# Patient Record
Sex: FEMALE | Race: WHITE | NOT HISPANIC OR LATINO | Employment: PART TIME | URBAN - METROPOLITAN AREA
[De-identification: names, ages, dates, MRNs, and addresses within clinical notes are randomized per-mention and may not be internally consistent; named-entity substitution may affect disease eponyms.]

---

## 2017-01-06 ENCOUNTER — CONVERSION ENCOUNTER (OUTPATIENT)
Dept: FAMILY MEDICINE CLINIC | Facility: CLINIC | Age: 52
End: 2017-01-06

## 2017-02-21 ENCOUNTER — CONVERSION ENCOUNTER (OUTPATIENT)
Dept: FAMILY MEDICINE CLINIC | Facility: CLINIC | Age: 52
End: 2017-02-21

## 2017-03-29 ENCOUNTER — HOSPITAL ENCOUNTER (OUTPATIENT)
Dept: CARDIOLOGY | Facility: HOSPITAL | Age: 52
Discharge: HOME | End: 2017-03-29
Attending: SURGERY | Admitting: SURGERY

## 2017-03-29 ENCOUNTER — CONVERSION ENCOUNTER (OUTPATIENT)
Dept: FAMILY MEDICINE CLINIC | Facility: CLINIC | Age: 52
End: 2017-03-29

## 2017-04-18 ENCOUNTER — HOSPITAL ENCOUNTER (OUTPATIENT)
Dept: INTERVENTIONAL RADIOLOGY/VASCULAR | Facility: HOSPITAL | Age: 52
Discharge: HOME | End: 2017-04-18
Attending: ORTHOPAEDIC SURGERY | Admitting: ORTHOPAEDIC SURGERY

## 2017-05-03 ENCOUNTER — CONVERSION ENCOUNTER (OUTPATIENT)
Dept: FAMILY MEDICINE CLINIC | Facility: CLINIC | Age: 52
End: 2017-05-03

## 2017-06-08 ENCOUNTER — CONVERSION ENCOUNTER (OUTPATIENT)
Dept: FAMILY MEDICINE CLINIC | Facility: CLINIC | Age: 52
End: 2017-06-08

## 2017-06-08 ENCOUNTER — HOSPITAL ENCOUNTER (OUTPATIENT)
Dept: ORTHOPEDIC SURGERY | Facility: CLINIC | Age: 52
Discharge: HOME | End: 2017-06-08
Attending: ORTHOPAEDIC SURGERY | Admitting: ORTHOPAEDIC SURGERY

## 2017-07-13 ENCOUNTER — CONVERSION ENCOUNTER (OUTPATIENT)
Dept: FAMILY MEDICINE CLINIC | Facility: CLINIC | Age: 52
End: 2017-07-13

## 2017-07-26 ENCOUNTER — CONVERSION ENCOUNTER (OUTPATIENT)
Dept: FAMILY MEDICINE CLINIC | Facility: CLINIC | Age: 52
End: 2017-07-26

## 2017-08-15 ENCOUNTER — CONVERSION ENCOUNTER (OUTPATIENT)
Dept: FAMILY MEDICINE CLINIC | Facility: CLINIC | Age: 52
End: 2017-08-15

## 2017-08-15 ENCOUNTER — HOSPITAL ENCOUNTER (OUTPATIENT)
Dept: ORTHOPEDIC SURGERY | Facility: CLINIC | Age: 52
Discharge: HOME | End: 2017-08-15
Attending: PHYSICIAN ASSISTANT | Admitting: PHYSICIAN ASSISTANT

## 2017-10-05 ENCOUNTER — CONVERSION ENCOUNTER (OUTPATIENT)
Dept: FAMILY MEDICINE CLINIC | Facility: CLINIC | Age: 52
End: 2017-10-05

## 2018-02-19 ENCOUNTER — CONVERSION ENCOUNTER (OUTPATIENT)
Dept: FAMILY MEDICINE CLINIC | Facility: CLINIC | Age: 53
End: 2018-02-19

## 2018-04-24 ENCOUNTER — CONVERSION ENCOUNTER (OUTPATIENT)
Dept: FAMILY MEDICINE CLINIC | Facility: CLINIC | Age: 53
End: 2018-04-24

## 2018-04-24 ENCOUNTER — HOSPITAL ENCOUNTER (OUTPATIENT)
Dept: FAMILY MEDICINE CLINIC | Facility: CLINIC | Age: 53
Setting detail: SPECIMEN
Discharge: HOME | End: 2018-04-24
Attending: NURSE PRACTITIONER | Admitting: NURSE PRACTITIONER

## 2018-04-26 LAB
BACTERIA SPEC AEROBE CULT: NORMAL
Lab: NORMAL
MICRO REPORT STATUS: NORMAL
SPECIMEN SOURCE: NORMAL

## 2018-11-27 ENCOUNTER — HOSPITAL ENCOUNTER (OUTPATIENT)
Dept: ORTHOPEDIC SURGERY | Facility: CLINIC | Age: 53
Discharge: HOME | End: 2018-11-27
Attending: PHYSICIAN ASSISTANT | Admitting: PHYSICIAN ASSISTANT

## 2018-11-27 ENCOUNTER — CONVERSION ENCOUNTER (OUTPATIENT)
Dept: FAMILY MEDICINE CLINIC | Facility: CLINIC | Age: 53
End: 2018-11-27

## 2018-11-29 ENCOUNTER — HOSPITAL ENCOUNTER (OUTPATIENT)
Dept: FAMILY MEDICINE CLINIC | Facility: CLINIC | Age: 53
Setting detail: SPECIMEN
Discharge: HOME | End: 2018-11-29
Attending: PHYSICIAN ASSISTANT | Admitting: PHYSICIAN ASSISTANT

## 2018-11-29 ENCOUNTER — CONVERSION ENCOUNTER (OUTPATIENT)
Dept: FAMILY MEDICINE CLINIC | Facility: CLINIC | Age: 53
End: 2018-11-29

## 2018-11-29 LAB
ALBUMIN SERPL-MCNC: 4.5 G/DL (ref 3.5–4.8)
ALBUMIN/GLOB SERPL: 1.3 {RATIO} (ref 1–1.7)
ALP SERPL-CCNC: 88 IU/L (ref 32–91)
ALT SERPL-CCNC: 42 IU/L (ref 14–54)
ANION GAP SERPL CALC-SCNC: 13.7 MMOL/L (ref 10–20)
AST SERPL-CCNC: 66 IU/L (ref 15–41)
BILIRUB SERPL-MCNC: 0.6 MG/DL (ref 0.3–1.2)
BUN SERPL-MCNC: 18 MG/DL (ref 8–20)
BUN/CREAT SERPL: 30 (ref 5.4–26.2)
CALCIUM SERPL-MCNC: 9.6 MG/DL (ref 8.9–10.3)
CHLORIDE SERPL-SCNC: 100 MMOL/L (ref 101–111)
CHOLEST SERPL-MCNC: 257 MG/DL
CHOLEST/HDLC SERPL: 3.6 {RATIO}
CONV CO2: 26 MMOL/L (ref 22–32)
CONV LDL CHOLESTEROL DIRECT: 100 MG/DL (ref 0–100)
CONV TOTAL PROTEIN: 8 G/DL (ref 6.1–7.9)
CREAT UR-MCNC: 0.6 MG/DL (ref 0.4–1)
GLOBULIN UR ELPH-MCNC: 3.5 G/DL (ref 2.5–3.8)
GLUCOSE SERPL-MCNC: 82 MG/DL (ref 65–99)
HDLC SERPL-MCNC: 71 MG/DL
LDLC/HDLC SERPL: 1.4 {RATIO}
LIPID INTERPRETATION: ABNORMAL
POTASSIUM SERPL-SCNC: 4.7 MMOL/L (ref 3.6–5.1)
SODIUM SERPL-SCNC: 135 MMOL/L (ref 136–144)
TRIGL SERPL-MCNC: 704 MG/DL
VLDLC SERPL CALC-MCNC: 85.7 MG/DL

## 2018-11-30 LAB
ANA SER QL IA: NORMAL
CHROMATIN AB SERPL-ACNC: <20 [IU]/ML (ref 0–20)

## 2018-12-27 ENCOUNTER — HOSPITAL ENCOUNTER (OUTPATIENT)
Dept: ORTHOPEDIC SURGERY | Facility: CLINIC | Age: 53
Setting detail: SPECIMEN
Discharge: HOME | End: 2018-12-27
Attending: PHYSICIAN ASSISTANT | Admitting: PHYSICIAN ASSISTANT

## 2018-12-27 ENCOUNTER — CONVERSION ENCOUNTER (OUTPATIENT)
Dept: FAMILY MEDICINE CLINIC | Facility: CLINIC | Age: 53
End: 2018-12-27

## 2018-12-27 LAB
BASOPHILS # BLD AUTO: 0 10*3/UL (ref 0–0.2)
BASOPHILS NFR BLD AUTO: 1 % (ref 0–2)
DIFFERENTIAL METHOD BLD: (no result)
EOSINOPHIL # BLD AUTO: 0.1 10*3/UL (ref 0–0.3)
EOSINOPHIL # BLD AUTO: 2 % (ref 0–3)
ERYTHROCYTE [DISTWIDTH] IN BLOOD BY AUTOMATED COUNT: 13.6 % (ref 11.5–14.5)
HCT VFR BLD AUTO: 39.6 % (ref 35–49)
HGB BLD-MCNC: 13.5 G/DL (ref 12–15)
LYMPHOCYTES # BLD AUTO: 1.6 10*3/UL (ref 0.8–4.8)
LYMPHOCYTES NFR BLD AUTO: 32 % (ref 18–42)
MAGNESIUM SERPL-MCNC: 1.8 MG/DL (ref 1.8–2.5)
MCH RBC QN AUTO: 36.5 PG (ref 26–32)
MCHC RBC AUTO-ENTMCNC: 34.2 G/DL (ref 32–36)
MCV RBC AUTO: 106.9 FL (ref 80–94)
MONOCYTES # BLD AUTO: 0.4 10*3/UL (ref 0.1–1.3)
MONOCYTES NFR BLD AUTO: 8 % (ref 2–11)
NEUTROPHILS # BLD AUTO: 2.9 10*3/UL (ref 2.3–8.6)
NEUTROPHILS NFR BLD AUTO: 57 % (ref 50–75)
NRBC BLD AUTO-RTO: 0 /100{WBCS}
NRBC/RBC NFR BLD MANUAL: 0 10*3/UL
PHOSPHATE SERPL-MCNC: 3.4 MG/DL (ref 2.4–4.7)
PLATELET # BLD AUTO: 217 10*3/UL (ref 150–450)
PMV BLD AUTO: 7.4 FL (ref 7.4–10.4)
RBC # BLD AUTO: 3.71 10*6/UL (ref 4–5.4)
WBC # BLD AUTO: 5.1 10*3/UL (ref 4.5–11.5)

## 2018-12-28 ENCOUNTER — CONVERSION ENCOUNTER (OUTPATIENT)
Dept: FAMILY MEDICINE CLINIC | Facility: CLINIC | Age: 53
End: 2018-12-28

## 2019-03-11 ENCOUNTER — HOSPITAL ENCOUNTER (OUTPATIENT)
Dept: FAMILY MEDICINE CLINIC | Facility: CLINIC | Age: 54
Setting detail: SPECIMEN
Discharge: HOME | End: 2019-03-11
Attending: PHYSICIAN ASSISTANT | Admitting: PHYSICIAN ASSISTANT

## 2019-04-26 ENCOUNTER — HOSPITAL ENCOUNTER (OUTPATIENT)
Dept: URGENT CARE | Facility: CLINIC | Age: 54
Setting detail: SPECIMEN
Discharge: HOME | End: 2019-04-26
Attending: FAMILY MEDICINE | Admitting: FAMILY MEDICINE

## 2019-04-26 ENCOUNTER — CONVERSION ENCOUNTER (OUTPATIENT)
Dept: FAMILY MEDICINE CLINIC | Facility: CLINIC | Age: 54
End: 2019-04-26

## 2019-04-26 ENCOUNTER — HOSPITAL ENCOUNTER (OUTPATIENT)
Dept: URGENT CARE | Facility: CLINIC | Age: 54
Discharge: HOME | End: 2019-04-26
Attending: FAMILY MEDICINE | Admitting: FAMILY MEDICINE

## 2019-04-26 LAB
GRAM STN SPEC: NORMAL
Lab: NORMAL
MICRO REPORT STATUS: NORMAL
SPECIMEN SOURCE: NORMAL
SPECIMEN SOURCE: NORMAL

## 2019-06-04 VITALS
BODY MASS INDEX: 20.49 KG/M2 | DIASTOLIC BLOOD PRESSURE: 87 MMHG | WEIGHT: 105 LBS | BODY MASS INDEX: 18.37 KG/M2 | DIASTOLIC BLOOD PRESSURE: 98 MMHG | DIASTOLIC BLOOD PRESSURE: 90 MMHG | HEART RATE: 90 BPM | DIASTOLIC BLOOD PRESSURE: 90 MMHG | WEIGHT: 109 LBS | HEART RATE: 88 BPM | OXYGEN SATURATION: 99 % | OXYGEN SATURATION: 99 % | HEART RATE: 93 BPM | HEIGHT: 64 IN | WEIGHT: 110 LBS | OXYGEN SATURATION: 100 % | SYSTOLIC BLOOD PRESSURE: 131 MMHG | WEIGHT: 105 LBS | WEIGHT: 108 LBS | SYSTOLIC BLOOD PRESSURE: 167 MMHG | SYSTOLIC BLOOD PRESSURE: 147 MMHG | SYSTOLIC BLOOD PRESSURE: 145 MMHG | SYSTOLIC BLOOD PRESSURE: 162 MMHG | DIASTOLIC BLOOD PRESSURE: 89 MMHG | HEART RATE: 86 BPM | DIASTOLIC BLOOD PRESSURE: 122 MMHG | WEIGHT: 111 LBS | SYSTOLIC BLOOD PRESSURE: 168 MMHG | SYSTOLIC BLOOD PRESSURE: 168 MMHG | OXYGEN SATURATION: 99 % | WEIGHT: 109 LBS | SYSTOLIC BLOOD PRESSURE: 142 MMHG | SYSTOLIC BLOOD PRESSURE: 133 MMHG | HEART RATE: 101 BPM | SYSTOLIC BLOOD PRESSURE: 124 MMHG | BODY MASS INDEX: 18.78 KG/M2 | DIASTOLIC BLOOD PRESSURE: 91 MMHG | HEART RATE: 101 BPM | HEART RATE: 77 BPM | WEIGHT: 105 LBS | WEIGHT: 120 LBS | SYSTOLIC BLOOD PRESSURE: 185 MMHG | WEIGHT: 107 LBS | BODY MASS INDEX: 18.27 KG/M2 | WEIGHT: 110 LBS | DIASTOLIC BLOOD PRESSURE: 85 MMHG | HEART RATE: 89 BPM | SYSTOLIC BLOOD PRESSURE: 135 MMHG | SYSTOLIC BLOOD PRESSURE: 193 MMHG | BODY MASS INDEX: 18.54 KG/M2 | DIASTOLIC BLOOD PRESSURE: 106 MMHG | WEIGHT: 108.8 LBS | WEIGHT: 107 LBS | DIASTOLIC BLOOD PRESSURE: 119 MMHG | HEIGHT: 64 IN | DIASTOLIC BLOOD PRESSURE: 86 MMHG | WEIGHT: 105 LBS | OXYGEN SATURATION: 100 % | WEIGHT: 105 LBS | BODY MASS INDEX: 18.95 KG/M2 | DIASTOLIC BLOOD PRESSURE: 98 MMHG | HEART RATE: 95 BPM | HEART RATE: 101 BPM | HEIGHT: 64 IN | WEIGHT: 105 LBS | DIASTOLIC BLOOD PRESSURE: 100 MMHG | OXYGEN SATURATION: 99 % | BODY MASS INDEX: 18.71 KG/M2 | OXYGEN SATURATION: 100 % | HEART RATE: 77 BPM | SYSTOLIC BLOOD PRESSURE: 124 MMHG | HEIGHT: 64 IN | SYSTOLIC BLOOD PRESSURE: 186 MMHG | HEIGHT: 64 IN | HEART RATE: 89 BPM | BODY MASS INDEX: 18.61 KG/M2 | DIASTOLIC BLOOD PRESSURE: 88 MMHG | HEART RATE: 73 BPM | DIASTOLIC BLOOD PRESSURE: 121 MMHG | HEART RATE: 81 BPM | HEART RATE: 89 BPM

## 2019-06-17 ENCOUNTER — OFFICE VISIT (OUTPATIENT)
Dept: FAMILY MEDICINE CLINIC | Facility: CLINIC | Age: 54
End: 2019-06-17

## 2019-06-17 VITALS
HEART RATE: 66 BPM | OXYGEN SATURATION: 99 % | BODY MASS INDEX: 21.09 KG/M2 | DIASTOLIC BLOOD PRESSURE: 60 MMHG | HEIGHT: 63 IN | TEMPERATURE: 98.2 F | WEIGHT: 119 LBS | SYSTOLIC BLOOD PRESSURE: 90 MMHG

## 2019-06-17 DIAGNOSIS — F10.932: Primary | ICD-10-CM

## 2019-06-17 PROBLEM — M79.605 LEG PAIN, LEFT: Status: ACTIVE | Noted: 2017-01-06

## 2019-06-17 PROBLEM — Z96.649 STATUS POST HIP REPLACEMENT: Status: ACTIVE | Noted: 2017-08-15

## 2019-06-17 PROBLEM — F17.200 SMOKER: Status: ACTIVE | Noted: 2019-04-26

## 2019-06-17 PROBLEM — M81.0 OSTEOPOROSIS: Status: ACTIVE | Noted: 2019-04-26

## 2019-06-17 PROBLEM — E78.5 HYPERLIPIDEMIA: Status: ACTIVE | Noted: 2018-11-29

## 2019-06-17 PROBLEM — I10 HYPERTENSION: Status: ACTIVE | Noted: 2019-06-17

## 2019-06-17 PROBLEM — Z78.0 POSTMENOPAUSAL STATE: Status: ACTIVE | Noted: 2018-12-27

## 2019-06-17 PROBLEM — E53.8 VITAMIN B12 DEFICIENCY: Status: ACTIVE | Noted: 2018-12-28

## 2019-06-17 PROBLEM — M16.12 DEGENERATIVE JOINT DISEASE OF LEFT HIP: Status: ACTIVE | Noted: 2017-06-08

## 2019-06-17 PROBLEM — G40.909 SEIZURE DISORDER: Status: ACTIVE | Noted: 2018-11-29

## 2019-06-17 PROBLEM — M54.17 LUMBOSACRAL RADICULOPATHY: Status: ACTIVE | Noted: 2017-02-21

## 2019-06-17 PROBLEM — D53.9 MACROCYTIC ANEMIA: Status: ACTIVE | Noted: 2018-12-28

## 2019-06-17 PROBLEM — F43.10 POST TRAUMATIC STRESS DISORDER: Status: ACTIVE | Noted: 2018-11-29

## 2019-06-17 PROBLEM — F17.200 TOBACCO DEPENDENCE SYNDROME: Status: ACTIVE | Noted: 2017-03-29

## 2019-06-17 PROBLEM — M25.50 PAIN IN JOINTS: Status: ACTIVE | Noted: 2018-11-29

## 2019-06-17 PROBLEM — I73.9 PERIPHERAL VASCULAR DISEASE (HCC): Status: ACTIVE | Noted: 2017-02-21

## 2019-06-17 PROCEDURE — 99213 OFFICE O/P EST LOW 20 MIN: CPT | Performed by: NURSE PRACTITIONER

## 2019-06-17 RX ORDER — ATORVASTATIN CALCIUM 20 MG/1
TABLET, FILM COATED ORAL
Refills: 0 | COMMUNITY
Start: 2019-05-28 | End: 2019-08-26 | Stop reason: SDUPTHER

## 2019-06-17 RX ORDER — SERTRALINE HYDROCHLORIDE 25 MG/1
75 TABLET, FILM COATED ORAL DAILY
Refills: 0 | COMMUNITY
Start: 2019-06-09 | End: 2019-06-17

## 2019-06-17 RX ORDER — OMEGA-3-ACID ETHYL ESTERS 1 G/1
CAPSULE, LIQUID FILLED ORAL
Refills: 0 | COMMUNITY
Start: 2019-05-28 | End: 2019-08-26 | Stop reason: SDUPTHER

## 2019-06-17 RX ORDER — LISINOPRIL AND HYDROCHLOROTHIAZIDE 25; 20 MG/1; MG/1
TABLET ORAL
Refills: 0 | COMMUNITY
Start: 2019-06-03 | End: 2019-07-26

## 2019-06-17 RX ORDER — ARIPIPRAZOLE 5 MG/1
5 TABLET ORAL DAILY
Qty: 30 TABLET | Refills: 0 | Status: SHIPPED | OUTPATIENT
Start: 2019-06-17 | End: 2019-07-18 | Stop reason: SDUPTHER

## 2019-06-17 RX ORDER — SERTRALINE HYDROCHLORIDE 100 MG/1
100 TABLET, FILM COATED ORAL DAILY
Qty: 30 TABLET | Refills: 1 | Status: SHIPPED | OUTPATIENT
Start: 2019-06-17 | End: 2019-08-27

## 2019-06-17 RX ORDER — MELOXICAM 15 MG/1
15 TABLET ORAL DAILY
Refills: 0 | COMMUNITY
Start: 2019-05-29 | End: 2021-03-29

## 2019-06-17 RX ORDER — ASPIRIN 325 MG/1
1 TABLET, FILM COATED ORAL DAILY
Refills: 0 | COMMUNITY
Start: 2019-06-09 | End: 2019-08-02 | Stop reason: SDUPTHER

## 2019-06-17 RX ORDER — AMLODIPINE BESYLATE 5 MG/1
TABLET ORAL
Refills: 0 | COMMUNITY
Start: 2019-05-28 | End: 2019-07-26

## 2019-06-17 RX ORDER — HYDROCHLOROTHIAZIDE 25 MG/1
TABLET ORAL EVERY 12 HOURS
COMMUNITY
Start: 2019-03-01 | End: 2019-07-26

## 2019-06-17 RX ORDER — CHOLECALCIFEROL (VITAMIN D3) 25 MCG
TABLET,CHEWABLE ORAL
Refills: 0 | COMMUNITY
Start: 2019-04-10 | End: 2021-03-29 | Stop reason: SDDI

## 2019-06-17 RX ORDER — FOLIC ACID 1 MG/1
1000 TABLET ORAL DAILY
Refills: 0 | COMMUNITY
Start: 2019-06-09 | End: 2019-08-02 | Stop reason: SDUPTHER

## 2019-06-17 NOTE — PROGRESS NOTES
Berkley Yu is a 53 y.o. female.     Pt is here today for a hospital follow up on alcohol withdrawal and hallucinations.  She was admitted on Weds Jun 5th and was released on Friday June 7th.  Her last drink of alcohol was on June 4th at 9pm.  She used to drink almost 1 pint of hard alcohol daily.  She went to the hospital with HA, neck stiffness, nausea and vomiting, and hallucination. She reports that she has not drank since being discharged.  She reports that she is still having some hallucinations at night.  She can see and feel bugs crawling and hears kids yelling.  The hallucinations keep her up at night.  She does not have them every night. Pt reports that she is going to AA meeting 4-5 times a week.  She denies any drug.  She is currently on zoloft 75 mg. She was told she may need to increase her dose again.  She denies any thoughts of suicide or homicide         The following portions of the patient's history were reviewed and updated as appropriate: allergies, current medications, past family history, past medical history, past social history, past surgical history and problem list.    Review of Systems   Constitutional: Positive for appetite change (very irregular eating habits), diaphoresis (night sweats) and fatigue. Negative for chills and fever.   HENT: Negative for congestion, ear pain and hearing loss.    Eyes: Positive for blurred vision (worseing vision since getting off alcohol) and visual disturbance.   Respiratory: Negative for cough, chest tightness, shortness of breath and wheezing.    Cardiovascular: Negative for chest pain and palpitations.   Gastrointestinal: Negative for abdominal pain, blood in stool, constipation, diarrhea, nausea and vomiting.   Genitourinary: Negative for dysuria, flank pain, frequency and urgency.   Musculoskeletal: Negative for arthralgias and myalgias.   Skin: Negative for rash and skin lesions.   Neurological: Negative for dizziness and  "headache.   Psychiatric/Behavioral: Positive for hallucinations and sleep disturbance. Negative for self-injury, suicidal ideas and stress. The patient is nervous/anxious.        Objective   BP 90/60   Pulse 66   Temp 98.2 °F (36.8 °C)   Ht 160.8 cm (63.3\")   Wt 54 kg (119 lb)   SpO2 99%   BMI 20.88 kg/m²   Physical Exam   Constitutional: She is oriented to person, place, and time. She appears well-developed and well-nourished. No distress.   thin   HENT:   Head: Normocephalic and atraumatic.   Eyes: Conjunctivae and EOM are normal. Pupils are equal, round, and reactive to light. Right eye exhibits no discharge. Left eye exhibits no discharge.   Neck: Normal range of motion. Neck supple.   Cardiovascular: Normal rate and regular rhythm.   Pulmonary/Chest: Effort normal and breath sounds normal. No respiratory distress. She has no wheezes. She has no rales.   Abdominal: Soft. Normal appearance and bowel sounds are normal. She exhibits no distension. There is no tenderness.   Musculoskeletal: Normal range of motion.   Neurological: She is alert and oriented to person, place, and time.   Skin: Skin is warm and dry. She is not diaphoretic.   Psychiatric: She has a normal mood and affect. Her speech is normal and behavior is normal. Thought content normal. Hallucinating: denies hallucinations at this time.   Vitals reviewed.        Assessment/Plan   Problems Addressed this Visit     None      Visit Diagnoses     Alcohol withdrawal with perceptual disturbances (CMS/HCC)    -  Primary    increase zoloft to 100mg, start abilify. psych referral    Relevant Medications    sertraline (ZOLOFT) 100 MG tablet    ARIPiprazole (ABILIFY) 5 MG tablet    Other Relevant Orders    Comprehensive metabolic panel    Ambulatory Referral to Psychiatry    Ambulatory Referral to Behavioral Health                   "

## 2019-06-17 NOTE — PATIENT INSTRUCTIONS
Start taking increased dose of zoloft  Start taking abilify daily  Follow up in 1 month  Referral to [facility] with psych and counseling  Call if symptoms worsening

## 2019-06-21 ENCOUNTER — LAB (OUTPATIENT)
Dept: FAMILY MEDICINE CLINIC | Facility: CLINIC | Age: 54
End: 2019-06-21

## 2019-06-21 DIAGNOSIS — F10.932: ICD-10-CM

## 2019-06-21 LAB
ALBUMIN SERPL-MCNC: 4 G/DL (ref 3.5–4.8)
ALBUMIN/GLOB SERPL: 1.1 G/DL (ref 1–1.7)
ALP SERPL-CCNC: 91 U/L (ref 32–91)
ALT SERPL W P-5'-P-CCNC: 31 U/L (ref 14–54)
ANION GAP SERPL CALCULATED.3IONS-SCNC: 11 MMOL/L (ref 10–20)
AST SERPL-CCNC: 38 U/L (ref 15–41)
BILIRUB SERPL-MCNC: 0.6 MG/DL (ref 0.3–1.2)
BUN BLD-MCNC: 16 MG/DL (ref 8–20)
BUN/CREAT SERPL: 20 (ref 5.4–26.2)
CALCIUM SPEC-SCNC: 9.7 MG/DL (ref 8.9–10.3)
CHLORIDE SERPL-SCNC: 99 MMOL/L (ref 101–111)
CO2 SERPL-SCNC: 24 MMOL/L (ref 22–32)
CREAT BLD-MCNC: 0.8 MG/DL (ref 0.4–1)
GFR SERPL CREATININE-BSD FRML MDRD: 76 ML/MIN/1.73
GLOBULIN UR ELPH-MCNC: 3.5 GM/DL (ref 2.5–3.8)
GLUCOSE BLD-MCNC: 90 MG/DL (ref 65–99)
POTASSIUM BLD-SCNC: 4.3 MMOL/L (ref 3.6–5.1)
PROT SERPL-MCNC: 7.5 G/DL (ref 6.1–7.9)
SODIUM BLD-SCNC: 134 MMOL/L (ref 136–144)

## 2019-06-21 PROCEDURE — 80053 COMPREHEN METABOLIC PANEL: CPT | Performed by: NURSE PRACTITIONER

## 2019-06-25 ENCOUNTER — TELEPHONE (OUTPATIENT)
Dept: FAMILY MEDICINE CLINIC | Facility: CLINIC | Age: 54
End: 2019-06-25

## 2019-06-25 NOTE — TELEPHONE ENCOUNTER
----- Message from SHARIF Agudelo sent at 6/22/2019 10:12 PM EDT -----  Please let patient know her labs are stable

## 2019-07-19 RX ORDER — ARIPIPRAZOLE 5 MG/1
TABLET ORAL
Qty: 30 TABLET | Refills: 0 | Status: SHIPPED | OUTPATIENT
Start: 2019-07-19 | End: 2019-07-26

## 2019-07-26 ENCOUNTER — OFFICE VISIT (OUTPATIENT)
Dept: FAMILY MEDICINE CLINIC | Facility: CLINIC | Age: 54
End: 2019-07-26

## 2019-07-26 VITALS
HEART RATE: 89 BPM | DIASTOLIC BLOOD PRESSURE: 67 MMHG | WEIGHT: 115.2 LBS | BODY MASS INDEX: 20.41 KG/M2 | OXYGEN SATURATION: 99 % | HEIGHT: 63 IN | SYSTOLIC BLOOD PRESSURE: 101 MMHG

## 2019-07-26 DIAGNOSIS — Z12.11 COLON CANCER SCREENING: ICD-10-CM

## 2019-07-26 DIAGNOSIS — I10 HYPERTENSION, UNSPECIFIED TYPE: ICD-10-CM

## 2019-07-26 DIAGNOSIS — F10.932: Primary | ICD-10-CM

## 2019-07-26 PROBLEM — D64.9 ANEMIA: Status: ACTIVE | Noted: 2019-04-26

## 2019-07-26 PROCEDURE — 99213 OFFICE O/P EST LOW 20 MIN: CPT | Performed by: NURSE PRACTITIONER

## 2019-07-26 NOTE — PATIENT INSTRUCTIONS
Ok to stay off of lisinopril/hctz  May stop norvasc  Call [facility] at [phone], option 3, then option 1 on Monday with BP reading  Continue zolt  Call for any issues or concerns  Complete colonoscopy

## 2019-07-26 NOTE — PROGRESS NOTES
Subjective   Lita Yu is a 53 y.o. female.     Pt is here today to follow up on alcohol withdrawal and depression.  She had one relapse since the last time I saw her, but that was over a month ago.  She is going to AA meetings 10 times a week.  She also has a sponsor that she is in contact with.  Pt states that she is feeling so much better.  She is not having any more withdrawal or side effects.  Pt stopped taking the abilify on Sunday because she had a 12lb weight gain within a week.  She thought it could have been due to the medication.  She states that she feels good without the abilify.  She has not made an appointment at [facility] since she is going to so many AA meetings.    Pt states that she has been out of her lisinopril/ hctz for 2 weeks.  She has checked her BP at the [facility] and it was 90/54.  She is wanting to know if she can stop norvasc.         The following portions of the patient's history were reviewed and updated as appropriate: allergies, current medications, past family history, past medical history, past social history, past surgical history and problem list.    Review of Systems   Constitutional: Positive for diaphoresis (every morning). Negative for appetite change, chills, fatigue and fever.   HENT: Negative for congestion, ear pain, hearing loss, postnasal drip, rhinorrhea, sinus pressure, sore throat and trouble swallowing.    Eyes: Negative for blurred vision, double vision, pain, discharge, itching and visual disturbance.   Respiratory: Negative for cough, chest tightness, shortness of breath and wheezing.    Cardiovascular: Negative for chest pain and palpitations.   Gastrointestinal: Positive for diarrhea. Negative for abdominal pain, blood in stool, constipation, nausea and vomiting.   Genitourinary: Negative for dysuria, flank pain, frequency and urgency.   Skin: Negative for rash and skin lesions.   Neurological: Negative for dizziness, weakness, numbness and  "headache.   Psychiatric/Behavioral: Negative for agitation, hallucinations, depressed mood and stress. The patient is not nervous/anxious.        Objective   /67 (BP Location: Left arm, Patient Position: Sitting, Cuff Size: Adult)   Pulse 89   Ht 160.8 cm (63.3\")   Wt 52.3 kg (115 lb 3.2 oz)   SpO2 99%   BMI 20.21 kg/m²   Physical Exam   Constitutional: She is oriented to person, place, and time. She appears well-developed and well-nourished. No distress.   HENT:   Head: Normocephalic and atraumatic.   Eyes: Conjunctivae and EOM are normal. Pupils are equal, round, and reactive to light. Right eye exhibits no discharge. Left eye exhibits no discharge.   Neck: Normal range of motion. Neck supple.   Cardiovascular: Normal rate and regular rhythm.   Pulmonary/Chest: Effort normal and breath sounds normal. No respiratory distress. She has no wheezes. She has no rales.   Abdominal: Soft. Normal appearance and bowel sounds are normal. She exhibits no distension. There is no tenderness.   Musculoskeletal: Normal range of motion.   Neurological: She is alert and oriented to person, place, and time.   Skin: Skin is warm and dry. She is not diaphoretic.   Psychiatric: She has a normal mood and affect. Her behavior is normal. Thought content normal.   Vitals reviewed.        Assessment/Plan   Problems Addressed this Visit        Cardiovascular and Mediastinum    Hypertension      Other Visit Diagnoses     Alcohol withdrawal with perceptual disturbances (CMS/HCC)    -  Primary    resolved  pt is feeling great and is currently taking zoloft  stopped abiliy  goes to     Colon cancer screening        colonoscopy ordered    Relevant Orders    Ambulatory Referral For Screening Colonoscopy              Diagnoses and all orders for this visit:    1. Alcohol withdrawal with perceptual disturbances (CMS/HCC) (Primary)  Comments:  resolved  pt is feeling great and is currently taking zoloft  stopped abilify  goes to " AA    2. Colon cancer screening  Comments:  colonoscopy ordered  Orders:  -     Ambulatory Referral For Screening Colonoscopy    3. Hypertension, unspecified type  Comments:  resolved  has not taken lisinopril/hctz in 2 wks  BP still low. Ok to stop amlodipine  check BP and call on monday

## 2019-08-07 RX ORDER — SERTRALINE HYDROCHLORIDE 25 MG/1
TABLET, FILM COATED ORAL
Qty: 90 TABLET | Refills: 1 | Status: SHIPPED | OUTPATIENT
Start: 2019-08-07 | End: 2019-08-27

## 2019-08-07 RX ORDER — FOLIC ACID 1 MG/1
TABLET ORAL
Qty: 30 TABLET | Refills: 1 | Status: SHIPPED | OUTPATIENT
Start: 2019-08-07 | End: 2021-03-29 | Stop reason: SDDI

## 2019-08-07 RX ORDER — ASPIRIN 325 MG/1
TABLET, FILM COATED ORAL
Qty: 30 EACH | Refills: 1 | Status: SHIPPED | OUTPATIENT
Start: 2019-08-07 | End: 2021-03-29 | Stop reason: SDDI

## 2019-08-27 RX ORDER — AMLODIPINE BESYLATE 5 MG/1
TABLET ORAL
Qty: 90 TABLET | Refills: 1 | Status: SHIPPED | OUTPATIENT
Start: 2019-08-27 | End: 2020-02-24

## 2019-08-27 RX ORDER — ATORVASTATIN CALCIUM 20 MG/1
TABLET, FILM COATED ORAL
Qty: 90 TABLET | Refills: 1 | Status: SHIPPED | OUTPATIENT
Start: 2019-08-27 | End: 2020-02-24

## 2019-08-27 RX ORDER — OMEGA-3-ACID ETHYL ESTERS 1 G/1
CAPSULE, LIQUID FILLED ORAL
Qty: 360 CAPSULE | Refills: 1 | Status: SHIPPED | OUTPATIENT
Start: 2019-08-27 | End: 2020-02-24

## 2019-11-08 ENCOUNTER — PATIENT MESSAGE (OUTPATIENT)
Dept: FAMILY MEDICINE CLINIC | Facility: CLINIC | Age: 54
End: 2019-11-08

## 2019-12-05 RX ORDER — SERTRALINE HYDROCHLORIDE 100 MG/1
TABLET, FILM COATED ORAL
Qty: 30 TABLET | Refills: 1 | Status: SHIPPED | OUTPATIENT
Start: 2019-12-05 | End: 2020-02-02

## 2019-12-05 NOTE — TELEPHONE ENCOUNTER
Pt states that she was prescribed 100mg so that is what she has been taking.   She is also requesting refill on abilify.

## 2019-12-05 NOTE — TELEPHONE ENCOUNTER
I have down pt has been off of abilify since July.  There has not been any prescriptions called in since then.  When did she start retaking it?

## 2020-02-02 RX ORDER — SERTRALINE HYDROCHLORIDE 100 MG/1
TABLET, FILM COATED ORAL
Qty: 30 TABLET | Refills: 1 | Status: SHIPPED | OUTPATIENT
Start: 2020-02-02 | End: 2020-02-24

## 2020-02-24 RX ORDER — OMEGA-3-ACID ETHYL ESTERS 1 G/1
CAPSULE, LIQUID FILLED ORAL
Qty: 360 CAPSULE | Refills: 1 | Status: SHIPPED | OUTPATIENT
Start: 2020-02-24 | End: 2021-02-19 | Stop reason: SDUPTHER

## 2020-02-24 RX ORDER — AMLODIPINE BESYLATE 5 MG/1
TABLET ORAL
Qty: 90 TABLET | Refills: 1 | Status: SHIPPED | OUTPATIENT
Start: 2020-02-24 | End: 2020-10-02 | Stop reason: SDUPTHER

## 2020-02-24 RX ORDER — ATORVASTATIN CALCIUM 20 MG/1
TABLET, FILM COATED ORAL
Qty: 90 TABLET | Refills: 1 | Status: SHIPPED | OUTPATIENT
Start: 2020-02-24 | End: 2021-03-05 | Stop reason: SDUPTHER

## 2020-03-24 ENCOUNTER — TELEPHONE (OUTPATIENT)
Dept: FAMILY MEDICINE CLINIC | Facility: CLINIC | Age: 55
End: 2020-03-24

## 2020-03-24 RX ORDER — LEVOFLOXACIN 500 MG/1
500 TABLET, FILM COATED ORAL DAILY
Qty: 10 TABLET | Refills: 0 | Status: SHIPPED | OUTPATIENT
Start: 2020-03-24 | End: 2020-04-03

## 2020-03-24 NOTE — TELEPHONE ENCOUNTER
I called the patient back and encouraged her to be tested for coronavirus, antibiotic sent to the pharmacy to cover for pneumonia.

## 2020-03-24 NOTE — TELEPHONE ENCOUNTER
I called the patient back, she complains of fever of 101.3, cough, body aches, headache for the past 3 days.  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  She is a current smoker.  She denies shortness of breath, however while speaking with her on the phone she sounds short of breath and frequently has to stop to catch her breath.  Due to her smoking status and shortness of breath with fever, I recommended she be tested for the coronavirus, I am also sending in an antibiotic to cover for pneumonia.  She verbalized understanding.

## 2020-03-25 ENCOUNTER — HOSPITAL ENCOUNTER (EMERGENCY)
Facility: HOSPITAL | Age: 55
Discharge: HOME | End: 2020-03-25
Attending: EMERGENCY MEDICINE | Admitting: EMERGENCY MEDICINE

## 2020-03-25 ENCOUNTER — TELEPHONE (OUTPATIENT)
Dept: FAMILY MEDICINE CLINIC | Facility: CLINIC | Age: 55
End: 2020-03-25

## 2020-03-25 VITALS
OXYGEN SATURATION: 100 % | TEMPERATURE: 97.9 F | HEIGHT: 64 IN | SYSTOLIC BLOOD PRESSURE: 119 MMHG | HEART RATE: 75 BPM | BODY MASS INDEX: 20.29 KG/M2 | DIASTOLIC BLOOD PRESSURE: 79 MMHG | RESPIRATION RATE: 16 BRPM | WEIGHT: 118.83 LBS

## 2020-03-25 DIAGNOSIS — J01.90 ACUTE NON-RECURRENT SINUSITIS, UNSPECIFIED LOCATION: Primary | ICD-10-CM

## 2020-03-25 DIAGNOSIS — J06.9 UPPER RESPIRATORY TRACT INFECTION, UNSPECIFIED TYPE: ICD-10-CM

## 2020-03-25 PROCEDURE — 99282 EMERGENCY DEPT VISIT SF MDM: CPT

## 2020-03-25 RX ORDER — AZITHROMYCIN 250 MG/1
TABLET, FILM COATED ORAL
Qty: 6 TABLET | Refills: 0 | OUTPATIENT
Start: 2020-03-25 | End: 2021-02-05

## 2020-03-25 NOTE — TELEPHONE ENCOUNTER
Informed pt to have someone  her letter. Pt came to  her letter coughing at the  requesting her letter.

## 2020-03-25 NOTE — TELEPHONE ENCOUNTER
Pt presented at front window to  work note. Pt was actively coughing and was not wearing mask or gloves. Pt is currently in [facility] ER.

## 2020-03-26 NOTE — TELEPHONE ENCOUNTER
Noted, the emergency department did not test her for coronavirus, they diagnosed her with sinusitis and bronchitis.

## 2020-05-12 ENCOUNTER — OFFICE VISIT (OUTPATIENT)
Dept: FAMILY MEDICINE CLINIC | Facility: CLINIC | Age: 55
End: 2020-05-12

## 2020-05-12 VITALS
TEMPERATURE: 98.5 F | BODY MASS INDEX: 19.91 KG/M2 | HEART RATE: 95 BPM | SYSTOLIC BLOOD PRESSURE: 143 MMHG | DIASTOLIC BLOOD PRESSURE: 95 MMHG | WEIGHT: 116 LBS | OXYGEN SATURATION: 99 %

## 2020-05-12 DIAGNOSIS — L08.9 SOFT TISSUE INFECTION: Primary | ICD-10-CM

## 2020-05-12 DIAGNOSIS — H92.01: ICD-10-CM

## 2020-05-12 PROCEDURE — 99213 OFFICE O/P EST LOW 20 MIN: CPT | Performed by: PHYSICIAN ASSISTANT

## 2020-05-12 PROCEDURE — 96372 THER/PROPH/DIAG INJ SC/IM: CPT | Performed by: PHYSICIAN ASSISTANT

## 2020-05-12 RX ORDER — SERTRALINE HYDROCHLORIDE 100 MG/1
100 TABLET, FILM COATED ORAL DAILY
COMMUNITY
Start: 2020-05-04 | End: 2020-06-01

## 2020-05-12 RX ORDER — DOXYCYCLINE 100 MG/1
100 TABLET ORAL 2 TIMES DAILY
Qty: 14 TABLET | Refills: 0 | Status: SHIPPED | OUTPATIENT
Start: 2020-05-12 | End: 2020-05-19

## 2020-05-12 RX ORDER — KETOROLAC TROMETHAMINE 30 MG/ML
60 INJECTION, SOLUTION INTRAMUSCULAR; INTRAVENOUS ONCE
Status: COMPLETED | OUTPATIENT
Start: 2020-05-12 | End: 2020-05-12

## 2020-05-12 RX ADMIN — KETOROLAC TROMETHAMINE 60 MG: 30 INJECTION, SOLUTION INTRAMUSCULAR; INTRAVENOUS at 11:39

## 2020-05-12 NOTE — PROGRESS NOTES
Berkley Yu is a 54 y.o. female     History of Present Illness  Patient is a 54-year-old white female complaining of bilateral heel pain for the past month.  She states there has been some drainage from the ears and scabs on the outside.  She reports a previous history of MRSA.  She denies fevers.  She has tried Tylenol and ibuprofen over-the-counter for the pain.     The following portions of the patient's history were reviewed and updated as appropriate: allergies, current medications, past family history, past medical history, past social history, past surgical history and problem list.    Review of Systems   Constitutional: Negative for fever.   HENT: Positive for ear pain (bilateral). Negative for sinus pressure and sore throat.    Eyes: Negative for blurred vision, pain and redness.   Respiratory: Negative for shortness of breath.    Cardiovascular: Negative for chest pain.   Skin: Positive for color change and skin lesions.       Objective  Physical Exam   Constitutional: She is oriented to person, place, and time. She appears well-developed and well-nourished.   HENT:   Head: Normocephalic and atraumatic.   Right Ear: External ear normal. There is swelling (tragus).   Left Ear: External ear normal.   Ears:    Nose: Nose normal.   Mouth/Throat: Oropharynx is clear and moist.   Eyes: Pupils are equal, round, and reactive to light. Conjunctivae and EOM are normal.   Neck: Normal range of motion. Neck supple.   Cardiovascular: Normal rate, regular rhythm and normal heart sounds.   Pulmonary/Chest: Effort normal and breath sounds normal.   Neurological: She is alert and oriented to person, place, and time.   Psychiatric: She has a normal mood and affect. Her behavior is normal.       Vitals:    05/12/20 1038   BP: 143/95   BP Location: Right arm   Patient Position: Sitting   Cuff Size: Adult   Pulse: 95   Temp: 98.5 °F (36.9 °C)   TempSrc: Oral   SpO2: 99%   Weight: 52.6 kg (116 lb)     Body  mass index is 19.91 kg/m².    PHQ-9 Total Score:        Assessment/Plan  Diagnoses and all orders for this visit:    1. Soft tissue infection (Primary)  Comments:  Treating patient with doxycycline orally and Toradol injection today.  She is allergic to Keflex and sulfa antibiotics.  Orders:  -     ketorolac (TORADOL) injection 60 mg    2. Pain of right ear structure  Comments:  There is no otitis media or acute otitis externa, it does appear there is an infection in the soft tissues of the right tragus area.    Other orders  -     doxycycline (ADOXA) 100 MG tablet; Take 1 tablet by mouth 2 (Two) Times a Day for 7 days.  Dispense: 14 tablet; Refill: 0

## 2020-06-01 RX ORDER — SERTRALINE HYDROCHLORIDE 100 MG/1
TABLET, FILM COATED ORAL
Qty: 90 TABLET | Refills: 1 | Status: SHIPPED | OUTPATIENT
Start: 2020-06-01 | End: 2021-02-19 | Stop reason: SDUPTHER

## 2020-06-04 ENCOUNTER — E-VISIT (OUTPATIENT)
Dept: FAMILY MEDICINE CLINIC | Facility: CLINIC | Age: 55
End: 2020-06-04

## 2020-06-04 DIAGNOSIS — J44.9 CHRONIC OBSTRUCTIVE PULMONARY DISEASE, UNSPECIFIED COPD TYPE (HCC): ICD-10-CM

## 2020-06-04 DIAGNOSIS — F17.200 SMOKER: ICD-10-CM

## 2020-06-04 DIAGNOSIS — J44.1 COPD EXACERBATION (HCC): Primary | ICD-10-CM

## 2020-06-04 PROCEDURE — 99213 OFFICE O/P EST LOW 20 MIN: CPT | Performed by: PHYSICIAN ASSISTANT

## 2020-06-04 RX ORDER — ALBUTEROL SULFATE 90 UG/1
2 AEROSOL, METERED RESPIRATORY (INHALATION) EVERY 4 HOURS PRN
Qty: 1 INHALER | Refills: 5 | Status: ON HOLD | OUTPATIENT
Start: 2020-06-04 | End: 2022-08-04

## 2020-06-04 NOTE — PROGRESS NOTES
Lita Yu    You have chosen to receive care through a telephone visit. Do you consent to use a telephone visit for your medical care today? Yes    [id number]  [phone]    I have reviewed the e-Visit questionnaire and patient's answers, my assessment and plan are as follows:    HPI  Patient is a 54-year-old white female complaining of episodic shortness of breath that has been occurring since February 2020.  She states that she has had a chronic cough for the last year or so, she is a daily smoker, however the shortness of breath is new.  She wonders if she could have asthma.  The shortness of breath is not severe, it does come and go.  She has never tried inhalers in the past.  She has never been diagnosed with COPD.  She denies fevers, feeling ill, productive cough, it is dry.    Review of Systems - Negative except SOA episodically.        Diagnoses and all orders for this visit:    COPD exacerbation (CMS/[facility])  Comments:  Patient to use albuterol inhaler as needed for shortness of breath, she is to follow-up in 1 week to let me know if improving or worsening.  Will consider maintenance inhalers, PFTs, or CT scan of the chest at that time if needed.  Will need an office visit at that time if not improving.    Chronic obstructive pulmonary disease, unspecified COPD type (CMS/[facility])  Comments:  Due to patient's long history of smoking and coughing regularly, I am diagnosing her with COPD.  Starting on albuterol inhaler today.  Recommended smoking cessation.    Smoker  Comments:  Patient is a current smoker not ready to quit.    This visit has been rescheduled as a phone visit to comply with patient safety concerns in accordance with CDC recommendations. Total time of discussion was 15 minutes.      Any medications prescribed have been sent electronically to   [facility][id number] [facility] [address] AT [facility] [phone] [facility] [phone]   [address]  [facility] [address] [place] [address] [place] [facility]  Phone: [phone] Fax: [phone]        WELLINGTON Fraire  06/04/20  1:00 PM

## 2020-06-08 DIAGNOSIS — J44.9 CHRONIC OBSTRUCTIVE PULMONARY DISEASE, UNSPECIFIED COPD TYPE (HCC): Primary | ICD-10-CM

## 2020-07-10 ENCOUNTER — TRANSCRIBE ORDERS (OUTPATIENT)
Dept: SLEEP MEDICINE | Facility: HOSPITAL | Age: 55
End: 2020-07-10

## 2020-07-10 DIAGNOSIS — Z01.818 OTHER SPECIFIED PRE-OPERATIVE EXAMINATION: Primary | ICD-10-CM

## 2020-10-02 RX ORDER — AMLODIPINE BESYLATE 5 MG/1
5 TABLET ORAL DAILY
Qty: 90 TABLET | Refills: 1 | Status: SHIPPED | OUTPATIENT
Start: 2020-10-02 | End: 2021-02-19

## 2021-02-19 ENCOUNTER — LAB (OUTPATIENT)
Dept: FAMILY MEDICINE CLINIC | Facility: CLINIC | Age: 56
End: 2021-02-19

## 2021-02-19 ENCOUNTER — OFFICE VISIT (OUTPATIENT)
Dept: FAMILY MEDICINE CLINIC | Facility: CLINIC | Age: 56
End: 2021-02-19

## 2021-02-19 VITALS
OXYGEN SATURATION: 98 % | HEART RATE: 92 BPM | HEIGHT: 64 IN | DIASTOLIC BLOOD PRESSURE: 88 MMHG | WEIGHT: 127 LBS | TEMPERATURE: 97.2 F | SYSTOLIC BLOOD PRESSURE: 154 MMHG | BODY MASS INDEX: 21.68 KG/M2

## 2021-02-19 DIAGNOSIS — I10 HYPERTENSION, UNSPECIFIED TYPE: ICD-10-CM

## 2021-02-19 DIAGNOSIS — F43.10 PTSD (POST-TRAUMATIC STRESS DISORDER): ICD-10-CM

## 2021-02-19 DIAGNOSIS — R04.0 BLOODY NOSE: Primary | ICD-10-CM

## 2021-02-19 DIAGNOSIS — E78.5 HYPERLIPIDEMIA, UNSPECIFIED HYPERLIPIDEMIA TYPE: ICD-10-CM

## 2021-02-19 DIAGNOSIS — R15.9 INCONTINENCE OF FECES, UNSPECIFIED FECAL INCONTINENCE TYPE: ICD-10-CM

## 2021-02-19 DIAGNOSIS — J44.9 CHRONIC OBSTRUCTIVE PULMONARY DISEASE, UNSPECIFIED COPD TYPE (HCC): ICD-10-CM

## 2021-02-19 DIAGNOSIS — Z11.59 ENCOUNTER FOR HEPATITIS C SCREENING TEST FOR LOW RISK PATIENT: ICD-10-CM

## 2021-02-19 LAB
ALBUMIN SERPL-MCNC: 4 G/DL (ref 3.5–5.2)
ALBUMIN/GLOB SERPL: 1.3 G/DL
ALP SERPL-CCNC: 250 U/L (ref 39–117)
ALT SERPL W P-5'-P-CCNC: 123 U/L (ref 1–33)
ANION GAP SERPL CALCULATED.3IONS-SCNC: 14.9 MMOL/L (ref 5–15)
ANISOCYTOSIS BLD QL: NORMAL
AST SERPL-CCNC: 339 U/L (ref 1–32)
BILIRUB SERPL-MCNC: 0.5 MG/DL (ref 0–1.2)
BUN SERPL-MCNC: 8 MG/DL (ref 6–20)
BUN/CREAT SERPL: 13.3 (ref 7–25)
CALCIUM SPEC-SCNC: 8.9 MG/DL (ref 8.6–10.5)
CHLORIDE SERPL-SCNC: 99 MMOL/L (ref 98–107)
CHOLEST SERPL-MCNC: 169 MG/DL (ref 0–200)
CO2 SERPL-SCNC: 25.1 MMOL/L (ref 22–29)
CREAT SERPL-MCNC: 0.6 MG/DL (ref 0.57–1)
DEPRECATED RDW RBC AUTO: 52 FL (ref 37–54)
ERYTHROCYTE [DISTWIDTH] IN BLOOD BY AUTOMATED COUNT: 13.1 % (ref 12.3–15.4)
GFR SERPL CREATININE-BSD FRML MDRD: 105 ML/MIN/1.73
GLOBULIN UR ELPH-MCNC: 3.2 GM/DL
GLUCOSE SERPL-MCNC: 100 MG/DL (ref 65–99)
HCT VFR BLD AUTO: 36.9 % (ref 34–46.6)
HCV AB SER DONR QL: NORMAL
HDLC SERPL-MCNC: 61 MG/DL (ref 40–60)
HGB BLD-MCNC: 12.4 G/DL (ref 12–15.9)
LDLC SERPL CALC-MCNC: 38 MG/DL (ref 0–100)
LDLC/HDLC SERPL: 0.13 {RATIO}
LYMPHOCYTES # BLD MANUAL: 1.18 10*3/MM3 (ref 0.7–3.1)
LYMPHOCYTES NFR BLD MANUAL: 10.6 % (ref 5–12)
LYMPHOCYTES NFR BLD MANUAL: 26.6 % (ref 19.6–45.3)
MACROCYTES BLD QL SMEAR: NORMAL
MCH RBC QN AUTO: 36.6 PG (ref 26.6–33)
MCHC RBC AUTO-ENTMCNC: 33.6 G/DL (ref 31.5–35.7)
MCV RBC AUTO: 108.8 FL (ref 79–97)
MONOCYTES # BLD AUTO: 0.47 10*3/MM3 (ref 0.1–0.9)
NEUTROPHILS # BLD AUTO: 2.79 10*3/MM3 (ref 1.7–7)
NEUTROPHILS NFR BLD MANUAL: 62.8 % (ref 42.7–76)
NRBC BLD AUTO-RTO: 0.2 /100 WBC (ref 0–0.2)
PLAT MORPH BLD: NORMAL
PLATELET # BLD AUTO: 183 10*3/MM3 (ref 140–450)
PMV BLD AUTO: 9.8 FL (ref 6–12)
POLYCHROMASIA BLD QL SMEAR: NORMAL
POTASSIUM SERPL-SCNC: 3.9 MMOL/L (ref 3.5–5.2)
PROT SERPL-MCNC: 7.2 G/DL (ref 6–8.5)
RBC # BLD AUTO: 3.39 10*6/MM3 (ref 3.77–5.28)
SODIUM SERPL-SCNC: 139 MMOL/L (ref 136–145)
TRIGL SERPL-MCNC: 501 MG/DL (ref 0–150)
VLDLC SERPL-MCNC: 70 MG/DL (ref 5–40)
WBC # BLD AUTO: 4.44 10*3/MM3 (ref 3.4–10.8)
WBC MORPH BLD: NORMAL

## 2021-02-19 PROCEDURE — 36415 COLL VENOUS BLD VENIPUNCTURE: CPT | Performed by: NURSE PRACTITIONER

## 2021-02-19 PROCEDURE — 85025 COMPLETE CBC W/AUTO DIFF WBC: CPT | Performed by: NURSE PRACTITIONER

## 2021-02-19 PROCEDURE — 86803 HEPATITIS C AB TEST: CPT | Performed by: NURSE PRACTITIONER

## 2021-02-19 PROCEDURE — 85007 BL SMEAR W/DIFF WBC COUNT: CPT | Performed by: NURSE PRACTITIONER

## 2021-02-19 PROCEDURE — 80053 COMPREHEN METABOLIC PANEL: CPT | Performed by: NURSE PRACTITIONER

## 2021-02-19 PROCEDURE — 80061 LIPID PANEL: CPT | Performed by: NURSE PRACTITIONER

## 2021-02-19 PROCEDURE — 99214 OFFICE O/P EST MOD 30 MIN: CPT | Performed by: NURSE PRACTITIONER

## 2021-02-19 RX ORDER — OMEGA-3-ACID ETHYL ESTERS 1 G/1
2 CAPSULE, LIQUID FILLED ORAL 2 TIMES DAILY
Qty: 360 CAPSULE | Refills: 1 | Status: SHIPPED | OUTPATIENT
Start: 2021-02-19 | End: 2022-02-18 | Stop reason: SDUPTHER

## 2021-02-19 RX ORDER — AMLODIPINE BESYLATE 10 MG/1
10 TABLET ORAL DAILY
Qty: 30 TABLET | Refills: 0 | Status: SHIPPED | OUTPATIENT
Start: 2021-02-19 | End: 2021-03-29

## 2021-02-19 RX ORDER — AMLODIPINE BESYLATE 10 MG/1
10 TABLET ORAL DAILY
Qty: 90 TABLET | OUTPATIENT
Start: 2021-02-19

## 2021-02-19 RX ORDER — SERTRALINE HYDROCHLORIDE 100 MG/1
100 TABLET, FILM COATED ORAL DAILY
Qty: 90 TABLET | Refills: 1 | Status: SHIPPED | OUTPATIENT
Start: 2021-02-19 | End: 2021-03-29 | Stop reason: SDUPTHER

## 2021-02-19 NOTE — PATIENT INSTRUCTIONS
Increase norvasc to 10mg daily  F/u 1 mo for blood pressure  Referral to [facility]  Cont saline spray  Work on diet and exercise  Referral to gastroenterology  Check blood work

## 2021-02-19 NOTE — PROGRESS NOTES
Answers for HPI/ROS submitted by the patient on 2/13/2021   What is the primary reason for your visit?: Other  Please describe your symptoms.: my nose and ears hurt and bleed .  Have you had these symptoms before?: Yes  How long have you been having these symptoms?: Greater than 2 weeks  Please list any medications you are currently taking for this condition.: i was taking an antibiotic but have used it all  Subjective   Lita Yu is a 55 y.o. female.     Patient is here today to follow-up on hypertension, COPD, hyperlipidemia, depression.  She is also here to follow-up on nasal cellulitis and bloody noses.  Works at [employer]    Hypertension- patient is currently on Norvasc 5 mg daily. She does not monitor at home.  States that it has ran high when checked other placed.  Denies CP, dizziness, HA.  Denies swelling in legs    COPD-patient is currently on Anoro 62.5-25 and albuterol as needed. Pt smokes 1 pack per week.  Does use her inhalers regularly.  Experiences some SOA.     Hyperlipidemia-patient is currently on Lipitor 20 mg daily and Lovaza 2 g twice a day.  She has been out of the lovaza for 1-2 months.  Eats arbys daily- doesn't always consume a well balanced diet.    PTSD- patient is currently on Zoloft 100 mg daily. She states that she has been out of the medication for the last month.  She has been more on edge.     Nasal cellulitis and nosebleeds-patient was seen at the urgent care center recently and was treated for nasal cellulitis with clindamycin.  Patient reports that she is still having nosebleeds.    Stool incontinence- has been occurring for about 1 year. Has regular bowel movements- often diarrhea.  She states she gets incontinent of stool.        The following portions of the patient's history were reviewed and updated as appropriate: allergies, current medications, past family history, past medical history, past social history, past surgical history and problem list.    Review of  "Systems   Constitutional: Negative for chills, fatigue and fever.   HENT: Positive for congestion, nosebleeds and sinus pressure.    Eyes: Negative for blurred vision and double vision.   Respiratory: Positive for shortness of breath. Negative for cough.    Cardiovascular: Negative for chest pain and palpitations.   Gastrointestinal: Positive for diarrhea. Negative for abdominal pain, constipation, nausea and vomiting.        Stool incontinence   Genitourinary: Negative for dysuria and frequency.   Neurological: Negative for dizziness and headache.   Psychiatric/Behavioral: Positive for stress. Negative for self-injury and suicidal ideas. The patient is nervous/anxious.        Objective   /88 (BP Location: Left arm, Patient Position: Sitting, Cuff Size: Adult)   Pulse 92   Temp 97.2 °F (36.2 °C) (Temporal)   Ht 161.3 cm (63.5\")   Wt 57.6 kg (127 lb)   SpO2 98%   BMI 22.14 kg/m²   Physical Exam  Vitals signs reviewed.   Constitutional:       General: She is not in acute distress.     Appearance: Normal appearance. She is well-developed. She is not diaphoretic.   HENT:      Head: Normocephalic and atraumatic.      Nose: Nasal tenderness present.      Right Turbinates: Enlarged and swollen.      Left Turbinates: Enlarged and swollen.      Comments: Nasal passage is erythematic  Eyes:      General:         Right eye: No discharge.         Left eye: No discharge.      Conjunctiva/sclera: Conjunctivae normal.   Neck:      Musculoskeletal: Normal range of motion and neck supple.   Cardiovascular:      Rate and Rhythm: Normal rate and regular rhythm.      Heart sounds: No murmur.   Pulmonary:      Effort: Pulmonary effort is normal. No respiratory distress.      Breath sounds: Normal breath sounds. No wheezing or rales.   Abdominal:      General: Bowel sounds are normal. There is no distension.      Palpations: Abdomen is soft.      Tenderness: There is no abdominal tenderness.   Musculoskeletal: Normal range of " motion.   Skin:     General: Skin is warm and dry.   Neurological:      General: No focal deficit present.      Mental Status: She is alert and oriented to person, place, and time.   Psychiatric:         Mood and Affect: Mood normal.         Behavior: Behavior normal.         Thought Content: Thought content normal.         Judgment: Judgment normal.           Assessment/Plan     Diagnoses and all orders for this visit:    1. Bloody nose (Primary)  Comments:  chronic issue  frequent bleeds and inflammation  completed abx  cont ocean spray  ENT referral  Orders:  -     Ambulatory Referral to ENT (Otolaryngology)    2. Encounter for hepatitis C screening test for low risk patient  -     Hepatitis C Antibody; Future    3. Chronic obstructive pulmonary disease, unspecified COPD type (CMS/HCC)  Comments:  stable  cont meds  smoking cessation discussed    4. Hypertension, unspecified type  Comments:  elevated  inc norvasc to 10mg   f/u 1 mo  Orders:  -     CBC & Differential  -     Comprehensive Metabolic Panel; Future  -     Lipid Panel; Future  -     amLODIPine (Norvasc) 10 MG tablet; Take 1 tablet by mouth Daily.  Dispense: 30 tablet; Refill: 0    5. PTSD (post-traumatic stress disorder)  Comments:  cont zoloft  has been out of med 1 mo  denies SI/HI  Orders:  -     sertraline (ZOLOFT) 100 MG tablet; Take 1 tablet by mouth Daily.  Dispense: 90 tablet; Refill: 1    6. Hyperlipidemia, unspecified hyperlipidemia type  Comments:  cont meds  work on diet and exercise  Orders:  -     omega-3 acid ethyl esters (LOVAZA) 1 g capsule; Take 2 capsules by mouth 2 (Two) Times a Day.  Dispense: 360 capsule; Refill: 1    7. Incontinence of feces, unspecified fecal incontinence type  Comments:  unknown etiology  denies constipation  referral to GI  Orders:  -     Ambulatory Referral to Gastroenterology

## 2021-03-05 RX ORDER — ATORVASTATIN CALCIUM 20 MG/1
20 TABLET, FILM COATED ORAL
Qty: 90 TABLET | Refills: 1 | Status: SHIPPED | OUTPATIENT
Start: 2021-03-05 | End: 2021-07-07

## 2021-03-25 ENCOUNTER — OFFICE (OUTPATIENT)
Dept: URBAN - METROPOLITAN AREA CLINIC 64 | Facility: CLINIC | Age: 56
End: 2021-03-25

## 2021-03-25 VITALS
HEART RATE: 98 BPM | SYSTOLIC BLOOD PRESSURE: 126 MMHG | HEIGHT: 63 IN | WEIGHT: 127 LBS | DIASTOLIC BLOOD PRESSURE: 75 MMHG

## 2021-03-25 DIAGNOSIS — R15.9 FULL INCONTINENCE OF FECES: ICD-10-CM

## 2021-03-25 DIAGNOSIS — R74.8 ABNORMAL LEVELS OF OTHER SERUM ENZYMES: ICD-10-CM

## 2021-03-25 DIAGNOSIS — F10.10 ALCOHOL ABUSE, UNCOMPLICATED: ICD-10-CM

## 2021-03-25 DIAGNOSIS — R10.32 LEFT LOWER QUADRANT PAIN: ICD-10-CM

## 2021-03-25 DIAGNOSIS — K21.9 GASTRO-ESOPHAGEAL REFLUX DISEASE WITHOUT ESOPHAGITIS: ICD-10-CM

## 2021-03-25 DIAGNOSIS — R19.7 DIARRHEA, UNSPECIFIED: ICD-10-CM

## 2021-03-25 PROCEDURE — 99204 OFFICE O/P NEW MOD 45 MIN: CPT | Performed by: INTERNAL MEDICINE

## 2021-03-28 NOTE — PROGRESS NOTES
Subjective  Answers for HPI/ROS submitted by the patient on 3/27/2021  What is the primary reason for your visit?: High Blood Pressure      Lita Yu is a 55 y.o. female.     Pt is here to follow up on hypertension. She has been taking amlodipine 10mg and has been taking 2 of them.  She does get swelling in her legs with this dose but didn't when she was taking 1 tab daily. She used to be on lisinopril/hctz with it and did well but it was discontinued due to blood pressure improving.  Has hx of elevated liver enzymes. Ultrasound in 2019 showed hepatic steatosis. Saw GI on 3/25/2021 and additional labs ordered. She is scheduled to have upper endoscopy and colonoscopy in early May.  Drinks 3 shots of bourbon every night. She has a lot of anxiety and PTSD and self medicates to help her sleep. She is taking zoloft 100mg daily but feels like it needs to be increased.  Smoking status- 1 pack every 10 days and has been smoking x 43 years. She is working on cutting back and has cut down from 2 packs per week. She does have some shortness of breath on exertion at times. Occasional chest pain but nothing significant.  Last mammogram- last year at [facility]  DXA scan- has an order for it already.       The following portions of the patient's history were reviewed and updated as appropriate: allergies, current medications, past family history, past medical history, past social history, past surgical history and problem list.  Past Medical History:   Diagnosis Date   • Depression    • Hyperlipidemia    • Hypertension    • PTSD (post-traumatic stress disorder)      Past Surgical History:   Procedure Laterality Date   •  SECTION N/A    • DENTAL PROCEDURE     • REPLACEMENT TOTAL HIP LATERAL POSITION Left    • TONSILLECTOMY     • VAGINAL BIRTH AFTER  SECTION       Family History   Problem Relation Age of Onset   • Alcohol abuse Mother    • Alcohol abuse Paternal Grandfather      Social History      Socioeconomic History   • Marital status:      Spouse name: Not on file   • Number of children: Not on file   • Years of education: Not on file   • Highest education level: Not on file   Tobacco Use   • Smoking status: Current Every Day Smoker     Types: Cigarettes   • Smokeless tobacco: Never Used   • Tobacco comment: 3cigs   Substance and Sexual Activity   • Alcohol use: Yes     Comment: daily   • Drug use: No   • Sexual activity: Yes     Partners: Male     Birth control/protection: Post-menopausal         Current Outpatient Medications:   •  albuterol sulfate  (90 Base) MCG/ACT inhaler, Inhale 2 puffs Every 4 (Four) Hours As Needed for Wheezing., Disp: 1 inhaler, Rfl: 5  •  amLODIPine (Norvasc) 10 MG tablet, Take 1 tablet by mouth Daily., Disp: 30 tablet, Rfl: 0  •  Anoro Ellipta 62.5-25 MCG/INH aerosol powder  inhaler, INHALE 1 PUFF BY MOUTH DAILY, Disp: 60 each, Rfl: 5  •  atorvastatin (LIPITOR) 20 MG tablet, Take 1 tablet by mouth every night at bedtime., Disp: 90 tablet, Rfl: 1  •  Cyanocobalamin (B-12) 1000 MCG lozenge, , Disp: , Rfl: 0  •  folic acid (FOLVITE) 1 MG tablet, take 1 tablet by mouth once daily, Disp: 30 tablet, Rfl: 1  •  meloxicam (MOBIC) 15 MG tablet, Take 15 mg by mouth Daily., Disp: , Rfl: 0  •  omega-3 acid ethyl esters (LOVAZA) 1 g capsule, Take 2 capsules by mouth 2 (Two) Times a Day., Disp: 360 capsule, Rfl: 1  •  RA VITAMIN B-1 100 MG tablet, take 1 tablet by mouth once daily, Disp: 30 each, Rfl: 1  •  sertraline (ZOLOFT) 100 MG tablet, Take 1 tablet by mouth Daily., Disp: 90 tablet, Rfl: 1    Review of Systems   Constitutional: Negative for activity change, appetite change, chills, diaphoresis, fatigue, fever, unexpected weight gain and unexpected weight loss.   HENT: Negative for rhinorrhea and sinus pressure.    Respiratory: Positive for cough, shortness of breath and wheezing. Negative for apnea, choking, chest tightness and stridor.    Cardiovascular: Negative  for chest pain, palpitations and leg swelling.   Gastrointestinal: Negative for abdominal pain, constipation, diarrhea and GERD.        Fecal incontinence   Genitourinary: Negative for frequency and urinary incontinence.   Musculoskeletal: Negative for gait problem.   Neurological: Negative for dizziness, weakness, light-headedness, headache and confusion.   Psychiatric/Behavioral: Positive for sleep disturbance and stress. Negative for suicidal ideas and depressed mood. The patient is nervous/anxious.      There were no vitals taken for this visit.      Objective   Physical Exam  Constitutional:       General: She is not in acute distress.     Appearance: Normal appearance. She is normal weight.   HENT:      Head: Normocephalic and atraumatic.   Neck:      Thyroid: No thyroid mass, thyromegaly or thyroid tenderness.   Cardiovascular:      Rate and Rhythm: Normal rate and regular rhythm.      Heart sounds: Normal heart sounds. No murmur heard.     Pulmonary:      Effort: Pulmonary effort is normal. No respiratory distress.      Breath sounds: Decreased air movement present. No wheezing, rhonchi or rales.   Abdominal:      General: Abdomen is flat. Bowel sounds are normal. There is no distension.      Palpations: Abdomen is soft. There is no mass.      Tenderness: There is no abdominal tenderness.   Musculoskeletal:      Cervical back: Normal range of motion and neck supple.      Right lower leg: No edema.      Left lower leg: No edema.   Skin:     General: Skin is warm and dry.   Neurological:      General: No focal deficit present.      Mental Status: She is alert and oriented to person, place, and time.   Psychiatric:         Mood and Affect: Mood normal.         Behavior: Behavior normal.         Thought Content: Thought content normal.           ECG 12 Lead    Date/Time: 3/29/2021 3:08 PM  Performed by: Loraine Griffin PA  Authorized by: Loraine Griffin PA   Comparison: compared with previous ECG from  6/5/2019  Similar to previous ECG  Rhythm: sinus rhythm  Rate: normal  ST Segments: ST segments normal  T Waves: T waves normal  QRS axis: normal  Other: no other findings    Clinical impression: normal ECG             Assessment/Plan   Diagnoses and all orders for this visit:    1. Essential hypertension (Primary)  -     amLODIPine (Norvasc) 10 MG tablet; Take 1 tablet by mouth Daily.  Dispense: 90 tablet; Refill: 3  -     lisinopril-hydrochlorothiazide (PRINZIDE,ZESTORETIC) 20-25 MG per tablet; Take 1 tablet by mouth Daily.  Dispense: 90 tablet; Refill: 3  Pt has been taking 2 of the amlodipine 10mg tabs daily.  Discussed to decrease this to 1 tablet daily.  No edema today on exam.  Start lisinopril/hctz and monitor blood pressure.  Let me know if any problems or if edema returns.  May need to decrease to amlodipine to 5mg or discontinue it if bp controlled but still having edema.  2. Tobacco dependence syndrome  Continue working on smoking cessation.  3. Mixed hyperlipidemia  Continue cholesterol medication and work on slowly decreasing alcohol consumption.    4.Chronic obstructive pulmonary disease, unspecified COPD type (CMS/HCC)  -     Fluticasone-Umeclidin-Vilant (Trelegy Ellipta) 100-62.5-25 MCG/INH aerosol powder ; Inhale 1 puff Daily.  Dispense: 1 each; Refill: 11  Pt is having more issues lately.  Will try on trelegy but if not improving, pt to let me know.  Follow up in 4 weeks or earlier if needed.  6. Abnormal liver enzymes  Seeing GI.  Pt to try to slowly decrease alcohol use.  7. Shortness of breath on exertion  -     XR Chest 2 View; Future  Will check chest xray and call with results.  Pt given order.  8. Post traumatic stress disorder  -     sertraline (ZOLOFT) 100 MG tablet; Take 1.5 tablets by mouth Daily.  Dispense: 135 tablet; Refill: 1  Increase zoloft to 1.5 tabs daily.  Follow up in 4 weeks to recheck.

## 2021-03-29 ENCOUNTER — OFFICE VISIT (OUTPATIENT)
Dept: FAMILY MEDICINE CLINIC | Facility: CLINIC | Age: 56
End: 2021-03-29

## 2021-03-29 VITALS
HEART RATE: 97 BPM | SYSTOLIC BLOOD PRESSURE: 136 MMHG | TEMPERATURE: 97.5 F | BODY MASS INDEX: 22.49 KG/M2 | WEIGHT: 129 LBS | OXYGEN SATURATION: 98 % | DIASTOLIC BLOOD PRESSURE: 80 MMHG

## 2021-03-29 DIAGNOSIS — I10 ESSENTIAL HYPERTENSION: Primary | ICD-10-CM

## 2021-03-29 DIAGNOSIS — F43.10 POST TRAUMATIC STRESS DISORDER: ICD-10-CM

## 2021-03-29 DIAGNOSIS — R06.02 SHORTNESS OF BREATH ON EXERTION: ICD-10-CM

## 2021-03-29 DIAGNOSIS — E53.8 VITAMIN B12 DEFICIENCY: ICD-10-CM

## 2021-03-29 DIAGNOSIS — R74.8 ABNORMAL LIVER ENZYMES: ICD-10-CM

## 2021-03-29 DIAGNOSIS — E78.2 MIXED HYPERLIPIDEMIA: ICD-10-CM

## 2021-03-29 DIAGNOSIS — J44.9 CHRONIC OBSTRUCTIVE PULMONARY DISEASE, UNSPECIFIED COPD TYPE (HCC): ICD-10-CM

## 2021-03-29 DIAGNOSIS — F17.200 TOBACCO DEPENDENCE SYNDROME: ICD-10-CM

## 2021-03-29 PROCEDURE — 99214 OFFICE O/P EST MOD 30 MIN: CPT | Performed by: PHYSICIAN ASSISTANT

## 2021-03-29 PROCEDURE — 93000 ELECTROCARDIOGRAM COMPLETE: CPT | Performed by: PHYSICIAN ASSISTANT

## 2021-03-29 RX ORDER — DICYCLOMINE HCL 20 MG
20 TABLET ORAL 2 TIMES DAILY
COMMUNITY
Start: 2021-03-25 | End: 2022-08-03

## 2021-03-29 RX ORDER — SERTRALINE HYDROCHLORIDE 100 MG/1
150 TABLET, FILM COATED ORAL DAILY
Qty: 135 TABLET | Refills: 1 | Status: SHIPPED | OUTPATIENT
Start: 2021-03-29 | End: 2021-06-14

## 2021-03-29 RX ORDER — LISINOPRIL AND HYDROCHLOROTHIAZIDE 25; 20 MG/1; MG/1
1 TABLET ORAL DAILY
Qty: 90 TABLET | Refills: 3 | Status: ON HOLD | OUTPATIENT
Start: 2021-03-29 | End: 2022-08-04

## 2021-03-29 RX ORDER — AMLODIPINE BESYLATE 10 MG/1
10 TABLET ORAL DAILY
Qty: 90 TABLET | Refills: 3 | Status: SHIPPED | OUTPATIENT
Start: 2021-03-29 | End: 2021-06-14

## 2021-03-29 NOTE — PATIENT INSTRUCTIONS
Please only take amlodipine 10mg once daily.  Also start taking the lisinopril/hydrochlorothiazide.  Monitor blood pressure and let me know if any problems.  Will also increase zoloft to 150mg (1.5 tabs) daily.  Please follow up in 4 weeks to see how you are doing and if we need to increase this further.  Please also get your mammogram done.  Continue working on smoking cessation.  Please also work on slowly cutting back on alcohol consumption.

## 2021-04-09 ENCOUNTER — ON CAMPUS - OUTPATIENT (OUTPATIENT)
Dept: URBAN - METROPOLITAN AREA HOSPITAL 77 | Facility: HOSPITAL | Age: 56
End: 2021-04-09

## 2021-04-09 DIAGNOSIS — R19.7 DIARRHEA, UNSPECIFIED: ICD-10-CM

## 2021-04-09 DIAGNOSIS — K29.50 UNSPECIFIED CHRONIC GASTRITIS WITHOUT BLEEDING: ICD-10-CM

## 2021-04-09 DIAGNOSIS — R74.8 ABNORMAL LEVELS OF OTHER SERUM ENZYMES: ICD-10-CM

## 2021-04-09 DIAGNOSIS — K21.9 GASTRO-ESOPHAGEAL REFLUX DISEASE WITHOUT ESOPHAGITIS: ICD-10-CM

## 2021-04-09 DIAGNOSIS — K75.81 NONALCOHOLIC STEATOHEPATITIS (NASH): ICD-10-CM

## 2021-04-09 PROCEDURE — 43242 EGD US FINE NEEDLE BX/ASPIR: CPT | Performed by: INTERNAL MEDICINE

## 2021-04-09 PROCEDURE — 43239 EGD BIOPSY SINGLE/MULTIPLE: CPT | Mod: 59 | Performed by: INTERNAL MEDICINE

## 2021-04-16 ENCOUNTER — TELEPHONE (OUTPATIENT)
Dept: FAMILY MEDICINE CLINIC | Facility: CLINIC | Age: 56
End: 2021-04-16

## 2021-04-16 NOTE — TELEPHONE ENCOUNTER
PATIENT CALLING IN REGARDS TO SPEAK TO VANDANA GONSALVES ABOUT A REFERRAL TO REHAB. PLEASE ADVISE, THANK YOU!

## 2021-04-19 DIAGNOSIS — F10.10 ALCOHOL ABUSE: Primary | ICD-10-CM

## 2021-06-14 ENCOUNTER — OFFICE VISIT (OUTPATIENT)
Dept: FAMILY MEDICINE CLINIC | Facility: CLINIC | Age: 56
End: 2021-06-14

## 2021-06-14 VITALS
DIASTOLIC BLOOD PRESSURE: 62 MMHG | OXYGEN SATURATION: 98 % | SYSTOLIC BLOOD PRESSURE: 89 MMHG | WEIGHT: 115 LBS | TEMPERATURE: 97.5 F | BODY MASS INDEX: 20.05 KG/M2 | HEART RATE: 91 BPM

## 2021-06-14 DIAGNOSIS — I10 ESSENTIAL HYPERTENSION: ICD-10-CM

## 2021-06-14 DIAGNOSIS — F10.10 ALCOHOL ABUSE: Primary | ICD-10-CM

## 2021-06-14 DIAGNOSIS — F41.9 ANXIETY: ICD-10-CM

## 2021-06-14 DIAGNOSIS — G47.00 INSOMNIA, UNSPECIFIED TYPE: ICD-10-CM

## 2021-06-14 PROCEDURE — 99214 OFFICE O/P EST MOD 30 MIN: CPT | Performed by: NURSE PRACTITIONER

## 2021-06-14 RX ORDER — TRAZODONE HYDROCHLORIDE 100 MG/1
100 TABLET ORAL NIGHTLY
Qty: 30 TABLET | Refills: 2 | Status: SHIPPED | OUTPATIENT
Start: 2021-06-14 | End: 2021-09-30

## 2021-06-14 RX ORDER — CHOLECALCIFEROL (VITAMIN D3) 125 MCG
1 CAPSULE ORAL EVERY MORNING
Status: ON HOLD | COMMUNITY
Start: 2021-06-03 | End: 2022-08-04

## 2021-06-14 RX ORDER — BUSPIRONE HYDROCHLORIDE 7.5 MG/1
7.5 TABLET ORAL 2 TIMES DAILY
COMMUNITY
Start: 2021-06-03 | End: 2021-06-14

## 2021-06-14 RX ORDER — BUSPIRONE HYDROCHLORIDE 15 MG/1
15 TABLET ORAL 3 TIMES DAILY
Qty: 60 TABLET | Refills: 2 | Status: SHIPPED | OUTPATIENT
Start: 2021-06-14 | End: 2021-09-29

## 2021-06-14 RX ORDER — METHION/INOS/CHOL BT/B COM/LIV 110MG-86MG
100 CAPSULE ORAL DAILY
COMMUNITY
Start: 2021-06-03 | End: 2022-08-03

## 2021-06-14 RX ORDER — IBUPROFEN 800 MG/1
800 TABLET ORAL EVERY 6 HOURS PRN
COMMUNITY
Start: 2021-05-06 | End: 2022-08-03

## 2021-06-14 RX ORDER — FOLIC ACID 1 MG/1
1000 TABLET ORAL DAILY
COMMUNITY
Start: 2021-06-03 | End: 2021-07-07

## 2021-06-14 RX ORDER — OLANZAPINE 10 MG/1
10 TABLET ORAL NIGHTLY
COMMUNITY
Start: 2021-06-09 | End: 2021-09-07

## 2021-06-14 RX ORDER — SERTRALINE HYDROCHLORIDE 100 MG/1
100 TABLET, FILM COATED ORAL 2 TIMES DAILY
COMMUNITY
End: 2021-06-14 | Stop reason: SDUPTHER

## 2021-06-14 RX ORDER — PRAZOSIN HYDROCHLORIDE 1 MG/1
CAPSULE ORAL
COMMUNITY
Start: 2021-06-03 | End: 2022-08-03

## 2021-06-14 RX ORDER — TRAZODONE HYDROCHLORIDE 100 MG/1
TABLET ORAL
COMMUNITY
Start: 2021-06-05 | End: 2021-06-14 | Stop reason: SDUPTHER

## 2021-06-14 RX ORDER — SERTRALINE HYDROCHLORIDE 100 MG/1
100 TABLET, FILM COATED ORAL 2 TIMES DAILY
Qty: 60 TABLET | Refills: 2 | Status: SHIPPED | OUTPATIENT
Start: 2021-06-14 | End: 2022-02-18 | Stop reason: SDUPTHER

## 2021-06-14 RX ORDER — HYDROXYZINE PAMOATE 25 MG/1
CAPSULE ORAL
COMMUNITY
Start: 2021-06-03 | End: 2021-07-02 | Stop reason: SDUPTHER

## 2021-06-14 RX ORDER — AMLODIPINE BESYLATE 5 MG/1
5 TABLET ORAL DAILY
Qty: 90 TABLET | OUTPATIENT
Start: 2021-06-14

## 2021-06-14 RX ORDER — AMLODIPINE BESYLATE 5 MG/1
5 TABLET ORAL DAILY
Qty: 30 TABLET | Refills: 0 | Status: ON HOLD | OUTPATIENT
Start: 2021-06-14 | End: 2022-08-04

## 2021-06-14 NOTE — PROGRESS NOTES
Berkley Yu is a 55 y.o. female.     Pt is here today to follow up on alcoholism.  She states she started drinking at the age of 48.  In 2019 she gave up alcohol but restarted when Covid hit.  She drank heavily for the last year and went to rehab in [facility] for 29 days.   She has been out of rehab for 11 days.  She goes to  at least once a day.  She has a sponsor.  She has not had any alcohol intake since rehab.  Her  is in rehab as well.  He is wanting to get out and start drinking again.  She is trying to surround herself around supportive people.   She was discharged on zyprexa from [facility] for hallucinations.  She was set up with [facility] but she doesn't want to go there.  She was also discharged on Vit B1.  She states that she continues to have them some.  She is on zoloft 100mg bid.  She states that she has night sweats and unable to sleep.   She is on trazadone for sleep.  She states it helps some but she is waking up in the middle of the night.   She has tried adding melatonin.   Denies any SI or HI.  Anxiety and depression are improving.  She is currently on buspar 7.5mg bid. She would like a higher dose.     Hypotension- pt is currently on norvasc 10mg, lisinopril/Hctz 20-25mg. Will decrease dose today.        The following portions of the patient's history were reviewed and updated as appropriate: allergies, current medications, past family history, past medical history, past social history, past surgical history and problem list.    Review of Systems   Constitutional: Positive for diaphoresis. Negative for chills, fatigue and fever.   Respiratory: Negative for chest tightness and shortness of breath.    Cardiovascular: Negative for chest pain and palpitations.   Gastrointestinal: Negative for abdominal pain, nausea and vomiting.   Neurological: Positive for tremors.   Psychiatric/Behavioral: Positive for sleep disturbance, depressed mood and stress.  Negative for self-injury and suicidal ideas. The patient is nervous/anxious.        Objective   BP (!) 89/62 (BP Location: Left arm, Patient Position: Sitting, Cuff Size: Adult)   Pulse 91   Temp 97.5 °F (36.4 °C) (Tympanic)   Wt 52.2 kg (115 lb)   SpO2 98%   BMI 20.05 kg/m²   Physical Exam  Constitutional:       Appearance: Normal appearance.   HENT:      Head: Normocephalic and atraumatic.   Cardiovascular:      Rate and Rhythm: Normal rate and regular rhythm.      Heart sounds: No murmur heard.     Pulmonary:      Effort: No respiratory distress.      Breath sounds: Normal breath sounds.   Skin:     General: Skin is warm and dry.   Neurological:      General: No focal deficit present.      Mental Status: She is alert and oriented to person, place, and time.   Psychiatric:         Mood and Affect: Mood normal.         Behavior: Behavior normal.         Thought Content: Thought content normal.         Judgment: Judgment normal.      Comments: Pt is quite fidgety during visit           Assessment/Plan     Diagnoses and all orders for this visit:    1. Alcohol abuse (Primary)  Comments:  1 mo sober  completed rehab  AA daily and has sponsor  cont Vit B, zoloft, trazadone  psych referral  Orders:  -     Ambulatory Referral to Psychiatry    2. Anxiety  Comments:  improving  will try increasing buspar to 15mg bid  f/u 1 mo  Orders:  -     Ambulatory Referral to Psychiatry  -     busPIRone (BUSPAR) 15 MG tablet; Take 1 tablet by mouth 3 (Three) Times a Day.  Dispense: 60 tablet; Refill: 2  -     sertraline (ZOLOFT) 100 MG tablet; Take 1 tablet by mouth 2 (Two) Times a Day.  Dispense: 60 tablet; Refill: 2    3. Essential hypertension  Comments:  hypotensive today  decrease norvasc to 5mg daily  f/u 1mo  Orders:  -     amLODIPine (Norvasc) 5 MG tablet; Take 1 tablet by mouth Daily.  Dispense: 30 tablet; Refill: 0    4. Insomnia, unspecified type  Comments:  stable  cont trazadone  add in melatonin  f/u 1  mo  Orders:  -     traZODone (DESYREL) 100 MG tablet; Take 1 tablet by mouth Every Night.  Dispense: 30 tablet; Refill: 2

## 2021-06-14 NOTE — PATIENT INSTRUCTIONS
Make psych appt with Dr. Loulou Mayers zoloft and trazadone  Decrease amlodipine to 5mg daily  Monitor BP at home  Increase buspar to 15mg twice daily  Continue AA daily  Call for issues or concerns

## 2021-07-02 RX ORDER — HYDROXYZINE PAMOATE 25 MG/1
25 CAPSULE ORAL EVERY 6 HOURS PRN
Qty: 120 CAPSULE | Refills: 0 | Status: SHIPPED | OUTPATIENT
Start: 2021-07-02 | End: 2021-09-07 | Stop reason: SDUPTHER

## 2021-07-07 RX ORDER — ATORVASTATIN CALCIUM 20 MG/1
TABLET, FILM COATED ORAL
Qty: 26 TABLET | Refills: 0 | Status: SHIPPED | OUTPATIENT
Start: 2021-07-07 | End: 2022-05-24

## 2021-07-07 RX ORDER — FOLIC ACID 1 MG/1
TABLET ORAL
Qty: 29 TABLET | Refills: 0 | Status: SHIPPED | OUTPATIENT
Start: 2021-07-07 | End: 2021-08-12

## 2021-07-15 ENCOUNTER — OFFICE VISIT (OUTPATIENT)
Dept: FAMILY MEDICINE CLINIC | Facility: CLINIC | Age: 56
End: 2021-07-15

## 2021-07-15 VITALS
WEIGHT: 123 LBS | HEART RATE: 92 BPM | OXYGEN SATURATION: 98 % | DIASTOLIC BLOOD PRESSURE: 76 MMHG | BODY MASS INDEX: 21.45 KG/M2 | SYSTOLIC BLOOD PRESSURE: 110 MMHG | TEMPERATURE: 97.3 F

## 2021-07-15 DIAGNOSIS — F41.9 ANXIETY: Primary | ICD-10-CM

## 2021-07-15 DIAGNOSIS — F10.10 ALCOHOL ABUSE: ICD-10-CM

## 2021-07-15 DIAGNOSIS — I10 ESSENTIAL HYPERTENSION: ICD-10-CM

## 2021-07-15 DIAGNOSIS — G47.00 INSOMNIA, UNSPECIFIED TYPE: ICD-10-CM

## 2021-07-15 PROCEDURE — 99214 OFFICE O/P EST MOD 30 MIN: CPT | Performed by: NURSE PRACTITIONER

## 2021-07-15 RX ORDER — AMLODIPINE BESYLATE 10 MG/1
10 TABLET ORAL NIGHTLY
COMMUNITY
Start: 2021-07-05 | End: 2021-12-14

## 2021-07-15 RX ORDER — ACAMPROSATE CALCIUM 333 MG/1
333 TABLET, DELAYED RELEASE ORAL 3 TIMES DAILY
Qty: 90 TABLET | Refills: 1 | Status: SHIPPED | OUTPATIENT
Start: 2021-07-15 | End: 2021-09-14 | Stop reason: SDUPTHER

## 2021-07-15 RX ORDER — ACAMPROSATE CALCIUM 333 MG/1
TABLET, DELAYED RELEASE ORAL 3 TIMES DAILY
COMMUNITY
Start: 2021-05-04 | End: 2021-07-15 | Stop reason: SDUPTHER

## 2021-07-15 NOTE — PROGRESS NOTES
Berkley Yu is a 55 y.o. female.     Patient is here today for 1 month follow-up on hypertension, anxiety, and insomnia.  Pt is a recovering alcoholic.  She has been in inpatient rehab before.  She had been sober for   She was previously on acamprosate and has been out of it for 2 weeks. She believes that this is why she has had a relapse.     Hypertension-patient was found to be hypotensive at her last appointment.  Her Norvasc was decreased to 5 mg. She states that she is doing well on the decreased dose. Denies CP, SOA, dizziness.     Anxiety-patient's BuSpar was increased to 15 mg twice a day.  She was referred to psych.  She sees them in January. She states that she is still anxious and depressed.  She doesn't feel like the increase has helped much.  She denies SI or HI.     Insomnia-patient was on trazodone and was advised to add melatonin.  She reports that adding the meloton has helped with the sleep.        The following portions of the patient's history were reviewed and updated as appropriate: allergies, current medications, past family history, past medical history, past social history, past surgical history and problem list.    Review of Systems   Constitutional: Negative for chills, diaphoresis, fatigue and fever.   Respiratory: Negative for chest tightness and shortness of breath.    Cardiovascular: Negative for chest pain and palpitations.   Gastrointestinal: Negative for anal bleeding, nausea and vomiting.   Neurological: Positive for headache. Negative for dizziness.   Psychiatric/Behavioral: Positive for depressed mood and stress. Negative for self-injury and suicidal ideas. The patient is nervous/anxious.        Objective   /76 (BP Location: Left arm, Patient Position: Sitting, Cuff Size: Adult)   Pulse 92   Temp 97.3 °F (36.3 °C) (Tympanic)   Wt 55.8 kg (123 lb)   SpO2 98%   BMI 21.45 kg/m²   Physical Exam  Constitutional:       Appearance: Normal appearance.  She is not ill-appearing.   HENT:      Head: Normocephalic and atraumatic.   Cardiovascular:      Rate and Rhythm: Normal rate and regular rhythm.      Heart sounds: No murmur heard.     Pulmonary:      Effort: Pulmonary effort is normal. No respiratory distress.      Breath sounds: Normal breath sounds.   Skin:     General: Skin is warm and dry.   Neurological:      General: No focal deficit present.      Mental Status: She is alert and oriented to person, place, and time.   Psychiatric:         Mood and Affect: Mood normal.         Thought Content: Thought content normal.      Comments: fidgety during visit           Assessment/Plan     Diagnoses and all orders for this visit:    1. Anxiety (Primary)  Comments:  no change  had a relapse  restart acamprosate  has appt with psych  cont other meds    2. Alcohol abuse  Comments:  had a relapse  going to AA  restart acamprosate  follow up with psych  f/u 1 mo  Orders:  -     acamprosate (CAMPRAL) 333 MG EC tablet; Take 1 tablet by mouth 3 (Three) Times a Day.  Dispense: 90 tablet; Refill: 1    3. Essential hypertension  Comments:  improved  cont norvasc    4. Insomnia, unspecified type  Comments:  improved   cont trazadone and melatonin

## 2021-08-12 RX ORDER — FOLIC ACID 1 MG/1
TABLET ORAL
Qty: 29 TABLET | Refills: 0 | Status: SHIPPED | OUTPATIENT
Start: 2021-08-12 | End: 2021-09-14 | Stop reason: SDUPTHER

## 2021-08-27 ENCOUNTER — LAB (OUTPATIENT)
Dept: LAB | Facility: HOSPITAL | Age: 56
End: 2021-08-27

## 2021-08-28 ENCOUNTER — LAB (OUTPATIENT)
Dept: LAB | Facility: HOSPITAL | Age: 56
End: 2021-08-28

## 2021-08-28 PROCEDURE — U0004 COV-19 TEST NON-CDC HGH THRU: HCPCS

## 2021-08-28 PROCEDURE — C9803 HOPD COVID-19 SPEC COLLECT: HCPCS

## 2021-08-29 LAB — SARS-COV-2 ORF1AB RESP QL NAA+PROBE: NOT DETECTED

## 2021-08-30 ENCOUNTER — ANESTHESIA EVENT (OUTPATIENT)
Dept: GASTROENTEROLOGY | Facility: HOSPITAL | Age: 56
End: 2021-08-30

## 2021-08-31 ENCOUNTER — HOSPITAL ENCOUNTER (OUTPATIENT)
Facility: HOSPITAL | Age: 56
Setting detail: HOSPITAL OUTPATIENT SURGERY
Discharge: HOME | End: 2021-08-31
Attending: INTERNAL MEDICINE | Admitting: INTERNAL MEDICINE

## 2021-08-31 ENCOUNTER — ANESTHESIA (OUTPATIENT)
Dept: GASTROENTEROLOGY | Facility: HOSPITAL | Age: 56
End: 2021-08-31

## 2021-08-31 ENCOUNTER — ON CAMPUS - OUTPATIENT (OUTPATIENT)
Dept: URBAN - METROPOLITAN AREA HOSPITAL 85 | Facility: HOSPITAL | Age: 56
End: 2021-08-31
Payer: COMMERCIAL

## 2021-08-31 VITALS — DIASTOLIC BLOOD PRESSURE: 56 MMHG | SYSTOLIC BLOOD PRESSURE: 90 MMHG | OXYGEN SATURATION: 100 % | HEART RATE: 76 BPM

## 2021-08-31 VITALS
RESPIRATION RATE: 18 BRPM | BODY MASS INDEX: 22.11 KG/M2 | HEIGHT: 63 IN | DIASTOLIC BLOOD PRESSURE: 68 MMHG | WEIGHT: 124.78 LBS | OXYGEN SATURATION: 100 % | HEART RATE: 77 BPM | TEMPERATURE: 98.1 F | SYSTOLIC BLOOD PRESSURE: 102 MMHG

## 2021-08-31 DIAGNOSIS — K29.70 GASTRITIS, UNSPECIFIED, WITHOUT BLEEDING: ICD-10-CM

## 2021-08-31 DIAGNOSIS — R19.7 DIARRHEA: ICD-10-CM

## 2021-08-31 DIAGNOSIS — K21.9 GERD (GASTROESOPHAGEAL REFLUX DISEASE): ICD-10-CM

## 2021-08-31 DIAGNOSIS — K21.00 GASTRO-ESOPHAGEAL REFLUX DISEASE WITH ESOPHAGITIS, WITHOUT B: ICD-10-CM

## 2021-08-31 DIAGNOSIS — K52.9 NONINFECTIVE GASTROENTERITIS AND COLITIS, UNSPECIFIED: ICD-10-CM

## 2021-08-31 DIAGNOSIS — D12.4 BENIGN NEOPLASM OF DESCENDING COLON: ICD-10-CM

## 2021-08-31 DIAGNOSIS — R19.7 DIARRHEA, UNSPECIFIED: ICD-10-CM

## 2021-08-31 PROCEDURE — 94799 UNLISTED PULMONARY SVC/PX: CPT

## 2021-08-31 PROCEDURE — 94640 AIRWAY INHALATION TREATMENT: CPT

## 2021-08-31 PROCEDURE — 43235 EGD DIAGNOSTIC BRUSH WASH: CPT | Performed by: INTERNAL MEDICINE

## 2021-08-31 PROCEDURE — 88305 TISSUE EXAM BY PATHOLOGIST: CPT | Performed by: INTERNAL MEDICINE

## 2021-08-31 PROCEDURE — 45385 COLONOSCOPY W/LESION REMOVAL: CPT | Performed by: INTERNAL MEDICINE

## 2021-08-31 PROCEDURE — 45380 COLONOSCOPY AND BIOPSY: CPT | Mod: 59 | Performed by: INTERNAL MEDICINE

## 2021-08-31 PROCEDURE — 25010000002 PROPOFOL 10 MG/ML EMULSION: Performed by: ANESTHESIOLOGY

## 2021-08-31 RX ORDER — ONDANSETRON 2 MG/ML
4 INJECTION INTRAMUSCULAR; INTRAVENOUS ONCE AS NEEDED
Status: DISCONTINUED | OUTPATIENT
Start: 2021-08-31 | End: 2021-08-31 | Stop reason: HOSPADM

## 2021-08-31 RX ORDER — SODIUM CHLORIDE 0.9 % (FLUSH) 0.9 %
3 SYRINGE (ML) INJECTION EVERY 12 HOURS SCHEDULED
Status: DISCONTINUED | OUTPATIENT
Start: 2021-08-31 | End: 2021-08-31 | Stop reason: HOSPADM

## 2021-08-31 RX ORDER — LIDOCAINE HYDROCHLORIDE 10 MG/ML
INJECTION, SOLUTION EPIDURAL; INFILTRATION; INTRACAUDAL; PERINEURAL AS NEEDED
Status: DISCONTINUED | OUTPATIENT
Start: 2021-08-31 | End: 2021-08-31 | Stop reason: SURG

## 2021-08-31 RX ORDER — SODIUM CHLORIDE 9 MG/ML
9 INJECTION, SOLUTION INTRAVENOUS CONTINUOUS PRN
Status: DISCONTINUED | OUTPATIENT
Start: 2021-08-31 | End: 2021-08-31 | Stop reason: HOSPADM

## 2021-08-31 RX ORDER — SODIUM CHLORIDE 0.9 % (FLUSH) 0.9 %
10 SYRINGE (ML) INJECTION AS NEEDED
Status: DISCONTINUED | OUTPATIENT
Start: 2021-08-31 | End: 2021-08-31 | Stop reason: HOSPADM

## 2021-08-31 RX ORDER — MAGNESIUM CARB/ALUMINUM HYDROX 105-160MG
296 TABLET,CHEWABLE ORAL ONCE
Status: DISCONTINUED | OUTPATIENT
Start: 2021-08-31 | End: 2021-08-31 | Stop reason: HOSPADM

## 2021-08-31 RX ORDER — IPRATROPIUM BROMIDE AND ALBUTEROL SULFATE 2.5; .5 MG/3ML; MG/3ML
3 SOLUTION RESPIRATORY (INHALATION) ONCE
Status: COMPLETED | OUTPATIENT
Start: 2021-08-31 | End: 2021-08-31

## 2021-08-31 RX ORDER — PROPOFOL 10 MG/ML
VIAL (ML) INTRAVENOUS AS NEEDED
Status: DISCONTINUED | OUTPATIENT
Start: 2021-08-31 | End: 2021-08-31 | Stop reason: SURG

## 2021-08-31 RX ORDER — SODIUM CHLORIDE 0.9 % (FLUSH) 0.9 %
10 SYRINGE (ML) INJECTION EVERY 12 HOURS SCHEDULED
Status: DISCONTINUED | OUTPATIENT
Start: 2021-08-31 | End: 2021-08-31 | Stop reason: HOSPADM

## 2021-08-31 RX ADMIN — PROPOFOL 50 MG: 10 INJECTION, EMULSION INTRAVENOUS at 09:13

## 2021-08-31 RX ADMIN — LIDOCAINE HYDROCHLORIDE 50 MG: 10 INJECTION, SOLUTION EPIDURAL; INFILTRATION; INTRACAUDAL; PERINEURAL at 08:49

## 2021-08-31 RX ADMIN — PROPOFOL 50 MG: 10 INJECTION, EMULSION INTRAVENOUS at 09:06

## 2021-08-31 RX ADMIN — PROPOFOL 50 MG: 10 INJECTION, EMULSION INTRAVENOUS at 08:59

## 2021-08-31 RX ADMIN — IPRATROPIUM BROMIDE AND ALBUTEROL SULFATE 3 ML: 2.5; .5 SOLUTION RESPIRATORY (INHALATION) at 08:23

## 2021-08-31 RX ADMIN — PROPOFOL 120 MG: 10 INJECTION, EMULSION INTRAVENOUS at 08:49

## 2021-08-31 RX ADMIN — SODIUM CHLORIDE: 0.9 INJECTION, SOLUTION INTRAVENOUS at 08:46

## 2021-08-31 RX ADMIN — PROPOFOL 50 MG: 10 INJECTION, EMULSION INTRAVENOUS at 09:10

## 2021-08-31 RX ADMIN — PROPOFOL 50 MG: 10 INJECTION, EMULSION INTRAVENOUS at 08:53

## 2021-08-31 RX ADMIN — PROPOFOL 40 MG: 10 INJECTION, EMULSION INTRAVENOUS at 09:02

## 2021-08-31 RX ADMIN — PROPOFOL 30 MG: 10 INJECTION, EMULSION INTRAVENOUS at 08:57

## 2021-08-31 NOTE — ANESTHESIA PREPROCEDURE EVALUATION
Anesthesia Evaluation     Patient summary reviewed and Nursing notes reviewed   NPO Solid Status: > 8 hours  NPO Liquid Status: > 2 hours           Airway   Mallampati: II  TM distance: >3 FB  Neck ROM: full  No difficulty expected  Dental - normal exam         Pulmonary    (+) a smoker Current Smoked day of surgery, COPD,   Cardiovascular     (+) hypertension, hyperlipidemia,       Neuro/Psych  (+) seizures, psychiatric history PTSD,     GI/Hepatic/Renal/Endo    (+)  GERD,  liver disease,     Musculoskeletal     Abdominal    Substance History      OB/GYN          Other   arthritis,                    Anesthesia Plan    ASA 3     MAC     intravenous induction     Anesthetic plan, all risks, benefits, and alternatives have been provided, discussed and informed consent has been obtained with: patient.    Plan discussed with CRNA and CAA.

## 2021-08-31 NOTE — ANESTHESIA POSTPROCEDURE EVALUATION
Patient: Lita Yu    Procedure Summary     Date: 08/31/21 Room / Location: [facility] ENDOSCOPY 4 / [facility] ENDOSCOPY    Anesthesia Start: 0846 Anesthesia Stop: 0921    Procedures:       ESOPHAGOGASTRODUODENOSCOPY (N/A )      COLONOSCOPY with polypectomy x2 and random biopsy (N/A ) Diagnosis:       Diarrhea      GERD (gastroesophageal reflux disease)      (Diarrhea [R19.7])      (GERD (gastroesophageal reflux disease) [K21.9])    Surgeons: Osman Clay MD Provider: Femi Ledesma MD    Anesthesia Type: MAC ASA Status: 3          Anesthesia Type: MAC    Vitals  Vitals Value Taken Time   /68 08/31/21 0933   Temp     Pulse 72 08/31/21 0936   Resp 18 08/31/21 0933   SpO2 98 % 08/31/21 0936   Vitals shown include unvalidated device data.        Post Anesthesia Care and Evaluation    Patient location during evaluation: PACU  Patient participation: complete - patient participated  Level of consciousness: awake  Pain scale: See nurse's notes for pain score.  Pain management: adequate  Airway patency: patent  Anesthetic complications: No anesthetic complications  PONV Status: none  Cardiovascular status: acceptable  Respiratory status: acceptable  Hydration status: acceptable    Comments: Patient seen and examined postoperatively; vital signs stable; SpO2 greater than or equal to 90%; cardiopulmonary status stable; nausea/vomiting adequately controlled; pain adequately controlled; no apparent anesthesia complications; patient discharged from anesthesia care when discharge criteria were met

## 2021-09-01 LAB
LAB AP CASE REPORT: NORMAL
LAB AP DIAGNOSIS COMMENT: NORMAL
PATH REPORT.FINAL DX SPEC: NORMAL
PATH REPORT.GROSS SPEC: NORMAL

## 2021-09-07 ENCOUNTER — OFFICE VISIT (OUTPATIENT)
Dept: FAMILY MEDICINE CLINIC | Facility: CLINIC | Age: 56
End: 2021-09-07

## 2021-09-07 ENCOUNTER — LAB (OUTPATIENT)
Dept: FAMILY MEDICINE CLINIC | Facility: CLINIC | Age: 56
End: 2021-09-07

## 2021-09-07 VITALS
DIASTOLIC BLOOD PRESSURE: 68 MMHG | WEIGHT: 132 LBS | TEMPERATURE: 98 F | SYSTOLIC BLOOD PRESSURE: 103 MMHG | OXYGEN SATURATION: 99 % | BODY MASS INDEX: 23.38 KG/M2 | HEART RATE: 78 BPM

## 2021-09-07 DIAGNOSIS — F41.9 ANXIETY: Primary | ICD-10-CM

## 2021-09-07 DIAGNOSIS — Z00.00 PREVENTATIVE HEALTH CARE: ICD-10-CM

## 2021-09-07 DIAGNOSIS — F41.9 ANXIETY: ICD-10-CM

## 2021-09-07 LAB
ALBUMIN SERPL-MCNC: 5 G/DL (ref 3.5–5.2)
ALBUMIN/GLOB SERPL: 1.8 G/DL
ALP SERPL-CCNC: 87 U/L (ref 39–117)
ALT SERPL W P-5'-P-CCNC: 21 U/L (ref 1–33)
ANION GAP SERPL CALCULATED.3IONS-SCNC: 14.2 MMOL/L (ref 5–15)
AST SERPL-CCNC: 27 U/L (ref 1–32)
BASOPHILS # BLD AUTO: 0.04 10*3/MM3 (ref 0–0.2)
BASOPHILS NFR BLD AUTO: 0.5 % (ref 0–1.5)
BILIRUB SERPL-MCNC: 0.3 MG/DL (ref 0–1.2)
BUN SERPL-MCNC: 13 MG/DL (ref 6–20)
BUN/CREAT SERPL: 15.1 (ref 7–25)
CALCIUM SPEC-SCNC: 9.6 MG/DL (ref 8.6–10.5)
CHLORIDE SERPL-SCNC: 99 MMOL/L (ref 98–107)
CHOLEST SERPL-MCNC: 174 MG/DL (ref 0–200)
CO2 SERPL-SCNC: 24.8 MMOL/L (ref 22–29)
CREAT SERPL-MCNC: 0.86 MG/DL (ref 0.57–1)
DEPRECATED RDW RBC AUTO: 54.2 FL (ref 37–54)
EOSINOPHIL # BLD AUTO: 0.16 10*3/MM3 (ref 0–0.4)
EOSINOPHIL NFR BLD AUTO: 1.9 % (ref 0.3–6.2)
ERYTHROCYTE [DISTWIDTH] IN BLOOD BY AUTOMATED COUNT: 14.3 % (ref 12.3–15.4)
GFR SERPL CREATININE-BSD FRML MDRD: 69 ML/MIN/1.73
GLOBULIN UR ELPH-MCNC: 2.8 GM/DL
GLUCOSE SERPL-MCNC: 80 MG/DL (ref 65–99)
HCT VFR BLD AUTO: 36.2 % (ref 34–46.6)
HDLC SERPL-MCNC: 91 MG/DL (ref 40–60)
HGB BLD-MCNC: 11.5 G/DL (ref 12–15.9)
IMM GRANULOCYTES # BLD AUTO: 0.03 10*3/MM3 (ref 0–0.05)
IMM GRANULOCYTES NFR BLD AUTO: 0.3 % (ref 0–0.5)
LDLC SERPL CALC-MCNC: 64 MG/DL (ref 0–100)
LDLC/HDLC SERPL: 0.67 {RATIO}
LYMPHOCYTES # BLD AUTO: 1.93 10*3/MM3 (ref 0.7–3.1)
LYMPHOCYTES NFR BLD AUTO: 22.4 % (ref 19.6–45.3)
MCH RBC QN AUTO: 32.5 PG (ref 26.6–33)
MCHC RBC AUTO-ENTMCNC: 31.8 G/DL (ref 31.5–35.7)
MCV RBC AUTO: 102.3 FL (ref 79–97)
MONOCYTES # BLD AUTO: 0.8 10*3/MM3 (ref 0.1–0.9)
MONOCYTES NFR BLD AUTO: 9.3 % (ref 5–12)
NEUTROPHILS NFR BLD AUTO: 5.64 10*3/MM3 (ref 1.7–7)
NEUTROPHILS NFR BLD AUTO: 65.6 % (ref 42.7–76)
NRBC BLD AUTO-RTO: 0 /100 WBC (ref 0–0.2)
PLATELET # BLD AUTO: 258 10*3/MM3 (ref 140–450)
PMV BLD AUTO: 9.7 FL (ref 6–12)
POTASSIUM SERPL-SCNC: 3.9 MMOL/L (ref 3.5–5.2)
PROT SERPL-MCNC: 7.8 G/DL (ref 6–8.5)
RBC # BLD AUTO: 3.54 10*6/MM3 (ref 3.77–5.28)
SODIUM SERPL-SCNC: 138 MMOL/L (ref 136–145)
TRIGL SERPL-MCNC: 109 MG/DL (ref 0–150)
TSH SERPL DL<=0.05 MIU/L-ACNC: 1.25 UIU/ML (ref 0.27–4.2)
VLDLC SERPL-MCNC: 19 MG/DL (ref 5–40)
WBC # BLD AUTO: 8.6 10*3/MM3 (ref 3.4–10.8)

## 2021-09-07 PROCEDURE — 99214 OFFICE O/P EST MOD 30 MIN: CPT | Performed by: NURSE PRACTITIONER

## 2021-09-07 PROCEDURE — 36415 COLL VENOUS BLD VENIPUNCTURE: CPT

## 2021-09-07 PROCEDURE — 80053 COMPREHEN METABOLIC PANEL: CPT | Performed by: NURSE PRACTITIONER

## 2021-09-07 PROCEDURE — 84443 ASSAY THYROID STIM HORMONE: CPT | Performed by: NURSE PRACTITIONER

## 2021-09-07 PROCEDURE — 80061 LIPID PANEL: CPT | Performed by: NURSE PRACTITIONER

## 2021-09-07 PROCEDURE — 85025 COMPLETE CBC W/AUTO DIFF WBC: CPT | Performed by: NURSE PRACTITIONER

## 2021-09-07 RX ORDER — OLANZAPINE 15 MG/1
15 TABLET ORAL NIGHTLY
Qty: 30 TABLET | Refills: 0 | Status: SHIPPED | OUTPATIENT
Start: 2021-09-07 | End: 2021-10-07 | Stop reason: SDUPTHER

## 2021-09-07 RX ORDER — HYDROXYZINE PAMOATE 25 MG/1
25 CAPSULE ORAL EVERY 6 HOURS PRN
Qty: 30 CAPSULE | Refills: 2 | Status: SHIPPED | OUTPATIENT
Start: 2021-09-07 | End: 2022-08-03

## 2021-09-07 NOTE — PROGRESS NOTES
Subjective     Lita Yu is a 55 y.o. female.     Pt is here today for a 1 mo follow up on anxiety and alcohol abuse.  She feels like her anxiety is worsening.  She is currently on buspar 15mg bid and zoloft 100mg bid.  She is on acamprosate for alcohol abuse.  She has not drank alcohol since July 13th.  She states that she has no desire to drink but she feels ill.  She has some suicidal ideation but she has no plan and states that they are not serious thoughts.  She has an appt with psych, but not until January.   She states that she has restless legs.  She is having trouble sleeping.  Only sleep 2-3 hours.  She is taking trazodone 100mg nightly.    She is taking 20mg melatonin.         The following portions of the patient's history were reviewed and updated as appropriate: allergies, current medications, past family history, past medical history, past social history, past surgical history and problem list.    Review of Systems   Constitutional: Positive for fatigue. Negative for chills and fever.   Respiratory: Negative for chest tightness and shortness of breath.    Cardiovascular: Negative for chest pain and palpitations.   Gastrointestinal: Positive for diarrhea. Negative for abdominal pain, constipation, nausea and vomiting.   Neurological: Positive for headache. Negative for dizziness.   Psychiatric/Behavioral: Positive for suicidal ideas, depressed mood and stress. Negative for self-injury. The patient is nervous/anxious.        Objective     /68 (BP Location: Right arm, Patient Position: Sitting, Cuff Size: Adult)   Pulse 78   Temp 98 °F (36.7 °C) (Tympanic)   Wt 59.9 kg (132 lb)   SpO2 99%   BMI 23.38 kg/m²     Current Outpatient Medications on File Prior to Visit   Medication Sig Dispense Refill   • acamprosate (CAMPRAL) 333 MG EC tablet Take 1 tablet by mouth 3 (Three) Times a Day. 90 tablet 1   • albuterol sulfate  (90 Base) MCG/ACT inhaler Inhale 2 puffs Every 4 (Four)  Hours As Needed for Wheezing. 1 inhaler 5   • amLODIPine (NORVASC) 10 MG tablet Take 10 mg by mouth Every Night.     • amLODIPine (Norvasc) 5 MG tablet Take 1 tablet by mouth Daily. 30 tablet 0   • atorvastatin (LIPITOR) 20 MG tablet TAKE 1 TABLET BY MOUTH EVERY DAY (Patient taking differently: Take 20 mg by mouth Every Night.) 26 tablet 0   • B-12, Methylcobalamin, 1000 MCG sublingual tablet Take 1 tablet by mouth Every Morning.     • busPIRone (BUSPAR) 15 MG tablet Take 1 tablet by mouth 3 (Three) Times a Day. (Patient taking differently: Take 15 mg by mouth 2 (two) times a day.) 60 tablet 2   • dicyclomine (BENTYL) 20 MG tablet Take 20 mg by mouth 2 (Two) Times a Day.     • Fluticasone-Umeclidin-Vilant (Trelegy Ellipta) 100-62.5-25 MCG/INH aerosol powder  Inhale 1 puff Daily. 1 each 11   • folic acid (FOLVITE) 1 MG tablet TAKE 1 TABLET BY MOUTH EVERY DAY (Patient taking differently: Take 1 mg by mouth Daily.) 29 tablet 0   • ibuprofen (ADVIL,MOTRIN) 800 MG tablet Take 800 mg by mouth Every 6 (Six) Hours As Needed.     • lisinopril-hydrochlorothiazide (PRINZIDE,ZESTORETIC) 20-25 MG per tablet Take 1 tablet by mouth Daily. (Patient taking differently: Take 1 tablet by mouth Daily. Hold DOS) 90 tablet 3   • mupirocin (BACTROBAN) 2 % ointment APPLY SMALL AMOUNT EXTERNALLY TO THE AFFECTED AREA THREE TIMES DAILY     • omega-3 acid ethyl esters (LOVAZA) 1 g capsule Take 2 capsules by mouth 2 (Two) Times a Day. (Patient taking differently: Take 2 g by mouth 2 (Two) Times a Day. LD /8/24) 360 capsule 1   • prazosin (MINIPRESS) 1 MG capsule No longer taking     • sertraline (ZOLOFT) 100 MG tablet Take 1 tablet by mouth 2 (Two) Times a Day. 60 tablet 2   • thiamine (VITAMIN B-1) 100 MG tablet tablet Take 100 mg by mouth Daily. No longer taking     • traZODone (DESYREL) 100 MG tablet Take 1 tablet by mouth Every Night. 30 tablet 2   • [DISCONTINUED] hydrOXYzine pamoate (VISTARIL) 25 MG capsule Take 1 capsule by mouth Every 6  (Six) Hours As Needed for Itching. 120 capsule 0   • [DISCONTINUED] OLANZapine (zyPREXA) 10 MG tablet Take 10 mg by mouth Every Night.       No current facility-administered medications on file prior to visit.        Physical Exam  Constitutional:       Appearance: Normal appearance. She is not ill-appearing.   HENT:      Head: Normocephalic and atraumatic.   Cardiovascular:      Rate and Rhythm: Normal rate and regular rhythm.      Heart sounds: No murmur heard.     Pulmonary:      Effort: Pulmonary effort is normal. No respiratory distress.      Breath sounds: Normal breath sounds.   Neurological:      Mental Status: She is alert.           Assessment/Plan     Diagnoses and all orders for this visit:    1. Anxiety (Primary)  Comments:  deteriorated  cont buspar and zoloft  add in zyprexa at night  awaiting psych appt  f/u 1 mo  Orders:  -     OLANZapine (ZyPREXA) 15 MG tablet; Take 1 tablet by mouth Every Night.  Dispense: 30 tablet; Refill: 0  -     TSH; Future    2. Preventative health care  Comments:  check labs  Orders:  -     Lipid panel; Future  -     Comprehensive metabolic panel; Future  -     CBC w AUTO Differential; Future    Other orders  -     hydrOXYzine pamoate (VISTARIL) 25 MG capsule; Take 1 capsule by mouth Every 6 (Six) Hours As Needed for Itching.  Dispense: 30 capsule; Refill: 2

## 2021-09-14 DIAGNOSIS — F10.10 ALCOHOL ABUSE: ICD-10-CM

## 2021-09-14 RX ORDER — FOLIC ACID 1 MG/1
1000 TABLET ORAL DAILY
Qty: 90 TABLET | OUTPATIENT
Start: 2021-09-14

## 2021-09-14 RX ORDER — FOLIC ACID 1 MG/1
TABLET ORAL
Qty: 29 TABLET | Refills: 0 | OUTPATIENT
Start: 2021-09-14

## 2021-09-14 RX ORDER — FOLIC ACID 1 MG/1
1000 TABLET ORAL DAILY
Qty: 29 TABLET | Refills: 0 | Status: SHIPPED | OUTPATIENT
Start: 2021-09-14 | End: 2021-09-29

## 2021-09-14 RX ORDER — ACAMPROSATE CALCIUM 333 MG/1
333 TABLET, DELAYED RELEASE ORAL 3 TIMES DAILY
Qty: 90 TABLET | Refills: 1 | Status: SHIPPED | OUTPATIENT
Start: 2021-09-14 | End: 2022-08-03

## 2021-09-28 DIAGNOSIS — F41.9 ANXIETY: ICD-10-CM

## 2021-09-29 RX ORDER — FOLIC ACID 1 MG/1
1000 TABLET ORAL DAILY
Qty: 29 TABLET | Refills: 0 | Status: ON HOLD | OUTPATIENT
Start: 2021-09-29 | End: 2022-08-04

## 2021-09-29 RX ORDER — BUSPIRONE HYDROCHLORIDE 15 MG/1
15 TABLET ORAL 2 TIMES DAILY
Qty: 180 TABLET | Refills: 0 | Status: SHIPPED | OUTPATIENT
Start: 2021-09-29 | End: 2022-02-23 | Stop reason: SDUPTHER

## 2021-09-30 DIAGNOSIS — G47.00 INSOMNIA, UNSPECIFIED TYPE: ICD-10-CM

## 2021-09-30 RX ORDER — TRAZODONE HYDROCHLORIDE 100 MG/1
100 TABLET ORAL NIGHTLY
Qty: 30 TABLET | Refills: 2 | Status: SHIPPED | OUTPATIENT
Start: 2021-09-30 | End: 2022-01-11

## 2021-10-07 ENCOUNTER — OFFICE VISIT (OUTPATIENT)
Dept: FAMILY MEDICINE CLINIC | Facility: CLINIC | Age: 56
End: 2021-10-07

## 2021-10-07 VITALS
WEIGHT: 134 LBS | HEART RATE: 107 BPM | OXYGEN SATURATION: 98 % | SYSTOLIC BLOOD PRESSURE: 119 MMHG | DIASTOLIC BLOOD PRESSURE: 72 MMHG | TEMPERATURE: 98.7 F | BODY MASS INDEX: 23.74 KG/M2

## 2021-10-07 DIAGNOSIS — F41.9 ANXIETY: ICD-10-CM

## 2021-10-07 PROCEDURE — 99213 OFFICE O/P EST LOW 20 MIN: CPT | Performed by: NURSE PRACTITIONER

## 2021-10-07 RX ORDER — OLANZAPINE 15 MG/1
15 TABLET ORAL NIGHTLY
Qty: 90 TABLET | Refills: 1 | Status: SHIPPED | OUTPATIENT
Start: 2021-10-07 | End: 2022-08-03

## 2021-10-07 NOTE — PATIENT INSTRUCTIONS
Continue current meds  Work on diet and exercise  Follow up with psych  Call for issues or concerns

## 2021-10-07 NOTE — PROGRESS NOTES
Berkley Yu is a 55 y.o. female.     Patient is here today for 1 month follow-up on anxiety.  She is a recovering alcoholic and is currently on treatment.  She is currently taking Zoloft 100 mg a day and BuSpar 15 mg twice daily.  Last month Zyprexa 15 mg was added for her to take at night.  Patient has an appointment scheduled with psychiatry in January.  Patient reports that she has noticed a difference in her anxiety and is feeling much better.  Her last drink was July 13th.    She is going to AA regularly.   Denies CP, dizziness, HA.  Has COPD and has occasional SOA.  Denies SI or HI.  She is resting better.   Pt is concerned about a 10lb weight gain in the last 2 weeks. She is not sure if it is related to the medication.  She does yoga.  She states she eats a lot of salads.       The following portions of the patient's history were reviewed and updated as appropriate: allergies, current medications, past family history, past medical history, past social history, past surgical history and problem list.    Review of Systems   Constitutional: Positive for unexpected weight gain. Negative for chills, fatigue and fever.   Respiratory: Positive for shortness of breath. Negative for chest tightness.    Cardiovascular: Negative for chest pain and palpitations.   Gastrointestinal: Negative for abdominal pain, nausea and vomiting.   Neurological: Negative for dizziness and headache.   Psychiatric/Behavioral: Negative for decreased concentration, self-injury, suicidal ideas and stress. The patient is not nervous/anxious.        Objective     /72 (BP Location: Right arm, Patient Position: Sitting, Cuff Size: Adult)   Pulse 107   Temp 98.7 °F (37.1 °C) (Tympanic)   Wt 60.8 kg (134 lb)   SpO2 98%   BMI 23.74 kg/m²     Current Outpatient Medications on File Prior to Visit   Medication Sig Dispense Refill   • acamprosate (CAMPRAL) 333 MG EC tablet Take 1 tablet by mouth 3 (Three) Times a  Day. 90 tablet 1   • albuterol sulfate  (90 Base) MCG/ACT inhaler Inhale 2 puffs Every 4 (Four) Hours As Needed for Wheezing. 1 inhaler 5   • amLODIPine (NORVASC) 10 MG tablet Take 10 mg by mouth Every Night.     • amLODIPine (Norvasc) 5 MG tablet Take 1 tablet by mouth Daily. 30 tablet 0   • atorvastatin (LIPITOR) 20 MG tablet TAKE 1 TABLET BY MOUTH EVERY DAY (Patient taking differently: Take 20 mg by mouth Every Night.) 26 tablet 0   • B-12, Methylcobalamin, 1000 MCG sublingual tablet Take 1 tablet by mouth Every Morning.     • busPIRone (BUSPAR) 15 MG tablet Take 1 tablet by mouth 2 (two) times a day. 180 tablet 0   • dicyclomine (BENTYL) 20 MG tablet Take 20 mg by mouth 2 (Two) Times a Day.     • Fluticasone-Umeclidin-Vilant (Trelegy Ellipta) 100-62.5-25 MCG/INH aerosol powder  Inhale 1 puff Daily. 1 each 11   • folic acid (FOLVITE) 1 MG tablet TAKE 1 TABLET BY MOUTH DAILY 29 tablet 0   • hydrOXYzine pamoate (VISTARIL) 25 MG capsule Take 1 capsule by mouth Every 6 (Six) Hours As Needed for Itching. 30 capsule 2   • ibuprofen (ADVIL,MOTRIN) 800 MG tablet Take 800 mg by mouth Every 6 (Six) Hours As Needed.     • lisinopril-hydrochlorothiazide (PRINZIDE,ZESTORETIC) 20-25 MG per tablet Take 1 tablet by mouth Daily. (Patient taking differently: Take 1 tablet by mouth Daily. Hold DOS) 90 tablet 3   • mupirocin (BACTROBAN) 2 % ointment APPLY SMALL AMOUNT EXTERNALLY TO THE AFFECTED AREA THREE TIMES DAILY     • omega-3 acid ethyl esters (LOVAZA) 1 g capsule Take 2 capsules by mouth 2 (Two) Times a Day. (Patient taking differently: Take 2 g by mouth 2 (Two) Times a Day. LD /8/24) 360 capsule 1   • prazosin (MINIPRESS) 1 MG capsule No longer taking     • sertraline (ZOLOFT) 100 MG tablet Take 1 tablet by mouth 2 (Two) Times a Day. 60 tablet 2   • thiamine (VITAMIN B-1) 100 MG tablet tablet Take 100 mg by mouth Daily. No longer taking     • traZODone (DESYREL) 100 MG tablet TAKE 1 TABLET BY MOUTH EVERY NIGHT 30  tablet 2   • [DISCONTINUED] OLANZapine (ZyPREXA) 15 MG tablet Take 1 tablet by mouth Every Night. 30 tablet 0     No current facility-administered medications on file prior to visit.        Physical Exam  Vitals reviewed.   Constitutional:       General: She is not in acute distress.     Appearance: Normal appearance. She is well-developed. She is not diaphoretic.   HENT:      Head: Normocephalic and atraumatic.   Eyes:      General:         Right eye: No discharge.         Left eye: No discharge.      Conjunctiva/sclera: Conjunctivae normal.   Cardiovascular:      Rate and Rhythm: Normal rate and regular rhythm.      Heart sounds: No murmur heard.     Pulmonary:      Effort: Pulmonary effort is normal. No respiratory distress.      Breath sounds: Normal breath sounds. No wheezing or rales.   Abdominal:      General: Bowel sounds are normal. There is no distension.      Palpations: Abdomen is soft.      Tenderness: There is no abdominal tenderness.   Musculoskeletal:         General: Normal range of motion.      Cervical back: Normal range of motion and neck supple.   Skin:     General: Skin is warm and dry.   Neurological:      General: No focal deficit present.      Mental Status: She is alert and oriented to person, place, and time.   Psychiatric:         Mood and Affect: Mood normal.         Behavior: Behavior normal.         Thought Content: Thought content normal.         Judgment: Judgment normal.           Assessment/Plan     Diagnoses and all orders for this visit:    1. Anxiety  Comments:  improved  cont buspar and zoloft and zyprexa at night  awaiting psych appt  concerned of weight gain  work on diet and exercise  Orders:  -     OLANZapine (ZyPREXA) 15 MG tablet; Take 1 tablet by mouth Every Night.  Dispense: 90 tablet; Refill: 1

## 2021-11-26 ENCOUNTER — APPOINTMENT (OUTPATIENT)
Dept: GENERAL RADIOLOGY | Facility: HOSPITAL | Age: 56
End: 2021-11-26

## 2021-11-26 ENCOUNTER — HOSPITAL ENCOUNTER (EMERGENCY)
Facility: HOSPITAL | Age: 56
Discharge: HOME | End: 2021-11-26
Attending: EMERGENCY MEDICINE | Admitting: EMERGENCY MEDICINE

## 2021-11-26 VITALS
HEIGHT: 63 IN | BODY MASS INDEX: 25 KG/M2 | DIASTOLIC BLOOD PRESSURE: 60 MMHG | HEART RATE: 102 BPM | OXYGEN SATURATION: 95 % | TEMPERATURE: 98.2 F | SYSTOLIC BLOOD PRESSURE: 112 MMHG | WEIGHT: 141.09 LBS | RESPIRATION RATE: 20 BRPM

## 2021-11-26 DIAGNOSIS — Z20.822 COVID-19 RULED OUT BY LABORATORY TESTING: ICD-10-CM

## 2021-11-26 DIAGNOSIS — R06.00 DYSPNEA, UNSPECIFIED TYPE: ICD-10-CM

## 2021-11-26 DIAGNOSIS — J44.1 COPD EXACERBATION (HCC): Primary | ICD-10-CM

## 2021-11-26 LAB
ALBUMIN SERPL-MCNC: 3.9 G/DL (ref 3.5–5.2)
ALBUMIN/GLOB SERPL: 1.3 G/DL
ALP SERPL-CCNC: 308 U/L (ref 39–117)
ALT SERPL W P-5'-P-CCNC: 366 U/L (ref 1–33)
ANION GAP SERPL CALCULATED.3IONS-SCNC: 22 MMOL/L (ref 5–15)
AST SERPL-CCNC: 1096 U/L (ref 1–32)
BASOPHILS # BLD AUTO: 0 10*3/MM3 (ref 0–0.2)
BASOPHILS NFR BLD AUTO: 0.5 % (ref 0–1.5)
BILIRUB SERPL-MCNC: 0.7 MG/DL (ref 0–1.2)
BUN SERPL-MCNC: 10 MG/DL (ref 6–20)
BUN/CREAT SERPL: 11.1 (ref 7–25)
CALCIUM SPEC-SCNC: 8.1 MG/DL (ref 8.6–10.5)
CHLORIDE SERPL-SCNC: 83 MMOL/L (ref 98–107)
CO2 SERPL-SCNC: 26 MMOL/L (ref 22–29)
CREAT SERPL-MCNC: 0.9 MG/DL (ref 0.57–1)
DEPRECATED RDW RBC AUTO: 48.1 FL (ref 37–54)
EOSINOPHIL # BLD AUTO: 0.1 10*3/MM3 (ref 0–0.4)
EOSINOPHIL NFR BLD AUTO: 1.6 % (ref 0.3–6.2)
ERYTHROCYTE [DISTWIDTH] IN BLOOD BY AUTOMATED COUNT: 14.1 % (ref 12.3–15.4)
GFR SERPL CREATININE-BSD FRML MDRD: 65 ML/MIN/1.73
GLOBULIN UR ELPH-MCNC: 3 GM/DL
GLUCOSE SERPL-MCNC: 125 MG/DL (ref 65–99)
HCT VFR BLD AUTO: 35 % (ref 34–46.6)
HGB BLD-MCNC: 12.3 G/DL (ref 12–15.9)
HOLD SPECIMEN: NORMAL
LYMPHOCYTES # BLD AUTO: 1.4 10*3/MM3 (ref 0.7–3.1)
LYMPHOCYTES NFR BLD AUTO: 16.7 % (ref 19.6–45.3)
MCH RBC QN AUTO: 34.5 PG (ref 26.6–33)
MCHC RBC AUTO-ENTMCNC: 35.1 G/DL (ref 31.5–35.7)
MCV RBC AUTO: 98.3 FL (ref 79–97)
MONOCYTES # BLD AUTO: 0.4 10*3/MM3 (ref 0.1–0.9)
MONOCYTES NFR BLD AUTO: 4.2 % (ref 5–12)
NEUTROPHILS NFR BLD AUTO: 6.7 10*3/MM3 (ref 1.7–7)
NEUTROPHILS NFR BLD AUTO: 77 % (ref 42.7–76)
NRBC BLD AUTO-RTO: 0.1 /100 WBC (ref 0–0.2)
PLATELET # BLD AUTO: 173 10*3/MM3 (ref 140–450)
PMV BLD AUTO: 7.6 FL (ref 6–12)
POTASSIUM SERPL-SCNC: 2.9 MMOL/L (ref 3.5–5.2)
PROT SERPL-MCNC: 6.9 G/DL (ref 6–8.5)
RBC # BLD AUTO: 3.56 10*6/MM3 (ref 3.77–5.28)
SARS-COV-2 RNA PNL SPEC NAA+PROBE: NOT DETECTED
SODIUM SERPL-SCNC: 131 MMOL/L (ref 136–145)
WBC NRBC COR # BLD: 8.7 10*3/MM3 (ref 3.4–10.8)
WHOLE BLOOD HOLD SPECIMEN: NORMAL

## 2021-11-26 PROCEDURE — 96374 THER/PROPH/DIAG INJ IV PUSH: CPT

## 2021-11-26 PROCEDURE — 71045 X-RAY EXAM CHEST 1 VIEW: CPT

## 2021-11-26 PROCEDURE — 94640 AIRWAY INHALATION TREATMENT: CPT

## 2021-11-26 PROCEDURE — 99283 EMERGENCY DEPT VISIT LOW MDM: CPT

## 2021-11-26 PROCEDURE — 85025 COMPLETE CBC W/AUTO DIFF WBC: CPT | Performed by: EMERGENCY MEDICINE

## 2021-11-26 PROCEDURE — 94799 UNLISTED PULMONARY SVC/PX: CPT

## 2021-11-26 PROCEDURE — 25010000002 METHYLPREDNISOLONE PER 125 MG: Performed by: EMERGENCY MEDICINE

## 2021-11-26 PROCEDURE — 87635 SARS-COV-2 COVID-19 AMP PRB: CPT | Performed by: EMERGENCY MEDICINE

## 2021-11-26 PROCEDURE — 80053 COMPREHEN METABOLIC PANEL: CPT | Performed by: EMERGENCY MEDICINE

## 2021-11-26 PROCEDURE — 93005 ELECTROCARDIOGRAM TRACING: CPT | Performed by: EMERGENCY MEDICINE

## 2021-11-26 RX ORDER — BROMPHENIRAMINE MALEATE, PSEUDOEPHEDRINE HYDROCHLORIDE, AND DEXTROMETHORPHAN HYDROBROMIDE 2; 30; 10 MG/5ML; MG/5ML; MG/5ML
5 SYRUP ORAL 4 TIMES DAILY PRN
Qty: 118 ML | Refills: 0 | Status: SHIPPED | OUTPATIENT
Start: 2021-11-26 | End: 2021-12-14

## 2021-11-26 RX ORDER — ALBUTEROL SULFATE 90 UG/1
2 AEROSOL, METERED RESPIRATORY (INHALATION) ONCE
Status: COMPLETED | OUTPATIENT
Start: 2021-11-26 | End: 2021-11-26

## 2021-11-26 RX ORDER — POTASSIUM CHLORIDE 20 MEQ/1
40 TABLET, EXTENDED RELEASE ORAL ONCE
Status: COMPLETED | OUTPATIENT
Start: 2021-11-26 | End: 2021-11-26

## 2021-11-26 RX ORDER — METHYLPREDNISOLONE SODIUM SUCCINATE 125 MG/2ML
125 INJECTION, POWDER, LYOPHILIZED, FOR SOLUTION INTRAMUSCULAR; INTRAVENOUS ONCE
Status: COMPLETED | OUTPATIENT
Start: 2021-11-26 | End: 2021-11-26

## 2021-11-26 RX ORDER — ALBUTEROL SULFATE 2.5 MG/3ML
2.5 SOLUTION RESPIRATORY (INHALATION) ONCE
Status: DISCONTINUED | OUTPATIENT
Start: 2021-11-26 | End: 2021-11-26

## 2021-11-26 RX ORDER — PREDNISONE 50 MG/1
50 TABLET ORAL DAILY
Qty: 4 TABLET | Refills: 0 | Status: SHIPPED | OUTPATIENT
Start: 2021-11-26 | End: 2021-12-14

## 2021-11-26 RX ORDER — DOXYCYCLINE 100 MG/1
100 CAPSULE ORAL 2 TIMES DAILY
Qty: 14 CAPSULE | Refills: 0 | Status: SHIPPED | OUTPATIENT
Start: 2021-11-26 | End: 2022-08-03

## 2021-11-26 RX ADMIN — POTASSIUM CHLORIDE 40 MEQ: 1500 TABLET, EXTENDED RELEASE ORAL at 20:17

## 2021-11-26 RX ADMIN — METHYLPREDNISOLONE SODIUM SUCCINATE 125 MG: 125 INJECTION, POWDER, FOR SOLUTION INTRAMUSCULAR; INTRAVENOUS at 18:40

## 2021-11-26 RX ADMIN — ALBUTEROL SULFATE 2 PUFF: 108 INHALANT RESPIRATORY (INHALATION) at 19:16

## 2021-11-28 LAB — QT INTERVAL: 345 MS

## 2021-12-14 ENCOUNTER — LAB (OUTPATIENT)
Dept: FAMILY MEDICINE CLINIC | Facility: CLINIC | Age: 56
End: 2021-12-14

## 2021-12-14 ENCOUNTER — HOSPITAL ENCOUNTER (EMERGENCY)
Facility: HOSPITAL | Age: 56
Discharge: HOME | End: 2021-12-14
Attending: EMERGENCY MEDICINE | Admitting: EMERGENCY MEDICINE

## 2021-12-14 ENCOUNTER — LAB (OUTPATIENT)
Dept: LAB | Facility: HOSPITAL | Age: 56
End: 2021-12-14

## 2021-12-14 ENCOUNTER — OFFICE VISIT (OUTPATIENT)
Dept: FAMILY MEDICINE CLINIC | Facility: CLINIC | Age: 56
End: 2021-12-14

## 2021-12-14 ENCOUNTER — APPOINTMENT (OUTPATIENT)
Dept: GENERAL RADIOLOGY | Facility: HOSPITAL | Age: 56
End: 2021-12-14

## 2021-12-14 VITALS
RESPIRATION RATE: 17 BRPM | BODY MASS INDEX: 23.92 KG/M2 | SYSTOLIC BLOOD PRESSURE: 104 MMHG | WEIGHT: 135 LBS | OXYGEN SATURATION: 96 % | HEART RATE: 86 BPM | DIASTOLIC BLOOD PRESSURE: 66 MMHG | TEMPERATURE: 98 F | HEIGHT: 63 IN

## 2021-12-14 VITALS
OXYGEN SATURATION: 100 % | DIASTOLIC BLOOD PRESSURE: 74 MMHG | SYSTOLIC BLOOD PRESSURE: 108 MMHG | WEIGHT: 134 LBS | HEART RATE: 85 BPM | TEMPERATURE: 98.6 F | BODY MASS INDEX: 23.74 KG/M2

## 2021-12-14 DIAGNOSIS — R74.8 ELEVATED LIVER ENZYMES: ICD-10-CM

## 2021-12-14 DIAGNOSIS — R53.1 WEAKNESS: Primary | ICD-10-CM

## 2021-12-14 DIAGNOSIS — R55 SYNCOPE, UNSPECIFIED SYNCOPE TYPE: Primary | ICD-10-CM

## 2021-12-14 DIAGNOSIS — R53.1 WEAKNESS: ICD-10-CM

## 2021-12-14 LAB
ALBUMIN SERPL-MCNC: 3.4 G/DL (ref 3.5–5.2)
ALBUMIN/GLOB SERPL: 0.8 G/DL
ALP SERPL-CCNC: 407 U/L (ref 39–117)
ALT SERPL W P-5'-P-CCNC: 142 U/L (ref 1–33)
AMMONIA BLD-SCNC: 54 UMOL/L (ref 11–51)
AMYLASE SERPL-CCNC: 83 U/L (ref 28–100)
ANION GAP SERPL CALCULATED.3IONS-SCNC: 12.2 MMOL/L (ref 5–15)
AST SERPL-CCNC: 335 U/L (ref 1–32)
BASOPHILS # BLD AUTO: 0.13 10*3/MM3 (ref 0–0.2)
BASOPHILS NFR BLD AUTO: 1.1 % (ref 0–1.5)
BILIRUB SERPL-MCNC: 2 MG/DL (ref 0–1.2)
BUN SERPL-MCNC: 5 MG/DL (ref 6–20)
BUN/CREAT SERPL: 7.9 (ref 7–25)
CALCIUM SPEC-SCNC: 8.8 MG/DL (ref 8.6–10.5)
CHLORIDE SERPL-SCNC: 97 MMOL/L (ref 98–107)
CO2 SERPL-SCNC: 25.8 MMOL/L (ref 22–29)
CREAT SERPL-MCNC: 0.63 MG/DL (ref 0.57–1)
DEPRECATED RDW RBC AUTO: 50 FL (ref 37–54)
EOSINOPHIL # BLD AUTO: 0.1 10*3/MM3 (ref 0–0.4)
EOSINOPHIL NFR BLD AUTO: 0.9 % (ref 0.3–6.2)
ERYTHROCYTE [DISTWIDTH] IN BLOOD BY AUTOMATED COUNT: 16.9 % (ref 12.3–15.4)
GFR SERPL CREATININE-BSD FRML MDRD: 98 ML/MIN/1.73
GGT SERPL-CCNC: 1477 U/L (ref 5–36)
GLOBULIN UR ELPH-MCNC: 4.5 GM/DL
GLUCOSE SERPL-MCNC: 89 MG/DL (ref 65–99)
HAV IGM SERPL QL IA: NORMAL
HBV CORE IGM SERPL QL IA: NORMAL
HBV SURFACE AG SERPL QL IA: NORMAL
HCT VFR BLD AUTO: 28.3 % (ref 34–46.6)
HCV AB SER DONR QL: NORMAL
HETEROPH AB SER QL LA: NEGATIVE
HGB BLD-MCNC: 9.7 G/DL (ref 12–15.9)
HOLD SPECIMEN: NORMAL
LIPASE SERPL-CCNC: 82 U/L (ref 13–60)
LYMPHOCYTES # BLD AUTO: 2.74 10*3/MM3 (ref 0.7–3.1)
LYMPHOCYTES NFR BLD AUTO: 24.2 % (ref 19.6–45.3)
MCH RBC QN AUTO: 35.9 PG (ref 26.6–33)
MCHC RBC AUTO-ENTMCNC: 34.3 G/DL (ref 31.5–35.7)
MCV RBC AUTO: 104.8 FL (ref 79–97)
MONOCYTES # BLD AUTO: 1.2 10*3/MM3 (ref 0.1–0.9)
MONOCYTES NFR BLD AUTO: 10.6 % (ref 5–12)
NEUTROPHILS NFR BLD AUTO: 6.89 10*3/MM3 (ref 1.7–7)
NEUTROPHILS NFR BLD AUTO: 60.7 % (ref 42.7–76)
PLATELET # BLD AUTO: 242 10*3/MM3 (ref 140–450)
PMV BLD AUTO: 11.6 FL (ref 6–12)
POTASSIUM SERPL-SCNC: 3.3 MMOL/L (ref 3.5–5.2)
PROT SERPL-MCNC: 7.9 G/DL (ref 6–8.5)
RBC # BLD AUTO: 2.7 10*6/MM3 (ref 3.77–5.28)
SARS-COV-2 RNA PNL SPEC NAA+PROBE: NOT DETECTED
SODIUM SERPL-SCNC: 135 MMOL/L (ref 136–145)
WBC NRBC COR # BLD: 11.34 10*3/MM3 (ref 3.4–10.8)
WHOLE BLOOD HOLD SPECIMEN: NORMAL
WHOLE BLOOD HOLD SPECIMEN: NORMAL

## 2021-12-14 PROCEDURE — 85025 COMPLETE CBC W/AUTO DIFF WBC: CPT

## 2021-12-14 PROCEDURE — 99283 EMERGENCY DEPT VISIT LOW MDM: CPT

## 2021-12-14 PROCEDURE — 93005 ELECTROCARDIOGRAM TRACING: CPT | Performed by: EMERGENCY MEDICINE

## 2021-12-14 PROCEDURE — 87635 SARS-COV-2 COVID-19 AMP PRB: CPT | Performed by: EMERGENCY MEDICINE

## 2021-12-14 PROCEDURE — 93005 ELECTROCARDIOGRAM TRACING: CPT

## 2021-12-14 PROCEDURE — 80074 ACUTE HEPATITIS PANEL: CPT

## 2021-12-14 PROCEDURE — 86645 CMV ANTIBODY IGM: CPT

## 2021-12-14 PROCEDURE — 82977 ASSAY OF GGT: CPT

## 2021-12-14 PROCEDURE — 82150 ASSAY OF AMYLASE: CPT

## 2021-12-14 PROCEDURE — 99214 OFFICE O/P EST MOD 30 MIN: CPT | Performed by: NURSE PRACTITIONER

## 2021-12-14 PROCEDURE — 80053 COMPREHEN METABOLIC PANEL: CPT

## 2021-12-14 PROCEDURE — 71045 X-RAY EXAM CHEST 1 VIEW: CPT

## 2021-12-14 PROCEDURE — 36415 COLL VENOUS BLD VENIPUNCTURE: CPT

## 2021-12-14 PROCEDURE — 82140 ASSAY OF AMMONIA: CPT

## 2021-12-14 PROCEDURE — 86644 CMV ANTIBODY: CPT

## 2021-12-14 PROCEDURE — 83690 ASSAY OF LIPASE: CPT

## 2021-12-14 PROCEDURE — 86308 HETEROPHILE ANTIBODY SCREEN: CPT

## 2021-12-14 PROCEDURE — 82962 GLUCOSE BLOOD TEST: CPT

## 2021-12-14 PROCEDURE — 94799 UNLISTED PULMONARY SVC/PX: CPT

## 2021-12-14 RX ADMIN — SODIUM CHLORIDE 1000 ML: 9 INJECTION, SOLUTION INTRAVENOUS at 16:18

## 2021-12-14 NOTE — PROGRESS NOTES
Subjective     Lita Yu is a 56 y.o. female.     Pt is here today with dizziness and fatigue.  She states that she started feeling ill on Nov 22nd and hasnt felt right since.  She went to the ER and was diagnosed with bronchitis.   She was sent home on prednisone and doxycycline.  They believe it was a COPD exacerbation with bronchitis.  She has been sober from alcohol since July 13th,  Her liver enzymes were extremely elevated in the ER.  She states she has a history of cirrhosis.  She states she has been extremely weak for 3 weeks.  She is unable to eat.  She states the only thing that she has been able to take in is yogurt.  She has not been able to take her medications until today.  She had been vomiting.  She also had some diarrhea.   She felt like she was going to pass out coming in today.  She has ran a fever off and on for the last few weeks.   Yesterday was 101.   Her  has had the same symptoms.  Had a sore throat but it has resolved.  Denies any current abdominal pain.  She has been taking immodium.   She was previously having diarrhea.  Denies CP, SOA.  She gets a HA with fever.       The following portions of the patient's history were reviewed and updated as appropriate: allergies, current medications, past family history, past medical history, past social history, past surgical history and problem list.    Review of Systems   Constitutional: Positive for chills, fatigue and fever.   HENT: Negative for congestion, sinus pressure and sore throat.    Respiratory: Negative for cough, chest tightness and shortness of breath.    Cardiovascular: Negative for chest pain and palpitations.   Gastrointestinal: Positive for diarrhea and nausea. Negative for abdominal pain.   Genitourinary: Negative for dysuria, frequency and urgency.   Musculoskeletal: Positive for myalgias.   Neurological: Positive for dizziness and headache.       Objective     /74 (BP Location: Right arm, Patient  Position: Sitting, Cuff Size: Adult)   Pulse 85   Temp 98.6 °F (37 °C) (Tympanic)   Wt 60.8 kg (134 lb)   SpO2 100%   BMI 23.74 kg/m²     No current facility-administered medications on file prior to visit.     Current Outpatient Medications on File Prior to Visit   Medication Sig Dispense Refill   • acamprosate (CAMPRAL) 333 MG EC tablet Take 1 tablet by mouth 3 (Three) Times a Day. 90 tablet 1   • albuterol sulfate  (90 Base) MCG/ACT inhaler Inhale 2 puffs Every 4 (Four) Hours As Needed for Wheezing. 1 inhaler 5   • amLODIPine (Norvasc) 5 MG tablet Take 1 tablet by mouth Daily. 30 tablet 0   • atorvastatin (LIPITOR) 20 MG tablet TAKE 1 TABLET BY MOUTH EVERY DAY (Patient taking differently: Take 20 mg by mouth Every Night.) 26 tablet 0   • B-12, Methylcobalamin, 1000 MCG sublingual tablet Take 1 tablet by mouth Every Morning.     • busPIRone (BUSPAR) 15 MG tablet Take 1 tablet by mouth 2 (two) times a day. 180 tablet 0   • dicyclomine (BENTYL) 20 MG tablet Take 20 mg by mouth 2 (Two) Times a Day.     • doxycycline (MONODOX) 100 MG capsule Take 1 capsule by mouth 2 (Two) Times a Day. 14 capsule 0   • Fluticasone-Umeclidin-Vilant (Trelegy Ellipta) 100-62.5-25 MCG/INH aerosol powder  Inhale 1 puff Daily. 1 each 11   • folic acid (FOLVITE) 1 MG tablet TAKE 1 TABLET BY MOUTH DAILY 29 tablet 0   • hydrOXYzine pamoate (VISTARIL) 25 MG capsule Take 1 capsule by mouth Every 6 (Six) Hours As Needed for Itching. 30 capsule 2   • ibuprofen (ADVIL,MOTRIN) 800 MG tablet Take 800 mg by mouth Every 6 (Six) Hours As Needed.     • lisinopril-hydrochlorothiazide (PRINZIDE,ZESTORETIC) 20-25 MG per tablet Take 1 tablet by mouth Daily. (Patient taking differently: Take 1 tablet by mouth Daily. Hold DOS) 90 tablet 3   • mupirocin (BACTROBAN) 2 % ointment APPLY SMALL AMOUNT EXTERNALLY TO THE AFFECTED AREA THREE TIMES DAILY     • OLANZapine (ZyPREXA) 15 MG tablet Take 1 tablet by mouth Every Night. 90 tablet 1   • omega-3 acid  ethyl esters (LOVAZA) 1 g capsule Take 2 capsules by mouth 2 (Two) Times a Day. (Patient taking differently: Take 2 g by mouth 2 (Two) Times a Day. LD /8/24) 360 capsule 1   • prazosin (MINIPRESS) 1 MG capsule No longer taking     • sertraline (ZOLOFT) 100 MG tablet Take 1 tablet by mouth 2 (Two) Times a Day. 60 tablet 2   • thiamine (VITAMIN B-1) 100 MG tablet tablet Take 100 mg by mouth Daily. No longer taking     • traZODone (DESYREL) 100 MG tablet TAKE 1 TABLET BY MOUTH EVERY NIGHT 30 tablet 2   • [DISCONTINUED] amLODIPine (NORVASC) 10 MG tablet Take 10 mg by mouth Every Night.     • [DISCONTINUED] brompheniramine-pseudoephedrine-DM 30-2-10 MG/5ML syrup Take 5 mL by mouth 4 (Four) Times a Day As Needed for Cough. 118 mL 0   • [DISCONTINUED] predniSONE (DELTASONE) 50 MG tablet Take 1 tablet by mouth Daily. 4 tablet 0        Physical Exam  Constitutional:       Appearance: Normal appearance. She is not ill-appearing.      Comments: Pt appears weak when standing   HENT:      Head: Normocephalic and atraumatic.   Cardiovascular:      Rate and Rhythm: Normal rate and regular rhythm.      Heart sounds: No murmur heard.      Pulmonary:      Effort: Pulmonary effort is normal. No respiratory distress.      Breath sounds: Normal breath sounds.   Abdominal:      General: Abdomen is flat. There is no distension.      Palpations: Abdomen is soft.      Tenderness: There is abdominal tenderness (mild RUQ).   Musculoskeletal:         General: Normal range of motion.   Skin:     General: Skin is warm and dry.   Neurological:      General: No focal deficit present.      Mental Status: She is alert and oriented to person, place, and time.   Psychiatric:         Mood and Affect: Mood normal.         Thought Content: Thought content normal.         Judgment: Judgment normal.           Assessment/Plan     Diagnoses and all orders for this visit:    1. Weakness (Primary)  Comments:  unknown etiology  poss dehydration  check labs  VS  stable  reviewed ER notes- liver enzymes elevated  r/o mono/cytomegalovirus  strict ER prec  Orders:  -     Comprehensive metabolic panel; Future  -     CBC w AUTO Differential; Future  -     Mononucleosis Screen; Future  -     Cytomegalovirus Antibody, IgM; Future  -     Cytomegalovirus Antibody, IgG; Future  -     Amylase; Future  -     Lipase; Future    2. Elevated liver enzymes  Comments:  hx cirrhosis  elevated in ER  recheck labs today  schedule appt with GI  r/o viral etiology  push fluids  hasnt drank alcohol since July  Orders:  -     Comprehensive metabolic panel; Future  -     CBC w AUTO Differential; Future  -     Mononucleosis Screen; Future  -     Cytomegalovirus Antibody, IgM; Future  -     Cytomegalovirus Antibody, IgG; Future  -     Amylase; Future  -     Lipase; Future  -     Gamma GT; Future  -     Hepatitis panel, acute; Future  -     Ammonia; Future      Pt given very strict ER prec.

## 2021-12-14 NOTE — ED PROVIDER NOTES
Subjective   Patient is a 56-year-old female who states she is having blood drawn today at the hospital when she passed out.  She states he has had this happen several times in the past with blood draws.  She has no complaints other than mild weakness at this time.          Review of Systems  Negative for headache earache throat cough fever chest pain shortness of breath vomiting diarrhea dysuria or other complaint  Past Medical History:   Diagnosis Date   • Cirrhosis (HCC)    • COPD (chronic obstructive pulmonary disease) (HCC)    • Depression    • GERD (gastroesophageal reflux disease)    • Hyperlipidemia    • Hypertension    • PTSD (post-traumatic stress disorder)        Allergies   Allergen Reactions   • Sulfa Antibiotics Hives   • Keflex [Cephalexin] Hives       Past Surgical History:   Procedure Laterality Date   •  SECTION N/A    • COLONOSCOPY N/A 2021    Procedure: COLONOSCOPY with polypectomy x2 and random biopsy;  Surgeon: Osman Clay MD;  Location: [facility] ENDOSCOPY;  Service: Gastroenterology;  Laterality: N/A;  colon polyps   • DENTAL PROCEDURE     • ENDOSCOPY N/A 2021    Procedure: ESOPHAGOGASTRODUODENOSCOPY;  Surgeon: Osman Clay MD;  Location: [facility] ENDOSCOPY;  Service: Gastroenterology;  Laterality: N/A;  esophagitis   • JOINT REPLACEMENT     • REPLACEMENT TOTAL HIP LATERAL POSITION Left    • TONSILLECTOMY     • VAGINAL BIRTH AFTER  SECTION         Family History   Problem Relation Age of Onset   • Alcohol abuse Mother    • Alcohol abuse Paternal Grandfather        Social History     Socioeconomic History   • Marital status:    Tobacco Use   • Smoking status: Current Every Day Smoker     Packs/day: 0.25     Types: Cigarettes   • Smokeless tobacco: Never Used   • Tobacco comment: 3cigs   Vaping Use   • Vaping Use: Never used   Substance and Sexual Activity   • Alcohol use: Not Currently     Comment: quit 23   • Drug use: No   • Sexual activity: Yes      Partners: Male     Birth control/protection: Post-menopausal           Objective   Physical Exam  Neurologic exam is nonfocal.  HEENT exam shows TMs to be clear.  Oropharynx comers but sclera nonicteric.  Neck has no not the JVD or bruits.  Lungs clear.  Heart has regular rhythm.  Chest nontender.  Soft nontender.  Extremities M unremarkable.  Procedures       My EKG interpretation shows normal sinus rhythm with no acute ST change    ED Course      .this  XR Chest 1 View    Result Date: 12/14/2021  No acute cardiopulmonary process.  Electronically Signed By-Dawson Hua MD On:12/14/2021 4:39 PM This report was finalized on [id number] by  Dawson Hua MD.                                               MDM  Number of Diagnoses or Management Options  Diagnosis management comments: Patient was mildly hypotensive but not orthostatic.  She has no evidence of infectious process including Covid.  Patient was given normal saline 1 L bolus.  On reexam her blood pressure is 100 systolic.  She states feels much improved patient be discharged home and follow with MD for recheck as needed.       Amount and/or Complexity of Data Reviewed  Clinical lab tests: reviewed  Tests in the radiology section of CPT®: reviewed  Tests in the medicine section of CPT®: reviewed    Risk of Complications, Morbidity, and/or Mortality  Presenting problems: high  Diagnostic procedures: high  Management options: high    Patient Progress  Patient progress: stable      Final diagnoses:   Syncope, unspecified syncope type       ED Disposition  ED Disposition     ED Disposition Condition Comment    Discharge Stable           No follow-up provider specified.       Medication List      Changed    atorvastatin 20 MG tablet  Commonly known as: LIPITOR  TAKE 1 TABLET BY MOUTH EVERY DAY  What changed: when to take this     lisinopril-hydrochlorothiazide 20-25 MG per tablet  Commonly known as: PRINZIDE,ZESTORETIC  Take 1 tablet by mouth Daily.  What changed:  additional instructions     omega-3 acid ethyl esters 1 g capsule  Commonly known as: LOVAZA  Take 2 capsules by mouth 2 (Two) Times a Day.  What changed: additional instructions             Dawson Berrios MD  12/14/21 1181

## 2021-12-15 DIAGNOSIS — R53.1 WEAKNESS: Primary | ICD-10-CM

## 2021-12-15 DIAGNOSIS — R74.8 ELEVATED LIVER ENZYMES: ICD-10-CM

## 2021-12-15 DIAGNOSIS — R74.8 ELEVATED LIPASE: ICD-10-CM

## 2021-12-15 DIAGNOSIS — R10.84 GENERALIZED ABDOMINAL PAIN: ICD-10-CM

## 2021-12-15 DIAGNOSIS — R11.0 NAUSEA: ICD-10-CM

## 2021-12-15 LAB
CMV IGG SERPL IA-ACNC: >10 U/ML (ref 0–0.59)
CMV IGM SERPL IA-ACNC: <30 AU/ML (ref 0–29.9)
GLUCOSE BLDC GLUCOMTR-MCNC: 108 MG/DL (ref 70–105)

## 2021-12-16 ENCOUNTER — HOSPITAL ENCOUNTER (EMERGENCY)
Facility: HOSPITAL | Age: 56
Discharge: LEFT BEFORE DISCHARGE | End: 2021-12-16

## 2021-12-16 ENCOUNTER — TELEPHONE (OUTPATIENT)
Dept: FAMILY MEDICINE CLINIC | Facility: CLINIC | Age: 56
End: 2021-12-16

## 2021-12-16 VITALS
OXYGEN SATURATION: 98 % | SYSTOLIC BLOOD PRESSURE: 124 MMHG | TEMPERATURE: 97.2 F | BODY MASS INDEX: 23.46 KG/M2 | HEIGHT: 63 IN | HEART RATE: 90 BPM | DIASTOLIC BLOOD PRESSURE: 64 MMHG | WEIGHT: 132.4 LBS | RESPIRATION RATE: 18 BRPM

## 2021-12-16 PROCEDURE — 99211 OFF/OP EST MAY X REQ PHY/QHP: CPT

## 2021-12-16 NOTE — TELEPHONE ENCOUNTER
Received CT results from [facility].  radiologist states that there is some right upper quadrant inflammation that he is concerned about a duodenal ulcer.  The pancreas did not look super inflamed on scan per radiologist.  I called patient went over results.  With her increase in white blood cell count and decreasing hemoglobin and extreme weakness, I advised her that I would like for her to go back to the ER at this time. She has also not been able to tolerate food for a few weeks.  I called the ER and gave report and voiced my concerns with patient status.  Patient was agreeable to go

## 2021-12-19 LAB — QT INTERVAL: 391 MS

## 2022-01-11 DIAGNOSIS — G47.00 INSOMNIA, UNSPECIFIED TYPE: ICD-10-CM

## 2022-01-11 RX ORDER — TRAZODONE HYDROCHLORIDE 100 MG/1
TABLET ORAL
Qty: 30 TABLET | Refills: 1 | Status: SHIPPED | OUTPATIENT
Start: 2022-01-11 | End: 2022-02-03

## 2022-02-03 DIAGNOSIS — G47.00 INSOMNIA, UNSPECIFIED TYPE: ICD-10-CM

## 2022-02-03 RX ORDER — TRAZODONE HYDROCHLORIDE 100 MG/1
TABLET ORAL
Qty: 30 TABLET | Refills: 1 | Status: SHIPPED | OUTPATIENT
Start: 2022-02-03 | End: 2022-03-09

## 2022-02-18 DIAGNOSIS — E78.5 HYPERLIPIDEMIA, UNSPECIFIED HYPERLIPIDEMIA TYPE: ICD-10-CM

## 2022-02-18 DIAGNOSIS — F41.9 ANXIETY: ICD-10-CM

## 2022-02-20 RX ORDER — SERTRALINE HYDROCHLORIDE 100 MG/1
100 TABLET, FILM COATED ORAL 2 TIMES DAILY
Qty: 60 TABLET | Refills: 2 | Status: SHIPPED | OUTPATIENT
Start: 2022-02-20 | End: 2022-03-16

## 2022-02-20 RX ORDER — OMEGA-3-ACID ETHYL ESTERS 1 G/1
2 CAPSULE, LIQUID FILLED ORAL 2 TIMES DAILY
Qty: 360 CAPSULE | Refills: 1 | Status: SHIPPED | OUTPATIENT
Start: 2022-02-20 | End: 2022-08-18

## 2022-02-23 ENCOUNTER — TELEPHONE (OUTPATIENT)
Dept: FAMILY MEDICINE CLINIC | Facility: CLINIC | Age: 57
End: 2022-02-23

## 2022-02-23 DIAGNOSIS — F41.9 ANXIETY: ICD-10-CM

## 2022-02-23 RX ORDER — BUSPIRONE HYDROCHLORIDE 15 MG/1
15 TABLET ORAL 2 TIMES DAILY
Qty: 180 TABLET | Refills: 0 | Status: SHIPPED | OUTPATIENT
Start: 2022-02-23 | End: 2022-05-24

## 2022-02-23 NOTE — TELEPHONE ENCOUNTER
Patient is transferring to this pharmacy [facility] and needs Buspirone 15 mg BID sent to [facility]

## 2022-03-09 DIAGNOSIS — G47.00 INSOMNIA, UNSPECIFIED TYPE: ICD-10-CM

## 2022-03-09 RX ORDER — TRAZODONE HYDROCHLORIDE 100 MG/1
TABLET ORAL
Qty: 30 TABLET | Refills: 1 | Status: SHIPPED | OUTPATIENT
Start: 2022-03-09 | End: 2022-04-11

## 2022-03-16 DIAGNOSIS — F41.9 ANXIETY: ICD-10-CM

## 2022-03-16 RX ORDER — SERTRALINE HYDROCHLORIDE 100 MG/1
TABLET, FILM COATED ORAL
Qty: 60 TABLET | Refills: 2 | Status: SHIPPED | OUTPATIENT
Start: 2022-03-16 | End: 2022-06-13

## 2022-04-09 DIAGNOSIS — G47.00 INSOMNIA, UNSPECIFIED TYPE: ICD-10-CM

## 2022-04-11 RX ORDER — TRAZODONE HYDROCHLORIDE 100 MG/1
TABLET ORAL
Qty: 30 TABLET | Refills: 1 | Status: SHIPPED | OUTPATIENT
Start: 2022-04-11 | End: 2022-05-11

## 2022-05-11 DIAGNOSIS — G47.00 INSOMNIA, UNSPECIFIED TYPE: ICD-10-CM

## 2022-05-11 RX ORDER — TRAZODONE HYDROCHLORIDE 100 MG/1
TABLET ORAL
Qty: 30 TABLET | Refills: 1 | Status: SHIPPED | OUTPATIENT
Start: 2022-05-11 | End: 2022-06-15

## 2022-05-24 DIAGNOSIS — F41.9 ANXIETY: ICD-10-CM

## 2022-05-24 RX ORDER — ATORVASTATIN CALCIUM 20 MG/1
TABLET, FILM COATED ORAL
Qty: 90 TABLET | Refills: 1 | Status: ON HOLD | OUTPATIENT
Start: 2022-05-24 | End: 2022-08-04

## 2022-05-24 RX ORDER — BUSPIRONE HYDROCHLORIDE 15 MG/1
TABLET ORAL
Qty: 180 TABLET | Refills: 0 | Status: ON HOLD | OUTPATIENT
Start: 2022-05-24 | End: 2022-08-04

## 2022-06-11 DIAGNOSIS — F41.9 ANXIETY: ICD-10-CM

## 2022-06-12 DIAGNOSIS — F41.9 ANXIETY: ICD-10-CM

## 2022-06-13 RX ORDER — SERTRALINE HYDROCHLORIDE 100 MG/1
TABLET, FILM COATED ORAL
Qty: 180 TABLET | Refills: 1 | Status: ON HOLD | OUTPATIENT
Start: 2022-06-13 | End: 2022-08-04

## 2022-06-13 RX ORDER — OLANZAPINE 15 MG/1
TABLET ORAL
Qty: 85 TABLET | OUTPATIENT
Start: 2022-06-13

## 2022-06-15 DIAGNOSIS — G47.00 INSOMNIA, UNSPECIFIED TYPE: ICD-10-CM

## 2022-06-15 RX ORDER — TRAZODONE HYDROCHLORIDE 100 MG/1
TABLET ORAL
Qty: 30 TABLET | Refills: 1 | Status: SHIPPED | OUTPATIENT
Start: 2022-06-15 | End: 2022-07-14

## 2022-06-21 DIAGNOSIS — J44.9 CHRONIC OBSTRUCTIVE PULMONARY DISEASE, UNSPECIFIED COPD TYPE: ICD-10-CM

## 2022-07-14 DIAGNOSIS — G47.00 INSOMNIA, UNSPECIFIED TYPE: ICD-10-CM

## 2022-07-14 RX ORDER — TRAZODONE HYDROCHLORIDE 100 MG/1
TABLET ORAL
Qty: 30 TABLET | Refills: 1 | Status: ON HOLD | OUTPATIENT
Start: 2022-07-14 | End: 2022-08-04

## 2022-07-18 ENCOUNTER — APPOINTMENT (OUTPATIENT)
Dept: CT IMAGING | Facility: HOSPITAL | Age: 57
End: 2022-07-18

## 2022-07-18 ENCOUNTER — HOSPITAL ENCOUNTER (EMERGENCY)
Facility: HOSPITAL | Age: 57
Discharge: HOME | End: 2022-07-18
Attending: EMERGENCY MEDICINE | Admitting: EMERGENCY MEDICINE

## 2022-07-18 VITALS
HEIGHT: 63 IN | BODY MASS INDEX: 23.39 KG/M2 | RESPIRATION RATE: 16 BRPM | OXYGEN SATURATION: 97 % | HEART RATE: 74 BPM | WEIGHT: 132 LBS | DIASTOLIC BLOOD PRESSURE: 69 MMHG | SYSTOLIC BLOOD PRESSURE: 118 MMHG | TEMPERATURE: 97.6 F

## 2022-07-18 DIAGNOSIS — B34.9 ACUTE VIRAL SYNDROME: Primary | ICD-10-CM

## 2022-07-18 DIAGNOSIS — D51.8 MACROCYTIC ANEMIA WITH VITAMIN B12 DEFICIENCY: ICD-10-CM

## 2022-07-18 DIAGNOSIS — K74.60 CIRRHOSIS OF LIVER WITHOUT ASCITES, UNSPECIFIED HEPATIC CIRRHOSIS TYPE: ICD-10-CM

## 2022-07-18 DIAGNOSIS — R79.89 ABNORMAL LIVER FUNCTION TESTS: ICD-10-CM

## 2022-07-18 LAB
ALBUMIN SERPL-MCNC: 3.5 G/DL (ref 3.5–5.2)
ALBUMIN/GLOB SERPL: 0.8 G/DL
ALP SERPL-CCNC: 212 U/L (ref 39–117)
ALT SERPL W P-5'-P-CCNC: 84 U/L (ref 1–33)
ANION GAP SERPL CALCULATED.3IONS-SCNC: 12 MMOL/L (ref 5–15)
APPEARANCE CSF: CLEAR
AST SERPL-CCNC: 199 U/L (ref 1–32)
BILIRUB SERPL-MCNC: 1 MG/DL (ref 0–1.2)
BUN SERPL-MCNC: 5 MG/DL (ref 6–20)
BUN/CREAT SERPL: 10.9 (ref 7–25)
CALCIUM SPEC-SCNC: 8.7 MG/DL (ref 8.6–10.5)
CHLORIDE SERPL-SCNC: 101 MMOL/L (ref 98–107)
CO2 SERPL-SCNC: 25 MMOL/L (ref 22–29)
COLOR CSF: COLORLESS
CREAT SERPL-MCNC: 0.46 MG/DL (ref 0.57–1)
CRP SERPL-MCNC: 12.77 MG/DL (ref 0–0.5)
DEPRECATED RDW RBC AUTO: 74.4 FL (ref 37–54)
EGFRCR SERPLBLD CKD-EPI 2021: 113.9 ML/MIN/1.73
ERYTHROCYTE [DISTWIDTH] IN BLOOD BY AUTOMATED COUNT: 18.6 % (ref 12.3–15.4)
FLUAV SUBTYP SPEC NAA+PROBE: NOT DETECTED
FLUBV RNA ISLT QL NAA+PROBE: NOT DETECTED
GLOBULIN UR ELPH-MCNC: 4.6 GM/DL
GLUCOSE CSF-MCNC: 54 MG/DL (ref 40–70)
GLUCOSE SERPL-MCNC: 90 MG/DL (ref 65–99)
HCT VFR BLD AUTO: 32.8 % (ref 34–46.6)
HGB BLD-MCNC: 11 G/DL (ref 12–15.9)
HOLD SPECIMEN: NORMAL
LYMPHOCYTES # BLD MANUAL: 1.37 10*3/MM3 (ref 0.7–3.1)
LYMPHOCYTES NFR BLD MANUAL: 10 % (ref 5–12)
MACROCYTES BLD QL SMEAR: ABNORMAL
MCH RBC QN AUTO: 37.9 PG (ref 26.6–33)
MCHC RBC AUTO-ENTMCNC: 33.5 G/DL (ref 31.5–35.7)
MCV RBC AUTO: 113 FL (ref 79–97)
METHOD: NORMAL
MONOCYTES # BLD: 0.65 10*3/MM3 (ref 0.1–0.9)
NEUTROPHILS # BLD AUTO: 4.49 10*3/MM3 (ref 1.7–7)
NEUTROPHILS NFR BLD MANUAL: 58 % (ref 42.7–76)
NEUTS BAND NFR BLD MANUAL: 11 % (ref 0–5)
NEUTS VAC BLD QL SMEAR: ABNORMAL
NUC CELL # CSF MANUAL: 1 /MM3 (ref 0–5)
PLAT MORPH BLD: NORMAL
PLATELET # BLD AUTO: 167 10*3/MM3 (ref 140–450)
PMV BLD AUTO: 7.8 FL (ref 6–12)
POLYCHROMASIA BLD QL SMEAR: ABNORMAL
POTASSIUM SERPL-SCNC: 3.1 MMOL/L (ref 3.5–5.2)
PROT CSF-MCNC: 28.2 MG/DL (ref 15–45)
PROT SERPL-MCNC: 8.1 G/DL (ref 6–8.5)
RBC # BLD AUTO: 2.9 10*6/MM3 (ref 3.77–5.28)
RBC # CSF MANUAL: 0 /MM3 (ref 0–0)
SARS-COV-2 RNA PNL SPEC NAA+PROBE: NOT DETECTED
SCAN SLIDE: NORMAL
SODIUM SERPL-SCNC: 138 MMOL/L (ref 136–145)
SPECIMEN VOL CSF: 3 ML
TOXIC GRANULATION: ABNORMAL
TUBE # CSF: 3
VARIANT LYMPHS NFR BLD MANUAL: 21 % (ref 19.6–45.3)
WBC NRBC COR # BLD: 6.5 10*3/MM3 (ref 3.4–10.8)

## 2022-07-18 PROCEDURE — 36415 COLL VENOUS BLD VENIPUNCTURE: CPT

## 2022-07-18 PROCEDURE — 84157 ASSAY OF PROTEIN OTHER: CPT | Performed by: EMERGENCY MEDICINE

## 2022-07-18 PROCEDURE — 87015 SPECIMEN INFECT AGNT CONCNTJ: CPT | Performed by: EMERGENCY MEDICINE

## 2022-07-18 PROCEDURE — 85007 BL SMEAR W/DIFF WBC COUNT: CPT | Performed by: EMERGENCY MEDICINE

## 2022-07-18 PROCEDURE — 87205 SMEAR GRAM STAIN: CPT | Performed by: EMERGENCY MEDICINE

## 2022-07-18 PROCEDURE — 87635 SARS-COV-2 COVID-19 AMP PRB: CPT | Performed by: EMERGENCY MEDICINE

## 2022-07-18 PROCEDURE — 85025 COMPLETE CBC W/AUTO DIFF WBC: CPT | Performed by: EMERGENCY MEDICINE

## 2022-07-18 PROCEDURE — 25010000002 PROCHLORPERAZINE 10 MG/2ML SOLUTION: Performed by: EMERGENCY MEDICINE

## 2022-07-18 PROCEDURE — 25010000002 HYDROMORPHONE 1 MG/ML SOLUTION: Performed by: EMERGENCY MEDICINE

## 2022-07-18 PROCEDURE — 89050 BODY FLUID CELL COUNT: CPT | Performed by: EMERGENCY MEDICINE

## 2022-07-18 PROCEDURE — 80053 COMPREHEN METABOLIC PANEL: CPT | Performed by: EMERGENCY MEDICINE

## 2022-07-18 PROCEDURE — 82607 VITAMIN B-12: CPT | Performed by: EMERGENCY MEDICINE

## 2022-07-18 PROCEDURE — 99283 EMERGENCY DEPT VISIT LOW MDM: CPT

## 2022-07-18 PROCEDURE — 82945 GLUCOSE OTHER FLUID: CPT | Performed by: EMERGENCY MEDICINE

## 2022-07-18 PROCEDURE — 87070 CULTURE OTHR SPECIMN AEROBIC: CPT | Performed by: EMERGENCY MEDICINE

## 2022-07-18 PROCEDURE — 87502 INFLUENZA DNA AMP PROBE: CPT | Performed by: EMERGENCY MEDICINE

## 2022-07-18 PROCEDURE — 86140 C-REACTIVE PROTEIN: CPT | Performed by: EMERGENCY MEDICINE

## 2022-07-18 PROCEDURE — 87040 BLOOD CULTURE FOR BACTERIA: CPT | Performed by: EMERGENCY MEDICINE

## 2022-07-18 PROCEDURE — 87498 ENTEROVIRUS PROBE&REVRS TRNS: CPT | Performed by: EMERGENCY MEDICINE

## 2022-07-18 PROCEDURE — 25010000002 DIPHENHYDRAMINE PER 50 MG: Performed by: EMERGENCY MEDICINE

## 2022-07-18 PROCEDURE — 96374 THER/PROPH/DIAG INJ IV PUSH: CPT

## 2022-07-18 PROCEDURE — 25010000002 ONDANSETRON PER 1 MG: Performed by: EMERGENCY MEDICINE

## 2022-07-18 PROCEDURE — 25010000002 MIDAZOLAM PER 1 MG

## 2022-07-18 PROCEDURE — 70450 CT HEAD/BRAIN W/O DYE: CPT

## 2022-07-18 PROCEDURE — 96375 TX/PRO/DX INJ NEW DRUG ADDON: CPT

## 2022-07-18 PROCEDURE — 87529 HSV DNA AMP PROBE: CPT | Performed by: EMERGENCY MEDICINE

## 2022-07-18 RX ORDER — PROCHLORPERAZINE EDISYLATE 5 MG/ML
10 INJECTION INTRAMUSCULAR; INTRAVENOUS ONCE
Status: COMPLETED | OUTPATIENT
Start: 2022-07-18 | End: 2022-07-18

## 2022-07-18 RX ORDER — MIDAZOLAM HYDROCHLORIDE 1 MG/ML
2 INJECTION INTRAMUSCULAR; INTRAVENOUS ONCE
Status: COMPLETED | OUTPATIENT
Start: 2022-07-18 | End: 2022-07-18

## 2022-07-18 RX ORDER — BUTALBITAL, ACETAMINOPHEN AND CAFFEINE 50; 325; 40 MG/1; MG/1; MG/1
1 TABLET ORAL EVERY 6 HOURS PRN
Qty: 20 TABLET | Refills: 0 | Status: SHIPPED | OUTPATIENT
Start: 2022-07-18 | End: 2022-08-17 | Stop reason: SDUPTHER

## 2022-07-18 RX ORDER — PROMETHAZINE HYDROCHLORIDE 12.5 MG/1
12.5 TABLET ORAL EVERY 6 HOURS PRN
Qty: 12 TABLET | Refills: 0 | Status: SHIPPED | OUTPATIENT
Start: 2022-07-18 | End: 2022-08-17 | Stop reason: SDUPTHER

## 2022-07-18 RX ORDER — MIDAZOLAM HYDROCHLORIDE 1 MG/ML
INJECTION INTRAMUSCULAR; INTRAVENOUS
Status: COMPLETED
Start: 2022-07-18 | End: 2022-07-18

## 2022-07-18 RX ORDER — ONDANSETRON 2 MG/ML
INJECTION INTRAMUSCULAR; INTRAVENOUS
Status: DISCONTINUED
Start: 2022-07-18 | End: 2022-07-19 | Stop reason: HOSPADM

## 2022-07-18 RX ORDER — CYANOCOBALAMIN 1000 UG/ML
1000 INJECTION, SOLUTION INTRAMUSCULAR; SUBCUTANEOUS ONCE
Status: CANCELLED | OUTPATIENT
Start: 2022-07-18

## 2022-07-18 RX ORDER — DIPHENHYDRAMINE HYDROCHLORIDE 50 MG/ML
25 INJECTION INTRAMUSCULAR; INTRAVENOUS ONCE
Status: COMPLETED | OUTPATIENT
Start: 2022-07-18 | End: 2022-07-18

## 2022-07-18 RX ORDER — ONDANSETRON 2 MG/ML
INJECTION INTRAMUSCULAR; INTRAVENOUS
Status: COMPLETED | OUTPATIENT
Start: 2022-07-18 | End: 2022-07-18

## 2022-07-18 RX ADMIN — PROCHLORPERAZINE EDISYLATE 10 MG: 5 INJECTION INTRAMUSCULAR; INTRAVENOUS at 18:25

## 2022-07-18 RX ADMIN — HYDROMORPHONE HYDROCHLORIDE 0.5 MG: 1 INJECTION, SOLUTION INTRAMUSCULAR; INTRAVENOUS; SUBCUTANEOUS at 20:33

## 2022-07-18 RX ADMIN — DIPHENHYDRAMINE HYDROCHLORIDE 25 MG: 50 INJECTION, SOLUTION INTRAMUSCULAR; INTRAVENOUS at 18:25

## 2022-07-18 RX ADMIN — MIDAZOLAM 2 MG: 1 INJECTION, SOLUTION INTRAMUSCULAR; INTRAVENOUS at 20:22

## 2022-07-18 RX ADMIN — ONDANSETRON 4 MG: 2 INJECTION INTRAMUSCULAR; INTRAVENOUS at 20:30

## 2022-07-18 RX ADMIN — MIDAZOLAM HYDROCHLORIDE 2 MG: 1 INJECTION INTRAMUSCULAR; INTRAVENOUS at 20:22

## 2022-07-18 RX ADMIN — SODIUM CHLORIDE 500 ML: 9 INJECTION, SOLUTION INTRAVENOUS at 18:27

## 2022-07-19 LAB — VIT B12 BLD-MCNC: 1592 PG/ML (ref 211–946)

## 2022-07-19 NOTE — ED PROVIDER NOTES
Subjective   56-year-old female reports a 3-day history of muscle aches and pains.  She states she has had subjective fever as well as shaking chills.  The patient reports that she has had some nausea but no vomiting or diarrhea.  She reports that she has had a severe headache starting today associated with some neck stiffness.  She reports that she has had no paresthesias or focal neurologic deficits she reports no low back pain.  The patient reports that that she has had no known tick bites or exposures.  The patient states that she has had no trauma to the head or recent loss of consciousness.  She denies a seizure history          Review of Systems   Constitutional: Positive for chills, fatigue and fever.   HENT: Negative for congestion, sore throat and trouble swallowing.    Eyes: Negative for discharge and visual disturbance.   Respiratory: Negative for chest tightness and shortness of breath.    Gastrointestinal: Positive for abdominal pain and nausea. Negative for abdominal distention, anal bleeding, blood in stool, constipation, diarrhea, rectal pain and vomiting.   Endocrine: Negative for polydipsia and polyphagia.   Genitourinary: Negative for flank pain and pelvic pain. Dysuria: .EDLABS.   Musculoskeletal: Positive for neck pain and neck stiffness. Negative for back pain.   Allergic/Immunologic: Positive for environmental allergies. Negative for immunocompromised state.   Neurological: Positive for headaches. Negative for tremors, syncope, facial asymmetry, speech difficulty and numbness.   Hematological: Does not bruise/bleed easily.   All other systems reviewed and are negative.      Past Medical History:   Diagnosis Date   • Cirrhosis (HCC)    • COPD (chronic obstructive pulmonary disease) (HCC)    • Depression    • GERD (gastroesophageal reflux disease)    • Hyperlipidemia    • Hypertension    • PTSD (post-traumatic stress disorder)      Patient states that she has had elevated liver functions in the  past.  She states that she has been told she did not have hepatitis C or chronic hepatitis B.  She states that LFTs were explained secondary to the cirrhosis.  The patient says that she has had anemia in the past and is supposed to get medication for but does not  Allergies   Allergen Reactions   • Sulfa Antibiotics Hives   • Keflex [Cephalexin] Hives       Past Surgical History:   Procedure Laterality Date   •  SECTION N/A    • COLONOSCOPY N/A 2021    Procedure: COLONOSCOPY with polypectomy x2 and random biopsy;  Surgeon: Osman Clay MD;  Location: [facility] ENDOSCOPY;  Service: Gastroenterology;  Laterality: N/A;  colon polyps   • DENTAL PROCEDURE     • ENDOSCOPY N/A 2021    Procedure: ESOPHAGOGASTRODUODENOSCOPY;  Surgeon: Osman Clay MD;  Location: [facility] ENDOSCOPY;  Service: Gastroenterology;  Laterality: N/A;  esophagitis   • JOINT REPLACEMENT     • REPLACEMENT TOTAL HIP LATERAL POSITION Left    • TONSILLECTOMY     • VAGINAL BIRTH AFTER  SECTION         Family History   Problem Relation Age of Onset   • Alcohol abuse Mother    • Alcohol abuse Paternal Grandfather        Social History     Socioeconomic History   • Marital status:    Tobacco Use   • Smoking status: Current Every Day Smoker     Packs/day: 0.25     Types: Cigarettes   • Smokeless tobacco: Never Used   • Tobacco comment: 3cigs   Vaping Use   • Vaping Use: Never used   Substance and Sexual Activity   • Alcohol use: Not Currently     Comment: quit 23   • Drug use: No   • Sexual activity: Yes     Partners: Male     Birth control/protection: Post-menopausal   Reports no unusual food water travel or activity        Objective   Physical Exam  Alert [place] Coma Scale 15   HEENT: Pupils equal and reactive to light. Conjunctivae are not injected. normal tympanic membranes. Oropharynx and nares are normal.   Neck: The patient has pain with side to side rotation of the neck as well as pain with flexion.. Midline  trachea. No JVD. No goiter.   Chest: Clear and equal breath sounds bilaterally regular rate and rhythm without murmur or rub.   Abdomen: Positive bowel sounds there is some right upper quadrant tenderness that is chronic according to the patient.  She has a negative Ballesteros sign.  Abdomen is mildly obese but nondistended. No rebound or peritoneal signs. No CVA tenderness.   Extremities no clubbing cyanosis or edema motor sensory exam is normal the full range of motion is intact equivocal Kernig negative Brudzinski   skin: Warm and dry, no rashes or petechia.   Lymphatic: No regional lymphadenopathy. No calf pain, swelling or Eddie's sign    Procedures  After consent and discussion of the procedure including the percentage of post spinal headaches and the patient was given Versed and hydromorphone for anxiolysis and pain prior to the procedure.  The patient was positioned and prepped with chlorhexidine she did have prepped with isopropyl propyl alcohol.  The L4-5 area was anesthetized with Xylocaine 1% the patient then had a 20-gauge needle inserted the L4-5 interspace 12 cc of clear CSF were withdrawn.  The stylette was then reinserted and needle removed intact dressing was applied patient tolerated procedure well         ED Course      Patient felt much better with ER therapy.     Labs Reviewed   COMPREHENSIVE METABOLIC PANEL - Abnormal; Notable for the following components:       Result Value    BUN 5 (*)     Creatinine 0.46 (*)     Potassium 3.1 (*)     ALT (SGPT) 84 (*)     AST (SGOT) 199 (*)     Alkaline Phosphatase 212 (*)     All other components within normal limits    Narrative:     GFR Normal >60  Chronic Kidney Disease <60  Kidney Failure <15     C-REACTIVE PROTEIN - Abnormal; Notable for the following components:    C-Reactive Protein 12.77 (*)     All other components within normal limits   CBC WITH AUTO DIFFERENTIAL - Abnormal; Notable for the following components:    RBC 2.90 (*)     Hemoglobin 11.0  (*)     Hematocrit 32.8 (*)     .0 (*)     MCH 37.9 (*)     RDW 18.6 (*)     RDW-SD 74.4 (*)     All other components within normal limits   MANUAL DIFFERENTIAL - Abnormal; Notable for the following components:    Bands %  11.0 (*)     All other components within normal limits   COVID-19,CEPHEID/CARRIE,COR/FREDA/PAD/JASWINDER IN-HOUSE,NP SWAB IN TRANSPORT MEDIA 3-4 HR TAT, RT-PCR - Normal    Narrative:     Fact sheet for providers: https://www.fda.gov/media/494581/download     Fact sheet for patients: https://www.fda.gov/media/191024/download  Fact sheet for providers: https://www.fda.gov/media/335072/download    Fact sheet for patients: https://www.iFormulary.gov/media/821740/download    Test performed by PCR.   INFLUENZA ANTIGEN, RAPID - Normal   PROTEIN, CSF - Normal   GLUCOSE, CSF - Normal   CULTURE, CSF   BLOOD CULTURE   BLOOD CULTURE   ENTEROVIRUS RT-PCR   SCAN SLIDE   CELL COUNT CSF    Narrative:     Differential not indicated.   VITAMIN B12   FOLATE RBC   HERPES SIMPLEX VIRUS (HSV) TYPES 1/2, CEREBROSPINAL FLUID, DNA PCR   CBC AND DIFFERENTIAL    Narrative:     The following orders were created for panel order CBC & Differential.  Procedure                               Abnormality         Status                     ---------                               -----------         ------                     CBC Auto Differential[[id number]]        Abnormal            Final result               Scan Slide[[id number]]                                       Final result                 Please view results for these tests on the individual orders.   CELL COUNT WITH DIFFERENTIAL, CSF    Narrative:     The following orders were created for panel order Cell Count With Differential, CSF Use CSF Tube: 4.  Procedure                               Abnormality         Status                     ---------                               -----------         ------                     Cell Count, CSF - Cerebr...[[id number]]                       Final result                 Please view results for these tests on the individual orders.   CSF TUBE     Medications   diphenhydrAMINE (BENADRYL) injection 25 mg (25 mg Intravenous Given 7/18/22 1825)   prochlorperazine (COMPAZINE) injection 10 mg (10 mg Intravenous Given 7/18/22 1825)   sodium chloride 0.9 % bolus 500 mL (0 mL Intravenous Stopped 7/18/22 2020)   midazolam (VERSED) injection 2 mg (2 mg Intravenous Given 7/18/22 2022)   ondansetron (ZOFRAN) injection ( Intravenous Not Given 7/18/22 2059)   HYDROmorphone (DILAUDID) injection ( Intravenous Not Given 7/18/22 2059)     CT Head Without Contrast    Result Date: 7/18/2022  No acute abnormality  Electronically Signed By-Glenn Tomas On:7/18/2022 7:14 PM This report was finalized on [id number] by  Glenn Tomas, .                                    MDM  Number of Diagnoses or Management Options     Amount and/or Complexity of Data Reviewed  Clinical lab tests: reviewed  Tests in the radiology section of CPT®: reviewed    Risk of Complications, Morbidity, and/or Mortality  Presenting problems: high  Diagnostic procedures: high  Management options: high  General comments: Patient's elevation liver functions is similar to those found previously.  The patient had 6 serum B12 and RBC folic acid pending and given the macrocytic anemia without history of recent replacement was given injection of B12.  The importance of follow-up to treat this was discussed.  The patient will be discharged with a prescription for Fioricet, ondansetron.  The patient was encouraged to follow-up with her primary care provider or return for worsening symptoms she was stable at discharge and vocalized understanding of discharge instructions warning        Final diagnoses:   Acute viral syndrome   Macrocytic anemia with vitamin B12 deficiency   Cirrhosis of liver without ascites, unspecified hepatic cirrhosis type (HCC)   Abnormal liver function tests       ED Disposition  ED  Disposition     ED Disposition   Discharge    Condition   Stable    Comment   --             Norma Saul, APRN  [address] [facility]  [address]  [place] [id number] [phone]               Medication List      Changed    lisinopril-hydrochlorothiazide 20-25 MG per tablet  Commonly known as: PRINZIDE,ZESTORETIC  Take 1 tablet by mouth Daily.  What changed: additional instructions             Roberto Carlos Payton MD  07/18/22 [id number]

## 2022-07-19 NOTE — DISCHARGE INSTRUCTIONS
Rest next 24 hours, no work or exertion  No heavy lifting bending or stooping for the next 3 days  You can also use Tylenol or ibuprofen for pain and discomfort  Medication as directed  Follow-up with primary care provider or return for worsening symptoms

## 2022-07-21 LAB
BACTERIA SPEC AEROBE CULT: NORMAL
GRAM STN SPEC: NORMAL
HSV1 DNA CSF QL NAA+PROBE: NEGATIVE
HSV2 DNA CSF QL NAA+PROBE: NEGATIVE

## 2022-07-23 LAB
BACTERIA SPEC AEROBE CULT: NORMAL
BACTERIA SPEC AEROBE CULT: NORMAL

## 2022-07-24 LAB — EV RNA SPEC QL NAA+PROBE: NEGATIVE

## 2022-08-03 ENCOUNTER — HOSPITAL ENCOUNTER (INPATIENT)
Facility: HOSPITAL | Age: 57
LOS: 5 days | Discharge: HOME | End: 2022-08-08
Attending: EMERGENCY MEDICINE | Admitting: HOSPITALIST

## 2022-08-03 ENCOUNTER — OFFICE VISIT (OUTPATIENT)
Dept: FAMILY MEDICINE CLINIC | Facility: CLINIC | Age: 57
End: 2022-08-03

## 2022-08-03 VITALS
HEART RATE: 96 BPM | OXYGEN SATURATION: 99 % | SYSTOLIC BLOOD PRESSURE: 113 MMHG | DIASTOLIC BLOOD PRESSURE: 73 MMHG | BODY MASS INDEX: 21.08 KG/M2 | WEIGHT: 119 LBS | TEMPERATURE: 98.7 F

## 2022-08-03 DIAGNOSIS — I10 ESSENTIAL HYPERTENSION: ICD-10-CM

## 2022-08-03 DIAGNOSIS — E87.6 HYPOKALEMIA: ICD-10-CM

## 2022-08-03 DIAGNOSIS — R11.2 NAUSEA AND VOMITING, UNSPECIFIED VOMITING TYPE: Primary | ICD-10-CM

## 2022-08-03 DIAGNOSIS — M54.17 LUMBOSACRAL RADICULOPATHY: ICD-10-CM

## 2022-08-03 DIAGNOSIS — F10.20 ALCOHOLISM: ICD-10-CM

## 2022-08-03 DIAGNOSIS — F10.931 ALCOHOL WITHDRAWAL SYNDROME, WITH DELIRIUM: Primary | ICD-10-CM

## 2022-08-03 DIAGNOSIS — F41.9 ANXIETY: ICD-10-CM

## 2022-08-03 LAB
ALBUMIN SERPL-MCNC: 3.4 G/DL (ref 3.5–5.2)
ALBUMIN/GLOB SERPL: 0.7 G/DL
ALP SERPL-CCNC: 248 U/L (ref 39–117)
ALT SERPL W P-5'-P-CCNC: 63 U/L (ref 1–33)
ANION GAP SERPL CALCULATED.3IONS-SCNC: 16 MMOL/L (ref 5–15)
ANISOCYTOSIS BLD QL: ABNORMAL
AST SERPL-CCNC: 242 U/L (ref 1–32)
BILIRUB SERPL-MCNC: 0.8 MG/DL (ref 0–1.2)
BUN SERPL-MCNC: 2 MG/DL (ref 6–20)
BUN/CREAT SERPL: 3 (ref 7–25)
CALCIUM SPEC-SCNC: 8.5 MG/DL (ref 8.6–10.5)
CHLORIDE SERPL-SCNC: 96 MMOL/L (ref 98–107)
CO2 SERPL-SCNC: 21 MMOL/L (ref 22–29)
CREAT SERPL-MCNC: 0.66 MG/DL (ref 0.57–1)
DEPRECATED RDW RBC AUTO: 73.9 FL (ref 37–54)
EGFRCR SERPLBLD CKD-EPI 2021: 104.4 ML/MIN/1.73
EOSINOPHIL # BLD MANUAL: 0.07 10*3/MM3 (ref 0–0.4)
EOSINOPHIL NFR BLD MANUAL: 1 % (ref 0.3–6.2)
ERYTHROCYTE [DISTWIDTH] IN BLOOD BY AUTOMATED COUNT: 18.5 % (ref 12.3–15.4)
ETHANOL UR QL: <0.01 %
GLOBULIN UR ELPH-MCNC: 5 GM/DL
GLUCOSE SERPL-MCNC: 99 MG/DL (ref 65–99)
HCT VFR BLD AUTO: 36.8 % (ref 34–46.6)
HGB BLD-MCNC: 12.3 G/DL (ref 12–15.9)
LIPASE SERPL-CCNC: 47 U/L (ref 13–60)
LYMPHOCYTES # BLD MANUAL: 2.16 10*3/MM3 (ref 0.7–3.1)
LYMPHOCYTES NFR BLD MANUAL: 16 % (ref 5–12)
MACROCYTES BLD QL SMEAR: ABNORMAL
MAGNESIUM SERPL-MCNC: 1.8 MG/DL (ref 1.6–2.6)
MCH RBC QN AUTO: 38.2 PG (ref 26.6–33)
MCHC RBC AUTO-ENTMCNC: 33.5 G/DL (ref 31.5–35.7)
MCV RBC AUTO: 114.1 FL (ref 79–97)
MONOCYTES # BLD: 1.15 10*3/MM3 (ref 0.1–0.9)
NEUTROPHILS # BLD AUTO: 3.82 10*3/MM3 (ref 1.7–7)
NEUTROPHILS NFR BLD MANUAL: 53 % (ref 42.7–76)
PLAT MORPH BLD: NORMAL
PLATELET # BLD AUTO: 226 10*3/MM3 (ref 140–450)
PMV BLD AUTO: 7.5 FL (ref 6–12)
POTASSIUM SERPL-SCNC: 2.3 MMOL/L (ref 3.5–5.2)
PROT SERPL-MCNC: 8.4 G/DL (ref 6–8.5)
RBC # BLD AUTO: 3.23 10*6/MM3 (ref 3.77–5.28)
ROULEAUX BLD QL SMEAR: ABNORMAL
SARS-COV-2 RNA RESP QL NAA+PROBE: NOT DETECTED
SCAN SLIDE: NORMAL
SODIUM SERPL-SCNC: 133 MMOL/L (ref 136–145)
TROPONIN T SERPL-MCNC: <0.01 NG/ML (ref 0–0.03)
VARIANT LYMPHS NFR BLD MANUAL: 30 % (ref 19.6–45.3)
WBC MORPH BLD: NORMAL
WBC NRBC COR # BLD: 7.2 10*3/MM3 (ref 3.4–10.8)

## 2022-08-03 PROCEDURE — 99222 1ST HOSP IP/OBS MODERATE 55: CPT | Performed by: NURSE PRACTITIONER

## 2022-08-03 PROCEDURE — 82077 ASSAY SPEC XCP UR&BREATH IA: CPT | Performed by: EMERGENCY MEDICINE

## 2022-08-03 PROCEDURE — 80053 COMPREHEN METABOLIC PANEL: CPT | Performed by: EMERGENCY MEDICINE

## 2022-08-03 PROCEDURE — 85025 COMPLETE CBC W/AUTO DIFF WBC: CPT | Performed by: EMERGENCY MEDICINE

## 2022-08-03 PROCEDURE — 80053 COMPREHEN METABOLIC PANEL: CPT | Performed by: NURSE PRACTITIONER

## 2022-08-03 PROCEDURE — 82607 VITAMIN B-12: CPT | Performed by: NURSE PRACTITIONER

## 2022-08-03 PROCEDURE — 83735 ASSAY OF MAGNESIUM: CPT | Performed by: NURSE PRACTITIONER

## 2022-08-03 PROCEDURE — 25010000002 ONDANSETRON PER 1 MG: Performed by: EMERGENCY MEDICINE

## 2022-08-03 PROCEDURE — 85007 BL SMEAR W/DIFF WBC COUNT: CPT | Performed by: EMERGENCY MEDICINE

## 2022-08-03 PROCEDURE — 25010000002 THIAMINE PER 100 MG: Performed by: EMERGENCY MEDICINE

## 2022-08-03 PROCEDURE — 85730 THROMBOPLASTIN TIME PARTIAL: CPT | Performed by: NURSE PRACTITIONER

## 2022-08-03 PROCEDURE — 0 POTASSIUM CHLORIDE 10 MEQ/100ML SOLUTION: Performed by: EMERGENCY MEDICINE

## 2022-08-03 PROCEDURE — 99284 EMERGENCY DEPT VISIT MOD MDM: CPT

## 2022-08-03 PROCEDURE — 84484 ASSAY OF TROPONIN QUANT: CPT | Performed by: NURSE PRACTITIONER

## 2022-08-03 PROCEDURE — 93010 ELECTROCARDIOGRAM REPORT: CPT | Performed by: INTERNAL MEDICINE

## 2022-08-03 PROCEDURE — 85007 BL SMEAR W/DIFF WBC COUNT: CPT | Performed by: NURSE PRACTITIONER

## 2022-08-03 PROCEDURE — 84425 ASSAY OF VITAMIN B-1: CPT | Performed by: NURSE PRACTITIONER

## 2022-08-03 PROCEDURE — 85610 PROTHROMBIN TIME: CPT | Performed by: NURSE PRACTITIONER

## 2022-08-03 PROCEDURE — 93005 ELECTROCARDIOGRAM TRACING: CPT | Performed by: NURSE PRACTITIONER

## 2022-08-03 PROCEDURE — 36415 COLL VENOUS BLD VENIPUNCTURE: CPT | Performed by: NURSE PRACTITIONER

## 2022-08-03 PROCEDURE — 25010000002 DIAZEPAM PER 5 MG: Performed by: NURSE PRACTITIONER

## 2022-08-03 PROCEDURE — 99213 OFFICE O/P EST LOW 20 MIN: CPT | Performed by: NURSE PRACTITIONER

## 2022-08-03 PROCEDURE — 83735 ASSAY OF MAGNESIUM: CPT | Performed by: EMERGENCY MEDICINE

## 2022-08-03 PROCEDURE — 85025 COMPLETE CBC W/AUTO DIFF WBC: CPT | Performed by: NURSE PRACTITIONER

## 2022-08-03 PROCEDURE — 83690 ASSAY OF LIPASE: CPT | Performed by: EMERGENCY MEDICINE

## 2022-08-03 PROCEDURE — U0003 INFECTIOUS AGENT DETECTION BY NUCLEIC ACID (DNA OR RNA); SEVERE ACUTE RESPIRATORY SYNDROME CORONAVIRUS 2 (SARS-COV-2) (CORONAVIRUS DISEASE [COVID-19]), AMPLIFIED PROBE TECHNIQUE, MAKING USE OF HIGH THROUGHPUT TECHNOLOGIES AS DESCRIBED BY CMS-2020-01-R: HCPCS | Performed by: EMERGENCY MEDICINE

## 2022-08-03 RX ORDER — POTASSIUM CHLORIDE 1.5 G/1.77G
40 POWDER, FOR SOLUTION ORAL AS NEEDED
Status: DISCONTINUED | OUTPATIENT
Start: 2022-08-03 | End: 2022-08-08 | Stop reason: HOSPADM

## 2022-08-03 RX ORDER — AMLODIPINE BESYLATE 5 MG/1
5 TABLET ORAL DAILY
Status: DISCONTINUED | OUTPATIENT
Start: 2022-08-04 | End: 2022-08-04

## 2022-08-03 RX ORDER — ONDANSETRON 4 MG/1
4 TABLET, FILM COATED ORAL EVERY 6 HOURS PRN
Status: DISCONTINUED | OUTPATIENT
Start: 2022-08-03 | End: 2022-08-08 | Stop reason: HOSPADM

## 2022-08-03 RX ORDER — SODIUM CHLORIDE 0.9 % (FLUSH) 0.9 %
10 SYRINGE (ML) INJECTION AS NEEDED
Status: DISCONTINUED | OUTPATIENT
Start: 2022-08-03 | End: 2022-08-08 | Stop reason: HOSPADM

## 2022-08-03 RX ORDER — SERTRALINE HYDROCHLORIDE 100 MG/1
100 TABLET, FILM COATED ORAL 2 TIMES DAILY
Status: DISCONTINUED | OUTPATIENT
Start: 2022-08-03 | End: 2022-08-04

## 2022-08-03 RX ORDER — MAGNESIUM SULFATE 1 G/100ML
1 INJECTION INTRAVENOUS AS NEEDED
Status: DISCONTINUED | OUTPATIENT
Start: 2022-08-03 | End: 2022-08-08 | Stop reason: HOSPADM

## 2022-08-03 RX ORDER — BUDESONIDE AND FORMOTEROL FUMARATE DIHYDRATE 80; 4.5 UG/1; UG/1
2 AEROSOL RESPIRATORY (INHALATION)
Status: DISCONTINUED | OUTPATIENT
Start: 2022-08-03 | End: 2022-08-07

## 2022-08-03 RX ORDER — POTASSIUM CHLORIDE 20 MEQ/1
40 TABLET, EXTENDED RELEASE ORAL AS NEEDED
Status: DISCONTINUED | OUTPATIENT
Start: 2022-08-03 | End: 2022-08-08 | Stop reason: HOSPADM

## 2022-08-03 RX ORDER — BUTALBITAL, ACETAMINOPHEN AND CAFFEINE 50; 325; 40 MG/1; MG/1; MG/1
1 TABLET ORAL EVERY 6 HOURS PRN
Status: DISCONTINUED | OUTPATIENT
Start: 2022-08-03 | End: 2022-08-08 | Stop reason: HOSPADM

## 2022-08-03 RX ORDER — SODIUM CHLORIDE 0.9 % (FLUSH) 0.9 %
10 SYRINGE (ML) INJECTION EVERY 12 HOURS SCHEDULED
Status: DISCONTINUED | OUTPATIENT
Start: 2022-08-03 | End: 2022-08-08 | Stop reason: HOSPADM

## 2022-08-03 RX ORDER — MAGNESIUM SULFATE HEPTAHYDRATE 40 MG/ML
2 INJECTION, SOLUTION INTRAVENOUS AS NEEDED
Status: DISCONTINUED | OUTPATIENT
Start: 2022-08-03 | End: 2022-08-08 | Stop reason: HOSPADM

## 2022-08-03 RX ORDER — ALUMINA, MAGNESIA, AND SIMETHICONE 2400; 2400; 240 MG/30ML; MG/30ML; MG/30ML
15 SUSPENSION ORAL EVERY 6 HOURS PRN
Status: DISCONTINUED | OUTPATIENT
Start: 2022-08-03 | End: 2022-08-08 | Stop reason: HOSPADM

## 2022-08-03 RX ORDER — ONDANSETRON 2 MG/ML
4 INJECTION INTRAMUSCULAR; INTRAVENOUS EVERY 6 HOURS PRN
Status: DISCONTINUED | OUTPATIENT
Start: 2022-08-03 | End: 2022-08-03 | Stop reason: SDUPTHER

## 2022-08-03 RX ORDER — TRAZODONE HYDROCHLORIDE 100 MG/1
100 TABLET ORAL NIGHTLY
Status: DISCONTINUED | OUTPATIENT
Start: 2022-08-03 | End: 2022-08-04

## 2022-08-03 RX ORDER — DIAZEPAM 5 MG/ML
10 INJECTION, SOLUTION INTRAMUSCULAR; INTRAVENOUS
Status: DISCONTINUED | OUTPATIENT
Start: 2022-08-03 | End: 2022-08-08 | Stop reason: HOSPADM

## 2022-08-03 RX ORDER — DIAZEPAM 5 MG/ML
5 INJECTION, SOLUTION INTRAMUSCULAR; INTRAVENOUS
Status: DISCONTINUED | OUTPATIENT
Start: 2022-08-03 | End: 2022-08-08 | Stop reason: HOSPADM

## 2022-08-03 RX ORDER — ONDANSETRON 2 MG/ML
4 INJECTION INTRAMUSCULAR; INTRAVENOUS EVERY 6 HOURS PRN
Status: DISCONTINUED | OUTPATIENT
Start: 2022-08-03 | End: 2022-08-08 | Stop reason: HOSPADM

## 2022-08-03 RX ORDER — LANOLIN ALCOHOL/MO/W.PET/CERES
1000 CREAM (GRAM) TOPICAL DAILY
Status: DISCONTINUED | OUTPATIENT
Start: 2022-08-04 | End: 2022-08-04

## 2022-08-03 RX ORDER — FOLIC ACID 1 MG/1
1000 TABLET ORAL DAILY
Status: DISCONTINUED | OUTPATIENT
Start: 2022-08-04 | End: 2022-08-08 | Stop reason: HOSPADM

## 2022-08-03 RX ORDER — NITROGLYCERIN 0.4 MG/1
0.4 TABLET SUBLINGUAL
Status: DISCONTINUED | OUTPATIENT
Start: 2022-08-03 | End: 2022-08-08 | Stop reason: HOSPADM

## 2022-08-03 RX ORDER — CALCIUM CARBONATE 200(500)MG
2 TABLET,CHEWABLE ORAL 2 TIMES DAILY PRN
Status: DISCONTINUED | OUTPATIENT
Start: 2022-08-03 | End: 2022-08-08 | Stop reason: HOSPADM

## 2022-08-03 RX ORDER — LORAZEPAM 1 MG/1
2 TABLET ORAL
Status: DISCONTINUED | OUTPATIENT
Start: 2022-08-03 | End: 2022-08-08 | Stop reason: HOSPADM

## 2022-08-03 RX ORDER — POTASSIUM CHLORIDE 7.45 MG/ML
10 INJECTION INTRAVENOUS
Status: DISCONTINUED | OUTPATIENT
Start: 2022-08-03 | End: 2022-08-08 | Stop reason: HOSPADM

## 2022-08-03 RX ORDER — CHOLECALCIFEROL (VITAMIN D3) 125 MCG
5 CAPSULE ORAL NIGHTLY PRN
Status: DISCONTINUED | OUTPATIENT
Start: 2022-08-03 | End: 2022-08-08 | Stop reason: HOSPADM

## 2022-08-03 RX ORDER — LORAZEPAM 1 MG/1
1 TABLET ORAL
Status: DISCONTINUED | OUTPATIENT
Start: 2022-08-03 | End: 2022-08-08 | Stop reason: HOSPADM

## 2022-08-03 RX ORDER — ALBUTEROL SULFATE 2.5 MG/3ML
2.5 SOLUTION RESPIRATORY (INHALATION) EVERY 6 HOURS PRN
Refills: 5 | Status: DISCONTINUED | OUTPATIENT
Start: 2022-08-03 | End: 2022-08-08 | Stop reason: HOSPADM

## 2022-08-03 RX ORDER — BUSPIRONE HYDROCHLORIDE 15 MG/1
15 TABLET ORAL 2 TIMES DAILY
Status: DISCONTINUED | OUTPATIENT
Start: 2022-08-04 | End: 2022-08-08 | Stop reason: HOSPADM

## 2022-08-03 RX ADMIN — ONDANSETRON 4 MG: 2 INJECTION INTRAMUSCULAR; INTRAVENOUS at 18:38

## 2022-08-03 RX ADMIN — THIAMINE HYDROCHLORIDE 1000 ML/HR: 100 INJECTION, SOLUTION INTRAMUSCULAR; INTRAVENOUS at 18:39

## 2022-08-03 RX ADMIN — Medication 10 ML: at 22:01

## 2022-08-03 RX ADMIN — SERTRALINE HYDROCHLORIDE 100 MG: 100 TABLET ORAL at 22:00

## 2022-08-03 RX ADMIN — DIAZEPAM 10 MG: 10 INJECTION, SOLUTION INTRAMUSCULAR; INTRAVENOUS at 22:01

## 2022-08-03 RX ADMIN — POTASSIUM CHLORIDE 10 MEQ: 7.46 INJECTION, SOLUTION INTRAVENOUS at 18:51

## 2022-08-03 RX ADMIN — TRAZODONE HYDROCHLORIDE 100 MG: 100 TABLET ORAL at 22:01

## 2022-08-03 RX ADMIN — BUTALBITAL, ACETAMINOPHEN AND CAFFEINE 1 TABLET: 50; 325; 40 TABLET ORAL at 22:01

## 2022-08-03 NOTE — ED NOTES
Patient stated that she is trying to cut back on drinking and is now hallucinating. Patient is seeing crabs and the walls or moving in and out. Patients last drink was at 10 am this morning. Patient drinks about 1/2-1 pint of bourbon a day.

## 2022-08-03 NOTE — PROGRESS NOTES
Berkley Yu is a 56 y.o. female.     Patient is here today with complaints of fatigue, extreme weakness, decreased appetite, vomiting, diarrhea.  Patient reports this started  She was seen in the ER on July 18.  She had a lumbar puncture which did not grow any bacteria.  Liver enzymes were elevated but improved from previously.  Blood count showed some mild anemia but white blood cells were normal.  CRP was quite elevated.  Patient had a CT of the head which was normal.    Pt states she started drinking alcohol again in June.  She is drinking over a half a pint per day.  She has been trying to cut back but she goes through withdrawal.   She starts hallucinating.  For the last 8 weeks she has been nauseous, vomiting, and diarrhea.   She hasnt gotten out of bed and showered in 8 weeks.  Insurance wont cover any more inpatient rehab.  She has stopped taking all of her medication   She is down 13lbs in 2.5wks       The following portions of the patient's history were reviewed and updated as appropriate: allergies, current medications, past family history, past medical history, past social history, past surgical history and problem list.    Review of Systems   Constitutional: Positive for chills, fatigue and fever.   Respiratory: Negative for chest tightness and shortness of breath.    Cardiovascular: Negative for chest pain and palpitations.   Gastrointestinal: Positive for diarrhea, nausea and vomiting. Negative for abdominal pain.   Neurological: Positive for dizziness and headache.   Psychiatric/Behavioral: Positive for depressed mood and stress. The patient is nervous/anxious.        Objective     /73 (BP Location: Left arm, Patient Position: Sitting, Cuff Size: Adult)   Pulse 96   Temp 98.7 °F (37.1 °C) (Tympanic)   Wt 54 kg (119 lb)   SpO2 99%   BMI 21.08 kg/m²     Current Outpatient Medications on File Prior to Visit   Medication Sig Dispense Refill   • albuterol sulfate HFA  108 (90 Base) MCG/ACT inhaler Inhale 2 puffs Every 4 (Four) Hours As Needed for Wheezing. 1 inhaler 5   • amLODIPine (Norvasc) 5 MG tablet Take 1 tablet by mouth Daily. 30 tablet 0   • atorvastatin (LIPITOR) 20 MG tablet TAKE 1 TABLET BY MOUTH EVERY DAY AT BEDTIME 90 tablet 1   • B-12, Methylcobalamin, 1000 MCG sublingual tablet Take 1 tablet by mouth Every Morning.     • busPIRone (BUSPAR) 15 MG tablet TAKE 1 TABLET BY MOUTH TWICE A  tablet 0   • butalbital-acetaminophen-caffeine (FIORICET, ESGIC) -40 MG per tablet Take 1 tablet by mouth Every 6 (Six) Hours As Needed for Headache (Neck discomfort). 20 tablet 0   • folic acid (FOLVITE) 1 MG tablet TAKE 1 TABLET BY MOUTH DAILY 29 tablet 0   • lisinopril-hydrochlorothiazide (PRINZIDE,ZESTORETIC) 20-25 MG per tablet Take 1 tablet by mouth Daily. (Patient taking differently: Take 1 tablet by mouth Daily. Hold DOS) 90 tablet 3   • mupirocin (BACTROBAN) 2 % ointment APPLY SMALL AMOUNT EXTERNALLY TO THE AFFECTED AREA THREE TIMES DAILY     • omega-3 acid ethyl esters (LOVAZA) 1 g capsule Take 2 capsules by mouth 2 (Two) Times a Day. 360 capsule 1   • promethazine (PHENERGAN) 12.5 MG tablet Take 1 tablet by mouth Every 6 (Six) Hours As Needed for Nausea or Vomiting. 12 tablet 0   • sertraline (ZOLOFT) 100 MG tablet TAKE 1 TABLET BY MOUTH TWICE A  tablet 1   • traZODone (DESYREL) 100 MG tablet TAKE 1 TABLET BY MOUTH EVERY DAY AT NIGHT 30 tablet 1   • Trelegy Ellipta 100-62.5-25 MCG/INH inhaler INHALE 1 PUFF BY MOUTH DAILY. 60 each 6   • [DISCONTINUED] acamprosate (CAMPRAL) 333 MG EC tablet Take 1 tablet by mouth 3 (Three) Times a Day. 90 tablet 1   • [DISCONTINUED] dicyclomine (BENTYL) 20 MG tablet Take 20 mg by mouth 2 (Two) Times a Day.     • [DISCONTINUED] doxycycline (MONODOX) 100 MG capsule Take 1 capsule by mouth 2 (Two) Times a Day. 14 capsule 0   • [DISCONTINUED] hydrOXYzine pamoate (VISTARIL) 25 MG capsule Take 1 capsule by mouth Every 6 (Six)  Hours As Needed for Itching. 30 capsule 2   • [DISCONTINUED] ibuprofen (ADVIL,MOTRIN) 800 MG tablet Take 800 mg by mouth Every 6 (Six) Hours As Needed.     • [DISCONTINUED] OLANZapine (ZyPREXA) 15 MG tablet Take 1 tablet by mouth Every Night. 90 tablet 1   • [DISCONTINUED] prazosin (MINIPRESS) 1 MG capsule No longer taking     • [DISCONTINUED] thiamine (VITAMIN B-1) 100 MG tablet tablet Take 100 mg by mouth Daily. No longer taking       No current facility-administered medications on file prior to visit.        Physical Exam  Constitutional:       Appearance: She is ill-appearing.   HENT:      Head: Normocephalic and atraumatic.   Cardiovascular:      Rate and Rhythm: Normal rate.      Heart sounds: No murmur heard.  Pulmonary:      Effort: Pulmonary effort is normal. No respiratory distress.   Abdominal:      General: Abdomen is flat.   Musculoskeletal:         General: Normal range of motion.   Skin:     General: Skin is warm and dry.   Neurological:      General: No focal deficit present.      Mental Status: She is alert and oriented to person, place, and time.   Psychiatric:         Mood and Affect: Mood normal.      Comments: Patient appears mildly intoxicated.           Assessment & Plan     Diagnoses and all orders for this visit:    1. Nausea and vomiting, unspecified vomiting type (Primary)  Comments:  Has not been able to eat much in 8 weeks  Down 13 pounds in 2 weeks  Concerned about malnourishment and dehydration  Referral to ER    2. Alcoholism (HCC)  Comments:  Relapse started in June  Drinking a half a pint per day  Insurance would not cover inpatient rehab  Will send to the hospital for further eval and detox        Called and spoke with provider at [facility].  Advised her that patient has had significant weight loss.  I am concerned about malnourishment and dehydration

## 2022-08-03 NOTE — ED PROVIDER NOTES
Subjective   Chief complaint: Alcohol withdrawal    56-year-old female presents with alcohol withdrawal.  Patient states she has been trying to cut back on her alcohol use and has been having alcohol withdrawal.  She states she has been having nausea, vomiting, diarrhea.  She has been generally weak and fatigued.  She has had bad anxiety.  She also states that she has had some hallucinations where she has seen lobsters and crabs.  She states she last had alcohol this morning.  She normally drinks over 1/2 pint of bourbon daily.  She states she has done inpatient rehab in the past but her insurance will not pay for it again.  She called her primary doctor who recommended she come to the emergency room.      History provided by:  Patient      Review of Systems   Constitutional: Negative for fever.   HENT: Negative for congestion.    Respiratory: Negative for cough and shortness of breath.    Cardiovascular: Negative for chest pain.   Gastrointestinal: Positive for abdominal pain, diarrhea and vomiting.   Genitourinary: Negative for dysuria.   Musculoskeletal: Negative for back pain.   Skin: Negative for rash.   Neurological: Negative for headaches.   Psychiatric/Behavioral: Positive for hallucinations. The patient is nervous/anxious.        Past Medical History:   Diagnosis Date   • Cirrhosis (HCC)    • COPD (chronic obstructive pulmonary disease) (HCC)    • Depression    • GERD (gastroesophageal reflux disease)    • Hyperlipidemia    • Hypertension    • PTSD (post-traumatic stress disorder)        Allergies   Allergen Reactions   • Sulfa Antibiotics Hives   • Keflex [Cephalexin] Hives       Past Surgical History:   Procedure Laterality Date   •  SECTION N/A    • COLONOSCOPY N/A 2021    Procedure: COLONOSCOPY with polypectomy x2 and random biopsy;  Surgeon: Osman Clay MD;  Location: [facility] ENDOSCOPY;  Service: Gastroenterology;  Laterality: N/A;  colon polyps   • DENTAL PROCEDURE     • ENDOSCOPY N/A  "2021    Procedure: ESOPHAGOGASTRODUODENOSCOPY;  Surgeon: Osman Clay MD;  Location: [facility] ENDOSCOPY;  Service: Gastroenterology;  Laterality: N/A;  esophagitis   • JOINT REPLACEMENT     • REPLACEMENT TOTAL HIP LATERAL POSITION Left    • TONSILLECTOMY     • VAGINAL BIRTH AFTER  SECTION         Family History   Problem Relation Age of Onset   • Alcohol abuse Mother    • Alcohol abuse Paternal Grandfather        Social History     Socioeconomic History   • Marital status:    Tobacco Use   • Smoking status: Current Every Day Smoker     Packs/day: 0.25     Types: Cigarettes   • Smokeless tobacco: Never Used   • Tobacco comment: 3cigs   Vaping Use   • Vaping Use: Never used   Substance and Sexual Activity   • Alcohol use: Not Currently     Comment: quit 21   • Drug use: No   • Sexual activity: Yes     Partners: Male     Birth control/protection: Post-menopausal       /76 (BP Location: Left arm, Patient Position: Sitting)   Pulse 96   Temp 98.2 °F (36.8 °C) (Oral)   Resp 16   Ht 160 cm (63\")   Wt 54.1 kg (119 lb 4.3 oz)   SpO2 98%   BMI 21.13 kg/m²       Objective   Physical Exam  Vitals and nursing note reviewed.   Constitutional:       General: She is not in acute distress.     Appearance: Normal appearance. She is well-developed. She is not toxic-appearing.   HENT:      Head: Normocephalic and atraumatic.   Eyes:      Pupils: Pupils are equal, round, and reactive to light.   Cardiovascular:      Rate and Rhythm: Normal rate and regular rhythm.      Heart sounds: Normal heart sounds.   Pulmonary:      Effort: Pulmonary effort is normal. No respiratory distress.      Breath sounds: Normal breath sounds.   Abdominal:      General: Bowel sounds are normal.      Palpations: Abdomen is soft.      Tenderness: There is no abdominal tenderness.   Musculoskeletal:         General: Normal range of motion.      Cervical back: Normal range of motion and neck supple.   Skin:     General: " Skin is warm and dry.   Neurological:      General: No focal deficit present.      Mental Status: She is alert and oriented to person, place, and time.         Procedures           ED Course      Results for orders placed or performed during the hospital encounter of 08/03/22   Comprehensive Metabolic Panel    Specimen: Blood   Result Value Ref Range    Glucose 99 65 - 99 mg/dL    BUN 2 (L) 6 - 20 mg/dL    Creatinine 0.66 0.57 - 1.00 mg/dL    Sodium 133 (L) 136 - 145 mmol/L    Potassium 2.3 (C) 3.5 - 5.2 mmol/L    Chloride 96 (L) 98 - 107 mmol/L    CO2 21.0 (L) 22.0 - 29.0 mmol/L    Calcium 8.5 (L) 8.6 - 10.5 mg/dL    Total Protein 8.4 6.0 - 8.5 g/dL    Albumin 3.40 (L) 3.50 - 5.20 g/dL    ALT (SGPT) 63 (H) 1 - 33 U/L    AST (SGOT) 242 (H) 1 - 32 U/L    Alkaline Phosphatase 248 (H) 39 - 117 U/L    Total Bilirubin 0.8 0.0 - 1.2 mg/dL    Globulin 5.0 gm/dL    A/G Ratio 0.7 g/dL    BUN/Creatinine Ratio 3.0 (L) 7.0 - 25.0    Anion Gap 16.0 (H) 5.0 - 15.0 mmol/L    eGFR 104.4 >60.0 mL/min/1.73   Lipase    Specimen: Blood   Result Value Ref Range    Lipase 47 13 - 60 U/L   Ethanol    Specimen: Blood   Result Value Ref Range    Ethanol % <0.010 %   CBC Auto Differential    Specimen: Blood   Result Value Ref Range    WBC 7.20 3.40 - 10.80 10*3/mm3    RBC 3.23 (L) 3.77 - 5.28 10*6/mm3    Hemoglobin 12.3 12.0 - 15.9 g/dL    Hematocrit 36.8 34.0 - 46.6 %    .1 (H) 79.0 - 97.0 fL    MCH 38.2 (H) 26.6 - 33.0 pg    MCHC 33.5 31.5 - 35.7 g/dL    RDW 18.5 (H) 12.3 - 15.4 %    RDW-SD 73.9 (H) 37.0 - 54.0 fl    MPV 7.5 6.0 - 12.0 fL    Platelets 226 140 - 450 10*3/mm3   Magnesium    Specimen: Blood   Result Value Ref Range    Magnesium 1.8 1.6 - 2.6 mg/dL                                          MDM   Patient had the above evaluation.  Results were discussed with the patient.  White blood cell count is normal.  Alcohol is negative.  Lipase is normal.  Patient has elevation of ALT and AST at 63 and 242 respectively.  She has  had chronic elevation in her liver function test.  Potassium is significantly low at 2.3.  She was started on IV potassium replacement.  She was given a banana bag.  She will be admitted for further management of her alcohol withdrawal and hypokalemia.  I discussed with the hospitalist who agreed to admit.      Final diagnoses:   Alcohol withdrawal syndrome, with delirium (HCC)   Hypokalemia       ED Disposition  ED Disposition     ED Disposition   Decision to Admit    Condition   --    Comment   Level of Care: Telemetry [5]   Admitting Physician: ALTA BURNETT [[id number]]               No follow-up provider specified.       Medication List      No changes were made to your prescriptions during this visit.          Khanh Taylor MD  08/03/22 6017

## 2022-08-03 NOTE — H&P
[facility]      Patient Name: Lita Yu  : 1965  MRN: [id number]  Primary Care Physician:  Norma Saul APRN  Date of admission: 8/3/2022      Subjective      Chief Complaint: Alcohol withdrawal    History of Present Illness: Lita Yu is a 56 y.o. female with past medical history of alcoholism, hypertension, hyperlipidemia, PTSD, seizure, tobacco user who presented to [facility] on 8/3/2022 complaining of alcohol withdrawal.  Patient states she has been trying to cut back on her alcohol use and has been having alcohol withdrawal.  She states she has been having nausea, vomiting, diarrhea.  She has been generally weak and fatigued, and complains of anxiety.  She also states that she has had some hallucinations where she has seen lobsters and crabs, which occurs mainly when she closes her eyes to rest.  Patient complains of migraine headache, which she has a history of. She states she last had alcohol this morning.  She normally drinks over 1/2 pint of bourbon daily.  She states she has done inpatient rehab about 1 year ago but her insurance will not pay for it again.  She called her primary doctor who recommended she come to the emergency room.  Patient stated that she is going to , and has no need for additional resources to cease drinking alcohol.    In the ED, CT head showed no acute abnormality.  All labs unremarkable except potassium 2.3, ALT 63, , albumin 3.4, sodium 133.  All vital signs unremarkable.  Patient received potassium chloride, banana bag in the ED.  Patient admitted to hospitalist service for further evaluation and treatment.    Review of Systems   Constitutional: Negative. Negative for chills and fever.   HENT: Negative.    Eyes: Negative.    Cardiovascular: Negative.  Negative for chest pain.   Respiratory: Negative.  Negative for cough and shortness of breath.    Endocrine: Negative.    Skin:  Negative.    Musculoskeletal: Negative.    Gastrointestinal: Positive for diarrhea, nausea and vomiting.   Genitourinary: Negative.    Neurological: Negative.    Psychiatric/Behavioral: The patient is nervous/anxious.    Allergic/Immunologic: Negative.         Personal History     Past Medical History:   Diagnosis Date   • Cirrhosis (HCC)    • COPD (chronic obstructive pulmonary disease) (HCC)    • Depression    • GERD (gastroesophageal reflux disease)    • Hyperlipidemia    • Hypertension    • PTSD (post-traumatic stress disorder)        Past Surgical History:   Procedure Laterality Date   •  SECTION N/A    • COLONOSCOPY N/A 2021    Procedure: COLONOSCOPY with polypectomy x2 and random biopsy;  Surgeon: Osman Clay MD;  Location: [facility] ENDOSCOPY;  Service: Gastroenterology;  Laterality: N/A;  colon polyps   • DENTAL PROCEDURE     • ENDOSCOPY N/A 2021    Procedure: ESOPHAGOGASTRODUODENOSCOPY;  Surgeon: Osman Clay MD;  Location: [facility] ENDOSCOPY;  Service: Gastroenterology;  Laterality: N/A;  esophagitis   • JOINT REPLACEMENT     • REPLACEMENT TOTAL HIP LATERAL POSITION Left    • TONSILLECTOMY     • VAGINAL BIRTH AFTER  SECTION         Family History: family history includes Alcohol abuse in her mother and paternal grandfather. Otherwise pertinent FHx was reviewed and not pertinent to current issue.    Social History:  reports that she has been smoking cigarettes. She has been smoking about 0.25 packs per day. She has never used smokeless tobacco. She reports previous alcohol use. She reports that she does not use drugs.    Home Medications:  Prior to Admission Medications     Prescriptions Last Dose Informant Patient Reported? Taking?    albuterol sulfate  (90 Base) MCG/ACT inhaler   No No    Inhale 2 puffs Every 4 (Four) Hours As Needed for Wheezing.    amLODIPine (Norvasc) 5 MG tablet   No No    Take 1 tablet by mouth Daily.    atorvastatin (LIPITOR) 20 MG tablet   No  No    TAKE 1 TABLET BY MOUTH EVERY DAY AT BEDTIME    B-12, Methylcobalamin, 1000 MCG sublingual tablet   Yes No    Take 1 tablet by mouth Every Morning.    busPIRone (BUSPAR) 15 MG tablet   No No    TAKE 1 TABLET BY MOUTH TWICE A DAY    butalbital-acetaminophen-caffeine (FIORICET, ESGIC) -40 MG per tablet   No No    Take 1 tablet by mouth Every 6 (Six) Hours As Needed for Headache (Neck discomfort).    folic acid (FOLVITE) 1 MG tablet   No No    TAKE 1 TABLET BY MOUTH DAILY    lisinopril-hydrochlorothiazide (PRINZIDE,ZESTORETIC) 20-25 MG per tablet   No No    Take 1 tablet by mouth Daily.    Patient taking differently:  Take 1 tablet by mouth Daily. Hold DOS    mupirocin (BACTROBAN) 2 % ointment   Yes No    APPLY SMALL AMOUNT EXTERNALLY TO THE AFFECTED AREA THREE TIMES DAILY    omega-3 acid ethyl esters (LOVAZA) 1 g capsule   No No    Take 2 capsules by mouth 2 (Two) Times a Day.    promethazine (PHENERGAN) 12.5 MG tablet   No No    Take 1 tablet by mouth Every 6 (Six) Hours As Needed for Nausea or Vomiting.    sertraline (ZOLOFT) 100 MG tablet   No No    TAKE 1 TABLET BY MOUTH TWICE A DAY    traZODone (DESYREL) 100 MG tablet   No No    TAKE 1 TABLET BY MOUTH EVERY DAY AT NIGHT    Trelegy Ellipta 100-62.5-25 MCG/INH inhaler   No No    INHALE 1 PUFF BY MOUTH DAILY.            Allergies:  Allergies   Allergen Reactions   • Sulfa Antibiotics Hives   • Keflex [Cephalexin] Hives       Objective      Vitals:   Temp:  [97.6 °F (36.4 °C)-98.7 °F (37.1 °C)] 97.6 °F (36.4 °C)  Heart Rate:  [84-96] 84  Resp:  [16-18] 18  BP: ()/(61-76) 99/61    Physical Exam  Vitals and nursing note reviewed.   Constitutional:       Appearance: Normal appearance.   HENT:      Head: Normocephalic.      Right Ear: External ear normal.      Left Ear: External ear normal.      Nose: Nose normal.      Mouth/Throat:      Pharynx: Oropharynx is clear.   Eyes:      Extraocular Movements: Extraocular movements intact.   Cardiovascular:       Rate and Rhythm: Normal rate and regular rhythm.      Pulses: Normal pulses.      Heart sounds: Normal heart sounds.   Pulmonary:      Effort: Pulmonary effort is normal.      Breath sounds: Normal breath sounds.   Abdominal:      General: Bowel sounds are normal.      Palpations: Abdomen is soft.   Musculoskeletal:         General: Normal range of motion.      Cervical back: Normal range of motion.   Skin:     General: Skin is warm and dry.   Neurological:      Mental Status: She is alert and oriented to person, place, and time.   Psychiatric:         Mood and Affect: Mood normal.         Behavior: Behavior normal.          Result Review    Result Review:  I have personally reviewed the results from the time of this admission to 8/4/2022 02:46 EDT and agree with these findings:  [x]  Laboratory  []  Microbiology  [x]  Radiology  [x]  EKG/Telemetry   []  Cardiology/Vascular   []  Pathology  []  Old records  []  Other:  Most notable findings include: as above      Assessment & Plan        Active Hospital Problems:  Active Hospital Problems    Diagnosis    • **Alcohol withdrawal syndrome, with delirium (HCC)    • Hypokalemia    • Chronic obstructive pulmonary disease (HCC)    • Hypertension    • Osteoporosis    • Smoker    • Seizure disorder (HCC)    • Post traumatic stress disorder    • Hyperlipidemia      Plan:     Alcohol withdrawal syndrome, with delirium  -CT head reviewed  -ALT 63  -   -Alcohol withdrawal protocol  -Seizure precautions  -Fall precautions  -PT/INR and PTT ordered  -Thiamine level and vitamin B12 level ordered  -Hold all hepatotoxic medications    Hypokalemia  -Potassium 2.3  -Potassium chloride given in the ED  -Electrolyte protocol  -EKG showed sinus rhythm with prolonged QT interval    Hyponatremia  -Sodium 133  -Monitor    Hypertension  Hyperlipidemia  -BP well controlled    Seizure disorder  -Seizure precautions    Chronic obstructive pulmonary disease   -Not in exacerbation    Post  traumatic stress disorder  -Stable    Osteoporosis    Smoker  -Encourage cessation    DVT prophylaxis:  Mechanical DVT prophylaxis orders are present.    CODE STATUS:    Code Status (Patient has no pulse and is not breathing): CPR (Attempt to Resuscitate)  Medical Interventions (Patient has pulse or is breathing): Full Support    Admission Status:  I believe this patient meets observation status.    I discussed the patient's findings and my recommendations with patient.    This patient has been examined wearing appropriate Personal Protective Equipment. 08/04/22      Signature: Electronically signed by SHARIF Ibrahim, 08/03/22, 10:34 PM EDT.

## 2022-08-04 PROBLEM — E87.6 HYPOKALEMIA: Status: ACTIVE | Noted: 2022-08-04

## 2022-08-04 LAB
ALBUMIN SERPL-MCNC: 2.7 G/DL (ref 3.5–5.2)
ALBUMIN/GLOB SERPL: 0.7 G/DL
ALP SERPL-CCNC: 198 U/L (ref 39–117)
ALT SERPL W P-5'-P-CCNC: 50 U/L (ref 1–33)
ANION GAP SERPL CALCULATED.3IONS-SCNC: 11 MMOL/L (ref 5–15)
ANISOCYTOSIS BLD QL: NORMAL
APTT PPP: 32.2 SECONDS (ref 24–31)
AST SERPL-CCNC: 187 U/L (ref 1–32)
BASOPHILS # BLD AUTO: 0 10*3/MM3 (ref 0–0.2)
BASOPHILS NFR BLD AUTO: 0.4 % (ref 0–1.5)
BILIRUB SERPL-MCNC: 0.8 MG/DL (ref 0–1.2)
BUN SERPL-MCNC: 2 MG/DL (ref 6–20)
BUN/CREAT SERPL: 3.7 (ref 7–25)
CALCIUM SPEC-SCNC: 7.7 MG/DL (ref 8.6–10.5)
CHLORIDE SERPL-SCNC: 103 MMOL/L (ref 98–107)
CO2 SERPL-SCNC: 23 MMOL/L (ref 22–29)
CREAT SERPL-MCNC: 0.54 MG/DL (ref 0.57–1)
DACRYOCYTES BLD QL SMEAR: NORMAL
DEPRECATED RDW RBC AUTO: 77 FL (ref 37–54)
EGFRCR SERPLBLD CKD-EPI 2021: 109.6 ML/MIN/1.73
EOSINOPHIL # BLD AUTO: 0.1 10*3/MM3 (ref 0–0.4)
EOSINOPHIL NFR BLD AUTO: 1.5 % (ref 0.3–6.2)
ERYTHROCYTE [DISTWIDTH] IN BLOOD BY AUTOMATED COUNT: 19.2 % (ref 12.3–15.4)
GLOBULIN UR ELPH-MCNC: 4 GM/DL
GLUCOSE SERPL-MCNC: 82 MG/DL (ref 65–99)
HCT VFR BLD AUTO: 32 % (ref 34–46.6)
HGB BLD-MCNC: 10.5 G/DL (ref 12–15.9)
INR PPP: 1.32 (ref 0.93–1.1)
LARGE PLATELETS: NORMAL
LYMPHOCYTES # BLD AUTO: 1.6 10*3/MM3 (ref 0.7–3.1)
LYMPHOCYTES NFR BLD AUTO: 26.9 % (ref 19.6–45.3)
MAGNESIUM SERPL-MCNC: 1.7 MG/DL (ref 1.6–2.6)
MCH RBC QN AUTO: 37.4 PG (ref 26.6–33)
MCHC RBC AUTO-ENTMCNC: 32.9 G/DL (ref 31.5–35.7)
MCV RBC AUTO: 113.9 FL (ref 79–97)
MONOCYTES # BLD AUTO: 0.9 10*3/MM3 (ref 0.1–0.9)
MONOCYTES NFR BLD AUTO: 15.2 % (ref 5–12)
NEUTROPHILS NFR BLD AUTO: 3.3 10*3/MM3 (ref 1.7–7)
NEUTROPHILS NFR BLD AUTO: 56 % (ref 42.7–76)
NRBC BLD AUTO-RTO: 0.1 /100 WBC (ref 0–0.2)
PLATELET # BLD AUTO: 176 10*3/MM3 (ref 140–450)
PMV BLD AUTO: 8.2 FL (ref 6–12)
POIKILOCYTOSIS BLD QL SMEAR: NORMAL
POTASSIUM SERPL-SCNC: 2.6 MMOL/L (ref 3.5–5.2)
POTASSIUM SERPL-SCNC: 3.2 MMOL/L (ref 3.5–5.2)
POTASSIUM SERPL-SCNC: 3.6 MMOL/L (ref 3.5–5.2)
PROT SERPL-MCNC: 6.7 G/DL (ref 6–8.5)
PROTHROMBIN TIME: 13.4 SECONDS (ref 9.6–11.7)
RBC # BLD AUTO: 2.81 10*6/MM3 (ref 3.77–5.28)
SMALL PLATELETS BLD QL SMEAR: ADEQUATE
SODIUM SERPL-SCNC: 137 MMOL/L (ref 136–145)
TROPONIN T SERPL-MCNC: <0.01 NG/ML (ref 0–0.03)
VIT B12 BLD-MCNC: 1331 PG/ML (ref 211–946)
WBC MORPH BLD: NORMAL
WBC NRBC COR # BLD: 5.8 10*3/MM3 (ref 3.4–10.8)

## 2022-08-04 PROCEDURE — 94799 UNLISTED PULMONARY SVC/PX: CPT

## 2022-08-04 PROCEDURE — 94760 N-INVAS EAR/PLS OXIMETRY 1: CPT

## 2022-08-04 PROCEDURE — 94640 AIRWAY INHALATION TREATMENT: CPT

## 2022-08-04 PROCEDURE — 99232 SBSQ HOSP IP/OBS MODERATE 35: CPT | Performed by: HOSPITALIST

## 2022-08-04 PROCEDURE — 94664 DEMO&/EVAL PT USE INHALER: CPT

## 2022-08-04 PROCEDURE — 25010000002 POTASSIUM CHLORIDE PER 2 MEQ OF POTASSIUM: Performed by: NURSE PRACTITIONER

## 2022-08-04 PROCEDURE — 25010000002 ONDANSETRON PER 1 MG: Performed by: NURSE PRACTITIONER

## 2022-08-04 PROCEDURE — 25010000002 DIAZEPAM PER 5 MG: Performed by: NURSE PRACTITIONER

## 2022-08-04 PROCEDURE — 0 POTASSIUM CHLORIDE 10 MEQ/100ML SOLUTION: Performed by: EMERGENCY MEDICINE

## 2022-08-04 PROCEDURE — 84132 ASSAY OF SERUM POTASSIUM: CPT | Performed by: HOSPITALIST

## 2022-08-04 PROCEDURE — 84132 ASSAY OF SERUM POTASSIUM: CPT | Performed by: INTERNAL MEDICINE

## 2022-08-04 RX ORDER — SERTRALINE HYDROCHLORIDE 100 MG/1
100 TABLET, FILM COATED ORAL 2 TIMES DAILY
COMMUNITY
End: 2023-01-03

## 2022-08-04 RX ORDER — TRAZODONE HYDROCHLORIDE 50 MG/1
50 TABLET ORAL NIGHTLY
Status: DISCONTINUED | OUTPATIENT
Start: 2022-08-04 | End: 2022-08-08 | Stop reason: HOSPADM

## 2022-08-04 RX ORDER — BUSPIRONE HYDROCHLORIDE 15 MG/1
15 TABLET ORAL 2 TIMES DAILY
COMMUNITY
End: 2022-08-19

## 2022-08-04 RX ORDER — CHOLECALCIFEROL (VITAMIN D3) 125 MCG
1000 CAPSULE ORAL DAILY
Status: ON HOLD | COMMUNITY
End: 2022-10-15

## 2022-08-04 RX ORDER — ATORVASTATIN CALCIUM 20 MG/1
20 TABLET, FILM COATED ORAL DAILY
COMMUNITY
End: 2022-11-28

## 2022-08-04 RX ORDER — LANOLIN ALCOHOL/MO/W.PET/CERES
1000 CREAM (GRAM) TOPICAL DAILY
Status: DISCONTINUED | OUTPATIENT
Start: 2022-08-04 | End: 2022-08-05

## 2022-08-04 RX ORDER — TRAZODONE HYDROCHLORIDE 100 MG/1
100 TABLET ORAL NIGHTLY
COMMUNITY
End: 2022-08-10

## 2022-08-04 RX ADMIN — LORAZEPAM 2 MG: 1 TABLET ORAL at 09:38

## 2022-08-04 RX ADMIN — POTASSIUM CHLORIDE 10 MEQ: 7.46 INJECTION, SOLUTION INTRAVENOUS at 02:54

## 2022-08-04 RX ADMIN — POTASSIUM CHLORIDE 100 ML/HR: 2 INJECTION, SOLUTION, CONCENTRATE INTRAVENOUS at 15:54

## 2022-08-04 RX ADMIN — SERTRALINE HYDROCHLORIDE 100 MG: 100 TABLET ORAL at 09:28

## 2022-08-04 RX ADMIN — POTASSIUM CHLORIDE 10 MEQ: 7.46 INJECTION, SOLUTION INTRAVENOUS at 05:20

## 2022-08-04 RX ADMIN — IPRATROPIUM BROMIDE 0.5 MG: 0.5 SOLUTION RESPIRATORY (INHALATION) at 08:14

## 2022-08-04 RX ADMIN — CYANOCOBALAMIN TAB 1000 MCG 1000 MCG: 1000 TAB at 15:56

## 2022-08-04 RX ADMIN — Medication 10 ML: at 20:08

## 2022-08-04 RX ADMIN — POTASSIUM CHLORIDE 100 ML/HR: 2 INJECTION, SOLUTION, CONCENTRATE INTRAVENOUS at 03:30

## 2022-08-04 RX ADMIN — DIAZEPAM 10 MG: 10 INJECTION, SOLUTION INTRAMUSCULAR; INTRAVENOUS at 13:50

## 2022-08-04 RX ADMIN — BUDESONIDE AND FORMOTEROL FUMARATE DIHYDRATE 2 PUFF: 80; 4.5 AEROSOL RESPIRATORY (INHALATION) at 08:14

## 2022-08-04 RX ADMIN — TRAZODONE HYDROCHLORIDE 50 MG: 50 TABLET ORAL at 20:08

## 2022-08-04 RX ADMIN — POTASSIUM CHLORIDE 10 MEQ: 7.46 INJECTION, SOLUTION INTRAVENOUS at 04:22

## 2022-08-04 RX ADMIN — DIAZEPAM 10 MG: 10 INJECTION, SOLUTION INTRAMUSCULAR; INTRAVENOUS at 02:47

## 2022-08-04 RX ADMIN — BUSPIRONE HYDROCHLORIDE 15 MG: 15 TABLET ORAL at 15:56

## 2022-08-04 RX ADMIN — LORAZEPAM 1 MG: 1 TABLET ORAL at 22:25

## 2022-08-04 RX ADMIN — BUTALBITAL, ACETAMINOPHEN AND CAFFEINE 1 TABLET: 50; 325; 40 TABLET ORAL at 09:28

## 2022-08-04 RX ADMIN — FOLIC ACID 1000 MCG: 1 TABLET ORAL at 15:57

## 2022-08-04 RX ADMIN — HYDROCHLOROTHIAZIDE: 25 TABLET ORAL at 15:55

## 2022-08-04 RX ADMIN — IPRATROPIUM BROMIDE 0.5 MG: 0.5 SOLUTION RESPIRATORY (INHALATION) at 19:18

## 2022-08-04 RX ADMIN — SERTRALINE 50 MG: 50 TABLET, FILM COATED ORAL at 20:08

## 2022-08-04 RX ADMIN — ONDANSETRON 4 MG: 2 INJECTION INTRAMUSCULAR; INTRAVENOUS at 02:46

## 2022-08-04 RX ADMIN — IPRATROPIUM BROMIDE 0.5 MG: 0.5 SOLUTION RESPIRATORY (INHALATION) at 11:12

## 2022-08-04 RX ADMIN — BUDESONIDE AND FORMOTEROL FUMARATE DIHYDRATE 2 PUFF: 80; 4.5 AEROSOL RESPIRATORY (INHALATION) at 19:18

## 2022-08-04 NOTE — SIGNIFICANT NOTE
PT discussed pt's status with nurse - nurse reports pt is not appropriate this date due to being lethargic related to diagnosis.

## 2022-08-04 NOTE — PLAN OF CARE
Problem: Adult Inpatient Plan of Care  Goal: Plan of Care Review  Outcome: Ongoing, Progressing  Flowsheets (Taken 8/4/2022 0316)  Outcome Evaluation: Pt resting abed at this time. Pt admitted from ED r/t alcohol withdrawals. Pt treated per CIWA. Pt C/O nausea, diarrhea, hallucinations, anxiety, & tremors. Pt K+ is low, K+ protocol initiated. Pt on tele. Fall precautions initiated. Seizure precautions in place. Pt on clear liquid diet. No additional C/O voiced at this time. Will continue to monitor.  Goal: Patient-Specific Goal (Individualized)  Outcome: Ongoing, Progressing  Goal: Absence of Hospital-Acquired Illness or Injury  Outcome: Ongoing, Progressing  Intervention: Identify and Manage Fall Risk  Recent Flowsheet Documentation  Taken 8/4/2022 0217 by Sukh Marcos RN  Safety Promotion/Fall Prevention:   safety round/check completed   fall prevention program maintained  Taken 8/4/2022 0031 by Sukh Marcos RN  Safety Promotion/Fall Prevention:   safety round/check completed   fall prevention program maintained  Taken 8/3/2022 2220 by Sukh Marcos RN  Safety Promotion/Fall Prevention: safety round/check completed  Taken 8/3/2022 2101 by Sukh Marcos RN  Safety Promotion/Fall Prevention:   safety round/check completed   nonskid shoes/slippers when out of bed   fall prevention program maintained   clutter free environment maintained   assistive device/personal items within reach  Intervention: Prevent Skin Injury  Recent Flowsheet Documentation  Taken 8/3/2022 2101 by Sukh Marcos RN  Body Position:   supine   position changed independently  Intervention: Prevent and Manage VTE (Venous Thromboembolism) Risk  Recent Flowsheet Documentation  Taken 8/3/2022 2101 by Sukh Marcos RN  Activity Management: activity adjusted per tolerance  Intervention: Prevent Infection  Recent Flowsheet Documentation  Taken 8/4/2022 0217 by Sukh Marcos RN  Infection Prevention:   hand hygiene promoted    personal protective equipment utilized   rest/sleep promoted   single patient room provided  Taken 8/4/2022 0031 by Sukh Marcos RN  Infection Prevention:   hand hygiene promoted   personal protective equipment utilized   rest/sleep promoted   single patient room provided  Taken 8/3/2022 2220 by Sukh Marcos RN  Infection Prevention:   hand hygiene promoted   personal protective equipment utilized   rest/sleep promoted   single patient room provided  Taken 8/3/2022 2101 by Sukh Marcos RN  Infection Prevention:   hand hygiene promoted   personal protective equipment utilized   rest/sleep promoted   single patient room provided  Goal: Optimal Comfort and Wellbeing  Outcome: Ongoing, Progressing  Intervention: Monitor Pain and Promote Comfort  Recent Flowsheet Documentation  Taken 8/3/2022 2101 by Sukh Marcos RN  Pain Management Interventions:   care clustered   diversional activity provided   pillow support provided   quiet environment facilitated   unnecessary movement minimized  Intervention: Provide Person-Centered Care  Recent Flowsheet Documentation  Taken 8/3/2022 2101 by Sukh Marcos RN  Trust Relationship/Rapport:   care explained   choices provided   questions answered   questions encouraged   reassurance provided   thoughts/feelings acknowledged  Goal: Readiness for Transition of Care  Outcome: Ongoing, Progressing  Intervention: Mutually Develop Transition Plan  Recent Flowsheet Documentation  Taken 8/3/2022 2055 by Sukh Marcos RN  Transportation Anticipated:   car, drives self   family or friend will provide  Patient/Family Anticipated Services at Transition: none  Patient/Family Anticipates Transition to: home with family  Taken 8/3/2022 2052 by Sukh Marcos RN  Equipment Currently Used at Home: none     Problem: Behavioral Health Comorbidity  Goal: Maintenance of Behavioral Health Symptom Control  Outcome: Ongoing, Progressing  Intervention: Maintain Behavioral Health  Symptom Control  Recent Flowsheet Documentation  Taken 8/4/2022 0217 by Sukh Marcos RN  Medication Review/Management: medications reviewed  Taken 8/4/2022 0031 by Sukh Marcos RN  Medication Review/Management: medications reviewed  Taken 8/3/2022 2220 by Sukh Marcos RN  Medication Review/Management: medications reviewed  Taken 8/3/2022 2101 by Sukh Marcos RN  Medication Review/Management: medications reviewed     Problem: COPD (Chronic Obstructive Pulmonary Disease) Comorbidity  Goal: Maintenance of COPD Symptom Control  Outcome: Ongoing, Progressing  Intervention: Maintain COPD-Symptom Control  Recent Flowsheet Documentation  Taken 8/4/2022 0217 by Sukh Marcos RN  Medication Review/Management: medications reviewed  Taken 8/4/2022 0031 by Sukh Marcos RN  Medication Review/Management: medications reviewed  Taken 8/3/2022 2220 by Sukh Marcos RN  Medication Review/Management: medications reviewed  Taken 8/3/2022 2101 by Sukh Marcos RN  Supportive Measures:   active listening utilized   verbalization of feelings encouraged  Medication Review/Management: medications reviewed     Problem: Hypertension Comorbidity  Goal: Blood Pressure in Desired Range  Outcome: Ongoing, Progressing  Intervention: Maintain Blood Pressure Management  Recent Flowsheet Documentation  Taken 8/4/2022 0217 by Sukh Marcos RN  Medication Review/Management: medications reviewed  Taken 8/4/2022 0031 by Sukh Marcos RN  Medication Review/Management: medications reviewed  Taken 8/3/2022 2220 by Sukh Marcos RN  Medication Review/Management: medications reviewed  Taken 8/3/2022 2101 by Sukh Marcos RN  Medication Review/Management: medications reviewed     Problem: Seizure Disorder Comorbidity  Goal: Maintenance of Seizure Control  Outcome: Ongoing, Progressing  Intervention: Maintain Seizure-Symptom Control  Recent Flowsheet Documentation  Taken 8/4/2022 0031 by Sukh Marcos RN  Seizure  Precautions: side rails padded  Taken 8/3/2022 2101 by Sukh Marcos, RN  Seizure Precautions: side rails padded   Goal Outcome Evaluation:              Outcome Evaluation: Pt resting abed at this time. Pt admitted from ED r/t alcohol withdrawals. Pt treated per CIWA. Pt C/O nausea, diarrhea, hallucinations, anxiety, & tremors. Pt K+ is low, K+ protocol initiated. Pt on tele. Fall precautions initiated. Seizure precautions in place. Pt on clear liquid diet. No additional C/O voiced at this time. Will continue to monitor.

## 2022-08-04 NOTE — PROGRESS NOTES
[facility] Daily Progress Note    Patient Name: Lita Yu  : 1965  MRN: [id number]  Primary Care Physician:  Norma Saul APRN  Date of admission: 8/3/2022      Subjective      Chief Complaint: Hallucinations due to alcohol withdrawal    Patient Reports   2022: The patient reports that she continues to have hallucinations but they are not getting worse.  She reports some mild tremor which is getting better.  She reports that she had been having nausea vomiting and diarrhea at home and had been unable to eat anything for several days.  She denies any hematemesis, coffee-ground emesis, melena or bright red blood per rectum.  She no longer has the nausea and vomiting but she still is having multiple episodes of diarrhea.      ROS   All other systems were reviewed and were negative.      Objective      Vitals:   Temp:  [97.6 °F (36.4 °C)-97.8 °F (36.6 °C)] 97.7 °F (36.5 °C)  Heart Rate:  [75-88] 81  Resp:  [16-18] 16  BP: ()/(61-84) 107/69    Physical Exam   Vital signs and nurses notes reviewed.  Well-nourished female sitting up in bed awake and alert in no acute distress; normocephalic atraumatic, mucous membranes moist; sclera anicteric; lungs clear to auscultation bilaterally; CV regular rate and rhythm; abdomen soft and nontender nondistended; extremities with no edema or calf tenderness; palpable pedal pulses bilaterally; no asterixis.       Result Review    Result Review:  I have personally reviewed the results from the time of this admission to 2022 18:15 EDT and agree with these findings:  [x]  Laboratory  [x]  Microbiology  [x]  Radiology  [x]  EKG/Telemetry   [x]  Cardiology/Vascular   []  Pathology  []  Old records  []  Other:  Most notable findings discussed in the assessment and plan.          Assessment & Plan      Brief Patient Summary:  Lita Yu is a 56 y.o. female with past medical history of alcoholism,  hypertension, hyperlipidemia, PTSD, seizure, tobacco user who presented to [facility] on 8/3/2022 complaining of alcohol withdrawal with hallucinations, tremors, nausea, vomiting, and diarrhea.  She has been generally weak and fatigued, and complains of anxiety. She states she has done inpatient rehab about 1 year ago but her insurance will not pay for it again.  She called her primary doctor who recommended she come to the emergency room.  Patient stated that she is going to , and had quit drinking for a year and started drinking again recently.    Current medications include:  budesonide-formoterol, 2 puff, Inhalation, BID - RT   And  ipratropium, 0.5 mg, Nebulization, Q6H - RT  busPIRone, 15 mg, Oral, BID  folic acid, 1,000 mcg, Oral, Daily  lisinopril-hydroCHLOROthiazide (ZESTORETIC) 20-25 mg combo dose, , Oral, Q24H  sertraline, 50 mg, Oral, BID  sodium chloride, 10 mL, Intravenous, Q12H  traZODone, 50 mg, Oral, Nightly  vitamin B-12, 1,000 mcg, Oral, Daily       lactated ringers with KCl 40 mEq/L, 100 mL/hr, Last Rate: 100 mL/hr (08/04/22 9074)         Active Hospital Problems:  Active Hospital Problems    Diagnosis    • **Alcohol withdrawal syndrome, with delirium (HCC)    • Hypokalemia    • Chronic obstructive pulmonary disease (HCC)    • Hypertension    • Osteoporosis    • Smoker    • Seizure disorder (HCC)    • Post traumatic stress disorder    • Hyperlipidemia      Plan:   Alcohol abuse with current delirium tremens and nausea, vomiting and diarrhea due to alcohol withdrawal  Elevated LFTs due to alcohol abuse  -CT head showed no acute intracranial abnormalities  -Alcohol withdrawal protocol  -Seizure precautions  -Continue thiamine and folic acid     Hypokalemia  -Potassium 2.3  -Potassium chloride given in the ED  -Electrolyte replacement protocol ordered  -EKG showed sinus rhythm with prolonged QT interval     Hyponatremia, mild and not clinically significant  -Sodium  133  -Monitor    Anemia of chronic disease  -Hemoglobin 12.3 on admission compared to 11.0 on 7/18/2022 but dropped to 10.5 after IV fluid hydration     Essential hypertension  Hyperlipidemia  -Continue lisinopril and hydrochlorothiazide  -Avoid statins due to elevated liver tests     Seizure disorder  -Seizure precautions     Chronic obstructive pulmonary disease   -Not in exacerbation  -Continue Symbicort and ipratropium (avoid albuterol due to side effect of tachycardia)     Post traumatic stress disorder  -Continue BuSpar  -Continue sertraline and trazodone at a reduced dose due to prolonged prolonged QT interval  -Stable     Osteoporosis     Smoker  -Encourage cessation    DVT prophylaxis:  Mechanical DVT prophylaxis orders are present.    CODE STATUS:    Code Status (Patient has no pulse and is not breathing): CPR (Attempt to Resuscitate)  Medical Interventions (Patient has pulse or is breathing): Full Support      Disposition:  I expect patient to be discharged once she has no further symptoms of alcohol withdrawal.    This patient has been examined wearing appropriate Personal Protective Equipment and discussed with hospital infection control department. 08/04/22      Electronically signed by Zoila Jama MD, 08/04/22, 18:15 EDT.  [facility] [place] Hospitalist Team

## 2022-08-04 NOTE — CASE MANAGEMENT/SOCIAL WORK
Discharge Planning Assessment   Vik     Patient Name: Lita Yu  MRN: [id number]  Today's Date: 8/4/2022    Admit Date: 8/3/2022     Discharge Needs Assessment     Row Name 08/04/22 1229       Living Environment    People in Home spouse    Name(s) of People in Home  Horacio    Current Living Arrangements home    Primary Care Provided by self    Provides Primary Care For no one, unable/limited ability to care for self    Family Caregiver if Needed spouse    Family Caregiver Names Horacio    Quality of Family Relationships unable to assess  No family present during CM rounds    Able to Return to Prior Arrangements yes       Resource/Environmental Concerns    Transportation Concerns none       Transition Planning    Patient/Family Anticipates Transition to home with family    Patient/Family Anticipated Services at Transition other (see comments)      Transportation Anticipated family or friend will provide       Discharge Needs Assessment    Readmission Within the Last 30 Days no previous admission in last 30 days    Equipment Currently Used at Home none    Concerns to be Addressed substance/tobacco abuse/use;discharge planning    Anticipated Changes Related to Illness none    Equipment Needed After Discharge none    Provided Post Acute Provider List? N/A    N/A Provider List Comment Anticipate routine home    Current Discharge Risk substance use/abuse               Discharge Plan     Row Name 08/04/22 1234       Plan    Plan D/C plan: Anticipate routine home with spouse.    Patient/Family in Agreement with Plan yes    Plan Comments Met with patient at bedside. Pt is sleepy, but arousable. Pt lives at home with  Horacio. Pt is IADL, including driving. Son in law Yo will provide transportation at time of d/c. Denies use of DME at home. PCP and pharmacy confirmed. No financial barriers to medications. Has used [facility] for HH PT in the past. CM consulted for ANNE to see patient today r/t  alcohol use. PT/OT eval order placed by nurse and is pending at this time.                   Expected Discharge Date and Time     Expected Discharge Date Expected Discharge Time    Aug 6, 2022          Demographic Summary     Row Name 08/04/22 1228       General Information    Admission Type inpatient    Arrived From emergency department    Referral Source admission list    Reason for Consult discharge planning    Preferred Language English               Functional Status     Row Name 08/04/22 1229       Functional Status    Usual Activity Tolerance moderate    Current Activity Tolerance moderate       Functional Status, IADL    Medications independent    Meal Preparation independent    Housekeeping independent    Laundry independent    Shopping independent                     Met with patient in room wearing PPE: mask    Maintained distance greater than six feet and spent less than 15 minutes in the room.          CHRISTINE TEMPLETON RN

## 2022-08-04 NOTE — PAYOR COMM NOTE
"Inpatient order 8/3/22  ER admit for n/v, visual hallucinations, ETOH withdrawal. Electrolyte imbalance and hx of seizure disorder.   IV Fluids, IV Valium, IV Zofran, IV potassium.   CIWA score 22  =======  AUTHORIZATION PENDING:   PLEASE FAX OR CALL DETERMINATION TO CONTACT BELOW:       THANK YOU,    DANAE HaydenN, RN  Utilization Review  [facility]  Phone: [phone]  Fax: [phone]      NPI: [id number]  Tax ID: [id number]      Lita Yu (56 y.o. Female)             Date of Birth   1965    Social Security Number       Address   [address] [place] [address] [address] [place] [facility]    Home Phone   [phone]    MRN   [id number]       Restorationism   None    Marital Status                               Admission Date   8/3/22    Admission Type   Emergency    Admitting Provider   Zoila Jama MD    Attending Provider   Zoila Jama MD    Department, Room/Bed   [facility] 3A MEDICAL INPATIENT, 345/1       Discharge Date       Discharge Disposition       Discharge Destination                               Attending Provider: Zoila Jama MD    Allergies: Sulfa Antibiotics, Keflex [Cephalexin]    Isolation: None   Infection: None   Code Status: CPR   Advance Care Planning Activity    Ht: 160 cm (63\")   Wt: 53.9 kg (118 lb 12.8 oz)    Admission Cmt: None   Principal Problem: Alcohol withdrawal syndrome, with delirium (HCC) [F10.231]                 Active Insurance as of 8/3/2022     Primary Coverage     Payor Plan Insurance Group Employer/Plan Group    [id number]     Payor Plan Address Payor Plan Phone Number Payor Plan Fax Number Effective Dates    [address]   6/22/2014 - None Entered    [facility] [id number]       Subscriber Name Subscriber Birth Date Member ID       [id number] CONDE 1/21/1966 [id number]                 Emergency Contacts      (Rel.) Home Phone Work Phone Mobile Phone    MILANA YU (Spouse) [phone] -- " [phone]               History & Physical      AlsopAlyssiai MARIA MSHARIF at 22              [facility]      Patient Name: Lita Yu  : 1965  MRN: [id number]  Primary Care Physician:  Norma Saul APRN  Date of admission: 8/3/2022      Subjective      Chief Complaint: Alcohol withdrawal    History of Present Illness: Lita Yu is a 56 y.o. female with past medical history of alcoholism, hypertension, hyperlipidemia, PTSD, seizure, tobacco user who presented to [facility] on 8/3/2022 complaining of alcohol withdrawal.  Patient states she has been trying to cut back on her alcohol use and has been having alcohol withdrawal.  She states she has been having nausea, vomiting, diarrhea.  She has been generally weak and fatigued, and complains of anxiety.  She also states that she has had some hallucinations where she has seen lobsters and crabs, which occurs mainly when she closes her eyes to rest.  Patient complains of migraine headache, which she has a history of. She states she last had alcohol this morning.  She normally drinks over 1/2 pint of bourbon daily.  She states she has done inpatient rehab about 1 year ago but her insurance will not pay for it again.  She called her primary doctor who recommended she come to the emergency room.  Patient stated that she is going to , and has no need for additional resources to cease drinking alcohol.    In the ED, CT head showed no acute abnormality.  All labs unremarkable except potassium 2.3, ALT 63, , albumin 3.4, sodium 133.  All vital signs unremarkable.  Patient received potassium chloride, banana bag in the ED.  Patient admitted to hospitalist service for further evaluation and treatment.    Review of Systems   Constitutional: Negative. Negative for chills and fever.   HENT: Negative.    Eyes: Negative.    Cardiovascular: Negative.  Negative for chest pain.    Respiratory: Negative.  Negative for cough and shortness of breath.    Endocrine: Negative.    Skin: Negative.    Musculoskeletal: Negative.    Gastrointestinal: Positive for diarrhea, nausea and vomiting.   Genitourinary: Negative.    Neurological: Negative.    Psychiatric/Behavioral: The patient is nervous/anxious.    Allergic/Immunologic: Negative.         Personal History     Past Medical History:   Diagnosis Date   • Cirrhosis (HCC)    • COPD (chronic obstructive pulmonary disease) (HCC)    • Depression    • GERD (gastroesophageal reflux disease)    • Hyperlipidemia    • Hypertension    • PTSD (post-traumatic stress disorder)        Past Surgical History:   Procedure Laterality Date   •  SECTION N/A    • COLONOSCOPY N/A 2021    Procedure: COLONOSCOPY with polypectomy x2 and random biopsy;  Surgeon: Osman Clay MD;  Location: [facility] ENDOSCOPY;  Service: Gastroenterology;  Laterality: N/A;  colon polyps   • DENTAL PROCEDURE     • ENDOSCOPY N/A 2021    Procedure: ESOPHAGOGASTRODUODENOSCOPY;  Surgeon: Osman Clay MD;  Location: [facility] ENDOSCOPY;  Service: Gastroenterology;  Laterality: N/A;  esophagitis   • JOINT REPLACEMENT     • REPLACEMENT TOTAL HIP LATERAL POSITION Left    • TONSILLECTOMY     • VAGINAL BIRTH AFTER  SECTION         Family History: family history includes Alcohol abuse in her mother and paternal grandfather. Otherwise pertinent FHx was reviewed and not pertinent to current issue.    Social History:  reports that she has been smoking cigarettes. She has been smoking about 0.25 packs per day. She has never used smokeless tobacco. She reports previous alcohol use. She reports that she does not use drugs.    Home Medications:  Prior to Admission Medications     Prescriptions Last Dose Informant Patient Reported? Taking?    albuterol sulfate  (90 Base) MCG/ACT inhaler   No No    Inhale 2 puffs Every 4 (Four) Hours As Needed for Wheezing.    amLODIPine  (Norvasc) 5 MG tablet   No No    Take 1 tablet by mouth Daily.    atorvastatin (LIPITOR) 20 MG tablet   No No    TAKE 1 TABLET BY MOUTH EVERY DAY AT BEDTIME    B-12, Methylcobalamin, 1000 MCG sublingual tablet   Yes No    Take 1 tablet by mouth Every Morning.    busPIRone (BUSPAR) 15 MG tablet   No No    TAKE 1 TABLET BY MOUTH TWICE A DAY    butalbital-acetaminophen-caffeine (FIORICET, ESGIC) -40 MG per tablet   No No    Take 1 tablet by mouth Every 6 (Six) Hours As Needed for Headache (Neck discomfort).    folic acid (FOLVITE) 1 MG tablet   No No    TAKE 1 TABLET BY MOUTH DAILY    lisinopril-hydrochlorothiazide (PRINZIDE,ZESTORETIC) 20-25 MG per tablet   No No    Take 1 tablet by mouth Daily.    Patient taking differently:  Take 1 tablet by mouth Daily. Hold DOS    mupirocin (BACTROBAN) 2 % ointment   Yes No    APPLY SMALL AMOUNT EXTERNALLY TO THE AFFECTED AREA THREE TIMES DAILY    omega-3 acid ethyl esters (LOVAZA) 1 g capsule   No No    Take 2 capsules by mouth 2 (Two) Times a Day.    promethazine (PHENERGAN) 12.5 MG tablet   No No    Take 1 tablet by mouth Every 6 (Six) Hours As Needed for Nausea or Vomiting.    sertraline (ZOLOFT) 100 MG tablet   No No    TAKE 1 TABLET BY MOUTH TWICE A DAY    traZODone (DESYREL) 100 MG tablet   No No    TAKE 1 TABLET BY MOUTH EVERY DAY AT NIGHT    Trelegy Ellipta 100-62.5-25 MCG/INH inhaler   No No    INHALE 1 PUFF BY MOUTH DAILY.            Allergies:  Allergies   Allergen Reactions   • Sulfa Antibiotics Hives   • Keflex [Cephalexin] Hives       Objective      Vitals:   Temp:  [97.6 °F (36.4 °C)-98.7 °F (37.1 °C)] 97.6 °F (36.4 °C)  Heart Rate:  [84-96] 84  Resp:  [16-18] 18  BP: ()/(61-76) 99/61    Physical Exam  Vitals and nursing note reviewed.   Constitutional:       Appearance: Normal appearance.   HENT:      Head: Normocephalic.      Right Ear: External ear normal.      Left Ear: External ear normal.      Nose: Nose normal.      Mouth/Throat:      Pharynx:  Oropharynx is clear.   Eyes:      Extraocular Movements: Extraocular movements intact.   Cardiovascular:      Rate and Rhythm: Normal rate and regular rhythm.      Pulses: Normal pulses.      Heart sounds: Normal heart sounds.   Pulmonary:      Effort: Pulmonary effort is normal.      Breath sounds: Normal breath sounds.   Abdominal:      General: Bowel sounds are normal.      Palpations: Abdomen is soft.   Musculoskeletal:         General: Normal range of motion.      Cervical back: Normal range of motion.   Skin:     General: Skin is warm and dry.   Neurological:      Mental Status: She is alert and oriented to person, place, and time.   Psychiatric:         Mood and Affect: Mood normal.         Behavior: Behavior normal.          Result Review    Result Review:  I have personally reviewed the results from the time of this admission to 8/4/2022 02:46 EDT and agree with these findings:  [x]  Laboratory  []  Microbiology  [x]  Radiology  [x]  EKG/Telemetry   []  Cardiology/Vascular   []  Pathology  []  Old records  []  Other:  Most notable findings include: as above      Assessment & Plan        Active Hospital Problems:  Active Hospital Problems    Diagnosis    • **Alcohol withdrawal syndrome, with delirium (HCC)    • Hypokalemia    • Chronic obstructive pulmonary disease (HCC)    • Hypertension    • Osteoporosis    • Smoker    • Seizure disorder (HCC)    • Post traumatic stress disorder    • Hyperlipidemia      Plan:     Alcohol withdrawal syndrome, with delirium  -CT head reviewed  -ALT 63  -   -Alcohol withdrawal protocol  -Seizure precautions  -Fall precautions  -PT/INR and PTT ordered  -Thiamine level and vitamin B12 level ordered  -Hold all hepatotoxic medications    Hypokalemia  -Potassium 2.3  -Potassium chloride given in the ED  -Electrolyte protocol  -EKG showed sinus rhythm with prolonged QT interval    Hyponatremia  -Sodium 133  -Monitor    Hypertension  Hyperlipidemia  -BP well  controlled    Seizure disorder  -Seizure precautions    Chronic obstructive pulmonary disease   -Not in exacerbation    Post traumatic stress disorder  -Stable    Osteoporosis    Smoker  -Encourage cessation    DVT prophylaxis:  Mechanical DVT prophylaxis orders are present.    CODE STATUS:    Code Status (Patient has no pulse and is not breathing): CPR (Attempt to Resuscitate)  Medical Interventions (Patient has pulse or is breathing): Full Support    Admission Status:  I believe this patient meets observation status.    I discussed the patient's findings and my recommendations with patient.    This patient has been examined wearing appropriate Personal Protective Equipment. 08/04/22      Signature: Electronically signed by SHARIF Ibrahim, 08/03/22, 10:34 PM EDT.      Electronically signed by Savannah Garrison APRN at 08/04/22 0247          Emergency Department Notes      Khanh Taylor MD at 08/03/22 1644          Subjective   Chief complaint: Alcohol withdrawal    56-year-old female presents with alcohol withdrawal.  Patient states she has been trying to cut back on her alcohol use and has been having alcohol withdrawal.  She states she has been having nausea, vomiting, diarrhea.  She has been generally weak and fatigued.  She has had bad anxiety.  She also states that she has had some hallucinations where she has seen lobsters and crabs.  She states she last had alcohol this morning.  She normally drinks over 1/2 pint of bourbon daily.  She states she has done inpatient rehab in the past but her insurance will not pay for it again.  She called her primary doctor who recommended she come to the emergency room.      History provided by:  Patient      Review of Systems   Constitutional: Negative for fever.   HENT: Negative for congestion.    Respiratory: Negative for cough and shortness of breath.    Cardiovascular: Negative for chest pain.   Gastrointestinal: Positive for abdominal pain, diarrhea and vomiting.    Genitourinary: Negative for dysuria.   Musculoskeletal: Negative for back pain.   Skin: Negative for rash.   Neurological: Negative for headaches.   Psychiatric/Behavioral: Positive for hallucinations. The patient is nervous/anxious.        Past Medical History:   Diagnosis Date   • Cirrhosis (HCC)    • COPD (chronic obstructive pulmonary disease) (HCC)    • Depression    • GERD (gastroesophageal reflux disease)    • Hyperlipidemia    • Hypertension    • PTSD (post-traumatic stress disorder)        Allergies   Allergen Reactions   • Sulfa Antibiotics Hives   • Keflex [Cephalexin] Hives       Past Surgical History:   Procedure Laterality Date   •  SECTION N/A    • COLONOSCOPY N/A 2021    Procedure: COLONOSCOPY with polypectomy x2 and random biopsy;  Surgeon: Osman Clay MD;  Location: [facility] ENDOSCOPY;  Service: Gastroenterology;  Laterality: N/A;  colon polyps   • DENTAL PROCEDURE     • ENDOSCOPY N/A 2021    Procedure: ESOPHAGOGASTRODUODENOSCOPY;  Surgeon: Osman Clay MD;  Location: [facility] ENDOSCOPY;  Service: Gastroenterology;  Laterality: N/A;  esophagitis   • JOINT REPLACEMENT     • REPLACEMENT TOTAL HIP LATERAL POSITION Left    • TONSILLECTOMY     • VAGINAL BIRTH AFTER  SECTION         Family History   Problem Relation Age of Onset   • Alcohol abuse Mother    • Alcohol abuse Paternal Grandfather        Social History     Socioeconomic History   • Marital status:    Tobacco Use   • Smoking status: Current Every Day Smoker     Packs/day: 0.25     Types: Cigarettes   • Smokeless tobacco: Never Used   • Tobacco comment: 3cigs   Vaping Use   • Vaping Use: Never used   Substance and Sexual Activity   • Alcohol use: Not Currently     Comment: quit 23   • Drug use: No   • Sexual activity: Yes     Partners: Male     Birth control/protection: Post-menopausal       /76 (BP Location: Left arm, Patient Position: Sitting)   Pulse 96   Temp 98.2 °F (36.8 °C) (Oral)    "Resp 16   Ht 160 cm (63\")   Wt 54.1 kg (119 lb 4.3 oz)   SpO2 98%   BMI 21.13 kg/m²       Objective   Physical Exam  Vitals and nursing note reviewed.   Constitutional:       General: She is not in acute distress.     Appearance: Normal appearance. She is well-developed. She is not toxic-appearing.   HENT:      Head: Normocephalic and atraumatic.   Eyes:      Pupils: Pupils are equal, round, and reactive to light.   Cardiovascular:      Rate and Rhythm: Normal rate and regular rhythm.      Heart sounds: Normal heart sounds.   Pulmonary:      Effort: Pulmonary effort is normal. No respiratory distress.      Breath sounds: Normal breath sounds.   Abdominal:      General: Bowel sounds are normal.      Palpations: Abdomen is soft.      Tenderness: There is no abdominal tenderness.   Musculoskeletal:         General: Normal range of motion.      Cervical back: Normal range of motion and neck supple.   Skin:     General: Skin is warm and dry.   Neurological:      General: No focal deficit present.      Mental Status: She is alert and oriented to person, place, and time.         Procedures          ED Course      Results for orders placed or performed during the hospital encounter of 08/03/22   Comprehensive Metabolic Panel    Specimen: Blood   Result Value Ref Range    Glucose 99 65 - 99 mg/dL    BUN 2 (L) 6 - 20 mg/dL    Creatinine 0.66 0.57 - 1.00 mg/dL    Sodium 133 (L) 136 - 145 mmol/L    Potassium 2.3 (C) 3.5 - 5.2 mmol/L    Chloride 96 (L) 98 - 107 mmol/L    CO2 21.0 (L) 22.0 - 29.0 mmol/L    Calcium 8.5 (L) 8.6 - 10.5 mg/dL    Total Protein 8.4 6.0 - 8.5 g/dL    Albumin 3.40 (L) 3.50 - 5.20 g/dL    ALT (SGPT) 63 (H) 1 - 33 U/L    AST (SGOT) 242 (H) 1 - 32 U/L    Alkaline Phosphatase 248 (H) 39 - 117 U/L    Total Bilirubin 0.8 0.0 - 1.2 mg/dL    Globulin 5.0 gm/dL    A/G Ratio 0.7 g/dL    BUN/Creatinine Ratio 3.0 (L) 7.0 - 25.0    Anion Gap 16.0 (H) 5.0 - 15.0 mmol/L    eGFR 104.4 >60.0 mL/min/1.73   Lipase    " Specimen: Blood   Result Value Ref Range    Lipase 47 13 - 60 U/L   Ethanol    Specimen: Blood   Result Value Ref Range    Ethanol % <0.010 %   CBC Auto Differential    Specimen: Blood   Result Value Ref Range    WBC 7.20 3.40 - 10.80 10*3/mm3    RBC 3.23 (L) 3.77 - 5.28 10*6/mm3    Hemoglobin 12.3 12.0 - 15.9 g/dL    Hematocrit 36.8 34.0 - 46.6 %    .1 (H) 79.0 - 97.0 fL    MCH 38.2 (H) 26.6 - 33.0 pg    MCHC 33.5 31.5 - 35.7 g/dL    RDW 18.5 (H) 12.3 - 15.4 %    RDW-SD 73.9 (H) 37.0 - 54.0 fl    MPV 7.5 6.0 - 12.0 fL    Platelets 226 140 - 450 10*3/mm3   Magnesium    Specimen: Blood   Result Value Ref Range    Magnesium 1.8 1.6 - 2.6 mg/dL                                          MDM   Patient had the above evaluation.  Results were discussed with the patient.  White blood cell count is normal.  Alcohol is negative.  Lipase is normal.  Patient has elevation of ALT and AST at 63 and 242 respectively.  She has had chronic elevation in her liver function test.  Potassium is significantly low at 2.3.  She was started on IV potassium replacement.  She was given a banana bag.  She will be admitted for further management of her alcohol withdrawal and hypokalemia.  I discussed with the hospitalist who agreed to admit.      Final diagnoses:   Alcohol withdrawal syndrome, with delirium (HCC)   Hypokalemia       ED Disposition  ED Disposition     ED Disposition   Decision to Admit    Condition   --    Comment   Level of Care: Telemetry [5]   Admitting Physician: ALTA BURNETT [[id number]]               No follow-up provider specified.       Medication List      No changes were made to your prescriptions during this visit.          Khanh Taylor MD  08/03/22 1818      Electronically signed by Khanh Taylor MD at 08/03/22 1818     Ciara Solomon RN at 08/03/22 1728        Patient stated that she is trying to cut back on drinking and is now hallucinating. Patient is seeing crabs and the walls or moving in and out.  Patients last drink was at 10 am this morning. Patient drinks about 1/2-1 pint of bourbon a day.     Electronically signed by Ciara Solomon RN at 08/03/22 1730       CIWA (last day)     Date/Time CIWA-Ar Score    08/04/22 0928 19    08/04/22 0242 22    08/03/22 2101 18    08/03/22 17:30:35 18          Facility-Administered Medications as of 8/4/2022   Medication Dose Route Frequency Provider Last Rate Last Admin   • albuterol (PROVENTIL) nebulizer solution 0.083% 2.5 mg/3mL  2.5 mg Nebulization Q6H PRN Alsop, Savannah L, APRN       • aluminum-magnesium hydroxide-simethicone (MAALOX MAX) 400-400-40 MG/5ML suspension 15 mL  15 mL Oral Q6H PRN Alsop, Savannah L, APRN       • amLODIPine (NORVASC) tablet 5 mg  5 mg Oral Daily Alsop, Savannah L, APRN       • budesonide-formoterol (SYMBICORT) 80-4.5 MCG/ACT inhaler 2 puff  2 puff Inhalation BID - RT Alsop, Savannah L, APRN   2 puff at 08/04/22 0814    And   • ipratropium (ATROVENT) nebulizer solution 0.5 mg  0.5 mg Nebulization Q6H - RT Alsop, Savannah L, APRN   0.5 mg at 08/04/22 0814   • busPIRone (BUSPAR) tablet 15 mg  15 mg Oral BID Alsop, Savannah L, APRN       • butalbital-acetaminophen-caffeine (FIORICET, ESGIC) -40 MG per tablet 1 tablet  1 tablet Oral Q6H PRN Alsop, Savannah L, APRN   1 tablet at 08/04/22 0928   • calcium carbonate (TUMS) chewable tablet 500 mg (200 mg elemental)  2 tablet Oral BID PRN Alsop, Savannah L, APRN       • LORazepam (ATIVAN) tablet 1 mg  1 mg Oral Q2H PRN Alsop, Savannah L, APRN        Or   • diazePAM (VALIUM) injection 5 mg  5 mg Intravenous Q2H PRN Alsop, Savannah L, APRN        Or   • LORazepam (ATIVAN) tablet 2 mg  2 mg Oral Q1H PRN Savannah Garrison APRN        Or   • diazePAM (VALIUM) injection 10 mg  10 mg Intravenous Q1H PRN Savannah Garrison APRN   10 mg at 08/04/22 0247    Or   • diazePAM (VALIUM) injection 10 mg  10 mg Intravenous Q30 Min PRN Savannah Garrison APRN       • folic acid (FOLVITE) tablet 1,000 mcg  1,000 mcg Oral Daily Savannah Garrison,  APRN       • lactated ringers with KCl 40 mEq/L infusion  100 mL/hr Intravenous Continuous Alsop, Savannah L, APRN 100 mL/hr at 08/04/22 0330 100 mL/hr at 08/04/22 0330   • lisinopril (PRINIVIL,ZESTRIL) 20 mg, hydroCHLOROthiazide (HYDRODIURIL) 25 mg for ZESTORETIC 20-25   Oral Q24H Alsop, Savannah L, APRN       • Magnesium Sulfate 2 gram infusion - Mg less than or equal to 1.5 mg/dL  2 g Intravenous PRN Alsop, Savannah L, APRN        Or   • Magnesium Sulfate 1 gram infusion - Mg 1.6-1.9 mg/dL  1 g Intravenous PRN Alsop, Savannah L, APRN       • melatonin tablet 5 mg  5 mg Oral Nightly PRN Alsop, Savannah L, APRN       • nitroglycerin (NITROSTAT) SL tablet 0.4 mg  0.4 mg Sublingual Q5 Min PRN Alsop, Savannah L, APRN       • ondansetron (ZOFRAN) tablet 4 mg  4 mg Oral Q6H PRN Alsop, Savannah L, APRN        Or   • ondansetron (ZOFRAN) injection 4 mg  4 mg Intravenous Q6H PRN Alsop, Savannah L, APRN   4 mg at 08/04/22 0246   • Pharmacy to dose Trelegy   Does not apply Continuous PRN Alsop, Savannah L, APRN       • potassium chloride (K-DUR,KLOR-CON) CR tablet 40 mEq  40 mEq Oral PRN Alsop, Savannah L, APRN       • potassium chloride (KLOR-CON) packet 40 mEq  40 mEq Oral PRN Alsop, Savannah L, APRN       • potassium chloride 10 mEq in 100 mL IVPB  10 mEq Intravenous Q1H PRN Khanh Taylor  mL/hr at 08/04/22 0520 10 mEq at 08/04/22 0520   • sertraline (ZOLOFT) tablet 100 mg  100 mg Oral BID Alsop, Savannah L, APRN   100 mg at 08/04/22 0928   • sodium chloride 0.9 % flush 10 mL  10 mL Intravenous PRN Khanh Taylor MD       • sodium chloride 0.9 % flush 10 mL  10 mL Intravenous Q12H Alsop, Savannah L, APRN   10 mL at 08/03/22 2201   • sodium chloride 0.9 % flush 10 mL  10 mL Intravenous PRN Savannah Garrison APRN       • [COMPLETED] thiamine (B-1) 100 mg, folic acid 1 mg in sodium chloride 0.9 % 1,000 mL infusion  1,000 mL/hr Intravenous Once Khanh Taylor MD 1,000 mL/hr at 08/03/22 1839 1,000 mL/hr at 08/03/22 1839   • traZODone (DESYREL) tablet 100 mg   100 mg Oral Nightly Alsop, Savannah L, APRN   100 mg at 08/03/22 2201   • vitamin B-12 (CYANOCOBALAMIN) tablet 1,000 mcg  1,000 mcg Oral Daily Alsop, Savannah L, APRN           Lab Results (last 24 hours)     Procedure Component Value Units Date/Time    Potassium [[id number]] Collected: 08/04/22 0828    Specimen: Blood Updated: 08/04/22 0922    CBC & Differential [[id number]]  (Abnormal) Collected: 08/03/22 2347    Specimen: Blood Updated: 08/04/22 0231    Narrative:      The following orders were created for panel order CBC & Differential.  Procedure                               Abnormality         Status                     ---------                               -----------         ------                     CBC Auto Differential[[id number]]        Abnormal            Final result               Scan Slide[[id number]]                                       Final result                 Please view results for these tests on the individual orders.    CBC Auto Differential [[id number]]  (Abnormal) Collected: 08/03/22 2347    Specimen: Blood Updated: 08/04/22 0231     WBC 5.80 10*3/mm3      RBC 2.81 10*6/mm3      Hemoglobin 10.5 g/dL      Hematocrit 32.0 %      .9 fL      MCH 37.4 pg      MCHC 32.9 g/dL      RDW 19.2 %      RDW-SD 77.0 fl      MPV 8.2 fL      Platelets 176 10*3/mm3      Neutrophil % 56.0 %      Lymphocyte % 26.9 %      Monocyte % 15.2 %      Eosinophil % 1.5 %      Basophil % 0.4 %      Neutrophils, Absolute 3.30 10*3/mm3      Lymphocytes, Absolute 1.60 10*3/mm3      Monocytes, Absolute 0.90 10*3/mm3      Eosinophils, Absolute 0.10 10*3/mm3      Basophils, Absolute 0.00 10*3/mm3      nRBC 0.1 /100 WBC     Narrative:      Appended report. These results have been appended to a previously verified report.    Scan Slide [[id number]] Collected: 08/03/22 2347    Specimen: Blood Updated: 08/04/22 0231     Anisocytosis Mod/2+     Dacrocytes Slight/1+     Poikilocytes Slight/1+     WBC Morphology Normal      Platelet Estimate Adequate     Large Platelets Slight/1+    Comprehensive Metabolic Panel [[id number]]  (Abnormal) Collected: 08/03/22 2347    Specimen: Blood Updated: 08/04/22 0159     Glucose 82 mg/dL      BUN 2 mg/dL      Creatinine 0.54 mg/dL      Sodium 137 mmol/L      Potassium 2.6 mmol/L      Chloride 103 mmol/L      CO2 23.0 mmol/L      Calcium 7.7 mg/dL      Total Protein 6.7 g/dL      Albumin 2.70 g/dL      ALT (SGPT) 50 U/L      AST (SGOT) 187 U/L      Alkaline Phosphatase 198 U/L      Total Bilirubin 0.8 mg/dL      Globulin 4.0 gm/dL      A/G Ratio 0.7 g/dL      BUN/Creatinine Ratio 3.7     Anion Gap 11.0 mmol/L      eGFR 109.6 mL/min/1.73      Comment: National Kid[employer] and American Society of Nephrology (ASN) Task Force recommended calculation based on the Chronic Kidney Disease Epidemiology Collaboration (CKD-EPI) equation refit without adjustment for race.       Narrative:      GFR Normal >60  Chronic Kidney Disease <60  Kidney Failure <15      Troponin [[id number]]  (Normal) Collected: 08/03/22 2347    Specimen: Blood Updated: 08/04/22 0139     Troponin T <0.010 ng/mL     Narrative:      Troponin T Reference Range:  <= 0.03 ng/mL-   Negative for AMI  >0.03 ng/mL-     Abnormal for myocardial necrosis.  Clinicians would have to utilize clinical acumen, EKG, Troponin and serial changes to determine if it is an Acute Myocardial Infarction or myocardial injury due to an underlying chronic condition.       Results may be falsely decreased if patient taking Biotin.      Magnesium [[id number]]  (Normal) Collected: 08/03/22 2347    Specimen: Blood Updated: 08/04/22 0139     Magnesium 1.7 mg/dL     Protime-INR [[id number]]  (Abnormal) Collected: 08/03/22 2347    Specimen: Blood Updated: 08/04/22 0133     Protime 13.4 Seconds      INR 1.32    aPTT [[id number]]  (Abnormal) Collected: 08/03/22 2347    Specimen: Blood Updated: 08/04/22 0133     PTT 32.2 seconds     Vitamin B1, Whole Blood [[id number]]  Collected: 08/03/22 2347    Specimen: Blood Updated: 08/04/22 0058    Vitamin B12 [[id number]] Collected: 08/03/22 2347    Specimen: Blood Updated: 08/04/22 0056    Troponin [[id number]]  (Normal) Collected: 08/03/22 1705    Specimen: Blood Updated: 08/03/22 2021     Troponin T <0.010 ng/mL     Narrative:      Troponin T Reference Range:  <= 0.03 ng/mL-   Negative for AMI  >0.03 ng/mL-     Abnormal for myocardial necrosis.  Clinicians would have to utilize clinical acumen, EKG, Troponin and serial changes to determine if it is an Acute Myocardial Infarction or myocardial injury due to an underlying chronic condition.       Results may be falsely decreased if patient taking Biotin.      COVID PRE-OP / PRE-PROCEDURE SCREENING ORDER (NO ISOLATION) - Swab, Nasopharynx [[id number]]  (Normal) Collected: 08/03/22 1854    Specimen: Swab from Nasopharynx Updated: 08/03/22 1945    Narrative:      The following orders were created for panel order COVID PRE-OP / PRE-PROCEDURE SCREENING ORDER (NO ISOLATION) - Swab, Nasopharynx.  Procedure                               Abnormality         Status                     ---------                               -----------         ------                     COVID-19,CEPHEID/CARRIE,CO...[[id number]]  Normal              Final result                 Please view results for these tests on the individual orders.    COVID-19,CEPHEID/CARRIE,COR/FREDA/PAD/[facility]AS[facility] IN-HOUSE(OR EMERGENT/ADD-ON),NP SWAB IN TRANSPORT MEDIA 3-4 HR TAT, RT-PCR - Swab, Nasopharynx [[id number]]  (Normal) Collected: 08/03/22 1854    Specimen: Swab from Nasopharynx Updated: 08/03/22 1945     COVID19 Not Detected    Narrative:      Fact sheet for providers: https://www.fda.gov/media/628961/download     Fact sheet for patients: https://www.fda.gov/media/692400/download  Fact sheet for providers: https://www.fda.gov/media/444972/download     Fact sheet for patients: https://www.fda.gov/media/303168/download    Manual Differential  [[id number]]  (Abnormal) Collected: 08/03/22 1705    Specimen: Blood Updated: 08/03/22 1818     Neutrophil % 53.0 %      Lymphocyte % 30.0 %      Monocyte % 16.0 %      Eosinophil % 1.0 %      Neutrophils Absolute 3.82 10*3/mm3      Lymphocytes Absolute 2.16 10*3/mm3      Monocytes Absolute 1.15 10*3/mm3      Eosinophils Absolute 0.07 10*3/mm3      Anisocytosis Slight/1+     Macrocytes Mod/2+     Rouleaux Slight/1+     WBC Morphology Normal     Platelet Morphology Normal    CBC & Differential [[id number]]  (Abnormal) Collected: 08/03/22 1705    Specimen: Blood Updated: 08/03/22 1818    Narrative:      The following orders were created for panel order CBC & Differential.  Procedure                               Abnormality         Status                     ---------                               -----------         ------                     CBC Auto Differential[[id number]]        Abnormal            Final result               Scan Slide[[id number]]                                       Final result                 Please view results for these tests on the individual orders.    CBC Auto Differential [[id number]]  (Abnormal) Collected: 08/03/22 1705    Specimen: Blood Updated: 08/03/22 1818     WBC 7.20 10*3/mm3      RBC 3.23 10*6/mm3      Hemoglobin 12.3 g/dL      Hematocrit 36.8 %      .1 fL      MCH 38.2 pg      MCHC 33.5 g/dL      RDW 18.5 %      RDW-SD 73.9 fl      MPV 7.5 fL      Platelets 226 10*3/mm3     Scan Slide [[id number]] Collected: 08/03/22 1705    Specimen: Blood Updated: 08/03/22 1818     Scan Slide --     Comment: See Manual Differential Results       Magnesium [[id number]]  (Normal) Collected: 08/03/22 1705    Specimen: Blood Updated: 08/03/22 1810     Magnesium 1.8 mg/dL     Comprehensive Metabolic Panel [[id number]]  (Abnormal) Collected: 08/03/22 1705    Specimen: Blood Updated: 08/03/22 1753     Glucose 99 mg/dL      BUN 2 mg/dL      Creatinine 0.66 mg/dL      Sodium 133 mmol/L      Potassium  2.3 mmol/L      Chloride 96 mmol/L      CO2 21.0 mmol/L      Calcium 8.5 mg/dL      Total Protein 8.4 g/dL      Albumin 3.40 g/dL      ALT (SGPT) 63 U/L      AST (SGOT) 242 U/L      Alkaline Phosphatase 248 U/L      Total Bilirubin 0.8 mg/dL      Globulin 5.0 gm/dL      A/G Ratio 0.7 g/dL      BUN/Creatinine Ratio 3.0     Anion Gap 16.0 mmol/L      eGFR 104.4 mL/min/1.73      Comment: National Kidney Foundation and American Society of Nephrology (ASN) Task Force recommended calculation based on the Chronic Kidney Disease Epidemiology Collaboration (CKD-EPI) equation refit without adjustment for race.       Narrative:      GFR Normal >60  Chronic Kidney Disease <60  Kidney Failure <15      Lipase [[id number]]  (Normal) Collected: 08/03/22 1705    Specimen: Blood Updated: 08/03/22 1750     Lipase 47 U/L     Ethanol [[id number]] Collected: 08/03/22 1705    Specimen: Blood Updated: 08/03/22 1750     Ethanol % <0.010 %     Narrative:      Plasma Ethanol Clinical Symptoms:    ETOH (%)               Clinical Symptom  .01-.05              No apparent influence  .03-.12              Euphoria, Diminished judgment and attention   .09-.25              Impaired comprehension, Muscle incoordination  .18-.30              Confusion, Staggered gait, Slurred speech  .25-.40              Markedly decreased response to stimuli, unable to stand or                        walk, vomitting, sleep or stupor  .35-.50              Comatose, Anesthesia, Subnormal body temperature

## 2022-08-04 NOTE — CASE MANAGEMENT/SOCIAL WORK
Social Work Assessment   Vik     Patient Name: Lita Yu  MRN: [id number]  Today's Date: 8/4/2022    Admit Date: 8/3/2022     Substance Abuse     Row Name 08/04/22 1508       Substance Use    Substance Use Status current alcohol use    Substance Use Comment SW was consulted 2/2 alcohol abuse, drinking 0.5-1 pint of bourbon daily. SW saw in H&P that she had been in IP treatment before and that she believed insurance would not pay for IP treatment again. ANNE called pt’s insurance and spoke with the [facility] CM for [place] (Gabriel DEL CID, [phone]), who stated that anyone who goes IP through [facility] insurance must first be assessed by her and she can coordinate IP rehab at any facility pt would like to go to. ANNE also spoke with [facility]  Doreen, who stated pt has a $350 co-pay per admission, with unlimited days in alcohol rehab, and a $6k OOP max - pt has met $592.64 of this as of today. SW met with pt in room 345 to discuss this with her. Pt stated the reason she doesn’t want to go to IP alcohol rehab is because the last time she went, her  attempted suicide. Pt described enabling behavior of her , and in fact, told this SW that he is texting her messages intended to cause guilt for her being admitted. When SW inquired of pt’s understanding of the consequences of her alcohol use, pt stated, “It’s killing me.” SW pointed out the irony of that statement against her ’s insistence on her being home vs. getting treatment. Pt stated she is in a 12-step program called [facility] that she tries to attend daily, and has a sponsor named [employer]. Pt declined a referral for IP alcohol tx at this time, and stated she plans to return home after getting through w/d and, “I’m gonna do 90 days (meetings?) in 90 days.” She reports having no alcohol in the home, and her  does not drink. ANNE provided pt with contact info for local CD resources, as well as the name  and contact info for her [facility] CM. SW encouraged pt to reach out if she changes her mind and would like a referral to IP rehab from the hospital. Pt thanked this writer and denies further needs at this time.              Met with patient in room wearing PPE: mask, face shield/goggles, gloves, gown.      Maintained distance greater than six feet and spent less than 15 minutes in the room.      TANYA Manzo, [facility]  Medical Social Worker  Ph [phone]  Fax [phone]  [email or web address]

## 2022-08-05 PROBLEM — D63.8 ANEMIA OF CHRONIC DISEASE: Status: ACTIVE | Noted: 2019-04-26

## 2022-08-05 LAB
ALBUMIN SERPL-MCNC: 2.6 G/DL (ref 3.5–5.2)
ALBUMIN/GLOB SERPL: 0.7 G/DL
ALP SERPL-CCNC: 181 U/L (ref 39–117)
ALT SERPL W P-5'-P-CCNC: 41 U/L (ref 1–33)
ANION GAP SERPL CALCULATED.3IONS-SCNC: 7 MMOL/L (ref 5–15)
AST SERPL-CCNC: 151 U/L (ref 1–32)
BASOPHILS # BLD AUTO: 0 10*3/MM3 (ref 0–0.2)
BASOPHILS NFR BLD AUTO: 0.5 % (ref 0–1.5)
BILIRUB SERPL-MCNC: 0.7 MG/DL (ref 0–1.2)
BUN SERPL-MCNC: 2 MG/DL (ref 6–20)
BUN/CREAT SERPL: 3.6 (ref 7–25)
CALCIUM SPEC-SCNC: 7.9 MG/DL (ref 8.6–10.5)
CHLORIDE SERPL-SCNC: 110 MMOL/L (ref 98–107)
CO2 SERPL-SCNC: 22 MMOL/L (ref 22–29)
CREAT SERPL-MCNC: 0.56 MG/DL (ref 0.57–1)
DEPRECATED RDW RBC AUTO: 76.1 FL (ref 37–54)
EGFRCR SERPLBLD CKD-EPI 2021: 108.6 ML/MIN/1.73
EOSINOPHIL # BLD AUTO: 0.1 10*3/MM3 (ref 0–0.4)
EOSINOPHIL NFR BLD AUTO: 2.1 % (ref 0.3–6.2)
ERYTHROCYTE [DISTWIDTH] IN BLOOD BY AUTOMATED COUNT: 19 % (ref 12.3–15.4)
GLOBULIN UR ELPH-MCNC: 3.5 GM/DL
GLUCOSE SERPL-MCNC: 126 MG/DL (ref 65–99)
HCT VFR BLD AUTO: 28.7 % (ref 34–46.6)
HGB BLD-MCNC: 9.4 G/DL (ref 12–15.9)
LYMPHOCYTES # BLD AUTO: 1.3 10*3/MM3 (ref 0.7–3.1)
LYMPHOCYTES NFR BLD AUTO: 29.9 % (ref 19.6–45.3)
MAGNESIUM SERPL-MCNC: 1.6 MG/DL (ref 1.6–2.6)
MCH RBC QN AUTO: 37.9 PG (ref 26.6–33)
MCHC RBC AUTO-ENTMCNC: 32.8 G/DL (ref 31.5–35.7)
MCV RBC AUTO: 115.4 FL (ref 79–97)
MONOCYTES # BLD AUTO: 0.5 10*3/MM3 (ref 0.1–0.9)
MONOCYTES NFR BLD AUTO: 12.4 % (ref 5–12)
NEUTROPHILS NFR BLD AUTO: 2.4 10*3/MM3 (ref 1.7–7)
NEUTROPHILS NFR BLD AUTO: 55.1 % (ref 42.7–76)
NRBC BLD AUTO-RTO: 0.2 /100 WBC (ref 0–0.2)
PLATELET # BLD AUTO: 153 10*3/MM3 (ref 140–450)
PMV BLD AUTO: 7.4 FL (ref 6–12)
POTASSIUM SERPL-SCNC: 3.8 MMOL/L (ref 3.5–5.2)
PROT SERPL-MCNC: 6.1 G/DL (ref 6–8.5)
RBC # BLD AUTO: 2.48 10*6/MM3 (ref 3.77–5.28)
SODIUM SERPL-SCNC: 139 MMOL/L (ref 136–145)
WBC NRBC COR # BLD: 4.4 10*3/MM3 (ref 3.4–10.8)

## 2022-08-05 PROCEDURE — 25010000002 MAGNESIUM SULFATE IN D5W 1G/100ML (PREMIX) 1-5 GM/100ML-% SOLUTION: Performed by: NURSE PRACTITIONER

## 2022-08-05 PROCEDURE — 94799 UNLISTED PULMONARY SVC/PX: CPT

## 2022-08-05 PROCEDURE — 80053 COMPREHEN METABOLIC PANEL: CPT | Performed by: NURSE PRACTITIONER

## 2022-08-05 PROCEDURE — 99232 SBSQ HOSP IP/OBS MODERATE 35: CPT | Performed by: HOSPITALIST

## 2022-08-05 PROCEDURE — 94761 N-INVAS EAR/PLS OXIMETRY MLT: CPT

## 2022-08-05 PROCEDURE — 97162 PT EVAL MOD COMPLEX 30 MIN: CPT

## 2022-08-05 PROCEDURE — 97166 OT EVAL MOD COMPLEX 45 MIN: CPT

## 2022-08-05 PROCEDURE — 97535 SELF CARE MNGMENT TRAINING: CPT

## 2022-08-05 PROCEDURE — 83735 ASSAY OF MAGNESIUM: CPT | Performed by: NURSE PRACTITIONER

## 2022-08-05 PROCEDURE — 85025 COMPLETE CBC W/AUTO DIFF WBC: CPT | Performed by: NURSE PRACTITIONER

## 2022-08-05 PROCEDURE — 36415 COLL VENOUS BLD VENIPUNCTURE: CPT | Performed by: NURSE PRACTITIONER

## 2022-08-05 RX ORDER — CHOLESTYRAMINE LIGHT 4 G/5.7G
2 POWDER, FOR SUSPENSION ORAL EVERY 12 HOURS SCHEDULED
Status: DISCONTINUED | OUTPATIENT
Start: 2022-08-05 | End: 2022-08-07

## 2022-08-05 RX ADMIN — IPRATROPIUM BROMIDE 0.5 MG: 0.5 SOLUTION RESPIRATORY (INHALATION) at 20:41

## 2022-08-05 RX ADMIN — BUSPIRONE HYDROCHLORIDE 15 MG: 15 TABLET ORAL at 21:20

## 2022-08-05 RX ADMIN — Medication 10 ML: at 08:01

## 2022-08-05 RX ADMIN — CHOLESTYRAMINE 8 G: 4 POWDER, FOR SUSPENSION ORAL at 09:44

## 2022-08-05 RX ADMIN — CYANOCOBALAMIN TAB 1000 MCG 1000 MCG: 1000 TAB at 07:54

## 2022-08-05 RX ADMIN — TRAZODONE HYDROCHLORIDE 50 MG: 50 TABLET ORAL at 21:20

## 2022-08-05 RX ADMIN — CHOLESTYRAMINE 8 G: 4 POWDER, FOR SUSPENSION ORAL at 21:20

## 2022-08-05 RX ADMIN — BUSPIRONE HYDROCHLORIDE 15 MG: 15 TABLET ORAL at 07:54

## 2022-08-05 RX ADMIN — BUDESONIDE AND FORMOTEROL FUMARATE DIHYDRATE 2 PUFF: 80; 4.5 AEROSOL RESPIRATORY (INHALATION) at 20:45

## 2022-08-05 RX ADMIN — MAGNESIUM SULFATE 1 G: 1 INJECTION INTRAVENOUS at 07:56

## 2022-08-05 RX ADMIN — LORAZEPAM 2 MG: 1 TABLET ORAL at 07:54

## 2022-08-05 RX ADMIN — FOLIC ACID 1000 MCG: 1 TABLET ORAL at 07:54

## 2022-08-05 RX ADMIN — SERTRALINE 50 MG: 50 TABLET, FILM COATED ORAL at 21:19

## 2022-08-05 RX ADMIN — BUTALBITAL, ACETAMINOPHEN AND CAFFEINE 1 TABLET: 50; 325; 40 TABLET ORAL at 07:54

## 2022-08-05 NOTE — THERAPY EVALUATION
Patient Name: Lita Yu  : 1965    MRN: [id number]                              Today's Date: 2022       Admit Date: 8/3/2022    Visit Dx:     ICD-10-CM ICD-9-CM   1. Alcohol withdrawal syndrome, with delirium (HCC)  F10.231 291.0   2. Hypokalemia  E87.6 276.8     Patient Active Problem List   Diagnosis   • Postmenopausal state   • Post traumatic stress disorder   • Pain in joints   • Osteoporosis   • Macrocytic anemia   • Lumbosacral radiculopathy   • Leg pain, left   • Peripheral vascular disease (HCC)   • Essential hypertension   • Hyperlipidemia   • Degenerative joint disease of left hip   • Seizure disorder (HCC)   • Smoker   • Status post hip replacement   • Tobacco dependence syndrome   • Vitamin B12 deficiency   • Anemia of chronic disease   • Chronic obstructive pulmonary disease (HCC)   • Alcohol withdrawal syndrome, with delirium (HCC)   • Hypokalemia     Past Medical History:   Diagnosis Date   • Cirrhosis (HCC)    • COPD (chronic obstructive pulmonary disease) (HCC)    • Depression    • GERD (gastroesophageal reflux disease)    • Hyperlipidemia    • Hypertension    • PTSD (post-traumatic stress disorder)      Past Surgical History:   Procedure Laterality Date   •  SECTION N/A    • COLONOSCOPY N/A 2021    Procedure: COLONOSCOPY with polypectomy x2 and random biopsy;  Surgeon: Osman Clay MD;  Location: [facility] ENDOSCOPY;  Service: Gastroenterology;  Laterality: N/A;  colon polyps   • DENTAL PROCEDURE     • ENDOSCOPY N/A 2021    Procedure: ESOPHAGOGASTRODUODENOSCOPY;  Surgeon: Osman Clay MD;  Location: [facility] ENDOSCOPY;  Service: Gastroenterology;  Laterality: N/A;  esophagitis   • JOINT REPLACEMENT     • REPLACEMENT TOTAL HIP LATERAL POSITION Left    • TONSILLECTOMY     • VAGINAL BIRTH AFTER  SECTION        General Information     Row Name 22 0923          OT Time and Intention    Document Type evaluation  -MS     Mode of Treatment  occupational therapy  -MS     Row Name 08/05/22 0923          General Information    Patient Profile Reviewed yes  -MS     Prior Level of Function independent:;ADL's;driving;max assist:  Pt reports being independent in ADLs, however she reports that she does not drive  -MS     Existing Precautions/Restrictions seizures  -MS     Barriers to Rehab ineffective coping  withdrawal from ETOH  -MS     Row Name 08/05/22 0923          Occupational Profile    Reason for Services/Referral (Occupational Profile) Pt is a 55 y/o female who presented to [facility] 8/3/22 with c/o alcohol withdrawal including nausea, vomiting, diarrhea, general weakness, fatigue, anxiety, and hallucinations. PMH includes alcoholism, hypertension, hyperlipidemia, PTSD, seizure and tobacco user. CT (-). Pt reports living with spouse in multilevel home with 1 flight entry stairs, 2 flights stairs in home.  -MS     Environmental Supports and Barriers (Occupational Profile) supportive family  -MS     Patient Goals (Occupational Profile) return home with spouse  -MS     Row Name 08/05/22 0923          Living Environment    People in Home spouse  -MS     Row Name 08/05/22 0923          Home Main Entrance    Number of Stairs, Main Entrance nine  -MS     Row Name 08/05/22 0923          Stairs Within Home, Primary    Number of Stairs, Within Home, Primary other (see comments)  two flights of stairs, one flight with railing  -MS     Row Name 08/05/22 0923          Cognition    Orientation Status (Cognition) oriented x 4  -MS     Row Name 08/05/22 0923          Safety Issues, Functional Mobility    Safety Issues Affecting Function (Mobility) problem-solving  -MS     Impairments Affecting Function (Mobility) endurance/activity tolerance;strength;pain  -MS           User Key  (r) = Recorded By, (t) = Taken By, (c) = Cosigned By    Initials Name Provider Type    Lary Hunter OT Occupational Therapist                 Mobility/ADL's     Row Name 08/05/22 09           Bed Mobility    Bed Mobility bed mobility (all) activities  -MS     All Activities, [place] (Bed Mobility) moderate assist (50% patient effort)  -MS     Assistive Device (Bed Mobility) bed rails  -MS     Row Name 08/05/22 0947          Activities of Daily Living    BADL Assessment/Intervention lower body dressing;grooming  -MS     Row Name 08/05/22 0947          Lower Body Dressing Assessment/Training    [place] Level (Lower Body Dressing) doff;don;socks;maximum assist (25% patient effort)  -MS     Position (Lower Body Dressing) edge of bed sitting  -MS     Row Name 08/05/22 0947          Grooming Assessment/Training    [place] Level (Grooming) hair care, combing/brushing;oral care regimen;wash face, hands;set up  -MS     Position (Grooming) sitting up in bed  -MS           User Key  (r) = Recorded By, (t) = Taken By, (c) = Cosigned By    Initials Name Provider Type    Lary Hunter OT Occupational Therapist               Obj/Interventions     Row Name 08/05/22 0948          Range of Motion Comprehensive    General Range of Motion bilateral upper extremity ROM WNL  -MS     Row Name 08/05/22 0948          Strength Comprehensive (MMT)    General Manual Muscle Testing (MMT) Assessment upper extremity strength deficits identified  -MS     Comment, General Manual Muscle Testing (MMT) Assessment BUE grossly 2+/5  -MS     Row Name 08/05/22 0948          Balance    Static Sitting Balance modified independence  -MS     Dynamic Sitting Balance moderate assist;1-person assist  -MS           User Key  (r) = Recorded By, (t) = Taken By, (c) = Cosigned By    Initials Name Provider Type    Lary Hunter OT Occupational Therapist               Goals/Plan     Row Name 08/05/22 1041          Bathing Goal 1 (OT)    Activity/Device (Bathing Goal 1, OT) bathing skills, all  -MS     [place] Level/Cues Needed (Bathing Goal 1, OT) independent  -MS     Time Frame (Bathing Goal 1, OT) 2 weeks  -MS      Progress/Outcomes (Bathing Goal 1, OT) new goal  -MS     Row Name 08/05/22 1041          Dressing Goal 1 (OT)    Activity/Device (Dressing Goal 1, OT) dressing skills, all  -MS     [place]/Cues Needed (Dressing Goal 1, OT) independent  -MS     Time Frame (Dressing Goal 1, OT) 2 weeks  -MS     Progress/Outcome (Dressing Goal 1, OT) new goal  -MS     Row Name 08/05/22 1041          Toileting Goal 1 (OT)    Activity/Device (Toileting Goal 1, OT) toileting skills, all  -MS     [place] Level/Cues Needed (Toileting Goal 1, OT) independent  -MS     Time Frame (Toileting Goal 1, OT) 2 weeks  -MS     Progress/Outcome (Toileting Goal 1, OT) new goal  -MS     Row Name 08/05/22 1041          Therapy Assessment/Plan (OT)    Planned Therapy Interventions (OT) activity tolerance training;BADL retraining;functional balance retraining;IADL retraining;strengthening exercise;transfer/mobility retraining  -MS           User Key  (r) = Recorded By, (t) = Taken By, (c) = Cosigned By    Initials Name Provider Type    MS Landrum Lary, OT Occupational Therapist               Clinical Impression     Row Name 08/05/22 0949          Pain Assessment    Pretreatment Pain Rating 6/10  -MS     Posttreatment Pain Rating 6/10  -MS     Pain Location - head;buttock  -MS     Pain Intervention(s) Nursing Notified;Emotional support;Repositioned  -MS     Row Name 08/05/22 0949          Plan of Care Review    Plan of Care Reviewed With patient  -MS     Outcome Evaluation Pt is a 57 y/o female who presented to [facility] 8/3/22 with c/o alcohol withdrawal including nausea, vomiting, diarrhea, general weakness, fatigue, anxiety, and hallucinations. PMH includes alcoholism, hypertension, hyperlipidemia, PTSD, seizure and tobacco user. CT (-). During OT eval, pt was sitting up in bed upon arrival. Pt reports living with spouse in multilevel home with 1 flight entry stairs, 2 flights stairs in home. Pt reports completing dressing, toileting, and bathing  "independently. Pt does report sitting on floor in bathtub ocassionally d/t increased fatigue. Pt reports having 2 grab bars in shower. During OT eval, pt required mod A for bed mobility, demonstrating increased faitgue. Pt able to tolerate sitting EOB without LOB. Pt attempted to don/dof socks, pt required mod A support for dynamic sitting balance. Pt completed grooming tasks sitting up in bed. Pt required extended time d/t slower movements. Pt elected not to complete functional mobility this date d/t increased pain and \"sore bottom\" d/t increased diarrhea. Pt likely to require mod A for dressing and bathing. Pt is at high risk for falls d/t grossly decrease in strength. Recommend IP rehab at [place] as she needs to be at higher level of funciton than at admit to return home. Pt's participation and independence is limited and needs to be able to perform at level prior to hospital admit. She is not safe to return home d/t fall risk, weakness, decrease in self care tasks, and ETOH abuse. OT to follow.  -MS     Row Name 08/05/22 0949          Therapy Assessment/Plan (OT)    Patient/Family Therapy Goal Statement (OT) return home with family  -MS     Rehab Potential (OT) good, to achieve stated therapy goals  -MS     Criteria for Skilled Therapeutic Interventions Met (OT) yes;skilled treatment is necessary  -MS     Therapy Frequency (OT) 5 times/wk  -MS     Predicted Duration of Therapy Intervention (OT) until d/c  -MS     Row Name 08/05/22 0949          Therapy Plan Review/Discharge Plan (OT)    Equipment Needs Upon Discharge (OT) shower chair  -MS     Anticipated Discharge Disposition (OT) inpatient rehabilitation facility  -MS     Row Name 08/05/22 0949          Vital Signs    O2 Delivery Pre Treatment room air  -MS     O2 Delivery Intra Treatment room air  -MS     O2 Delivery Post Treatment room air  -MS     Pre Patient Position Supine  -MS     Intra Patient Position Sitting  -MS     Post Patient Position Supine  -MS     " Row Name 08/05/22 0949          Positioning and Restraints    Pre-Treatment Position in bed  -MS     Post Treatment Position bed  -MS     In Bed notified nsg;supine;call light within reach;encouraged to call for assist;exit alarm on  -MS           User Key  (r) = Recorded By, (t) = Taken By, (c) = Cosigned By    Initials Name Provider Type    Lary Hunter, OT Occupational Therapist               Outcome Measures     Row Name 08/05/22 1042          How much help from another is currently needed...    Putting on and taking off regular lower body clothing? 2  -MS     Bathing (including washing, rinsing, and drying) 2  -MS     Toileting (which includes using toilet bed pan or urinal) 2  -MS     Putting on and taking off regular upper body clothing 2  -MS     Taking care of personal grooming (such as brushing teeth) 3  -MS     Eating meals 3  -MS     AM-PAC 6 Clicks Score (OT) 14  -MS     Row Name 08/05/22 0949          How much help from another person do you currently need...    Turning from your back to your side while in flat bed without using bedrails? 4  -JR     Moving from lying on back to sitting on the side of a flat bed without bedrails? 3  -JR     Moving to and from a bed to a chair (including a wheelchair)? 3  -JR     Standing up from a chair using your arms (e.g., wheelchair, bedside chair)? 3  -JR     Climbing 3-5 steps with a railing? 2  -JR     To walk in hospital room? 3  -JR     AM-PAC 6 Clicks Score (PT) 18  -JR     Highest level of mobility 6 --> Walked 10 steps or more  -JR     Row Name 08/05/22 1042 08/05/22 0949       Functional Assessment    Outcome Measure Options AM-PAC 6 Clicks Daily Activity (OT)  -MS AM-PAC 6 Clicks Basic Mobility (PT)  -JR          User Key  (r) = Recorded By, (t) = Taken By, (c) = Cosigned By    Initials Name Provider Type    Paz Blas, PT Physical Therapist    Lary Hunter, OT Occupational Therapist                Occupational Therapy Education          "        Title: PT OT SLP Therapies (In Progress)     Topic: Occupational Therapy (Done)     Point: ADL training (Done)     Description:   Instruct learner(s) on proper safety adaptation and remediation techniques during self care or transfers.   Instruct in proper use of assistive devices.              Learning Progress Summary           Patient Acceptance, E,TB, VU by MS at 8/5/2022 1043                               User Key     Initials Effective Dates Name Provider Type Discipline    MS 07/13/22 -  Lary Landrum OT Occupational Therapist OT              OT Recommendation and Plan  Planned Therapy Interventions (OT): activity tolerance training, BADL retraining, functional balance retraining, IADL retraining, strengthening exercise, transfer/mobility retraining  Therapy Frequency (OT): 5 times/wk  Plan of Care Review  Plan of Care Reviewed With: patient  Outcome Evaluation: Pt is a 55 y/o female who presented to [facility] 8/3/22 with c/o alcohol withdrawal including nausea, vomiting, diarrhea, general weakness, fatigue, anxiety, and hallucinations. PMH includes alcoholism, hypertension, hyperlipidemia, PTSD, seizure and tobacco user. CT (-). During OT eval, pt was sitting up in bed upon arrival. Pt reports living with spouse in multilevel home with 1 flight entry stairs, 2 flights stairs in home. Pt reports completing dressing, toileting, and bathing independently. Pt does report sitting on floor in bathtub ocassionally d/t increased fatigue. Pt reports having 2 grab bars in shower. During OT eval, pt required mod A for bed mobility, demonstrating increased faitgue. Pt able to tolerate sitting EOB without LOB. Pt attempted to don/dof socks, pt required mod A support for dynamic sitting balance. Pt completed grooming tasks sitting up in bed. Pt required extended time d/t slower movements. Pt elected not to complete functional mobility this date d/t increased pain and \"sore bottom\" d/t increased diarrhea. Pt likely to " require mod A for dressing and bathing. Pt is at high risk for falls d/t grossly decrease in strength. Recommend IP rehab at [place] as she needs to be at higher level of funciton than at admit to return home. Pt's participation and independence is limited and needs to be able to perform at level prior to hospital admit. She is not safe to return home d/t fall risk, weakness, decrease in self care tasks, and ETOH abuse. OT to follow.     Time Calculation:    Time Calculation- OT     Row Name 08/05/22 1043             Time Calculation- OT    OT Start Time 0849  -MS      OT Stop Time 0917  -MS      OT Time Calculation (min) 28 min  -MS      Total Timed Code Minutes- OT 14 minute(s)  -MS      OT Received On 08/05/22  -MS      OT - Next Appointment 08/08/22  -MS      OT Goal Re-Cert Due Date 08/19/22  -MS            User Key  (r) = Recorded By, (t) = Taken By, (c) = Cosigned By    Initials Name Provider Type    MS Lary Landrum OT Occupational Therapist              Therapy Charges for Today     Code Description Service Date Service Provider Modifiers Qty    18224753297 HC OT SELF CARE/MGMT/TRAIN EA 15 MIN 8/5/2022 Lary Landrum OT GO 1    53430731109 HC OT EVAL MOD COMPLEXITY 3 8/5/2022 Lary Landrum OT GO 1               Lary Landrum OT  8/5/2022

## 2022-08-05 NOTE — PLAN OF CARE
"Goal Outcome Evaluation:  Plan of Care Reviewed With: patient           Outcome Evaluation: Pt is a 57 y/o female who presented to [facility] 8/3/22 with c/o alcohol withdrawal including nausea, vomiting, diarrhea, general weakness, fatigue, anxiety, and hallucinations. PMH includes alcoholism, hypertension, hyperlipidemia, PTSD, seizure and tobacco user. CT (-). During OT eval, pt was sitting up in bed upon arrival. Pt reports living with spouse in multilevel home with 1 flight entry stairs, 2 flights stairs in home. Pt reports completing dressing, toileting, and bathing independently. Pt does report sitting on floor in bathtub ocassionally d/t increased fatigue. Pt reports having 2 grab bars in shower. During OT eval, pt required mod A for bed mobility, demonstrating increased faitgue. Pt able to tolerate sitting EOB without LOB. Pt attempted to don/dof socks, pt required mod A support for dynamic sitting balance. Pt completed grooming tasks sitting up in bed. Pt required extended time d/t slower movements. Pt elected not to complete functional mobility this date d/t increased pain and \"sore bottom\" d/t increased diarrhea. Pt likely to require mod A for dressing and bathing. Pt is at high risk for falls d/t grossly decrease in strength. Recommend IP rehab at [place] as she needs to be at higher level of funciton than at admit to return home. Pt's participation and independence is limited and needs to be able to perform at level prior to hospital admit. She is not safe to return home d/t fall risk, weakness, decrease in self care tasks, and ETOH abuse. OT to follow.  "

## 2022-08-05 NOTE — PROGRESS NOTES
[facility] Medicine Services Daily Progress Note    Patient Name: Lita Yu  : 1965  MRN: [id number]  Primary Care Physician:  Norma Saul APRN  Date of admission: 8/3/2022      Subjective      Chief Complaint: Hallucinations due to alcohol withdrawal    Patient Reports   2022: The patient reports that she continues to have hallucinations but they are not getting worse.  She reports some mild tremor which is getting better.  She reports that she had been having nausea vomiting and diarrhea at home and had been unable to eat anything for several days.  She denies any hematemesis, coffee-ground emesis, melena or bright red blood per rectum.  She no longer has the nausea and vomiting but she still is having multiple episodes of diarrhea.  2022: Patient reports ongoing hallucinations but denies other complaints.  The nursing staff reports that the patient continues to have multiple episodes of diarrhea.      ROS   All other systems were reviewed and were negative.      Objective      Vitals:   Temp:  [97.6 °F (36.4 °C)-97.8 °F (36.6 °C)] 97.7 °F (36.5 °C)  Heart Rate:  [75-88] 81  Resp:  [16-18] 16  BP: ()/(61-84) 107/69    Physical Exam   Vital signs and nurses notes reviewed.  Well-nourished female sitting up in bed awake and alert in no acute distress; normocephalic atraumatic, mucous membranes moist; sclera anicteric; lungs clear to auscultation bilaterally; CV regular rate and rhythm; abdomen soft and nontender nondistended; extremities with no edema or calf tenderness; palpable pedal pulses bilaterally; no asterixis.       Result Review    Result Review:  I have personally reviewed the results from the time of this admission to 2022 18:15 EDT and agree with these findings:  [x]  Laboratory  [x]  Microbiology  [x]  Radiology  [x]  EKG/Telemetry   [x]  Cardiology/Vascular   []  Pathology  []  Old records  []  Other:  Most notable findings discussed  in the assessment and plan.          Assessment & Plan      Brief Patient Summary:  Lita Yu is a 56 y.o. female with past medical history of alcoholism, hypertension, hyperlipidemia, PTSD, seizure, tobacco user who presented to [facility] on 8/3/2022 complaining of alcohol withdrawal with hallucinations, tremors, nausea, vomiting, and diarrhea.  She has been generally weak and fatigued, and complains of anxiety. She states she has done inpatient rehab about 1 year ago but her insurance will not pay for it again.  She called her primary doctor who recommended she come to the emergency room.  Patient stated that she is going to , and had quit drinking for a year and started drinking again recently.    Current medications include:  budesonide-formoterol, 2 puff, Inhalation, BID - RT   And  ipratropium, 0.5 mg, Nebulization, Q6H - RT  busPIRone, 15 mg, Oral, BID  folic acid, 1,000 mcg, Oral, Daily  lisinopril-hydroCHLOROthiazide (ZESTORETIC) 20-25 mg combo dose, , Oral, Q24H  sertraline, 50 mg, Oral, BID  sodium chloride, 10 mL, Intravenous, Q12H  traZODone, 50 mg, Oral, Nightly  vitamin B-12, 1,000 mcg, Oral, Daily       lactated ringers with KCl 40 mEq/L, 100 mL/hr, Last Rate: 100 mL/hr (08/04/22 9124)         Active Hospital Problems:  Active Hospital Problems    Diagnosis    • **Alcohol withdrawal syndrome, with delirium (HCC)    • Hypokalemia    • Chronic obstructive pulmonary disease (HCC)    • Hypertension    • Osteoporosis    • Smoker    • Seizure disorder (HCC)    • Post traumatic stress disorder    • Hyperlipidemia      Plan:   Alcohol abuse with current delirium tremens and nausea, vomiting and diarrhea due to alcohol withdrawal  Elevated LFTs due to alcohol abuse  -CT head showed no acute intracranial abnormalities  -Alcohol withdrawal protocol  -Seizure precautions  -Continue thiamine and folic acid    Diarrhea likely secondary to alcohol withdrawal  -Stool studies including GI  panel and C. difficile ordered  -Cholestyramine     Hypokalemia  -Potassium 2.3  -Potassium chloride given in the ED  -Electrolyte replacement protocol ordered  -EKG showed sinus rhythm with prolonged QT interval     Hyponatremia, mild and not clinically significant  -Sodium 133  -Monitor    Anemia of chronic disease  -Hemoglobin 12.3 on admission compared to 11.0 on 7/18/2022 but dropped to 10.5 after IV fluid hydration     Essential hypertension  Hyperlipidemia  -Continue lisinopril and hydrochlorothiazide  -Avoid statins due to elevated liver tests     Seizure disorder  -Seizure precautions     Chronic obstructive pulmonary disease   -Not in exacerbation  -Continue Symbicort and ipratropium (avoid albuterol due to side effect of tachycardia)     Post traumatic stress disorder  -Continue BuSpar  -Continue sertraline and trazodone at a reduced dose due to prolonged prolonged QT interval  -Stable     Osteoporosis     Smoker  -Encourage cessation    DVT prophylaxis:  Mechanical DVT prophylaxis orders are present.    CODE STATUS:    Code Status (Patient has no pulse and is not breathing): CPR (Attempt to Resuscitate)  Medical Interventions (Patient has pulse or is breathing): Full Support      Disposition:  I expect patient to be discharged once she has no further symptoms of alcohol withdrawal.    This patient has been examined wearing appropriate Personal Protective Equipment and discussed with hospital infection control department. 08/04/22      Electronically signed by Zoila Jama MD, 08/04/22, 18:15 EDT.  [facility] Hospitalist Team

## 2022-08-05 NOTE — CASE MANAGEMENT/SOCIAL WORK
Continued Stay Note   Vik     Patient Name: Lita Yu  MRN: [id number]  Today's Date: 8/5/2022    Admit Date: 8/3/2022     Discharge Plan     Row Name 08/05/22 1653       Plan    Plan D/C plan: Home vs rehab, pending pt decision. Will need precert. PASRR per facility.    Patient/Family in Agreement with Plan yes    Provided Post Acute Provider List? Yes    Post Acute Provider List Inpatient Rehab    Delivered To Patient    Method of Delivery In person    Plan Comments CM met with patient at bedside to discuss recommendation of PT for rehab. Pt does not know what facility she wants, but is agreeable to take list of options. Pt not willing to make selection at this time, wants to discuss with her . CM to follow for rehab choices. Pt will require precert.                   Expected Discharge Date and Time     Expected Discharge Date Expected Discharge Time    Aug 8, 2022         Met with patient in room wearing PPE: mask, face shield/goggles, gloves, gown.      Maintained distance greater than six feet and spent less than 15 minutes in the room.          CHRISTINE TEMPLETON RN

## 2022-08-05 NOTE — PLAN OF CARE
Goal Outcome Evaluation:              Outcome Evaluation: Patient pleasant and cooperative. PRN Ativan and Floricet given and effective. Held BP med this AM due to low BP. MD aware of BP being low. Patient states she is very tired. Still needs stool sample; not able to obtain during this shift.

## 2022-08-05 NOTE — PLAN OF CARE
"Goal Outcome Evaluation:           Progress: improving  Outcome Evaluation: Patient performs bed mobility with min a to decrease shearing on \"sore bottom\".  She has been having frequent diarrhea that keeps sacral area and buttocks sore.  Fit with rolling walker 2' to increased fall risk.  Balance improves with use of AD.  Amb to and from bathroom with CG and Min a for turning,.  She does not fully back up to toilet/chair/bed before sitting and this also increases fall risk.  Recomend IP rehab at [facility] as she needs to be at a higher level of function than at admit to return home.  She needs to be able to use steps and perform ADLS that she was not doing prior to hospital admit.  She is not safe to return home 2' to fall risk, weakness, poor intitative, ETOH abuse and other issues.  "

## 2022-08-05 NOTE — THERAPY EVALUATION
Patient Name: Lita Yu  : 1965    MRN: [id number]                              Today's Date: 2022       Admit Date: 8/3/2022    Visit Dx:     ICD-10-CM ICD-9-CM   1. Alcohol withdrawal syndrome, with delirium (HCC)  F10.231 291.0   2. Hypokalemia  E87.6 276.8     Patient Active Problem List   Diagnosis   • Postmenopausal state   • Post traumatic stress disorder   • Pain in joints   • Osteoporosis   • Macrocytic anemia   • Lumbosacral radiculopathy   • Leg pain, left   • Peripheral vascular disease (HCC)   • Essential hypertension   • Hyperlipidemia   • Degenerative joint disease of left hip   • Seizure disorder (HCC)   • Smoker   • Status post hip replacement   • Tobacco dependence syndrome   • Vitamin B12 deficiency   • Anemia of chronic disease   • Chronic obstructive pulmonary disease (HCC)   • Alcohol withdrawal syndrome, with delirium (HCC)   • Hypokalemia     Past Medical History:   Diagnosis Date   • Cirrhosis (HCC)    • COPD (chronic obstructive pulmonary disease) (HCC)    • Depression    • GERD (gastroesophageal reflux disease)    • Hyperlipidemia    • Hypertension    • PTSD (post-traumatic stress disorder)      Past Surgical History:   Procedure Laterality Date   •  SECTION N/A    • COLONOSCOPY N/A 2021    Procedure: COLONOSCOPY with polypectomy x2 and random biopsy;  Surgeon: Osman Clay MD;  Location: [facility] ENDOSCOPY;  Service: Gastroenterology;  Laterality: N/A;  colon polyps   • DENTAL PROCEDURE     • ENDOSCOPY N/A 2021    Procedure: ESOPHAGOGASTRODUODENOSCOPY;  Surgeon: Osman Clay MD;  Location: [facility] ENDOSCOPY;  Service: Gastroenterology;  Laterality: N/A;  esophagitis   • JOINT REPLACEMENT     • REPLACEMENT TOTAL HIP LATERAL POSITION Left    • TONSILLECTOMY     • VAGINAL BIRTH AFTER  SECTION        General Information     Row Name 22 0924          Physical Therapy Time and Intention    Document Type evaluation  -JR     Mode of  Treatment physical therapy  -     Row Name 08/05/22 0924          General Information    Patient Profile Reviewed yes  -JR     Prior Level of Function min assist:  -     Barriers to Rehab medically complex;ineffective coping  -     Row Name 08/05/22 0924          Cognition    Orientation Status (Cognition) oriented x 4  -     Row Name 08/05/22 0924          Safety Issues, Functional Mobility    Safety Issues Affecting Function (Mobility) positioning of assistive device;problem-solving  -     Impairments Affecting Function (Mobility) balance;strength  -           User Key  (r) = Recorded By, (t) = Taken By, (c) = Cosigned By    Initials Name Provider Type     Paz Mc, PT Physical Therapist               Mobility     Row Name 08/05/22 0925          Bed Mobility    Bed Mobility bed mobility (all) activities  -     All Activities, [place] (Bed Mobility) minimum assist (75% patient effort)  -     Assistive Device (Bed Mobility) bed rails  -     Row Name 08/05/22 0925          Bed-Chair Transfer    Bed-Chair [place] (Transfers) standby assist  -     Assistive Device (Bed-Chair Transfers) walker, front-wheeled  -     Row Name 08/05/22 0925          Sit-Stand Transfer    Sit-Stand [place] (Transfers) standby assist  -     Assistive Device (Sit-Stand Transfers) walker, front-wheeled  -     Row Name 08/05/22 0925          Gait/Stairs (Locomotion)    [place] Level (Gait) contact guard;minimum assist (75% patient effort)  -     Assistive Device (Gait) walker, front-wheeled  -     Distance in Feet (Gait) 2x25'  -           User Key  (r) = Recorded By, (t) = Taken By, (c) = Cosigned By    Initials Name Provider Type    Paz Blas, PT Physical Therapist               Obj/Interventions     Row Name 08/05/22 0926          Range of Motion Comprehensive    General Range of Motion no range of motion deficits identified  -     Row Name 08/05/22 0926           Strength Comprehensive (MMT)    General Manual Muscle Testing (MMT) Assessment lower extremity strength deficits identified  -JR     Comment, General Manual Muscle Testing (MMT) Assessment generalized weakness 3+/5  -JR     Row Name 08/05/22 0926          Balance    Balance Assessment sitting static balance;sitting dynamic balance;standing static balance;standing dynamic balance  -JR     Static Sitting Balance independent  -JR     Dynamic Sitting Balance modified independence  -JR     Static Standing Balance contact guard  -JR     Dynamic Standing Balance minimal assist  -JR           User Key  (r) = Recorded By, (t) = Taken By, (c) = Cosigned By    Initials Name Provider Type    JR Paz Mc, PT Physical Therapist               Goals/Plan     Row Name 08/05/22 0948          Bed Mobility Goal 1 (PT)    Activity/Assistive Device (Bed Mobility Goal 1, PT) bed mobility activities, all  -JR     [place] Level/Cues Needed (Bed Mobility Goal 1, PT) independent  -JR     Time Frame (Bed Mobility Goal 1, PT) 2 weeks  -JR     Progress/Outcomes (Bed Mobility Goal 1, PT) progress slower than expected  -JR     Row Name 08/05/22 0948          Transfer Goal 1 (PT)    Activity/Assistive Device (Transfer Goal 1, PT) transfers, all  -JR     [place] Level/Cues Needed (Transfer Goal 1, PT) independent  -JR     Time Frame (Transfer Goal 1, PT) 2 weeks  -JR     Row Name 08/05/22 0948          Gait Training Goal 1 (PT)    Activity/Assistive Device (Gait Training Goal 1, PT) gait (walking locomotion);assistive device use;diminish gait deviation;walker, rolling  -JR     [place] Level (Gait Training Goal 1, PT) modified independence  -JR     Distance (Gait Training Goal 1, PT) 100'  -JR     Time Frame (Gait Training Goal 1, PT) 2 weeks  -JR     Progress/Outcome (Gait Training Goal 1, PT) progress slower than expected  -JR     Row Name 08/05/22 0948          Therapy Assessment/Plan (PT)    Planned Therapy Interventions  "(PT) balance training;bed mobility training;gait training;home exercise program;patient/family education;strengthening;transfer training  -           User Key  (r) = Recorded By, (t) = Taken By, (c) = Cosigned By    Initials Name Provider Type    Paz Blas PT Physical Therapist               Clinical Impression     Row Name 08/05/22 0927          Pain    Pretreatment Pain Rating 4/10  -JR     Posttreatment Pain Rating 4/10  -JR     Pain Location - buttock;head  -JR     Row Name 08/05/22 0927          Plan of Care Review    Progress improving  -     Outcome Evaluation Patient performs bed mobility with min a to decrease shearing on \"sore bottom\".  She has been having frequent diarrhea that keeps sacral area and buttocks sore.  Fit with rolling walker 2' to increased fall risk.  Balance improves with use of AD.  Amb to and from bathroom with CG and Min a for turning,.  She does not fully back up to toilet/chair/bed before sitting and this also increases fall risk.  Recomend IP rehab at [facility] as she needs to be at a higher level of function than at admit to return home.  She needs to be able to use steps and perform ADLS that she was not doing prior to hospital admit.  She is not safe to return home 2' to fall risk, weakness, poor intitative, ETOH abuse and other issues.  -     Row Name 08/05/22 0927          Therapy Assessment/Plan (PT)    Criteria for Skilled Interventions Met (PT) yes  -JR     Therapy Frequency (PT) 5 times/wk  -JR     Row Name 08/05/22 0927          Positioning and Restraints    Pre-Treatment Position in bed  -JR     Post Treatment Position bed  -JR     In Bed notified nsg;call light within reach;exit alarm on  -JR           User Key  (r) = Recorded By, (t) = Taken By, (c) = Cosigned By    Initials Name Provider Type    Paz Blas, TOBY Physical Therapist               Outcome Measures     Row Name 08/05/22 0949          How much help from another person do you currently " "need...    Turning from your back to your side while in flat bed without using bedrails? 4  -JR     Moving from lying on back to sitting on the side of a flat bed without bedrails? 3  -JR     Moving to and from a bed to a chair (including a wheelchair)? 3  -JR     Standing up from a chair using your arms (e.g., wheelchair, bedside chair)? 3  -JR     Climbing 3-5 steps with a railing? 2  -JR     To walk in hospital room? 3  -JR     AM-PAC 6 Clicks Score (PT) 18  -JR     Highest level of mobility 6 --> Walked 10 steps or more  -     Row Name 08/05/22 0949          Functional Assessment    Outcome Measure Options AM-PAC 6 Clicks Basic Mobility (PT)  -           User Key  (r) = Recorded By, (t) = Taken By, (c) = Cosigned By    Initials Name Provider Type    Paz Blas, PT Physical Therapist                             Physical Therapy Education                 Title: PT OT SLP Therapies (In Progress)     Topic: Physical Therapy (In Progress)     Point: Mobility training (In Progress)     Learning Progress Summary           Patient Acceptance, E,TB,D, NR by  at 8/5/2022 0950    Comment: use of rolling walker to decrease fall risk                               User Key     Initials Effective Dates Name Provider Type Discipline     06/16/21 -  Paz Mc, PT Physical Therapist PT              PT Recommendation and Plan  Planned Therapy Interventions (PT): balance training, bed mobility training, gait training, home exercise program, patient/family education, strengthening, transfer training  Progress: improving  Outcome Evaluation: Patient performs bed mobility with min a to decrease shearing on \"sore bottom\".  She has been having frequent diarrhea that keeps sacral area and buttocks sore.  Fit with rolling walker 2' to increased fall risk.  Balance improves with use of AD.  Amb to and from bathroom with CG and Min a for turning,.  She does not fully back up to toilet/chair/bed before sitting and " this also increases fall risk.  Recomend IP rehab at [facility] as she needs to be at a higher level of function than at admit to return home.  She needs to be able to use steps and perform ADLS that she was not doing prior to hospital admit.  She is not safe to return home 2' to fall risk, weakness, poor intitative, ETOH abuse and other issues.     Time Calculation:    PT Charges     Row Name 08/05/22 0957             Time Calculation    Start Time 0815  -JR      Stop Time 0900  -      Time Calculation (min) 45 min  -JR      PT Received On 08/05/22  -      PT - Next Appointment 08/08/22  -      PT Goal Re-Cert Due Date 08/19/22  -            User Key  (r) = Recorded By, (t) = Taken By, (c) = Cosigned By    Initials Name Provider Type    Paz Blas PT Physical Therapist              Therapy Charges for Today     Code Description Service Date Service Provider Modifiers Qty    92721269885 HC PT EVAL MOD COMPLEXITY 4 8/5/2022 Paz Mc, PT GP 1          PT G-Codes  Outcome Measure Options: AM-PAC 6 Clicks Basic Mobility (PT)  AM-PAC 6 Clicks Score (PT): 18    Paz Mc PT  8/5/2022

## 2022-08-06 LAB
ADV 40+41 DNA STL QL NAA+NON-PROBE: NOT DETECTED
ALBUMIN SERPL-MCNC: 2.7 G/DL (ref 3.5–5.2)
ALBUMIN/GLOB SERPL: 0.7 G/DL
ALP SERPL-CCNC: 180 U/L (ref 39–117)
ALT SERPL W P-5'-P-CCNC: 42 U/L (ref 1–33)
ANION GAP SERPL CALCULATED.3IONS-SCNC: 10 MMOL/L (ref 5–15)
AST SERPL-CCNC: 132 U/L (ref 1–32)
ASTRO TYP 1-8 RNA STL QL NAA+NON-PROBE: NOT DETECTED
BASOPHILS # BLD AUTO: 0 10*3/MM3 (ref 0–0.2)
BASOPHILS NFR BLD AUTO: 0.5 % (ref 0–1.5)
BILIRUB SERPL-MCNC: 0.7 MG/DL (ref 0–1.2)
BUN SERPL-MCNC: <2 MG/DL (ref 6–20)
BUN/CREAT SERPL: ABNORMAL
C CAYETANENSIS DNA STL QL NAA+NON-PROBE: NOT DETECTED
C COLI+JEJ+UPSA DNA STL QL NAA+NON-PROBE: NOT DETECTED
C DIFF GDH STL QL: NEGATIVE
CALCIUM SPEC-SCNC: 8.3 MG/DL (ref 8.6–10.5)
CHLORIDE SERPL-SCNC: 104 MMOL/L (ref 98–107)
CO2 SERPL-SCNC: 23 MMOL/L (ref 22–29)
CREAT SERPL-MCNC: 0.58 MG/DL (ref 0.57–1)
CRYPTOSP DNA STL QL NAA+NON-PROBE: NOT DETECTED
DEPRECATED RDW RBC AUTO: 75.7 FL (ref 37–54)
E HISTOLYT DNA STL QL NAA+NON-PROBE: NOT DETECTED
EAEC PAA PLAS AGGR+AATA ST NAA+NON-PRB: NOT DETECTED
EC STX1+STX2 GENES STL QL NAA+NON-PROBE: NOT DETECTED
EGFRCR SERPLBLD CKD-EPI 2021: 107.7 ML/MIN/1.73
EOSINOPHIL # BLD AUTO: 0.1 10*3/MM3 (ref 0–0.4)
EOSINOPHIL NFR BLD AUTO: 2.6 % (ref 0.3–6.2)
EPEC EAE GENE STL QL NAA+NON-PROBE: NOT DETECTED
ERYTHROCYTE [DISTWIDTH] IN BLOOD BY AUTOMATED COUNT: 18.9 % (ref 12.3–15.4)
ETEC LTA+ST1A+ST1B TOX ST NAA+NON-PROBE: NOT DETECTED
G LAMBLIA DNA STL QL NAA+NON-PROBE: NOT DETECTED
GLOBULIN UR ELPH-MCNC: 3.7 GM/DL
GLUCOSE SERPL-MCNC: 82 MG/DL (ref 65–99)
HCT VFR BLD AUTO: 30.6 % (ref 34–46.6)
HGB BLD-MCNC: 10 G/DL (ref 12–15.9)
LYMPHOCYTES # BLD AUTO: 1.6 10*3/MM3 (ref 0.7–3.1)
LYMPHOCYTES NFR BLD AUTO: 32.5 % (ref 19.6–45.3)
MAGNESIUM SERPL-MCNC: 1.7 MG/DL (ref 1.6–2.6)
MCH RBC QN AUTO: 37.9 PG (ref 26.6–33)
MCHC RBC AUTO-ENTMCNC: 32.6 G/DL (ref 31.5–35.7)
MCV RBC AUTO: 116.2 FL (ref 79–97)
MONOCYTES # BLD AUTO: 0.6 10*3/MM3 (ref 0.1–0.9)
MONOCYTES NFR BLD AUTO: 12.7 % (ref 5–12)
NEUTROPHILS NFR BLD AUTO: 2.5 10*3/MM3 (ref 1.7–7)
NEUTROPHILS NFR BLD AUTO: 51.7 % (ref 42.7–76)
NOROVIRUS GI+II RNA STL QL NAA+NON-PROBE: NOT DETECTED
NRBC BLD AUTO-RTO: 0.4 /100 WBC (ref 0–0.2)
P SHIGELLOIDES DNA STL QL NAA+NON-PROBE: NOT DETECTED
PLATELET # BLD AUTO: 166 10*3/MM3 (ref 140–450)
PMV BLD AUTO: 7.9 FL (ref 6–12)
POTASSIUM SERPL-SCNC: 3.1 MMOL/L (ref 3.5–5.2)
PROT SERPL-MCNC: 6.4 G/DL (ref 6–8.5)
QT INTERVAL: 438 MS
RBC # BLD AUTO: 2.63 10*6/MM3 (ref 3.77–5.28)
RVA RNA STL QL NAA+NON-PROBE: NOT DETECTED
S ENT+BONG DNA STL QL NAA+NON-PROBE: NOT DETECTED
SAPO I+II+IV+V RNA STL QL NAA+NON-PROBE: NOT DETECTED
SHIGELLA SP+EIEC IPAH ST NAA+NON-PROBE: NOT DETECTED
SODIUM SERPL-SCNC: 137 MMOL/L (ref 136–145)
V CHOL+PARA+VUL DNA STL QL NAA+NON-PROBE: NOT DETECTED
V CHOLERAE DNA STL QL NAA+NON-PROBE: NOT DETECTED
WBC NRBC COR # BLD: 4.8 10*3/MM3 (ref 3.4–10.8)
Y ENTEROCOL DNA STL QL NAA+NON-PROBE: NOT DETECTED

## 2022-08-06 PROCEDURE — 94664 DEMO&/EVAL PT USE INHALER: CPT

## 2022-08-06 PROCEDURE — 94799 UNLISTED PULMONARY SVC/PX: CPT

## 2022-08-06 PROCEDURE — 85025 COMPLETE CBC W/AUTO DIFF WBC: CPT | Performed by: NURSE PRACTITIONER

## 2022-08-06 PROCEDURE — 94761 N-INVAS EAR/PLS OXIMETRY MLT: CPT

## 2022-08-06 PROCEDURE — 80053 COMPREHEN METABOLIC PANEL: CPT | Performed by: NURSE PRACTITIONER

## 2022-08-06 PROCEDURE — 87324 CLOSTRIDIUM AG IA: CPT | Performed by: HOSPITALIST

## 2022-08-06 PROCEDURE — 36415 COLL VENOUS BLD VENIPUNCTURE: CPT | Performed by: NURSE PRACTITIONER

## 2022-08-06 PROCEDURE — 83735 ASSAY OF MAGNESIUM: CPT | Performed by: NURSE PRACTITIONER

## 2022-08-06 PROCEDURE — 87507 IADNA-DNA/RNA PROBE TQ 12-25: CPT | Performed by: HOSPITALIST

## 2022-08-06 PROCEDURE — 99232 SBSQ HOSP IP/OBS MODERATE 35: CPT | Performed by: HOSPITALIST

## 2022-08-06 PROCEDURE — 87449 NOS EACH ORGANISM AG IA: CPT | Performed by: HOSPITALIST

## 2022-08-06 RX ADMIN — FOLIC ACID 1000 MCG: 1 TABLET ORAL at 10:34

## 2022-08-06 RX ADMIN — TRAZODONE HYDROCHLORIDE 50 MG: 50 TABLET ORAL at 21:56

## 2022-08-06 RX ADMIN — BUSPIRONE HYDROCHLORIDE 15 MG: 15 TABLET ORAL at 10:33

## 2022-08-06 RX ADMIN — BUDESONIDE AND FORMOTEROL FUMARATE DIHYDRATE 2 PUFF: 80; 4.5 AEROSOL RESPIRATORY (INHALATION) at 06:44

## 2022-08-06 RX ADMIN — POTASSIUM CHLORIDE 40 MEQ: 1500 TABLET, EXTENDED RELEASE ORAL at 06:44

## 2022-08-06 RX ADMIN — LORAZEPAM 1 MG: 1 TABLET ORAL at 17:14

## 2022-08-06 RX ADMIN — SERTRALINE 50 MG: 50 TABLET, FILM COATED ORAL at 10:33

## 2022-08-06 RX ADMIN — IPRATROPIUM BROMIDE 0.5 MG: 0.5 SOLUTION RESPIRATORY (INHALATION) at 00:00

## 2022-08-06 RX ADMIN — BUTALBITAL, ACETAMINOPHEN AND CAFFEINE 1 TABLET: 50; 325; 40 TABLET ORAL at 17:11

## 2022-08-06 RX ADMIN — BUDESONIDE AND FORMOTEROL FUMARATE DIHYDRATE 2 PUFF: 80; 4.5 AEROSOL RESPIRATORY (INHALATION) at 19:47

## 2022-08-06 RX ADMIN — Medication 10 ML: at 21:55

## 2022-08-06 RX ADMIN — CHOLESTYRAMINE 8 G: 4 POWDER, FOR SUSPENSION ORAL at 21:56

## 2022-08-06 RX ADMIN — LORAZEPAM 1 MG: 1 TABLET ORAL at 10:33

## 2022-08-06 RX ADMIN — SERTRALINE 50 MG: 50 TABLET, FILM COATED ORAL at 21:56

## 2022-08-06 RX ADMIN — IPRATROPIUM BROMIDE 0.5 MG: 0.5 SOLUTION RESPIRATORY (INHALATION) at 19:43

## 2022-08-06 RX ADMIN — BUSPIRONE HYDROCHLORIDE 15 MG: 15 TABLET ORAL at 21:55

## 2022-08-06 RX ADMIN — LORAZEPAM 1 MG: 1 TABLET ORAL at 21:56

## 2022-08-06 RX ADMIN — POTASSIUM CHLORIDE 40 MEQ: 1500 TABLET, EXTENDED RELEASE ORAL at 17:11

## 2022-08-06 RX ADMIN — HYDROCHLOROTHIAZIDE: 25 TABLET ORAL at 10:33

## 2022-08-06 RX ADMIN — IPRATROPIUM BROMIDE 0.5 MG: 0.5 SOLUTION RESPIRATORY (INHALATION) at 06:40

## 2022-08-06 RX ADMIN — BUTALBITAL, ACETAMINOPHEN AND CAFFEINE 1 TABLET: 50; 325; 40 TABLET ORAL at 06:47

## 2022-08-06 NOTE — PROGRESS NOTES
[facility] Medicine Services Daily Progress Note    Patient Name: Lita Yu  : 1965  MRN: [id number]  Primary Care Physician:  Norma Saul APRN  Date of admission: 8/3/2022      Subjective      Chief Complaint: Hallucinations due to alcohol withdrawal    Patient Reports   2022: The patient reports that she continues to have hallucinations but they are not getting worse.  She reports some mild tremor which is getting better.  She reports that she had been having nausea vomiting and diarrhea at home and had been unable to eat anything for several days.  She denies any hematemesis, coffee-ground emesis, melena or bright red blood per rectum.  She no longer has the nausea and vomiting but she still is having multiple episodes of diarrhea.  2022: Patient reports ongoing hallucinations.  The nursing staff reports that the patient continues to have multiple episodes of diarrhea.  2022: Patient reports improvement in her diarrhea.  She is still having hallucinations.    ROS   All other systems were reviewed and were negative.       Objective      Vitals:   Temp:  [97.6 °F (36.4 °C)-97.8 °F (36.6 °C)] 97.7 °F (36.5 °C)  Heart Rate:  [75-88] 81  Resp:  [16-18] 16  BP: ()/(61-84) 107/69    Physical Exam   Vital signs and nurses notes reviewed.  Well-nourished female sitting up in bed awake and alert in no acute distress; normocephalic atraumatic, mucous membranes moist; sclera anicteric; lungs clear to auscultation bilaterally; CV regular rate and rhythm; abdomen soft and nontender nondistended; extremities with no edema or calf tenderness; palpable pedal pulses bilaterally; no asterixis.  Exam unchanged from 2022.       Result Review    Result Review:  I have personally reviewed the results from the time of this admission to 2022 18:15 EDT and agree with these findings:  [x]  Laboratory  [x]  Microbiology  [x]  Radiology  [x]  EKG/Telemetry   [x]   Cardiology/Vascular   []  Pathology  []  Old records  []  Other:  Most notable findings discussed in the assessment and plan.          Assessment & Plan      Brief Patient Summary:  Lita Yu is a 56 y.o. female with past medical history of alcoholism, hypertension, hyperlipidemia, PTSD, seizure, tobacco user who [facility] to [facility] complaining of alcohol withdrawal with hallucinations, tremors, nausea, vomiting, and diarrhea.  She has been generally weak and fatigued, and complains of anxiety. She states she has done inpatient rehab about 1 year ago but her insurance will not pay for it again.  She called her primary doctor who recommended she come to the emergency room.  Patient stated that she is going to , and had quit drinking for a year and started drinking again recently.    Current medications include:  budesonide-formoterol, 2 puff, Inhalation, BID - RT   And  ipratropium, 0.5 mg, Nebulization, Q6H - RT  busPIRone, 15 mg, Oral, BID  folic acid, 1,000 mcg, Oral, Daily  lisinopril-hydroCHLOROthiazide (ZESTORETIC) 20-25 mg combo dose, , Oral, Q24H  sertraline, 50 mg, Oral, BID  sodium chloride, 10 mL, Intravenous, Q12H  traZODone, 50 mg, Oral, Nightly  vitamin B-12, 1,000 mcg, Oral, Daily       lactated ringers with KCl 40 mEq/L, 100 mL/hr, Last Rate: 100 mL/hr (08/04/22 6964)         Active Hospital Problems:  Active Hospital Problems    Diagnosis    • **Alcohol withdrawal syndrome, with delirium (HCC)    • Hypokalemia    • Chronic obstructive pulmonary disease (HCC)    • Hypertension    • Osteoporosis    • Smoker    • Seizure disorder (HCC)    • Post traumatic stress disorder    • Hyperlipidemia      Plan:   Alcohol abuse with current delirium tremens and nausea, vomiting and diarrhea due to alcohol withdrawal  Elevated LFTs due to alcohol abuse  -CT head showed no acute intracranial abnormalities  -Alcohol withdrawal protocol  -Seizure precautions  -Continue thiamine  and folic acid    Diarrhea likely secondary to alcohol withdrawal  -Stool studies including GI panel and C. difficile ordered  -Cholestyramine     Hypokalemia  -Potassium 2.3  -Potassium chloride given in the ED  -Electrolyte replacement protocol ordered  -EKG showed sinus rhythm with prolonged QT interval     Hyponatremia, mild and not clinically significant  -Sodium 133  -Monitor    Anemia of chronic disease  -Hemoglobin 12.3 on admission compared to 11.0 on 7/18/2022 but dropped to 10.5 after IV fluid hydration     Essential hypertension  Hyperlipidemia  -Continue lisinopril and hydrochlorothiazide  -Avoid statins due to elevated liver tests     Seizure disorder  -Seizure precautions     Chronic obstructive pulmonary disease   -Not in exacerbation  -Continue Symbicort and ipratropium (avoid albuterol due to side effect of tachycardia)     Post traumatic stress disorder  -Continue BuSpar  -Continue sertraline and trazodone at a reduced dose due to prolonged prolonged QT interval  -Stable     Osteoporosis     Smoker  -Encourage cessation    DVT prophylaxis:  Mechanical DVT prophylaxis orders are present.    CODE STATUS:    Code Status (Patient has no pulse and is not breathing): CPR (Attempt to Resuscitate)  Medical Interventions (Patient has pulse or is breathing): Full Support      Disposition:  I expect patient to be discharged once she has no further symptoms of alcohol withdrawal.    This patient has been examined wearing appropriate Personal Protective Equipment and discussed with hospital infection control department. 08/04/22      Electronically signed by Zoila Jama MD, 08/04/22, 18:15 EDT.  [facility]ist Team

## 2022-08-06 NOTE — PLAN OF CARE
Goal Outcome Evaluation:  Plan of Care Reviewed With: patient        Progress: improving  Outcome Evaluation: Pt has no c/o pain or discomfort. Pt scored an 8 on CIWA scale this am, see MAR for intervention. Pt is not observed to have any confusion, visual disturbances or audio hallucinations. Pt is able to make needs known. Pt remains resting in bed with eyes closed and call light within reach.

## 2022-08-06 NOTE — PLAN OF CARE
Goal Outcome Evaluation:     Awake and alert during nursing assessment. Patient had been asleep during walking rounds at beginning of shift. Last V/S: B/P 119/75 T-97.9 HR 89 R-16. O2 sats 95% RA. CIWA score 2. Obtained stool sample. Contact Spore isolation. Falls risk precautions continued. Will continue to monitor closely.

## 2022-08-07 LAB
MAGNESIUM SERPL-MCNC: 1.9 MG/DL (ref 1.6–2.6)
POTASSIUM SERPL-SCNC: 4.2 MMOL/L (ref 3.5–5.2)

## 2022-08-07 PROCEDURE — 94799 UNLISTED PULMONARY SVC/PX: CPT

## 2022-08-07 PROCEDURE — 99232 SBSQ HOSP IP/OBS MODERATE 35: CPT | Performed by: HOSPITALIST

## 2022-08-07 PROCEDURE — 83735 ASSAY OF MAGNESIUM: CPT | Performed by: NURSE PRACTITIONER

## 2022-08-07 PROCEDURE — 84132 ASSAY OF SERUM POTASSIUM: CPT | Performed by: NURSE PRACTITIONER

## 2022-08-07 PROCEDURE — 97116 GAIT TRAINING THERAPY: CPT

## 2022-08-07 PROCEDURE — 36415 COLL VENOUS BLD VENIPUNCTURE: CPT | Performed by: NURSE PRACTITIONER

## 2022-08-07 PROCEDURE — 94664 DEMO&/EVAL PT USE INHALER: CPT

## 2022-08-07 PROCEDURE — 97530 THERAPEUTIC ACTIVITIES: CPT

## 2022-08-07 RX ORDER — CHOLESTYRAMINE LIGHT 4 G/5.7G
2 POWDER, FOR SUSPENSION ORAL EVERY 8 HOURS SCHEDULED
Status: DISCONTINUED | OUTPATIENT
Start: 2022-08-07 | End: 2022-08-08 | Stop reason: HOSPADM

## 2022-08-07 RX ORDER — BUDESONIDE AND FORMOTEROL FUMARATE DIHYDRATE 80; 4.5 UG/1; UG/1
2 AEROSOL RESPIRATORY (INHALATION)
Status: DISCONTINUED | OUTPATIENT
Start: 2022-08-08 | End: 2022-08-08 | Stop reason: HOSPADM

## 2022-08-07 RX ADMIN — SERTRALINE 50 MG: 50 TABLET, FILM COATED ORAL at 20:41

## 2022-08-07 RX ADMIN — LORAZEPAM 1 MG: 1 TABLET ORAL at 20:41

## 2022-08-07 RX ADMIN — BUTALBITAL, ACETAMINOPHEN AND CAFFEINE 1 TABLET: 50; 325; 40 TABLET ORAL at 13:10

## 2022-08-07 RX ADMIN — CHOLESTYRAMINE 8 G: 4 POWDER, FOR SUSPENSION ORAL at 09:02

## 2022-08-07 RX ADMIN — BUDESONIDE AND FORMOTEROL FUMARATE DIHYDRATE 2 PUFF: 80; 4.5 AEROSOL RESPIRATORY (INHALATION) at 19:08

## 2022-08-07 RX ADMIN — BUTALBITAL, ACETAMINOPHEN AND CAFFEINE 1 TABLET: 50; 325; 40 TABLET ORAL at 05:27

## 2022-08-07 RX ADMIN — IPRATROPIUM BROMIDE 0.5 MG: 0.5 SOLUTION RESPIRATORY (INHALATION) at 19:08

## 2022-08-07 RX ADMIN — TRAZODONE HYDROCHLORIDE 50 MG: 50 TABLET ORAL at 20:41

## 2022-08-07 RX ADMIN — Medication 10 ML: at 20:41

## 2022-08-07 RX ADMIN — HYDROCHLOROTHIAZIDE: 25 TABLET ORAL at 09:02

## 2022-08-07 RX ADMIN — BUDESONIDE AND FORMOTEROL FUMARATE DIHYDRATE 2 PUFF: 80; 4.5 AEROSOL RESPIRATORY (INHALATION) at 06:28

## 2022-08-07 RX ADMIN — LORAZEPAM 1 MG: 1 TABLET ORAL at 10:18

## 2022-08-07 RX ADMIN — Medication 10 ML: at 09:03

## 2022-08-07 RX ADMIN — SERTRALINE 50 MG: 50 TABLET, FILM COATED ORAL at 09:02

## 2022-08-07 RX ADMIN — CHOLESTYRAMINE 8 G: 4 POWDER, FOR SUSPENSION ORAL at 20:41

## 2022-08-07 RX ADMIN — IPRATROPIUM BROMIDE 0.5 MG: 0.5 SOLUTION RESPIRATORY (INHALATION) at 06:24

## 2022-08-07 RX ADMIN — BUSPIRONE HYDROCHLORIDE 15 MG: 15 TABLET ORAL at 20:41

## 2022-08-07 RX ADMIN — LORAZEPAM 2 MG: 1 TABLET ORAL at 13:10

## 2022-08-07 RX ADMIN — FOLIC ACID 1000 MCG: 1 TABLET ORAL at 09:03

## 2022-08-07 RX ADMIN — BUSPIRONE HYDROCHLORIDE 15 MG: 15 TABLET ORAL at 09:02

## 2022-08-07 RX ADMIN — BUTALBITAL, ACETAMINOPHEN AND CAFFEINE 1 TABLET: 50; 325; 40 TABLET ORAL at 20:41

## 2022-08-07 NOTE — PLAN OF CARE
Goal Outcome Evaluation:      Patient alert and oriented. Able to make needs known. CIWA score at HS 10. Stool specimen negative for C-diff. Assisted to bathroom for shower. Gait unsteady. Nursing assistant stayed with patient during ADL. Monitor shows SR. Falls precautions maintained. Will continue to monitor patient closely.

## 2022-08-07 NOTE — PLAN OF CARE
Patient stable throughout shift. Anticipate discharge tomorrow 8/8.           Problem: Adult Inpatient Plan of Care  Goal: Plan of Care Review  Outcome: Ongoing, Progressing  Goal: Patient-Specific Goal (Individualized)  Outcome: Ongoing, Progressing  Goal: Absence of Hospital-Acquired Illness or Injury  Outcome: Ongoing, Progressing  Intervention: Identify and Manage Fall Risk  Recent Flowsheet Documentation  Taken 8/7/2022 1400 by Darlyn Aguilar RN  Safety Promotion/Fall Prevention: safety round/check completed  Taken 8/7/2022 1200 by Darlyn Aguilar RN  Safety Promotion/Fall Prevention: safety round/check completed  Taken 8/7/2022 1000 by Darlyn Aguilar RN  Safety Promotion/Fall Prevention: safety round/check completed  Taken 8/7/2022 0900 by Darlyn Aguilar RN  Safety Promotion/Fall Prevention: safety round/check completed  Taken 8/7/2022 0800 by Darlyn Aguilar RN  Safety Promotion/Fall Prevention: safety round/check completed  Intervention: Prevent Skin Injury  Recent Flowsheet Documentation  Taken 8/7/2022 1200 by Darlyn Aguilar RN  Skin Protection: adhesive use limited  Taken 8/7/2022 0900 by Darlyn Aguilar RN  Body Position: position changed independently  Intervention: Prevent and Manage VTE (Venous Thromboembolism) Risk  Recent Flowsheet Documentation  Taken 8/7/2022 0900 by Darlyn Aguilar RN  Activity Management: activity adjusted per tolerance  VTE Prevention/Management: dorsiflexion/plantar flexion performed  Goal: Optimal Comfort and Wellbeing  Outcome: Ongoing, Progressing  Intervention: Monitor Pain and Promote Comfort  Recent Flowsheet Documentation  Taken 8/7/2022 1310 by Darlyn Aguilar RN  Pain Management Interventions: see MAR  Intervention: Provide Person-Centered Care  Recent Flowsheet Documentation  Taken 8/7/2022 1200 by Darlyn Aguilar RN  Trust Relationship/Rapport:   care explained   thoughts/feelings acknowledged   reassurance provided   questions  answered   questions encouraged  Goal: Readiness for Transition of Care  Outcome: Ongoing, Progressing     Problem: Behavioral Health Comorbidity  Goal: Maintenance of Behavioral Health Symptom Control  Outcome: Ongoing, Progressing     Problem: COPD (Chronic Obstructive Pulmonary Disease) Comorbidity  Goal: Maintenance of COPD Symptom Control  Outcome: Ongoing, Progressing     Problem: Hypertension Comorbidity  Goal: Blood Pressure in Desired Range  Outcome: Ongoing, Progressing     Problem: Seizure Disorder Comorbidity  Goal: Maintenance of Seizure Control  Outcome: Ongoing, Progressing     Problem: Fall Injury Risk  Goal: Absence of Fall and Fall-Related Injury  Outcome: Ongoing, Progressing  Intervention: Promote Injury-Free Environment  Recent Flowsheet Documentation  Taken 8/7/2022 1400 by Darlyn Aguilar RN  Safety Promotion/Fall Prevention: safety round/check completed  Taken 8/7/2022 1200 by Darlyn Aguilar RN  Safety Promotion/Fall Prevention: safety round/check completed  Taken 8/7/2022 1000 by Darlyn Aguilar RN  Safety Promotion/Fall Prevention: safety round/check completed  Taken 8/7/2022 0900 by Darlyn Aguilar RN  Safety Promotion/Fall Prevention: safety round/check completed  Taken 8/7/2022 0800 by Darlyn Aguilar RN  Safety Promotion/Fall Prevention: safety round/check completed   Goal Outcome Evaluation:

## 2022-08-07 NOTE — PROGRESS NOTES
[facility] Daily Progress Note    Patient Name: Lita Yu  : 1965  MRN: [id number]  Primary Care Physician:  Norma Saul APRN  Date of admission: 8/3/2022      Subjective      Chief Complaint: Hallucinations due to alcohol withdrawal    Patient Reports   2022: The patient reports that she continues to have hallucinations but they are not getting worse.  She reports some mild tremor which is getting better.  She reports that she had been having nausea vomiting and diarrhea at home and had been unable to eat anything for several days.  She denies any hematemesis, coffee-ground emesis, melena or bright red blood per rectum.  She no longer has the nausea and vomiting but she still is having multiple episodes of diarrhea.  2022: Patient reports ongoing hallucinations.  The nursing staff reports that the patient continues to have multiple episodes of diarrhea.  2022: Patient reports improvement in her diarrhea.  She is still having hallucinations.  2022: Patient reports that she has had more diarrhea (3-5) today but she denies any melena or bright red blood per rectum.  No nausea or vomiting.  She reports that she is still having hallucinations.  No reported seizures.    ROS   All other systems were reviewed and were negative except hallucinations and diarrhea.       Objective      Vitals:   Temp:  [97.6 °F (36.4 °C)-97.8 °F (36.6 °C)] 97.7 °F (36.5 °C)  Heart Rate:  [75-88] 81  Resp:  [16-18] 16  BP: ()/(61-84) 107/69    Physical Exam   Vital signs and nurses notes reviewed.  Well-nourished female sitting up in bed awake and alert in no acute distress; normocephalic atraumatic, mucous membranes moist; sclera anicteric; lungs clear to auscultation bilaterally; CV regular rate and rhythm; abdomen soft and nontender nondistended; extremities with no edema or calf tenderness; palpable pedal pulses bilaterally; no asterixis.  Exam  unchanged from 8/6/2022.       Result Review    Result Review:  I have personally reviewed the results from the time of this admission to 8/4/2022 18:15 EDT and agree with these findings:  [x]  Laboratory  [x]  Microbiology  [x]  Radiology  [x]  EKG/Telemetry   [x]  Cardiology/Vascular   []  Pathology  []  Old records  []  Other:  Most notable findings discussed in the assessment and plan.          Assessment & Plan      Brief Patient Summary:  Lita Yu is a 56 y.o. female with past medical history of alcoholism, hypertension, hyperlipidemia, PTSD, seizure, tobacco user who presented to [facility] on 8/3/2022 complaining of alcohol withdrawal with hallucinations, tremors, nausea, vomiting, and diarrhea.  She has been generally weak and fatigued, and complains of anxiety. She states she has done inpatient rehab about 1 year ago but her insurance will not pay for it again.  She called her primary doctor who recommended she come to the emergency room.  Patient stated that she is going to , and had quit drinking for a year and started drinking again recently.    Current medications include:  budesonide-formoterol, 2 puff, Inhalation, BID - RT   And  ipratropium, 0.5 mg, Nebulization, Q6H - RT  busPIRone, 15 mg, Oral, BID  folic acid, 1,000 mcg, Oral, Daily  lisinopril-hydroCHLOROthiazide (ZESTORETIC) 20-25 mg combo dose, , Oral, Q24H  sertraline, 50 mg, Oral, BID  sodium chloride, 10 mL, Intravenous, Q12H  traZODone, 50 mg, Oral, Nightly  vitamin B-12, 1,000 mcg, Oral, Daily       lactated ringers with KCl 40 mEq/L, 100 mL/hr, Last Rate: 100 mL/hr (08/04/22 8387)         Active Hospital Problems:  Active Hospital Problems    Diagnosis    • **Alcohol withdrawal syndrome, with delirium (HCC)    • Hypokalemia    • Chronic obstructive pulmonary disease (HCC)    • Hypertension    • Osteoporosis    • Smoker    • Seizure disorder (HCC)    • Post traumatic stress disorder    • Hyperlipidemia      Plan:    Alcohol abuse with current delirium tremens and nausea, vomiting and diarrhea due to alcohol withdrawal  Elevated LFTs due to alcohol abuse  -CT head showed no acute intracranial abnormalities  -Patient had a a negative acute hepatitis panel 12/14/2021  -Continue alcohol withdrawal protocol  -Seizure precautions  -Continue thiamine and folic acid    Diarrhea likely secondary to alcohol withdrawal  -Stool studies including GI panel and C. difficile ordered  -Cholestyramine     Hypokalemia  -Potassium 2.3 on admission  -Potassium chloride given in the ED  -Electrolyte replacement protocol ordered  -EKG showed sinus rhythm with prolonged QT interval     Hyponatremia, mild and not clinically significant, resolved  -Sodium 133 and follow-up 137  -Monitor    Anemia of chronic disease  -Hemoglobin 12.3 on admission compared to 11.0 on 7/18/2022 but dropped to 10.5 after IV fluid hydration  -Hemoglobin has remained stable at 10.0 on 8/6/2022     Essential hypertension  Hyperlipidemia  -Continue lisinopril and hydrochlorothiazide  -Avoid statins due to elevated liver tests     Seizure disorder  -Seizure precautions     Chronic obstructive pulmonary disease   -Not in exacerbation  -Continue Symbicort and ipratropium (avoid albuterol due to side effect of tachycardia)     Post traumatic stress disorder  -Continue BuSpar  -Continue sertraline and trazodone at a reduced dose due to prolonged prolonged QT interval  -Stable     Osteoporosis     Smoker  -Encourage cessation    DVT prophylaxis:  Mechanical DVT prophylaxis orders are present.    CODE STATUS:    Code Status (Patient has no pulse and is not breathing): CPR (Attempt to Resuscitate)  Medical Interventions (Patient has pulse or is breathing): Full Support      Disposition:  I expect patient to be discharged once she has no further symptoms of alcohol withdrawal.    This patient has been examined wearing appropriate Personal Protective Equipment and discussed with  \[facility] [place] [facility]\"" infection control department. 08/04/22      Electronically signed by Zoila Jama MD, 08/04/22, 18:15 EDT.  [facility]ist Team

## 2022-08-07 NOTE — THERAPY TREATMENT NOTE
Subjective: Pt agreeable to therapeutic plan of care.    Objective:     Bed mobility - Min-A supine to sit  Transfers - CGA and with rolling walker  Ambulation - 48 feet CGA and Min-A  Pt needs cues for eye gaze ahead instead of looking at the floor.  Pt unsteady, ambulates outside the right side of the walker.  Pt needed abut 4 cues to stay the walker.  Pt very fatigued after about 20 feet and quality of gait declined.     Vitals: WNL    Pain: 0 VAS  Education: Provided education on importance of mobility and skilled verbal / tactile cueing throughout intervention.     Assessment: Lita Yu presents with functional mobility impairments which indicate the need for skilled intervention. Tolerating session today without incident. Pt is far from her PLOF but has good potential to cont to improve her mobility with physical therapy services. Will continue to follow and progress as tolerated.     Plan/Recommendations:   High Intensity Therapy recommended post-acute care. This is recommended as therapy feels the patient would require 5-6 days per week, 2-3 hours per day. At this time, inpatient rehabilitation (acute rehab) would be the first choice and SNF would be second.. Pt requires no DME at discharge.     Pt desires Home with family assist and Outpatient therapy at discharge. Pt cooperative; agreeable to therapeutic recommendations and plan of care.       Basic Mobility 6-click:  Rollin = Total, A lot = 2, A little = 3; 4 = None  Supine>Sit:   1 = Total, A lot = 2, A little = 3; 4 = None   Sit>Stand with arms:  1 = Total, A lot = 2, A little = 3; 4 = None  Bed>Chair:   1 = Total, A lot = 2, A little = 3; 4 = None  Ambulate in room:  1 = Total, A lot = 2, A little = 3; 4 = None  3-5 Steps with railin = Total, A lot = 2, A little = 3; 4 = None  Score: 16    Modified [place]: 4 = Moderately severe disability (Unable to attend to own bodily needs without assistance, and unable to walk  unassisted)     Post-Tx Position: Supine with HOB Elevated, Alarms activated and Call light and personal items within reach  Encouraged pt to stay out of the bed but she declined  PPE: gloves, surgical mask, eyewear protection

## 2022-08-07 NOTE — PLAN OF CARE
Goal Outcome Evaluation:     Bed mobility - Min-A supine to sit  Transfers - CGA and with rolling walker  Ambulation - 48 feet CGA and Min-A  Pt needs cues for eye gaze ahead instead of looking at the floor.  Pt unsteady, ambulates outside the right side of the walker.  Pt needed abut 4 cues to stay the walker.  Pt very fatigued after about 20 feet and quality of gait declined.     High Intensity Therapy recommended post-acute care. This is recommended as therapy feels the patient would require 5-6 days per week, 2-3 hours per day. At this time, inpatient rehabilitation (acute rehab) would be the first choice and SNF would be second.. Pt requires no DME at discharge.     Pt desires Home with family assist and Outpatient therapy at discharge. Pt cooperative; agreeable to therapeutic recommendations and plan of care.

## 2022-08-08 ENCOUNTER — READMISSION MANAGEMENT (OUTPATIENT)
Dept: CALL CENTER | Facility: HOSPITAL | Age: 57
End: 2022-08-08

## 2022-08-08 VITALS
WEIGHT: 127.4 LBS | TEMPERATURE: 98 F | OXYGEN SATURATION: 99 % | HEIGHT: 63 IN | RESPIRATION RATE: 14 BRPM | SYSTOLIC BLOOD PRESSURE: 90 MMHG | DIASTOLIC BLOOD PRESSURE: 58 MMHG | BODY MASS INDEX: 22.57 KG/M2 | HEART RATE: 69 BPM

## 2022-08-08 LAB
MAGNESIUM SERPL-MCNC: 1.9 MG/DL (ref 1.6–2.6)
POTASSIUM SERPL-SCNC: 4.1 MMOL/L (ref 3.5–5.2)
VIT B1 BLD-SCNC: 167.8 NMOL/L (ref 66.5–200)

## 2022-08-08 PROCEDURE — 25010000002 THIAMINE PER 100 MG: Performed by: HOSPITALIST

## 2022-08-08 PROCEDURE — 94799 UNLISTED PULMONARY SVC/PX: CPT

## 2022-08-08 PROCEDURE — 99238 HOSP IP/OBS DSCHRG MGMT 30/<: CPT | Performed by: HOSPITALIST

## 2022-08-08 PROCEDURE — 94664 DEMO&/EVAL PT USE INHALER: CPT

## 2022-08-08 PROCEDURE — 97530 THERAPEUTIC ACTIVITIES: CPT

## 2022-08-08 PROCEDURE — 97535 SELF CARE MNGMENT TRAINING: CPT

## 2022-08-08 PROCEDURE — 94760 N-INVAS EAR/PLS OXIMETRY 1: CPT

## 2022-08-08 RX ORDER — CHOLESTYRAMINE LIGHT 4 G/5.7G
2 POWDER, FOR SUSPENSION ORAL EVERY 8 HOURS SCHEDULED
Qty: 30 PACKET | Refills: 0 | Status: ON HOLD | OUTPATIENT
Start: 2022-08-08 | End: 2022-10-15

## 2022-08-08 RX ORDER — THIAMINE HYDROCHLORIDE 100 MG/ML
100 INJECTION, SOLUTION INTRAMUSCULAR; INTRAVENOUS DAILY
Status: DISCONTINUED | OUTPATIENT
Start: 2022-08-08 | End: 2022-08-08

## 2022-08-08 RX ORDER — THIAMINE MONONITRATE (VIT B1) 100 MG
100 TABLET ORAL DAILY
Qty: 30 TABLET | Refills: 0 | Status: ON HOLD | OUTPATIENT
Start: 2022-08-09 | End: 2022-10-15

## 2022-08-08 RX ORDER — FOLIC ACID 1 MG/1
1000 TABLET ORAL DAILY
Qty: 29 TABLET | Refills: 0 | Status: SHIPPED | OUTPATIENT
Start: 2022-08-08

## 2022-08-08 RX ORDER — LISINOPRIL AND HYDROCHLOROTHIAZIDE 25; 20 MG/1; MG/1
1 TABLET ORAL DAILY
Qty: 90 TABLET | Refills: 3 | Status: SHIPPED | OUTPATIENT
Start: 2022-08-08 | End: 2022-10-21 | Stop reason: HOSPADM

## 2022-08-08 RX ORDER — ALBUTEROL SULFATE 90 UG/1
2 AEROSOL, METERED RESPIRATORY (INHALATION) EVERY 4 HOURS PRN
Status: ON HOLD
Start: 2022-08-08 | End: 2022-10-15

## 2022-08-08 RX ADMIN — FOLIC ACID 1000 MCG: 1 TABLET ORAL at 08:10

## 2022-08-08 RX ADMIN — IPRATROPIUM BROMIDE 0.5 MG: 0.5 SOLUTION RESPIRATORY (INHALATION) at 18:35

## 2022-08-08 RX ADMIN — BUDESONIDE AND FORMOTEROL FUMARATE DIHYDRATE 2 PUFF: 80; 4.5 AEROSOL RESPIRATORY (INHALATION) at 18:35

## 2022-08-08 RX ADMIN — BUDESONIDE AND FORMOTEROL FUMARATE DIHYDRATE 2 PUFF: 80; 4.5 AEROSOL RESPIRATORY (INHALATION) at 07:36

## 2022-08-08 RX ADMIN — SERTRALINE 50 MG: 50 TABLET, FILM COATED ORAL at 08:09

## 2022-08-08 RX ADMIN — BUSPIRONE HYDROCHLORIDE 15 MG: 15 TABLET ORAL at 08:09

## 2022-08-08 RX ADMIN — HYDROCHLOROTHIAZIDE: 25 TABLET ORAL at 08:08

## 2022-08-08 RX ADMIN — CHOLESTYRAMINE 8 G: 4 POWDER, FOR SUSPENSION ORAL at 05:03

## 2022-08-08 RX ADMIN — IPRATROPIUM BROMIDE 0.5 MG: 0.5 SOLUTION RESPIRATORY (INHALATION) at 14:57

## 2022-08-08 RX ADMIN — IPRATROPIUM BROMIDE 0.5 MG: 0.5 SOLUTION RESPIRATORY (INHALATION) at 07:35

## 2022-08-08 RX ADMIN — THIAMINE HYDROCHLORIDE 100 MG: 100 INJECTION, SOLUTION INTRAMUSCULAR; INTRAVENOUS at 15:05

## 2022-08-08 RX ADMIN — Medication 10 ML: at 08:09

## 2022-08-08 RX ADMIN — BUTALBITAL, ACETAMINOPHEN AND CAFFEINE 1 TABLET: 50; 325; 40 TABLET ORAL at 08:08

## 2022-08-08 RX ADMIN — CHOLESTYRAMINE 8 G: 4 POWDER, FOR SUSPENSION ORAL at 15:04

## 2022-08-08 NOTE — DISCHARGE INSTR - APPOINTMENTS
[facility]    8/16/2022 10am    [address] [facility] [address]  [facility] [phone]

## 2022-08-08 NOTE — DISCHARGE PLACEMENT REQUEST
"      To [facility]  Fax: [phone]    From BAKARI Crespo BSN    [facility]  [phone]            Lita Yu (56 y.o. Female)             Date of Birth   1965    Social Security Number       Address   [address] [place] [address]    Home Phone   [phone]    MRN   [id number]       Lutheran   None    Marital Status                               Admission Date   8/3/22    Admission Type   Emergency    Admitting Provider   Zoila Jama MD    Attending Provider   Uriah Finch DO    Department, Room/Bed   [facility] 3A MEDICAL INPATIENT, 345/1       Discharge Date       Discharge Disposition       Discharge Destination                               Attending Provider: Uriah Finch DO    Allergies: Sulfa Antibiotics, Keflex [Cephalexin]    Isolation: None   Infection: None   Code Status: CPR   Advance Care Planning Activity    Ht: 160 cm (63\")   Wt: 57.8 kg (127 lb 6.4 oz)    Admission Cmt: None   Principal Problem: Alcohol withdrawal syndrome, with delirium (HCC) [F10.231]                 Active Insurance as of 8/3/2022     Primary Coverage     Payor Plan Insurance Group Employer/Plan Group    [id number]     Payor Plan Address Payor Plan Phone Number Payor Plan Fax Number Effective Dates    [address]   6/22/2014 - None Entered    [place] [address]       Subscriber Name Subscriber Birth Date Member ID       MILANA YU ELTON 1/21/1966 [id number]                 Emergency Contacts      (Rel.) Home Phone Work Phone Mobile Phone    VICKIKWAMEMILANA (Spouse) [phone] -- [phone]               History & Physical      Alsop, SHARIF Bah at 08/03/22 1948     Attestation signed by Zoila Jama MD at 08/05/22 0849    I have reviewed this documentation and agree.  Electronically signed by Zoila Jama MD, 08/05/22, 8:49 AM EDT.                        [facility] " [facility] Medicine Services      Patient Name: Lita Yu  : 1965  MRN: [id number]  Primary Care Physician:  Norma Saul APRN  Date of admission: 8/3/2022      Subjective      Chief Complaint: Alcohol withdrawal    History of Present Illness: Lita Yu is a 56 y.o. female with past medical history of alcoholism, hypertension, hyperlipidemia, PTSD, seizure, tobacco user who presented to [facility] on 8/3/2022 complaining of alcohol withdrawal.  Patient states she has been trying to cut back on her alcohol use and has been having alcohol withdrawal.  She states she has been having nausea, vomiting, diarrhea.  She has been generally weak and fatigued, and complains of anxiety.  She also states that she has had some hallucinations where she has seen lobsters and crabs, which occurs mainly when she closes her eyes to rest.  Patient complains of migraine headache, which she has a history of. She states she last had alcohol this morning.  She normally drinks over 1/2 pint of bourbon daily.  She states she has done inpatient rehab about 1 year ago but her insurance will not pay for it again.  She called her primary doctor who recommended she come to the emergency room.  Patient stated that she is going to , and has no need for additional resources to cease drinking alcohol.    In the ED, CT head showed no acute abnormality.  All labs unremarkable except potassium 2.3, ALT 63, , albumin 3.4, sodium 133.  All vital signs unremarkable.  Patient received potassium chloride, banana bag in the ED.  Patient admitted to hospitalist service for further evaluation and treatment.    Review of Systems   Constitutional: Negative. Negative for chills and fever.   HENT: Negative.    Eyes: Negative.    Cardiovascular: Negative.  Negative for chest pain.   Respiratory: Negative.  Negative for cough and shortness of breath.    Endocrine: Negative.    Skin: Negative.     Musculoskeletal: Negative.    Gastrointestinal: Positive for diarrhea, nausea and vomiting.   Genitourinary: Negative.    Neurological: Negative.    Psychiatric/Behavioral: The patient is nervous/anxious.    Allergic/Immunologic: Negative.         Personal History     Past Medical History:   Diagnosis Date   • Cirrhosis (HCC)    • COPD (chronic obstructive pulmonary disease) (HCC)    • Depression    • GERD (gastroesophageal reflux disease)    • Hyperlipidemia    • Hypertension    • PTSD (post-traumatic stress disorder)        Past Surgical History:   Procedure Laterality Date   •  SECTION N/A    • COLONOSCOPY N/A 2021    Procedure: COLONOSCOPY with polypectomy x2 and random biopsy;  Surgeon: Osman Clay MD;  Location: [facility] ENDOSCOPY;  Service: Gastroenterology;  Laterality: N/A;  colon polyps   • DENTAL PROCEDURE     • ENDOSCOPY N/A 2021    Procedure: ESOPHAGOGASTRODUODENOSCOPY;  Surgeon: Osman Clay MD;  Location: [facility] ENDOSCOPY;  Service: Gastroenterology;  Laterality: N/A;  esophagitis   • JOINT REPLACEMENT     • REPLACEMENT TOTAL HIP LATERAL POSITION Left    • TONSILLECTOMY     • VAGINAL BIRTH AFTER  SECTION         Family History: family history includes Alcohol abuse in her mother and paternal grandfather. Otherwise pertinent FHx was reviewed and not pertinent to current issue.    Social History:  reports that she has been smoking cigarettes. She has been smoking about 0.25 packs per day. She has never used smokeless tobacco. She reports previous alcohol use. She reports that she does not use drugs.    Home Medications:  Prior to Admission Medications     Prescriptions Last Dose Informant Patient Reported? Taking?    albuterol sulfate  (90 Base) MCG/ACT inhaler   No No    Inhale 2 puffs Every 4 (Four) Hours As Needed for Wheezing.    amLODIPine (Norvasc) 5 MG tablet   No No    Take 1 tablet by mouth Daily.    atorvastatin (LIPITOR) 20 MG tablet   No No    TAKE 1  TABLET BY MOUTH EVERY DAY AT BEDTIME    B-12, Methylcobalamin, 1000 MCG sublingual tablet   Yes No    Take 1 tablet by mouth Every Morning.    busPIRone (BUSPAR) 15 MG tablet   No No    TAKE 1 TABLET BY MOUTH TWICE A DAY    butalbital-acetaminophen-caffeine (FIORICET, ESGIC) -40 MG per tablet   No No    Take 1 tablet by mouth Every 6 (Six) Hours As Needed for Headache (Neck discomfort).    folic acid (FOLVITE) 1 MG tablet   No No    TAKE 1 TABLET BY MOUTH DAILY    lisinopril-hydrochlorothiazide (PRINZIDE,ZESTORETIC) 20-25 MG per tablet   No No    Take 1 tablet by mouth Daily.    Patient taking differently:  Take 1 tablet by mouth Daily. Hold DOS    mupirocin (BACTROBAN) 2 % ointment   Yes No    APPLY SMALL AMOUNT EXTERNALLY TO THE AFFECTED AREA THREE TIMES DAILY    omega-3 acid ethyl esters (LOVAZA) 1 g capsule   No No    Take 2 capsules by mouth 2 (Two) Times a Day.    promethazine (PHENERGAN) 12.5 MG tablet   No No    Take 1 tablet by mouth Every 6 (Six) Hours As Needed for Nausea or Vomiting.    sertraline (ZOLOFT) 100 MG tablet   No No    TAKE 1 TABLET BY MOUTH TWICE A DAY    traZODone (DESYREL) 100 MG tablet   No No    TAKE 1 TABLET BY MOUTH EVERY DAY AT NIGHT    Trelegy Ellipta 100-62.5-25 MCG/INH inhaler   No No    INHALE 1 PUFF BY MOUTH DAILY.            Allergies:  Allergies   Allergen Reactions   • Sulfa Antibiotics Hives   • Keflex [Cephalexin] Hives       Objective      Vitals:   Temp:  [97.6 °F (36.4 °C)-98.7 °F (37.1 °C)] 97.6 °F (36.4 °C)  Heart Rate:  [84-96] 84  Resp:  [16-18] 18  BP: ()/(61-76) 99/61    Physical Exam  Vitals and nursing note reviewed.   Constitutional:       Appearance: Normal appearance.   HENT:      Head: Normocephalic.      Right Ear: External ear normal.      Left Ear: External ear normal.      Nose: Nose normal.      Mouth/Throat:      Pharynx: Oropharynx is clear.   Eyes:      Extraocular Movements: Extraocular movements intact.   Cardiovascular:      Rate and  Rhythm: Normal rate and regular rhythm.      Pulses: Normal pulses.      Heart sounds: Normal heart sounds.   Pulmonary:      Effort: Pulmonary effort is normal.      Breath sounds: Normal breath sounds.   Abdominal:      General: Bowel sounds are normal.      Palpations: Abdomen is soft.   Musculoskeletal:         General: Normal range of motion.      Cervical back: Normal range of motion.   Skin:     General: Skin is warm and dry.   Neurological:      Mental Status: She is alert and oriented to person, place, and time.   Psychiatric:         Mood and Affect: Mood normal.         Behavior: Behavior normal.          Result Review    Result Review:  I have personally reviewed the results from the time of this admission to 8/4/2022 02:46 EDT and agree with these findings:  [x]  Laboratory  []  Microbiology  [x]  Radiology  [x]  EKG/Telemetry   []  Cardiology/Vascular   []  Pathology  []  Old records  []  Other:  Most notable findings include: as above      Assessment & Plan        Active Hospital Problems:  Active Hospital Problems    Diagnosis    • **Alcohol withdrawal syndrome, with delirium (HCC)    • Hypokalemia    • Chronic obstructive pulmonary disease (HCC)    • Hypertension    • Osteoporosis    • Smoker    • Seizure disorder (HCC)    • Post traumatic stress disorder    • Hyperlipidemia      Plan:     Alcohol withdrawal syndrome, with delirium  -CT head reviewed  -ALT 63  -   -Alcohol withdrawal protocol  -Seizure precautions  -Fall precautions  -PT/INR and PTT ordered  -Thiamine level and vitamin B12 level ordered  -Hold all hepatotoxic medications    Hypokalemia  -Potassium 2.3  -Potassium chloride given in the ED  -Electrolyte protocol  -EKG showed sinus rhythm with prolonged QT interval    Hyponatremia  -Sodium 133  -Monitor    Hypertension  Hyperlipidemia  -BP well controlled    Seizure disorder  -Seizure precautions    Chronic obstructive pulmonary disease   -Not in exacerbation    Post traumatic  stress disorder  -Stable    Osteoporosis    Smoker  -Encourage cessation    DVT prophylaxis:  Mechanical DVT prophylaxis orders are present.    CODE STATUS:    Code Status (Patient has no pulse and is not breathing): CPR (Attempt to Resuscitate)  Medical Interventions (Patient has pulse or is breathing): Full Support    Admission Status:  I believe this patient meets observation status.    I discussed the patient's findings and my recommendations with patient.    This patient has been examined wearing appropriate Personal Protective Equipment. 22      Signature: Electronically signed by SHARIF Ibrahim, 22, 10:34 PM EDT.      Electronically signed by Zoila Jama MD at 22 0849       Referral Orders (last 24 hours) (24h ago, onward)     Start     Ordered    22 0000  Ambulatory Referral to Physical Therapy Evaluate and treat        Question:  Specialty needed:  Answer:  Evaluate and treat    22 1519                   Physician Progress Notes (last 24 hours)      Uriah Finch DO at 22 1327              [facility] Medicine Services Daily Progress Note    Patient Name: Lita Yu  : 1965  MRN: [id number]  Primary Care Physician:  Norma Saul APRN  Date of admission: 8/3/2022      Subjective      Chief Complaint: Hallucinations      Patient Reports     22: feels better.  Claims to have visual hallucinations    Review of Systems   All other systems reviewed and are negative.         Objective      Vitals:   Temp:  [97.6 °F (36.4 °C)-98 °F (36.7 °C)] 98 °F (36.7 °C)  Heart Rate:  [72-82] 82  Resp:  [12-20] 12  BP: (88-99)/(53-68) 90/58    Physical Exam  HENT:      Head: Normocephalic.      Nose: Nose normal.   Eyes:      Extraocular Movements: Extraocular movements intact.      Pupils: Pupils are equal, round, and reactive to light.   Cardiovascular:      Rate and Rhythm: Normal rate and regular rhythm.   Pulmonary:       Effort: Pulmonary effort is normal.   Abdominal:      General: Bowel sounds are normal.   Musculoskeletal:         General: Normal range of motion.      Cervical back: Normal range of motion.   Skin:     General: Skin is warm.   Neurological:      General: No focal deficit present.      Mental Status: She is alert.   Psychiatric:         Mood and Affect: Mood normal.             Result Review    Result Review:  I have personally reviewed the results from the time of this admission to 8/8/2022 13:27 EDT and agree with these findings:  [x]  Laboratory  []  Microbiology  [x]  Radiology  []  EKG/Telemetry   []  Cardiology/Vascular   []  Pathology  [x]  Old records  []  Other:            Assessment & Plan      Brief Patient Summary:        ipratropium, 0.5 mg, Nebulization, TID - RT   And  budesonide-formoterol, 2 puff, Inhalation, BID - RT  busPIRone, 15 mg, Oral, BID  cholestyramine light, 2 packet, Oral, Q8H  folic acid, 1,000 mcg, Oral, Daily  lisinopril-hydroCHLOROthiazide (ZESTORETIC) 20-25 mg combo dose, , Oral, Q24H  sertraline, 50 mg, Oral, BID  sodium chloride, 10 mL, Intravenous, Q12H  traZODone, 50 mg, Oral, Nightly             Active Hospital Problems:  Active Hospital Problems    Diagnosis    • **Alcohol withdrawal syndrome, with delirium (HCC)    • Hypokalemia    • Chronic obstructive pulmonary disease (HCC)    • Essential hypertension    • Osteoporosis    • Smoker    • Anemia of chronic disease    • Seizure disorder (HCC)    • Post traumatic stress disorder    • Hyperlipidemia            Alcohol abuse with current delirium tremens and nausea, vomiting and diarrhea due to alcohol withdrawal  Elevated LFTs due to alcohol abuse  -CT head showed no acute intracranial abnormalities  -Patient had a a negative acute hepatitis panel 12/14/2021  -Continue CIWA protocol  -Continue thiamine and folic acid     Diarrhea likely secondary to alcohol withdrawal  -Stool studies including GI panel and C. difficile --> all  negative  -Continue cholestyramine     Hypokalemia  -Potassium 2.3 on admission  -Potassium chloride given in the ED  -Electrolyte replacement protocol ordered  -EKG showed sinus rhythm with prolonged QT interval     Hyponatremia due to alcohol use  -Asymptomatic     Anemia of chronic disease  -Hemodynamically stable   -No evidence of bleeding     Essential hypertension:  -Continue lisinopril and HCTZ    Hyperlipidemia  -Avoid statins due to elevated liver tests     Seizure disorder  -Seizure precautions     Chronic obstructive pulmonary disease   -Not in exacerbation  -Continue bronchodilators     Post traumatic stress disorder  -Continue BuSpar  -Continue sertraline and trazodone at a reduced dose due to prolonged prolonged QT interval  -Stable     Osteoporosis     Smoker  -Encourage cessation      DVT prophylaxis:  Mechanical DVT prophylaxis orders are present.    CODE STATUS:    Code Status (Patient has no pulse and is not breathing): CPR (Attempt to Resuscitate)  Medical Interventions (Patient has pulse or is breathing): Full Support      Disposition:  I expect patient to be discharged defer.    This patient has been examined wearing appropriate Personal Protective Equipment and discussed with nursing. 08/08/22      Electronically signed by Uriah Finch DO, 08/08/22, 13:27 EDT.  [facility] Hospitalist Team             Electronically signed by Uriah Finch DO at 08/08/22 1331          Physical Therapy Notes (last 48 hours)      Merced [place], PTA at 08/07/22 1257  Version 1 of 1       Goal Outcome Evaluation:     Bed mobility - Min-A supine to sit  Transfers - CGA and with rolling walker  Ambulation - 48 feet CGA and Min-A  Pt needs cues for eye gaze ahead instead of looking at the floor.  Pt unsteady, ambulates outside the right side of the walker.  Pt needed abut 4 cues to stay the walker.  Pt very fatigued after about 20 feet and quality of gait declined.     High Intensity  Therapy recommended post-acute care. This is recommended as therapy feels the patient would require 5-6 days per week, 2-3 hours per day. At this time, inpatient rehabilitation (acute rehab) would be the first choice and SNF would be second.. Pt requires no DME at discharge.     Pt desires Home with family assist and Outpatient therapy at discharge. Pt cooperative; agreeable to therapeutic recommendations and plan of care.               Electronically signed by Merced Carbone PTA at 08/07/22 1308     Merced Carbone PTA at 08/07/22 1257  Version 1 of 1       Subjective: Pt agreeable to therapeutic plan of care.    Objective:     Bed mobility - Min-A supine to sit  Transfers - CGA and with rolling walker  Ambulation - 48 feet CGA and Min-A  Pt needs cues for eye gaze ahead instead of looking at the floor.  Pt unsteady, ambulates outside the right side of the walker.  Pt needed abut 4 cues to stay the walker.  Pt very fatigued after about 20 feet and quality of gait declined.     Vitals: WNL    Pain: 0 VAS  Education: Provided education on importance of mobility and skilled verbal / tactile cueing throughout intervention.     Assessment: Lita Yu presents with functional mobility impairments which indicate the need for skilled intervention. Tolerating session today without incident. Pt is far from her PLOF but has good potential to cont to improve her mobility with physical therapy services. Will continue to follow and progress as tolerated.     Plan/Recommendations:   High Intensity Therapy recommended post-acute care. This is recommended as therapy feels the patient would require 5-6 days per week, 2-3 hours per day. At this time, inpatient rehabilitation (acute rehab) would be the first choice and SNF would be second.. Pt requires no DME at discharge.     Pt desires Home with family assist and Outpatient therapy at discharge. Pt cooperative; agreeable to therapeutic recommendations and plan of  care.       Basic Mobility 6-click:  Rollin = Total, A lot = 2, A little = 3; 4 = None  Supine>Sit:   1 = Total, A lot = 2, A little = 3; 4 = None   Sit>Stand with arms:  1 = Total, A lot = 2, A little = 3; 4 = None  Bed>Chair:   1 = Total, A lot = 2, A little = 3; 4 = None  Ambulate in room:  1 = Total, A lot = 2, A little = 3; 4 = None  3-5 Steps with railin = Total, A lot = 2, A little = 3; 4 = None  Score: 16    Modified [place]: 4 = Moderately severe disability (Unable to attend to own bodily needs without assistance, and unable to walk unassisted)     Post-Tx Position: Supine with HOB Elevated, Alarms activated and Call light and personal items within reach  Encouraged pt to stay out of the bed but she declined  PPE: gloves, surgical mask, eyewear protection        Electronically signed by Merced Carbone PTA at 22 [phone]

## 2022-08-08 NOTE — CASE MANAGEMENT/SOCIAL WORK
Continued Stay Note   [facility]     Patient Name: Lita Yu  MRN: [id number]  Today's Date: 8/8/2022    Admit Date: 8/3/2022     Discharge Plan     Row Name 08/08/22 1547       Plan    Plan D/C plan: Home with family, [facility] for OP PT.    Patient/Family in Agreement with Plan yes    Post Acute Provider List Outpatient Therapy    Plan Comments CM met with patient at bedside to follow up on IP rehab. Pt is now refusing IP rehab following hospital stay, pt states she wants to go home, but is agreeable to outpatient rehab. Pt wants to go to OP PT in [place]. CM sent secure chat to Dr. Junior to notify that pt is refusing inpatient rehab and requested order for OP rehab. CM contacted [facility] OP, they are unable to see any new patients at a minimum of 2 weeks. CM contacted [facility] in [place]. They have a new pt appt open next week Tuesday 8/16 @ 10am. CM will place appointment details on discharge summary for patient. CM faxed face sheet, order, and progress notes to [facility] at [phone].                   Expected Discharge Date and Time     Expected Discharge Date Expected Discharge Time    Aug 8, 2022         Met with patient in room wearing PPE: mask, face shield/goggles, gloves, gown.      Maintained distance greater than six feet and spent less than 15 minutes in the room.          CHRISTINE TEMPLETON RN

## 2022-08-08 NOTE — OUTREACH NOTE
Prep Survey    Flowsheet Row Responses   [facility] facility patient discharged from? [facility]   Is LACE score < 7 ? No   Emergency Room discharge w/ pulse ox? No   Eligibility TCM   Hospital [facility]   Date of Admission 08/03/22   Date of Discharge 08/08/22   Discharge Disposition Home or Self Care   Discharge diagnosis alcohol withdrawal syndrome with delirium, hypokalemia   Does the patient have one of the following disease processes/diagnoses(primary or secondary)? Other   Does the patient have Home health ordered? No   Is there a DME ordered? No   Prep survey completed? Yes          MISHEL DOUGLAS - Registered Nurse

## 2022-08-08 NOTE — PLAN OF CARE
Problem: Adult Inpatient Plan of Care  Goal: Plan of Care Review  Outcome: Ongoing, Progressing  Flowsheets (Taken 8/8/2022 0320)  Outcome Evaluation: Pt has been resting abed most of this shift. PRN lorazepam administered x1 so far during this shift r/t CIWA. Pt denied having any hallucinations earlier in this shift. No s/s of hallucinations observed. Pt reports having diarrhea. Pt C/O HA earlier in this shift with PRN medication administered with + effect noted. No additional C/O voiced at this time. Pt remains in isolation r/t C-Diff R/O. Will continue to monitor.  Goal: Patient-Specific Goal (Individualized)  Outcome: Ongoing, Progressing  Goal: Absence of Hospital-Acquired Illness or Injury  Outcome: Ongoing, Progressing  Intervention: Identify and Manage Fall Risk  Recent Flowsheet Documentation  Taken 8/8/2022 0240 by Sukh Marcos RN  Safety Promotion/Fall Prevention:   safety round/check completed   fall prevention program maintained  Taken 8/8/2022 0024 by Sukh Marcos RN  Safety Promotion/Fall Prevention:   safety round/check completed   fall prevention program maintained  Taken 8/7/2022 2245 by Sukh Marcos RN  Safety Promotion/Fall Prevention:   safety round/check completed   fall prevention program maintained  Taken 8/7/2022 2041 by Sukh Marcos RN  Safety Promotion/Fall Prevention:   safety round/check completed   nonskid shoes/slippers when out of bed   fall prevention program maintained   clutter free environment maintained   assistive device/personal items within reach   activity supervised  Intervention: Prevent Skin Injury  Recent Flowsheet Documentation  Taken 8/7/2022 2041 by Sukh Marcos RN  Body Position: supine  Intervention: Prevent and Manage VTE (Venous Thromboembolism) Risk  Recent Flowsheet Documentation  Taken 8/7/2022 2041 by Sukh Marcos, RN  Activity Management: activity adjusted per tolerance  Intervention: Prevent Infection  Recent Flowsheet  Documentation  Taken 8/8/2022 0240 by Sukh Marcos RN  Infection Prevention:   hand hygiene promoted   personal protective equipment utilized   rest/sleep promoted   single patient room provided  Taken 8/8/2022 0024 by Sukh Marcos RN  Infection Prevention:   hand hygiene promoted   personal protective equipment utilized   rest/sleep promoted   single patient room provided  Taken 8/7/2022 2245 by Sukh Marcos RN  Infection Prevention:   hand hygiene promoted   personal protective equipment utilized   rest/sleep promoted   single patient room provided  Taken 8/7/2022 2041 by Sukh Marcos RN  Infection Prevention:   hand hygiene promoted   personal protective equipment utilized   rest/sleep promoted   single patient room provided  Goal: Optimal Comfort and Wellbeing  Outcome: Ongoing, Progressing  Intervention: Monitor Pain and Promote Comfort  Recent Flowsheet Documentation  Taken 8/7/2022 2041 by Sukh Marcos RN  Pain Management Interventions:   care clustered   diversional activity provided   pillow support provided   position adjusted   quiet environment facilitated   see MAR   unnecessary movement minimized  Intervention: Provide Person-Centered Care  Recent Flowsheet Documentation  Taken 8/7/2022 2041 by Sukh Marcos RN  Trust Relationship/Rapport:   care explained   choices provided   questions answered   questions encouraged   thoughts/feelings acknowledged   reassurance provided  Goal: Readiness for Transition of Care  Outcome: Ongoing, Progressing     Problem: Behavioral Health Comorbidity  Goal: Maintenance of Behavioral Health Symptom Control  Outcome: Ongoing, Progressing  Intervention: Maintain Behavioral Health Symptom Control  Recent Flowsheet Documentation  Taken 8/8/2022 0240 by Sukh Marcos RN  Medication Review/Management: medications reviewed  Taken 8/8/2022 0024 by Sukh Marcos RN  Medication Review/Management: medications reviewed  Taken 8/7/2022 2245 by Hemant  BAKARI Luz  Medication Review/Management: medications reviewed  Taken 8/7/2022 2041 by Sukh aMrcos RN  Medication Review/Management: medications reviewed     Problem: COPD (Chronic Obstructive Pulmonary Disease) Comorbidity  Goal: Maintenance of COPD Symptom Control  Outcome: Ongoing, Progressing  Intervention: Maintain COPD-Symptom Control  Recent Flowsheet Documentation  Taken 8/8/2022 0240 by Sukh Marcos RN  Medication Review/Management: medications reviewed  Taken 8/8/2022 0024 by Sukh Marcos RN  Medication Review/Management: medications reviewed  Taken 8/7/2022 2245 by Sukh Marcos RN  Medication Review/Management: medications reviewed  Taken 8/7/2022 2041 by Sukh Marcos RN  Supportive Measures:   active listening utilized   verbalization of feelings encouraged  Medication Review/Management: medications reviewed     Problem: Hypertension Comorbidity  Goal: Blood Pressure in Desired Range  Outcome: Ongoing, Progressing  Intervention: Maintain Blood Pressure Management  Recent Flowsheet Documentation  Taken 8/8/2022 0240 by Sukh Marcos RN  Medication Review/Management: medications reviewed  Taken 8/8/2022 0024 by Sukh Marcos RN  Medication Review/Management: medications reviewed  Taken 8/7/2022 2245 by Sukh Marcos RN  Medication Review/Management: medications reviewed  Taken 8/7/2022 2041 by Sukh Marcos RN  Medication Review/Management: medications reviewed     Problem: Seizure Disorder Comorbidity  Goal: Maintenance of Seizure Control  Outcome: Ongoing, Progressing  Intervention: Maintain Seizure-Symptom Control  Recent Flowsheet Documentation  Taken 8/7/2022 2041 by Sukh Marcos RN  Seizure Precautions:   clutter-free environment maintained   side rails padded     Problem: Fall Injury Risk  Goal: Absence of Fall and Fall-Related Injury  Outcome: Ongoing, Progressing  Intervention: Identify and Manage Contributors  Recent Flowsheet Documentation  Taken 8/8/2022  0240 by Sukh Marcos RN  Medication Review/Management: medications reviewed  Taken 8/8/2022 0024 by Sukh Marcos RN  Medication Review/Management: medications reviewed  Taken 8/7/2022 2245 by Sukh Marcos RN  Medication Review/Management: medications reviewed  Taken 8/7/2022 2041 by Sukh Marcos RN  Medication Review/Management: medications reviewed  Self-Care Promotion: BADL personal objects within reach  Intervention: Promote Injury-Free Environment  Recent Flowsheet Documentation  Taken 8/8/2022 0240 by Sukh Marcos RN  Safety Promotion/Fall Prevention:   safety round/check completed   fall prevention program maintained  Taken 8/8/2022 0024 by Sukh Marcos RN  Safety Promotion/Fall Prevention:   safety round/check completed   fall prevention program maintained  Taken 8/7/2022 2245 by Sukh Marcos RN  Safety Promotion/Fall Prevention:   safety round/check completed   fall prevention program maintained  Taken 8/7/2022 2041 by Sukh Marcos RN  Safety Promotion/Fall Prevention:   safety round/check completed   nonskid shoes/slippers when out of bed   fall prevention program maintained   clutter free environment maintained   assistive device/personal items within reach   activity supervised   Goal Outcome Evaluation:              Outcome Evaluation: Pt has been resting abed most of this shift. PRN lorazepam administered x1 so far during this shift r/t CIWA. Pt denied having any hallucinations earlier in this shift. No s/s of hallucinations observed. Pt reports having diarrhea. Pt C/O HA earlier in this shift with PRN medication administered with + effect noted. No additional C/O voiced at this time. Pt remains in isolation r/t C-Diff R/O. Will continue to monitor.

## 2022-08-08 NOTE — PLAN OF CARE
"Lita Yu presents with ADL impairments below baseline abilities which indicate the need for continued skilled intervention while inpatient. Pt agreeable to minimal activity this date. Completes bed mobility with Min A. Sit<>stand with Min A. Pt states she is no longer \"Eating\" because \"I always get diarrhea afterward\".  Edu on importance of OOB activity, but pt declines transfer to chair this date. She completes minimal sit<>stand, with poor activity tolerance and SOA with minimal exertion tasks. Pt continues to be below PLOF, and would benefit from IP rehab to facilitate increased strength and activity tolerance within the home. Tolerating session today without incident. Will continue to follow and progress as tolerated.   "

## 2022-08-08 NOTE — THERAPY TREATMENT NOTE
"Subjective: Pt agreeable to therapeutic plan of care.  Cognition: oriented to Person and Place. Pt requesting pain Rx. NSG states pt has recently received pain meds.     Objective:     Bed Mobility: Min-A  Functional Transfers: Min-A  Functional Ambulation: N/A or Not attempted.    Grooming: SBA for set up  ADL Position: edge of bed sitting and unsupported sitting    OT provided and reviewed BUE ROM HEP. Pt completes 1 set x 10 reps of each exercise with fair understanding. Encouraged to complete 3x/daily to facilitate increased activity tolerance. Pt will require additional education to ensure carryover.     Vitals: WNL    Pain: 9 VAS HA  Education: Provided education on importance of mobility and skilled verbal / tactile cueing throughout intervention.     Assessment: Lita Yu presents with ADL impairments below baseline abilities which indicate the need for continued skilled intervention while inpatient. Pt agreeable to minimal activity this date. Completes bed mobility with Min A. Sit<>stand with Min A. Pt states she is no longer \"Eating\" because \"I always get diarrhea afterward\".  Edu on importance of OOB activity, but pt declines transfer to chair this date. She completes minimal sit<>stand, with poor activity tolerance and SOA with minimal exertion tasks. Pt continues to be below PLOF, and would benefit from IP rehab to facilitate increased strength and activity tolerance within the home. Tolerating session today without incident. Will continue to follow and progress as tolerated.     Plan/Recommendations:   Pt would benefit from Inpatient Rehabilitation placement at discharge from facility.   Pt desires Home at discharge. Pt cooperative; agreeable to therapeutic recommendations and plan of care.     Post-Tx Position: Supine with HOB elevated. Bed alarm on and call light within reach.  PPE: gloves, surgical mask, eyewear protection    "

## 2022-08-08 NOTE — PROGRESS NOTES
[facility] Medicine Services Daily Progress Note    Patient Name: Lita Yu  : 1965  MRN: [id number]  Primary Care Physician:  Norma Saul APRN  Date of admission: 8/3/2022      Subjective      Chief Complaint: Hallucinations      Patient Reports     24: feels better.  Claims to have visual hallucinations    Review of Systems   All other systems reviewed and are negative.         Objective      Vitals:   Temp:  [97.6 °F (36.4 °C)-98 °F (36.7 °C)] 98 °F (36.7 °C)  Heart Rate:  [72-82] 82  Resp:  [12-20] 12  BP: (88-99)/(53-68) 90/58    Physical Exam  HENT:      Head: Normocephalic.      Nose: Nose normal.   Eyes:      Extraocular Movements: Extraocular movements intact.      Pupils: Pupils are equal, round, and reactive to light.   Cardiovascular:      Rate and Rhythm: Normal rate and regular rhythm.   Pulmonary:      Effort: Pulmonary effort is normal.   Abdominal:      General: Bowel sounds are normal.   Musculoskeletal:         General: Normal range of motion.      Cervical back: Normal range of motion.   Skin:     General: Skin is warm.   Neurological:      General: No focal deficit present.      Mental Status: She is alert.   Psychiatric:         Mood and Affect: Mood normal.             Result Review    Result Review:  I have personally reviewed the results from the time of this admission to 2022 13:27 EDT and agree with these findings:  [x]  Laboratory  []  Microbiology  [x]  Radiology  []  EKG/Telemetry   []  Cardiology/Vascular   []  Pathology  [x]  Old records  []  Other:            Assessment & Plan      Brief Patient Summary:        ipratropium, 0.5 mg, Nebulization, TID - RT   And  budesonide-formoterol, 2 puff, Inhalation, BID - RT  busPIRone, 15 mg, Oral, BID  cholestyramine light, 2 packet, Oral, Q8H  folic acid, 1,000 mcg, Oral, Daily  lisinopril-hydroCHLOROthiazide (ZESTORETIC) 20-25 mg combo dose, , Oral, Q24H  sertraline, 50 mg, Oral,  BID  sodium chloride, 10 mL, Intravenous, Q12H  traZODone, 50 mg, Oral, Nightly             Active Hospital Problems:  Active Hospital Problems    Diagnosis    • **Alcohol withdrawal syndrome, with delirium (HCC)    • Hypokalemia    • Chronic obstructive pulmonary disease (HCC)    • Essential hypertension    • Osteoporosis    • Smoker    • Anemia of chronic disease    • Seizure disorder (HCC)    • Post traumatic stress disorder    • Hyperlipidemia            Alcohol abuse with current delirium tremens and nausea, vomiting and diarrhea due to alcohol withdrawal  Elevated LFTs due to alcohol abuse  -CT head showed no acute intracranial abnormalities  -Patient had a a negative acute hepatitis panel 12/14/2021  -Continue CIWA protocol  -Continue thiamine and folic acid     Diarrhea likely secondary to alcohol withdrawal  -Stool studies including GI panel and C. difficile --> all negative  -Continue cholestyramine     Hypokalemia  -Potassium 2.3 on admission  -Potassium chloride given in the ED  -Electrolyte replacement protocol ordered  -EKG showed sinus rhythm with prolonged QT interval     Hyponatremia due to alcohol use  -Asymptomatic     Anemia of chronic disease  -Hemodynamically stable   -No evidence of bleeding     Essential hypertension:  -Continue lisinopril and HCTZ    Hyperlipidemia  -Avoid statins due to elevated liver tests     Seizure disorder  -Seizure precautions     Chronic obstructive pulmonary disease   -Not in exacerbation  -Continue bronchodilators     Post traumatic stress disorder  -Continue BuSpar  -Continue sertraline and trazodone at a reduced dose due to prolonged prolonged QT interval  -Stable     Osteoporosis     Smoker  -Encourage cessation      DVT prophylaxis:  Mechanical DVT prophylaxis orders are present.    CODE STATUS:    Code Status (Patient has no pulse and is not breathing): CPR (Attempt to Resuscitate)  Medical Interventions (Patient has pulse or is breathing): Full  Support      Disposition:  I expect patient to be discharged defer.    This patient has been examined wearing appropriate Personal Protective Equipment and discussed with nursing. 08/08/22      Electronically signed by Uriah Finch DO, 08/08/22, 13:27 EDT.  [facility] Hospitalist Team

## 2022-08-09 ENCOUNTER — TRANSITIONAL CARE MANAGEMENT TELEPHONE ENCOUNTER (OUTPATIENT)
Dept: CALL CENTER | Facility: HOSPITAL | Age: 57
End: 2022-08-09

## 2022-08-09 PROBLEM — E87.6 HYPOKALEMIA: Status: RESOLVED | Noted: 2022-08-04 | Resolved: 2022-08-09

## 2022-08-09 PROBLEM — F10.931 ALCOHOL WITHDRAWAL SYNDROME, WITH DELIRIUM: Status: RESOLVED | Noted: 2022-08-03 | Resolved: 2022-08-09

## 2022-08-09 NOTE — OUTREACH NOTE
Call Center TCM Note    Flowsheet Row Responses   [facility] patient discharged from? [facility]   Does the patient have one of the following disease processes/diagnoses(primary or secondary)? Other   TCM attempt successful? No   Unsuccessful attempts Attempt 1          Stephanie Dejesus RN    8/9/2022, 10:55 EDT

## 2022-08-09 NOTE — OUTREACH NOTE
Call Center TCM Note    Flowsheet Row Responses   [facility] patient discharged from? [facility]   Does the patient have one of the following disease processes/diagnoses(primary or secondary)? Other   TCM attempt successful? No   Unsuccessful attempts Attempt 2          Stephanie Dejesus RN    8/9/2022, 12:42 EDT

## 2022-08-09 NOTE — DISCHARGE SUMMARY
[facility]  DISCHARGE SUMMARY    Patient Name: Lita Yu  : 1965  MRN: [id number]    Date of Admission: 8/3/2022  Date of Discharge:  2022  Primary Care Physician: Norma Saul APRN      Presenting Problem:   Hypokalemia [E87.6]  Alcohol withdrawal syndrome, with delirium (HCC) [F10.231]    Active and Resolved Hospital Problems:  Active Hospital Problems    Diagnosis POA   • **Alcohol withdrawal syndrome, with delirium (HCC) [F10.231] Yes   • Hypokalemia [E87.6] Yes   • Chronic obstructive pulmonary disease (HCC) [J44.9] Yes   • Essential hypertension [I10] Yes   • Osteoporosis [M81.0] Yes   • Smoker [F17.200] Yes   • Anemia of chronic disease [D63.8] Yes   • Seizure disorder (HCC) [G40.909] Yes   • Post traumatic stress disorder [F43.10] Yes   • Hyperlipidemia [E78.5] Yes      Resolved Hospital Problems   No resolved problems to display.         Hospital Course     Hospital Course:      The patient is a 56-year-old female with history of alcohol abuse, hypertension, hyperlipidemia, PTSD, seizure disorder and active tobacco use.    The patient came to the emergency room on 8/3/22 complaining of tremors, nausea, vomiting, diarrhea and generalized fatigue.  The patient usually drinks half a pint of bourbon daily and was in alcohol rehab a year ago.    In the ED, CT of the head ruled out acute intracranial pathology.    The patient was then admitted to the medical floor for treatment of alcohol withdrawal.  Infectious causes for diarrhea were ruled out.  Hyponatremia due to alcohol abuse and hypokalemia due to diarrhea were treated.  The patient refused to go to rehab therefore was discharged home on 2022.      DISCHARGE Follow Up Recommendations for labs and diagnostics:       Reasons For Change In Medications and Indications for New Medications:      Day of Discharge     Vital Signs:  Heart Rate:  [69] 69  Resp:  [14] 14    Physical  Exam:    Physical Exam  HENT:      Head: Normocephalic.      Nose: Nose normal.   Eyes:      Extraocular Movements: Extraocular movements intact.      Pupils: Pupils are equal, round, and reactive to light.   Cardiovascular:      Rate and Rhythm: Normal rate and regular rhythm.   Pulmonary:      Effort: Pulmonary effort is normal.   Abdominal:      General: Bowel sounds are normal.   Musculoskeletal:         General: Normal range of motion.      Cervical back: Normal range of motion.   Skin:     General: Skin is warm.   Neurological:      Mental Status: She is alert. Mental status is at baseline.   Psychiatric:         Mood and Affect: Mood normal.            Pertinent  and/or Most Recent Results     LAB RESULTS:      Lab 08/06/22  0220 08/05/22  0113 08/03/22  2347 08/03/22  1705   WBC 4.80 4.40 5.80 7.20   HEMOGLOBIN 10.0* 9.4* 10.5* 12.3   HEMATOCRIT 30.6* 28.7* 32.0* 36.8   PLATELETS 166 153 176 226   NEUTROS ABS 2.50 2.40 3.30 3.82   LYMPHS ABS 1.60 1.30 1.60  --    MONOS ABS 0.60 0.50 0.90  --    EOS ABS 0.10 0.10 0.10 0.07   .2* 115.4* 113.9* 114.1*   PROTIME  --   --  13.4*  --    APTT  --   --  32.2*  --          Lab 08/07/22  2354 08/07/22  0243 08/06/22  0220 08/05/22  0113 08/04/22  1828 08/04/22  0828 08/03/22  2347 08/03/22  1705   SODIUM  --   --  137 139  --   --  137 133*   POTASSIUM 4.1 4.2 3.1* 3.8 3.6   < > 2.6* 2.3*   CHLORIDE  --   --  104 110*  --   --  103 96*   CO2  --   --  23.0 22.0  --   --  23.0 21.0*   ANION GAP  --   --  10.0 7.0  --   --  11.0 16.0*   BUN  --   --  <2* 2*  --   --  2* 2*   CREATININE  --   --  0.58 0.56*  --   --  0.54* 0.66   EGFR  --   --  107.7 108.6  --   --  109.6 104.4   GLUCOSE  --   --  82 126*  --   --  82 99   CALCIUM  --   --  8.3* 7.9*  --   --  7.7* 8.5*   MAGNESIUM 1.9 1.9 1.7 1.6  --   --  1.7 1.8    < > = values in this interval not displayed.         Lab 08/06/22  0220 08/05/22  0113 08/03/22  2347 08/03/22  1705   TOTAL PROTEIN 6.4 6.1 6.7 8.4    ALBUMIN 2.70* 2.60* 2.70* 3.40*   GLOBULIN 3.7 3.5 4.0 5.0   ALT (SGPT) 42* 41* 50* 63*   AST (SGOT) 132* 151* 187* 242*   BILIRUBIN 0.7 0.7 0.8 0.8   ALK PHOS 180* 181* 198* 248*   LIPASE  --   --   --  47         Lab 08/03/22 2347 08/03/22  1705   TROPONIN T <0.010 <0.010   PROTIME 13.4*  --    INR 1.32*  --              Lab 08/03/22 2347   VITAMIN B 12 1,331*         Brief Urine Lab Results     None        Microbiology Results (last 10 days)     Procedure Component Value - Date/Time    Gastrointestinal Panel, PCR - Stool, Per Rectum [[id number]]  (Normal) Collected: 08/06/22 0217    Lab Status: Final result Specimen: Stool from Per Rectum Updated: 08/06/22 0427     Campylobacter Not Detected     Plesiomonas shigelloides Not Detected     Salmonella Not Detected     Vibrio Not Detected     Vibrio cholerae Not Detected     Yersinia enterocolitica Not Detected     Enteroaggregative E. coli (EAEC) Not Detected     Enteropathogenic E. coli (EPEC) Not Detected     Enterotoxigenic E. coli (ETEC) lt/st Not Detected     Shiga-like toxin-producing E. coli (STEC) stx1/stx2 Not Detected     Shigella/Enteroinvasive E. coli (EIEC) Not Detected     Cryptosporidium Not Detected     Cyclospora cayetanensis Not Detected     Entamoeba histolytica Not Detected     Giardia lamblia Not Detected     Adenovirus F40/41 Not Detected     Astrovirus Not Detected     Norovirus GI/GII Not Detected     Rotavirus A Not Detected     Sapovirus (I, II, IV or V) Not Detected    Narrative:      If Aeromonas, Staphylococcus aureus or Bacillus cereus are suspected, please order [id number]: Stool Culture, Aeromonas, S aureus, B Cereus.    Clostridioides difficile Toxin - Stool, Per Rectum [[id number]]  (Normal) Collected: 08/06/22 0217    Lab Status: Final result Specimen: Stool from Per Rectum Updated: 08/06/22 0727    Narrative:      The following orders were created for panel order Clostridioides difficile Toxin - Stool, Per Rectum.  Procedure                                Abnormality         Status                     ---------                               -----------         ------                     Clostridioides difficile...[[id number]]  Normal              Final result                 Please view results for these tests on the individual orders.    Clostridioides difficile EIA - Stool, Per Rectum [[id number]]  (Normal) Collected: 08/06/22 0217    Lab Status: Final result Specimen: Stool from Per Rectum Updated: 08/06/22 0727     C Diff GDH / Toxin Negative    COVID PRE-OP / PRE-PROCEDURE SCREENING ORDER (NO ISOLATION) - Swab, Nasopharynx [[id number]]  (Normal) Collected: 08/03/22 1854    Lab Status: Final result Specimen: Swab from Nasopharynx Updated: 08/03/22 1945    Narrative:      The following orders were created for panel order COVID PRE-OP / PRE-PROCEDURE SCREENING ORDER (NO ISOLATION) - Swab, Nasopharynx.  Procedure                               Abnormality         Status                     ---------                               -----------         ------                     COVID-19,CEPHEID/CARRIE,CO...[[id number]]  Normal              Final result                 Please view results for these tests on the individual orders.    COVID-19,CEPHEID/CARRIE,COR/[facility]/PAD/[facility] IN-HOUSE(OR EMERGENT/ADD-ON),NP SWAB IN TRANSPORT MEDIA 3-4 HR TAT, RT-PCR - Swab, Nasopharynx [[id number]]  (Normal) Collected: 08/03/22 1854    Lab Status: Final result Specimen: Swab from Nasopharynx Updated: 08/03/22 1945     COVID19 Not Detected    Narrative:      Fact sheet for providers: https://www.fda.gov/media/066017/download     Fact sheet for patients: https://www.fda.gov/media/565430/download  Fact sheet for providers: https://www.fda.gov/media/841623/download     Fact sheet for patients: https://www.fda.gov/media/847424/download          CT Head Without Contrast    Result Date: 7/18/2022  Impression: No acute abnormality  Electronically Signed ByAnila Tomas On:7/18/2022  7:14 PM This report was finalized on [id number] by  Glenn Tomas, .                  Labs Pending at Discharge:      Procedures Performed           Consults:   Consults     No orders found from 7/5/2022 to 8/4/2022.            Discharge Details        Discharge Medications      New Medications      Instructions Start Date   cholestyramine light 4 g packet   8 g, Oral, Every 8 Hours Scheduled      Thiamine Mononitrate 100 MG tablet   100 mg, Oral, Daily         Changes to Medications      Instructions Start Date   lisinopril-hydrochlorothiazide 20-25 MG per tablet  Commonly known as: PRINZIDE,ZESTORETIC  What changed: additional instructions   1 tablet, Oral, Daily         Continue These Medications      Instructions Start Date   albuterol sulfate  (90 Base) MCG/ACT inhaler  Commonly known as: PROVENTIL HFA;VENTOLIN HFA;PROAIR HFA   2 puffs, Inhalation, Every 4 Hours PRN      atorvastatin 20 MG tablet  Commonly known as: LIPITOR   20 mg, Oral, Daily      busPIRone 15 MG tablet  Commonly known as: BUSPAR   15 mg, Oral, 2 Times Daily      butalbital-acetaminophen-caffeine -40 MG per tablet  Commonly known as: FIORICET, ESGIC   1 tablet, Oral, Every 6 Hours PRN      folic acid 1 MG tablet  Commonly known as: FOLVITE   1,000 mcg, Oral, Daily      omega-3 acid ethyl esters 1 g capsule  Commonly known as: LOVAZA   2 g, Oral, 2 Times Daily      PROBIOTIC (LACTOBACILLUS) PO   1 dose, Oral, Daily      promethazine 12.5 MG tablet  Commonly known as: PHENERGAN   12.5 mg, Oral, Every 6 Hours PRN      psyllium 58.6 % packet  Commonly known as: METAMUCIL   1 packet, Oral, Daily      sertraline 100 MG tablet  Commonly known as: ZOLOFT   100 mg, Oral, 2 Times Daily      traZODone 100 MG tablet  Commonly known as: DESYREL   100 mg, Oral, Nightly      Trelegy Ellipta 100-62.5-25 MCG/INH inhaler  Generic drug: Fluticasone-Umeclidin-Vilant   1 puff, Inhalation, Daily - RT      vitamin B-12 500 MCG tablet  Commonly  known as: CYANOCOBALAMIN   1,000 mcg, Oral, Daily             Allergies   Allergen Reactions   • Sulfa Antibiotics Hives   • Keflex [Cephalexin] Hives         Discharge Disposition:   Home or Self Care    Diet:  Hospital:No active diet order        Discharge Activity:   Activity Instructions    As Tolerated            Discharge Condition: Hemodynamically stable      CODE STATUS:  Code Status and Medical Interventions:   Ordered at: 08/03/22 1957     Code Status (Patient has no pulse and is not breathing):    CPR (Attempt to Resuscitate)     Medical Interventions (Patient has pulse or is breathing):    Full Support         Future Appointments   Date Time Provider Department Center   8/24/2022  3:00 PM Namita Prado, PT [facility]       Additional Instructions for the Follow-ups that You Need to Schedule     Ambulatory Referral to Physical Therapy Evaluate and treat   As directed      Specialty needed: Evaluate and treat         Discharge Follow-up with PCP   As directed       Currently Documented PCP:    Norma Saul APRN    PCP Phone Number:    [phone]     Follow Up Details: 2 weeks               Time spent on Discharge including face to face service:  15 minutes    This patient has been examined wearing appropriate Personal Protective Equipment and discussed with nursing. 08/09/22      Signature: Electronically signed by Uriah Finch DO, 08/09/22, 4:41 PM EDT.

## 2022-08-09 NOTE — CASE MANAGEMENT/SOCIAL WORK
Case Management Discharge Note      Final Note: Home, Outpatient PT at [facility]    Provided Post Acute Provider List?: Yes  Post Acute Provider List: Outpatient Therapy  N/A Provider List Comment: Anticipate routine home  Delivered To: Patient  Method of Delivery: In person    Selected Continued Care - Discharged on 8/8/2022 Admission date: 8/3/2022 - Discharge disposition: Home or Self Care            Transportation Services  Private: Car    Final Discharge Disposition Code: 01 - home or self-care

## 2022-08-10 ENCOUNTER — TRANSITIONAL CARE MANAGEMENT TELEPHONE ENCOUNTER (OUTPATIENT)
Dept: CALL CENTER | Facility: HOSPITAL | Age: 57
End: 2022-08-10

## 2022-08-10 DIAGNOSIS — G47.00 INSOMNIA, UNSPECIFIED: ICD-10-CM

## 2022-08-10 RX ORDER — TRAZODONE HYDROCHLORIDE 100 MG/1
TABLET ORAL
Qty: 30 TABLET | Refills: 1 | Status: SHIPPED | OUTPATIENT
Start: 2022-08-10 | End: 2022-09-08

## 2022-08-10 NOTE — OUTREACH NOTE
Call Center TCM Note    Flowsheet Row Responses   [facility] patient discharged from? [facility]   Does the patient have one of the following disease processes/diagnoses(primary or secondary)? Other   TCM attempt successful? Yes   Call start time 1000   Call end time 1004   Discharge diagnosis alcohol withdrawal syndrome with delirium, hypokalemia   Meds reviewed with patient/caregiver? Yes   Is the patient having any side effects they believe may be caused by any medication additions or changes? No   Does the patient have all medications ordered at discharge? Yes   Is the patient taking all medications as directed (includes completed medication regime)? Yes   Does the patient have a primary care provider?  Yes   Does the patient have an appointment with their PCP within 7 days of discharge? No   What is preventing the patient from scheduling follow up appointments within 7 days of discharge? Haven't had time   Has the patient kept scheduled appointments due by today? N/A   Comments No appts available in the TCM time frame.  Will message office   Psychosocial issues? No   Did the patient receive a copy of their discharge instructions? Yes   Nursing interventions Reviewed instructions with patient   What is the patient's perception of their health status since discharge? Improving   Is the patient/caregiver able to teach back signs and symptoms related to disease process for when to call PCP? Yes   Is the patient/caregiver able to teach back signs and symptoms related to disease process for when to call 911? Yes   Is the patient/caregiver able to teach back the hierarchy of who to call/visit for symptoms/problems? PCP, Specialist, Home health nurse, Urgent Care, ED, 911 Yes   Additional teach back comments States she is doing better today.  Was able to keep some french fries down.  She is still having diarrhea.  She is drinking fluids to prevent dehydration.    TCM call completed? Yes   Wrap up additional comments  Will message PCP office for TCM follow up due to none available in the TCM time frame.           Jelena Castellanos LPN    8/10/2022, 10:05 EDT

## 2022-08-17 ENCOUNTER — OFFICE VISIT (OUTPATIENT)
Dept: FAMILY MEDICINE CLINIC | Facility: CLINIC | Age: 57
End: 2022-08-17

## 2022-08-17 VITALS
BODY MASS INDEX: 19.84 KG/M2 | HEART RATE: 103 BPM | OXYGEN SATURATION: 97 % | SYSTOLIC BLOOD PRESSURE: 128 MMHG | DIASTOLIC BLOOD PRESSURE: 84 MMHG | WEIGHT: 112 LBS | TEMPERATURE: 97.8 F

## 2022-08-17 DIAGNOSIS — F10.20 ALCOHOLISM: Primary | ICD-10-CM

## 2022-08-17 DIAGNOSIS — R51.9 NONINTRACTABLE HEADACHE, UNSPECIFIED CHRONICITY PATTERN, UNSPECIFIED HEADACHE TYPE: ICD-10-CM

## 2022-08-17 DIAGNOSIS — R10.84 GENERALIZED ABDOMINAL PAIN: ICD-10-CM

## 2022-08-17 PROCEDURE — 99495 TRANSJ CARE MGMT MOD F2F 14D: CPT | Performed by: NURSE PRACTITIONER

## 2022-08-17 RX ORDER — BUTALBITAL, ACETAMINOPHEN AND CAFFEINE 50; 325; 40 MG/1; MG/1; MG/1
1 TABLET ORAL EVERY 6 HOURS PRN
Qty: 20 TABLET | Refills: 0 | Status: ON HOLD | OUTPATIENT
Start: 2022-08-17 | End: 2022-10-15

## 2022-08-17 RX ORDER — PROMETHAZINE HYDROCHLORIDE 12.5 MG/1
12.5 TABLET ORAL EVERY 6 HOURS PRN
Qty: 12 TABLET | Refills: 0 | Status: ON HOLD | OUTPATIENT
Start: 2022-08-17 | End: 2022-10-15

## 2022-08-17 NOTE — PATIENT INSTRUCTIONS
Go to [facility] on [address] tomorrow for appt with Nya Jolley at 1 pm  Push water intake

## 2022-08-17 NOTE — PROGRESS NOTES
Transitional Care Follow Up Visit  Subjective     Lita Yu is a 56 y.o. female who presents for a transitional care management visit.    Within 48 business hours after discharge our office contacted her via telephone to coordinate her care and needs.      I reviewed and discussed the details of that call along with the discharge summary, hospital problems, inpatient lab results, inpatient diagnostic studies, and consultation reports with Lita.     Current outpatient and discharge medications have been reconciled for the patient.  Reviewed by: SHARIF Agudelo      Date of TCM Phone Call 8/8/2022   [facility]   Date of Admission 8/3/2022   Date of Discharge 8/8/2022   Discharge Disposition Home or Self Care     Risk for Readmission (LACE) Score: 11 (8/8/2022  6:01 AM)      Patient is here today for hospital follow-up.  Patient was admitted to [facility] from August 3 through August 4.  She was sent there after being seen in PCP office.  Patient had a relapse with her alcoholism.  She had been able to eat for numerous weeks and had significant weight loss  Liver enzymes have been elevated  She sees gastroenterology but hasnt seen them in a while  Pt states she is still vomiting and having diarrhea daily.  She hasnt been able to eat anything and keep it down.  She tries daily  She drinks ensure but throws that up.  She is drinking some water.   Ginger ale helps  She hasnt drank alcohol since the day she was admitted.  She has HA everyday.  It is in the center of her forehead.   She takes fioricet and it helps.  She hasnt been able to keep any of her other meds down such as zoloft.   She vomits the cholestyramine.   She has more nausea than she does abdominal pain.  She has tried immodium.   She is down 7lbs from her last visit.     Hospital Course:     The patient is a 56-year-old female with history of alcohol abuse, hypertension, hyperlipidemia, PTSD, seizure disorder and active  tobacco use.     The patient came to the emergency room on 8/3/22 complaining of tremors, nausea, vomiting, diarrhea and generalized fatigue.  The patient usually drinks half a pint of bourbon daily and was in alcohol rehab a year ago.     In the ED, CT of the head ruled out acute intracranial pathology.     The patient was then admitted to the medical floor for treatment of alcohol withdrawal.  Infectious causes for diarrhea were ruled out.  Hyponatremia due to alcohol abuse and hypokalemia due to diarrhea were treated.  The patient refused to go to rehab therefore was discharged home on 8/8/2022.        Course During Hospital Stay:  See HPI     The following portions of the patient's history were reviewed and updated as appropriate: allergies, current medications, past family history, past medical history, past social history, past surgical history and problem list.    Review of Systems   Constitutional: Positive for fatigue. Negative for chills and fever.   Respiratory: Negative for chest tightness and shortness of breath.    Cardiovascular: Negative for chest pain and palpitations.   Gastrointestinal: Positive for abdominal pain, diarrhea, nausea and vomiting.   Neurological: Positive for dizziness and headaches.   Psychiatric/Behavioral: Negative for self-injury and suicidal ideas. The patient is not nervous/anxious.        Objective   Physical Exam  Vitals reviewed.   Constitutional:       General: She is not in acute distress.     Appearance: Normal appearance. She is well-developed. She is not diaphoretic.   HENT:      Head: Normocephalic and atraumatic.   Eyes:      General:         Right eye: No discharge.         Left eye: No discharge.      Extraocular Movements: Extraocular movements intact.      Conjunctiva/sclera: Conjunctivae normal.   Cardiovascular:      Rate and Rhythm: Normal rate and regular rhythm.   Pulmonary:      Effort: Pulmonary effort is normal. No respiratory distress.      Breath  sounds: Normal breath sounds. No wheezing or rales.   Abdominal:      General: Bowel sounds are normal.      Palpations: Abdomen is soft.      Tenderness: There is no abdominal tenderness.   Musculoskeletal:         General: Normal range of motion.      Cervical back: Normal range of motion.   Skin:     General: Skin is warm and dry.   Neurological:      General: No focal deficit present.      Mental Status: She is alert and oriented to person, place, and time.   Psychiatric:         Mood and Affect: Mood normal.         Behavior: Behavior normal.         Thought Content: Thought content normal.         Judgment: Judgment normal.         Assessment & Plan   Problems Addressed this Visit    None     Visit Diagnoses     Alcoholism (HCC)    -  Primary    Has not drink alcohol since being in the hospital  Going to AA meetings    Generalized abdominal pain        More nauseous than pain  Vomiting daily and diarrhea daily  Got an appointment with GI tomorrow  Unable to keep medication down at this time  Phenergan as neede    Nonintractable headache, unspecified chronicity pattern, unspecified headache type        Believe it is likely due to dehydration  Mother had brain tumors  CT in 2019 was normal  Fioricet as needed  Unable to keep other medications down at this time  Follow-up once nausea has resolved    Relevant Medications    butalbital-acetaminophen-caffeine (FIORICET, ESGIC) -40 MG per tablet    promethazine (PHENERGAN) 12.5 MG tablet      Diagnoses       Codes Comments    Alcoholism (HCC)    -  Primary ICD-10-CM: F10.20  ICD-9-CM: 303.90 Has not drink alcohol since being in the hospital  Going to AA meetings    Generalized abdominal pain     ICD-10-CM: R10.84  ICD-9-CM: 789.07 More nauseous than pain  Vomiting daily and diarrhea daily  Got an appointment with GI tomorrow  Unable to keep medication down at this time  Phenergan as neede    Nonintractable headache, unspecified chronicity pattern, unspecified  headache type     ICD-10-CM: R51.9  ICD-9-CM: 784.0 Believe it is likely due to dehydration  Mother had brain tumors  CT in 2019 was normal  Fioricet as needed  Unable to keep other medications down at this time

## 2022-08-18 ENCOUNTER — OFFICE (OUTPATIENT)
Dept: URBAN - METROPOLITAN AREA CLINIC 64 | Facility: CLINIC | Age: 57
End: 2022-08-18

## 2022-08-18 VITALS
SYSTOLIC BLOOD PRESSURE: 132 MMHG | DIASTOLIC BLOOD PRESSURE: 92 MMHG | HEIGHT: 63 IN | HEART RATE: 93 BPM | WEIGHT: 114 LBS

## 2022-08-18 DIAGNOSIS — R19.7 DIARRHEA, UNSPECIFIED: ICD-10-CM

## 2022-08-18 DIAGNOSIS — E78.5 HYPERLIPIDEMIA, UNSPECIFIED HYPERLIPIDEMIA TYPE: ICD-10-CM

## 2022-08-18 DIAGNOSIS — R11.2 NAUSEA WITH VOMITING, UNSPECIFIED: ICD-10-CM

## 2022-08-18 PROCEDURE — 99213 OFFICE O/P EST LOW 20 MIN: CPT | Performed by: NURSE PRACTITIONER

## 2022-08-18 RX ORDER — OMEGA-3-ACID ETHYL ESTERS 1 G/1
CAPSULE, LIQUID FILLED ORAL
Qty: 360 CAPSULE | Refills: 1 | Status: SHIPPED | OUTPATIENT
Start: 2022-08-18

## 2022-08-19 DIAGNOSIS — F41.9 ANXIETY DISORDER, UNSPECIFIED: ICD-10-CM

## 2022-08-19 RX ORDER — BUSPIRONE HYDROCHLORIDE 15 MG/1
TABLET ORAL
Qty: 180 TABLET | Refills: 1 | Status: SHIPPED | OUTPATIENT
Start: 2022-08-19

## 2022-09-08 DIAGNOSIS — G47.00 INSOMNIA, UNSPECIFIED: ICD-10-CM

## 2022-09-08 RX ORDER — TRAZODONE HYDROCHLORIDE 100 MG/1
TABLET ORAL
Qty: 30 TABLET | Refills: 1 | Status: SHIPPED | OUTPATIENT
Start: 2022-09-08 | End: 2022-10-31

## 2022-09-19 ENCOUNTER — LAB (OUTPATIENT)
Dept: FAMILY MEDICINE CLINIC | Facility: CLINIC | Age: 57
End: 2022-09-19

## 2022-09-19 ENCOUNTER — OFFICE VISIT (OUTPATIENT)
Dept: FAMILY MEDICINE CLINIC | Facility: CLINIC | Age: 57
End: 2022-09-19

## 2022-09-19 VITALS
TEMPERATURE: 98.2 F | HEART RATE: 100 BPM | OXYGEN SATURATION: 97 % | SYSTOLIC BLOOD PRESSURE: 144 MMHG | BODY MASS INDEX: 18.25 KG/M2 | DIASTOLIC BLOOD PRESSURE: 84 MMHG | WEIGHT: 103 LBS

## 2022-09-19 DIAGNOSIS — R11.2 NAUSEA AND VOMITING, UNSPECIFIED VOMITING TYPE: ICD-10-CM

## 2022-09-19 DIAGNOSIS — F10.20 ALCOHOLISM: ICD-10-CM

## 2022-09-19 DIAGNOSIS — R63.4 WEIGHT LOSS: Primary | ICD-10-CM

## 2022-09-19 PROCEDURE — 99214 OFFICE O/P EST MOD 30 MIN: CPT | Performed by: NURSE PRACTITIONER

## 2022-09-19 RX ORDER — ONDANSETRON 4 MG/1
4 TABLET, ORALLY DISINTEGRATING ORAL EVERY 12 HOURS PRN
Qty: 30 TABLET | Refills: 0 | Status: ON HOLD | OUTPATIENT
Start: 2022-09-19 | End: 2022-10-15

## 2022-09-19 NOTE — PROGRESS NOTES
Subjective     Lita Yu is a 56 y.o. female.     History of Present Illness  Patient is here today for 1 month follow-up on alcoholism and nausea.  She is disheveled.  She reiterates that she is no longer drinking.  She states she has black mold in her house and she doesn't know if that's causing her to feel bad.  She gets tachycardia when she is sitting down.   She is having trouble sleeping.  She is trying to take the trazodone at night.     Alcoholism-this has been an ongoing issue for the patient.  He has a history of cirrhosis.  She is seen by GI. States she has been sober since the beginning of august.     Nausea- patient has lost close to 30 pounds in the last few months.  She has had extreme nausea.  Patient has seen gastroenterology.  She supposed be getting a right upper quadrant ultrasound. She is vomiting 4-6 times a day. Has diarrhea daily.        The following portions of the patient's history were reviewed and updated as appropriate: allergies, current medications, past family history, past medical history, past social history, past surgical history and problem list.    Review of Systems   Constitutional: Positive for fatigue. Negative for chills and fever.   Respiratory: Negative for chest tightness and shortness of breath.    Cardiovascular: Negative for chest pain and palpitations.   Gastrointestinal: Positive for abdominal pain (occasional generalized), diarrhea, nausea and vomiting.   Neurological: Positive for dizziness. Negative for headache.       Objective     /84 (BP Location: Left arm, Patient Position: Sitting, Cuff Size: Adult)   Pulse 100   Temp 98.2 °F (36.8 °C) (Tympanic)   Wt 46.7 kg (103 lb)   SpO2 97%   BMI 18.25 kg/m²     Current Outpatient Medications on File Prior to Visit   Medication Sig Dispense Refill   • albuterol sulfate  (90 Base) MCG/ACT inhaler Inhale 2 puffs Every 4 (Four) Hours As Needed for Wheezing.     • atorvastatin (LIPITOR) 20 MG  tablet Take 20 mg by mouth Daily.     • busPIRone (BUSPAR) 15 MG tablet TAKE 1 TABLET BY MOUTH TWICE A  tablet 1   • butalbital-acetaminophen-caffeine (FIORICET, ESGIC) -40 MG per tablet Take 1 tablet by mouth Every 6 (Six) Hours As Needed for Headache (Neck discomfort). 20 tablet 0   • cholestyramine light 4 g packet Take 2 packets by mouth Every 8 (Eight) Hours. 30 packet 0   • Fluticasone-Umeclidin-Vilant (Trelegy Ellipta) 100-62.5-25 MCG/INH inhaler Inhale 1 puff Daily.     • folic acid (FOLVITE) 1 MG tablet Take 1 tablet by mouth Daily. 29 tablet 0   • lisinopril-hydrochlorothiazide (PRINZIDE,ZESTORETIC) 20-25 MG per tablet Take 1 tablet by mouth Daily. 90 tablet 3   • omega-3 acid ethyl esters (LOVAZA) 1 g capsule TAKE 2 CAPSULES BY MOUTH 2 TIMES A DAY. 360 capsule 1   • PROBIOTIC, LACTOBACILLUS, PO Take 1 dose by mouth Daily.     • promethazine (PHENERGAN) 12.5 MG tablet Take 1 tablet by mouth Every 6 (Six) Hours As Needed for Nausea or Vomiting. 12 tablet 0   • psyllium (METAMUCIL) 58.6 % packet Take 1 packet by mouth Daily.     • sertraline (ZOLOFT) 100 MG tablet Take 100 mg by mouth 2 (Two) Times a Day.     • thiamine (VITAMIN B-1) 100 MG tablet Take 1 tablet by mouth Daily. 30 tablet 0   • traZODone (DESYREL) 100 MG tablet TAKE 1 TABLET BY MOUTH EVERY DAY AT NIGHT 30 tablet 1   • vitamin B-12 (CYANOCOBALAMIN) 500 MCG tablet Take 1,000 mcg by mouth Daily.       No current facility-administered medications on file prior to visit.        Physical Exam  Constitutional:       Appearance: She is ill-appearing.      Comments: Disheveled look. Very thin   HENT:      Head: Normocephalic and atraumatic.   Cardiovascular:      Rate and Rhythm: Normal rate and regular rhythm.      Heart sounds: No murmur heard.  Pulmonary:      Effort: Pulmonary effort is normal. No respiratory distress.      Breath sounds: Normal breath sounds.   Abdominal:      General: There is no distension.      Palpations: Abdomen  is soft.   Musculoskeletal:      Comments: Patient stumbling some as she was walking through the [place]   Skin:     General: Skin is warm and dry.   Neurological:      General: No focal deficit present.      Mental Status: She is alert and oriented to person, place, and time.   Psychiatric:         Behavior: Behavior normal.         Thought Content: Thought content normal.         Judgment: Judgment normal.           Assessment & Plan     Diagnoses and all orders for this visit:    1. Weight loss (Primary)  Comments:  Unknown etiology  Has daily vomiting and diarrhea  Check labs and stool studies  Rule out pancreatitis  Get CT of abdomen  Follow-up GI ASAP  Orders:  -     CK; Future  -     Ammonia; Future  -     Comprehensive metabolic panel; Future  -     CBC & Differential  -     Ethanol level; Future  -     Lipase; Future  -     Amylase; Future  -     Vitamin B12; Future  -     Vitamin B1, Whole Blood; Future  -     Urine Drug Screen - Urine, Clean Catch; Future  -     Magnesium; Future  -     Gastrointestinal Panel, PCR - Stool, Per Rectum; Future  -     Cancel: CT Abdomen Without Contrast; Future  -     CT Abdomen Pelvis With Contrast; Future    2. Nausea and vomiting, unspecified vomiting type  Comments:  Zofran as needed  Check labs  Get CT scan  Concerned about history of esophageal varices  Follow-up GI  Orders:  -     CK; Future  -     Ammonia; Future  -     Comprehensive metabolic panel; Future  -     CBC & Differential  -     Ethanol level; Future  -     Lipase; Future  -     Amylase; Future  -     Vitamin B12; Future  -     Vitamin B1, Whole Blood; Future  -     Urine Drug Screen - Urine, Clean Catch; Future  -     Magnesium; Future  -     Gastrointestinal Panel, PCR - Stool, Per Rectum; Future  -     Cancel: CT Abdomen Without Contrast; Future  -     CT Abdomen Pelvis With Contrast; Future    3. Alcoholism (HCC)  Comments:  Patient states she has been sober since August  Check serum ethanol  level  Discussed importance of sobriety with cirrhosis  Orders:  -     CK; Future  -     Ammonia; Future  -     Comprehensive metabolic panel; Future  -     CBC & Differential  -     Ethanol level; Future  -     Lipase; Future  -     Amylase; Future  -     Vitamin B12; Future  -     Vitamin B1, Whole Blood; Future  -     Urine Drug Screen - Urine, Clean Catch; Future  -     Magnesium; Future  -     Gastrointestinal Panel, PCR - Stool, Per Rectum; Future  -     Cancel: CT Abdomen Without Contrast; Future  -     CT Abdomen Pelvis With Contrast; Future    Other orders  -     ondansetron ODT (Zofran ODT) 4 MG disintegrating tablet; Place 1 tablet on the tongue Every 12 (Twelve) Hours As Needed for Nausea or Vomiting.  Dispense: 30 tablet; Refill: 0

## 2022-10-14 ENCOUNTER — HOSPITAL ENCOUNTER (INPATIENT)
Facility: HOSPITAL | Age: 57
LOS: 7 days | Discharge: NURSING, REHAB OR LONG-TERM CARE | End: 2022-10-21
Attending: EMERGENCY MEDICINE | Admitting: INTERNAL MEDICINE

## 2022-10-14 ENCOUNTER — APPOINTMENT (OUTPATIENT)
Dept: CT IMAGING | Facility: HOSPITAL | Age: 57
End: 2022-10-14

## 2022-10-14 DIAGNOSIS — N39.0 URINARY TRACT INFECTION WITHOUT HEMATURIA, SITE UNSPECIFIED: ICD-10-CM

## 2022-10-14 DIAGNOSIS — E83.42 HYPOMAGNESEMIA: ICD-10-CM

## 2022-10-14 DIAGNOSIS — M62.82 NON-TRAUMATIC RHABDOMYOLYSIS: Primary | ICD-10-CM

## 2022-10-14 PROBLEM — R53.1 GENERALIZED WEAKNESS: Status: ACTIVE | Noted: 2022-10-14

## 2022-10-14 PROBLEM — K74.60 CIRRHOSIS OF LIVER (HCC): Chronic | Status: ACTIVE | Noted: 2022-10-14

## 2022-10-14 LAB
ALBUMIN SERPL-MCNC: 2 G/DL (ref 3.5–5.2)
ALBUMIN/GLOB SERPL: 0.7 G/DL
ALP SERPL-CCNC: 77 U/L (ref 39–117)
ALT SERPL W P-5'-P-CCNC: 83 U/L (ref 1–33)
ANION GAP SERPL CALCULATED.3IONS-SCNC: 9 MMOL/L (ref 5–15)
APTT PPP: 26.4 SECONDS (ref 61–76.5)
AST SERPL-CCNC: 493 U/L (ref 1–32)
BACTERIA UR QL AUTO: ABNORMAL /HPF
BASOPHILS # BLD AUTO: 0.1 10*3/MM3 (ref 0–0.2)
BASOPHILS NFR BLD AUTO: 1 % (ref 0–1.5)
BILIRUB SERPL-MCNC: 0.6 MG/DL (ref 0–1.2)
BILIRUB UR QL STRIP: ABNORMAL
BUN SERPL-MCNC: 7 MG/DL (ref 6–20)
BUN/CREAT SERPL: 16.7 (ref 7–25)
CALCIUM SPEC-SCNC: 7.5 MG/DL (ref 8.6–10.5)
CHLORIDE SERPL-SCNC: 112 MMOL/L (ref 98–107)
CK SERPL-CCNC: ABNORMAL U/L (ref 20–180)
CLARITY UR: ABNORMAL
CO2 SERPL-SCNC: 22 MMOL/L (ref 22–29)
COLOR UR: ABNORMAL
CREAT SERPL-MCNC: 0.42 MG/DL (ref 0.57–1)
DEPRECATED RDW RBC AUTO: 56.9 FL (ref 37–54)
EGFRCR SERPLBLD CKD-EPI 2021: 115.7 ML/MIN/1.73
EOSINOPHIL # BLD AUTO: 0.2 10*3/MM3 (ref 0–0.4)
EOSINOPHIL NFR BLD AUTO: 2 % (ref 0.3–6.2)
ERYTHROCYTE [DISTWIDTH] IN BLOOD BY AUTOMATED COUNT: 14.5 % (ref 12.3–15.4)
GLOBULIN UR ELPH-MCNC: 2.8 GM/DL
GLUCOSE SERPL-MCNC: 74 MG/DL (ref 65–99)
GLUCOSE UR STRIP-MCNC: NEGATIVE MG/DL
HCT VFR BLD AUTO: 36.9 % (ref 34–46.6)
HGB BLD-MCNC: 11.8 G/DL (ref 12–15.9)
HGB UR QL STRIP.AUTO: ABNORMAL
HYALINE CASTS UR QL AUTO: ABNORMAL /LPF
INR PPP: 1.14 (ref 0.93–1.1)
KETONES UR QL STRIP: NEGATIVE
LEUKOCYTE ESTERASE UR QL STRIP.AUTO: ABNORMAL
LYMPHOCYTES # BLD AUTO: 1.3 10*3/MM3 (ref 0.7–3.1)
LYMPHOCYTES NFR BLD AUTO: 14.5 % (ref 19.6–45.3)
MAGNESIUM SERPL-MCNC: 1.4 MG/DL (ref 1.6–2.6)
MCH RBC QN AUTO: 35.6 PG (ref 26.6–33)
MCHC RBC AUTO-ENTMCNC: 32 G/DL (ref 31.5–35.7)
MCV RBC AUTO: 111 FL (ref 79–97)
MONOCYTES # BLD AUTO: 0.5 10*3/MM3 (ref 0.1–0.9)
MONOCYTES NFR BLD AUTO: 6 % (ref 5–12)
NEUTROPHILS NFR BLD AUTO: 6.8 10*3/MM3 (ref 1.7–7)
NEUTROPHILS NFR BLD AUTO: 76.5 % (ref 42.7–76)
NITRITE UR QL STRIP: POSITIVE
NRBC BLD AUTO-RTO: 0.1 /100 WBC (ref 0–0.2)
PH UR STRIP.AUTO: 5.5 [PH] (ref 5–8)
PLATELET # BLD AUTO: 233 10*3/MM3 (ref 140–450)
PMV BLD AUTO: 7.7 FL (ref 6–12)
POTASSIUM SERPL-SCNC: 3.6 MMOL/L (ref 3.5–5.2)
PROT SERPL-MCNC: 4.8 G/DL (ref 6–8.5)
PROT UR QL STRIP: ABNORMAL
PROTHROMBIN TIME: 11.7 SECONDS (ref 9.6–11.7)
RBC # BLD AUTO: 3.33 10*6/MM3 (ref 3.77–5.28)
RBC # UR STRIP: ABNORMAL /HPF
REF LAB TEST METHOD: ABNORMAL
SODIUM SERPL-SCNC: 143 MMOL/L (ref 136–145)
SP GR UR STRIP: 1.02 (ref 1–1.03)
SQUAMOUS #/AREA URNS HPF: ABNORMAL /HPF
TSH SERPL DL<=0.05 MIU/L-ACNC: 0.93 UIU/ML (ref 0.27–4.2)
UROBILINOGEN UR QL STRIP: ABNORMAL
WBC # UR STRIP: ABNORMAL /HPF
WBC NRBC COR # BLD: 8.9 10*3/MM3 (ref 3.4–10.8)

## 2022-10-14 PROCEDURE — 87088 URINE BACTERIA CULTURE: CPT | Performed by: EMERGENCY MEDICINE

## 2022-10-14 PROCEDURE — 87186 SC STD MICRODIL/AGAR DIL: CPT | Performed by: EMERGENCY MEDICINE

## 2022-10-14 PROCEDURE — 83735 ASSAY OF MAGNESIUM: CPT | Performed by: EMERGENCY MEDICINE

## 2022-10-14 PROCEDURE — 74176 CT ABD & PELVIS W/O CONTRAST: CPT

## 2022-10-14 PROCEDURE — 70450 CT HEAD/BRAIN W/O DYE: CPT

## 2022-10-14 PROCEDURE — 82550 ASSAY OF CK (CPK): CPT | Performed by: EMERGENCY MEDICINE

## 2022-10-14 PROCEDURE — 81001 URINALYSIS AUTO W/SCOPE: CPT | Performed by: EMERGENCY MEDICINE

## 2022-10-14 PROCEDURE — 85730 THROMBOPLASTIN TIME PARTIAL: CPT | Performed by: EMERGENCY MEDICINE

## 2022-10-14 PROCEDURE — 25010000002 THIAMINE PER 100 MG: Performed by: EMERGENCY MEDICINE

## 2022-10-14 PROCEDURE — 87086 URINE CULTURE/COLONY COUNT: CPT | Performed by: EMERGENCY MEDICINE

## 2022-10-14 PROCEDURE — P9612 CATHETERIZE FOR URINE SPEC: HCPCS

## 2022-10-14 PROCEDURE — 85610 PROTHROMBIN TIME: CPT | Performed by: EMERGENCY MEDICINE

## 2022-10-14 PROCEDURE — 80050 GENERAL HEALTH PANEL: CPT | Performed by: EMERGENCY MEDICINE

## 2022-10-14 PROCEDURE — 99285 EMERGENCY DEPT VISIT HI MDM: CPT

## 2022-10-14 RX ORDER — SODIUM CHLORIDE 0.9 % (FLUSH) 0.9 %
10 SYRINGE (ML) INJECTION AS NEEDED
Status: DISCONTINUED | OUTPATIENT
Start: 2022-10-14 | End: 2022-10-21 | Stop reason: HOSPADM

## 2022-10-14 RX ORDER — MAGNESIUM SULFATE HEPTAHYDRATE 40 MG/ML
2 INJECTION, SOLUTION INTRAVENOUS ONCE
Status: COMPLETED | OUTPATIENT
Start: 2022-10-15 | End: 2022-10-15

## 2022-10-14 RX ORDER — THIAMINE HYDROCHLORIDE 100 MG/ML
100 INJECTION, SOLUTION INTRAMUSCULAR; INTRAVENOUS ONCE
Status: COMPLETED | OUTPATIENT
Start: 2022-10-14 | End: 2022-10-14

## 2022-10-14 RX ADMIN — THIAMINE HYDROCHLORIDE 100 MG: 100 INJECTION, SOLUTION INTRAMUSCULAR; INTRAVENOUS at 20:43

## 2022-10-14 RX ADMIN — MAGNESIUM SULFATE HEPTAHYDRATE 2 G: 2 INJECTION, SOLUTION INTRAVENOUS at 23:56

## 2022-10-14 RX ADMIN — SODIUM CHLORIDE 1000 ML: 9 INJECTION, SOLUTION INTRAVENOUS at 23:56

## 2022-10-15 ENCOUNTER — APPOINTMENT (OUTPATIENT)
Dept: MRI IMAGING | Facility: HOSPITAL | Age: 57
End: 2022-10-15

## 2022-10-15 ENCOUNTER — INPATIENT HOSPITAL (OUTPATIENT)
Dept: URBAN - METROPOLITAN AREA HOSPITAL 84 | Facility: HOSPITAL | Age: 57
End: 2022-10-15

## 2022-10-15 DIAGNOSIS — R93.3 ABNORMAL FINDINGS ON DIAGNOSTIC IMAGING OF OTHER PARTS OF DI: ICD-10-CM

## 2022-10-15 DIAGNOSIS — K70.31 ALCOHOLIC CIRRHOSIS OF LIVER WITH ASCITES: ICD-10-CM

## 2022-10-15 DIAGNOSIS — D53.9 NUTRITIONAL ANEMIA, UNSPECIFIED: ICD-10-CM

## 2022-10-15 DIAGNOSIS — K75.81 NONALCOHOLIC STEATOHEPATITIS (NASH): ICD-10-CM

## 2022-10-15 DIAGNOSIS — R74.01 ELEVATION OF LEVELS OF LIVER TRANSAMINASE LEVELS: ICD-10-CM

## 2022-10-15 DIAGNOSIS — K52.9 NONINFECTIVE GASTROENTERITIS AND COLITIS, UNSPECIFIED: ICD-10-CM

## 2022-10-15 LAB
AMMONIA BLD-SCNC: 26 UMOL/L (ref 11–51)
BASOPHILS # BLD AUTO: 0 10*3/MM3 (ref 0–0.2)
BASOPHILS NFR BLD AUTO: 0.3 % (ref 0–1.5)
CK SERPL-CCNC: ABNORMAL U/L (ref 20–180)
D-LACTATE SERPL-SCNC: 1 MMOL/L (ref 0.5–2)
DEPRECATED RDW RBC AUTO: 56.9 FL (ref 37–54)
EOSINOPHIL # BLD AUTO: 0.2 10*3/MM3 (ref 0–0.4)
EOSINOPHIL NFR BLD AUTO: 2.1 % (ref 0.3–6.2)
ERYTHROCYTE [DISTWIDTH] IN BLOOD BY AUTOMATED COUNT: 14.5 % (ref 12.3–15.4)
HAV IGM SERPL QL IA: NORMAL
HBV CORE IGM SERPL QL IA: NORMAL
HBV SURFACE AG SERPL QL IA: NORMAL
HCT VFR BLD AUTO: 31.7 % (ref 34–46.6)
HCV AB SER DONR QL: NORMAL
HGB BLD-MCNC: 10.2 G/DL (ref 12–15.9)
LYMPHOCYTES # BLD AUTO: 1.2 10*3/MM3 (ref 0.7–3.1)
LYMPHOCYTES NFR BLD AUTO: 15 % (ref 19.6–45.3)
MCH RBC QN AUTO: 35.6 PG (ref 26.6–33)
MCHC RBC AUTO-ENTMCNC: 32.2 G/DL (ref 31.5–35.7)
MCV RBC AUTO: 110.8 FL (ref 79–97)
MONOCYTES # BLD AUTO: 0.6 10*3/MM3 (ref 0.1–0.9)
MONOCYTES NFR BLD AUTO: 7.6 % (ref 5–12)
NEUTROPHILS NFR BLD AUTO: 5.8 10*3/MM3 (ref 1.7–7)
NEUTROPHILS NFR BLD AUTO: 75 % (ref 42.7–76)
NRBC BLD AUTO-RTO: 0.1 /100 WBC (ref 0–0.2)
PLATELET # BLD AUTO: 183 10*3/MM3 (ref 140–450)
PMV BLD AUTO: 8.8 FL (ref 6–12)
RBC # BLD AUTO: 2.86 10*6/MM3 (ref 3.77–5.28)
WBC NRBC COR # BLD: 7.7 10*3/MM3 (ref 3.4–10.8)

## 2022-10-15 PROCEDURE — 72146 MRI CHEST SPINE W/O DYE: CPT

## 2022-10-15 PROCEDURE — 80143 DRUG ASSAY ACETAMINOPHEN: CPT | Performed by: NURSE PRACTITIONER

## 2022-10-15 PROCEDURE — 84100 ASSAY OF PHOSPHORUS: CPT | Performed by: INTERNAL MEDICINE

## 2022-10-15 PROCEDURE — 86015 ACTIN ANTIBODY EACH: CPT | Performed by: INTERNAL MEDICINE

## 2022-10-15 PROCEDURE — 80053 COMPREHEN METABOLIC PANEL: CPT | Performed by: INTERNAL MEDICINE

## 2022-10-15 PROCEDURE — 72141 MRI NECK SPINE W/O DYE: CPT

## 2022-10-15 PROCEDURE — 25010000002 FUROSEMIDE PER 20 MG: Performed by: NURSE PRACTITIONER

## 2022-10-15 PROCEDURE — 72148 MRI LUMBAR SPINE W/O DYE: CPT

## 2022-10-15 PROCEDURE — 25010000002 MAGNESIUM SULFATE 2 GM/50ML SOLUTION: Performed by: EMERGENCY MEDICINE

## 2022-10-15 PROCEDURE — 85025 COMPLETE CBC W/AUTO DIFF WBC: CPT | Performed by: PHYSICIAN ASSISTANT

## 2022-10-15 PROCEDURE — 82784 ASSAY IGA/IGD/IGG/IGM EACH: CPT | Performed by: INTERNAL MEDICINE

## 2022-10-15 PROCEDURE — 87040 BLOOD CULTURE FOR BACTERIA: CPT | Performed by: EMERGENCY MEDICINE

## 2022-10-15 PROCEDURE — 36415 COLL VENOUS BLD VENIPUNCTURE: CPT

## 2022-10-15 PROCEDURE — 25010000002 LORAZEPAM PER 2 MG: Performed by: INTERNAL MEDICINE

## 2022-10-15 PROCEDURE — 82550 ASSAY OF CK (CPK): CPT | Performed by: INTERNAL MEDICINE

## 2022-10-15 PROCEDURE — 83605 ASSAY OF LACTIC ACID: CPT | Performed by: EMERGENCY MEDICINE

## 2022-10-15 PROCEDURE — 99222 1ST HOSP IP/OBS MODERATE 55: CPT | Performed by: PSYCHIATRY & NEUROLOGY

## 2022-10-15 PROCEDURE — 83735 ASSAY OF MAGNESIUM: CPT | Performed by: INTERNAL MEDICINE

## 2022-10-15 PROCEDURE — 99222 1ST HOSP IP/OBS MODERATE 55: CPT | Performed by: NURSE PRACTITIONER

## 2022-10-15 PROCEDURE — 63710000001 ONDANSETRON PER 8 MG: Performed by: PHYSICIAN ASSISTANT

## 2022-10-15 PROCEDURE — 82140 ASSAY OF AMMONIA: CPT | Performed by: PHYSICIAN ASSISTANT

## 2022-10-15 PROCEDURE — 94640 AIRWAY INHALATION TREATMENT: CPT

## 2022-10-15 PROCEDURE — 94799 UNLISTED PULMONARY SVC/PX: CPT

## 2022-10-15 PROCEDURE — 80074 ACUTE HEPATITIS PANEL: CPT | Performed by: INTERNAL MEDICINE

## 2022-10-15 PROCEDURE — 86038 ANTINUCLEAR ANTIBODIES: CPT | Performed by: INTERNAL MEDICINE

## 2022-10-15 RX ORDER — ONDANSETRON 4 MG/1
4 TABLET, FILM COATED ORAL EVERY 6 HOURS PRN
Status: DISCONTINUED | OUTPATIENT
Start: 2022-10-15 | End: 2022-10-21 | Stop reason: HOSPADM

## 2022-10-15 RX ORDER — LORAZEPAM 2 MG/ML
2 INJECTION INTRAMUSCULAR
Status: DISCONTINUED | OUTPATIENT
Start: 2022-10-15 | End: 2022-10-21 | Stop reason: HOSPADM

## 2022-10-15 RX ORDER — NICOTINE 21 MG/24HR
1 PATCH, TRANSDERMAL 24 HOURS TRANSDERMAL
Status: DISCONTINUED | OUTPATIENT
Start: 2022-10-15 | End: 2022-10-21 | Stop reason: HOSPADM

## 2022-10-15 RX ORDER — SERTRALINE HYDROCHLORIDE 100 MG/1
100 TABLET, FILM COATED ORAL 2 TIMES DAILY
Status: DISCONTINUED | OUTPATIENT
Start: 2022-10-15 | End: 2022-10-21 | Stop reason: HOSPADM

## 2022-10-15 RX ORDER — SPIRONOLACTONE 25 MG/1
50 TABLET ORAL DAILY
Status: DISCONTINUED | OUTPATIENT
Start: 2022-10-15 | End: 2022-10-15

## 2022-10-15 RX ORDER — LORAZEPAM 2 MG/ML
0.5 INJECTION INTRAMUSCULAR ONCE
Status: DISCONTINUED | OUTPATIENT
Start: 2022-10-15 | End: 2022-10-20

## 2022-10-15 RX ORDER — ALBUMIN (HUMAN) 12.5 G/50ML
62.5 SOLUTION INTRAVENOUS ONCE
Status: DISCONTINUED | OUTPATIENT
Start: 2022-10-15 | End: 2022-10-15

## 2022-10-15 RX ORDER — ALBUMIN (HUMAN) 12.5 G/50ML
87.5 SOLUTION INTRAVENOUS ONCE
Status: DISCONTINUED | OUTPATIENT
Start: 2022-10-15 | End: 2022-10-15

## 2022-10-15 RX ORDER — ALBUMIN (HUMAN) 12.5 G/50ML
50 SOLUTION INTRAVENOUS ONCE
Status: DISCONTINUED | OUTPATIENT
Start: 2022-10-15 | End: 2022-10-15

## 2022-10-15 RX ORDER — BUSPIRONE HYDROCHLORIDE 15 MG/1
15 TABLET ORAL 2 TIMES DAILY
Status: DISCONTINUED | OUTPATIENT
Start: 2022-10-15 | End: 2022-10-21 | Stop reason: HOSPADM

## 2022-10-15 RX ORDER — ALBUMIN (HUMAN) 12.5 G/50ML
62.5 SOLUTION INTRAVENOUS ONCE
Status: DISCONTINUED | OUTPATIENT
Start: 2022-10-17 | End: 2022-10-20

## 2022-10-15 RX ORDER — CHOLECALCIFEROL (VITAMIN D3) 125 MCG
5 CAPSULE ORAL NIGHTLY PRN
Status: DISCONTINUED | OUTPATIENT
Start: 2022-10-15 | End: 2022-10-21 | Stop reason: HOSPADM

## 2022-10-15 RX ORDER — BUDESONIDE AND FORMOTEROL FUMARATE DIHYDRATE 160; 4.5 UG/1; UG/1
2 AEROSOL RESPIRATORY (INHALATION)
Status: DISCONTINUED | OUTPATIENT
Start: 2022-10-15 | End: 2022-10-18

## 2022-10-15 RX ORDER — SODIUM CHLORIDE 0.9 % (FLUSH) 0.9 %
10 SYRINGE (ML) INJECTION AS NEEDED
Status: DISCONTINUED | OUTPATIENT
Start: 2022-10-15 | End: 2022-10-21 | Stop reason: HOSPADM

## 2022-10-15 RX ORDER — FUROSEMIDE 10 MG/ML
40 INJECTION INTRAMUSCULAR; INTRAVENOUS DAILY
Status: DISCONTINUED | OUTPATIENT
Start: 2022-10-15 | End: 2022-10-15

## 2022-10-15 RX ORDER — ASPIRIN 325 MG
650 TABLET, DELAYED RELEASE (ENTERIC COATED) ORAL ONCE
Status: COMPLETED | OUTPATIENT
Start: 2022-10-15 | End: 2022-10-15

## 2022-10-15 RX ORDER — MAGNESIUM 200 MG
1 TABLET ORAL DAILY
COMMUNITY

## 2022-10-15 RX ORDER — LORAZEPAM 2 MG/ML
1 INJECTION INTRAMUSCULAR
Status: DISCONTINUED | OUTPATIENT
Start: 2022-10-15 | End: 2022-10-21 | Stop reason: HOSPADM

## 2022-10-15 RX ORDER — ALBUMIN (HUMAN) 12.5 G/50ML
50 SOLUTION INTRAVENOUS ONCE
Status: DISCONTINUED | OUTPATIENT
Start: 2022-10-17 | End: 2022-10-20

## 2022-10-15 RX ORDER — ONDANSETRON 4 MG/1
4 TABLET, FILM COATED ORAL DAILY PRN
COMMUNITY
End: 2022-10-31 | Stop reason: SDUPTHER

## 2022-10-15 RX ORDER — ALBUMIN (HUMAN) 12.5 G/50ML
87.5 SOLUTION INTRAVENOUS ONCE
Status: DISCONTINUED | OUTPATIENT
Start: 2022-10-17 | End: 2022-10-20

## 2022-10-15 RX ORDER — ALBUMIN (HUMAN) 12.5 G/50ML
37.5 SOLUTION INTRAVENOUS ONCE
Status: DISCONTINUED | OUTPATIENT
Start: 2022-10-15 | End: 2022-10-15

## 2022-10-15 RX ORDER — TRAZODONE HYDROCHLORIDE 100 MG/1
100 TABLET ORAL NIGHTLY
Status: DISCONTINUED | OUTPATIENT
Start: 2022-10-15 | End: 2022-10-21 | Stop reason: HOSPADM

## 2022-10-15 RX ORDER — ALBUMIN (HUMAN) 12.5 G/50ML
75 SOLUTION INTRAVENOUS ONCE
Status: DISCONTINUED | OUTPATIENT
Start: 2022-10-17 | End: 2022-10-20

## 2022-10-15 RX ORDER — SODIUM CHLORIDE, SODIUM LACTATE, POTASSIUM CHLORIDE, CALCIUM CHLORIDE 600; 310; 30; 20 MG/100ML; MG/100ML; MG/100ML; MG/100ML
75 INJECTION, SOLUTION INTRAVENOUS CONTINUOUS
Status: DISCONTINUED | OUTPATIENT
Start: 2022-10-15 | End: 2022-10-17

## 2022-10-15 RX ORDER — LORAZEPAM 1 MG/1
2 TABLET ORAL
Status: DISCONTINUED | OUTPATIENT
Start: 2022-10-15 | End: 2022-10-21 | Stop reason: HOSPADM

## 2022-10-15 RX ORDER — LORAZEPAM 1 MG/1
1 TABLET ORAL
Status: DISCONTINUED | OUTPATIENT
Start: 2022-10-15 | End: 2022-10-21 | Stop reason: HOSPADM

## 2022-10-15 RX ORDER — ALBUMIN (HUMAN) 12.5 G/50ML
100 SOLUTION INTRAVENOUS ONCE
Status: DISCONTINUED | OUTPATIENT
Start: 2022-10-17 | End: 2022-10-20

## 2022-10-15 RX ORDER — ALBUMIN (HUMAN) 12.5 G/50ML
75 SOLUTION INTRAVENOUS ONCE
Status: DISCONTINUED | OUTPATIENT
Start: 2022-10-15 | End: 2022-10-15

## 2022-10-15 RX ORDER — IPRATROPIUM BROMIDE AND ALBUTEROL SULFATE 2.5; .5 MG/3ML; MG/3ML
3 SOLUTION RESPIRATORY (INHALATION) EVERY 4 HOURS PRN
Status: DISCONTINUED | OUTPATIENT
Start: 2022-10-15 | End: 2022-10-21 | Stop reason: HOSPADM

## 2022-10-15 RX ORDER — LORAZEPAM 2 MG/ML
0.5 INJECTION INTRAMUSCULAR ONCE
Status: COMPLETED | OUTPATIENT
Start: 2022-10-15 | End: 2022-10-15

## 2022-10-15 RX ORDER — FOLIC ACID/MULTIVIT,IRON,MINER .4-18-35
1 TABLET,CHEWABLE ORAL DAILY
Status: DISCONTINUED | OUTPATIENT
Start: 2022-10-15 | End: 2022-10-21 | Stop reason: HOSPADM

## 2022-10-15 RX ORDER — NITROGLYCERIN 0.4 MG/1
0.4 TABLET SUBLINGUAL
Status: DISCONTINUED | OUTPATIENT
Start: 2022-10-15 | End: 2022-10-21 | Stop reason: HOSPADM

## 2022-10-15 RX ORDER — ALBUMIN (HUMAN) 12.5 G/50ML
112.5 SOLUTION INTRAVENOUS ONCE
Status: DISCONTINUED | OUTPATIENT
Start: 2022-10-17 | End: 2022-10-20

## 2022-10-15 RX ORDER — ONDANSETRON 2 MG/ML
4 INJECTION INTRAMUSCULAR; INTRAVENOUS EVERY 6 HOURS PRN
Status: DISCONTINUED | OUTPATIENT
Start: 2022-10-15 | End: 2022-10-21 | Stop reason: HOSPADM

## 2022-10-15 RX ORDER — SODIUM CHLORIDE 0.9 % (FLUSH) 0.9 %
10 SYRINGE (ML) INJECTION EVERY 12 HOURS SCHEDULED
Status: DISCONTINUED | OUTPATIENT
Start: 2022-10-15 | End: 2022-10-21 | Stop reason: HOSPADM

## 2022-10-15 RX ORDER — FOLIC ACID 1 MG/1
1000 TABLET ORAL DAILY
Status: DISCONTINUED | OUTPATIENT
Start: 2022-10-15 | End: 2022-10-21 | Stop reason: HOSPADM

## 2022-10-15 RX ORDER — ALBUMIN (HUMAN) 12.5 G/50ML
112.5 SOLUTION INTRAVENOUS ONCE
Status: DISCONTINUED | OUTPATIENT
Start: 2022-10-15 | End: 2022-10-15

## 2022-10-15 RX ORDER — ALBUMIN (HUMAN) 12.5 G/50ML
100 SOLUTION INTRAVENOUS ONCE
Status: DISCONTINUED | OUTPATIENT
Start: 2022-10-15 | End: 2022-10-15

## 2022-10-15 RX ORDER — SPIRONOLACTONE 100 MG/1
100 TABLET, FILM COATED ORAL DAILY
Status: DISCONTINUED | OUTPATIENT
Start: 2022-10-15 | End: 2022-10-15

## 2022-10-15 RX ORDER — ALBUMIN (HUMAN) 12.5 G/50ML
37.5 SOLUTION INTRAVENOUS ONCE
Status: DISCONTINUED | OUTPATIENT
Start: 2022-10-17 | End: 2022-10-20

## 2022-10-15 RX ADMIN — Medication 100 MG: at 09:45

## 2022-10-15 RX ADMIN — ASPIRIN 650 MG: 325 TABLET, COATED ORAL at 02:55

## 2022-10-15 RX ADMIN — Medication 10 ML: at 09:45

## 2022-10-15 RX ADMIN — SERTRALINE 100 MG: 100 TABLET, FILM COATED ORAL at 21:16

## 2022-10-15 RX ADMIN — FUROSEMIDE 40 MG: 10 INJECTION, SOLUTION INTRAMUSCULAR; INTRAVENOUS at 09:46

## 2022-10-15 RX ADMIN — AZTREONAM 1000 MG: 1 INJECTION, POWDER, LYOPHILIZED, FOR SOLUTION INTRAMUSCULAR; INTRAVENOUS at 09:45

## 2022-10-15 RX ADMIN — ONDANSETRON HYDROCHLORIDE 4 MG: 4 TABLET, FILM COATED ORAL at 02:55

## 2022-10-15 RX ADMIN — BUDESONIDE AND FORMOTEROL FUMARATE DIHYDRATE 2 PUFF: 160; 4.5 AEROSOL RESPIRATORY (INHALATION) at 19:18

## 2022-10-15 RX ADMIN — Medication 10 ML: at 21:16

## 2022-10-15 RX ADMIN — Medication 1 TABLET: at 09:45

## 2022-10-15 RX ADMIN — SPIRONOLACTONE 100 MG: 100 TABLET ORAL at 09:45

## 2022-10-15 RX ADMIN — AZTREONAM 1 G: 1 INJECTION, POWDER, LYOPHILIZED, FOR SOLUTION INTRAMUSCULAR; INTRAVENOUS at 01:04

## 2022-10-15 RX ADMIN — Medication 400 MG: at 21:16

## 2022-10-15 RX ADMIN — BUSPIRONE HYDROCHLORIDE 15 MG: 15 TABLET ORAL at 21:16

## 2022-10-15 RX ADMIN — SODIUM CHLORIDE, POTASSIUM CHLORIDE, SODIUM LACTATE AND CALCIUM CHLORIDE 125 ML/HR: 600; 310; 30; 20 INJECTION, SOLUTION INTRAVENOUS at 02:58

## 2022-10-15 RX ADMIN — SODIUM CHLORIDE, POTASSIUM CHLORIDE, SODIUM LACTATE AND CALCIUM CHLORIDE 500 ML: 600; 310; 30; 20 INJECTION, SOLUTION INTRAVENOUS at 02:57

## 2022-10-15 RX ADMIN — LORAZEPAM 0.5 MG: 2 INJECTION INTRAMUSCULAR; INTRAVENOUS at 11:05

## 2022-10-15 RX ADMIN — TRAZODONE HYDROCHLORIDE 100 MG: 100 TABLET ORAL at 21:16

## 2022-10-15 RX ADMIN — AZTREONAM 1000 MG: 1 INJECTION, POWDER, LYOPHILIZED, FOR SOLUTION INTRAMUSCULAR; INTRAVENOUS at 17:10

## 2022-10-15 NOTE — CONSULTS
Primary Care Provider: Norma Saul APRN     Consult requested by: Moisés Rodriguez DO    Reason for Consultation: Neurological evaluation for weakness, management of condition.     Lita Yu is a 57 y.o. female *    History taken from: patient chart RN    Chief complaint: Weakness of extremities.        SUBJECTIVE:    History of present illness:    The patient is a 57 year old female with multiple medical problems including cirrhosis of liver, COPDD, depression, GERD, HLD, HTN, PTSD who was brought to ER of [facility] yesterday evening due to inability to walk or stand up at home.  She lay on ground for all day.  It took her 6 hours to crawl to the phone to call [facility]. She states that she was too weak.  She also had sharp pain in the upper back region.  Her weakness started Monday (10/10/22).  One day prior to admission she was able to go to store and walk to her car.  She states that she has stopped drinking alcohol about 2 months ago.  Her last drink was 2 months ago.  She was found to have rhabdomyolysis.  She denies any vision problems, bowel and bladder incontinence, change in sensation over anterior abdominal wall.     Review of Systems   Constitutional: Negative  HENT: Negative.    Eyes: Negative.    Respiratory: Negative.    Cardiovascular: Negative.    Gastrointestinal: Cirrhosis of liver, GERD.    Genitourinary: Negative.    Musculoskeletal: Negative  Skin: Negative.    Neurological: Negative.    Hematological: Negative.    Psychiatric/Behavioral: anxiety, depression, PTSD.        PATIENT HISTORY:  Past Medical History:   Diagnosis Date   • Cirrhosis (HCC)    • COPD (chronic obstructive pulmonary disease) (HCC)    • Depression    • GERD (gastroesophageal reflux disease)    • Hyperlipidemia    • Hypertension    • PTSD (post-traumatic stress disorder)    ,   Past Surgical History:   Procedure Laterality Date   •  SECTION N/A    • COLONOSCOPY N/A 2021    Procedure: COLONOSCOPY with  polypectomy x2 and random biopsy;  Surgeon: Osman Clay MD;  Location: [facility] ENDOSCOPY;  Service: Gastroenterology;  Laterality: N/A;  colon polyps   • DENTAL PROCEDURE     • ENDOSCOPY N/A 2021    Procedure: ESOPHAGOGASTRODUODENOSCOPY;  Surgeon: Osman Clay MD;  Location: [facility] ENDOSCOPY;  Service: Gastroenterology;  Laterality: N/A;  esophagitis   • JOINT REPLACEMENT     • REPLACEMENT TOTAL HIP LATERAL POSITION Left    • TONSILLECTOMY     • VAGINAL BIRTH AFTER  SECTION     ,   Family History   Problem Relation Age of Onset   • Alcohol abuse Mother    • Alcohol abuse Paternal Grandfather    ,   Social History     Tobacco Use   • Smoking status: Every Day     Packs/day: 0.25     Types: Cigarettes   • Smokeless tobacco: Never   • Tobacco comments:     3cigs   Vaping Use   • Vaping Use: Never used   Substance Use Topics   • Alcohol use: Not Currently     Comment: quit 23   • Drug use: No   ,   Medications Prior to Admission   Medication Sig Dispense Refill Last Dose   • atorvastatin (LIPITOR) 20 MG tablet Take 20 mg by mouth Daily.   10/13/2022   • busPIRone (BUSPAR) 15 MG tablet TAKE 1 TABLET BY MOUTH TWICE A  tablet 1 10/13/2022   • Cyanocobalamin (Vitamin B-12) 1000 MCG sublingual tablet Place 1 tablet under the tongue Daily.   10/13/2022   • folic acid (FOLVITE) 1 MG tablet Take 1 tablet by mouth Daily. 29 tablet 0 10/13/2022   • lisinopril-hydrochlorothiazide (PRINZIDE,ZESTORETIC) 20-25 MG per tablet Take 1 tablet by mouth Daily. 90 tablet 3 10/13/2022   • omega-3 acid ethyl esters (LOVAZA) 1 g capsule TAKE 2 CAPSULES BY MOUTH 2 TIMES A DAY. 360 capsule 1 10/13/2022   • ondansetron (ZOFRAN) 4 MG tablet Take 1 tablet by mouth Daily As Needed for Nausea or Vomiting.   Past Month   • sertraline (ZOLOFT) 100 MG tablet Take 100 mg by mouth 2 (Two) Times a Day.   10/13/2022   • traZODone (DESYREL) 100 MG tablet TAKE 1 TABLET BY MOUTH EVERY DAY AT NIGHT 30 tablet 1 10/13/2022   ,  Scheduled Meds:  albumin human, 37.5 g, Intravenous, Once   Or  albumin human, 50 g, Intravenous, Once   Or  albumin human, 62.5 g, Intravenous, Once   Or  albumin human, 75 g, Intravenous, Once   Or  albumin human, 87.5 g, Intravenous, Once   Or  albumin human, 100 g, Intravenous, Once   Or  albumin human, 112.5 g, Intravenous, Once  aztreonam, 1,000 mg, Intravenous, Q8H  budesonide-formoterol, 2 puff, Inhalation, BID - RT  busPIRone, 15 mg, Oral, BID  folic acid, 1,000 mcg, Oral, Daily  furosemide, 40 mg, Intravenous, Daily  LORazepam, 0.5 mg, Intravenous, Once  multivitamin-iron-minerals-folic acid, 1 tablet, Oral, Daily  nicotine, 1 patch, Transdermal, Q24H  sertraline, 100 mg, Oral, BID  sodium chloride, 10 mL, Intravenous, Q12H  spironolactone, 100 mg, Oral, Daily  thiamine, 100 mg, Oral, Daily  traZODone, 100 mg, Oral, Nightly    , Continuous Infusions:  lactated ringers, 75 mL/hr, Last Rate: 75 mL/hr (10/15/22 0947)    , PRN Meds:  •  ipratropium-albuterol  •  LORazepam **OR** LORazepam **OR** LORazepam **OR** LORazepam **OR** LORazepam **OR** LORazepam  •  melatonin  •  nitroglycerin  •  ondansetron **OR** ondansetron  •  [COMPLETED] Insert peripheral IV **AND** sodium chloride  •  sodium chloride, Allergies:  Sulfa antibiotics and Keflex [cephalexin]    ________________________________________________________        OBJECTIVE:  Upon today's exam, The patient is laying in bed in no apparent distress.  Head NC, AT, Neck supple, trachea midline.  Lungs CTA,  Good pulmonary effort. CV  S1-S2 no murmur. Abdomen soft, non tender.  Ext no edema, no cyanosis.          Neurologic Exam  The patient is awake, alert, oriented to person, place and time.  Speech fluent with good comprehension, follow commands.  Name common objects.  CN VFFC, EOMI, no facial droop. Tongue midline.  Motor both upper extremities 5-/5.  Both lower extremities 4+/5.  Reflexes absent, plantar mute.  Sensory light touch intact, no sensory  level on anterior abdominal wall noted. Cerebellum finger to nose intact.    ________________________________________________________   RESULTS REVIEW:    VITAL SIGNS:   Temp:  [98 °F (36.7 °C)-98.2 °F (36.8 °C)] 98.2 °F (36.8 °C)  Heart Rate:  [84-96] 88  Resp:  [16-18] 16  BP: (116-161)/(65-87) 125/86     LABS:  WBC   Date Value Ref Range Status   10/14/2022 8.90 3.40 - 10.80 10*3/mm3 Final     RBC   Date Value Ref Range Status   10/14/2022 3.33 (L) 3.77 - 5.28 10*6/mm3 Final     Hemoglobin   Date Value Ref Range Status   10/14/2022 11.8 (L) 12.0 - 15.9 g/dL Final     Hematocrit   Date Value Ref Range Status   10/14/2022 36.9 34.0 - 46.6 % Final     MCV   Date Value Ref Range Status   10/14/2022 111.0 (H) 79.0 - 97.0 fL Final     MCH   Date Value Ref Range Status   10/14/2022 35.6 (H) 26.6 - 33.0 pg Final     MCHC   Date Value Ref Range Status   10/14/2022 32.0 31.5 - 35.7 g/dL Final     RDW   Date Value Ref Range Status   10/14/2022 14.5 12.3 - 15.4 % Final     RDW-SD   Date Value Ref Range Status   10/14/2022 56.9 (H) 37.0 - 54.0 fl Final     MPV   Date Value Ref Range Status   10/14/2022 7.7 6.0 - 12.0 fL Final     Platelets   Date Value Ref Range Status   10/14/2022 233 140 - 450 10*3/mm3 Final     Neutrophil %   Date Value Ref Range Status   10/14/2022 76.5 (H) 42.7 - 76.0 % Final     Lymphocyte %   Date Value Ref Range Status   10/14/2022 14.5 (L) 19.6 - 45.3 % Final     Monocyte %   Date Value Ref Range Status   10/14/2022 6.0 5.0 - 12.0 % Final     Eosinophil %   Date Value Ref Range Status   10/14/2022 2.0 0.3 - 6.2 % Final     Basophil %   Date Value Ref Range Status   10/14/2022 1.0 0.0 - 1.5 % Final     Neutrophils, Absolute   Date Value Ref Range Status   10/14/2022 6.80 1.70 - 7.00 10*3/mm3 Final     Lymphocytes, Absolute   Date Value Ref Range Status   10/14/2022 1.30 0.70 - 3.10 10*3/mm3 Final     Monocytes, Absolute   Date Value Ref Range Status   10/14/2022 0.50 0.10 - 0.90 10*3/mm3 Final      Eosinophils, Absolute   Date Value Ref Range Status   10/14/2022 0.20 0.00 - 0.40 10*3/mm3 Final     Basophils, Absolute   Date Value Ref Range Status   10/14/2022 0.10 0.00 - 0.20 10*3/mm3 Final     nRBC   Date Value Ref Range Status   10/14/2022 0.1 0.0 - 0.2 /100 WBC Final     Glucose   Date Value Ref Range Status   10/14/2022 74 65 - 99 mg/dL Final     BUN   Date Value Ref Range Status   10/14/2022 7 6 - 20 mg/dL Final     Creatinine   Date Value Ref Range Status   10/14/2022 0.42 (L) 0.57 - 1.00 mg/dL Final     Sodium   Date Value Ref Range Status   10/14/2022 143 136 - 145 mmol/L Final     Potassium   Date Value Ref Range Status   10/14/2022 3.6 3.5 - 5.2 mmol/L Final     Chloride   Date Value Ref Range Status   10/14/2022 112 (H) 98 - 107 mmol/L Final     CO2   Date Value Ref Range Status   10/14/2022 22.0 22.0 - 29.0 mmol/L Final     Calcium   Date Value Ref Range Status   10/14/2022 7.5 (L) 8.6 - 10.5 mg/dL Final     Total Protein   Date Value Ref Range Status   10/14/2022 4.8 (L) 6.0 - 8.5 g/dL Final     Albumin   Date Value Ref Range Status   10/14/2022 2.00 (L) 3.50 - 5.20 g/dL Final     ALT (SGPT)   Date Value Ref Range Status   10/14/2022 83 (H) 1 - 33 U/L Final     AST (SGOT)   Date Value Ref Range Status   10/14/2022 493 (H) 1 - 32 U/L Final     Alkaline Phosphatase   Date Value Ref Range Status   10/14/2022 77 39 - 117 U/L Final     Total Bilirubin   Date Value Ref Range Status   10/14/2022 0.6 0.0 - 1.2 mg/dL Final     BUN/Creatinine Ratio   Date Value Ref Range Status   10/14/2022 16.7 7.0 - 25.0 Final     Anion Gap   Date Value Ref Range Status   10/14/2022 9.0 5.0 - 15.0 mmol/L Final       Lab Results   Component Value Date    TSH 0.930 10/14/2022    LDL 64 09/07/2021    [id number] 1,331 (H) 08/03/2022         IMAGING STUDIES:  CT Abdomen Pelvis Without Contrast    Result Date: 10/14/2022   1. Large amount of ascites in the abdomen pelvis. 2. The gallbladder is largely distended. I see no  evidence of gallbladder wall thickening or stone or sludge in the gallbladder lumen. No evidence of dilatation of bile ducts. 3. The liver is small in size and is lobulated surface consistent with cirrhosis of the liver. There is no evidence of enlargement of the spleen #4 questionable thickening of the wall of the a ascending colon and transverse colon might be caused by contraction or possibly inflammation or infection  Electronically Signed By-Yohan Alcantar MD On:10/14/2022 9:15 PM This report was finalized on [id number] by  Yohan Alcantar MD.    CT Head Without Contrast    Result Date: 10/14/2022   1. No acute intracranial abnormality seen.  Electronically Signed By-Yohan Alcantar MD On:10/14/2022 9:08 PM This report was finalized on [id number] by  Yohan Alcantar MD.      I reviewed the patient's new clinical results.      ________________________________________________________     PROBLEM LIST:    Generalized weakness    Post traumatic stress disorder    Essential hypertension    Hyperlipidemia    Tobacco dependence syndrome    Anemia of chronic disease    Chronic obstructive pulmonary disease (HCC)    Cirrhosis of liver (HCC)    Acute UTI (urinary tract infection)    Rhabdomyolysis    Non-traumatic rhabdomyolysis          Assessment & Plan   ASSESSMENT/PLAN:  The patient is a 57 yr old female with multiple medical problems with HTN, cirrhosis of liver, history of alcohol abuse, COPD, UTI and rhabdomyolysis who had developed profound weakness of both upper and lower extremities.  Her weakness has improved considerably.  It may be secondary to the effect of rhabdomyolysis.  However agree with obtaining MRI of Spine to evaluate for any spinal cord pathology.   Rec:  Will continue supportive care.  Agree with obtaining MRI of C-Th-LS Spine.  Will follow. Discussed with Dr. Fitzpatrick.   HTN, cirrhosis of liver, HLD, COPD, UTI, rhabdomyolysis as per hospitalist, MD.           I  discussed the patient's findings and my recommendations with patient and nursing staff    Shiv Stevenson MD  10/15/22  10:30 EDT

## 2022-10-15 NOTE — ED PROVIDER NOTES
Subjective   History of Present Illness  Chief complaint: Patient is a pleasant 57-year-old female who came in today by EMS.  She was on the ground all day.  Took her 6 hours to be able to crawl to a phone to call EMS.  She states she is too weak.  She also has pain all over her extremities.  Sharp-like pain is the way she describes it.  But she also has mid spine tenderness all the way down.  She rates it as severe but she has been progressively weak since Monday.  She had to sit down for an hour and a half in the store yesterday in order to be able to stand up and ambulate to the car.  She has had no fever.  She does not drink alcohol anymore.  She was here couple of months ago for an alcohol detox.  She has not had any alcohol since that time.  She does not use drugs.  No numbness.  She is very thirsty because has not been able to eat or drink anything all day she states.  She denies loss of bowel or bladder.  She states that she has been going on the floor today but it is because she has been too generally weak to get to the bathroom    Context: Started and has progressed    Duration: Progressive since Monday over the last 5 days    Timin days of gradual onset    Severity: Symptoms are severe    Associated Symptoms: As noted above.        PCP:  LMP: Postmenopausal        Review of Systems   Respiratory: Negative.    Cardiovascular: Negative.    Gastrointestinal: Negative for abdominal pain.   Genitourinary: Negative.    Musculoskeletal: Positive for myalgias.   Neurological: Positive for weakness and headaches.   Psychiatric/Behavioral: Negative.    All other systems reviewed and are negative.      Past Medical History:   Diagnosis Date   • Cirrhosis (HCC)    • COPD (chronic obstructive pulmonary disease) (HCC)    • Depression    • GERD (gastroesophageal reflux disease)    • Hyperlipidemia    • Hypertension    • PTSD (post-traumatic stress disorder)        Allergies   Allergen Reactions   • Sulfa Antibiotics  Hives   • Keflex [Cephalexin] Hives       Past Surgical History:   Procedure Laterality Date   •  SECTION N/A    • COLONOSCOPY N/A 2021    Procedure: COLONOSCOPY with polypectomy x2 and random biopsy;  Surgeon: Osman Clay MD;  Location: [facility] ENDOSCOPY;  Service: Gastroenterology;  Laterality: N/A;  colon polyps   • DENTAL PROCEDURE     • ENDOSCOPY N/A 2021    Procedure: ESOPHAGOGASTRODUODENOSCOPY;  Surgeon: sOman Clay MD;  Location: [facility] ENDOSCOPY;  Service: Gastroenterology;  Laterality: N/A;  esophagitis   • JOINT REPLACEMENT     • REPLACEMENT TOTAL HIP LATERAL POSITION Left    • TONSILLECTOMY     • VAGINAL BIRTH AFTER  SECTION         Family History   Problem Relation Age of Onset   • Alcohol abuse Mother    • Alcohol abuse Paternal Grandfather        Social History     Socioeconomic History   • Marital status:    Tobacco Use   • Smoking status: Every Day     Packs/day: 0.25     Types: Cigarettes   • Smokeless tobacco: Never   • Tobacco comments:     3cigs   Vaping Use   • Vaping Use: Never used   Substance and Sexual Activity   • Alcohol use: Not Currently     Comment: quit 23   • Drug use: No   • Sexual activity: Yes     Partners: Male     Birth control/protection: Post-menopausal           Objective   Physical Exam  Vitals and nursing note reviewed.   HENT:      Head: Normocephalic and atraumatic.   Eyes:      Extraocular Movements: Extraocular movements intact.      Pupils: Pupils are equal, round, and reactive to light.   Cardiovascular:      Rate and Rhythm: Normal rate and regular rhythm.      Pulses: Normal pulses.      Heart sounds: Normal heart sounds.   Pulmonary:      Effort: Pulmonary effort is normal.      Breath sounds: Normal breath sounds.   Abdominal:      General: Abdomen is protuberant.      Tenderness: There is no abdominal tenderness.   Musculoskeletal:         General: Normal range of motion.      Cervical back: Normal range of motion and  neck supple.        Back:    Skin:     General: Skin is warm and dry.   Neurological:      General: No focal deficit present.      Mental Status: She is alert and oriented to person, place, and time.      Motor: Weakness present.      Coordination: Coordination abnormal.         Procedures           ED Course  ED Course as of 10/14/22 2045   Fri Oct 14, 2022   2042 Spoke with Dr. Stevenson who did not feel that at this point time MRI needed to be called in emergently.  This has been going on for 5 days.   [LH]      ED Course User Index  [LH] Moisés Rodriguez,             Results for orders placed or performed during the hospital encounter of 10/14/22   Comprehensive Metabolic Panel    Specimen: Blood   Result Value Ref Range    Glucose 74 65 - 99 mg/dL    BUN 7 6 - 20 mg/dL    Creatinine 0.42 (L) 0.57 - 1.00 mg/dL    Sodium 143 136 - 145 mmol/L    Potassium 3.6 3.5 - 5.2 mmol/L    Chloride 112 (H) 98 - 107 mmol/L    CO2 22.0 22.0 - 29.0 mmol/L    Calcium 7.5 (L) 8.6 - 10.5 mg/dL    Total Protein 4.8 (L) 6.0 - 8.5 g/dL    Albumin 2.00 (L) 3.50 - 5.20 g/dL    ALT (SGPT) 83 (H) 1 - 33 U/L    AST (SGOT) 493 (H) 1 - 32 U/L    Alkaline Phosphatase 77 39 - 117 U/L    Total Bilirubin 0.6 0.0 - 1.2 mg/dL    Globulin 2.8 gm/dL    A/G Ratio 0.7 g/dL    BUN/Creatinine Ratio 16.7 7.0 - 25.0    Anion Gap 9.0 5.0 - 15.0 mmol/L    eGFR 115.7 >60.0 mL/min/1.73   CK    Specimen: Blood   Result Value Ref Range    Creatine Kinase 17,738 (H) 20 - 180 U/L   Magnesium    Specimen: Blood   Result Value Ref Range    Magnesium 1.4 (L) 1.6 - 2.6 mg/dL   TSH    Specimen: Blood   Result Value Ref Range    TSH 0.930 0.270 - 4.200 uIU/mL   Protime-INR    Specimen: Blood   Result Value Ref Range    Protime 11.7 9.6 - 11.7 Seconds    INR 1.14 (H) 0.93 - 1.10   aPTT    Specimen: Blood   Result Value Ref Range    PTT 26.4 (L) 61.0 - 76.5 seconds   Urinalysis With Culture If Indicated - Urine, Catheter    Specimen: Urine, Catheter   Result Value  Ref Range    Color, UA Other (A) Yellow, Straw    Appearance, UA Turbid (A) Clear    pH, UA 5.5 5.0 - 8.0    Specific Gravity, UA 1.020 1.005 - 1.030    Glucose, UA Negative Negative    Ketones, UA Negative Negative    Bilirubin, UA Small (1+) (A) Negative    Blood, UA Large (3+) (A) Negative    Protein, UA >=300 mg/dL (3+) (A) Negative    Leuk Esterase, UA Large (3+) (A) Negative    Nitrite, UA Positive (A) Negative    Urobilinogen, UA 1.0 E.U./dL 0.2 - 1.0 E.U./dL   CBC Auto Differential    Specimen: Blood   Result Value Ref Range    WBC 8.90 3.40 - 10.80 10*3/mm3    RBC 3.33 (L) 3.77 - 5.28 10*6/mm3    Hemoglobin 11.8 (L) 12.0 - 15.9 g/dL    Hematocrit 36.9 34.0 - 46.6 %    .0 (H) 79.0 - 97.0 fL    MCH 35.6 (H) 26.6 - 33.0 pg    MCHC 32.0 31.5 - 35.7 g/dL    RDW 14.5 12.3 - 15.4 %    RDW-SD 56.9 (H) 37.0 - 54.0 fl    MPV 7.7 6.0 - 12.0 fL    Platelets 233 140 - 450 10*3/mm3    Neutrophil % 76.5 (H) 42.7 - 76.0 %    Lymphocyte % 14.5 (L) 19.6 - 45.3 %    Monocyte % 6.0 5.0 - 12.0 %    Eosinophil % 2.0 0.3 - 6.2 %    Basophil % 1.0 0.0 - 1.5 %    Neutrophils, Absolute 6.80 1.70 - 7.00 10*3/mm3    Lymphocytes, Absolute 1.30 0.70 - 3.10 10*3/mm3    Monocytes, Absolute 0.50 0.10 - 0.90 10*3/mm3    Eosinophils, Absolute 0.20 0.00 - 0.40 10*3/mm3    Basophils, Absolute 0.10 0.00 - 0.20 10*3/mm3    nRBC 0.1 0.0 - 0.2 /100 WBC   Urinalysis, Microscopic Only - Urine, Catheter    Specimen: Urine, Catheter   Result Value Ref Range    RBC, UA 0-2 (A) None Seen /HPF    WBC, UA Too Numerous to Count (A) None Seen /HPF    Bacteria, UA 4+ (A) None Seen /HPF    Squamous Epithelial Cells, UA None Seen None Seen, 0-2 /HPF    Hyaline Casts, UA None Seen None Seen /LPF    Methodology Manual Light Microscopy            CT Abdomen Pelvis Without Contrast    Result Date: 10/14/2022   1. Large amount of ascites in the abdomen pelvis. 2. The gallbladder is largely distended. I see no evidence of gallbladder wall thickening or stone  or sludge in the gallbladder lumen. No evidence of dilatation of bile ducts. 3. The liver is small in size and is lobulated surface consistent with cirrhosis of the liver. There is no evidence of enlargement of the spleen #4 questionable thickening of the wall of the a ascending colon and transverse colon might be caused by contraction or possibly inflammation or infection  Electronically Signed By-Yohan Alcantar MD On:10/14/2022 9:15 PM This report was finalized on [id number] by  Yohan Alcantar MD.    CT Head Without Contrast    Result Date: 10/14/2022   1. No acute intracranial abnormality seen.  Electronically Signed By-Yohan Alcantar MD On:10/14/2022 9:08 PM This report was finalized on [id number] by  Yohan Alcantar MD.                                 MDM  Number of Diagnoses or Management Options  Hypomagnesemia  Non-traumatic rhabdomyolysis  Urinary tract infection without hematuria, site unspecified  Diagnosis management comments: Patient does have significant rhabdomyolysis as well as UTI.  Which could cause her back pain.  As well as her weakness her weakness is not focal.  I spoke with Dr. Stevenson.  MRIs were not necessary emergently at this point he did not feel.  She is not having bowel or bladder incontinence.  She has a history of alcoholism.  She has not had a drink for 2 months she states.  Warnicke's is a risk.  However she does not have ophthalmoplegia.  And finger-to-nose and heel-to-shin she is able to complete here.  She is not altered at this time.  She is receiving IV fluid boluses as well as magnesium replacement and antibiotics.  She is allergic to Keflex with hives.  Aztreonam has been ordered.  Discussed with the Shaun on-call for the hospital who agrees to admit in patient verbalized understanding.  I did review old records of her prior admission for alcohol detox       Amount and/or Complexity of Data Reviewed  Clinical lab tests: reviewed  Tests in the  radiology section of CPT®: reviewed  Discussion of test results with the performing providers: yes  Review and summarize past medical records: yes  Discuss the patient with other providers: yes  Independent visualization of images, tracings, or specimens: yes    Patient Progress  Patient progress: other (comment)      Final diagnoses:   None   Acute rhabdomyolysis  UTI  Back pain   Generalized weakness      ED Disposition  ED Disposition     None          No follow-up provider specified.       Medication List      No changes were made to your prescriptions during this visit.          Moisés Rodriguez,   10/14/22 [id number]       Moisés Rodriguez,   10/14/22 2431

## 2022-10-15 NOTE — H&P
[facility] Medicine Services      Patient Name: Lita Yu  : 1965  MRN: [id number]  Primary Care Physician:  Norma Saul APRN  Date of admission: 10/14/2022      Subjective      Chief Complaint: Generalized weakness and pain    History of Present Illness: Lita Yu is a 57 y.o. female who presented to [facility] on 10/14/2022 complaining of generalized weakness as well as generalized pain.  Patient reports for approximately past 5 days she has had increasing weakness with difficulty in mobility though no focal neurologic deficits are reported.  She notes significant decrease in p.o. intake as well as urine output and reports that on 10/14/2022 she fell landing on the ground and had to lay on the ground for approximately 24 hours before she was found.  At present she denies generalized muscle aches worse in her extremities as well as a headache and dry mouth.  Some increase in her abdominal swelling is also reported though she denies any peripheral edema and does not note any nausea, vomiting or recent fevers.      Review of Systems   Constitutional: Positive for malaise/fatigue.   HENT: Negative.    Eyes: Negative.    Cardiovascular: Negative.    Respiratory: Negative.    Skin: Negative.    Musculoskeletal: Positive for joint pain and myalgias.   Gastrointestinal: Positive for nausea. Negative for vomiting.   Genitourinary: Negative.    Neurological: Negative.    Psychiatric/Behavioral: Negative.         Personal History     Past Medical History:   Diagnosis Date   • Cirrhosis (HCC)    • COPD (chronic obstructive pulmonary disease) (HCC)    • Depression    • GERD (gastroesophageal reflux disease)    • Hyperlipidemia    • Hypertension    • PTSD (post-traumatic stress disorder)        Past Surgical History:   Procedure Laterality Date   •  SECTION N/A    • COLONOSCOPY N/A 2021    Procedure: COLONOSCOPY with polypectomy x2 and random  biopsy;  Surgeon: Osman Clay MD;  Location: [facility] ENDOSCOPY;  Service: Gastroenterology;  Laterality: N/A;  colon polyps   • DENTAL PROCEDURE     • ENDOSCOPY N/A 2021    Procedure: ESOPHAGOGASTRODUODENOSCOPY;  Surgeon: Osman Clay MD;  Location: [facility] ENDOSCOPY;  Service: Gastroenterology;  Laterality: N/A;  esophagitis   • JOINT REPLACEMENT     • REPLACEMENT TOTAL HIP LATERAL POSITION Left    • TONSILLECTOMY     • VAGINAL BIRTH AFTER  SECTION         Family History: family history includes Alcohol abuse in her mother and paternal grandfather. Otherwise pertinent FHx was reviewed and not pertinent to current issue.    Social History:  reports that she has been smoking cigarettes. She has been smoking an average of .25 packs per day. She has never used smokeless tobacco. She reports that she does not currently use alcohol. She reports that she does not use drugs.    Home Medications:  Prior to Admission Medications     Prescriptions Last Dose Informant Patient Reported? Taking?    albuterol sulfate  (90 Base) MCG/ACT inhaler   No No    Inhale 2 puffs Every 4 (Four) Hours As Needed for Wheezing.    atorvastatin (LIPITOR) 20 MG tablet   Yes No    Take 20 mg by mouth Daily.    busPIRone (BUSPAR) 15 MG tablet   No No    TAKE 1 TABLET BY MOUTH TWICE A DAY    butalbital-acetaminophen-caffeine (FIORICET, ESGIC) -40 MG per tablet   No No    Take 1 tablet by mouth Every 6 (Six) Hours As Needed for Headache (Neck discomfort).    cholestyramine light 4 g packet   No No    Take 2 packets by mouth Every 8 (Eight) Hours.    Fluticasone-Umeclidin-Vilant (Trelegy Ellipta) 100-62.5-25 MCG/INH inhaler   Yes No    Inhale 1 puff Daily.    folic acid (FOLVITE) 1 MG tablet   No No    Take 1 tablet by mouth Daily.    lisinopril-hydrochlorothiazide (PRINZIDE,ZESTORETIC) 20-25 MG per tablet   No No    Take 1 tablet by mouth Daily.    omega-3 acid ethyl esters (LOVAZA) 1 g capsule   No No    TAKE 2  CAPSULES BY MOUTH 2 TIMES A DAY.    ondansetron ODT (Zofran ODT) 4 MG disintegrating tablet   No No    Place 1 tablet on the tongue Every 12 (Twelve) Hours As Needed for Nausea or Vomiting.    PROBIOTIC, LACTOBACILLUS, PO   Yes No    Take 1 dose by mouth Daily.    promethazine (PHENERGAN) 12.5 MG tablet   No No    Take 1 tablet by mouth Every 6 (Six) Hours As Needed for Nausea or Vomiting.    psyllium (METAMUCIL) 58.6 % packet   Yes No    Take 1 packet by mouth Daily.    sertraline (ZOLOFT) 100 MG tablet   Yes No    Take 100 mg by mouth 2 (Two) Times a Day.    thiamine (VITAMIN B-1) 100 MG tablet   No No    Take 1 tablet by mouth Daily.    traZODone (DESYREL) 100 MG tablet   No No    TAKE 1 TABLET BY MOUTH EVERY DAY AT NIGHT    vitamin B-12 (CYANOCOBALAMIN) 500 MCG tablet   Yes No    Take 1,000 mcg by mouth Daily.            Allergies:  Allergies   Allergen Reactions   • Sulfa Antibiotics Hives   • Keflex [Cephalexin] Hives       Objective      Vitals:   Temp:  [98 °F (36.7 °C)] 98 °F (36.7 °C)  Heart Rate:  [86-88] 87  Resp:  [18] 18  BP: (141-161)/(76-87) 149/76    Physical Exam  Constitutional:       General: She is not in acute distress.     Appearance: Normal appearance. She is normal weight. She is not ill-appearing, toxic-appearing or diaphoretic.   HENT:      Head: Normocephalic.      Right Ear: External ear normal.      Left Ear: External ear normal.      Nose: Nose normal.      Mouth/Throat:      Mouth: Mucous membranes are moist.   Eyes:      Extraocular Movements: Extraocular movements intact.   Cardiovascular:      Rate and Rhythm: Normal rate and regular rhythm.      Pulses: Normal pulses.   Pulmonary:      Effort: Pulmonary effort is normal.      Breath sounds: Normal breath sounds.   Abdominal:      General: Bowel sounds are normal. There is distension.      Palpations: Abdomen is soft.   Musculoskeletal:      Cervical back: Normal range of motion.      Right lower leg: No edema.      Left lower leg:  No edema.   Skin:     General: Skin is warm and dry.      Capillary Refill: Capillary refill takes less than 2 seconds.   Neurological:      General: No focal deficit present.      Mental Status: She is alert.   Psychiatric:         Mood and Affect: Mood normal.         Behavior: Behavior normal.         Thought Content: Thought content normal.         Judgment: Judgment normal.          Result Review    Result Review:  I have personally reviewed the results from the time of this admission to 10/14/2022 23:33 EDT and agree with these findings:  [x]  Laboratory  []  Microbiology  [x]  Radiology  []  EKG/Telemetry   []  Cardiology/Vascular   []  Pathology  []  Old records  []  Other:  Most notable findings include: CK, CMP, CBC, magnesium, TSH, UA, CT of head and CT of abdomen      Assessment & Plan        Active Hospital Problems:  Active Hospital Problems    Diagnosis    • **Generalized weakness    • Acute UTI (urinary tract infection)    • Rhabdomyolysis    • Cirrhosis of liver (HCC)    • Chronic obstructive pulmonary disease (HCC)    • Essential hypertension    • Anemia of chronic disease    • Post traumatic stress disorder    • Hyperlipidemia    • Tobacco dependence syndrome      Plan:     Generalized weakness with rhabdomyolysis and urinary tract infection  -CK: 17,738  -Creatinine 0.42 with a BUN of 7  -WBCs: 8.90  -UA shows 4+ bacteria with WBCs too numerous to count, 0-2 RBCs, 1+ bilirubin, 3+ protein, 3+ leukocyte esterase, positive nitrites and 3+ blood  -CT of the head showed no acute intracranial abnormality  -Azactam started in the ED, continue  -MRI of spine ordered in ED with neurology also consulted who felt given the several day chronicity of the symptoms could be performed in a.m.  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  Liver is noted as consistent with cirrhosis with questionable thickening of the wall of the ascending colon and transverse colon also noted  -ALT and AST increased from baseline with current finding of 83/493  -INR: 1.14 with a PT of 11.7 and a PTT of 26.4  -Patient may benefit from paracentesis  -Continue thiamine and B12    Hypomagnesemia  -Magnesium: 1.4  -Replacement protocol ordered in ED  -Monitor while admitted    COPD  -Symbicort and DuoNeb    Hypertension  -Well controlled   BP Readings from Last 1 Encounters:   10/14/22 149/76   -Hold lisinopril hydrochlorothiazide for now and monitor kidney function  - Monitor while admitted    Anemia  Lab Results   Component Value Date    HGB 11.8 (L) 10/14/2022    .0 (H) 10/14/2022    MCHC 32.0 10/14/2022   -Monitor while admitted    PTSD/anxiety  -Zoloft and BuSpar    Hyperlipidemia  -Statin held for now    Tobacco abuse  -Encourage cessation especially in light of comorbid conditions  -Nicotine patch ordered        DVT prophylaxis:  No DVT prophylaxis order currently exists.    CODE STATUS:       Admission Status:  I believe this patient meets inpatient status.    I discussed the patient's findings and my recommendations with patient and nursing staff.      Signature: Electronically signed by Christian Andrews PA-C, 10/15/22, 3:19 AM EDT.

## 2022-10-15 NOTE — PROGRESS NOTES
[facility] Medicine Services Daily Progress Note    Patient Name: Lita Yu  : 1965  MRN: [id number]  Primary Care Physician:  Norma Saul APRN  Date of admission: 10/14/2022      Subjective      Chief Complaint: Weakness    Patient seen and examined this morning.  Feeling better compared to yesterday.  Denies any further weakness, able to move both upper and lower extremities well, denies any numbness or tingling.  No fecal or urinary incontinence.  He does tell me that her abdomen is more distended than usual.  Her last drink of alcohol was 1 day prior to admission.  Denies any fever, chills, chest pain, or shortness of breath.    Pertinent positives as noted in HPI/subjective.  All other systems were reviewed and are negative.      Objective      Vitals:   Temp:  [98 °F (36.7 °C)-98.2 °F (36.8 °C)] 98.2 °F (36.8 °C)  Heart Rate:  [84-96] 88  Resp:  [16-18] 16  BP: (116-161)/(65-87) 125/86    Physical Exam:    General: Awake, alert, lying in bed, NAD  Eyes: PERRL, EOMI, conjunctive are clear  Cardiovascular: Regular rate and rhythm, no murmurs  Respiratory: Clear to auscultation bilaterally, no wheezing or rales, unlabored breathing  Abdomen: Soft, distended, nontender, positive bowel sounds, no guarding  Neurologic: A&O, CN grossly intact, moves all extremities spontaneously, sensation intact bilaterally  Musculoskeletal: Generalized weakness, no deformities  Skin: Warm, dry, intact         Result Review    Result Review:  I have personally reviewed the results from the time of this admission to 10/15/2022 09:53 EDT and agree with these findings:  [x]  Laboratory  [x]  Microbiology  [x]  Radiology  []  EKG/Telemetry   []  Cardiology/Vascular   []  Pathology  [x]  Old records  []  Other:          Assessment & Plan      Brief Patient Summary:  Lita Yu is a 57 y.o. female who      albumin human, 37.5 g, Intravenous, Once   Or  albumin human, 50 g,  Intravenous, Once   Or  albumin human, 62.5 g, Intravenous, Once   Or  albumin human, 75 g, Intravenous, Once   Or  albumin human, 87.5 g, Intravenous, Once   Or  albumin human, 100 g, Intravenous, Once   Or  albumin human, 112.5 g, Intravenous, Once  aztreonam, 1,000 mg, Intravenous, Q8H  furosemide, 40 mg, Intravenous, Daily  LORazepam, 0.5 mg, Intravenous, Once  multivitamin-iron-minerals-folic acid, 1 tablet, Oral, Daily  nicotine, 1 patch, Transdermal, Q24H  sodium chloride, 10 mL, Intravenous, Q12H  spironolactone, 100 mg, Oral, Daily  thiamine, 100 mg, Oral, Daily       lactated ringers, 75 mL/hr, Last Rate: 75 mL/hr (10/15/22 1381)         Active Hospital Problems:  Active Hospital Problems    Diagnosis    • **Generalized weakness    • Cirrhosis of liver (HCC)    • Acute UTI (urinary tract infection)    • Rhabdomyolysis    • Non-traumatic rhabdomyolysis    • Chronic obstructive pulmonary disease (HCC)    • Essential hypertension    • Anemia of chronic disease    • Post traumatic stress disorder    • Hyperlipidemia    • Tobacco dependence syndrome      Plan:   Generalized weakness  Acute rhabdomyolysis  -Likely related to deconditioning and alcohol abuse, patient was found on the floor for at least 24 hours  -CPK elevated but patient has significant ascites, IV fluids decreased per GI  -Nephrology has been consulted as well  -PT/OT, may need rehab  -MRI spine ordered and discussed with neurology who will see the patient  -Continue to monitor    LORENZO/Alcoholic cirrhosis with ascites  -CT report noted  -Paracentesis ordered, check fluid studies  -Diuresis with Lasix per GI  -Continue Aldactone  -Lactulose if needed  -GI following    Acute UTI  -UA noted, urine culture collected  -Continue empiric aztreonam due to allergies  -Follow-up on urine culture    Alcohol abuse  -Appears to be chronic, counseled on cessation  -Last drink was the night before admission  -Started on CIWA protocol with Ativan as  needed  -Monitor for withdrawals    Hypertension  -Controlled  -Resume home meds as able  -Monitor    Hypomagnesemia  -Replace and monitor    Anemia of chronic disease  -Hemoglobin appears to be stable  -Monitor daily    COPD  -Not in exacerbation, remains on room air  -Continue bronchodilators, monitor    PTSD/anxiety  -Continue home meds, monitor    DVT prophylaxis  -Lovenox        CODE STATUS:    Code Status (Patient has no pulse and is not breathing): CPR (Attempt to Resuscitate)  Medical Interventions (Patient has pulse or is breathing): Full Support      Disposition: Pending improvement    Electronically signed by Donna Fitzpatrick DO, 10/15/22, 09:53 EDT.  [facility]      Much of this encounter note is an electronic transcription/translation of spoken language to printed text. The electronic translation of spoken language may permit erroneous, or at times, nonsensical words or phrases to be inadvertently transcribed; Although I have reviewed the note for such errors, some may still exist.

## 2022-10-15 NOTE — CONSULTS
GI CONSULT  NOTE:    Referring Provider:   Dr. Fitzpatrick    Chief complaint:   Cirrhosis    Subjective    Weakness    History of present illness:   Patient is a 57-year-old female with a history of alcohol abuse, alcohol/Taylor cirrhosis, COPD, GERD, hypertension and depression who came into the ER on 10/14/2022 with a complaint of weakness.  States her weakness started on 10/10/2022.  Patient had evidently been down on the floor for over 6 hours and urinating on herself.  Unable to crawl to the telephone.  Patient was evaluated in the ER via CT of the abdomen and pelvis without contrast which showed a large amount of ascites in the abdomen and pelvis gallbladder largely distended no evidence of biliary ductal dilation, gallbladder wall thickening, stones or sludge.  Liver cirrhosis.  Questionable thickening of the wall of the ascending colon and transverse colon questionable contraction or possible inflammation or infection.   Labs: Sodium, potassium, creatinine normal.  Total bilirubin 0.6.  Alk phos 77, , ALT 83.  Ammonia level 26, lactate 1, magnesium low at 1.4, INR 1.14 WBCs 8.9, hemoglobin 11.8 with an MCV of 111, platelets 233. Meld Na 8  It does not appear as if patient is on any diuretics at home.  She denies taking any at home.  Patient does not follow low-sodium diet at home.  States she eats chips, preprepared meals, canned foods and soda.    She has chronic diarrhea and cholestyramine is listed on her medication list but admits she is not taking it.  She takes Imodium once a day and it generally controls her diarrhea.  States she has not been able to reach her for a couple days due to her weakness though so she has been having diarrhea lately.   She denies any abdominal pain, GERD, blood in her stool, nausea or vomiting.  No fever or chills.  Denies any urinary symptoms.  She was given Azactam for urinary tract infection in the ER.  Patient states she did notice abdominal distention but she thought  she was just getting fat.    2022 AFP 2.5    Endo History:  2021 EGD/colonoscopy (Dr. Clay) - LA grade A esophagitis at the GE junction, nonerosive gastritis, tubular adenoma colon polyps, mild nonspecific chronic colitis.   2021 EGD/EUS by Dr. Clay - nonerosive gastritis of biopsy for H. pylori, severe fatty liver with liver biopsy showing cirrhosis with mild steatohepatitis, abnormal spleen with linear change, small bowel biopsy benign.       Past Medical History:  Past Medical History:   Diagnosis Date   • Cirrhosis (HCC)    • COPD (chronic obstructive pulmonary disease) (HCC)    • Depression    • GERD (gastroesophageal reflux disease)    • Hyperlipidemia    • Hypertension    • PTSD (post-traumatic stress disorder)        Past Surgical History:  Past Surgical History:   Procedure Laterality Date   •  SECTION N/A    • COLONOSCOPY N/A 2021    Procedure: COLONOSCOPY with polypectomy x2 and random biopsy;  Surgeon: Osman Clay MD;  Location: [facility] ENDOSCOPY;  Service: Gastroenterology;  Laterality: N/A;  colon polyps   • DENTAL PROCEDURE     • ENDOSCOPY N/A 2021    Procedure: ESOPHAGOGASTRODUODENOSCOPY;  Surgeon: Osman Clay MD;  Location: [facility] ENDOSCOPY;  Service: Gastroenterology;  Laterality: N/A;  esophagitis   • JOINT REPLACEMENT     • REPLACEMENT TOTAL HIP LATERAL POSITION Left    • TONSILLECTOMY     • VAGINAL BIRTH AFTER  SECTION         Social History:  Social History     Tobacco Use   • Smoking status: Every Day     Packs/day: 0.25     Types: Cigarettes   • Smokeless tobacco: Never   • Tobacco comments:     3cigs   Vaping Use   • Vaping Use: Never used   Substance Use Topics   • Alcohol use: Not Currently     Comment: quit 23   • Drug use: No       Family History:  Family History   Problem Relation Age of Onset   • Alcohol abuse Mother    • Alcohol abuse Paternal Grandfather        Medications:  Medications Prior to Admission   Medication Sig Dispense  Refill Last Dose   • albuterol sulfate  (90 Base) MCG/ACT inhaler Inhale 2 puffs Every 4 (Four) Hours As Needed for Wheezing.      • atorvastatin (LIPITOR) 20 MG tablet Take 20 mg by mouth Daily.      • busPIRone (BUSPAR) 15 MG tablet TAKE 1 TABLET BY MOUTH TWICE A  tablet 1    • butalbital-acetaminophen-caffeine (FIORICET, ESGIC) -40 MG per tablet Take 1 tablet by mouth Every 6 (Six) Hours As Needed for Headache (Neck discomfort). 20 tablet 0    • cholestyramine light 4 g packet Take 2 packets by mouth Every 8 (Eight) Hours. 30 packet 0    • Fluticasone-Umeclidin-Vilant (Trelegy Ellipta) 100-62.5-25 MCG/INH inhaler Inhale 1 puff Daily.      • folic acid (FOLVITE) 1 MG tablet Take 1 tablet by mouth Daily. 29 tablet 0    • lisinopril-hydrochlorothiazide (PRINZIDE,ZESTORETIC) 20-25 MG per tablet Take 1 tablet by mouth Daily. 90 tablet 3    • omega-3 acid ethyl esters (LOVAZA) 1 g capsule TAKE 2 CAPSULES BY MOUTH 2 TIMES A DAY. 360 capsule 1    • ondansetron ODT (Zofran ODT) 4 MG disintegrating tablet Place 1 tablet on the tongue Every 12 (Twelve) Hours As Needed for Nausea or Vomiting. 30 tablet 0    • PROBIOTIC, LACTOBACILLUS, PO Take 1 dose by mouth Daily.      • promethazine (PHENERGAN) 12.5 MG tablet Take 1 tablet by mouth Every 6 (Six) Hours As Needed for Nausea or Vomiting. 12 tablet 0    • psyllium (METAMUCIL) 58.6 % packet Take 1 packet by mouth Daily.      • sertraline (ZOLOFT) 100 MG tablet Take 100 mg by mouth 2 (Two) Times a Day.      • thiamine (VITAMIN B-1) 100 MG tablet Take 1 tablet by mouth Daily. 30 tablet 0    • traZODone (DESYREL) 100 MG tablet TAKE 1 TABLET BY MOUTH EVERY DAY AT NIGHT 30 tablet 1    • vitamin B-12 (CYANOCOBALAMIN) 500 MCG tablet Take 1,000 mcg by mouth Daily.          Scheduled Meds:aztreonam, 1,000 mg, Intravenous, Q8H  LORazepam, 0.5 mg, Intravenous, Once  nicotine, 1 patch, Transdermal, Q24H  sodium chloride, 10 mL, Intravenous, Q12H      Continuous  "Infusions:lactated ringers, 150 mL/hr, Last Rate: 125 mL/hr (10/15/22 0258)      PRN Meds:.•  ipratropium-albuterol  •  melatonin  •  nitroglycerin  •  ondansetron **OR** ondansetron  •  [COMPLETED] Insert peripheral IV **AND** sodium chloride  •  sodium chloride    ALLERGIES:  Sulfa antibiotics and Keflex [cephalexin]    ROS:  Review of Systems   Gastrointestinal: Positive for abdominal distention and diarrhea. Negative for abdominal pain, anal bleeding, blood in stool, constipation, nausea, rectal pain and vomiting.   Genitourinary: Negative.        The following systems were reviewed and negative;  Constitution:  No fevers, chills, no unintentional weight loss  Skin: no rash, no jaundice  Eyes:  No blurry vision, no eye pain  HENT:  No change in hearing or smell  Resp:  No dyspnea or cough  CV:  No chest pain or palpitations  :  No dysuria, hematuria  Musculoskeletal:  No leg cramps or arthralgias  Neuro:  No tremor, no numbness  Psych:  No depression or confusion    Objective Resting in hospital bed covered up.  NAD.  Room 304.    Vital Signs:   Vitals:    10/15/22 0001 10/15/22 0100 10/15/22 0203 10/15/22 0747   BP: 116/65 127/79 143/78 125/86   BP Location:   Left arm Left arm   Patient Position:   Lying Lying   Pulse: 88 88 89 88   Resp:   18 16   Temp:   98.1 °F (36.7 °C) 98.2 °F (36.8 °C)   TempSrc:   Oral Oral   SpO2: 93%  95% 95%   Weight:   58.3 kg (128 lb 8.5 oz)    Height:   162.6 cm (64\")        Physical Exam:   General Appearance:    Awake and alert, in no acute distress   Head:    Normocephalic, without obvious abnormality, atraumatic   Eyes:            Conjunctivae normal, anicteric sclerae, pupils equal   Ears:    Ears appear intact with no abnormalities noted   Throat:   No oral lesions, no thrush, oral mucosa moist   Neck:   Supple, no JVD   Lungs:     respirations regular, even and unlabored       Chest Wall:    No abnormalities observed   Abdomen:     Normal bowel sounds, soft, nontender, no " rebound or guarding, moderate distended with ascites but not tense, no hepatosplenomegaly   Rectal:     Deferred   Extremities:   Moves all extremities, no edema, no cyanosis   Pulses:   Pulses palpable and equal bilaterally   Skin:   No rash, no jaundice, normal palpation       Neurologic:   Cranial nerves 2 - 12 grossly intact, no asterixis       Results Review:   I reviewed the patient's labs and imaging.  CBC    Results from last 7 days   Lab Units 10/14/22  2037   WBC 10*3/mm3 8.90   HEMOGLOBIN g/dL 11.8*   PLATELETS 10*3/mm3 233     CMP   Results from last 7 days   Lab Units 10/15/22  0428 10/14/22  2113   SODIUM mmol/L  --  143   POTASSIUM mmol/L  --  3.6   CHLORIDE mmol/L  --  112*   CO2 mmol/L  --  22.0   BUN mg/dL  --  7   CREATININE mg/dL  --  0.42*   GLUCOSE mg/dL  --  74   ALBUMIN g/dL  --  2.00*   BILIRUBIN mg/dL  --  0.6   ALK PHOS U/L  --  77   AST (SGOT) U/L  --  493*   ALT (SGPT) U/L  --  83*   MAGNESIUM mg/dL  --  1.4*   AMMONIA umol/L 26  --      Cr Clearance Estimated Creatinine Clearance: 139.3 mL/min (A) (by C-G formula based on SCr of 0.42 mg/dL (L)).  Coag   Results from last 7 days   Lab Units 10/14/22  2037   INR  1.14*   APTT seconds 26.4*     HbA1C No results found for: HGBA1C  Blood Glucose No results found for: POCGLU  Infection     UA    Results from last 7 days   Lab Units 10/14/22  2136   NITRITE UA  Positive*   WBC UA /HPF Too Numerous to Count*   BACTERIA UA /HPF 4+*   SQUAM EPITHEL UA /HPF None Seen     Radiology(recent) CT Abdomen Pelvis Without Contrast    Result Date: 10/14/2022   1. Large amount of ascites in the abdomen pelvis. 2. The gallbladder is largely distended. I see no evidence of gallbladder wall thickening or stone or sludge in the gallbladder lumen. No evidence of dilatation of bile ducts. 3. The liver is small in size and is lobulated surface consistent with cirrhosis of the liver. There is no evidence of enlargement of the spleen #4 questionable thickening of  the wall of the a ascending colon and transverse colon might be caused by contraction or possibly inflammation or infection  Electronically Signed By-Yohan Alcantar MD On:10/14/2022 9:15 PM This report was finalized on [id number] by  Yohan Alcantar MD.    CT Head Without Contrast    Result Date: 10/14/2022   1. No acute intracranial abnormality seen.  Electronically Signed By-Yohan Alcantar MD On:10/14/2022 9:08 PM This report was finalized on [id number] by  Yohan Alcantar MD.        ASSESSMENT AND PLAN:  LORENZO/alcohol cirrhosis complicated by ascites  Generalized weakness  UTI  Hypertension  Macrocytic anemia of chronic disease  COPD  GERD  Depression  Rhabdomyolysis    PLAN:  Decrease or stop lactated Ringer's due to large amount of ascites seen on CT.  Kidney function is good she does not show signs of dehydration.  Sodium and potassium are normal.  We will start diuretics to hopefully help decrease amount of ascites fluid present. Paracentesis with fluid studies to rule out SBP. MELD Na 8  Hold Imodium for diarrhea. Recommend welchol or colestipol upon discharge, she does not like cholestyramine.   Continue Azactam  Thiamine, multi-vitamin, folate   Physical therapy   Low sodium diet. Ask dietician to see to provide diet education.   She up to date on AFP HCC screening & varices screening.  Will order right upper quadrant ultrasound tomorrow for HCC screening after she has diuresed and hopefully had a paracentesis.      I discussed the patient's findings and my recommendations with the patient.  Roxann Coley, SHARIF  10/15/22  09:08 EDT    Time:

## 2022-10-15 NOTE — CONSULTS
[facility] Consult Note      Patient Name: Lita Yu  : 1965  MRN: [id number]  Primary Care Physician:  Norma Saul APRN  Referring Physician: SHARIF Agudelo  Date of admission: 10/14/2022    Subjective     Reason for Consult: Rhabdomyolysis    HPI:   Lita Yu is a 57 y.o. female with past medical history of tobacco abuse quarter pack for decades, chronic obstructive pulmonary disease, hypertension, dyslipidemia, gastroesophageal flux disease, alcoholic liver disease patient used to drink 1 pint of bourbon daily for 9 years she quit in 2022 ) .  Patient states that for the last couple of days she has been noticing generalized weakness with marked inability to perform activities and recurrent falls at home she was down for many hours.  Patient presented to the emergency department found to have rhabdomyolysis with generalized weakness.    Nephrology consultation been requested for the management    Review of Systems:   14 point review of systems is otherwise negative except for mentioned above on HPI    Personal History     Past Medical History:   Diagnosis Date   • Cirrhosis (HCC)    • COPD (chronic obstructive pulmonary disease) (HCC)    • Depression    • GERD (gastroesophageal reflux disease)    • Hyperlipidemia    • Hypertension    • PTSD (post-traumatic stress disorder)        Past Surgical History:   Procedure Laterality Date   •  SECTION N/A    • COLONOSCOPY N/A 2021    Procedure: COLONOSCOPY with polypectomy x2 and random biopsy;  Surgeon: Osman Clay MD;  Location: [facility] ENDOSCOPY;  Service: Gastroenterology;  Laterality: N/A;  colon polyps   • DENTAL PROCEDURE     • ENDOSCOPY N/A 2021    Procedure: ESOPHAGOGASTRODUODENOSCOPY;  Surgeon: Osman Clay MD;  Location: [facility] ENDOSCOPY;  Service: Gastroenterology;  Laterality: N/A;  esophagitis   • JOINT REPLACEMENT     • REPLACEMENT TOTAL HIP LATERAL  POSITION Left    • TONSILLECTOMY     • VAGINAL BIRTH AFTER  SECTION         Family History: family history includes Alcohol abuse in her mother and paternal grandfather.    Social History:  reports that she has been smoking cigarettes. She has been smoking an average of .25 packs per day. She has never used smokeless tobacco. She reports that she does not currently use alcohol. She reports that she does not use drugs.    Home Medications:  Prior to Admission medications    Medication Sig Start Date End Date Taking? Authorizing Provider   atorvastatin (LIPITOR) 20 MG tablet Take 20 mg by mouth Daily.   Yes Abigail Hood MD   busPIRone (BUSPAR) 15 MG tablet TAKE 1 TABLET BY MOUTH TWICE A DAY 22  Yes Loraine Griffin PA   Cyanocobalamin (Vitamin B-12) 1000 MCG sublingual tablet Place 1 tablet under the tongue Daily.   Yes Abigail Hood MD   folic acid (FOLVITE) 1 MG tablet Take 1 tablet by mouth Daily. 22  Yes Uriah Finch DO   lisinopril-hydrochlorothiazide (PRINZIDE,ZESTORETIC) 20-25 MG per tablet Take 1 tablet by mouth Daily. 22  Yes Uriah Finch DO   omega-3 acid ethyl esters (LOVAZA) 1 g capsule TAKE 2 CAPSULES BY MOUTH 2 TIMES A DAY. 22  Yes Norma Saul APRN   ondansetron (ZOFRAN) 4 MG tablet Take 1 tablet by mouth Daily As Needed for Nausea or Vomiting.   Yes Abigail Hood MD   sertraline (ZOLOFT) 100 MG tablet Take 100 mg by mouth 2 (Two) Times a Day.   Yes Abigail Hood MD   traZODone (DESYREL) 100 MG tablet TAKE 1 TABLET BY MOUTH EVERY DAY AT NIGHT 22  Yes Norma Saul APRN   albuterol sulfate  (90 Base) MCG/ACT inhaler Inhale 2 puffs Every 4 (Four) Hours As Needed for Wheezing. 8/8/22 10/15/22  Uriah Finch DO   butalbital-acetaminophen-caffeine (FIORICET, ESGIC) -40 MG per tablet Take 1 tablet by mouth Every 6 (Six) Hours As Needed for Headache (Neck discomfort). 8/17/22 10/15/22   Norma Saul APRN   cholestyramine light 4 g packet Take 2 packets by mouth Every 8 (Eight) Hours. 8/8/22 10/15/22  Uriah Finch DO   Fluticasone-Umeclidin-Vilant (Trelegy Ellipta) 100-62.5-25 MCG/INH inhaler Inhale 1 puff Daily.  10/15/22  Abigail Hood MD   ondansetron ODT (Zofran ODT) 4 MG disintegrating tablet Place 1 tablet on the tongue Every 12 (Twelve) Hours As Needed for Nausea or Vomiting. 9/19/22 10/15/22  Norma Saul APRN   PROBIOTIC, LACTOBACILLUS, PO Take 1 dose by mouth Daily.  10/15/22  Abigail Hood MD   promethazine (PHENERGAN) 12.5 MG tablet Take 1 tablet by mouth Every 6 (Six) Hours As Needed for Nausea or Vomiting. 8/17/22 10/15/22  Norma Saul APRN   psyllium (METAMUCIL) 58.6 % packet Take 1 packet by mouth Daily.  10/15/22  Abigail Hood MD   thiamine (VITAMIN B-1) 100 MG tablet Take 1 tablet by mouth Daily. 8/9/22 10/15/22  Uriah Finch DO   vitamin B-12 (CYANOCOBALAMIN) 500 MCG tablet Take 1,000 mcg by mouth Daily.  10/15/22  Abigail Hood MD       Allergies:  Allergies   Allergen Reactions   • Sulfa Antibiotics Hives   • Keflex [Cephalexin] Hives       Objective     Vitals:   Temp:  [98 °F (36.7 °C)-98.2 °F (36.8 °C)] 98.2 °F (36.8 °C)  Heart Rate:  [84-96] 88  Resp:  [16-18] 16  BP: (116-161)/(65-87) 125/86    Intake/Output Summary (Last 24 hours) at 10/15/2022 1542  Last data filed at 10/15/2022 0058  Gross per 24 hour   Intake 50 ml   Output --   Net 50 ml       Physical Exam:   Constitutional: Awake, alert, no acute distress.  HEENT: Sclera anicteric, no conjunctival injection  Neck: Supple, no thyromegaly, no lymphadenopathy, trachea at midline, no JVD  Respiratory: Clear to auscultation bilaterally, nonlabored respiration  Cardiovascular: RRR, no murmurs, no rubs or gallops, no carotid bruit  Gastrointestinal: Positive bowel sounds, abdomen is soft, nontender and nondistended  : No palpable  bladder  Musculoskeletal: No edema, no clubbing or cyanosis  Psychiatric: Appropriate affect, cooperative  Neurologic: Oriented x3, moving all extremities, normal speech and mental status  Skin: Warm and dry       Scheduled Meds:     [START ON 10/17/2022] albumin human, 37.5 g, Intravenous, Once   Or  [START ON 10/17/2022] albumin human, 50 g, Intravenous, Once   Or  [START ON 10/17/2022] albumin human, 62.5 g, Intravenous, Once   Or  [START ON 10/17/2022] albumin human, 75 g, Intravenous, Once   Or  [START ON 10/17/2022] albumin human, 87.5 g, Intravenous, Once   Or  [START ON 10/17/2022] albumin human, 100 g, Intravenous, Once   Or  [START ON 10/17/2022] albumin human, 112.5 g, Intravenous, Once  aztreonam, 1,000 mg, Intravenous, Q8H  budesonide-formoterol, 2 puff, Inhalation, BID - RT  busPIRone, 15 mg, Oral, BID  folic acid, 1,000 mcg, Oral, Daily  furosemide, 40 mg, Intravenous, Daily  LORazepam, 0.5 mg, Intravenous, Once  multivitamin-iron-minerals-folic acid, 1 tablet, Oral, Daily  nicotine, 1 patch, Transdermal, Q24H  sertraline, 100 mg, Oral, BID  sodium chloride, 10 mL, Intravenous, Q12H  spironolactone, 50 mg, Oral, Daily  thiamine, 100 mg, Oral, Daily  traZODone, 100 mg, Oral, Nightly      IV Meds:   lactated ringers, 75 mL/hr, Last Rate: 75 mL/hr (10/15/22 0947)        Results Reviewed:   I have personally reviewed the results from the time of this admission to 10/15/2022 15:42 EDT     Results from last 7 days   Lab Units 10/15/22  1006 10/14/22  2037   WBC 10*3/mm3 7.70 8.90   HEMOGLOBIN g/dL 10.2* 11.8*   HEMATOCRIT % 31.7* 36.9   PLATELETS 10*3/mm3 183 233     Results from last 7 days   Lab Units 10/14/22  2113   SODIUM mmol/L 143   POTASSIUM mmol/L 3.6   CHLORIDE mmol/L 112*   CO2 mmol/L 22.0   BUN mg/dL 7   CREATININE mg/dL 0.42*   CALCIUM mg/dL 7.5*   BILIRUBIN mg/dL 0.6   ALK PHOS U/L 77   ALT (SGPT) U/L 83*   AST (SGOT) U/L 493*   GLUCOSE mg/dL 74         Lab Results   Component Value Date     GLUCOSE 74 10/14/2022    CALCIUM 7.5 (L) 10/14/2022     10/14/2022    K 3.6 10/14/2022    CO2 22.0 10/14/2022     (H) 10/14/2022    BUN 7 10/14/2022    CREATININE 0.42 (L) 10/14/2022    EGFRIFNONA 98 12/14/2021    BCR 16.7 10/14/2022    ANIONGAP 9.0 10/14/2022      Lab Results   Component Value Date    MG 1.4 (L) 10/14/2022    PHOS 3.4 12/27/2018    ALBUMIN 2.00 (L) 10/14/2022           Assessment / Plan     ASSESSMENT:    -Rhabdomyolysis , secondary to recurrent falls aggravated with increased GI losses while on diuretics.  Agree with fluid resuscitation and follow CPK .  Hold diuretics temporarily.     -Hypomagnesemia.  Patient was treated with magnesium IV we will continue magnesium oxide 400 mg twice a day    -Chronic macrocytic anemia .  Currently on thiamine, and multivitamins    -Tobacco abuse chronic nicotine patch    -History alcohol abuse with cirrhosis followed closely by GI.  Appreciate input    PLAN:    -Agree with fluid resuscitation   -We will continue to follow CPK trend. , Her CPK are worsening from 15741 to 44773 .  -Will  Holding  Diuretics ( for 24 -48 hours )  while receiving  IV fluids , once CPKs has normalized can titrate diuretic  to prevent acute kidney injury and worsening rhabdomyolysis       Thank you for involving us in the care of Lita Yu.  Please feel free to call with any questions.    Boris Helm MD  10/15/22  15:42 EDT    [facility]  [phone]

## 2022-10-16 ENCOUNTER — INPATIENT HOSPITAL (OUTPATIENT)
Dept: URBAN - METROPOLITAN AREA HOSPITAL 84 | Facility: HOSPITAL | Age: 57
End: 2022-10-16

## 2022-10-16 ENCOUNTER — APPOINTMENT (OUTPATIENT)
Dept: ULTRASOUND IMAGING | Facility: HOSPITAL | Age: 57
End: 2022-10-16

## 2022-10-16 DIAGNOSIS — K74.69 OTHER CIRRHOSIS OF LIVER: ICD-10-CM

## 2022-10-16 LAB
ADV 40+41 DNA STL QL NAA+NON-PROBE: NOT DETECTED
ALBUMIN SERPL-MCNC: 2.3 G/DL (ref 3.5–5.2)
ALBUMIN/GLOB SERPL: 0.7 G/DL
ALP SERPL-CCNC: 90 U/L (ref 39–117)
ALT SERPL W P-5'-P-CCNC: 114 U/L (ref 1–33)
ANION GAP SERPL CALCULATED.3IONS-SCNC: 8 MMOL/L (ref 5–15)
APAP SERPL-MCNC: <5 MCG/ML (ref 0–30)
AST SERPL-CCNC: 675 U/L (ref 1–32)
ASTRO TYP 1-8 RNA STL QL NAA+NON-PROBE: NOT DETECTED
BACTERIA SPEC AEROBE CULT: ABNORMAL
BASOPHILS # BLD AUTO: 0 10*3/MM3 (ref 0–0.2)
BASOPHILS NFR BLD AUTO: 0.2 % (ref 0–1.5)
BILIRUB SERPL-MCNC: 0.4 MG/DL (ref 0–1.2)
BUN SERPL-MCNC: 9 MG/DL (ref 6–20)
BUN/CREAT SERPL: 17.6 (ref 7–25)
C CAYETANENSIS DNA STL QL NAA+NON-PROBE: NOT DETECTED
C COLI+JEJ+UPSA DNA STL QL NAA+NON-PROBE: NOT DETECTED
CALCIUM SPEC-SCNC: 8.5 MG/DL (ref 8.6–10.5)
CHLORIDE SERPL-SCNC: 102 MMOL/L (ref 98–107)
CK SERPL-CCNC: ABNORMAL U/L (ref 20–180)
CO2 SERPL-SCNC: 29 MMOL/L (ref 22–29)
CREAT SERPL-MCNC: 0.51 MG/DL (ref 0.57–1)
CRYPTOSP DNA STL QL NAA+NON-PROBE: NOT DETECTED
DEPRECATED RDW RBC AUTO: 53.8 FL (ref 37–54)
E HISTOLYT DNA STL QL NAA+NON-PROBE: NOT DETECTED
EAEC PAA PLAS AGGR+AATA ST NAA+NON-PRB: NOT DETECTED
EC STX1+STX2 GENES STL QL NAA+NON-PROBE: NOT DETECTED
EGFRCR SERPLBLD CKD-EPI 2021: 110.4 ML/MIN/1.73
EOSINOPHIL # BLD AUTO: 0.2 10*3/MM3 (ref 0–0.4)
EOSINOPHIL NFR BLD AUTO: 2.3 % (ref 0.3–6.2)
EPEC EAE GENE STL QL NAA+NON-PROBE: NOT DETECTED
ERYTHROCYTE [DISTWIDTH] IN BLOOD BY AUTOMATED COUNT: 14 % (ref 12.3–15.4)
ETEC LTA+ST1A+ST1B TOX ST NAA+NON-PROBE: NOT DETECTED
G LAMBLIA DNA STL QL NAA+NON-PROBE: NOT DETECTED
GLOBULIN UR ELPH-MCNC: 3.1 GM/DL
GLUCOSE BLDC GLUCOMTR-MCNC: 86 MG/DL (ref 70–105)
GLUCOSE SERPL-MCNC: 83 MG/DL (ref 65–99)
HCT VFR BLD AUTO: 28.5 % (ref 34–46.6)
HGB BLD-MCNC: 9.4 G/DL (ref 12–15.9)
IGA1 MFR SER: 476 MG/DL (ref 70–400)
IGG1 SER-MCNC: 1218 MG/DL (ref 700–1600)
IGM SERPL-MCNC: 275 MG/DL (ref 40–230)
INR PPP: 1.2 (ref 0.93–1.1)
LYMPHOCYTES # BLD AUTO: 1.4 10*3/MM3 (ref 0.7–3.1)
LYMPHOCYTES NFR BLD AUTO: 18.1 % (ref 19.6–45.3)
MAGNESIUM SERPL-MCNC: 1.6 MG/DL (ref 1.6–2.6)
MCH RBC QN AUTO: 36 PG (ref 26.6–33)
MCHC RBC AUTO-ENTMCNC: 33 G/DL (ref 31.5–35.7)
MCV RBC AUTO: 109 FL (ref 79–97)
MONOCYTES # BLD AUTO: 0.6 10*3/MM3 (ref 0.1–0.9)
MONOCYTES NFR BLD AUTO: 8.6 % (ref 5–12)
NEUTROPHILS NFR BLD AUTO: 5.3 10*3/MM3 (ref 1.7–7)
NEUTROPHILS NFR BLD AUTO: 70.8 % (ref 42.7–76)
NOROVIRUS GI+II RNA STL QL NAA+NON-PROBE: NOT DETECTED
NRBC BLD AUTO-RTO: 0.1 /100 WBC (ref 0–0.2)
P SHIGELLOIDES DNA STL QL NAA+NON-PROBE: NOT DETECTED
PHOSPHATE SERPL-MCNC: 3.8 MG/DL (ref 2.5–4.5)
PLATELET # BLD AUTO: 176 10*3/MM3 (ref 140–450)
PMV BLD AUTO: 8.4 FL (ref 6–12)
POTASSIUM SERPL-SCNC: 3.5 MMOL/L (ref 3.5–5.2)
POTASSIUM UR-SCNC: 14.8 MMOL/L
PROT SERPL-MCNC: 5.4 G/DL (ref 6–8.5)
PROTHROMBIN TIME: 12.2 SECONDS (ref 9.6–11.7)
RBC # BLD AUTO: 2.61 10*6/MM3 (ref 3.77–5.28)
RVA RNA STL QL NAA+NON-PROBE: NOT DETECTED
S ENT+BONG DNA STL QL NAA+NON-PROBE: NOT DETECTED
SAPO I+II+IV+V RNA STL QL NAA+NON-PROBE: NOT DETECTED
SHIGELLA SP+EIEC IPAH ST NAA+NON-PROBE: NOT DETECTED
SODIUM SERPL-SCNC: 139 MMOL/L (ref 136–145)
SODIUM UR-SCNC: 133 MMOL/L
V CHOL+PARA+VUL DNA STL QL NAA+NON-PROBE: NOT DETECTED
V CHOLERAE DNA STL QL NAA+NON-PROBE: NOT DETECTED
WBC NRBC COR # BLD: 7.5 10*3/MM3 (ref 3.4–10.8)
Y ENTEROCOL DNA STL QL NAA+NON-PROBE: NOT DETECTED

## 2022-10-16 PROCEDURE — 76705 ECHO EXAM OF ABDOMEN: CPT

## 2022-10-16 PROCEDURE — 99232 SBSQ HOSP IP/OBS MODERATE 35: CPT | Performed by: NURSE PRACTITIONER

## 2022-10-16 PROCEDURE — 84133 ASSAY OF URINE POTASSIUM: CPT | Performed by: NURSE PRACTITIONER

## 2022-10-16 PROCEDURE — 85025 COMPLETE CBC W/AUTO DIFF WBC: CPT | Performed by: PHYSICIAN ASSISTANT

## 2022-10-16 PROCEDURE — 85610 PROTHROMBIN TIME: CPT | Performed by: NURSE PRACTITIONER

## 2022-10-16 PROCEDURE — 86015 ACTIN ANTIBODY EACH: CPT | Performed by: INTERNAL MEDICINE

## 2022-10-16 PROCEDURE — 84300 ASSAY OF URINE SODIUM: CPT | Performed by: NURSE PRACTITIONER

## 2022-10-16 PROCEDURE — 82962 GLUCOSE BLOOD TEST: CPT

## 2022-10-16 PROCEDURE — 82550 ASSAY OF CK (CPK): CPT | Performed by: INTERNAL MEDICINE

## 2022-10-16 PROCEDURE — 87449 NOS EACH ORGANISM AG IA: CPT | Performed by: NURSE PRACTITIONER

## 2022-10-16 PROCEDURE — 87507 IADNA-DNA/RNA PROBE TQ 12-25: CPT | Performed by: NURSE PRACTITIONER

## 2022-10-16 PROCEDURE — 80053 COMPREHEN METABOLIC PANEL: CPT | Performed by: INTERNAL MEDICINE

## 2022-10-16 PROCEDURE — 83735 ASSAY OF MAGNESIUM: CPT | Performed by: INTERNAL MEDICINE

## 2022-10-16 PROCEDURE — 99232 SBSQ HOSP IP/OBS MODERATE 35: CPT | Performed by: PSYCHIATRY & NEUROLOGY

## 2022-10-16 PROCEDURE — 87324 CLOSTRIDIUM AG IA: CPT | Performed by: NURSE PRACTITIONER

## 2022-10-16 RX ORDER — TRAMADOL HYDROCHLORIDE 50 MG/1
25 TABLET ORAL ONCE
Status: COMPLETED | OUTPATIENT
Start: 2022-10-16 | End: 2022-10-16

## 2022-10-16 RX ORDER — NITROFURANTOIN 25; 75 MG/1; MG/1
100 CAPSULE ORAL EVERY 12 HOURS SCHEDULED
Status: COMPLETED | OUTPATIENT
Start: 2022-10-16 | End: 2022-10-19

## 2022-10-16 RX ORDER — FUROSEMIDE 10 MG/ML
40 INJECTION INTRAMUSCULAR; INTRAVENOUS DAILY
Status: DISCONTINUED | OUTPATIENT
Start: 2022-10-17 | End: 2022-10-21 | Stop reason: HOSPADM

## 2022-10-16 RX ORDER — SPIRONOLACTONE 25 MG/1
50 TABLET ORAL DAILY
Status: DISCONTINUED | OUTPATIENT
Start: 2022-10-17 | End: 2022-10-17

## 2022-10-16 RX ADMIN — Medication 10 ML: at 09:51

## 2022-10-16 RX ADMIN — FOLIC ACID 1000 MCG: 1 TABLET ORAL at 09:51

## 2022-10-16 RX ADMIN — Medication 100 MG: at 09:51

## 2022-10-16 RX ADMIN — Medication 1 TABLET: at 09:51

## 2022-10-16 RX ADMIN — Medication 400 MG: at 20:30

## 2022-10-16 RX ADMIN — SERTRALINE 100 MG: 100 TABLET, FILM COATED ORAL at 20:30

## 2022-10-16 RX ADMIN — BUSPIRONE HYDROCHLORIDE 15 MG: 15 TABLET ORAL at 20:30

## 2022-10-16 RX ADMIN — Medication 400 MG: at 09:51

## 2022-10-16 RX ADMIN — Medication 10 ML: at 20:30

## 2022-10-16 RX ADMIN — TRAMADOL HYDROCHLORIDE 25 MG: 50 TABLET, COATED ORAL at 16:26

## 2022-10-16 RX ADMIN — AZTREONAM 1000 MG: 1 INJECTION, POWDER, LYOPHILIZED, FOR SOLUTION INTRAMUSCULAR; INTRAVENOUS at 09:51

## 2022-10-16 RX ADMIN — SERTRALINE 100 MG: 100 TABLET, FILM COATED ORAL at 09:51

## 2022-10-16 RX ADMIN — TRAZODONE HYDROCHLORIDE 100 MG: 100 TABLET ORAL at 20:30

## 2022-10-16 RX ADMIN — AZTREONAM 1000 MG: 1 INJECTION, POWDER, LYOPHILIZED, FOR SOLUTION INTRAMUSCULAR; INTRAVENOUS at 02:00

## 2022-10-16 RX ADMIN — BUSPIRONE HYDROCHLORIDE 15 MG: 15 TABLET ORAL at 09:51

## 2022-10-16 RX ADMIN — NITROFURANTOIN (MONOHYDRATE/MACROCRYSTALS) 100 MG: 75; 25 CAPSULE ORAL at 20:30

## 2022-10-16 NOTE — PROGRESS NOTES
LOS: 2 days     Lita Yu is a 57 y.o. female     Chief Complaint:  Follow up for generalized weakness.        SUBJECTIVE:  History taken from: patient chart RN    Interval History:  57 yr old lady with cirrhosis of liver, COPD, GERD, depression, HLD, GERD, HLD, HTN, PTSD who presented to ER with inability to walk.  The MRI of C-Th-LS Spine were unremarkable.  Her condition has improved.  She complains about headaches, no vision problems, no speech and swallowing problems.           Patient Complaints: none.      Review of Systems   Review of Systems   Review of Systems   Constitutional: Negative  HENT: Negative.    Eyes: Negative.    Respiratory: Negative.    Cardiovascular: Negative.    Gastrointestinal: Cirrhosis of liver, GERD   Genitourinary: Negative.    Musculoskeletal: weakness  Skin: Negative.    Neurological: Negative.    Hematological: Negative.    Psychiatric/Behavioral: depression, anxiety, PTSD.      Pertinent PMH:  has a past medical history of Cirrhosis (HCC), COPD (chronic obstructive pulmonary disease) (HCC), Depression, GERD (gastroesophageal reflux disease), Hyperlipidemia, Hypertension, and PTSD (post-traumatic stress disorder).   ________________________________________________     OBJECTIVE:  The patient is laying in bed in no apparent distress.  Head NC, AT, Neck supple, trachea midline.  Lungs CTA,  Good pulmonary effort. CV  S1-S2 no murmur. Abdomen soft, non tender.  Ext no edema, no cyanosis.            Neurologic Exam    The patient is awake, alert, oriented to person, place and time.  Speech fluent with good comprehension, follow commands.  Name common objects.  CN VFFC, EOMI, no facial droop. Tongue midline.  Motor both upper extremities 4+/5.  Both lower extremities 4+/5.  Reflexes absent, plantar mute.  Sensory light touch intact, no sensory level on anterior abdominal wall noted. Cerebellum finger to nose intact.       ________________________________________________   RESULTS REVIEW    VITAL SIGNS:  Temp:  [97.3 °F (36.3 °C)-98.7 °F (37.1 °C)] 97.7 °F (36.5 °C)  Heart Rate:  [73-87] 83  Resp:  [16-18] 18  BP: (105-122)/(68-79) 117/73    LABS:   Lab Results   Component Value Date    WBC 7.50 10/15/2022    HGB 9.4 (L) 10/15/2022    HCT 28.5 (L) 10/15/2022    .0 (H) 10/15/2022     10/15/2022     Lab Results   Component Value Date    GLUCOSE 83 10/15/2022    BUN 9 10/15/2022    CREATININE 0.51 (L) 10/15/2022    EGFRIFNONA 98 12/14/2021    BCR 17.6 10/15/2022    K 3.5 10/15/2022    CO2 29.0 10/15/2022    CALCIUM 8.5 (L) 10/15/2022    ALBUMIN 2.30 (L) 10/15/2022    LABIL2 0.9 (L) 06/07/2019     (H) 10/15/2022     (H) 10/15/2022       Lab Results   Component Value Date    TSH 0.930 10/14/2022    LDL 64 09/07/2021    [id number] 1,331 (H) 08/03/2022         IMAGING STUDIES:  CT Abdomen Pelvis Without Contrast    Result Date: 10/14/2022   1. Large amount of ascites in the abdomen pelvis. 2. The gallbladder is largely distended. I see no evidence of gallbladder wall thickening or stone or sludge in the gallbladder lumen. No evidence of dilatation of bile ducts. 3. The liver is small in size and is lobulated surface consistent with cirrhosis of the liver. There is no evidence of enlargement of the spleen #4 questionable thickening of the wall of the a ascending colon and transverse colon might be caused by contraction or possibly inflammation or infection  Electronically Signed By-Yohan Alcantar MD On:10/14/2022 9:15 PM This report was finalized on [id number] by  Yohan Alcantar MD.    CT Head Without Contrast    Result Date: 10/14/2022   1. No acute intracranial abnormality seen.  Electronically Signed By-Yohan Alcantar MD On:10/14/2022 9:08 PM This report was finalized on [id number] by  Yohan Alcantar MD.    MRI Cervical Spine Without Contrast    Result Date:  10/15/2022  IMPRESSION : 1.     Motion artifact degrades diagnostic evaluation. No definitive cord abnormality is seen on this exam. 2.     Multilevel degenerative changes as above with suspected multilevel severe foraminal and mild to moderate canal narrowing. Repeat evaluation when patient is better able to maintain positioning may be considered.  Electronically Signed By-Jluis Medina MD On:10/15/2022 1:23 PM This report was finalized on [id number] by  Jluis Medina MD.    MRI Thoracic Spine Without Contrast    Result Date: 10/15/2022  1.     No definite high-grade stenosis or thoracic cord abnormality is seen on this exam which is somewhat limited by motion. 2.     Ascites and body wall edema.  Electronically Signed By-Jluis Medina MD On:10/15/2022 1:30 PM This report was finalized on [id number] by  Jluis Medina MD.    MRI Lumbar Spine Without Contrast    Result Date: 10/15/2022  1.     Motion artifact somewhat degrades diagnostic image quality. Conus appears grossly unremarkable. 2.     Mild marrow edema at the superior endplate of L3 surrounding a small Schmorl's node. No definite vertebral body height loss or acute fracture is identified. 3.     Mild marrow signal heterogeneity which could be seen with marrow reconversion. Correlate with lab values. 4.     Degenerative changes with suspected mild canal and foraminal narrowing as above. 5.     L5 pars defects without significant displacement on this exam. 6.     Ascites and body wall edema.  Electronically Signed By-Jluis Medina MD On:10/15/2022 1:38 PM This report was finalized on [id number] by  Jluis Medina MD.    US Abdomen Limited    Result Date: 10/16/2022   1.  Nodular appearing liver and be seen with cirrhosis. 2.  Small amount of ascites. 3.  No evidence of gallstones or biliary tract obstruction.  Electronically Signed By-Ryan Peres MD On:10/16/2022 11:22 AM This report was finalized on [id number] by  Ryan Peres MD.      I  reviewed the patient's new clinical results.    ________________________________________________      PROBLEM LIST:    Generalized weakness    Post traumatic stress disorder    Essential hypertension    Hyperlipidemia    Tobacco dependence syndrome    Anemia of chronic disease    Chronic obstructive pulmonary disease (HCC)    Cirrhosis of liver (HCC)    Acute UTI (urinary tract infection)    Rhabdomyolysis    Non-traumatic rhabdomyolysis        Assessment & Plan   ASSESSMENT/PLAN:  57 yr old female with multiple medical problems with HTN, cirrhosis of liver, history of alcohol abuse, COPD, UTI and rhabdomyolysis who had developed profound weakness of both upper and lower extremities.  Her weakness has improved.  It may be secondary to the effect of rhabdomyolysis  Rec:  Continue supportive care.  Will follow.   HTN, cirrhosis of liver, HLD, COPD, UTI, rhabdomyolysis as per hospitalist, MD.   **Please refer to previous notes for further details and recommendations.     I discussed the patients findings and my recommendations with patient and nursing staff    Shiv Stevenson MD  10/16/22  13:07 EDT

## 2022-10-16 NOTE — PROGRESS NOTES
[facility] Progress Note      Patient Name: Lita Yu  : 1965  MRN: [id number]  Primary Care Physician:  Norma Saul APRN  Date of admission: 10/14/2022    Subjective     Interval History:     Patient feeling better  Having some frontal headache without visual or neurological deficit  Tolerating oral intake  Denies any chest pain, shortness of palpitations  Having regular bowel movements  Urinating spontaneously    Review of Systems:   As noted above    Objective     Vitals:   Temp:  [97.3 °F (36.3 °C)-98.7 °F (37.1 °C)] 97.7 °F (36.5 °C)  Heart Rate:  [73-87] 83  Resp:  [16-18] 18  BP: (105-122)/(68-79) 117/73    Intake/Output Summary (Last 24 hours) at 10/16/2022 1532  Last data filed at 10/16/2022 1048  Gross per 24 hour   Intake 240 ml   Output --   Net 240 ml       Physical Exam:    General Appearance: alert, oriented x 3, no acute distress   Skin: warm and dry  HEENT: oral mucosa normal, nonicteric sclera  Neck: supple, no JVD  Lungs: CTA  Heart: RRR, normal S1 and S2  Abdomen: soft, nontender, nondistended  : no palpable bladder  Extremities: no edema, cyanosis or clubbing  Neuro: normal speech and mental status     Scheduled Meds:     [START ON 10/17/2022] albumin human, 37.5 g, Intravenous, Once   Or  [START ON 10/17/2022] albumin human, 50 g, Intravenous, Once   Or  [START ON 10/17/2022] albumin human, 62.5 g, Intravenous, Once   Or  [START ON 10/17/2022] albumin human, 75 g, Intravenous, Once   Or  [START ON 10/17/2022] albumin human, 87.5 g, Intravenous, Once   Or  [START ON 10/17/2022] albumin human, 100 g, Intravenous, Once   Or  [START ON 10/17/2022] albumin human, 112.5 g, Intravenous, Once  budesonide-formoterol, 2 puff, Inhalation, BID - RT  busPIRone, 15 mg, Oral, BID  folic acid, 1,000 mcg, Oral, Daily  [START ON 10/17/2022] furosemide, 40 mg, Intravenous, Daily  LORazepam, 0.5 mg, Intravenous, Once  magnesium oxide, 400 mg, Oral,  BID  multivitamin-iron-minerals-folic acid, 1 tablet, Oral, Daily  nicotine, 1 patch, Transdermal, Q24H  nitrofurantoin (macrocrystal-monohydrate), 100 mg, Oral, Q12H  sertraline, 100 mg, Oral, BID  sodium chloride, 10 mL, Intravenous, Q12H  [START ON 10/17/2022] spironolactone, 50 mg, Oral, Daily  thiamine, 100 mg, Oral, Daily  traMADol, 25 mg, Oral, Once  traZODone, 100 mg, Oral, Nightly      IV Meds:   lactated ringers, 75 mL/hr, Last Rate: 75 mL/hr (10/15/22 0947)        Results Reviewed:   I have personally reviewed the results from the time of this admission to 10/16/2022 15:32 EDT     Results from last 7 days   Lab Units 10/15/22  2259 10/14/22  2113   SODIUM mmol/L 139 143   POTASSIUM mmol/L 3.5 3.6   CHLORIDE mmol/L 102 112*   CO2 mmol/L 29.0 22.0   BUN mg/dL 9 7   CREATININE mg/dL 0.51* 0.42*   CALCIUM mg/dL 8.5* 7.5*   BILIRUBIN mg/dL 0.4 0.6   ALK PHOS U/L 90 77   ALT (SGPT) U/L 114* 83*   AST (SGOT) U/L 675* 493*   GLUCOSE mg/dL 83 74       Estimated Creatinine Clearance: 114.9 mL/min (A) (by C-G formula based on SCr of 0.51 mg/dL (L)).    Results from last 7 days   Lab Units 10/15/22  2259 10/14/22  2113   MAGNESIUM mg/dL 1.6 1.4*   PHOSPHORUS mg/dL 3.8  --              Results from last 7 days   Lab Units 10/15/22  2259 10/15/22  1006 10/14/22  2037   WBC 10*3/mm3 7.50 7.70 8.90   HEMOGLOBIN g/dL 9.4* 10.2* 11.8*   PLATELETS 10*3/mm3 176 183 233       Results from last 7 days   Lab Units 10/16/22  1351 10/14/22  2037   INR  1.20* 1.14*       Assessment / Plan       ASSESSMENT:     -Rhabdomyolysis , secondary to recurrent falls aggravated with increased GI losses while on diuretics.  On lactate ringer  and follow CPK .      -Hypomagnesemia.  Patient was treated with magnesium IV we will continue magnesium oxide 400 mg twice a day  Upon admission 1.4 > 1.6     -Chronic macrocytic anemia .  Currently on thiamine, and multivitamins     -Tobacco abuse chronic nicotine patch     -History alcohol abuse with  cirrhosis followed closely by GI.  Appreciate input.  Planning paracentesis tomorrow currently on diuretics.     -Urinary tract infection culture isolated over 100,000 colonies of E. Coli.  After primary team.  On nitrofurantoin    -Headache ; Order tramadol 25 mg oral  x1      PLAN:     -Continue IVF , CPK improving   -Diuretics as per gastroenterology   -Follow renal function closely  -Avoid nephrotoxins  -Adjust medications to GFR         Thank you for involving us in the care of Lita MARIA M Yu.  Please feel free to call with any questions.    Boris Helm MD  10/16/22  15:32 EDT    [facility]  [phone]

## 2022-10-16 NOTE — PROGRESS NOTES
" LOS: 2 days   Patient Care Team:  Norma Saul APRN as PCP - General (Nurse Practitioner)      Subjective    \"Let me sleep\"    Interval History:   Pending ultrasound this morning.  N.p.o. until after that.  Labs: Hepatitis panel is negative.  Pending urine sodium and potassium.  Pending stool studies.  Creatinine 0.51.  Total bilirubin is still normal at 0.4, AST is more elevated at 679 5, ALT is increased at 114, alk phos is 90.  WBC 7.5, hemoglobin 9.4, platelets 176.  Magnesium normal at 1.6.  Meld 9    ROS:   Weakness  No chest pain, shortness of breath, or cough.        Medication Review:     Current Facility-Administered Medications:   •  [START ON 10/17/2022] albumin human 25 % IV SOLN 37.5 g, 37.5 g, Intravenous, Once **OR** [START ON 10/17/2022] albumin human 25 % IV SOLN 50 g, 50 g, Intravenous, Once **OR** [START ON 10/17/2022] albumin human 25 % IV SOLN 62.5 g, 62.5 g, Intravenous, Once **OR** [START ON 10/17/2022] albumin human 25 % IV SOLN 75 g, 75 g, Intravenous, Once **OR** [START ON 10/17/2022] albumin human 25 % IV SOLN 87.5 g, 87.5 g, Intravenous, Once **OR** [START ON 10/17/2022] albumin human 25 % IV SOLN 100 g, 100 g, Intravenous, Once **OR** [START ON 10/17/2022] albumin human 25 % IV SOLN 112.5 g, 112.5 g, Intravenous, Once, Roxann Coley APRN  •  aztreonam (AZACTAM) 1,000 mg in sodium chloride 0.9 % 100 mL IVPB-MBP, 1,000 mg, Intravenous, Q8H, Christian Andrews PA-C, Last Rate: 0 mL/hr at 10/15/22 1711, 1,000 mg at 10/16/22 0200  •  budesonide-formoterol (SYMBICORT) 160-4.5 MCG/ACT inhaler 2 puff, 2 puff, Inhalation, BID - RT, Donna Fitzpatrick DO, 2 puff at 10/15/22 1918  •  busPIRone (BUSPAR) tablet 15 mg, 15 mg, Oral, BID, Donna Fitzpatrick DO, 15 mg at 10/15/22 2116  •  folic acid (FOLVITE) tablet 1,000 mcg, 1,000 mcg, Oral, Daily, Donna Fitzpatrick DO  •  ipratropium-albuterol (DUO-NEB) nebulizer solution 3 mL, 3 mL, Nebulization, Q4H PRN, Christian Andrews PA-C  •  lactated " ringers infusion, 75 mL/hr, Intravenous, Continuous, Roxann Coley, APRN, Last Rate: 75 mL/hr at 10/15/22 0947, 75 mL/hr at 10/15/22 0947  •  LORazepam (ATIVAN) injection 0.5 mg, 0.5 mg, Intravenous, Once, Donna Fitzpatrick,   •  LORazepam (ATIVAN) tablet 1 mg, 1 mg, Oral, Q2H PRN **OR** LORazepam (ATIVAN) injection 1 mg, 1 mg, Intravenous, Q2H PRN **OR** LORazepam (ATIVAN) tablet 2 mg, 2 mg, Oral, Q1H PRN **OR** LORazepam (ATIVAN) injection 2 mg, 2 mg, Intravenous, Q1H PRN **OR** LORazepam (ATIVAN) injection 2 mg, 2 mg, Intravenous, Q15 Min PRN **OR** LORazepam (ATIVAN) injection 2 mg, 2 mg, Intramuscular, Q15 Min PRN, Donna Fitzpatrick,   •  magnesium oxide (MAG-OX) tablet 400 mg, 400 mg, Oral, BID, Boris Helm MD, 400 mg at 10/15/22 2116  •  melatonin tablet 5 mg, 5 mg, Oral, Nightly PRN, Christian Andrews PA-C  •  multivitamin-iron-minerals-folic acid (CENTRUM) chewable tablet 1 tablet, 1 tablet, Oral, Daily, Roxann Coley, APRN, 1 tablet at 10/15/22 0945  •  nicotine (NICODERM CQ) 21 MG/24HR patch 1 patch, 1 patch, Transdermal, Q24H, Christian Andrews PA-C  •  nitroglycerin (NITROSTAT) SL tablet 0.4 mg, 0.4 mg, Sublingual, Q5 Min PRN, Christian Andrews PA-C  •  ondansetron (ZOFRAN) tablet 4 mg, 4 mg, Oral, Q6H PRN, 4 mg at 10/15/22 0255 **OR** ondansetron (ZOFRAN) injection 4 mg, 4 mg, Intravenous, Q6H PRN, Christian Andrews PA-C  •  sertraline (ZOLOFT) tablet 100 mg, 100 mg, Oral, BID, Fitzpatrick, Jalaram, DO, 100 mg at 10/15/22 2116  •  [COMPLETED] Insert peripheral IV, , , Once **AND** sodium chloride 0.9 % flush 10 mL, 10 mL, Intravenous, PRN, Christian Andrews PA-C  •  sodium chloride 0.9 % flush 10 mL, 10 mL, Intravenous, Q12H, Christian Andrews PA-C, 10 mL at 10/15/22 2116  •  sodium chloride 0.9 % flush 10 mL, 10 mL, Intravenous, PRN, Christian Andrews PA-C  •  thiamine (VITAMIN B-1) tablet 100 mg, 100 mg, Oral, Daily, Roxann Coley APRN, 100 mg at 10/15/22 0932  •   traZODone (DESYREL) tablet 100 mg, 100 mg, Oral, Nightly, Donna Fitzpatrick, DO, 100 mg at 10/15/22 2116      Objective Resting in hospital bed.  NAD.  Covers over her head.    Vital Signs  Temp:  [97.3 °F (36.3 °C)-98.7 °F (37.1 °C)] 97.3 °F (36.3 °C)  Heart Rate:  [73-87] 82  Resp:  [16-18] 16  BP: (105-122)/(68-79) 105/68  Physical Exam:    General Appearance:    Awake and alert, in no acute distress   Head:    Normocephalic, without obvious abnormality   Eyes:          Conjunctivae normal, anicteric sclerae   Ears:    Hearing intact   Throat:   No oral lesions, no thrush, oral mucosa moist   Neck:   No adenopathy, supple, no JVD   Lungs:     respirations regular, even and unlabored       Abdomen:     Normal bowel sounds, soft, non-tender, no rebound or guarding, non-distended, no hepatosplenomegaly   Rectal:     Deferred   Extremities:   No edema, no cyanosis, no redness   Skin:   No bleeding, bruising or rash, no jaundice   Neurologic:   Cranial nerves 2 - 12 grossly intact, no asterixis, sensation   intact        Results Review:    CBC    Results from last 7 days   Lab Units 10/15/22  2259 10/15/22  1006 10/14/22  2037   WBC 10*3/mm3 7.50 7.70 8.90   HEMOGLOBIN g/dL 9.4* 10.2* 11.8*   PLATELETS 10*3/mm3 176 183 233     CMP   Results from last 7 days   Lab Units 10/15/22  2259 10/15/22  0428 10/14/22  2113   SODIUM mmol/L 139  --  143   POTASSIUM mmol/L 3.5  --  3.6   CHLORIDE mmol/L 102  --  112*   CO2 mmol/L 29.0  --  22.0   BUN mg/dL 9  --  7   CREATININE mg/dL 0.51*  --  0.42*   GLUCOSE mg/dL 83  --  74   ALBUMIN g/dL 2.30*  --  2.00*   BILIRUBIN mg/dL 0.4  --  0.6   ALK PHOS U/L 90  --  77   AST (SGOT) U/L 675*  --  493*   ALT (SGPT) U/L 114*  --  83*   MAGNESIUM mg/dL 1.6  --  1.4*   PHOSPHORUS mg/dL 3.8  --   --    AMMONIA umol/L  --  26  --      Cr Clearance Estimated Creatinine Clearance: 114.9 mL/min (A) (by C-G formula based on SCr of 0.51 mg/dL (L)).  Coag   Results from last 7 days   Lab Units  10/14/22  2037   INR  1.14*   APTT seconds 26.4*     HbA1C No results found for: HGBA1C  Blood Glucose No results found for: POCGLU  Infection   Results from last 7 days   Lab Units 10/15/22  0100 10/14/22  2136   BLOODCX  No growth at 24 hours  No growth at 24 hours  --    URINECX   --  >100,000 CFU/mL Escherichia coli*     UA    Results from last 7 days   Lab Units 10/14/22  2136   NITRITE UA  Positive*   WBC UA /HPF Too Numerous to Count*   BACTERIA UA /HPF 4+*   SQUAM EPITHEL UA /HPF None Seen   URINECX  >100,000 CFU/mL Escherichia coli*     Radiology(recent) CT Abdomen Pelvis Without Contrast    Result Date: 10/14/2022   1. Large amount of ascites in the abdomen pelvis. 2. The gallbladder is largely distended. I see no evidence of gallbladder wall thickening or stone or sludge in the gallbladder lumen. No evidence of dilatation of bile ducts. 3. The liver is small in size and is lobulated surface consistent with cirrhosis of the liver. There is no evidence of enlargement of the spleen #4 questionable thickening of the wall of the a ascending colon and transverse colon might be caused by contraction or possibly inflammation or infection  Electronically Signed By-Yohan Alcantar MD On:10/14/2022 9:15 PM This report was finalized on [id number] by  Yohan Alcantar MD.    CT Head Without Contrast    Result Date: 10/14/2022   1. No acute intracranial abnormality seen.  Electronically Signed By-Yohan Alcantar MD On:10/14/2022 9:08 PM This report was finalized on [id number] by  Yohan Alcantar MD.    MRI Cervical Spine Without Contrast    Result Date: 10/15/2022  IMPRESSION : 1.     Motion artifact degrades diagnostic evaluation. No definitive cord abnormality is seen on this exam. 2.     Multilevel degenerative changes as above with suspected multilevel severe foraminal and mild to moderate canal narrowing. Repeat evaluation when patient is better able to maintain positioning may be  considered.  Electronically Signed By-Jluis Medina MD On:10/15/2022 1:23 PM This report was finalized on [id number] by  Jluis Medina MD.    MRI Thoracic Spine Without Contrast    Result Date: 10/15/2022  1.     No definite high-grade stenosis or thoracic cord abnormality is seen on this exam which is somewhat limited by motion. 2.     Ascites and body wall edema.  Electronically Signed By-Jluis Medina MD On:10/15/2022 1:30 PM This report was finalized on [id number] by  Jluis Medina MD.    MRI Lumbar Spine Without Contrast    Result Date: 10/15/2022  1.     Motion artifact somewhat degrades diagnostic image quality. Conus appears grossly unremarkable. 2.     Mild marrow edema at the superior endplate of L3 surrounding a small Schmorl's node. No definite vertebral body height loss or acute fracture is identified. 3.     Mild marrow signal heterogeneity which could be seen with marrow reconversion. Correlate with lab values. 4.     Degenerative changes with suspected mild canal and foraminal narrowing as above. 5.     L5 pars defects without significant displacement on this exam. 6.     Ascites and body wall edema.  Electronically Signed By-Jluis Medina MD On:10/15/2022 1:38 PM This report was finalized on [id number] by  Jluis Medina MD.        Assessment & Plan   Decompensated cirrhosis due to LORENZO/alcohol, complicated by ascites  Transaminitis-AST predominant transaminitis may be due to rhabdomyolysis in the setting of being down, versus less likely drug-induced liver injury due to antibiotics.   Macrocytic anemia  E coli UTI  Chronic diarrhea  Abnormal CT imaging of the colon    PLAN:   Meld Na 9  Pending ultrasound this morning.  Needs stool and urine specimen.  Nurse and patient made aware.  Plan for diagnostic paracentesis Monday to rule out SBP.  Albumin per protocol after paracentesis   Continue diuretics and titrate up as able.  Pending urine sodium to potassium ratio.    Low-sodium diet.  Dietician   Recommend colestipol as an outpatient as she does not like colestipol  Patient is on Azactam for UTI.  Hepatitis panel is negative.  Consider physical therapy evaluation after neurology clears to help with ambulation.  Part of her weakness may be related to deconditioning.    Addendum: Right upper quadrant ultrasound shows nodular appearing liver consistent with cirrhosis.  Small amount of ascites.  Urine potassium 14.8, urine sodium 133.  Patient admits to taking Tylenol ibuprofen combination prior to admission.  We will check Tylenol level on blood in the lab.  Add boost/Ensure 2 times a day.  Only eating about 25%.    Roxann Coley, APRN  10/16/22  08:07 EDT

## 2022-10-16 NOTE — PROGRESS NOTES
[facility] Medicine Services Daily Progress Note    Patient Name: Lita Yu  : 1965  MRN: [id number]  Primary Care Physician:  Norma Saul APRN  Date of admission: 10/14/2022      Subjective      Chief Complaint: Weakness    Patient seen and examined this morning.  Doing better compared to yesterday.  Feels generalized pain all over and weakness but no other complaints.  No numbness or tingling.    Pertinent positives as noted in HPI/subjective.  All other systems were reviewed and are negative.      Objective      Vitals:   Temp:  [97.3 °F (36.3 °C)-98.7 °F (37.1 °C)] 97.7 °F (36.5 °C)  Heart Rate:  [73-87] 83  Resp:  [16-18] 18  BP: (105-122)/(68-79) 117/73    Physical Exam:    General: Awake, alert, lying in bed, NAD  Eyes: PERRL, EOMI, conjunctive are clear  Cardiovascular: Regular rate and rhythm, no murmurs  Respiratory: Clear to auscultation bilaterally, no wheezing or rales, unlabored breathing  Abdomen: Soft, distended, nontender, positive bowel sounds, no guarding  Neurologic: A&O, CN grossly intact, moves all extremities spontaneously, sensation intact bilaterally  Musculoskeletal: Generalized weakness, no deformities  Skin: Warm, dry, intact         Result Review    Result Review:  I have personally reviewed the results from the time of this admission to 10/16/2022 09:24 EDT and agree with these findings:  [x]  Laboratory  [x]  Microbiology  [x]  Radiology  []  EKG/Telemetry   []  Cardiology/Vascular   []  Pathology  []  Old records  []  Other:          Assessment & Plan      Brief Patient Summary:  Lita Yu is a 57 y.o. female who      [START ON 10/17/2022] albumin human, 37.5 g, Intravenous, Once   Or  [START ON 10/17/2022] albumin human, 50 g, Intravenous, Once   Or  [START ON 10/17/2022] albumin human, 62.5 g, Intravenous, Once   Or  [START ON 10/17/2022] albumin human, 75 g, Intravenous, Once   Or  [START ON 10/17/2022] albumin human,  87.5 g, Intravenous, Once   Or  [START ON 10/17/2022] albumin human, 100 g, Intravenous, Once   Or  [START ON 10/17/2022] albumin human, 112.5 g, Intravenous, Once  aztreonam, 1,000 mg, Intravenous, Q8H  budesonide-formoterol, 2 puff, Inhalation, BID - RT  busPIRone, 15 mg, Oral, BID  folic acid, 1,000 mcg, Oral, Daily  LORazepam, 0.5 mg, Intravenous, Once  magnesium oxide, 400 mg, Oral, BID  multivitamin-iron-minerals-folic acid, 1 tablet, Oral, Daily  nicotine, 1 patch, Transdermal, Q24H  sertraline, 100 mg, Oral, BID  sodium chloride, 10 mL, Intravenous, Q12H  thiamine, 100 mg, Oral, Daily  traZODone, 100 mg, Oral, Nightly       lactated ringers, 75 mL/hr, Last Rate: 75 mL/hr (10/15/22 0947)         Active Hospital Problems:  Active Hospital Problems    Diagnosis    • **Generalized weakness    • Cirrhosis of liver (HCC)    • Acute UTI (urinary tract infection)    • Rhabdomyolysis    • Non-traumatic rhabdomyolysis    • Chronic obstructive pulmonary disease (HCC)    • Essential hypertension    • Anemia of chronic disease    • Post traumatic stress disorder    • Hyperlipidemia    • Tobacco dependence syndrome      Plan:   Generalized weakness  Acute rhabdomyolysis  -Likely related to deconditioning and alcohol abuse, patient was found on the floor for at least 24 hours  -CPK elevated but patient has significant ascites, IV fluids decreased per GI  -PT/OT, may need rehab  -MRI spine negative for acute cord compression or abscess  -Low-dose IV fluid due to ascites  -Nephrology following  -Neurology following    LORENZO/Alcoholic cirrhosis with ascites  -CT report noted  -Paracentesis ordered, check fluid studies  -Diuresis with Lasix per GI  -Continue Aldactone  -Lactulose if needed  -GI following    Acute UTI  -UA noted, urine culture collected  -Continue empiric aztreonam due to allergies  -Follow-up on urine culture    Alcohol abuse  -Appears to be chronic, counseled on cessation  -Last drink was the night before  admission  -Started on CIWA protocol with Ativan as needed  -Monitor for withdrawals    Hypertension  -Controlled  -Resume home meds as able  -Monitor    Hypomagnesemia  -Replace and monitor    Anemia of chronic disease  -Hemoglobin appears to be stable  -Monitor daily    COPD  -Not in exacerbation, remains on room air  -Continue bronchodilators, monitor    PTSD/anxiety  -Continue home meds, monitor    DVT prophylaxis  -Lovenox        CODE STATUS:    Code Status (Patient has no pulse and is not breathing): CPR (Attempt to Resuscitate)  Medical Interventions (Patient has pulse or is breathing): Full Support      Disposition: Pending improvement    Electronically signed by Donna Fitzpatrick DO, 10/16/22, 09:24 EDT.  [facility]      Much of this encounter note is an electronic transcription/translation of spoken language to printed text. The electronic translation of spoken language may permit erroneous, or at times, nonsensical words or phrases to be inadvertently transcribed; Although I have reviewed the note for such errors, some may still exist.

## 2022-10-17 ENCOUNTER — INPATIENT HOSPITAL (OUTPATIENT)
Dept: URBAN - METROPOLITAN AREA HOSPITAL 84 | Facility: HOSPITAL | Age: 57
End: 2022-10-17

## 2022-10-17 DIAGNOSIS — D53.9 NUTRITIONAL ANEMIA, UNSPECIFIED: ICD-10-CM

## 2022-10-17 DIAGNOSIS — K75.81 NONALCOHOLIC STEATOHEPATITIS (NASH): ICD-10-CM

## 2022-10-17 DIAGNOSIS — A04.72 ENTEROCOLITIS DUE TO CLOSTRIDIUM DIFFICILE, NOT SPECIFIED AS: ICD-10-CM

## 2022-10-17 DIAGNOSIS — R94.5 ABNORMAL RESULTS OF LIVER FUNCTION STUDIES: ICD-10-CM

## 2022-10-17 DIAGNOSIS — K70.31 ALCOHOLIC CIRRHOSIS OF LIVER WITH ASCITES: ICD-10-CM

## 2022-10-17 DIAGNOSIS — R93.3 ABNORMAL FINDINGS ON DIAGNOSTIC IMAGING OF OTHER PARTS OF DI: ICD-10-CM

## 2022-10-17 LAB
ALBUMIN SERPL-MCNC: 2.3 G/DL (ref 3.5–5.2)
ALBUMIN/GLOB SERPL: 0.8 G/DL
ALP SERPL-CCNC: 121 U/L (ref 39–117)
ALT SERPL W P-5'-P-CCNC: 108 U/L (ref 1–33)
ANA SER QL: NEGATIVE
ANION GAP SERPL CALCULATED.3IONS-SCNC: 7 MMOL/L (ref 5–15)
AST SERPL-CCNC: 428 U/L (ref 1–32)
BASOPHILS # BLD AUTO: 0 10*3/MM3 (ref 0–0.2)
BASOPHILS NFR BLD AUTO: 0.2 % (ref 0–1.5)
BILIRUB SERPL-MCNC: 0.3 MG/DL (ref 0–1.2)
BUN SERPL-MCNC: 9 MG/DL (ref 6–20)
BUN/CREAT SERPL: 22 (ref 7–25)
C DIFF GDH + TOXINS A+B STL QL IA.RAPID: POSITIVE
C DIFF GDH + TOXINS A+B STL QL IA.RAPID: POSITIVE
CALCIUM SPEC-SCNC: 8 MG/DL (ref 8.6–10.5)
CHLORIDE SERPL-SCNC: 103 MMOL/L (ref 98–107)
CK SERPL-CCNC: 5500 U/L (ref 20–180)
CO2 SERPL-SCNC: 29 MMOL/L (ref 22–29)
CREAT SERPL-MCNC: 0.41 MG/DL (ref 0.57–1)
DEPRECATED RDW RBC AUTO: 53.4 FL (ref 37–54)
EGFRCR SERPLBLD CKD-EPI 2021: 116.4 ML/MIN/1.73
EOSINOPHIL # BLD AUTO: 0.2 10*3/MM3 (ref 0–0.4)
EOSINOPHIL NFR BLD AUTO: 2.3 % (ref 0.3–6.2)
ERYTHROCYTE [DISTWIDTH] IN BLOOD BY AUTOMATED COUNT: 14 % (ref 12.3–15.4)
GLOBULIN UR ELPH-MCNC: 3 GM/DL
GLUCOSE SERPL-MCNC: 96 MG/DL (ref 65–99)
HCT VFR BLD AUTO: 26.7 % (ref 34–46.6)
HGB BLD-MCNC: 8.9 G/DL (ref 12–15.9)
INR PPP: 1.16 (ref 0.93–1.1)
LYMPHOCYTES # BLD AUTO: 1.7 10*3/MM3 (ref 0.7–3.1)
LYMPHOCYTES NFR BLD AUTO: 21.6 % (ref 19.6–45.3)
MAGNESIUM SERPL-MCNC: 1.5 MG/DL (ref 1.6–2.6)
MCH RBC QN AUTO: 36.7 PG (ref 26.6–33)
MCHC RBC AUTO-ENTMCNC: 33.3 G/DL (ref 31.5–35.7)
MCV RBC AUTO: 110.3 FL (ref 79–97)
MONOCYTES # BLD AUTO: 0.9 10*3/MM3 (ref 0.1–0.9)
MONOCYTES NFR BLD AUTO: 11.8 % (ref 5–12)
NEUTROPHILS NFR BLD AUTO: 5 10*3/MM3 (ref 1.7–7)
NEUTROPHILS NFR BLD AUTO: 64.1 % (ref 42.7–76)
NRBC BLD AUTO-RTO: 0.2 /100 WBC (ref 0–0.2)
PLATELET # BLD AUTO: 153 10*3/MM3 (ref 140–450)
PMV BLD AUTO: 8.6 FL (ref 6–12)
POTASSIUM SERPL-SCNC: 3.6 MMOL/L (ref 3.5–5.2)
PROT SERPL-MCNC: 5.3 G/DL (ref 6–8.5)
PROTHROMBIN TIME: 11.8 SECONDS (ref 9.6–11.7)
RBC # BLD AUTO: 2.42 10*6/MM3 (ref 3.77–5.28)
SODIUM SERPL-SCNC: 139 MMOL/L (ref 136–145)
WBC NRBC COR # BLD: 7.7 10*3/MM3 (ref 3.4–10.8)

## 2022-10-17 PROCEDURE — 97163 PT EVAL HIGH COMPLEX 45 MIN: CPT | Performed by: PHYSICAL THERAPIST

## 2022-10-17 PROCEDURE — 99232 SBSQ HOSP IP/OBS MODERATE 35: CPT | Performed by: NURSE PRACTITIONER

## 2022-10-17 PROCEDURE — 25010000002 FUROSEMIDE PER 20 MG: Performed by: INTERNAL MEDICINE

## 2022-10-17 PROCEDURE — 99232 SBSQ HOSP IP/OBS MODERATE 35: CPT | Performed by: PSYCHIATRY & NEUROLOGY

## 2022-10-17 RX ORDER — VANCOMYCIN HYDROCHLORIDE 125 MG/1
125 CAPSULE ORAL EVERY 6 HOURS SCHEDULED
Status: DISCONTINUED | OUTPATIENT
Start: 2022-10-17 | End: 2022-10-21 | Stop reason: HOSPADM

## 2022-10-17 RX ORDER — SPIRONOLACTONE 100 MG/1
100 TABLET, FILM COATED ORAL DAILY
Status: DISCONTINUED | OUTPATIENT
Start: 2022-10-18 | End: 2022-10-21 | Stop reason: HOSPADM

## 2022-10-17 RX ORDER — SACCHAROMYCES BOULARDII 250 MG
250 CAPSULE ORAL 2 TIMES DAILY
Status: DISCONTINUED | OUTPATIENT
Start: 2022-10-17 | End: 2022-10-21 | Stop reason: HOSPADM

## 2022-10-17 RX ADMIN — BUSPIRONE HYDROCHLORIDE 15 MG: 15 TABLET ORAL at 09:48

## 2022-10-17 RX ADMIN — SERTRALINE 100 MG: 100 TABLET, FILM COATED ORAL at 20:30

## 2022-10-17 RX ADMIN — FOLIC ACID 1000 MCG: 1 TABLET ORAL at 09:48

## 2022-10-17 RX ADMIN — Medication 800 MG: at 09:48

## 2022-10-17 RX ADMIN — TRAZODONE HYDROCHLORIDE 100 MG: 100 TABLET ORAL at 20:30

## 2022-10-17 RX ADMIN — NITROFURANTOIN (MONOHYDRATE/MACROCRYSTALS) 100 MG: 75; 25 CAPSULE ORAL at 09:48

## 2022-10-17 RX ADMIN — NITROFURANTOIN (MONOHYDRATE/MACROCRYSTALS) 100 MG: 75; 25 CAPSULE ORAL at 20:30

## 2022-10-17 RX ADMIN — VANCOMYCIN HYDROCHLORIDE 125 MG: 125 CAPSULE ORAL at 17:15

## 2022-10-17 RX ADMIN — VANCOMYCIN HYDROCHLORIDE 125 MG: 125 CAPSULE ORAL at 23:24

## 2022-10-17 RX ADMIN — VANCOMYCIN HYDROCHLORIDE 125 MG: 125 CAPSULE ORAL at 09:48

## 2022-10-17 RX ADMIN — SERTRALINE 100 MG: 100 TABLET, FILM COATED ORAL at 09:48

## 2022-10-17 RX ADMIN — SODIUM CHLORIDE, POTASSIUM CHLORIDE, SODIUM LACTATE AND CALCIUM CHLORIDE 75 ML/HR: 600; 310; 30; 20 INJECTION, SOLUTION INTRAVENOUS at 01:46

## 2022-10-17 RX ADMIN — Medication 800 MG: at 20:30

## 2022-10-17 RX ADMIN — Medication 1 TABLET: at 09:48

## 2022-10-17 RX ADMIN — SPIRONOLACTONE 50 MG: 25 TABLET ORAL at 09:48

## 2022-10-17 RX ADMIN — FUROSEMIDE 40 MG: 10 INJECTION, SOLUTION INTRAMUSCULAR; INTRAVENOUS at 09:54

## 2022-10-17 RX ADMIN — BUSPIRONE HYDROCHLORIDE 15 MG: 15 TABLET ORAL at 20:31

## 2022-10-17 RX ADMIN — Medication 250 MG: at 11:51

## 2022-10-17 RX ADMIN — Medication 100 MG: at 09:48

## 2022-10-17 RX ADMIN — Medication 10 ML: at 09:53

## 2022-10-17 RX ADMIN — Medication 250 MG: at 20:30

## 2022-10-17 NOTE — PAYOR COMM NOTE
"INPATIENT AUTHORIZATION REQUEST  ==============================  UTILIZATION REVIEW  ARIELLE ABDALLA RN   PH: [phone]  FAX: [phone]    [facility]  NPI# [id number]  TID # [id number]  =================================                        Lita Yu (57 y.o. Female)     Date of Birth   1965    Social Security Number       Address   [id number] [place] [address] [address] [place] [address]    Home Phone   [phone]    MRN   [id number]       Jehovah's witness   None    Marital Status                               Admission Date   10/14/22    Admission Type   Emergency    Admitting Provider       Attending Provider   Quang Estrella MD    Department, Room/Bed   [facility] 3A MEDICAL INPATIENT, 304/1       Discharge Date       Discharge Disposition       Discharge Destination                               Attending Provider: Quang Estrella MD    Allergies: Sulfa Antibiotics, Keflex [Cephalexin]    Isolation: None   Infection: C.difficile (rule out) (10/16/22), C.difficile (10/17/22)   Code Status: CPR    Ht: 162.6 cm (64\")   Wt: 58.4 kg (128 lb 12 oz)    Admission Cmt: None   Principal Problem: Generalized weakness [R53.1]                 Active Insurance as of 10/14/2022     Primary Coverage     Payor Plan Insurance Group Employer/Plan Group    [id number]     Payor Plan Address Payor Plan Phone Number Payor Plan Fax Number Effective Dates    [address]   6/22/2014 - None Entered    [facility] [id number]       Subscriber Name Subscriber Birth Date Member ID       MILANA YU 1/21/1966 [id number]                 Emergency Contacts      (Rel.) Home Phone Work Phone Mobile Phone    OBDULIA CANTU (Daughter) -- -- [phone]        Indicia® Completed 10/15/2022 1759       Created By   Cydney Abdalla       Criteria Set Name - Subset   Musculoskeletal Disease GRG - Clinical Indications for Admission to Inpatient Care      Criteria Review    "   Clinical Indications for Admission to Inpatient Care    Most Recent : Cydney Abdalla Most Recent Date: 10/15/2022 17:57:49 EDST    (X) Hospital admission is needed for appropriate care of the patient because of  1 or more  of    the following :       (X) Rhabdomyolysis and  1 or more  of the following  (65) (66):          (X) Inability to maintain oral hydration          10/15/2022 17:57:49 EDST by Cydney Abdalla            She notes significant decrease in p.o. intake as well as urine output and reports that on 10/14/2022    Notes:    10/15/2022 17:57:49 EDST by Rm Cydney    Subject: Admission    Reports increased weakness and diff with mobility x 5 days      Reports fall and that she was on the ground for 24 hours      Acute rhabdomyolysis    UTI    Back pain    Generalized weakness      Temp 98      HR 88      Resp 18      B/P 161/78      Pain /10      SpO2 95% on RA      CK 17,738, 94746      Creat 0.42      Magnesium 1.4      Hgb 10.2      Aztreonam 1G Iv every 8 hours for UTI      LR 75mL per hour IV cont                 =================================================         History & Physical      Christian Andrews PA-C at 10/14/22 6599     Attestation signed by Donna Fitzpatrick DO at 10/15/22 8908    Attending attestation:    I have reviewed the LAUREN's note and agree with the documented findings and plan of care unless stated otherwise by my note.                        [facility]      Patient Name: Lita Yu  : 1965  MRN: [id number]  Primary Care Physician:  Norma Saul APRN  Date of admission: 10/14/2022      Subjective       Chief Complaint: Generalized weakness and pain    History of Present Illness: Lita Yu is a 57 y.o. female who presented to [facility] on 10/14/2022 complaining of generalized weakness as well as generalized pain.  Patient reports for approximately past 5 days she has had  increasing weakness with difficulty in mobility though no focal neurologic deficits are reported.  She notes significant decrease in p.o. intake as well as urine output and reports that on 10/14/2022 she fell landing on the ground and had to lay on the ground for approximately 24 hours before she was found.  At present she denies generalized muscle aches worse in her extremities as well as a headache and dry mouth.  Some increase in her abdominal swelling is also reported though she denies any peripheral edema and does not note any nausea, vomiting or recent fevers.      Review of Systems   Constitutional: Positive for malaise/fatigue.   HENT: Negative.    Eyes: Negative.    Cardiovascular: Negative.    Respiratory: Negative.    Skin: Negative.    Musculoskeletal: Positive for joint pain and myalgias.   Gastrointestinal: Positive for nausea. Negative for vomiting.   Genitourinary: Negative.    Neurological: Negative.    Psychiatric/Behavioral: Negative.         Personal History     Past Medical History:   Diagnosis Date   • Cirrhosis (HCC)    • COPD (chronic obstructive pulmonary disease) (HCC)    • Depression    • GERD (gastroesophageal reflux disease)    • Hyperlipidemia    • Hypertension    • PTSD (post-traumatic stress disorder)        Past Surgical History:   Procedure Laterality Date   •  SECTION N/A    • COLONOSCOPY N/A 2021    Procedure: COLONOSCOPY with polypectomy x2 and random biopsy;  Surgeon: Osman Clay MD;  Location: [facility] ENDOSCOPY;  Service: Gastroenterology;  Laterality: N/A;  colon polyps   • DENTAL PROCEDURE     • ENDOSCOPY N/A 2021    Procedure: ESOPHAGOGASTRODUODENOSCOPY;  Surgeon: Osman Clay MD;  Location: [facility] ENDOSCOPY;  Service: Gastroenterology;  Laterality: N/A;  esophagitis   • JOINT REPLACEMENT     • REPLACEMENT TOTAL HIP LATERAL POSITION Left    • TONSILLECTOMY     • VAGINAL BIRTH AFTER  SECTION         Family History: family history includes  Alcohol abuse in her mother and paternal grandfather. Otherwise pertinent FHx was reviewed and not pertinent to current issue.    Social History:  reports that she has been smoking cigarettes. She has been smoking an average of .25 packs per day. She has never used smokeless tobacco. She reports that she does not currently use alcohol. She reports that she does not use drugs.    Home Medications:  Prior to Admission Medications     Prescriptions Last Dose Informant Patient Reported? Taking?    albuterol sulfate  (90 Base) MCG/ACT inhaler   No No    Inhale 2 puffs Every 4 (Four) Hours As Needed for Wheezing.    atorvastatin (LIPITOR) 20 MG tablet   Yes No    Take 20 mg by mouth Daily.    busPIRone (BUSPAR) 15 MG tablet   No No    TAKE 1 TABLET BY MOUTH TWICE A DAY    butalbital-acetaminophen-caffeine (FIORICET, ESGIC) -40 MG per tablet   No No    Take 1 tablet by mouth Every 6 (Six) Hours As Needed for Headache (Neck discomfort).    cholestyramine light 4 g packet   No No    Take 2 packets by mouth Every 8 (Eight) Hours.    Fluticasone-Umeclidin-Vilant (Trelegy Ellipta) 100-62.5-25 MCG/INH inhaler   Yes No    Inhale 1 puff Daily.    folic acid (FOLVITE) 1 MG tablet   No No    Take 1 tablet by mouth Daily.    lisinopril-hydrochlorothiazide (PRINZIDE,ZESTORETIC) 20-25 MG per tablet   No No    Take 1 tablet by mouth Daily.    omega-3 acid ethyl esters (LOVAZA) 1 g capsule   No No    TAKE 2 CAPSULES BY MOUTH 2 TIMES A DAY.    ondansetron ODT (Zofran ODT) 4 MG disintegrating tablet   No No    Place 1 tablet on the tongue Every 12 (Twelve) Hours As Needed for Nausea or Vomiting.    PROBIOTIC, LACTOBACILLUS, PO   Yes No    Take 1 dose by mouth Daily.    promethazine (PHENERGAN) 12.5 MG tablet   No No    Take 1 tablet by mouth Every 6 (Six) Hours As Needed for Nausea or Vomiting.    psyllium (METAMUCIL) 58.6 % packet   Yes No    Take 1 packet by mouth Daily.    sertraline (ZOLOFT) 100 MG tablet   Yes No    Take  100 mg by mouth 2 (Two) Times a Day.    thiamine (VITAMIN B-1) 100 MG tablet   No No    Take 1 tablet by mouth Daily.    traZODone (DESYREL) 100 MG tablet   No No    TAKE 1 TABLET BY MOUTH EVERY DAY AT NIGHT    vitamin B-12 (CYANOCOBALAMIN) 500 MCG tablet   Yes No    Take 1,000 mcg by mouth Daily.            Allergies:  Allergies   Allergen Reactions   • Sulfa Antibiotics Hives   • Keflex [Cephalexin] Hives       Objective       Vitals:   Temp:  [98 °F (36.7 °C)] 98 °F (36.7 °C)  Heart Rate:  [86-88] 87  Resp:  [18] 18  BP: (141-161)/(76-87) 149/76    Physical Exam  Constitutional:       General: She is not in acute distress.     Appearance: Normal appearance. She is normal weight. She is not ill-appearing, toxic-appearing or diaphoretic.   HENT:      Head: Normocephalic.      Right Ear: External ear normal.      Left Ear: External ear normal.      Nose: Nose normal.      Mouth/Throat:      Mouth: Mucous membranes are moist.   Eyes:      Extraocular Movements: Extraocular movements intact.   Cardiovascular:      Rate and Rhythm: Normal rate and regular rhythm.      Pulses: Normal pulses.   Pulmonary:      Effort: Pulmonary effort is normal.      Breath sounds: Normal breath sounds.   Abdominal:      General: Bowel sounds are normal. There is distension.      Palpations: Abdomen is soft.   Musculoskeletal:      Cervical back: Normal range of motion.      Right lower leg: No edema.      Left lower leg: No edema.   Skin:     General: Skin is warm and dry.      Capillary Refill: Capillary refill takes less than 2 seconds.   Neurological:      General: No focal deficit present.      Mental Status: She is alert.   Psychiatric:         Mood and Affect: Mood normal.         Behavior: Behavior normal.         Thought Content: Thought content normal.         Judgment: Judgment normal.          Result Review    Result Review:  I have personally reviewed the results from the time of this admission to 10/14/2022 23:33 EDT and  agree with these findings:  [x]  Laboratory  []  Microbiology  [x]  Radiology  []  EKG/Telemetry   []  Cardiology/Vascular   []  Pathology  []  Old records  []  Other:  Most notable findings include: CK, CMP, CBC, magnesium, TSH, UA, CT of head and CT of abdomen      Assessment & Plan        Active Hospital Problems:  Active Hospital Problems    Diagnosis    • **Generalized weakness    • Acute UTI (urinary tract infection)    • Rhabdomyolysis    • Cirrhosis of liver (HCC)    • Chronic obstructive pulmonary disease (HCC)    • Essential hypertension    • Anemia of chronic disease    • Post traumatic stress disorder    • Hyperlipidemia    • Tobacco dependence syndrome      Plan:     Generalized weakness with rhabdomyolysis and urinary tract infection  -CK: 17,738  -Creatinine 0.42 with a BUN of 7  -WBCs: 8.90  -UA shows 4+ bacteria with WBCs too numerous to count, 0-2 RBCs, 1+ bilirubin, 3+ protein, 3+ leukocyte esterase, positive nitrites and 3+ blood  -CT of the head showed no acute intracranial abnormality  -Azactam started in the ED, continue  -MRI of spine ordered in ED with neurology also consulted who felt given the several day chronicity of the symptoms could be performed in a.m.  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  Liver is noted as consistent with cirrhosis with questionable thickening of the wall of the ascending colon and transverse colon also noted  -ALT and AST increased from baseline with current finding of 83/493  -INR: 1.14 with a PT of 11.7 and a PTT of 26.4  -Patient  may benefit from paracentesis  -Continue thiamine and B12    Hypomagnesemia  -Magnesium: 1.4  -Replacement protocol ordered in ED  -Monitor while admitted    COPD  -Symbicort and DuoNeb    Hypertension  -Well controlled   BP Readings from Last 1 Encounters:   10/14/22 149/76   -Hold lisinopril hydrochlorothiazide for now and monitor kidney function  - Monitor while admitted    Anemia  Lab Results   Component Value Date    HGB 11.8 (L) 10/14/2022    .0 (H) 10/14/2022    MCHC 32.0 10/14/2022   -Monitor while admitted    PTSD/anxiety  -Zoloft and BuSpar    Hyperlipidemia  -Statin held for now    Tobacco abuse  -Encourage cessation especially in light of comorbid conditions  -Nicotine patch ordered        DVT prophylaxis:  No DVT prophylaxis order currently exists.    CODE STATUS:       Admission Status:  I believe this patient meets inpatient status.    I discussed the patient's findings and my recommendations with patient and nursing staff.      Signature: Electronically signed by Christian Andrews PA-C, 10/15/22, 3:19 AM EDT.    Electronically signed by Donna Fitzpatrick DO at 10/15/22 0758       Vital Signs (last day)     Date/Time Temp Temp src Pulse Resp BP Patient Position SpO2    10/17/22 0730 -- -- -- -- -- -- 92    10/17/22 0725 97.5 (36.4) Axillary 83 14 135/74 Lying 87    10/17/22 0334 98.3 (36.8) Oral 80 16 136/80 Lying 92    10/16/22 2300 98 (36.7) Oral 82 18 139/78 Lying 97    10/16/22 1544 98.5 (36.9) Oral 78 18 143/89 Lying 95    10/16/22 0700 97.7 (36.5) Oral 83 18 117/73 Lying 87    10/16/22 0532 97.3 (36.3) Oral 82 16 105/68 Lying 92             Physician Progress Notes (last 24 hours)      Boris Helm MD at 10/17/22 0739                [facility] Progress Note      Patient Name: Lita Yu  : 1965  MRN: [id number]  Primary Care Physician:  Norma Sual APRN  Date of admission: 10/14/2022    Subjective     Interval History:      Patient continues to have decreased appetite   her diffuse myalgias have improved  Denies any chest pain, shortness of palpitations  Awaiting paracentesis today      Review of Systems:   As noted above    Objective     Vitals:   Temp:  [97.5 °F (36.4 °C)-98.5 °F (36.9 °C)] 97.5 °F (36.4 °C)  Heart Rate:  [78-83] 83  Resp:  [14-18] 14  BP: (135-143)/(74-89) 135/74  Flow (L/min):  [2] 2    Intake/Output Summary (Last 24 hours) at 10/17/2022 0739  Last data filed at 10/17/2022 0146  Gross per 24 hour   Intake 1480 ml   Output --   Net 1480 ml       Physical Exam:    General Appearance: alert, oriented x 3, no acute distress   Skin: warm and dry  HEENT: oral mucosa normal, nonicteric sclera  Neck: supple, no JVD  Lungs: CTA  Heart: RRR, normal S1 and S2  Abdomen: soft, nontender, nondistended  : no palpable bladder  Extremities: no edema, cyanosis or clubbing  Neuro: normal speech and mental status     Scheduled Meds:     albumin human, 37.5 g, Intravenous, Once   Or  albumin human, 50 g, Intravenous, Once   Or  albumin human, 62.5 g, Intravenous, Once   Or  albumin human, 75 g, Intravenous, Once   Or  albumin human, 87.5 g, Intravenous, Once   Or  albumin human, 100 g, Intravenous, Once   Or  albumin human, 112.5 g, Intravenous, Once  budesonide-formoterol, 2 puff, Inhalation, BID - RT  busPIRone, 15 mg, Oral, BID  folic acid, 1,000 mcg, Oral, Daily  furosemide, 40 mg, Intravenous, Daily  LORazepam, 0.5 mg, Intravenous, Once  magnesium oxide, 800 mg, Oral, BID  multivitamin-iron-minerals-folic acid, 1 tablet, Oral, Daily  nicotine, 1 patch, Transdermal, Q24H  nitrofurantoin (macrocrystal-monohydrate), 100 mg, Oral, Q12H  sertraline, 100 mg, Oral, BID  sodium chloride, 10 mL, Intravenous, Q12H  spironolactone, 50 mg, Oral, Daily  thiamine, 100 mg, Oral, Daily  traZODone, 100 mg, Oral, Nightly      IV Meds:        Results Reviewed:   I have personally reviewed the results from the time of this admission to  10/17/2022 07:39 EDT     Results from last 7 days   Lab Units 10/16/22  2344 10/15/22  2259 10/14/22  2113   SODIUM mmol/L 139 139 143   POTASSIUM mmol/L 3.6 3.5 3.6   CHLORIDE mmol/L 103 102 112*   CO2 mmol/L 29.0 29.0 22.0   BUN mg/dL 9 9 7   CREATININE mg/dL 0.41* 0.51* 0.42*   CALCIUM mg/dL 8.0* 8.5* 7.5*   BILIRUBIN mg/dL 0.3 0.4 0.6   ALK PHOS U/L 121* 90 77   ALT (SGPT) U/L 108* 114* 83*   AST (SGOT) U/L 428* 675* 493*   GLUCOSE mg/dL 96 83 74       Estimated Creatinine Clearance: 142.9 mL/min (A) (by C-G formula based on SCr of 0.41 mg/dL (L)).    Results from last 7 days   Lab Units 10/16/22  2344 10/15/22  2259 10/14/22  2113   MAGNESIUM mg/dL 1.5* 1.6 1.4*   PHOSPHORUS mg/dL  --  3.8  --              Results from last 7 days   Lab Units 10/16/22  2344 10/15/22  2259 10/15/22  1006 10/14/22  2037   WBC 10*3/mm3 7.70 7.50 7.70 8.90   HEMOGLOBIN g/dL 8.9* 9.4* 10.2* 11.8*   PLATELETS 10*3/mm3 153 176 183 233       Results from last 7 days   Lab Units 10/16/22  2344 10/16/22  1351 10/14/22  2037   INR  1.16* 1.20* 1.14*       Assessment / Plan       ASSESSMENT:     Rhabdomyolysis , secondary to recurrent falls aggravated with increased GI losses while on diuretics.   Results from last 7 days   Lab Units 10/16/22  2344 10/15/22  2259 10/15/22  1006   CK TOTAL U/L 5,500* 14,182* 18,760*   -CPKs with significant improvement with IV fluids,   -We will discontinue IV fluids to avoid worsening ascites  -We will continue to follow CPKs trend closely     Hypomagnesemia.   - Patient was treated with magnesium IV ,  - Magnesium  1.4 > 1.6 > 1.5  - Increased magnesium oral from 400 to 800 mg      Chronic macrocytic anemia .   - Currently on thiamine, and multivitamins     Tobacco abuse chronic nicotine patch     History alcohol abuse with cirrhosis followed closely by GI.  -  Appreciate input.    -Planning paracentesis today  currently on diuretics.     -Urinary tract infection culture isolated over 100,000 colonies of  E. Coli.  After primary team.  On nitrofurantoin     PLAN:     - will d/c IV fluids to avoid worsening ascites .   -Awaiting paracentesis today  - Increased magnesium oral from 400 to 800 mg   -Diuretics as per gastroenterology   -Follow renal function closely  -Avoid nephrotoxins  -Adjust medications to GFR      Discussed with nursing staff at bedside    Thank you for involving us in the care of Lita Yu.  Please feel free to call with any questions.    Boris Helm MD  10/17/22  07:39 EDT    [facility]  [phone]                   Electronically signed by Boris Helm MD at 10/17/22 0743     Boris Helm MD at 10/16/22 1532                [facility] Progress Note      Patient Name: Lita Yu  : 1965  MRN: [id number]  Primary Care Physician:  Norma Saul APRN  Date of admission: 10/14/2022    Subjective     Interval History:     Patient feeling better  Having some frontal headache without visual or neurological deficit  Tolerating oral intake  Denies any chest pain, shortness of palpitations  Having regular bowel movements  Urinating spontaneously    Review of Systems:   As noted above    Objective     Vitals:   Temp:  [97.3 °F (36.3 °C)-98.7 °F (37.1 °C)] 97.7 °F (36.5 °C)  Heart Rate:  [73-87] 83  Resp:  [16-18] 18  BP: (105-122)/(68-79) 117/73    Intake/Output Summary (Last 24 hours) at 10/16/2022 1532  Last data filed at 10/16/2022 1048  Gross per 24 hour   Intake 240 ml   Output --   Net 240 ml       Physical Exam:    General Appearance: alert, oriented x 3, no acute distress   Skin: warm and dry  HEENT: oral mucosa normal, nonicteric sclera  Neck: supple, no JVD  Lungs: CTA  Heart: RRR, normal S1 and S2  Abdomen: soft, nontender, nondistended  : no palpable bladder  Extremities: no edema, cyanosis or clubbing  Neuro: normal speech and mental status     Scheduled Meds:     [START ON  10/17/2022] albumin human, 37.5 g, Intravenous, Once   Or  [START ON 10/17/2022] albumin human, 50 g, Intravenous, Once   Or  [START ON 10/17/2022] albumin human, 62.5 g, Intravenous, Once   Or  [START ON 10/17/2022] albumin human, 75 g, Intravenous, Once   Or  [START ON 10/17/2022] albumin human, 87.5 g, Intravenous, Once   Or  [START ON 10/17/2022] albumin human, 100 g, Intravenous, Once   Or  [START ON 10/17/2022] albumin human, 112.5 g, Intravenous, Once  budesonide-formoterol, 2 puff, Inhalation, BID - RT  busPIRone, 15 mg, Oral, BID  folic acid, 1,000 mcg, Oral, Daily  [START ON 10/17/2022] furosemide, 40 mg, Intravenous, Daily  LORazepam, 0.5 mg, Intravenous, Once  magnesium oxide, 400 mg, Oral, BID  multivitamin-iron-minerals-folic acid, 1 tablet, Oral, Daily  nicotine, 1 patch, Transdermal, Q24H  nitrofurantoin (macrocrystal-monohydrate), 100 mg, Oral, Q12H  sertraline, 100 mg, Oral, BID  sodium chloride, 10 mL, Intravenous, Q12H  [START ON 10/17/2022] spironolactone, 50 mg, Oral, Daily  thiamine, 100 mg, Oral, Daily  traMADol, 25 mg, Oral, Once  traZODone, 100 mg, Oral, Nightly      IV Meds:   lactated ringers, 75 mL/hr, Last Rate: 75 mL/hr (10/15/22 0973)        Results Reviewed:   I have personally reviewed the results from the time of this admission to 10/16/2022 15:32 EDT     Results from last 7 days   Lab Units 10/15/22  5219 10/14/22  2113   SODIUM mmol/L 139 143   POTASSIUM mmol/L 3.5 3.6   CHLORIDE mmol/L 102 112*   CO2 mmol/L 29.0 22.0   BUN mg/dL 9 7   CREATININE mg/dL 0.51* 0.42*   CALCIUM mg/dL 8.5* 7.5*   BILIRUBIN mg/dL 0.4 0.6   ALK PHOS U/L 90 77   ALT (SGPT) U/L 114* 83*   AST (SGOT) U/L 675* 493*   GLUCOSE mg/dL 83 74       Estimated Creatinine Clearance: 114.9 mL/min (A) (by C-G formula based on SCr of 0.51 mg/dL (L)).    Results from last 7 days   Lab Units 10/15/22  2101 10/14/22  2113   MAGNESIUM mg/dL 1.6 1.4*   PHOSPHORUS mg/dL 3.8  --              Results from last 7 days   Lab  Units 10/15/22  2259 10/15/22  1006 10/14/22  2037   WBC 10*3/mm3 7.50 7.70 8.90   HEMOGLOBIN g/dL 9.4* 10.2* 11.8*   PLATELETS 10*3/mm3 176 183 233       Results from last 7 days   Lab Units 10/16/22  1351 10/14/22  2037   INR  1.20* 1.14*       Assessment / Plan       ASSESSMENT:     -Rhabdomyolysis , secondary to recurrent falls aggravated with increased GI losses while on diuretics.  On lactate ringer  and follow CPK .      -Hypomagnesemia.  Patient was treated with magnesium IV we will continue magnesium oxide 400 mg twice a day  Upon admission 1.4 > 1.6     -Chronic macrocytic anemia .  Currently on thiamine, and multivitamins     -Tobacco abuse chronic nicotine patch     -History alcohol abuse with cirrhosis followed closely by GI.  Appreciate input.  Planning paracentesis tomorrow currently on diuretics.     -Urinary tract infection culture isolated over 100,000 colonies of E. Coli.  After primary team.  On nitrofurantoin    -Headache ; Order tramadol 25 mg oral  x1      PLAN:     -Continue IVF , CPK improving   -Diuretics as per gastroenterology   -Follow renal function closely  -Avoid nephrotoxins  -Adjust medications to GFR         Thank you for involving us in the care of Lita Yu.  Please feel free to call with any questions.    Boris Helm MD  10/16/22  15:32 EDT    [facility]  [phone]                   Electronically signed by Boris Helm MD at 10/16/22 9788     Shiv Stevenson MD at 10/16/22 1302               LOS: 2 days     Lita Yu is a 57 y.o. female     Chief Complaint:  Follow up for generalized weakness.     Subjective   SUBJECTIVE:  History taken from: patient chart RN    Interval History:  57 yr old lady with cirrhosis of liver, COPD, GERD, depression, HLD, GERD, HLD, HTN, PTSD who presented to ER with inability to walk.  The MRI of C-Th-LS Spine were unremarkable.  Her condition has improved.  She complains about  headaches, no vision problems, no speech and swallowing problems.           Patient Complaints: none.      Review of Systems   Review of Systems   Review of Systems   Constitutional: Negative  HENT: Negative.    Eyes: Negative.    Respiratory: Negative.    Cardiovascular: Negative.    Gastrointestinal: Cirrhosis of liver, GERD   Genitourinary: Negative.    Musculoskeletal: weakness  Skin: Negative.    Neurological: Negative.    Hematological: Negative.    Psychiatric/Behavioral: depression, anxiety, PTSD.      Pertinent PMH:  has a past medical history of Cirrhosis (HCC), COPD (chronic obstructive pulmonary disease) (HCC), Depression, GERD (gastroesophageal reflux disease), Hyperlipidemia, Hypertension, and PTSD (post-traumatic stress disorder).   ________________________________________________  Objective   OBJECTIVE:  The patient is laying in bed in no apparent distress.  Head NC, AT, Neck supple, trachea midline.  Lungs CTA,  Good pulmonary effort. CV  S1-S2 no murmur. Abdomen soft, non tender.  Ext no edema, no cyanosis.            Neurologic Exam    The patient is awake, alert, oriented to person, place and time.  Speech fluent with good comprehension, follow commands.  Name common objects.  CN VFFC, EOMI, no facial droop. Tongue midline.  Motor both upper extremities 4+/5.  Both lower extremities 4+/5.  Reflexes absent, plantar mute.  Sensory light touch intact, no sensory level on anterior abdominal wall noted. Cerebellum finger to nose intact.      ________________________________________________   RESULTS REVIEW    VITAL SIGNS:  Temp:  [97.3 °F (36.3 °C)-98.7 °F (37.1 °C)] 97.7 °F (36.5 °C)  Heart Rate:  [73-87] 83  Resp:  [16-18] 18  BP: (105-122)/(68-79) 117/73    LABS:   Lab Results   Component Value Date    WBC 7.50 10/15/2022    HGB 9.4 (L) 10/15/2022    HCT 28.5 (L) 10/15/2022    .0 (H) 10/15/2022     10/15/2022     Lab Results   Component Value Date    GLUCOSE 83 10/15/2022    BUN 9  10/15/2022    CREATININE 0.51 (L) 10/15/2022    EGFRIFNONA 98 12/14/2021    BCR 17.6 10/15/2022    K 3.5 10/15/2022    CO2 29.0 10/15/2022    CALCIUM 8.5 (L) 10/15/2022    ALBUMIN 2.30 (L) 10/15/2022    LABIL2 0.9 (L) 06/07/2019     (H) 10/15/2022     (H) 10/15/2022       Lab Results   Component Value Date    TSH 0.930 10/14/2022    LDL 64 09/07/2021    HTWLFAOM15 1,331 (H) 08/03/2022         IMAGING STUDIES:  CT Abdomen Pelvis Without Contrast    Result Date: 10/14/2022   1. Large amount of ascites in the abdomen pelvis. 2. The gallbladder is largely distended. I see no evidence of gallbladder wall thickening or stone or sludge in the gallbladder lumen. No evidence of dilatation of bile ducts. 3. The liver is small in size and is lobulated surface consistent with cirrhosis of the liver. There is no evidence of enlargement of the spleen #4 questionable thickening of the wall of the a ascending colon and transverse colon might be caused by contraction or possibly inflammation or infection  Electronically Signed By-Yohan Alcantar MD On:10/14/2022 9:15 PM This report was finalized on [id number] by  Yohan Alcantar MD.    CT Head Without Contrast    Result Date: 10/14/2022   1. No acute intracranial abnormality seen.  Electronically Signed By-Yohan Alcantar MD On:10/14/2022 9:08 PM This report was finalized on [id number] by  Yohan Alcantar MD.    MRI Cervical Spine Without Contrast    Result Date: 10/15/2022  IMPRESSION : 1.     Motion artifact degrades diagnostic evaluation. No definitive cord abnormality is seen on this exam. 2.     Multilevel degenerative changes as above with suspected multilevel severe foraminal and mild to moderate canal narrowing. Repeat evaluation when patient is better able to maintain positioning may be considered.  Electronically Signed By-Jluis Medina MD On:10/15/2022 1:23 PM This report was finalized on [id number] by  Jluis Medina  MD.    MRI Thoracic Spine Without Contrast    Result Date: 10/15/2022  1.     No definite high-grade stenosis or thoracic cord abnormality is seen on this exam which is somewhat limited by motion. 2.     Ascites and body wall edema.  Electronically Signed By-Jluis Medina MD On:10/15/2022 1:30 PM This report was finalized on [id number] by  Jluis Medina MD.    MRI Lumbar Spine Without Contrast    Result Date: 10/15/2022  1.     Motion artifact somewhat degrades diagnostic image quality. Conus appears grossly unremarkable. 2.     Mild marrow edema at the superior endplate of L3 surrounding a small Schmorl's node. No definite vertebral body height loss or acute fracture is identified. 3.     Mild marrow signal heterogeneity which could be seen with marrow reconversion. Correlate with lab values. 4.     Degenerative changes with suspected mild canal and foraminal narrowing as above. 5.     L5 pars defects without significant displacement on this exam. 6.     Ascites and body wall edema.  Electronically Signed By-Jluis Medina MD On:10/15/2022 1:38 PM This report was finalized on [id number] by  Jluis Medina MD.    US Abdomen Limited    Result Date: 10/16/2022   1.  Nodular appearing liver and be seen with cirrhosis. 2.  Small amount of ascites. 3.  No evidence of gallstones or biliary tract obstruction.  Electronically Signed By-Ryan Peres MD On:10/16/2022 11:22 AM This report was finalized on [id number] by  Ryan Peres MD.      I reviewed the patient's new clinical results.    ________________________________________________      PROBLEM LIST:    Generalized weakness    Post traumatic stress disorder    Essential hypertension    Hyperlipidemia    Tobacco dependence syndrome    Anemia of chronic disease    Chronic obstructive pulmonary disease (HCC)    Cirrhosis of liver (HCC)    Acute UTI (urinary tract infection)    Rhabdomyolysis    Non-traumatic rhabdomyolysis        Assessment & Plan    ASSESSMENT/PLAN:  57 yr old female with multiple medical problems with HTN, cirrhosis of liver, history of alcohol abuse, COPD, UTI and rhabdomyolysis who had developed profound weakness of both upper and lower extremities.  Her weakness has improved.  It may be secondary to the effect of rhabdomyolysis  Rec:  Continue supportive care.  Will follow.   HTN, cirrhosis of liver, HLD, COPD, UTI, rhabdomyolysis as per hospitalist, MD.   **Please refer to previous notes for further details and recommendations.     I discussed the patients findings and my recommendations with patient and nursing staff    Shiv Stevenson MD  10/16/22  13:07 EDT        Electronically signed by Shiv Stevenson MD at 10/16/22 1313     Donna Fitzpatrick DO at 10/16/22 0910              [facility] Daily Progress Note    Patient Name: Lita Yu  : 1965  MRN: [id number]  Primary Care Physician:  Norma Saul APRN  Date of admission: 10/14/2022      Subjective       Chief Complaint: Weakness    Patient seen and examined this morning.  Doing better compared to yesterday.  Feels generalized pain all over and weakness but no other complaints.  No numbness or tingling.    Pertinent positives as noted in HPI/subjective.  All other systems were reviewed and are negative.      Objective       Vitals:   Temp:  [97.3 °F (36.3 °C)-98.7 °F (37.1 °C)] 97.7 °F (36.5 °C)  Heart Rate:  [73-87] 83  Resp:  [16-18] 18  BP: (105-122)/(68-79) 117/73    Physical Exam:    General: Awake, alert, lying in bed, NAD  Eyes: PERRL, EOMI, conjunctive are clear  Cardiovascular: Regular rate and rhythm, no murmurs  Respiratory: Clear to auscultation bilaterally, no wheezing or rales, unlabored breathing  Abdomen: Soft, distended, nontender, positive bowel sounds, no guarding  Neurologic: A&O, CN grossly intact, moves all extremities spontaneously, sensation intact bilaterally  Musculoskeletal: Generalized weakness, no  deformities  Skin: Warm, dry, intact         Result Review    Result Review:  I have personally reviewed the results from the time of this admission to 10/16/2022 09:24 EDT and agree with these findings:  [x]  Laboratory  [x]  Microbiology  [x]  Radiology  []  EKG/Telemetry   []  Cardiology/Vascular   []  Pathology  []  Old records  []  Other:          Assessment & Plan      Brief Patient Summary:  Lita Yu is a 57 y.o. female who      [START ON 10/17/2022] albumin human, 37.5 g, Intravenous, Once   Or  [START ON 10/17/2022] albumin human, 50 g, Intravenous, Once   Or  [START ON 10/17/2022] albumin human, 62.5 g, Intravenous, Once   Or  [START ON 10/17/2022] albumin human, 75 g, Intravenous, Once   Or  [START ON 10/17/2022] albumin human, 87.5 g, Intravenous, Once   Or  [START ON 10/17/2022] albumin human, 100 g, Intravenous, Once   Or  [START ON 10/17/2022] albumin human, 112.5 g, Intravenous, Once  aztreonam, 1,000 mg, Intravenous, Q8H  budesonide-formoterol, 2 puff, Inhalation, BID - RT  busPIRone, 15 mg, Oral, BID  folic acid, 1,000 mcg, Oral, Daily  LORazepam, 0.5 mg, Intravenous, Once  magnesium oxide, 400 mg, Oral, BID  multivitamin-iron-minerals-folic acid, 1 tablet, Oral, Daily  nicotine, 1 patch, Transdermal, Q24H  sertraline, 100 mg, Oral, BID  sodium chloride, 10 mL, Intravenous, Q12H  thiamine, 100 mg, Oral, Daily  traZODone, 100 mg, Oral, Nightly       lactated ringers, 75 mL/hr, Last Rate: 75 mL/hr (10/15/22 0947)         Active Hospital Problems:  Active Hospital Problems    Diagnosis    • **Generalized weakness    • Cirrhosis of liver (HCC)    • Acute UTI (urinary tract infection)    • Rhabdomyolysis    • Non-traumatic rhabdomyolysis    • Chronic obstructive pulmonary disease (HCC)    • Essential hypertension    • Anemia of chronic disease    • Post traumatic stress disorder    • Hyperlipidemia    • Tobacco dependence syndrome      Plan:   Generalized weakness  Acute  rhabdomyolysis  -Likely related to deconditioning and alcohol abuse, patient was found on the floor for at least 24 hours  -CPK elevated but patient has significant ascites, IV fluids decreased per GI  -PT/OT, may need rehab  -MRI spine negative for acute cord compression or abscess  -Low-dose IV fluid due to ascites  -Nephrology following  -Neurology following    LORENZO/Alcoholic cirrhosis with ascites  -CT report noted  -Paracentesis ordered, check fluid studies  -Diuresis with Lasix per GI  -Continue Aldactone  -Lactulose if needed  -GI following    Acute UTI  -UA noted, urine culture collected  -Continue empiric aztreonam due to allergies  -Follow-up on urine culture    Alcohol abuse  -Appears to be chronic, counseled on cessation  -Last drink was the night before admission  -Started on CIWA protocol with Ativan as needed  -Monitor for withdrawals    Hypertension  -Controlled  -Resume home meds as able  -Monitor    Hypomagnesemia  -Replace and monitor    Anemia of chronic disease  -Hemoglobin appears to be stable  -Monitor daily    COPD  -Not in exacerbation, remains on room air  -Continue bronchodilators, monitor    PTSD/anxiety  -Continue home meds, monitor    DVT prophylaxis  -Lovenox        CODE STATUS:    Code Status (Patient has no pulse and is not breathing): CPR (Attempt to Resuscitate)  Medical Interventions (Patient has pulse or is breathing): Full Support      Disposition: Pending improvement    Electronically signed by Donna Fitzpatrick DO, 10/16/22, 09:24 EDT.  [facility] Hospitalist Team      Much of this encounter note is an electronic transcription/translation of spoken language to printed text. The electronic translation of spoken language may permit erroneous, or at times, nonsensical words or phrases to be inadvertently transcribed; Although I have reviewed the note for such errors, some may still exist.         Electronically signed by Donna Fitzpatrick DO at 10/16/22 7615       Consult Notes  (last 24 hours)  Notes from 10/16/22 0842 through 10/17/22 0842   No notes of this type exist for this encounter.

## 2022-10-17 NOTE — THERAPY EVALUATION
Patient Name: Lita Yu  : 1965    MRN: [id number]                              Today's Date: 10/17/2022       Admit Date: 10/14/2022    Visit Dx:     ICD-10-CM ICD-9-CM   1. Non-traumatic rhabdomyolysis  M62.82 728.88   2. Urinary tract infection without hematuria, site unspecified  N39.0 599.0   3. Hypomagnesemia  E83.42 275.2     Patient Active Problem List   Diagnosis   • Postmenopausal state   • Post traumatic stress disorder   • Pain in joints   • Osteoporosis   • Macrocytic anemia   • Lumbosacral radiculopathy   • Leg pain, left   • Peripheral vascular disease (HCC)   • Essential hypertension   • Hyperlipidemia   • Degenerative joint disease of left hip   • Seizure disorder (HCC)   • Smoker   • Status post hip replacement   • Tobacco dependence syndrome   • Vitamin B12 deficiency   • Anemia of chronic disease   • Chronic obstructive pulmonary disease (HCC)   • Cirrhosis of liver (HCC)   • Acute UTI (urinary tract infection)   • Generalized weakness   • Rhabdomyolysis   • Non-traumatic rhabdomyolysis     Past Medical History:   Diagnosis Date   • Cirrhosis (HCC)    • COPD (chronic obstructive pulmonary disease) (HCC)    • Depression    • GERD (gastroesophageal reflux disease)    • Hyperlipidemia    • Hypertension    • PTSD (post-traumatic stress disorder)      Past Surgical History:   Procedure Laterality Date   •  SECTION N/A    • COLONOSCOPY N/A 2021    Procedure: COLONOSCOPY with polypectomy x2 and random biopsy;  Surgeon: Osman Clay MD;  Location: [facility] ENDOSCOPY;  Service: Gastroenterology;  Laterality: N/A;  colon polyps   • DENTAL PROCEDURE     • ENDOSCOPY N/A 2021    Procedure: ESOPHAGOGASTRODUODENOSCOPY;  Surgeon: Osman Clay MD;  Location: [facility] ENDOSCOPY;  Service: Gastroenterology;  Laterality: N/A;  esophagitis   • JOINT REPLACEMENT     • REPLACEMENT TOTAL HIP LATERAL POSITION Left    • TONSILLECTOMY     • VAGINAL BIRTH AFTER  SECTION         General Information     Row Name 10/17/22 [facility]          Physical Therapy Time and Intention    Document Type evaluation  -EJ     Mode of Treatment physical therapy  -     Row Name 10/17/22 [employer]          General Information    Patient Profile Reviewed yes  -EJ     Prior Level of Function independent:;ADL's;community mobility;all household mobility;gait;transfer;driving;work;home management;cooking;bed mobility;cleaning  -EJ     Existing Precautions/Restrictions fall  -EJ     Barriers to Rehab medically complex  -EJ     Row Name 10/17/22 [employer]          Living Environment    People in Home alone  -EJ     Row Name 10/17/22 [facility]          Home Main Entrance    Number of Stairs, Main Entrance five  Pt reports she lives in a bilevel home, so at minimum she will have to go up/down 5 steps.  -EJ     Stair Railings, Main Entrance railings safe and in good condition  -     Row Name 10/17/22 1355          Stairs Within Home, Primary    Number of Stairs, Within Home, Primary six  -EJ     Row Name 10/17/22 [employer]          Cognition    Orientation Status (Cognition) oriented x 4  -EJ     Row Name 10/17/22 [employer]          Safety Issues, Functional Mobility    Impairments Affecting Function (Mobility) balance;pain;strength;postural/trunk control  -EJ           User Key  (r) = Recorded By, (t) = Taken By, (c) = Cosigned By    Initials Name Provider Type    EJ Michelle Cid, PT Physical Therapist               Mobility     Row Name 10/17/22 1357          Bed Mobility    Bed Mobility bed mobility (all) activities  -EJ     All Activities, [place] (Bed Mobility) moderate assist (50% patient effort)  -EJ     Assistive Device (Bed Mobility) bed rails  -EJ     Comment, (Bed Mobility) Sat EOB ~10 min  -EJ     Row Name 10/17/22 1357          Sit-Stand Transfer    Sit-Stand [place] (Transfers) moderate assist (50% patient effort);2 person assist  -EJ     Assistive Device (Sit-Stand Transfers) walker, front-wheeled  -EJ     Row  Name 10/17/22 1357          Gait/Stairs (Locomotion)    [place] Level (Gait) not tested  -EJ           User Key  (r) = Recorded By, (t) = Taken By, (c) = Cosigned By    Initials Name Provider Type    Michelle Cook, PT Physical Therapist               Obj/Interventions     Row Name 10/17/22 1359          Range of Motion Comprehensive    Comment, General Range of Motion AROM limited ~30% (hips/knees)  -EJ     Row Name 10/17/22 1359          Strength Comprehensive (MMT)    Comment, General Manual Muscle Testing (MMT) Assessment BLE/BUE strength grossly 3-/5  -EJ     Row Name 10/17/22 1359          Balance    Balance Assessment sitting static balance;sitting dynamic balance;sit to stand dynamic balance;standing static balance  -EJ     Static Sitting Balance standby assist  -EJ     Dynamic Sitting Balance contact guard;minimal assist;1-person assist  -EJ     Position, Sitting Balance sitting edge of bed  -EJ     Sit to Stand Dynamic Balance moderate assist;2-person assist  -EJ     Static Standing Balance moderate assist;2-person assist  -EJ     Position/Device Used, Standing Balance walker, front-wheeled  -EJ     Balance Interventions sitting;standing;sit to stand;supported;static;minimal challenge  -EJ           User Key  (r) = Recorded By, (t) = Taken By, (c) = Cosigned By    Initials Name Provider Type    Michelle Cook, PT Physical Therapist               Goals/Plan     Row Name 10/17/22 1412          Bed Mobility Goal 1 (PT)    Activity/Assistive Device (Bed Mobility Goal 1, PT) bed mobility activities, all  -EJ     [place] Level/Cues Needed (Bed Mobility Goal 1, PT) modified independence  -EJ     Time Frame (Bed Mobility Goal 1, PT) long term goal (LTG);2 weeks  -     Row Name 10/17/22 1412          Transfer Goal 1 (PT)    Activity/Assistive Device (Transfer Goal 1, PT) transfers, all;walker, rolling  -EJ     [place] Level/Cues Needed (Transfer Goal 1, PT) minimum assist (75% or more  patient effort)  -EJ     Time Frame (Transfer Goal 1, PT) long term goal (LTG);2 weeks  -EJ     Row Name 10/17/22 1412          Gait Training Goal 1 (PT)    Activity/Assistive Device (Gait Training Goal 1, PT) gait (walking locomotion);walker, rolling  -EJ     [place] Level (Gait Training Goal 1, PT) minimum assist (75% or more patient effort)  -EJ     Distance (Gait Training Goal 1, PT) 40 feet  -EJ     Time Frame (Gait Training Goal 1, PT) long term goal (LTG);2 weeks  -EJ     Row Name 10/17/22 1412          Therapy Assessment/Plan (PT)    Planned Therapy Interventions (PT) balance training;bed mobility training;gait training;home exercise program;postural re-education;patient/family education;neuromuscular re-education;ROM (range of motion);strengthening;transfer training  -EJ           User Key  (r) = Recorded By, (t) = Taken By, (c) = Cosigned By    Initials Name Provider Type    EJ Michelle Cid, PT Physical Therapist               Clinical Impression     Row Name 10/17/22 1407          Pain    Pretreatment Pain Rating 6/10  -EJ     Posttreatment Pain Rating 6/10  -EJ     Pain Location generalized  -EJ     Pain Intervention(s) Therapeutic presence;Repositioned  -EJ     Row Name 10/17/22 1407          Plan of Care Review    Plan of Care Reviewed With patient  -EJ     Outcome Evaluation Patient is a 56 y/o F who was admitted 10/14/22 after inability to get herself around her home and being on the floor for an extended amount of time.  She has a h/o cirrohosis, anemia, transaminitis, ETOH abuse, PTSD, and HTN.  Pt lives in a bi-level home with multiple steps.  During today's evaluation she required mod A x2 to perform sit to stand at EOB for almas-care.  Due to findings pt will benefit from d/c to Inpatient Rehab.  -EJ     Row Name 10/17/22 1406          Therapy Assessment/Plan (PT)    Rehab Potential (PT) good, to achieve stated therapy goals  -EJ     Criteria for Skilled Interventions Met (PT) yes;skilled  treatment is necessary  -EJ     Therapy Frequency (PT) 5 times/wk  -EJ     Predicted Duration of Therapy Intervention (PT) until discharge  -     Row Name 10/17/22 1407          Positioning and Restraints    Pre-Treatment Position in bed  -EJ     Post Treatment Position bed  -EJ     In Bed call light within reach;encouraged to call for assist;exit alarm on;with family/caregiver;with other staff  -           User Key  (r) = Recorded By, (t) = Taken By, (c) = Cosigned By    Initials Name Provider Type     Michelle Cid, PT Physical Therapist               Outcome Measures     Row Name 10/17/22 1413          How much help from another person do you currently need...    Turning from your back to your side while in flat bed without using bedrails? 3  -EJ     Moving from lying on back to sitting on the side of a flat bed without bedrails? 2  -EJ     Moving to and from a bed to a chair (including a wheelchair)? 2  -EJ     Standing up from a chair using your arms (e.g., wheelchair, bedside chair)? 2  -EJ     Climbing 3-5 steps with a railing? 1  -EJ     To walk in hospital room? 1  -EJ     AM-PAC 6 Clicks Score (PT) 11  -EJ     Highest level of mobility 4 --> Transferred to chair/commode  -     Row Name 10/17/22 1413          Functional Assessment    Outcome Measure Options AM-PAC 6 Clicks Basic Mobility (PT)  -           User Key  (r) = Recorded By, (t) = Taken By, (c) = Cosigned By    Initials Name Provider Type    Michelle Cook, PT Physical Therapist                             Physical Therapy Education     Title: PT OT SLP Therapies (In Progress)     Topic: Physical Therapy (In Progress)     Point: Mobility training (Done)     Learning Progress Summary           Patient Acceptance, E,TB, VU,NR by GABRIELA at 10/17/2022 1414                   Point: Home exercise program (Not Started)     Learner Progress:  Not documented in this visit.          Point: Body mechanics (Done)     Learning Progress Summary            Patient Acceptance, E,TB, VU,NR by  at 10/17/2022 1414                   Point: Precautions (Done)     Learning Progress Summary           Patient Acceptance, E,TB, VU,NR by  at 10/17/2022 1414                               User Key     Initials Effective Dates Name Provider Type [facility] 06/16/21 -  Michelle Cid, PT Physical Therapist PT              PT Recommendation and Plan  Planned Therapy Interventions (PT): balance training, bed mobility training, gait training, home exercise program, postural re-education, patient/family education, neuromuscular re-education, ROM (range of motion), strengthening, transfer training  Plan of Care Reviewed With: patient  Outcome Evaluation: Patient is a 58 y/o F who was admitted 10/14/22 after inability to get herself around her home and being on the floor for an extended amount of time.  She has a h/o cirrohosis, anemia, transaminitis, ETOH abuse, PTSD, and HTN.  Pt lives in a bi-level home with multiple steps.  During today's evaluation she required mod A x2 to perform sit to stand at EOB for almas-care.  Due to findings pt will benefit from d/c to Inpatient Rehab.     Time Calculation:    PT Charges     Row Name 10/17/22 1415             Time Calculation    Start Time 0925  -      Stop Time 0957  -      Time Calculation (min) 32 min  -EJ      PT Received On 10/17/22  -      PT - Next Appointment 10/18/22  -      PT Goal Re-Cert Due Date 10/31/22  -         Time Calculation- PT    Total Timed Code Minutes- PT 0 minute(s)  -            User Key  (r) = Recorded By, (t) = Taken By, (c) = Cosigned By    Initials Name Provider Type     Michelle Cid, PT Physical Therapist              Therapy Charges for Today     Code Description Service Date Service Provider Modifiers Qty    29003163073 HC PT EVAL HIGH COMPLEXITY 4 10/17/2022 Michelle Cid, PT GP 1          PT G-Codes  Outcome Measure Options: AM-PAC 6 Clicks Basic Mobility (PT)  AM-PAC 6  Clicks Score (PT): 11    Michelle Cid, PT  10/17/2022

## 2022-10-17 NOTE — PLAN OF CARE
Problem: Adult Inpatient Plan of Care  Goal: Plan of Care Review  Outcome: Ongoing, Progressing  Goal: Patient-Specific Goal (Individualized)  Outcome: Ongoing, Progressing  Goal: Absence of Hospital-Acquired Illness or Injury  Outcome: Ongoing, Progressing  Intervention: Identify and Manage Fall Risk  Recent Flowsheet Documentation  Taken 10/17/2022 1413 by Glenny Alvarez RN  Safety Promotion/Fall Prevention: safety round/check completed  Taken 10/17/2022 0800 by Glenny Alvarez RN  Safety Promotion/Fall Prevention: safety round/check completed  Goal: Optimal Comfort and Wellbeing  Outcome: Ongoing, Progressing  Intervention: Provide Person-Centered Care  Recent Flowsheet Documentation  Taken 10/17/2022 0800 by Glenny Alvarez RN  Trust Relationship/Rapport:   care explained   choices provided   emotional support provided  Goal: Readiness for Transition of Care  Outcome: Ongoing, Progressing     Problem: Fall Injury Risk  Goal: Absence of Fall and Fall-Related Injury  Outcome: Ongoing, Progressing  Intervention: Promote Injury-Free Environment  Recent Flowsheet Documentation  Taken 10/17/2022 1413 by Glenny Alvarez RN  Safety Promotion/Fall Prevention: safety round/check completed  Taken 10/17/2022 0800 by Glenny Alvarez RN  Safety Promotion/Fall Prevention: safety round/check completed     Problem: Skin Injury Risk Increased  Goal: Skin Health and Integrity  Outcome: Ongoing, Progressing   Goal Outcome Evaluation:      Pt had a fall today, eased down to knees with nurse support, no complaints of pain post fall. Significant weakness at this time. Will plan to use bedpan, incontinent of bowel and bladder, external catheter applied.

## 2022-10-17 NOTE — SIGNIFICANT NOTE
Post Fall Note    Patient: Lita Yu   Location: 304/1    Time of fall: 1225    Date of fall:10/17/22    Location of the fall: Rm 304    Describe the Fall: Patient was transferring to bedside commode and was not able to bear weight on legs.  Nurse assisted patient to her knees    Interventions in place prior to fall: Call Light Within Reach, Encourage Use of Call Light, Bed Side Rails x1, x2, x3, Bed/Chair in Lowest Position, Bed/Chair Locked, Falls Wrist Band Applied, Falls Gown Applied, Fall Risk Care Plan Active, Non-Skid Footwear, Items within Reach, Room Clutter Free and Adequate Lighting Provided    Was the fall witnessed? Yes    Where was the patient found?: On floor      Patient position when found?: On knees    If witnessed, what part of the body made contact with the floor or other object?Knees and legs     Injury: No    Is the patient having any pain?: No    Level of Consciousness: awake, alert and oriented     Post fall assessment: 26    Physician notified: Yes    Name of physician: Sameer    Family notified: Yes    Name of family member notified: Darshana Curiel    Post fall precautions in place: Patient/ Family Educated, Call Light Within Reach, Encourage Use of Call Light and Pharmacy Review of Medication     Teaching provided: Patient instructed to call when needing help.           Electronically signed by Meng Hill RN, 10/17/22, 13:03 EDT.

## 2022-10-17 NOTE — PROGRESS NOTES
[facility] Progress Note      Patient Name: Lita Yu  : 1965  MRN: [id number]  Primary Care Physician:  Norma Saul APRN  Date of admission: 10/14/2022    Subjective     Interval History:     Patient continues to have decreased appetite   her diffuse myalgias have improved  Denies any chest pain, shortness of palpitations  Awaiting paracentesis today      Review of Systems:   As noted above    Objective     Vitals:   Temp:  [97.5 °F (36.4 °C)-98.5 °F (36.9 °C)] 97.5 °F (36.4 °C)  Heart Rate:  [78-83] 83  Resp:  [14-18] 14  BP: (135-143)/(74-89) 135/74  Flow (L/min):  [2] 2    Intake/Output Summary (Last 24 hours) at 10/17/2022 0739  Last data filed at 10/17/2022 0146  Gross per 24 hour   Intake 1480 ml   Output --   Net 1480 ml       Physical Exam:    General Appearance: alert, oriented x 3, no acute distress   Skin: warm and dry  HEENT: oral mucosa normal, nonicteric sclera  Neck: supple, no JVD  Lungs: CTA  Heart: RRR, normal S1 and S2  Abdomen: soft, nontender, nondistended  : no palpable bladder  Extremities: no edema, cyanosis or clubbing  Neuro: normal speech and mental status     Scheduled Meds:     albumin human, 37.5 g, Intravenous, Once   Or  albumin human, 50 g, Intravenous, Once   Or  albumin human, 62.5 g, Intravenous, Once   Or  albumin human, 75 g, Intravenous, Once   Or  albumin human, 87.5 g, Intravenous, Once   Or  albumin human, 100 g, Intravenous, Once   Or  albumin human, 112.5 g, Intravenous, Once  budesonide-formoterol, 2 puff, Inhalation, BID - RT  busPIRone, 15 mg, Oral, BID  folic acid, 1,000 mcg, Oral, Daily  furosemide, 40 mg, Intravenous, Daily  LORazepam, 0.5 mg, Intravenous, Once  magnesium oxide, 800 mg, Oral, BID  multivitamin-iron-minerals-folic acid, 1 tablet, Oral, Daily  nicotine, 1 patch, Transdermal, Q24H  nitrofurantoin (macrocrystal-monohydrate), 100 mg, Oral, Q12H  sertraline, 100 mg, Oral, BID  sodium chloride,  10 mL, Intravenous, Q12H  spironolactone, 50 mg, Oral, Daily  thiamine, 100 mg, Oral, Daily  traZODone, 100 mg, Oral, Nightly      IV Meds:        Results Reviewed:   I have personally reviewed the results from the time of this admission to 10/17/2022 07:39 EDT     Results from last 7 days   Lab Units 10/16/22  2344 10/15/22  2259 10/14/22  2113   SODIUM mmol/L 139 139 143   POTASSIUM mmol/L 3.6 3.5 3.6   CHLORIDE mmol/L 103 102 112*   CO2 mmol/L 29.0 29.0 22.0   BUN mg/dL 9 9 7   CREATININE mg/dL 0.41* 0.51* 0.42*   CALCIUM mg/dL 8.0* 8.5* 7.5*   BILIRUBIN mg/dL 0.3 0.4 0.6   ALK PHOS U/L 121* 90 77   ALT (SGPT) U/L 108* 114* 83*   AST (SGOT) U/L 428* 675* 493*   GLUCOSE mg/dL 96 83 74       Estimated Creatinine Clearance: 142.9 mL/min (A) (by C-G formula based on SCr of 0.41 mg/dL (L)).    Results from last 7 days   Lab Units 10/16/22  2344 10/15/22  2259 10/14/22  2113   MAGNESIUM mg/dL 1.5* 1.6 1.4*   PHOSPHORUS mg/dL  --  3.8  --              Results from last 7 days   Lab Units 10/16/22  2344 10/15/22  2259 10/15/22  1006 10/14/22  2037   WBC 10*3/mm3 7.70 7.50 7.70 8.90   HEMOGLOBIN g/dL 8.9* 9.4* 10.2* 11.8*   PLATELETS 10*3/mm3 153 176 183 233       Results from last 7 days   Lab Units 10/16/22  2344 10/16/22  1351 10/14/22  2037   INR  1.16* 1.20* 1.14*       Assessment / Plan       ASSESSMENT:     Rhabdomyolysis , secondary to recurrent falls aggravated with increased GI losses while on diuretics.   Results from last 7 days   Lab Units 10/16/22  2344 10/15/22  2259 10/15/22  1006   CK TOTAL U/L 5,500* 14,182* 18,760*   -CPKs with significant improvement with IV fluids,   -We will discontinue IV fluids to avoid worsening ascites  -We will continue to follow CPKs trend closely     Hypomagnesemia.   - Patient was treated with magnesium IV ,  - Magnesium  1.4 > 1.6 > 1.5  - Increased magnesium oral from 400 to 800 mg      Chronic macrocytic anemia .   - Currently on thiamine, and multivitamins     Tobacco  abuse chronic nicotine patch     History alcohol abuse with cirrhosis followed closely by GI.  -  Appreciate input.    -Planning paracentesis today  currently on diuretics.     -Urinary tract infection culture isolated over 100,000 colonies of E. Coli.  After primary team.  On nitrofurantoin     PLAN:     - will d/c IV fluids to avoid worsening ascites .   -Awaiting paracentesis today  - Increased magnesium oral from 400 to 800 mg   -Diuretics as per gastroenterology   -Follow renal function closely  -Avoid nephrotoxins  -Adjust medications to GFR      Discussed with nursing staff at bedside    Thank you for involving us in the care of Lita Yu.  Please feel free to call with any questions.    Boris Helm MD  10/17/22  07:39 EDT    [facility]  [phone]

## 2022-10-17 NOTE — PLAN OF CARE
Goal Outcome Evaluation:  Plan of Care Reviewed With: patient           Outcome Evaluation: Patient is a 58 y/o F who was admitted 10/14/22 after inability to get herself around her home and being on the floor for an extended amount of time.  She has a h/o cirrohosis, anemia, transaminitis, ETOH abuse, PTSD, and HTN.  Pt lives in a bi-level home with multiple steps.  During today's evaluation she required mod A x2 to perform sit to stand at EOB for almas-care.  Due to findings pt will benefit from d/c to Inpatient Rehab.

## 2022-10-17 NOTE — PROGRESS NOTES
[facility] Medicine Services Daily Progress Note    Patient Name: Lita Yu  : 1965  MRN: [id number]  Primary Care Physician:  Norma Saul APRN  Date of admission: 10/14/2022      Subjective      Chief Complaint: Weakness    10/16/2022 patient seen and examined this morning.  Doing better compared to yesterday.  Feels generalized pain all over and weakness but no other complaints.  No numbness or tingling.  10/17/2022 patient seen and examined in bed no acute distress, vital signs stable, reports feeling better today denies any abdominal pain nausea vomiting.  Unable to perform paracentesis no None fluids.        Pertinent positives as noted in HPI/subjective.  All other systems were reviewed and are negative.      Objective      Vitals:   Temp:  [97.5 °F (36.4 °C)-98.5 °F (36.9 °C)] 97.5 °F (36.4 °C)  Heart Rate:  [78-83] 83  Resp:  [14-20] 20  BP: (130-143)/(74-89) 130/79  Flow (L/min):  [2] 2    Physical Exam:    General: Awake, alert, lying in bed, NAD  Eyes: PERRL, EOMI, conjunctive are clear  Cardiovascular: Regular rate and rhythm, no murmurs  Respiratory: Clear to auscultation bilaterally, no wheezing or rales, unlabored breathing  Abdomen: Soft, distended, nontender, positive bowel sounds, no guarding  Neurologic: A&O, CN grossly intact, moves all extremities spontaneously, sensation intact bilaterally  Musculoskeletal: Generalized weakness, no deformities  Skin: Warm, dry, intact         Result Review    Result Review:  I have personally reviewed the results from the time of this admission to 10/17/2022 15:35 EDT and agree with these findings:  [x]  Laboratory  [x]  Microbiology  [x]  Radiology  []  EKG/Telemetry   []  Cardiology/Vascular   []  Pathology  []  Old records  []  Other:       CMP:      Lab 10/16/22  2344 10/15/22  2259 10/14/22  2113   SODIUM 139 139 143   POTASSIUM 3.6 3.5 3.6   CHLORIDE 103 102 112*   CO2 29.0 29.0 22.0   ANION GAP 7.0 8.0  9.0   BUN 9 9 7   CREATININE 0.41* 0.51* 0.42*   EGFR 116.4 110.4 115.7   GLUCOSE 96 83 74   CALCIUM 8.0* 8.5* 7.5*   MAGNESIUM 1.5* 1.6 1.4*   PHOSPHORUS  --  3.8  --    TOTAL PROTEIN 5.3* 5.4* 4.8*   ALBUMIN 2.30* 2.30* 2.00*   GLOBULIN 3.0 3.1 2.8   ALT (SGPT) 108* 114* 83*   AST (SGOT) 428* 675* 493*   BILIRUBIN 0.3 0.4 0.6   ALK PHOS 121* 90 77     CBC:      Lab 10/16/22  2344 10/15/22  2259 10/15/22  1006 10/14/22  2037   WBC 7.70 7.50 7.70 8.90   HEMOGLOBIN 8.9* 9.4* 10.2* 11.8*   HEMATOCRIT 26.7* 28.5* 31.7* 36.9   PLATELETS 153 176 183 233   NEUTROS ABS 5.00 5.30 5.80 6.80   LYMPHS ABS 1.70 1.40 1.20 1.30   MONOS ABS 0.90 0.60 0.60 0.50   EOS ABS 0.20 0.20 0.20 0.20   .3* 109.0* 110.8* 111.0*       Assessment & Plan      Brief Patient Summary:  Lita Yu is a 57 y.o. female who      albumin human, 37.5 g, Intravenous, Once   Or  albumin human, 50 g, Intravenous, Once   Or  albumin human, 62.5 g, Intravenous, Once   Or  albumin human, 75 g, Intravenous, Once   Or  albumin human, 87.5 g, Intravenous, Once   Or  albumin human, 100 g, Intravenous, Once   Or  albumin human, 112.5 g, Intravenous, Once  budesonide-formoterol, 2 puff, Inhalation, BID - RT  busPIRone, 15 mg, Oral, BID  folic acid, 1,000 mcg, Oral, Daily  furosemide, 40 mg, Intravenous, Daily  LORazepam, 0.5 mg, Intravenous, Once  magnesium oxide, 800 mg, Oral, BID  multivitamin-iron-minerals-folic acid, 1 tablet, Oral, Daily  nicotine, 1 patch, Transdermal, Q24H  nitrofurantoin (macrocrystal-monohydrate), 100 mg, Oral, Q12H  saccharomyces boulardii, 250 mg, Oral, BID  sertraline, 100 mg, Oral, BID  sodium chloride, 10 mL, Intravenous, Q12H  [START ON 10/18/2022] spironolactone, 100 mg, Oral, Daily  thiamine, 100 mg, Oral, Daily  traZODone, 100 mg, Oral, Nightly  vancomycin, 125 mg, Oral, Q6H       Pharmacy Consult,          Active Hospital Problems:  Active Hospital Problems    Diagnosis    • **Generalized weakness    • Cirrhosis  of liver (HCC)    • Acute UTI (urinary tract infection)    • Rhabdomyolysis    • Non-traumatic rhabdomyolysis    • Chronic obstructive pulmonary disease (HCC)    • Essential hypertension    • Anemia of chronic disease    • Post traumatic stress disorder    • Hyperlipidemia    • Tobacco dependence syndrome         Generalized weakness  Acute rhabdomyolysis  -Likely related to deconditioning and alcohol abuse, patient was found on the floor for at least 24 hours  -CPK elevated but patient has significant ascites, IV fluids decreased per GI  -PT/OT, may need rehab  -MRI spine negative for acute cord compression or abscess  -Low-dose IV fluid due to ascites  -Nephrology following  -Neurology following    LORENZO/Alcoholic cirrhosis with ascites  -CT report noted  -Paracentesis ordered, unable to perform  -Diuresis with Lasix per GI  -Continue Aldactone  -Lactulose if needed  -GI following    Acute UTI  -UA noted, urine culture collected  -Continue empiric aztreonam due to allergies  -Follow-up on urine culture    Alcohol abuse  -Appears to be chronic, counseled on cessation  -Last drink was the night before admission  -Started on CIWA protocol with Ativan as needed  -Monitor for withdrawals    Hypertension  -Controlled  -Resume home meds as able  -Monitor    Hypomagnesemia  -Replace and monitor    Anemia of chronic disease  -Hemoglobin appears to be stable  -Monitor daily    COPD  -Not in exacerbation, remains on room air  -Continue bronchodilators, monitor    PTSD/anxiety  -Continue home meds, monitor    DVT prophylaxis  -Lovenox        CODE STATUS:    Code Status (Patient has no pulse and is not breathing): CPR (Attempt to Resuscitate)  Medical Interventions (Patient has pulse or is breathing): Full Support      Disposition: Pending improvement    Electronically signed by Quang Estrella MD, 10/17/22, 15:35 EDT.  [facility] Hospitalist Team      Much of this encounter note is an electronic  transcription/translation of spoken language to printed text. The electronic translation of spoken language may permit erroneous, or at times, nonsensical words or phrases to be inadvertently transcribed; Although I have reviewed the note for such errors, some may still exist.

## 2022-10-17 NOTE — NURSING NOTE
PARACENTESIS    Time Arrived in Department: 0845      Consent: yes  Allergies have been Reviewed: yes      ID Band Verified: yes    Method: Bed    Lab Results: Checked and MD Notified - Patient is c-diff Positive    Physician: Sameer      Nurse: Shakila Humphrey RN    IR Care Plan: Person Educated - Patient, Current Knowledge - Understands, Learning Barrier - None, Readiness to Learn - Acceptance, Teaching Topic - Procedure and Evaluation of Teaching - Verbalized Understanding      Neurological: Within Normal Limits    Skin: Flesh, Warm and Dry    Respiratory Status: Respirations even and unlabored    Room In: 304      Procedure: Paracentesis    Time Out Completed: yes    Time Out: 0905    Patient did not have enough fluid to remove per MD. Patient is aware. Patients primary nurse was notified. No Paracentesis done today.

## 2022-10-17 NOTE — PROGRESS NOTES
LOS: 3 days   Patient Care Team:  Norma Saul APRN as PCP - General (Nurse Practitioner)      Subjective     Interval History:     Subjective: Patient denies abdominal pain.  No diarrhea.  Denies nausea/vomiting.      ROS:   No chest pain, shortness of breath, or cough.        Medication Review:     Current Facility-Administered Medications:   •  albumin human 25 % IV SOLN 37.5 g, 37.5 g, Intravenous, Once **OR** albumin human 25 % IV SOLN 50 g, 50 g, Intravenous, Once **OR** albumin human 25 % IV SOLN 62.5 g, 62.5 g, Intravenous, Once **OR** albumin human 25 % IV SOLN 75 g, 75 g, Intravenous, Once **OR** albumin human 25 % IV SOLN 87.5 g, 87.5 g, Intravenous, Once **OR** albumin human 25 % IV SOLN 100 g, 100 g, Intravenous, Once **OR** albumin human 25 % IV SOLN 112.5 g, 112.5 g, Intravenous, Once, Roxann Coley APRN  •  budesonide-formoterol (SYMBICORT) 160-4.5 MCG/ACT inhaler 2 puff, 2 puff, Inhalation, BID - RT, Donna Fitzpatrick DO, 2 puff at 10/15/22 1918  •  busPIRone (BUSPAR) tablet 15 mg, 15 mg, Oral, BID, Donna Fitzpatrick DO, 15 mg at 10/17/22 0948  •  folic acid (FOLVITE) tablet 1,000 mcg, 1,000 mcg, Oral, Daily, Donna Fitzpatrick DO, 1,000 mcg at 10/17/22 0948  •  furosemide (LASIX) injection 40 mg, 40 mg, Intravenous, Daily, Boris Helm MD, 40 mg at 10/17/22 0954  •  ipratropium-albuterol (DUO-NEB) nebulizer solution 3 mL, 3 mL, Nebulization, Q4H PRN, Christian Andrews PA-C  •  LORazepam (ATIVAN) injection 0.5 mg, 0.5 mg, Intravenous, Once, Donna Fitzpatrick DO  •  LORazepam (ATIVAN) tablet 1 mg, 1 mg, Oral, Q2H PRN **OR** LORazepam (ATIVAN) injection 1 mg, 1 mg, Intravenous, Q2H PRN **OR** LORazepam (ATIVAN) tablet 2 mg, 2 mg, Oral, Q1H PRN **OR** LORazepam (ATIVAN) injection 2 mg, 2 mg, Intravenous, Q1H PRN **OR** LORazepam (ATIVAN) injection 2 mg, 2 mg, Intravenous, Q15 Min PRN **OR** LORazepam (ATIVAN) injection 2 mg, 2 mg, Intramuscular, Q15 Min PRN, Donna Fitzpatrick,   •  magnesium  oxide (MAG-OX) tablet 800 mg, 800 mg, Oral, BID, Boris Helm MD, 800 mg at 10/17/22 0948  •  melatonin tablet 5 mg, 5 mg, Oral, Nightly PRN, Christian Andrews PA-C  •  multivitamin-iron-minerals-folic acid (CENTRUM) chewable tablet 1 tablet, 1 tablet, Oral, Daily, Roxann Coley, APRN, 1 tablet at 10/17/22 0948  •  nicotine (NICODERM CQ) 21 MG/24HR patch 1 patch, 1 patch, Transdermal, Q24H, Christian Andrews PA-C  •  nitrofurantoin (macrocrystal-monohydrate) (MACROBID) capsule 100 mg, 100 mg, Oral, Q12H, Donna Fitzpatrick DO, 100 mg at 10/17/22 0948  •  nitroglycerin (NITROSTAT) SL tablet 0.4 mg, 0.4 mg, Sublingual, Q5 Min PRN, Christian Andrews PA-C  •  ondansetron (ZOFRAN) tablet 4 mg, 4 mg, Oral, Q6H PRN, 4 mg at 10/15/22 0255 **OR** ondansetron (ZOFRAN) injection 4 mg, 4 mg, Intravenous, Q6H PRN, Christian Andrews PA-C  •  Pharmacy Consult, , Does not apply, Continuous PRN, Quang Estrella MD  •  sertraline (ZOLOFT) tablet 100 mg, 100 mg, Oral, BID, Donna Fitzpatrick DO, 100 mg at 10/17/22 0948  •  [COMPLETED] Insert peripheral IV, , , Once **AND** sodium chloride 0.9 % flush 10 mL, 10 mL, Intravenous, PRN, Christian Andrews PA-C  •  sodium chloride 0.9 % flush 10 mL, 10 mL, Intravenous, Q12H, Christian Andrews PA-C, 10 mL at 10/17/22 0953  •  sodium chloride 0.9 % flush 10 mL, 10 mL, Intravenous, PRN, Christian Andrews PA-C  •  spironolactone (ALDACTONE) tablet 50 mg, 50 mg, Oral, Daily, Boris Helm MD, 50 mg at 10/17/22 0948  •  thiamine (VITAMIN B-1) tablet 100 mg, 100 mg, Oral, Daily, Roxann Coley APRN, 100 mg at 10/17/22 0948  •  traZODone (DESYREL) tablet 100 mg, 100 mg, Oral, Nightly, Donna Fitzpatrick DO, 100 mg at 10/16/22 2030  •  vancomycin (VANCOCIN) capsule 125 mg, 125 mg, Oral, Q6H, Quang Estrella MD, 125 mg at 10/17/22 0948      Objective     Vital Signs  Vitals:    10/17/22 0334 10/17/22 0725 10/17/22 0730 10/17/22 0855   BP: 136/80 135/74  130/79    BP Location: Right arm Left arm  Left arm   Patient Position: Lying Lying  Lying   Pulse: 80 83  83   Resp: 16 14  20   Temp: 98.3 °F (36.8 °C) 97.5 °F (36.4 °C)     TempSrc: Oral Axillary     SpO2: 92% (!) 87% 92% 97%   Weight: 58.4 kg (128 lb 12 oz)      Height:           Physical Exam:     General Appearance:    Awake and alert, in no acute distress   Head:    Normocephalic, without obvious abnormality   Eyes:          Conjunctivae normal, anicteric sclera   Throat:   No oral lesions, no thrush, oral mucosa moist   Neck:   No adenopathy, supple, no JVD   Lungs:     respirations regular, even and unlabored   Abdomen:     Soft, non-tender, no rebound or guarding, non-distended   Rectal:     Deferred   Extremities:   No edema, no cyanosis   Skin:   No bruising or rash, no jaundice        Results Review:    CBC    Results from last 7 days   Lab Units 10/16/22  2344 10/15/22  2259 10/15/22  1006 10/14/22  2037   WBC 10*3/mm3 7.70 7.50 7.70 8.90   HEMOGLOBIN g/dL 8.9* 9.4* 10.2* 11.8*   PLATELETS 10*3/mm3 153 176 183 233     CMP   Results from last 7 days   Lab Units 10/16/22  2344 10/15/22  2259 10/15/22  0428 10/14/22  2113   SODIUM mmol/L 139 139  --  143   POTASSIUM mmol/L 3.6 3.5  --  3.6   CHLORIDE mmol/L 103 102  --  112*   CO2 mmol/L 29.0 29.0  --  22.0   BUN mg/dL 9 9  --  7   CREATININE mg/dL 0.41* 0.51*  --  0.42*   GLUCOSE mg/dL 96 83  --  74   ALBUMIN g/dL 2.30* 2.30*  --  2.00*   BILIRUBIN mg/dL 0.3 0.4  --  0.6   ALK PHOS U/L 121* 90  --  77   AST (SGOT) U/L 428* 675*  --  493*   ALT (SGPT) U/L 108* 114*  --  83*   MAGNESIUM mg/dL 1.5* 1.6  --  1.4*   PHOSPHORUS mg/dL  --  3.8  --   --    AMMONIA umol/L  --   --  26  --      Cr Clearance Estimated Creatinine Clearance: 142.9 mL/min (A) (by C-G formula based on SCr of 0.41 mg/dL (L)).  Coag   Results from last 7 days   Lab Units 10/16/22  2344 10/16/22  1351 10/14/22  2037   INR  1.16* 1.20* 1.14*   APTT seconds  --   --  26.4*     HbA1C No results  found for: HGBA1C  Blood Glucose   Glucose   Date/Time Value Ref Range Status   10/16/2022 0828 86 70 - 105 mg/dL Final     Comment:     Serial Number: [id number]:  [id number]     Infection   Results from last 7 days   Lab Units 10/15/22  0100 10/14/22  2136   BLOODCX  No growth at 2 days  No growth at 2 days  --    URINECX   --  >100,000 CFU/mL Escherichia coli*     UA    Results from last 7 days   Lab Units 10/14/22  2136   NITRITE UA  Positive*   WBC UA /HPF Too Numerous to Count*   BACTERIA UA /HPF 4+*   SQUAM EPITHEL UA /HPF None Seen   URINECX  >100,000 CFU/mL Escherichia coli*     Radiology(recent) MRI Cervical Spine Without Contrast    Result Date: 10/15/2022  IMPRESSION : 1.     Motion artifact degrades diagnostic evaluation. No definitive cord abnormality is seen on this exam. 2.     Multilevel degenerative changes as above with suspected multilevel severe foraminal and mild to moderate canal narrowing. Repeat evaluation when patient is better able to maintain positioning may be considered.  Electronically Signed By-Jluis Medina MD On:10/15/2022 1:23 PM This report was finalized on [id number] by  Jluis Medina MD.    MRI Thoracic Spine Without Contrast    Result Date: 10/15/2022  1.     No definite high-grade stenosis or thoracic cord abnormality is seen on this exam which is somewhat limited by motion. 2.     Ascites and body wall edema.  Electronically Signed By-Jluis Medina MD On:10/15/2022 1:30 PM This report was finalized on [id number] by  Jluis Medina MD.    MRI Lumbar Spine Without Contrast    Result Date: 10/15/2022  1.     Motion artifact somewhat degrades diagnostic image quality. Conus appears grossly unremarkable. 2.     Mild marrow edema at the superior endplate of L3 surrounding a small Schmorl's node. No definite vertebral body height loss or acute fracture is identified. 3.     Mild marrow signal heterogeneity which could be seen with marrow reconversion. Correlate with  lab values. 4.     Degenerative changes with suspected mild canal and foraminal narrowing as above. 5.     L5 pars defects without significant displacement on this exam. 6.     Ascites and body wall edema.  Electronically Signed By-Jluis Medina MD On:10/15/2022 1:38 PM This report was finalized on [id number] by  Jluis Medina MD.    US Abdomen Limited    Result Date: 10/16/2022   1.  Nodular appearing liver and be seen with cirrhosis. 2.  Small amount of ascites. 3.  No evidence of gallstones or biliary tract obstruction.  Electronically Signed By-Ryan Peres MD On:10/16/2022 11:22 AM This report was finalized on [id number] by  Ryan Peres MD.         Assessment & Plan     ASSESSMENT:  -Decompensated cirrhosis due to LORENZO/ETOH complicated by ascites  -Elevated LFTs  -C. difficile colitis  -Macrocytic anemia  -E. coli UTI  -Abnormal CT of the colon    PLAN:  Patient reports that she is feeling some better today.  Denies any abdominal pain, nausea/vomiting, or further diarrhea.  Labs have not yet resulted today.  LFTs were trending down yesterday.  Await results for today.  Suspect elevation due to rhabdomyolysis superimposed on cirrhosis.  Liver serologies pending.  Hepatitis panel negative.  Tylenol level normal.  RUQ US shows cirrhosis, small amount of ascites, no evidence of CBD obstruction.  Paracentesis is planned for today.  Fluid studies ordered to rule out SBP.  Replace albumin as needed per protocol.  Continue Lasix 40 mg daily and spironolactone 100 mg daily.  Creatinine 0.41.  Monitor renal function closely.  Urine sodium to potassium ratio > 1 is consistent with nonadherence to low-sodium diet.  Continue low-sodium diet.  Stool positive for C. difficile.  Continue oral vancomycin.  Will add Florastor.  On antibiotics for UTI which will make treating C. difficile more difficult.  GI PCR negative.  Supportive care.      Electronically signed by SHARIF Huertas, 10/17/22, 10:20 AM EDT.

## 2022-10-17 NOTE — CASE MANAGEMENT/SOCIAL WORK
Continued Stay Note   Vik     Patient Name: Lita Yu  MRN: [id number]  Today's Date: 10/17/2022    Admit Date: 10/14/2022    Plan: Needs screened   Discharge Plan     Row Name 10/17/22 1246       Plan    Plan Needs screened    Plan Comments Needs CM assessment. Pt + for Cdiff, having multiple bowel movements, had fall today, requiring nursing staff continuous interventions.                   Expected Discharge Date and Time     Expected Discharge Date Expected Discharge Time    Oct 21, 2022         Phone communication or documentation only - no physical contact with patient or family.      Zak Osei RN

## 2022-10-17 NOTE — PLAN OF CARE
Goal Outcome Evaluation:              Outcome Evaluation: Patient currently resting abed, no distress noted. Patient voiced complaints of feeling tenderness in upper extremities and weakness in her lower extremities. Patient moves from side to side but is unable to sit herself up or move herself over in bed. Will continue to monitor

## 2022-10-17 NOTE — CONSULTS
Nutrition Services    Patient Name: Lita Yu  YOB: 1965  MRN: [id number]  Admission date: 10/14/2022    *See [facility] data at bottom of this note.     Moderate chronic Dz related malnutrition R/t suspected chronic suboptimal intake in the setting of EtOH use disorder; as evidenced by moderate and severe muscle and fat loss per NFPE.    Pt refusing meals while on 2g Na diet. May consider allowing slightly liberalized diet during hospitalization. Foods offered on regular diet while inpatient are lower in sodium than a typical home diet. Will liberalize to Regular to help improve intake halt progression of malnutrition.     Diet education on low sodium diet for home use completed today -- print materials, sample recipes, and discussion provided at bedside. RD contact information also given, and pt encouraged to contact this writer to discuss further, as needed.    PPE Documentation        PPE Worn By Provider mask, gloves, eye protection and gown   PPE Worn By Patient  None      CLINICAL NUTRITION ASSESSMENT      Reason for Assessment 10/17: MST score; consult for education      H&P      Past Medical History:   Diagnosis Date   • Cirrhosis (HCC)    • COPD (chronic obstructive pulmonary disease) (HCC)    • Depression    • GERD (gastroesophageal reflux disease)    • Hyperlipidemia    • Hypertension    • PTSD (post-traumatic stress disorder)        Past Surgical History:   Procedure Laterality Date   •  SECTION N/A    • COLONOSCOPY N/A 2021    Procedure: COLONOSCOPY with polypectomy x2 and random biopsy;  Surgeon: Osman Clay MD;  Location: [facility] ENDOSCOPY;  Service: Gastroenterology;  Laterality: N/A;  colon polyps   • DENTAL PROCEDURE     • ENDOSCOPY N/A 2021    Procedure: ESOPHAGOGASTRODUODENOSCOPY;  Surgeon: Osman Clay MD;  Location: [facility] ENDOSCOPY;  Service: Gastroenterology;  Laterality: N/A;  esophagitis   • JOINT REPLACEMENT     • REPLACEMENT TOTAL HIP  "LATERAL POSITION Left    • TONSILLECTOMY     • VAGINAL BIRTH AFTER  SECTION          Current Problems   Generalized weakness  Acute rhabdomyolysis  -Neurology following  -Nephrology following     LORENZO/Alcoholic cirrhosis with ascites  -GI following     Acute UTI     Alcohol abuse  - B-1 and folvite in place      Anemia of chronic disease     COPD  -Not in exacerbation     PTSD/anxiety     Encounter Information        Trending Narrative     10/17: Pt assessed due to concern for MST score/possible weight loss. Pt denies weight loss verbally, but documented weight Hx shows weight loss, and NFPE is C/W malnutrition. Pt with palpable muscle and fat loss -- discussed with pt and reviewed sources of protein and use of ONS. Pt agreeable to ongoing ONS; recommended she continue these for at least 1-2 months following D/C. Pt open to low sodium educ, but also stated she will not eat while on low sodium diet. States she doesn't like the diet and therefore will not accept food. Observed 0% consumed from tray at bedside. Other meals, pt eating 0-25%. Reviewed importance of low sodium choices at home; reviewed diet with pt and provided print materials. For now, will liberalize diet while in hospital, to help optimize intake and prevent progression of malnutrition.      Anthropometrics        Current Height, Weight Height: 162.6 cm (64\")  Weight: 58.4 kg (128 lb 12 oz) (10/17/22 0334)       Ideal Body Weight (IBW) 120 lb   Usual Body Weight (UBW) 135 lb per pt report        Trending Weight Hx     This admission: 10/17: Scale weight 58.4 kg             PTA: 10/17: Downtrend in weight over the last several months -- though current scale weight is higher than previous weights from  and  -- monitor     Wt Readings from Last 30 Encounters:   10/17/22 0334 58.4 kg (128 lb 12 oz)   10/16/22 0532 58.4 kg (128 lb 12 oz)   10/15/22 0203 58.3 kg (128 lb 8.5 oz)   10/14/22 2008 61.2 kg (135 lb)   22 1446 46.7 kg (103 " lb)   08/17/22 1057 50.8 kg (112 lb)   08/05/22 0323 57.8 kg (127 lb 6.4 oz)   08/03/22 2028 53.9 kg (118 lb 12.8 oz)   08/03/22 1523 54.1 kg (119 lb 4.3 oz)   08/03/22 1401 54 kg (119 lb)   07/18/22 1523 59.9 kg (132 lb)   12/16/21 1418 60.1 kg (132 lb 6.4 oz)   12/14/21 1548 61.2 kg (135 lb)   12/14/21 1422 60.8 kg (134 lb)   11/26/21 1802 64 kg (141 lb 1.5 oz)   10/07/21 1258 60.8 kg (134 lb)   09/07/21 1258 59.9 kg (132 lb)   08/31/21 0742 56.6 kg (124 lb 12.5 oz)   08/20/21 1618 56.7 kg (125 lb)   07/15/21 1259 55.8 kg (123 lb)   06/14/21 1303 52.2 kg (115 lb)   03/29/21 1405 58.5 kg (129 lb)   02/19/21 1406 57.6 kg (127 lb)   02/05/21 1115 63.5 kg (140 lb)   05/12/20 1038 52.6 kg (116 lb)   03/25/20 1056 53.9 kg (118 lb 13.3 oz)   07/26/19 1231 52.3 kg (115 lb 3.2 oz)   06/17/19 1110 54 kg (119 lb)   04/26/19 1217 54.4 kg (120 lb)   12/28/18 1455 49.9 kg (110 lb)   12/27/18 1306 49.4 kg (109 lb)   11/29/18 1354 48.5 kg (107 lb)   11/27/18 1245 50.3 kg (111 lb)   04/24/18 1421 49.4 kg (109 lb)   02/19/18 1424 49 kg (108 lb)   10/05/17 1024 48.5 kg (107 lb)      BMI kg/m2 Body mass index is 22.1 kg/m².       Labs        Pertinent Labs    Results from last 7 days   Lab Units 10/16/22  2344 10/15/22  2259 10/14/22  2113   SODIUM mmol/L 139 139 143   POTASSIUM mmol/L 3.6 3.5 3.6   CHLORIDE mmol/L 103 102 112*   CO2 mmol/L 29.0 29.0 22.0   BUN mg/dL 9 9 7   CREATININE mg/dL 0.41* 0.51* 0.42*   CALCIUM mg/dL 8.0* 8.5* 7.5*   BILIRUBIN mg/dL 0.3 0.4 0.6   ALK PHOS U/L 121* 90 77   ALT (SGPT) U/L 108* 114* 83*   AST (SGOT) U/L 428* 675* 493*   GLUCOSE mg/dL 96 83 74     Results from last 7 days   Lab Units 10/16/22  2344 10/15/22  2259 10/15/22  1006 10/14/22  2113   MAGNESIUM mg/dL 1.5* 1.6  --  1.4*   PHOSPHORUS mg/dL  --  3.8  --   --    HEMOGLOBIN g/dL 8.9* 9.4*   < >  --    HEMATOCRIT % 26.7* 28.5*   < >  --     < > = values in this interval not displayed.     COVID19   Date Value Ref Range Status   08/03/2022 Not  Detected Not Detected - Ref. Range Final     No results found for: HGBA1C     Medications    Scheduled Medications albumin human, 37.5 g, Intravenous, Once   Or  albumin human, 50 g, Intravenous, Once   Or  albumin human, 62.5 g, Intravenous, Once   Or  albumin human, 75 g, Intravenous, Once   Or  albumin human, 87.5 g, Intravenous, Once   Or  albumin human, 100 g, Intravenous, Once   Or  albumin human, 112.5 g, Intravenous, Once  budesonide-formoterol, 2 puff, Inhalation, BID - RT  busPIRone, 15 mg, Oral, BID  folic acid, 1,000 mcg, Oral, Daily  furosemide, 40 mg, Intravenous, Daily  LORazepam, 0.5 mg, Intravenous, Once  magnesium oxide, 800 mg, Oral, BID  multivitamin-iron-minerals-folic acid, 1 tablet, Oral, Daily  nicotine, 1 patch, Transdermal, Q24H  nitrofurantoin (macrocrystal-monohydrate), 100 mg, Oral, Q12H  saccharomyces boulardii, 250 mg, Oral, BID  sertraline, 100 mg, Oral, BID  sodium chloride, 10 mL, Intravenous, Q12H  [START ON 10/18/2022] spironolactone, 100 mg, Oral, Daily  thiamine, 100 mg, Oral, Daily  traZODone, 100 mg, Oral, Nightly  vancomycin, 125 mg, Oral, Q6H        Infusions Pharmacy Consult,         PRN Medications •  ipratropium-albuterol  •  LORazepam **OR** LORazepam **OR** LORazepam **OR** LORazepam **OR** LORazepam **OR** LORazepam  •  melatonin  •  nitroglycerin  •  ondansetron **OR** ondansetron  •  Pharmacy Consult  •  [COMPLETED] Insert peripheral IV **AND** sodium chloride  •  sodium chloride     Physical Findings        Trending Physical   Appearance, NFPE 10/17: NFPE completed, consistent with nutrition diagnosis of malnutrition using AND/ASPEN criteria. See MSA below.      --  Edema  None documented      Bowel Function Stool Output  Stool Unmeasured Occurrence: 1 (10/17/22 1212)  Bowel Incontinence: Yes (10/17/22 1212)  Stool Amount: moderate (10/17/22 1600)        Tubes No feeding tube in place      Chewing/Swallowing Denies difficulty      Skin No breakdown documented       --  Current Nutrition Orders & Evaluation of Intake       Oral Nutrition     Food Allergies NKFA   Current PO Diet Diet Cardiac; 2gm Na+   Supplement Boost Plus BID   PO Evaluation     Trending % PO Intake 10/17: 50% or less at recent meals    --  Nutritional Risk Screening        NRS-2002 Score          Nutrition Diagnosis         Nutrition Dx Problem 1 Moderate chronic Dz related malnutrition R/t suspected chronic suboptimal intake in the setting of EtOH use disorder; as evidenced by moderate and severe muscle and fat loss per NFPE.      Nutrition Dx Problem 2        Intervention Goal         Intervention Goal(s) Intake improving to at least 75% at meals   Weight stabilizing      Nutrition Intervention        RD Action Liberalizing diet to help improve intake      Nutrition Prescription          Diet Prescription Regular    Supplement Prescription Boost Plus BID (Provides 720 kcals, 28 g protein if consumed)      --  Monitor/Evaluation        Monitor PO intake, Pertinent labs, Weight, Skin status, GI status, POC/GOC     Malnutrition Severity Assessment      Patient meets criteria for : Moderate (non-severe) Malnutrition  Malnutrition Type (last 8 hours)     Malnutrition Severity Assessment     Row Name 10/17/22 2333       Malnutrition Severity Assessment    Malnutrition Type Chronic Disease - Related Malnutrition    Row Name 10/17/22 2334       Muscle Loss    Loss of Muscle Mass Findings Moderate    [place] Severe - deep hollowing/scooping, lack of muscle to touch, facial bones well defined    Clavicle Bone Region Moderate - some protrusion in females, visible in males    Acromion Bone Region Moderate - acromion may slightly protrude    Scapular Bone Region Moderate - mild depression, bones may show slightly    Dorsal Hand Region Moderate - slight depression    Patellar Region Severe - prominent bone, square looking, very little muscle definition    Anterior Thigh Region Severe - line/depression along  thigh, obviously thin    Posterior Calf Region Moderate - some roundness, slight firmness    Row Name 10/17/22 9287       Fat Loss    Subcutaneous Fat Loss Findings Moderate    Orbital Region  Moderate -  somewhat hollowness, slightly dark circles    Upper Arm Region Severe - mostly skin, very little space between folds, fingers touch    Thoracic & Lumbar Region Moderate - ribs visible with mild depressions, iliac crest somewhat prominent    Row Name 10/17/22 8206       Criteria Met (Must meet criteria for severity in at least 2 of these categories: M Wasting, Fat Loss, Fluid, Secondary Signs, Wt. Status, Intake)    Patient meets criteria for  Moderate (non-severe) Malnutrition                   Electronically signed by:  Marleni Alonzo RD  10/17/22 17:09 EDT

## 2022-10-17 NOTE — PROGRESS NOTES
LOS: 3 days     Lita Yu is a 57 y.o. female     Chief Complaint:  Follow up for weakness.        SUBJECTIVE:  History taken from: patient chart RN    Interval History:   57 yr old lady with cirrhosis of liver, COPD, GERD, depression, HLD, GERD, HLD, HTN, PTSD who presented to ER with inability to walk.  The MRI of C-Th-LS Spine were unremarkable.  Her condition has improved considerably.  Her headaches have resolved. no vision problems, no speech and swallowing problems.         Patient Complaints: none      Review of Systems   Review of Systems   Review of Systems   Constitutional: Negative  HENT: Negative.    Eyes: Negative.    Respiratory: Negative.    Cardiovascular: Negative.    Gastrointestinal: cirrhosis of liver. GERD.    Genitourinary: Negative.    Musculoskeletal: weakness.  Skin: Negative.    Neurological: Negative.    Hematological: Negative.    Psychiatric/Behavioral:  Depression, PTSD.      Pertinent PMH:  has a past medical history of Cirrhosis (HCC), COPD (chronic obstructive pulmonary disease) (HCC), Depression, GERD (gastroesophageal reflux disease), Hyperlipidemia, Hypertension, and PTSD (post-traumatic stress disorder).   ________________________________________________     OBJECTIVE:  The patient is laying in bed in no apparent distress.  Head NC, AT, Neck supple, trachea midline.  Lungs CTA,  Good pulmonary effort. CV  S1-S2 no murmur. Abdomen soft, non tender.  Ext no edema, no cyanosis.       Neurologic Exam    The patient is awake, alert, oriented to person, place and time.  Speech fluent with good comprehension, follow commands.  Name common objects.  CN VFFC, EOMI, no facial droop. Tongue midline.  Motor both upper extremities 5-/5.  Both lower extremities 5-/5.  Reflexes absent, plantar mute.  ________________________________________________   RESULTS REVIEW    VITAL SIGNS:  Temp:  [97.5 °F (36.4 °C)-98.5 °F (36.9 °C)] 97.5 °F (36.4 °C)  Heart Rate:  [78-83] 83  Resp:   [14-20] 20  BP: (130-143)/(74-89) 130/79    LABS:   Lab Results   Component Value Date    WBC 7.70 10/16/2022    HGB 8.9 (L) 10/16/2022    HCT 26.7 (L) 10/16/2022    .3 (H) 10/16/2022     10/16/2022     Lab Results   Component Value Date    GLUCOSE 96 10/16/2022    BUN 9 10/16/2022    CREATININE 0.41 (L) 10/16/2022    EGFRIFNONA 98 12/14/2021    BCR 22.0 10/16/2022    K 3.6 10/16/2022    CO2 29.0 10/16/2022    CALCIUM 8.0 (L) 10/16/2022    ALBUMIN 2.30 (L) 10/16/2022    LABIL2 0.9 (L) 06/07/2019     (H) 10/16/2022     (H) 10/16/2022       Lab Results   Component Value Date    TSH 0.930 10/14/2022    LDL 64 09/07/2021    AHUXIFBH43 1,331 (H) 08/03/2022         IMAGING STUDIES:  US Abdomen Limited    Result Date: 10/16/2022   1.  Nodular appearing liver and be seen with cirrhosis. 2.  Small amount of ascites. 3.  No evidence of gallstones or biliary tract obstruction.  Electronically Signed By-Ryan Peres MD On:10/16/2022 11:22 AM This report was finalized on [id number] by  Ryan Peres MD.      I reviewed the patient's new clinical results.    ________________________________________________      PROBLEM LIST:    Generalized weakness    Post traumatic stress disorder    Essential hypertension    Hyperlipidemia    Tobacco dependence syndrome    Anemia of chronic disease    Chronic obstructive pulmonary disease (HCC)    Cirrhosis of liver (HCC)    Acute UTI (urinary tract infection)    Rhabdomyolysis    Non-traumatic rhabdomyolysis        Assessment & Plan   ASSESSMENT/PLAN:  57 yr old female with multiple medical problems with HTN, cirrhosis of liver, history of alcohol abuse, COPD, UTI and rhabdomyolysis who had developed profound weakness of both upper and lower extremities.  Her weakness has improved considerably.  It may be secondary to the effect of rhabdomyolysis.  Rec:  Will continue supportive care.  Patient is signed off. Please call for further assistance.  HTN, cirrhosis  of liver, HLD, COPD, UTI, rhabdomyolysis as per hospitalist, MD.   **Please refer to previous notes for further details and recommendations.     I discussed the patients findings and my recommendations with patient and nursing staff    Shiv Stevenson MD  10/17/22  13:30 EDT

## 2022-10-18 ENCOUNTER — INPATIENT HOSPITAL (OUTPATIENT)
Dept: URBAN - METROPOLITAN AREA HOSPITAL 84 | Facility: HOSPITAL | Age: 57
End: 2022-10-18

## 2022-10-18 DIAGNOSIS — K75.81 NONALCOHOLIC STEATOHEPATITIS (NASH): ICD-10-CM

## 2022-10-18 PROBLEM — E44.0 MODERATE MALNUTRITION: Status: ACTIVE | Noted: 2022-10-18

## 2022-10-18 LAB
ALBUMIN SERPL-MCNC: 2.9 G/DL (ref 3.5–5.2)
ALBUMIN/GLOB SERPL: 0.8 G/DL
ALP SERPL-CCNC: 103 U/L (ref 39–117)
ALT SERPL W P-5'-P-CCNC: 113 U/L (ref 1–33)
ANION GAP SERPL CALCULATED.3IONS-SCNC: 10 MMOL/L (ref 5–15)
AST SERPL-CCNC: 305 U/L (ref 1–32)
BASOPHILS # BLD AUTO: 0 10*3/MM3 (ref 0–0.2)
BASOPHILS NFR BLD AUTO: 0.4 % (ref 0–1.5)
BILIRUB SERPL-MCNC: 0.7 MG/DL (ref 0–1.2)
BUN SERPL-MCNC: 9 MG/DL (ref 6–20)
BUN/CREAT SERPL: 19.6 (ref 7–25)
CALCIUM SPEC-SCNC: 8.9 MG/DL (ref 8.6–10.5)
CHLORIDE SERPL-SCNC: 98 MMOL/L (ref 98–107)
CK SERPL-CCNC: 4682 U/L (ref 20–180)
CO2 SERPL-SCNC: 32 MMOL/L (ref 22–29)
CREAT SERPL-MCNC: 0.46 MG/DL (ref 0.57–1)
DEPRECATED RDW RBC AUTO: 52.9 FL (ref 37–54)
EGFRCR SERPLBLD CKD-EPI 2021: 113.2 ML/MIN/1.73
EOSINOPHIL # BLD AUTO: 0.1 10*3/MM3 (ref 0–0.4)
EOSINOPHIL NFR BLD AUTO: 2 % (ref 0.3–6.2)
ERYTHROCYTE [DISTWIDTH] IN BLOOD BY AUTOMATED COUNT: 14 % (ref 12.3–15.4)
GLOBULIN UR ELPH-MCNC: 3.7 GM/DL
GLUCOSE SERPL-MCNC: 91 MG/DL (ref 65–99)
HCT VFR BLD AUTO: 33.5 % (ref 34–46.6)
HGB BLD-MCNC: 10.9 G/DL (ref 12–15.9)
INR PPP: 1.1 (ref 0.93–1.1)
LYMPHOCYTES # BLD AUTO: 1.2 10*3/MM3 (ref 0.7–3.1)
LYMPHOCYTES NFR BLD AUTO: 16.6 % (ref 19.6–45.3)
MAGNESIUM SERPL-MCNC: 1.6 MG/DL (ref 1.6–2.6)
MCH RBC QN AUTO: 35.4 PG (ref 26.6–33)
MCHC RBC AUTO-ENTMCNC: 32.5 G/DL (ref 31.5–35.7)
MCV RBC AUTO: 108.9 FL (ref 79–97)
MONOCYTES # BLD AUTO: 0.6 10*3/MM3 (ref 0.1–0.9)
MONOCYTES NFR BLD AUTO: 8.6 % (ref 5–12)
NEUTROPHILS NFR BLD AUTO: 5.4 10*3/MM3 (ref 1.7–7)
NEUTROPHILS NFR BLD AUTO: 72.4 % (ref 42.7–76)
NRBC BLD AUTO-RTO: 0.1 /100 WBC (ref 0–0.2)
PLATELET # BLD AUTO: 205 10*3/MM3 (ref 140–450)
PMV BLD AUTO: 8.3 FL (ref 6–12)
POTASSIUM SERPL-SCNC: 3.7 MMOL/L (ref 3.5–5.2)
PROT SERPL-MCNC: 6.6 G/DL (ref 6–8.5)
PROTHROMBIN TIME: 11.3 SECONDS (ref 9.6–11.7)
RBC # BLD AUTO: 3.07 10*6/MM3 (ref 3.77–5.28)
SMA IGG SER-ACNC: 11 UNITS (ref 0–19)
SMA IGG SER-ACNC: 14 UNITS (ref 0–19)
SODIUM SERPL-SCNC: 140 MMOL/L (ref 136–145)
WBC NRBC COR # BLD: 7.5 10*3/MM3 (ref 3.4–10.8)

## 2022-10-18 PROCEDURE — 97530 THERAPEUTIC ACTIVITIES: CPT

## 2022-10-18 PROCEDURE — 85025 COMPLETE CBC W/AUTO DIFF WBC: CPT | Performed by: NURSE PRACTITIONER

## 2022-10-18 PROCEDURE — 80053 COMPREHEN METABOLIC PANEL: CPT | Performed by: INTERNAL MEDICINE

## 2022-10-18 PROCEDURE — 97110 THERAPEUTIC EXERCISES: CPT

## 2022-10-18 PROCEDURE — 25010000002 MAGNESIUM SULFATE 2 GM/50ML SOLUTION: Performed by: INTERNAL MEDICINE

## 2022-10-18 PROCEDURE — 99232 SBSQ HOSP IP/OBS MODERATE 35: CPT | Performed by: NURSE PRACTITIONER

## 2022-10-18 PROCEDURE — 25010000002 FUROSEMIDE PER 20 MG: Performed by: INTERNAL MEDICINE

## 2022-10-18 PROCEDURE — 82550 ASSAY OF CK (CPK): CPT | Performed by: INTERNAL MEDICINE

## 2022-10-18 PROCEDURE — 85610 PROTHROMBIN TIME: CPT | Performed by: NURSE PRACTITIONER

## 2022-10-18 PROCEDURE — 93005 ELECTROCARDIOGRAM TRACING: CPT | Performed by: INTERNAL MEDICINE

## 2022-10-18 PROCEDURE — 83735 ASSAY OF MAGNESIUM: CPT | Performed by: INTERNAL MEDICINE

## 2022-10-18 PROCEDURE — 93010 ELECTROCARDIOGRAM REPORT: CPT | Performed by: INTERNAL MEDICINE

## 2022-10-18 RX ORDER — MAGNESIUM SULFATE HEPTAHYDRATE 40 MG/ML
2 INJECTION, SOLUTION INTRAVENOUS ONCE
Status: COMPLETED | OUTPATIENT
Start: 2022-10-18 | End: 2022-10-18

## 2022-10-18 RX ORDER — CHOLESTYRAMINE LIGHT 4 G/5.7G
1 POWDER, FOR SUSPENSION ORAL DAILY
Status: DISCONTINUED | OUTPATIENT
Start: 2022-10-18 | End: 2022-10-19

## 2022-10-18 RX ADMIN — FOLIC ACID 1000 MCG: 1 TABLET ORAL at 09:05

## 2022-10-18 RX ADMIN — NITROFURANTOIN (MONOHYDRATE/MACROCRYSTALS) 100 MG: 75; 25 CAPSULE ORAL at 09:05

## 2022-10-18 RX ADMIN — Medication 800 MG: at 20:16

## 2022-10-18 RX ADMIN — Medication 100 MG: at 09:05

## 2022-10-18 RX ADMIN — MAGNESIUM SULFATE HEPTAHYDRATE 2 G: 2 INJECTION, SOLUTION INTRAVENOUS at 12:24

## 2022-10-18 RX ADMIN — Medication 250 MG: at 20:16

## 2022-10-18 RX ADMIN — Medication 250 MG: at 09:05

## 2022-10-18 RX ADMIN — VANCOMYCIN HYDROCHLORIDE 125 MG: 125 CAPSULE ORAL at 12:24

## 2022-10-18 RX ADMIN — SERTRALINE 100 MG: 100 TABLET, FILM COATED ORAL at 09:05

## 2022-10-18 RX ADMIN — BUSPIRONE HYDROCHLORIDE 15 MG: 15 TABLET ORAL at 09:05

## 2022-10-18 RX ADMIN — SERTRALINE 100 MG: 100 TABLET, FILM COATED ORAL at 20:16

## 2022-10-18 RX ADMIN — SPIRONOLACTONE 100 MG: 100 TABLET ORAL at 09:05

## 2022-10-18 RX ADMIN — Medication 10 ML: at 20:16

## 2022-10-18 RX ADMIN — Medication 10 ML: at 09:06

## 2022-10-18 RX ADMIN — FUROSEMIDE 40 MG: 10 INJECTION, SOLUTION INTRAMUSCULAR; INTRAVENOUS at 09:05

## 2022-10-18 RX ADMIN — TRAZODONE HYDROCHLORIDE 100 MG: 100 TABLET ORAL at 20:16

## 2022-10-18 RX ADMIN — Medication 1 TABLET: at 09:05

## 2022-10-18 RX ADMIN — NITROFURANTOIN (MONOHYDRATE/MACROCRYSTALS) 100 MG: 75; 25 CAPSULE ORAL at 20:16

## 2022-10-18 RX ADMIN — CHOLESTYRAMINE 4 G: 4 POWDER, FOR SUSPENSION ORAL at 18:05

## 2022-10-18 RX ADMIN — Medication 800 MG: at 09:05

## 2022-10-18 RX ADMIN — VANCOMYCIN HYDROCHLORIDE 125 MG: 125 CAPSULE ORAL at 18:04

## 2022-10-18 RX ADMIN — BUSPIRONE HYDROCHLORIDE 15 MG: 15 TABLET ORAL at 20:16

## 2022-10-18 RX ADMIN — VANCOMYCIN HYDROCHLORIDE 125 MG: 125 CAPSULE ORAL at 05:51

## 2022-10-18 RX ADMIN — VANCOMYCIN HYDROCHLORIDE 125 MG: 125 CAPSULE ORAL at 23:13

## 2022-10-18 NOTE — PLAN OF CARE
Goal Outcome Evaluation:              Outcome Evaluation: Patient currently resting abed, no distress noted.

## 2022-10-18 NOTE — CASE MANAGEMENT/SOCIAL WORK
Discharge Planning Assessment   Vik     Patient Name: Lita Yu  MRN: [id number]  Today's Date: 10/18/2022    Admit Date: 10/14/2022    Plan: D/C plan: IP rehab vs SNF, pending pt decision. Will require precert.   Discharge Needs Assessment     Row Name 10/18/22 1430       Living Environment    People in Home alone    Current Living Arrangements home    Primary Care Provided by self    Provides Primary Care For no one, unable/limited ability to care for self    Family Caregiver if Needed child(stephanie), adult    Family Caregiver Names 3 adult daughters    Quality of Family Relationships unable to assess  No family present at bedside during CM rounds    Able to Return to Prior Arrangements no  PT recommends rehab       Resource/Environmental Concerns    Resource/Environmental Concerns none    Transportation Concerns none       Transition Planning    Patient/Family Anticipates Transition to inpatient rehabilitation facility    Patient/Family Anticipated Services at Transition none    Transportation Anticipated family or friend will provide       Discharge Needs Assessment    Readmission Within the Last 30 Days no previous admission in last 30 days    Equipment Currently Used at Home cane, straight    Concerns to be Addressed discharge planning    Anticipated Changes Related to Illness inability to care for self    Equipment Needed After Discharge none    Discharge Facility/Level of Care Needs rehabilitation facility;nursing facility, skilled    Provided Post Acute Provider List? Yes    Post Acute Provider List Inpatient Rehab  SNF also    Delivered To Patient    Method of Delivery In person    Current Discharge Risk lives alone               Discharge Plan     Row Name 10/18/22 5957       Plan    Plan D/C plan: IP rehab vs SNF, pending pt decision. Will require precert.    Patient/Family in Agreement with Plan yes    Plan Comments CM met with pt at bedside. Pt lives at home alone, has mostly been IADL,  including driving. Pt has become weaker recently and decided she needed to come to the hospital. Pt states for transportation at d/c, one of her 3 adult daughters should be able to get her. Pt uses a cane at home. PCP and pharmacy confirmed. Pt expressed concern about financial status and she has not been able to work lately and thinks she is not going to be able to work for awhile, and also her  passed away in September and she states she did not receive anything from his estate. Pt has used [facility] previously for HH after her hip replacement several years ago. PT recommends IP rehab for pt. CM discussed this with pt, also discussed the difference between IP rehab and SNF. CM provided lists for both levels of care, and provided CM contact info if pt decides she wants to go to rehab to provide her choices so that CM can send out referrals. Pt not ready to make decision while CM was at bedside.                   Expected Discharge Date and Time     Expected Discharge Date Expected Discharge Time    Oct 21, 2022          Demographic Summary     Row Name 10/18/22 1430       General Information    Admission Type inpatient    Arrived From emergency department    Referral Source admission list    Reason for Consult discharge planning    Preferred Language English               Functional Status     Row Name 10/18/22 1430       Functional Status    Usual Activity Tolerance moderate    Current Activity Tolerance fair       Functional Status, IADL    Medications independent    Meal Preparation independent    Housekeeping independent    Laundry independent    Shopping independent              Met with patient in room wearing PPE: mask, face shield/goggles, gloves, gown.      Maintained distance greater than six feet and spent less than 15 minutes in the room.                 Zak Osei RN

## 2022-10-18 NOTE — THERAPY TREATMENT NOTE
Subjective: Pt agreeable to therapeutic plan of care.    Objective:     Bed mobility - Min-A  Supine to sit to supine  Transfers - Min-A, Assist x 2 and with rolling walker sit to stand from edge of the bed.  Pt took a few side step toward head of the bed.   Ambulation - N/A or Not attempted.   Therapeutic exercises:  Heel slides, hip abduction, laq's, ap's 1/10 reps.  Pt performed 5 bridges.      Vitals: WNL    Pain: 0 VAS   Location: N/A  Intervention for pain: N/A    Education: Provided education on the importance of mobility in the acute care setting, Verbal/Tactile Cues and Transfer/Gait training    Assessment: Lita Yu presents with functional mobility impairments which indicate the need for skilled intervention. Tolerating session today without incident.  Pt did need some encouragement to participate but then demonstrated improve abilities today. Will continue to follow and progress as tolerated.     Plan/Recommendations:   High Intensity Therapy recommended post-acute care. This is recommended as therapy feels the patient would require 5-6 days per week, 2-3 hours per day. At this time, inpatient rehabilitation (acute rehab) would be the first choice and SNF would be second.. Pt requires no DME at discharge.     Pt desires Inpatient Rehabilitation placement at discharge. Pt cooperative; agreeable to therapeutic recommendations and plan of care.     Basic Mobility 6-click:  Rollin = Total, A lot = 2, A little = 3; 4 = None  Supine>Sit:   1 = Total, A lot = 2, A little = 3; 4 = None   Sit>Stand with arms:  1 = Total, A lot = 2, A little = 3; 4 = None  Bed>Chair:   1 = Total, A lot = 2, A little = 3; 4 = None  Ambulate in room:  1 = Total, A lot = 2, A little = 3; 4 = None  3-5 Steps with railin = Total, A lot = 2, A little = 3; 4 = None  Score: 13    Modified [place]: 4 = Moderately severe disability (Unable to attend to own bodily needs without assistance, and unable to walk  unassisted)     Post-Tx Position: Supine with HOB Elevated, Alarms activated and Call light and personal items within reach   PPE: mask, gloves, eye protection and gown

## 2022-10-18 NOTE — PROGRESS NOTES
[facility] Medicine Services Daily Progress Note    Patient Name: Lita Yu  : 1965  MRN: [id number]  Primary Care Physician:  Norma Saul APRN  Date of admission: 10/14/2022      Subjective      Chief Complaint: Weakness    10/16/2022 patient seen and examined this morning.  Doing better compared to yesterday.  Feels generalized pain all over and weakness but no other complaints.  No numbness or tingling.  10/17/2022 patient seen and examined in bed no acute distress, vital signs stable, reports feeling better today denies any abdominal pain nausea vomiting.  Unable to perform paracentesis no None fluids.  10/18/22 patient seen and examined in bed no acute distress, no complaints, vital signs stable, discussed with RN.  Diarrhea      Pertinent positives as noted in HPI/subjective.  All other systems were reviewed and are negative.    Objective      Vitals:   Temp:  [97.8 °F (36.6 °C)-98.7 °F (37.1 °C)] 98.3 °F (36.8 °C)  Heart Rate:  [79-86] 79  Resp:  [14-18] 14  BP: (124-147)/(75-95) 128/79  Flow (L/min):  [2] 2    Physical Exam:  General: Awake, alert, lying in bed, NAD  Eyes: PERRL, EOMI, conjunctive are clear  Cardiovascular: Regular rate and rhythm, no murmurs  Respiratory: Clear to auscultation bilaterally, no wheezing or rales, unlabored breathing  Abdomen: Soft, distended, nontender, positive bowel sounds, no guarding  Neurologic: A&O, CN grossly intact, moves all extremities spontaneously, sensation intact bilaterally  Musculoskeletal: Generalized weakness, no deformities  Skin: Warm, dry, intact    Result Review    Result Review:  I have personally reviewed the results from the time of this admission to 10/18/2022 13:34 EDT and agree with these findings:  [x]  Laboratory  [x]  Microbiology  [x]  Radiology  []  EKG/Telemetry   []  Cardiology/Vascular   []  Pathology  []  Old records  []  Other:       CMP:        Lab 10/18/22  1052 10/16/22  7276  10/15/22  2259 10/14/22  2113   SODIUM 140 139 139 143   POTASSIUM 3.7 3.6 3.5 3.6   CHLORIDE 98 103 102 112*   CO2 32.0* 29.0 29.0 22.0   ANION GAP 10.0 7.0 8.0 9.0   BUN 9 9 9 7   CREATININE 0.46* 0.41* 0.51* 0.42*   EGFR 113.2 116.4 110.4 115.7   GLUCOSE 91 96 83 74   CALCIUM 8.9 8.0* 8.5* 7.5*   MAGNESIUM 1.6 1.5* 1.6 1.4*   PHOSPHORUS  --   --  3.8  --    TOTAL PROTEIN 6.6 5.3* 5.4* 4.8*   ALBUMIN 2.90* 2.30* 2.30* 2.00*   GLOBULIN 3.7 3.0 3.1 2.8   ALT (SGPT) 113* 108* 114* 83*   AST (SGOT) 305* 428* 675* 493*   BILIRUBIN 0.7 0.3 0.4 0.6   ALK PHOS 103 121* 90 77     CBC:      Lab 10/18/22  1052 10/16/22  2344 10/15/22  2259 10/15/22  1006 10/14/22  2037   WBC 7.50 7.70 7.50 7.70 8.90   HEMOGLOBIN 10.9* 8.9* 9.4* 10.2* 11.8*   HEMATOCRIT 33.5* 26.7* 28.5* 31.7* 36.9   PLATELETS 205 153 176 183 233   NEUTROS ABS 5.40 5.00 5.30 5.80 6.80   LYMPHS ABS 1.20 1.70 1.40 1.20 1.30   MONOS ABS 0.60 0.90 0.60 0.60 0.50   EOS ABS 0.10 0.20 0.20 0.20 0.20   .9* 110.3* 109.0* 110.8* 111.0*       Assessment & Plan      Brief Patient Summary:  Lita Yu is a 57 y.o. female who      albumin human, 37.5 g, Intravenous, Once   Or  albumin human, 50 g, Intravenous, Once   Or  albumin human, 62.5 g, Intravenous, Once   Or  albumin human, 75 g, Intravenous, Once   Or  albumin human, 87.5 g, Intravenous, Once   Or  albumin human, 100 g, Intravenous, Once   Or  albumin human, 112.5 g, Intravenous, Once  budesonide-formoterol, 2 puff, Inhalation, BID - RT  busPIRone, 15 mg, Oral, BID  folic acid, 1,000 mcg, Oral, Daily  furosemide, 40 mg, Intravenous, Daily  LORazepam, 0.5 mg, Intravenous, Once  magnesium oxide, 800 mg, Oral, BID  multivitamin-iron-minerals-folic acid, 1 tablet, Oral, Daily  nicotine, 1 patch, Transdermal, Q24H  nitrofurantoin (macrocrystal-monohydrate), 100 mg, Oral, Q12H  saccharomyces boulardii, 250 mg, Oral, BID  sertraline, 100 mg, Oral, BID  sodium chloride, 10 mL, Intravenous,  Q12H  spironolactone, 100 mg, Oral, Daily  thiamine, 100 mg, Oral, Daily  traZODone, 100 mg, Oral, Nightly  vancomycin, 125 mg, Oral, Q6H       Pharmacy Consult,        Active Hospital Problems:  Active Hospital Problems    Diagnosis    • **Generalized weakness    • Moderate malnutrition (HCC)    • Cirrhosis of liver (HCC)    • Acute UTI (urinary tract infection)    • Rhabdomyolysis    • Non-traumatic rhabdomyolysis    • Chronic obstructive pulmonary disease (HCC)    • Essential hypertension    • Anemia of chronic disease    • Post traumatic stress disorder    • Hyperlipidemia    • Tobacco dependence syndrome        Generalized weakness  Acute rhabdomyolysis  -Likely related to deconditioning and alcohol abuse, patient was found on the floor for at least 24 hours  -CPK elevated but patient has significant ascites, IV fluids decreased per GI  -PT/OT, may need rehab  -MRI spine negative for acute cord compression or abscess  -Low-dose IV fluid due to ascites  -Nephrology following  -Neurology following    LORENZO/Alcoholic cirrhosis with ascites  -CT report noted  -Paracentesis ordered, unable to perform  -Diuresis with Lasix per GI  -Continue Aldactone  -Lactulose if needed  -GI following    Acute UTI  -UA noted, urine culture collected  -Continue empiric aztreonam due to allergies  -Follow-up on urine culture    C. difficile colitis  P.o. vancomycin,  P.o. Florastor  P.o. well,  Imodium as needed    Alcohol abuse  -Appears to be chronic, counseled on cessation  -Last drink was the night before admission  -Started on CIWA protocol with Ativan as needed  -Monitor for withdrawals    Hypertension  -Controlled  -Resume home meds as able  -Monitor    Hypomagnesemia  -Replace and monitor    Anemia of chronic disease  -Hemoglobin appears to be stable  -Monitor daily    COPD  -Not in exacerbation, remains on room air  -Continue bronchodilators, monitor    PTSD/anxiety  -Continue home meds, monitor    DVT  prophylaxis  -Lovenox        CODE STATUS:    Code Status (Patient has no pulse and is not breathing): CPR (Attempt to Resuscitate)  Medical Interventions (Patient has pulse or is breathing): Full Support      Disposition: Pending improvement    Electronically signed by Quang Estrella MD, 10/18/22, 13:34 EDT.  [facility] Hospitalist Team      Much of this encounter note is an electronic transcription/translation of spoken language to printed text. The electronic translation of spoken language may permit erroneous, or at times, nonsensical words or phrases to be inadvertently transcribed; Although I have reviewed the note for such errors, some may still exist.

## 2022-10-18 NOTE — PLAN OF CARE
Goal Outcome Evaluation:     Bed mobility - Min-A  Supine to sit to supine  Transfers - Min-A, Assist x 2 and with rolling walker sit to stand from edge of the bed.  Pt took a few side step toward head of the bed.   Ambulation - N/A or Not attempted.   Therapeutic exercises:  Heel slides, hip abduction, laq's, ap's 1/10 reps.  Pt performed 5 bridges.        High Intensity Therapy recommended post-acute care. This is recommended as therapy feels the patient would require 5-6 days per week, 2-3 hours per day. At this time, inpatient rehabilitation (acute rehab) would be the first choice and SNF would be second.. Pt requires no DME at discharge.     Pt desires Inpatient Rehabilitation placement at discharge. Pt cooperative; agreeable to therapeutic recommendations and plan of care.

## 2022-10-18 NOTE — PROGRESS NOTES
LOS: 4 days   Patient Care Team:  Norma Saul APRN as PCP - General (Nurse Practitioner)      Subjective     Interval History:     Subjective: Patient with no new complaints.  Continues to have more than 5 loose bowel movements daily.  No overt GI bleeding.  Reports that paracentesis was not performed as there was insufficient amount of ascites fluid.      ROS:   No chest pain, shortness of breath, or cough.        Medication Review:     Current Facility-Administered Medications:   •  albumin human 25 % IV SOLN 37.5 g, 37.5 g, Intravenous, Once **OR** albumin human 25 % IV SOLN 50 g, 50 g, Intravenous, Once **OR** albumin human 25 % IV SOLN 62.5 g, 62.5 g, Intravenous, Once **OR** albumin human 25 % IV SOLN 75 g, 75 g, Intravenous, Once **OR** albumin human 25 % IV SOLN 87.5 g, 87.5 g, Intravenous, Once **OR** albumin human 25 % IV SOLN 100 g, 100 g, Intravenous, Once **OR** albumin human 25 % IV SOLN 112.5 g, 112.5 g, Intravenous, Once, Roxann Coley APRN  •  budesonide-formoterol (SYMBICORT) 160-4.5 MCG/ACT inhaler 2 puff, 2 puff, Inhalation, BID - RT, Donna Fitzpatrick DO, 2 puff at 10/15/22 1918  •  busPIRone (BUSPAR) tablet 15 mg, 15 mg, Oral, BID, Donna Fitzpatrick DO, 15 mg at 10/18/22 0905  •  folic acid (FOLVITE) tablet 1,000 mcg, 1,000 mcg, Oral, Daily, Donna Fitzpatrick DO, 1,000 mcg at 10/18/22 0905  •  furosemide (LASIX) injection 40 mg, 40 mg, Intravenous, Daily, Boris Helm MD, 40 mg at 10/18/22 0905  •  ipratropium-albuterol (DUO-NEB) nebulizer solution 3 mL, 3 mL, Nebulization, Q4H PRN, Christian Andrews PA-C  •  LORazepam (ATIVAN) injection 0.5 mg, 0.5 mg, Intravenous, Once, Donna Fitzpatrick, DO  •  LORazepam (ATIVAN) tablet 1 mg, 1 mg, Oral, Q2H PRN **OR** LORazepam (ATIVAN) injection 1 mg, 1 mg, Intravenous, Q2H PRN **OR** LORazepam (ATIVAN) tablet 2 mg, 2 mg, Oral, Q1H PRN **OR** LORazepam (ATIVAN) injection 2 mg, 2 mg, Intravenous, Q1H PRN **OR** LORazepam (ATIVAN) injection 2  mg, 2 mg, Intravenous, Q15 Min PRN **OR** LORazepam (ATIVAN) injection 2 mg, 2 mg, Intramuscular, Q15 Min PRN, Donna Fitzpatrick DO  •  magnesium oxide (MAG-OX) tablet 800 mg, 800 mg, Oral, BID, Boris Helm MD, 800 mg at 10/18/22 0905  •  magnesium sulfate 2g/50 mL (PREMIX) infusion, 2 g, Intravenous, Once, Quang Estrella MD  •  melatonin tablet 5 mg, 5 mg, Oral, Nightly PRN, Christian Andrews PA-C  •  multivitamin-iron-minerals-folic acid (CENTRUM) chewable tablet 1 tablet, 1 tablet, Oral, Daily, Roxann Coley, APRN, 1 tablet at 10/18/22 0905  •  nicotine (NICODERM CQ) 21 MG/24HR patch 1 patch, 1 patch, Transdermal, Q24H, Christian Andrews PA-C  •  nitrofurantoin (macrocrystal-monohydrate) (MACROBID) capsule 100 mg, 100 mg, Oral, Q12H, Donna Fitzpatrick DO, 100 mg at 10/18/22 0905  •  nitroglycerin (NITROSTAT) SL tablet 0.4 mg, 0.4 mg, Sublingual, Q5 Min PRN, Christian Andrews PA-C  •  ondansetron (ZOFRAN) tablet 4 mg, 4 mg, Oral, Q6H PRN, 4 mg at 10/15/22 0255 **OR** ondansetron (ZOFRAN) injection 4 mg, 4 mg, Intravenous, Q6H PRN, Christian Andrews PA-C  •  Pharmacy Consult, , Does not apply, Continuous PRN, Quang Estrella MD  •  saccharomyces boulardii (FLORASTOR) capsule 250 mg, 250 mg, Oral, BID, Teresa Curran, APRN, 250 mg at 10/18/22 0905  •  sertraline (ZOLOFT) tablet 100 mg, 100 mg, Oral, BID, Donna Fitzpatrick, DO, 100 mg at 10/18/22 0905  •  [COMPLETED] Insert peripheral IV, , , Once **AND** sodium chloride 0.9 % flush 10 mL, 10 mL, Intravenous, PRN, Christian Andrews PA-C  •  sodium chloride 0.9 % flush 10 mL, 10 mL, Intravenous, Q12H, Christian Andrews PA-C, 10 mL at 10/18/22 0906  •  sodium chloride 0.9 % flush 10 mL, 10 mL, Intravenous, PRN, Christian Andrews PA-C  •  spironolactone (ALDACTONE) tablet 100 mg, 100 mg, Oral, Daily, Teresa Curran APRN, 100 mg at 10/18/22 0905  •  thiamine (VITAMIN B-1) tablet 100 mg, 100 mg, Oral, Daily, Roxann Coley APRN,  100 mg at 10/18/22 0905  •  traZODone (DESYREL) tablet 100 mg, 100 mg, Oral, Nightly, Donna Fitzpatrick DO, 100 mg at 10/17/22 2030  •  vancomycin (VANCOCIN) capsule 125 mg, 125 mg, Oral, Q6H, Quang Estrella MD, 125 mg at 10/18/22 0551      Objective     Vital Signs  Vitals:    10/17/22 1931 10/17/22 2257 10/18/22 0342 10/18/22 0805   BP: 128/79 147/81 124/75 128/79   BP Location: Right arm Right arm Left arm Left arm   Patient Position: Lying Lying Lying Lying   Pulse: 84 81 86 79   Resp: 18 16 16 14   Temp: 97.9 °F (36.6 °C)  98.7 °F (37.1 °C) 98.3 °F (36.8 °C)   TempSrc: Oral  Oral Oral   SpO2: 90% 94% 92% 93%   Weight:       Height:           Physical Exam:     General Appearance:    Awake and alert, in no acute distress   Head:    Normocephalic, without obvious abnormality   Eyes:          Conjunctivae normal, anicteric sclera   Throat:   No oral lesions, no thrush, oral mucosa moist   Neck:   No adenopathy, supple, no JVD   Lungs:     respirations regular, even and unlabored   Abdomen:     Soft, non-tender, no rebound or guarding, non-distended   Rectal:     Deferred   Extremities:   No edema, no cyanosis   Skin:   No bruising or rash, no jaundice        Results Review:    CBC    Results from last 7 days   Lab Units 10/18/22  1052 10/16/22  2344 10/15/22  2259 10/15/22  1006 10/14/22  2037   WBC 10*3/mm3 7.50 7.70 7.50 7.70 8.90   HEMOGLOBIN g/dL 10.9* 8.9* 9.4* 10.2* 11.8*   PLATELETS 10*3/mm3 205 153 176 183 233     CMP   Results from last 7 days   Lab Units 10/18/22  1052 10/16/22  2344 10/15/22  2259 10/15/22  0428 10/14/22  2113   SODIUM mmol/L 140 139 139  --  143   POTASSIUM mmol/L 3.7 3.6 3.5  --  3.6   CHLORIDE mmol/L 98 103 102  --  112*   CO2 mmol/L 32.0* 29.0 29.0  --  22.0   BUN mg/dL 9 9 9  --  7   CREATININE mg/dL 0.46* 0.41* 0.51*  --  0.42*   GLUCOSE mg/dL 91 96 83  --  74   ALBUMIN g/dL 2.90* 2.30* 2.30*  --  2.00*   BILIRUBIN mg/dL 0.7 0.3 0.4  --  0.6   ALK PHOS U/L 103 121* 90  --  77    AST (SGOT) U/L 305* 428* 675*  --  493*   ALT (SGPT) U/L 113* 108* 114*  --  83*   MAGNESIUM mg/dL 1.6 1.5* 1.6  --  1.4*   PHOSPHORUS mg/dL  --   --  3.8  --   --    AMMONIA umol/L  --   --   --  26  --      Cr Clearance Estimated Creatinine Clearance: 127.4 mL/min (A) (by C-G formula based on SCr of 0.46 mg/dL (L)).  Coag   Results from last 7 days   Lab Units 10/18/22  1052 10/16/22  2344 10/16/22  1351 10/14/22  2037   INR  1.10 1.16* 1.20* 1.14*   APTT seconds  --   --   --  26.4*     HbA1C No results found for: HGBA1C  Blood Glucose   Glucose   Date/Time Value Ref Range Status   10/16/2022 0828 86 70 - 105 mg/dL Final     Comment:     Serial Number: [id number]:  [id number]     Infection   Results from last 7 days   Lab Units 10/15/22  0100 10/14/22  2136   BLOODCX  No growth at 3 days  No growth at 3 days  --    URINECX   --  >100,000 CFU/mL Escherichia coli*     UA    Results from last 7 days   Lab Units 10/14/22  2136   NITRITE UA  Positive*   WBC UA /HPF Too Numerous to Count*   BACTERIA UA /HPF 4+*   SQUAM EPITHEL UA /HPF None Seen   URINECX  >100,000 CFU/mL Escherichia coli*     Radiology(recent) No radiology results for the last day       Assessment & Plan     & Plan        ASSESSMENT:  -Decompensated cirrhosis due to LORENZO/ETOH complicated by ascites  -Elevated LFTs  -C. difficile colitis  -Macrocytic anemia  -E. coli UTI  -Abnormal CT of the colon     PLAN:  Patient with no new complaints.  Continues to report more than 5 loose bowel movements daily.  No overt GI bleeding.  Stool positive for C. difficile this admission.  Continue oral vancomycin and Florastor.  WBC count is normal.  Continue to monitor.  LFTs continue to trend down.  Suspect elevation due to rhabdomyolysis superimposed on cirrhosis.  Liver serologies pending.  Hepatitis panel negative.  Tylenol level normal.  RUQ US shows cirrhosis, small amount of ascites, no evidence of CBD obstruction.  Paracentesis unable to be performed  yesterday due to insufficient amount of ascites fluid.  Continue Lasix 40 mg daily and spironolactone 100 mg daily.  Creatinine 0.46. Monitor renal function closely.  Urine sodium to potassium ratio > 1 is consistent with nonadherence to low-sodium diet.  Continue low-sodium diet.  On antibiotics for UTI which will make treating C. difficile more difficult.  Continue supportive care.      Electronically signed by SHARIF Huertas, 10/18/22, 12:02 PM EDT.

## 2022-10-18 NOTE — PROGRESS NOTES
[facility] Progress Note      Patient Name: Lita Yu  : 1965  MRN: [id number]  Primary Care Physician:  Norma Saul APRN  Date of admission: 10/14/2022    Subjective     Interval History:     Patient feeling better  Denies any chest pain, shortness of palpitations  Decreased appetite with onset of loose bowel movements  Awaiting paracentesis today      Review of Systems:   As noted above    Objective     Vitals:   Temp:  [97.8 °F (36.6 °C)-98.7 °F (37.1 °C)] 98.3 °F (36.8 °C)  Heart Rate:  [79-86] 79  Resp:  [14-18] 14  BP: (124-147)/(75-95) 128/79  Flow (L/min):  [2] 2    Intake/Output Summary (Last 24 hours) at 10/18/2022 1443  Last data filed at 10/18/2022 1403  Gross per 24 hour   Intake 480 ml   Output 500 ml   Net -20 ml       Physical Exam:    General Appearance: alert, oriented x 3, no acute distress   Skin: warm and dry  HEENT: oral mucosa normal, nonicteric sclera  Neck: supple, no JVD  Lungs: CTA  Heart: RRR, normal S1 and S2  Abdomen: soft, nontender, nondistended  : no palpable bladder  Extremities: no edema, cyanosis or clubbing  Neuro: normal speech and mental status     Scheduled Meds:     albumin human, 37.5 g, Intravenous, Once   Or  albumin human, 50 g, Intravenous, Once   Or  albumin human, 62.5 g, Intravenous, Once   Or  albumin human, 75 g, Intravenous, Once   Or  albumin human, 87.5 g, Intravenous, Once   Or  albumin human, 100 g, Intravenous, Once   Or  albumin human, 112.5 g, Intravenous, Once  budesonide-formoterol, 2 puff, Inhalation, BID - RT  busPIRone, 15 mg, Oral, BID  cholestyramine light, 1 packet, Oral, Daily  folic acid, 1,000 mcg, Oral, Daily  furosemide, 40 mg, Intravenous, Daily  LORazepam, 0.5 mg, Intravenous, Once  magnesium oxide, 800 mg, Oral, BID  multivitamin-iron-minerals-folic acid, 1 tablet, Oral, Daily  nicotine, 1 patch, Transdermal, Q24H  nitrofurantoin (macrocrystal-monohydrate), 100 mg, Oral,  Q12H  saccharomyces boulardii, 250 mg, Oral, BID  sertraline, 100 mg, Oral, BID  sodium chloride, 10 mL, Intravenous, Q12H  spironolactone, 100 mg, Oral, Daily  thiamine, 100 mg, Oral, Daily  traZODone, 100 mg, Oral, Nightly  vancomycin, 125 mg, Oral, Q6H      IV Meds:   Pharmacy Consult,         Results Reviewed:   I have personally reviewed the results from the time of this admission to 10/18/2022 14:43 EDT     Results from last 7 days   Lab Units 10/18/22  1052 10/16/22  2344 10/15/22  2259   SODIUM mmol/L 140 139 139   POTASSIUM mmol/L 3.7 3.6 3.5   CHLORIDE mmol/L 98 103 102   CO2 mmol/L 32.0* 29.0 29.0   BUN mg/dL 9 9 9   CREATININE mg/dL 0.46* 0.41* 0.51*   CALCIUM mg/dL 8.9 8.0* 8.5*   BILIRUBIN mg/dL 0.7 0.3 0.4   ALK PHOS U/L 103 121* 90   ALT (SGPT) U/L 113* 108* 114*   AST (SGOT) U/L 305* 428* 675*   GLUCOSE mg/dL 91 96 83       Estimated Creatinine Clearance: 127.4 mL/min (A) (by C-G formula based on SCr of 0.46 mg/dL (L)).    Results from last 7 days   Lab Units 10/18/22  1052 10/16/22  2344 10/15/22  2259   MAGNESIUM mg/dL 1.6 1.5* 1.6   PHOSPHORUS mg/dL  --   --  3.8             Results from last 7 days   Lab Units 10/18/22  1052 10/16/22  2344 10/15/22  2259 10/15/22  1006 10/14/22  2037   WBC 10*3/mm3 7.50 7.70 7.50 7.70 8.90   HEMOGLOBIN g/dL 10.9* 8.9* 9.4* 10.2* 11.8*   PLATELETS 10*3/mm3 205 153 176 183 233       Results from last 7 days   Lab Units 10/18/22  1052 10/16/22  2344 10/16/22  1351 10/14/22  2037   INR  1.10 1.16* 1.20* 1.14*       Assessment / Plan       ASSESSMENT:     Rhabdomyolysis , secondary to recurrent falls aggravated with increased GI losses while on diuretics.   Results from last 7 days   Lab Units 10/18/22  1052 10/16/22  2344 10/15/22  2259   CK TOTAL U/L 4,682* 5,500* 14,182*   -CPKs with significant improvement   -Avoiding fluids due to ascites  -We will continue to follow CPKs trend closely     Hypomagnesemia.   - Patient was treated with magnesium IV ,  - Magnesium   1.4 > 1.6 > 1.5 > 1.5  - I magnesium  800 mg  TID      Chronic macrocytic anemia .   - Currently on thiamine, and multivitamins     Tobacco abuse chronic nicotine patch     History alcohol abuse with cirrhosis followed closely by GI.  -  Appreciate input.    -Planning paracentesis today  currently on diuretics.     -Urinary tract infection culture isolated over 100,000 colonies of E. Coli.  After primary team.  On nitrofurantoin    C. difficile colitis.  -Started on oral vancomycin  -On isolation    PLAN:     - continue magnesium   -Diuretics as per gastroenterology   -Follow renal function closely  -Avoid nephrotoxins  -Adjust medications to GFR      Thank you for involving us in the care of Lita Leonrene.  Please feel free to call with any questions.    Boris Helm MD  10/18/22  14:43 EDT    [facility]  [phone]

## 2022-10-19 ENCOUNTER — INPATIENT HOSPITAL (OUTPATIENT)
Dept: URBAN - METROPOLITAN AREA HOSPITAL 84 | Facility: HOSPITAL | Age: 57
End: 2022-10-19

## 2022-10-19 DIAGNOSIS — K75.81 NONALCOHOLIC STEATOHEPATITIS (NASH): ICD-10-CM

## 2022-10-19 LAB
ALBUMIN SERPL-MCNC: 2.4 G/DL (ref 3.5–5.2)
ALBUMIN/GLOB SERPL: 0.7 G/DL
ALP SERPL-CCNC: 108 U/L (ref 39–117)
ALT SERPL W P-5'-P-CCNC: 86 U/L (ref 1–33)
ANION GAP SERPL CALCULATED.3IONS-SCNC: 9 MMOL/L (ref 5–15)
AST SERPL-CCNC: 187 U/L (ref 1–32)
BASOPHILS # BLD AUTO: 0 10*3/MM3 (ref 0–0.2)
BASOPHILS NFR BLD AUTO: 0.2 % (ref 0–1.5)
BILIRUB SERPL-MCNC: 0.4 MG/DL (ref 0–1.2)
BUN SERPL-MCNC: 9 MG/DL (ref 6–20)
BUN/CREAT SERPL: 23.1 (ref 7–25)
CALCIUM SPEC-SCNC: 8.1 MG/DL (ref 8.6–10.5)
CHLORIDE SERPL-SCNC: 99 MMOL/L (ref 98–107)
CK SERPL-CCNC: 2278 U/L (ref 20–180)
CO2 SERPL-SCNC: 30 MMOL/L (ref 22–29)
CREAT SERPL-MCNC: 0.39 MG/DL (ref 0.57–1)
DEPRECATED RDW RBC AUTO: 53.8 FL (ref 37–54)
EGFRCR SERPLBLD CKD-EPI 2021: 117.8 ML/MIN/1.73
EOSINOPHIL # BLD AUTO: 0.2 10*3/MM3 (ref 0–0.4)
EOSINOPHIL NFR BLD AUTO: 2.9 % (ref 0.3–6.2)
ERYTHROCYTE [DISTWIDTH] IN BLOOD BY AUTOMATED COUNT: 14.2 % (ref 12.3–15.4)
GLOBULIN UR ELPH-MCNC: 3.4 GM/DL
GLUCOSE SERPL-MCNC: 92 MG/DL (ref 65–99)
HCT VFR BLD AUTO: 27.5 % (ref 34–46.6)
HGB BLD-MCNC: 9.2 G/DL (ref 12–15.9)
INR PPP: 1.09 (ref 0.93–1.1)
LYMPHOCYTES # BLD AUTO: 1.9 10*3/MM3 (ref 0.7–3.1)
LYMPHOCYTES NFR BLD AUTO: 24.4 % (ref 19.6–45.3)
MCH RBC QN AUTO: 36.7 PG (ref 26.6–33)
MCHC RBC AUTO-ENTMCNC: 33.5 G/DL (ref 31.5–35.7)
MCV RBC AUTO: 109.3 FL (ref 79–97)
MONOCYTES # BLD AUTO: 0.9 10*3/MM3 (ref 0.1–0.9)
MONOCYTES NFR BLD AUTO: 11.9 % (ref 5–12)
NEUTROPHILS NFR BLD AUTO: 4.6 10*3/MM3 (ref 1.7–7)
NEUTROPHILS NFR BLD AUTO: 60.6 % (ref 42.7–76)
NRBC BLD AUTO-RTO: 0 /100 WBC (ref 0–0.2)
PLATELET # BLD AUTO: 150 10*3/MM3 (ref 140–450)
PMV BLD AUTO: 8.5 FL (ref 6–12)
POTASSIUM SERPL-SCNC: 3.2 MMOL/L (ref 3.5–5.2)
PROT SERPL-MCNC: 5.8 G/DL (ref 6–8.5)
PROTHROMBIN TIME: 11.2 SECONDS (ref 9.6–11.7)
RBC # BLD AUTO: 2.51 10*6/MM3 (ref 3.77–5.28)
SODIUM SERPL-SCNC: 138 MMOL/L (ref 136–145)
WBC NRBC COR # BLD: 7.6 10*3/MM3 (ref 3.4–10.8)

## 2022-10-19 PROCEDURE — 25010000002 FUROSEMIDE PER 20 MG: Performed by: INTERNAL MEDICINE

## 2022-10-19 PROCEDURE — 97110 THERAPEUTIC EXERCISES: CPT

## 2022-10-19 PROCEDURE — 85025 COMPLETE CBC W/AUTO DIFF WBC: CPT | Performed by: NURSE PRACTITIONER

## 2022-10-19 PROCEDURE — 99232 SBSQ HOSP IP/OBS MODERATE 35: CPT | Performed by: NURSE PRACTITIONER

## 2022-10-19 PROCEDURE — 82550 ASSAY OF CK (CPK): CPT | Performed by: INTERNAL MEDICINE

## 2022-10-19 PROCEDURE — 80053 COMPREHEN METABOLIC PANEL: CPT | Performed by: INTERNAL MEDICINE

## 2022-10-19 PROCEDURE — 85610 PROTHROMBIN TIME: CPT | Performed by: NURSE PRACTITIONER

## 2022-10-19 PROCEDURE — 97116 GAIT TRAINING THERAPY: CPT

## 2022-10-19 RX ORDER — POTASSIUM CHLORIDE 1.5 G/1.77G
40 POWDER, FOR SOLUTION ORAL AS NEEDED
Status: DISCONTINUED | OUTPATIENT
Start: 2022-10-19 | End: 2022-10-21 | Stop reason: HOSPADM

## 2022-10-19 RX ORDER — POTASSIUM CHLORIDE 20 MEQ/1
40 TABLET, EXTENDED RELEASE ORAL AS NEEDED
Status: DISCONTINUED | OUTPATIENT
Start: 2022-10-19 | End: 2022-10-21 | Stop reason: HOSPADM

## 2022-10-19 RX ORDER — CHOLESTYRAMINE LIGHT 4 G/5.7G
1 POWDER, FOR SUSPENSION ORAL EVERY 12 HOURS SCHEDULED
Status: DISCONTINUED | OUTPATIENT
Start: 2022-10-19 | End: 2022-10-20

## 2022-10-19 RX ADMIN — BUSPIRONE HYDROCHLORIDE 15 MG: 15 TABLET ORAL at 20:36

## 2022-10-19 RX ADMIN — FOLIC ACID 1000 MCG: 1 TABLET ORAL at 08:45

## 2022-10-19 RX ADMIN — SPIRONOLACTONE 100 MG: 100 TABLET ORAL at 08:45

## 2022-10-19 RX ADMIN — Medication 800 MG: at 08:45

## 2022-10-19 RX ADMIN — SERTRALINE 100 MG: 100 TABLET, FILM COATED ORAL at 20:35

## 2022-10-19 RX ADMIN — CHOLESTYRAMINE 4 G: 4 POWDER, FOR SUSPENSION ORAL at 20:36

## 2022-10-19 RX ADMIN — CHOLESTYRAMINE 4 G: 4 POWDER, FOR SUSPENSION ORAL at 08:46

## 2022-10-19 RX ADMIN — VANCOMYCIN HYDROCHLORIDE 125 MG: 125 CAPSULE ORAL at 05:31

## 2022-10-19 RX ADMIN — Medication 250 MG: at 08:45

## 2022-10-19 RX ADMIN — Medication 10 ML: at 20:48

## 2022-10-19 RX ADMIN — POTASSIUM CHLORIDE 40 MEQ: 1500 TABLET, EXTENDED RELEASE ORAL at 08:46

## 2022-10-19 RX ADMIN — NITROFURANTOIN (MONOHYDRATE/MACROCRYSTALS) 100 MG: 75; 25 CAPSULE ORAL at 20:36

## 2022-10-19 RX ADMIN — FUROSEMIDE 40 MG: 10 INJECTION, SOLUTION INTRAMUSCULAR; INTRAVENOUS at 08:45

## 2022-10-19 RX ADMIN — Medication 10 ML: at 08:46

## 2022-10-19 RX ADMIN — SERTRALINE 100 MG: 100 TABLET, FILM COATED ORAL at 08:45

## 2022-10-19 RX ADMIN — Medication 250 MG: at 20:36

## 2022-10-19 RX ADMIN — TRAZODONE HYDROCHLORIDE 100 MG: 100 TABLET ORAL at 20:35

## 2022-10-19 RX ADMIN — Medication 800 MG: at 20:36

## 2022-10-19 RX ADMIN — Medication 1 TABLET: at 08:45

## 2022-10-19 RX ADMIN — VANCOMYCIN HYDROCHLORIDE 125 MG: 125 CAPSULE ORAL at 13:59

## 2022-10-19 RX ADMIN — Medication 100 MG: at 08:45

## 2022-10-19 RX ADMIN — BUSPIRONE HYDROCHLORIDE 15 MG: 15 TABLET ORAL at 08:46

## 2022-10-19 RX ADMIN — NITROFURANTOIN (MONOHYDRATE/MACROCRYSTALS) 100 MG: 75; 25 CAPSULE ORAL at 08:46

## 2022-10-19 RX ADMIN — VANCOMYCIN HYDROCHLORIDE 125 MG: 125 CAPSULE ORAL at 17:35

## 2022-10-19 RX ADMIN — POTASSIUM CHLORIDE 40 MEQ: 1500 TABLET, EXTENDED RELEASE ORAL at 20:36

## 2022-10-19 NOTE — THERAPY TREATMENT NOTE
"Subjective: Pt agreeable to therapeutic plan of care.    Objective:     Bed mobility - CGA supine to sit, min A for LE sit to supine  Transfers - Min-A, Mod-A and with rolling walker. Needs cues to extend knees during sit to stand  Ambulation - 20 feet CGA, Min-A, Assist x 2 and with rolling walker    Vitals: WNL    Pain: 6 VAS   Location: Le  Intervention for pain: Repositioned and Increased Activity    Education: Provided education on the importance of mobility in the acute care setting, Verbal/Tactile Cues and Transfer/Gait training    Assessment: Lita Yu presents with functional mobility impairments which indicate the need for skilled intervention. Patient making progress, now able to ambulate 20 ft this session using rw with min/CGA of 2. Patient has difficulty with sit to stand activities but with cues able to perform with min A using rw , no buckling noted.  Tolerating session today without incident. Will continue to follow and progress as tolerated.     Plan/Recommendations:   Moderate Intensity Therapy recommended post-acute care. This is recommended as therapy feels the patient would require 3-4 days per week and wouldn't tolerate \"3 hour daily\" rehab intensity. SNF would be the preferred choice. If the patient does not agree to SNF, arrange HH or OP depending on home bound status. If patient is medically complex, consider LTACH.. Pt requires no DME at discharge.     Pt desires Skilled Rehab placement at discharge. Pt cooperative; agreeable to therapeutic recommendations and plan of care.         Basic Mobility 6-click:  Rollin = Total, A lot = 2, A little = 3; 4 = None  Supine>Sit:   1 = Total, A lot = 2, A little = 3; 4 = None   Sit>Stand with arms:  1 = Total, A lot = 2, A little = 3; 4 = None  Bed>Chair:   1 = Total, A lot = 2, A little = 3; 4 = None  Ambulate in room:  1 = Total, A lot = 2, A little = 3; 4 = None  3-5 Steps with railin = Total, A lot = 2, A little = 3; 4 " = None  Score: 16      Post-Tx Position: Supine with HOB Elevated, Alarms activated and Call light and personal items within reach  PPE: gloves, eye protection, gown and respirator

## 2022-10-19 NOTE — PROGRESS NOTES
[facility] Daily Progress Note    Patient Name: Lita Yu  : 1965  MRN: [id number]  Primary Care Physician:  Norma Saul APRN  Date of admission: 10/14/2022      Subjective      Chief Complaint: Weakness    10/16/2022 patient seen and examined this morning.  Doing better compared to yesterday.  Feels generalized pain all over and weakness but no other complaints.  No numbness or tingling.  10/17/2022 patient seen and examined in bed no acute distress, vital signs stable, reports feeling better today denies any abdominal pain nausea vomiting.  Unable to perform paracentesis no None fluids.  10/18/22 patient seen and examined in bed no acute distress, no complaints, vital signs stable, discussed with RN.  Diarrhea  10/19/22 patient seen and examined in bed no acute distress, vital signs stable, having diarrhea.  Discussed with RN.  Awaiting placement.  Pending patient decision.    Pertinent positives as noted in HPI/subjective.  All other systems were reviewed and are negative.    Objective      Vitals:   Temp:  [97.7 °F (36.5 °C)-98.3 °F (36.8 °C)] 98.3 °F (36.8 °C)  Heart Rate:  [70-76] 73  Resp:  [16-18] 16  BP: (118-138)/(71-79) 128/76    Physical Exam:  General: Awake, alert, lying in bed, NAD  Eyes: PERRL, EOMI, conjunctive are clear  Cardiovascular: Regular rate and rhythm, no murmurs  Respiratory: Clear to auscultation bilaterally, no wheezing or rales, unlabored breathing  Abdomen: Soft, distended, nontender, positive bowel sounds, no guarding  Neurologic: A&O, CN grossly intact, moves all extremities spontaneously, sensation intact bilaterally  Musculoskeletal: Generalized weakness, no deformities  Skin: Warm, dry, intact    Result Review    Result Review:  I have personally reviewed the results from the time of this admission to 10/19/2022 09:19 EDT and agree with these findings:  [x]  Laboratory  [x]  Microbiology  [x]  Radiology  []   EKG/Telemetry   []  Cardiology/Vascular   []  Pathology  []  Old records  []  Other:       CMP:        Lab 10/19/22  0315 10/18/22  1052 10/16/22  2344 10/15/22  2259 10/14/22  2113   SODIUM 138 140 139 139 143   POTASSIUM 3.2* 3.7 3.6 3.5 3.6   CHLORIDE 99 98 103 102 112*   CO2 30.0* 32.0* 29.0 29.0 22.0   ANION GAP 9.0 10.0 7.0 8.0 9.0   BUN 9 9 9 9 7   CREATININE 0.39* 0.46* 0.41* 0.51* 0.42*   EGFR 117.8 113.2 116.4 110.4 115.7   GLUCOSE 92 91 96 83 74   CALCIUM 8.1* 8.9 8.0* 8.5* 7.5*   MAGNESIUM  --  1.6 1.5* 1.6 1.4*   PHOSPHORUS  --   --   --  3.8  --    TOTAL PROTEIN 5.8* 6.6 5.3* 5.4* 4.8*   ALBUMIN 2.40* 2.90* 2.30* 2.30* 2.00*   GLOBULIN 3.4 3.7 3.0 3.1 2.8   ALT (SGPT) 86* 113* 108* 114* 83*   AST (SGOT) 187* 305* 428* 675* 493*   BILIRUBIN 0.4 0.7 0.3 0.4 0.6   ALK PHOS 108 103 121* 90 77     CBC:      Lab 10/19/22  0315 10/18/22  1052 10/16/22  2344 10/15/22  2259 10/15/22  1006   WBC 7.60 7.50 7.70 7.50 7.70   HEMOGLOBIN 9.2* 10.9* 8.9* 9.4* 10.2*   HEMATOCRIT 27.5* 33.5* 26.7* 28.5* 31.7*   PLATELETS 150 205 153 176 183   NEUTROS ABS 4.60 5.40 5.00 5.30 5.80   LYMPHS ABS 1.90 1.20 1.70 1.40 1.20   MONOS ABS 0.90 0.60 0.90 0.60 0.60   EOS ABS 0.20 0.10 0.20 0.20 0.20   .3* 108.9* 110.3* 109.0* 110.8*       Assessment & Plan      Brief Patient Summary:  Lita Yu is a 57 y.o. female who      albumin human, 37.5 g, Intravenous, Once   Or  albumin human, 50 g, Intravenous, Once   Or  albumin human, 62.5 g, Intravenous, Once   Or  albumin human, 75 g, Intravenous, Once   Or  albumin human, 87.5 g, Intravenous, Once   Or  albumin human, 100 g, Intravenous, Once   Or  albumin human, 112.5 g, Intravenous, Once  busPIRone, 15 mg, Oral, BID  cholestyramine light, 1 packet, Oral, Daily  folic acid, 1,000 mcg, Oral, Daily  furosemide, 40 mg, Intravenous, Daily  LORazepam, 0.5 mg, Intravenous, Once  magnesium oxide, 800 mg, Oral, BID  multivitamin-iron-minerals-folic acid, 1 tablet, Oral,  Daily  nicotine, 1 patch, Transdermal, Q24H  nitrofurantoin (macrocrystal-monohydrate), 100 mg, Oral, Q12H  saccharomyces boulardii, 250 mg, Oral, BID  sertraline, 100 mg, Oral, BID  sodium chloride, 10 mL, Intravenous, Q12H  spironolactone, 100 mg, Oral, Daily  thiamine, 100 mg, Oral, Daily  traZODone, 100 mg, Oral, Nightly  vancomycin, 125 mg, Oral, Q6H       Pharmacy Consult,        Active Hospital Problems:  Active Hospital Problems    Diagnosis    • **Generalized weakness    • Moderate malnutrition (HCC)    • Cirrhosis of liver (HCC)    • Acute UTI (urinary tract infection)    • Rhabdomyolysis    • Non-traumatic rhabdomyolysis    • Chronic obstructive pulmonary disease (HCC)    • Essential hypertension    • Anemia of chronic disease    • Post traumatic stress disorder    • Hyperlipidemia    • Tobacco dependence syndrome        Generalized weakness  Acute rhabdomyolysis  -Likely related to deconditioning and alcohol abuse, patient was found on the floor for at least 24 hours  -CPK elevated but patient has significant ascites, IV fluids decreased per GI  -PT/OT, may need rehab  -MRI spine negative for acute cord compression or abscess  -Low-dose IV fluid due to ascites  -Nephrology following  -Neurology following    OLRENZO/Alcoholic cirrhosis with ascites  -CT report noted  -Paracentesis ordered, unable to perform  -Diuresis with Lasix per GI  -Continue Aldactone  -Lactulose if needed  -GI following    Acute UTI  -UA noted, urine culture collected  -Continue empiric aztreonam due to allergies  -Follow-up on urine culture    C. difficile colitis  P.o. vancomycin,  P.o. Florastor  P.o. well,  Imodium as needed    Alcohol abuse  -Appears to be chronic, counseled on cessation  -Last drink was the night before admission  -Started on CIWA protocol with Ativan as needed  -Monitor for withdrawals    Hypertension  -Controlled  -Resume home meds as able  -Monitor    Hypomagnesemia  -Replace and monitor    Anemia of chronic  disease  -Hemoglobin appears to be stable  -Monitor daily    COPD  -Not in exacerbation, remains on room air  -Continue bronchodilators, monitor    PTSD/anxiety  -Continue home meds, monitor    DVT prophylaxis  -Lovenox        CODE STATUS:    Code Status (Patient has no pulse and is not breathing): CPR (Attempt to Resuscitate)  Medical Interventions (Patient has pulse or is breathing): Full Support      Disposition: Pending improvement    Electronically signed by Quang Estrella MD, 10/19/22, 09:19 EDT.  [facility]      Much of this encounter note is an electronic transcription/translation of spoken language to printed text. The electronic translation of spoken language may permit erroneous, or at times, nonsensical words or phrases to be inadvertently transcribed; Although I have reviewed the note for such errors, some may still exist.

## 2022-10-19 NOTE — PLAN OF CARE
Goal Outcome Evaluation:      Lita Yu presents with functional mobility impairments which indicate the need for skilled intervention. Patient making progress, now able to ambulate 20 ft this session using rw with min/CGA of 2. Patient has difficulty with sit to stand activities but with cues able to perform with min A using rw , no buckling noted.  Tolerating session today without incident. Will continue to follow and progress as tolerated.

## 2022-10-19 NOTE — PLAN OF CARE
Problem: Adult Inpatient Plan of Care  Goal: Plan of Care Review  Outcome: Ongoing, Progressing  Flowsheets (Taken 10/19/2022 0137)  Progress: no change  Plan of Care Reviewed With: patient  Outcome Evaluation: Patient rested throughout the night, no complaints at this time.  Goal: Patient-Specific Goal (Individualized)  Outcome: Ongoing, Progressing  Goal: Absence of Hospital-Acquired Illness or Injury  Outcome: Ongoing, Progressing  Intervention: Identify and Manage Fall Risk  Recent Flowsheet Documentation  Taken 10/19/2022 0021 by Tio Ortiz RN  Safety Promotion/Fall Prevention: safety round/check completed  Taken 10/18/2022 2206 by Tio Ortiz RN  Safety Promotion/Fall Prevention: safety round/check completed  Taken 10/18/2022 2016 by Tio Ortiz RN  Safety Promotion/Fall Prevention:   safety round/check completed   room organization consistent   nonskid shoes/slippers when out of bed   fall prevention program maintained   clutter free environment maintained   assistive device/personal items within reach   activity supervised  Intervention: Prevent Skin Injury  Recent Flowsheet Documentation  Taken 10/18/2022 2016 by Tio Ortiz RN  Skin Protection:   adhesive use limited   incontinence pads utilized   protective footwear used   transparent dressing maintained  Intervention: Prevent and Manage VTE (Venous Thromboembolism) Risk  Recent Flowsheet Documentation  Taken 10/18/2022 2016 by Tio Ortiz RN  VTE Prevention/Management:   bilateral   sequential compression devices off  Intervention: Prevent Infection  Recent Flowsheet Documentation  Taken 10/18/2022 2016 by Tio Ortiz RN  Infection Prevention:   single patient room provided   rest/sleep promoted   personal protective equipment utilized   hand hygiene promoted  Goal: Optimal Comfort and Wellbeing  Outcome: Ongoing, Progressing  Intervention: Provide Person-Centered Care  Recent Flowsheet Documentation  Taken 10/18/2022  2016 by Tio Ortiz RN  Trust Relationship/Rapport:   choices provided   care explained   questions answered   questions encouraged  Goal: Readiness for Transition of Care  Outcome: Ongoing, Progressing     Problem: Fall Injury Risk  Goal: Absence of Fall and Fall-Related Injury  Outcome: Ongoing, Progressing  Intervention: Identify and Manage Contributors  Recent Flowsheet Documentation  Taken 10/19/2022 0021 by Tio Ortiz RN  Medication Review/Management: medications reviewed  Taken 10/18/2022 2206 by Tio Ortiz RN  Medication Review/Management: medications reviewed  Taken 10/18/2022 2016 by Tio Ortiz RN  Medication Review/Management: medications reviewed  Intervention: Promote Injury-Free Environment  Recent Flowsheet Documentation  Taken 10/19/2022 0021 by Tio Ortiz RN  Safety Promotion/Fall Prevention: safety round/check completed  Taken 10/18/2022 2206 by Tio Ortiz RN  Safety Promotion/Fall Prevention: safety round/check completed  Taken 10/18/2022 2016 by Tio Ortiz RN  Safety Promotion/Fall Prevention:   safety round/check completed   room organization consistent   nonskid shoes/slippers when out of bed   fall prevention program maintained   clutter free environment maintained   assistive device/personal items within reach   activity supervised     Problem: Skin Injury Risk Increased  Goal: Skin Health and Integrity  Outcome: Ongoing, Progressing  Intervention: Optimize Skin Protection  Recent Flowsheet Documentation  Taken 10/18/2022 2016 by Tio Ortiz RN  Pressure Reduction Techniques:   frequent weight shift encouraged   weight shift assistance provided  Pressure Reduction Devices:   pressure-redistributing mattress utilized   positioning supports utilized  Skin Protection:   adhesive use limited   incontinence pads utilized   protective footwear used   transparent dressing maintained     Problem: Malnutrition  Goal: Improved Nutritional Intake  Outcome:  Ongoing, Progressing   Goal Outcome Evaluation:  Plan of Care Reviewed With: patient        Progress: no change  Outcome Evaluation: Patient rested throughout the night, no complaints at this time.

## 2022-10-19 NOTE — CASE MANAGEMENT/SOCIAL WORK
Continued Stay Note  DAYANARA Vik     Patient Name: Lita Yu  MRN: [id number]  Today's Date: 10/19/2022    Admit Date: 10/14/2022    Plan: D/C plan: IP rehab vs SNF, pending pt decision. Will require precert.   Discharge Plan     Row Name 10/19/22 1336       Plan    Plan D/C plan: IP rehab vs SNF, pending pt decision. Will require precert.    Patient/Family in Agreement with Plan yes    Plan Comments CM met with pt to follow up on decision about rehab. Pt has not yet made a decision, she states her family member is coming to the hospital soon and they are going to discuss rehab options. Pt has contact information for CM and will reach out when she makes a decision.                   Expected Discharge Date and Time     Expected Discharge Date Expected Discharge Time    Oct 21, 2022       Met with patient in room wearing PPE: mask, face shield/goggles, gloves, gown.      Maintained distance greater than six feet and spent less than 15 minutes in the room.            Zak Osei RN

## 2022-10-19 NOTE — PROGRESS NOTES
LOS: 5 days   Patient Care Team:  Norma Saul APRN as PCP - General (Nurse Practitioner)      Subjective     Interval History:     Subjective: Patient continues to complain of diarrhea that has not significantly improved since yesterday.  No abdominal pain.  Denies nausea/vomiting.      ROS:   No chest pain, shortness of breath, or cough.        Medication Review:     Current Facility-Administered Medications:   •  albumin human 25 % IV SOLN 37.5 g, 37.5 g, Intravenous, Once **OR** albumin human 25 % IV SOLN 50 g, 50 g, Intravenous, Once **OR** albumin human 25 % IV SOLN 62.5 g, 62.5 g, Intravenous, Once **OR** albumin human 25 % IV SOLN 75 g, 75 g, Intravenous, Once **OR** albumin human 25 % IV SOLN 87.5 g, 87.5 g, Intravenous, Once **OR** albumin human 25 % IV SOLN 100 g, 100 g, Intravenous, Once **OR** albumin human 25 % IV SOLN 112.5 g, 112.5 g, Intravenous, Once, Roxann Coley APRN  •  busPIRone (BUSPAR) tablet 15 mg, 15 mg, Oral, BID, Donna Fitzpatrick DO, 15 mg at 10/19/22 0846  •  cholestyramine light packet 4 g, 1 packet, Oral, Daily, Quang Estrella MD, 4 g at 10/19/22 0846  •  folic acid (FOLVITE) tablet 1,000 mcg, 1,000 mcg, Oral, Daily, Donna Fitzpatrick DO, 1,000 mcg at 10/19/22 0845  •  furosemide (LASIX) injection 40 mg, 40 mg, Intravenous, Daily, Boris Helm MD, 40 mg at 10/19/22 0845  •  ipratropium-albuterol (DUO-NEB) nebulizer solution 3 mL, 3 mL, Nebulization, Q4H PRN, Christian Andrews PA-C  •  LORazepam (ATIVAN) injection 0.5 mg, 0.5 mg, Intravenous, Once, Donna Fitzpatrick DO  •  LORazepam (ATIVAN) tablet 1 mg, 1 mg, Oral, Q2H PRN **OR** LORazepam (ATIVAN) injection 1 mg, 1 mg, Intravenous, Q2H PRN **OR** LORazepam (ATIVAN) tablet 2 mg, 2 mg, Oral, Q1H PRN **OR** LORazepam (ATIVAN) injection 2 mg, 2 mg, Intravenous, Q1H PRN **OR** LORazepam (ATIVAN) injection 2 mg, 2 mg, Intravenous, Q15 Min PRN **OR** LORazepam (ATIVAN) injection 2 mg, 2 mg, Intramuscular, Q15 Min PRN,  Donna Fitzpatrick DO  •  magnesium oxide (MAG-OX) tablet 800 mg, 800 mg, Oral, BID, Boris Helm MD, 800 mg at 10/19/22 0845  •  melatonin tablet 5 mg, 5 mg, Oral, Nightly PRN, Christian Andrews PA-C  •  multivitamin-iron-minerals-folic acid (CENTRUM) chewable tablet 1 tablet, 1 tablet, Oral, Daily, Roxann Coley, APRN, 1 tablet at 10/19/22 0845  •  nicotine (NICODERM CQ) 21 MG/24HR patch 1 patch, 1 patch, Transdermal, Q24H, Christian Andrews PA-C  •  nitrofurantoin (macrocrystal-monohydrate) (MACROBID) capsule 100 mg, 100 mg, Oral, Q12H, Donna Fitzpatrick DO, 100 mg at 10/19/22 0846  •  nitroglycerin (NITROSTAT) SL tablet 0.4 mg, 0.4 mg, Sublingual, Q5 Min PRN, Christian Andrews PA-C  •  ondansetron (ZOFRAN) tablet 4 mg, 4 mg, Oral, Q6H PRN, 4 mg at 10/15/22 0255 **OR** ondansetron (ZOFRAN) injection 4 mg, 4 mg, Intravenous, Q6H PRN, Christian Andrews PA-C  •  Pharmacy Consult, , Does not apply, Continuous PRN, Quang Estrella MD  •  potassium chloride (K-DUR,KLOR-CON) CR tablet 40 mEq, 40 mEq, Oral, PRN, Quang Estrella MD, 40 mEq at 10/19/22 0846  •  potassium chloride (KLOR-CON) packet 40 mEq, 40 mEq, Oral, PRN, Quang Estrella MD  •  saccharomyces boulardii (FLORASTOR) capsule 250 mg, 250 mg, Oral, BID, Teresa Curran, APRN, 250 mg at 10/19/22 0845  •  sertraline (ZOLOFT) tablet 100 mg, 100 mg, Oral, BID, Donna Fitzpatrick DO, 100 mg at 10/19/22 0845  •  [COMPLETED] Insert peripheral IV, , , Once **AND** sodium chloride 0.9 % flush 10 mL, 10 mL, Intravenous, PRN, Christian Andrews PA-C  •  sodium chloride 0.9 % flush 10 mL, 10 mL, Intravenous, Q12H, Christian Andrews PA-C, 10 mL at 10/19/22 0846  •  sodium chloride 0.9 % flush 10 mL, 10 mL, Intravenous, PRN, Christian Andrews PA-C  •  spironolactone (ALDACTONE) tablet 100 mg, 100 mg, Oral, Daily, Teresa Curran APRN, 100 mg at 10/19/22 0845  •  thiamine (VITAMIN B-1) tablet 100 mg, 100 mg, Oral, Daily, Roxann Coley,  APRN, 100 mg at 10/19/22 0845  •  traZODone (DESYREL) tablet 100 mg, 100 mg, Oral, Nightly, Donna Fitzpatrick DO, 100 mg at 10/18/22 2016  •  vancomycin (VANCOCIN) capsule 125 mg, 125 mg, Oral, Q6H, Quang Estrella MD, 125 mg at 10/19/22 0531      Objective     Vital Signs  Vitals:    10/19/22 0022 10/19/22 0507 10/19/22 0555 10/19/22 0749   BP: 118/75  118/71 128/76   BP Location: Left arm  Left arm Left arm   Patient Position: Lying  Lying Lying   Pulse: 70  71 73   Resp: 18  18 16   Temp: 98.3 °F (36.8 °C)  98.1 °F (36.7 °C) 98.3 °F (36.8 °C)   TempSrc: Oral  Oral Oral   SpO2: 94%  92% 94%   Weight:  53.1 kg (117 lb 1 oz)     Height:           Physical Exam:     General Appearance:    Awake and alert, in no acute distress   Head:    Normocephalic, without obvious abnormality   Eyes:          Conjunctivae normal, anicteric sclera   Throat:   No oral lesions, no thrush, oral mucosa moist   Neck:   No adenopathy, supple, no JVD   Lungs:     respirations regular, even and unlabored   Abdomen:     Soft, non-tender, no rebound or guarding, non-distended   Rectal:     Deferred   Extremities:   No edema, no cyanosis   Skin:   No bruising or rash, no jaundice        Results Review:    CBC    Results from last 7 days   Lab Units 10/19/22  0315 10/18/22  1052 10/16/22  2344 10/15/22  2259 10/15/22  1006 10/14/22  2037   WBC 10*3/mm3 7.60 7.50 7.70 7.50 7.70 8.90   HEMOGLOBIN g/dL 9.2* 10.9* 8.9* 9.4* 10.2* 11.8*   PLATELETS 10*3/mm3 150 205 153 176 183 233     CMP   Results from last 7 days   Lab Units 10/19/22  0315 10/18/22  1052 10/16/22  2344 10/15/22  2259 10/15/22  0428 10/14/22  2113   SODIUM mmol/L 138 140 139 139  --  143   POTASSIUM mmol/L 3.2* 3.7 3.6 3.5  --  3.6   CHLORIDE mmol/L 99 98 103 102  --  112*   CO2 mmol/L 30.0* 32.0* 29.0 29.0  --  22.0   BUN mg/dL 9 9 9 9  --  7   CREATININE mg/dL 0.39* 0.46* 0.41* 0.51*  --  0.42*   GLUCOSE mg/dL 92 91 96 83  --  74   ALBUMIN g/dL 2.40* 2.90* 2.30* 2.30*  --   2.00*   BILIRUBIN mg/dL 0.4 0.7 0.3 0.4  --  0.6   ALK PHOS U/L 108 103 121* 90  --  77   AST (SGOT) U/L 187* 305* 428* 675*  --  493*   ALT (SGPT) U/L 86* 113* 108* 114*  --  83*   MAGNESIUM mg/dL  --  1.6 1.5* 1.6  --  1.4*   PHOSPHORUS mg/dL  --   --   --  3.8  --   --    AMMONIA umol/L  --   --   --   --  26  --      Cr Clearance Estimated Creatinine Clearance: 136.6 mL/min (A) (by C-G formula based on SCr of 0.39 mg/dL (L)).  Coag   Results from last 7 days   Lab Units 10/19/22  0315 10/18/22  1052 10/16/22  2344 10/16/22  1351 10/14/22  2037   INR  1.09 1.10 1.16* 1.20* 1.14*   APTT seconds  --   --   --   --  26.4*     HbA1C No results found for: HGBA1C  Blood Glucose No results found for: POCGLU  Infection   Results from last 7 days   Lab Units 10/15/22  0100 10/14/22  2136   BLOODCX  No growth at 4 days  No growth at 4 days  --    URINECX   --  >100,000 CFU/mL Escherichia coli*     UA    Results from last 7 days   Lab Units 10/14/22  2136   NITRITE UA  Positive*   WBC UA /HPF Too Numerous to Count*   BACTERIA UA /HPF 4+*   SQUAM EPITHEL UA /HPF None Seen   URINECX  >100,000 CFU/mL Escherichia coli*     Radiology(recent) No radiology results for the last day       Assessment & Plan     ASSESSMENT:  -Decompensated cirrhosis due to LORENZO/ETOH complicated by ascites  -Elevated LFTs  -C. difficile colitis  -Macrocytic anemia  -E. coli UTI  -Abnormal CT of the colon     PLAN:  Patient continues to complain of diarrhea that has not significantly improved since yesterday.  No abdominal pain.  No overt GI bleeding.  No nausea/vomiting.  We will continue oral vancomycin and Florastor for C. difficile.  Increase cholestyramine to twice daily dosing.  LFTs continue to trend down.  Continue to monitor.  Suspect elevation was due to rhabdomyolysis superimposed on cirrhosis.  Hepatitis panel negative.  CMV IgM negative.  KAMINI negative.  Smooth muscle antibody normal.  RUQ US shows cirrhosis, small amount of ascites, no  evidence of CBD obstruction.  Continue Lasix 40 mg daily and spironolactone 100 mg daily.  Creatinine remains normal.  Urine sodium to potassium ratio > 1 this admission which is consistent with nonadherence to low-sodium diet.  Continue low-sodium diet.  Patient remains on antibiotics for UTI.  Continue supportive care.    Electronically signed by SHARIF Huertas, 10/19/22, 11:38 AM EDT.

## 2022-10-19 NOTE — PHARMACY RECOMMENDATION
Transitions-of-Care ([facility]) Pharmacy Future COST Assessment:     /Nurse requesting test claim: N/A - this patient is on the high-cost drug report list    Pharmacy ran test claim for the following:     Drug Sig Covered/PA required Patient Copay per month   Vancomycin Oral 125mg q6h x 32 doses (8 doses given inpt so far) Covered without PA $ 7.50     Patient Insurance Type: Commercial Insurance - they are eligible to use a monthly  discount card in the future    Deductible/Medicare Gap Issue? Unknown    Is patient signed up for M2B service? Yes    Is above drug(s) eligible for 1 month free through M2B service? No - coupon not available for this drug  If eligible,  or [facility] pharmacy can provide coupon upon request.           For billing questions, reach out to [facility] at [phone]  For M2B questions, reach out to [facility] Pharmacy at [phone]    Christian Lopez, PharmD   10/19/2022 12:57 EDT

## 2022-10-19 NOTE — PROGRESS NOTES
[facility] Progress Note      Patient Name: Lita Yu  : 1965  MRN: [id number]  Primary Care Physician:  Norma Saul APRN  Date of admission: 10/14/2022    Subjective     Interval History:     Patient with improved myalgias  Appetite slowly improving  Minimal abdominal discomfort  Bowel movements improvement in consistency    No fevers or chills      Review of Systems:   As noted above    Objective     Vitals:   Temp:  [97.7 °F (36.5 °C)-98.3 °F (36.8 °C)] 98.3 °F (36.8 °C)  Heart Rate:  [70-76] 73  Resp:  [16-18] 16  BP: (118-138)/(71-79) 128/76    Intake/Output Summary (Last 24 hours) at 10/19/2022 0832  Last data filed at 10/18/2022 1820  Gross per 24 hour   Intake 340 ml   Output --   Net 340 ml       Physical Exam:    General Appearance: alert, oriented x 3, no acute distress   Skin: warm and dry  HEENT: oral mucosa normal, nonicteric sclera  Neck: supple, no JVD  Lungs: CTA  Heart: RRR, normal S1 and S2  Abdomen: soft, nontender, nondistended  : no palpable bladder  Extremities: no edema, cyanosis or clubbing  Neuro: normal speech and mental status     Scheduled Meds:     albumin human, 37.5 g, Intravenous, Once   Or  albumin human, 50 g, Intravenous, Once   Or  albumin human, 62.5 g, Intravenous, Once   Or  albumin human, 75 g, Intravenous, Once   Or  albumin human, 87.5 g, Intravenous, Once   Or  albumin human, 100 g, Intravenous, Once   Or  albumin human, 112.5 g, Intravenous, Once  busPIRone, 15 mg, Oral, BID  cholestyramine light, 1 packet, Oral, Daily  folic acid, 1,000 mcg, Oral, Daily  furosemide, 40 mg, Intravenous, Daily  LORazepam, 0.5 mg, Intravenous, Once  magnesium oxide, 800 mg, Oral, BID  multivitamin-iron-minerals-folic acid, 1 tablet, Oral, Daily  nicotine, 1 patch, Transdermal, Q24H  nitrofurantoin (macrocrystal-monohydrate), 100 mg, Oral, Q12H  saccharomyces boulardii, 250 mg, Oral, BID  sertraline, 100 mg, Oral, BID  sodium  chloride, 10 mL, Intravenous, Q12H  spironolactone, 100 mg, Oral, Daily  thiamine, 100 mg, Oral, Daily  traZODone, 100 mg, Oral, Nightly  vancomycin, 125 mg, Oral, Q6H      IV Meds:   Pharmacy Consult,         Results Reviewed:   I have personally reviewed the results from the time of this admission to 10/19/2022 08:32 EDT     Results from last 7 days   Lab Units 10/19/22  0315 10/18/22  1052 10/16/22  2344   SODIUM mmol/L 138 140 139   POTASSIUM mmol/L 3.2* 3.7 3.6   CHLORIDE mmol/L 99 98 103   CO2 mmol/L 30.0* 32.0* 29.0   BUN mg/dL 9 9 9   CREATININE mg/dL 0.39* 0.46* 0.41*   CALCIUM mg/dL 8.1* 8.9 8.0*   BILIRUBIN mg/dL 0.4 0.7 0.3   ALK PHOS U/L 108 103 121*   ALT (SGPT) U/L 86* 113* 108*   AST (SGOT) U/L 187* 305* 428*   GLUCOSE mg/dL 92 91 96       Estimated Creatinine Clearance: 136.6 mL/min (A) (by C-G formula based on SCr of 0.39 mg/dL (L)).    Results from last 7 days   Lab Units 10/18/22  1052 10/16/22  2344 10/15/22  2259   MAGNESIUM mg/dL 1.6 1.5* 1.6   PHOSPHORUS mg/dL  --   --  3.8             Results from last 7 days   Lab Units 10/19/22  0315 10/18/22  1052 10/16/22  2344 10/15/22  2259 10/15/22  1006   WBC 10*3/mm3 7.60 7.50 7.70 7.50 7.70   HEMOGLOBIN g/dL 9.2* 10.9* 8.9* 9.4* 10.2*   PLATELETS 10*3/mm3 150 205 153 176 183       Results from last 7 days   Lab Units 10/19/22  0315 10/18/22  1052 10/16/22  2344 10/16/22  1351 10/14/22  2037   INR  1.09 1.10 1.16* 1.20* 1.14*       Assessment / Plan       ASSESSMENT:     Rhabdomyolysis , secondary to recurrent falls aggravated with increased GI losses while on diuretics.   Results from last 7 days   Lab Units 10/19/22  0315 10/18/22  1052 10/16/22  2344   CK TOTAL U/L 2,278* 4,682* 5,500*   -CPKs with significant improvement   -Avoiding fluids due to ascites  -We will continue to follow CPKs trend closely     Hypomagnesemia.   - Patient was treated with magnesium IV ,  - Magnesium  1.4 > 1.6 > 1.5 > 1.5  - I magnesium  800 mg  TID      Chronic  macrocytic anemia .   - Currently on thiamine, and multivitamins     Tobacco abuse chronic   -nicotine patch  -Counseling provided    Hypokalemia  -Secondary to colitis  -KCl replacement  -Magnesium normal    History alcohol abuse with cirrhosis .  -  Appreciate GI recommendations  -Currently spironolactone 100 mg and furosemide 40 mg IV daily    -Urinary tract infection culture isolated over 100,000 colonies of E. Coli.  After primary team.  On nitrofurantoin    C. difficile colitis.  -Started on oral vancomycin  -On isolation    PLAN:     -Diuretics as per gastroenterology   -Follow renal function closely, volume status stable  -Avoid nephrotoxins  -Adjust medications to GFR      Thank you for involving us in the care of Lita Yu.  Please feel free to call with any questions.    Boris Helm MD  10/19/22  08:32 EDT    [facility]  [phone]

## 2022-10-19 NOTE — PLAN OF CARE
Problem: Adult Inpatient Plan of Care  Goal: Plan of Care Review  Outcome: Ongoing, Progressing  Flowsheets (Taken 10/19/2022 1423)  Progress: no change  Plan of Care Reviewed With: patient  Goal: Patient-Specific Goal (Individualized)  Outcome: Ongoing, Progressing  Goal: Absence of Hospital-Acquired Illness or Injury  Outcome: Ongoing, Progressing  Intervention: Identify and Manage Fall Risk  Recent Flowsheet Documentation  Taken 10/19/2022 1405 by Radha Kevin LPN  Safety Promotion/Fall Prevention: safety round/check completed  Taken 10/19/2022 1225 by Radha Kevin LPN  Safety Promotion/Fall Prevention: safety round/check completed  Taken 10/19/2022 1028 by Radha Kevin LPN  Safety Promotion/Fall Prevention: safety round/check completed  Taken 10/19/2022 0825 by Radha Kevin LPN  Safety Promotion/Fall Prevention: safety round/check completed  Goal: Optimal Comfort and Wellbeing  Outcome: Ongoing, Progressing  Goal: Readiness for Transition of Care  Outcome: Ongoing, Progressing   Goal Outcome Evaluation:  Plan of Care Reviewed With: patient        Progress: no change   Pt will only ambulate with PT, pt states she is making a decision about placement today when her daughter arrives to visit. Pt has had no other complaints, will cont to monitor.

## 2022-10-20 ENCOUNTER — INPATIENT HOSPITAL (OUTPATIENT)
Dept: URBAN - METROPOLITAN AREA HOSPITAL 84 | Facility: HOSPITAL | Age: 57
End: 2022-10-20

## 2022-10-20 DIAGNOSIS — K74.69 OTHER CIRRHOSIS OF LIVER: ICD-10-CM

## 2022-10-20 LAB
ALBUMIN SERPL-MCNC: 2.6 G/DL (ref 3.5–5.2)
ALBUMIN/GLOB SERPL: 0.7 G/DL
ALP SERPL-CCNC: 113 U/L (ref 39–117)
ALT SERPL W P-5'-P-CCNC: 74 U/L (ref 1–33)
ANION GAP SERPL CALCULATED.3IONS-SCNC: 9 MMOL/L (ref 5–15)
AST SERPL-CCNC: 128 U/L (ref 1–32)
BACTERIA SPEC AEROBE CULT: NORMAL
BACTERIA SPEC AEROBE CULT: NORMAL
BASOPHILS # BLD AUTO: 0.1 10*3/MM3 (ref 0–0.2)
BASOPHILS NFR BLD AUTO: 0.8 % (ref 0–1.5)
BILIRUB SERPL-MCNC: 0.4 MG/DL (ref 0–1.2)
BUN SERPL-MCNC: 10 MG/DL (ref 6–20)
BUN/CREAT SERPL: 27 (ref 7–25)
CALCIUM SPEC-SCNC: 8.7 MG/DL (ref 8.6–10.5)
CHLORIDE SERPL-SCNC: 104 MMOL/L (ref 98–107)
CK SERPL-CCNC: 1250 U/L (ref 20–180)
CO2 SERPL-SCNC: 27 MMOL/L (ref 22–29)
CREAT SERPL-MCNC: 0.37 MG/DL (ref 0.57–1)
DEPRECATED RDW RBC AUTO: 56.4 FL (ref 37–54)
EGFRCR SERPLBLD CKD-EPI 2021: 119.3 ML/MIN/1.73
EOSINOPHIL # BLD AUTO: 0.2 10*3/MM3 (ref 0–0.4)
EOSINOPHIL NFR BLD AUTO: 2.8 % (ref 0.3–6.2)
ERYTHROCYTE [DISTWIDTH] IN BLOOD BY AUTOMATED COUNT: 14.6 % (ref 12.3–15.4)
GLOBULIN UR ELPH-MCNC: 3.5 GM/DL
GLUCOSE SERPL-MCNC: 82 MG/DL (ref 65–99)
HCT VFR BLD AUTO: 28.6 % (ref 34–46.6)
HGB BLD-MCNC: 9.9 G/DL (ref 12–15.9)
INR PPP: 1.11 (ref 0.93–1.1)
LYMPHOCYTES # BLD AUTO: 2 10*3/MM3 (ref 0.7–3.1)
LYMPHOCYTES NFR BLD AUTO: 24.2 % (ref 19.6–45.3)
MCH RBC QN AUTO: 38.2 PG (ref 26.6–33)
MCHC RBC AUTO-ENTMCNC: 34.7 G/DL (ref 31.5–35.7)
MCV RBC AUTO: 110.2 FL (ref 79–97)
MONOCYTES # BLD AUTO: 0.9 10*3/MM3 (ref 0.1–0.9)
MONOCYTES NFR BLD AUTO: 11.4 % (ref 5–12)
NEUTROPHILS NFR BLD AUTO: 5 10*3/MM3 (ref 1.7–7)
NEUTROPHILS NFR BLD AUTO: 60.8 % (ref 42.7–76)
NRBC BLD AUTO-RTO: 0.1 /100 WBC (ref 0–0.2)
PLATELET # BLD AUTO: 172 10*3/MM3 (ref 140–450)
PMV BLD AUTO: 8.9 FL (ref 6–12)
POTASSIUM SERPL-SCNC: 4.4 MMOL/L (ref 3.5–5.2)
PROT SERPL-MCNC: 6.1 G/DL (ref 6–8.5)
PROTHROMBIN TIME: 11.4 SECONDS (ref 9.6–11.7)
RBC # BLD AUTO: 2.6 10*6/MM3 (ref 3.77–5.28)
SODIUM SERPL-SCNC: 140 MMOL/L (ref 136–145)
WBC NRBC COR # BLD: 8.2 10*3/MM3 (ref 3.4–10.8)

## 2022-10-20 PROCEDURE — 99232 SBSQ HOSP IP/OBS MODERATE 35: CPT | Performed by: NURSE PRACTITIONER

## 2022-10-20 PROCEDURE — 85610 PROTHROMBIN TIME: CPT | Performed by: NURSE PRACTITIONER

## 2022-10-20 PROCEDURE — 25010000002 FUROSEMIDE PER 20 MG: Performed by: INTERNAL MEDICINE

## 2022-10-20 PROCEDURE — 82550 ASSAY OF CK (CPK): CPT | Performed by: INTERNAL MEDICINE

## 2022-10-20 PROCEDURE — 97165 OT EVAL LOW COMPLEX 30 MIN: CPT

## 2022-10-20 PROCEDURE — 85025 COMPLETE CBC W/AUTO DIFF WBC: CPT | Performed by: NURSE PRACTITIONER

## 2022-10-20 PROCEDURE — 63710000001 ONDANSETRON PER 8 MG: Performed by: PHYSICIAN ASSISTANT

## 2022-10-20 PROCEDURE — 80053 COMPREHEN METABOLIC PANEL: CPT | Performed by: INTERNAL MEDICINE

## 2022-10-20 PROCEDURE — 97530 THERAPEUTIC ACTIVITIES: CPT

## 2022-10-20 RX ORDER — CHOLESTYRAMINE LIGHT 4 G/5.7G
2 POWDER, FOR SUSPENSION ORAL EVERY 12 HOURS SCHEDULED
Status: DISCONTINUED | OUTPATIENT
Start: 2022-10-20 | End: 2022-10-21 | Stop reason: HOSPADM

## 2022-10-20 RX ORDER — BUTALBITAL, ACETAMINOPHEN AND CAFFEINE 50; 325; 40 MG/1; MG/1; MG/1
1 TABLET ORAL EVERY 4 HOURS PRN
Status: DISCONTINUED | OUTPATIENT
Start: 2022-10-20 | End: 2022-10-21 | Stop reason: HOSPADM

## 2022-10-20 RX ORDER — ACETAMINOPHEN 325 MG/1
650 TABLET ORAL EVERY 6 HOURS PRN
Status: DISCONTINUED | OUTPATIENT
Start: 2022-10-20 | End: 2022-10-21 | Stop reason: HOSPADM

## 2022-10-20 RX ADMIN — TRAZODONE HYDROCHLORIDE 100 MG: 100 TABLET ORAL at 20:24

## 2022-10-20 RX ADMIN — Medication 800 MG: at 20:24

## 2022-10-20 RX ADMIN — Medication 250 MG: at 20:24

## 2022-10-20 RX ADMIN — Medication 1 TABLET: at 08:35

## 2022-10-20 RX ADMIN — CHOLESTYRAMINE 8 G: 4 POWDER, FOR SUSPENSION ORAL at 20:21

## 2022-10-20 RX ADMIN — SPIRONOLACTONE 100 MG: 100 TABLET ORAL at 08:35

## 2022-10-20 RX ADMIN — ONDANSETRON HYDROCHLORIDE 4 MG: 4 TABLET, FILM COATED ORAL at 17:39

## 2022-10-20 RX ADMIN — SERTRALINE 100 MG: 100 TABLET, FILM COATED ORAL at 08:35

## 2022-10-20 RX ADMIN — Medication 10 ML: at 20:24

## 2022-10-20 RX ADMIN — FOLIC ACID 1000 MCG: 1 TABLET ORAL at 08:35

## 2022-10-20 RX ADMIN — VANCOMYCIN HYDROCHLORIDE 125 MG: 125 CAPSULE ORAL at 05:59

## 2022-10-20 RX ADMIN — VANCOMYCIN HYDROCHLORIDE 125 MG: 125 CAPSULE ORAL at 01:00

## 2022-10-20 RX ADMIN — BUSPIRONE HYDROCHLORIDE 15 MG: 15 TABLET ORAL at 20:23

## 2022-10-20 RX ADMIN — ACETAMINOPHEN 650 MG: 325 TABLET, FILM COATED ORAL at 17:39

## 2022-10-20 RX ADMIN — VANCOMYCIN HYDROCHLORIDE 125 MG: 125 CAPSULE ORAL at 12:56

## 2022-10-20 RX ADMIN — VANCOMYCIN HYDROCHLORIDE 125 MG: 125 CAPSULE ORAL at 17:39

## 2022-10-20 RX ADMIN — Medication 10 ML: at 08:36

## 2022-10-20 RX ADMIN — SERTRALINE 100 MG: 100 TABLET, FILM COATED ORAL at 20:23

## 2022-10-20 RX ADMIN — Medication 250 MG: at 08:35

## 2022-10-20 RX ADMIN — Medication 100 MG: at 08:35

## 2022-10-20 RX ADMIN — CHOLESTYRAMINE 4 G: 4 POWDER, FOR SUSPENSION ORAL at 08:36

## 2022-10-20 RX ADMIN — BUSPIRONE HYDROCHLORIDE 15 MG: 15 TABLET ORAL at 08:35

## 2022-10-20 RX ADMIN — FUROSEMIDE 40 MG: 10 INJECTION, SOLUTION INTRAMUSCULAR; INTRAVENOUS at 08:35

## 2022-10-20 RX ADMIN — Medication 800 MG: at 08:35

## 2022-10-20 NOTE — PROGRESS NOTES
LOS: 6 days   Patient Care Team:  Norma Saul APRN as PCP - General (Nurse Practitioner)      Subjective     Interval History:     Subjective: Patient continues to report persistent diarrhea.  States that she has had 8 bowel movements over the past 24 hours.  Denies any abdominal pain or nausea/vomiting.  No overt GI bleeding.      ROS:   No chest pain, shortness of breath, or cough.        Medication Review:     Current Facility-Administered Medications:   •  albumin human 25 % IV SOLN 37.5 g, 37.5 g, Intravenous, Once **OR** albumin human 25 % IV SOLN 50 g, 50 g, Intravenous, Once **OR** albumin human 25 % IV SOLN 62.5 g, 62.5 g, Intravenous, Once **OR** albumin human 25 % IV SOLN 75 g, 75 g, Intravenous, Once **OR** albumin human 25 % IV SOLN 87.5 g, 87.5 g, Intravenous, Once **OR** albumin human 25 % IV SOLN 100 g, 100 g, Intravenous, Once **OR** albumin human 25 % IV SOLN 112.5 g, 112.5 g, Intravenous, Once, Roxann Coley APRN  •  busPIRone (BUSPAR) tablet 15 mg, 15 mg, Oral, BID, Donna Fitzpatrick DO, 15 mg at 10/20/22 0835  •  cholestyramine light packet 4 g, 1 packet, Oral, Q12H, Teresa Curran, APRN, 4 g at 10/20/22 0836  •  folic acid (FOLVITE) tablet 1,000 mcg, 1,000 mcg, Oral, Daily, Donna Fitzpatrick DO, 1,000 mcg at 10/20/22 0835  •  furosemide (LASIX) injection 40 mg, 40 mg, Intravenous, Daily, Boris Helm MD, 40 mg at 10/20/22 0835  •  ipratropium-albuterol (DUO-NEB) nebulizer solution 3 mL, 3 mL, Nebulization, Q4H PRN, Christian Andrews PA-C  •  LORazepam (ATIVAN) injection 0.5 mg, 0.5 mg, Intravenous, Once, Donna Fitzpatrick DO  •  LORazepam (ATIVAN) tablet 1 mg, 1 mg, Oral, Q2H PRN **OR** LORazepam (ATIVAN) injection 1 mg, 1 mg, Intravenous, Q2H PRN **OR** LORazepam (ATIVAN) tablet 2 mg, 2 mg, Oral, Q1H PRN **OR** LORazepam (ATIVAN) injection 2 mg, 2 mg, Intravenous, Q1H PRN **OR** LORazepam (ATIVAN) injection 2 mg, 2 mg, Intravenous, Q15 Min PRN **OR** LORazepam (ATIVAN)  injection 2 mg, 2 mg, Intramuscular, Q15 Min PRN, Donna Fitzpatrick DO  •  magnesium oxide (MAG-OX) tablet 800 mg, 800 mg, Oral, BID, Boris Helm MD, 800 mg at 10/20/22 0835  •  melatonin tablet 5 mg, 5 mg, Oral, Nightly PRN, Christian Andrews PA-C  •  multivitamin-iron-minerals-folic acid (CENTRUM) chewable tablet 1 tablet, 1 tablet, Oral, Daily, Roxann Coley, APRN, 1 tablet at 10/20/22 0835  •  nicotine (NICODERM CQ) 21 MG/24HR patch 1 patch, 1 patch, Transdermal, Q24H, Christian Andrews PA-C  •  nitroglycerin (NITROSTAT) SL tablet 0.4 mg, 0.4 mg, Sublingual, Q5 Min PRN, Christian Andrews PA-C  •  ondansetron (ZOFRAN) tablet 4 mg, 4 mg, Oral, Q6H PRN, 4 mg at 10/15/22 0255 **OR** ondansetron (ZOFRAN) injection 4 mg, 4 mg, Intravenous, Q6H PRN, Christian Andrews PA-C  •  Pharmacy Consult, , Does not apply, Continuous PRN, Quang Estrella MD  •  potassium chloride (K-DUR,KLOR-CON) CR tablet 40 mEq, 40 mEq, Oral, PRN, Quang Estrella MD, 40 mEq at 10/19/22 2036  •  potassium chloride (KLOR-CON) packet 40 mEq, 40 mEq, Oral, PRN, Quang Estrella MD  •  saccharomyces boulardii (FLORASTOR) capsule 250 mg, 250 mg, Oral, BID, Teresa Curran, APRN, 250 mg at 10/20/22 0835  •  sertraline (ZOLOFT) tablet 100 mg, 100 mg, Oral, BID, Donna Fitzpatrick DO, 100 mg at 10/20/22 0835  •  [COMPLETED] Insert peripheral IV, , , Once **AND** sodium chloride 0.9 % flush 10 mL, 10 mL, Intravenous, PRN, Christian Andrews PA-C  •  sodium chloride 0.9 % flush 10 mL, 10 mL, Intravenous, Q12H, Christian Andrews PA-C, 10 mL at 10/20/22 0836  •  sodium chloride 0.9 % flush 10 mL, 10 mL, Intravenous, PRN, Christian Andrews PA-C  •  spironolactone (ALDACTONE) tablet 100 mg, 100 mg, Oral, Daily, Teresa Curran APRN, 100 mg at 10/20/22 0835  •  thiamine (VITAMIN B-1) tablet 100 mg, 100 mg, Oral, Daily, Roxann Coley APRN, 100 mg at 10/20/22 0835  •  traZODone (DESYREL) tablet 100 mg, 100 mg, Oral,  Nightly, Donna Fitzpatrick DO, 100 mg at 10/19/22 2035  •  vancomycin (VANCOCIN) capsule 125 mg, 125 mg, Oral, Q6H, Quang Estrella MD, 125 mg at 10/20/22 0559      Objective     Vital Signs  Vitals:    10/19/22 1521 10/20/22 0000 10/20/22 0424 10/20/22 0815   BP: 121/69 125/77 100/65 116/72   BP Location: Left arm Left arm Left arm Left arm   Patient Position: Lying Lying Lying Lying   Pulse: 83 71 64 72   Resp: 16 16 16 14   Temp: 97.8 °F (36.6 °C) 98.1 °F (36.7 °C) 97.8 °F (36.6 °C) 98.5 °F (36.9 °C)   TempSrc: Oral Oral Oral Oral   SpO2: 96% 94% 95% 94%   Weight:   52.9 kg (116 lb 10 oz)    Height:           Physical Exam:     General Appearance:    Awake and alert, in no acute distress   Head:    Normocephalic, without obvious abnormality   Eyes:          Conjunctivae normal, anicteric sclera   Throat:   No oral lesions, no thrush, oral mucosa moist   Neck:   No adenopathy, supple, no JVD   Lungs:     respirations regular, even and unlabored   Abdomen:     Soft, non-tender, no rebound or guarding, non-distended   Rectal:     Deferred   Extremities:   No edema, no cyanosis   Skin:   No bruising or rash, no jaundice        Results Review:    CBC    Results from last 7 days   Lab Units 10/20/22  0036 10/19/22  0315 10/18/22  1052 10/16/22  2344 10/15/22  2259 10/15/22  1006 10/14/22  2037   WBC 10*3/mm3 8.20 7.60 7.50 7.70 7.50 7.70 8.90   HEMOGLOBIN g/dL 9.9* 9.2* 10.9* 8.9* 9.4* 10.2* 11.8*   PLATELETS 10*3/mm3 172 150 205 153 176 183 233     CMP   Results from last 7 days   Lab Units 10/20/22  0036 10/19/22  0315 10/18/22  1052 10/16/22  2344 10/15/22  2259 10/15/22  0428 10/14/22  2113   SODIUM mmol/L 140 138 140 139 139  --  143   POTASSIUM mmol/L 4.4 3.2* 3.7 3.6 3.5  --  3.6   CHLORIDE mmol/L 104 99 98 103 102  --  112*   CO2 mmol/L 27.0 30.0* 32.0* 29.0 29.0  --  22.0   BUN mg/dL 10 9 9 9 9  --  7   CREATININE mg/dL 0.37* 0.39* 0.46* 0.41* 0.51*  --  0.42*   GLUCOSE mg/dL 82 92 91 96 83  --  74    ALBUMIN g/dL 2.60* 2.40* 2.90* 2.30* 2.30*  --  2.00*   BILIRUBIN mg/dL 0.4 0.4 0.7 0.3 0.4  --  0.6   ALK PHOS U/L 113 108 103 121* 90  --  77   AST (SGOT) U/L 128* 187* 305* 428* 675*  --  493*   ALT (SGPT) U/L 74* 86* 113* 108* 114*  --  83*   MAGNESIUM mg/dL  --   --  1.6 1.5* 1.6  --  1.4*   PHOSPHORUS mg/dL  --   --   --   --  3.8  --   --    AMMONIA umol/L  --   --   --   --   --  26  --      Cr Clearance Estimated Creatinine Clearance: 143.5 mL/min (A) (by C-G formula based on SCr of 0.37 mg/dL (L)).  Coag   Results from last 7 days   Lab Units 10/20/22  0036 10/19/22  0315 10/18/22  1052 10/16/22  2344 10/16/22  1351 10/14/22  2037   INR  1.11* 1.09 1.10 1.16* 1.20* 1.14*   APTT seconds  --   --   --   --   --  26.4*     HbA1C No results found for: HGBA1C  Blood Glucose No results found for: POCGLU  Infection   Results from last 7 days   Lab Units 10/15/22  0100 10/14/22  2136   BLOODCX  No growth at 5 days  No growth at 5 days  --    URINECX   --  >100,000 CFU/mL Escherichia coli*     UA    Results from last 7 days   Lab Units 10/14/22  2136   NITRITE UA  Positive*   WBC UA /HPF Too Numerous to Count*   BACTERIA UA /HPF 4+*   SQUAM EPITHEL UA /HPF None Seen   URINECX  >100,000 CFU/mL Escherichia coli*     Radiology(recent) No radiology results for the last day       Assessment & Plan     ASSESSMENT:  -Decompensated cirrhosis due to LORENZO/ETOH complicated by ascites  -Elevated LFTs  -C. difficile colitis  -Macrocytic anemia  -E. coli UTI  -Abnormal CT of the colon     PLAN:  Patient continues to complain of persistent diarrhea.  Reports 8 bowel movements over the past 24 hours.  She is not having any abdominal pain or nausea/vomiting.  Continue oral vancomycin and Florastor.  We will once again increase cholestyramine dose.  LFTs continue to trend down.  Continue to monitor.  Suspect elevation was due to rhabdomyolysis superimposed on cirrhosis.  Hepatitis panel negative.  CMV IgM negative.  KAMINI negative.   Smooth muscle antibody normal.  RUQ US shows cirrhosis, small amount of ascites, no evidence of CBD obstruction.  Continue Lasix 40 mg daily and spironolactone 100 mg daily.  Creatinine remains normal.  Urine sodium to potassium ratio > 1 this admission which is consistent with nonadherence to low-sodium diet.  Continue low-sodium diet.  Patient remains on antibiotics for UTI.  Continue supportive care.    Electronically signed by SHARIF Huertas, 10/20/22, 10:04 AM EDT.

## 2022-10-20 NOTE — CASE MANAGEMENT/SOCIAL WORK
Continued Stay Note  DAYANARA Vik     Patient Name: Lita Yu  MRN: [id number]  Today's Date: 10/20/2022    Admit Date: 10/14/2022    Plan: D/C plan: [facility], pending acceptance/bed availability. Precert required, requested to start today if accepted.   Discharge Plan     Row Name 10/20/22 1112       Plan    Plan D/C plan: [facility], pending acceptance/bed availability. Precert required, requested to start today if accepted.    Patient/Family in Agreement with Plan yes    Plan Comments CM received call from pt's daughter stating that pt has chosen [facility] for rehab. Would like [facility] to be 2nd choice. Per liaison for [facility], need OT to eval pt as well. CM requested order for OT eval.                   Expected Discharge Date and Time     Expected Discharge Date Expected Discharge Time    Oct 21, 2022         Phone communication or documentation only - no physical contact with patient or family.      Zak Osei RN

## 2022-10-20 NOTE — DISCHARGE PLACEMENT REQUEST
"Lita Yu (57 y.o. Female)     Date of Birth   1965    Social Security Number       Address   [id number] [place] [address] [address] [place] [facility]    Home Phone   [phone]    MRN   [id number]       Sabianism   None    Marital Status                               Admission Date   10/14/22    Admission Type   Emergency    Admitting Provider   Quang Estrella MD    Attending Provider   Quang Estrella MD    Department, Room/Bed   [facility] 3A MEDICAL INPATIENT, 306/1       Discharge Date       Discharge Disposition       Discharge Destination                               Attending Provider: Quang Estrella MD    Allergies: Sulfa Antibiotics, Keflex [Cephalexin]    Isolation: Spore   Infection: C.difficile (rule out) (10/16/22), C.difficile (10/17/22)   Code Status: CPR    Ht: 162.6 cm (64\")   Wt: 52.9 kg (116 lb 10 oz)    Admission Cmt: None   Principal Problem: Generalized weakness [R53.1]                 Active Insurance as of 10/14/2022     Primary Coverage     Payor Plan Insurance Group Employer/Plan Group    [place] [id number]     Payor Plan Address Payor Plan Phone Number Payor Plan Fax Number Effective Dates    [address]   6/22/2014 - None Entered    [facility] [id number]       Subscriber Name Subscriber Birth Date Member ID       MILANA YU 1/21/1966 [id number]                 Emergency Contacts      (Rel.) Home Phone Work Phone Mobile Phone    OBDULIA CANTU (Daughter) -- -- [phone]              "

## 2022-10-20 NOTE — CASE MANAGEMENT/SOCIAL WORK
Social Work Assessment  [facility]     Patient Name: Lita Yu  MRN: [id number]  Today's Date: 10/20/2022    Admit Date: 10/14/2022     Discharge Needs Assessment     Row Name 10/20/22 1120       Discharge Needs Assessment    Concerns to be Addressed employment/school;financial/insurance    Concerns Comments SW was notified that pt may have some financial issues 2/2 having to be off work for some time. Due to isolation status, SW spoke with pt on the phone. SW introduced self and reason for consult. Pt stated she does not have short-term disability through her employer, and her employer has less than 50 employees so FMLA is not an option. SW encouraged her to check with her HR anyway, in attempt to preserve her job. Pt stated as of this moment, her bills are current, but she has no income while she isn’t working. SW encouraged pt to utilize her [facility] 2/2 unforeseen medical circumstances - pt verbalized understanding and reports ability to get the contact info. Pt thanked this writer and denies further needs at this time.              Phone communication or documentation only - no physical contact with patient or family.    TANYA Manzo, W  Medical Social Worker  Ph [phone]  Fax [phone]  [email or web address]

## 2022-10-20 NOTE — PROGRESS NOTES
[facility] Medicine Services Daily Progress Note    Patient Name: Lita Yu  : 1965  MRN: [id number]  Primary Care Physician:  Norma Saul APRN  Date of admission: 10/14/2022      Subjective      Chief Complaint: Weakness    10/16/2022 patient seen and examined this morning.  Doing better compared to yesterday.  Feels generalized pain all over and weakness but no other complaints.  No numbness or tingling.  10/17/2022 patient seen and examined in bed no acute distress, vital signs stable, reports feeling better today denies any abdominal pain nausea vomiting.  Unable to perform paracentesis no None fluids.  10/18/22 patient seen and examined in bed no acute distress, no complaints, vital signs stable, discussed with RN.  Diarrhea  10/19/22 patient seen and examined in bed no acute distress, vital signs stable, having diarrhea.  Discussed with RN.  Awaiting placement.  Pending patient decision.  10/20/22 patient seen and examined in bed NAD, feels better today, still having some diarrhea   Pertinent positives as noted in HPI/subjective.  All other systems were reviewed and are negative.    Objective      Vitals:   Temp:  [97.8 °F (36.6 °C)-98.5 °F (36.9 °C)] 98.5 °F (36.9 °C)  Heart Rate:  [64-83] 72  Resp:  [14-16] 14  BP: (100-125)/(65-77) 116/72    Physical Exam:  General: Awake, alert, lying in bed, NAD  Eyes: PERRL, EOMI, conjunctive are clear  Cardiovascular: Regular rate and rhythm, no murmurs  Respiratory: Clear to auscultation bilaterally, no wheezing or rales, unlabored breathing  Abdomen: Soft, distended, nontender, positive bowel sounds, no guarding  Neurologic: A&O, CN grossly intact, moves all extremities spontaneously, sensation intact bilaterally  Musculoskeletal: Generalized weakness, no deformities  Skin: Warm, dry, intact    Result Review    Result Review:  I have personally reviewed the results from the time of this admission to 10/20/2022 09:50  EDT and agree with these findings:  [x]  Laboratory  [x]  Microbiology  [x]  Radiology  []  EKG/Telemetry   []  Cardiology/Vascular   []  Pathology  []  Old records  []  Other:       CMP:        Lab 10/20/22  0036 10/19/22  0315 10/18/22  1052 10/16/22  2344 10/15/22  2259 10/14/22  2113   SODIUM 140 138 140 139 139 143   POTASSIUM 4.4 3.2* 3.7 3.6 3.5 3.6   CHLORIDE 104 99 98 103 102 112*   CO2 27.0 30.0* 32.0* 29.0 29.0 22.0   ANION GAP 9.0 9.0 10.0 7.0 8.0 9.0   BUN 10 9 9 9 9 7   CREATININE 0.37* 0.39* 0.46* 0.41* 0.51* 0.42*   EGFR 119.3 117.8 113.2 116.4 110.4 115.7   GLUCOSE 82 92 91 96 83 74   CALCIUM 8.7 8.1* 8.9 8.0* 8.5* 7.5*   MAGNESIUM  --   --  1.6 1.5* 1.6 1.4*   PHOSPHORUS  --   --   --   --  3.8  --    TOTAL PROTEIN 6.1 5.8* 6.6 5.3* 5.4* 4.8*   ALBUMIN 2.60* 2.40* 2.90* 2.30* 2.30* 2.00*   GLOBULIN 3.5 3.4 3.7 3.0 3.1 2.8   ALT (SGPT) 74* 86* 113* 108* 114* 83*   AST (SGOT) 128* 187* 305* 428* 675* 493*   BILIRUBIN 0.4 0.4 0.7 0.3 0.4 0.6   ALK PHOS 113 108 103 121* 90 77     CBC:      Lab 10/20/22  0036 10/19/22  0315 10/18/22  1052 10/16/22  2344 10/15/22  2259   WBC 8.20 7.60 7.50 7.70 7.50   HEMOGLOBIN 9.9* 9.2* 10.9* 8.9* 9.4*   HEMATOCRIT 28.6* 27.5* 33.5* 26.7* 28.5*   PLATELETS 172 150 205 153 176   NEUTROS ABS 5.00 4.60 5.40 5.00 5.30   LYMPHS ABS 2.00 1.90 1.20 1.70 1.40   MONOS ABS 0.90 0.90 0.60 0.90 0.60   EOS ABS 0.20 0.20 0.10 0.20 0.20   .2* 109.3* 108.9* 110.3* 109.0*       Assessment & Plan      Brief Patient Summary:  Lita Yu is a 57 y.o. female who      albumin human, 37.5 g, Intravenous, Once   Or  albumin human, 50 g, Intravenous, Once   Or  albumin human, 62.5 g, Intravenous, Once   Or  albumin human, 75 g, Intravenous, Once   Or  albumin human, 87.5 g, Intravenous, Once   Or  albumin human, 100 g, Intravenous, Once   Or  albumin human, 112.5 g, Intravenous, Once  busPIRone, 15 mg, Oral, BID  cholestyramine light, 1 packet, Oral, Q12H  folic acid, 1,000  mcg, Oral, Daily  furosemide, 40 mg, Intravenous, Daily  LORazepam, 0.5 mg, Intravenous, Once  magnesium oxide, 800 mg, Oral, BID  multivitamin-iron-minerals-folic acid, 1 tablet, Oral, Daily  nicotine, 1 patch, Transdermal, Q24H  saccharomyces boulardii, 250 mg, Oral, BID  sertraline, 100 mg, Oral, BID  sodium chloride, 10 mL, Intravenous, Q12H  spironolactone, 100 mg, Oral, Daily  thiamine, 100 mg, Oral, Daily  traZODone, 100 mg, Oral, Nightly  vancomycin, 125 mg, Oral, Q6H       Pharmacy Consult,        Active Hospital Problems:  Active Hospital Problems    Diagnosis    • **Generalized weakness    • Moderate malnutrition (HCC)    • Cirrhosis of liver (HCC)    • Acute UTI (urinary tract infection)    • Rhabdomyolysis    • Non-traumatic rhabdomyolysis    • Chronic obstructive pulmonary disease (HCC)    • Essential hypertension    • Anemia of chronic disease    • Post traumatic stress disorder    • Hyperlipidemia    • Tobacco dependence syndrome        Generalized weakness  Acute rhabdomyolysis  -Likely related to deconditioning and alcohol abuse, patient was found on the floor for at least 24 hours  -CPK elevated but patient has significant ascites, IV fluids decreased per GI  -PT/OT, may need rehab  -MRI spine negative for acute cord compression or abscess  -Low-dose IV fluid due to ascites  -Nephrology following  -Neurology following    LORENZO/Alcoholic cirrhosis with ascites  -CT report noted  -Paracentesis ordered, unable to perform  -Diuresis with Lasix per GI  -Continue Aldactone  -Lactulose if needed  -GI following    Acute UTI  -UA noted, urine culture collected  -Continue empiric aztreonam due to allergies  -Follow-up on urine culture    C. difficile colitis-improving   P.o. vancomycin,  P.o. Florastor  P.o. welchole,  Imodium as needed    Alcohol abuse  -Appears to be chronic, counseled on cessation  -Last drink was the night before admission  -Started on CIWA protocol with Ativan as needed  -Monitor for  withdrawals    Hypertension  -Controlled  -Resume home meds as able  -Monitor    Hypomagnesemia  -Replace and monitor    Anemia of chronic disease  -Hemoglobin appears to be stable  -Monitor daily    COPD  -Not in exacerbation, remains on room air  -Continue bronchodilators, monitor    PTSD/anxiety  -Continue home meds, monitor    DVT prophylaxis  -Lovenox        CODE STATUS:    Code Status (Patient has no pulse and is not breathing): CPR (Attempt to Resuscitate)  Medical Interventions (Patient has pulse or is breathing): Full Support      Disposition: Pending improvement    Electronically signed by Quang Estrella MD, 10/20/22, 09:50 EDT.  [facility] Hospitalist Team      Much of this encounter note is an electronic transcription/translation of spoken language to printed text. The electronic translation of spoken language may permit erroneous, or at times, nonsensical words or phrases to be inadvertently transcribed; Although I have reviewed the note for such errors, some may still exist.

## 2022-10-20 NOTE — PROGRESS NOTES
"Nutrition Services    Patient Name: Lita Yu  YOB: 1965  MRN: [id number]  Admission date: 10/14/2022    PPE Documentation        PPE Worn By Provider Did not enter room for this encounter   PPE Worn By Patient  N/A     PROGRESS NOTE      Encounter Information: Progress note to check on PO intakes. Intake remains suboptimal -- pt with poor appetite and still having frequent diarrhea. Of note, pt's diet was changed to \"renal 2g Na\" -- pt not in need of K+ or phosphorus restrictions. Will change to Cardiac 2g Na to somewhat broaden menu array. Pt with strong dislike for sodium restricted diet; education provided at last visit; hopefully intake will improve with slight diet modification.       PO Diet: Diet Regular, Renal; 2gm Na+   PO Supplements: Boost Plus BID (Provides 720 kcals, 28 g protein if consumed)      PO Intake:  ~50% at meals on average; is accepting some ONS       Current nutrition support:    Nutrition support review:        Labs (reviewed below): K+ 3.2 - L, monitor         GI Function:  Stool Output  Stool Unmeasured Occurrence: 1 (10/19/22 2257)  Bowel Incontinence: Yes (10/19/22 2257)  Stool Amount: small (10/19/22 2257)          Nutrition Intervention Updates: Change diet to 2g sodium without K+ or phosphorus restrictions.       Results from last 7 days   Lab Units 10/19/22  0315 10/18/22  1052 10/16/22  2344   SODIUM mmol/L 138 140 139   POTASSIUM mmol/L 3.2* 3.7 3.6   CHLORIDE mmol/L 99 98 103   CO2 mmol/L 30.0* 32.0* 29.0   BUN mg/dL 9 9 9   CREATININE mg/dL 0.39* 0.46* 0.41*   CALCIUM mg/dL 8.1* 8.9 8.0*   BILIRUBIN mg/dL 0.4 0.7 0.3   ALK PHOS U/L 108 103 121*   ALT (SGPT) U/L 86* 113* 108*   AST (SGOT) U/L 187* 305* 428*   GLUCOSE mg/dL 92 91 96     Results from last 7 days   Lab Units 10/19/22  0315 10/18/22  1052 10/16/22  2344 10/15/22  2259   MAGNESIUM mg/dL  --  1.6 1.5* 1.6   PHOSPHORUS mg/dL  --   --   --  3.8   HEMOGLOBIN g/dL 9.2* 10.9* 8.9* 9.4* "   HEMATOCRIT % 27.5* 33.5* 26.7* 28.5*     COVID19   Date Value Ref Range Status   08/03/2022 Not Detected Not Detected - Ref. Range Final     No results found for: HGBA1C  RD to follow up per protocol.    Electronically signed by:  Marleni Alonzo RD  10/19/22

## 2022-10-20 NOTE — PLAN OF CARE
Goal Outcome Evaluation:  Plan of Care Reviewed With: patient        Progress: no change  Outcome Evaluation: Patient is a 56 y/o F who was admitted 10/14/22 after inability to get herself around her home and being on the floor for an extended amount of time.  She has a h/o cirrohosis, anemia, transaminitis, ETOH abuse, PTSD, and HTN. Pt. states she lives at home alone and is typically I/ADL independent, progressive weakness at home and states it took her 3 hours to get her mail. Pt. requires min A for bed mobility and sit to stand transfers this date, significant global weakness noted 3+/5 BUE's. Pt. requires increased mod A for all LB ADLs. Pt. not safe to d/c home alone at this time and will require acute IP rehab at d/c to address aforementioned deficits.

## 2022-10-20 NOTE — PLAN OF CARE
Problem: Adult Inpatient Plan of Care  Goal: Plan of Care Review  Outcome: Ongoing, Progressing  Flowsheets (Taken 10/20/2022 1752)  Progress: no change  Plan of Care Reviewed With: patient  Goal: Patient-Specific Goal (Individualized)  Outcome: Ongoing, Progressing  Goal: Absence of Hospital-Acquired Illness or Injury  Outcome: Ongoing, Progressing  Intervention: Identify and Manage Fall Risk  Recent Flowsheet Documentation  Taken 10/20/2022 1440 by Radha Kevin LPN  Safety Promotion/Fall Prevention: safety round/check completed  Taken 10/20/2022 1225 by Radha Kevin LPN  Safety Promotion/Fall Prevention: safety round/check completed  Taken 10/20/2022 1025 by Radha Kevin LPN  Safety Promotion/Fall Prevention: safety round/check completed  Taken 10/20/2022 1010 by Radha Kevin LPN  Safety Promotion/Fall Prevention: safety round/check completed  Taken 10/20/2022 0825 by Radha Kevin LPN  Safety Promotion/Fall Prevention: safety round/check completed  Goal: Optimal Comfort and Wellbeing  Outcome: Ongoing, Progressing  Intervention: Provide Person-Centered Care  Recent Flowsheet Documentation  Taken 10/20/2022 0825 by Radha Kevin LPN  Trust Relationship/Rapport:   care explained   choices provided   thoughts/feelings acknowledged  Goal: Readiness for Transition of Care  Outcome: Ongoing, Progressing   Goal Outcome Evaluation:  Plan of Care Reviewed With: patient        Progress: no change   Pt has rested in bed throughout the shift, pt has had no complaints. Pt should possible discharge tomorrow to [facility], will cont to monitor.

## 2022-10-20 NOTE — CASE MANAGEMENT/SOCIAL WORK
Continued Stay Note   Vik     Patient Name: Lita Yu  MRN: [id number]  Today's Date: 10/20/2022    Admit Date: 10/14/2022    Plan: D/C plan: [facility], accepted, pending bed availability. Precert started 10/20.   Discharge Plan     Row Name 10/20/22 1446       Plan    Plan D/C plan: [facility], accepted, pending bed availability. Precert started 10/20.    Patient/Family in Agreement with Plan yes    Plan Comments CM received notification from [facility] liaison they have accepted pt and have submitted precert today 10/20. Pending approval and bed availability.                Expected Discharge Date and Time     Expected Discharge Date Expected Discharge Time    Oct 21, 2022         Phone communication or documentation only - no physical contact with patient or family.      Zak Osei RN

## 2022-10-20 NOTE — PLAN OF CARE
Goal Outcome Evaluation:  Plan of Care Reviewed With: patient        Progress: no change   Pt rested through the night. Pt did c/o of headache reduced lighting. No other issues at this time. Pt VSS will cont to monitor.

## 2022-10-20 NOTE — THERAPY EVALUATION
Patient Name: Lita Yu  : 1965    MRN: [id number]                              Today's Date: 10/20/2022       Admit Date: 10/14/2022    Visit Dx:     ICD-10-CM ICD-9-CM   1. Non-traumatic rhabdomyolysis  M62.82 728.88   2. Urinary tract infection without hematuria, site unspecified  N39.0 599.0   3. Hypomagnesemia  E83.42 275.2     Patient Active Problem List   Diagnosis   • Postmenopausal state   • Post traumatic stress disorder   • Pain in joints   • Osteoporosis   • Macrocytic anemia   • Lumbosacral radiculopathy   • Leg pain, left   • Peripheral vascular disease (HCC)   • Essential hypertension   • Hyperlipidemia   • Degenerative joint disease of left hip   • Seizure disorder (HCC)   • Smoker   • Status post hip replacement   • Tobacco dependence syndrome   • Vitamin B12 deficiency   • Anemia of chronic disease   • Chronic obstructive pulmonary disease (HCC)   • Cirrhosis of liver (HCC)   • Acute UTI (urinary tract infection)   • Generalized weakness   • Rhabdomyolysis   • Non-traumatic rhabdomyolysis   • Moderate malnutrition (HCC)     Past Medical History:   Diagnosis Date   • Cirrhosis (HCC)    • COPD (chronic obstructive pulmonary disease) (HCC)    • Depression    • GERD (gastroesophageal reflux disease)    • Hyperlipidemia    • Hypertension    • PTSD (post-traumatic stress disorder)      Past Surgical History:   Procedure Laterality Date   •  SECTION N/A    • COLONOSCOPY N/A 2021    Procedure: COLONOSCOPY with polypectomy x2 and random biopsy;  Surgeon: Osman Clay MD;  [facility];  Service: Gastroenterology;  Laterality: N/A;  colon polyps   • DENTAL PROCEDURE     • ENDOSCOPY N/A 2021    Procedure: ESOPHAGOGASTRODUODENOSCOPY;  Surgeon: Osman Clay MD;  [facility];  Service: Gastroenterology;  Laterality: N/A;  esophagitis   • JOINT REPLACEMENT     • REPLACEMENT TOTAL HIP LATERAL POSITION Left    • TONSILLECTOMY     • VAGINAL  BIRTH AFTER  SECTION        General Information     Row Name 10/20/22 1415          OT Time and Intention    Document Type evaluation  -     Mode of Treatment occupational therapy  -     Row Name 10/20/22 1415          General Information    Patient Profile Reviewed yes  -MP     Prior Level of Function ADL's;independent:  -     Existing Precautions/Restrictions fall  -     Row Name 10/20/22 141          Living Environment    People in Home alone  -     Row Name 10/20/22 1415          Cognition    Orientation Status (Cognition) oriented x 4  -     Row Name 10/20/22 1415          Safety Issues, Functional Mobility    Impairments Affecting Function (Mobility) balance;pain;strength;postural/trunk control  -           User Key  (r) = Recorded By, (t) = Taken By, (c) = Cosigned By    Initials Name Provider Type    David Simmons OT Occupational Therapist                 Mobility/ADL's     Row Name 10/20/22 141          Bed Mobility    Bed Mobility bed mobility (all) activities  -     All Activities, [place] (Bed Mobility) minimum assist (75% patient effort)  -     Row Name 10/20/22 1418          Sit-Stand Transfer    Sit-Stand [place] (Transfers) minimum assist (75% patient effort);1 person assist  -     Assistive Device (Sit-Stand Transfers) walker, front-wheeled  -     Row Name 10/20/22 141          Functional Mobility    Functional Mobility- Ind. Level minimum assist (75% patient effort);contact guard assist;1 person  -     Row Name 10/20/22 141          Activities of Daily Living    BADL Assessment/Intervention lower body dressing  -     Row Name 10/20/22 1418          Lower Body Dressing Assessment/Training    [place] Level (Lower Body Dressing) don;doff;socks;maximum assist (25% patient effort)  -           User Key  (r) = Recorded By, (t) = Taken By, (c) = Cosigned By    Initials Name Provider Type    David Simmons OT Occupational Therapist                Obj/Interventions     Row Name 10/20/22 1419          Range of Motion Comprehensive    Comment, General Range of Motion BUE WFL  -MP     Row Name 10/20/22 1419          Strength Comprehensive (MMT)    Comment, General Manual Muscle Testing (MMT) Assessment BUE 3+/5  -MP     Row Name 10/20/22 1419          Balance    Balance Interventions sitting;standing;sit to stand;dynamic;static;supported  -MP           User Key  (r) = Recorded By, (t) = Taken By, (c) = Cosigned By    Initials Name Provider Type    David Simmons OT Occupational Therapist               Goals/Plan     Row Name 10/20/22 1422          Bed Mobility Goal 1 (OT)    Activity/Assistive Device (Bed Mobility Goal 1, OT) bed mobility activities, all  -MP     [place] Level/Cues Needed (Bed Mobility Goal 1, OT) contact guard required  -MP     Time Frame (Bed Mobility Goal 1, OT) long term goal (LTG);2 weeks  -MP     Row Name 10/20/22 1422          Transfer Goal 1 (OT)    Activity/Assistive Device (Transfer Goal 1, OT) sit-to-stand/stand-to-sit;toilet  -MP     [place] Level/Cues Needed (Transfer Goal 1, OT) contact guard required  -MP     Time Frame (Transfer Goal 1, OT) long term goal (LTG);2 weeks  -MP     Row Name 10/20/22 1422          Dressing Goal 1 (OT)    Activity/Device (Dressing Goal 1, OT) lower body dressing  -MP     [place]/Cues Needed (Dressing Goal 1, OT) minimum assist (75% or more patient effort)  -MP     Time Frame (Dressing Goal 1, OT) long term goal (LTG);2 weeks  -MP           User Key  (r) = Recorded By, (t) = Taken By, (c) = Cosigned By    Initials Name Provider Type    David Simmons OT Occupational Therapist               Clinical Impression     Row Name 10/20/22 1420          Pain Assessment    Pretreatment Pain Rating 4/10  -MP     Posttreatment Pain Rating 4/10  -MP     Pain Location generalized  -MP     Row Name 10/20/22 1420          Plan of Care Review    Plan of Care Reviewed With  patient  -MP     Progress no change  -MP     Outcome Evaluation Patient is a 56 y/o F who was admitted 10/14/22 after inability to get herself around her home and being on the floor for an extended amount of time.  She has a h/o cirrohosis, anemia, transaminitis, ETOH abuse, PTSD, and HTN. Pt. states she lives at home alone and is typically I/ADL independent, progressive weakness at home and states it took her 3 hours to get her mail. Pt. requires min A for bed mobility and sit to stand transfers this date, significant global weakness noted 3+/5 BUE's. Pt. requires increased mod A for all LB ADLs. Pt. not safe to d/c home alone at this time and will require acute IP rehab at d/c to address aforementioned deficits.  -     Row Name 10/20/22 1420          Therapy Assessment/Plan (OT)    Rehab Potential (OT) good, to achieve stated therapy goals  -MP     Criteria for Skilled Therapeutic Interventions Met (OT) yes;skilled treatment is necessary;meets criteria  -MP     Therapy Frequency (OT) 5 times/wk  -MP     Row Name 10/20/22 1420          Therapy Plan Review/Discharge Plan (OT)    Anticipated Discharge Disposition (OT) inpatient rehabilitation facility  -MP     Row Name 10/20/22 1420          Vital Signs    Pre Patient Position Supine  -MP     Intra Patient Position Standing  -MP     Post Patient Position Supine  -MP     Row Name 10/20/22 1420          Positioning and Restraints    Pre-Treatment Position in bed  -MP     Post Treatment Position bed  -MP     In Bed supine;encouraged to call for assist;call light within reach;exit alarm on  -MP           User Key  (r) = Recorded By, (t) = Taken By, (c) = Cosigned By    Initials Name Provider Type    David Simmons OT Occupational Therapist               Outcome Measures    No documentation.                 Occupational Therapy Education     Title: PT OT SLP Therapies (In Progress)     Topic: Occupational Therapy (In Progress)     Point: ADL training (Not  Started)     Description:   Instruct learner(s) on proper safety adaptation and remediation techniques during self care or transfers.   Instruct in proper use of assistive devices.              Learner Progress:  Not documented in this visit.          Point: Home exercise program (Not Started)     Description:   Instruct learner(s) on appropriate technique for monitoring, assisting and/or progressing therapeutic exercises/activities.              Learner Progress:  Not documented in this visit.          Point: Precautions (Not Started)     Description:   Instruct learner(s) on prescribed precautions during self-care and functional transfers.              Learner Progress:  Not documented in this visit.          Point: Body mechanics (Done)     Description:   Instruct learner(s) on proper positioning and spine alignment during self-care, functional mobility activities and/or exercises.              Learning Progress Summary           Patient Acceptance, E,TB, VU by ZENOBIA at 10/20/2022 1423                               User Key     Initials Effective Dates Name Provider Type Discipline    ZENOBIA 06/16/21 -  David Mao OT Occupational Therapist OT              OT Recommendation and Plan  Therapy Frequency (OT): 5 times/wk  Plan of Care Review  Plan of Care Reviewed With: patient  Progress: no change  Outcome Evaluation: Patient is a 56 y/o F who was admitted 10/14/22 after inability to get herself around her home and being on the floor for an extended amount of time.  She has a h/o cirrohosis, anemia, transaminitis, ETOH abuse, PTSD, and HTN. Pt. states she lives at home alone and is typically I/ADL independent, progressive weakness at home and states it took her 3 hours to get her mail. Pt. requires min A for bed mobility and sit to stand transfers this date, significant global weakness noted 3+/5 BUE's. Pt. requires increased mod A for all LB ADLs. Pt. not safe to d/c home alone at this time and will require acute  IP rehab at d/c to address aforementioned deficits.     Time Calculation:    Time Calculation- OT     Row Name 10/20/22 1423             Time Calculation- OT    OT Start Time 1155  -MP      OT Stop Time 1220  -      OT Time Calculation (min) 25 min  -      Total Timed Code Minutes- OT 10 minute(s)  -      OT Received On 10/20/22  -      OT - Next Appointment 10/21/22  -      OT Goal Re-Cert Due Date 11/03/22  -            User Key  (r) = Recorded By, (t) = Taken By, (c) = Cosigned By    Initials Name Provider Type     David Mao OT Occupational Therapist              Therapy Charges for Today     Code Description Service Date Service Provider Modifiers Qty    [id number]  OT EVAL LOW COMPLEXITY 3 10/20/2022 David Mao OT GO 1    [id number]  OT THERAPEUTIC ACT EA 15 MIN 10/20/2022 David Mao OT GO 1               David Mao OT  10/20/2022

## 2022-10-20 NOTE — PROGRESS NOTES
[facility] Progress Note      Patient Name: Lita Yu  : 1965  MRN: [id number]  Primary Care Physician:  Norma Saul APRN  Date of admission: 10/14/2022    Subjective     Interval History:     Appetite getting better   Tolerating oral intake   Bowel movements decreasing in amount   Working with PT and OT     No fevers or chills      Review of Systems:   As noted above    Objective     Vitals:   Temp:  [97.8 °F (36.6 °C)-98.5 °F (36.9 °C)] 97.9 °F (36.6 °C)  Heart Rate:  [64-76] 76  Resp:  [14-16] 14  BP: (100-126)/(65-77) 126/76    Intake/Output Summary (Last 24 hours) at 10/20/2022 1750  Last data filed at 10/20/2022 1713  Gross per 24 hour   Intake 200 ml   Output --   Net 200 ml       Physical Exam:    General Appearance: alert, oriented x 3, no acute distress   Skin: warm and dry  HEENT: oral mucosa normal, nonicteric sclera  Neck: supple, no JVD  Lungs: CTA  Heart: RRR, normal S1 and S2  Abdomen: soft, nontender, nondistended  : no palpable bladder  Extremities: no edema, cyanosis or clubbing  Neuro: normal speech and mental status     Scheduled Meds:     busPIRone, 15 mg, Oral, BID  cholestyramine light, 2 packet, Oral, Q12H  folic acid, 1,000 mcg, Oral, Daily  furosemide, 40 mg, Intravenous, Daily  magnesium oxide, 800 mg, Oral, BID  multivitamin-iron-minerals-folic acid, 1 tablet, Oral, Daily  nicotine, 1 patch, Transdermal, Q24H  saccharomyces boulardii, 250 mg, Oral, BID  sertraline, 100 mg, Oral, BID  sodium chloride, 10 mL, Intravenous, Q12H  spironolactone, 100 mg, Oral, Daily  thiamine, 100 mg, Oral, Daily  traZODone, 100 mg, Oral, Nightly  vancomycin, 125 mg, Oral, Q6H      IV Meds:   Pharmacy Consult,         Results Reviewed:   I have personally reviewed the results from the time of this admission to 10/20/2022 17:50 EDT     Results from last 7 days   Lab Units 10/20/22  0036 10/19/22  0315 10/18/22  1052   SODIUM mmol/L 140 138 140    POTASSIUM mmol/L 4.4 3.2* 3.7   CHLORIDE mmol/L 104 99 98   CO2 mmol/L 27.0 30.0* 32.0*   BUN mg/dL 10 9 9   CREATININE mg/dL 0.37* 0.39* 0.46*   CALCIUM mg/dL 8.7 8.1* 8.9   BILIRUBIN mg/dL 0.4 0.4 0.7   ALK PHOS U/L 113 108 103   ALT (SGPT) U/L 74* 86* 113*   AST (SGOT) U/L 128* 187* 305*   GLUCOSE mg/dL 82 92 91       Estimated Creatinine Clearance: 143.5 mL/min (A) (by C-G formula based on SCr of 0.37 mg/dL (L)).    Results from last 7 days   Lab Units 10/18/22  1052 10/16/22  2344 10/15/22  2259   MAGNESIUM mg/dL 1.6 1.5* 1.6   PHOSPHORUS mg/dL  --   --  3.8             Results from last 7 days   Lab Units 10/20/22  0036 10/19/22  0315 10/18/22  1052 10/16/22  2344 10/15/22  2259   WBC 10*3/mm3 8.20 7.60 7.50 7.70 7.50   HEMOGLOBIN g/dL 9.9* 9.2* 10.9* 8.9* 9.4*   PLATELETS 10*3/mm3 172 150 205 153 176       Results from last 7 days   Lab Units 10/20/22  0036 10/19/22  0315 10/18/22  1052 10/16/22  2344 10/16/22  1351   INR  1.11* 1.09 1.10 1.16* 1.20*       Assessment / Plan       ASSESSMENT:     Rhabdomyolysis , secondary to recurrent falls aggravated with increased GI losses while on diuretics.   Results from last 7 days   Lab Units 10/20/22  0036 10/19/22  0315 10/18/22  1052   CK TOTAL U/L 1,250* 2,278* 4,682*   -CPKs with significant improvement  From 18K > 1.2 K   -Avoiding fluids due to ascites  -We will continue to follow CPKs trend closely     Hypomagnesemia.   - Patient was treated with magnesium IV ,  - Magnesium  1.4 > 1.6 > 1.5 > 1.5> 1.6   - I magnesium  800 mg  TID      Chronic macrocytic anemia .   - Currently on thiamine, and multivitamins     Tobacco abuse chronic   -nicotine patch  -Counseling provided    Hypokalemia  -Secondary to colitis  -KCl replacement and spironolactone   -Magnesium normal    History alcohol abuse with cirrhosis .  -  Appreciate GI recommendations  -Currently spironolactone 100 mg and furosemide 40 mg IV daily    -Urinary tract infection culture isolated over 100,000  colonies of E. Coli.  After primary team.  On nitrofurantoin    C. difficile colitis.  -Started on oral vancomycin  -On isolation    PLAN:    -Follow renal function closely, volume status improving ,  -Electrolytes stable   -Avoid nephrotoxins  -Adjust medications to GFR      Thank you for involving us in the care of Lita MARIA M Yu.  Please feel free to call with any questions.    Boris Helm MD  10/20/22  17:50 EDT    [facility]  [phone]

## 2022-10-21 ENCOUNTER — INPATIENT HOSPITAL (OUTPATIENT)
Dept: URBAN - METROPOLITAN AREA HOSPITAL 84 | Facility: HOSPITAL | Age: 57
End: 2022-10-21

## 2022-10-21 VITALS
RESPIRATION RATE: 16 BRPM | SYSTOLIC BLOOD PRESSURE: 109 MMHG | TEMPERATURE: 97.7 F | HEART RATE: 85 BPM | WEIGHT: 112.88 LBS | OXYGEN SATURATION: 95 % | DIASTOLIC BLOOD PRESSURE: 72 MMHG | HEIGHT: 64 IN | BODY MASS INDEX: 19.27 KG/M2

## 2022-10-21 DIAGNOSIS — K74.69 OTHER CIRRHOSIS OF LIVER: ICD-10-CM

## 2022-10-21 LAB
ALBUMIN SERPL-MCNC: 2.8 G/DL (ref 3.5–5.2)
ALBUMIN/GLOB SERPL: 0.8 G/DL
ALP SERPL-CCNC: 104 U/L (ref 39–117)
ALT SERPL W P-5'-P-CCNC: 67 U/L (ref 1–33)
ANION GAP SERPL CALCULATED.3IONS-SCNC: 10 MMOL/L (ref 5–15)
AST SERPL-CCNC: 101 U/L (ref 1–32)
BASOPHILS # BLD AUTO: 0 10*3/MM3 (ref 0–0.2)
BASOPHILS NFR BLD AUTO: 0.5 % (ref 0–1.5)
BILIRUB SERPL-MCNC: 0.4 MG/DL (ref 0–1.2)
BUN SERPL-MCNC: 11 MG/DL (ref 6–20)
BUN/CREAT SERPL: 22 (ref 7–25)
CALCIUM SPEC-SCNC: 8.9 MG/DL (ref 8.6–10.5)
CHLORIDE SERPL-SCNC: 101 MMOL/L (ref 98–107)
CK SERPL-CCNC: 1010 U/L (ref 20–180)
CO2 SERPL-SCNC: 26 MMOL/L (ref 22–29)
CREAT SERPL-MCNC: 0.5 MG/DL (ref 0.57–1)
DEPRECATED RDW RBC AUTO: 52.5 FL (ref 37–54)
EGFRCR SERPLBLD CKD-EPI 2021: 110.9 ML/MIN/1.73
EOSINOPHIL # BLD AUTO: 0.3 10*3/MM3 (ref 0–0.4)
EOSINOPHIL NFR BLD AUTO: 3.5 % (ref 0.3–6.2)
ERYTHROCYTE [DISTWIDTH] IN BLOOD BY AUTOMATED COUNT: 13.9 % (ref 12.3–15.4)
GLOBULIN UR ELPH-MCNC: 3.7 GM/DL
GLUCOSE SERPL-MCNC: 79 MG/DL (ref 65–99)
HCT VFR BLD AUTO: 32 % (ref 34–46.6)
HGB BLD-MCNC: 10.3 G/DL (ref 12–15.9)
INR PPP: 1.12 (ref 0.93–1.1)
LYMPHOCYTES # BLD AUTO: 2.1 10*3/MM3 (ref 0.7–3.1)
LYMPHOCYTES NFR BLD AUTO: 24.1 % (ref 19.6–45.3)
MCH RBC QN AUTO: 34.9 PG (ref 26.6–33)
MCHC RBC AUTO-ENTMCNC: 32.2 G/DL (ref 31.5–35.7)
MCV RBC AUTO: 108.2 FL (ref 79–97)
MONOCYTES # BLD AUTO: 0.9 10*3/MM3 (ref 0.1–0.9)
MONOCYTES NFR BLD AUTO: 10.2 % (ref 5–12)
NEUTROPHILS NFR BLD AUTO: 5.4 10*3/MM3 (ref 1.7–7)
NEUTROPHILS NFR BLD AUTO: 61.7 % (ref 42.7–76)
NRBC BLD AUTO-RTO: 0 /100 WBC (ref 0–0.2)
PLATELET # BLD AUTO: 201 10*3/MM3 (ref 140–450)
PMV BLD AUTO: 8.8 FL (ref 6–12)
POTASSIUM SERPL-SCNC: 4 MMOL/L (ref 3.5–5.2)
PROT SERPL-MCNC: 6.5 G/DL (ref 6–8.5)
PROTHROMBIN TIME: 11.5 SECONDS (ref 9.6–11.7)
RBC # BLD AUTO: 2.96 10*6/MM3 (ref 3.77–5.28)
SODIUM SERPL-SCNC: 137 MMOL/L (ref 136–145)
WBC NRBC COR # BLD: 8.8 10*3/MM3 (ref 3.4–10.8)

## 2022-10-21 PROCEDURE — 82550 ASSAY OF CK (CPK): CPT | Performed by: INTERNAL MEDICINE

## 2022-10-21 PROCEDURE — 97535 SELF CARE MNGMENT TRAINING: CPT

## 2022-10-21 PROCEDURE — 80053 COMPREHEN METABOLIC PANEL: CPT | Performed by: INTERNAL MEDICINE

## 2022-10-21 PROCEDURE — 25010000002 ONDANSETRON PER 1 MG: Performed by: PHYSICIAN ASSISTANT

## 2022-10-21 PROCEDURE — 85025 COMPLETE CBC W/AUTO DIFF WBC: CPT | Performed by: NURSE PRACTITIONER

## 2022-10-21 PROCEDURE — 97116 GAIT TRAINING THERAPY: CPT | Performed by: PHYSICAL THERAPIST

## 2022-10-21 PROCEDURE — 97530 THERAPEUTIC ACTIVITIES: CPT | Performed by: PHYSICAL THERAPIST

## 2022-10-21 PROCEDURE — 85610 PROTHROMBIN TIME: CPT | Performed by: NURSE PRACTITIONER

## 2022-10-21 PROCEDURE — 99231 SBSQ HOSP IP/OBS SF/LOW 25: CPT | Performed by: NURSE PRACTITIONER

## 2022-10-21 PROCEDURE — 25010000002 FUROSEMIDE PER 20 MG: Performed by: INTERNAL MEDICINE

## 2022-10-21 RX ORDER — SACCHAROMYCES BOULARDII 250 MG
250 CAPSULE ORAL 2 TIMES DAILY
Qty: 30 CAPSULE | Refills: 0 | Status: SHIPPED | OUTPATIENT
Start: 2022-10-21 | End: 2022-11-30 | Stop reason: SDUPTHER

## 2022-10-21 RX ORDER — VANCOMYCIN HYDROCHLORIDE 125 MG/1
125 CAPSULE ORAL EVERY 6 HOURS SCHEDULED
Qty: 22 CAPSULE | Refills: 0 | Status: SHIPPED | OUTPATIENT
Start: 2022-10-22 | End: 2022-10-28

## 2022-10-21 RX ORDER — IPRATROPIUM BROMIDE AND ALBUTEROL SULFATE 2.5; .5 MG/3ML; MG/3ML
3 SOLUTION RESPIRATORY (INHALATION) EVERY 4 HOURS PRN
Qty: 360 ML | Refills: 1 | Status: SHIPPED | OUTPATIENT
Start: 2022-10-21 | End: 2022-10-31 | Stop reason: SDUPTHER

## 2022-10-21 RX ORDER — NITROGLYCERIN 0.4 MG/1
0.4 TABLET SUBLINGUAL
Qty: 30 TABLET | Refills: 12 | Status: SHIPPED | OUTPATIENT
Start: 2022-10-21 | End: 2022-10-31 | Stop reason: SDUPTHER

## 2022-10-21 RX ORDER — FUROSEMIDE 40 MG/1
40 TABLET ORAL 2 TIMES DAILY
Qty: 30 TABLET | Refills: 0
Start: 2022-10-21 | End: 2022-11-30 | Stop reason: SDUPTHER

## 2022-10-21 RX ORDER — POTASSIUM CHLORIDE 1.5 G/1.77G
40 POWDER, FOR SOLUTION ORAL DAILY
Qty: 30 EACH | Refills: 0 | Status: SHIPPED | OUTPATIENT
Start: 2022-10-21 | End: 2022-10-31 | Stop reason: SDUPTHER

## 2022-10-21 RX ORDER — LANOLIN ALCOHOL/MO/W.PET/CERES
100 CREAM (GRAM) TOPICAL DAILY
Qty: 30 TABLET | Refills: 0 | Status: SHIPPED | OUTPATIENT
Start: 2022-10-22 | End: 2022-11-07 | Stop reason: SDUPTHER

## 2022-10-21 RX ORDER — CHOLESTYRAMINE LIGHT 4 G/5.7G
2 POWDER, FOR SUSPENSION ORAL EVERY 12 HOURS SCHEDULED
Qty: 30 PACKET | Refills: 1 | Status: SHIPPED | OUTPATIENT
Start: 2022-10-21 | End: 2022-11-07 | Stop reason: SDUPTHER

## 2022-10-21 RX ORDER — FOLIC ACID/MULTIVIT,IRON,MINER .4-18-35
1 TABLET,CHEWABLE ORAL DAILY
Qty: 30 TABLET | Refills: 11 | Status: SHIPPED | OUTPATIENT
Start: 2022-10-22 | End: 2023-10-22

## 2022-10-21 RX ORDER — SPIRONOLACTONE 100 MG/1
100 TABLET, FILM COATED ORAL DAILY
Qty: 30 TABLET | Refills: 1 | Status: SHIPPED | OUTPATIENT
Start: 2022-10-22 | End: 2022-11-30 | Stop reason: SDUPTHER

## 2022-10-21 RX ORDER — NICOTINE 21 MG/24HR
1 PATCH, TRANSDERMAL 24 HOURS TRANSDERMAL
Qty: 30 PATCH | Refills: 0 | Status: SHIPPED | OUTPATIENT
Start: 2022-10-22 | End: 2022-10-31 | Stop reason: SDUPTHER

## 2022-10-21 RX ADMIN — ONDANSETRON 4 MG: 2 INJECTION INTRAMUSCULAR; INTRAVENOUS at 00:06

## 2022-10-21 RX ADMIN — TRAZODONE HYDROCHLORIDE 100 MG: 100 TABLET ORAL at 20:31

## 2022-10-21 RX ADMIN — SERTRALINE 100 MG: 100 TABLET, FILM COATED ORAL at 20:31

## 2022-10-21 RX ADMIN — VANCOMYCIN HYDROCHLORIDE 125 MG: 125 CAPSULE ORAL at 17:15

## 2022-10-21 RX ADMIN — Medication 1 TABLET: at 09:26

## 2022-10-21 RX ADMIN — VANCOMYCIN HYDROCHLORIDE 125 MG: 125 CAPSULE ORAL at 00:07

## 2022-10-21 RX ADMIN — BUTALBITAL, ACETAMINOPHEN, AND CAFFEINE 1 TABLET: 50; 325; 40 TABLET ORAL at 17:15

## 2022-10-21 RX ADMIN — SERTRALINE 100 MG: 100 TABLET, FILM COATED ORAL at 09:26

## 2022-10-21 RX ADMIN — CHOLESTYRAMINE 8 G: 4 POWDER, FOR SUSPENSION ORAL at 20:31

## 2022-10-21 RX ADMIN — CHOLESTYRAMINE 8 G: 4 POWDER, FOR SUSPENSION ORAL at 09:26

## 2022-10-21 RX ADMIN — Medication 100 MG: at 09:26

## 2022-10-21 RX ADMIN — Medication 250 MG: at 20:31

## 2022-10-21 RX ADMIN — Medication 10 ML: at 09:27

## 2022-10-21 RX ADMIN — BUSPIRONE HYDROCHLORIDE 15 MG: 15 TABLET ORAL at 09:26

## 2022-10-21 RX ADMIN — Medication 800 MG: at 20:31

## 2022-10-21 RX ADMIN — ACETAMINOPHEN 650 MG: 325 TABLET, FILM COATED ORAL at 00:06

## 2022-10-21 RX ADMIN — VANCOMYCIN HYDROCHLORIDE 125 MG: 125 CAPSULE ORAL at 06:22

## 2022-10-21 RX ADMIN — FOLIC ACID 1000 MCG: 1 TABLET ORAL at 09:26

## 2022-10-21 RX ADMIN — FUROSEMIDE 40 MG: 10 INJECTION, SOLUTION INTRAMUSCULAR; INTRAVENOUS at 09:26

## 2022-10-21 RX ADMIN — Medication 250 MG: at 09:26

## 2022-10-21 RX ADMIN — Medication 800 MG: at 09:26

## 2022-10-21 RX ADMIN — VANCOMYCIN HYDROCHLORIDE 125 MG: 125 CAPSULE ORAL at 14:04

## 2022-10-21 RX ADMIN — BUSPIRONE HYDROCHLORIDE 15 MG: 15 TABLET ORAL at 20:31

## 2022-10-21 NOTE — PLAN OF CARE
Goal Outcome Evaluation:  Plan of Care Reviewed With: patient        Assessment: Lita Yu presents with functional mobility impairments which indicate the need for skilled intervention. Tolerating session today without incident. Pt was able to reduce assist with bed mobility.  She required max A for almas-care, and was able to roll with min A x1 L/R.  Pt was fatigued end of session. Will continue to follow and progress as tolerated.     Plan/Recommendations:   High Intensity Therapy recommended post-acute care. This is recommended as therapy feels the patient would require 5-6 days per week, 2-3 hours per day. At this time, inpatient rehabilitation (acute rehab) would be the first choice and SNF would be second.. Pt requires no DME at discharge.

## 2022-10-21 NOTE — PROGRESS NOTES
[facility] Medicine Services Daily Progress Note    Patient Name: Lita Yu  : 1965  MRN: [id number]  Primary Care Physician:  Norma Saul APRN  Date of admission: 10/14/2022      Subjective      Chief Complaint: Weakness    10/16/2022 patient seen and examined this morning.  Doing better compared to yesterday.  Feels generalized pain all over and weakness but no other complaints.  No numbness or tingling.  10/17/2022 patient seen and examined in bed no acute distress, vital signs stable, reports feeling better today denies any abdominal pain nausea vomiting.  Unable to perform paracentesis no None fluids.  10/18/22 patient seen and examined in bed no acute distress, no complaints, vital signs stable, discussed with RN.  Diarrhea  10/19/22 patient seen and examined in bed no acute distress, vital signs stable, having diarrhea.  Discussed with RN.  Awaiting placement.  Pending patient decision.  10/20/22 patient seen and examined in bed NAD, feels better today, still having some diarrhea   Pertinent positives as noted in HPI/subjective.  All other systems were reviewed and are negative.  10/21/2022 patient seen  in bed acute distress, feeling better today, vital signs stable, awaiting placement, pre-CERT pending.  Discussed with RN.    Objective      Vitals:   Temp:  [97.5 °F (36.4 °C)-97.9 °F (36.6 °C)] 97.5 °F (36.4 °C)  Heart Rate:  [76-77] 77  Resp:  [14-16] 16  BP: ()/(63-76) 97/63    Physical Exam:  General: Awake, alert, lying in bed, NAD  Eyes: PERRL, EOMI, conjunctive are clear  Cardiovascular: Regular rate and rhythm, no murmurs  Respiratory: Clear to auscultation bilaterally, no wheezing or rales, unlabored breathing  Abdomen: Soft, distended, nontender, positive bowel sounds, no guarding  Neurologic: A&O, CN grossly intact, moves all extremities spontaneously, sensation intact bilaterally  Musculoskeletal: Generalized weakness, no deformities  Skin:  Warm, dry, intact    Result Review    Result Review:  I have personally reviewed the results from the time of this admission to 10/21/2022 09:44 EDT and agree with these findings:  [x]  Laboratory  [x]  Microbiology  [x]  Radiology  []  EKG/Telemetry   []  Cardiology/Vascular   []  Pathology  []  Old records  []  Other:       CMP:        Lab 10/21/22  0052 10/20/22  0036 10/19/22  0315 10/18/22  1052 10/16/22  2344 10/15/22  2259 10/14/22  2113   SODIUM 137 140 138 140 139 139 143   POTASSIUM 4.0 4.4 3.2* 3.7 3.6 3.5 3.6   CHLORIDE 101 104 99 98 103 102 112*   CO2 26.0 27.0 30.0* 32.0* 29.0 29.0 22.0   ANION GAP 10.0 9.0 9.0 10.0 7.0 8.0 9.0   BUN 11 10 9 9 9 9 7   CREATININE 0.50* 0.37* 0.39* 0.46* 0.41* 0.51* 0.42*   EGFR 110.9 119.3 117.8 113.2 116.4 110.4 115.7   GLUCOSE 79 82 92 91 96 83 74   CALCIUM 8.9 8.7 8.1* 8.9 8.0* 8.5* 7.5*   MAGNESIUM  --   --   --  1.6 1.5* 1.6 1.4*   PHOSPHORUS  --   --   --   --   --  3.8  --    TOTAL PROTEIN 6.5 6.1 5.8* 6.6 5.3* 5.4* 4.8*   ALBUMIN 2.80* 2.60* 2.40* 2.90* 2.30* 2.30* 2.00*   GLOBULIN 3.7 3.5 3.4 3.7 3.0 3.1 2.8   ALT (SGPT) 67* 74* 86* 113* 108* 114* 83*   AST (SGOT) 101* 128* 187* 305* 428* 675* 493*   BILIRUBIN 0.4 0.4 0.4 0.7 0.3 0.4 0.6   ALK PHOS 104 113 108 103 121* 90 77     CBC:      Lab 10/21/22  0052 10/20/22  0036 10/19/22  0315 10/18/22  1052 10/16/22  2344   WBC 8.80 8.20 7.60 7.50 7.70   HEMOGLOBIN 10.3* 9.9* 9.2* 10.9* 8.9*   HEMATOCRIT 32.0* 28.6* 27.5* 33.5* 26.7*   PLATELETS 201 172 150 205 153   NEUTROS ABS 5.40 5.00 4.60 5.40 5.00   LYMPHS ABS 2.10 2.00 1.90 1.20 1.70   MONOS ABS 0.90 0.90 0.90 0.60 0.90   EOS ABS 0.30 0.20 0.20 0.10 0.20   .2* 110.2* 109.3* 108.9* 110.3*       Assessment & Plan      Brief Patient Summary:  Lita Yu is a 57 y.o. female who      busPIRone, 15 mg, Oral, BID  cholestyramine light, 2 packet, Oral, Q12H  folic acid, 1,000 mcg, Oral, Daily  furosemide, 40 mg, Intravenous, Daily  magnesium oxide,  800 mg, Oral, BID  multivitamin-iron-minerals-folic acid, 1 tablet, Oral, Daily  nicotine, 1 patch, Transdermal, Q24H  saccharomyces boulardii, 250 mg, Oral, BID  sertraline, 100 mg, Oral, BID  sodium chloride, 10 mL, Intravenous, Q12H  spironolactone, 100 mg, Oral, Daily  thiamine, 100 mg, Oral, Daily  traZODone, 100 mg, Oral, Nightly  vancomycin, 125 mg, Oral, Q6H       Pharmacy Consult,        Active Hospital Problems:  Active Hospital Problems    Diagnosis    • **Generalized weakness    • Moderate malnutrition (HCC)    • Cirrhosis of liver (HCC)    • Acute UTI (urinary tract infection)    • Rhabdomyolysis    • Non-traumatic rhabdomyolysis    • Chronic obstructive pulmonary disease (HCC)    • Essential hypertension    • Anemia of chronic disease    • Post traumatic stress disorder    • Hyperlipidemia    • Tobacco dependence syndrome        Generalized weakness  Acute rhabdomyolysis  -Likely related to deconditioning and alcohol abuse, patient was found on the floor for at least 24 hours  -CPK elevated but patient has significant ascites, IV fluids decreased per GI  -PT/OT, may need rehab  -MRI spine negative for acute cord compression or abscess  -Low-dose IV fluid due to ascites  -Nephrology following  -Neurology following    LORENZO/Alcoholic cirrhosis with ascites  -CT report noted  -Paracentesis ordered, unable to perform  -Diuresis with Lasix per GI  -Continue Aldactone  -Lactulose if needed  -GI following    Acute UTI  -UA noted, urine culture collected  -Continue empiric aztreonam due to allergies  -Follow-up on urine culture    C. difficile colitis-improving   P.o. vancomycin,  P.o. Florastor  P.o. welchole,  Imodium as needed    Alcohol abuse  -Appears to be chronic, counseled on cessation  -Last drink was the night before admission  -Started on CIWA protocol with Ativan as needed  -Monitor for withdrawals    Hypertension  -Controlled  -Resume home meds as able  -Monitor    Hypomagnesemia  -Replace and  monitor    Anemia of chronic disease  -Hemoglobin appears to be stable  -Monitor daily    COPD  -Not in exacerbation, remains on room air  -Continue bronchodilators, monitor    PTSD/anxiety  -Continue home meds, monitor    DVT prophylaxis  -Lovenox        CODE STATUS:    Code Status (Patient has no pulse and is not breathing): CPR (Attempt to Resuscitate)  Medical Interventions (Patient has pulse or is breathing): Full Support      Disposition: Pending improvement    Electronically signed by Quang Estrella MD, 10/21/22, 09:44 EDT.  [facility] Team      Much of this encounter note is an electronic transcription/translation of spoken language to printed text. The electronic translation of spoken language may permit erroneous, or at times, nonsensical words or phrases to be inadvertently transcribed; Although I have reviewed the note for such errors, some may still exist.

## 2022-10-21 NOTE — DISCHARGE SUMMARY
[facility] Medicine Services  DISCHARGE SUMMARY    Patient Name: Lita Yu  : 1965  MRN: [id number]    Date of Admission: 10/14/2022  Discharge Diagnosis: Weakness  Date of Discharge: 10/21/22  Primary Care Physician: Norma Saul APRN    Presenting Problem:   Hypomagnesemia [E83.42]  Non-traumatic rhabdomyolysis [M62.82]  Urinary tract infection without hematuria, site unspecified [N39.0]    Active and Resolved Hospital Problems:  Active Hospital Problems    Diagnosis POA   • **Generalized weakness [R53.1] Yes   • Moderate malnutrition (HCC) [E44.0] Yes   • Cirrhosis of liver (HCC) [K74.60] Yes   • Acute UTI (urinary tract infection) [N39.0] Yes   • Rhabdomyolysis [M62.82] Yes   • Non-traumatic rhabdomyolysis [M62.82] Yes   • Chronic obstructive pulmonary disease (HCC) [J44.9] Yes   • Essential hypertension [I10] Yes   • Anemia of chronic disease [D63.8] Yes   • Post traumatic stress disorder [F43.10] Yes   • Hyperlipidemia [E78.5] Yes   • Tobacco dependence syndrome [F17.200] Yes      Resolved Hospital Problems   No resolved problems to display.     Generalized weakness  Acute rhabdomyolysis  -Likely related to deconditioning and alcohol abuse, patient was found on the floor for at least 24 hours  -CPK elevated but patient has significant ascites, IV fluids decreased per GI  -PT/OT, may need rehab  -MRI spine negative for acute cord compression or abscess  -Low-dose IV fluid due to ascites     LORENZO/Alcoholic cirrhosis with ascites  -CT report noted  -Paracentesis ordered, unable to perform  -Diuresis with Lasix per GI  -Continue Aldactone  -Lactulose if needed  -GI following     Acute UTI  -UA noted, urine culture collected  -Continue empiric aztreonam due to allergies  -Follow-up on urine culture     C. difficile colitis-improving   P.o. vancomycin,  P.o. Florastor  P.o. welchole,  Imodium as needed     Alcohol abuse  -Appears to be chronic, counseled on  cessation  -Last drink was the night before admission  -Started on CIWA protocol with Ativan as needed  -Monitor for withdrawals     Hypertension  -Controlled  -Resume home meds as able  -Monitor     Hypomagnesemia  -Replace and monitor     Anemia of chronic disease  -Hemoglobin appears to be stable  -Monitor daily     COPD  -Not in exacerbation, remains on room air  -Continue bronchodilators, monitor     PTSD/anxiety  -Continue home meds, monitor    Hospital Course     Hospital Course:  History of Present Illness: Lita Yu is a 57 y.o. female who presented to [facility] on 10/14/2022 complaining of generalized weakness as well as generalized pain.  Patient reports for approximately past 5 days she has had increasing weakness with difficulty in mobility though no focal neurologic deficits are reported.  She notes significant decrease in p.o. intake as well as urine output and reports that on 10/14/2022 she fell landing on the ground and had to lay on the ground for approximately 24 hours before she was found.  At present she denies generalized muscle aches worse in her extremities as well as a headache and dry mouth.  Some increase in her abdominal swelling is also reported though she denies any peripheral edema and does not note any nausea, vomiting or recent fevers.     10/16/2022 patient seen and examined this morning.  Doing better compared to yesterday.  Feels generalized pain all over and weakness but no other complaints.  No numbness or tingling.  10/17/2022 patient seen and examined in bed no acute distress, vital signs stable, reports feeling better today denies any abdominal pain nausea vomiting.  Unable to perform paracentesis no None fluids.  10/18/22 patient seen and examined in bed no acute distress, no complaints, vital signs stable, discussed with RN.  Diarrhea  10/19/22 patient seen and examined in bed no acute distress, vital signs stable, having diarrhea.  Discussed with RN.   Awaiting placement.  Pending patient decision.  10/20/22 patient seen and examined in bed NAD, feels better today, still having some diarrhea   Pertinent positives as noted in HPI/subjective.  All other systems were reviewed and are negative.  10/21/2022 patient seen  in bed acute distress, feeling better today, vital signs stable, awaiting placement, pre-CERT pending.  Discussed with RN.    DISCHARGE Follow Up Recommendations for labs and diagnostics: Follow-up with PCP in a week      Reasons For Change In Medications and Indications for New Medications:  START taking:  cholestyramine light   furosemide (Lasix)   ipratropium-albuterol (DUO-NEB)   magnesium oxide (MAG-OX)   multivitamin-iron-minerals-folic acid   nicotine (NICODERM CQ)   nitroglycerin (NITROSTAT)   saccharomyces boulardii (FLORASTOR)   spironolactone (ALDACTONE)   thiamine (VITAMIN B1)   vancomycin (VANCOCIN)      STOP taking:  lisinopril-hydrochlorothiazide 20-25 MG per tablet (PRINZIDE,ZESTORETIC)       Day of Discharge     Vital Signs:  Temp:  [97.5 °F (36.4 °C)-97.7 °F (36.5 °C)] 97.7 °F (36.5 °C)  Heart Rate:  [77-85] 85  Resp:  [16] 16  BP: ()/(63-72) 109/72      Physical Exam *General: Awake, alert, lying in bed, NAD  Eyes: PERRL, EOMI, conjunctive are clear  Cardiovascular: Regular rate and rhythm, no murmurs  Respiratory: Clear to auscultation bilaterally, no wheezing or rales, unlabored breathing  Abdomen: Soft, distended, nontender, positive bowel sounds, no guarding  Neurologic: A&O, CN grossly intact, moves all extremities spontaneously, sensation intact bilaterally  Musculoskeletal: Generalized weakness, no deformities  Skin: Warm, dry, intact      Pertinent  and/or Most Recent Results     LAB RESULTS:      Lab 10/21/22  0052 10/20/22  0036 10/19/22  0315 10/18/22  1052 10/16/22  2344 10/15/22  1006 10/15/22  0100 10/14/22  2037   WBC 8.80 8.20 7.60 7.50 7.70   < >  --  8.90   HEMOGLOBIN 10.3* 9.9* 9.2* 10.9* 8.9*   < >  --   11.8*   HEMATOCRIT 32.0* 28.6* 27.5* 33.5* 26.7*   < >  --  36.9   PLATELETS 201 172 150 205 153   < >  --  233   NEUTROS ABS 5.40 5.00 4.60 5.40 5.00   < >  --  6.80   LYMPHS ABS 2.10 2.00 1.90 1.20 1.70   < >  --  1.30   MONOS ABS 0.90 0.90 0.90 0.60 0.90   < >  --  0.50   EOS ABS 0.30 0.20 0.20 0.10 0.20   < >  --  0.20   .2* 110.2* 109.3* 108.9* 110.3*   < >  --  111.0*   LACTATE  --   --   --   --   --   --  1.0  --    PROTIME 11.5 11.4 11.2 11.3 11.8*   < >  --  11.7   APTT  --   --   --   --   --   --   --  26.4*    < > = values in this interval not displayed.         Lab 10/21/22  0052 10/20/22  0036 10/19/22  0315 10/18/22  1052 10/16/22  2344 10/15/22  2259 10/14/22  2113   SODIUM 137 140 138 140 139 139 143   POTASSIUM 4.0 4.4 3.2* 3.7 3.6 3.5 3.6   CHLORIDE 101 104 99 98 103 102 112*   CO2 26.0 27.0 30.0* 32.0* 29.0 29.0 22.0   ANION GAP 10.0 9.0 9.0 10.0 7.0 8.0 9.0   BUN 11 10 9 9 9 9 7   CREATININE 0.50* 0.37* 0.39* 0.46* 0.41* 0.51* 0.42*   EGFR 110.9 119.3 117.8 113.2 116.4 110.4 115.7   GLUCOSE 79 82 92 91 96 83 74   CALCIUM 8.9 8.7 8.1* 8.9 8.0* 8.5* 7.5*   MAGNESIUM  --   --   --  1.6 1.5* 1.6 1.4*   PHOSPHORUS  --   --   --   --   --  3.8  --    TSH  --   --   --   --   --   --  0.930         Lab 10/21/22  0052 10/20/22  0036 10/19/22  0315 10/18/22  1052 10/16/22  2344   TOTAL PROTEIN 6.5 6.1 5.8* 6.6 5.3*   ALBUMIN 2.80* 2.60* 2.40* 2.90* 2.30*   GLOBULIN 3.7 3.5 3.4 3.7 3.0   ALT (SGPT) 67* 74* 86* 113* 108*   AST (SGOT) 101* 128* 187* 305* 428*   BILIRUBIN 0.4 0.4 0.4 0.7 0.3   ALK PHOS 104 113 108 103 121*         Lab 10/21/22  0052 10/20/22  0036 10/19/22  0315 10/18/22  1052 10/16/22  2344   PROTIME 11.5 11.4 11.2 11.3 11.8*   INR 1.12* 1.11* 1.09 1.10 1.16*                 Brief Urine Lab Results  (Last result in the past 365 days)      Color   Clarity   Blood   Leuk Est   Nitrite   Protein   CREAT   Urine HCG        10/14/22 2136 Other  Comment: Brown   Turbid   Large (3+)    Large (3+)   Positive   >=300 mg/dL (3+)               Microbiology Results (last 10 days)     Procedure Component Value - Date/Time    Gastrointestinal Panel, PCR - Stool, Per Rectum [[id number]]  (Normal) Collected: 10/16/22 1216    Lab Status: Final result Specimen: Stool from Per Rectum Updated: 10/16/22 1635     Campylobacter Not Detected     Plesiomonas shigelloides Not Detected     Salmonella Not Detected     Vibrio Not Detected     Vibrio cholerae Not Detected     Yersinia enterocolitica Not Detected     Enteroaggregative E. coli (EAEC) Not Detected     Enteropathogenic E. coli (EPEC) Not Detected     Enterotoxigenic E. coli (ETEC) lt/st Not Detected     Shiga-like toxin-producing E. coli (STEC) stx1/stx2 Not Detected     Shigella/Enteroinvasive E. coli (EIEC) Not Detected     Cryptosporidium Not Detected     Cyclospora cayetanensis Not Detected     Entamoeba histolytica Not Detected     Giardia lamblia Not Detected     Adenovirus F40/41 Not Detected     Astrovirus Not Detected     Norovirus GI/GII Not Detected     Rotavirus A Not Detected     Sapovirus (I, II, IV or V) Not Detected    Narrative:      If Aeromonas, Staphylococcus aureus or Bacillus cereus are suspected, please order [id number]: Stool Culture, Aeromonas, S aureus, B Cereus.    Clostridioides difficile Toxin - Stool, Per Rectum [[id number]]  (Abnormal) Collected: 10/16/22 1216    Lab Status: Final result Specimen: Stool from Per Rectum Updated: 10/17/22 1449    Narrative:      The following orders were created for panel order Clostridioides difficile Toxin - Stool, Per Rectum.  Procedure                               Abnormality         Status                     ---------                               -----------         ------                     Clostridioides difficile...[[id number]]  Abnormal            Final result                 Please view results for these tests on the individual orders.    Clostridioides difficile EIA - Stool, Per Rectum  [[id number]]  (Abnormal) Collected: 10/16/22 1216    Lab Status: Final result Specimen: Stool from Per Rectum Updated: 10/17/22 0815     C Diff GDH Ag Positive     C.diff Toxin Ag Positive    Narrative:      DNA from a toxigenic strain of C.difficile was detected, along with the presence of free toxin. These results are suggestive of C.difficile infection, in context of an appropriate clinical scenario.    Blood Culture - Blood, Arm, Left [[id number]]  (Normal) Collected: 10/15/22 0100    Lab Status: Final result Specimen: Blood from Arm, Left Updated: 10/20/22 0115     Blood Culture No growth at 5 days    Blood Culture - Blood, Blood, Venous Line [[id number]]  (Normal) Collected: 10/15/22 0100    Lab Status: Final result Specimen: Blood, Venous Line Updated: 10/20/22 0115     Blood Culture No growth at 5 days    Urine Culture - Urine, Urine, Catheter [[id number]]  (Abnormal)  (Susceptibility) Collected: 10/14/22 2136    Lab Status: Final result Specimen: Urine, Catheter Updated: 10/16/22 0938     Urine Culture >100,000 CFU/mL Escherichia coli    Narrative:      Colonization of the urinary tract without infection is common. Treatment is discouraged unless the patient is symptomatic, pregnant, or undergoing an invasive urologic procedure.    Susceptibility      Escherichia coli      SOFIA      Ampicillin Susceptible      Ampicillin + Sulbactam Susceptible      Cefazolin Susceptible      Cefepime Susceptible      Ceftazidime Susceptible      Ceftriaxone Susceptible      Gentamicin Susceptible      Levofloxacin Susceptible      Nitrofurantoin Susceptible      Piperacillin + Tazobactam Susceptible      Trimethoprim + Sulfamethoxazole Susceptible                                 CT Abdomen Pelvis Without Contrast    Result Date: 10/14/2022  Impression:  1. Large amount of ascites in the abdomen pelvis. 2. The gallbladder is largely distended. I see no evidence of gallbladder wall thickening or stone or sludge in the gallbladder  lumen. No evidence of dilatation of bile ducts. 3. The liver is small in size and is lobulated surface consistent with cirrhosis of the liver. There is no evidence of enlargement of the spleen #4 questionable thickening of the wall of the a ascending colon and transverse colon might be caused by contraction or possibly inflammation or infection  Electronically Signed By-Yohan Alcantar MD On:10/14/2022 9:15 PM This report was finalized on [id number] by  Yohan Alcantar MD.    CT Head Without Contrast    Result Date: 10/14/2022  Impression:  1. No acute intracranial abnormality seen.  Electronically Signed By-Yohan Alcantar MD On:10/14/2022 9:08 PM This report was finalized on [id number] by  Yohan Alcantar MD.    MRI Cervical Spine Without Contrast    Result Date: 10/15/2022  Impression: IMPRESSION : 1.     Motion artifact degrades diagnostic evaluation. No definitive cord abnormality is seen on this exam. 2.     Multilevel degenerative changes as above with suspected multilevel severe foraminal and mild to moderate canal narrowing. Repeat evaluation when patient is better able to maintain positioning may be considered.  Electronically Signed By-Jluis Medina MD On:10/15/2022 1:23 PM This report was finalized on [id number] by  Jluis Medina MD.    MRI Thoracic Spine Without Contrast    Result Date: 10/15/2022  Impression: 1.     No definite high-grade stenosis or thoracic cord abnormality is seen on this exam which is somewhat limited by motion. 2.     Ascites and body wall edema.  Electronically Signed By-Jluis Medina MD On:10/15/2022 1:30 PM This report was finalized on [id number] by  Jluis Medina MD.    MRI Lumbar Spine Without Contrast    Result Date: 10/15/2022  Impression: 1.     Motion artifact somewhat degrades diagnostic image quality. Conus appears grossly unremarkable. 2.     Mild marrow edema at the superior endplate of L3 surrounding a small Schmorl's node. No  definite vertebral body height loss or acute fracture is identified. 3.     Mild marrow signal heterogeneity which could be seen with marrow reconversion. Correlate with lab values. 4.     Degenerative changes with suspected mild canal and foraminal narrowing as above. 5.     L5 pars defects without significant displacement on this exam. 6.     Ascites and body wall edema.  Electronically Signed By-Jluis Medina MD On:10/15/2022 1:38 PM This report was finalized on [id number] by  Jluis Medina MD.    US Abdomen Limited    Result Date: 10/16/2022  Impression:  1.  Nodular appearing liver and be seen with cirrhosis. 2.  Small amount of ascites. 3.  No evidence of gallstones or biliary tract obstruction.  Electronically Signed By-Ryan Peres MD On:10/16/2022 11:22 AM This report was finalized on [id number] by  Ryan Peres MD.                  Labs Pending at Discharge:      Procedures Performed           Consults:   Consults     Date and Time Order Name Status Description    10/15/2022  8:01 AM Inpatient Gastroenterology Consult      10/15/2022  2:16 AM Inpatient Nephrology Consult Completed     10/14/2022 11:14 PM Hospitalist (on-call MD unless specified)      10/14/2022  8:19 PM Inpatient Neurology Consult General Completed         Avoiding fluids due to ascites  -We will continue to follow CPKs trend closely     Hypomagnesemia.   - Patient was treated with magnesium IV ,  - Magnesium  1.4 > 1.6 > 1.5 > 1.5> 1.6   - I magnesium  800 mg  TID      Chronic macrocytic anemia .   - Currently on thiamine, and multivitamins     Tobacco abuse chronic   -nicotine patch  -Counseling provided     Hypokalemia  -Secondary to colitis  -KCl replacement and spironolactone   -Magnesium normal     History alcohol abuse with cirrhosis .  -  Appreciate GI recommendations  -Currently spironolactone 100 mg and furosemide 40 mg IV daily     -Urinary tract infection culture isolated over 100,000 colonies of E. Coli.  After  primary team.  On nitrofurantoin     C. difficile colitis.  -on oral vancomycin QID   -On isolation     PLAN:     -CPK normalizing   -Volume status improved with diuretic  -Completing course of vancomycin  -Electrolytes stable   -Avoid nephrotoxins  -Continue surveillance labs        Thank you for involving us in the care of Lita Yu.  Please feel free to call with any questions.     Boris Helm MD  10/21/22  16:09 EDT     [facility]  [phone]         Attestation signed by SHADE Aragon MD at 10/16/22 6686     The patient was seen and examined with an APRN. I personally performed the entire medical decision making, and physical exam.     HPI/ Subjective: No complaints this afternoon.  Denies abdominal swelling.  Denies nausea or vomiting.  Physical Exam notable for:  Awake, alert, no distress. Abdomen soft, nondistended. Otherwise unremarkable physical exam.  Labs and imaging and outpatient records reviewed, ordered.  AST and ALT are worse today, 679, and 114 respectively.  CT abdomen-large ascites, cirrhotic liver.  Possible thickening colon.     Assessment/Plan  Decompensated cirrhosis due to LORENZO/alcohol, complicated by ascites  Transaminitis  Macrocytic anemia  UTI  Chronic diarrhea  Abnormal CT imaging of the colon     -- Plan for diagnostic paracentesis as soon as possible, likely will be on Monday, to rule out SBP.  Started low-dose diuretics and titrate up as able.  Needs low-sodium diet.  -- For diarrhea we will check stool studies, continue Imodium, could also try colestipol.  She had a colonoscopy a year ago which showed nonspecific colitis.  -- No encephalopathy at present.  Needs RUQ US for HCC screening, up to date on esophageal varices screening.    --Consider AST predominant transaminitis and elevated CPK, may be due to rhabdomyolysis in the setting of being down, versus less likely drug-induced liver injury due to antibiotics.  Aztreonam is  unlikely cause of DILI, category E on liver tox. Check viral hepatitis panel, US was done but no doppler was done, pending autoimmune hepatitis serologies.     Electronically signed on 10/16/22 15:44 EDT by CHELE Aragon MD    Discharge Details        Discharge Medications      New Medications      Instructions Start Date   cholestyramine light 4 g packet   8 g, Oral, Every 12 Hours Scheduled      furosemide 40 MG tablet  Commonly known as: Lasix   40 mg, Oral, 2 Times Daily      ipratropium-albuterol 0.5-2.5 mg/3 ml nebulizer  Commonly known as: DUO-NEB   3 mL, Nebulization, Every 4 Hours PRN      magnesium oxide 400 tablet tablet  Commonly known as: MAG-OX   800 mg, Oral, 2 Times Daily      multivitamin-iron-minerals-folic acid chewable tablet   1 tablet, Oral, Daily   Start Date: October 22, 2022     nicotine 21 MG/24HR patch  Commonly known as: NICODERM CQ   1 patch, Transdermal, Every 24 Hours Scheduled   Start Date: October 22, 2022     nitroglycerin 0.4 MG SL tablet  Commonly known as: NITROSTAT   0.4 mg, Sublingual, Every 5 Minutes PRN, Take no more than 3 doses in 15 minutes.      saccharomyces boulardii 250 MG capsule  Commonly known as: FLORASTOR   250 mg, Oral, 2 Times Daily      spironolactone 100 MG tablet  Commonly known as: ALDACTONE   100 mg, Oral, Daily   Start Date: October 22, 2022     thiamine 100 MG tablet  Commonly known as: VITAMIN B1   100 mg, Oral, Daily   Start Date: October 22, 2022     vancomycin 125 MG capsule  Commonly known as: VANCOCIN   125 mg, Oral, Every 6 Hours Scheduled   Start Date: October 22, 2022        Continue These Medications      Instructions Start Date   atorvastatin 20 MG tablet  Commonly known as: LIPITOR   20 mg, Oral, Daily      busPIRone 15 MG tablet  Commonly known as: BUSPAR   TAKE 1 TABLET BY MOUTH TWICE A DAY      folic acid 1 MG tablet  Commonly known as: FOLVITE   1,000 mcg, Oral, Daily      omega-3 acid ethyl esters 1 g capsule  Commonly known as:  LOVAZA   TAKE 2 CAPSULES BY MOUTH 2 TIMES A DAY.      ondansetron 4 MG tablet  Commonly known as: ZOFRAN   4 mg, Oral, Daily PRN      sertraline 100 MG tablet  Commonly known as: ZOLOFT   100 mg, Oral, 2 Times Daily      traZODone 100 MG tablet  Commonly known as: DESYREL   TAKE 1 TABLET BY MOUTH EVERY DAY AT NIGHT      Vitamin B-12 1000 MCG sublingual tablet   1 tablet, Sublingual, Daily         Stop These Medications    lisinopril-hydrochlorothiazide 20-25 MG per tablet  Commonly known as: PRINZIDE,ZESTORETIC            Allergies   Allergen Reactions   • Sulfa Antibiotics Hives   • Keflex [Cephalexin] Hives         Discharge Disposition:   Rehab Facility or Unit (DC - External)    Diet:  Hospital:  Diet Order   Procedures   • Diet Regular, Cardiac; 2gm Na+         Discharge Activity:         CODE STATUS:  Code Status and Medical Interventions:   Ordered at: 10/14/22 7145     Code Status (Patient has no pulse and is not breathing):    CPR (Attempt to Resuscitate)     Medical Interventions (Patient has pulse or is breathing):    Full Support         Future Appointments   Date Time Provider Department Center   10/31/2022  1:30 PM Norma Saul APRN [facility]           Time spent on Discharge including face to face service: 35 minutes    This patient has been examined wearing appropriate Personal Protective Equipment and discussed with rn. 10/21/22      Signature: Electronically signed by Quang Estrella MD, 10/23/22, 12:52 PM EDT.

## 2022-10-21 NOTE — PROGRESS NOTES
[facility] Progress Note      Patient Name: Lita Yu  : 1965  MRN: [id number]  Primary Care Physician:  Norma Saul APRN  Date of admission: 10/14/2022    Subjective     Interval History:     Patient feeling better  Tolerating oral intake  No chest pain or shortness of breath  No abdominal discomfort  Continues to have some loose bowel movements  No fevers or chills    Ambulating without assistance      Review of Systems:   As noted above    Objective     Vitals:   Temp:  [97.5 °F (36.4 °C)-97.9 °F (36.6 °C)] 97.5 °F (36.4 °C)  Heart Rate:  [76-77] 77  Resp:  [14-16] 16  BP: ()/(63-76) 97/63    Intake/Output Summary (Last 24 hours) at 10/21/2022 1609  Last data filed at 10/20/2022 2000  Gross per 24 hour   Intake 0 ml   Output --   Net 0 ml       Physical Exam:    General Appearance: alert, oriented x 3, no acute distress   Skin: warm and dry  HEENT: oral mucosa normal, nonicteric sclera  Neck: supple, no JVD  Lungs: CTA  Heart: RRR, normal S1 and S2  Abdomen: soft, nontender, nondistended  : no palpable bladder  Extremities: no edema, cyanosis or clubbing  Neuro: normal speech and mental status     Scheduled Meds:     busPIRone, 15 mg, Oral, BID  cholestyramine light, 2 packet, Oral, Q12H  folic acid, 1,000 mcg, Oral, Daily  furosemide, 40 mg, Intravenous, Daily  magnesium oxide, 800 mg, Oral, BID  multivitamin-iron-minerals-folic acid, 1 tablet, Oral, Daily  nicotine, 1 patch, Transdermal, Q24H  saccharomyces boulardii, 250 mg, Oral, BID  sertraline, 100 mg, Oral, BID  sodium chloride, 10 mL, Intravenous, Q12H  spironolactone, 100 mg, Oral, Daily  thiamine, 100 mg, Oral, Daily  traZODone, 100 mg, Oral, Nightly  vancomycin, 125 mg, Oral, Q6H      IV Meds:   Pharmacy Consult,         Results Reviewed:   I have personally reviewed the results from the time of this admission to 10/21/2022 16:09 EDT     Results from last 7 days   Lab Units  10/21/22  0052 10/20/22  0036 10/19/22  0315   SODIUM mmol/L 137 140 138   POTASSIUM mmol/L 4.0 4.4 3.2*   CHLORIDE mmol/L 101 104 99   CO2 mmol/L 26.0 27.0 30.0*   BUN mg/dL 11 10 9   CREATININE mg/dL 0.50* 0.37* 0.39*   CALCIUM mg/dL 8.9 8.7 8.1*   BILIRUBIN mg/dL 0.4 0.4 0.4   ALK PHOS U/L 104 113 108   ALT (SGPT) U/L 67* 74* 86*   AST (SGOT) U/L 101* 128* 187*   GLUCOSE mg/dL 79 82 92       Estimated Creatinine Clearance: 102.8 mL/min (A) (by C-G formula based on SCr of 0.5 mg/dL (L)).    Results from last 7 days   Lab Units 10/18/22  1052 10/16/22  2344 10/15/22  2259   MAGNESIUM mg/dL 1.6 1.5* 1.6   PHOSPHORUS mg/dL  --   --  3.8             Results from last 7 days   Lab Units 10/21/22  0052 10/20/22  0036 10/19/22  0315 10/18/22  1052 10/16/22  2344   WBC 10*3/mm3 8.80 8.20 7.60 7.50 7.70   HEMOGLOBIN g/dL 10.3* 9.9* 9.2* 10.9* 8.9*   PLATELETS 10*3/mm3 201 172 150 205 153       Results from last 7 days   Lab Units 10/21/22  0052 10/20/22  0036 10/19/22  0315 10/18/22  1052 10/16/22  2344   INR  1.12* 1.11* 1.09 1.10 1.16*       Assessment / Plan       ASSESSMENT:     Rhabdomyolysis , secondary to recurrent falls aggravated with increased GI losses while on diuretics.   Results from last 7 days   Lab Units 10/21/22  0052 10/20/22  0036 10/19/22  0315   CK TOTAL U/L 1,010* 1,250* 2,278*   -CPKs with significant improvement  From 18K > 1.0 K   -Avoiding fluids due to ascites  -We will continue to follow CPKs trend closely     Hypomagnesemia.   - Patient was treated with magnesium IV ,  - Magnesium  1.4 > 1.6 > 1.5 > 1.5> 1.6   - I magnesium  800 mg  TID      Chronic macrocytic anemia .   - Currently on thiamine, and multivitamins     Tobacco abuse chronic   -nicotine patch  -Counseling provided    Hypokalemia  -Secondary to colitis  -KCl replacement and spironolactone   -Magnesium normal    History alcohol abuse with cirrhosis .  -  Appreciate GI recommendations  -Currently spironolactone 100 mg and furosemide  40 mg IV daily    -Urinary tract infection culture isolated over 100,000 colonies of E. Coli.  After primary team.  On nitrofurantoin    C. difficile colitis.  -on oral vancomycin QID   -On isolation    PLAN:    -CPK normalizing   -Volume status improved with diuretic  -Completing course of vancomycin  -Electrolytes stable   -Avoid nephrotoxins  -Continue surveillance labs      Thank you for involving us in the care of Lita Yu.  Please feel free to call with any questions.    Boris Helm MD  10/21/22  16:09 EDT    [facility]  [phone]

## 2022-10-21 NOTE — PLAN OF CARE
Assessment: Lita Yu presents with ADL impairments below baseline abilities which indicate the need for continued skilled intervention while inpatient. Patient with bowel incontinence upon arrival, requiring Max-DEP Ax2 for almas-care, however patient able to roll in bed without assistance. CGA for standing and ambulation, however she remains very weak. Tolerating session today without incident. Will continue to follow and progress as tolerated.      Plan/Recommendations:   High Intensity Therapy recommended post-acute care. This is recommended as therapy feels the patient would require 5-6 days per week, 2-3 hours per day. At this time, inpatient rehabilitation (acute rehab) would be the first choice and SNF would be second.. Pt requires no DME at discharge.      Pt desires Inpatient Rehabilitation placement at discharge. Pt cooperative; agreeable to therapeutic recommendations and plan of care.

## 2022-10-21 NOTE — THERAPY TREATMENT NOTE
Subjective: Pt agreeable to therapeutic plan of care.  She reports that she is doing ok.  Had BM in bed and has been waiting to get cleaned up.     Objective:     Bed mobility - SBA for supine to sit and sit to supine, min A x1 for rolling L/R.  Pt able to pull herself up in bed with mod I.   Transfers - CGA and with rolling walker  Ambulation - 9 feet CGA and with rolling walker     *Gait belt applied and non-skid socks worn during treatment.       Vitals: WNL    Pain: 0 VAS     Education: Provided education on the importance of mobility in the acute care setting and Transfer/Gait training    Assessment: Lita Yu presents with functional mobility impairments which indicate the need for skilled intervention. Tolerating session today without incident. Pt was able to reduce assist with bed mobility.  She required max A for almas-care, and was able to roll with min A x1 L/R.  Pt was fatigued end of session. Will continue to follow and progress as tolerated.     Plan/Recommendations:   High Intensity Therapy recommended post-acute care. This is recommended as therapy feels the patient would require 5-6 days per week, 2-3 hours per day. At this time, inpatient rehabilitation (acute rehab) would be the first choice and SNF would be second.. Pt requires no DME at discharge.     Pt desires Inpatient Rehabilitation placement at discharge. Pt cooperative; agreeable to therapeutic recommendations and plan of care.         Basic Mobility 6-click:  Rollin = Total, A lot = 2, A little = 3; 4 = None  Supine>Sit:   1 = Total, A lot = 2, A little = 3; 4 = None   Sit>Stand with arms:  1 = Total, A lot = 2, A little = 3; 4 = None  Bed>Chair:   1 = Total, A lot = 2, A little = 3; 4 = None  Ambulate in room:  1 = Total, A lot = 2, A little = 3; 4 = None  3-5 Steps with railin = Total, A lot = 2, A little = 3; 4 = None  Score: 18      Post-Tx Position: Supine with HOB Elevated, Alarms activated and Call light  and personal items within reach  PPE: mask, gloves, eye protection and gown

## 2022-10-21 NOTE — PROGRESS NOTES
LOS: 7 days   Patient Care Team:  Norma Saul APRN as PCP - General (Nurse Practitioner)      Subjective     Interval History:     Subjective: Patient reports that she is feeling slightly better today.  States that diarrhea has decreased somewhat.  No overt GI bleeding.  Denies abdominal pain.      ROS:   No chest pain, shortness of breath, or cough.        Medication Review:     Current Facility-Administered Medications:   •  acetaminophen (TYLENOL) tablet 650 mg, 650 mg, Oral, Q6H PRN, Quang Estrella MD, 650 mg at 10/21/22 0006  •  acetaminophen (TYLENOL) tablet 650 mg, 650 mg, Oral, Q6H PRN, Quang Estrella MD  •  busPIRone (BUSPAR) tablet 15 mg, 15 mg, Oral, BID, Donna Fitzpatrick DO, 15 mg at 10/21/22 0926  •  butalbital-acetaminophen-caffeine (FIORICET, ESGIC) -40 MG per tablet 1 tablet, 1 tablet, Oral, Q4H PRN, Quang Estrella MD  •  cholestyramine light packet 8 g, 2 packet, Oral, Q12H, Teresa Curran APRN, 8 g at 10/21/22 0926  •  folic acid (FOLVITE) tablet 1,000 mcg, 1,000 mcg, Oral, Daily, Donna Fitzpatrick DO, 1,000 mcg at 10/21/22 0926  •  furosemide (LASIX) injection 40 mg, 40 mg, Intravenous, Daily, Boris Helm MD, 40 mg at 10/21/22 0926  •  ipratropium-albuterol (DUO-NEB) nebulizer solution 3 mL, 3 mL, Nebulization, Q4H PRN, Christian Andrews PA-C  •  LORazepam (ATIVAN) tablet 1 mg, 1 mg, Oral, Q2H PRN **OR** LORazepam (ATIVAN) injection 1 mg, 1 mg, Intravenous, Q2H PRN **OR** LORazepam (ATIVAN) tablet 2 mg, 2 mg, Oral, Q1H PRN **OR** LORazepam (ATIVAN) injection 2 mg, 2 mg, Intravenous, Q1H PRN **OR** LORazepam (ATIVAN) injection 2 mg, 2 mg, Intravenous, Q15 Min PRN **OR** LORazepam (ATIVAN) injection 2 mg, 2 mg, Intramuscular, Q15 Min PRN, Donna Fitzpatrick DO  •  magnesium oxide (MAG-OX) tablet 800 mg, 800 mg, Oral, BID, Boris Helm MD, 800 mg at 10/21/22 0926  •  melatonin tablet 5 mg, 5 mg, Oral, Nightly PRN, Christian Andrews PA-C  •   multivitamin-iron-minerals-folic acid (CENTRUM) chewable tablet 1 tablet, 1 tablet, Oral, Daily, Roxann Coley, APRN, 1 tablet at 10/21/22 0926  •  nicotine (NICODERM CQ) 21 MG/24HR patch 1 patch, 1 patch, Transdermal, Q24H, Christian Andrews PA-C  •  nitroglycerin (NITROSTAT) SL tablet 0.4 mg, 0.4 mg, Sublingual, Q5 Min PRN, Christian Andrews PA-C  •  ondansetron (ZOFRAN) tablet 4 mg, 4 mg, Oral, Q6H PRN, 4 mg at 10/20/22 1739 **OR** ondansetron (ZOFRAN) injection 4 mg, 4 mg, Intravenous, Q6H PRN, Christian Andrews PA-C, 4 mg at 10/21/22 0006  •  Pharmacy Consult, , Does not apply, Continuous PRN, Quang Estrella MD  •  potassium chloride (K-DUR,KLOR-CON) CR tablet 40 mEq, 40 mEq, Oral, PRN, Quang Estrella MD, 40 mEq at 10/19/22 2036  •  potassium chloride (KLOR-CON) packet 40 mEq, 40 mEq, Oral, PRN, Quang Estrella MD  •  saccharomyces boulardii (FLORASTOR) capsule 250 mg, 250 mg, Oral, BID, Teresa Curran, APRN, 250 mg at 10/21/22 0926  •  sertraline (ZOLOFT) tablet 100 mg, 100 mg, Oral, BID, Donna Fitzpatrick, DO, 100 mg at 10/21/22 0926  •  [COMPLETED] Insert peripheral IV, , , Once **AND** sodium chloride 0.9 % flush 10 mL, 10 mL, Intravenous, PRN, Christian Andrews PA-C  •  sodium chloride 0.9 % flush 10 mL, 10 mL, Intravenous, Q12H, Christian Andrews PA-C, 10 mL at 10/21/22 0927  •  sodium chloride 0.9 % flush 10 mL, 10 mL, Intravenous, PRN, Christian Andrews PA-C  •  spironolactone (ALDACTONE) tablet 100 mg, 100 mg, Oral, Daily, Teresa Curran APRN, 100 mg at 10/20/22 0835  •  thiamine (VITAMIN B-1) tablet 100 mg, 100 mg, Oral, Daily, Roxann Coley APRN, 100 mg at 10/21/22 0926  •  traZODone (DESYREL) tablet 100 mg, 100 mg, Oral, Nightly, Donna Fitzpatrick DO, 100 mg at 10/20/22 2024  •  vancomycin (VANCOCIN) capsule 125 mg, 125 mg, Oral, Q6H, Quang Estrella MD, 125 mg at 10/21/22 0622      Objective     Vital Signs  Vitals:    10/20/22 1150 10/20/22 1713 10/21/22  0107 10/21/22 0441   BP: 117/71 126/76 97/63    BP Location: Left arm Left arm Left arm    Patient Position: Lying Lying Lying    Pulse: 76 76 77    Resp: 16 14 16    Temp: 97.9 °F (36.6 °C) 97.9 °F (36.6 °C) 97.5 °F (36.4 °C)    TempSrc: Oral Oral Oral    SpO2: 96% 96% 93%    Weight:    51.2 kg (112 lb 14 oz)   Height:           Physical Exam:     General Appearance:    Awake and alert, in no acute distress   Head:    Normocephalic, without obvious abnormality   Eyes:          Conjunctivae normal, anicteric sclera   Throat:   No oral lesions, no thrush, oral mucosa moist   Neck:   No adenopathy, supple, no JVD   Lungs:     respirations regular, even and unlabored   Abdomen:     Soft, non-tender, no rebound or guarding, non-distendedy   Rectal:     Deferred   Extremities:   No edema, no cyanosis   Skin:   No bruising or rash, no jaundice        Results Review:    CBC    Results from last 7 days   Lab Units 10/21/22  0052 10/20/22  0036 10/19/22  0315 10/18/22  1052 10/16/22  2344 10/15/22  2259 10/15/22  1006   WBC 10*3/mm3 8.80 8.20 7.60 7.50 7.70 7.50 7.70   HEMOGLOBIN g/dL 10.3* 9.9* 9.2* 10.9* 8.9* 9.4* 10.2*   PLATELETS 10*3/mm3 201 172 150 205 153 176 183     CMP   Results from last 7 days   Lab Units 10/21/22  0052 10/20/22  0036 10/19/22  0315 10/18/22  1052 10/16/22  2344 10/15/22  2259 10/15/22  0428 10/14/22  2113   SODIUM mmol/L 137 140 138 140 139 139  --  143   POTASSIUM mmol/L 4.0 4.4 3.2* 3.7 3.6 3.5  --  3.6   CHLORIDE mmol/L 101 104 99 98 103 102  --  112*   CO2 mmol/L 26.0 27.0 30.0* 32.0* 29.0 29.0  --  22.0   BUN mg/dL 11 10 9 9 9 9  --  7   CREATININE mg/dL 0.50* 0.37* 0.39* 0.46* 0.41* 0.51*  --  0.42*   GLUCOSE mg/dL 79 82 92 91 96 83  --  74   ALBUMIN g/dL 2.80* 2.60* 2.40* 2.90* 2.30* 2.30*  --  2.00*   BILIRUBIN mg/dL 0.4 0.4 0.4 0.7 0.3 0.4  --  0.6   ALK PHOS U/L 104 113 108 103 121* 90  --  77   AST (SGOT) U/L 101* 128* 187* 305* 428* 675*  --  493*   ALT (SGPT) U/L 67* 74* 86* 113*  108* 114*  --  83*   MAGNESIUM mg/dL  --   --   --  1.6 1.5* 1.6  --  1.4*   PHOSPHORUS mg/dL  --   --   --   --   --  3.8  --   --    AMMONIA umol/L  --   --   --   --   --   --  26  --      Cr Clearance Estimated Creatinine Clearance: 102.8 mL/min (A) (by C-G formula based on SCr of 0.5 mg/dL (L)).  Coag   Results from last 7 days   Lab Units 10/21/22  0052 10/20/22  0036 10/19/22  0315 10/18/22  1052 10/16/22  2344 10/16/22  1351 10/14/22  2037   INR  1.12* 1.11* 1.09 1.10 1.16* 1.20* 1.14*   APTT seconds  --   --   --   --   --   --  26.4*     HbA1C No results found for: HGBA1C  Blood Glucose No results found for: POCGLU  Infection   Results from last 7 days   Lab Units 10/15/22  0100 10/14/22  2136   BLOODCX  No growth at 5 days  No growth at 5 days  --    URINECX   --  >100,000 CFU/mL Escherichia coli*     UA    Results from last 7 days   Lab Units 10/14/22  2136   NITRITE UA  Positive*   WBC UA /HPF Too Numerous to Count*   BACTERIA UA /HPF 4+*   SQUAM EPITHEL UA /HPF None Seen   URINECX  >100,000 CFU/mL Escherichia coli*     Radiology(recent) No radiology results for the last day       Assessment & Plan     ASSESSMENT:  -Decompensated cirrhosis due to LORENZO/ETOH complicated by ascites  -Elevated LFTs  -C. difficile colitis  -Macrocytic anemia  -E. coli UTI  -Abnormal CT of the colon     PLAN:  Patient reports that she is feeling some better today.  Diarrhea is beginning to slow.  No abdominal pain.  Continue oral vancomycin and Florastor.  Also continue cholestyramine.  WBC count remains normal.  LFTs continue to trend down.  Continue to monitor.  Suspect elevation was due to rhabdomyolysis superimposed on cirrhosis.  Hepatitis panel negative.  CMV IgM negative.  KAMINI negative.  Smooth muscle antibody normal.  RUQ US shows cirrhosis, small amount of ascites, no evidence of CBD obstruction.  Continue Lasix 40 mg daily and spironolactone 100 mg daily.  Creatinine remains normal.  Urine sodium to potassium  ratio > 1 this admission which is consistent with nonadherence to low-sodium diet.  Continue low-sodium diet.  Continue treatment of UTI per primary.  Okay for discharge home from GI standpoint.  She can follow-up in our office in 4 to 6 weeks.  We will see as inpatient as needed.    Electronically signed by SHARIF Huertas, 10/21/22, 10:52 AM EDT.

## 2022-10-21 NOTE — CASE MANAGEMENT/SOCIAL WORK
Continued Stay Note  DAYANARA Vik     Patient Name: Lita Yu  MRN: [id number]  Today's Date: 10/21/2022    Admit Date: 10/14/2022    Plan: D/C plan: [facility], accepted, precert approved 10/21, bed available today at 630pm.   Discharge Plan     Row Name 10/21/22 1630       Plan    Plan D/C plan: [facility], accepted, precert approved 10/21, bed available today at 630pm.    Patient/Family in Agreement with Plan yes    Plan Comments CM received notification from [facility] liaison that precert has been approved and bed is ready today after 630pm. CM notified bedside nurse and MD.                                              Expected Discharge Date and Time     Expected Discharge Date Expected Discharge Time    Oct 24, 2022         Phone communication or documentation only - no physical contact with patient or family.      Zak Osei RN

## 2022-10-21 NOTE — PLAN OF CARE
Problem: Adult Inpatient Plan of Care  Goal: Plan of Care Review  Outcome: Met  Flowsheets (Taken 10/21/2022 1851)  Plan of Care Reviewed With: patient  Goal: Patient-Specific Goal (Individualized)  Outcome: Met  Goal: Absence of Hospital-Acquired Illness or Injury  Outcome: Met  Intervention: Identify and Manage Fall Risk  Recent Flowsheet Documentation  Taken 10/21/2022 1005 by Sharda Burrell LPN  Safety Promotion/Fall Prevention:   safety round/check completed   assistive device/personal items within reach   activity supervised  Taken 10/21/2022 0810 by Sharda Burrell LPN  Safety Promotion/Fall Prevention:   safety round/check completed   activity supervised   assistive device/personal items within reach  Intervention: Prevent Skin Injury  Recent Flowsheet Documentation  Taken 10/21/2022 0810 by Sharda Burrell LPN  Skin Protection: adhesive use limited  Intervention: Prevent Infection  Recent Flowsheet Documentation  Taken 10/21/2022 1005 by Sharda Burrell LPN  Infection Prevention: hand hygiene promoted  Taken 10/21/2022 0810 by Sharda Burrell LPN  Infection Prevention: hand hygiene promoted  Goal: Optimal Comfort and Wellbeing  Outcome: Met  Intervention: Provide Person-Centered Care  Recent Flowsheet Documentation  Taken 10/21/2022 0810 by Sharda Burrell LPN  Trust Relationship/Rapport: care explained  Goal: Readiness for Transition of Care  Outcome: Met     Problem: Fall Injury Risk  Goal: Absence of Fall and Fall-Related Injury  Outcome: Met  Intervention: Identify and Manage Contributors  Recent Flowsheet Documentation  Taken 10/21/2022 1005 by Sharda Burrell LPN  Medication Review/Management: medications reviewed  Taken 10/21/2022 0810 by Sharda Burrell LPN  Medication Review/Management: medications reviewed  Intervention: Promote Injury-Free Environment  Recent Flowsheet Documentation  Taken 10/21/2022 1005 by Sharda Burrell LPN  Safety Promotion/Fall Prevention:   safety  round/check completed   assistive device/personal items within reach   activity supervised  Taken 10/21/2022 0810 by Sharda Burrell LPN  Safety Promotion/Fall Prevention:   safety round/check completed   activity supervised   assistive device/personal items within reach     Problem: Skin Injury Risk Increased  Goal: Skin Health and Integrity  Outcome: Met  Intervention: Optimize Skin Protection  Recent Flowsheet Documentation  Taken 10/21/2022 0810 by Sharda Burrell LPN  Pressure Reduction Techniques: frequent weight shift encouraged  Pressure Reduction Devices: alternating pressure pump (ADD)  Skin Protection: adhesive use limited     Problem: Malnutrition  Goal: Improved Nutritional Intake  Outcome: Met   Goal Outcome Evaluation:  Plan of Care Reviewed With: patient         Patient d/c to [facility].

## 2022-10-21 NOTE — THERAPY TREATMENT NOTE
Subjective: Pt agreeable to therapeutic plan of care.  Cognition: oriented to Person, Place, Time and Situation    Objective:     Bed Mobility: SBA   Functional Transfers: CGA with rolling walker  Functional Ambulation: CGA with RW    Toileting: Max-A, Assist x 2 and Dependent  ADL Position: supine  ADL Comments: Upon arrival, patient reports that she has had bowel accident. Assisted with cleanup and replacing soiled hospital gown and bed linens provided.       Vitals: WNL    Pain: 0 VAS  Location: na  Interventions for pain: N/A  Education: Provided education on the importance of mobility in the acute care setting, Verbal/Tactile Cues, ADL training and Transfer/Gait training    Assessment: Lita Yu presents with ADL impairments below baseline abilities which indicate the need for continued skilled intervention while inpatient. Patient with bowel incontinence upon arrival, requiring Max-DEP Ax2 for almas-care, however patient able to roll in bed without assistance. CGA for standing and ambulation, however she remains very weak. Tolerating session today without incident. Will continue to follow and progress as tolerated.     Plan/Recommendations:   High Intensity Therapy recommended post-acute care. This is recommended as therapy feels the patient would require 5-6 days per week, 2-3 hours per day. At this time, inpatient rehabilitation (acute rehab) would be the first choice and SNF would be second.. Pt requires no DME at discharge.     Pt desires Inpatient Rehabilitation placement at discharge. Pt cooperative; agreeable to therapeutic recommendations and plan of care.     Modified [place]: N/A = No pre-op stroke/TIA    Post-Tx Position: Supine with HOB Elevated, Alarms activated and Call light and personal items within reach  PPE: mask, gloves, eye protection and gown

## 2022-10-24 NOTE — CASE MANAGEMENT/SOCIAL WORK
Case Management Discharge Note      Final Note: [facility]    Provided Post Acute Provider List?: Yes  Post Acute Provider List: Inpatient Rehab (SNF also)  Delivered To: Patient  Method of Delivery: In person    Selected Continued Care - Discharged on 10/21/2022 Admission date: 10/14/2022 - Discharge disposition: Rehab Facility or Unit (DC - External)            Transportation Services  Private: Car    Final Discharge Disposition Code: 62 - inpatient rehab facility

## 2022-10-26 LAB — QT INTERVAL: 418 MS

## 2022-10-31 ENCOUNTER — OFFICE VISIT (OUTPATIENT)
Dept: FAMILY MEDICINE CLINIC | Facility: CLINIC | Age: 57
End: 2022-10-31

## 2022-10-31 VITALS
DIASTOLIC BLOOD PRESSURE: 73 MMHG | OXYGEN SATURATION: 98 % | WEIGHT: 111 LBS | SYSTOLIC BLOOD PRESSURE: 118 MMHG | TEMPERATURE: 97.8 F | BODY MASS INDEX: 19.05 KG/M2 | HEART RATE: 88 BPM

## 2022-10-31 DIAGNOSIS — G47.00 INSOMNIA, UNSPECIFIED TYPE: Primary | ICD-10-CM

## 2022-10-31 DIAGNOSIS — K70.31 ALCOHOLIC CIRRHOSIS OF LIVER WITH ASCITES: ICD-10-CM

## 2022-10-31 DIAGNOSIS — G43.809 OTHER MIGRAINE WITHOUT STATUS MIGRAINOSUS, NOT INTRACTABLE: ICD-10-CM

## 2022-10-31 DIAGNOSIS — A04.72 CLOSTRIDIUM DIFFICILE COLITIS: ICD-10-CM

## 2022-10-31 PROCEDURE — 99214 OFFICE O/P EST MOD 30 MIN: CPT | Performed by: NURSE PRACTITIONER

## 2022-10-31 RX ORDER — BUTALBITAL, ACETAMINOPHEN AND CAFFEINE 50; 325; 40 MG/1; MG/1; MG/1
TABLET ORAL AS NEEDED
COMMUNITY
Start: 2022-10-26

## 2022-10-31 RX ORDER — POTASSIUM CHLORIDE 750 MG/1
4 TABLET, FILM COATED, EXTENDED RELEASE ORAL DAILY
COMMUNITY
Start: 2022-10-26 | End: 2022-11-07 | Stop reason: SDUPTHER

## 2022-10-31 RX ORDER — QUETIAPINE FUMARATE 50 MG/1
50 TABLET, FILM COATED ORAL NIGHTLY
Qty: 30 TABLET | Refills: 0 | Status: SHIPPED | OUTPATIENT
Start: 2022-10-31 | End: 2022-11-22

## 2022-10-31 NOTE — PROGRESS NOTES
Subjective     Lita Yu is a 57 y.o. female.     History of Present Illness  Patient is here today to follow-up on alcoholism, weight loss, weakness.  Patient has struggled with alcoholism for quite some time.  She was thought to have rhabdomyolysis  She has been to rehab numerous times and has had multiple relapses.  She states she has not had a drink in months.  She found her  dead less than a month ago.   Patient was seen at [facility] about 2 weeks ago after falling and lying on the ground for 24 hours.  She had numerous scans completed which showed ascites of the abdomen.  They were going to do a paracentesis but it was not successful as there was limited fluid.  Patient has cirrhosis of the liver.  She was also found to have C. difficile.  She was treated for this.  Patient sees gastroenterology.  She is currently on Lasix and spironolactone for the ascites.  She is also on Florastor  Patient was discharged to rehab after her hospital stay.  She has slowly started gaining weight back.  She is no longer having diarrhea.  She states she is eating better,  Pt is needing a release to go back to work- works at [employer]  She is supposed to see GI but she loses her insurance after today.   She is having trouble sleeping.  She is currently taking 100mg trazodone nightly and melatonin.   She is having migraines regularly.   Takes fioricet  She is also taking zoloft 100mg bid.   She has not tolerated elavil in the past and overdosed.           The following portions of the patient's history were reviewed and updated as appropriate: allergies, current medications, past family history, past medical history, past social history, past surgical history and problem list.    Review of Systems   Constitutional: Positive for fatigue. Negative for chills and fever.   Respiratory: Negative for chest tightness and shortness of breath.    Cardiovascular: Negative for chest pain and palpitations.    Gastrointestinal: Negative for abdominal pain, diarrhea, nausea and vomiting.   Neurological: Positive for dizziness and headache.   Psychiatric/Behavioral: Positive for sleep disturbance.       Objective     /73 (BP Location: Left arm, Patient Position: Sitting, Cuff Size: Adult)   Pulse 88   Temp 97.8 °F (36.6 °C) (Tympanic)   Wt 50.3 kg (111 lb)   SpO2 98%   BMI 19.05 kg/m²     Current Outpatient Medications on File Prior to Visit   Medication Sig Dispense Refill   • butalbital-acetaminophen-caffeine (FIORICET, ESGIC) -40 MG per tablet As Needed.     • potassium chloride 10 MEQ CR tablet 4 tablets Daily.     • atorvastatin (LIPITOR) 20 MG tablet Take 20 mg by mouth Daily.     • busPIRone (BUSPAR) 15 MG tablet TAKE 1 TABLET BY MOUTH TWICE A  tablet 1   • cholestyramine light 4 g packet Take 2 packets by mouth Every 12 (Twelve) Hours. 30 packet 1   • Cyanocobalamin (Vitamin B-12) 1000 MCG sublingual tablet Place 1 tablet under the tongue Daily.     • folic acid (FOLVITE) 1 MG tablet Take 1 tablet by mouth Daily. 29 tablet 0   • furosemide (Lasix) 40 MG tablet Take 1 tablet by mouth 2 (Two) Times a Day. 30 tablet 0   • multivitamin-iron-minerals-folic acid (CENTRUM) chewable tablet Chew 1 tablet Daily. 30 tablet 11   • omega-3 acid ethyl esters (LOVAZA) 1 g capsule TAKE 2 CAPSULES BY MOUTH 2 TIMES A DAY. 360 capsule 1   • saccharomyces boulardii (FLORASTOR) 250 MG capsule Take 1 capsule by mouth 2 (Two) Times a Day. 30 capsule 0   • sertraline (ZOLOFT) 100 MG tablet Take 100 mg by mouth 2 (Two) Times a Day.     • spironolactone (ALDACTONE) 100 MG tablet Take 1 tablet by mouth Daily. 30 tablet 1   • thiamine (VITAMIN B1) 100 MG tablet Take 1 tablet by mouth Daily. 30 tablet 0   • [DISCONTINUED] ipratropium-albuterol (DUO-NEB) 0.5-2.5 mg/3 ml nebulizer Take 3 mL by nebulization Every 4 (Four) Hours As Needed for Shortness of Air. 360 mL 1   • [DISCONTINUED] magnesium oxide (MAG-OX) 400 tablet  tablet Take 2 tablets by mouth 2 (Two) Times a Day. 30 tablet 0   • [DISCONTINUED] nicotine (NICODERM CQ) 21 MG/24HR patch Place 1 patch on the skin as directed by provider Daily. 30 patch 0   • [DISCONTINUED] nitroglycerin (NITROSTAT) 0.4 MG SL tablet Place 1 tablet under the tongue Every 5 (Five) Minutes As Needed for Chest Pain (Only if SBP Greater Than 100) for up to 3 doses. Take no more than 3 doses in 15 minutes. 30 tablet 12   • [DISCONTINUED] ondansetron (ZOFRAN) 4 MG tablet Take 1 tablet by mouth Daily As Needed for Nausea or Vomiting.     • [DISCONTINUED] potassium chloride (KLOR-CON) 20 MEQ packet Take 40 mEq by mouth Daily. 30 each 0   • [DISCONTINUED] traZODone (DESYREL) 100 MG tablet TAKE 1 TABLET BY MOUTH EVERY DAY AT NIGHT 30 tablet 1     No current facility-administered medications on file prior to visit.        Physical Exam  Vitals reviewed.   Constitutional:       General: She is not in acute distress.     Appearance: Normal appearance. She is well-developed. She is not diaphoretic.   HENT:      Head: Normocephalic and atraumatic.   Eyes:      General:         Right eye: No discharge.         Left eye: No discharge.      Extraocular Movements: Extraocular movements intact.      Conjunctiva/sclera: Conjunctivae normal.   Cardiovascular:      Rate and Rhythm: Normal rate and regular rhythm.   Pulmonary:      Effort: Pulmonary effort is normal. No respiratory distress.      Breath sounds: Normal breath sounds. No wheezing or rales.   Abdominal:      General: Bowel sounds are normal.      Palpations: Abdomen is soft.   Musculoskeletal:         General: Normal range of motion.      Cervical back: Normal range of motion.   Skin:     General: Skin is warm and dry.   Neurological:      General: No focal deficit present.      Mental Status: She is alert and oriented to person, place, and time.   Psychiatric:         Mood and Affect: Mood normal.         Behavior: Behavior normal.         Thought Content:  Thought content normal.         Judgment: Judgment normal.           Assessment & Plan     Diagnoses and all orders for this visit:    1. Insomnia, unspecified type (Primary)  Comments:  worse since husbands death  avoid controlled substance  stop trazodone  start seroquel  f/u 1mo  Orders:  -     QUEtiapine (SEROquel) 50 MG tablet; Take 1 tablet by mouth Every Night.  Dispense: 30 tablet; Refill: 0    2. Alcoholic cirrhosis of liver with ascites (HCC)  Comments:  recently hospitalized  states she hasnt drank in months  hasnt made f/u with GI- losing insurance  following low sodium diet    3. Clostridium difficile colitis  Comments:  resolved  completed treatment  denies abdominal pain or diarrhea    4. Other migraine without status migrainosus, not intractable  Comments:  likely due to lack of sleep  try seroquel for sleep   f/u 1 mo  if no imp try nurtec  has had fioricet from hospital

## 2022-11-01 NOTE — CASE MANAGEMENT/SOCIAL WORK
Continued Stay Note  DAYANARA Chery     Patient Name: Lita Yu  MRN: [id number]  Today's Date: 10/21/2022    Admit Date: 10/14/2022    Plan: D/C plan: [facility], accepted, pending bed availability, precert started 10/20   Discharge Plan     Row Name 10/21/22 1438       Plan    Plan D/C plan: [facility], accepted, pending bed availability, precert started 10/20    Patient/Family in Agreement with Plan yes    Plan Comments CM contacted [facility] liaison for update. At this time, precert is still pending. Will need to confirm bed availability when approved. Pt aware of acceptance, as well as pt daughter.                   Expected Discharge Date and Time     Expected Discharge Date Expected Discharge Time    Oct 24, 2022         Phone communication or documentation only - no physical contact with patient or family.      Zak Osei RN     NGT replaced, CXR done- needed slight advancement --> NGT adjusted- rt CXR - ordered, will reschedule feeds and meds until NGT placement confirmed.

## 2022-11-07 RX ORDER — POTASSIUM CHLORIDE 750 MG/1
40 TABLET, FILM COATED, EXTENDED RELEASE ORAL DAILY
Qty: 90 TABLET | Refills: 1 | Status: SHIPPED | OUTPATIENT
Start: 2022-11-07 | End: 2022-12-19

## 2022-11-07 RX ORDER — LANOLIN ALCOHOL/MO/W.PET/CERES
100 CREAM (GRAM) TOPICAL DAILY
Qty: 30 TABLET | Refills: 0 | Status: SHIPPED | OUTPATIENT
Start: 2022-11-07 | End: 2022-11-30

## 2022-11-07 RX ORDER — CHOLESTYRAMINE LIGHT 4 G/5.7G
2 POWDER, FOR SUSPENSION ORAL EVERY 12 HOURS SCHEDULED
Qty: 30 PACKET | Refills: 1 | Status: SHIPPED | OUTPATIENT
Start: 2022-11-07 | End: 2022-12-16

## 2022-11-07 RX ORDER — BUTALBITAL, ACETAMINOPHEN AND CAFFEINE 50; 325; 40 MG/1; MG/1; MG/1
TABLET ORAL AS NEEDED
OUTPATIENT
Start: 2022-11-07

## 2022-11-08 ENCOUNTER — TREATMENT (OUTPATIENT)
Dept: PHYSICAL THERAPY | Facility: CLINIC | Age: 57
End: 2022-11-08

## 2022-11-08 DIAGNOSIS — R29.898 LEG WEAKNESS, BILATERAL: ICD-10-CM

## 2022-11-08 DIAGNOSIS — M54.17 LUMBOSACRAL RADICULOPATHY: Primary | ICD-10-CM

## 2022-11-08 PROCEDURE — 97110 THERAPEUTIC EXERCISES: CPT | Performed by: PHYSICAL THERAPIST

## 2022-11-08 PROCEDURE — 97161 PT EVAL LOW COMPLEX 20 MIN: CPT | Performed by: PHYSICAL THERAPIST

## 2022-11-08 NOTE — PROGRESS NOTES
Physical Therapy Initial Evaluation and Plan of Care    Patient: Lita Yu   : 1965  Diagnosis/ICD-10 Code:  Lumbosacral radiculopathy [M54.17]  Referring practitioner: Flory Villanueva MD  Date of Initial Visit: 2022  Today's Date: 2022  Patient seen for 1 sessions           Subjective Questionnaire: LEFS: 61% functional ability       Subjective Evaluation    History of Present Illness  Mechanism of injury: Pt states that she had some weakness inher legs and was diagnosed with rhabdomyolysis. Pt went to the hospital on 10/14/2022 due to the weakness in her legs. She wasn't able to move. Pt reports that she had a severe UTI and Cdiff so that's what caused the weakness to occur. Not had anything like this before. Was admitted and stayed in the hospital for one week. She was discharged to inpatient rehab at [facility]. She was discharged from there on 10/27. Feels like she is getting better. Not really having trouble with her normal daily tasks. She was able to return to work at [employer] last Monday. Last week was rough and she had to go home once. She was just too tired. She worked today though and it was fine. She feels like her weakness in her legs is doing better. Doesn't feel like she's getting tired as much either. Pt states that the more she goes, the less problems she has. Not really having as much trouble as she was. Pt isn't having any pain, except migraine. She does like to walk and she has been working on it. She feels like she can walk for ~15 mins now if it's on a flat surface. If there are hills then it's more difficult for her. She was working 9 hours (3x per week for ~3 hours) prior to this and now she is working 6 hours total per week (2x per week for ~3 hours). Pt is living at home now. She has 27 steps to go up to her home and 10 if she wants to go in the front. Going well but she is sometimes tired at the end of it, only if she is tired to start. Able to carry things up and  down the stairs now as well and it's going fine. Pt lives on her own and not having any trouble with daily activities or with household chores. Pt does have follow-up with PCP at this end of this month.     Patient Goals  Patient goals for therapy: increased strength, return to work and independence with ADLs/IADLs           Past Medical History:   Diagnosis Date   • Cirrhosis (HCC)    • COPD (chronic obstructive pulmonary disease) (HCC)    • Depression    • GERD (gastroesophageal reflux disease)    • Hyperlipidemia    • Hypertension    • PTSD (post-traumatic stress disorder)         Past Surgical History:   Procedure Laterality Date   •  SECTION N/A    • COLONOSCOPY N/A 2021    Procedure: COLONOSCOPY with polypectomy x2 and random biopsy;  Surgeon: Osman Clay MD;  Location: [facility] ENDOSCOPY;  Service: Gastroenterology;  Laterality: N/A;  colon polyps   • DENTAL PROCEDURE     • ENDOSCOPY N/A 2021    Procedure: ESOPHAGOGASTRODUODENOSCOPY;  Surgeon: Osman Clay MD;  Location: [facility] ENDOSCOPY;  Service: Gastroenterology;  Laterality: N/A;  esophagitis   • JOINT REPLACEMENT     • REPLACEMENT TOTAL HIP LATERAL POSITION Left    • TONSILLECTOMY     • VAGINAL BIRTH AFTER  SECTION          Objective          Strength/Myotome Testing     Left Hip   Planes of Motion   Flexion: 4  Extension: 4-  Abduction: 4  External rotation: 4  Internal rotation: 4    Right Hip   Planes of Motion   Flexion: 4-  Extension: 3+  Abduction: 4-  External rotation: 4-  Internal rotation: 4-    Left Knee   Flexion: 4+  Extension: 4+    Right Knee   Flexion: 4  Extension: 4    Left Ankle/Foot   Dorsiflexion: 4+  Plantar flexion: 4+  Great toe flexion: 4+  Great toe extension: 4+    Right Ankle/Foot   Dorsiflexion: 4+  Plantar flexion: 4+  Great toe flexion: 4+  Great toe extension: 4+    Additional Strength Details  Little more weakness noted in the L compared to R, however pt reports h/o THR on the L      Ambulation     Ambulation: Level Surfaces   Ambulation without assistive device: independent    Ambulation: Stairs   Ascend stairs: independent  Descend stairs: independent    Observational Gait   Gait: within functional limits     Functional Assessment     Single Leg Stance   Left: 6 seconds  Right: 4 seconds    Comments  5x STS: 12 sec   6 MWT: 1,538 ft; O2 100%,  bpm at end of 6 mins, pt noted a little fatigue at end of test - pt did seem to have fatigue in the ankles as she continued walking, however pt reported that her shoes were too big and that's what she was having trouble with as she went           Assessment & Plan     Assessment  Impairments: abnormal coordination, abnormal gait, abnormal muscle firing, abnormal muscle tone, activity intolerance, impaired balance, impaired physical strength and lacks appropriate home exercise program  Functional Limitations: carrying objects, lifting, walking, standing and stooping  Assessment details: Pt is a 57 year old female with c/o LE weakness s/p diagnosis of rhabdomyolysis. Pt demonstrates some weakness in B LE's still, mostly the hips. Min decreased endurance noted during the 6MWT, however good gait speed during test. Min limitations with functional activities at home. Discussed pt performing some strengthening exercises at home as well as working on increasing endurance by walking for the next two weeks and then plan is for pt to follow-up to determine if she is ready for d/c or if she needs more PT. Functional limitations are listed above. Pt will benefit from PT in order to address impairments and improve overall function.     Prognosis: good    Goals  Plan Goals: STG (3 weeks):  Pt will demonstrate increased activation of core stabilizers during activities/exercises to decrease load on spine.   Pt will demonstrate improved balance with improved SLS to 10 seconds B without use of UE's to assist.   Pt will be independent with home exercises to assist  with improved strength and continue to improve function.    LTG (6 weeks):  Pt will demonstrate increased score on the LEFS to 75% to demonstrate decreased overall impairment.   Pt will be able to return to her normal work schedule with little to no fatigue or difficulty.   Pt will demonstrate improved hip strength to 4+/5 overall to assist with function.        Plan  Therapy options: will be seen for skilled therapy services  Planned therapy interventions: abdominal trunk stabilization, ADL retraining, balance/weight-bearing training, body mechanics training, flexibility, functional ROM exercises, gait training, home exercise program, joint mobilization, manual therapy, motor coordination training, neuromuscular re-education, postural training, soft tissue mobilization, spinal/joint mobilization, strengthening, stretching and therapeutic activities  Frequency: 1x week  Duration in weeks: 12  Treatment plan discussed with: patient        HEP:  Access Code: [email or web address]  URL: [email or web address]  Date: 11/08/2022  Prepared by: Janay Barahona    Exercises  Standing 3-Way Leg Reach with Resistance at Ankles and Counter Support - 4 x weekly - 2 sets - 15 reps  Standing Single Leg Stance with Counter Support - 4 x weekly - 3 reps - 30 sec hold  Sit to Stand with Arms Crossed - 4 x weekly - 2 sets - 10 reps  Seated Long Arc Quad - 4 x weekly - 2 sets - 15 reps - 5 sec hold    History # of Personal Factors and/or Comorbidities: LOW (0)  Examination of Body System(s): # of elements: LOW (1-2)  Clinical Presentation: STABLE   Clinical Decision Making: LOW       Eval:  Low Eval     25     Mins  09967  Mod Eval          Mins  29393  High Eval                            Mins  11556    Timed:         Manual Therapy:         mins  96675;     Therapeutic Exercise:    18     mins  01338;     Neuromuscular David:        mins  70506;    Therapeutic Activity:          mins  17698;     Gait Training:           mins  28967;        Un-Timed:  Electrical Stimulation:         mins  31156 ( );  Traction          mins 21028      Timed Treatment:   18   mins   Total Treatment:     43   mins    PT SIGNATURE: Janay Barahona PT, DPT, OCS           [place] License # [id number]           [place] License # [id number]    DATE TREATMENT INITIATED: 11/8/2022    Initial Certification  Certification Period: 2/5/2023  I certify that the therapy services are furnished while this patient is under my care.  The services outlined above are required by this patient, and will be reviewed every 90 days.     PHYSICIAN: Flory Villanueva MD      DATE:     Please sign and return via fax to [phone].. Thank you, [facility].

## 2022-11-22 ENCOUNTER — OFFICE (OUTPATIENT)
Dept: URBAN - METROPOLITAN AREA CLINIC 64 | Facility: CLINIC | Age: 57
End: 2022-11-22

## 2022-11-22 VITALS
WEIGHT: 116 LBS | HEART RATE: 88 BPM | HEIGHT: 63 IN | DIASTOLIC BLOOD PRESSURE: 92 MMHG | SYSTOLIC BLOOD PRESSURE: 124 MMHG

## 2022-11-22 DIAGNOSIS — R19.7 DIARRHEA, UNSPECIFIED: ICD-10-CM

## 2022-11-22 DIAGNOSIS — R74.8 ABNORMAL LEVELS OF OTHER SERUM ENZYMES: ICD-10-CM

## 2022-11-22 DIAGNOSIS — F10.10 ALCOHOL ABUSE, UNCOMPLICATED: ICD-10-CM

## 2022-11-22 DIAGNOSIS — K74.69 OTHER CIRRHOSIS OF LIVER: ICD-10-CM

## 2022-11-22 DIAGNOSIS — G47.00 INSOMNIA, UNSPECIFIED TYPE: ICD-10-CM

## 2022-11-22 PROCEDURE — 99214 OFFICE O/P EST MOD 30 MIN: CPT | Performed by: INTERNAL MEDICINE

## 2022-11-22 RX ORDER — QUETIAPINE FUMARATE 50 MG/1
TABLET, FILM COATED ORAL
Qty: 30 TABLET | Refills: 0 | Status: SHIPPED | OUTPATIENT
Start: 2022-11-22 | End: 2022-12-23

## 2022-11-28 RX ORDER — ATORVASTATIN CALCIUM 20 MG/1
TABLET, FILM COATED ORAL
Qty: 90 TABLET | Refills: 1 | Status: SHIPPED | OUTPATIENT
Start: 2022-11-28

## 2022-11-30 RX ORDER — LANOLIN ALCOHOL/MO/W.PET/CERES
CREAM (GRAM) TOPICAL
Qty: 30 TABLET | Refills: 0 | Status: SHIPPED | OUTPATIENT
Start: 2022-11-30 | End: 2023-01-24

## 2022-11-30 RX ORDER — SPIRONOLACTONE 100 MG/1
100 TABLET, FILM COATED ORAL DAILY
Qty: 30 TABLET | Refills: 1 | Status: SHIPPED | OUTPATIENT
Start: 2022-11-30 | End: 2022-12-23

## 2022-11-30 RX ORDER — LANOLIN ALCOHOL/MO/W.PET/CERES
100 CREAM (GRAM) TOPICAL DAILY
Qty: 30 TABLET | Refills: 0 | Status: SHIPPED | OUTPATIENT
Start: 2022-11-30 | End: 2022-12-23

## 2022-11-30 RX ORDER — FUROSEMIDE 40 MG/1
40 TABLET ORAL 2 TIMES DAILY
Qty: 30 TABLET | Refills: 0
Start: 2022-11-30 | End: 2022-12-02 | Stop reason: SDUPTHER

## 2022-11-30 RX ORDER — SACCHAROMYCES BOULARDII 250 MG
250 CAPSULE ORAL 2 TIMES DAILY
Qty: 30 CAPSULE | Refills: 0 | Status: SHIPPED | OUTPATIENT
Start: 2022-11-30 | End: 2022-12-16

## 2022-11-30 NOTE — TELEPHONE ENCOUNTER
10/31/22  
Caller: Lita Yu    Relationship: Self    Best call back number: [phone]    Requested Prescriptions:   Requested Prescriptions     Pending Prescriptions Disp Refills   • furosemide (Lasix) 40 MG tablet 30 tablet 0     Sig: Take 1 tablet by mouth 2 (Two) Times a Day.   • saccharomyces boulardii (FLORASTOR) 250 MG capsule 30 capsule 0     Sig: Take 1 capsule by mouth 2 (Two) Times a Day.   • thiamine (VITAMIN B1) 100 MG tablet 30 tablet 0     Sig: Take 1 tablet by mouth Daily.   • spironolactone (ALDACTONE) 100 MG tablet 30 tablet 1     Sig: Take 1 tablet by mouth Daily.        Pharmacy where request should be sent: [facility]ACY #[id number] - [place], [place] - [address] - [phone] [facility] [phone] FX     Additional details provided by patient: PATIENT STATES THAT SHE IS COMPLETELY OUT OF MEDICATIONS.  PATIENT ALSO STATES THAT SHE NEEDS A REFILL FOR CHOLESTYRAMINE LIGHT PACKETS BUT THESE ARE NOT ON HER CURRENT MED LIST.    Does the patient have less than a 3 day supply:  [x] Yes  [] No    Would you like a call back once the refill request has been completed: [x] Yes [] No    If the office needs to give you a call back, can they leave a voicemail: [x] Yes [] No    PLEASE ADVISE.    Florentin Sorensen Rep   11/30/22 10:22 EST           
2 seconds or less

## 2022-12-01 ENCOUNTER — TELEPHONE (OUTPATIENT)
Dept: PHYSICAL THERAPY | Facility: CLINIC | Age: 57
End: 2022-12-01

## 2022-12-01 NOTE — TELEPHONE ENCOUNTER
Pt no show,called for tracking. Pt stated she was sick and forgot, she also said she was doing well with the physical and that is ok to close her file.

## 2022-12-02 RX ORDER — FUROSEMIDE 40 MG/1
40 TABLET ORAL 2 TIMES DAILY
Qty: 30 TABLET | Refills: 0
Start: 2022-12-02 | End: 2022-12-05

## 2022-12-02 NOTE — TELEPHONE ENCOUNTER
Caller: Lita Yu MAIRA M    Relationship: Self    Best call back number: [phone]     Requested Prescriptions:   Requested Prescriptions     Pending Prescriptions Disp Refills   • furosemide (Lasix) 40 MG tablet 30 tablet 0     Sig: Take 1 tablet by mouth 2 (Two) Times a Day.        Pharmacy where request should be sent: [facility]Y #[id number] [address] - [phone]  - [phone] FX     Additional details provided by patient: OUT OF MEDICATION FOR 2 WEEKS. ORIGINAL REQUEST MADE ON 11/30/2022    Does the patient have less than a 3 day supply:  [x] Yes  [] No    Would you like a call back once the refill request has been completed: [x] Yes [] No    If the office needs to give you a call back, can they leave a voicemail: [x] Yes [] No    Hien Dugan   12/02/22 11:17 EST

## 2022-12-05 RX ORDER — FUROSEMIDE 40 MG/1
TABLET ORAL
Qty: 60 TABLET | Refills: 0 | Status: SHIPPED | OUTPATIENT
Start: 2022-12-05 | End: 2022-12-28

## 2022-12-16 RX ORDER — CHOLESTYRAMINE LIGHT 4 G/5.7G
2 POWDER, FOR SUSPENSION ORAL EVERY 12 HOURS SCHEDULED
Qty: 30 PACKET | Refills: 1 | Status: SHIPPED | OUTPATIENT
Start: 2022-12-16 | End: 2023-01-10

## 2022-12-16 RX ORDER — SACCHAROMYCES BOULARDII 250 MG
CAPSULE ORAL
Qty: 30 CAPSULE | Refills: 0 | Status: SHIPPED | OUTPATIENT
Start: 2022-12-16

## 2022-12-19 RX ORDER — POTASSIUM CHLORIDE 750 MG/1
40 TABLET, FILM COATED, EXTENDED RELEASE ORAL DAILY
Qty: 90 TABLET | Refills: 1 | Status: SHIPPED | OUTPATIENT
Start: 2022-12-19 | End: 2023-02-07

## 2022-12-23 DIAGNOSIS — G47.00 INSOMNIA, UNSPECIFIED TYPE: ICD-10-CM

## 2022-12-23 DIAGNOSIS — G47.00 INSOMNIA, UNSPECIFIED: ICD-10-CM

## 2022-12-23 RX ORDER — SPIRONOLACTONE 100 MG/1
TABLET, FILM COATED ORAL
Qty: 30 TABLET | Refills: 1 | Status: SHIPPED | OUTPATIENT
Start: 2022-12-23 | End: 2023-01-19

## 2022-12-23 RX ORDER — LANOLIN ALCOHOL/MO/W.PET/CERES
CREAM (GRAM) TOPICAL
Qty: 30 TABLET | Refills: 0 | Status: SHIPPED | OUTPATIENT
Start: 2022-12-23

## 2022-12-23 RX ORDER — TRAZODONE HYDROCHLORIDE 100 MG/1
TABLET ORAL
Qty: 30 TABLET | Refills: 1 | OUTPATIENT
Start: 2022-12-23

## 2022-12-23 RX ORDER — QUETIAPINE FUMARATE 50 MG/1
TABLET, FILM COATED ORAL
Qty: 30 TABLET | Refills: 0 | Status: SHIPPED | OUTPATIENT
Start: 2022-12-23 | End: 2023-01-19

## 2022-12-28 ENCOUNTER — OFFICE (OUTPATIENT)
Dept: URBAN - METROPOLITAN AREA CLINIC 64 | Facility: CLINIC | Age: 57
End: 2022-12-28

## 2022-12-28 DIAGNOSIS — K74.69 OTHER CIRRHOSIS OF LIVER: ICD-10-CM

## 2022-12-28 RX ORDER — FUROSEMIDE 40 MG/1
TABLET ORAL
Qty: 60 TABLET | Refills: 0 | Status: SHIPPED | OUTPATIENT
Start: 2022-12-28 | End: 2023-01-19

## 2023-01-01 DIAGNOSIS — F41.9 ANXIETY DISORDER, UNSPECIFIED: ICD-10-CM

## 2023-01-03 RX ORDER — SERTRALINE HYDROCHLORIDE 100 MG/1
TABLET, FILM COATED ORAL
Qty: 180 TABLET | Refills: 1 | Status: SHIPPED | OUTPATIENT
Start: 2023-01-03

## 2023-01-10 RX ORDER — CHOLESTYRAMINE LIGHT 4 G/5.7G
2 POWDER, FOR SUSPENSION ORAL EVERY 12 HOURS SCHEDULED
Qty: 30 PACKET | Refills: 1 | Status: SHIPPED | OUTPATIENT
Start: 2023-01-10 | End: 2023-02-02 | Stop reason: SDUPTHER

## 2023-01-12 ENCOUNTER — DOCUMENTATION (OUTPATIENT)
Dept: PHYSICAL THERAPY | Facility: CLINIC | Age: 58
End: 2023-01-12
Payer: COMMERCIAL

## 2023-01-12 NOTE — PROGRESS NOTES
Discharge Summary  Discharge Summary from Physical Therapy Report      Date of Initial PT visit: 11/8/2022  Number of Visits Seen: 1     Discharge Status of Patient:   STG (3 weeks):  Pt will demonstrate increased activation of core stabilizers during activities/exercises to decrease load on spine. - met  Pt will demonstrate improved balance with improved SLS to 10 seconds B without use of UE's to assist.  - met  Pt will be independent with home exercises to assist with improved strength and continue to improve function.  - met    LTG (6 weeks):  Pt will demonstrate increased score on the LEFS to 75% to demonstrate decreased overall impairment.  - met  Pt will be able to return to her normal work schedule with little to no fatigue or difficulty.  - met  Pt will demonstrate improved hip strength to 4+/5 overall to assist with function.   - met    Goals: All Met    Discharge Plan: Continue with current home exercise program as instructed    Comments: Pt did not return after 1st visit and stated that she is doing well and no longer needs PT when called.     Date of Discharge: 1/12/2023      Janay Barahona, PT, DPT, OCS     [place] License # [id number]     [place] License # [id number]

## 2023-01-19 DIAGNOSIS — G47.00 INSOMNIA, UNSPECIFIED TYPE: ICD-10-CM

## 2023-01-19 RX ORDER — SPIRONOLACTONE 100 MG/1
TABLET, FILM COATED ORAL
Qty: 30 TABLET | Refills: 1 | Status: SHIPPED | OUTPATIENT
Start: 2023-01-19 | End: 2023-02-20

## 2023-01-19 RX ORDER — QUETIAPINE FUMARATE 50 MG/1
TABLET, FILM COATED ORAL
Qty: 30 TABLET | Refills: 0 | Status: SHIPPED | OUTPATIENT
Start: 2023-01-19 | End: 2023-02-20

## 2023-01-19 RX ORDER — FUROSEMIDE 40 MG/1
TABLET ORAL
Qty: 60 TABLET | Refills: 0 | Status: SHIPPED | OUTPATIENT
Start: 2023-01-19 | End: 2023-02-20

## 2023-01-24 RX ORDER — METHION/INOS/CHOL BT/B COM/LIV 110MG-86MG
CAPSULE ORAL
Qty: 30 TABLET | Refills: 0 | Status: SHIPPED | OUTPATIENT
Start: 2023-01-24 | End: 2023-02-20

## 2023-02-02 RX ORDER — CHOLESTYRAMINE LIGHT 4 G/5.7G
2 POWDER, FOR SUSPENSION ORAL EVERY 12 HOURS SCHEDULED
Qty: 180 PACKET | Refills: 0 | Status: SHIPPED | OUTPATIENT
Start: 2023-02-02 | End: 2023-03-27 | Stop reason: SDUPTHER

## 2023-02-07 ENCOUNTER — OFFICE VISIT (OUTPATIENT)
Dept: ORTHOPEDIC SURGERY | Facility: CLINIC | Age: 58
End: 2023-02-07
Payer: COMMERCIAL

## 2023-02-07 VITALS — HEART RATE: 99 BPM | OXYGEN SATURATION: 97 %

## 2023-02-07 DIAGNOSIS — M25.851 RIGHT HIP IMPINGEMENT SYNDROME: ICD-10-CM

## 2023-02-07 DIAGNOSIS — M25.551 ACUTE RIGHT HIP PAIN: Primary | ICD-10-CM

## 2023-02-07 PROCEDURE — 99214 OFFICE O/P EST MOD 30 MIN: CPT | Performed by: STUDENT IN AN ORGANIZED HEALTH CARE EDUCATION/TRAINING PROGRAM

## 2023-02-07 RX ORDER — POTASSIUM CHLORIDE 750 MG/1
40 TABLET, FILM COATED, EXTENDED RELEASE ORAL DAILY
Qty: 90 TABLET | Refills: 1 | Status: SHIPPED | OUTPATIENT
Start: 2023-02-07 | End: 2023-03-27

## 2023-02-07 RX ORDER — LANOLIN ALCOHOL/MO/W.PET/CERES
400 CREAM (GRAM) TOPICAL DAILY
COMMUNITY

## 2023-02-07 NOTE — PROGRESS NOTES
NEW VISIT    Patient: Lita Yu  ?  YOB: 1965    MRN: [id number]  ?  Chief Complaint   Patient presents with   • Right Hip - Initial Evaluation      ?  HPI: Patient 57-year-old female who presents today for right hip pain.  She states she is having anterior groin pain that is radiating anteriorly to her knee over the last month.  She denies any acute injury.  She states the pain has been gradually worsening.  The pain is worse over the anterior hip while rising from a seated position, prolonged walking, or standing.  The pain is to the point where she is now ambulating with a limp.  She does have an occasional click sensation.  She denies any low back pain.  Denies any numbness or tingling.  He denies any radiating symptoms down the leg.  She states she does have significant discomfort at night which at times will wake her up from sleep.  She denies any recent illness, weight loss, fevers or chills.  She was referred to physical therapy for her hip and low back, and she states was given home exercises for core and glutes strengthening which she has been doing 2-3 times a week without any improvement.  She states the exercises actually makes her hip hurt worse.  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  States the symptoms feel similar to her initial left hip pain      Allergies:   Allergies   Allergen Reactions   • Sulfa Antibiotics Hives   • Keflex [Cephalexin] Hives       Past Medical History:   Diagnosis Date   • Cirrhosis (HCC)    • COPD (chronic obstructive pulmonary disease) (HCC)    • Depression    • GERD (gastroesophageal reflux disease)    • Hyperlipidemia    • Hypertension    • PTSD (post-traumatic stress disorder)      Past Surgical History:   Procedure Laterality Date   •  SECTION N/A    • COLONOSCOPY N/A 2021    Procedure: COLONOSCOPY with polypectomy x2 and random biopsy;  Surgeon: Osman Clay MD;   Location: [facility] ENDOSCOPY;  Service: Gastroenterology;  Laterality: N/A;  colon polyps   • DENTAL PROCEDURE     • ENDOSCOPY N/A 2021    Procedure: ESOPHAGOGASTRODUODENOSCOPY;  Surgeon: Osman Clay MD;  Location: [facility] ENDOSCOPY;  Service: Gastroenterology;  Laterality: N/A;  esophagitis   • JOINT REPLACEMENT     • REPLACEMENT TOTAL HIP LATERAL POSITION Left    • TONSILLECTOMY     • VAGINAL BIRTH AFTER  SECTION       Social History     Occupational History   • Not on file   Tobacco Use   • Smoking status: Every Day     Packs/day: 0.25     Types: Cigarettes   • Smokeless tobacco: Never   • Tobacco comments:     3cigs   Vaping Use   • Vaping Use: Never used   Substance and Sexual Activity   • Alcohol use: Not Currently     Comment: quit 23   • Drug use: No   • Sexual activity: Yes     Partners: Male     Birth control/protection: Post-menopausal      Social History     Social History Narrative   • Not on file     Family History   Problem Relation Age of Onset   • Alcohol abuse Mother    • Alcohol abuse Paternal Grandfather        Review of Systems  Constitutional: Negative.  Negative for fever.   Musculoskeletal: Positive for joint pain  Skin: Negative.  Negative for rash and wound.    Neurological: Negative for numbness.     No Height Documented This Encounter  There were no vitals filed for this visit.  There is no height or weight on file to calculate BMI.  Vitals:    23 1450   Pulse: 99   SpO2: 97%        Physical Exam  Constitutional: Patient is oriented to person, place, and time. Appears well-developed and well-nourished.   Head: Normocephalic and atraumatic.   Pulmonary/Chest: Effort normal.   Musculoskeletal:   See detailed exam below   Neurological: Alert and oriented to person, place, and time. No sensory deficit. Coordination normal.   Skin: Skin is warm and dry. Capillary refill takes less than 2 seconds. No rash noted. No erythema.      The right hip and pelvis are without  obvious signs of acute bony deformity, obliquity, swelling, erythema, ecchymosis or instability.  There is tenderness to palpation over the anterior joint line within the groin.  There is also some mild tenderness to palpation over psoas musculature.  Mild tenderness laterally over greater trochanteric region and gluteal musculature.  There is no tenderness over iliac crest, ASIS, or AIIS. Active and passive range of motion are limited with internal rotation and painful. Log roll is positive for Mild discomfort in the groin. Straight leg raise test is positive for Pain in the groin and resisted SLR is painful. BAILEY and FADIR are positive for Groin pain.  Lateral gluteal hip strength is 4/5 with discomfort.  Positive Vamsi's.  Positive scour for groin pain.  Positive Roberto Carlos for hip flexor and quad tightness.  Trendelenburg is positive. The opposite hip is otherwise nontender and stable. Gait is uncomfortable and antalgic.       Diagnostics:  xrays obtained today     XR Hip With or Without Pelvis 2 - 3 View Right    Result Date: 2/7/2023  Impression: Mild arthritic change of the right hip. Electronically Signed: Dawson Hua  2/7/2023 5:57 PM EST  Workstation ID: [id number]      MRI Lumbar Spine Without Contrast    Result Date: 10/15/2022  1.     Motion artifact somewhat degrades diagnostic image quality. Conus appears grossly unremarkable. 2.     Mild marrow edema at the superior endplate of L3 surrounding a small Schmorl's node. No definite vertebral body height loss or acute fracture is identified. 3.     Mild marrow signal heterogeneity which could be seen with marrow reconversion. Correlate with lab values. 4.     Degenerative changes with suspected mild canal and foraminal narrowing as above. 5.     L5 pars defects without significant displacement on this exam. 6.     Ascites and body wall edema.  Electronically Signed By-Jluis Medina MD On:10/15/2022 1:38 PM This report was finalized on [id number] by  Jluis  MD Carol.      Assessment:  Diagnoses and all orders for this visit:    1. Acute right hip pain (Primary)  -     XR Hip With or Without Pelvis 2 - 3 View Right; Future  -     MRI Hip Right Without Contrast; Future    2. Right hip impingement syndrome  -     MRI Hip Right Without Contrast; Future          Plan    · Corticosteroid hip joint injection discussed   · Physical Therapy discussed   · Activity modifications discussed and recommended  · Rest, ice, compression, and elevation (RICE) therapy  · Stretching and strengthening exercises  · Limited in oral medications due to medical core morbidities  · Follow up after MRI obtained    Above diagnosis and plan discussed with the patient in office today.  X-rays of the right hip were obtained today and remarkable for some mild arthritic change within the right hip and mild cam lesion.  Did also review her prior MRI of her lumbar spine which was remarkable for some degenerative change within the lower lumbar spine.  Normal neurologic exam today of bilateral lower extremity.  Her discomfort is localized to the anterior hip joint and groin region which was reproducible both with exam as well as ambulation.  She states she had done an initial evaluation with physical therapy and was given home exercises which she has been completing 2-3 times a week for the last month focused on core and hip strength.  She states these exercises have only made her pain worse, and she is currently unable to ambulate without significant pain within the right hip joint.  She was recently hospitalized for a prolonged period due to rhabdomyolysis.  She denies any acute recent infections, fevers or chills or any constitutional symptoms.   I will order an MRI to further evaluate her right hip labrum as well as surrounding soft tissue structures.  We will follow-up after MRI is obtained to review results    Date of encounter: 02/07/2023   Yimi Stevens DO    Electronically signed by Yimi Stevens DO,  02/07/23, 2:48 PM EST.    Disclaimer: Please note that areas of this note were completed with computer voice recognition software.  Quite often unanticipated grammatical, syntax, homophones, and other interpretive errors are inadvertently transcribed by the computer software. Please excuse any errors that have escaped final proofreading.

## 2023-02-09 ENCOUNTER — PATIENT ROUNDING (BHMG ONLY) (OUTPATIENT)
Dept: SPORTS MEDICINE | Facility: CLINIC | Age: 58
End: 2023-02-09
Payer: COMMERCIAL

## 2023-02-18 DIAGNOSIS — G47.00 INSOMNIA, UNSPECIFIED TYPE: ICD-10-CM

## 2023-02-20 RX ORDER — FUROSEMIDE 40 MG/1
TABLET ORAL
Qty: 60 TABLET | Refills: 0 | Status: SHIPPED | OUTPATIENT
Start: 2023-02-20 | End: 2023-03-16 | Stop reason: SDUPTHER

## 2023-02-20 RX ORDER — QUETIAPINE FUMARATE 50 MG/1
TABLET, FILM COATED ORAL
Qty: 30 TABLET | Refills: 0 | Status: SHIPPED | OUTPATIENT
Start: 2023-02-20 | End: 2023-03-16 | Stop reason: SDUPTHER

## 2023-02-20 RX ORDER — METHION/INOS/CHOL BT/B COM/LIV 110MG-86MG
CAPSULE ORAL
Qty: 30 TABLET | Refills: 0 | Status: SHIPPED | OUTPATIENT
Start: 2023-02-20 | End: 2023-03-15

## 2023-02-20 RX ORDER — SPIRONOLACTONE 100 MG/1
TABLET, FILM COATED ORAL
Qty: 30 TABLET | Refills: 1 | Status: SHIPPED | OUTPATIENT
Start: 2023-02-20 | End: 2023-03-16 | Stop reason: SDUPTHER

## 2023-03-15 RX ORDER — METHION/INOS/CHOL BT/B COM/LIV 110MG-86MG
CAPSULE ORAL
Qty: 30 TABLET | Refills: 0 | Status: SHIPPED | OUTPATIENT
Start: 2023-03-15

## 2023-03-16 DIAGNOSIS — G47.00 INSOMNIA, UNSPECIFIED TYPE: ICD-10-CM

## 2023-03-16 RX ORDER — QUETIAPINE FUMARATE 50 MG/1
50 TABLET, FILM COATED ORAL
Qty: 30 TABLET | Refills: 1 | Status: SHIPPED | OUTPATIENT
Start: 2023-03-16

## 2023-03-16 RX ORDER — FUROSEMIDE 40 MG/1
40 TABLET ORAL DAILY
Qty: 90 TABLET | Refills: 0 | Status: SHIPPED | OUTPATIENT
Start: 2023-03-16

## 2023-03-16 RX ORDER — SPIRONOLACTONE 100 MG/1
100 TABLET, FILM COATED ORAL DAILY
Qty: 30 TABLET | Refills: 1 | Status: SHIPPED | OUTPATIENT
Start: 2023-03-16

## 2023-03-19 RX ORDER — SPIRONOLACTONE 100 MG/1
100 TABLET, FILM COATED ORAL DAILY
Qty: 30 TABLET | Refills: 2 | OUTPATIENT
Start: 2023-03-19

## 2023-03-20 ENCOUNTER — TELEPHONE (OUTPATIENT)
Dept: FAMILY MEDICINE CLINIC | Facility: CLINIC | Age: 58
End: 2023-03-20
Payer: COMMERCIAL

## 2023-03-20 NOTE — TELEPHONE ENCOUNTER
[facility] Waiting on determination.

## 2023-03-20 NOTE — TELEPHONE ENCOUNTER
Please check to see if patient is still taking this. She hasnt been seen in some time. I looked on good RX and if this is helping her then she can get it cash pay at [facility] for $9

## 2023-03-20 NOTE — TELEPHONE ENCOUNTER
PA denied. Message from plan:    Per your health plan's criteria, this drug is covered if you meet the following:  One of the following:  (1) Both of the following:  (A) You had lab tests within the past 18 months [metabolic monitoring (hemoglobin A1c and/  or lipids, please document date metabolic monitoring was performed)].  (B) You have been taking this drug at a high dose or lower dose [there is evidence of history  with the requested antipsychotic at a dose exceeding utilization edit(s) or below minimum effective  dose for 90 of the past 120 days and if duplicate antipsychotic is present, you must have history of  duplication for 90 of the past 120 days].  (2) All of the following:  (A) You had lab tests within the past 18 months [metabolic monitoring (hemoglobin A1c and/  or lipids, please document date metabolic monitoring was performed)].  (B) One of the following:  (I) You are less than 18 years of age or greater than 64 years of age.  (II) You are taking a certain dose of the drug (the dose requested meets or exceeds the daily  minimum effective dose).  [id number]  (III) One of the following (for low dose therapy):  (a) The request is for 8 days or less.  (b) All of the following:  (i) One of the following:  (AA) You have a brain or mood disorder (diagnosis of psychosis, bipolar affective disorder, or  unspecified episodic mood disorder).  (BB) You have a mood disorder (diagnosis of depressed mood disorder) and the request is for  aripiprazole or quetiapine to be taken with another antidepressant.  (CC) You have a mood disorder (diagnosis of depressed mood disorder) and the request is for  olanzapine in to be taken with fluoxetine.  (ii) One of the following:  (AA) The drug is being tapered with plans to stop the drug (and it is the first request for an  authorization due to discontinuation).  (BB) You are decreasing the dose and stopping the drug and starting a new drug (drug is being  cross-tapered  with another mental health drug and it is the first request for an authorization due to  cross-tapering).  (C) One of the following:  (I) You have a paid claim for a drug with the same active ingredient (in the previous four  months).  (II) You have had samples of the same active ingredient (in the previous four months).  (III) Request is for a days' supply less than or equal to 15 days.  The information provided does not show that you meet the criteria listed above.

## 2023-03-27 RX ORDER — POTASSIUM CHLORIDE 750 MG/1
40 TABLET, FILM COATED, EXTENDED RELEASE ORAL DAILY
Qty: 90 TABLET | Refills: 1 | Status: SHIPPED | OUTPATIENT
Start: 2023-03-27

## 2023-03-27 RX ORDER — CHOLESTYRAMINE LIGHT 4 G/5.7G
2 POWDER, FOR SUSPENSION ORAL EVERY 12 HOURS SCHEDULED
Qty: 180 PACKET | Refills: 0 | Status: SHIPPED | OUTPATIENT
Start: 2023-03-27

## 2023-03-27 NOTE — TELEPHONE ENCOUNTER
Betzaida Scale  1980  [id number]    Situation:  Verbal report given from: BRITTNEY Paredes CRNA, RN  Procedure: Procedure(s):  VENTRAL HERNIA  REPAIR    Background:    Preoperative diagnosis: VENTRAL HERNIA    Postoperative diagnosis: VENTRAL HERNIA    :  Dr. Emanuel Queen    Assistant(s): Circ-1: Mina Nelson RN  Scrub Tech-1: Zeenat Dunbar  Surg Asst-1: Kody Donis    Specimens:   ID Type Source Tests Collected by Time Destination   1 : Ventral hernia sac Preservative Hernia Sac  Malik Nelson MD 3/27/2023 1404 Pathology       Assessment:  Intra-procedure medications         Anesthesia gave intra-procedure sedation and medications, see anesthesia flow sheet     Intravenous fluids: LR@ KVO     Vital signs stable       Recommendation: Caller: Lita Yu    Relationship: Self    Best call back number: [phone]    Requested Prescriptions:   Requested Prescriptions     Pending Prescriptions Disp Refills   • cholestyramine light 4 g packet 30 packet 1     Sig: Take 2 packets by mouth Every 12 (Twelve) Hours.   • potassium chloride 10 MEQ CR tablet       Si tablets Daily.   • butalbital-acetaminophen-caffeine (FIORICET, ESGIC) -40 MG per tablet       Sig: As Needed.   • thiamine (VITAMIN B1) 100 MG tablet 30 tablet 0     Sig: Take 1 tablet by mouth Daily.        Pharmacy where request should be sent: [facility]/PHARMACY #[id number] - [place], [place] - [address] - [phone]  - [phone] FX     Additional details provided by patient: PATIENT STATES THAT SHE IS OUT OF THE MEDICATIONS    Does the patient have less than a 3 day supply:  [x] Yes  [] No    Florentin Quintero   22 11:04 EST

## 2023-03-29 ENCOUNTER — TELEPHONE (OUTPATIENT)
Dept: FAMILY MEDICINE CLINIC | Facility: CLINIC | Age: 58
End: 2023-03-29
Payer: COMMERCIAL

## 2023-03-29 NOTE — TELEPHONE ENCOUNTER
Prior Auth completed for Cholestyramine Light 4GM packets via cover[facility]ymeds. Pending determination  Key: [id number]   PA Case ID: [id number]   Rx #: [id number]    Sent with chart notes

## 2023-03-30 NOTE — TELEPHONE ENCOUNTER
Prior Authorization for Cholestyramine Light 4GM packets was denied because of the following:  CHOLESTYRAM POW 4GM LITE is denied for medical necessity. Medication authorization  requires the following:  One of the following:  (1) Both of the following:  (A) You have tried and failed each of the preferred drugs. Preferred drugs include  cholestyramine multi-dose containers; colesevelam tablets; Prevalite powder/packets; Welchol Mitchell  suspension.  (B) Trial and failure is documented in your medical chart.  (2) The prescriber has provided a documented medically-justifiable reason that all of the preferred  drugs are not suitable for use.  (3) The preferred drug(s) are not indicated for the requested indication covered by the nonpreferred drug.  Reviewed by: da, R.Ph.

## 2023-04-14 DIAGNOSIS — F41.9 ANXIETY DISORDER, UNSPECIFIED: ICD-10-CM

## 2023-04-14 RX ORDER — BUSPIRONE HYDROCHLORIDE 15 MG/1
TABLET ORAL
Qty: 180 TABLET | Refills: 1 | Status: SHIPPED | OUTPATIENT
Start: 2023-04-14

## 2023-04-17 RX ORDER — FUROSEMIDE 40 MG/1
40 TABLET ORAL DAILY
Qty: 90 TABLET | Refills: 0 | Status: SHIPPED | OUTPATIENT
Start: 2023-04-17

## 2023-04-17 NOTE — TELEPHONE ENCOUNTER
"Caller: CarolynreneLita \"NAINA\"    Relationship: Self    Best call back number: [phone]    Requested Prescriptions:   Requested Prescriptions     Pending Prescriptions Disp Refills   • furosemide (LASIX) 40 MG tablet 90 tablet 0     Sig: Take 1 tablet by mouth Daily.        [facility] be sent[id number] [address] - [phone]  - [phone] FX     Last office visit with prescribing clinician: 10/31/2022   Last telemedicine visit with prescribing clinician: Visit date not found   Next office visit with prescribing clinician: Visit date not found     Additional details provided by patient: PATIENT ALSO STATES SHE NEEDS TRAZODONE 100MG REFILLED.    Does the patient have less than a 3 day supply:  [x] Yes  [] No    Would you like a call back once the refill request has been completed: [x] Yes [] No    If the office needs to give you a call back, can they leave a voicemail: [x] Yes [] No    Florentin Mccray Rep   04/17/23 11:41 EDT     "

## 2023-05-08 RX ORDER — POTASSIUM CHLORIDE 750 MG/1
40 TABLET, FILM COATED, EXTENDED RELEASE ORAL DAILY
Qty: 90 TABLET | Refills: 1 | Status: SHIPPED | OUTPATIENT
Start: 2023-05-08

## 2023-05-25 DIAGNOSIS — G47.00 INSOMNIA, UNSPECIFIED TYPE: ICD-10-CM

## 2023-05-25 RX ORDER — QUETIAPINE FUMARATE 50 MG/1
50 TABLET, FILM COATED ORAL
Qty: 90 TABLET | Refills: 0 | Status: SHIPPED | OUTPATIENT
Start: 2023-05-25

## 2023-06-11 ENCOUNTER — APPOINTMENT (OUTPATIENT)
Dept: GENERAL RADIOLOGY | Facility: HOSPITAL | Age: 58
End: 2023-06-11
Payer: COMMERCIAL

## 2023-06-11 ENCOUNTER — HOSPITAL ENCOUNTER (OUTPATIENT)
Facility: HOSPITAL | Age: 58
Setting detail: OBSERVATION
Discharge: HOME | End: 2023-06-12
Attending: EMERGENCY MEDICINE | Admitting: EMERGENCY MEDICINE
Payer: COMMERCIAL

## 2023-06-11 DIAGNOSIS — J44.9 CHRONIC OBSTRUCTIVE PULMONARY DISEASE, UNSPECIFIED COPD TYPE: ICD-10-CM

## 2023-06-11 DIAGNOSIS — R07.9 CHEST PAIN, UNSPECIFIED TYPE: Primary | ICD-10-CM

## 2023-06-11 DIAGNOSIS — F17.200 SMOKER: ICD-10-CM

## 2023-06-11 LAB
ALBUMIN SERPL-MCNC: 4.7 G/DL (ref 3.5–5.2)
ALBUMIN/GLOB SERPL: 1.4 G/DL
ALP SERPL-CCNC: 124 U/L (ref 39–117)
ALT SERPL W P-5'-P-CCNC: 11 U/L (ref 1–33)
ANION GAP SERPL CALCULATED.3IONS-SCNC: 12 MMOL/L (ref 5–15)
AST SERPL-CCNC: 24 U/L (ref 1–32)
BASOPHILS # BLD AUTO: 0.1 10*3/MM3 (ref 0–0.2)
BASOPHILS NFR BLD AUTO: 0.8 % (ref 0–1.5)
BILIRUB SERPL-MCNC: 0.3 MG/DL (ref 0–1.2)
BUN SERPL-MCNC: 13 MG/DL (ref 6–20)
BUN/CREAT SERPL: 12.4 (ref 7–25)
CALCIUM SPEC-SCNC: 9.3 MG/DL (ref 8.6–10.5)
CHLORIDE SERPL-SCNC: 106 MMOL/L (ref 98–107)
CO2 SERPL-SCNC: 22 MMOL/L (ref 22–29)
CREAT SERPL-MCNC: 1.05 MG/DL (ref 0.57–1)
D DIMER PPP FEU-MCNC: 0.35 MG/L (FEU) (ref 0–0.55)
DEPRECATED RDW RBC AUTO: 49.9 FL (ref 37–54)
EGFRCR SERPLBLD CKD-EPI 2021: 62.9 ML/MIN/1.73
EOSINOPHIL # BLD AUTO: 0.2 10*3/MM3 (ref 0–0.4)
EOSINOPHIL NFR BLD AUTO: 3.2 % (ref 0.3–6.2)
ERYTHROCYTE [DISTWIDTH] IN BLOOD BY AUTOMATED COUNT: 13.9 % (ref 12.3–15.4)
GLOBULIN UR ELPH-MCNC: 3.3 GM/DL
GLUCOSE SERPL-MCNC: 87 MG/DL (ref 65–99)
HCT VFR BLD AUTO: 33.8 % (ref 34–46.6)
HGB BLD-MCNC: 11.2 G/DL (ref 12–15.9)
HOLD SPECIMEN: NORMAL
LYMPHOCYTES # BLD AUTO: 2.4 10*3/MM3 (ref 0.7–3.1)
LYMPHOCYTES NFR BLD AUTO: 36.7 % (ref 19.6–45.3)
MAGNESIUM SERPL-MCNC: 1.7 MG/DL (ref 1.6–2.6)
MCH RBC QN AUTO: 32.5 PG (ref 26.6–33)
MCHC RBC AUTO-ENTMCNC: 33.2 G/DL (ref 31.5–35.7)
MCV RBC AUTO: 97.6 FL (ref 79–97)
MONOCYTES # BLD AUTO: 0.6 10*3/MM3 (ref 0.1–0.9)
MONOCYTES NFR BLD AUTO: 10 % (ref 5–12)
NEUTROPHILS NFR BLD AUTO: 3.2 10*3/MM3 (ref 1.7–7)
NEUTROPHILS NFR BLD AUTO: 49.3 % (ref 42.7–76)
NRBC BLD AUTO-RTO: 0.2 /100 WBC (ref 0–0.2)
PLATELET # BLD AUTO: 183 10*3/MM3 (ref 140–450)
PMV BLD AUTO: 8.3 FL (ref 6–12)
POTASSIUM SERPL-SCNC: 4.3 MMOL/L (ref 3.5–5.2)
PROT SERPL-MCNC: 8 G/DL (ref 6–8.5)
RBC # BLD AUTO: 3.46 10*6/MM3 (ref 3.77–5.28)
SODIUM SERPL-SCNC: 140 MMOL/L (ref 136–145)
TROPONIN T SERPL HS-MCNC: <6 NG/L
TSH SERPL DL<=0.05 MIU/L-ACNC: 1.72 UIU/ML (ref 0.27–4.2)
WBC NRBC COR # BLD: 6.4 10*3/MM3 (ref 3.4–10.8)

## 2023-06-11 PROCEDURE — G0378 HOSPITAL OBSERVATION PER HR: HCPCS

## 2023-06-11 PROCEDURE — 36415 COLL VENOUS BLD VENIPUNCTURE: CPT

## 2023-06-11 PROCEDURE — 83735 ASSAY OF MAGNESIUM: CPT | Performed by: EMERGENCY MEDICINE

## 2023-06-11 PROCEDURE — 80050 GENERAL HEALTH PANEL: CPT | Performed by: EMERGENCY MEDICINE

## 2023-06-11 PROCEDURE — 71045 X-RAY EXAM CHEST 1 VIEW: CPT

## 2023-06-11 PROCEDURE — 93005 ELECTROCARDIOGRAM TRACING: CPT

## 2023-06-11 PROCEDURE — 85379 FIBRIN DEGRADATION QUANT: CPT | Performed by: EMERGENCY MEDICINE

## 2023-06-11 PROCEDURE — 99284 EMERGENCY DEPT VISIT MOD MDM: CPT

## 2023-06-11 PROCEDURE — 84484 ASSAY OF TROPONIN QUANT: CPT | Performed by: EMERGENCY MEDICINE

## 2023-06-11 RX ORDER — SODIUM CHLORIDE 0.9 % (FLUSH) 0.9 %
10 SYRINGE (ML) INJECTION EVERY 12 HOURS SCHEDULED
Status: DISCONTINUED | OUTPATIENT
Start: 2023-06-11 | End: 2023-06-12 | Stop reason: HOSPADM

## 2023-06-11 RX ORDER — SERTRALINE HYDROCHLORIDE 100 MG/1
100 TABLET, FILM COATED ORAL NIGHTLY
Status: DISCONTINUED | OUTPATIENT
Start: 2023-06-12 | End: 2023-06-12 | Stop reason: HOSPADM

## 2023-06-11 RX ORDER — ACETAMINOPHEN 325 MG/1
650 TABLET ORAL EVERY 4 HOURS PRN
Status: DISCONTINUED | OUTPATIENT
Start: 2023-06-11 | End: 2023-06-12 | Stop reason: HOSPADM

## 2023-06-11 RX ORDER — ASPIRIN 325 MG
325 TABLET ORAL ONCE
Status: COMPLETED | OUTPATIENT
Start: 2023-06-11 | End: 2023-06-11

## 2023-06-11 RX ORDER — SODIUM CHLORIDE 0.9 % (FLUSH) 0.9 %
10 SYRINGE (ML) INJECTION AS NEEDED
Status: DISCONTINUED | OUTPATIENT
Start: 2023-06-11 | End: 2023-06-12 | Stop reason: HOSPADM

## 2023-06-11 RX ORDER — ONDANSETRON 2 MG/ML
4 INJECTION INTRAMUSCULAR; INTRAVENOUS EVERY 6 HOURS PRN
Status: DISCONTINUED | OUTPATIENT
Start: 2023-06-11 | End: 2023-06-12 | Stop reason: HOSPADM

## 2023-06-11 RX ORDER — ASPIRIN 325 MG
325 TABLET ORAL ONCE
Status: DISCONTINUED | OUTPATIENT
Start: 2023-06-11 | End: 2023-06-11 | Stop reason: SDUPTHER

## 2023-06-11 RX ORDER — CHOLECALCIFEROL (VITAMIN D3) 125 MCG
10 CAPSULE ORAL NIGHTLY PRN
COMMUNITY

## 2023-06-11 RX ORDER — SODIUM CHLORIDE 9 MG/ML
40 INJECTION, SOLUTION INTRAVENOUS AS NEEDED
Status: DISCONTINUED | OUTPATIENT
Start: 2023-06-11 | End: 2023-06-12 | Stop reason: HOSPADM

## 2023-06-11 RX ORDER — BUSPIRONE HYDROCHLORIDE 15 MG/1
15 TABLET ORAL EVERY 12 HOURS SCHEDULED
Status: DISCONTINUED | OUTPATIENT
Start: 2023-06-12 | End: 2023-06-12 | Stop reason: HOSPADM

## 2023-06-11 RX ORDER — QUETIAPINE FUMARATE 25 MG/1
50 TABLET, FILM COATED ORAL NIGHTLY
Status: DISCONTINUED | OUTPATIENT
Start: 2023-06-12 | End: 2023-06-12 | Stop reason: HOSPADM

## 2023-06-11 RX ORDER — ATORVASTATIN CALCIUM 20 MG/1
20 TABLET, FILM COATED ORAL NIGHTLY
Status: DISCONTINUED | OUTPATIENT
Start: 2023-06-12 | End: 2023-06-12 | Stop reason: HOSPADM

## 2023-06-11 RX ORDER — AMOXICILLIN 250 MG
2 CAPSULE ORAL 2 TIMES DAILY
Status: DISCONTINUED | OUTPATIENT
Start: 2023-06-11 | End: 2023-06-12 | Stop reason: HOSPADM

## 2023-06-11 RX ORDER — BISACODYL 10 MG
10 SUPPOSITORY, RECTAL RECTAL DAILY PRN
Status: DISCONTINUED | OUTPATIENT
Start: 2023-06-11 | End: 2023-06-12 | Stop reason: HOSPADM

## 2023-06-11 RX ORDER — CHOLECALCIFEROL (VITAMIN D3) 125 MCG
5 CAPSULE ORAL NIGHTLY PRN
Status: DISCONTINUED | OUTPATIENT
Start: 2023-06-11 | End: 2023-06-12 | Stop reason: HOSPADM

## 2023-06-11 RX ORDER — POLYETHYLENE GLYCOL 3350 17 G/17G
17 POWDER, FOR SOLUTION ORAL DAILY PRN
Status: DISCONTINUED | OUTPATIENT
Start: 2023-06-11 | End: 2023-06-12 | Stop reason: HOSPADM

## 2023-06-11 RX ORDER — BISACODYL 5 MG/1
5 TABLET, DELAYED RELEASE ORAL DAILY PRN
Status: DISCONTINUED | OUTPATIENT
Start: 2023-06-11 | End: 2023-06-12 | Stop reason: HOSPADM

## 2023-06-11 RX ADMIN — QUETIAPINE FUMARATE 50 MG: 25 TABLET ORAL at 23:30

## 2023-06-11 RX ADMIN — Medication 5 MG: at 23:30

## 2023-06-11 RX ADMIN — ATORVASTATIN CALCIUM 20 MG: 20 TABLET, FILM COATED ORAL at 23:30

## 2023-06-11 RX ADMIN — ASPIRIN 325 MG: 325 TABLET ORAL at 19:45

## 2023-06-11 RX ADMIN — Medication 10 ML: at 23:30

## 2023-06-11 RX ADMIN — BUSPIRONE HYDROCHLORIDE 15 MG: 15 TABLET ORAL at 23:30

## 2023-06-11 RX ADMIN — SERTRALINE 100 MG: 100 TABLET, FILM COATED ORAL at 23:30

## 2023-06-11 RX ADMIN — NITROGLYCERIN 1 INCH: 20 OINTMENT TOPICAL at 21:28

## 2023-06-12 ENCOUNTER — APPOINTMENT (OUTPATIENT)
Dept: NUCLEAR MEDICINE | Facility: HOSPITAL | Age: 58
End: 2023-06-12
Payer: COMMERCIAL

## 2023-06-12 ENCOUNTER — READMISSION MANAGEMENT (OUTPATIENT)
Dept: CALL CENTER | Facility: HOSPITAL | Age: 58
End: 2023-06-12
Payer: COMMERCIAL

## 2023-06-12 VITALS
DIASTOLIC BLOOD PRESSURE: 80 MMHG | HEART RATE: 60 BPM | RESPIRATION RATE: 16 BRPM | BODY MASS INDEX: 22.96 KG/M2 | SYSTOLIC BLOOD PRESSURE: 120 MMHG | TEMPERATURE: 98 F | WEIGHT: 134.48 LBS | HEIGHT: 64 IN | OXYGEN SATURATION: 100 %

## 2023-06-12 LAB
ALBUMIN SERPL-MCNC: 4 G/DL (ref 3.5–5.2)
ALP SERPL-CCNC: 102 U/L (ref 39–117)
ALT SERPL W P-5'-P-CCNC: 9 U/L (ref 1–33)
AMMONIA BLD-SCNC: 67 UMOL/L (ref 11–51)
ANION GAP SERPL CALCULATED.3IONS-SCNC: 12 MMOL/L (ref 5–15)
AST SERPL-CCNC: 20 U/L (ref 1–32)
BASOPHILS # BLD AUTO: 0 10*3/MM3 (ref 0–0.2)
BASOPHILS NFR BLD AUTO: 0.6 % (ref 0–1.5)
BH CV REST NUCLEAR ISOTOPE DOSE: 11 MCI
BH CV STRESS BP STAGE 1: NORMAL
BH CV STRESS BP STAGE 2: NORMAL
BH CV STRESS COMMENTS STAGE 1: NORMAL
BH CV STRESS COMMENTS STAGE 2: NORMAL
BH CV STRESS DOSE REGADENOSON STAGE 1: 0.4
BH CV STRESS DURATION MIN STAGE 1: 0
BH CV STRESS DURATION MIN STAGE 2: 4
BH CV STRESS DURATION SEC STAGE 1: 10
BH CV STRESS DURATION SEC STAGE 2: 0
BH CV STRESS HR STAGE 1: 130
BH CV STRESS HR STAGE 2: 74
BH CV STRESS NUCLEAR ISOTOPE DOSE: 33 MCI
BH CV STRESS PROTOCOL 1: NORMAL
BH CV STRESS RECOVERY BP: NORMAL MMHG
BH CV STRESS RECOVERY HR: 74 BPM
BH CV STRESS STAGE 1: 1
BH CV STRESS STAGE 2: 2
BILIRUB CONJ SERPL-MCNC: <0.2 MG/DL (ref 0–0.3)
BILIRUB INDIRECT SERPL-MCNC: NORMAL MG/DL
BILIRUB SERPL-MCNC: 0.3 MG/DL (ref 0–1.2)
BILIRUB UR QL STRIP: NEGATIVE
BUN SERPL-MCNC: 15 MG/DL (ref 6–20)
BUN/CREAT SERPL: 16.7 (ref 7–25)
CALCIUM SPEC-SCNC: 9.1 MG/DL (ref 8.6–10.5)
CHLORIDE SERPL-SCNC: 109 MMOL/L (ref 98–107)
CHOLEST SERPL-MCNC: 152 MG/DL (ref 0–200)
CLARITY UR: CLEAR
CO2 SERPL-SCNC: 20 MMOL/L (ref 22–29)
COLOR UR: YELLOW
CREAT SERPL-MCNC: 0.9 MG/DL (ref 0.57–1)
DEPRECATED RDW RBC AUTO: 48.1 FL (ref 37–54)
EGFRCR SERPLBLD CKD-EPI 2021: 75.7 ML/MIN/1.73
EOSINOPHIL # BLD AUTO: 0.2 10*3/MM3 (ref 0–0.4)
EOSINOPHIL NFR BLD AUTO: 3.4 % (ref 0.3–6.2)
ERYTHROCYTE [DISTWIDTH] IN BLOOD BY AUTOMATED COUNT: 13.8 % (ref 12.3–15.4)
GEN 5 2HR TROPONIN T REFLEX: <6 NG/L
GLUCOSE SERPL-MCNC: 101 MG/DL (ref 65–99)
GLUCOSE UR STRIP-MCNC: NEGATIVE MG/DL
HBA1C MFR BLD: 5 % (ref 4.8–5.6)
HCT VFR BLD AUTO: 30.4 % (ref 34–46.6)
HDLC SERPL-MCNC: 67 MG/DL (ref 40–60)
HGB BLD-MCNC: 10.2 G/DL (ref 12–15.9)
HGB UR QL STRIP.AUTO: NEGATIVE
KETONES UR QL STRIP: NEGATIVE
LDLC SERPL CALC-MCNC: 62 MG/DL (ref 0–100)
LDLC/HDLC SERPL: 0.86 {RATIO}
LEUKOCYTE ESTERASE UR QL STRIP.AUTO: NEGATIVE
LIPASE SERPL-CCNC: 424 U/L (ref 13–60)
LV EF NUC BP: 74 %
LYMPHOCYTES # BLD AUTO: 2.1 10*3/MM3 (ref 0.7–3.1)
LYMPHOCYTES NFR BLD AUTO: 33.7 % (ref 19.6–45.3)
MAXIMAL PREDICTED HEART RATE: 165 BPM
MCH RBC QN AUTO: 33.7 PG (ref 26.6–33)
MCHC RBC AUTO-ENTMCNC: 33.7 G/DL (ref 31.5–35.7)
MCV RBC AUTO: 100.1 FL (ref 79–97)
MONOCYTES # BLD AUTO: 0.7 10*3/MM3 (ref 0.1–0.9)
MONOCYTES NFR BLD AUTO: 11.3 % (ref 5–12)
NEUTROPHILS NFR BLD AUTO: 3.2 10*3/MM3 (ref 1.7–7)
NEUTROPHILS NFR BLD AUTO: 51 % (ref 42.7–76)
NITRITE UR QL STRIP: NEGATIVE
NRBC BLD AUTO-RTO: 0.1 /100 WBC (ref 0–0.2)
NT-PROBNP SERPL-MCNC: 67.6 PG/ML (ref 0–900)
PERCENT MAX PREDICTED HR: 78.79 %
PH UR STRIP.AUTO: 6 [PH] (ref 5–8)
PLATELET # BLD AUTO: 168 10*3/MM3 (ref 140–450)
PMV BLD AUTO: 7.8 FL (ref 6–12)
POTASSIUM SERPL-SCNC: 4.3 MMOL/L (ref 3.5–5.2)
PROT SERPL-MCNC: 6.8 G/DL (ref 6–8.5)
PROT UR QL STRIP: NEGATIVE
QT INTERVAL: 395 MS
RBC # BLD AUTO: 3.04 10*6/MM3 (ref 3.77–5.28)
SODIUM SERPL-SCNC: 141 MMOL/L (ref 136–145)
SP GR UR STRIP: 1.01 (ref 1–1.03)
STRESS BASELINE BP: NORMAL MMHG
STRESS BASELINE HR: 83 BPM
STRESS PERCENT HR: 93 %
STRESS POST ESTIMATED WORKLOAD: 3.8 METS
STRESS POST EXERCISE DUR MIN: 6 MIN
STRESS POST EXERCISE DUR SEC: 1 SEC
STRESS POST PEAK BP: NORMAL MMHG
STRESS POST PEAK HR: 130 BPM
STRESS TARGET HR: 140 BPM
T4 FREE SERPL-MCNC: 0.77 NG/DL (ref 0.93–1.7)
TRIGL SERPL-MCNC: 137 MG/DL (ref 0–150)
TROPONIN T DELTA: NORMAL
TROPONIN T SERPL HS-MCNC: <6 NG/L
UROBILINOGEN UR QL STRIP: NORMAL
VLDLC SERPL-MCNC: 23 MG/DL (ref 5–40)
WBC NRBC COR # BLD: 6.3 10*3/MM3 (ref 3.4–10.8)

## 2023-06-12 PROCEDURE — 81003 URINALYSIS AUTO W/O SCOPE: CPT | Performed by: NURSE PRACTITIONER

## 2023-06-12 PROCEDURE — 93017 CV STRESS TEST TRACING ONLY: CPT

## 2023-06-12 PROCEDURE — 85025 COMPLETE CBC W/AUTO DIFF WBC: CPT | Performed by: EMERGENCY MEDICINE

## 2023-06-12 PROCEDURE — 83690 ASSAY OF LIPASE: CPT | Performed by: NURSE PRACTITIONER

## 2023-06-12 PROCEDURE — 78452 HT MUSCLE IMAGE SPECT MULT: CPT

## 2023-06-12 PROCEDURE — G0378 HOSPITAL OBSERVATION PER HR: HCPCS

## 2023-06-12 PROCEDURE — 94640 AIRWAY INHALATION TREATMENT: CPT

## 2023-06-12 PROCEDURE — 84484 ASSAY OF TROPONIN QUANT: CPT | Performed by: EMERGENCY MEDICINE

## 2023-06-12 PROCEDURE — 80048 BASIC METABOLIC PNL TOTAL CA: CPT | Performed by: EMERGENCY MEDICINE

## 2023-06-12 PROCEDURE — 83036 HEMOGLOBIN GLYCOSYLATED A1C: CPT | Performed by: NURSE PRACTITIONER

## 2023-06-12 PROCEDURE — 82140 ASSAY OF AMMONIA: CPT | Performed by: NURSE PRACTITIONER

## 2023-06-12 PROCEDURE — 80061 LIPID PANEL: CPT | Performed by: EMERGENCY MEDICINE

## 2023-06-12 PROCEDURE — 80076 HEPATIC FUNCTION PANEL: CPT | Performed by: NURSE PRACTITIONER

## 2023-06-12 PROCEDURE — 80307 DRUG TEST PRSMV CHEM ANLYZR: CPT | Performed by: NURSE PRACTITIONER

## 2023-06-12 PROCEDURE — 94799 UNLISTED PULMONARY SVC/PX: CPT

## 2023-06-12 PROCEDURE — 0 TECHNETIUM TETROFOSMIN KIT: Performed by: EMERGENCY MEDICINE

## 2023-06-12 PROCEDURE — 83880 ASSAY OF NATRIURETIC PEPTIDE: CPT | Performed by: NURSE PRACTITIONER

## 2023-06-12 PROCEDURE — A9502 TC99M TETROFOSMIN: HCPCS | Performed by: EMERGENCY MEDICINE

## 2023-06-12 PROCEDURE — 84439 ASSAY OF FREE THYROXINE: CPT | Performed by: NURSE PRACTITIONER

## 2023-06-12 PROCEDURE — 94761 N-INVAS EAR/PLS OXIMETRY MLT: CPT

## 2023-06-12 RX ORDER — FUROSEMIDE 40 MG/1
40 TABLET ORAL DAILY
Status: DISCONTINUED | OUTPATIENT
Start: 2023-06-12 | End: 2023-06-12 | Stop reason: HOSPADM

## 2023-06-12 RX ORDER — POTASSIUM CHLORIDE 20 MEQ/1
40 TABLET, EXTENDED RELEASE ORAL DAILY
Status: DISCONTINUED | OUTPATIENT
Start: 2023-06-12 | End: 2023-06-12 | Stop reason: HOSPADM

## 2023-06-12 RX ORDER — FLUTICASONE PROPIONATE AND SALMETEROL 250; 50 UG/1; UG/1
1 POWDER RESPIRATORY (INHALATION)
Qty: 60 EACH | Refills: 0 | Status: SHIPPED | OUTPATIENT
Start: 2023-06-12

## 2023-06-12 RX ORDER — ALBUTEROL SULFATE 90 UG/1
2 AEROSOL, METERED RESPIRATORY (INHALATION) EVERY 4 HOURS PRN
COMMUNITY

## 2023-06-12 RX ORDER — SPIRONOLACTONE 100 MG/1
100 TABLET, FILM COATED ORAL DAILY
Status: DISCONTINUED | OUTPATIENT
Start: 2023-06-12 | End: 2023-06-12 | Stop reason: HOSPADM

## 2023-06-12 RX ORDER — IPRATROPIUM BROMIDE AND ALBUTEROL SULFATE 2.5; .5 MG/3ML; MG/3ML
3 SOLUTION RESPIRATORY (INHALATION)
Status: DISCONTINUED | OUTPATIENT
Start: 2023-06-12 | End: 2023-06-12 | Stop reason: HOSPADM

## 2023-06-12 RX ADMIN — TETROFOSMIN 1 DOSE: 1.38 INJECTION, POWDER, LYOPHILIZED, FOR SOLUTION INTRAVENOUS at 06:31

## 2023-06-12 RX ADMIN — Medication 10 ML: at 09:40

## 2023-06-12 RX ADMIN — TETROFOSMIN 1 DOSE: 1.38 INJECTION, POWDER, LYOPHILIZED, FOR SOLUTION INTRAVENOUS at 09:06

## 2023-06-12 RX ADMIN — ACETAMINOPHEN 650 MG: 325 TABLET, FILM COATED ORAL at 09:45

## 2023-06-12 RX ADMIN — BUSPIRONE HYDROCHLORIDE 15 MG: 15 TABLET ORAL at 09:45

## 2023-06-12 RX ADMIN — IPRATROPIUM BROMIDE AND ALBUTEROL SULFATE 3 ML: .5; 3 SOLUTION RESPIRATORY (INHALATION) at 13:07

## 2023-06-12 RX ADMIN — FUROSEMIDE 40 MG: 40 TABLET ORAL at 09:40

## 2023-06-12 RX ADMIN — POTASSIUM CHLORIDE 40 MEQ: 1500 TABLET, EXTENDED RELEASE ORAL at 09:40

## 2023-06-12 RX ADMIN — SPIRONOLACTONE 100 MG: 100 TABLET ORAL at 09:40

## 2023-06-12 NOTE — ED NOTES
Nursing report ED to floor  Lita Yu  57 y.o.  female    HPI:   Chief Complaint   Patient presents with    Palpitations     Pt reports palpitations and SOA that started last week but states progressively worsened today.         Admitting doctor:   Roberto Carlos Payton MD    Admitting diagnosis:   The encounter diagnosis was Chest pain, unspecified type.    Code status:   Current Code Status       Date Active Code Status Order ID Comments User Context       6/11/2023 2117 CPR (Attempt to Resuscitate) [id number]  Roberto Carlos Payton MD ED        Question Answer    Code Status (Patient has no pulse and is not breathing) CPR (Attempt to Resuscitate)    Medical Interventions (Patient has pulse or is breathing) Full Support                    Allergies:   Sulfa antibiotics and Keflex [cephalexin]    Isolation:  No active isolations     Fall Risk:  Fall Risk Assessment was completed, and patient is at low risk for falls.   Predictive Model Details         2 (Low) Factor Value    Calculated 6/11/2023 20:29 Age 57    Risk of Fall Model Musculoskeletal Assessment WDL     Imaging order in this encounter Present     Respiratory Rate 20     Skin Assessment WDL     Magnesium 1.7 mg/dL     Drug Use No     Tobacco Use Current     Baldemar Scale not on file     Financial Class Private Insurance     Diastolic BP 76     Peripheral Vascular Assessment WDL     Cardiac Assessment X     Gastrointestinal Assessment WDL     Creatinine 1.05 mg/dL     Number of Distinct Medication Classes administered 1     Calcium 9.3 mg/dL     Chloride 106 mmol/L     Potassium 4.3 mmol/L     Days after Admission 0.063     ALT 11 U/L     Total Bilirubin 0.3 mg/dL     Albumin 4.7 g/dL         Weight:       06/11/23 1854   Weight: 58.8 kg (129 lb 10.1 oz)       Intake and Output  No intake or output data in the 24 hours ending 06/11/23 2149    Diet:        Most recent vitals:   Vitals:    06/11/23 2113 06/11/23 2115 06/11/23 2126 06/11/23 2128   BP:   "140/80  140/80   BP Location:       Patient Position:       Pulse: 62 62 66 66   Resp:       Temp:       TempSrc:       SpO2: 97% 93% 97% 97%   Weight:       Height:           Active LDAs/IV Access:   Lines, Drains & Airways       Active LDAs       None                    Skin Condition:   Skin Assessments (last day)       Date/Time Skin WDL    06/11/23 19:46:48 WDL             Labs (abnormal labs have a star):   Labs Reviewed   COMPREHENSIVE METABOLIC PANEL - Abnormal; Notable for the following components:       Result Value    Creatinine 1.05 (*)     Alkaline Phosphatase 124 (*)     All other components within normal limits    Narrative:     GFR Normal >60  Chronic Kidney Disease <60  Kidney Failure <15     CBC WITH AUTO DIFFERENTIAL - Abnormal; Notable for the following components:    RBC 3.46 (*)     Hemoglobin 11.2 (*)     Hematocrit 33.8 (*)     MCV 97.6 (*)     All other components within normal limits   D-DIMER, QUANTITATIVE - Normal    Narrative:     According to the assay 's published package insert, a normal (<0.50 mg/L (FEU)) D-dimer result in conjunction with a non-high clinical probability assessment, excludes deep vein thrombosis (DVT) and pulmonary embolism (PE) with high sensitivity.    D-dimer values increase with age and this can make VTE exclusion of an older population difficult. To address this, the American College of [employer], based on best available evidence and recent guidelines, recommends that clinicians use age-adjusted D-dimer thresholds in patients greater than 50 years of age with: a) a low probability of PE who do not meet all Pulmonary Embolism Rule Out Criteria, or b) in those with intermediate probability of PE.   The formula for an age-adjusted D-dimer cut-off is \"age/100\".  For example, a 60 year old patient would have an age-adjusted cut-off of 0.60 mg/L (FEU) and an 80 year old 0.80 mg/L (FEU).   SINGLE HSTROPONIN T - Normal    Narrative:     High Sensitive " Troponin T Reference Range:  <10.0 ng/L- Negative Female for AMI  <15.0 ng/L- Negative Male for AMI  >=10 - Abnormal Female indicating possible myocardial injury.  >=15 - Abnormal Male indicating possible myocardial injury.   Clinicians would have to utilize clinical acumen, EKG, Troponin, and serial changes to determine if it is an Acute Myocardial Infarction or myocardial injury due to an underlying chronic condition.        MAGNESIUM - Normal   TSH - Normal   CBC AND DIFFERENTIAL    Narrative:     The following orders were created for panel order CBC & Differential.  Procedure                               Abnormality         Status                     ---------                               -----------         ------                     CBC Auto Differential[[id number]]        Abnormal            Final result                 Please view results for these tests on the individual orders.   EXTRA TUBES    Narrative:     The following orders were created for panel order Extra Tubes.  Procedure                               Abnormality         Status                     ---------                               -----------         ------                     Gold Top - SST[[id number]]                                   Final result                 Please view results for these tests on the individual orders.   GOLD TOP - [facility]       LOC: Person, Place, Time, and Situation    Telemetry:  Observation Unit    Cardiac Monitoring Ordered: yes    EKG:   ECG 12 Lead Chest Pain   Preliminary Result   HEART RATE= 71  bpm   RR Interval= 848  ms   AL Interval= 146  ms   P Horizontal Axis= 34  deg   P Front Axis= 4  deg   QRSD Interval= 87  ms   QT Interval= 395  ms   QRS Axis= 57  deg   T Wave Axis= 61  deg   - NORMAL ECG -   Sinus rhythm   When compared with ECG of 18-Oct-2022 12:09:01,   Significant axis, voltage or hypertrophy change   Electronically Signed By:    Date and Time of Study: 2023-06-11 19:07:58          Medications  Given in the ED:   Medications   aspirin tablet 325 mg (325 mg Oral Given 6/11/23 1945)   nitroglycerin (NITROSTAT) ointment 1 inch (1 inch Topical Given 6/11/23 2128)       Imaging results:  XR Chest 1 View    Result Date: 6/11/2023  Impression: No acute cardiopulmonary process. Electronically Signed: Petros Abdon  6/11/2023 8:27 PM EDT  Workstation ID: [id number]     Social issues:   Social History     Socioeconomic History    Marital status:    Tobacco Use    Smoking status: Every Day     Packs/day: 0.25     Types: Cigarettes    Smokeless tobacco: Never    Tobacco comments:     3cigs   Vaping Use    Vaping Use: Never used   Substance and Sexual Activity    Alcohol use: Not Currently     Comment: quit 5/4/21    Drug use: No    Sexual activity: Yes     Partners: Male     Birth control/protection: Post-menopausal       NIH Stroke Scale:  Interval: (not recorded)  1a. Level of Consciousness: (not recorded)  1b. LOC Questions: (not recorded)  1c. LOC Commands: (not recorded)  2. Best Gaze: (not recorded)  3. Visual: (not recorded)  4. Facial Palsy: (not recorded)  5a. Motor Arm, Left: (not recorded)  5b. Motor Arm, Right: (not recorded)  6a. Motor Leg, Left: (not recorded)  6b. Motor Leg, Right: (not recorded)  7. Limb Ataxia: (not recorded)  8. Sensory: (not recorded)  9. Best Language: (not recorded)  10. Dysarthria: (not recorded)  11. Extinction and Inattention (formerly Neglect): (not recorded)    Total (NIH Stroke Scale): (not recorded)     Additional notable assessment information:     Nursing report ED to floor:      Rosalinda Prasad RN   06/11/23 21:49 EDT

## 2023-06-12 NOTE — PLAN OF CARE
Goal Outcome Evaluation:    Plan for myoview today. Oral electrolyte replacement followed per protocol.

## 2023-06-12 NOTE — ED PROVIDER NOTES
Subjective   History of Present Illness  57-year-old female reports chest pain and dyspnea on exertion increasing over the last 2 weeks.  She states the pain was at its worst today and she was briefly diaphoretic.  The patient denies palpitations.  She reports no recent fever chills or cough.  She reports no leg pain or swelling.  She reports a recent decrease in exercise tolerance she denies night sweats or hemoptysis    Patient does not report a pleuritic component to the pain    Review of Systems   Constitutional: Positive for fatigue. Negative for chills and fever.   Respiratory: Positive for chest tightness and shortness of breath. Negative for cough and choking.    Cardiovascular: Positive for chest pain. Negative for palpitations and leg swelling.   Gastrointestinal: Positive for nausea.   Hematological: Does not bruise/bleed easily.   All other systems reviewed and are negative.      Past Medical History:   Diagnosis Date   • Cirrhosis    • COPD (chronic obstructive pulmonary disease)    • Depression    • GERD (gastroesophageal reflux disease)    • Hyperlipidemia    • Hypertension    • PTSD (post-traumatic stress disorder)        Allergies   Allergen Reactions   • Sulfa Antibiotics Hives   • Keflex [Cephalexin] Hives       Past Surgical History:   Procedure Laterality Date   •  SECTION N/A    • COLONOSCOPY N/A 2021    Procedure: COLONOSCOPY with polypectomy x2 and random biopsy;  Surgeon: Osman Clay MD;  Location: [facility] ENDOSCOPY;  Service: Gastroenterology;  Laterality: N/A;  colon polyps   • DENTAL PROCEDURE     • ENDOSCOPY N/A 2021    Procedure: ESOPHAGOGASTRODUODENOSCOPY;  Surgeon: Osman Clay MD;  Location: [facility] ENDOSCOPY;  Service: Gastroenterology;  Laterality: N/A;  esophagitis   • JOINT REPLACEMENT     • REPLACEMENT TOTAL HIP LATERAL POSITION Left    • TONSILLECTOMY     • VAGINAL BIRTH AFTER  SECTION         Family History   Problem Relation Age of Onset   •  Alcohol abuse Mother    • Alcohol abuse Paternal Grandfather      History is extensively positive for occlusive cardiac disease and hypertension  Social History     Socioeconomic History   • Marital status:    Tobacco Use   • Smoking status: Every Day     Packs/day: 0.25     Types: Cigarettes   • Smokeless tobacco: Never   • Tobacco comments:     3cigs   Vaping Use   • Vaping Use: Never used   Substance and Sexual Activity   • Alcohol use: Not Currently     Comment: quit 5/4/21   • Drug use: No   • Sexual activity: Yes     Partners: Male     Birth control/protection: Post-menopausal     Patient states that she has a greater than 30-pack-year smoking history      Objective   Physical Exam  Alert [place] Coma Scale 15   HEENT: Pupils equal and reactive to light. Conjunctivae are not injected. Normal tympanic membranes. Oropharynx and nares are normal.   Neck: Supple. Midline trachea. No JVD. No goiter.   Chest: Clear and equal breath sounds bilaterally, regular rate and rhythm without murmur or rub.  Nontender chest wall no S3 or S4   Abdomen: Positive bowel sounds, nontender, nondistended. No rebound or peritoneal signs. No CVA tenderness.   Extremities no clubbing. cyanosis or edema. Motor sensory exam is normal. The full range of motion is intact   Skin: Warm and dry, no rashes or petechia.   Lymphatic: No regional lymphadenopathy. No calf pain, swelling or Homans sign    Procedures           ED Course      Labs Reviewed   COMPREHENSIVE METABOLIC PANEL - Abnormal; Notable for the following components:       Result Value    Creatinine 1.05 (*)     Alkaline Phosphatase 124 (*)     All other components within normal limits    Narrative:     GFR Normal >60  Chronic Kidney Disease <60  Kidney Failure <15     CBC WITH AUTO DIFFERENTIAL - Abnormal; Notable for the following components:    RBC 3.46 (*)     Hemoglobin 11.2 (*)     Hematocrit 33.8 (*)     MCV 97.6 (*)     All other components within normal limits  "  D-DIMER, QUANTITATIVE - Normal    Narrative:     According to the assay 's published package insert, a normal (<0.50 mg/L (FEU)) D-dimer result in conjunction with a non-high clinical probability assessment, excludes deep vein thrombosis (DVT) and pulmonary embolism (PE) with high sensitivity.    D-dimer values increase with age and this can make VTE exclusion of an older population difficult. To address this, the [employer], based on best available evidence and recent guidelines, recommends that clinicians use age-adjusted D-dimer thresholds in patients greater than 50 years of age with: a) a low probability of PE who do not meet all Pulmonary Embolism Rule Out Criteria, or b) in those with intermediate probability of PE.   The formula for an age-adjusted D-dimer cut-off is \"age/100\".  For example, a 60 year old patient would have an age-adjusted cut-off of 0.60 mg/L (FEU) and an 80 year old 0.80 mg/L (FEU).   SINGLE HSTROPONIN T - Normal    Narrative:     High Sensitive Troponin T Reference Range:  <10.0 ng/L- Negative Female for AMI  <15.0 ng/L- Negative Male for AMI  >=10 - Abnormal Female indicating possible myocardial injury.  >=15 - Abnormal Male indicating possible myocardial injury.   Clinicians would have to utilize clinical acumen, EKG, Troponin, and serial changes to determine if it is an Acute Myocardial Infarction or myocardial injury due to an underlying chronic condition.        MAGNESIUM - Normal   TSH - Normal   CBC AND DIFFERENTIAL    Narrative:     The following orders were created for panel order CBC & Differential.  Procedure                               Abnormality         Status                     ---------                               -----------         ------                     CBC Auto Differential[[id number]]        Abnormal            Final result                 Please view results for these tests on the individual orders.   EXTRA TUBES    " Narrative:     The following orders were created for panel order Extra Tubes.  Procedure                               Abnormality         Status                     ---------                               -----------         ------                     Gold Top - SST[[id number]]                                   Final result                 Please view results for these tests on the individual orders.   GOLD TOP - SST     Medications   nitroglycerin (NITROSTAT) ointment 1 inch (has no administration in time range)   aspirin tablet 325 mg (325 mg Oral Given 6/11/23 1945)     XR Chest 1 View    Result Date: 6/11/2023  Impression: No acute cardiopulmonary process. Electronically Signed: Petros Abdon  6/11/2023 8:27 PM EDT  Workstation ID: [id number]                                         Medical Decision Making  The patient had no additional chest pain in the emergency department.  The patient will be admitted for serial troponin and EKG.  She will need stress Myoview in the morning the patient was agreeable to this plan of treatment    Amount and/or Complexity of Data Reviewed  Independent Historian: spouse  External Data Reviewed: notes.  Labs: ordered. Decision-making details documented in ED Course.  Radiology: ordered and independent interpretation performed.  ECG/medicine tests: ordered and independent interpretation performed.     Details: EKG showed sinus rhythm low voltage and was similar to comparison      Risk  OTC drugs.  Prescription drug management.  Decision regarding hospitalization.          Final diagnoses:   Chest pain, unspecified type       ED Disposition  ED Disposition     ED Disposition   Decision to Admit    Condition   --    Comment   --             No follow-up provider specified.       Medication List      No changes were made to your prescriptions during this visit.          Roberto Carlos Payton MD  06/11/23 9829

## 2023-06-12 NOTE — OUTREACH NOTE
Prep Survey    Flowsheet Row Responses   [facility] patient discharged from? [facility]   Is LACE score < 7 ? Yes   Eligibility [facility]   Date of Admission 06/11/23   Date of Discharge 06/12/23   Discharge Disposition Home or Self Care   Discharge diagnosis chest pain   Does the patient have one of the following disease processes/diagnoses(primary or secondary)? Other   Does the patient have Home health ordered? No   Is there a DME ordered? No   Prep survey completed? Yes          Annemarie DOUGLAS - Registered Nurse

## 2023-06-12 NOTE — CASE MANAGEMENT/SOCIAL WORK
Discharge Planning Assessment   [facility]     Patient Name: Lita Yu  MRN: [id number]  Today's Date: 6/12/2023    Admit Date: 6/11/2023    Plan: Home at discharge.   Discharge Needs Assessment       Row Name 06/12/23 1515       Living Environment    People in Home alone    Current Living Arrangements home    Potentially Unsafe Housing Conditions none    Primary Care Provided by self    Provides Primary Care For no one    Family Caregiver if Needed none    Able to Return to Prior Arrangements yes       Resource/Environmental Concerns    Resource/Environmental Concerns none    Transportation Concerns none       Transition Planning    Patient/Family Anticipates Transition to home    Patient/Family Anticipated Services at Transition none    Transportation Anticipated family or friend will provide       Discharge Needs Assessment    Readmission Within the Last 30 Days no previous admission in last 30 days    Equipment Currently Used at Home none    Concerns to be Addressed no discharge needs identified;denies needs/concerns at this time    Anticipated Changes Related to Illness none    Equipment Needed After Discharge none                   Discharge Plan       Row Name 06/12/23 1516       Plan    Plan Home at discharge.    Patient/Family in Agreement with Plan yes    Plan Comments Patient lives at home alone. Patient drives, patient will schedule own ride through cab service at discharge. Patient performs ADL. PCP and pharmacy confirmed. Denies financial assistance needs for medication and/or food. Denies HH and/or rehab needs.    Final Discharge Disposition Code 01 - home or self-care    Final Note Home                  Demographic Summary       Row Name 06/12/23 1515       General Information    Admission Type observation    Arrived From emergency department    Referral Source admission list    Reason for Consult discharge planning    Preferred Language English                   Functional Status       Row  Name 06/12/23 1515       Functional Status    Usual Activity Tolerance good    Current Activity Tolerance good       Functional Status, IADL    Medications independent    Meal Preparation independent    Housekeeping independent    Laundry independent    Shopping independent               Met with patient in room .     Maintained distance greater than six feet and spent less than 15 minutes in the room.    Ana María Falcon RN, BSN  Obs/[facility]   [address] [address]  Phone: [phone]  Fax: [phone]

## 2023-06-12 NOTE — DISCHARGE SUMMARY
[facility]    Norma Saul APRN    CHIEF COMPLAINT:     Chest Pain     HISTORY OF PRESENT ILLNESS:    HPI      57-year-old female reports chest pain and dyspnea on exertion increasing over the last 2 weeks. She states the pain was at its worst today and she was briefly diaphoretic. The patient denies palpitations. She reports no recent fever chills or cough. She reports no leg pain or swelling. She reports a recent decrease in exercise tolerance she denies night sweats or hemoptysis.      Past Surgical History:   Procedure Laterality Date     SECTION N/A     COLONOSCOPY N/A 2021    Procedure: COLONOSCOPY with polypectomy x2 and random biopsy;  Surgeon: Osman Clay MD;  Location: [facility] ENDOSCOPY;  Service: Gastroenterology;  Laterality: N/A;  colon polyps    DENTAL PROCEDURE      ENDOSCOPY N/A 2021    Procedure: ESOPHAGOGASTRODUODENOSCOPY;  Surgeon: Osman Clay MD;  Location: [facility] ENDOSCOPY;  Service: Gastroenterology;  Laterality: N/A;  esophagitis    JOINT REPLACEMENT      REPLACEMENT TOTAL HIP LATERAL POSITION Left     TONSILLECTOMY      VAGINAL BIRTH AFTER  SECTION       Family History   Problem Relation Age of Onset    Alcohol abuse Mother     Alcohol abuse Paternal Grandfather      Social History     Tobacco Use    Smoking status: Every Day     Packs/day: 0.25     Types: Cigarettes    Smokeless tobacco: Never    Tobacco comments:     3cigs   Vaping Use    Vaping Use: Never used   Substance Use Topics    Alcohol use: Not Currently     Comment: quit 23    Drug use: No     Medications Prior to Admission   Medication Sig Dispense Refill Last Dose    atorvastatin (LIPITOR) 20 MG tablet TAKE 1 TABLET BY MOUTH EVERYDAY AT BEDTIME 90 tablet 1 6/10/2023 at 2300    busPIRone (BUSPAR) 15 MG tablet TAKE 1 TABLET BY MOUTH TWICE A  tablet 1 2023 at 0900    cholestyramine light 4 g packet Take 2 packets by mouth Every 12 (Twelve) Hours. 180  packet 0 6/11/2023 at 0900    Cyanocobalamin (Vitamin B-12) 1000 MCG sublingual tablet Place 1 tablet under the tongue Daily.   6/11/2023 at 0900    furosemide (LASIX) 40 MG tablet Take 1 tablet by mouth Daily. 90 tablet 0 6/11/2023 at 0900    melatonin 5 MG tablet tablet Take 2 tablets by mouth At Night As Needed (sleep).   6/10/2023 at 2300    omega-3 acid ethyl esters (LOVAZA) 1 g capsule TAKE 2 CAPSULES BY MOUTH 2 TIMES A DAY. 360 capsule 1 6/11/2023 at 0900    potassium chloride 10 MEQ CR tablet TAKE 4 TABLETS BY MOUTH DAILY 90 tablet 1 6/11/2023 at 0900    QUEtiapine (SEROquel) 50 MG tablet Take 1 tablet by mouth every night at bedtime. 90 tablet 0 6/10/2023 at 2300    sertraline (ZOLOFT) 100 MG tablet TAKE 1 TABLET BY MOUTH TWICE A  tablet 1 6/11/2023 at 0900    spironolactone (ALDACTONE) 100 MG tablet Take 1 tablet by mouth Daily. 30 tablet 1 6/11/2023 at 0900    thiamine (VITAMIN B-1) 100 MG tablet tablet TAKE 1 TABLET BY MOUTH EVERY DAY 30 tablet 0 6/11/2023 at 0900    butalbital-acetaminophen-caffeine (FIORICET, ESGIC) -40 MG per tablet As Needed.       folic acid (FOLVITE) 1 MG tablet Take 1 tablet by mouth Daily. 29 tablet 0     Magnesium Oxide 400 (240 Mg) MG tablet Take 1 tablet by mouth Daily.       multivitamin-iron-minerals-folic acid (CENTRUM) chewable tablet Chew 1 tablet Daily. 30 tablet 11     saccharomyces boulardii (FLORASTOR) 250 MG capsule TAKE 1 CAPSULE BY MOUTH TWICE A DAY 30 capsule 0      Allergies:  Sulfa antibiotics and Keflex [cephalexin]    Immunization History   Administered Date(s) Administered    Flu Vaccine Intradermal Quad 18-64YR 11/29/2018    flucelvax quad pfs =>4 YRS 01/04/2020           REVIEW OF SYSTEMS:    Review of Systems   Constitutional: Positive for malaise/fatigue.   Eyes: Negative.    Cardiovascular:  Positive for chest pain and dyspnea on exertion.   Respiratory:  Positive for cough and shortness of breath. Negative for sputum production.     Endocrine: Negative.    Hematologic/Lymphatic: Negative.    Skin: Negative.    Musculoskeletal: Negative.    Gastrointestinal: Negative.    Genitourinary: Negative.    Neurological:  Positive for headaches.   Psychiatric/Behavioral: Negative.     Allergic/Immunologic: Negative.      Vital Signs  Temp:  [97.7 °F (36.5 °C)-98.3 °F (36.8 °C)] 98 °F (36.7 °C)  Heart Rate:  [60-80] 60  Resp:  [17-20] 18  BP: (108-140)/(53-82) 120/80          Physical Exam:  Physical Exam  Vitals and nursing note reviewed.   Constitutional:       Appearance: Normal appearance.   HENT:      Head: Normocephalic and atraumatic.      Right Ear: External ear normal.      Left Ear: External ear normal.      Nose: Nose normal.      Mouth/Throat:      Mouth: Mucous membranes are moist.      Pharynx: Oropharynx is clear.   Eyes:      Extraocular Movements: Extraocular movements intact.      Conjunctiva/sclera: Conjunctivae normal.      Pupils: Pupils are equal, round, and reactive to light.   Cardiovascular:      Rate and Rhythm: Normal rate and regular rhythm.      Pulses: Normal pulses.      Heart sounds: Normal heart sounds.   Pulmonary:      Effort: Pulmonary effort is normal.      Breath sounds: Wheezing present.   Abdominal:      General: Bowel sounds are normal.      Palpations: Abdomen is soft.   Musculoskeletal:         General: Normal range of motion.      Cervical back: Normal range of motion.   Skin:     General: Skin is warm.      Capillary Refill: Capillary refill takes less than 2 seconds.   Neurological:      Mental Status: She is alert and oriented to person, place, and time.   Psychiatric:         Mood and Affect: Mood normal.         Behavior: Behavior normal.         Thought Content: Thought content normal.         Judgment: Judgment normal.       Emotional Behavior:    WNL   Debilities:   none  Results Review:    I reviewed the patient's new clinical results.  Lab Results (most recent)       Procedure Component Value Units  Date/Time    Ammonia [[id number]]  (Abnormal) Collected: 06/12/23 0955    Specimen: Blood from Arm, Right Updated: 06/12/23 1044     Ammonia 67 umol/L     High Sensitivity Troponin T 2Hr [[id number]] Collected: 06/12/23 0955    Specimen: Blood from Arm, Right Updated: 06/12/23 1042     HS Troponin T <6 ng/L      Troponin T Delta --     Comment: Unable to calculate.       Narrative:      High Sensitive Troponin T Reference Range:  <10.0 ng/L- Negative Female for AMI  <15.0 ng/L- Negative Male for AMI  >=10 - Abnormal Female indicating possible myocardial injury.  >=15 - Abnormal Male indicating possible myocardial injury.   Clinicians would have to utilize clinical acumen, EKG, Troponin, and serial changes to determine if it is an Acute Myocardial Infarction or myocardial injury due to an underlying chronic condition.         Ethanol, Urine - Urine, Clean Catch [[id number]] Collected: 06/12/23 1003    Specimen: Urine, Clean Catch Updated: 06/12/23 1011    Hepatic Function Panel [[id number]] Collected: 06/12/23 0538    Specimen: Blood Updated: 06/12/23 1004     Total Protein 6.8 g/dL      Albumin 4.0 g/dL      ALT (SGPT) 9 U/L      AST (SGOT) 20 U/L      Alkaline Phosphatase 102 U/L      Total Bilirubin 0.3 mg/dL      Bilirubin, Direct <0.2 mg/dL      Bilirubin, Indirect --     Comment: Unable to calculate       Lipase [[id number]]  (Abnormal) Collected: 06/12/23 0538    Specimen: Blood Updated: 06/12/23 0737     Lipase 424 U/L     T4, Free [[id number]]  (Abnormal) Collected: 06/12/23 0538    Specimen: Blood Updated: 06/12/23 0715     Free T4 0.77 ng/dL     Narrative:      Results may be falsely increased if patient taking Biotin.      BNP [[id number]]  (Normal) Collected: 06/12/23 0538    Specimen: Blood Updated: 06/12/23 0715     proBNP 67.6 pg/mL     Narrative:      Among patients with dyspnea, NT-proBNP is highly sensitive for the detection of acute congestive heart failure. In addition NT-proBNP of <300 pg/ml  effectively rules out acute congestive heart failure with 99% negative predictive value.    Results may be falsely decreased if patient taking Biotin.      Lipid Panel [[id number]]  (Abnormal) Collected: 06/12/23 0538    Specimen: Blood Updated: 06/12/23 0655     Total Cholesterol 152 mg/dL      Triglycerides 137 mg/dL      HDL Cholesterol 67 mg/dL      LDL Cholesterol  62 mg/dL      VLDL Cholesterol 23 mg/dL      LDL/HDL Ratio 0.86    Narrative:      Cholesterol Reference Ranges  (U.S. Department of Health and Human Services ATP III Classifications)    Desirable          <200 mg/dL  Borderline High    200-239 mg/dL  High Risk          >240 mg/dL      Triglyceride Reference Ranges  (U.S. Department of Health and Human Services ATP III Classifications)    Normal           <150 mg/dL  Borderline High  150-199 mg/dL  High             200-499 mg/dL  Very High        >500 mg/dL    HDL Reference Ranges  (U.S. Department of Health and Human Services ATP III Classifications)    Low     <40 mg/dl (major risk factor for CHD)  High    >60 mg/dl ('negative' risk factor for CHD)        LDL Reference Ranges  (U.S. Department of Health and Human Services ATP III Classifications)    Optimal          <100 mg/dL  Near Optimal     100-129 mg/dL  Borderline High  130-159 mg/dL  High             160-189 mg/dL  Very High        >189 mg/dL    Hemoglobin A1c [[id number]] Collected: 06/12/23 0538    Specimen: Blood Updated: 06/12/23 0645    High Sensitivity Troponin T [[id number]]  (Normal) Collected: 06/12/23 0538    Specimen: Blood Updated: 06/12/23 0632     HS Troponin T <6 ng/L     Narrative:      High Sensitive Troponin T Reference Range:  <10.0 ng/L- Negative Female for AMI  <15.0 ng/L- Negative Male for AMI  >=10 - Abnormal Female indicating possible myocardial injury.  >=15 - Abnormal Male indicating possible myocardial injury.   Clinicians would have to utilize clinical acumen, EKG, Troponin, and serial changes to determine if it is  an Acute Myocardial Infarction or myocardial injury due to an underlying chronic condition.         Basic Metabolic Panel [[id number]]  (Abnormal) Collected: 06/12/23 0538    Specimen: Blood Updated: 06/12/23 0628     Glucose 101 mg/dL      BUN 15 mg/dL      Creatinine 0.90 mg/dL      Sodium 141 mmol/L      Potassium 4.3 mmol/L      Comment: Slight hemolysis detected by analyzer. Results may be affected.        Chloride 109 mmol/L      CO2 20.0 mmol/L      Calcium 9.1 mg/dL      BUN/Creatinine Ratio 16.7     Anion Gap 12.0 mmol/L      eGFR 75.7 mL/min/1.73     Narrative:      GFR Normal >60  Chronic Kidney Disease <60  Kidney Failure <15      CBC Auto Differential [[id number]]  (Abnormal) Collected: 06/12/23 0538    Specimen: Blood Updated: 06/12/23 0613     WBC 6.30 10*3/mm3      RBC 3.04 10*6/mm3      Hemoglobin 10.2 g/dL      Hematocrit 30.4 %      .1 fL      MCH 33.7 pg      MCHC 33.7 g/dL      RDW 13.8 %      RDW-SD 48.1 fl      MPV 7.8 fL      Platelets 168 10*3/mm3      Neutrophil % 51.0 %      Lymphocyte % 33.7 %      Monocyte % 11.3 %      Eosinophil % 3.4 %      Basophil % 0.6 %      Neutrophils, Absolute 3.20 10*3/mm3      Lymphocytes, Absolute 2.10 10*3/mm3      Monocytes, Absolute 0.70 10*3/mm3      Eosinophils, Absolute 0.20 10*3/mm3      Basophils, Absolute 0.00 10*3/mm3      nRBC 0.1 /100 WBC     Extra Tubes [[id number]] Collected: 06/11/23 1934    Specimen: Blood, Venous Line Updated: 06/11/23 2046    Narrative:      The following orders were created for panel order Extra Tubes.  Procedure                               Abnormality         Status                     ---------                               -----------         ------                     Gold [facility] - [facility][[id number]]                                   Final result                 Please view results for these tests on the individual orders.    Gold Top - SST [[id number]] Collected: 06/11/23 1934    Specimen: Blood Updated: 06/11/23 2046      Extra Tube Hold for add-ons.     Comment: Auto resulted.       TSH [[id number]]  (Normal) Collected: 06/11/23 1934    Specimen: Blood Updated: 06/11/23 2014     TSH 1.720 uIU/mL     Single High Sensitivity Troponin T [[id number]]  (Normal) Collected: 06/11/23 1934    Specimen: Blood Updated: 06/11/23 2014     HS Troponin T <6 ng/L     Narrative:      High Sensitive Troponin T Reference Range:  <10.0 ng/L- Negative Female for AMI  <15.0 ng/L- Negative Male for AMI  >=10 - Abnormal Female indicating possible myocardial injury.  >=15 - Abnormal Male indicating possible myocardial injury.   Clinicians would have to utilize clinical acumen, EKG, Troponin, and serial changes to determine if it is an Acute Myocardial Infarction or myocardial injury due to an underlying chronic condition.         Comprehensive Metabolic Panel [[id number]]  (Abnormal) Collected: 06/11/23 1934    Specimen: Blood Updated: 06/11/23 2010     Glucose 87 mg/dL      BUN 13 mg/dL      Creatinine 1.05 mg/dL      Sodium 140 mmol/L      Potassium 4.3 mmol/L      Chloride 106 mmol/L      CO2 22.0 mmol/L      Calcium 9.3 mg/dL      Total Protein 8.0 g/dL      Albumin 4.7 g/dL      ALT (SGPT) 11 U/L      AST (SGOT) 24 U/L      Alkaline Phosphatase 124 U/L      Total Bilirubin 0.3 mg/dL      Globulin 3.3 gm/dL      A/G Ratio 1.4 g/dL      BUN/Creatinine Ratio 12.4     Anion Gap 12.0 mmol/L      eGFR 62.9 mL/min/1.73     Narrative:      GFR Normal >60  Chronic Kidney Disease <60  Kidney Failure <15      Magnesium [[id number]]  (Normal) Collected: 06/11/23 1934    Specimen: Blood Updated: 06/11/23 2010     Magnesium 1.7 mg/dL     D-dimer, Quantitative [[id number]]  (Normal) Collected: 06/11/23 1934    Specimen: Blood Updated: 06/11/23 1951     D-Dimer, Quantitative 0.35 mg/L (FEU)     Narrative:      According to the assay 's published package insert, a normal (<0.50 mg/L (FEU)) D-dimer result in conjunction with a non-high clinical probability  "assessment, excludes deep vein thrombosis (DVT) and pulmonary embolism (PE) with high sensitivity.    D-dimer values increase with age and this can make VTE exclusion of an older population difficult. To address this, the [employer], based on best available evidence and recent guidelines, recommends that clinicians use age-adjusted D-dimer thresholds in patients greater than 50 years of age with: a) a low probability of PE who do not meet all Pulmonary Embolism Rule Out Criteria, or b) in those with intermediate probability of PE.   The formula for an age-adjusted D-dimer cut-off is \"age/100\".  For example, a 60 year old patient would have an age-adjusted cut-off of 0.60 mg/L (FEU) and an 80 year old 0.80 mg/L (FEU).    CBC & Differential [[id number]]  (Abnormal) Collected: 06/11/23 1934    Specimen: Blood Updated: 06/11/23 1940    Narrative:      The following orders were created for panel order CBC & Differential.  Procedure                               Abnormality         Status                     ---------                               -----------         ------                     CBC Auto Differential[[id number]]        Abnormal            Final result                 Please view results for these tests on the individual orders.    CBC Auto Differential [[id number]]  (Abnormal) Collected: 06/11/23 1934    Specimen: Blood Updated: 06/11/23 1940     WBC 6.40 10*3/mm3      RBC 3.46 10*6/mm3      Hemoglobin 11.2 g/dL      Hematocrit 33.8 %      MCV 97.6 fL      MCH 32.5 pg      MCHC 33.2 g/dL      RDW 13.9 %      RDW-SD 49.9 fl      MPV 8.3 fL      Platelets 183 10*3/mm3      Neutrophil % 49.3 %      Lymphocyte % 36.7 %      Monocyte % 10.0 %      Eosinophil % 3.2 %      Basophil % 0.8 %      Neutrophils, Absolute 3.20 10*3/mm3      Lymphocytes, Absolute 2.40 10*3/mm3      Monocytes, Absolute 0.60 10*3/mm3      Eosinophils, Absolute 0.20 10*3/mm3      Basophils, Absolute 0.10 10*3/mm3      nRBC " 0.2 /100 WBC             Imaging Results (Most Recent)       Procedure Component Value Units Date/Time    XR Chest 1 View [[id number]] Collected: 06/11/23 2026     Updated: 06/11/23 2029    Narrative:      XR CHEST 1 VW    Date of Exam: 6/11/2023 7:50 PM EDT    Indication: chest pain, palpitations    Comparison: December 14, 2021    Findings:  The lungs are clear. The heart and mediastinal contours appear normal. There is no pleural effusion. The pulmonary vasculature appears normal. The osseous structures appear intact. A couple benign calcified granulomas are noted.      Impression:      Impression:  No acute cardiopulmonary process.      Electronically Signed: Petros Coppola    6/11/2023 8:27 PM EDT    Workstation ID: [id number]          reviewed    ECG/EMG Results (most recent)       Procedure Component Value Units Date/Time    ECG 12 Lead Chest Pain [[id number]] Collected: 06/11/23 1907     Updated: 06/12/23 0553     QT Interval 395 ms     Narrative:      HEART RATE= 71  bpm  RR Interval= 848  ms  ME Interval= 146  ms  P Horizontal Axis= 34  deg  P Front Axis= 4  deg  QRSD Interval= 87  ms  QT Interval= 395  ms  QRS Axis= 57  deg  T Wave Axis= 61  deg  - NORMAL ECG -  Sinus rhythm  Electronically Signed By: Roberto Carlos Payton (Steve) 12-Jun-2023 05:53:00  Date and Time of Study: 2023-06-11 19:07:58          reviewed            Microbiology Results (last 10 days)       ** No results found for the last 240 hours. **            Assessment & Plan     Chest pain     Chest Pain  -Troponins negative x3  -BNP 67.6  -Continuous cardiac monitoring  -D-dimer negative  -Chest x-ray showed no acute cardiopulmonary findings  -EKG shows normal sinus rhythm without acute changes   -Myoview showed no ischemia with EF of over 70%    Hyperlipidemia  -Continue statin    Anxiety disorder  -Continue BuSpar Seroquel, Zoloft    End-stage liver disease  -Continue Lasix and spironolactone  -Monitor BMP  -Alkaline phosphate initially 124 with  normal AST and ALT  -Ammonia 60  -Lipase 424    I discussed the patients findings and my recommendations with patient and family.     Discharge Diagnosis:      Chest pain      Hospital Course  Patient is a 57 y.o. female presented with chest pain.  Patient is admitted to ED with midsternal chest pain that was nonradiating.  Patient states over the past 2 days she has developed dyspnea when talking and worse with exertion.  Patient states she has an albuterol inhaler that she has had to use twice daily for the past couple of days with minimal relief.  Patient reports she is a smoker but only smokes 2 to 3 cigarettes a day and denies alcohol use.  Troponins were negative.  BNP negative.  D-dimer negative.  Chest x-ray showed no acute cardiopulmonary findings.  EKG showed normal sinus rhythm without acute changes.  Myoview showed no ischemia with a EF of over 70%.  Upon examination, patient was noted to have wheezing throughout lungs.  Patient will have pulmonary function test to be completed outpatient.  Patient take medication as prescribed.  Patient noted to have increased ammonia and lipase without nausea, vomiting or abdominal pain.  Patient advised to follow-up with GI within next month as she stated her last appointment was last .  Testing recommendations reviewed with patient and she agreed to treatment plan.  If symptoms worsen patient to call 911 or go to nearest ED.    Past Medical History:     Past Medical History:   Diagnosis Date    Cirrhosis     COPD (chronic obstructive pulmonary disease)     Depression     GERD (gastroesophageal reflux disease)     Hyperlipidemia     Hypertension     PTSD (post-traumatic stress disorder)        Past Surgical History:     Past Surgical History:   Procedure Laterality Date     SECTION N/A     COLONOSCOPY N/A 2021    Procedure: COLONOSCOPY with polypectomy x2 and random biopsy;  Surgeon: Osman Clay MD;  Location: [facility] ENDOSCOPY;  Service:  Gastroenterology;  Laterality: N/A;  colon polyps    DENTAL PROCEDURE      ENDOSCOPY N/A 2021    Procedure: ESOPHAGOGASTRODUODENOSCOPY;  Surgeon: Osman Clay MD;  Location: [facility] ENDOSCOPY;  Service: Gastroenterology;  Laterality: N/A;  esophagitis    JOINT REPLACEMENT      REPLACEMENT TOTAL HIP LATERAL POSITION Left     TONSILLECTOMY      VAGINAL BIRTH AFTER  SECTION         Social History:   Social History     Socioeconomic History    Marital status:    Tobacco Use    Smoking status: Every Day     Packs/day: 0.25     Types: Cigarettes    Smokeless tobacco: Never    Tobacco comments:     3cigs   Vaping Use    Vaping Use: Never used   Substance and Sexual Activity    Alcohol use: Not Currently     Comment: quit 23    Drug use: No    Sexual activity: Yes     Partners: Male     Birth control/protection: Post-menopausal       Procedures Performed         Consults:   Consults       No orders found for last 30 day(s).            Condition on Discharge:     Stable    Discharge Disposition  Home or Self Care    Discharge Medications     Discharge Medications        New Medications        Instructions Start Date   Fluticasone-Salmeterol 250-50 MCG/ACT DISKUS  Commonly known as: ADVAIR/WIXELA   1 puff, Inhalation, 2 Times Daily - RT             Changes to Medications        Instructions Start Date   furosemide 40 MG tablet  Commonly known as: LASIX  What changed: when to take this   40 mg, Oral, Daily             Continue These Medications        Instructions Start Date   atorvastatin 20 MG tablet  Commonly known as: LIPITOR   TAKE 1 TABLET BY MOUTH EVERYDAY AT BEDTIME      busPIRone 15 MG tablet  Commonly known as: BUSPAR   TAKE 1 TABLET BY MOUTH TWICE A DAY      butalbital-acetaminophen-caffeine -40 MG per tablet  Commonly known as: FIORICET, ESGIC   As Needed      cholestyramine light 4 g packet   8 g, Oral, Every 12 Hours Scheduled      folic acid 1 MG tablet  Commonly known as:  FOLVITE   1,000 mcg, Oral, Daily      Magnesium Oxide 400 (240 Mg) MG tablet   400 mg, Oral, Daily      melatonin 5 MG tablet tablet   10 mg, Oral, Nightly PRN      multivitamin-iron-minerals-folic acid chewable tablet   1 tablet, Oral, Daily      omega-3 acid ethyl esters 1 g capsule  Commonly known as: LOVAZA   TAKE 2 CAPSULES BY MOUTH 2 TIMES A DAY.      potassium chloride 10 MEQ CR tablet   40 mEq, Oral, Daily      QUEtiapine 50 MG tablet  Commonly known as: SEROquel   50 mg, Oral, Every Night at Bedtime      saccharomyces boulardii 250 MG capsule  Commonly known as: FLORASTOR   TAKE 1 CAPSULE BY MOUTH TWICE A DAY      sertraline 100 MG tablet  Commonly known as: ZOLOFT   TAKE 1 TABLET BY MOUTH TWICE A DAY      spironolactone 100 MG tablet  Commonly known as: ALDACTONE   100 mg, Oral, Daily      thiamine 100 MG tablet tablet  Commonly known as: VITAMIN B-1   TAKE 1 TABLET BY MOUTH EVERY DAY      Vitamin B-12 1000 MCG sublingual tablet   1 tablet, Sublingual, Daily               Discharge Diet:   Diet Instructions       Diet: Cardiac Diets; Healthy Heart (2-3 Na+); Regular Texture (IDDSI 7); Thin (IDDSI 0)      Discharge Diet: Cardiac Diets    Cardiac Diet: Healthy Heart (2-3 Na+)    Texture: Regular Texture (IDDSI 7)    Fluid Consistency: Thin (IDDSI 0)            Activity at Discharge:   Activity Instructions       Activity as Tolerated      Measure Blood Pressure              Follow-up Appointments  No future appointments.  Additional Instructions for the Follow-ups that You Need to Schedule       Discharge Follow-up with PCP   As directed       Currently Documented PCP:    Norma Saul APRN    PCP Phone Number:    [phone]     Follow Up Details: 7-10 days                 Test Results Pending at Discharge  Pending Labs       Order Current Status    Urinalysis With Culture If Indicated - Urine, Clean Catch Collected (06/12/23 1005)    Ethanol, Urine - Urine, Clean Catch In process    Hemoglobin A1c In  process             Risk for Readmission (LACE) Score: 6 (6/12/2023  6:00 AM)          SHARIF Alvarez  06/12/23  11:19 EDT

## 2023-06-12 NOTE — CASE MANAGEMENT/SOCIAL WORK
Case Management Discharge Note      Final Note: Home         Transportation Services  Taxi: [employer] (Patient paid for cab service)    Final Discharge Disposition Code: 01 - home or self-care

## 2023-06-12 NOTE — PLAN OF CARE
Goal Outcome Evaluation:  Plan of Care Reviewed With: patient        Progress: no change  Outcome Evaluation: New admit to room 101 from the ED in stable condition with diagnosis of chest pain. Pt is scheduled for a myoview this am  will await further orders from MD

## 2023-06-13 ENCOUNTER — TRANSITIONAL CARE MANAGEMENT TELEPHONE ENCOUNTER (OUTPATIENT)
Dept: CALL CENTER | Facility: HOSPITAL | Age: 58
End: 2023-06-13
Payer: COMMERCIAL

## 2023-06-13 NOTE — OUTREACH NOTE
Call Center TCM Note      Flowsheet Row Responses   [facility] facility patient discharged from? [facility]   Does the patient have one of the following disease processes/diagnoses(primary or secondary)? Other   TCM attempt successful? No   Unsuccessful attempts Attempt 2            Marquita Shannon LPN    6/13/2023, 15:29 EDT

## 2023-06-13 NOTE — OUTREACH NOTE
Call Center TCM Note      Flowsheet Row Responses   [facility] facility patient discharged from? [facility]   Does the patient have one of the following disease processes/diagnoses(primary or secondary)? Other   TCM attempt successful? No   Unsuccessful attempts Attempt 1            Marquita Shannon LPN    6/13/2023, 12:01 EDT

## 2023-06-14 ENCOUNTER — TRANSITIONAL CARE MANAGEMENT TELEPHONE ENCOUNTER (OUTPATIENT)
Dept: CALL CENTER | Facility: HOSPITAL | Age: 58
End: 2023-06-14
Payer: COMMERCIAL

## 2023-06-14 NOTE — OUTREACH NOTE
Call Center TCM Note      Flowsheet Row Responses   [facility] patient discharged from? [facility]   Does the patient have one of the following disease processes/diagnoses(primary or secondary)? Other   TCM attempt successful? Yes   Call start time 1142   Call end time 1145   Discharge diagnosis chest pain   Meds reviewed with patient/caregiver? Yes   Is the patient having any side effects they believe may be caused by any medication additions or changes? No   Prescription comments Insurance will not approve Lasix r/f as pt was accidentally taking BID instead of QD, so rx ran out too soon, so now she is completely out.   Does the patient have an appointment with their PCP within 7 days of discharge? No   Nursing Interventions Routed TCM call to PCP office, Patient declined scheduling/rescheduling appointment at this time   Has home health visited the patient within 72 hours of discharge? N/A   Psychosocial issues? No   Did the patient receive a copy of their discharge instructions? Yes   Nursing interventions Reviewed instructions with patient   What is the patient's perception of their health status since discharge? Improving   Is the patient/caregiver able to teach back signs and symptoms related to disease process for when to call PCP? Yes   Is the patient/caregiver able to teach back signs and symptoms related to disease process for when to call 911? Yes   Is the patient/caregiver able to teach back the hierarchy of who to call/visit for symptoms/problems? PCP, Specialist, Home health nurse, Urgent Care, ED, 911 Yes   TCM call completed? Yes   Wrap up additional comments D/C DX: chest pain, lynne - Pt doing ok, states she is sleeping alot since home. No lethargy, confusion, no trouble stayng awake when up, just feels tired. New rx Fluticasone-Salmeterol in place.Insurance will not approve Lasix r/f as pt was accidentally taking BID instead of QD, so rx ran out too soon, so now she is completely out. Cannot see  PCP SHARIF Saul until 07/10/2023 due to pt being out of town starting tomorrow until 07/01/2023. I urged pt to seek help if needed on vacation is she starts to experiece any edema, sob etc.   Call end time [id number]            Vania Davies MA    6/14/2023, 11:50 EDT

## 2023-06-17 LAB — ETHANOL UR-MCNC: NEGATIVE %

## 2023-08-07 ENCOUNTER — OFFICE (OUTPATIENT)
Dept: URBAN - METROPOLITAN AREA CLINIC 64 | Facility: CLINIC | Age: 58
End: 2023-08-07

## 2023-08-07 VITALS
WEIGHT: 135.4 LBS | DIASTOLIC BLOOD PRESSURE: 68 MMHG | HEIGHT: 63 IN | SYSTOLIC BLOOD PRESSURE: 103 MMHG | HEART RATE: 77 BPM

## 2023-08-07 DIAGNOSIS — K52.9 NONINFECTIVE GASTROENTERITIS AND COLITIS, UNSPECIFIED: ICD-10-CM

## 2023-08-07 DIAGNOSIS — K74.69 OTHER CIRRHOSIS OF LIVER: ICD-10-CM

## 2023-08-07 DIAGNOSIS — E72.20 DISORDER OF UREA CYCLE METABOLISM, UNSPECIFIED: ICD-10-CM

## 2023-08-07 DIAGNOSIS — R18.8 OTHER ASCITES: ICD-10-CM

## 2023-08-07 PROCEDURE — 99214 OFFICE O/P EST MOD 30 MIN: CPT | Performed by: NURSE PRACTITIONER

## 2023-08-07 RX ORDER — FUROSEMIDE 40 MG/1
TABLET ORAL
Qty: 90 | Refills: 2 | Status: COMPLETED
End: 2024-08-12

## 2023-08-07 RX ORDER — SPIRONOLACTONE 100 MG/1
TABLET ORAL
Qty: 90 | Refills: 2 | Status: COMPLETED
End: 2024-08-12

## 2023-08-12 DIAGNOSIS — G47.00 INSOMNIA, UNSPECIFIED TYPE: ICD-10-CM

## 2023-08-14 RX ORDER — QUETIAPINE FUMARATE 50 MG/1
TABLET, FILM COATED ORAL
Qty: 90 TABLET | Refills: 0 | Status: SHIPPED | OUTPATIENT
Start: 2023-08-14

## 2023-08-21 RX ORDER — POTASSIUM CHLORIDE 750 MG/1
40 TABLET, FILM COATED, EXTENDED RELEASE ORAL DAILY
Qty: 90 TABLET | Refills: 1 | Status: SHIPPED | OUTPATIENT
Start: 2023-08-21

## 2023-10-09 RX ORDER — POTASSIUM CHLORIDE 750 MG/1
40 TABLET, FILM COATED, EXTENDED RELEASE ORAL DAILY
Qty: 90 TABLET | Refills: 1 | Status: SHIPPED | OUTPATIENT
Start: 2023-10-09

## 2023-10-10 ENCOUNTER — LAB (OUTPATIENT)
Dept: FAMILY MEDICINE CLINIC | Facility: CLINIC | Age: 58
End: 2023-10-10
Payer: COMMERCIAL

## 2023-10-10 ENCOUNTER — OFFICE VISIT (OUTPATIENT)
Dept: FAMILY MEDICINE CLINIC | Facility: CLINIC | Age: 58
End: 2023-10-10
Payer: COMMERCIAL

## 2023-10-10 VITALS
WEIGHT: 131 LBS | DIASTOLIC BLOOD PRESSURE: 83 MMHG | TEMPERATURE: 98.1 F | BODY MASS INDEX: 23.21 KG/M2 | OXYGEN SATURATION: 97 % | HEART RATE: 86 BPM | HEIGHT: 63 IN | SYSTOLIC BLOOD PRESSURE: 124 MMHG

## 2023-10-10 DIAGNOSIS — Z00.00 PREVENTATIVE HEALTH CARE: Primary | ICD-10-CM

## 2023-10-10 DIAGNOSIS — Z78.0 POST-MENOPAUSE: ICD-10-CM

## 2023-10-10 DIAGNOSIS — G47.00 INSOMNIA, UNSPECIFIED TYPE: ICD-10-CM

## 2023-10-10 DIAGNOSIS — E78.5 HYPERLIPIDEMIA, UNSPECIFIED HYPERLIPIDEMIA TYPE: ICD-10-CM

## 2023-10-10 DIAGNOSIS — Z23 NEED FOR IMMUNIZATION AGAINST INFLUENZA: ICD-10-CM

## 2023-10-10 DIAGNOSIS — Z23 NEED FOR SHINGLES VACCINE: ICD-10-CM

## 2023-10-10 DIAGNOSIS — F41.9 ANXIETY DISORDER, UNSPECIFIED TYPE: ICD-10-CM

## 2023-10-10 DIAGNOSIS — Z12.31 BREAST CANCER SCREENING BY MAMMOGRAM: ICD-10-CM

## 2023-10-10 DIAGNOSIS — Z23 NEED FOR PNEUMOCOCCAL VACCINATION: ICD-10-CM

## 2023-10-10 DIAGNOSIS — Z00.00 PREVENTATIVE HEALTH CARE: ICD-10-CM

## 2023-10-10 DIAGNOSIS — K70.31 ALCOHOLIC CIRRHOSIS OF LIVER WITH ASCITES: ICD-10-CM

## 2023-10-10 LAB
ALBUMIN SERPL-MCNC: 4.4 G/DL (ref 3.5–5.2)
ALBUMIN/GLOB SERPL: 1.2 G/DL
ALP SERPL-CCNC: 108 U/L (ref 39–117)
ALT SERPL W P-5'-P-CCNC: 16 U/L (ref 1–33)
ANION GAP SERPL CALCULATED.3IONS-SCNC: 11.9 MMOL/L (ref 5–15)
AST SERPL-CCNC: 28 U/L (ref 1–32)
BASOPHILS # BLD AUTO: 0.06 10*3/MM3 (ref 0–0.2)
BASOPHILS NFR BLD AUTO: 0.9 % (ref 0–1.5)
BILIRUB SERPL-MCNC: 0.3 MG/DL (ref 0–1.2)
BUN SERPL-MCNC: 18 MG/DL (ref 6–20)
BUN/CREAT SERPL: 13.8 (ref 7–25)
CALCIUM SPEC-SCNC: 10 MG/DL (ref 8.6–10.5)
CHLORIDE SERPL-SCNC: 102 MMOL/L (ref 98–107)
CHOLEST SERPL-MCNC: 238 MG/DL (ref 0–200)
CO2 SERPL-SCNC: 22.1 MMOL/L (ref 22–29)
CREAT SERPL-MCNC: 1.3 MG/DL (ref 0.57–1)
DEPRECATED RDW RBC AUTO: 45.6 FL (ref 37–54)
EGFRCR SERPLBLD CKD-EPI 2021: 48.7 ML/MIN/1.73
EOSINOPHIL # BLD AUTO: 0.19 10*3/MM3 (ref 0–0.4)
EOSINOPHIL NFR BLD AUTO: 2.9 % (ref 0.3–6.2)
ERYTHROCYTE [DISTWIDTH] IN BLOOD BY AUTOMATED COUNT: 12.9 % (ref 12.3–15.4)
GLOBULIN UR ELPH-MCNC: 3.8 GM/DL
GLUCOSE SERPL-MCNC: 90 MG/DL (ref 65–99)
HCT VFR BLD AUTO: 36 % (ref 34–46.6)
HDLC SERPL-MCNC: 114 MG/DL (ref 40–60)
HGB BLD-MCNC: 12.1 G/DL (ref 12–15.9)
IMM GRANULOCYTES # BLD AUTO: 0.09 10*3/MM3 (ref 0–0.05)
IMM GRANULOCYTES NFR BLD AUTO: 1.4 % (ref 0–0.5)
LDLC SERPL CALC-MCNC: 103 MG/DL (ref 0–100)
LDLC/HDLC SERPL: 0.86 {RATIO}
LYMPHOCYTES # BLD AUTO: 2.17 10*3/MM3 (ref 0.7–3.1)
LYMPHOCYTES NFR BLD AUTO: 32.7 % (ref 19.6–45.3)
MCH RBC QN AUTO: 33.3 PG (ref 26.6–33)
MCHC RBC AUTO-ENTMCNC: 33.6 G/DL (ref 31.5–35.7)
MCV RBC AUTO: 99.2 FL (ref 79–97)
MONOCYTES # BLD AUTO: 0.63 10*3/MM3 (ref 0.1–0.9)
MONOCYTES NFR BLD AUTO: 9.5 % (ref 5–12)
NEUTROPHILS NFR BLD AUTO: 3.5 10*3/MM3 (ref 1.7–7)
NEUTROPHILS NFR BLD AUTO: 52.6 % (ref 42.7–76)
NRBC BLD AUTO-RTO: 0 /100 WBC (ref 0–0.2)
PLATELET # BLD AUTO: 190 10*3/MM3 (ref 140–450)
PMV BLD AUTO: 10.5 FL (ref 6–12)
POTASSIUM SERPL-SCNC: 4.6 MMOL/L (ref 3.5–5.2)
PROT SERPL-MCNC: 8.2 G/DL (ref 6–8.5)
RBC # BLD AUTO: 3.63 10*6/MM3 (ref 3.77–5.28)
SODIUM SERPL-SCNC: 136 MMOL/L (ref 136–145)
TRIGL SERPL-MCNC: 128 MG/DL (ref 0–150)
VLDLC SERPL-MCNC: 21 MG/DL (ref 5–40)
WBC NRBC COR # BLD: 6.64 10*3/MM3 (ref 3.4–10.8)

## 2023-10-10 PROCEDURE — 85025 COMPLETE CBC W/AUTO DIFF WBC: CPT | Performed by: NURSE PRACTITIONER

## 2023-10-10 PROCEDURE — 36415 COLL VENOUS BLD VENIPUNCTURE: CPT

## 2023-10-10 PROCEDURE — 80061 LIPID PANEL: CPT | Performed by: NURSE PRACTITIONER

## 2023-10-10 PROCEDURE — 80053 COMPREHEN METABOLIC PANEL: CPT | Performed by: NURSE PRACTITIONER

## 2023-10-10 NOTE — PROGRESS NOTES
Berkley Yu is a 57 y.o. female.     History of Present Illness  Patient is here today for annual CPE.  She is working at [employer]  She is stressed with her job  She is walking regularly.  She tries to eat a well balanced diet.  She smokes 1 pack in 4 days  She is back to drinking alcohol once every 10 days.  She will drink 1/2 pint at that time.   She was sober for over a year  She started drinking again due to a lot changes in her life  Her  diet a year ago.   She is going to .    Chronic liver disease-patient has cirrhosis of the liver from alcohol abuse.  She sees GI for this.  She is on spironolactone 100 mg daily and Lasix 40 mg daily. She I back to drinking alcohol 2 times a month. She is doing well at this time.     Hyperlipidemia-patient stopped taking lipitor. She is taking fish oil. She states the lipitor causes muscle aches.     Anxiety/depression-patient is currently on Zoloft 100 mg daily and BuSpar 15 mg twice daily. She is doing well on the medication. Denies SI or HI    Insomnia-patient is currently on Seroquel 50 mg nightly. Sleeping well.     Diarrhea-patient is currently on cholestyramine as needed.      Labs- due  Pap smear- sees GYN  Mammogram- due  DEXA- due  Colonoscopy-    Vaccines:  Flu- due  PNA- due  Shingles- due  Tdap-  Covid-19-    Dental exam-  Eye exam-       The following portions of the patient's history were reviewed and updated as appropriate: allergies, current medications, past family history, past medical history, past social history, past surgical history, and problem list.    Review of Systems   Constitutional:  Negative for appetite change, chills, fatigue and fever.   HENT:  Positive for congestion and sinus pressure. Negative for ear pain, hearing loss, postnasal drip, rhinorrhea, sore throat, swollen glands and trouble swallowing.    Eyes:  Negative for blurred vision, double vision, pain, discharge, itching and visual disturbance.  "  Respiratory:  Negative for cough, chest tightness, shortness of breath and wheezing.    Cardiovascular:  Negative for chest pain and palpitations.   Gastrointestinal:  Negative for abdominal pain, blood in stool, constipation, diarrhea, nausea and vomiting.   Endocrine: Negative for polydipsia, polyphagia and polyuria.   Genitourinary:  Negative for breast lump, dysuria, flank pain, frequency, urgency, vaginal bleeding, vaginal discharge and vaginal pain.   Musculoskeletal:  Negative for arthralgias, back pain and myalgias.   Skin:  Negative for rash and skin lesions.   Neurological:  Positive for headache. Negative for dizziness, weakness and numbness.   Psychiatric/Behavioral:  Negative for depressed mood and stress. The patient is not nervous/anxious.        Objective     /83 (BP Location: Left arm, Patient Position: Sitting, Cuff Size: Adult)   Pulse 86   Temp 98.1 øF (36.7 øC) (Oral)   Ht 160 cm (63\")   Wt 59.4 kg (131 lb)   SpO2 97%   BMI 23.21 kg/mý     Current Outpatient Medications on File Prior to Visit   Medication Sig Dispense Refill    potassium chloride 10 MEQ CR tablet TAKE 4 TABLETS BY MOUTH DAILY 90 tablet 1    albuterol sulfate  (90 Base) MCG/ACT inhaler Inhale 2 puffs Every 4 (Four) Hours As Needed for Wheezing.      busPIRone (BUSPAR) 15 MG tablet TAKE 1 TABLET BY MOUTH TWICE A  tablet 1    cholestyramine light 4 g packet Take 2 packets by mouth Every 12 (Twelve) Hours. 180 packet 0    Cyanocobalamin (Vitamin B-12) 1000 MCG sublingual tablet Place 1 tablet under the tongue Daily.      furosemide (LASIX) 40 MG tablet Take 1 tablet by mouth Daily. 90 tablet 0    melatonin 5 MG tablet tablet Take 2 tablets by mouth At Night As Needed (sleep).      multivitamin-iron-minerals-folic acid (CENTRUM) chewable tablet Chew 1 tablet Daily. 30 tablet 11    QUEtiapine (SEROquel) 50 MG tablet TAKE 1 TABLET BY MOUTH EVERYDAY AT BEDTIME 90 tablet 0    sertraline (ZOLOFT) 100 MG tablet " TAKE 1 TABLET BY MOUTH TWICE A  tablet 1    spironolactone (ALDACTONE) 100 MG tablet Take 1 tablet by mouth Daily. 30 tablet 1    [DISCONTINUED] atorvastatin (LIPITOR) 20 MG tablet TAKE 1 TABLET BY MOUTH EVERYDAY AT BEDTIME 90 tablet 1    [DISCONTINUED] Fluticasone-Salmeterol (ADVAIR/WIXELA) 250-50 MCG/ACT DISKUS Inhale 1 puff 2 (Two) Times a Day. 60 each 0    [DISCONTINUED] methylPREDNISolone (MEDROL) 4 MG dose pack Take as directed on package instructions. 21 tablet 0     No current facility-administered medications on file prior to visit.        BMI is within normal parameters. No other follow-up for BMI required.       Physical Exam  Vitals reviewed.   Constitutional:       General: She is not in acute distress.     Appearance: Normal appearance. She is well-developed. She is not diaphoretic.   HENT:      Head: Normocephalic and atraumatic.      Right Ear: Tympanic membrane and ear canal normal.      Left Ear: Tympanic membrane and ear canal normal.      Nose: No congestion or rhinorrhea.      Mouth/Throat:      Pharynx: No oropharyngeal exudate or posterior oropharyngeal erythema.   Eyes:      General:         Right eye: No discharge.         Left eye: No discharge.      Extraocular Movements: Extraocular movements intact.      Conjunctiva/sclera: Conjunctivae normal.   Cardiovascular:      Rate and Rhythm: Normal rate and regular rhythm.      Heart sounds: No murmur heard.  Pulmonary:      Effort: Pulmonary effort is normal. No respiratory distress.      Breath sounds: Normal breath sounds. No wheezing or rales.   Abdominal:      General: Bowel sounds are normal.      Palpations: Abdomen is soft.   Musculoskeletal:         General: Normal range of motion.      Cervical back: Normal range of motion.   Skin:     General: Skin is warm and dry.   Neurological:      General: No focal deficit present.      Mental Status: She is alert and oriented to person, place, and time.   Psychiatric:         Mood and  Affect: Mood normal.         Behavior: Behavior normal.         Thought Content: Thought content normal.         Judgment: Judgment normal.           Assessment & Plan     Diagnoses and all orders for this visit:    1. Preventative health care (Primary)  Comments:  stable  work on diet and exercsie  smoking and drinking cessation  check labs  schedule appt with gyn  get mammo and DEXA  Orders:  -     Comprehensive Metabolic Panel; Future  -     Lipid Panel; Future  -     CBC & Differential    2. Need for immunization against influenza  -     Fluzone >6 Months (3662-6824)    3. Need for pneumococcal vaccination  -     Pneumococcal Conjugate Vaccine 20-Valent (PCV20)    4. Need for shingles vaccine  -     Shingrix Vaccine    5. Breast cancer screening by mammogram  -     Mammo Screening Digital Tomosynthesis Bilateral With CAD; Future    6. Post-menopause  -     DEXA Bone Density Axial; Future    7. Insomnia, unspecified type  Comments:  stable  cont seroquel    8. Alcoholic cirrhosis of liver with ascites  Comments:  sees GI  cont lasix and spironolactone  drinking 2 days a month  discussed risks    9. Anxiety disorder, unspecified type  Comments:  stable  cont zoloft and buspar  denies SI or HI    10. Hyperlipidemia, unspecified hyperlipidemia type  Comments:  cant tolerate statins  taking fish oil  work on diet and exercise

## 2023-10-10 NOTE — PATIENT INSTRUCTIONS
Continue current meds  Check labs  Work on diet and exercise  Stop smoking  Stop Excedrin- may try aleve  Stop drinking alcohol  Get mammogram and DEXA  Follow up with specialist.

## 2023-10-11 DIAGNOSIS — R79.89 ELEVATED SERUM CREATININE: Primary | ICD-10-CM

## 2023-10-14 DIAGNOSIS — F41.9 ANXIETY DISORDER, UNSPECIFIED: ICD-10-CM

## 2023-10-16 RX ORDER — BUSPIRONE HYDROCHLORIDE 15 MG/1
TABLET ORAL
Qty: 180 TABLET | Refills: 1 | Status: SHIPPED | OUTPATIENT
Start: 2023-10-16

## 2023-10-17 ENCOUNTER — OFFICE (OUTPATIENT)
Dept: URBAN - METROPOLITAN AREA CLINIC 64 | Facility: CLINIC | Age: 58
End: 2023-10-17

## 2023-10-17 VITALS
SYSTOLIC BLOOD PRESSURE: 126 MMHG | DIASTOLIC BLOOD PRESSURE: 81 MMHG | WEIGHT: 136 LBS | HEART RATE: 74 BPM | HEIGHT: 63 IN

## 2023-10-17 DIAGNOSIS — K74.69 OTHER CIRRHOSIS OF LIVER: ICD-10-CM

## 2023-10-17 DIAGNOSIS — R18.8 OTHER ASCITES: ICD-10-CM

## 2023-10-17 DIAGNOSIS — R19.7 DIARRHEA, UNSPECIFIED: ICD-10-CM

## 2023-10-17 PROCEDURE — 99214 OFFICE O/P EST MOD 30 MIN: CPT | Performed by: INTERNAL MEDICINE

## 2023-10-17 RX ORDER — ZINC GLUCONATE 50 MG
50 TABLET ORAL
Qty: 30 | Refills: 11 | Status: ACTIVE
Start: 2023-10-17

## 2023-10-18 DIAGNOSIS — F41.9 ANXIETY DISORDER, UNSPECIFIED: ICD-10-CM

## 2023-10-19 RX ORDER — SERTRALINE HYDROCHLORIDE 100 MG/1
TABLET, FILM COATED ORAL
Qty: 180 TABLET | Refills: 1 | Status: SHIPPED | OUTPATIENT
Start: 2023-10-19

## 2023-10-19 RX ORDER — CHOLESTYRAMINE LIGHT 4 G/5.7G
2 POWDER, FOR SUSPENSION ORAL EVERY 12 HOURS SCHEDULED
Qty: 180 PACKET | Refills: 0 | Status: SHIPPED | OUTPATIENT
Start: 2023-10-19

## 2023-10-28 DIAGNOSIS — E78.5 HYPERLIPIDEMIA, UNSPECIFIED HYPERLIPIDEMIA TYPE: ICD-10-CM

## 2023-10-30 RX ORDER — OMEGA-3-ACID ETHYL ESTERS 1 G/1
2 CAPSULE, LIQUID FILLED ORAL 2 TIMES DAILY
Qty: 360 CAPSULE | Refills: 1 | OUTPATIENT
Start: 2023-10-30

## 2023-11-09 DIAGNOSIS — G47.00 INSOMNIA, UNSPECIFIED TYPE: ICD-10-CM

## 2023-11-09 RX ORDER — QUETIAPINE FUMARATE 50 MG/1
TABLET, FILM COATED ORAL
Qty: 90 TABLET | Refills: 0 | Status: SHIPPED | OUTPATIENT
Start: 2023-11-09

## 2023-11-20 DIAGNOSIS — M81.0 OSTEOPOROSIS, UNSPECIFIED OSTEOPOROSIS TYPE, UNSPECIFIED PATHOLOGICAL FRACTURE PRESENCE: Primary | ICD-10-CM

## 2023-11-29 RX ORDER — POTASSIUM CHLORIDE 750 MG/1
40 TABLET, FILM COATED, EXTENDED RELEASE ORAL DAILY
Qty: 90 TABLET | Refills: 1 | Status: SHIPPED | OUTPATIENT
Start: 2023-11-29

## 2024-02-12 ENCOUNTER — OFFICE (OUTPATIENT)
Dept: URBAN - METROPOLITAN AREA CLINIC 64 | Facility: CLINIC | Age: 59
End: 2024-02-12

## 2024-02-12 VITALS
WEIGHT: 134 LBS | HEART RATE: 94 BPM | HEIGHT: 63 IN | SYSTOLIC BLOOD PRESSURE: 128 MMHG | DIASTOLIC BLOOD PRESSURE: 80 MMHG

## 2024-02-12 DIAGNOSIS — K21.9 GASTRO-ESOPHAGEAL REFLUX DISEASE WITHOUT ESOPHAGITIS: ICD-10-CM

## 2024-02-12 DIAGNOSIS — K74.69 OTHER CIRRHOSIS OF LIVER: ICD-10-CM

## 2024-02-12 DIAGNOSIS — F10.10 ALCOHOL ABUSE, UNCOMPLICATED: ICD-10-CM

## 2024-02-12 DIAGNOSIS — R18.8 OTHER ASCITES: ICD-10-CM

## 2024-02-12 PROCEDURE — 99213 OFFICE O/P EST LOW 20 MIN: CPT | Performed by: NURSE PRACTITIONER

## 2024-02-15 DIAGNOSIS — G47.00 INSOMNIA, UNSPECIFIED TYPE: ICD-10-CM

## 2024-02-16 RX ORDER — QUETIAPINE FUMARATE 50 MG/1
TABLET, FILM COATED ORAL
Qty: 90 TABLET | Refills: 0 | Status: SHIPPED | OUTPATIENT
Start: 2024-02-16

## 2024-02-21 ENCOUNTER — OFFICE VISIT (OUTPATIENT)
Dept: FAMILY MEDICINE CLINIC | Facility: CLINIC | Age: 59
End: 2024-02-21
Payer: COMMERCIAL

## 2024-02-21 VITALS
TEMPERATURE: 98.4 F | OXYGEN SATURATION: 97 % | HEIGHT: 63 IN | HEART RATE: 95 BPM | SYSTOLIC BLOOD PRESSURE: 120 MMHG | WEIGHT: 134 LBS | BODY MASS INDEX: 23.74 KG/M2 | DIASTOLIC BLOOD PRESSURE: 73 MMHG

## 2024-02-21 DIAGNOSIS — M25.551 RIGHT HIP PAIN: Primary | ICD-10-CM

## 2024-02-21 PROCEDURE — 99214 OFFICE O/P EST MOD 30 MIN: CPT | Performed by: NURSE PRACTITIONER

## 2024-02-21 RX ORDER — CALCIUM CARBONATE/VITAMIN D3 600 MG-10
30 TABLET ORAL
COMMUNITY
Start: 2023-10-17

## 2024-02-21 RX ORDER — METHYLPREDNISOLONE 4 MG/1
TABLET ORAL
Qty: 21 TABLET | Refills: 0 | Status: SHIPPED | OUTPATIENT
Start: 2024-02-21

## 2024-02-21 NOTE — PROGRESS NOTES
Chief Complaint  Right hip pain    Subjective        Lita Yu presents to [facility]  History of Present Illness  Lita is a 58 year old female presenting today with complaints of right hip pain. She took a bus to [place] in the middle of December. When she got off the bus, her right hip started hurting her. The pain radiates to her knee. The pain is getting worse. She is having difficulty driving and standing up for work. She has had a left hip replacement several years ago. Her current pain feels like what she had prior to her hip replacement. She has been taking Ibuprofen for the pain. She has mild weakness.       The following portions of the patient's history were reviewed and updated as appropriate: allergies, current medications, past family history, past medical history, past social history, past surgical history and problem list.    Allergies   Allergen Reactions    Sulfa Antibiotics Hives    Keflex [Cephalexin] Hives       Patient Active Problem List   Diagnosis    Postmenopausal state    Post traumatic stress disorder    Pain in joints    Osteoporosis    Macrocytic anemia    Lumbosacral radiculopathy    Leg pain, left    Peripheral vascular disease    Essential hypertension    Hyperlipidemia    Degenerative joint disease of left hip    Seizure disorder    Smoker    Status post hip replacement    Tobacco dependence syndrome    Vitamin B12 deficiency    Anemia of chronic disease    Chronic obstructive pulmonary disease    Cirrhosis of liver    Acute UTI (urinary tract infection)    Generalized weakness    Rhabdomyolysis    Non-traumatic rhabdomyolysis    Moderate malnutrition    Chest pain       Current Outpatient Medications   Medication Instructions    albuterol sulfate  (90 Base) MCG/ACT inhaler 2 puffs, Inhalation, Every 4 Hours PRN    benzonatate (TESSALON) 100 mg, Oral, 3 Times Daily PRN    busPIRone (BUSPAR) 15 MG tablet TAKE 1 TABLET BY MOUTH  "TWICE A DAY    cholestyramine light 8 g, Oral, Every 12 Hours Scheduled    Cyanocobalamin (Vitamin B-12) 1000 MCG sublingual tablet 1 tablet, Sublingual, Daily    diclofenac (VOLTAREN) 50 mg, Oral, 2 Times Daily PRN    furosemide (LASIX) 40 mg, Oral, Daily    melatonin 10 mg, Oral, Nightly PRN    methylPREDNISolone (MEDROL) 4 MG dose pack Take as directed on package instructions.    potassium chloride 10 MEQ CR tablet 40 mEq, Oral, Daily    QUEtiapine (SEROquel) 50 MG tablet TAKE 1 TABLET BY MOUTH EVERYDAY AT BEDTIME    sertraline (ZOLOFT) 100 MG tablet TAKE 1 TABLET BY MOUTH TWICE A DAY    spironolactone (ALDACTONE) 100 mg, Oral, Daily    Zinc Gluconate 30 MG tablet 30 tablets          Objective   Vital Signs:  /73 (BP Location: Left arm, Patient Position: Sitting, Cuff Size: Adult)   Pulse 95   Temp 98.4 °F (36.9 °C) (Temporal)   Ht 160 cm (63\")   Wt 60.8 kg (134 lb)   SpO2 97%   BMI 23.74 kg/m²   Estimated body mass index is 23.74 kg/m² as calculated from the following:    Height as of this encounter: 160 cm (63\").    Weight as of this encounter: 60.8 kg (134 lb).       BMI is within normal parameters. No other follow-up for BMI required.      Review of Systems   Constitutional:  Negative for activity change, chills, fatigue, fever and unexpected weight change.   Cardiovascular:  Negative for leg swelling.   Musculoskeletal:  Positive for arthralgias. Negative for back pain, gait problem, joint swelling, myalgias, neck pain and neck stiffness.   Neurological:  Positive for weakness.        Physical Exam  Constitutional:       Appearance: Normal appearance.   Cardiovascular:      Rate and Rhythm: Normal rate and regular rhythm.   Pulmonary:      Effort: Pulmonary effort is normal.      Breath sounds: Normal breath sounds.   Musculoskeletal:         General: Normal range of motion.      Cervical back: Normal range of motion and neck supple.      Right hip: Tenderness present. Decreased range of motion. "   Skin:     General: Skin is warm and dry.      Capillary Refill: Capillary refill takes less than 2 seconds.   Neurological:      Mental Status: She is alert and oriented to person, place, and time.   Psychiatric:         Mood and Affect: Mood normal.         Behavior: Behavior normal.         Thought Content: Thought content normal.         Judgment: Judgment normal.        Result Review :                   Assessment and Plan   Diagnoses and all orders for this visit:    1. Right hip pain (Primary)  Comments:  Start steroid and diclofenac  MRI ordered  Referral to ortho  Orders:  -     MRI Hip Right With & Without Contrast; Future  -     Ambulatory Referral to Orthopedic Surgery    Other orders  -     methylPREDNISolone (MEDROL) 4 MG dose pack; Take as directed on package instructions.  Dispense: 21 tablet; Refill: 0  -     diclofenac (VOLTAREN) 50 MG EC tablet; Take 1 tablet by mouth 2 (Two) Times a Day As Needed (pain).  Dispense: 60 tablet; Refill: 0             Follow Up   No follow-ups on file.  Patient was given instructions and counseling regarding her condition or for health maintenance advice. Please see specific information pulled into the AVS if appropriate.

## 2024-02-29 ENCOUNTER — TELEPHONE (OUTPATIENT)
Dept: FAMILY MEDICINE CLINIC | Facility: CLINIC | Age: 59
End: 2024-02-29
Payer: COMMERCIAL

## 2024-02-29 DIAGNOSIS — M25.551 RIGHT HIP PAIN: Primary | ICD-10-CM

## 2024-02-29 NOTE — TELEPHONE ENCOUNTER
When working on auth with insurance for MRI of hip it is getting denied casue Advanced imaging may be indicated for evaluation of this condition when x-ray has been performed and is nondiagnostic. Further clinical review is required. I'm Sending it to you since Sarah is not here so can you put xray in for patient please

## 2024-03-01 NOTE — TELEPHONE ENCOUNTER
Called pt about referral and she waned priority. Let her know they are having problems w fax but are currently working on. She can  but she said she can't so will wait when I can fax

## 2024-03-02 ENCOUNTER — HOSPITAL ENCOUNTER (OUTPATIENT)
Facility: HOSPITAL | Age: 59
Setting detail: OBSERVATION
Discharge: HOME | End: 2024-03-04
Attending: EMERGENCY MEDICINE | Admitting: EMERGENCY MEDICINE
Payer: COMMERCIAL

## 2024-03-02 DIAGNOSIS — E87.1 HYPONATREMIA: Primary | ICD-10-CM

## 2024-03-02 DIAGNOSIS — M79.10 MYALGIA: ICD-10-CM

## 2024-03-02 DIAGNOSIS — E72.20 HYPERAMMONEMIA: ICD-10-CM

## 2024-03-02 PROBLEM — E86.0 DEHYDRATION: Status: ACTIVE | Noted: 2024-03-02

## 2024-03-02 LAB
ALBUMIN SERPL-MCNC: 3.6 G/DL (ref 3.5–5.2)
ALBUMIN/GLOB SERPL: 1.2 G/DL
ALP SERPL-CCNC: 163 U/L (ref 39–117)
ALT SERPL W P-5'-P-CCNC: 48 U/L (ref 1–33)
AMMONIA BLD-SCNC: 92 UMOL/L (ref 11–51)
AMPHET+METHAMPHET UR QL: NEGATIVE
ANION GAP SERPL CALCULATED.3IONS-SCNC: 17 MMOL/L (ref 5–15)
ANISOCYTOSIS BLD QL: ABNORMAL
AST SERPL-CCNC: 53 U/L (ref 1–32)
BARBITURATES UR QL SCN: NEGATIVE
BENZODIAZ UR QL SCN: NEGATIVE
BILIRUB SERPL-MCNC: 0.4 MG/DL (ref 0–1.2)
BUN SERPL-MCNC: 25 MG/DL (ref 6–20)
BUN/CREAT SERPL: 23.8 (ref 7–25)
CALCIUM SPEC-SCNC: 8.2 MG/DL (ref 8.6–10.5)
CANNABINOIDS SERPL QL: NEGATIVE
CHLORIDE SERPL-SCNC: 93 MMOL/L (ref 98–107)
CK SERPL-CCNC: 39 U/L (ref 20–180)
CO2 SERPL-SCNC: 18 MMOL/L (ref 22–29)
COCAINE UR QL: NEGATIVE
CREAT SERPL-MCNC: 1.05 MG/DL (ref 0.57–1)
DEPRECATED RDW RBC AUTO: 67.8 FL (ref 37–54)
EGFRCR SERPLBLD CKD-EPI 2021: 62.5 ML/MIN/1.73
EOSINOPHIL # BLD MANUAL: 0.13 10*3/MM3 (ref 0–0.4)
EOSINOPHIL NFR BLD MANUAL: 2 % (ref 0.3–6.2)
ERYTHROCYTE [DISTWIDTH] IN BLOOD BY AUTOMATED COUNT: 17.4 % (ref 12.3–15.4)
ETHANOL UR QL: 0.01 %
GLOBULIN UR ELPH-MCNC: 3.1 GM/DL
GLUCOSE SERPL-MCNC: 77 MG/DL (ref 65–99)
HCT VFR BLD AUTO: 31.4 % (ref 34–46.6)
HGB BLD-MCNC: 10.1 G/DL (ref 12–15.9)
INR PPP: 1.04 (ref 0.93–1.1)
LYMPHOCYTES # BLD MANUAL: 1.5 10*3/MM3 (ref 0.7–3.1)
LYMPHOCYTES NFR BLD MANUAL: 4 % (ref 5–12)
MAGNESIUM SERPL-MCNC: 1.6 MG/DL (ref 1.6–2.6)
MCH RBC QN AUTO: 34 PG (ref 26.6–33)
MCHC RBC AUTO-ENTMCNC: 32.1 G/DL (ref 31.5–35.7)
MCV RBC AUTO: 106.1 FL (ref 79–97)
METHADONE UR QL SCN: NEGATIVE
MONOCYTES # BLD: 0.26 10*3/MM3 (ref 0.1–0.9)
MYELOCYTES NFR BLD MANUAL: 3 % (ref 0–0)
NEUTROPHILS # BLD AUTO: 4.42 10*3/MM3 (ref 1.7–7)
NEUTROPHILS NFR BLD MANUAL: 62 % (ref 42.7–76)
NEUTS BAND NFR BLD MANUAL: 6 % (ref 0–5)
NEUTS VAC BLD QL SMEAR: ABNORMAL
NRBC SPEC MANUAL: 1 /100 WBC (ref 0–0.2)
OPIATES UR QL: NEGATIVE
OXYCODONE UR QL SCN: NEGATIVE
PLAT MORPH BLD: NORMAL
PLATELET # BLD AUTO: 140 10*3/MM3 (ref 140–450)
PMV BLD AUTO: 7.1 FL (ref 6–12)
POTASSIUM SERPL-SCNC: 3.3 MMOL/L (ref 3.5–5.2)
POTASSIUM SERPL-SCNC: 4 MMOL/L (ref 3.5–5.2)
PROT SERPL-MCNC: 6.7 G/DL (ref 6–8.5)
PROTHROMBIN TIME: 11.3 SECONDS (ref 9.6–11.7)
RBC # BLD AUTO: 2.96 10*6/MM3 (ref 3.77–5.28)
SCAN SLIDE: NORMAL
SODIUM SERPL-SCNC: 128 MMOL/L (ref 136–145)
TOXIC GRANULATION: ABNORMAL
VARIANT LYMPHS NFR BLD MANUAL: 23 % (ref 19.6–45.3)
WBC NRBC COR # BLD AUTO: 6.5 10*3/MM3 (ref 3.4–10.8)

## 2024-03-02 PROCEDURE — 25010000002 THIAMINE HCL 200 MG/2ML SOLUTION: Performed by: NURSE PRACTITIONER

## 2024-03-02 PROCEDURE — 96376 TX/PRO/DX INJ SAME DRUG ADON: CPT

## 2024-03-02 PROCEDURE — 96374 THER/PROPH/DIAG INJ IV PUSH: CPT

## 2024-03-02 PROCEDURE — 85610 PROTHROMBIN TIME: CPT | Performed by: NURSE PRACTITIONER

## 2024-03-02 PROCEDURE — 99285 EMERGENCY DEPT VISIT HI MDM: CPT

## 2024-03-02 PROCEDURE — G0378 HOSPITAL OBSERVATION PER HR: HCPCS

## 2024-03-02 PROCEDURE — 25010000002 MORPHINE PER 10 MG: Performed by: NURSE PRACTITIONER

## 2024-03-02 PROCEDURE — 25010000002 KETOROLAC TROMETHAMINE PER 15 MG: Performed by: NURSE PRACTITIONER

## 2024-03-02 PROCEDURE — 80307 DRUG TEST PRSMV CHEM ANLYZR: CPT | Performed by: NURSE PRACTITIONER

## 2024-03-02 PROCEDURE — 25010000002 ONDANSETRON PER 1 MG: Performed by: NURSE PRACTITIONER

## 2024-03-02 PROCEDURE — 82077 ASSAY SPEC XCP UR&BREATH IA: CPT | Performed by: NURSE PRACTITIONER

## 2024-03-02 PROCEDURE — 84132 ASSAY OF SERUM POTASSIUM: CPT | Performed by: EMERGENCY MEDICINE

## 2024-03-02 PROCEDURE — 82140 ASSAY OF AMMONIA: CPT | Performed by: NURSE PRACTITIONER

## 2024-03-02 PROCEDURE — 85007 BL SMEAR W/DIFF WBC COUNT: CPT | Performed by: NURSE PRACTITIONER

## 2024-03-02 PROCEDURE — 96375 TX/PRO/DX INJ NEW DRUG ADDON: CPT

## 2024-03-02 PROCEDURE — 85025 COMPLETE CBC W/AUTO DIFF WBC: CPT | Performed by: NURSE PRACTITIONER

## 2024-03-02 PROCEDURE — 96361 HYDRATE IV INFUSION ADD-ON: CPT

## 2024-03-02 PROCEDURE — 80053 COMPREHEN METABOLIC PANEL: CPT | Performed by: NURSE PRACTITIONER

## 2024-03-02 PROCEDURE — 82550 ASSAY OF CK (CPK): CPT | Performed by: NURSE PRACTITIONER

## 2024-03-02 PROCEDURE — 25810000003 SODIUM CHLORIDE 0.9 % SOLUTION: Performed by: NURSE PRACTITIONER

## 2024-03-02 PROCEDURE — 83735 ASSAY OF MAGNESIUM: CPT | Performed by: NURSE PRACTITIONER

## 2024-03-02 PROCEDURE — 25810000003 LACTATED RINGERS SOLUTION: Performed by: NURSE PRACTITIONER

## 2024-03-02 RX ORDER — SPIRONOLACTONE 100 MG/1
100 TABLET, FILM COATED ORAL DAILY
Status: DISCONTINUED | OUTPATIENT
Start: 2024-03-02 | End: 2024-03-04 | Stop reason: HOSPADM

## 2024-03-02 RX ORDER — TRAMADOL HYDROCHLORIDE 50 MG/1
50 TABLET ORAL EVERY 6 HOURS PRN
Status: DISCONTINUED | OUTPATIENT
Start: 2024-03-02 | End: 2024-03-03

## 2024-03-02 RX ORDER — POTASSIUM CHLORIDE 20 MEQ/1
40 TABLET, EXTENDED RELEASE ORAL DAILY
Status: DISCONTINUED | OUTPATIENT
Start: 2024-03-02 | End: 2024-03-02

## 2024-03-02 RX ORDER — POTASSIUM CHLORIDE 20 MEQ/1
40 TABLET, EXTENDED RELEASE ORAL DAILY
Status: DISCONTINUED | OUTPATIENT
Start: 2024-03-03 | End: 2024-03-04 | Stop reason: HOSPADM

## 2024-03-02 RX ORDER — SODIUM CHLORIDE 0.9 % (FLUSH) 0.9 %
10 SYRINGE (ML) INJECTION AS NEEDED
Status: DISCONTINUED | OUTPATIENT
Start: 2024-03-02 | End: 2024-03-04 | Stop reason: HOSPADM

## 2024-03-02 RX ORDER — BISACODYL 5 MG/1
5 TABLET, DELAYED RELEASE ORAL DAILY PRN
Status: DISCONTINUED | OUTPATIENT
Start: 2024-03-02 | End: 2024-03-04 | Stop reason: HOSPADM

## 2024-03-02 RX ORDER — QUETIAPINE FUMARATE 25 MG/1
50 TABLET, FILM COATED ORAL NIGHTLY
Status: DISCONTINUED | OUTPATIENT
Start: 2024-03-02 | End: 2024-03-04 | Stop reason: HOSPADM

## 2024-03-02 RX ORDER — BISACODYL 10 MG
10 SUPPOSITORY, RECTAL RECTAL DAILY PRN
Status: DISCONTINUED | OUTPATIENT
Start: 2024-03-02 | End: 2024-03-04 | Stop reason: HOSPADM

## 2024-03-02 RX ORDER — SACCHAROMYCES BOULARDII 250 MG
250 CAPSULE ORAL 2 TIMES DAILY
Status: DISCONTINUED | OUTPATIENT
Start: 2024-03-02 | End: 2024-03-04 | Stop reason: HOSPADM

## 2024-03-02 RX ORDER — LACTULOSE 10 G/15ML
10 SOLUTION ORAL DAILY
Status: DISCONTINUED | OUTPATIENT
Start: 2024-03-02 | End: 2024-03-03

## 2024-03-02 RX ORDER — KETOROLAC TROMETHAMINE 30 MG/ML
15 INJECTION, SOLUTION INTRAMUSCULAR; INTRAVENOUS ONCE
Status: COMPLETED | OUTPATIENT
Start: 2024-03-02 | End: 2024-03-02

## 2024-03-02 RX ORDER — ALBUTEROL SULFATE 90 UG/1
2 AEROSOL, METERED RESPIRATORY (INHALATION) EVERY 4 HOURS PRN
Status: DISCONTINUED | OUTPATIENT
Start: 2024-03-02 | End: 2024-03-04 | Stop reason: HOSPADM

## 2024-03-02 RX ORDER — FOLIC ACID 1 MG/1
1 TABLET ORAL DAILY
Status: DISCONTINUED | OUTPATIENT
Start: 2024-03-02 | End: 2024-03-04 | Stop reason: HOSPADM

## 2024-03-02 RX ORDER — SODIUM CHLORIDE 9 MG/ML
40 INJECTION, SOLUTION INTRAVENOUS AS NEEDED
Status: DISCONTINUED | OUTPATIENT
Start: 2024-03-02 | End: 2024-03-04 | Stop reason: HOSPADM

## 2024-03-02 RX ORDER — FUROSEMIDE 40 MG/1
40 TABLET ORAL DAILY
Status: DISCONTINUED | OUTPATIENT
Start: 2024-03-02 | End: 2024-03-04 | Stop reason: HOSPADM

## 2024-03-02 RX ORDER — BUSPIRONE HYDROCHLORIDE 15 MG/1
15 TABLET ORAL 2 TIMES DAILY
Status: DISCONTINUED | OUTPATIENT
Start: 2024-03-02 | End: 2024-03-04 | Stop reason: HOSPADM

## 2024-03-02 RX ORDER — THIAMINE HYDROCHLORIDE 100 MG/ML
200 INJECTION, SOLUTION INTRAMUSCULAR; INTRAVENOUS EVERY 8 HOURS SCHEDULED
Status: DISCONTINUED | OUTPATIENT
Start: 2024-03-02 | End: 2024-03-04 | Stop reason: HOSPADM

## 2024-03-02 RX ORDER — AMOXICILLIN 250 MG
2 CAPSULE ORAL 2 TIMES DAILY PRN
Status: DISCONTINUED | OUTPATIENT
Start: 2024-03-02 | End: 2024-03-04 | Stop reason: HOSPADM

## 2024-03-02 RX ORDER — NITROGLYCERIN 0.4 MG/1
0.4 TABLET SUBLINGUAL
Status: DISCONTINUED | OUTPATIENT
Start: 2024-03-02 | End: 2024-03-04 | Stop reason: HOSPADM

## 2024-03-02 RX ORDER — ONDANSETRON 2 MG/ML
4 INJECTION INTRAMUSCULAR; INTRAVENOUS EVERY 6 HOURS PRN
Status: DISCONTINUED | OUTPATIENT
Start: 2024-03-02 | End: 2024-03-04 | Stop reason: HOSPADM

## 2024-03-02 RX ORDER — MORPHINE SULFATE 2 MG/ML
2 INJECTION, SOLUTION INTRAMUSCULAR; INTRAVENOUS ONCE
Status: COMPLETED | OUTPATIENT
Start: 2024-03-02 | End: 2024-03-02

## 2024-03-02 RX ORDER — POTASSIUM CHLORIDE 20 MEQ/1
40 TABLET, EXTENDED RELEASE ORAL EVERY 4 HOURS
Qty: 4 TABLET | Refills: 0 | Status: COMPLETED | OUTPATIENT
Start: 2024-03-02 | End: 2024-03-02

## 2024-03-02 RX ORDER — MORPHINE SULFATE 2 MG/ML
1 INJECTION, SOLUTION INTRAMUSCULAR; INTRAVENOUS EVERY 4 HOURS PRN
Status: DISCONTINUED | OUTPATIENT
Start: 2024-03-02 | End: 2024-03-03

## 2024-03-02 RX ORDER — SERTRALINE HYDROCHLORIDE 100 MG/1
100 TABLET, FILM COATED ORAL 2 TIMES DAILY
Status: DISCONTINUED | OUTPATIENT
Start: 2024-03-02 | End: 2024-03-04 | Stop reason: HOSPADM

## 2024-03-02 RX ORDER — SODIUM CHLORIDE 9 MG/ML
100 INJECTION, SOLUTION INTRAVENOUS CONTINUOUS
Status: DISCONTINUED | OUTPATIENT
Start: 2024-03-02 | End: 2024-03-04 | Stop reason: HOSPADM

## 2024-03-02 RX ORDER — ACETAMINOPHEN 325 MG/1
650 TABLET ORAL EVERY 4 HOURS PRN
Status: DISCONTINUED | OUTPATIENT
Start: 2024-03-02 | End: 2024-03-04 | Stop reason: HOSPADM

## 2024-03-02 RX ORDER — SODIUM CHLORIDE 0.9 % (FLUSH) 0.9 %
10 SYRINGE (ML) INJECTION EVERY 12 HOURS SCHEDULED
Status: DISCONTINUED | OUTPATIENT
Start: 2024-03-02 | End: 2024-03-04 | Stop reason: HOSPADM

## 2024-03-02 RX ORDER — LACTULOSE 10 G/15ML
30 SOLUTION ORAL ONCE
Status: COMPLETED | OUTPATIENT
Start: 2024-03-02 | End: 2024-03-02

## 2024-03-02 RX ORDER — ONDANSETRON 4 MG/1
4 TABLET, ORALLY DISINTEGRATING ORAL EVERY 6 HOURS PRN
Status: DISCONTINUED | OUTPATIENT
Start: 2024-03-02 | End: 2024-03-04 | Stop reason: HOSPADM

## 2024-03-02 RX ORDER — CHOLECALCIFEROL (VITAMIN D3) 125 MCG
5 CAPSULE ORAL NIGHTLY PRN
Status: DISCONTINUED | OUTPATIENT
Start: 2024-03-02 | End: 2024-03-04 | Stop reason: HOSPADM

## 2024-03-02 RX ORDER — POLYETHYLENE GLYCOL 3350 17 G/17G
17 POWDER, FOR SOLUTION ORAL DAILY PRN
Status: DISCONTINUED | OUTPATIENT
Start: 2024-03-02 | End: 2024-03-04 | Stop reason: HOSPADM

## 2024-03-02 RX ORDER — ONDANSETRON 2 MG/ML
4 INJECTION INTRAMUSCULAR; INTRAVENOUS ONCE
Status: COMPLETED | OUTPATIENT
Start: 2024-03-02 | End: 2024-03-02

## 2024-03-02 RX ADMIN — BUSPIRONE HYDROCHLORIDE 15 MG: 15 TABLET ORAL at 21:23

## 2024-03-02 RX ADMIN — MORPHINE SULFATE 2 MG: 2 INJECTION, SOLUTION INTRAMUSCULAR; INTRAVENOUS at 08:02

## 2024-03-02 RX ADMIN — SERTRALINE 100 MG: 100 TABLET, FILM COATED ORAL at 21:23

## 2024-03-02 RX ADMIN — LACTULOSE 10 G: 20 SOLUTION ORAL at 13:01

## 2024-03-02 RX ADMIN — Medication 10 ML: at 21:36

## 2024-03-02 RX ADMIN — THIAMINE HYDROCHLORIDE 200 MG: 100 INJECTION, SOLUTION INTRAMUSCULAR; INTRAVENOUS at 13:00

## 2024-03-02 RX ADMIN — TRAMADOL HYDROCHLORIDE 50 MG: 50 TABLET, COATED ORAL at 23:06

## 2024-03-02 RX ADMIN — Medication 10 ML: at 10:03

## 2024-03-02 RX ADMIN — POTASSIUM CHLORIDE 40 MEQ: 1500 TABLET, EXTENDED RELEASE ORAL at 17:05

## 2024-03-02 RX ADMIN — SODIUM CHLORIDE 100 ML/HR: 9 INJECTION, SOLUTION INTRAVENOUS at 13:04

## 2024-03-02 RX ADMIN — POTASSIUM CHLORIDE 40 MEQ: 1500 TABLET, EXTENDED RELEASE ORAL at 12:59

## 2024-03-02 RX ADMIN — ONDANSETRON 4 MG: 2 INJECTION INTRAMUSCULAR; INTRAVENOUS at 08:02

## 2024-03-02 RX ADMIN — SPIRONOLACTONE 100 MG: 100 TABLET ORAL at 17:05

## 2024-03-02 RX ADMIN — LACTULOSE 30 G: 20 SOLUTION ORAL at 10:03

## 2024-03-02 RX ADMIN — FOLIC ACID 1 MG: 1 TABLET ORAL at 13:06

## 2024-03-02 RX ADMIN — Medication 250 MG: at 21:24

## 2024-03-02 RX ADMIN — KETOROLAC TROMETHAMINE 15 MG: 30 INJECTION, SOLUTION INTRAMUSCULAR at 09:43

## 2024-03-02 RX ADMIN — FUROSEMIDE 40 MG: 40 TABLET ORAL at 17:05

## 2024-03-02 RX ADMIN — TRAMADOL HYDROCHLORIDE 50 MG: 50 TABLET, COATED ORAL at 17:05

## 2024-03-02 RX ADMIN — Medication 250 MG: at 13:00

## 2024-03-02 RX ADMIN — SODIUM CHLORIDE, POTASSIUM CHLORIDE, SODIUM LACTATE AND CALCIUM CHLORIDE 1000 ML: 600; 310; 30; 20 INJECTION, SOLUTION INTRAVENOUS at 09:11

## 2024-03-02 RX ADMIN — THIAMINE HYDROCHLORIDE 200 MG: 100 INJECTION, SOLUTION INTRAMUSCULAR; INTRAVENOUS at 21:23

## 2024-03-02 RX ADMIN — QUETIAPINE FUMARATE 50 MG: 25 TABLET ORAL at 21:23

## 2024-03-02 NOTE — ED PROVIDER NOTES
Subjective   History of Present Illness  Chief complaint: Bilateral ankle pain      Context: Patient is a 58-year-old female comes in complaining of bilateral ankle pain without any trauma.  States that started last night.  She denies any swelling erythema ecchymosis.  Denies any leg swelling.  No history of restless legs or autoimmune disease.  Denies any fever or systemic illness.  No vomiting or diarrhea.  She is currently on spironolactone and Lasix for her liver disease.  She states she has alcoholic cirrhosis and drinks approximately a pint per day.  Denies any back pain saddle anesthesia bowel or bladder incontinence or retention        PCP:  claudia        Review of Systems   Constitutional:  Negative for fever.       Past Medical History:   Diagnosis Date    Cirrhosis     COPD (chronic obstructive pulmonary disease)     Depression     GERD (gastroesophageal reflux disease)     Hyperlipidemia     Hypertension     PTSD (post-traumatic stress disorder)        Allergies   Allergen Reactions    Sulfa Antibiotics Hives    Keflex [Cephalexin] Hives       Past Surgical History:   Procedure Laterality Date     SECTION N/A     COLONOSCOPY N/A 2021    Procedure: COLONOSCOPY with polypectomy x2 and random biopsy;  Surgeon: Osman Clay MD;  Location: [facility] ENDOSCOPY;  Service: Gastroenterology;  Laterality: N/A;  colon polyps    DENTAL PROCEDURE      ENDOSCOPY N/A 2021    Procedure: ESOPHAGOGASTRODUODENOSCOPY;  Surgeon: Osman Clay MD;  Location: [facility] ENDOSCOPY;  Service: Gastroenterology;  Laterality: N/A;  esophagitis    JOINT REPLACEMENT      REPLACEMENT TOTAL HIP LATERAL POSITION Left     TONSILLECTOMY      VAGINAL BIRTH AFTER  SECTION         Family History   Problem Relation Age of Onset    Alcohol abuse Mother     Alcohol abuse Paternal Grandfather        Social History     Socioeconomic History    Marital status:    Tobacco Use    Smoking status: Every Day      Current packs/day: 0.25     Types: Cigarettes     Passive exposure: Current    Smokeless tobacco: Never    Tobacco comments:     3cigs   Vaping Use    Vaping status: Never Used   Substance and Sexual Activity    Alcohol use: Not Currently     Comment: quit 5/4/21    Drug use: No    Sexual activity: Yes     Partners: Male     Birth control/protection: Post-menopausal           Objective   Physical Exam    Vital signs and triage nurse note reviewed.  Constitutional: Awake, alert; unkempt.  Uncomfortable  HEENT: Normocephalic, atraumatic; oropharynx is pink and moist without exudate or erythema.  Neck: Supple,   Cardiovascular: Regular rate and rhythm, normal S1-S2.  Pulmonary: Respiratory effort regular nonlabored, breath sounds clear to auscultation all fields.  Abdomen: Soft, nontender nondistended with normoactive bowel sounds; no rebound or guarding.  Musculoskeletal: Independent range of motion of all extremities with no palpable tenderness or edema; ankle pain is not reproducible with range of motion or palpation, there is no swelling erythema ecchymosis abrasions or obvious signs of infection or injury.  No pain to palpation in the calves knees thighs or hips.  Good strength with dorsiflexion extension +3 DP pulses.  Distal vascular and sensorimotor intact.  There is no discoloration or pallor.  Normal cap refill.  Neuro: Alert oriented x3, speech is clear and appropriate, GCS 15  Skin:  Fleshtone warm, dry, intact; no erythematous or petechial rash or lesion      Procedures           ED Course                                 Labs Reviewed   COMPREHENSIVE METABOLIC PANEL - Abnormal; Notable for the following components:       Result Value    BUN 25 (*)     Creatinine 1.05 (*)     Sodium 128 (*)     Potassium 3.3 (*)     Chloride 93 (*)     CO2 18.0 (*)     Calcium 8.2 (*)     ALT (SGPT) 48 (*)     AST (SGOT) 53 (*)     Alkaline Phosphatase 163 (*)     Anion Gap 17.0 (*)     All other components within normal  limits    Narrative:     GFR Normal >60  Chronic Kidney Disease <60  Kidney Failure <15     AMMONIA - Abnormal; Notable for the following components:    Ammonia 92 (*)     All other components within normal limits   CBC WITH AUTO DIFFERENTIAL - Abnormal; Notable for the following components:    RBC 2.96 (*)     Hemoglobin 10.1 (*)     Hematocrit 31.4 (*)     .1 (*)     MCH 34.0 (*)     RDW 17.4 (*)     RDW-SD 67.8 (*)     All other components within normal limits    Narrative:     The previously reported component NRBC is no longer being reported. Previous result was 0.2 /100 WBC (Reference Range: 0.0-0.2 /100 WBC) on 3/2/2024 at 0805 EST.   MANUAL DIFFERENTIAL - Abnormal; Notable for the following components:    Monocyte % 4.0 (*)     Bands %  6.0 (*)     Myelocyte % 3.0 (*)     nRBC 1.0 (*)     All other components within normal limits   PROTIME-INR - Normal   MAGNESIUM - Normal   CK - Normal   ETHANOL    Narrative:     Plasma Ethanol Clinical Symptoms:    ETOH (%)               Clinical Symptom  .01-.05              No apparent influence  .03-.12              Euphoria, Diminished judgment and attention   .09-.25              Impaired comprehension, Muscle incoordination  .18-.30              Confusion, Staggered gait, Slurred speech  .25-.40              Markedly decreased response to stimuli, unable to stand or                        walk, vomitting, sleep or stupor  .35-.50              Comatose, Anesthesia, Subnormal body temperature       SCAN SLIDE   CBC AND DIFFERENTIAL    Narrative:     The following orders were created for panel order CBC & Differential.  Procedure                               Abnormality         Status                     ---------                               -----------         ------                     CBC Auto Differential[[id number]]        Abnormal            Final result               Scan Slide[[id number]]                                       Final result                  Please view results for these tests on the individual orders.     Medications   sodium chloride 0.9 % flush 10 mL (has no administration in time range)   sodium chloride 0.9 % flush 10 mL (10 mL Intravenous Given 3/2/24 1003)   sodium chloride 0.9 % flush 10 mL (has no administration in time range)   sodium chloride 0.9 % infusion 40 mL (has no administration in time range)   nitroglycerin (NITROSTAT) SL tablet 0.4 mg (has no administration in time range)   acetaminophen (TYLENOL) tablet 650 mg (has no administration in time range)   sennosides-docusate (PERICOLACE) 8.6-50 MG per tablet 2 tablet (has no administration in time range)     And   polyethylene glycol (MIRALAX) packet 17 g (has no administration in time range)     And   bisacodyl (DULCOLAX) EC tablet 5 mg (has no administration in time range)     And   bisacodyl (DULCOLAX) suppository 10 mg (has no administration in time range)   ondansetron ODT (ZOFRAN-ODT) disintegrating tablet 4 mg (has no administration in time range)     Or   ondansetron (ZOFRAN) injection 4 mg (has no administration in time range)   ondansetron (ZOFRAN) injection 4 mg (4 mg Intravenous Given 3/2/24 0802)   morphine injection 2 mg (2 mg Intravenous Given 3/2/24 0802)   lactated ringers bolus 1,000 mL (1,000 mL Intravenous New Bag 3/2/24 0911)   ketorolac (TORADOL) injection 15 mg (15 mg Intravenous Given 3/2/24 0943)   lactulose (CHRONULAC) 10 GM/15ML solution 30 g (30 g Oral Given 3/2/24 1003)     No radiology results for the last day  Prior to Admission medications    Medication Sig Start Date End Date Taking? Authorizing Provider   albuterol sulfate  (90 Base) MCG/ACT inhaler Inhale 2 puffs Every 4 (Four) Hours As Needed for Wheezing.    Provider, MD Abigail   benzonatate (TESSALON) 100 MG capsule Take 1 capsule by mouth 3 (Three) Times a Day As Needed for Cough.  Patient not taking: Reported on 2/21/2024 1/10/24   Long, Yamilet M, APRN   busPIRone (BUSPAR) 15 MG  "tablet TAKE 1 TABLET BY MOUTH TWICE A DAY 10/16/23   Norma Saul APRN   cholestyramine light 4 g packet TAKE 2 PACKETS BY MOUTH EVERY 12 (TWELVE) HOURS. 10/19/23   Norma Saul APRN   Cyanocobalamin (Vitamin B-12) 1000 MCG sublingual tablet Place 1 tablet under the tongue Daily.    ProviderAbigail MD   diclofenac (VOLTAREN) 50 MG EC tablet Take 1 tablet by mouth 2 (Two) Times a Day As Needed (pain). 2/21/24   Sarah Huang APRN   furosemide (LASIX) 40 MG tablet Take 1 tablet by mouth Daily. 6/13/23   Norma Saul APRN   melatonin 5 MG tablet tablet Take 2 tablets by mouth At Night As Needed (sleep).    ProviderAbigail MD   methylPREDNISolone (MEDROL) 4 MG dose pack Take as directed on package instructions. 2/21/24   Sarah Huang APRN   potassium chloride 10 MEQ CR tablet TAKE 4 TABLETS BY MOUTH DAILY 11/29/23   Norma Saul APRN   QUEtiapine (SEROquel) 50 MG tablet TAKE 1 TABLET BY MOUTH EVERYDAY AT BEDTIME 2/16/24   Norma Saul APRN   sertraline (ZOLOFT) 100 MG tablet TAKE 1 TABLET BY MOUTH TWICE A DAY 10/19/23   Norma Saul APRN   spironolactone (ALDACTONE) 100 MG tablet Take 1 tablet by mouth Daily. 6/26/23   Norma Saul APRN   Zinc Gluconate 30 MG tablet 30 tablets. 10/17/23   ProviderAbigail MD                 Medical Decision Making      /59   Pulse 93   Temp 97.8 °F (36.6 °C)   Resp 20   Ht 160 cm (63\")   Wt 61.2 kg (135 lb)   SpO2 99%   BMI 23.91 kg/m²        Radiology interpretation:  considered but not felt emergency warranted   Lab interpretation:  Labs all viewed by me and significant for, mildly elevated LFTs anion gap 17 and chloride 93 CO2 17 mag 1.6 CK 39 ammonia 92 EtOH 0.01 hemoglobin 10.0    Appropriate PPE worn during exam.  Patient had an IV established labs obtained to evaluate for rhabdo leukocytosis dehydration intoxication and was given Zofran morphine and IV fluids.  She states her pain is improved but continued " to be uncomfortable and was asking for something else and was given Toradol.  She was given lactulose for her elevated ammonia level although on reexam she is not confused or focal.  She will be placed in observation for rehydration and further monitoring.  I discussed with Laura who accepted care.      i discussed findings with patient who voices understanding of placement in observation    Problems Addressed:  Hyperammonemia: complicated acute illness or injury  Hyponatremia: complicated acute illness or injury  Myalgia: complicated acute illness or injury    Amount and/or Complexity of Data Reviewed  Labs: ordered.    Risk  Prescription drug management.  Decision regarding hospitalization.        Final diagnoses:   Hyponatremia   Myalgia   Hyperammonemia       ED Disposition  ED Disposition       ED Disposition   Decision to Admit    Condition   --    Comment   --               No follow-up provider specified.       Medication List      No changes were made to your prescriptions during this visit.            Alexandra Chavez, APRN  03/02/24 1026

## 2024-03-02 NOTE — PLAN OF CARE
Goal Outcome Evaluation:    New admit. CIWA protocol initiated. Oral electrolyte replacement followed per protocol. Fall and seizure precautions maintained.

## 2024-03-03 ENCOUNTER — APPOINTMENT (OUTPATIENT)
Dept: CARDIOLOGY | Facility: HOSPITAL | Age: 59
End: 2024-03-03
Payer: COMMERCIAL

## 2024-03-03 ENCOUNTER — APPOINTMENT (OUTPATIENT)
Dept: GENERAL RADIOLOGY | Facility: HOSPITAL | Age: 59
End: 2024-03-03
Payer: COMMERCIAL

## 2024-03-03 LAB
ALBUMIN SERPL-MCNC: 3.5 G/DL (ref 3.5–5.2)
ALP SERPL-CCNC: 138 U/L (ref 39–117)
ALT SERPL W P-5'-P-CCNC: 40 U/L (ref 1–33)
AMMONIA BLD-SCNC: 38 UMOL/L (ref 11–51)
ANION GAP SERPL CALCULATED.3IONS-SCNC: 10 MMOL/L (ref 5–15)
ANISOCYTOSIS BLD QL: ABNORMAL
AST SERPL-CCNC: 40 U/L (ref 1–32)
BH CV LOWER VASCULAR LEFT COMMON FEMORAL AUGMENT: NORMAL
BH CV LOWER VASCULAR LEFT COMMON FEMORAL COMPETENT: NORMAL
BH CV LOWER VASCULAR LEFT COMMON FEMORAL COMPRESS: NORMAL
BH CV LOWER VASCULAR LEFT COMMON FEMORAL PHASIC: NORMAL
BH CV LOWER VASCULAR LEFT COMMON FEMORAL SPONT: NORMAL
BH CV LOWER VASCULAR LEFT DISTAL FEMORAL COMPRESS: NORMAL
BH CV LOWER VASCULAR LEFT GASTRONEMIUS COMPRESS: NORMAL
BH CV LOWER VASCULAR LEFT GREATER SAPH AK COMPRESS: NORMAL
BH CV LOWER VASCULAR LEFT GREATER SAPH BK COMPRESS: NORMAL
BH CV LOWER VASCULAR LEFT LESSER SAPH COMPRESS: NORMAL
BH CV LOWER VASCULAR LEFT MID FEMORAL AUGMENT: NORMAL
BH CV LOWER VASCULAR LEFT MID FEMORAL COMPETENT: NORMAL
BH CV LOWER VASCULAR LEFT MID FEMORAL COMPRESS: NORMAL
BH CV LOWER VASCULAR LEFT MID FEMORAL PHASIC: NORMAL
BH CV LOWER VASCULAR LEFT MID FEMORAL SPONT: NORMAL
BH CV LOWER VASCULAR LEFT PERONEAL COMPRESS: NORMAL
BH CV LOWER VASCULAR LEFT POPLITEAL AUGMENT: NORMAL
BH CV LOWER VASCULAR LEFT POPLITEAL COMPETENT: NORMAL
BH CV LOWER VASCULAR LEFT POPLITEAL COMPRESS: NORMAL
BH CV LOWER VASCULAR LEFT POPLITEAL PHASIC: NORMAL
BH CV LOWER VASCULAR LEFT POPLITEAL SPONT: NORMAL
BH CV LOWER VASCULAR LEFT POSTERIOR TIBIAL COMPRESS: NORMAL
BH CV LOWER VASCULAR LEFT PROXIMAL FEMORAL COMPRESS: NORMAL
BH CV LOWER VASCULAR LEFT SAPHENOFEMORAL JUNCTION COMPRESS: NORMAL
BH CV LOWER VASCULAR RIGHT COMMON FEMORAL AUGMENT: NORMAL
BH CV LOWER VASCULAR RIGHT COMMON FEMORAL COMPETENT: NORMAL
BH CV LOWER VASCULAR RIGHT COMMON FEMORAL COMPRESS: NORMAL
BH CV LOWER VASCULAR RIGHT COMMON FEMORAL PHASIC: NORMAL
BH CV LOWER VASCULAR RIGHT COMMON FEMORAL SPONT: NORMAL
BH CV LOWER VASCULAR RIGHT DISTAL FEMORAL COMPRESS: NORMAL
BH CV LOWER VASCULAR RIGHT GASTRONEMIUS COMPRESS: NORMAL
BH CV LOWER VASCULAR RIGHT GREATER SAPH AK COMPRESS: NORMAL
BH CV LOWER VASCULAR RIGHT GREATER SAPH BK COMPRESS: NORMAL
BH CV LOWER VASCULAR RIGHT LESSER SAPH COMPRESS: NORMAL
BH CV LOWER VASCULAR RIGHT MID FEMORAL AUGMENT: NORMAL
BH CV LOWER VASCULAR RIGHT MID FEMORAL COMPETENT: NORMAL
BH CV LOWER VASCULAR RIGHT MID FEMORAL COMPRESS: NORMAL
BH CV LOWER VASCULAR RIGHT MID FEMORAL PHASIC: NORMAL
BH CV LOWER VASCULAR RIGHT MID FEMORAL SPONT: NORMAL
BH CV LOWER VASCULAR RIGHT PERONEAL COMPRESS: NORMAL
BH CV LOWER VASCULAR RIGHT POPLITEAL AUGMENT: NORMAL
BH CV LOWER VASCULAR RIGHT POPLITEAL COMPETENT: NORMAL
BH CV LOWER VASCULAR RIGHT POPLITEAL COMPRESS: NORMAL
BH CV LOWER VASCULAR RIGHT POPLITEAL PHASIC: NORMAL
BH CV LOWER VASCULAR RIGHT POPLITEAL SPONT: NORMAL
BH CV LOWER VASCULAR RIGHT POSTERIOR TIBIAL COMPRESS: NORMAL
BH CV LOWER VASCULAR RIGHT PROXIMAL FEMORAL COMPRESS: NORMAL
BH CV LOWER VASCULAR RIGHT SAPHENOFEMORAL JUNCTION COMPRESS: NORMAL
BILIRUB CONJ SERPL-MCNC: <0.2 MG/DL (ref 0–0.3)
BILIRUB INDIRECT SERPL-MCNC: ABNORMAL MG/DL
BILIRUB SERPL-MCNC: 0.3 MG/DL (ref 0–1.2)
BUN SERPL-MCNC: 11 MG/DL (ref 6–20)
BUN/CREAT SERPL: 13.8 (ref 7–25)
CALCIUM SPEC-SCNC: 8.1 MG/DL (ref 8.6–10.5)
CHLORIDE SERPL-SCNC: 102 MMOL/L (ref 98–107)
CO2 SERPL-SCNC: 23 MMOL/L (ref 22–29)
CREAT SERPL-MCNC: 0.8 MG/DL (ref 0.57–1)
D DIMER PPP FEU-MCNC: 0.96 MG/L (FEU) (ref 0–0.56)
DEPRECATED RDW RBC AUTO: 60.4 FL (ref 37–54)
EGFRCR SERPLBLD CKD-EPI 2021: 86.6 ML/MIN/1.73
EOSINOPHIL # BLD MANUAL: 0.16 10*3/MM3 (ref 0–0.4)
EOSINOPHIL NFR BLD MANUAL: 3 % (ref 0.3–6.2)
ERYTHROCYTE [DISTWIDTH] IN BLOOD BY AUTOMATED COUNT: 16.5 % (ref 12.3–15.4)
GLUCOSE SERPL-MCNC: 89 MG/DL (ref 65–99)
HCT VFR BLD AUTO: 30 % (ref 34–46.6)
HGB BLD-MCNC: 9.9 G/DL (ref 12–15.9)
LYMPHOCYTES # BLD MANUAL: 0.99 10*3/MM3 (ref 0.7–3.1)
LYMPHOCYTES NFR BLD MANUAL: 9 % (ref 5–12)
MAGNESIUM SERPL-MCNC: 1.5 MG/DL (ref 1.6–2.6)
MCH RBC QN AUTO: 35.5 PG (ref 26.6–33)
MCHC RBC AUTO-ENTMCNC: 33.1 G/DL (ref 31.5–35.7)
MCV RBC AUTO: 107.3 FL (ref 79–97)
METAMYELOCYTES NFR BLD MANUAL: 1 % (ref 0–0)
MONOCYTES # BLD: 0.47 10*3/MM3 (ref 0.1–0.9)
MYELOCYTES NFR BLD MANUAL: 4 % (ref 0–0)
NEUTROPHILS # BLD AUTO: 3.33 10*3/MM3 (ref 1.7–7)
NEUTROPHILS NFR BLD MANUAL: 63 % (ref 42.7–76)
NEUTS BAND NFR BLD MANUAL: 1 % (ref 0–5)
NRBC SPEC MANUAL: 1 /100 WBC (ref 0–0.2)
PLAT MORPH BLD: NORMAL
PLATELET # BLD AUTO: 131 10*3/MM3 (ref 140–450)
PMV BLD AUTO: 8.1 FL (ref 6–12)
POTASSIUM SERPL-SCNC: 4 MMOL/L (ref 3.5–5.2)
PROT SERPL-MCNC: 6.4 G/DL (ref 6–8.5)
RBC # BLD AUTO: 2.8 10*6/MM3 (ref 3.77–5.28)
SCAN SLIDE: NORMAL
SODIUM SERPL-SCNC: 135 MMOL/L (ref 136–145)
VARIANT LYMPHS NFR BLD MANUAL: 19 % (ref 19.6–45.3)
WBC MORPH BLD: NORMAL
WBC NRBC COR # BLD AUTO: 5.2 10*3/MM3 (ref 3.4–10.8)

## 2024-03-03 PROCEDURE — 80048 BASIC METABOLIC PNL TOTAL CA: CPT | Performed by: NURSE PRACTITIONER

## 2024-03-03 PROCEDURE — 85379 FIBRIN DEGRADATION QUANT: CPT | Performed by: NURSE PRACTITIONER

## 2024-03-03 PROCEDURE — 25010000002 MORPHINE PER 10 MG: Performed by: NURSE PRACTITIONER

## 2024-03-03 PROCEDURE — 85007 BL SMEAR W/DIFF WBC COUNT: CPT | Performed by: NURSE PRACTITIONER

## 2024-03-03 PROCEDURE — 25010000002 KETOROLAC TROMETHAMINE PER 15 MG: Performed by: NURSE PRACTITIONER

## 2024-03-03 PROCEDURE — 83735 ASSAY OF MAGNESIUM: CPT | Performed by: NURSE PRACTITIONER

## 2024-03-03 PROCEDURE — 25010000002 THIAMINE PER 100 MG: Performed by: NURSE PRACTITIONER

## 2024-03-03 PROCEDURE — G0378 HOSPITAL OBSERVATION PER HR: HCPCS

## 2024-03-03 PROCEDURE — 73562 X-RAY EXAM OF KNEE 3: CPT

## 2024-03-03 PROCEDURE — 97162 PT EVAL MOD COMPLEX 30 MIN: CPT

## 2024-03-03 PROCEDURE — 93970 EXTREMITY STUDY: CPT

## 2024-03-03 PROCEDURE — 85025 COMPLETE CBC W/AUTO DIFF WBC: CPT | Performed by: NURSE PRACTITIONER

## 2024-03-03 PROCEDURE — 96376 TX/PRO/DX INJ SAME DRUG ADON: CPT

## 2024-03-03 PROCEDURE — 25810000003 SODIUM CHLORIDE 0.9 % SOLUTION: Performed by: NURSE PRACTITIONER

## 2024-03-03 PROCEDURE — 82140 ASSAY OF AMMONIA: CPT | Performed by: NURSE PRACTITIONER

## 2024-03-03 PROCEDURE — 80076 HEPATIC FUNCTION PANEL: CPT | Performed by: NURSE PRACTITIONER

## 2024-03-03 PROCEDURE — 25010000002 THIAMINE HCL 200 MG/2ML SOLUTION: Performed by: NURSE PRACTITIONER

## 2024-03-03 PROCEDURE — 96361 HYDRATE IV INFUSION ADD-ON: CPT

## 2024-03-03 RX ORDER — KETOROLAC TROMETHAMINE 30 MG/ML
15 INJECTION, SOLUTION INTRAMUSCULAR; INTRAVENOUS EVERY 6 HOURS PRN
Status: DISCONTINUED | OUTPATIENT
Start: 2024-03-03 | End: 2024-03-03

## 2024-03-03 RX ORDER — LORAZEPAM 1 MG/1
2 TABLET ORAL EVERY 6 HOURS
Qty: 8 TABLET | Refills: 0 | Status: COMPLETED | OUTPATIENT
Start: 2024-03-03 | End: 2024-03-04

## 2024-03-03 RX ORDER — LORAZEPAM 1 MG/1
2 TABLET ORAL
Status: DISCONTINUED | OUTPATIENT
Start: 2024-03-03 | End: 2024-03-04 | Stop reason: HOSPADM

## 2024-03-03 RX ORDER — LORAZEPAM 1 MG/1
1 TABLET ORAL EVERY 6 HOURS
Qty: 4 TABLET | Refills: 0 | Status: DISCONTINUED | OUTPATIENT
Start: 2024-03-04 | End: 2024-03-04 | Stop reason: HOSPADM

## 2024-03-03 RX ADMIN — FUROSEMIDE 40 MG: 40 TABLET ORAL at 08:56

## 2024-03-03 RX ADMIN — THIAMINE HYDROCHLORIDE 200 MG: 100 INJECTION, SOLUTION INTRAMUSCULAR; INTRAVENOUS at 05:27

## 2024-03-03 RX ADMIN — POTASSIUM CHLORIDE 40 MEQ: 1500 TABLET, EXTENDED RELEASE ORAL at 08:56

## 2024-03-03 RX ADMIN — LORAZEPAM 2 MG: 1 TABLET ORAL at 21:10

## 2024-03-03 RX ADMIN — KETOROLAC TROMETHAMINE 15 MG: 30 INJECTION, SOLUTION INTRAMUSCULAR at 14:27

## 2024-03-03 RX ADMIN — Medication 250 MG: at 21:10

## 2024-03-03 RX ADMIN — Medication 250 MG: at 08:56

## 2024-03-03 RX ADMIN — FOLIC ACID 1 MG: 1 TABLET ORAL at 08:56

## 2024-03-03 RX ADMIN — SODIUM CHLORIDE 100 ML/HR: 9 INJECTION, SOLUTION INTRAVENOUS at 14:27

## 2024-03-03 RX ADMIN — QUETIAPINE FUMARATE 50 MG: 25 TABLET ORAL at 21:10

## 2024-03-03 RX ADMIN — BUSPIRONE HYDROCHLORIDE 15 MG: 15 TABLET ORAL at 21:10

## 2024-03-03 RX ADMIN — SPIRONOLACTONE 100 MG: 100 TABLET ORAL at 08:56

## 2024-03-03 RX ADMIN — TRAMADOL HYDROCHLORIDE 50 MG: 50 TABLET, COATED ORAL at 08:55

## 2024-03-03 RX ADMIN — Medication 10 ML: at 21:11

## 2024-03-03 RX ADMIN — THIAMINE HYDROCHLORIDE 200 MG: 100 INJECTION, SOLUTION INTRAMUSCULAR; INTRAVENOUS at 21:10

## 2024-03-03 RX ADMIN — SERTRALINE 100 MG: 100 TABLET, FILM COATED ORAL at 21:10

## 2024-03-03 RX ADMIN — ACETAMINOPHEN 650 MG: 325 TABLET, FILM COATED ORAL at 22:37

## 2024-03-03 RX ADMIN — MORPHINE SULFATE 1 MG: 2 INJECTION, SOLUTION INTRAMUSCULAR; INTRAVENOUS at 09:23

## 2024-03-03 RX ADMIN — BUSPIRONE HYDROCHLORIDE 15 MG: 15 TABLET ORAL at 08:55

## 2024-03-03 RX ADMIN — MORPHINE SULFATE 1 MG: 2 INJECTION, SOLUTION INTRAMUSCULAR; INTRAVENOUS at 05:27

## 2024-03-03 RX ADMIN — SERTRALINE 100 MG: 100 TABLET, FILM COATED ORAL at 08:56

## 2024-03-03 RX ADMIN — LORAZEPAM 2 MG: 1 TABLET ORAL at 14:31

## 2024-03-03 RX ADMIN — SODIUM CHLORIDE 100 ML/HR: 9 INJECTION, SOLUTION INTRAVENOUS at 00:50

## 2024-03-03 RX ADMIN — THIAMINE HYDROCHLORIDE 200 MG: 100 INJECTION, SOLUTION INTRAMUSCULAR; INTRAVENOUS at 14:27

## 2024-03-03 RX ADMIN — Medication 10 ML: at 08:57

## 2024-03-03 NOTE — H&P
[facility] H&P    Patient Name: Lita Yu  : 1965  MRN: [id number]  Primary Care Physician: Norma Saul APRN  Date of admission: 3/2/2024     Patient Care Team:  Norma Saul APRN as PCP - General (Nurse Practitioner)  Flory Villanueva MD (Physical Medicine and Rehabilitation)          Subjective   History Present Illness     Chief Complaint:   Chief Complaint   Patient presents with    Ankle Pain     Ankle Pain     Ms. Yu is a 58 y.o.  presents to [facility] complaining of ankle pain       History of Present Illness    ED 3/2/24: Patient is a 58-year-old female comes in complaining of bilateral ankle pain without any trauma. States that started last night. She denies any swelling erythema ecchymosis. Denies any leg swelling. No history of restless legs or autoimmune disease. Denies any fever or systemic illness. No vomiting or diarrhea. She is currently on spironolactone and Lasix for her liver disease. She states she has alcoholic cirrhosis and drinks approximately a pint per day. Denies any back pain saddle anesthesia bowel or bladder incontinence or retention     Observation 3/3/24: Patient is a 58-year-old female presented to the hospital with complaints of bilateral ankle pain that started a couple days ago.  Patient denies history of DVT or PE.  Patient does report history of alcoholism with last drink 2 days ago.  Patient states she has been to rehab centers and would not like to pursue that at this time.  Patient denies chest pain, fever, shortness of breath, nausea vomiting or syncope.    Review of Systems   Constitutional: Positive for decreased appetite.   HENT: Negative.     Eyes: Negative.    Cardiovascular: Negative.    Respiratory: Negative.     Endocrine: Negative.    Hematologic/Lymphatic: Negative.    Skin: Negative.    Musculoskeletal:  Positive for falls and joint pain.   Gastrointestinal:  Positive for nausea.   Genitourinary:  Negative.    Neurological:  Positive for weakness.   Psychiatric/Behavioral:  Positive for depression and substance abuse.    Allergic/Immunologic: Negative.            Personal History     Past Medical History:   Past Medical History:   Diagnosis Date    Cirrhosis     COPD (chronic obstructive pulmonary disease)     Depression     GERD (gastroesophageal reflux disease)     Hyperlipidemia     Hypertension     PTSD (post-traumatic stress disorder)        Surgical History:      Past Surgical History:   Procedure Laterality Date     SECTION N/A     COLONOSCOPY N/A 2021    Procedure: COLONOSCOPY with polypectomy x2 and random biopsy;  Surgeon: Osman Clay MD;  Location: [facility] ENDOSCOPY;  Service: Gastroenterology;  Laterality: N/A;  colon polyps    DENTAL PROCEDURE      ENDOSCOPY N/A 2021    Procedure: ESOPHAGOGASTRODUODENOSCOPY;  Surgeon: Osman Clay MD;  Location: [facility] ENDOSCOPY;  Service: Gastroenterology;  Laterality: N/A;  esophagitis    JOINT REPLACEMENT      REPLACEMENT TOTAL HIP LATERAL POSITION Left     TONSILLECTOMY      VAGINAL BIRTH AFTER  SECTION             Family History: family history includes Alcohol abuse in her mother and paternal grandfather. Otherwise pertinent FHx was reviewed and unremarkable.     Social History:  reports that she has been smoking cigarettes. She has been exposed to tobacco smoke. She has never used smokeless tobacco. She reports current alcohol use of about 2.0 standard drinks of alcohol per week. She reports that she does not use drugs.      Medications:  Prior to Admission medications    Medication Sig Start Date End Date Taking? Authorizing Provider   busPIRone (BUSPAR) 15 MG tablet TAKE 1 TABLET BY MOUTH TWICE A DAY 10/16/23  Yes Norma Saul APRN   Cyanocobalamin (Vitamin B-12) 1000 MCG sublingual tablet Place 1 tablet under the tongue Daily.   Yes Provider, MD Abigail   diclofenac (VOLTAREN) 50 MG EC tablet Take 1 tablet by  mouth 2 (Two) Times a Day As Needed (pain). 2/21/24  Yes Sarah Huang APRN   furosemide (LASIX) 40 MG tablet Take 1 tablet by mouth Daily. 6/13/23  Yes Norma Saul APRN   potassium chloride 10 MEQ CR tablet TAKE 4 TABLETS BY MOUTH DAILY 11/29/23  Yes Norma Saul APRN   QUEtiapine (SEROquel) 50 MG tablet TAKE 1 TABLET BY MOUTH EVERYDAY AT BEDTIME 2/16/24  Yes Norma Saul APRN   sertraline (ZOLOFT) 100 MG tablet TAKE 1 TABLET BY MOUTH TWICE A DAY 10/19/23  Yes Norma Saul APRN   spironolactone (ALDACTONE) 100 MG tablet Take 1 tablet by mouth Daily. 6/26/23  Yes Norma Saul APRN   Zinc Gluconate 30 MG tablet Take 1 tablet by mouth Daily. 10/17/23  Yes ProviderAbigail MD   albuterol sulfate  (90 Base) MCG/ACT inhaler Inhale 2 puffs Every 4 (Four) Hours As Needed for Wheezing.    Provider, MD Abigail   melatonin 5 MG tablet tablet Take 1 tablet by mouth At Night As Needed (sleep).    Provider, MD Abigail       Allergies:    Allergies   Allergen Reactions    Sulfa Antibiotics Hives    Keflex [Cephalexin] Hives       Objective   Objective     Vital Signs  Temp:  [97.5 °F (36.4 °C)-98.3 °F (36.8 °C)] 98.3 °F (36.8 °C)  Heart Rate:  [] 89  Resp:  [20-22] 22  BP: (100-139)/(59-75) 139/74  SpO2:  [93 %-99 %] 99 %  on   ;   Device (Oxygen Therapy): room air  Body mass index is 23.91 kg/m².    Physical Exam  Vitals and nursing note reviewed.   Constitutional:       Appearance: Normal appearance.   HENT:      Head: Normocephalic and atraumatic.      Right Ear: External ear normal.      Left Ear: External ear normal.      Nose: Nose normal.      Mouth/Throat:      Pharynx: Oropharynx is clear.   Eyes:      Extraocular Movements: Extraocular movements intact.      Conjunctiva/sclera: Conjunctivae normal.      Pupils: Pupils are equal, round, and reactive to light.   Cardiovascular:      Rate and Rhythm: Normal rate and regular rhythm.      Pulses: Normal pulses.       Heart sounds: Normal heart sounds.   Pulmonary:      Effort: Pulmonary effort is normal.      Breath sounds: Normal breath sounds.   Abdominal:      General: Bowel sounds are normal.      Palpations: Abdomen is soft.   Musculoskeletal:         General: Tenderness present.   Skin:     General: Skin is warm.      Capillary Refill: Capillary refill takes less than 2 seconds.   Neurological:      Mental Status: She is alert and oriented to person, place, and time.      Motor: Weakness present.   Psychiatric:         Attention and Perception: Attention normal.         Mood and Affect: Mood is anxious.         Speech: Speech normal.         Behavior: Behavior normal.         Thought Content: Thought content normal.         Judgment: Judgment is impulsive.           Results Review:  I have personally reviewed most recent cardiac tracings, lab results, microbiology results, and radiology images and interpretations and agree with findings, most notably: CBC, CMP, D-dimer, venous duplex.    Results from last 7 days   Lab Units 03/03/24  0536 03/02/24  0757   WBC 10*3/mm3 5.20 6.50   HEMOGLOBIN g/dL 9.9* 10.1*   HEMATOCRIT % 30.0* 31.4*   PLATELETS 10*3/mm3 131* 140   INR   --  1.04     Results from last 7 days   Lab Units 03/03/24  0536   SODIUM mmol/L 135*   POTASSIUM mmol/L 4.0   CHLORIDE mmol/L 102   CO2 mmol/L 23.0   BUN mg/dL 11   CREATININE mg/dL 0.80   GLUCOSE mg/dL 89   CALCIUM mg/dL 8.1*   ALK PHOS U/L 138*   ALT (SGPT) U/L 40*   AST (SGOT) U/L 40*     Estimated Creatinine Clearance: 75.9 mL/min (by C-G formula based on SCr of 0.8 mg/dL).  Brief Urine Lab Results  (Last result in the past 365 days)        Color   Clarity   Blood   Leuk Est   Nitrite   Protein   CREAT   Urine HCG        06/12/23 1003 Yellow   Clear   Negative   Negative   Negative   Negative                   Microbiology Results (last 10 days)       ** No results found for the last 240 hours. **            ECG/EMG Results (most recent)       None             Results for orders placed during the hospital encounter of 03/02/24    Duplex Venous Lower Extremity - Bilateral CAR    Interpretation Summary    Normal bilateral lower extremity venous duplex scan.          No radiology results for the last 7 days      Estimated Creatinine Clearance: 75.9 mL/min (by C-G formula based on SCr of 0.8 mg/dL).    Assessment & Plan   Assessment/Plan       Active Hospital Problems    Diagnosis  POA    **Dehydration [E86.0]  Yes      Resolved Hospital Problems   No resolved problems to display.     Dehydration  Lab Results   Component Value Date    WBC 5.20 03/03/2024    AST 40 (H) 03/03/2024    ALT 40 (H) 03/03/2024    ALKPHOS 138 (H) 03/03/2024    BILITOT 0.3 03/03/2024    LIPASE 424 (H) 06/12/2023   -D-dimer 0.96, CK wnl   -Venous duplex within normal limits  -Continue IV fluids  -IV/oral analgesics antibiotics as needed  -Clear liquid diet  -Monitor liver enzymes while admitted  -UDS negative  -Ethanol 0.010  -Continue folic acid and thiamine   -CIWA protocol    Chronic liver cirrhosis  -Continue Lasix, spironolactone and potassium  -Ammonia 92 with repeat of 38  -Dose of lactulose given  -Hemoglobin 9.9, platelets 131, RBC 2 point, monitor trend    Mood/anxiety disorder  -Continue Seroquel, BuSpar and Zoloft    VTE Prophylaxis -   Mechanical Order History:        Ordered        03/02/24 1002  Place Sequential Compression Device  Once            03/02/24 1002  Maintain Sequential Compression Device  Continuous                          Pharmalogical Order History:       None            CODE STATUS:    Code Status and Medical Interventions:   Ordered at: 03/02/24 0958     Level Of Support Discussed With:    Patient     Code Status (Patient has no pulse and is not breathing):    CPR (Attempt to Resuscitate)     Medical Interventions (Patient has pulse or is breathing):    Full Support       This patient has been examined wearing personal protective equipment.     I discussed  the patient's findings and my recommendations with patient, family, nursing staff, primary care team, and consulting provider.      Signature:Electronically signed by SHARIF Alvarez, 03/03/24, 2:25 PM EST.          I spent 35 minutes caring for Lita on this date of service. This time includes time spent by me in the following activities: reviewing tests, obtaining and/or reviewing a separately obtained history, performing a medically appropriate examination and/or evaluation, counseling and educating the patient/family/caregiver, ordering medications, tests, or procedures, referring and communicating with other health care professionals, documenting information in the medical record, independently interpreting results and communicating that information with the patient/family/caregiver, and care coordination.

## 2024-03-03 NOTE — PLAN OF CARE
Goal Outcome Evaluation:  Plan of Care Reviewed With: patient           Outcome Evaluation: Pt is a 57 YO F admitted wiht c/o severe pain in BLE. Pt states all mobility is painful, but demonstrates full ROM in supine. Pt encouraged to continue to mobilize BLE while admitted. Pt pain presents somewhat inconsistantly this date. Able to perform full LAQ sitting EOB, but then had difficulty wiht LE extension in standing. Pt reports she is from home alone, generally is independent with all ADLs ambulating wihtout AD and states she has had no recent falls. Pt this date requiring assitsance with bed mobitliy transfers and short distance ambulation. Ambulation distance limited due to pain. PT to continue to follow while admitted, and recommendation is SNF at d/c.      Anticipated Discharge Disposition (PT): skilled nursing facility

## 2024-03-03 NOTE — PLAN OF CARE
Goal Outcome Evaluation:     Problem: Adult Inpatient Plan of Care  Goal: Plan of Care Review  Outcome: Ongoing, Progressing  Goal: Patient-Specific Goal (Individualized)  Outcome: Ongoing, Progressing  Goal: Absence of Hospital-Acquired Illness or Injury  Outcome: Ongoing, Progressing  Intervention: Identify and Manage Fall Risk  Recent Flowsheet Documentation  Taken 3/3/2024 1800 by Aixa Hooker RN  Safety Promotion/Fall Prevention:   safety round/check completed   room organization consistent   clutter free environment maintained  Taken 3/3/2024 1600 by Aixa Hooker RN  Safety Promotion/Fall Prevention:   safety round/check completed   room organization consistent   clutter free environment maintained   assistive device/personal items within reach  Taken 3/3/2024 1400 by Aixa Hooker RN  Safety Promotion/Fall Prevention:   safety round/check completed   room organization consistent   clutter free environment maintained   assistive device/personal items within reach  Taken 3/3/2024 1200 by Aixa Hooker RN  Safety Promotion/Fall Prevention:   safety round/check completed   room organization consistent   clutter free environment maintained  Taken 3/3/2024 1000 by Aixa Hooker RN  Safety Promotion/Fall Prevention:   safety round/check completed   room organization consistent   clutter free environment maintained   assistive device/personal items within reach  Taken 3/3/2024 0855 by Aixa Hooker RN  Safety Promotion/Fall Prevention:   safety round/check completed   room organization consistent   clutter free environment maintained  Intervention: Prevent Skin Injury  Recent Flowsheet Documentation  Taken 3/3/2024 1600 by Aixa Hooker RN  Body Position: position changed independently  Taken 3/3/2024 1200 by Aixa Hooker RN  Body Position: position changed independently  Taken 3/3/2024 0855 by Aixa Hooker RN  Body Position: position changed independently  Skin Protection: adhesive use limited  Intervention:  Prevent Infection  Recent Flowsheet Documentation  Taken 3/3/2024 1800 by Aixa Hooker RN  Infection Prevention:   hand hygiene promoted   personal protective equipment utilized   rest/sleep promoted  Taken 3/3/2024 1600 by Aixa Hooker RN  Infection Prevention:   rest/sleep promoted   hand hygiene promoted   personal protective equipment utilized  Taken 3/3/2024 1400 by Aixa Hooker RN  Infection Prevention:   hand hygiene promoted   personal protective equipment utilized   rest/sleep promoted  Taken 3/3/2024 1200 by Aixa Hooker RN  Infection Prevention:   hand hygiene promoted   personal protective equipment utilized   rest/sleep promoted  Taken 3/3/2024 1000 by Aixa Hokoer RN  Infection Prevention:   rest/sleep promoted   personal protective equipment utilized   hand hygiene promoted  Taken 3/3/2024 0855 by Aixa Hooker RN  Infection Prevention:   hand hygiene promoted   personal protective equipment utilized   rest/sleep promoted  Goal: Optimal Comfort and Wellbeing  Outcome: Ongoing, Progressing  Intervention: Monitor Pain and Promote Comfort  Recent Flowsheet Documentation  Taken 3/3/2024 1427 by Aixa Hooker RN  Pain Management Interventions: see MAR  Taken 3/3/2024 0923 by Aixa Hooker RN  Pain Management Interventions: see MAR  Taken 3/3/2024 0855 by Aixa Hooker RN  Pain Management Interventions: see MAR  Intervention: Provide Person-Centered Care  Recent Flowsheet Documentation  Taken 3/3/2024 0855 by Aixa Hooker RN  Trust Relationship/Rapport:   care explained   questions answered   thoughts/feelings acknowledged  Goal: Readiness for Transition of Care  Outcome: Ongoing, Progressing     Problem: Behavioral Health Comorbidity  Goal: Maintenance of Behavioral Health Symptom Control  Outcome: Ongoing, Progressing     Problem: Hypertension Comorbidity  Goal: Blood Pressure in Desired Range  Outcome: Ongoing, Progressing     Problem: Alcohol Withdrawal  Goal: Alcohol Withdrawal Symptom  Control  Outcome: Ongoing, Progressing     Problem: Acute Neurologic Deterioration (Alcohol Withdrawal)  Goal: Optimal Neurologic Function  Outcome: Ongoing, Progressing     Problem: Substance Misuse (Alcohol Withdrawal)  Goal: Readiness for Change Identified  Outcome: Ongoing, Progressing  Intervention: Partner to Facilitate Behavior Change  Recent Flowsheet Documentation  Taken 3/3/2024 0855 by Aixa Hooker RN  Supportive Measures: active listening utilized  Intervention: Promote Psychosocial Wellbeing  Recent Flowsheet Documentation  Taken 3/3/2024 0855 by Aixa Hooker RN  Family/Support System Care:   support provided   self-care encouraged     Problem: Seizure Disorder Comorbidity  Goal: Maintenance of Seizure Control  Outcome: Ongoing, Progressing     Problem: Electrolyte Imbalance  Goal: Electrolyte Balance  Outcome: Ongoing, Progressing     Problem: Fall Injury Risk  Goal: Absence of Fall and Fall-Related Injury  Outcome: Ongoing, Progressing  Intervention: Promote Injury-Free Environment  Recent Flowsheet Documentation  Taken 3/3/2024 1800 by Aixa Hooker RN  Safety Promotion/Fall Prevention:   safety round/check completed   room organization consistent   clutter free environment maintained  Taken 3/3/2024 1600 by Aixa Hooker RN  Safety Promotion/Fall Prevention:   safety round/check completed   room organization consistent   clutter free environment maintained   assistive device/personal items within reach  Taken 3/3/2024 1400 by Aixa Hooker RN  Safety Promotion/Fall Prevention:   safety round/check completed   room organization consistent   clutter free environment maintained   assistive device/personal items within reach  Taken 3/3/2024 1200 by Aixa Hooker RN  Safety Promotion/Fall Prevention:   safety round/check completed   room organization consistent   clutter free environment maintained  Taken 3/3/2024 1000 by Aixa Hooker RN  Safety Promotion/Fall Prevention:   safety round/check  completed   room organization consistent   clutter free environment maintained   assistive device/personal items within reach  Taken 3/3/2024 0855 by Aixa Hooker RN  Safety Promotion/Fall Prevention:   safety round/check completed   room organization consistent   clutter free environment maintained

## 2024-03-03 NOTE — THERAPY EVALUATION
Patient Name: Lita Yu  : 1965    MRN: [id number]                              Today's Date: 3/3/2024       Admit Date: 3/2/2024    Visit Dx:     ICD-10-CM ICD-9-CM   1. Hyponatremia  E87.1 276.1   2. Myalgia  M79.10 729.1   3. Hyperammonemia  E72.20 270.6     Patient Active Problem List   Diagnosis    Postmenopausal state    Post traumatic stress disorder    Pain in joints    Osteoporosis    Macrocytic anemia    Lumbosacral radiculopathy    Leg pain, left    Peripheral vascular disease    Essential hypertension    Hyperlipidemia    Degenerative joint disease of left hip    Seizure disorder    Smoker    Status post hip replacement    Tobacco dependence syndrome    Vitamin B12 deficiency    Anemia of chronic disease    Chronic obstructive pulmonary disease    Cirrhosis of liver    Acute UTI (urinary tract infection)    Generalized weakness    Rhabdomyolysis    Non-traumatic rhabdomyolysis    Moderate malnutrition    Chest pain    Dehydration     Past Medical History:   Diagnosis Date    Cirrhosis     COPD (chronic obstructive pulmonary disease)     Depression     GERD (gastroesophageal reflux disease)     Hyperlipidemia     Hypertension     PTSD (post-traumatic stress disorder)      Past Surgical History:   Procedure Laterality Date     SECTION N/A     COLONOSCOPY N/A 2021    Procedure: COLONOSCOPY with polypectomy x2 and random biopsy;  Surgeon: Osman Clay MD;  Location: [facility] ENDOSCOPY;  Service: Gastroenterology;  Laterality: N/A;  colon polyps    DENTAL PROCEDURE      ENDOSCOPY N/A 2021    Procedure: ESOPHAGOGASTRODUODENOSCOPY;  Surgeon: Osman Clay MD;  Location: [facility] ENDOSCOPY;  Service: Gastroenterology;  Laterality: N/A;  esophagitis    JOINT REPLACEMENT      REPLACEMENT TOTAL HIP LATERAL POSITION Left     TONSILLECTOMY      VAGINAL BIRTH AFTER  SECTION        General Information       Row Name 24 1641          Physical Therapy Time and  Intention    Document Type evaluation  -EL     Mode of Treatment individual therapy;physical therapy  -EL       Row Name 03/03/24 1641          General Information    Prior Level of Function independent:;all household mobility;ADL's  -EL       Row Name 03/03/24 1641          Living Environment    People in Home alone  -EL       Row Name 03/03/24 1641          Stairs Within Home, Primary    Number of Stairs, Within Home, Primary none  -EL       Row Name 03/03/24 1641          Cognition    Orientation Status (Cognition) oriented x 4  -EL       Row Name 03/03/24 1641          Safety Issues, Functional Mobility    Impairments Affecting Function (Mobility) pain;sensation/sensory awareness  -EL               User Key  (r) = Recorded By, (t) = Taken By, (c) = Cosigned By      Initials Name Provider Type    Cortez Orozco PT Physical Therapist                   Mobility       Row Name 03/03/24 1647          Bed Mobility    Bed Mobility bed mobility (all) activities  -EL     All Activities, [place] (Bed Mobility) modified independence  -EL     Assistive Device (Bed Mobility) bed rails  -EL       Row Name 03/03/24 1647          Sit-Stand Transfer    Sit-Stand [place] (Transfers) minimum assist (75% patient effort)  -EL       Row Name 03/03/24 1647          Gait/Stairs (Locomotion)    [place] Level (Gait) minimum assist (75% patient effort);moderate assist (50% patient effort)  -EL     Distance in Feet (Gait) 8  -EL     Deviations/Abnormal Patterns (Gait) gait speed decreased  -EL     Bilateral Gait Deviations forward flexed posture  -EL     Comment, (Gait/Stairs) provided unilateral UUE assist. Pt crying out in pain when attempting to ambluate  -EL               User Key  (r) = Recorded By, (t) = Taken By, (c) = Cosigned By      Initials Name Provider Type    Cortez Orozco PT Physical Therapist                   Obj/Interventions       Row Name 03/03/24 1647          Range of Motion Comprehensive    General  Range of Motion bilateral lower extremity ROM WFL  -EL       Row Name 03/03/24 1647          Strength Comprehensive (MMT)    General Manual Muscle Testing (MMT) Assessment lower extremity strength deficits identified  -EL     Comment, General Manual Muscle Testing (MMT) Assessment Functionally 3+/5 gross, c/o pain when attempting to move through full ROM  -EL       Row Name 03/03/24 1647          Balance    Balance Assessment sitting static balance;standing static balance;standing dynamic balance  -EL     Static Sitting Balance independent  -EL     Static Standing Balance minimal assist  -EL     Dynamic Standing Balance minimal assist  -EL     Position/Device Used, Standing Balance supported  -EL       Row Name 03/03/24 1647          Sensory Assessment (Somatosensory)    Sensory Assessment (Somatosensory) other (see comments)  c/o burning pain i nBLE  -EL               User Key  (r) = Recorded By, (t) = Taken By, (c) = Cosigned By      Initials Name Provider Type    Cortez Orozco, PT Physical Therapist                   Goals/Plan       [facility] Name 03/03/24 1701          Bed Mobility Goal 1 (PT)    Activity/Assistive Device (Bed Mobility Goal 1, PT) bed mobility activities, all  -EL     [place] Level/Cues Needed (Bed Mobility Goal 1, PT) modified independence  -EL     Time Frame (Bed Mobility Goal 1, PT) long term goal (LTG);2 weeks  -       Row Name 03/03/24 1701          Transfer Goal 1 (PT)    Activity/Assistive Device (Transfer Goal 1, PT) transfers, all;walker, rolling  -EL     [place] Level/Cues Needed (Transfer Goal 1, PT) modified independence  -EL     Time Frame (Transfer Goal 1, PT) long term goal (LTG);2 weeks  -       Row Name 03/03/24 1701          Gait Training Goal 1 (PT)    Activity/Assistive Device (Gait Training Goal 1, PT) gait (walking locomotion);walker, rolling  -EL     [place] Level (Gait Training Goal 1, PT) modified independence  -EL     Distance (Gait Training Goal 1, PT)  150  -EL     Time Frame (Gait Training Goal 1, PT) long term goal (LTG);2 weeks  -EL       Row Name 03/03/24 1701          Therapy Assessment/Plan (PT)    Planned Therapy Interventions (PT) neuromuscular re-education;balance training;bed mobility training;transfer training;patient/family education;gait training;postural re-education;strengthening  -EL               User Key  (r) = Recorded By, (t) = Taken By, (c) = Cosigned By      Initials Name Provider Type    Cortez Orozco, PT Physical Therapist                   Clinical Impression       Row Name 03/03/24 1649          Pain    Pretreatment Pain Rating 8/10  -EL     Posttreatment Pain Rating 10/10  -EL     Pain Location - Side/Orientation Bilateral  -EL     Pain Location lower  -EL     Pain Location - extremity  -EL       Row Name 03/03/24 164          Plan of Care Review    Plan of Care Reviewed With patient  -EL     Outcome Evaluation Pt is a 59 YO F admitted wiht c/o severe pain in BLE. Pt states all mobility is painful, but demonstrates full ROM in supine. Pt encouraged to continue to mobilize BLE while admitted. Pt pain presents somewhat inconsistantly this date. Able to perform full LAQ sitting EOB, but then had difficulty wiht LE extension in standing. Pt reports she is from home alone, generally is independent with all ADLs ambulating wihtout AD and states she has had no recent falls. Pt this date requiring assitsance with bed mobitliy transfers and short distance ambulation. Ambulation distance limited due to pain. PT to continue to follow while admitted, and recommendation is SNF at d/c.  -EL       Row Name 03/03/24 0314          Therapy Assessment/Plan (PT)    Rehab Potential (PT) good, to achieve stated therapy goals  -EL     Criteria for Skilled Interventions Met (PT) yes  -EL     Therapy Frequency (PT) 5 times/wk  -EL     Predicted Duration of Therapy Intervention (PT) Until d/c  -EL       Row Name 03/03/24 1649          Vital Signs    O2 Delivery  Pre Treatment room air  -EL     O2 Delivery Intra Treatment room air  -EL     O2 Delivery Post Treatment room air  -EL     Pre Patient Position Supine  -EL     Intra Patient Position Standing  -EL     Post Patient Position Supine  -EL       Row Name 03/03/24 1649          Positioning and Restraints    Pre-Treatment Position in bed  -EL     Post Treatment Position bed  -EL     In Bed notified nsg;supine;call light within reach;encouraged to call for assist;exit alarm on  -EL               User Key  (r) = Recorded By, (t) = Taken By, (c) = Cosigned By      Initials Name Provider Type    Cortez Orozco, PT Physical Therapist                   Outcome Measures       Row Name 03/03/24 1702 03/03/24 0855       How much help from another person do you currently need...    Turning from your back to your side while in flat bed without using bedrails? 4  -EL 4  -KR    Moving from lying on back to sitting on the side of a flat bed without bedrails? 3  -EL 4  -KR    Moving to and from a bed to a chair (including a wheelchair)? 3  -EL 4  -KR    Standing up from a chair using your arms (e.g., wheelchair, bedside chair)? 3  -EL 4  -KR    Climbing 3-5 steps with a railing? 1  -EL 3  -KR    To walk in hospital room? 2  -EL 3  -KR    AM-PAC 6 Clicks Score (PT) 16  -EL 22  -KR    Highest Level of Mobility Goal 5 --> Static standing  -EL 7 --> Walk 25 feet or more  -KR      Row Name 03/03/24 1702          Functional Assessment    Outcome Measure Options AM-PAC 6 Clicks Basic Mobility (PT)  -EL               User Key  (r) = Recorded By, (t) = Taken By, (c) = Cosigned By      Initials Name Provider Type    Cortez Orozco, PT Physical Therapist    Aixa Parker RN Registered Nurse                                 Physical Therapy Education       Title: PT OT SLP Therapies (Done)       Topic: Physical Therapy (Done)       Point: Mobility training (Done)       Learning Progress Summary             Patient Acceptance, E,TB, VU by JUANA at  3/3/2024 1702                         Point: Precautions (Done)       Learning Progress Summary             Patient Acceptance, E,TB, VU by  at 3/3/2024 1702                                         User Key       Initials Effective Dates Name Provider Type Discipline     06/23/20 -  Cortez Ledesma, PT Physical Therapist PT                  PT Recommendation and Plan  Planned Therapy Interventions (PT): neuromuscular re-education, balance training, bed mobility training, transfer training, patient/family education, gait training, postural re-education, strengthening  Plan of Care Reviewed With: patient  Outcome Evaluation: Pt is a 59 YO F admitted wiht c/o severe pain in BLE. Pt states all mobility is painful, but demonstrates full ROM in supine. Pt encouraged to continue to mobilize BLE while admitted. Pt pain presents somewhat inconsistantly this date. Able to perform full LAQ sitting EOB, but then had difficulty wiht LE extension in standing. Pt reports she is from home alone, generally is independent with all ADLs ambulating wihtout AD and states she has had no recent falls. Pt this date requiring assitsance with bed mobitliy transfers and short distance ambulation. Ambulation distance limited due to pain. PT to continue to follow while admitted, and recommendation is SNF at d/c.     Time Calculation:   PT Evaluation Complexity  History, PT Evaluation Complexity: 1-2 personal factors and/or comorbidities  Examination of Body Systems (PT Eval Complexity): total of 3 or more elements  Clinical Presentation (PT Evaluation Complexity): evolving  Clinical Decision Making (PT Evaluation Complexity): moderate complexity  Overall Complexity (PT Evaluation Complexity): moderate complexity     PT Charges       Row Name 03/03/24 1702             Time Calculation    Start Time 1259  -EL      Stop Time 1313  -EL      Time Calculation (min) 14 min  -EL      PT Received On 03/03/24  -EL      PT - Next Appointment 03/04/24  -EL       PT Goal Re-Cert Due Date 03/17/24  -JUANA                User Key  (r) = Recorded By, (t) = Taken By, (c) = Cosigned By      Initials Name Provider Type    Cortez Orozco PT Physical Therapist                  Therapy Charges for Today       Code Description Service Date Service Provider Modifiers Qty    [id number] HC PT EVAL MOD COMPLEXITY 3 3/3/2024 Cortez Ledesma PT GP 1            PT G-Codes  Outcome Measure Options: AM-PAC 6 Clicks Basic Mobility (PT)  AM-PAC 6 Clicks Score (PT): 16  PT Discharge Summary  Anticipated Discharge Disposition (PT): skilled nursing facility    Cortez Ledesma PT  3/3/2024

## 2024-03-03 NOTE — PLAN OF CARE
Problem: Adult Inpatient Plan of Care  Goal: Plan of Care Review  Outcome: Ongoing, Not Progressing  Flowsheets (Taken 3/3/2024 0612)  Progress: no change  Plan of Care Reviewed With: patient  Outcome Evaluation: Patient complains of 10/10 pain throughout the night, in right knee, see MAR for treatment. Bed in lowest position, call light within reach.  Goal: Patient-Specific Goal (Individualized)  Outcome: Ongoing, Not Progressing  Goal: Absence of Hospital-Acquired Illness or Injury  Outcome: Ongoing, Not Progressing  Intervention: Identify and Manage Fall Risk  Description: Perform standard risk assessment on admission using a validated tool or comprehensive approach appropriate to the patient; reassess fall risk frequently, with change in status or transfer to another level of care.  Communicate fall injury risk to interprofessional healthcare team.  Determine need for increased observation, equipment and environmental modification, such as low bed, signage and supportive, nonskid footwear.  Adjust safety measures to individual developmental age, stage and identified risk factors.  Reinforce the importance of safety and physical activity with patient and family.  Perform regular intentional rounding to assess need for position change, pain assessment and personal needs, including assistance with toileting.  Recent Flowsheet Documentation  Taken 3/3/2024 0400 by Kylee Richardson, RN  Safety Promotion/Fall Prevention: safety round/check completed  Taken 3/3/2024 0200 by Kylee Richardson RN  Safety Promotion/Fall Prevention: safety round/check completed  Taken 3/3/2024 0000 by Kylee Richardson, RN  Safety Promotion/Fall Prevention: safety round/check completed  Taken 3/2/2024 2200 by Kylee Richardson, RN  Safety Promotion/Fall Prevention: safety round/check completed  Taken 3/2/2024 2000 by Kylee Richardson, RN  Safety Promotion/Fall Prevention: safety round/check completed  Intervention: Prevent Skin  Injury  Description: Perform a screening for skin injury risk, such as pressure or moisture associated skin damage on admission and at regular intervals throughout hospital stay.  Keep all areas of skin (especially folds) clean and dry.  Maintain adequate skin hydration.  Relieve and redistribute pressure and protect bony prominences; implement measures based on patient-specific risk factors.  Match turning and repositioning schedule to clinical condition.  Encourage weight shift frequently; assist with reposition if unable to complete independently.  Float heels off bed; avoid pressure on the Achilles tendon.  Keep skin free from extended contact with medical devices.  Encourage functional activity and mobility, as early as tolerated.  Use aids (e.g., slide boards, mechanical lift) during transfer.  Recent Flowsheet Documentation  Taken 3/2/2024 2000 by Kylee Richardson RN  Body Position: position changed independently  Skin Protection: adhesive use limited  Intervention: Prevent and Manage VTE (Venous Thromboembolism) Risk  Description: Assess for VTE (venous thromboembolism) risk.  Encourage and assist with early ambulation.  Initiate and maintain compression or other therapy, as indicated, based on identified risk in accordance with organizational protocol and provider order.  Encourage both active and passive leg exercises while in bed, if unable to ambulate.  Recent Flowsheet Documentation  Taken 3/2/2024 2000 by Kylee Richardson, RN  Range of Motion: active ROM (range of motion) encouraged  Intervention: Prevent Infection  Description: Maintain skin and mucous membrane integrity; promote hand, oral and pulmonary hygiene.  Optimize fluid balance, nutrition, sleep and glycemic control to maximize infection resistance.  Identify potential sources of infection early to prevent or mitigate progression of infection (e.g., wound, lines, devices).  Evaluate ongoing need for invasive devices; remove promptly when  no longer indicated.  Recent Flowsheet Documentation  Taken 3/3/2024 0400 by Kylee Richardson RN  Infection Prevention:   hand hygiene promoted   rest/sleep promoted   single patient room provided  Taken 3/3/2024 0000 by Kylee Richardson RN  Infection Prevention:   hand hygiene promoted   rest/sleep promoted   single patient room provided  Taken 3/2/2024 2200 by Kylee Richardson RN  Infection Prevention:   hand hygiene promoted   rest/sleep promoted   single patient room provided  Taken 3/2/2024 2000 by Kylee Richardson RN  Infection Prevention:   hand hygiene promoted   rest/sleep promoted   single patient room provided  Goal: Optimal Comfort and Wellbeing  Outcome: Ongoing, Not Progressing  Intervention: Monitor Pain and Promote Comfort  Description: Assess pain level, treatment efficacy and patient response at regular intervals using a consistent pain scale.  Consider the presence and impact of preexisting chronic pain.  Encourage patient and caregiver involvement in pain assessment, interventions and safety measures.  Recent Flowsheet Documentation  Taken 3/3/2024 0527 by yKlee Richardson RN  Pain Management Interventions:   see MAR   heat applied  Taken 3/3/2024 0000 by Kylee Richardson RN  Pain Management Interventions: heat applied  Intervention: Provide Person-Centered Care  Description: Use a family-focused approach to care.  Develop trust and rapport by proactively providing information, encouraging questions, addressing concerns and offering reassurance.  Acknowledge emotional response to hospitalization.  Recognize and utilize personal coping strategies.  Honor spiritual and cultural preferences.  Recent Flowsheet Documentation  Taken 3/2/2024 2000 by Kylee Richardson RN  Trust Relationship/Rapport:   care explained   choices provided   emotional support provided   empathic listening provided   questions answered   questions encouraged   reassurance provided   thoughts/feelings  acknowledged  Goal: Readiness for Transition of Care  Outcome: Ongoing, Not Progressing     Problem: Behavioral Health Comorbidity  Goal: Maintenance of Behavioral Health Symptom Control  Outcome: Ongoing, Not Progressing  Intervention: Maintain Behavioral Health Symptom Control  Description: Confirm mental health diagnosis and current treatment.  Evaluate adherence to previously identified self-management plan.  Advocate continuation of home strategies, including medication.  Evaluate effectiveness of self-management strategies and coping skills.  Communicate with providers to ensure continuity and follow-up at transition.  Recent Flowsheet Documentation  Taken 3/2/2024 2000 by Kylee Richardson RN  Medication Review/Management: medications reviewed     Problem: Hypertension Comorbidity  Goal: Blood Pressure in Desired Range  Outcome: Ongoing, Not Progressing  Intervention: Maintain Blood Pressure Management  Description: Evaluate adherence to home antihypertensive regimen (e.g., exercise and activity, diet modification, medication).  Provide scheduled antihypertensive medication; consider administration time and effects (e.g., avoid giving diuretic prior to bedtime).  Monitor response to antihypertensive medication therapy (e.g., blood pressure, electrolyte levels, medication effects).  Minimize risk of orthostatic hypotension; encourage caution with position changes, particularly if elderly.  Recent Flowsheet Documentation  Taken 3/2/2024 2000 by Kylee Richardson RN  Medication Review/Management: medications reviewed     Problem: Alcohol Withdrawal  Goal: Alcohol Withdrawal Symptom Control  Outcome: Ongoing, Not Progressing     Problem: Acute Neurologic Deterioration (Alcohol Withdrawal)  Goal: Optimal Neurologic Function  Outcome: Ongoing, Not Progressing     Problem: Substance Misuse (Alcohol Withdrawal)  Goal: Readiness for Change Identified  Outcome: Ongoing, Not Progressing  Intervention: Partner to  Facilitate Behavior Change  Description: Assess pattern of substance use from multiple information sources.  Provide education about risks of current use; develop a mutual treatment plan.  Assess readiness to change; identify stage of change and current level of insight.  Explore motivations for change, affirming change-related statements; identify and develop recovery goals.  Elicit recognition of gap between current behavior and desired life goals.  Explore ambivalence associated with commitment to behavior change; support steps toward change, however small.  Identify internal and external triggers and methods to cope.  Assist with transition to next level of care; establish or re-establish connections with community resources; identify and address safety risk.  Identify and address factors that impact overall treatment adherence (e.g., childcare, transportation, appointment times, cost).  Recent Flowsheet Documentation  Taken 3/2/2024 2000 by Kylee Richardson RN  Supportive Measures: active listening utilized  Intervention: Promote Psychosocial Wellbeing  Description: Use a nonjudgmental approach to establish a therapeutic alliance and trust; normalize and validate the patient and family/caregiver’s experience.  Include family/caregiver in assessment and planning, if supportive; honor confidentiality.  Encourage verbalization of feelings and concerns; partner to identify and utilize patient and family/caregiver strengths and coping strategies.  Emphasize importance of a strong support system; encourage support of family and friends.  Screen and monitor for signs and symptoms of co-occurring mental illness.  Recent Flowsheet Documentation  Taken 3/2/2024 2000 by Kylee Richardson, RN  Family/Support System Care: self-care encouraged     Problem: Seizure Disorder Comorbidity  Goal: Maintenance of Seizure Control  Outcome: Ongoing, Not Progressing     Problem: Electrolyte Imbalance  Goal: Electrolyte  Balance  Outcome: Ongoing, Not Progressing  Intervention: Monitor and Manage Electrolyte Imbalance  Description: Anticipate the need for intravenous or oral electrolyte replacement, binding therapy or restriction.  Monitor weight, intake, output and laboratory values for trends; advocate for adjustment in treatment if imbalance persists.  Assess medication for potential impact on imbalance; advocate need for medication adjustment.  Monitor ECG (electrocardiogram) rate and rhythm for changes that may indicate fluctuation in serum electrolyte levels, such as calcium and potassium.  Recent Flowsheet Documentation  Taken 3/2/2024 2000 by Kylee Richardson RN  Fluid/Electrolyte Management: fluids provided     Problem: Fall Injury Risk  Goal: Absence of Fall and Fall-Related Injury  Outcome: Ongoing, Not Progressing  Intervention: Identify and Manage Contributors  Description: Develop a fall prevention plan with the patient and caregiver/family.  Provide reorientation, appropriate sensory stimulation and routines with changes in mental status to decrease risk of fall.  Promote use of personal vision and auditory aids.  Assess assistance level required for safe and effective self-care; provide support as needed, such as toileting, mobilization. For age 65 and older, implement timed toileting with assistance.  Encourage physical activity, such as performance of mobility and self-care at highest level of patient ability, multicomponent exercise program and provision of appropriate assistive devices.  If fall occurs, assess the severity of injury; implement fall injury protocol. Determine the cause and revise fall injury prevention plan.  Regularly review medication contribution to fall risk; adjust medication administration times to minimize risk of falling.  Consider risk related to polypharmacy and age.  Balance adequate pain management with potential for oversedation.  Recent Flowsheet Documentation  Taken 3/2/2024 2000  by Kylee Richardson RN  Medication Review/Management: medications reviewed  Intervention: Promote Injury-Free Environment  Description: Provide a safe, barrier-free environment that encourages independent activity.  Keep care area uncluttered and well-lighted.  Determine need for increased observation or monitoring.  Avoid use of devices that minimize mobility, such as restraints or indwelling urinary catheter.  Recent Flowsheet Documentation  Taken 3/3/2024 0400 by Kylee Richardson RN  Safety Promotion/Fall Prevention: safety round/check completed  Taken 3/3/2024 0200 by Kylee Richardson RN  Safety Promotion/Fall Prevention: safety round/check completed  Taken 3/3/2024 0000 by Kylee Richardson RN  Safety Promotion/Fall Prevention: safety round/check completed  Taken 3/2/2024 2200 by Kylee Richardson RN  Safety Promotion/Fall Prevention: safety round/check completed  Taken 3/2/2024 2000 by Kylee Richardson RN  Safety Promotion/Fall Prevention: safety round/check completed   Goal Outcome Evaluation:  Plan of Care Reviewed With: patient        Progress: no change  Outcome Evaluation: Patient complains of 10/10 pain throughout the night, in right knee, see MAR for treatment. Bed in lowest position, call light within reach.

## 2024-03-04 ENCOUNTER — READMISSION MANAGEMENT (OUTPATIENT)
Dept: CALL CENTER | Facility: HOSPITAL | Age: 59
End: 2024-03-04
Payer: COMMERCIAL

## 2024-03-04 VITALS
OXYGEN SATURATION: 96 % | BODY MASS INDEX: 23.92 KG/M2 | TEMPERATURE: 98.6 F | RESPIRATION RATE: 16 BRPM | HEIGHT: 63 IN | SYSTOLIC BLOOD PRESSURE: 116 MMHG | DIASTOLIC BLOOD PRESSURE: 69 MMHG | HEART RATE: 97 BPM | WEIGHT: 135 LBS

## 2024-03-04 LAB
ALBUMIN SERPL-MCNC: 3.5 G/DL (ref 3.5–5.2)
ALP SERPL-CCNC: 138 U/L (ref 39–117)
ALT SERPL W P-5'-P-CCNC: 35 U/L (ref 1–33)
ANION GAP SERPL CALCULATED.3IONS-SCNC: 10 MMOL/L (ref 5–15)
ANISOCYTOSIS BLD QL: ABNORMAL
AST SERPL-CCNC: 39 U/L (ref 1–32)
BILIRUB CONJ SERPL-MCNC: <0.2 MG/DL (ref 0–0.3)
BILIRUB INDIRECT SERPL-MCNC: ABNORMAL MG/DL
BILIRUB SERPL-MCNC: 0.3 MG/DL (ref 0–1.2)
BUN SERPL-MCNC: 8 MG/DL (ref 6–20)
BUN/CREAT SERPL: 8.6 (ref 7–25)
CALCIUM SPEC-SCNC: 8.1 MG/DL (ref 8.6–10.5)
CHLORIDE SERPL-SCNC: 103 MMOL/L (ref 98–107)
CO2 SERPL-SCNC: 22 MMOL/L (ref 22–29)
CREAT SERPL-MCNC: 0.93 MG/DL (ref 0.57–1)
DEPRECATED RDW RBC AUTO: 65.6 FL (ref 37–54)
EGFRCR SERPLBLD CKD-EPI 2021: 72.3 ML/MIN/1.73
EOSINOPHIL # BLD MANUAL: 0.06 10*3/MM3 (ref 0–0.4)
EOSINOPHIL NFR BLD MANUAL: 1 % (ref 0.3–6.2)
ERYTHROCYTE [DISTWIDTH] IN BLOOD BY AUTOMATED COUNT: 17.4 % (ref 12.3–15.4)
GLUCOSE SERPL-MCNC: 94 MG/DL (ref 65–99)
HCT VFR BLD AUTO: 31.1 % (ref 34–46.6)
HGB BLD-MCNC: 10.1 G/DL (ref 12–15.9)
LYMPHOCYTES # BLD MANUAL: 1.33 10*3/MM3 (ref 0.7–3.1)
LYMPHOCYTES NFR BLD MANUAL: 3 % (ref 5–12)
MCH RBC QN AUTO: 35.3 PG (ref 26.6–33)
MCHC RBC AUTO-ENTMCNC: 32.6 G/DL (ref 31.5–35.7)
MCV RBC AUTO: 108.3 FL (ref 79–97)
MONOCYTES # BLD: 0.17 10*3/MM3 (ref 0.1–0.9)
MYELOCYTES NFR BLD MANUAL: 3 % (ref 0–0)
NEUTROPHILS # BLD AUTO: 4.06 10*3/MM3 (ref 1.7–7)
NEUTROPHILS NFR BLD MANUAL: 66 % (ref 42.7–76)
NEUTS BAND NFR BLD MANUAL: 4 % (ref 0–5)
PLATELET # BLD AUTO: 125 10*3/MM3 (ref 140–450)
PMV BLD AUTO: 8.2 FL (ref 6–12)
POTASSIUM SERPL-SCNC: 4.3 MMOL/L (ref 3.5–5.2)
PROT SERPL-MCNC: 6.4 G/DL (ref 6–8.5)
RBC # BLD AUTO: 2.87 10*6/MM3 (ref 3.77–5.28)
SCAN SLIDE: NORMAL
SMALL PLATELETS BLD QL SMEAR: ABNORMAL
SODIUM SERPL-SCNC: 135 MMOL/L (ref 136–145)
VARIANT LYMPHS NFR BLD MANUAL: 23 % (ref 19.6–45.3)
WBC MORPH BLD: NORMAL
WBC NRBC COR # BLD AUTO: 5.8 10*3/MM3 (ref 3.4–10.8)

## 2024-03-04 PROCEDURE — 85007 BL SMEAR W/DIFF WBC COUNT: CPT | Performed by: NURSE PRACTITIONER

## 2024-03-04 PROCEDURE — 96376 TX/PRO/DX INJ SAME DRUG ADON: CPT

## 2024-03-04 PROCEDURE — 85025 COMPLETE CBC W/AUTO DIFF WBC: CPT | Performed by: NURSE PRACTITIONER

## 2024-03-04 PROCEDURE — G0378 HOSPITAL OBSERVATION PER HR: HCPCS

## 2024-03-04 PROCEDURE — 80076 HEPATIC FUNCTION PANEL: CPT | Performed by: NURSE PRACTITIONER

## 2024-03-04 PROCEDURE — 80048 BASIC METABOLIC PNL TOTAL CA: CPT | Performed by: NURSE PRACTITIONER

## 2024-03-04 PROCEDURE — 25010000002 THIAMINE HCL 200 MG/2ML SOLUTION: Performed by: NURSE PRACTITIONER

## 2024-03-04 RX ORDER — FOLIC ACID 1 MG/1
1 TABLET ORAL DAILY
Qty: 30 TABLET | Refills: 0 | Status: SHIPPED | OUTPATIENT
Start: 2024-03-04

## 2024-03-04 RX ORDER — LIDOCAINE 50 MG/G
1 PATCH TOPICAL EVERY 24 HOURS
Qty: 30 EACH | Refills: 0 | Status: SHIPPED | OUTPATIENT
Start: 2024-03-04

## 2024-03-04 RX ORDER — LANOLIN ALCOHOL/MO/W.PET/CERES
100 CREAM (GRAM) TOPICAL DAILY
Qty: 30 TABLET | Refills: 0 | Status: SHIPPED | OUTPATIENT
Start: 2024-03-07

## 2024-03-04 RX ADMIN — SERTRALINE 100 MG: 100 TABLET, FILM COATED ORAL at 09:44

## 2024-03-04 RX ADMIN — THIAMINE HYDROCHLORIDE 200 MG: 100 INJECTION, SOLUTION INTRAMUSCULAR; INTRAVENOUS at 05:32

## 2024-03-04 RX ADMIN — Medication 250 MG: at 09:43

## 2024-03-04 RX ADMIN — FOLIC ACID 1 MG: 1 TABLET ORAL at 09:44

## 2024-03-04 RX ADMIN — BUSPIRONE HYDROCHLORIDE 15 MG: 15 TABLET ORAL at 09:43

## 2024-03-04 RX ADMIN — ACETAMINOPHEN 650 MG: 325 TABLET, FILM COATED ORAL at 05:56

## 2024-03-04 RX ADMIN — LORAZEPAM 2 MG: 1 TABLET ORAL at 09:44

## 2024-03-04 RX ADMIN — FUROSEMIDE 40 MG: 40 TABLET ORAL at 09:42

## 2024-03-04 RX ADMIN — LORAZEPAM 2 MG: 1 TABLET ORAL at 03:44

## 2024-03-04 RX ADMIN — SPIRONOLACTONE 100 MG: 100 TABLET ORAL at 09:43

## 2024-03-04 RX ADMIN — POTASSIUM CHLORIDE 40 MEQ: 1500 TABLET, EXTENDED RELEASE ORAL at 09:42

## 2024-03-04 NOTE — PLAN OF CARE
Problem: Adult Inpatient Plan of Care  Goal: Plan of Care Review  Outcome: Ongoing, Progressing  Flowsheets (Taken 3/4/2024 0620)  Plan of Care Reviewed With: patient  Outcome Evaluation: Patient complaining of R knee pain throughout night, see MAR for treatment. Bed in lowest position, call light within reach.   Goal Outcome Evaluation:  Plan of Care Reviewed With: patient        Progress: no change  Outcome Evaluation: Patient complaining of R knee pain throughout night, see MAR for treatment. Bed in lowest position, call light within reach.

## 2024-03-04 NOTE — PLAN OF CARE
Goal Outcome Evaluation:  Plan of Care Reviewed With: patient        Progress: improving  Outcome Evaluation: pt to d/c home this AM.

## 2024-03-04 NOTE — OUTREACH NOTE
Prep Survey      Flowsheet Row Responses   [facility] patient discharged from? [facility]   Is LACE score < 7 ? No   Eligibility [facility]   Date of Admission 03/02/24   Date of Discharge 03/04/24   Discharge Disposition Home or Self Care   Discharge diagnosis dehydration   Does the patient have one of the following disease processes/diagnoses(primary or secondary)? Other   Does the patient have Home health ordered? No   Is there a DME ordered? No   Prep survey completed? Yes            Annemarie DOUGLAS - Registered Nurse

## 2024-03-04 NOTE — DISCHARGE SUMMARY
[facility]    Norma Saul APRN    CHIEF COMPLAINT:     Ankle Pain     HISTORY OF PRESENT ILLNESS:    [facility]    ED 3/2/24: Patient is a 58-year-old female comes in complaining of bilateral ankle pain without any trauma. States that started last night. She denies any swelling erythema ecchymosis. Denies any leg swelling. No history of restless legs or autoimmune disease. Denies any fever or systemic illness. No vomiting or diarrhea. She is currently on spironolactone and Lasix for her liver disease. She states she has alcoholic cirrhosis and drinks approximately a pint per day. Denies any back pain saddle anesthesia bowel or bladder incontinence or retention      Observation 3/3/24: Patient is a 58-year-old female presented to the hospital with complaints of bilateral ankle pain that started a couple days ago.  Patient denies history of DVT or PE.  Patient does report history of alcoholism with last drink 2 days ago.  Patient states she has been to rehab centers and would not like to pursue that at this time.  Patient denies chest pain, fever, shortness of breath, nausea vomiting or syncope.    Past Medical History:   Diagnosis Date    Cirrhosis     COPD (chronic obstructive pulmonary disease)     Depression     GERD (gastroesophageal reflux disease)     Hyperlipidemia     Hypertension     PTSD (post-traumatic stress disorder)      Past Surgical History:   Procedure Laterality Date     SECTION N/A     COLONOSCOPY N/A 2021    Procedure: COLONOSCOPY with polypectomy x2 and random biopsy;  Surgeon: Osman Clay MD;  Location: [facility] ENDOSCOPY;  Service: Gastroenterology;  Laterality: N/A;  colon polyps    DENTAL PROCEDURE      ENDOSCOPY N/A 2021    Procedure: ESOPHAGOGASTRODUODENOSCOPY;  Surgeon: Osman Clay MD;  Location: [facility] ENDOSCOPY;  Service: Gastroenterology;  Laterality: N/A;  esophagitis    JOINT REPLACEMENT      REPLACEMENT TOTAL HIP LATERAL POSITION Left      TONSILLECTOMY      VAGINAL BIRTH AFTER  SECTION       Family History   Problem Relation Age of Onset    Alcohol abuse Mother     Alcohol abuse Paternal Grandfather      Social History     Tobacco Use    Smoking status: Every Day     Current packs/day: 0.50     Types: Cigarettes     Passive exposure: Current    Smokeless tobacco: Never    Tobacco comments:     3cigs   Vaping Use    Vaping status: Never Used   Substance Use Topics    Alcohol use: Yes     Alcohol/week: 2.0 standard drinks of alcohol     Types: 2 Cans of beer per week     Comment: pint daily    Drug use: No     Medications Prior to Admission   Medication Sig Dispense Refill Last Dose    busPIRone (BUSPAR) 15 MG tablet TAKE 1 TABLET BY MOUTH TWICE A  tablet 1 3/1/2024    Cyanocobalamin (Vitamin B-12) 1000 MCG sublingual tablet Place 1 tablet under the tongue Daily.   3/1/2024    diclofenac (VOLTAREN) 50 MG EC tablet Take 1 tablet by mouth 2 (Two) Times a Day As Needed (pain). 60 tablet 0 3/2/2024    furosemide (LASIX) 40 MG tablet Take 1 tablet by mouth Daily. 90 tablet 0 3/1/2024    potassium chloride 10 MEQ CR tablet TAKE 4 TABLETS BY MOUTH DAILY 90 tablet 1 3/1/2024    QUEtiapine (SEROquel) 50 MG tablet TAKE 1 TABLET BY MOUTH EVERYDAY AT BEDTIME 90 tablet 0 3/1/2024    sertraline (ZOLOFT) 100 MG tablet TAKE 1 TABLET BY MOUTH TWICE A  tablet 1 3/1/2024    spironolactone (ALDACTONE) 100 MG tablet Take 1 tablet by mouth Daily. 30 tablet 1 3/1/2024    Zinc Gluconate 30 MG tablet Take 1 tablet by mouth Daily.   3/1/2024    albuterol sulfate  (90 Base) MCG/ACT inhaler Inhale 2 puffs Every 4 (Four) Hours As Needed for Wheezing.       melatonin 5 MG tablet tablet Take 1 tablet by mouth At Night As Needed (sleep).        Allergies:  Sulfa antibiotics and Keflex [cephalexin]    Immunization History   Administered Date(s) Administered    COVID-19 (PFIZER) Purple Cap Monovalent 2021, 2021    Flu Vaccine Intradermal  Quad 18-64YR 11/29/2018    Fluzone (or Fluarix & Flulaval for VFC) >6mos 10/10/2023    Pneumococcal Conjugate 20-Valent (PCV20) 10/10/2023    Shingrix 10/10/2023    flucelvax quad pfs =>4 YRS 01/04/2020           REVIEW OF SYSTEMS:    Review of Systems   Constitutional: Positive for malaise/fatigue.   HENT: Negative.     Eyes: Negative.    Cardiovascular: Negative.    Respiratory: Negative.     Endocrine: Negative.    Hematologic/Lymphatic: Negative.    Skin: Negative.    Musculoskeletal:  Positive for back pain and joint pain.   Gastrointestinal: Negative.    Genitourinary: Negative.    Neurological: Negative.    Psychiatric/Behavioral:  Positive for substance abuse.    Allergic/Immunologic: Negative.        Vital Signs  Temp:  [98.3 °F (36.8 °C)-98.6 °F (37 °C)] 98.6 °F (37 °C)  Heart Rate:  [89-99] 97  Resp:  [16-18] 16  BP: (116-146)/(69-83) 116/69          Physical Exam:  Physical Exam  Vitals and nursing note reviewed.   Constitutional:       Appearance: Normal appearance.   HENT:      Head: Normocephalic and atraumatic.      Right Ear: External ear normal.      Left Ear: External ear normal.      Nose: Nose normal.      Mouth/Throat:      Pharynx: Oropharynx is clear.   Eyes:      Extraocular Movements: Extraocular movements intact.      Conjunctiva/sclera: Conjunctivae normal.      Pupils: Pupils are equal, round, and reactive to light.   Cardiovascular:      Rate and Rhythm: Normal rate and regular rhythm.      Pulses: Normal pulses.      Heart sounds: Normal heart sounds.   Pulmonary:      Effort: Pulmonary effort is normal.      Breath sounds: Normal breath sounds.   Abdominal:      General: Bowel sounds are normal.   Musculoskeletal:         General: Tenderness present.      Cervical back: Normal range of motion.   Skin:     General: Skin is warm.      Capillary Refill: Capillary refill takes less than 2 seconds.   Neurological:      Mental Status: She is alert and oriented to person, place, and time.    Psychiatric:         Mood and Affect: Mood is anxious.         Behavior: Behavior normal.         Thought Content: Thought content normal.         Judgment: Judgment is impulsive.         Emotional Behavior:    WNL   Debilities:   none  Results Review:    I reviewed the patient's new clinical results.  Lab Results (most recent)       Procedure Component Value Units Date/Time    Hepatic Function Panel [[id number]]  (Abnormal) Collected: 03/04/24 0351    Specimen: Blood from Hand, Left Updated: 03/04/24 0826     Total Protein 6.4 g/dL      Albumin 3.5 g/dL      ALT (SGPT) 35 U/L      AST (SGOT) 39 U/L      Alkaline Phosphatase 138 U/L      Total Bilirubin 0.3 mg/dL      Bilirubin, Direct <0.2 mg/dL      Bilirubin, Indirect --     Comment: Unable to calculate       CBC & Differential [[id number]]  (Abnormal) Collected: 03/04/24 0351    Specimen: Blood from Hand, Left Updated: 03/04/24 0455    Narrative:      The following orders were created for panel order CBC & Differential.  Procedure                               Abnormality         Status                     ---------                               -----------         ------                     CBC Auto Differential[[id number]]        Abnormal            Final result               Scan Slide[[id number]]                                       Final result                 Please view results for these tests on the individual orders.    CBC Auto Differential [[id number]]  (Abnormal) Collected: 03/04/24 0351    Specimen: Blood from Hand, Left Updated: 03/04/24 0455     WBC 5.80 10*3/mm3      RBC 2.87 10*6/mm3      Hemoglobin 10.1 g/dL      Hematocrit 31.1 %      .3 fL      MCH 35.3 pg      MCHC 32.6 g/dL      RDW 17.4 %      RDW-SD 65.6 fl      MPV 8.2 fL      Platelets 125 10*3/mm3     Narrative:      The previously reported component NRBC is no longer being reported. Previous result was 0.1 /100 WBC (Reference Range: 0.0-0.2 /100 WBC) on 3/4/2024 at 0435 EST.     Scan Slide [[id number]] Collected: 03/04/24 0351    Specimen: Blood from Hand, Left Updated: 03/04/24 0455     Scan Slide --     Comment: See Manual Differential Results       Manual Differential [[id number]]  (Abnormal) Collected: 03/04/24 0351    Specimen: Blood from Hand, Left Updated: 03/04/24 0455     Neutrophil % 66.0 %      Lymphocyte % 23.0 %      Monocyte % 3.0 %      Eosinophil % 1.0 %      Bands %  4.0 %      Myelocyte % 3.0 %      Neutrophils Absolute 4.06 10*3/mm3      Lymphocytes Absolute 1.33 10*3/mm3      Monocytes Absolute 0.17 10*3/mm3      Eosinophils Absolute 0.06 10*3/mm3      Anisocytosis Slight/1+     WBC Morphology Normal     Platelet Estimate Decreased    Basic Metabolic Panel [[id number]]  (Abnormal) Collected: 03/04/24 0351    Specimen: Blood from Hand, Left Updated: 03/04/24 0452     Glucose 94 mg/dL      BUN 8 mg/dL      Creatinine 0.93 mg/dL      Sodium 135 mmol/L      Potassium 4.3 mmol/L      Chloride 103 mmol/L      CO2 22.0 mmol/L      Calcium 8.1 mg/dL      BUN/Creatinine Ratio 8.6     Anion Gap 10.0 mmol/L      eGFR 72.3 mL/min/1.73     Narrative:      GFR Normal >60  Chronic Kidney Disease <60  Kidney Failure <15      Magnesium [[id number]]  (Abnormal) Collected: 03/03/24 0536    Specimen: Blood from Arm, Right Updated: 03/03/24 1021     Magnesium 1.5 mg/dL     D-dimer, Quantitative [[id number]]  (Abnormal) Collected: 03/03/24 0927    Specimen: Blood Updated: 03/03/24 1004     D-Dimer, Quantitative 0.96 mg/L (FEU)     Narrative:      According to the assay 's published package insert, a normal (<0.50 mg/L (FEU)) D-dimer result in conjunction with a non-high clinical probability assessment, excludes deep vein thrombosis (DVT) and pulmonary embolism (PE) with high sensitivity.    D-dimer values increase with age and this can make VTE exclusion of an older population difficult. To address this, the [employer], based on best available evidence and recent  "guidelines, recommends that clinicians use age-adjusted D-dimer thresholds in patients greater than 50 years of age with: a) a low probability of PE who do not meet all Pulmonary Embolism Rule Out Criteria, or b) in those with intermediate probability of PE.   The formula for an age-adjusted D-dimer cut-off is \"age/100\".  For example, a 60 year old patient would have an age-adjusted cut-off of 0.60 mg/L (FEU) and an 80 year old 0.80 mg/L (FEU).    Hepatic Function Panel [[id number]]  (Abnormal) Collected: 03/03/24 0536    Specimen: Blood from Arm, Right Updated: 03/03/24 0818     Total Protein 6.4 g/dL      Albumin 3.5 g/dL      ALT (SGPT) 40 U/L      AST (SGOT) 40 U/L      Alkaline Phosphatase 138 U/L      Total Bilirubin 0.3 mg/dL      Bilirubin, Direct <0.2 mg/dL      Bilirubin, Indirect --     Comment: Unable to calculate       CBC & Differential [[id number]]  (Abnormal) Collected: 03/03/24 0536    Specimen: Blood from Arm, Right Updated: 03/03/24 0739    Narrative:      The following orders were created for panel order CBC & Differential.  Procedure                               Abnormality         Status                     ---------                               -----------         ------                     CBC Auto Differential[[id number]]        Abnormal            Final result               Scan Slide[[id number]]                                       Final result                 Please view results for these tests on the individual orders.    CBC Auto Differential [[id number]]  (Abnormal) Collected: 03/03/24 0536    Specimen: Blood from Arm, Right Updated: 03/03/24 0739     WBC 5.20 10*3/mm3      RBC 2.80 10*6/mm3      Hemoglobin 9.9 g/dL      Hematocrit 30.0 %      .3 fL      MCH 35.5 pg      MCHC 33.1 g/dL      RDW 16.5 %      RDW-SD 60.4 fl      MPV 8.1 fL      Platelets 131 10*3/mm3     Narrative:      The previously reported component NRBC is no longer being reported. Previous result was 0.2 /100 " WBC (Reference Range: 0.0-0.2 /100 WBC) on 3/3/2024 at 0644 EST.    Scan Slide [[id number]] Collected: 03/03/24 0536    Specimen: Blood from Arm, Right Updated: 03/03/24 0739     Scan Slide --     Comment: See Manual Differential Results       Manual Differential [[id number]]  (Abnormal) Collected: 03/03/24 0536    Specimen: Blood from Arm, Right Updated: 03/03/24 0739     Neutrophil % 63.0 %      Lymphocyte % 19.0 %      Monocyte % 9.0 %      Eosinophil % 3.0 %      Bands %  1.0 %      Metamyelocyte % 1.0 %      Myelocyte % 4.0 %      Neutrophils Absolute 3.33 10*3/mm3      Lymphocytes Absolute 0.99 10*3/mm3      Monocytes Absolute 0.47 10*3/mm3      Eosinophils Absolute 0.16 10*3/mm3      nRBC 1.0 /100 WBC      Anisocytosis Slight/1+     WBC Morphology Normal     Platelet Morphology Normal    Ammonia [[id number]]  (Normal) Collected: 03/03/24 0648    Specimen: Blood from Arm, Right Updated: 03/03/24 0715     Ammonia 38 umol/L     Basic Metabolic Panel [[id number]]  (Abnormal) Collected: 03/03/24 0536    Specimen: Blood from Arm, Right Updated: 03/03/24 0708     Glucose 89 mg/dL      BUN 11 mg/dL      Creatinine 0.80 mg/dL      Sodium 135 mmol/L      Potassium 4.0 mmol/L      Chloride 102 mmol/L      CO2 23.0 mmol/L      Calcium 8.1 mg/dL      BUN/Creatinine Ratio 13.8     Anion Gap 10.0 mmol/L      eGFR 86.6 mL/min/1.73     Narrative:      GFR Normal >60  Chronic Kidney Disease <60  Kidney Failure <15      Potassium [[id number]]  (Normal) Collected: 03/02/24 2136    Specimen: Blood from Arm, Left Updated: 03/02/24 2203     Potassium 4.0 mmol/L     Urine Drug Screen - Urine, Clean Catch [[id number]]  (Normal) Collected: 03/02/24 2043    Specimen: Urine, Clean Catch Updated: 03/02/24 2128     Amphet/Methamphet, Screen Negative     Barbiturates Screen, Urine Negative     Benzodiazepine Screen, Urine Negative     Cocaine Screen, Urine Negative     Opiate Screen Negative     THC, Screen, Urine Negative     Methadone Screen,  Urine Negative     Oxycodone Screen, Urine Negative    Narrative:      Negative Thresholds Per Drugs Screened:    Amphetamines                 500 ng/ml  Barbiturates                 200 ng/ml  Benzodiazepines              100 ng/ml  Cocaine                      300 ng/ml  Methadone                    300 ng/ml  Opiates                      300 ng/ml  Oxycodone                    100 ng/ml  THC                           50 ng/ml    The Normal Value for all drugs tested is negative. This report includes final unconfirmed screening results to be used for medical treatment purposes only. Unconfirmed results must not be used for non-medical purposes such as employment or legal testing. Clinical consideration should be applied to any drug of abuse test, particularly when unconfirmed results are used.          All urine drugs of abuse requests without chain of custody are for medical screening purposes only.  False positives are possible.      Comprehensive Metabolic Panel [[id number]]  (Abnormal) Collected: 03/02/24 0757    Specimen: Blood Updated: 03/02/24 0833     Glucose 77 mg/dL      BUN 25 mg/dL      Creatinine 1.05 mg/dL      Sodium 128 mmol/L      Potassium 3.3 mmol/L      Comment: Slight hemolysis detected by analyzer. Result may be falsely elevated.        Chloride 93 mmol/L      CO2 18.0 mmol/L      Calcium 8.2 mg/dL      Total Protein 6.7 g/dL      Albumin 3.6 g/dL      ALT (SGPT) 48 U/L      AST (SGOT) 53 U/L      Alkaline Phosphatase 163 U/L      Total Bilirubin 0.4 mg/dL      Globulin 3.1 gm/dL      A/G Ratio 1.2 g/dL      BUN/Creatinine Ratio 23.8     Anion Gap 17.0 mmol/L      eGFR 62.5 mL/min/1.73     Narrative:      GFR Normal >60  Chronic Kidney Disease <60  Kidney Failure <15      Magnesium [[id number]]  (Normal) Collected: 03/02/24 0757    Specimen: Blood Updated: 03/02/24 0833     Magnesium 1.6 mg/dL     CK [[id number]]  (Normal) Collected: 03/02/24 0757    Specimen: Blood Updated: 03/02/24 0833      Creatine Kinase 39 U/L     Ethanol [[id number]] Collected: 03/02/24 0757    Specimen: Blood Updated: 03/02/24 0833     Ethanol % 0.010 %     Narrative:      Plasma Ethanol Clinical Symptoms:    ETOH (%)               Clinical Symptom  .01-.05              No apparent influence  .03-.12              Euphoria, Diminished judgment and attention   .09-.25              Impaired comprehension, Muscle incoordination  .18-.30              Confusion, Staggered gait, Slurred speech  .25-.40              Markedly decreased response to stimuli, unable to stand or                        walk, vomitting, sleep or stupor  .35-.50              Comatose, Anesthesia, Subnormal body temperature        Ammonia [[id number]]  (Abnormal) Collected: 03/02/24 0757    Specimen: Blood Updated: 03/02/24 0830     Ammonia 92 umol/L     Protime-INR [[id number]]  (Normal) Collected: 03/02/24 0757    Specimen: Blood Updated: 03/02/24 0819     Protime 11.3 Seconds      INR 1.04            Imaging Results (Most Recent)       Procedure Component Value Units Date/Time    XR Knee 3 View Bilateral [[id number]] Collected: 03/03/24 1510     Updated: 03/03/24 1514    Narrative:      XR KNEE 3 VW BILATERAL    Date of Exam: 3/3/2024 2:49 PM EST    Indication: pain, recent fall    Comparison: None available.    Findings:  Right knee:  Normal bone mineralization. Normal alignment. Negative for fracture or dislocation. Patella intact. No joint effusion.    Left knee:  Normal bone mineralization. Normal alignment. Negative for fracture or dislocation. Patella intact. No joint effusion. Suspect healed remote fracture of the left fibular neck.        Impression:      Impression:  RIGHT KNEE:  No acute osseous abnormality.     LEFT KNEE:  No acute osseous abnormality.      Electronically Signed: Guy Cabrera MD    3/3/2024 3:12 PM EST    Workstation ID: [id number]          reviewed    ECG/EMG Results (most recent)       None          reviewed    Results for orders placed  during the hospital encounter of 03/02/24    Duplex Venous Lower Extremity - Bilateral CAR    Interpretation Summary    Normal bilateral lower extremity venous duplex scan.          Microbiology Results (last 10 days)       ** No results found for the last 240 hours. **            Assessment & Plan     Dehydration        Dehydration        Lab Results   Component Value Date     WBC 5.20 03/03/2024     AST 40 (H) 03/03/2024     ALT 40 (H) 03/03/2024     ALKPHOS 138 (H) 03/03/2024     BILITOT 0.3 03/03/2024     LIPASE 424 (H) 06/12/2023   -D-dimer 0.96, CK wnl   -Venous duplex within normal limits  -Continue IV fluids  -IV/oral analgesics antibiotics as needed  -Clear liquid diet  -Monitor liver enzymes while admitted  -UDS negative  -Ethanol 0.010  -Continue folic acid and thiamine   -CIWA protocol     Chronic liver cirrhosis  -Continue Lasix, spironolactone and potassium  -Ammonia 92 with repeat of 38  -Dose of lactulose given  -Hemoglobin 9.9, platelets 131, RBC 2 point, monitor trend     Mood/anxiety disorder  -Continue Seroquel, BuSpar and Zoloft    I discussed the patients findings and my recommendations with patient.     Discharge Diagnosis:      Dehydration      Hospital Course  Patient is a 58 y.o. female presented with bilateral ankle pain.   White blood cell within normal limit.  Patient did have elevated AST ALT alk phos and lipase on admission.  Ethanol 0.010.  Patient started on CIWA protocol with folic acid thiamine and Ativan.  Patient is history of alcohol and substance abuse but denied further outpatient treatment centers at this time.  UDS negative.  Patient also has chronic cirrhosis and continue on Lasix.  Lactone potassium.  Patient initial ammonia 92 with repeat of 38 after lactulose.  Patient hemoglobin platelets and red blood cells monitored and stable at baseline upon admission.  D-dimer slightly elevated 0.96.  With venous duplex normal.  Patient complained of bilateral knee pain with no  acute fracture or dislocation seen.  Patient advised to take medication as prescribed and sustain from drug or alcohol use.  Patient to take medication as prescribed without alcohol use.  Testing recommendations reviewed with patient and she agreed to treatment plan.  If symptoms worsen patient call 911 or go to nearest ED.    Past Medical History:     Past Medical History:   Diagnosis Date    Cirrhosis     COPD (chronic obstructive pulmonary disease)     Depression     GERD (gastroesophageal reflux disease)     Hyperlipidemia     Hypertension     PTSD (post-traumatic stress disorder)        Past Surgical History:     Past Surgical History:   Procedure Laterality Date     SECTION N/A     COLONOSCOPY N/A 2021    Procedure: COLONOSCOPY with polypectomy x2 and random biopsy;  Surgeon: Osman Clay MD;  Location: [facility] ENDOSCOPY;  Service: Gastroenterology;  Laterality: N/A;  colon polyps    DENTAL PROCEDURE      ENDOSCOPY N/A 2021    Procedure: ESOPHAGOGASTRODUODENOSCOPY;  Surgeon: Osman Clay MD;  Location: [facility] ENDOSCOPY;  Service: Gastroenterology;  Laterality: N/A;  esophagitis    JOINT REPLACEMENT      REPLACEMENT TOTAL HIP LATERAL POSITION Left     TONSILLECTOMY      VAGINAL BIRTH AFTER  SECTION         Social History:   Social History     Socioeconomic History    Marital status:    Tobacco Use    Smoking status: Every Day     Current packs/day: 0.50     Types: Cigarettes     Passive exposure: Current    Smokeless tobacco: Never    Tobacco comments:     3cigs   Vaping Use    Vaping status: Never Used   Substance and Sexual Activity    Alcohol use: Yes     Alcohol/week: 2.0 standard drinks of alcohol     Types: 2 Cans of beer per week     Comment: pint daily    Drug use: No    Sexual activity: Yes     Partners: Male     Birth control/protection: Post-menopausal       Procedures Performed         Consults:   Consults       No orders found from 2024 to 3/3/2024.             Condition on Discharge:     Stable    Discharge Disposition  Home or Self Care    Discharge Medications     Discharge Medications        New Medications        Instructions Start Date   folic acid 1 MG tablet  Commonly known as: FOLVITE   1 mg, Oral, Daily      lidocaine 5 %  Commonly known as: LIDODERM   1 patch, Transdermal, Every 24 Hours, Remove & Discard patch within 12 hours or as directed by MD      thiamine 100 MG tablet  Commonly known as: VITAMIN B1   100 mg, Oral, Daily   Start Date: March 7, 2024            Continue These Medications        Instructions Start Date   albuterol sulfate  (90 Base) MCG/ACT inhaler  Commonly known as: PROVENTIL HFA;VENTOLIN HFA;PROAIR HFA   2 puffs, Inhalation, Every 4 Hours PRN      busPIRone 15 MG tablet  Commonly known as: BUSPAR   TAKE 1 TABLET BY MOUTH TWICE A DAY      diclofenac 50 MG EC tablet  Commonly known as: VOLTAREN   50 mg, Oral, 2 Times Daily PRN      furosemide 40 MG tablet  Commonly known as: LASIX   40 mg, Oral, Daily      melatonin 5 MG tablet tablet   5 mg, Oral, Nightly PRN      potassium chloride 10 MEQ CR tablet   40 mEq, Oral, Daily      QUEtiapine 50 MG tablet  Commonly known as: SEROquel   TAKE 1 TABLET BY MOUTH EVERYDAY AT BEDTIME      sertraline 100 MG tablet  Commonly known as: ZOLOFT   TAKE 1 TABLET BY MOUTH TWICE A DAY      spironolactone 100 MG tablet  Commonly known as: ALDACTONE   100 mg, Oral, Daily      Vitamin B-12 1000 MCG sublingual tablet   1 tablet, Sublingual, Daily      Zinc Gluconate 30 MG tablet   1 tablet, Oral, Daily               Discharge Diet:   Diet Instructions       Diet: Cardiac Diets; Healthy Heart (2-3 Na+); Regular (IDDSI 7); Thin (IDDSI 0)      Discharge Diet: Cardiac Diets    Cardiac Diet: Healthy Heart (2-3 Na+)    Texture: Regular (IDDSI 7)    Fluid Consistency: Thin (IDDSI 0)            Activity at Discharge:   Activity Instructions       Activity as Tolerated      Measure Blood Pressure               Follow-up Appointments  Future Appointments   Date Time Provider Department Center   3/11/2024  1:30 PM Mariel Jessica MD [facility]   4/15/2024  1:30 PM Norma Saul APRN [facility]     Additional Instructions for the Follow-ups that You Need to Schedule       Discharge Follow-up with PCP   As directed       Currently Documented PCP:    Norma Saul APRN    PCP Phone Number:    [phone]     Follow Up Details: 7-10 days                Test Results Pending at Discharge       Risk for Readmission (LACE) Score: 7 (3/4/2024  6:00 AM)          SHARIF Alvarez  03/04/24  09:28 EST        I spent 35 minutes caring for Lita on this date of service. This time includes time spent by me in the following activities: reviewing tests, obtaining and/or reviewing a separately obtained history, performing a medically appropriate examination and/or evaluation, counseling and educating the patient/family/caregiver, ordering medications, tests, or procedures, referring and communicating with other health care professionals, documenting information in the medical record, independently interpreting results and communicating that information with the patient/family/caregiver, and care coordination.

## 2024-03-05 ENCOUNTER — TRANSITIONAL CARE MANAGEMENT TELEPHONE ENCOUNTER (OUTPATIENT)
Dept: CALL CENTER | Facility: HOSPITAL | Age: 59
End: 2024-03-05
Payer: COMMERCIAL

## 2024-03-05 RX ORDER — POTASSIUM CHLORIDE 750 MG/1
40 TABLET, FILM COATED, EXTENDED RELEASE ORAL DAILY
Qty: 90 TABLET | Refills: 1 | Status: ON HOLD | OUTPATIENT
Start: 2024-03-05

## 2024-03-05 NOTE — OUTREACH NOTE
Call Center TCM Note      Flowsheet Row Responses   [facility] patient discharged from? [facility]   Does the patient have one of the following disease processes/diagnoses(primary or secondary)? Other   TCM attempt successful? Yes   Call start time 1619   Call end time 1624   Discharge diagnosis dehydration   Meds reviewed with patient/caregiver? Yes   Is the patient having any side effects they believe may be caused by any medication additions or changes? No   Does the patient have all medications ordered at discharge? Yes   Is the patient taking all medications as directed (includes completed medication regime)? Yes   Does the patient have an appointment with their PCP within 7-14 days of discharge? Yes   Has home health visited the patient within 72 hours of discharge? N/A   Psychosocial issues? No   Did the patient receive a copy of their discharge instructions? Yes   Nursing interventions Reviewed instructions with patient   What is the patient's perception of their health status since discharge? Improving   Is the patient/caregiver able to teach back signs and symptoms related to disease process for when to call PCP? Yes   Is the patient/caregiver able to teach back signs and symptoms related to disease process for when to call 911? Yes   Is the patient/caregiver able to teach back the hierarchy of who to call/visit for symptoms/problems? PCP, Specialist, Home health nurse, Urgent Care, ED, 911 Yes   TCM call completed? Yes   Wrap up additional comments D/C DX,  dehydration,  acute bilat ankle pain - Pt states she does not feel great, but better than before admit. New rx's Folic acid, Lidocaine, VIT B1 all in place. TCM APPT with PCP Norma Saul is 03/13/2024   Call end time 1624            Vania Davies MA    3/5/2024, 16:26 EST

## 2024-03-05 NOTE — OUTREACH NOTE
Call Center TCM Note      Flowsheet Row Responses   [facility] facility patient discharged from? [facility]   Does the patient have one of the following disease processes/diagnoses(primary or secondary)? Other   TCM attempt successful? No   Unsuccessful attempts Attempt 1            Vania Davies MA    3/5/2024, 14:53 EST

## 2024-03-07 ENCOUNTER — APPOINTMENT (OUTPATIENT)
Dept: CT IMAGING | Facility: HOSPITAL | Age: 59
DRG: 872 | End: 2024-03-07
Payer: COMMERCIAL

## 2024-03-07 ENCOUNTER — HOSPITAL ENCOUNTER (INPATIENT)
Facility: HOSPITAL | Age: 59
LOS: 7 days | Discharge: NURSING, REHAB OR LONG-TERM CARE | DRG: 872 | End: 2024-03-15
Attending: EMERGENCY MEDICINE | Admitting: INTERNAL MEDICINE
Payer: COMMERCIAL

## 2024-03-07 ENCOUNTER — READMISSION MANAGEMENT (OUTPATIENT)
Dept: CALL CENTER | Facility: HOSPITAL | Age: 59
End: 2024-03-07
Payer: COMMERCIAL

## 2024-03-07 DIAGNOSIS — R53.1 WEAKNESS: Primary | ICD-10-CM

## 2024-03-07 DIAGNOSIS — R26.2 UNABLE TO WALK: ICD-10-CM

## 2024-03-07 PROBLEM — M79.10 MYALGIA: Status: ACTIVE | Noted: 2024-03-07

## 2024-03-07 LAB
ALBUMIN SERPL-MCNC: 3.8 G/DL (ref 3.5–5.2)
ALBUMIN/GLOB SERPL: 1 G/DL
ALP SERPL-CCNC: 153 U/L (ref 39–117)
ALT SERPL W P-5'-P-CCNC: 26 U/L (ref 1–33)
AMMONIA BLD-SCNC: 16 UMOL/L (ref 11–51)
AMPHET+METHAMPHET UR QL: NEGATIVE
ANION GAP SERPL CALCULATED.3IONS-SCNC: 11 MMOL/L (ref 5–15)
ANION GAP SERPL CALCULATED.3IONS-SCNC: 17 MMOL/L (ref 5–15)
APTT PPP: 33.4 SECONDS (ref 61–76.5)
AST SERPL-CCNC: 38 U/L (ref 1–32)
BARBITURATES UR QL SCN: NEGATIVE
BASOPHILS # BLD AUTO: 0 10*3/MM3 (ref 0–0.2)
BASOPHILS NFR BLD AUTO: 0.3 % (ref 0–1.5)
BENZODIAZ UR QL SCN: NEGATIVE
BILIRUB SERPL-MCNC: 0.7 MG/DL (ref 0–1.2)
BILIRUB UR QL STRIP: NEGATIVE
BUN SERPL-MCNC: 18 MG/DL (ref 6–20)
BUN SERPL-MCNC: 20 MG/DL (ref 6–20)
BUN/CREAT SERPL: 16.4 (ref 7–25)
BUN/CREAT SERPL: 18.4 (ref 7–25)
CALCIUM SPEC-SCNC: 7.8 MG/DL (ref 8.6–10.5)
CALCIUM SPEC-SCNC: 9.2 MG/DL (ref 8.6–10.5)
CANNABINOIDS SERPL QL: NEGATIVE
CHLORIDE SERPL-SCNC: 92 MMOL/L (ref 98–107)
CHLORIDE SERPL-SCNC: 97 MMOL/L (ref 98–107)
CHLORIDE UR-SCNC: 47 MMOL/L
CK SERPL-CCNC: 74 U/L (ref 20–180)
CLARITY UR: CLEAR
CO2 SERPL-SCNC: 18 MMOL/L (ref 22–29)
CO2 SERPL-SCNC: 19 MMOL/L (ref 22–29)
COCAINE UR QL: NEGATIVE
COLOR UR: YELLOW
CREAT SERPL-MCNC: 0.98 MG/DL (ref 0.57–1)
CREAT SERPL-MCNC: 1.22 MG/DL (ref 0.57–1)
D-LACTATE SERPL-SCNC: 1.7 MMOL/L (ref 0.3–2)
DEPRECATED RDW RBC AUTO: 67.4 FL (ref 37–54)
EGFRCR SERPLBLD CKD-EPI 2021: 52.2 ML/MIN/1.73
EGFRCR SERPLBLD CKD-EPI 2021: 67.9 ML/MIN/1.73
EOSINOPHIL # BLD AUTO: 0 10*3/MM3 (ref 0–0.4)
EOSINOPHIL NFR BLD AUTO: 0 % (ref 0.3–6.2)
ERYTHROCYTE [DISTWIDTH] IN BLOOD BY AUTOMATED COUNT: 17.4 % (ref 12.3–15.4)
ERYTHROCYTE [SEDIMENTATION RATE] IN BLOOD: 66 MM/HR (ref 0–30)
ETHANOL UR QL: <0.01 %
FLUAV RNA RESP QL NAA+PROBE: NOT DETECTED
FLUBV RNA RESP QL NAA+PROBE: NOT DETECTED
GLOBULIN UR ELPH-MCNC: 3.7 GM/DL
GLUCOSE SERPL-MCNC: 86 MG/DL (ref 65–99)
GLUCOSE SERPL-MCNC: 99 MG/DL (ref 65–99)
GLUCOSE UR STRIP-MCNC: NEGATIVE MG/DL
HCT VFR BLD AUTO: 32 % (ref 34–46.6)
HGB BLD-MCNC: 10.6 G/DL (ref 12–15.9)
HGB UR QL STRIP.AUTO: NEGATIVE
INR PPP: 1 (ref 0.93–1.1)
KETONES UR QL STRIP: NEGATIVE
LEUKOCYTE ESTERASE UR QL STRIP.AUTO: NEGATIVE
LYMPHOCYTES # BLD AUTO: 0.3 10*3/MM3 (ref 0.7–3.1)
LYMPHOCYTES NFR BLD AUTO: 3 % (ref 19.6–45.3)
MAGNESIUM SERPL-MCNC: 1.7 MG/DL (ref 1.6–2.6)
MCH RBC QN AUTO: 34.9 PG (ref 26.6–33)
MCHC RBC AUTO-ENTMCNC: 33.1 G/DL (ref 31.5–35.7)
MCV RBC AUTO: 105.7 FL (ref 79–97)
METHADONE UR QL SCN: NEGATIVE
MONOCYTES # BLD AUTO: 0.9 10*3/MM3 (ref 0.1–0.9)
MONOCYTES NFR BLD AUTO: 10.8 % (ref 5–12)
NEUTROPHILS NFR BLD AUTO: 7.4 10*3/MM3 (ref 1.7–7)
NEUTROPHILS NFR BLD AUTO: 85.9 % (ref 42.7–76)
NITRITE UR QL STRIP: NEGATIVE
NRBC BLD AUTO-RTO: 0 /100 WBC (ref 0–0.2)
OPIATES UR QL: NEGATIVE
OSMOLALITY UR: 323 MOSM/KG (ref 300–800)
OXYCODONE UR QL SCN: NEGATIVE
PH UR STRIP.AUTO: 5.5 [PH] (ref 5–8)
PLATELET # BLD AUTO: 76 10*3/MM3 (ref 140–450)
PMV BLD AUTO: 8.6 FL (ref 6–12)
POTASSIUM SERPL-SCNC: 3.9 MMOL/L (ref 3.5–5.2)
POTASSIUM SERPL-SCNC: 4.3 MMOL/L (ref 3.5–5.2)
POTASSIUM UR-SCNC: 39 MMOL/L
PROT SERPL-MCNC: 7.5 G/DL (ref 6–8.5)
PROT UR QL STRIP: NEGATIVE
PROTHROMBIN TIME: 10.9 SECONDS (ref 9.6–11.7)
RBC # BLD AUTO: 3.03 10*6/MM3 (ref 3.77–5.28)
RSV RNA RESP QL NAA+PROBE: NOT DETECTED
SARS-COV-2 RNA RESP QL NAA+PROBE: NOT DETECTED
SODIUM SERPL-SCNC: 127 MMOL/L (ref 136–145)
SODIUM SERPL-SCNC: 127 MMOL/L (ref 136–145)
SP GR UR STRIP: 1.01 (ref 1–1.03)
T4 FREE SERPL-MCNC: 1.24 NG/DL (ref 0.93–1.7)
TSH SERPL DL<=0.05 MIU/L-ACNC: 1.81 UIU/ML (ref 0.27–4.2)
UROBILINOGEN UR QL STRIP: NORMAL
WBC NRBC COR # BLD AUTO: 8.7 10*3/MM3 (ref 3.4–10.8)

## 2024-03-07 PROCEDURE — 36415 COLL VENOUS BLD VENIPUNCTURE: CPT

## 2024-03-07 PROCEDURE — 80307 DRUG TEST PRSMV CHEM ANLYZR: CPT | Performed by: EMERGENCY MEDICINE

## 2024-03-07 PROCEDURE — 82436 ASSAY OF URINE CHLORIDE: CPT | Performed by: INTERNAL MEDICINE

## 2024-03-07 PROCEDURE — 83605 ASSAY OF LACTIC ACID: CPT

## 2024-03-07 PROCEDURE — 80048 BASIC METABOLIC PNL TOTAL CA: CPT | Performed by: INTERNAL MEDICINE

## 2024-03-07 PROCEDURE — 99285 EMERGENCY DEPT VISIT HI MDM: CPT

## 2024-03-07 PROCEDURE — 87040 BLOOD CULTURE FOR BACTERIA: CPT | Performed by: EMERGENCY MEDICINE

## 2024-03-07 PROCEDURE — G0378 HOSPITAL OBSERVATION PER HR: HCPCS

## 2024-03-07 PROCEDURE — 84300 ASSAY OF URINE SODIUM: CPT | Performed by: INTERNAL MEDICINE

## 2024-03-07 PROCEDURE — 83935 ASSAY OF URINE OSMOLALITY: CPT | Performed by: INTERNAL MEDICINE

## 2024-03-07 PROCEDURE — 85610 PROTHROMBIN TIME: CPT | Performed by: EMERGENCY MEDICINE

## 2024-03-07 PROCEDURE — P9612 CATHETERIZE FOR URINE SPEC: HCPCS

## 2024-03-07 PROCEDURE — 84439 ASSAY OF FREE THYROXINE: CPT | Performed by: EMERGENCY MEDICINE

## 2024-03-07 PROCEDURE — 82140 ASSAY OF AMMONIA: CPT | Performed by: EMERGENCY MEDICINE

## 2024-03-07 PROCEDURE — 25010000002 MIDAZOLAM PER 1 MG: Performed by: NURSE PRACTITIONER

## 2024-03-07 PROCEDURE — 25810000003 LACTATED RINGERS SOLUTION: Performed by: EMERGENCY MEDICINE

## 2024-03-07 PROCEDURE — 87154 CUL TYP ID BLD PTHGN 6+ TRGT: CPT | Performed by: EMERGENCY MEDICINE

## 2024-03-07 PROCEDURE — 83735 ASSAY OF MAGNESIUM: CPT | Performed by: EMERGENCY MEDICINE

## 2024-03-07 PROCEDURE — 70450 CT HEAD/BRAIN W/O DYE: CPT

## 2024-03-07 PROCEDURE — 82550 ASSAY OF CK (CPK): CPT | Performed by: EMERGENCY MEDICINE

## 2024-03-07 PROCEDURE — 87637 SARSCOV2&INF A&B&RSV AMP PRB: CPT | Performed by: EMERGENCY MEDICINE

## 2024-03-07 PROCEDURE — 25810000003 SODIUM CHLORIDE 0.9 % SOLUTION: Performed by: INTERNAL MEDICINE

## 2024-03-07 PROCEDURE — 85730 THROMBOPLASTIN TIME PARTIAL: CPT | Performed by: EMERGENCY MEDICINE

## 2024-03-07 PROCEDURE — 93005 ELECTROCARDIOGRAM TRACING: CPT | Performed by: EMERGENCY MEDICINE

## 2024-03-07 PROCEDURE — 25010000002 MORPHINE PER 10 MG: Performed by: EMERGENCY MEDICINE

## 2024-03-07 PROCEDURE — 84133 ASSAY OF URINE POTASSIUM: CPT | Performed by: INTERNAL MEDICINE

## 2024-03-07 PROCEDURE — 87147 CULTURE TYPE IMMUNOLOGIC: CPT | Performed by: EMERGENCY MEDICINE

## 2024-03-07 PROCEDURE — 25010000002 KETOROLAC TROMETHAMINE PER 15 MG: Performed by: INTERNAL MEDICINE

## 2024-03-07 PROCEDURE — 25010000002 THIAMINE HCL 200 MG/2ML SOLUTION: Performed by: EMERGENCY MEDICINE

## 2024-03-07 PROCEDURE — 25010000002 THIAMINE HCL 200 MG/2ML SOLUTION: Performed by: NURSE PRACTITIONER

## 2024-03-07 PROCEDURE — 80050 GENERAL HEALTH PANEL: CPT | Performed by: EMERGENCY MEDICINE

## 2024-03-07 PROCEDURE — 82077 ASSAY SPEC XCP UR&BREATH IA: CPT | Performed by: EMERGENCY MEDICINE

## 2024-03-07 PROCEDURE — 85652 RBC SED RATE AUTOMATED: CPT | Performed by: EMERGENCY MEDICINE

## 2024-03-07 PROCEDURE — 81003 URINALYSIS AUTO W/O SCOPE: CPT | Performed by: EMERGENCY MEDICINE

## 2024-03-07 PROCEDURE — 87186 SC STD MICRODIL/AGAR DIL: CPT | Performed by: EMERGENCY MEDICINE

## 2024-03-07 RX ORDER — SODIUM CHLORIDE 0.9 % (FLUSH) 0.9 %
10 SYRINGE (ML) INJECTION AS NEEDED
Status: DISCONTINUED | OUTPATIENT
Start: 2024-03-07 | End: 2024-03-15 | Stop reason: HOSPADM

## 2024-03-07 RX ORDER — SODIUM CHLORIDE 9 MG/ML
40 INJECTION, SOLUTION INTRAVENOUS AS NEEDED
Status: DISCONTINUED | OUTPATIENT
Start: 2024-03-07 | End: 2024-03-15 | Stop reason: HOSPADM

## 2024-03-07 RX ORDER — FOLIC ACID 1 MG/1
1 TABLET ORAL DAILY
Status: DISCONTINUED | OUTPATIENT
Start: 2024-03-08 | End: 2024-03-15 | Stop reason: HOSPADM

## 2024-03-07 RX ORDER — LORAZEPAM 1 MG/1
1 TABLET ORAL
Status: DISCONTINUED | OUTPATIENT
Start: 2024-03-07 | End: 2024-03-07

## 2024-03-07 RX ORDER — LORAZEPAM 1 MG/1
1 TABLET ORAL
Status: ACTIVE | OUTPATIENT
Start: 2024-03-07 | End: 2024-03-08

## 2024-03-07 RX ORDER — LORAZEPAM 1 MG/1
2 TABLET ORAL
Status: DISCONTINUED | OUTPATIENT
Start: 2024-03-07 | End: 2024-03-07

## 2024-03-07 RX ORDER — THIAMINE HYDROCHLORIDE 100 MG/ML
100 INJECTION, SOLUTION INTRAMUSCULAR; INTRAVENOUS ONCE
Status: COMPLETED | OUTPATIENT
Start: 2024-03-07 | End: 2024-03-07

## 2024-03-07 RX ORDER — THIAMINE HYDROCHLORIDE 100 MG/ML
200 INJECTION, SOLUTION INTRAMUSCULAR; INTRAVENOUS EVERY 8 HOURS SCHEDULED
Status: COMPLETED | OUTPATIENT
Start: 2024-03-07 | End: 2024-03-12

## 2024-03-07 RX ORDER — MIDAZOLAM HYDROCHLORIDE 1 MG/ML
4 INJECTION INTRAMUSCULAR; INTRAVENOUS
Status: DISCONTINUED | OUTPATIENT
Start: 2024-03-07 | End: 2024-03-07

## 2024-03-07 RX ORDER — ENOXAPARIN SODIUM 100 MG/ML
40 INJECTION SUBCUTANEOUS DAILY
Status: DISCONTINUED | OUTPATIENT
Start: 2024-03-07 | End: 2024-03-15 | Stop reason: HOSPADM

## 2024-03-07 RX ORDER — MIDAZOLAM HYDROCHLORIDE 1 MG/ML
4 INJECTION INTRAMUSCULAR; INTRAVENOUS
Status: ACTIVE | OUTPATIENT
Start: 2024-03-07 | End: 2024-03-08

## 2024-03-07 RX ORDER — BISACODYL 10 MG
10 SUPPOSITORY, RECTAL RECTAL DAILY PRN
Status: DISCONTINUED | OUTPATIENT
Start: 2024-03-07 | End: 2024-03-10

## 2024-03-07 RX ORDER — LORAZEPAM 1 MG/1
2 TABLET ORAL
Status: ACTIVE | OUTPATIENT
Start: 2024-03-07 | End: 2024-03-08

## 2024-03-07 RX ORDER — MULTIPLE VITAMINS W/ MINERALS TAB 9MG-400MCG
1 TAB ORAL DAILY
Status: DISCONTINUED | OUTPATIENT
Start: 2024-03-08 | End: 2024-03-15 | Stop reason: HOSPADM

## 2024-03-07 RX ORDER — BISACODYL 5 MG/1
5 TABLET, DELAYED RELEASE ORAL DAILY PRN
Status: DISCONTINUED | OUTPATIENT
Start: 2024-03-07 | End: 2024-03-10

## 2024-03-07 RX ORDER — SODIUM CHLORIDE 0.9 % (FLUSH) 0.9 %
10 SYRINGE (ML) INJECTION EVERY 12 HOURS SCHEDULED
Status: DISCONTINUED | OUTPATIENT
Start: 2024-03-07 | End: 2024-03-15 | Stop reason: HOSPADM

## 2024-03-07 RX ORDER — HYDROCODONE BITARTRATE AND ACETAMINOPHEN 5; 325 MG/1; MG/1
1 TABLET ORAL EVERY 6 HOURS PRN
Status: DISPENSED | OUTPATIENT
Start: 2024-03-07 | End: 2024-03-12

## 2024-03-07 RX ORDER — METHOCARBAMOL 500 MG/1
500 TABLET, FILM COATED ORAL 4 TIMES DAILY
Status: DISPENSED | OUTPATIENT
Start: 2024-03-07 | End: 2024-03-12

## 2024-03-07 RX ORDER — KETOROLAC TROMETHAMINE 30 MG/ML
15 INJECTION, SOLUTION INTRAMUSCULAR; INTRAVENOUS EVERY 6 HOURS PRN
Status: DISPENSED | OUTPATIENT
Start: 2024-03-07 | End: 2024-03-12

## 2024-03-07 RX ORDER — AMOXICILLIN 250 MG
2 CAPSULE ORAL 2 TIMES DAILY
Status: DISCONTINUED | OUTPATIENT
Start: 2024-03-07 | End: 2024-03-10

## 2024-03-07 RX ORDER — POLYETHYLENE GLYCOL 3350 17 G/17G
17 POWDER, FOR SOLUTION ORAL DAILY PRN
Status: DISCONTINUED | OUTPATIENT
Start: 2024-03-07 | End: 2024-03-10

## 2024-03-07 RX ORDER — MIDAZOLAM HYDROCHLORIDE 1 MG/ML
4 INJECTION INTRAMUSCULAR; INTRAVENOUS
Status: DISPENSED | OUTPATIENT
Start: 2024-03-07 | End: 2024-03-08

## 2024-03-07 RX ORDER — MIDAZOLAM HYDROCHLORIDE 1 MG/ML
2 INJECTION INTRAMUSCULAR; INTRAVENOUS
Status: DISCONTINUED | OUTPATIENT
Start: 2024-03-07 | End: 2024-03-07

## 2024-03-07 RX ORDER — MIDAZOLAM HYDROCHLORIDE 1 MG/ML
2 INJECTION INTRAMUSCULAR; INTRAVENOUS
Status: ACTIVE | OUTPATIENT
Start: 2024-03-07 | End: 2024-03-08

## 2024-03-07 RX ADMIN — MORPHINE SULFATE 4 MG: 4 INJECTION, SOLUTION INTRAMUSCULAR; INTRAVENOUS at 09:37

## 2024-03-07 RX ADMIN — HYDROCODONE BITARTRATE AND ACETAMINOPHEN 1 TABLET: 5; 325 TABLET ORAL at 21:51

## 2024-03-07 RX ADMIN — THIAMINE HYDROCHLORIDE 200 MG: 100 INJECTION, SOLUTION INTRAMUSCULAR; INTRAVENOUS at 23:01

## 2024-03-07 RX ADMIN — DOCUSATE SODIUM 50MG AND SENNOSIDES 8.6MG 2 TABLET: 8.6; 5 TABLET, FILM COATED ORAL at 13:30

## 2024-03-07 RX ADMIN — THIAMINE HYDROCHLORIDE 100 MG: 100 INJECTION, SOLUTION INTRAMUSCULAR; INTRAVENOUS at 09:37

## 2024-03-07 RX ADMIN — SODIUM CHLORIDE 500 ML: 0.9 INJECTION, SOLUTION INTRAVENOUS at 17:02

## 2024-03-07 RX ADMIN — SODIUM CHLORIDE, POTASSIUM CHLORIDE, SODIUM LACTATE AND CALCIUM CHLORIDE 1000 ML: 600; 310; 30; 20 INJECTION, SOLUTION INTRAVENOUS at 09:37

## 2024-03-07 RX ADMIN — METHOCARBAMOL 500 MG: 500 TABLET ORAL at 18:43

## 2024-03-07 RX ADMIN — MIDAZOLAM 4 MG: 1 INJECTION INTRAMUSCULAR; INTRAVENOUS at 23:01

## 2024-03-07 RX ADMIN — Medication 10 ML: at 13:31

## 2024-03-07 RX ADMIN — METHOCARBAMOL 500 MG: 500 TABLET ORAL at 20:42

## 2024-03-07 RX ADMIN — HYDROCODONE BITARTRATE AND ACETAMINOPHEN 1 TABLET: 5; 325 TABLET ORAL at 13:30

## 2024-03-07 RX ADMIN — Medication 10 ML: at 20:42

## 2024-03-07 RX ADMIN — KETOROLAC TROMETHAMINE 15 MG: 30 INJECTION, SOLUTION INTRAMUSCULAR at 16:23

## 2024-03-07 NOTE — CONSULTS
Pt is seen and examined     Labs ordered  500 cc saline bolus needed  Will order NaHCO3 based fluid   F/u repeat labs today   Check urine studies

## 2024-03-07 NOTE — ED PROVIDER NOTES
Subjective   History of Present Illness  Chief complaint generalized weakness pain all over unable to walk loss of bladder control    History of present illness this is a 58-year-old female who was admitted to the hospital and discharged home a few days ago for electrolyte abnormalities.  She reports that she does have a alcohol issue but over the last 2 days she has become extremely weak and unable to walk and loss of bladder bowel.  There is no numbness or tingling throughout the extremities no chest pain neck arm jaw pain she has pain all over throughout her back and just generally extremities anytime she tries to do anything she has excruciating pain no fever chills she denies any headache visual changes speech difficulty no paralysis.  Patient states that she is unable to even walk.  She denies any abdominal pain she has had no appetite and she has not drank in a couple of days.  Denies any injury or ill exposures and no one home with similar illness no foreign travels antibiotic use.  And no other change in medications.      Review of Systems   Constitutional:  Negative for chills and fever.   Eyes:  Negative for photophobia and visual disturbance.   Respiratory:  Negative for chest tightness and shortness of breath.    Cardiovascular:  Negative for chest pain and palpitations.   Gastrointestinal:  Positive for diarrhea. Negative for abdominal pain and vomiting.   Genitourinary:  Negative for difficulty urinating, dysuria and hematuria.   Musculoskeletal:  Positive for arthralgias, back pain and myalgias.   Skin:  Negative for rash and wound.   Neurological:  Positive for weakness. Negative for dizziness, facial asymmetry, speech difficulty, numbness and headaches.   Psychiatric/Behavioral:  Negative for confusion.        Past Medical History:   Diagnosis Date    Cirrhosis     COPD (chronic obstructive pulmonary disease)     Depression     GERD (gastroesophageal reflux disease)     Hyperlipidemia      Hypertension     PTSD (post-traumatic stress disorder)        Allergies   Allergen Reactions    Sulfa Antibiotics Hives    Keflex [Cephalexin] Hives       Past Surgical History:   Procedure Laterality Date     SECTION N/A     COLONOSCOPY N/A 2021    Procedure: COLONOSCOPY with polypectomy x2 and random biopsy;  Surgeon: Osman Clay MD;  Location: [facility] ENDOSCOPY;  Service: Gastroenterology;  Laterality: N/A;  colon polyps    DENTAL PROCEDURE      ENDOSCOPY N/A 2021    Procedure: ESOPHAGOGASTRODUODENOSCOPY;  Surgeon: Osman Clay MD;  Location: [facility] ENDOSCOPY;  Service: Gastroenterology;  Laterality: N/A;  esophagitis    JOINT REPLACEMENT      REPLACEMENT TOTAL HIP LATERAL POSITION Left     TONSILLECTOMY      VAGINAL BIRTH AFTER  SECTION         Family History   Problem Relation Age of Onset    Alcohol abuse Mother     Alcohol abuse Paternal Grandfather        Social History     Socioeconomic History    Marital status:    Tobacco Use    Smoking status: Every Day     Current packs/day: 0.50     Types: Cigarettes     Passive exposure: Current    Smokeless tobacco: Never    Tobacco comments:     3cigs   Vaping Use    Vaping status: Never Used   Substance and Sexual Activity    Alcohol use: Yes     Alcohol/week: 2.0 standard drinks of alcohol     Types: 2 Cans of beer per week     Comment: pint daily    Drug use: No    Sexual activity: Yes     Partners: Male     Birth control/protection: Post-menopausal     Prior to Admission medications    Medication Sig Start Date End Date Taking? Authorizing Provider   albuterol sulfate  (90 Base) MCG/ACT inhaler Inhale 2 puffs Every 4 (Four) Hours As Needed for Wheezing.    ProviderAbigail MD   busPIRone (BUSPAR) 15 MG tablet TAKE 1 TABLET BY MOUTH TWICE A DAY 10/16/23   Norma Saul APRN   Cyanocobalamin (Vitamin B-12) 1000 MCG sublingual tablet Place 1 tablet under the tongue Daily.    ProviderAbigail MD    diclofenac (VOLTAREN) 50 MG EC tablet Take 1 tablet by mouth 2 (Two) Times a Day As Needed (pain). 2/21/24   Sarah Huang APRN   folic acid (FOLVITE) 1 MG tablet Take 1 tablet by mouth Daily. 3/4/24   Laura Quinones APRN   furosemide (LASIX) 40 MG tablet Take 1 tablet by mouth Daily. 6/13/23   Norma Saul APRN   lidocaine (LIDODERM) 5 % Place 1 patch on the skin as directed by provider Daily. Remove & Discard patch within 12 hours or as directed by MD 3/4/24   Laura Quinones APRN   melatonin 5 MG tablet tablet Take 1 tablet by mouth At Night As Needed (sleep).    ProviderAbigail MD   potassium chloride 10 MEQ CR tablet TAKE 4 TABLETS BY MOUTH DAILY 3/5/24   Norma Saul APRN   QUEtiapine (SEROquel) 50 MG tablet TAKE 1 TABLET BY MOUTH EVERYDAY AT BEDTIME 2/16/24   Norma Saul APRN   sertraline (ZOLOFT) 100 MG tablet TAKE 1 TABLET BY MOUTH TWICE A DAY 10/19/23   Norma Saul APRN   spironolactone (ALDACTONE) 100 MG tablet Take 1 tablet by mouth Daily. 6/26/23   Norma Saul APRN   thiamine (VITAMIN B1) 100 MG tablet Take 1 tablet by mouth Daily. 3/7/24   Laura Quinones APRN   Zinc Gluconate 30 MG tablet Take 1 tablet by mouth Daily. 10/17/23   Provider, MD Abigail          Objective   Physical Exam  Constitutional is a 58-year-old female awake alert chronic ill-appearing triage vital signs reviewed.  HEENT extraocular muscles are intact pupils equal round reactive there is no nystagmus mouth is clear but dry neck supple no adenopathy no JV no bruits no meningeal signs lungs clear no retraction heart regular without murmur abdomen soft nontender good bowel sounds no peritoneal findings or pulsatile masses extremities pulses equal throughout upper and lower extremities no edema cords or Homans' sign or evidence of DVT skin warm and dry without rashes or cellulitic changes back she has some generalized pain throughout the entire back there is no direct tenderness to palpation  percussion throughout the cervical thoracic or lumbar spine the patient has some generalized pain throughout the back.  There is no redness warmth or edema.  The buttock area has good sensation no sacral anesthesia there is no redness warmth or an abscess or signs of necrotizing fasciitis.  Neurologic awake alert orientated x 4 no facial asymmetry speech normal no drift the arms or legs normal finger-to-nose she is tremulous the patient has increased reflexes noted throughout the knees and ankles are hyperreflexive.  Toes under including big toe dorsiflex plantarflex at difficulty motor strength 5-5 there is no weakness in the lower extremities.  She has good proprioception she is able to tell which way her big toe is going on exam.  No clonus toes downgoing upper extremities reflexes are 2+ symmetrical to the bicep tricep and wrist and  strength normal shoulder shrug all normal no weakness there is no swelling to the extremities or signs of DVT no cellulitic changes.  Procedures           ED Course      Results for orders placed or performed during the hospital encounter of 03/07/24   COVID-19, FLU A/B, RSV PCR 1 HR TAT - Swab, Nasopharynx    Specimen: Nasopharynx; Swab   Result Value Ref Range    COVID19 Not Detected Not Detected - Ref. Range    Influenza A PCR Not Detected Not Detected    Influenza B PCR Not Detected Not Detected    RSV, PCR Not Detected Not Detected   Comprehensive Metabolic Panel    Specimen: Blood   Result Value Ref Range    Glucose 99 65 - 99 mg/dL    BUN 20 6 - 20 mg/dL    Creatinine 1.22 (H) 0.57 - 1.00 mg/dL    Sodium 127 (L) 136 - 145 mmol/L    Potassium 4.3 3.5 - 5.2 mmol/L    Chloride 92 (L) 98 - 107 mmol/L    CO2 18.0 (L) 22.0 - 29.0 mmol/L    Calcium 9.2 8.6 - 10.5 mg/dL    Total Protein 7.5 6.0 - 8.5 g/dL    Albumin 3.8 3.5 - 5.2 g/dL    ALT (SGPT) 26 1 - 33 U/L    AST (SGOT) 38 (H) 1 - 32 U/L    Alkaline Phosphatase 153 (H) 39 - 117 U/L    Total Bilirubin 0.7 0.0 - 1.2 mg/dL     Globulin 3.7 gm/dL    A/G Ratio 1.0 g/dL    BUN/Creatinine Ratio 16.4 7.0 - 25.0    Anion Gap 17.0 (H) 5.0 - 15.0 mmol/L    eGFR 52.2 (L) >60.0 mL/min/1.73   Urinalysis With Microscopic If Indicated (No Culture) - Straight Cath    Specimen: Straight Cath; Urine   Result Value Ref Range    Color, UA Yellow Yellow, Straw    Appearance, UA Clear Clear    pH, UA 5.5 5.0 - 8.0    Specific Gravity, UA 1.015 1.005 - 1.030    Glucose, UA Negative Negative    Ketones, UA Negative Negative    Bilirubin, UA Negative Negative    Blood, UA Negative Negative    Protein, UA Negative Negative    Leuk Esterase, UA Negative Negative    Nitrite, UA Negative Negative    Urobilinogen, UA 0.2 E.U./dL 0.2 - 1.0 E.U./dL   Protime-INR    Specimen: Blood   Result Value Ref Range    Protime 10.9 9.6 - 11.7 Seconds    INR 1.00 0.93 - 1.10   aPTT    Specimen: Blood   Result Value Ref Range    PTT 33.4 (L) 61.0 - 76.5 seconds   Sedimentation Rate    Specimen: Blood   Result Value Ref Range    Sed Rate 66 (H) 0 - 30 mm/hr   Urine Drug Screen - Straight Cath    Specimen: Straight Cath; Urine   Result Value Ref Range    Amphet/Methamphet, Screen Negative Negative    Barbiturates Screen, Urine Negative Negative    Benzodiazepine Screen, Urine Negative Negative    Cocaine Screen, Urine Negative Negative    Opiate Screen Negative Negative    THC, Screen, Urine Negative Negative    Methadone Screen, Urine Negative Negative    Oxycodone Screen, Urine Negative Negative   Ethanol    Specimen: Blood   Result Value Ref Range    Ethanol % <0.010 %   Ammonia    Specimen: Blood   Result Value Ref Range    Ammonia 16 11 - 51 umol/L   CK    Specimen: Blood   Result Value Ref Range    Creatine Kinase 74 20 - 180 U/L   Magnesium    Specimen: Blood   Result Value Ref Range    Magnesium 1.7 1.6 - 2.6 mg/dL   TSH    Specimen: Blood   Result Value Ref Range    TSH 1.810 0.270 - 4.200 uIU/mL   T4, Free    Specimen: Blood   Result Value Ref Range    Free T4 1.24 0.93 -  1.70 ng/dL   CBC Auto Differential    Specimen: Blood   Result Value Ref Range    WBC 8.70 3.40 - 10.80 10*3/mm3    RBC 3.03 (L) 3.77 - 5.28 10*6/mm3    Hemoglobin 10.6 (L) 12.0 - 15.9 g/dL    Hematocrit 32.0 (L) 34.0 - 46.6 %    .7 (H) 79.0 - 97.0 fL    MCH 34.9 (H) 26.6 - 33.0 pg    MCHC 33.1 31.5 - 35.7 g/dL    RDW 17.4 (H) 12.3 - 15.4 %    RDW-SD 67.4 (H) 37.0 - 54.0 fl    MPV 8.6 6.0 - 12.0 fL    Platelets 76 (L) 140 - 450 10*3/mm3    Neutrophil % 85.9 (H) 42.7 - 76.0 %    Lymphocyte % 3.0 (L) 19.6 - 45.3 %    Monocyte % 10.8 5.0 - 12.0 %    Eosinophil % 0.0 (L) 0.3 - 6.2 %    Basophil % 0.3 0.0 - 1.5 %    Neutrophils, Absolute 7.40 (H) 1.70 - 7.00 10*3/mm3    Lymphocytes, Absolute 0.30 (L) 0.70 - 3.10 10*3/mm3    Monocytes, Absolute 0.90 0.10 - 0.90 10*3/mm3    Eosinophils, Absolute 0.00 0.00 - 0.40 10*3/mm3    Basophils, Absolute 0.00 0.00 - 0.20 10*3/mm3    nRBC 0.0 0.0 - 0.2 /100 WBC   Osmolality, Urine - Urine, Clean Catch    Specimen: Urine, Clean Catch   Result Value Ref Range    Osmolality, Urine 323 300 - 800 mOsm/kg   Potassium, Urine, Random - Urine, Clean Catch    Specimen: Urine, Clean Catch   Result Value Ref Range    Potassium, Urine 39.0 mmol/L   Chloride, Urine, Random - Urine, Clean Catch    Specimen: Urine, Clean Catch   Result Value Ref Range    Chloride, Urine 47 mmol/L   POC Lactate    Specimen: Blood   Result Value Ref Range    Lactate 1.7 0.3 - 2.0 mmol/L   ECG 12 Lead Other; Weakness   Result Value Ref Range    QT Interval 306 ms    QTC Interval 412 ms     CT Head Without Contrast    Result Date: 3/7/2024  Impression: No definite acute intracranial abnormality Electronically Signed: Miguel Dean MD  3/7/2024 11:29 AM EST  Workstation ID: [id number]   Medications   sodium chloride 0.9 % flush 10 mL (has no administration in time range)   sodium chloride 0.9 % flush 10 mL (10 mL Intravenous Given 3/7/24 1331)   sodium chloride 0.9 % flush 10 mL (has no administration in time  range)   sodium chloride 0.9 % infusion 40 mL (has no administration in time range)   Enoxaparin Sodium (LOVENOX) syringe 40 mg (40 mg Subcutaneous Not Given 3/7/24 1511)   sennosides-docusate (PERICOLACE) 8.6-50 MG per tablet 2 tablet (2 tablets Oral Given 3/7/24 1330)     And   polyethylene glycol (MIRALAX) packet 17 g (has no administration in time range)     And   bisacodyl (DULCOLAX) EC tablet 5 mg (has no administration in time range)     And   bisacodyl (DULCOLAX) suppository 10 mg (has no administration in time range)   HYDROcodone-acetaminophen (NORCO) 5-325 MG per tablet 1 tablet (1 tablet Oral Given 3/7/24 1330)   ketorolac (TORADOL) injection 15 mg (15 mg Intravenous Given 3/7/24 1623)   methocarbamol (ROBAXIN) tablet 500 mg (has no administration in time range)   lactated ringers bolus 1,000 mL (1,000 mL Intravenous New Bag 3/7/24 0937)   thiamine (B-1) injection 100 mg (100 mg Intravenous Given 3/7/24 0937)   morphine injection 4 mg (4 mg Intravenous Given 3/7/24 0937)   sodium chloride 0.9 % bolus 500 mL (500 mL Intravenous New Bag 3/7/24 1702)     EKG my interpretation sinus tachycardia rate of 105 normal axis no hypertrophy QTc of 412 other than tachycardia to normal EKG unchanged compared to 6/11/2022 other than faster rate                                         Medical Decision Making  Medical decision making patient IV established she was given a liter lactated Ringer's thiamine 100 mg IV morphine 4 IV.  EKG my interpretation sinus tachycardia rate of 105 normal axis no hypertrophy QTc of 4 and 12 it was normal other than a tachycardia that is unchanged compared to 6/11/2022.  Labs obtained my independent review comprehensive metabolic profile unremarkable other than a creatinine of 1.2 sodium 127 sed rate was elevated at 66 drug screen negative alcohol negative ammonia level normal magnesium normal CK normal TSH T4 normal BBC unremarkable other than hemoglobin 10.6 and a platelet count was  76,000.  CT head without radiology no acute findings.  Report reviewed by me.  Patient repeat exam was resting comfortably feeling better.  I do not see evidence of an acute stroke I do not see evidence acute intra-abdominal process I do not see any evidence suggest sepsis based on the history and physical and clinical exam I do not see any evidence of cauda equina at this point there is no sacral anesthesia the patient has good sensation throughout the area and extremities she is hyperreflexic she has had loss of bladder control she states.  The bladder is not distended here the patient has no clonus toes downgoing motor strength all normal.  Consider consider metabolic process or infectious etiology or spinal issue although not a complete list of all possibilities.  She will need much more workup.  This has been ongoing for 2 days.  I talked to the hospitalist we discussed the case and patient will be admitted for further workup and care patient agreeable.  Based on the above findings I would anticipate a 2 midnight stay    Problems Addressed:  Unable to walk: complicated acute illness or injury  Weakness: complicated acute illness or injury    Amount and/or Complexity of Data Reviewed  External Data Reviewed: ECG.  Labs: ordered. Decision-making details documented in ED Course.  Radiology: ordered and independent interpretation performed. Decision-making details documented in ED Course.  ECG/medicine tests: ordered and independent interpretation performed. Decision-making details documented in ED Course.    Risk  Decision regarding hospitalization.        Final diagnoses:   Weakness   Unable to walk       ED Disposition  ED Disposition       ED Disposition   Decision to Admit    Condition   --    Comment   Level of Care: Med/Surg [1]   Diagnosis: Myalgia [093558]   Admitting Physician: SUSAN ODELL [[id number]]   Attending Physician: SUSAN ODELL [[id number]]                 No follow-up provider specified.        Medication List      No changes were made to your prescriptions during this visit.            Carl Simmons MD  03/07/24 [id number]

## 2024-03-07 NOTE — CASE MANAGEMENT/SOCIAL WORK
Discharge Planning Assessment   [facility]     Patient Name: Lita Yu  MRN: [id number]  Today's Date: 3/7/2024    Admit Date: 3/7/2024    Plan: Routine return home   Discharge Needs Assessment       Row Name 03/07/24 1614       Living Environment    People in Home alone    Current Living Arrangements apartment    Potentially Unsafe Housing Conditions none    In the past 12 months has the electric, gas, oil, or water company threatened to shut off services in your home? No    Primary Care Provided by self    Provides Primary Care For no one    Family Caregiver if Needed none    Able to Return to Prior Arrangements yes       Resource/Environmental Concerns    Resource/Environmental Concerns none    Transportation Concerns none       Transportation Needs    In the past 12 months, has lack of transportation kept you from medical appointments or from getting medications? no    In the past 12 months, has lack of transportation kept you from meetings, work, or from getting things needed for daily living? No       Food Insecurity    Within the past 12 months, you worried that your food would run out before you got the money to buy more. Never true    Within the past 12 months, the food you bought just didn't last and you didn't have money to get more. Never true       Transition Planning    Patient/Family Anticipates Transition to home    Patient/Family Anticipated Services at Transition none    Transportation Anticipated car, drives self       Discharge Needs Assessment    Readmission Within the Last 30 Days no previous admission in last 30 days    Equipment Currently Used at Home none    Concerns to be Addressed no discharge needs identified    Anticipated Changes Related to Illness none    Equipment Needed After Discharge none              Discharge Plan       Row Name 03/07/24 1614       Plan    Plan Routine return home    Patient/Family in Agreement with Plan yes    Plan Comments CM met patient at bedside.  Confirmed PCP and pharmacy, enrolled in M2Bs. IADLs, uses cane, denies needing any further CM d/c needs. Pt lives in an apartment alone and does not have family that can assist if needed. Pt denies needing assistance affording food or medication. Pt will arrange transportation at d/c. DC Barriers: Nephrology consult              Demographic Summary       Row Name 03/07/24 1613       General Information    Admission Type observation    Arrived From emergency department    Referral Source admission list    Reason for Consult discharge planning    Preferred Language English              Functional Status       Row Name 03/07/24 1613       Functional Status    Usual Activity Tolerance good    Current Activity Tolerance good       Functional Status, IADL    Medications independent    Meal Preparation assistive equipment    Housekeeping assistive equipment    Laundry assistive equipment    Shopping assistive equipment       Mental Status    General Appearance WDL WDL       Mental Status Summary    Recent Changes in Mental Status/Cognitive Functioning no changes           Caitlyn Roblero RN    phone [phone]  fax [phone]

## 2024-03-07 NOTE — CONSULTS
INITIAL CONSULT NOTE      Patient Name: Lita Yu  : 1965  MRN: [id number]  Primary Care Physician: Norma Saul APRN  Date of admission: 3/7/2024    Patient Care Team:  Norma Saul APRN as PCP - General (Nurse Practitioner)  Flory Villanueva MD (Physical Medicine and Rehabilitation)        Reason for Consult:       Renal failure  Subjective   History of Present Illness:   Chief Complaint:   Chief Complaint   Patient presents with   • Weakness - Generalized     HISTORY:  Lita Yu is a 58 y.o. female with past medical history of cirrhosis, depression, hyperlipidemia, hypertension, PTSD,. who presents with extreme weakness of the muscle in the thighs.  Patient had some previous history of rhabdomyolysis but CPK level at this time is normal.  Patient also found to have significant hyponatremia with sodium of 127 and creatinine that was 1 point 4 repeat creatinine 0.98.  Patient also found to be somewhat acidotic.  Which is already getting better          Review of systems:  All other review of system unremarkable  Constitutional: No fever, no chills, no lethargy, no weakness.  HEENT:  No headache, otalgia, itchy eyes, nasal discharge or sore throat.  Cardiac:  No chest pain, dyspnea, orthopnea or PND.  Chest:              No cough, phlegm or wheezing.  Abdomen:  No abdominal pain, nausea or vomiting.  Neuro:  No focal weakness, abnormal movements orseizure like activity.  :   No hematuria, no pyuria, no dysuria, no flank pain.  ROS was otherwise negative except as mentioned in the [place].       Personal History:     Past Medical History:   Past Medical History:   Diagnosis Date   • Cirrhosis    • COPD (chronic obstructive pulmonary disease)    • Depression    • GERD (gastroesophageal reflux disease)    • Hyperlipidemia    • Hypertension    • PTSD (post-traumatic stress disorder)        Surgical History:      Past Surgical History:   Procedure Laterality Date   •   SECTION N/A    • COLONOSCOPY N/A 2021    Procedure: COLONOSCOPY with polypectomy x2 and random biopsy;  Surgeon: Osman Clay MD;  Location: [facility] ENDOSCOPY;  Service: Gastroenterology;  Laterality: N/A;  colon polyps   • DENTAL PROCEDURE     • ENDOSCOPY N/A 2021    Procedure: ESOPHAGOGASTRODUODENOSCOPY;  Surgeon: Osman Clay MD;  Location: [facility] ENDOSCOPY;  Service: Gastroenterology;  Laterality: N/A;  esophagitis   • JOINT REPLACEMENT     • REPLACEMENT TOTAL HIP LATERAL POSITION Left    • TONSILLECTOMY     • VAGINAL BIRTH AFTER  SECTION         Family History: family history includes Alcohol abuse in her mother and paternal grandfather. Otherwise pertinent FHx was reviewed and unremarkable.     Social History:  reports that she has been smoking cigarettes. She has been exposed to tobacco smoke. She has never used smokeless tobacco. She reports current alcohol use of about 2.0 standard drinks of alcohol per week. She reports that she does not use drugs.    Medications:  Prior to Admission medications    Medication Sig Start Date End Date Taking? Authorizing Provider   albuterol sulfate  (90 Base) MCG/ACT inhaler Inhale 2 puffs Every 4 (Four) Hours As Needed for Wheezing.    Provider, MD Abigail   busPIRone (BUSPAR) 15 MG tablet TAKE 1 TABLET BY MOUTH TWICE A DAY 10/16/23   Norma Saul APRN   Cyanocobalamin (Vitamin B-12) 1000 MCG sublingual tablet Place 1 tablet under the tongue Daily.    Provider, MD Abigail   diclofenac (VOLTAREN) 50 MG EC tablet Take 1 tablet by mouth 2 (Two) Times a Day As Needed (pain). 24   Sarah Huang APRN   folic acid (FOLVITE) 1 MG tablet Take 1 tablet by mouth Daily. 3/4/24   Laura Quinones APRN   furosemide (LASIX) 40 MG tablet Take 1 tablet by mouth Daily. 23   Norma Saul APRN   lidocaine (LIDODERM) 5 % Place 1 patch on the skin as directed by provider Daily. Remove & Discard patch within 12 hours or as  directed by MD 3/4/24   Laura Quinones APRN   melatonin 5 MG tablet tablet Take 1 tablet by mouth At Night As Needed (sleep).    Provider, MD Abigail   potassium chloride 10 MEQ CR tablet TAKE 4 TABLETS BY MOUTH DAILY 3/5/24   Norma Saul APRN   QUEtiapine (SEROquel) 50 MG tablet TAKE 1 TABLET BY MOUTH EVERYDAY AT BEDTIME 2/16/24   Norma Saul APRN   sertraline (ZOLOFT) 100 MG tablet TAKE 1 TABLET BY MOUTH TWICE A DAY 10/19/23   Norma Saul APRN   spironolactone (ALDACTONE) 100 MG tablet Take 1 tablet by mouth Daily. 6/26/23   Norma Saul APRN   thiamine (VITAMIN B1) 100 MG tablet Take 1 tablet by mouth Daily. 3/7/24   Laura Quinones APRN   Zinc Gluconate 30 MG tablet Take 1 tablet by mouth Daily. 10/17/23   Provider, MD Abigail     Scheduled Meds:enoxaparin, 40 mg, Subcutaneous, Daily  methocarbamol, 500 mg, Oral, 4x Daily  senna-docusate sodium, 2 tablet, Oral, BID  sodium chloride, 10 mL, Intravenous, Q12H      Continuous Infusions:   PRN Meds:•  senna-docusate sodium **AND** polyethylene glycol **AND** bisacodyl **AND** bisacodyl  •  HYDROcodone-acetaminophen  •  ketorolac  •  [COMPLETED] Insert Peripheral IV **AND** sodium chloride  •  sodium chloride  •  sodium chloride  Allergies:    Allergies   Allergen Reactions   • Sulfa Antibiotics Hives   • Keflex [Cephalexin] Hives       Objective   Exam:     Vital Signs  Temp:  [98.1 °F (36.7 °C)-100.4 °F (38 °C)] 98.8 °F (37.1 °C)  Heart Rate:  [] 112  Resp:  [19-26] 20  BP: ()/(58-65) 90/58  SpO2:  [91 %-99 %] 95 %  on   ;      Body mass index is 23.91 kg/m².  EXAM  General: Middle-age white female in no acute distress.    Head:      Normocephalic and atraumatic.    Eyes:      PERRL/EOM intact, conjunctivae and sclerae clear without nystagmus.    Neck:      No masses, thyromegaly,  trachea central   Lungs:    Clear bilaterally to auscultation.    Heart:      Regular rate and rhythm, no murmur no gallop  Abd:         Soft, nontender, not distended, bowel sounds positive, no shifting dullness.  Msk:        No deformity or scoliosis noted of thoracic or lumbar spine.    Pulses:   Pulses normal in all 4 extremities.    Extremities:        No cyanosis or clubbing--no edema.    Neuro:    No focal deficits.   alert oriented x3  Skin:       Intact without lesions or rashes.    Psych:    Alert and cooperative; normal mood and affect; normal attention span       Results Review:  I have personally reviewed most recent Data :  [facility]  CBC    Results from last 7 days   Lab Units 03/07/24  0920 03/04/24  0351 03/03/24  0536 03/02/24  0757   WBC 10*3/mm3 8.70 5.80 5.20 6.50   HEMOGLOBIN g/dL 10.6* 10.1* 9.9* 10.1*   PLATELETS 10*3/mm3 76* 125* 131* 140     CMP   Results from last 7 days   Lab Units 03/07/24  0920 03/04/24  0351 03/03/24  0648 03/03/24  0536 03/02/24  2136 03/02/24  0757   SODIUM mmol/L 127* 135*  --  135*  --  128*   POTASSIUM mmol/L 4.3 4.3  --  4.0 4.0 3.3*   CHLORIDE mmol/L 92* 103  --  102  --  93*   CO2 mmol/L 18.0* 22.0  --  23.0  --  18.0*   BUN mg/dL 20 8  --  11  --  25*   CREATININE mg/dL 1.22* 0.93  --  0.80  --  1.05*   GLUCOSE mg/dL 99 94  --  89  --  77   ALBUMIN g/dL 3.8 3.5  --  3.5  --  3.6   BILIRUBIN mg/dL 0.7 0.3  --  0.3  --  0.4   ALK PHOS U/L 153* 138*  --  138*  --  163*   AST (SGOT) U/L 38* 39*  --  40*  --  53*   ALT (SGPT) U/L 26 35*  --  40*  --  48*   AMMONIA umol/L 16  --  38  --   --  92*     ABG      CT Head Without Contrast    Result Date: 3/7/2024  Impression: No definite acute intracranial abnormality Electronically Signed: Miguel Dean MD  3/7/2024 11:29 AM EST  Workstation ID: [id number]    XR Knee 3 View Bilateral    Result Date: 3/3/2024  Impression: RIGHT KNEE: No acute osseous abnormality. LEFT KNEE: No acute osseous abnormality. Electronically Signed: Guy Cabrera MD  3/3/2024 3:12 PM EST  Workstation ID: [id number]           Assessment & Plan   Assessment and  Plan:         Myalgia    ASSESSMENT:  Acute kidney injury likely secondary to volume and low blood pressure  Hyponatremia may be secondary to alcohol intake follow-up with a urine studies   Metabolic acidosis may be due to his respiratory alkalosis because of some history of cirrhosis  Hypocalcemia due to poor nutritional status check phosphorus level tomorrow morning check magnesium level tomorrow morning          PLAN :     At this time 500 cc of saline bolus was given repeat creatinine 0.98  Sodium level is stable at 127 that is acceptable  Follow-up with complete urine studies  Follow-up with repeat labs  Follow-up with magnesium and phosphorus level  Thank you for letting me participate      Red Quiroga MD  [facility]  3/7/2024  18:33 EST

## 2024-03-07 NOTE — LETTER
[facility]  [address] [place] [address] [place] [address]  [phone]  [phone]        March 15, 2024      Patient: Lita Yu  YOB: 1965  Member ID:     [id number]        POST-ACUTE CARE SKILLED NURSING FACILITY PRECERTIFICATION REQUEST    REFERENCE NUMBER: [id number]    Santos Arellano  Case Management Associate  [facility]  [address] [place] [address], [place] [address]  P: [phone]  F: [phone]

## 2024-03-07 NOTE — H&P
[facility]   HISTORY AND PHYSICAL    Patient Name: Lita Yu  : 1965  MRN: [id number]  Primary Care Physician:  Norma Saul APRN  Date of admission: 3/7/2024    Subjective   Subjective     Chief Complaint: Generalized weakness and muscle aches mostly in the thighs and the arms started yesterday  Patient had previous history of rhabdomyolysis  Patient was hospitalized in February for dehydration and  Patient had previous hip replacement  Since last night she is complaining of generalized muscle aches and difficulty ambulation  Seen in the emergency room initial workup was negative    HPI:    Lita Yu is a 58 y.o. female with history of alcohol abuse  Presented emergency room with generalized weakness and myalgia    Review of Systems   Significant for generalized muscle aches and myalgia  No focal weakness no slurred speech no blurred vision no change in mentation no fever no chills no weight loss rest of 14 system reviewed and negative    Personal History     Past Medical History:   Diagnosis Date   • Cirrhosis    • COPD (chronic obstructive pulmonary disease)    • Depression    • GERD (gastroesophageal reflux disease)    • Hyperlipidemia    • Hypertension    • PTSD (post-traumatic stress disorder)        Past Surgical History:   Procedure Laterality Date   •  SECTION N/A    • COLONOSCOPY N/A 2021    Procedure: COLONOSCOPY with polypectomy x2 and random biopsy;  Surgeon: Osman Clay MD;  Location: [facility] ENDOSCOPY;  Service: Gastroenterology;  Laterality: N/A;  colon polyps   • DENTAL PROCEDURE     • ENDOSCOPY N/A 2021    Procedure: ESOPHAGOGASTRODUODENOSCOPY;  Surgeon: Osman Clay MD;  Location: [facility] ENDOSCOPY;  Service: Gastroenterology;  Laterality: N/A;  esophagitis   • JOINT REPLACEMENT     • REPLACEMENT TOTAL HIP LATERAL POSITION Left    • TONSILLECTOMY     • VAGINAL BIRTH AFTER  SECTION         Family History: family history  includes Alcohol abuse in her mother and paternal grandfather. Otherwise pertinent FHx was reviewed and not pertinent to current issue.    Social History:  reports that she has been smoking cigarettes. She has been exposed to tobacco smoke. She has never used smokeless tobacco. She reports current alcohol use of about 2.0 standard drinks of alcohol per week. She reports that she does not use drugs.    Home Medications:  QUEtiapine, Vitamin B-12, Zinc Gluconate, albuterol sulfate HFA, busPIRone, diclofenac, folic acid, furosemide, lidocaine, melatonin, potassium chloride, sertraline, spironolactone, and thiamine    Allergies:  Allergies   Allergen Reactions   • Sulfa Antibiotics Hives   • Keflex [Cephalexin] Hives       Objective   Objective     Vitals:   Temp:  [98.1 °F (36.7 °C)] 98.1 °F (36.7 °C)  Heart Rate:  [] 107  Resp:  [19] 19  BP: (103-120)/(58-65) 120/63  Physical Exam    Constitutional: Awake, alert   Eyes: PERRLA, sclerae anicteric, no conjunctival injection   HENT: NCAT, mucous membranes moist   Neck: Supple, no thyromegaly, no lymphadenopathy, trachea midline   Respiratory: Clear to auscultation bilaterally, nonlabored respirations    Cardiovascular: RRR, no murmurs, rubs, or gallops, palpable pedal pulses bilaterally   Gastrointestinal: Positive bowel sounds, soft, nontender, nondistended   Musculoskeletal: No bilateral ankle edema, no clubbing or cyanosis to extremities   Psychiatric: Appropriate affect, cooperative   Neurologic: Oriented x 3, strength symmetric in all extremities, Cranial Nerves grossly intact to confrontation, speech clear   Skin: No rashes     Result Review    Result Review:  I have personally reviewed the results from the time of this admission to 3/7/2024 11:12 EST and agree with these findings:  []  Laboratory list / accordion  []  Microbiology  []  Radiology  []  EKG/Telemetry   []  Cardiology/Vascular   []  Pathology  []  Old records  []  Other:  Most notable findings  include:       Assessment & Plan   Assessment / Plan     Brief Patient Summary:  Lita Yu is a 58 y.o. female who is here with generalized muscle aches and myalgia  No focal weakness she is able to move all extremities with mild generalized weakness with no focal deficits  Patient with a history of previous rhabdomyolysis  Patient with history of hypertension on Aldactone  History of alcohol abuse with alcohol cirrhosis  History of COPD patient is hyponatremic probably related to Aldactone      Active Hospital Problems:  Active Hospital Problems    Diagnosis    • **Myalgia      Plan:   Patient will be kept observation  Hydration IV fluids  Will get nephrology consult for evaluation for hyponatremia  Get PT evaluation  Follow daily BMP  I will hold the diuretic for now  C initial CK 74  Initial thyroid test normal  Will check CT of the brain but I doubt any intracranial she had previously low T4  Repeat T4 and TSH are normal  DVT prophylaxis:  Medical DVT prophylaxis orders are present.        CODE STATUS:    Code Status (Patient has no pulse and is not breathing): CPR (Attempt to Resuscitate)  Medical Interventions (Patient has pulse or is breathing): Full Support    Admission Status:  I believe this patient meets obs status.    Edgar Oliva MD

## 2024-03-07 NOTE — Clinical Note
Level of Care: Med/Surg [1]   Diagnosis: Myalgia [119963]   Admitting Physician: SUSAN ODELL [[id number]]   Attending Physician: SUSAN ODELL [[id number]]

## 2024-03-08 LAB
ALBUMIN SERPL-MCNC: 3.1 G/DL (ref 3.5–5.2)
ALBUMIN/GLOB SERPL: 1 G/DL
ALP SERPL-CCNC: 123 U/L (ref 39–117)
ALT SERPL W P-5'-P-CCNC: 21 U/L (ref 1–33)
ANION GAP SERPL CALCULATED.3IONS-SCNC: 13 MMOL/L (ref 5–15)
AST SERPL-CCNC: 29 U/L (ref 1–32)
BACTERIA BLD CULT: ABNORMAL
BILIRUB SERPL-MCNC: 0.5 MG/DL (ref 0–1.2)
BOTTLE TYPE: ABNORMAL
BUN SERPL-MCNC: 17 MG/DL (ref 6–20)
BUN/CREAT SERPL: 18.5 (ref 7–25)
CALCIUM SPEC-SCNC: 8.3 MG/DL (ref 8.6–10.5)
CHLORIDE SERPL-SCNC: 95 MMOL/L (ref 98–107)
CO2 SERPL-SCNC: 20 MMOL/L (ref 22–29)
CREAT SERPL-MCNC: 0.92 MG/DL (ref 0.57–1)
DEPRECATED RDW RBC AUTO: 63.9 FL (ref 37–54)
EGFRCR SERPLBLD CKD-EPI 2021: 73.2 ML/MIN/1.73
ERYTHROCYTE [DISTWIDTH] IN BLOOD BY AUTOMATED COUNT: 17.2 % (ref 12.3–15.4)
GLOBULIN UR ELPH-MCNC: 3 GM/DL
GLUCOSE SERPL-MCNC: 108 MG/DL (ref 65–99)
HCT VFR BLD AUTO: 29 % (ref 34–46.6)
HGB BLD-MCNC: 9.4 G/DL (ref 12–15.9)
MAGNESIUM SERPL-MCNC: 1.7 MG/DL (ref 1.6–2.6)
MCH RBC QN AUTO: 34.7 PG (ref 26.6–33)
MCHC RBC AUTO-ENTMCNC: 32.3 G/DL (ref 31.5–35.7)
MCV RBC AUTO: 107.3 FL (ref 79–97)
PHOSPHATE SERPL-MCNC: 3 MG/DL (ref 2.5–4.5)
PLATELET # BLD AUTO: 70 10*3/MM3 (ref 140–450)
PMV BLD AUTO: 8.4 FL (ref 6–12)
POTASSIUM SERPL-SCNC: 3.7 MMOL/L (ref 3.5–5.2)
PROT SERPL-MCNC: 6.1 G/DL (ref 6–8.5)
QT INTERVAL: 306 MS
QTC INTERVAL: 412 MS
RBC # BLD AUTO: 2.7 10*6/MM3 (ref 3.77–5.28)
SODIUM SERPL-SCNC: 128 MMOL/L (ref 136–145)
SODIUM UR-SCNC: 45 MMOL/L
WBC NRBC COR # BLD AUTO: 6.5 10*3/MM3 (ref 3.4–10.8)

## 2024-03-08 PROCEDURE — 85025 COMPLETE CBC W/AUTO DIFF WBC: CPT | Performed by: STUDENT IN AN ORGANIZED HEALTH CARE EDUCATION/TRAINING PROGRAM

## 2024-03-08 PROCEDURE — 87040 BLOOD CULTURE FOR BACTERIA: CPT | Performed by: STUDENT IN AN ORGANIZED HEALTH CARE EDUCATION/TRAINING PROGRAM

## 2024-03-08 PROCEDURE — 82105 ALPHA-FETOPROTEIN SERUM: CPT | Performed by: STUDENT IN AN ORGANIZED HEALTH CARE EDUCATION/TRAINING PROGRAM

## 2024-03-08 PROCEDURE — 83735 ASSAY OF MAGNESIUM: CPT | Performed by: INTERNAL MEDICINE

## 2024-03-08 PROCEDURE — 25010000002 VANCOMYCIN HCL 1.25 G RECONSTITUTED SOLUTION 1 EACH VIAL: Performed by: NURSE PRACTITIONER

## 2024-03-08 PROCEDURE — 97166 OT EVAL MOD COMPLEX 45 MIN: CPT

## 2024-03-08 PROCEDURE — 25010000002 VANCOMYCIN 750 MG RECONSTITUTED SOLUTION 1 EACH VIAL: Performed by: STUDENT IN AN ORGANIZED HEALTH CARE EDUCATION/TRAINING PROGRAM

## 2024-03-08 PROCEDURE — 84100 ASSAY OF PHOSPHORUS: CPT | Performed by: INTERNAL MEDICINE

## 2024-03-08 PROCEDURE — 97162 PT EVAL MOD COMPLEX 30 MIN: CPT

## 2024-03-08 PROCEDURE — 25010000002 THIAMINE HCL 200 MG/2ML SOLUTION: Performed by: NURSE PRACTITIONER

## 2024-03-08 PROCEDURE — 87324 CLOSTRIDIUM AG IA: CPT | Performed by: NURSE PRACTITIONER

## 2024-03-08 PROCEDURE — 25010000002 THIAMINE HCL 200 MG/2ML SOLUTION: Performed by: STUDENT IN AN ORGANIZED HEALTH CARE EDUCATION/TRAINING PROGRAM

## 2024-03-08 PROCEDURE — 85027 COMPLETE CBC AUTOMATED: CPT | Performed by: STUDENT IN AN ORGANIZED HEALTH CARE EDUCATION/TRAINING PROGRAM

## 2024-03-08 PROCEDURE — 25010000002 THIAMINE PER 100 MG: Performed by: STUDENT IN AN ORGANIZED HEALTH CARE EDUCATION/TRAINING PROGRAM

## 2024-03-08 PROCEDURE — 25810000003 SODIUM CHLORIDE 0.9 % SOLUTION 250 ML FLEX CONT: Performed by: NURSE PRACTITIONER

## 2024-03-08 PROCEDURE — 99222 1ST HOSP IP/OBS MODERATE 55: CPT | Performed by: INTERNAL MEDICINE

## 2024-03-08 PROCEDURE — 80053 COMPREHEN METABOLIC PANEL: CPT | Performed by: STUDENT IN AN ORGANIZED HEALTH CARE EDUCATION/TRAINING PROGRAM

## 2024-03-08 PROCEDURE — 85007 BL SMEAR W/DIFF WBC COUNT: CPT | Performed by: STUDENT IN AN ORGANIZED HEALTH CARE EDUCATION/TRAINING PROGRAM

## 2024-03-08 PROCEDURE — 25810000003 SODIUM CHLORIDE 0.9 % SOLUTION 250 ML FLEX CONT: Performed by: STUDENT IN AN ORGANIZED HEALTH CARE EDUCATION/TRAINING PROGRAM

## 2024-03-08 PROCEDURE — 25010000002 ENOXAPARIN PER 10 MG: Performed by: STUDENT IN AN ORGANIZED HEALTH CARE EDUCATION/TRAINING PROGRAM

## 2024-03-08 PROCEDURE — 85652 RBC SED RATE AUTOMATED: CPT | Performed by: STUDENT IN AN ORGANIZED HEALTH CARE EDUCATION/TRAINING PROGRAM

## 2024-03-08 PROCEDURE — 87449 NOS EACH ORGANISM AG IA: CPT | Performed by: NURSE PRACTITIONER

## 2024-03-08 RX ORDER — CHOLECALCIFEROL (VITAMIN D3) 125 MCG
5 CAPSULE ORAL NIGHTLY PRN
Status: DISCONTINUED | OUTPATIENT
Start: 2024-03-08 | End: 2024-03-15 | Stop reason: HOSPADM

## 2024-03-08 RX ORDER — ALBUTEROL SULFATE 2.5 MG/3ML
2.5 SOLUTION RESPIRATORY (INHALATION) EVERY 4 HOURS PRN
Status: DISCONTINUED | OUTPATIENT
Start: 2024-03-08 | End: 2024-03-15 | Stop reason: HOSPADM

## 2024-03-08 RX ORDER — LIDOCAINE 4 G/G
1 PATCH TOPICAL DAILY
Status: DISCONTINUED | OUTPATIENT
Start: 2024-03-08 | End: 2024-03-15 | Stop reason: HOSPADM

## 2024-03-08 RX ORDER — FUROSEMIDE 40 MG/1
40 TABLET ORAL DAILY
Status: DISCONTINUED | OUTPATIENT
Start: 2024-03-08 | End: 2024-03-09

## 2024-03-08 RX ORDER — SPIRONOLACTONE 100 MG/1
100 TABLET, FILM COATED ORAL DAILY
Status: DISCONTINUED | OUTPATIENT
Start: 2024-03-08 | End: 2024-03-09

## 2024-03-08 RX ORDER — QUETIAPINE FUMARATE 25 MG/1
50 TABLET, FILM COATED ORAL NIGHTLY
Status: DISCONTINUED | OUTPATIENT
Start: 2024-03-08 | End: 2024-03-15 | Stop reason: HOSPADM

## 2024-03-08 RX ORDER — SERTRALINE HYDROCHLORIDE 100 MG/1
100 TABLET, FILM COATED ORAL 2 TIMES DAILY
Status: DISCONTINUED | OUTPATIENT
Start: 2024-03-08 | End: 2024-03-15 | Stop reason: HOSPADM

## 2024-03-08 RX ORDER — BUSPIRONE HYDROCHLORIDE 15 MG/1
15 TABLET ORAL 2 TIMES DAILY
Status: DISCONTINUED | OUTPATIENT
Start: 2024-03-08 | End: 2024-03-15 | Stop reason: HOSPADM

## 2024-03-08 RX ORDER — ACETAMINOPHEN 325 MG/1
650 TABLET ORAL EVERY 6 HOURS PRN
Status: DISCONTINUED | OUTPATIENT
Start: 2024-03-08 | End: 2024-03-15 | Stop reason: HOSPADM

## 2024-03-08 RX ORDER — LOPERAMIDE HYDROCHLORIDE 2 MG/1
2 CAPSULE ORAL 4 TIMES DAILY PRN
Status: DISCONTINUED | OUTPATIENT
Start: 2024-03-08 | End: 2024-03-15 | Stop reason: HOSPADM

## 2024-03-08 RX ADMIN — HYDROCODONE BITARTRATE AND ACETAMINOPHEN 1 TABLET: 5; 325 TABLET ORAL at 17:01

## 2024-03-08 RX ADMIN — THIAMINE HYDROCHLORIDE 200 MG: 100 INJECTION, SOLUTION INTRAMUSCULAR; INTRAVENOUS at 05:32

## 2024-03-08 RX ADMIN — ACETAMINOPHEN 650 MG: 325 TABLET, FILM COATED ORAL at 03:00

## 2024-03-08 RX ADMIN — QUETIAPINE FUMARATE 50 MG: 25 TABLET ORAL at 20:57

## 2024-03-08 RX ADMIN — HYDROCODONE BITARTRATE AND ACETAMINOPHEN 1 TABLET: 5; 325 TABLET ORAL at 23:25

## 2024-03-08 RX ADMIN — Medication 10 ML: at 08:04

## 2024-03-08 RX ADMIN — LOPERAMIDE HYDROCHLORIDE 2 MG: 2 CAPSULE ORAL at 23:25

## 2024-03-08 RX ADMIN — HYDROCODONE BITARTRATE AND ACETAMINOPHEN 1 TABLET: 5; 325 TABLET ORAL at 08:01

## 2024-03-08 RX ADMIN — METHOCARBAMOL 500 MG: 500 TABLET ORAL at 18:15

## 2024-03-08 RX ADMIN — VANCOMYCIN HYDROCHLORIDE 750 MG: 750 INJECTION, POWDER, LYOPHILIZED, FOR SOLUTION INTRAVENOUS at 16:20

## 2024-03-08 RX ADMIN — THIAMINE HYDROCHLORIDE 200 MG: 100 INJECTION, SOLUTION INTRAMUSCULAR; INTRAVENOUS at 21:00

## 2024-03-08 RX ADMIN — VANCOMYCIN HYDROCHLORIDE 1250 MG: 1.25 INJECTION, POWDER, LYOPHILIZED, FOR SOLUTION INTRAVENOUS at 05:32

## 2024-03-08 RX ADMIN — Medication 10 ML: at 20:59

## 2024-03-08 RX ADMIN — METHOCARBAMOL 500 MG: 500 TABLET ORAL at 12:56

## 2024-03-08 RX ADMIN — METHOCARBAMOL 500 MG: 500 TABLET ORAL at 08:01

## 2024-03-08 RX ADMIN — Medication 100 MG: at 16:32

## 2024-03-08 RX ADMIN — FOLIC ACID 1 MG: 1 TABLET ORAL at 08:01

## 2024-03-08 RX ADMIN — BUSPIRONE HYDROCHLORIDE 15 MG: 15 TABLET ORAL at 20:58

## 2024-03-08 RX ADMIN — ENOXAPARIN SODIUM 40 MG: 100 INJECTION SUBCUTANEOUS at 16:32

## 2024-03-08 RX ADMIN — ACETAMINOPHEN 650 MG: 325 TABLET, FILM COATED ORAL at 20:57

## 2024-03-08 RX ADMIN — THIAMINE HYDROCHLORIDE 200 MG: 100 INJECTION, SOLUTION INTRAMUSCULAR; INTRAVENOUS at 12:56

## 2024-03-08 RX ADMIN — LIDOCAINE 1 PATCH: 4 PATCH TOPICAL at 16:30

## 2024-03-08 RX ADMIN — METHOCARBAMOL 500 MG: 500 TABLET ORAL at 20:58

## 2024-03-08 RX ADMIN — SERTRALINE 100 MG: 100 TABLET, FILM COATED ORAL at 20:57

## 2024-03-08 RX ADMIN — ACETAMINOPHEN 650 MG: 325 TABLET, FILM COATED ORAL at 12:56

## 2024-03-08 NOTE — CONSULTS
Infectious Diseases Consult Note    Referring Provider: Ramsey Hanna MD    Reason for Consultation: Bacteremia    Patient Care Team:  Norma Saul APRN as PCP - General (Nurse Practitioner)  Flory Villanueva MD (Physical Medicine and Rehabilitation)    Chief complaint back pain, joint pain and fever    Subjective     History of present illness:      This is 58-year-old female has been sick for a few days with fever and back pain involving the neck and the lower back she was also complaining of pain in her upper and lower extremities.  She was spiking fever at home.  The patient came to the emergency room with a complains of worsening symptoms on March 7, 2024.  She had 2 sets of blood culture after she was found febrile and both sets are growing Staphylococcus aureus and BC ID detected MRSA gene.  The patient is currently on IV vancomycin.  The patient denies using IV drugs.  The patient is known to have history of left hip replacement secondary to fracture but denied having any infection in the left hip after the placement.  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  She has history of liver disease and she has history of alcohol abuse and she stated that she has history of hemochromatosis.    Review of Systems   Review of Systems   Constitutional:  Positive for fever.   HENT: Negative.     Eyes: Negative.    Respiratory: Negative.     Cardiovascular: Negative.    Gastrointestinal: Negative.    Genitourinary: Negative.    Musculoskeletal:  Positive for back pain, myalgias and neck pain.   Skin: Negative.    Neurological: Negative.    Hematological: Negative.    Psychiatric/Behavioral: Negative.         Medications  (Not in a hospital admission)      History  Past Medical History:   Diagnosis Date    Cirrhosis     COPD (chronic obstructive pulmonary disease)     Depression     GERD (gastroesophageal reflux disease)     Hyperlipidemia     Hypertension     PTSD (post-traumatic stress disorder)       Past Surgical History:   Procedure Laterality Date     SECTION N/A     COLONOSCOPY N/A 2021    Procedure: COLONOSCOPY with polypectomy x2 and random biopsy;  Surgeon: Osman Clay MD;  Location: [facility] ENDOSCOPY;  Service: Gastroenterology;  Laterality: N/A;  colon polyps    DENTAL PROCEDURE      ENDOSCOPY N/A 2021    Procedure: ESOPHAGOGASTRODUODENOSCOPY;  Surgeon: Osman Clay MD;  Location: [facility] ENDOSCOPY;  Service: Gastroenterology;  Laterality: N/A;  esophagitis    JOINT REPLACEMENT      REPLACEMENT TOTAL HIP LATERAL POSITION Left     TONSILLECTOMY      VAGINAL BIRTH AFTER  SECTION         Family History  Family History   Problem Relation Age of Onset    Alcohol abuse Mother     Alcohol abuse Paternal Grandfather        Social History   reports that she has been smoking cigarettes. She has been exposed to tobacco smoke. She has never used smokeless tobacco. She reports current alcohol use of about 2.0 standard drinks of alcohol per week. She reports that she does not use drugs.    Allergies  Sulfa antibiotics and Keflex [cephalexin]    Objective     Vital Signs   Vital Signs (last 24 hours)          0700   0659  0700   1128   Most Recent      Temp (°F) 98.1 -  102.9      98.2     98.2 (36.8) 0814    Heart Rate 96 -  112      99     99 0814    Resp 19 -  28      20     20 0814    BP 90/58 -  120/63      100/56     100/56  0814    SpO2 (%) 91 -  99      90     90  0814            Physical Exam:  Physical Exam  Vitals and nursing note reviewed.   Constitutional:       Appearance: She is well-developed.   HENT:      Head: Normocephalic and atraumatic.   Eyes:      Pupils: Pupils are equal, round, and reactive to light.   Cardiovascular:      Rate and Rhythm: Normal rate and regular rhythm.      Heart sounds: Normal heart sounds.   Pulmonary:      Effort: Pulmonary effort is normal. No respiratory distress.      Breath sounds:  Normal breath sounds. No wheezing or rales.   Abdominal:      General: Bowel sounds are normal. There is no distension.      Palpations: Abdomen is soft. There is no mass.      Tenderness: There is no abdominal tenderness. There is no guarding or rebound.   Musculoskeletal:         General: No deformity.      Cervical back: Normal range of motion and neck supple.      Comments: Tenderness with the movement of all joints including hips and shoulders but there is no obvious joint effusion    Tenderness at the lower back and the upper back   Skin:     General: Skin is warm.      Findings: No erythema or rash.   Neurological:      Mental Status: She is alert and oriented to person, place, and time.      Cranial Nerves: No cranial nerve deficit.         Microbiology  Microbiology Results (last 10 days)       Procedure Component Value - Date/Time    COVID-19, FLU A/B, RSV PCR 1 HR TAT - Swab, Nasopharynx [[id number]]  (Normal) Collected: 03/07/24 1526    Lab Status: Final result Specimen: Swab from Nasopharynx Updated: 03/07/24 1607     COVID19 Not Detected     Influenza A PCR Not Detected     Influenza B PCR Not Detected     RSV, PCR Not Detected    Narrative:      Fact sheet for providers: https://www.fda.gov/media/905548/download    Fact sheet for patients: https://www.fda.gov/media/907637/download    Test performed by PCR.    Blood Culture - Blood, Arm, Left [[id number]]  (Abnormal) Collected: 03/07/24 0933    Lab Status: Preliminary result Specimen: Blood from Arm, Left Updated: 03/08/24 0434     Blood Culture Abnormal Stain     Gram Stain Aerobic Bottle Gram positive cocci in clusters      Anaerobic Bottle Gram positive cocci in clusters    Blood Culture - Blood, Arm, Right [[id number]]  (Abnormal) Collected: 03/07/24 0920    Lab Status: Preliminary result Specimen: Blood from Arm, Right Updated: 03/08/24 0433     Blood Culture Abnormal Stain     Gram Stain Anaerobic Bottle Gram positive cocci in clusters      Aerobic  Bottle Gram positive cocci in clusters    Blood Culture ID, PCR - Blood, Arm, Right [[id number]]  (Abnormal) Collected: 03/07/24 0920    Lab Status: Final result Specimen: Blood from Arm, Right Updated: 03/08/24 0433     BCID, PCR Staph aureus. mecA/C and MREJ (methicillin resistance gene) detected. Identification by BCID2 PCR.     BOTTLE TYPE Anaerobic Bottle    Narrative:      Infectious disease consultation is highly recommended to rule out distant foci of infection.            Laboratory  Results from last 7 days   Lab Units 03/08/24  0752   WBC 10*3/mm3 6.50   HEMOGLOBIN g/dL 9.4*   HEMATOCRIT % 29.0*   PLATELETS 10*3/mm3 70*     Results from last 7 days   Lab Units 03/08/24  0752   SODIUM mmol/L 128*   POTASSIUM mmol/L 3.7   CHLORIDE mmol/L 95*   CO2 mmol/L 20.0*   BUN mg/dL 17   CREATININE mg/dL 0.92   GLUCOSE mg/dL 108*   CALCIUM mg/dL 8.3*     Results from last 7 days   Lab Units 03/08/24  0752   SODIUM mmol/L 128*   POTASSIUM mmol/L 3.7   CHLORIDE mmol/L 95*   CO2 mmol/L 20.0*   BUN mg/dL 17   CREATININE mg/dL 0.92   GLUCOSE mg/dL 108*   CALCIUM mg/dL 8.3*     Results from last 7 days   Lab Units 03/07/24  0920   CK TOTAL U/L 74     Results from last 7 days   Lab Units 03/07/24  0920   SED RATE mm/hr 66*           Radiology  Imaging Results (Last 72 Hours)       Procedure Component Value Units Date/Time    CT Head Without Contrast [[id number]] Collected: 03/07/24 1127     Updated: 03/07/24 1131    Narrative:      CT HEAD WO CONTRAST    Date of Exam: 3/7/2024 11:21 AM EST    Indication: General weakness unable to walk.    Comparison: CT brain dated 10/14/2022    Technique: Axial CT images were obtained of the head without contrast administration.  Coronal reconstructions were performed.  Automated exposure control and iterative reconstruction methods were used.    Findings:  There is no evidence of hemorrhage. There is no mass effect or midline shift. Mild diffuse brain atrophy.    There is no extracerebral  collection.    Ventricles are normal in size and configuration for patient's stated age.     Posterior fossa is within normal limits.    Calvarium and skull base appear intact.  Visualized sinuses show no air fluid levels. Visualized orbits are unremarkable.      Impression:      Impression:    No definite acute intracranial abnormality      Electronically Signed: Miguel Dean MD    3/7/2024 11:29 AM EST    Workstation ID: [id number]            Cardiology      Results Review:  I have reviewed all clinical data, test, lab, and imaging results.       Schedule Meds  enoxaparin, 40 mg, Subcutaneous, Daily  folic acid, 1 mg, Oral, Daily  methocarbamol, 500 mg, Oral, 4x Daily  multivitamin with minerals, 1 tablet, Oral, Daily  senna-docusate sodium, 2 tablet, Oral, BID  sodium chloride, 10 mL, Intravenous, Q12H  thiamine (B-1) IV, 200 mg, Intravenous, Q8H   Followed by  [START ON 3/13/2024] thiamine, 100 mg, Oral, Daily  vancomycin, 750 mg, Intravenous, Q12H        Infusion Meds  Pharmacy to dose vancomycin,         PRN Meds    acetaminophen    senna-docusate sodium **AND** polyethylene glycol **AND** bisacodyl **AND** bisacodyl    HYDROcodone-acetaminophen    ketorolac    LORazepam **OR** midazolam **OR** LORazepam **OR** midazolam **OR** midazolam **OR** midazolam    Pharmacy to dose vancomycin    [COMPLETED] Insert Peripheral IV **AND** sodium chloride    sodium chloride    sodium chloride      Assessment & Plan       Assessment    Staphylococcus aureus bacteremia, BCID detected MRSA gene.  The source is not very clear but based on the patient's symptoms we need to rule out discitis/osteomyelitis.  Endocarditis also needs to be ruled out.  Patient denies using IV drugs    History of liver cirrhosis secondary to alcohol and hemochromatosis    History of alcohol abuse and tobacco abuse    S/p left total hip replacement secondary to hip fracture in 2017 or 2018.  Patient denied having infection after the  procedure    Plan    Continue IV vancomycin waiting on final blood culture susceptibilities and try to keep vancomycin trough between 15 and 20  Repeat 1 set of blood cultures today  Request 2D echo  Consult cardiology for transesophageal echo to rule out vegetation  Request MRI of the lumbar, thoracic and cervical spine with and without contrast to rule out discitis/osteomyelitis  Supportive care  A.m. labs with sed rate    Pilar Foster MD  03/08/24  11:28 EST    Note is dictated utilizing voice recognition software/Dragon

## 2024-03-08 NOTE — OUTREACH NOTE
Medical Week 2 Survey      Flowsheet Row Responses   [facility] facility patient discharged from? [facility]   Does the patient have one of the following disease processes/diagnoses(primary or secondary)? Other   Week 2 attempt successful? No   Unsuccessful attempts Attempt 1   Revoke Readmitted            JAY ANDERSON - Registered Nurse

## 2024-03-08 NOTE — PLAN OF CARE
Goal Outcome Evaluation:              Outcome Evaluation: Lita Yu is a 58 y.o. female who is here with generalized muscle aches and myalgia.  Patient had previous history of rhabdomyolysis.  Found to have acute kidney injury likely secondary to volume and low blood pressure.  She is currently living at home alone in [facility] with one step entry. At baseline, she is normally IND with household mobility and ADLs with use of SPC. Owns RW.  Pt able to roll side to side with supervision to change brief, though once rolling to attempt to sit EOB she cried out due to pain and required mod assist.  She is very reluctant to attempt to stand though once she agrees requires min to mod assist. Recommend SNF at discharge due to difficulty with all movement.      Anticipated Discharge Disposition (OT): skilled nursing facility

## 2024-03-08 NOTE — PROGRESS NOTES
PROGRESS NOTE      Patient Name: Lita Yu  : 1965  MRN: [id number]  Primary Care Physician: Norma Saul APRN  Date of admission: 3/7/2024    Patient Care Team:  Norma Saul APRN as PCP - General (Nurse Practitioner)  Flory Villanueva MD (Physical Medicine and Rehabilitation)        Subjective   Subjective:     Patient is still complaining of generalized bodyaches  Review of systems:  All other review of system unremarkable      Allergies:    Allergies   Allergen Reactions   • Sulfa Antibiotics Hives   • Keflex [Cephalexin] Hives       Objective   Exam:     Vital Signs  Temp:  [98.1 °F (36.7 °C)-102.9 °F (39.4 °C)] 99.7 °F (37.6 °C)  Heart Rate:  [] 96  Resp:  [19-28] 22  BP: ()/(51-68) 96/54  SpO2:  [91 %-99 %] 91 %  on   ;   Device (Oxygen Therapy): room air  Body mass index is 23.91 kg/m².    General: Middle-age female in no acute distress.    Head:      Normocephalic and atraumatic.    Eyes:      PERRL/EOM intact, conjunctiva and sclera clear with out nystagmus.    Neck:      No masses, thyromegaly,  trachea central with normal respiratory effort   Lungs:    Clear bilaterally to auscultation.    Heart:      Regular rate and rhythm, no murmur no gallop  Abd:        Soft, nontender, not distended, bowel sounds positive, no shifting dullness   Pulses:   Pulses palpable  Extr:        No cyanosis or clubbing--no significant edema.    Neuro:    No focal deficits.   alert oriented x3  Skin:       Intact without lesions or rashes.    Psych:    Alert and cooperative; normal mood and affect; .      Results Review:  I have personally reviewed most recent Data :  CBC    Results from last 7 days   Lab Units 24  0752 24  0920 24  0351 24  0536 24  0757   WBC 10*3/mm3 6.50 8.70 5.80 5.20 6.50   HEMOGLOBIN g/dL 9.4* 10.6* 10.1* 9.9* 10.1*   PLATELETS 10*3/mm3 70* 76* 125* 131* 140     CMP   Results from last 7 days   Lab Units 24  7955  03/07/24  0920 03/04/24  0351 03/03/24  0648 03/03/24  0536 03/02/24  2136 03/02/24  0757   SODIUM mmol/L 127* 127* 135*  --  135*  --  128*   POTASSIUM mmol/L 3.9 4.3 4.3  --  4.0 4.0 3.3*   CHLORIDE mmol/L 97* 92* 103  --  102  --  93*   CO2 mmol/L 19.0* 18.0* 22.0  --  23.0  --  18.0*   BUN mg/dL 18 20 8  --  11  --  25*   CREATININE mg/dL 0.98 1.22* 0.93  --  0.80  --  1.05*   GLUCOSE mg/dL 86 99 94  --  89  --  77   ALBUMIN g/dL  --  3.8 3.5  --  3.5  --  3.6   BILIRUBIN mg/dL  --  0.7 0.3  --  0.3  --  0.4   ALK PHOS U/L  --  153* 138*  --  138*  --  163*   AST (SGOT) U/L  --  38* 39*  --  40*  --  53*   ALT (SGPT) U/L  --  26 35*  --  40*  --  48*   AMMONIA umol/L  --  16  --  38  --   --  92*     ABG      CT Head Without Contrast    Result Date: 3/7/2024  Impression: No definite acute intracranial abnormality Electronically Signed: Miguel Dean MD  3/7/2024 11:29 AM EST  Workstation ID: [id number]       Scheduled Meds:enoxaparin, 40 mg, Subcutaneous, Daily  folic acid, 1 mg, Oral, Daily  methocarbamol, 500 mg, Oral, 4x Daily  multivitamin with minerals, 1 tablet, Oral, Daily  senna-docusate sodium, 2 tablet, Oral, BID  sodium chloride, 10 mL, Intravenous, Q12H  thiamine (B-1) IV, 200 mg, Intravenous, Q8H   Followed by  [START ON 3/13/2024] thiamine, 100 mg, Oral, Daily  vancomycin, 750 mg, Intravenous, Q12H      Continuous Infusions:Pharmacy to dose vancomycin,       PRN Meds:•  acetaminophen  •  senna-docusate sodium **AND** polyethylene glycol **AND** bisacodyl **AND** bisacodyl  •  HYDROcodone-acetaminophen  •  ketorolac  •  LORazepam **OR** midazolam **OR** LORazepam **OR** midazolam **OR** midazolam **OR** midazolam  •  Pharmacy to dose vancomycin  •  [COMPLETED] Insert Peripheral IV **AND** sodium chloride  •  sodium chloride  •  sodium chloride    Assessment & Plan   Assessment and Plan:         Myalgia    ASSESSMENT:  Acute kidney injury likely secondary to volume and low blood  pressure  Hyponatremia may be secondary to alcohol intake follow-up with a urine studies   Metabolic acidosis may be due to his respiratory alkalosis because of some history of cirrhosis  Hypocalcemia due to poor nutritional status check phosphorus level tomorrow morning check magnesium level tomorrow morning    PLAN :      Although hemodynamics are still on the softer side but patient is feeling much better   Patient is eating and drinking   Labs are pending from today I really want to check sodium level   Renal functions have improved   Urine studies with urine  osmolality of 323 urine sodium somehow was not don  Will order urine sodium again   Follow-up with repeat labs  Follow-up with magnesium and phosphorus level  Thank you for letting me participate             Electronically signed by Red Quiroga MD,   [facility] kidney consultant  [phone]  3/8/2024  08:11 EST

## 2024-03-08 NOTE — PLAN OF CARE
Problem: Adult Inpatient Plan of Care  Goal: Plan of Care Review  Outcome: Ongoing, Progressing  Goal: Patient-Specific Goal (Individualized)  Outcome: Ongoing, Progressing  Goal: Absence of Hospital-Acquired Illness or Injury  Outcome: Ongoing, Progressing  Goal: Optimal Comfort and Wellbeing  Outcome: Ongoing, Progressing  Goal: Readiness for Transition of Care  Outcome: Ongoing, Progressing  Intervention: Mutually Develop Transition Plan  Recent Flowsheet Documentation  Taken 3/7/2024 2053 by Antonella Kowalski, RN  Equipment Currently Used at Home: cane, straight  Taken 3/7/2024 2049 by Antonella Kowalski, RN  Equipment Currently Used at Home: cane, straight     Problem: Pain Acute  Goal: Acceptable Pain Control and Functional Ability  Outcome: Ongoing, Progressing     Problem: Fall Injury Risk  Goal: Absence of Fall and Fall-Related Injury  Outcome: Ongoing, Progressing     Problem: Alcohol Withdrawal  Goal: Alcohol Withdrawal Symptom Control  Outcome: Ongoing, Progressing     Problem: Acute Neurologic Deterioration (Alcohol Withdrawal)  Goal: Optimal Neurologic Function  Outcome: Ongoing, Progressing     Problem: Substance Misuse (Alcohol Withdrawal)  Goal: Readiness for Change Identified  Outcome: Ongoing, Progressing     Problem: Electrolyte Imbalance  Goal: Electrolyte Balance  Outcome: Ongoing, Progressing   Goal Outcome Evaluation:

## 2024-03-08 NOTE — THERAPY EVALUATION
Patient Name: Lita Yu  : 1965    MRN: [id number]                              Today's Date: 3/8/2024       Admit Date: 3/7/2024    Visit Dx:     ICD-10-CM ICD-9-CM   1. Weakness  R53.1 780.79   2. Unable to walk  R26.2 719.7     Patient Active Problem List   Diagnosis    Postmenopausal state    Post traumatic stress disorder    Pain in joints    Osteoporosis    Macrocytic anemia    Lumbosacral radiculopathy    Leg pain, left    Peripheral vascular disease    Essential hypertension    Hyperlipidemia    Degenerative joint disease of left hip    Seizure disorder    Smoker    Status post hip replacement    Tobacco dependence syndrome    Vitamin B12 deficiency    Anemia of chronic disease    Chronic obstructive pulmonary disease    Cirrhosis of liver    Acute UTI (urinary tract infection)    Generalized weakness    Rhabdomyolysis    Non-traumatic rhabdomyolysis    Moderate malnutrition    Chest pain    Dehydration    Myalgia     Past Medical History:   Diagnosis Date    Cirrhosis     COPD (chronic obstructive pulmonary disease)     Depression     GERD (gastroesophageal reflux disease)     Hyperlipidemia     Hypertension     PTSD (post-traumatic stress disorder)      Past Surgical History:   Procedure Laterality Date     SECTION N/A     COLONOSCOPY N/A 2021    Procedure: COLONOSCOPY with polypectomy x2 and random biopsy;  Surgeon: Osman Clay MD;  Location: [facility] ENDOSCOPY;  Service: Gastroenterology;  Laterality: N/A;  colon polyps    DENTAL PROCEDURE      ENDOSCOPY N/A 2021    Procedure: ESOPHAGOGASTRODUODENOSCOPY;  Surgeon: Osman Clay MD;  Location: [facility] ENDOSCOPY;  Service: Gastroenterology;  Laterality: N/A;  esophagitis    JOINT REPLACEMENT      REPLACEMENT TOTAL HIP LATERAL POSITION Left     TONSILLECTOMY      VAGINAL BIRTH AFTER  SECTION        General Information       Row Name 24 1728 24 1353       OT Time and Intention    Document Type  evaluation  -SR evaluation  -SR    Mode of Treatment occupational therapy  -SR --      Row Name 03/08/24 1728 03/08/24 1353       Occupational Profile    Reason for Services/Referral (Occupational Profile) Lita Yu is a 58 y.o. female who is here with generalized muscle aches and myalgia.  Patient had previous history of rhabdomyolysis.  Found to have acute kidney injury likely secondary to volume and low blood pressure.  She is currently living at home alone in [facility] with one step entry. At baseline, she is normally IND with household mobility and ADLs with use of SPC. Owns RW.  -SR Lita Yu is a 58 y.o. female who is here with generalized muscle aches and myalgia.  Patient had previous history of rhabdomyolysis.  Found to have acute kidney injury likely secondary to volume and low blood pressure. Pt lives at home and uses cane as needed.  -SR      Row Name 03/08/24 1728          Living Environment    People in Home alone  -SR       Row Name 03/08/24 1728          Cognition    Orientation Status (Cognition) oriented x 4  -SR       Row Name 03/08/24 1728          Safety Issues, Functional Mobility    Impairments Affecting Function (Mobility) balance;endurance/activity tolerance;cognition;pain;strength  -SR               User Key  (r) = Recorded By, (t) = Taken By, (c) = Cosigned By      Initials Name Provider Type    SR Michaela Briones, OT Occupational Therapist                     Mobility/ADL's       Row Name 03/08/24 1729          Bed Mobility    Bed Mobility bed mobility (all) activities  -SR     All Activities, [place] (Bed Mobility) supervision;moderate assist (50% patient effort)  -SR     Assistive Device (Bed Mobility) head of bed elevated  -SR     Comment, (Bed Mobility) Pt able to roll side to side with supervision to change brief, though once rolling to attempt to sit EOB she cried out due to pain and required mod assist.  -SR       Row Name 03/08/24 8040           Sit-Stand Transfer    Sit-Stand [place] (Transfers) moderate assist (50% patient effort);1 person assist  -SR       Row Name 03/08/24 1729          Activities of Daily Living    BADL Assessment/Intervention toileting  -SR       Row Name 03/08/24 1729          Toileting Assessment/Training    [place] Level (Toileting) dependent (less than 25% patient effort);change pad/brief  -SR               User Key  (r) = Recorded By, (t) = Taken By, (c) = Cosigned By      Initials Name Provider Type    SR Michaela Briones OT Occupational Therapist                   Obj/Interventions       Row Name 03/08/24 1730          Range of Motion Comprehensive    General Range of Motion no range of motion deficits identified  -SR     Comment, General Range of Motion Appears normal though guarded during therapy due to fear of pain  -SR       Row Name 03/08/24 1730          Strength Comprehensive (MMT)    Comment, General Manual Muscle Testing (MMT) Assessment Did not MMT due to pain  -SR       [facility] Name 03/08/24 1730          Balance    Balance Interventions sitting;standing;sit to stand;supported;static;dynamic;minimal challenge  -SR               User Key  (r) = Recorded By, (t) = Taken By, (c) = Cosigned By      Initials Name Provider Type    SR Michaela Briones OT Occupational Therapist                   Goals/Plan       [facility] Name 03/08/24 1732          Toileting Goal 1 (OT)    Activity/Device (Toileting Goal 1, OT) toileting skills, all  -SR     [place] Level/Cues Needed (Toileting Goal 1, OT) moderate assist (50-74% patient effort)  -SR     Time Frame (Toileting Goal 1, OT) 2 weeks  -SR       [facility] Name 03/08/24 1732          Grooming Goal 1 (OT)    Activity/Device (Grooming Goal 1, OT) grooming skills, all  -SR     [place] (Grooming Goal 1, OT) contact guard required  -SR     Time Frame (Grooming Goal 1, OT) 2 weeks  -SR       [facility] Name 03/08/24 1732          Therapy Assessment/Plan (OT)    Planned  Therapy Interventions (OT) activity tolerance training;BADL retraining;IADL retraining;functional balance retraining;neuromuscular control/coordination retraining;ROM/therapeutic exercise;transfer/mobility retraining;strengthening exercise;occupation/activity based interventions  -SR               User Key  (r) = Recorded By, (t) = Taken By, (c) = Cosigned By      Initials Name Provider Type    SR Michaela Briones, OT Occupational Therapist                   Clinical Impression       Row Name 03/08/24 1731          Pain Assessment    Pretreatment Pain Rating 9/10  -SR     Posttreatment Pain Rating 9/10  -SR       Row Name 03/08/24 1731          Plan of Care Review    Outcome Evaluation Lita Yu is a 58 y.o. female who is here with generalized muscle aches and myalgia.  Patient had previous history of rhabdomyolysis.  Found to have acute kidney injury likely secondary to volume and low blood pressure.  She is currently living at home alone in [facility] with one step entry. At baseline, she is normally IND with household mobility and ADLs with use of SPC. Owns RW.  Pt able to roll side to side with supervision to change brief, though once rolling to attempt to sit EOB she cried out due to pain and required mod assist.  She is very reluctant to attempt to stand though once she agrees requires min to mod assist. Recommend SNF at discharge due to difficulty with all movement.  -SR       Row Name 03/08/24 1731          Therapy Assessment/Plan (OT)    Rehab Potential (OT) good, to achieve stated therapy goals  -SR     Criteria for Skilled Therapeutic Interventions Met (OT) yes  -SR     Therapy Frequency (OT) 3 times/wk  -SR     Predicted Duration of Therapy Intervention (OT) Until discharge  -SR       Row Name 03/08/24 1731          Therapy Plan Review/Discharge Plan (OT)    Anticipated Discharge Disposition (OT) skilled nursing facility  -SR       Row Name 03/08/24 1731          Positioning and Restraints     Pre-Treatment Position in bed  -SR     Post Treatment Position bed  -SR     In Bed call light within reach;encouraged to call for assist;exit alarm on  -SR               User Key  (r) = Recorded By, (t) = Taken By, (c) = Cosigned By      Initials Name Provider Type    SR Michaela Briones OT Occupational Therapist                   Outcome Measures       Row Name 03/08/24 1631 03/08/24 0800       How much help from another person do you currently need...    Turning from your back to your side while in flat bed without using bedrails? 2  -MB 2  -KS    Moving from lying on back to sitting on the side of a flat bed without bedrails? 2  -MB 2  -KS    Moving to and from a bed to a chair (including a wheelchair)? 2  -MB 2  -KS    Standing up from a chair using your arms (e.g., wheelchair, bedside chair)? 2  -MB 2  -KS    Climbing 3-5 steps with a railing? 1  -MB 2  -KS    To walk in hospital room? 1  -MB 2  -KS    AM-PAC 6 Clicks Score (PT) 10  -MB 12  -KS    Highest Level of Mobility Goal 4 --> Transfer to chair/commode  -MB 4 --> Transfer to chair/commode  -KS      Row Name 03/08/24 1733          Functional Assessment    Outcome Measure Options AM-PAC 6 Clicks Daily Activity (OT)  -SR               User Key  (r) = Recorded By, (t) = Taken By, (c) = Cosigned By      Initials Name Provider Type    Michaela Quinones OT Occupational Therapist    Vel Riddle, RN Registered Nurse    Ric Velazquez, PT Physical Therapist                    Occupational Therapy Education       Title: PT OT SLP Therapies (In Progress)       Topic: Occupational Therapy (In Progress)       Point: ADL training (In Progress)       Description:   Instruct learner(s) on proper safety adaptation and remediation techniques during self care or transfers.   Instruct in proper use of assistive devices.                  Learning Progress Summary             Patient Acceptance, E,TB, NR by  at 3/8/2024 4455                          Point: Home exercise program (Not Started)       Description:   Instruct learner(s) on appropriate technique for monitoring, assisting and/or progressing therapeutic exercises/activities.                  Learner Progress:  Not documented in this visit.              Point: Precautions (Not Started)       Description:   Instruct learner(s) on prescribed precautions during self-care and functional transfers.                  Learner Progress:  Not documented in this visit.              Point: Body mechanics (In Progress)       Description:   Instruct learner(s) on proper positioning and spine alignment during self-care, functional mobility activities and/or exercises.                  Learning Progress Summary             Patient Acceptance, E,TB, NR by SR at 3/8/2024 5363                                         User Key       Initials Effective Dates Name Provider Type Discipline     06/16/21 -  Michaela Briones, OT Occupational Therapist OT                  OT Recommendation and Plan  Planned Therapy Interventions (OT): activity tolerance training, BADL retraining, IADL retraining, functional balance retraining, neuromuscular control/coordination retraining, ROM/therapeutic exercise, transfer/mobility retraining, strengthening exercise, occupation/activity based interventions  Therapy Frequency (OT): 3 times/wk  Plan of Care Review  Outcome Evaluation: Lita Yu is a 58 y.o. female who is here with generalized muscle aches and myalgia.  Patient had previous history of rhabdomyolysis.  Found to have acute kidney injury likely secondary to volume and low blood pressure.  She is currently living at home alone in [facility] with one step entry. At baseline, she is normally IND with household mobility and ADLs with use of SPC. Owns RW.  Pt able to roll side to side with supervision to change brief, though once rolling to attempt to sit EOB she cried out due to pain and required mod assist.  She is very  reluctant to attempt to stand though once she agrees requires min to mod assist. Recommend SNF at discharge due to difficulty with all movement.     Time Calculation:         Time Calculation- OT       Row Name 03/08/24 1733             Time Calculation- OT    OT Start Time 1048  -SR      OT Stop Time 1105  -SR      OT Time Calculation (min) 17 min  -SR      Total Timed Code Minutes- OT 0 minute(s)  -SR      OT Received On 03/08/24  -SR      OT - Next Appointment 03/11/24  -SR      OT Goal Re-Cert Due Date 03/22/24  -SR                User Key  (r) = Recorded By, (t) = Taken By, (c) = Cosigned By      Initials Name Provider Type    SR Michaela Briones, OT Occupational Therapist                  Therapy Charges for Today       Code Description Service Date Service Provider Modifiers Qty    [id number] HC OT EVAL MOD COMPLEXITY 4 3/8/2024 Michaela Briones OT GO 1                 Michaela Briones OT  3/8/2024

## 2024-03-08 NOTE — PROGRESS NOTES
"Pharmacy Antimicrobial Dosing Service    Subjective:  Lita Yu is a 58 y.o.female admitted with generalized weakness and pain. Pharmacy has been consulted to dose Vancomycin for possible bacteremia.      Assessment/Plan    1. Day #1 Vancomycin: Goal -600 mcg*h/mL. 1250mg (~20mg/kg ABW) IV x1 dose followed by 750mg (~12mg/kg ABW) IV q12h.  Random level ordered for 3/9 at 1200.    Will continue to monitor drug levels, renal function, culture and sensitivities, and patient clinical status.       Objective:  Relevant clinical data and objective history reviewed:  160 cm (63\")   61.2 kg (135 lb)   Ideal body weight: 52.4 kg (115 lb 8.3 oz)  Adjusted ideal body weight: 55.9 kg (123 lb 5 oz)  Body mass index is 23.91 kg/m².        Results from last 7 days   Lab Units 03/07/24  1757 03/07/24  0920 03/04/24  0351   CREATININE mg/dL 0.98 1.22* 0.93     Estimated Creatinine Clearance: 61.9 mL/min (by C-G formula based on SCr of 0.98 mg/dL).  I/O last 3 completed shifts:  In: 240 [P.O.:240]  Out: 200 [Urine:200]    Results from last 7 days   Lab Units 03/07/24  0920 03/04/24  0351 03/03/24  0536   WBC 10*3/mm3 8.70 5.80 5.20     Temperature    03/08/24 0108 03/08/24 0300 03/08/24 0453   Temp: (!) 101.9 °F (38.8 °C) (!) 102.9 °F (39.4 °C) 99.7 °F (37.6 °C)     Baseline culture/source/susceptibility:  Microbiology Results (last 10 days)       Procedure Component Value - Date/Time    COVID-19, FLU A/B, RSV PCR 1 HR TAT - Swab, Nasopharynx [[id number]]  (Normal) Collected: 03/07/24 1526    Lab Status: Final result Specimen: Swab from Nasopharynx Updated: 03/07/24 1607     COVID19 Not Detected     Influenza A PCR Not Detected     Influenza B PCR Not Detected     RSV, PCR Not Detected    Narrative:      Fact sheet for providers: https://www.fda.gov/media/779289/download    Fact sheet for patients: https://www.fda.gov/media/374648/download    Test performed by PCR.    Blood Culture - Blood, Arm, Left [[id number]]  " (Abnormal) Collected: 03/07/24 0933    Lab Status: Preliminary result Specimen: Blood from Arm, Left Updated: 03/08/24 0434     Blood Culture Abnormal Stain     Gram Stain Aerobic Bottle Gram positive cocci in clusters      Anaerobic Bottle Gram positive cocci in clusters    Blood Culture - Blood, Arm, Right [[id number]]  (Abnormal) Collected: 03/07/24 0920    Lab Status: Preliminary result Specimen: Blood from Arm, Right Updated: 03/08/24 0433     Blood Culture Abnormal Stain     Gram Stain Anaerobic Bottle Gram positive cocci in clusters      Aerobic Bottle Gram positive cocci in clusters    Blood Culture ID, PCR - Blood, Arm, Right [[id number]]  (Abnormal) Collected: 03/07/24 0920    Lab Status: Final result Specimen: Blood from Arm, Right Updated: 03/08/24 0433     BCID, PCR Staph aureus. mecA/C and MREJ (methicillin resistance gene) detected. Identification by BCID2 PCR.     BOTTLE TYPE Anaerobic Bottle    Narrative:      Infectious disease consultation is highly recommended to rule out distant foci of infection.            Arnold Sahu  03/08/24 05:09 EST

## 2024-03-08 NOTE — CONSULTS
"Cardiology Consult Note      REQUESTING PHYSICIAN    Ramsey Hanna MD    PATIENT IDENTIFICATION  Name: Lita Yu  Age: 58 y.o.  Sex: female  :  1965  MRN: [id number]             REASON FOR CONSULTATION:  58-year-old female with known history of ischemic heart disease.  Past medical history includes primary hypertension, dyslipidemia, seizure disorder, tobacco dependence, vitamin D deficiency, COPD, cirrhosis, EtOH abuse, anemia, GERD, PTSD.      CC:  Bacteremia  Lower extremity weakness    HISTORY OF PRESENT ILLNESS:   Patient presented to the emergency department 3/7/2024 with complaint of muscle aches legs weak.  In the ED, workup included sodium 127, creatinine 1.22, alk phos 153.  Nephrology and infectious disease have evaluated the patient.  She was febrile on presentation and both sets of blood cultures grew Staphylococcus aureus and ID detected MRSA gene.  Cardiology was consulted to evaluate for possible endocarditis.  Upon my evaluation, patient is resting comfortably in his bed.  She denies any chest discomfort, shortness of breath, lower extremity edema, dizziness or lightheadedness.      REVIEW OF SYSTEMS:  Pertinent items are noted in HPI, all other systems reviewed and negative    OBJECTIVE   Hemoglobin 9.4  Creatinine 1.92    ASSESSMENT  Bacteremia-Staphylococcus aureus  Hyponatremia  Acute kidney injury  Hypocalcemia  Liver cirrhosis  Tobacco use    PLAN  SILVINA recommended to further evaluate for valvular endocarditis.  Timing per cardiologist        Vital Signs  Visit Vitals  BP 99/57 (BP Location: Right arm, Patient Position: Lying)   Pulse 91   Temp 98.9 °F (37.2 °C) (Oral)   Resp 20   Ht 160 cm (63\")   Wt 61.2 kg (135 lb)   SpO2 97%   BMI 23.91 kg/m²     Oxygen Therapy  SpO2: 97 %  Pulse Oximetry Type: Continuous  Device (Oxygen Therapy): room air  Device (Oxygen Therapy) (Infant): room air  Flowsheet Rows      Flowsheet Row First Filed Value   Admission Height 160 cm (63\") " Documented at 2024 0851   Admission Weight 61.2 kg (135 lb) Documented at 2024 0851          Intake & Output (last 3 days)          07 07 07 07 07    P.O.   240     Total Intake(mL/kg)   240 (3.9)     Urine (mL/kg/hr)   200     Stool   0     Total Output   200     Net   +40             Urine Unmeasured Occurrence   1 x     Stool Unmeasured Occurrence   1 x           Lines, Drains & Airways       Active LDAs       Name Placement date Placement time Site Days    Peripheral IV 24 0932 Right Antecubital 24  0932  Antecubital  1                    MEDICAL HISTORY    Past Medical History:   Diagnosis Date    Cirrhosis     COPD (chronic obstructive pulmonary disease)     Depression     GERD (gastroesophageal reflux disease)     Hyperlipidemia     Hypertension     PTSD (post-traumatic stress disorder)         SURGICAL HISTORY    Past Surgical History:   Procedure Laterality Date     SECTION N/A     COLONOSCOPY N/A 2021    Procedure: COLONOSCOPY with polypectomy x2 and random biopsy;  Surgeon: Osman Clay MD;  Location: [facility] ENDOSCOPY;  Service: Gastroenterology;  Laterality: N/A;  colon polyps    DENTAL PROCEDURE      ENDOSCOPY N/A 2021    Procedure: ESOPHAGOGASTRODUODENOSCOPY;  Surgeon: Osman Clay MD;  Location: [facility] ENDOSCOPY;  Service: Gastroenterology;  Laterality: N/A;  esophagitis    JOINT REPLACEMENT      REPLACEMENT TOTAL HIP LATERAL POSITION Left     TONSILLECTOMY      VAGINAL BIRTH AFTER  SECTION          FAMILY HISTORY    Family History   Problem Relation Age of Onset    Alcohol abuse Mother     Alcohol abuse Paternal Grandfather        SOCIAL HISTORY    Social History     Tobacco Use    Smoking status: Every Day     Current packs/day: 0.50     Types: Cigarettes     Passive exposure: Current    Smokeless tobacco: Never    Tobacco comments:     3cigs   Substance Use Topics     "Alcohol use: Yes     Alcohol/week: 2.0 standard drinks of alcohol     Types: 2 Cans of beer per week     Comment: pint daily        ALLERGIES    Allergies   Allergen Reactions    Sulfa Antibiotics Hives    Keflex [Cephalexin] Hives              BP 99/57 (BP Location: Right arm, Patient Position: Lying)   Pulse 91   Temp 98.9 °F (37.2 °C) (Oral)   Resp 20   Ht 160 cm (63\")   Wt 61.2 kg (135 lb)   SpO2 97%   BMI 23.91 kg/m²   Intake/Output last 3 shifts:  I/O last 3 completed shifts:  In: 240 [P.O.:240]  Out: 200 [Urine:200]  Intake/Output this shift:  No intake/output data recorded.    PHYSICAL EXAM:    General: Alert, cooperative, no distress, appears stated age  Head:  Normocephalic, atraumatic, mucous membranes moist  Eyes:  Conjunctivae/corneas clear, EOM's intact     Neck:  Supple,  no adenopathy; no JVD or bruit  Lungs: Clear to auscultation bilaterally, no wheezes, rhonchi or rales are noted  Chest wall: No tenderness  Heart::  Regular rate and rhythm, S1 and S2 normal, no murmur, rub or gallop  Abdomen: Soft, nontender, nondistended, bowel sounds active  Extremities: No cyanosis, clubbing, or edema   Pulses: 2+ and symmetric all extremities  Skin:  No rashes or lesions  Neuro/psych: A&O x3. CN II through XII are grossly intact with appropriate affect      Scheduled Meds:      busPIRone, 15 mg, Oral, BID  enoxaparin, 40 mg, Subcutaneous, Daily  folic acid, 1 mg, Oral, Daily  [Held by provider] furosemide, 40 mg, Oral, Daily  Lidocaine, 1 patch, Transdermal, Daily  methocarbamol, 500 mg, Oral, 4x Daily  multivitamin with minerals, 1 tablet, Oral, Daily  QUEtiapine, 50 mg, Oral, Nightly  senna-docusate sodium, 2 tablet, Oral, BID  sertraline, 100 mg, Oral, BID  sodium chloride, 10 mL, Intravenous, Q12H  [Held by provider] spironolactone, 100 mg, Oral, Daily  thiamine (B-1) IV, 200 mg, Intravenous, Q8H   Followed by  [START ON 3/13/2024] thiamine, 100 mg, Oral, Daily  thiamine, 100 mg, Oral, " "Daily  vancomycin, 750 mg, Intravenous, Q12H        Continuous Infusions:    Pharmacy to dose vancomycin,         PRN Meds:      acetaminophen    albuterol    senna-docusate sodium **AND** polyethylene glycol **AND** bisacodyl **AND** bisacodyl    HYDROcodone-acetaminophen    ketorolac    LORazepam **OR** midazolam **OR** LORazepam **OR** midazolam **OR** midazolam **OR** midazolam    melatonin    Pharmacy to dose vancomycin    [COMPLETED] Insert Peripheral IV **AND** sodium chloride    sodium chloride    sodium chloride        Results Review:     I reviewed the patient's new clinical results.    CBC    Results from last 7 days   Lab Units 03/08/24  0752 03/07/24  0920 03/04/24  0351 03/03/24  0536 03/02/24  0757   WBC 10*3/mm3 6.50 8.70 5.80 5.20 6.50   HEMOGLOBIN g/dL 9.4* 10.6* 10.1* 9.9* 10.1*   PLATELETS 10*3/mm3 70* 76* 125* 131* 140     Cr Clearance Estimated Creatinine Clearance: 66 mL/min (by C-G formula based on SCr of 0.92 mg/dL).  Coag   Results from last 7 days   Lab Units 03/07/24  0920 03/02/24  0757   INR  1.00 1.04   APTT seconds 33.4*  --      HbA1C   Lab Results   Component Value Date    HGBA1C 5.00 06/12/2023     Blood Glucose No results found for: \"POCGLU\"  Infection   Results from last 7 days   Lab Units 03/07/24  0933 03/07/24  0920   BLOODCX  Abnormal Stain* Abnormal Stain*   BCIDPCR   --  Staph aureus. mecA/C and MREJ (methicillin resistance gene) detected. Identification by BCID2 PCR.*     CMP   Results from last 7 days   Lab Units 03/08/24  0752 03/07/24  1757 03/07/24  0920 03/04/24  0351 03/03/24  0648 03/03/24  0536 03/02/24  2136 03/02/24  0757   SODIUM mmol/L 128* 127* 127* 135*  --  135*  --  128*   POTASSIUM mmol/L 3.7 3.9 4.3 4.3  --  4.0 4.0 3.3*   CHLORIDE mmol/L 95* 97* 92* 103  --  102  --  93*   CO2 mmol/L 20.0* 19.0* 18.0* 22.0  --  23.0  --  18.0*   BUN mg/dL 17 18 20 8  --  11  --  25*   CREATININE mg/dL 0.92 0.98 1.22* 0.93  --  0.80  --  1.05*   GLUCOSE mg/dL 108* 86 99 " "94  --  89  --  77   ALBUMIN g/dL 3.1*  --  3.8 3.5  --  3.5  --  3.6   BILIRUBIN mg/dL 0.5  --  0.7 0.3  --  0.3  --  0.4   ALK PHOS U/L 123*  --  153* 138*  --  138*  --  163*   AST (SGOT) U/L 29  --  38* 39*  --  40*  --  53*   ALT (SGPT) U/L 21  --  26 35*  --  40*  --  48*   AMMONIA umol/L  --   --  16  --  38  --   --  92*     ABG      UA    Results from last 7 days   Lab Units 03/07/24  0932   NITRITE UA  Negative     ABDI  No results found for: \"POCMETH\", \"POCAMPHET\", \"POCBARBITUR\", \"POCBENZO\", \"POCCOCAINE\", \"POCOPIATES\", \"POCOXYCODO\", \"POCPHENCYC\", \"POCPROPOXY\", \"POCTHC\", \"POCTRICYC\"  Lysis Labs   Results from last 7 days   Lab Units 03/08/24  0752 03/07/24  1757 03/07/24  0920 03/04/24  0351 03/03/24  0536 03/02/24  0757   INR   --   --  1.00  --   --  1.04   APTT seconds  --   --  33.4*  --   --   --    HEMOGLOBIN g/dL 9.4*  --  10.6* 10.1* 9.9* 10.1*   PLATELETS 10*3/mm3 70*  --  76* 125* 131* 140   CREATININE mg/dL 0.92 0.98 1.22* 0.93 0.80 1.05*     Radiology(recent) CT Head Without Contrast    Result Date: 3/7/2024  Impression: No definite acute intracranial abnormality Electronically Signed: Miguel Dean MD  3/7/2024 11:29 AM EST  Workstation ID: [id number]       Results from last 7 days   Lab Units 03/07/24  0920   CK TOTAL U/L 74       X-rays, labs reviewed personally by physician.    ECG/EMG Results (most recent)       Procedure Component Value Units Date/Time    ECG 12 Lead Other; Weakness [[id number]] Collected: 03/07/24 0939     Updated: 03/08/24 0606     QT Interval 306 ms      QTC Interval 412 ms     Narrative:      HEART RATE= 108  bpm  RR Interval= 552  ms  OR Interval= 139  ms  P Horizontal Axis= -1  deg  P Front Axis= 0  deg  QRSD Interval= 86  ms  QT Interval= 306  ms  QTcB= 412  ms  QRS Axis= 62  deg  T Wave Axis= 68  deg  - OTHERWISE NORMAL ECG -  Sinus tachycardia  When compared with ECG of 11-Jun-2023 19:07:58,  Significant rate increase  Electronically Signed By: Carl Simmons (FREDA) " 08-Mar-2024 06:06:03  Date and Time of Study: 2024-03-07 09:39:49              Medication Review:   I have reviewed the patient's current medication list  Scheduled Meds:busPIRone, 15 mg, Oral, BID  enoxaparin, 40 mg, Subcutaneous, Daily  folic acid, 1 mg, Oral, Daily  [Held by provider] furosemide, 40 mg, Oral, Daily  Lidocaine, 1 patch, Transdermal, Daily  methocarbamol, 500 mg, Oral, 4x Daily  multivitamin with minerals, 1 tablet, Oral, Daily  QUEtiapine, 50 mg, Oral, Nightly  senna-docusate sodium, 2 tablet, Oral, BID  sertraline, 100 mg, Oral, BID  sodium chloride, 10 mL, Intravenous, Q12H  [Held by provider] spironolactone, 100 mg, Oral, Daily  thiamine (B-1) IV, 200 mg, Intravenous, Q8H   Followed by  [START ON 3/13/2024] thiamine, 100 mg, Oral, Daily  thiamine, 100 mg, Oral, Daily  vancomycin, 750 mg, Intravenous, Q12H      Continuous Infusions:Pharmacy to dose vancomycin,       PRN Meds:.  acetaminophen    albuterol    senna-docusate sodium **AND** polyethylene glycol **AND** bisacodyl **AND** bisacodyl    HYDROcodone-acetaminophen    ketorolac    LORazepam **OR** midazolam **OR** LORazepam **OR** midazolam **OR** midazolam **OR** midazolam    melatonin    Pharmacy to dose vancomycin    [COMPLETED] Insert Peripheral IV **AND** sodium chloride    sodium chloride    sodium chloride    Imaging:  Imaging Results (Last 72 Hours)       Procedure Component Value Units Date/Time    CT Head Without Contrast [[id number]] Collected: 03/07/24 1127     Updated: 03/07/24 1131    Narrative:      CT HEAD WO CONTRAST    Date of Exam: 3/7/2024 11:21 AM EST    Indication: General weakness unable to walk.    Comparison: CT brain dated 10/14/2022    Technique: Axial CT images were obtained of the head without contrast administration.  Coronal reconstructions were performed.  Automated exposure control and iterative reconstruction methods were used.    Findings:  There is no evidence of hemorrhage. There is no mass effect or  "midline shift. Mild diffuse brain atrophy.    There is no extracerebral collection.    Ventricles are normal in size and configuration for patient's stated age.     Posterior fossa is within normal limits.    Calvarium and skull base appear intact.  Visualized sinuses show no air fluid levels. Visualized orbits are unremarkable.      Impression:      Impression:    No definite acute intracranial abnormality      Electronically Signed: Miguel Dean MD    3/7/2024 11:29 AM EST    Workstation ID: [id number]              SHARIF Eisenberg  03/08/24  16:53 EST       EMR Dragon/Transcription:   \"Dictated utilizing Dragon dictation\".                 Electronically signed by SHARIF Eisenberg, 03/08/24, 4:53 PM EST.  Copied text in this note has been reviewed by me and is accurate as of 03/08/24.    Cardiology attending:  Seen and examined.  Chart and labs reviewed. Independant interpretations of cardiac testing was performed. History and exam findings are verified with above changes noted.  Assessment and plan notated by APC after being formulated by attending consultant.  Note that greater than 50% of the time spent in care of the patient was provided by attending consultant.    Patient denies any chest pain pressure heaviness or tightness.  She has been having febrile episodes.  Blood cultures positive for what appears to be MRSA.  She does have a history of a hip replacement but denies any infection.  Infectious disease has recommended transesophageal echocardiogram to evaluate for endocarditis.  Discussed with patient.  Will proceed Monday.  Antimicrobials as per ID.    Physical Exam:    General: Alert, cooperative, no distress, appears stated age  Head:  Normocephalic, atraumatic, mucous membranes moist  Eyes:  Conjunctiva/corneas clear, EOM's intact     Neck:  Supple,  no bruit  Lungs:            Clear to auscultation bilaterally, no wheezes rhonchi rales are noted  Chest wall: No " tenderness  Heart::  Regular rate and rhythm, S1 and S2 normal, 1/6 holosystolic murmur.  No rub or gallop  Abdomen: Soft, non-tender, nondistended bowel sounds active  Extremities: No cyanosis, clubbing, or edema  Pulses: Diminished pedal pulses  Skin:  No rashes or lesions  Neuro/psych: A&O x3. CN II through XII are grossly intact with appropriate affect

## 2024-03-08 NOTE — CASE MANAGEMENT/SOCIAL WORK
Discharge Planning Assessment   Vik     Patient Name: Lita Yu  MRN: [id number]  Today's Date: 3/8/2024    Admit Date: 3/7/2024    Plan: DC PLAN; Routine home     Discharge Needs Assessment       Row Name 03/08/24 1116       Living Environment    People in Home alone    Current Living Arrangements home    Potentially Unsafe Housing Conditions none    In the past 12 months has the electric, gas, oil, or water company threatened to shut off services in your home? No    Primary Care Provided by self    Provides Primary Care For no one    Quality of Family Relationships helpful;involved;supportive    Able to Return to Prior Arrangements yes       Resource/Environmental Concerns    Resource/Environmental Concerns none    Transportation Concerns none       Transportation Needs    In the past 12 months, has lack of transportation kept you from medical appointments or from getting medications? no    In the past 12 months, has lack of transportation kept you from meetings, work, or from getting things needed for daily living? No       Food Insecurity    Within the past 12 months, you worried that your food would run out before you got the money to buy more. Never true    Within the past 12 months, the food you bought just didn't last and you didn't have money to get more. Never true       Transition Planning    Patient/Family Anticipates Transition to home    Patient/Family Anticipated Services at Transition none    Transportation Anticipated car, drives self;family or friend will provide       Discharge Needs Assessment    Readmission Within the Last 30 Days no previous admission in last 30 days    Equipment Currently Used at Home cane, straight    Anticipated Changes Related to Illness none    Equipment Needed After Discharge none                   Discharge Plan       Row Name 03/08/24 1116       Plan    Plan DC PLAN; Routine home    Patient/Family in Agreement with Plan yes    Plan Comments Met with  patient at bedside, from routine home alone. Independent with ADL's uses a cane. PCP is Shruthi and pharmacy is [facility]. Able to afford medications and denies any issues with food or utilities. Denies any transportation issues, still drives and Ra will provide at DC. Denies any HHC or SNF needs. Denies any concerns about return home.                  Continued Care and Services - Admitted Since 3/7/2024    No active coordination exists for this encounter.          Demographic Summary       Row Name 03/08/24 1114       General Information    Admission Type inpatient    Arrived From emergency department    Required Notices Provided Important Message from Medicare    Referral Source admission list    Reason for Consult discharge planning    Preferred Language English       Contact Information    Permission Granted to Share Info With     Contact Information Obtained for                    Functional Status       Row Name 03/08/24 1114       Functional Status    Usual Activity Tolerance excellent    Current Activity Tolerance excellent       Physical Activity    On average, how many days per week do you engage in moderate to strenuous exercise (like a brisk walk)? 0 days    On average, how many minutes do you engage in exercise at this level? 0 min    Number of minutes of exercise per week 0       Assessment of Health Literacy    How often do you have someone help you read hospital materials? Sometimes    How often do you have problems learning about your medical condition because of difficulty understanding written information? Sometimes    How often do you have a problem understanding what is told to you about your medical condition? Sometimes    How confident are you filling out medical forms by yourself? Somewhat    Health Literacy Moderate       Functional Status, IADL    Medications independent    Meal Preparation independent    Housekeeping independent    Laundry independent    Shopping  independent       Mental Status    General Appearance WDL WDL       Mental Status Summary    Recent Changes in Mental Status/Cognitive Functioning no changes                  Tammy Galvan, RN

## 2024-03-08 NOTE — NURSING NOTE
WOCN note:    58 yr old female admitted 3/7/24 with reports of generalized weakness, pain and loss of bowel and bladder control. WOCN consult received to assess the skin of the perineum.   Patient presents with erythema to her perineum and almas-rectal area consistent with incontinence associated dermatitis. She currently has a almas-wick device in place as well as a brief and Ultrasorb pads. She was able to assist with turning in bed but it was painful.   Calazime zinc barrier paste was applied to the area and will order a low air loss pump when patient is placed on a [place] bed. Continue pressure injury prevention measures with skin care for any episodes of incontinence. We will continue to follow.

## 2024-03-08 NOTE — PLAN OF CARE
Goal Outcome Evaluation:  Plan of Care Reviewed With: patient        Progress: no change   Pt is a 57 y/o F admitted to [facility] on 3/7/24 following previous admission and discharged home a few days ago for electrolyte abnormalities. She reports that she does have a alcohol issue but over the last 2 days she has become extremely weak and unable to walk, losing control of bowel and bladder. She is primarily complaining of muscle aches and weakness, not allowing her to ambulate. Nephrology following for monitoring of labs. She is currently living at home alone in [facility] with one step entry. At baseline, she is normally IND with household mobility and ADLs with use of SPC. Owns RW. This date, she is AAOx4, but very reluctant to work with PT staff 2/2 to significant pain levels. She completes bed mobility mod A x 2 to sit Eob. Once EOB, she begins screaming in pain but is unable to locate her pain stating she is hurting all over. She seems to be pain hypersensitive this date with all movement. She is eventually agreeable to complete STS mod A x 2 and shuffle to the [facility], but again screams in pain without the ability to localize or describe. She wishes to return home, but she will not be able to return home in this condition. Pt will monitor her functional capacity, but recommending SNF at d/c.    Anticipated Discharge Disposition (PT): skilled nursing facility

## 2024-03-08 NOTE — THERAPY EVALUATION
Patient Name: Lita Yu  : 1965    MRN: [id number]                              Today's Date: 3/8/2024       Admit Date: 3/7/2024    Visit Dx:     ICD-10-CM ICD-9-CM   1. Weakness  R53.1 780.79   2. Unable to walk  R26.2 719.7     Patient Active Problem List   Diagnosis    Postmenopausal state    Post traumatic stress disorder    Pain in joints    Osteoporosis    Macrocytic anemia    Lumbosacral radiculopathy    Leg pain, left    Peripheral vascular disease    Essential hypertension    Hyperlipidemia    Degenerative joint disease of left hip    Seizure disorder    Smoker    Status post hip replacement    Tobacco dependence syndrome    Vitamin B12 deficiency    Anemia of chronic disease    Chronic obstructive pulmonary disease    Cirrhosis of liver    Acute UTI (urinary tract infection)    Generalized weakness    Rhabdomyolysis    Non-traumatic rhabdomyolysis    Moderate malnutrition    Chest pain    Dehydration    Myalgia     Past Medical History:   Diagnosis Date    Cirrhosis     COPD (chronic obstructive pulmonary disease)     Depression     GERD (gastroesophageal reflux disease)     Hyperlipidemia     Hypertension     PTSD (post-traumatic stress disorder)      Past Surgical History:   Procedure Laterality Date     SECTION N/A     COLONOSCOPY N/A 2021    Procedure: COLONOSCOPY with polypectomy x2 and random biopsy;  Surgeon: Osman Clay MD;  Location: [facility] ENDOSCOPY;  Service: Gastroenterology;  Laterality: N/A;  colon polyps    DENTAL PROCEDURE      ENDOSCOPY N/A 2021    Procedure: ESOPHAGOGASTRODUODENOSCOPY;  Surgeon: Osman Clay MD;  Location: [facility] ENDOSCOPY;  Service: Gastroenterology;  Laterality: N/A;  esophagitis    JOINT REPLACEMENT      REPLACEMENT TOTAL HIP LATERAL POSITION Left     TONSILLECTOMY      VAGINAL BIRTH AFTER  SECTION        General Information       Row Name 24 1622          Physical Therapy Time and Intention    Document  Type evaluation  -MB     Mode of Treatment physical therapy  -MB       Row Name 03/08/24 1622          General Information    Patient Profile Reviewed yes  -MB     Prior Level of Function independent:;all household mobility;community mobility;gait;transfer;ADL's;home management  -MB     Existing Precautions/Restrictions fall  -MB     Barriers to Rehab cognitive status  -MB       Row Name 03/08/24 1622          Living Environment    People in Home alone  -MB       Row Name 03/08/24 1622          Home Main Entrance    Number of Stairs, Main Entrance one  -MB       Row Name 03/08/24 1622          Stairs Within Home, Primary    Number of Stairs, Within Home, Primary none  -MB       Row Name 03/08/24 1622          Cognition    Orientation Status (Cognition) oriented x 4  -MB       Row Name 03/08/24 1622          Safety Issues, Functional Mobility    Safety Issues Affecting Function (Mobility) awareness of need for assistance;at risk behavior observed;insight into deficits/self-awareness;safety precaution awareness;safety precautions follow-through/compliance  -MB     Impairments Affecting Function (Mobility) balance;endurance/activity tolerance;cognition;pain;strength  -MB     Cognitive Impairments, Mobility Safety/Performance insight into deficits/self-awareness;problem-solving/reasoning;safety precaution awareness;safety precaution follow-through  -MB               User Key  (r) = Recorded By, (t) = Taken By, (c) = Cosigned By      Initials Name Provider Type    MB Ric Sibley, PT Physical Therapist                   Mobility       Row Name 03/08/24 1623          Bed Mobility    Bed Mobility bed mobility (all) activities  -MB     All Activities, [place] (Bed Mobility) moderate assist (50% patient effort);2 person assist  -MB     Assistive Device (Bed Mobility) head of bed elevated  -MB       Row Name 03/08/24 1623          Sit-Stand Transfer    Sit-Stand [place] (Transfers) moderate assist (50% patient  effort);1 person assist  -MB     Comment, (Sit-Stand Transfer) non AD  -MB       Row Name 03/08/24 1623          Gait/Stairs (Locomotion)    Patient was able to Ambulate no, other medical factors prevent ambulation  -MB     Reason Patient was unable to Ambulate Uncontrolled Pain  -MB               User Key  (r) = Recorded By, (t) = Taken By, (c) = Cosigned By      Initials Name Provider Type    Ric Velazquez PT Physical Therapist                   Obj/Interventions       Row Name 03/08/24 1623          Range of Motion Comprehensive    General Range of Motion no range of motion deficits identified  -MB       Row Name 03/08/24 1623          Strength Comprehensive (MMT)    Comment, General Manual Muscle Testing (MMT) Assessment BLE Strength 2/5 grossly  -MB       Row Name 03/08/24 1623          Balance    Balance Assessment sitting static balance;sitting dynamic balance;sit to stand dynamic balance;standing static balance  -MB     Static Sitting Balance independent  -MB     Dynamic Sitting Balance independent  -MB     Position, Sitting Balance unsupported;sitting edge of bed  -MB     Sit to Stand Dynamic Balance minimal assist  -MB     Static Standing Balance minimal assist  -MB       Row Name 03/08/24 1623          Sensory Assessment (Somatosensory)    Sensory Assessment (Somatosensory) sensation intact  -MB               User Key  (r) = Recorded By, (t) = Taken By, (c) = Cosigned By      Initials Name Provider Type    Ric Velazquez PT Physical Therapist                   Goals/Plan       Row Name 03/08/24 1626          Bed Mobility Goal 1 (PT)    Activity/Assistive Device (Bed Mobility Goal 1, PT) bed mobility activities, all  -MB     [place] Level/Cues Needed (Bed Mobility Goal 1, PT) standby assist  -MB     Time Frame (Bed Mobility Goal 1, PT) other (see comments);2 weeks  -MB       Row Name 03/08/24 1626          Transfer Goal 1 (PT)    Activity/Assistive Device (Transfer Goal 1, PT) transfers,  all;walker, rolling  -MB     [place] Level/Cues Needed (Transfer Goal 1, PT) contact guard required  -MB     Time Frame (Transfer Goal 1, PT) long term goal (LTG);2 weeks  -MB       Row Name 03/08/24 1626          Gait Training Goal 1 (PT)    Activity/Assistive Device (Gait Training Goal 1, PT) gait (walking locomotion);walker, rolling  -MB     [place] Level (Gait Training Goal 1, PT) contact guard required  -MB     Distance (Gait Training Goal 1, PT) 50  -MB     Time Frame (Gait Training Goal 1, PT) long term goal (LTG);2 weeks  -MB       Row Name 03/08/24 1626          Therapy Assessment/Plan (PT)    Planned Therapy Interventions (PT) balance training;bed mobility training;gait training;home exercise program;neuromuscular re-education;patient/family education;strengthening;transfer training  -MB               User Key  (r) = Recorded By, (t) = Taken By, (c) = Cosigned By      Initials Name Provider Type    Ric Velazquez, PT Physical Therapist                   Clinical Impression       Row Name 03/08/24 1625          Pain    Pretreatment Pain Rating 9/10  -MB     Posttreatment Pain Rating 9/10  -MB     Pain Location generalized  -MB     Pre/Posttreatment Pain Comment reports pain all over, primarily in her back and traveling down into her legs, hypersensitive  -MB     Pain Intervention(s) Repositioned;Nursing Notified;Emotional support  -MB       Row Name 03/08/24 1636 03/08/24 1625       Plan of Care Review    Plan of Care Reviewed With -- patient  -MB    Progress -- no change  -MB    Outcome Evaluation Pt is a 57 y/o F admitted to [facility] on 3/7/24 following previous admission and discharged home a few days ago for electrolyte abnormalities. She reports that she does have a alcohol issue but over the last 2 days she has become extremely weak and unable to walk, losing control of bowel and bladder. She is primarily complaining of muscle aches and weakness, not allowing her to ambulate. Nephrology  following for monitoring of labs. She is currently living at home alone in [facility] with one step entry. At baseline, she is normally IND with household mobility and ADLs with use of SPC. Owns RW. This date, she is AAOx4, but very reluctant to work with PT staff 2/2 to significant pain levels. She completes bed mobility mod A x 2 to sit Eob. Once EOB, she begins screaming in pain but is unable to locate her pain stating she is hurting all over. She seems to be pain hypersensitive this date with all movement. She is eventually agreeable to complete STS mod A x 2 and shuffle to the [facility], but again screams in pain without the ability to localize or describe. She wishes to return home, but she will not be able to return home in this condition. Pt will monitor her functional capacity, but recommending SNF at d/c.  -MB --      Row Name 03/08/24 1625          Therapy Assessment/Plan (PT)    Rehab Potential (PT) fair, will monitor progress closely  -MB     Criteria for Skilled Interventions Met (PT) yes;meets criteria  -MB     Therapy Frequency (PT) 3 times/wk  -MB     Predicted Duration of Therapy Intervention (PT) until d/c  -MB       Row Name 03/08/24 1625          Vital Signs    O2 Delivery Pre Treatment room air  -MB     O2 Delivery Intra Treatment room air  -MB     O2 Delivery Post Treatment room air  -MB     Pre Patient Position Supine  -MB     Intra Patient Position Standing  -MB     Post Patient Position Supine  -MB       Row Name 03/08/24 1625          Positioning and Restraints    Pre-Treatment Position in bed  -MB     Post Treatment Position bed  -MB     In Bed notified nsg;supine;call light within reach;encouraged to call for assist;exit alarm on  -MB               User Key  (r) = Recorded By, (t) = Taken By, (c) = Cosigned By      Initials Name Provider Type    Ric Velazquez, PT Physical Therapist                   Outcome Measures       Row Name 03/08/24 1631 03/08/24 0800       How much help from another person  do you currently need...    Turning from your back to your side while in flat bed without using bedrails? 2  -MB 2  -KS    Moving from lying on back to sitting on the side of a flat bed without bedrails? 2  -MB 2  -KS    Moving to and from a bed to a chair (including a wheelchair)? 2  -MB 2  -KS    Standing up from a chair using your arms (e.g., wheelchair, bedside chair)? 2  -MB 2  -KS    Climbing 3-5 steps with a railing? 1  -MB 2  -KS    To walk in hospital room? 1  -MB 2  -KS    AM-PAC 6 Clicks Score (PT) 10  -MB 12  -KS    Highest Level of Mobility Goal 4 --> Transfer to chair/commode  -MB 4 --> Transfer to chair/commode  -KS              User Key  (r) = Recorded By, (t) = Taken By, (c) = Cosigned By      Initials Name Provider Type    KS Vel Aranda, RN Registered Nurse    Ric Velazquez, PT Physical Therapist                                 Physical Therapy Education       Title: PT OT SLP Therapies (Done)       Topic: Physical Therapy (Done)       Point: Mobility training (Done)       Learning Progress Summary             Patient Acceptance, E,TB, VU by MB at 3/8/2024 1631                         Point: Body mechanics (Done)       Learning Progress Summary             Patient Acceptance, E,TB, VU by MB at 3/8/2024 1631                         Point: Precautions (Done)       Learning Progress Summary             Patient Acceptance, E,TB, VU by MB at 3/8/2024 1631                                         User Key       Initials Effective Dates Name Provider Type Discipline    MB 06/06/23 -  Ric Sibley, TOBY Physical Therapist PT                  PT Recommendation and Plan  Planned Therapy Interventions (PT): balance training, bed mobility training, gait training, home exercise program, neuromuscular re-education, patient/family education, strengthening, transfer training  Plan of Care Reviewed With: patient  Progress: no change  Outcome Evaluation: Pt is a 57 y/o F admitted to [facility] on 3/7/24 following  previous admission and discharged home a few days ago for electrolyte abnormalities. She reports that she does have a alcohol issue but over the last 2 days she has become extremely weak and unable to walk, losing control of bowel and bladder. She is primarily complaining of muscle aches and weakness, not allowing her to ambulate. Nephrology following for monitoring of labs. She is currently living at home alone in [facility] with one step entry. At baseline, she is normally IND with household mobility and ADLs with use of SPC. Owns RW. This date, she is AAOx4, but very reluctant to work with PT staff 2/2 to significant pain levels. She completes bed mobility mod A x 2 to sit Eob. Once EOB, she begins screaming in pain but is unable to locate her pain stating she is hurting all over. She seems to be pain hypersensitive this date with all movement. She is eventually agreeable to complete STS mod A x 2 and shuffle to the [facility], but again screams in pain without the ability to localize or describe. She wishes to return home, but she will not be able to return home in this condition. Pt will monitor her functional capacity, but recommending SNF at d/c.     Time Calculation:   PT Evaluation Complexity  History, PT Evaluation Complexity: 1-2 personal factors and/or comorbidities  Examination of Body Systems (PT Eval Complexity): total of 3 or more elements  Clinical Presentation (PT Evaluation Complexity): evolving  Clinical Decision Making (PT Evaluation Complexity): moderate complexity  Overall Complexity (PT Evaluation Complexity): moderate complexity     PT Charges       Row Name 03/08/24 1631             Time Calculation    Start Time 1052  -MB      Stop Time 1115  -MB      Time Calculation (min) 23 min  -MB      PT Received On 03/08/24  -MB      PT - Next Appointment 03/10/24  -MB      PT Goal Re-Cert Due Date 03/22/24  -MB         Time Calculation- PT    Total Timed Code Minutes- PT 0 minute(s)  -MB                User Key   (r) = Recorded By, (t) = Taken By, (c) = Cosigned By      Initials Name Provider Type    Ric Velazquez, PT Physical Therapist                  Therapy Charges for Today       Code Description Service Date Service Provider Modifiers Qty    [id number] HC PT EVAL MOD COMPLEXITY 4 3/8/2024 Ric Sibley, PT GP 1            PT G-Codes  AM-PAC 6 Clicks Score (PT): 10  PT Discharge Summary  Anticipated Discharge Disposition (PT): skilled nursing facility    Ric Sibley PT  3/8/2024

## 2024-03-08 NOTE — PAYOR COMM NOTE
"OBSERVATION TO INPATIENT CASE    03/08/24 1034  Inpatient Admission  Once        Level of Care: Med/Surg  Diagnosis: Myalgia [[id number]]  Admitting Physician: SUSAN OLIVA [[id number]]  Attending Physician: ALINA GRANADO [[id number]]  Certification: I Certify That Inpatient Hospital Services Are Medically Necessary For Greater Than 2 Midnights    03/08/24 1033   03/07/24 1111  Initiate Observation Status  Once     Completed     Level of Care: Med/Surg  Diagnosis: Myalgia [[id number]]  Admitting Physician: SUSAN OLIVA [[id number]]  Attending Physician: SUSAN OLIVA [[id number]]           Sarah Singleton BSN, RN    Care Coordination   [place] [address]    [phone] office  [phone] fax  [email or web address]      CarolynKsenia lópezcelina FRANZ \"NAINA\" (58 y.o. Female)       Date of Birth   1965    Social Security Number       Address   [address] [place] [address] [address] [place] [address]    Home Phone   [phone]    N   [id number]       Adventism   None    Marital Status                               Admission Date   3/7/24    Admission Type   Emergency    Admitting Provider   Susan Oliva MD    Attending Provider   Alina Granado MD    Department, Room/Bed   [facility] EMERGENCY DEPARTMENT, 31/31       Discharge Date       Discharge Disposition       Discharge Destination                                 Attending Provider: Alina Granado MD    Allergies: Sulfa Antibiotics, Keflex [Cephalexin]    Isolation: None   Infection: MRSA No Isolation this Admit (03/08/24)   Code Status: CPR    Ht: 160 cm (63\")   Wt: 61.2 kg (135 lb)    Admission Cmt: None   Principal Problem: Myalgia [M79.10]                   Active Insurance as of 3/7/2024       Primary Coverage       Payor Plan Insurance Group Employer/Plan Group    [place] [id number]       Payor Plan Address Payor Plan Phone Number Payor Plan Fax " Number Effective Dates    [address]   2014 - None Entered    [facility] [id number]         Subscriber Name Subscriber Birth Date Member ID       YONIS YU 1965 [id number]                     Emergency Contacts        (Rel.) Home Phone Work Phone Mobile Phone    OBDULIA CANTU (Daughter) -- -- [phone]                 History & Physical        Edgar Oliva MD at [address] [place] [address]   HISTORY AND PHYSICAL    Patient Name: Yonis Yu  : 1965  MRN: [id number]  Primary Care Physician:  Norma Saul APRN  Date of admission: 3/7/2024    Subjective  Subjective     Chief Complaint: Generalized weakness and muscle aches mostly in the thighs and the arms started yesterday  Patient had previous history of rhabdomyolysis  Patient was hospitalized in February for dehydration and  Patient had previous hip replacement  Since last night she is complaining of generalized muscle aches and difficulty ambulation  Seen in the emergency room initial workup was negative    HPI:    Yonis Yu is a 58 y.o. female with history of alcohol abuse  Presented emergency room with generalized weakness and myalgia    Review of Systems   Significant for generalized muscle aches and myalgia  No focal weakness no slurred speech no blurred vision no change in mentation no fever no chills no weight loss rest of 14 system reviewed and negative    Personal History     Past Medical History:   Diagnosis Date    Cirrhosis     COPD (chronic obstructive pulmonary disease)     Depression     GERD (gastroesophageal reflux disease)     Hyperlipidemia     Hypertension     PTSD (post-traumatic stress disorder)        Past Surgical History:   Procedure Laterality Date     SECTION N/A     COLONOSCOPY N/A 2021    Procedure: COLONOSCOPY with polypectomy x2 and random biopsy;  Surgeon: Osman Clay MD;  Location: [facility] ENDOSCOPY;  Service: Gastroenterology;   Laterality: N/A;  colon polyps    DENTAL PROCEDURE      ENDOSCOPY N/A 2021    Procedure: ESOPHAGOGASTRODUODENOSCOPY;  Surgeon: Osman Clay MD;  Location: [facility] ENDOSCOPY;  Service: Gastroenterology;  Laterality: N/A;  esophagitis    JOINT REPLACEMENT      REPLACEMENT TOTAL HIP LATERAL POSITION Left     TONSILLECTOMY      VAGINAL BIRTH AFTER  SECTION         Family History: family history includes Alcohol abuse in her mother and paternal grandfather. Otherwise pertinent FHx was reviewed and not pertinent to current issue.    Social History:  reports that she has been smoking cigarettes. She has been exposed to tobacco smoke. She has never used smokeless tobacco. She reports current alcohol use of about 2.0 standard drinks of alcohol per week. She reports that she does not use drugs.    Home Medications:  QUEtiapine, Vitamin B-12, Zinc Gluconate, albuterol sulfate HFA, busPIRone, diclofenac, folic acid, furosemide, lidocaine, melatonin, potassium chloride, sertraline, spironolactone, and thiamine    Allergies:  Allergies   Allergen Reactions    Sulfa Antibiotics Hives    Keflex [Cephalexin] Hives       Objective  Objective     Vitals:   Temp:  [98.1 °F (36.7 °C)] 98.1 °F (36.7 °C)  Heart Rate:  [] 107  Resp:  [19] 19  BP: (103-120)/(58-65) 120/63  Physical Exam    Constitutional: Awake, alert   Eyes: PERRLA, sclerae anicteric, no conjunctival injection   HENT: NCAT, mucous membranes moist   Neck: Supple, no thyromegaly, no lymphadenopathy, trachea midline   Respiratory: Clear to auscultation bilaterally, nonlabored respirations    Cardiovascular: RRR, no murmurs, rubs, or gallops, palpable pedal pulses bilaterally   Gastrointestinal: Positive bowel sounds, soft, nontender, nondistended   Musculoskeletal: No bilateral ankle edema, no clubbing or cyanosis to extremities   Psychiatric: Appropriate affect, cooperative   Neurologic: Oriented x 3, strength symmetric in all extremities, Cranial  Nerves grossly intact to confrontation, speech clear   Skin: No rashes     Result Review   Result Review:  I have personally reviewed the results from the time of this admission to 3/7/2024 11:12 EST and agree with these findings:  []  Laboratory list / accordion  []  Microbiology  []  Radiology  []  EKG/Telemetry   []  Cardiology/Vascular   []  Pathology  []  Old records  []  Other:  Most notable findings include:       Assessment & Plan  Assessment / Plan     Brief Patient Summary:  Lita Yu is a 58 y.o. female who is here with generalized muscle aches and myalgia  No focal weakness she is able to move all extremities with mild generalized weakness with no focal deficits  Patient with a history of previous rhabdomyolysis  Patient with history of hypertension on Aldactone  History of alcohol abuse with alcohol cirrhosis  History of COPD patient is hyponatremic probably related to Aldactone      Active Hospital Problems:  Active Hospital Problems    Diagnosis     **Myalgia      Plan:   Patient will be kept observation  Hydration IV fluids  Will get nephrology consult for evaluation for hyponatremia  Get PT evaluation  Follow daily BMP  I will hold the diuretic for now  C initial CK 74  Initial thyroid test normal  Will check CT of the brain but I doubt any intracranial she had previously low T4  Repeat T4 and TSH are normal  DVT prophylaxis:  Medical DVT prophylaxis orders are present.        CODE STATUS:    Code Status (Patient has no pulse and is not breathing): CPR (Attempt to Resuscitate)  Medical Interventions (Patient has pulse or is breathing): Full Support    Admission Status:  I believe this patient meets obs status.    Edgar Oliva MD             Electronically signed by Edgar Oliva MD at 03/07/24 1117          Emergency Department Notes        Carl Simmons MD at 03/07/24 1043          Subjective   History of Present Illness  Chief complaint generalized weakness pain all over unable  to walk loss of bladder control    History of present illness this is a 58-year-old female who was admitted to the hospital and discharged home a few days ago for electrolyte abnormalities.  She reports that she does have a alcohol issue but over the last 2 days she has become extremely weak and unable to walk and loss of bladder bowel.  There is no numbness or tingling throughout the extremities no chest pain neck arm jaw pain she has pain all over throughout her back and just generally extremities anytime she tries to do anything she has excruciating pain no fever chills she denies any headache visual changes speech difficulty no paralysis.  Patient states that she is unable to even walk.  She denies any abdominal pain she has had no appetite and she has not drank in a couple of days.  Denies any injury or ill exposures and no one home with similar illness no foreign travels antibiotic use.  And no other change in medications.      Review of Systems   Constitutional:  Negative for chills and fever.   Eyes:  Negative for photophobia and visual disturbance.   Respiratory:  Negative for chest tightness and shortness of breath.    Cardiovascular:  Negative for chest pain and palpitations.   Gastrointestinal:  Positive for diarrhea. Negative for abdominal pain and vomiting.   Genitourinary:  Negative for difficulty urinating, dysuria and hematuria.   Musculoskeletal:  Positive for arthralgias, back pain and myalgias.   Skin:  Negative for rash and wound.   Neurological:  Positive for weakness. Negative for dizziness, facial asymmetry, speech difficulty, numbness and headaches.   Psychiatric/Behavioral:  Negative for confusion.        Past Medical History:   Diagnosis Date    Cirrhosis     COPD (chronic obstructive pulmonary disease)     Depression     GERD (gastroesophageal reflux disease)     Hyperlipidemia     Hypertension     PTSD (post-traumatic stress disorder)        Allergies   Allergen Reactions    Sulfa  Antibiotics Hives    Keflex [Cephalexin] Hives       Past Surgical History:   Procedure Laterality Date     SECTION N/A     COLONOSCOPY N/A 2021    Procedure: COLONOSCOPY with polypectomy x2 and random biopsy;  Surgeon: Osman Clay MD;  Location: [facility] ENDOSCOPY;  Service: Gastroenterology;  Laterality: N/A;  colon polyps    DENTAL PROCEDURE      ENDOSCOPY N/A 2021    Procedure: ESOPHAGOGASTRODUODENOSCOPY;  Surgeon: Osman Clay MD;  Location: [facility] ENDOSCOPY;  Service: Gastroenterology;  Laterality: N/A;  esophagitis    JOINT REPLACEMENT      REPLACEMENT TOTAL HIP LATERAL POSITION Left     TONSILLECTOMY      VAGINAL BIRTH AFTER  SECTION         Family History   Problem Relation Age of Onset    Alcohol abuse Mother     Alcohol abuse Paternal Grandfather        Social History     Socioeconomic History    Marital status:    Tobacco Use    Smoking status: Every Day     Current packs/day: 0.50     Types: Cigarettes     Passive exposure: Current    Smokeless tobacco: Never    Tobacco comments:     3cigs   Vaping Use    Vaping status: Never Used   Substance and Sexual Activity    Alcohol use: Yes     Alcohol/week: 2.0 standard drinks of alcohol     Types: 2 Cans of beer per week     Comment: pint daily    Drug use: No    Sexual activity: Yes     Partners: Male     Birth control/protection: Post-menopausal     Prior to Admission medications    Medication Sig Start Date End Date Taking? Authorizing Provider   albuterol sulfate  (90 Base) MCG/ACT inhaler Inhale 2 puffs Every 4 (Four) Hours As Needed for Wheezing.    ProviderAbigail MD   busPIRone (BUSPAR) 15 MG tablet TAKE 1 TABLET BY MOUTH TWICE A DAY 10/16/23   Norma Saul APRN   Cyanocobalamin (Vitamin B-12) 1000 MCG sublingual tablet Place 1 tablet under the tongue Daily.    ProviderAbigail MD   diclofenac (VOLTAREN) 50 MG EC tablet Take 1 tablet by mouth 2 (Two) Times a Day As Needed (pain). 24    Sarah Huang APRN   folic acid (FOLVITE) 1 MG tablet Take 1 tablet by mouth Daily. 3/4/24   Laura Quinones APRN   furosemide (LASIX) 40 MG tablet Take 1 tablet by mouth Daily. 6/13/23   Norma Saul APRN   lidocaine (LIDODERM) 5 % Place 1 patch on the skin as directed by provider Daily. Remove & Discard patch within 12 hours or as directed by MD 3/4/24   Laura Quinones APRN   melatonin 5 MG tablet tablet Take 1 tablet by mouth At Night As Needed (sleep).    ProviderAbigail MD   potassium chloride 10 MEQ CR tablet TAKE 4 TABLETS BY MOUTH DAILY 3/5/24   Norma Saul APRN   QUEtiapine (SEROquel) 50 MG tablet TAKE 1 TABLET BY MOUTH EVERYDAY AT BEDTIME 2/16/24   Norma Saul APRN   sertraline (ZOLOFT) 100 MG tablet TAKE 1 TABLET BY MOUTH TWICE A DAY 10/19/23   Norma Saul APRN   spironolactone (ALDACTONE) 100 MG tablet Take 1 tablet by mouth Daily. 6/26/23   Norma Saul APRN   thiamine (VITAMIN B1) 100 MG tablet Take 1 tablet by mouth Daily. 3/7/24   Laura Quinones APRN   Zinc Gluconate 30 MG tablet Take 1 tablet by mouth Daily. 10/17/23   Provider, MD Abigail          Objective   Physical Exam  Constitutional is a 58-year-old female awake alert chronic ill-appearing triage vital signs reviewed.  HEENT extraocular muscles are intact pupils equal round reactive there is no nystagmus mouth is clear but dry neck supple no adenopathy no JV no bruits no meningeal signs lungs clear no retraction heart regular without murmur abdomen soft nontender good bowel sounds no peritoneal findings or pulsatile masses extremities pulses equal throughout upper and lower extremities no edema cords or Homans' sign or evidence of DVT skin warm and dry without rashes or cellulitic changes back she has some generalized pain throughout the entire back there is no direct tenderness to palpation percussion throughout the cervical thoracic or lumbar spine the patient has some generalized pain throughout  the back.  There is no redness warmth or edema.  The buttock area has good sensation no sacral anesthesia there is no redness warmth or an abscess or signs of necrotizing fasciitis.  Neurologic awake alert orientated x 4 no facial asymmetry speech normal no drift the arms or legs normal finger-to-nose she is tremulous the patient has increased reflexes noted throughout the knees and ankles are hyperreflexive.  Toes under including big toe dorsiflex plantarflex at difficulty motor strength 5-5 there is no weakness in the lower extremities.  She has good proprioception she is able to tell which way her big toe is going on exam.  No clonus toes downgoing upper extremities reflexes are 2+ symmetrical to the bicep tricep and wrist and  strength normal shoulder shrug all normal no weakness there is no swelling to the extremities or signs of DVT no cellulitic changes.  Procedures          ED Course      Results for orders placed or performed during the hospital encounter of 03/07/24   COVID-19, FLU A/B, RSV PCR 1 HR TAT - Swab, Nasopharynx    Specimen: Nasopharynx; Swab   Result Value Ref Range    COVID19 Not Detected Not Detected - Ref. Range    Influenza A PCR Not Detected Not Detected    Influenza B PCR Not Detected Not Detected    RSV, PCR Not Detected Not Detected   Comprehensive Metabolic Panel    Specimen: Blood   Result Value Ref Range    Glucose 99 65 - 99 mg/dL    BUN 20 6 - 20 mg/dL    Creatinine 1.22 (H) 0.57 - 1.00 mg/dL    Sodium 127 (L) 136 - 145 mmol/L    Potassium 4.3 3.5 - 5.2 mmol/L    Chloride 92 (L) 98 - 107 mmol/L    CO2 18.0 (L) 22.0 - 29.0 mmol/L    Calcium 9.2 8.6 - 10.5 mg/dL    Total Protein 7.5 6.0 - 8.5 g/dL    Albumin 3.8 3.5 - 5.2 g/dL    ALT (SGPT) 26 1 - 33 U/L    AST (SGOT) 38 (H) 1 - 32 U/L    Alkaline Phosphatase 153 (H) 39 - 117 U/L    Total Bilirubin 0.7 0.0 - 1.2 mg/dL    Globulin 3.7 gm/dL    A/G Ratio 1.0 g/dL    BUN/Creatinine Ratio 16.4 7.0 - 25.0    Anion Gap 17.0 (H) 5.0 -  15.0 mmol/L    eGFR 52.2 (L) >60.0 mL/min/1.73   Urinalysis With Microscopic If Indicated (No Culture) - Straight Cath    Specimen: Straight Cath; Urine   Result Value Ref Range    Color, UA Yellow Yellow, Straw    Appearance, UA Clear Clear    pH, UA 5.5 5.0 - 8.0    Specific Gravity, UA 1.015 1.005 - 1.030    Glucose, UA Negative Negative    Ketones, UA Negative Negative    Bilirubin, UA Negative Negative    Blood, UA Negative Negative    Protein, UA Negative Negative    Leuk Esterase, UA Negative Negative    Nitrite, UA Negative Negative    Urobilinogen, UA 0.2 E.U./dL 0.2 - 1.0 E.U./dL   Protime-INR    Specimen: Blood   Result Value Ref Range    Protime 10.9 9.6 - 11.7 Seconds    INR 1.00 0.93 - 1.10   aPTT    Specimen: Blood   Result Value Ref Range    PTT 33.4 (L) 61.0 - 76.5 seconds   Sedimentation Rate    Specimen: Blood   Result Value Ref Range    Sed Rate 66 (H) 0 - 30 mm/hr   Urine Drug Screen - Straight Cath    Specimen: Straight Cath; Urine   Result Value Ref Range    Amphet/Methamphet, Screen Negative Negative    Barbiturates Screen, Urine Negative Negative    Benzodiazepine Screen, Urine Negative Negative    Cocaine Screen, Urine Negative Negative    Opiate Screen Negative Negative    THC, Screen, Urine Negative Negative    Methadone Screen, Urine Negative Negative    Oxycodone Screen, Urine Negative Negative   Ethanol    Specimen: Blood   Result Value Ref Range    Ethanol % <0.010 %   Ammonia    Specimen: Blood   Result Value Ref Range    Ammonia 16 11 - 51 umol/L   CK    Specimen: Blood   Result Value Ref Range    Creatine Kinase 74 20 - 180 U/L   Magnesium    Specimen: Blood   Result Value Ref Range    Magnesium 1.7 1.6 - 2.6 mg/dL   TSH    Specimen: Blood   Result Value Ref Range    TSH 1.810 0.270 - 4.200 uIU/mL   T4, Free    Specimen: Blood   Result Value Ref Range    Free T4 1.24 0.93 - 1.70 ng/dL   CBC Auto Differential    Specimen: Blood   Result Value Ref Range    WBC 8.70 3.40 - 10.80  10*3/mm3    RBC 3.03 (L) 3.77 - 5.28 10*6/mm3    Hemoglobin 10.6 (L) 12.0 - 15.9 g/dL    Hematocrit 32.0 (L) 34.0 - 46.6 %    .7 (H) 79.0 - 97.0 fL    MCH 34.9 (H) 26.6 - 33.0 pg    MCHC 33.1 31.5 - 35.7 g/dL    RDW 17.4 (H) 12.3 - 15.4 %    RDW-SD 67.4 (H) 37.0 - 54.0 fl    MPV 8.6 6.0 - 12.0 fL    Platelets 76 (L) 140 - 450 10*3/mm3    Neutrophil % 85.9 (H) 42.7 - 76.0 %    Lymphocyte % 3.0 (L) 19.6 - 45.3 %    Monocyte % 10.8 5.0 - 12.0 %    Eosinophil % 0.0 (L) 0.3 - 6.2 %    Basophil % 0.3 0.0 - 1.5 %    Neutrophils, Absolute 7.40 (H) 1.70 - 7.00 10*3/mm3    Lymphocytes, Absolute 0.30 (L) 0.70 - 3.10 10*3/mm3    Monocytes, Absolute 0.90 0.10 - 0.90 10*3/mm3    Eosinophils, Absolute 0.00 0.00 - 0.40 10*3/mm3    Basophils, Absolute 0.00 0.00 - 0.20 10*3/mm3    nRBC 0.0 0.0 - 0.2 /100 WBC   Osmolality, Urine - Urine, Clean Catch    Specimen: Urine, Clean Catch   Result Value Ref Range    Osmolality, Urine 323 300 - 800 mOsm/kg   Potassium, Urine, Random - Urine, Clean Catch    Specimen: Urine, Clean Catch   Result Value Ref Range    Potassium, Urine 39.0 mmol/L   Chloride, Urine, Random - Urine, Clean Catch    Specimen: Urine, Clean Catch   Result Value Ref Range    Chloride, Urine 47 mmol/L   POC Lactate    Specimen: Blood   Result Value Ref Range    Lactate 1.7 0.3 - 2.0 mmol/L   ECG 12 Lead Other; Weakness   Result Value Ref Range    QT Interval 306 ms    QTC Interval 412 ms     CT Head Without Contrast    Result Date: 3/7/2024  Impression: No definite acute intracranial abnormality Electronically Signed: Miguel Dean MD  3/7/2024 11:29 AM EST  Workstation ID: [id number]   Medications   sodium chloride 0.9 % flush 10 mL (has no administration in time range)   sodium chloride 0.9 % flush 10 mL (10 mL Intravenous Given 3/7/24 1331)   sodium chloride 0.9 % flush 10 mL (has no administration in time range)   sodium chloride 0.9 % infusion 40 mL (has no administration in time range)   Enoxaparin Sodium  (LOVENOX) syringe 40 mg (40 mg Subcutaneous Not Given 3/7/24 1511)   sennosides-docusate (PERICOLACE) 8.6-50 MG per tablet 2 tablet (2 tablets Oral Given 3/7/24 1330)     And   polyethylene glycol (MIRALAX) packet 17 g (has no administration in time range)     And   bisacodyl (DULCOLAX) EC tablet 5 mg (has no administration in time range)     And   bisacodyl (DULCOLAX) suppository 10 mg (has no administration in time range)   HYDROcodone-acetaminophen (NORCO) 5-325 MG per tablet 1 tablet (1 tablet Oral Given 3/7/24 1330)   ketorolac (TORADOL) injection 15 mg (15 mg Intravenous Given 3/7/24 1623)   methocarbamol (ROBAXIN) tablet 500 mg (has no administration in time range)   lactated ringers bolus 1,000 mL (1,000 mL Intravenous New Bag 3/7/24 0937)   thiamine (B-1) injection 100 mg (100 mg Intravenous Given 3/7/24 0937)   morphine injection 4 mg (4 mg Intravenous Given 3/7/24 0937)   sodium chloride 0.9 % bolus 500 mL (500 mL Intravenous New Bag 3/7/24 1702)     EKG my interpretation sinus tachycardia rate of 105 normal axis no hypertrophy QTc of 412 other than tachycardia to normal EKG unchanged compared to 6/11/2022 other than faster rate                                         Medical Decision Making  Medical decision making patient IV established she was given a liter lactated Ringer's thiamine 100 mg IV morphine 4 IV.  EKG my interpretation sinus tachycardia rate of 105 normal axis no hypertrophy QTc of 4 and 12 it was normal other than a tachycardia that is unchanged compared to 6/11/2022.  Labs obtained my independent review comprehensive metabolic profile unremarkable other than a creatinine of 1.2 sodium 127 sed rate was elevated at 66 drug screen negative alcohol negative ammonia level normal magnesium normal CK normal TSH T4 normal BBC unremarkable other than hemoglobin 10.6 and a platelet count was 76,000.  CT head without radiology no acute findings.  Report reviewed by me.  Patient repeat exam was  resting comfortably feeling better.  I do not see evidence of an acute stroke I do not see evidence acute intra-abdominal process I do not see any evidence suggest sepsis based on the history and physical and clinical exam I do not see any evidence of cauda equina at this point there is no sacral anesthesia the patient has good sensation throughout the area and extremities she is hyperreflexic she has had loss of bladder control she states.  The bladder is not distended here the patient has no clonus toes downgoing motor strength all normal.  Consider consider metabolic process or infectious etiology or spinal issue although not a complete list of all possibilities.  She will need much more workup.  This has been ongoing for 2 days.  I talked to the hospitalist we discussed the case and patient will be admitted for further workup and care patient agreeable.  Based on the above findings I would anticipate a 2 midnight stay    Problems Addressed:  Unable to walk: complicated acute illness or injury  Weakness: complicated acute illness or injury    Amount and/or Complexity of Data Reviewed  External Data Reviewed: ECG.  Labs: ordered. Decision-making details documented in ED Course.  Radiology: ordered and independent interpretation performed. Decision-making details documented in ED Course.  ECG/medicine tests: ordered and independent interpretation performed. Decision-making details documented in ED Course.    Risk  Decision regarding hospitalization.        Final diagnoses:   Weakness   Unable to walk       ED Disposition  ED Disposition       ED Disposition   Decision to Admit    Condition   --    Comment   Level of Care: Med/Surg [1]   Diagnosis: Myalgia [006240]   Admitting Physician: SUSAN ODELL [[id number]]   Attending Physician: SUSAN ODELL [[id number]]                 No follow-up provider specified.       Medication List      No changes were made to your prescriptions during this visit.            Carl Simmons  MD MISAEL  03/07/24 1740      Electronically signed by Carl Simmons MD at 03/07/24 1740       Vital Signs (last 2 days)       Date/Time Temp Temp src Pulse Resp BP Patient Position SpO2    03/08/24 0814 98.2 (36.8) Oral 99 20 100/56 Lying 90    03/08/24 0453 99.7 (37.6) Oral 96 22 96/54 Lying 91    03/08/24 0300 102.9 (39.4) Oral 112 26 113/68 Lying 92    03/08/24 0108 101.9 (38.8) Oral 105 28 108/61 Lying 93    03/07/24 2040 99.1 (37.3) Oral 103 23 98/51 Lying 97    03/07/24 1531 98.8 (37.1) Oral -- 20 90/58 Lying --    03/07/24 1336 100.4 (38) Oral 112 26 106/65 Lying 95    03/07/24 1105 -- -- 106 -- -- -- 96    03/07/24 1057 -- -- 107 -- -- -- 97    03/07/24 1046 -- -- 99 -- 120/63 -- --    03/07/24 1021 -- -- 99 -- -- -- 95    03/07/24 1016 -- -- 99 -- 103/65 -- 91    03/07/24 1013 -- -- 99 -- -- -- 93    03/07/24 0946 -- -- 98 -- 110/58 -- 95    03/07/24 0937 -- -- 102 -- -- -- 98    03/07/24 0907 -- -- 107 -- -- -- 99    03/07/24 0902 -- -- -- -- 103/63 -- --    03/07/24 0851 98.1 (36.7) Oral 112 19 110/63 Sitting 94            Facility-Administered Medications as of 3/7/2024   Medication Dose Route Frequency Provider Last Rate Last Admin    acetaminophen (TYLENOL) tablet 650 mg  650 mg Oral Q6H REINALDON Reny Barrera APRN   650 mg at 03/08/24 0300    sennosides-docusate (PERICOLACE) 8.6-50 MG per tablet 2 tablet  2 tablet Oral BID Edgar Oliva MD   2 tablet at 03/07/24 1330    And    polyethylene glycol (MIRALAX) packet 17 g  17 g Oral Daily PRN Edgar Oliva MD        And    bisacodyl (DULCOLAX) EC tablet 5 mg  5 mg Oral Daily PRN Edgar Oliva MD        And    bisacodyl (DULCOLAX) suppository 10 mg  10 mg Rectal Daily PRN Edgar Oliva MD        Enoxaparin Sodium (LOVENOX) syringe 40 mg  40 mg Subcutaneous Daily Edgar Oliva MD        folic acid (FOLVITE) tablet 1 mg  1 mg Oral Daily Reny Barrera APRN   1 mg at 03/08/24 0801    HYDROcodone-acetaminophen (NORCO) 5-325 MG per  tablet 1 tablet  1 tablet Oral Q6H PRN Edgar Oliva MD   1 tablet at 03/08/24 0801    ketorolac (TORADOL) injection 15 mg  15 mg Intravenous Q6H PRN Edgar Oliva MD   15 mg at 03/07/24 1623    [COMPLETED] lactated ringers bolus 1,000 mL  1,000 mL Intravenous Once Carl Simmons MD 2,000 mL/hr at 03/07/24 0937 1,000 mL at 03/07/24 0937    LORazepam (ATIVAN) tablet 1 mg  1 mg Oral Q1H PRN Reny Barrera APRN        Or    midazolam (VERSED) injection 2 mg  2 mg Intravenous Q1H PRN Reny Barrera APRN        Or    LORazepam (ATIVAN) tablet 2 mg  2 mg Oral Q1H PRN Reny Barrera APRN        Or    midazolam (VERSED) injection 4 mg  4 mg Intravenous Q1H PRN Reny Barrera APRN        Or    midazolam (VERSED) injection 4 mg  4 mg Intravenous Q15 Min PRN Reny Barrera APRN   4 mg at 03/07/24 2301    Or    midazolam (VERSED) injection 4 mg  4 mg Intramuscular Q15 Min PRN Reny Barrera APRN        methocarbamol (ROBAXIN) tablet 500 mg  500 mg Oral 4x Daily Edgar Oilva MD   500 mg at 03/08/24 0801    [COMPLETED] morphine injection 4 mg  4 mg Intravenous Once Carl Simmons MD   4 mg at 03/07/24 0937    multivitamin with minerals 1 tablet  1 tablet Oral Daily Reny Barrera APRN        Pharmacy to dose vancomycin   Does not apply Continuous PRN Reny Barrera APRN        [COMPLETED] sodium chloride 0.9 % bolus 500 mL  500 mL Intravenous Once Red Quiroga MD 2,000 mL/hr at 03/07/24 1702 500 mL at 03/07/24 1702    sodium chloride 0.9 % flush 10 mL  10 mL Intravenous PRN Carl Simmons MD        sodium chloride 0.9 % flush 10 mL  10 mL Intravenous Q12H Edgar Oliva MD   10 mL at 03/08/24 0804    sodium chloride 0.9 % flush 10 mL  10 mL Intravenous PRN Edgar Oliva MD        sodium chloride 0.9 % infusion 40 mL  40 mL Intravenous PRN Edgar Oliva MD        [COMPLETED] thiamine (B-1) injection 100 mg  100 mg Intravenous Once  Carl Simmons MD   100 mg at 03/07/24 0937    thiamine (B-1) injection 200 mg  200 mg Intravenous Q8H Reny Barrera APRN   200 mg at 03/08/24 0532    Followed by    [START ON 3/13/2024] thiamine (VITAMIN B-1) tablet 100 mg  100 mg Oral Daily Reny Barrera APRN        vancomycin 750 mg in sodium chloride 0.9 % 250 mL IVPB-VTB  750 mg Intravenous Q12H Reny Barrera APRN        [COMPLETED] Vancomycin HCl 1,250 mg in sodium chloride 0.9 % 250 mL IVPB  1,250 mg Intravenous Once Reny Barrera APRN 200 mL/hr at 03/08/24 0532 1,250 mg at 03/08/24 0532     Lab Results (all)       Procedure Component Value Units Date/Time    Sodium, Urine, Random - Urine, Clean Catch [[id number]] Collected: 03/07/24 1705    Specimen: Urine, Clean Catch Updated: 03/08/24 0840     Sodium, Urine 45 mmol/L     Narrative:      Reference intervals for random urine have not been established.  Clinical usage is dependent upon physician's interpretation in combination with other laboratory tests.       Magnesium [[id number]]  (Normal) Collected: 03/08/24 0752    Specimen: Blood Updated: 03/08/24 0831     Magnesium 1.7 mg/dL     Phosphorus [[id number]]  (Normal) Collected: 03/08/24 0752    Specimen: Blood Updated: 03/08/24 0831     Phosphorus 3.0 mg/dL     Comprehensive Metabolic Panel [[id number]]  (Abnormal) Collected: 03/08/24 0752    Specimen: Blood Updated: 03/08/24 0813     Glucose 108 mg/dL      BUN 17 mg/dL      Creatinine 0.92 mg/dL      Sodium 128 mmol/L      Potassium 3.7 mmol/L      Chloride 95 mmol/L      CO2 20.0 mmol/L      Calcium 8.3 mg/dL      Total Protein 6.1 g/dL      Albumin 3.1 g/dL      ALT (SGPT) 21 U/L      AST (SGOT) 29 U/L      Alkaline Phosphatase 123 U/L      Total Bilirubin 0.5 mg/dL      Globulin 3.0 gm/dL      A/G Ratio 1.0 g/dL      BUN/Creatinine Ratio 18.5     Anion Gap 13.0 mmol/L      eGFR 73.2 mL/min/1.73     Narrative:      GFR Normal >60  Chronic Kidney Disease <60  Kidney  Failure <15      CBC (No Diff) [[id number]]  (Abnormal) Collected: 03/08/24 0752    Specimen: Blood Updated: 03/08/24 0805     WBC 6.50 10*3/mm3      RBC 2.70 10*6/mm3      Hemoglobin 9.4 g/dL      Hematocrit 29.0 %      .3 fL      MCH 34.7 pg      MCHC 32.3 g/dL      RDW 17.2 %      RDW-SD 63.9 fl      MPV 8.4 fL      Platelets 70 10*3/mm3     Blood Culture - Blood, Arm, Left [[id number]]  (Abnormal) Collected: 03/07/24 0933    Specimen: Blood from Arm, Left Updated: 03/08/24 0434     Blood Culture Abnormal Stain     Gram Stain Aerobic Bottle Gram positive cocci in clusters      Anaerobic Bottle Gram positive cocci in clusters    Blood Culture - Blood, Arm, Right [[id number]]  (Abnormal) Collected: 03/07/24 0920    Specimen: Blood from Arm, Right Updated: 03/08/24 0433     Blood Culture Abnormal Stain     Gram Stain Anaerobic Bottle Gram positive cocci in clusters      Aerobic Bottle Gram positive cocci in clusters    Blood Culture ID, PCR - Blood, Arm, Right [[id number]]  (Abnormal) Collected: 03/07/24 0920    Specimen: Blood from Arm, Right Updated: 03/08/24 0433     BCID, PCR Staph aureus. mecA/C and MREJ (methicillin resistance gene) detected. Identification by BCID2 PCR.     BOTTLE TYPE Anaerobic Bottle    Narrative:      Infectious disease consultation is highly recommended to rule out distant foci of infection.    Basic Metabolic Panel [[id number]]  (Abnormal) Collected: 03/07/24 1757    Specimen: Blood Updated: 03/07/24 1834     Glucose 86 mg/dL      BUN 18 mg/dL      Creatinine 0.98 mg/dL      Sodium 127 mmol/L      Potassium 3.9 mmol/L      Chloride 97 mmol/L      CO2 19.0 mmol/L      Calcium 7.8 mg/dL      BUN/Creatinine Ratio 18.4     Anion Gap 11.0 mmol/L      eGFR 67.9 mL/min/1.73     Narrative:      GFR Normal >60  Chronic Kidney Disease <60  Kidney Failure <15      Osmolality, Urine - Urine, Clean Catch [[id number]]  (Normal) Collected: 03/07/24 1705    Specimen: Urine, Clean Catch Updated:  03/07/24 1733     Osmolality, Urine 323 mOsm/kg     Chloride, Urine, Random - Urine, Clean Catch [[id number]] Collected: 03/07/24 1705    Specimen: Urine, Clean Catch Updated: 03/07/24 1727     Chloride, Urine 47 mmol/L     Narrative:      Reference intervals for random urine have not been established.  Clinical usage is dependent upon physician's interpretation in combination with other laboratory tests.       Potassium, Urine, Random - Urine, Clean Catch [[id number]] Collected: 03/07/24 1705    Specimen: Urine, Clean Catch Updated: 03/07/24 1727     Potassium, Urine 39.0 mmol/L     Narrative:      Reference intervals for random urine have not been established.  Clinical usage is dependent upon physician's interpretation in combination with other laboratory tests.       COVID-19, FLU A/B, RSV PCR 1 HR TAT - Swab, Nasopharynx [[id number]]  (Normal) Collected: 03/07/24 1526    Specimen: Swab from Nasopharynx Updated: 03/07/24 1607     COVID19 Not Detected     Influenza A PCR Not Detected     Influenza B PCR Not Detected     RSV, PCR Not Detected    Narrative:      Fact sheet for providers: https://www.fda.gov/media/835111/download    Fact sheet for patients: https://www.fda.gov/media/668757/download    Test performed by PCR.    TSH [[id number]]  (Normal) Collected: 03/07/24 0920    Specimen: Blood Updated: 03/07/24 1018     TSH 1.810 uIU/mL     T4, Free [[id number]]  (Normal) Collected: 03/07/24 0920    Specimen: Blood Updated: 03/07/24 1018     Free T4 1.24 ng/dL     Narrative:      Results may be falsely increased if patient taking Biotin.      Comprehensive Metabolic Panel [[id number]]  (Abnormal) Collected: 03/07/24 0920    Specimen: Blood Updated: 03/07/24 1017     Glucose 99 mg/dL      BUN 20 mg/dL      Creatinine 1.22 mg/dL      Sodium 127 mmol/L      Potassium 4.3 mmol/L      Chloride 92 mmol/L      CO2 18.0 mmol/L      Calcium 9.2 mg/dL      Total Protein 7.5 g/dL      Albumin 3.8 g/dL      ALT (SGPT) 26 U/L       AST (SGOT) 38 U/L      Alkaline Phosphatase 153 U/L      Total Bilirubin 0.7 mg/dL      Globulin 3.7 gm/dL      A/G Ratio 1.0 g/dL      BUN/Creatinine Ratio 16.4     Anion Gap 17.0 mmol/L      eGFR 52.2 mL/min/1.73     Narrative:      GFR Normal >60  Chronic Kidney Disease <60  Kidney Failure <15      Magnesium [[id number]]  (Normal) Collected: 03/07/24 0920    Specimen: Blood Updated: 03/07/24 1017     Magnesium 1.7 mg/dL     CK [[id number]]  (Normal) Collected: 03/07/24 0920    Specimen: Blood Updated: 03/07/24 1012     Creatine Kinase 74 U/L     Ethanol [[id number]] Collected: 03/07/24 0920    Specimen: Blood Updated: 03/07/24 1012     Ethanol % <0.010 %     Narrative:      Plasma Ethanol Clinical Symptoms:    ETOH (%)               Clinical Symptom  .01-.05              No apparent influence  .03-.12              Euphoria, Diminished judgment and attention   .09-.25              Impaired comprehension, Muscle incoordination  .18-.30              Confusion, Staggered gait, Slurred speech  .25-.40              Markedly decreased response to stimuli, unable to stand or                        walk, vomitting, sleep or stupor  .35-.50              Comatose, Anesthesia, Subnormal body temperature        Urine Drug Screen - Straight Cath [[id number]]  (Normal) Collected: 03/07/24 0932    Specimen: Urine from Straight Cath Updated: 03/07/24 1009     Amphet/Methamphet, Screen Negative     Barbiturates Screen, Urine Negative     Benzodiazepine Screen, Urine Negative     Cocaine Screen, Urine Negative     Opiate Screen Negative     THC, Screen, Urine Negative     Methadone Screen, Urine Negative     Oxycodone Screen, Urine Negative    Narrative:      Negative Thresholds Per Drugs Screened:    Amphetamines                 500 ng/ml  Barbiturates                 200 ng/ml  Benzodiazepines              100 ng/ml  Cocaine                      300 ng/ml  Methadone                    300 ng/ml  Opiates                      300  ng/ml  Oxycodone                    100 ng/ml  THC                           50 ng/ml    The Normal Value for all drugs tested is negative. This report includes final unconfirmed screening results to be used for medical treatment purposes only. Unconfirmed results must not be used for non-medical purposes such as employment or legal testing. Clinical consideration should be applied to any drug of abuse test, particularly when unconfirmed results are used.          All urine drugs of abuse requests without chain of custody are for medical screening purposes only.  False positives are possible.      Ammonia [[id number]]  (Normal) Collected: 03/07/24 0920    Specimen: Blood Updated: 03/07/24 0958     Ammonia 16 umol/L     Protime-INR [[id number]]  (Normal) Collected: 03/07/24 0920    Specimen: Blood Updated: 03/07/24 0954     Protime 10.9 Seconds      INR 1.00    aPTT [[id number]]  (Abnormal) Collected: 03/07/24 0920    Specimen: Blood Updated: 03/07/24 0954     PTT 33.4 seconds     POC Lactate [[id number]]  (Normal) Collected: 03/07/24 0952    Specimen: Blood Updated: 03/07/24 0954     Lactate 1.7 mmol/L      Comment: Serial Number: [id number]:  [id number]       Sedimentation Rate [[id number]]  (Abnormal) Collected: 03/07/24 0920    Specimen: Blood Updated: 03/07/24 0950     Sed Rate 66 mm/hr     CBC & Differential [[id number]]  (Abnormal) Collected: 03/07/24 0920    Specimen: Blood Updated: 03/07/24 0946    Narrative:      The following orders were created for panel order CBC & Differential.  Procedure                               Abnormality         Status                     ---------                               -----------         ------                     CBC Auto Differential[[id number]]        Abnormal            Final result               Scan Slide[[id number]]                                                                    Please view results for these tests on the individual orders.    CBC Auto  Differential [[id number]]  (Abnormal) Collected: 03/07/24 0920    Specimen: Blood Updated: 03/07/24 0946     WBC 8.70 10*3/mm3      RBC 3.03 10*6/mm3      Hemoglobin 10.6 g/dL      Hematocrit 32.0 %      .7 fL      MCH 34.9 pg      MCHC 33.1 g/dL      RDW 17.4 %      RDW-SD 67.4 fl      MPV 8.6 fL      Platelets 76 10*3/mm3      Neutrophil % 85.9 %      Lymphocyte % 3.0 %      Monocyte % 10.8 %      Eosinophil % 0.0 %      Basophil % 0.3 %      Neutrophils, Absolute 7.40 10*3/mm3      Lymphocytes, Absolute 0.30 10*3/mm3      Monocytes, Absolute 0.90 10*3/mm3      Eosinophils, Absolute 0.00 10*3/mm3      Basophils, Absolute 0.00 10*3/mm3      nRBC 0.0 /100 WBC     Urinalysis With Microscopic If Indicated (No Culture) - Straight Cath [[id number]]  (Normal) Collected: 03/07/24 0932    Specimen: Urine from Straight Cath Updated: 03/07/24 0946     Color, UA Yellow     Appearance, UA Clear     pH, UA 5.5     Specific Gravity, UA 1.015     Glucose, UA Negative     Ketones, UA Negative     Bilirubin, UA Negative     Blood, UA Negative     Protein, UA Negative     Leuk Esterase, UA Negative     Nitrite, UA Negative     Urobilinogen, UA 0.2 E.U./dL    Narrative:      Urine microscopic not indicated.          Imaging Results (All)       Procedure Component Value Units Date/Time    CT Head Without Contrast [[id number]] Collected: 03/07/24 1127     Updated: 03/07/24 1131    Narrative:      CT HEAD WO CONTRAST    Date of Exam: 3/7/2024 11:21 AM EST    Indication: General weakness unable to walk.    Comparison: CT brain dated 10/14/2022    Technique: Axial CT images were obtained of the head without contrast administration.  Coronal reconstructions were performed.  Automated exposure control and iterative reconstruction methods were used.    Findings:  There is no evidence of hemorrhage. There is no mass effect or midline shift. Mild diffuse brain atrophy.    There is no extracerebral collection.    Ventricles are normal in  size and configuration for patient's stated age.     Posterior fossa is within normal limits.    Calvarium and skull base appear intact.  Visualized sinuses show no air fluid levels. Visualized orbits are unremarkable.      Impression:      Impression:    No definite acute intracranial abnormality      Electronically Signed: Miguel Dean MD    3/7/2024 11:29 AM EST    Workstation ID: [id number]          Operative/Procedure Notes (all)    No notes of this type exist for this encounter.          Physician Progress Notes (all)        Red Quiroga MD at 24 0810              PROGRESS NOTE      Patient Name: Lita Yu  : 1965  MRN: [id number]  Primary Care Physician: Norma Saul APRN  Date of admission: 3/7/2024    Patient Care Team:  Norma Saul APRN as PCP - General (Nurse Practitioner)  Flory Villanueva MD (Physical Medicine and Rehabilitation)        Subjective   Subjective:     Patient is still complaining of generalized bodyaches  Review of systems:  All other review of system unremarkable      Allergies:    Allergies   Allergen Reactions    Sulfa Antibiotics Hives    Keflex [Cephalexin] Hives       Objective   Exam:     Vital Signs  Temp:  [98.1 °F (36.7 °C)-102.9 °F (39.4 °C)] 99.7 °F (37.6 °C)  Heart Rate:  [] 96  Resp:  [19-28] 22  BP: ()/(51-68) 96/54  SpO2:  [91 %-99 %] 91 %  on   ;   Device (Oxygen Therapy): room air  Body mass index is 23.91 kg/m².    General: Middle-age female in no acute distress.    Head:      Normocephalic and atraumatic.    Eyes:      PERRL/EOM intact, conjunctiva and sclera clear with out nystagmus.    Neck:      No masses, thyromegaly,  trachea central with normal respiratory effort   Lungs:    Clear bilaterally to auscultation.    Heart:      Regular rate and rhythm, no murmur no gallop  Abd:        Soft, nontender, not distended, bowel sounds positive, no shifting dullness   Pulses:   Pulses palpable  Extr:        No  cyanosis or clubbing--no significant edema.    Neuro:    No focal deficits.   alert oriented x3  Skin:       Intact without lesions or rashes.    Psych:    Alert and cooperative; normal mood and affect; .      Results Review:  I have personally reviewed most recent Data :  CBC    Results from last 7 days   Lab Units 03/08/24  0752 03/07/24  0920 03/04/24  0351 03/03/24  0536 03/02/24  0757   WBC 10*3/mm3 6.50 8.70 5.80 5.20 6.50   HEMOGLOBIN g/dL 9.4* 10.6* 10.1* 9.9* 10.1*   PLATELETS 10*3/mm3 70* 76* 125* 131* 140     CMP   Results from last 7 days   Lab Units 03/07/24  1757 03/07/24  0920 03/04/24  0351 03/03/24  0648 03/03/24  0536 03/02/24  2136 03/02/24  0757   SODIUM mmol/L 127* 127* 135*  --  135*  --  128*   POTASSIUM mmol/L 3.9 4.3 4.3  --  4.0 4.0 3.3*   CHLORIDE mmol/L 97* 92* 103  --  102  --  93*   CO2 mmol/L 19.0* 18.0* 22.0  --  23.0  --  18.0*   BUN mg/dL 18 20 8  --  11  --  25*   CREATININE mg/dL 0.98 1.22* 0.93  --  0.80  --  1.05*   GLUCOSE mg/dL 86 99 94  --  89  --  77   ALBUMIN g/dL  --  3.8 3.5  --  3.5  --  3.6   BILIRUBIN mg/dL  --  0.7 0.3  --  0.3  --  0.4   ALK PHOS U/L  --  153* 138*  --  138*  --  163*   AST (SGOT) U/L  --  38* 39*  --  40*  --  53*   ALT (SGPT) U/L  --  26 35*  --  40*  --  48*   AMMONIA umol/L  --  16  --  38  --   --  92*     ABG      CT Head Without Contrast    Result Date: 3/7/2024  Impression: No definite acute intracranial abnormality Electronically Signed: Miguel Dean MD  3/7/2024 11:29 AM EST  Workstation ID: [id number]       Scheduled Meds:enoxaparin, 40 mg, Subcutaneous, Daily  folic acid, 1 mg, Oral, Daily  methocarbamol, 500 mg, Oral, 4x Daily  multivitamin with minerals, 1 tablet, Oral, Daily  senna-docusate sodium, 2 tablet, Oral, BID  sodium chloride, 10 mL, Intravenous, Q12H  thiamine (B-1) IV, 200 mg, Intravenous, Q8H   Followed by  [START ON 3/13/2024] thiamine, 100 mg, Oral, Daily  vancomycin, 750 mg, Intravenous, Q12H      Continuous  Infusions:Pharmacy to dose vancomycin,       PRN Meds:  acetaminophen    senna-docusate sodium **AND** polyethylene glycol **AND** bisacodyl **AND** bisacodyl    HYDROcodone-acetaminophen    ketorolac    LORazepam **OR** midazolam **OR** LORazepam **OR** midazolam **OR** midazolam **OR** midazolam    Pharmacy to dose vancomycin    [COMPLETED] Insert Peripheral IV **AND** sodium chloride    sodium chloride    sodium chloride    Assessment & Plan   Assessment and Plan:         Myalgia    ASSESSMENT:  Acute kidney injury likely secondary to volume and low blood pressure  Hyponatremia may be secondary to alcohol intake follow-up with a urine studies   Metabolic acidosis may be due to his respiratory alkalosis because of some history of cirrhosis  Hypocalcemia due to poor nutritional status check phosphorus level tomorrow morning check magnesium level tomorrow morning    PLAN :      Although hemodynamics are still on the softer side but patient is feeling much better   Patient is eating and drinking   Labs are pending from today I really want to check sodium level   Renal functions have improved   Urine studies with urine  osmolality of 323 urine sodium somehow was not don  Will order urine sodium again   Follow-up with repeat labs  Follow-up with magnesium and phosphorus level  Thank you for letting me participate             Electronically signed by Red Quiroga MD,   [facility] kidney consultant  [phone]  3/8/2024  08:11 EST      Electronically signed by Red Quiroga MD at 24 0816          Consult Notes (all)        Red Quiroga MD at 24 1833              INITIAL CONSULT NOTE      Patient Name: Lita Yu  : 1965  MRN: [id number]  Primary Care Physician: Norma Saul APRN  Date of admission: 3/7/2024    Patient Care Team:  Norma Saul APRN as PCP - General (Nurse Practitioner)  Flory Villanueva MD (Physical Medicine and Rehabilitation)        Reason for  Consult:       Renal failure  Subjective   History of Present Illness:   Chief Complaint:   Chief Complaint   Patient presents with    Weakness - Generalized     HISTORY:  Lita Yu is a 58 y.o. female with past medical history of cirrhosis, depression, hyperlipidemia, hypertension, PTSD,. who presents with extreme weakness of the muscle in the thighs.  Patient had some previous history of rhabdomyolysis but CPK level at this time is normal.  Patient also found to have significant hyponatremia with sodium of 127 and creatinine that was 1 point 4 repeat creatinine 0.98.  Patient also found to be somewhat acidotic.  Which is already getting better          Review of systems:  All other review of system unremarkable  Constitutional: No fever, no chills, no lethargy, no weakness.  HEENT:  No headache, otalgia, itchy eyes, nasal discharge or sore throat.  Cardiac:  No chest pain, dyspnea, orthopnea or PND.  Chest:              No cough, phlegm or wheezing.  Abdomen:  No abdominal pain, nausea or vomiting.  Neuro:  No focal weakness, abnormal movements orseizure like activity.  :   No hematuria, no pyuria, no dysuria, no flank pain.  ROS was otherwise negative except as mentioned in the [place].       Personal History:     Past Medical History:   Past Medical History:   Diagnosis Date    Cirrhosis     COPD (chronic obstructive pulmonary disease)     Depression     GERD (gastroesophageal reflux disease)     Hyperlipidemia     Hypertension     PTSD (post-traumatic stress disorder)        Surgical History:      Past Surgical History:   Procedure Laterality Date     SECTION N/A     COLONOSCOPY N/A 2021    Procedure: COLONOSCOPY with polypectomy x2 and random biopsy;  Surgeon: Osman Clay MD;  Location: [facility] ENDOSCOPY;  Service: Gastroenterology;  Laterality: N/A;  colon polyps    DENTAL PROCEDURE      ENDOSCOPY N/A 2021    Procedure: ESOPHAGOGASTRODUODENOSCOPY;  Surgeon: Osman Clay  MD;  Location: [facility] ENDOSCOPY;  Service: Gastroenterology;  Laterality: N/A;  esophagitis    JOINT REPLACEMENT      REPLACEMENT TOTAL HIP LATERAL POSITION Left     TONSILLECTOMY      VAGINAL BIRTH AFTER  SECTION         Family History: family history includes Alcohol abuse in her mother and paternal grandfather. Otherwise pertinent FHx was reviewed and unremarkable.     Social History:  reports that she has been smoking cigarettes. She has been exposed to tobacco smoke. She has never used smokeless tobacco. She reports current alcohol use of about 2.0 standard drinks of alcohol per week. She reports that she does not use drugs.    Medications:  Prior to Admission medications    Medication Sig Start Date End Date Taking? Authorizing Provider   albuterol sulfate  (90 Base) MCG/ACT inhaler Inhale 2 puffs Every 4 (Four) Hours As Needed for Wheezing.    ProviderAbigail MD   busPIRone (BUSPAR) 15 MG tablet TAKE 1 TABLET BY MOUTH TWICE A DAY 10/16/23   Norma Saul APRN   Cyanocobalamin (Vitamin B-12) 1000 MCG sublingual tablet Place 1 tablet under the tongue Daily.    ProviderAbigail MD   diclofenac (VOLTAREN) 50 MG EC tablet Take 1 tablet by mouth 2 (Two) Times a Day As Needed (pain). 24   Sarah Huang APRN   folic acid (FOLVITE) 1 MG tablet Take 1 tablet by mouth Daily. 3/4/24   Laura Quinones APRN   furosemide (LASIX) 40 MG tablet Take 1 tablet by mouth Daily. 23   Norma Saul APRN   lidocaine (LIDODERM) 5 % Place 1 patch on the skin as directed by provider Daily. Remove & Discard patch within 12 hours or as directed by MD 3/4/24   Laura Quinones APRN   melatonin 5 MG tablet tablet Take 1 tablet by mouth At Night As Needed (sleep).    Abigail Hood MD   potassium chloride 10 MEQ CR tablet TAKE 4 TABLETS BY MOUTH DAILY 3/5/24   Norma Saul APRN   QUEtiapine (SEROquel) 50 MG tablet TAKE 1 TABLET BY MOUTH EVERYDAY AT BEDTIME 24   Norma Saul  SHARIF   sertraline (ZOLOFT) 100 MG tablet TAKE 1 TABLET BY MOUTH TWICE A DAY 10/19/23   Norma Saul APRN   spironolactone (ALDACTONE) 100 MG tablet Take 1 tablet by mouth Daily. 6/26/23   Norma Saul APRN   thiamine (VITAMIN B1) 100 MG tablet Take 1 tablet by mouth Daily. 3/7/24   Laura Quinones APRN   Zinc Gluconate 30 MG tablet Take 1 tablet by mouth Daily. 10/17/23   Provider, MD Abigail     Scheduled Meds:enoxaparin, 40 mg, Subcutaneous, Daily  methocarbamol, 500 mg, Oral, 4x Daily  senna-docusate sodium, 2 tablet, Oral, BID  sodium chloride, 10 mL, Intravenous, Q12H      Continuous Infusions:   PRN Meds:  senna-docusate sodium **AND** polyethylene glycol **AND** bisacodyl **AND** bisacodyl    HYDROcodone-acetaminophen    ketorolac    [COMPLETED] Insert Peripheral IV **AND** sodium chloride    sodium chloride    sodium chloride  Allergies:    Allergies   Allergen Reactions    Sulfa Antibiotics Hives    Keflex [Cephalexin] Hives       Objective   Exam:     Vital Signs  Temp:  [98.1 °F (36.7 °C)-100.4 °F (38 °C)] 98.8 °F (37.1 °C)  Heart Rate:  [] 112  Resp:  [19-26] 20  BP: ()/(58-65) 90/58  SpO2:  [91 %-99 %] 95 %  on   ;      Body mass index is 23.91 kg/m².  EXAM  General: Middle-age white female in no acute distress.    Head:      Normocephalic and atraumatic.    Eyes:      PERRL/EOM intact, conjunctivae and sclerae clear without nystagmus.    Neck:      No masses, thyromegaly,  trachea central   Lungs:    Clear bilaterally to auscultation.    Heart:      Regular rate and rhythm, no murmur no gallop  Abd:        Soft, nontender, not distended, bowel sounds positive, no shifting dullness.  Msk:        No deformity or scoliosis noted of thoracic or lumbar spine.    Pulses:   Pulses normal in all 4 extremities.    Extremities:        No cyanosis or clubbing--no edema.    Neuro:    No focal deficits.   alert oriented x3  Skin:       Intact without lesions or rashes.    Psych:    Alert  and cooperative; normal mood and affect; normal attention span       Results Review:  I have personally reviewed most recent Data :  [facility]  CBC    Results from last 7 days   Lab Units 03/07/24  0920 03/04/24  0351 03/03/24  0536 03/02/24  0757   WBC 10*3/mm3 8.70 5.80 5.20 6.50   HEMOGLOBIN g/dL 10.6* 10.1* 9.9* 10.1*   PLATELETS 10*3/mm3 76* 125* 131* 140     CMP   Results from last 7 days   Lab Units 03/07/24  0920 03/04/24  0351 03/03/24  0648 03/03/24  0536 03/02/24 2136 03/02/24  0757   SODIUM mmol/L 127* 135*  --  135*  --  128*   POTASSIUM mmol/L 4.3 4.3  --  4.0 4.0 3.3*   CHLORIDE mmol/L 92* 103  --  102  --  93*   CO2 mmol/L 18.0* 22.0  --  23.0  --  18.0*   BUN mg/dL 20 8  --  11  --  25*   CREATININE mg/dL 1.22* 0.93  --  0.80  --  1.05*   GLUCOSE mg/dL 99 94  --  89  --  77   ALBUMIN g/dL 3.8 3.5  --  3.5  --  3.6   BILIRUBIN mg/dL 0.7 0.3  --  0.3  --  0.4   ALK PHOS U/L 153* 138*  --  138*  --  163*   AST (SGOT) U/L 38* 39*  --  40*  --  53*   ALT (SGPT) U/L 26 35*  --  40*  --  48*   AMMONIA umol/L 16  --  38  --   --  92*     ABG      CT Head Without Contrast    Result Date: 3/7/2024  Impression: No definite acute intracranial abnormality Electronically Signed: Miguel Dean MD  3/7/2024 11:29 AM EST  Workstation ID: [id number]    XR Knee 3 View Bilateral    Result Date: 3/3/2024  Impression: RIGHT KNEE: No acute osseous abnormality. LEFT KNEE: No acute osseous abnormality. Electronically Signed: Guy Cabrera MD  3/3/2024 3:12 PM EST  Workstation ID: [id number]           Assessment & Plan   Assessment and Plan:         Myalgia    ASSESSMENT:  Acute kidney injury likely secondary to volume and low blood pressure  Hyponatremia may be secondary to alcohol intake follow-up with a urine studies   Metabolic acidosis may be due to his respiratory alkalosis because of some history of cirrhosis  Hypocalcemia due to poor nutritional status check phosphorus level tomorrow morning check  magnesium level tomorrow morning          PLAN :     At this time 500 cc of saline bolus was given repeat creatinine 0.98  Sodium level is stable at 127 that is acceptable  Follow-up with complete urine studies  Follow-up with repeat labs  Follow-up with magnesium and phosphorus level  Thank you for letting me participate      Red Quiroga MD  [facility]  3/7/2024  18:33 EST        Electronically signed by Red Quiroga MD at 03/07/24 4927       Red Quiroga MD at 03/07/24 1651          Pt is seen and examined     Labs ordered  500 cc saline bolus needed  Will order NaHCO3 based fluid   F/u repeat labs today   Check urine studies        Electronically signed by Red Quiroga MD at 03/07/24 5308

## 2024-03-08 NOTE — PROGRESS NOTES
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    Physical Exam  Physical Exam  HENT:      Head: Atraumatic.      Mouth/Throat:      Mouth: Mucous membranes are moist.   Eyes:      Pupils: Pupils are equal, round, and reactive to light.   Cardiovascular:      Rate and Rhythm: Normal rate and regular rhythm.   Pulmonary:      Effort: Pulmonary effort is normal.      Breath sounds: Normal breath sounds.   Abdominal:      General: Bowel sounds are normal.      Palpations: Abdomen is soft.   Musculoskeletal:         General: Normal range of motion.      Cervical back: Normal range of motion.   Skin:     General: Skin is warm.   Neurological:      General: No focal deficit present.      Mental Status: She is alert and oriented to person, place, and time.   Psychiatric:         Mood and Affect: Mood normal.         Scheduled Meds   enoxaparin, 40 mg, Subcutaneous, Daily  folic acid, 1 mg, Oral, Daily  methocarbamol, 500 mg, Oral, 4x Daily  multivitamin with minerals, 1 tablet, Oral,  Daily  senna-docusate sodium, 2 tablet, Oral, BID  sodium chloride, 10 mL, Intravenous, Q12H  thiamine (B-1) IV, 200 mg, Intravenous, Q8H   Followed by  [START ON 3/13/2024] thiamine, 100 mg, Oral, Daily  vancomycin, 750 mg, Intravenous, Q12H       PRN Meds     acetaminophen    senna-docusate sodium **AND** polyethylene glycol **AND** bisacodyl **AND** bisacodyl    HYDROcodone-acetaminophen    ketorolac    LORazepam **OR** midazolam **OR** LORazepam **OR** midazolam **OR** midazolam **OR** midazolam    Pharmacy to dose vancomycin    [COMPLETED] Insert Peripheral IV **AND** sodium chloride    sodium chloride    sodium chloride   Infusions  Pharmacy to dose vancomycin,           Diagnostic Data    Results from last 7 days   Lab Units 03/07/24  1757 03/07/24  0920   WBC 10*3/mm3  --  8.70   HEMOGLOBIN g/dL  --  10.6*   HEMATOCRIT %  --  32.0*   PLATELETS 10*3/mm3  --  76*   GLUCOSE mg/dL 86 99   CREATININE mg/dL 0.98 1.22*   BUN mg/dL 18 20   SODIUM mmol/L 127* 127*   POTASSIUM mmol/L 3.9 4.3   AST (SGOT) U/L  --  38*   ALT (SGPT) U/L  --  26   ALK PHOS U/L  --  153*   BILIRUBIN mg/dL  --  0.7   ANION GAP mmol/L 11.0 17.0*       CT Head Without Contrast    Result Date: 3/7/2024  Impression: No definite acute intracranial abnormality Electronically Signed: Miguel Dean MD  3/7/2024 11:29 AM EST  Workstation ID: [id number]       I reviewed the patient's new clinical results.    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 presented with generalized weakness and was noted to have hypokalemia with metabolic acidosis  Active and Resolved Problems  #Generalized weakness  #Gram-positive bacteremia of unknown source  #Sepsis present on admission  -Patient presented with generalized body ache complaints of back and bilateral hip pain  - Culture gram-positive  sign plasters  - MRSA nasal swab positive  - ID team consulted continue vancomycin  - Echocardiogram  - Cardiology consulted for possible SILVINA to rule out vegetation  - MRI of cervical thoracic and lumbar spines    #Hypotonic hyponatremia  #SARITA  #Anion gap metabolic acidosis  #Hypocalcemia  -Serum sodium with goal of correction 6 to 8 mmol over 24 hours  - Nephrology team [id number] from EtOH use  - Metabolic acidosis with liver cirrhosis  -Continue to monitor renal function and renally dose medication      #Liver cirrhosis  -Alcoholic cessation counseling  - CT screening with alpha-fetoprotein if positive consult GI and oncology  - Counseled on cessation    #Acute on chronic anemia  -Monitor hemoglobin and transfuse as needed to keep hemoglobin above 7 g/dL  - Currently 9.4    #Tobacco use  - 40-pack-year history of smoking  - Declined nicotine patch  - Counseled on cessation    DVT prophylaxis:  Medical DVT prophylaxis orders are present.         Code status is   Code Status and Medical Interventions:   Ordered at: 03/07/24 1111     Code Status (Patient has no pulse and is not breathing):    CPR (Attempt to Resuscitate)     Medical Interventions (Patient has pulse or is breathing):    Full Support       Plan for disposition:home  in 2-3 days pending clinical improvement    Time: 35 minutes    Signature: Electronically signed by Ramsey Hanna MD, 03/08/24, 07:37 EST.  [facility] Hospitalist Team

## 2024-03-08 NOTE — PLAN OF CARE
Goal Outcome Evaluation:         Patient has muscle weakness. Bedside table within reach and call light within reach.

## 2024-03-09 ENCOUNTER — APPOINTMENT (OUTPATIENT)
Dept: MRI IMAGING | Facility: HOSPITAL | Age: 59
DRG: 872 | End: 2024-03-09
Payer: COMMERCIAL

## 2024-03-09 ENCOUNTER — APPOINTMENT (OUTPATIENT)
Dept: CARDIOLOGY | Facility: HOSPITAL | Age: 59
DRG: 872 | End: 2024-03-09
Payer: COMMERCIAL

## 2024-03-09 LAB
ALBUMIN SERPL-MCNC: 2.8 G/DL (ref 3.5–5.2)
ALBUMIN/GLOB SERPL: 0.9 G/DL
ALP SERPL-CCNC: 108 U/L (ref 39–117)
ALPHA-FETOPROTEIN: <2 NG/ML (ref 0–8.3)
ALT SERPL W P-5'-P-CCNC: 18 U/L (ref 1–33)
ANION GAP SERPL CALCULATED.3IONS-SCNC: 11 MMOL/L (ref 5–15)
ANION GAP SERPL CALCULATED.3IONS-SCNC: 12 MMOL/L (ref 5–15)
ANISOCYTOSIS BLD QL: ABNORMAL
AORTIC DIMENSIONLESS INDEX: 0.78 (DI)
AST SERPL-CCNC: 29 U/L (ref 1–32)
BH CV ECHO LEFT VENTRICLE GLOBAL LONGITUDINAL STRAIN: -15.7 %
BH CV ECHO MEAS - ACS: 1.7 CM
BH CV ECHO MEAS - AO MAX PG: 14.7 MMHG
BH CV ECHO MEAS - AO MEAN PG: 8 MMHG
BH CV ECHO MEAS - AO V2 MAX: 192 CM/SEC
BH CV ECHO MEAS - AO V2 VTI: 32.4 CM
BH CV ECHO MEAS - AVA(I,D): 2.21 CM2
BH CV ECHO MEAS - EDV(CUBED): 103.8 ML
BH CV ECHO MEAS - EDV(MOD-SP4): 65.7 ML
BH CV ECHO MEAS - EF(MOD-BP): 55 %
BH CV ECHO MEAS - EF(MOD-SP4): 55.4 %
BH CV ECHO MEAS - ESV(CUBED): 35.9 ML
BH CV ECHO MEAS - ESV(MOD-SP4): 29.3 ML
BH CV ECHO MEAS - FS: 29.8 %
BH CV ECHO MEAS - IVS/LVPW: 1 CM
BH CV ECHO MEAS - IVSD: 0.8 CM
BH CV ECHO MEAS - LA DIMENSION: 3 CM
BH CV ECHO MEAS - LAT PEAK E' VEL: 21 CM/SEC
BH CV ECHO MEAS - LV DIASTOLIC VOL/BSA (35-75): 40.2 CM2
BH CV ECHO MEAS - LV MASS(C)D: 122.3 GRAMS
BH CV ECHO MEAS - LV MAX PG: 9 MMHG
BH CV ECHO MEAS - LV MEAN PG: 5 MMHG
BH CV ECHO MEAS - LV SYSTOLIC VOL/BSA (12-30): 17.9 CM2
BH CV ECHO MEAS - LV V1 MAX: 150 CM/SEC
BH CV ECHO MEAS - LV V1 VTI: 25.2 CM
BH CV ECHO MEAS - LVIDD: 4.7 CM
BH CV ECHO MEAS - LVIDS: 3.3 CM
BH CV ECHO MEAS - LVOT AREA: 2.8 CM2
BH CV ECHO MEAS - LVOT DIAM: 1.9 CM
BH CV ECHO MEAS - LVPWD: 0.8 CM
BH CV ECHO MEAS - MED PEAK E' VEL: 15.3 CM/SEC
BH CV ECHO MEAS - MV A MAX VEL: 64.5 CM/SEC
BH CV ECHO MEAS - MV DEC SLOPE: 442 CM/SEC2
BH CV ECHO MEAS - MV DEC TIME: 0.15 SEC
BH CV ECHO MEAS - MV E MAX VEL: 125 CM/SEC
BH CV ECHO MEAS - MV E/A: 1.94
BH CV ECHO MEAS - MV MAX PG: 7.2 MMHG
BH CV ECHO MEAS - MV MEAN PG: 3 MMHG
BH CV ECHO MEAS - MV P1/2T: 86.8 MSEC
BH CV ECHO MEAS - MV V2 VTI: 27.8 CM
BH CV ECHO MEAS - MVA(P1/2T): 2.5 CM2
BH CV ECHO MEAS - MVA(VTI): 2.6 CM2
BH CV ECHO MEAS - PA ACC TIME: 0.14 SEC
BH CV ECHO MEAS - PA V2 MAX: 123 CM/SEC
BH CV ECHO MEAS - RAP SYSTOLE: 8 MMHG
BH CV ECHO MEAS - RV MAX PG: 2.6 MMHG
BH CV ECHO MEAS - RV V1 MAX: 80.6 CM/SEC
BH CV ECHO MEAS - RV V1 VTI: 15.4 CM
BH CV ECHO MEAS - RVDD: 3.7 CM
BH CV ECHO MEAS - RVSP: 31.4 MMHG
BH CV ECHO MEAS - SI(MOD-SP4): 22.2 ML/M2
BH CV ECHO MEAS - SV(LVOT): 71.4 ML
BH CV ECHO MEAS - SV(MOD-SP4): 36.4 ML
BH CV ECHO MEAS - TAPSE (>1.6): 2.17 CM
BH CV ECHO MEAS - TR MAX PG: 23.4 MMHG
BH CV ECHO MEAS - TR MAX VEL: 242 CM/SEC
BH CV ECHO MEASUREMENTS AVERAGE E/E' RATIO: 6.89
BH CV XLRA - TDI S': 19 CM/SEC
BILIRUB SERPL-MCNC: 0.5 MG/DL (ref 0–1.2)
BUN SERPL-MCNC: 14 MG/DL (ref 6–20)
BUN SERPL-MCNC: 15 MG/DL (ref 6–20)
BUN/CREAT SERPL: 17.3 (ref 7–25)
BUN/CREAT SERPL: 18.3 (ref 7–25)
C DIFF GDH + TOXINS A+B STL QL IA.RAPID: NEGATIVE
C DIFF GDH + TOXINS A+B STL QL IA.RAPID: NEGATIVE
CALCIUM SPEC-SCNC: 8.1 MG/DL (ref 8.6–10.5)
CALCIUM SPEC-SCNC: 8.2 MG/DL (ref 8.6–10.5)
CHLORIDE SERPL-SCNC: 92 MMOL/L (ref 98–107)
CHLORIDE SERPL-SCNC: 93 MMOL/L (ref 98–107)
CO2 SERPL-SCNC: 17 MMOL/L (ref 22–29)
CO2 SERPL-SCNC: 19 MMOL/L (ref 22–29)
CREAT SERPL-MCNC: 0.81 MG/DL (ref 0.57–1)
CREAT SERPL-MCNC: 0.82 MG/DL (ref 0.57–1)
DEPRECATED RDW RBC AUTO: 62.6 FL (ref 37–54)
EGFRCR SERPLBLD CKD-EPI 2021: 84.1 ML/MIN/1.73
EGFRCR SERPLBLD CKD-EPI 2021: 85.3 ML/MIN/1.73
ERYTHROCYTE [DISTWIDTH] IN BLOOD BY AUTOMATED COUNT: 16.9 % (ref 12.3–15.4)
ERYTHROCYTE [SEDIMENTATION RATE] IN BLOOD: 59 MM/HR (ref 0–30)
GIANT PLATELETS: ABNORMAL
GLOBULIN UR ELPH-MCNC: 3.1 GM/DL
GLUCOSE SERPL-MCNC: 111 MG/DL (ref 65–99)
GLUCOSE SERPL-MCNC: 97 MG/DL (ref 65–99)
HCT VFR BLD AUTO: 25.2 % (ref 34–46.6)
HGB BLD-MCNC: 8.1 G/DL (ref 12–15.9)
LEFT ATRIUM VOLUME INDEX: 19.6 ML/M2
LYMPHOCYTES # BLD MANUAL: 0.39 10*3/MM3 (ref 0.7–3.1)
LYMPHOCYTES NFR BLD MANUAL: 3 % (ref 5–12)
MCH RBC QN AUTO: 34.8 PG (ref 26.6–33)
MCHC RBC AUTO-ENTMCNC: 32.4 G/DL (ref 31.5–35.7)
MCV RBC AUTO: 107.5 FL (ref 79–97)
MONOCYTES # BLD: 0.2 10*3/MM3 (ref 0.1–0.9)
NEUTROPHILS # BLD AUTO: 5.92 10*3/MM3 (ref 1.7–7)
NEUTROPHILS NFR BLD MANUAL: 74 % (ref 42.7–76)
NEUTS BAND NFR BLD MANUAL: 17 % (ref 0–5)
NEUTS VAC BLD QL SMEAR: ABNORMAL
OSMOLALITY SERPL: 257 MOSM/KG (ref 275–295)
PLATELET # BLD AUTO: 64 10*3/MM3 (ref 140–450)
PMV BLD AUTO: 9.2 FL (ref 6–12)
POTASSIUM SERPL-SCNC: 3.2 MMOL/L (ref 3.5–5.2)
POTASSIUM SERPL-SCNC: 4 MMOL/L (ref 3.5–5.2)
PROT SERPL-MCNC: 5.9 G/DL (ref 6–8.5)
RBC # BLD AUTO: 2.34 10*6/MM3 (ref 3.77–5.28)
SCAN SLIDE: NORMAL
SINUS: 3 CM
SMALL PLATELETS BLD QL SMEAR: ABNORMAL
SODIUM SERPL-SCNC: 120 MMOL/L (ref 136–145)
SODIUM SERPL-SCNC: 124 MMOL/L (ref 136–145)
STJ: 2.5 CM
TOXIC GRANULATION: ABNORMAL
VANCOMYCIN SERPL-MCNC: 12.2 MCG/ML (ref 5–40)
VARIANT LYMPHS NFR BLD MANUAL: 6 % (ref 19.6–45.3)
WBC NRBC COR # BLD AUTO: 6.5 10*3/MM3 (ref 3.4–10.8)

## 2024-03-09 PROCEDURE — 93356 MYOCRD STRAIN IMG SPCKL TRCK: CPT

## 2024-03-09 PROCEDURE — 80048 BASIC METABOLIC PNL TOTAL CA: CPT | Performed by: INTERNAL MEDICINE

## 2024-03-09 PROCEDURE — 25010000002 THIAMINE HCL 200 MG/2ML SOLUTION: Performed by: STUDENT IN AN ORGANIZED HEALTH CARE EDUCATION/TRAINING PROGRAM

## 2024-03-09 PROCEDURE — 80202 ASSAY OF VANCOMYCIN: CPT | Performed by: INTERNAL MEDICINE

## 2024-03-09 PROCEDURE — 83930 ASSAY OF BLOOD OSMOLALITY: CPT | Performed by: INTERNAL MEDICINE

## 2024-03-09 PROCEDURE — 93306 TTE W/DOPPLER COMPLETE: CPT | Performed by: INTERNAL MEDICINE

## 2024-03-09 PROCEDURE — 25010000002 ENOXAPARIN PER 10 MG: Performed by: STUDENT IN AN ORGANIZED HEALTH CARE EDUCATION/TRAINING PROGRAM

## 2024-03-09 PROCEDURE — 25010000002 VANCOMYCIN 750 MG RECONSTITUTED SOLUTION 1 EACH VIAL: Performed by: STUDENT IN AN ORGANIZED HEALTH CARE EDUCATION/TRAINING PROGRAM

## 2024-03-09 PROCEDURE — 93356 MYOCRD STRAIN IMG SPCKL TRCK: CPT | Performed by: INTERNAL MEDICINE

## 2024-03-09 PROCEDURE — 99232 SBSQ HOSP IP/OBS MODERATE 35: CPT | Performed by: INTERNAL MEDICINE

## 2024-03-09 PROCEDURE — 93306 TTE W/DOPPLER COMPLETE: CPT

## 2024-03-09 PROCEDURE — 25810000003 SODIUM CHLORIDE 0.9 % SOLUTION 250 ML FLEX CONT: Performed by: STUDENT IN AN ORGANIZED HEALTH CARE EDUCATION/TRAINING PROGRAM

## 2024-03-09 RX ORDER — LORAZEPAM 0.5 MG/1
0.5 TABLET ORAL ONCE
Status: COMPLETED | OUTPATIENT
Start: 2024-03-09 | End: 2024-03-09

## 2024-03-09 RX ORDER — POTASSIUM CHLORIDE 20 MEQ/1
20 TABLET, EXTENDED RELEASE ORAL DAILY
Status: DISCONTINUED | OUTPATIENT
Start: 2024-03-10 | End: 2024-03-13

## 2024-03-09 RX ORDER — POTASSIUM CHLORIDE 20 MEQ/1
40 TABLET, EXTENDED RELEASE ORAL EVERY 4 HOURS
Status: COMPLETED | OUTPATIENT
Start: 2024-03-09 | End: 2024-03-09

## 2024-03-09 RX ORDER — SODIUM CHLORIDE 1 G/1
1 TABLET ORAL
Status: DISCONTINUED | OUTPATIENT
Start: 2024-03-09 | End: 2024-03-12

## 2024-03-09 RX ADMIN — VANCOMYCIN HYDROCHLORIDE 750 MG: 750 INJECTION, POWDER, LYOPHILIZED, FOR SOLUTION INTRAVENOUS at 18:58

## 2024-03-09 RX ADMIN — THIAMINE HYDROCHLORIDE 200 MG: 100 INJECTION, SOLUTION INTRAMUSCULAR; INTRAVENOUS at 05:00

## 2024-03-09 RX ADMIN — Medication 1 TABLET: at 08:57

## 2024-03-09 RX ADMIN — SODIUM CHLORIDE 1 G: 1 TABLET ORAL at 20:10

## 2024-03-09 RX ADMIN — ENOXAPARIN SODIUM 40 MG: 100 INJECTION SUBCUTANEOUS at 16:34

## 2024-03-09 RX ADMIN — SERTRALINE 100 MG: 100 TABLET, FILM COATED ORAL at 08:57

## 2024-03-09 RX ADMIN — Medication 10 ML: at 20:13

## 2024-03-09 RX ADMIN — POTASSIUM CHLORIDE 40 MEQ: 1500 TABLET, EXTENDED RELEASE ORAL at 13:54

## 2024-03-09 RX ADMIN — HYDROCODONE BITARTRATE AND ACETAMINOPHEN 1 TABLET: 5; 325 TABLET ORAL at 18:57

## 2024-03-09 RX ADMIN — SERTRALINE 100 MG: 100 TABLET, FILM COATED ORAL at 20:10

## 2024-03-09 RX ADMIN — BUSPIRONE HYDROCHLORIDE 15 MG: 15 TABLET ORAL at 08:57

## 2024-03-09 RX ADMIN — METHOCARBAMOL 500 MG: 500 TABLET ORAL at 08:57

## 2024-03-09 RX ADMIN — METHOCARBAMOL 500 MG: 500 TABLET ORAL at 18:57

## 2024-03-09 RX ADMIN — LORAZEPAM 0.5 MG: 0.5 TABLET ORAL at 16:17

## 2024-03-09 RX ADMIN — Medication 100 MG: at 08:57

## 2024-03-09 RX ADMIN — VANCOMYCIN HYDROCHLORIDE 750 MG: 750 INJECTION, POWDER, LYOPHILIZED, FOR SOLUTION INTRAVENOUS at 04:59

## 2024-03-09 RX ADMIN — THIAMINE HYDROCHLORIDE 200 MG: 100 INJECTION, SOLUTION INTRAMUSCULAR; INTRAVENOUS at 13:47

## 2024-03-09 RX ADMIN — HYDROCODONE BITARTRATE AND ACETAMINOPHEN 1 TABLET: 5; 325 TABLET ORAL at 09:27

## 2024-03-09 RX ADMIN — SODIUM CHLORIDE 1 G: 1 TABLET ORAL at 10:51

## 2024-03-09 RX ADMIN — POTASSIUM CHLORIDE 40 MEQ: 1500 TABLET, EXTENDED RELEASE ORAL at 04:58

## 2024-03-09 RX ADMIN — FOLIC ACID 1 MG: 1 TABLET ORAL at 08:57

## 2024-03-09 RX ADMIN — Medication 10 ML: at 09:30

## 2024-03-09 RX ADMIN — METHOCARBAMOL 500 MG: 500 TABLET ORAL at 13:46

## 2024-03-09 RX ADMIN — POTASSIUM CHLORIDE 40 MEQ: 1500 TABLET, EXTENDED RELEASE ORAL at 09:28

## 2024-03-09 RX ADMIN — LIDOCAINE 1 PATCH: 4 PATCH TOPICAL at 08:57

## 2024-03-09 RX ADMIN — BUSPIRONE HYDROCHLORIDE 15 MG: 15 TABLET ORAL at 20:10

## 2024-03-09 RX ADMIN — SODIUM CHLORIDE 1 G: 1 TABLET ORAL at 13:46

## 2024-03-09 RX ADMIN — METHOCARBAMOL 500 MG: 500 TABLET ORAL at 20:10

## 2024-03-09 RX ADMIN — QUETIAPINE FUMARATE 50 MG: 25 TABLET ORAL at 20:10

## 2024-03-09 RX ADMIN — THIAMINE HYDROCHLORIDE 200 MG: 100 INJECTION, SOLUTION INTRAMUSCULAR; INTRAVENOUS at 22:41

## 2024-03-09 NOTE — PROGRESS NOTES
"Pharmacy Antimicrobial Dosing Service    Subjective:  Lita Yu is a 58 y.o.female admitted with generalized weakness and pain. Pharmacy has been consulted to dose Vancomycin for possible bacteremia.      Assessment/Plan    1. Day #1 Vancomycin: Goal -600 mcg*h/mL. 1250mg (~20mg/kg ABW) IV x1 dose followed by 750mg (~12mg/kg ABW) IV q12h.      Vancomycin level prior to the 4th total dose was 12.2mcg/mL. Based on this level the patient is and will remain at goal AUC of 400-600. Will continue current regimen and check a repeat level prior to the 8th total dose.     Will continue to monitor drug levels, renal function, culture and sensitivities, and patient clinical status.       Objective:  Relevant clinical data and objective history reviewed:  160 cm (63\")   61.2 kg (135 lb)   Ideal body weight: 52.4 kg (115 lb 8.3 oz)  Adjusted ideal body weight: 55.9 kg (123 lb 5 oz)  Body mass index is 23.91 kg/m².    Results from last 7 days   Lab Units 03/09/24  1512   VANCOMYCIN RM mcg/mL 12.20     Results from last 7 days   Lab Units 03/09/24  0948 03/08/24  2338 03/08/24  0752   CREATININE mg/dL 0.81 0.82 0.92     Estimated Creatinine Clearance: 74.9 mL/min (by C-G formula based on SCr of 0.81 mg/dL).  No intake/output data recorded.    Results from last 7 days   Lab Units 03/08/24  2338 03/08/24  0752 03/07/24  0920   WBC 10*3/mm3 6.50 6.50 8.70     Temperature    03/09/24 0801 03/09/24 1146 03/09/24 1543   Temp: 99.3 °F (37.4 °C) 98.6 °F (37 °C) 98.9 °F (37.2 °C)     Baseline culture/source/susceptibility:  Microbiology Results (last 10 days)       Procedure Component Value - Date/Time    Clostridioides difficile EIA - Stool, Per Rectum [[id number]]  (Normal) Collected: 03/08/24 2327    Lab Status: Final result Specimen: Stool from Per Rectum Updated: 03/09/24 0745     C Diff GDH Ag Negative     C.diff Toxin Ag Negative    Narrative:      The result indicates the absence of toxigenic C.difficile from " stool specimen.    COVID-19, FLU A/B, RSV PCR 1 HR TAT - Swab, Nasopharynx [[id number]]  (Normal) Collected: 03/07/24 1526    Lab Status: Final result Specimen: Swab from Nasopharynx Updated: 03/07/24 1607     COVID19 Not Detected     Influenza A PCR Not Detected     Influenza B PCR Not Detected     RSV, PCR Not Detected    Narrative:      Fact sheet for providers: https://www.fda.gov/media/755843/download    Fact sheet for patients: https://www.fda.gov/media/618889/download    Test performed by PCR.    Blood Culture - Blood, Arm, Left [[id number]]  (Abnormal) Collected: 03/07/24 0933    Lab Status: Preliminary result Specimen: Blood from Arm, Left Updated: 03/09/24 0745     Blood Culture Staphylococcus aureus, MRSA     Comment:   Infectious disease consultation is highly recommended to rule out distant foci of infection.  Methicillin resistant Staphylococcus aureus, Patient may be an isolation risk.        Isolated from Aerobic and Anaerobic Bottles     Gram Stain Aerobic Bottle Gram positive cocci in clusters      Anaerobic Bottle Gram positive cocci in clusters    Blood Culture - Blood, Arm, Right [[id number]]  (Abnormal) Collected: 03/07/24 0920    Lab Status: Preliminary result Specimen: Blood from Arm, Right Updated: 03/09/24 0744     Blood Culture Staphylococcus aureus, MRSA     Comment:   Infectious disease consultation is highly recommended to rule out distant foci of infection.  Methicillin resistant Staphylococcus aureus, Patient may be an isolation risk.        Isolated from Aerobic and Anaerobic Bottles     Gram Stain Anaerobic Bottle Gram positive cocci in clusters      Aerobic Bottle Gram positive cocci in clusters    Blood Culture ID, PCR - Blood, Arm, Right [[id number]]  (Abnormal) Collected: 03/07/24 0920    Lab Status: Final result Specimen: Blood from Arm, Right Updated: 03/08/24 0433     BCID, PCR Staph aureus. mecA/C and MREJ (methicillin resistance gene) detected. Identification by BCID2 PCR.      BOTTLE TYPE Anaerobic Bottle    Narrative:      Infectious disease consultation is highly recommended to rule out distant foci of infection.            Joshua Chowdhury [facility]  03/09/24 16:02 EST

## 2024-03-09 NOTE — PLAN OF CARE
Goal Outcome Evaluation:  Plan of Care Reviewed With: patient        Progress: no change     Pt currently resting abed. Pt has diarrhea this shift states she's had for several days see new orders. Stool sample collected for C. Diff. C/o pain see mar. VSS cont to monitor.

## 2024-03-09 NOTE — PROGRESS NOTES
Infectious Diseases Progress Note      LOS: 1 day   Patient Care Team:  Norma Saul APRN as PCP - General (Nurse Practitioner)  Flory Villanueva MD (Physical Medicine and Rehabilitation)    Chief Complaint: Back pain, fever    Subjective       Patient had a temperature as high as 102.3 degrees the last 24 hours.  She is on room air hemodynamically stable and tolerating antimicrobial therapy.  Patient still is having back and neck pain      Review of Systems:   Review of Systems   Constitutional:  Positive for fever.   HENT: Negative.     Eyes: Negative.    Respiratory: Negative.     Cardiovascular: Negative.    Gastrointestinal: Negative.    Endocrine: Negative.    Genitourinary: Negative.    Musculoskeletal:  Positive for back pain and neck pain.   Skin: Negative.    Neurological: Negative.    Psychiatric/Behavioral: Negative.     All other systems reviewed and are negative.       Objective     Vital Signs  Temp:  [98.2 °F (36.8 °C)-102.3 °F (39.1 °C)] 98.6 °F (37 °C)  Heart Rate:  [] 109  Resp:  [16-20] 16  BP: ()/(57-59) 104/59    Physical Exam:  Physical Exam  Vitals and nursing note reviewed.   Constitutional:       General: She is not in acute distress.     Appearance: She is well-developed and normal weight. She is ill-appearing. She is not diaphoretic.   HENT:      Head: Normocephalic and atraumatic.   Eyes:      General: No scleral icterus.     Extraocular Movements: Extraocular movements intact.      Conjunctiva/sclera: Conjunctivae normal.      Pupils: Pupils are equal, round, and reactive to light.   Cardiovascular:      Rate and Rhythm: Normal rate and regular rhythm.      Heart sounds: Normal heart sounds, S1 normal and S2 normal. No murmur heard.  Pulmonary:      Effort: Pulmonary effort is normal. No respiratory distress.      Breath sounds: Normal breath sounds. No stridor. No wheezing or rales.   Chest:      Chest wall: No tenderness.   Abdominal:      General: Bowel sounds are  normal. There is no distension.      Palpations: Abdomen is soft. There is no mass.      Tenderness: There is no abdominal tenderness. There is no guarding.   Musculoskeletal:         General: No swelling, tenderness or deformity. Normal range of motion.      Cervical back: Neck supple.      Comments: Tenderness with the movement of all joints including hips and shoulders but there is no obvious joint effusion     Tenderness at the lower back and the upper back      Skin:     General: Skin is warm and dry.      Coloration: Skin is not pale.      Findings: No bruising, erythema or rash.   Neurological:      General: No focal deficit present.      Mental Status: She is alert and oriented to person, place, and time. Mental status is at baseline.      Cranial Nerves: No cranial nerve deficit.   Psychiatric:         Mood and Affect: Mood normal.          Results Review:    I have reviewed all clinical data, test, lab, and imaging results.     Radiology  No Radiology Exams Resulted Within Past 24 Hours    Cardiology    Laboratory    Results from last 7 days   Lab Units 03/08/24 2338 03/08/24  0752 03/07/24  0920 03/04/24  0351 03/03/24  0536   WBC 10*3/mm3 6.50 6.50 8.70 5.80 5.20   HEMOGLOBIN g/dL 8.1* 9.4* 10.6* 10.1* 9.9*   HEMATOCRIT % 25.2* 29.0* 32.0* 31.1* 30.0*   PLATELETS 10*3/mm3 64* 70* 76* 125* 131*     Results from last 7 days   Lab Units 03/09/24  0948 03/08/24  2338 03/08/24 0752 03/07/24  1757 03/07/24  0920 03/04/24  0351 03/03/24  0648 03/03/24  0536   SODIUM mmol/L 120* 124* 128* 127* 127* 135*  --  135*   POTASSIUM mmol/L 4.0 3.2* 3.7 3.9 4.3 4.3  --  4.0   CHLORIDE mmol/L 92* 93* 95* 97* 92* 103  --  102   CO2 mmol/L 17.0* 19.0* 20.0* 19.0* 18.0* 22.0  --  23.0   BUN mg/dL 14 15 17 18 20 8  --  11   CREATININE mg/dL 0.81 0.82 0.92 0.98 1.22* 0.93  --  0.80   GLUCOSE mg/dL 111* 97 108* 86 99 94  --  89   ALBUMIN g/dL  --  2.8* 3.1*  --  3.8 3.5  --  3.5   BILIRUBIN mg/dL  --  0.5 0.5  --  0.7 0.3  --   0.3   ALK PHOS U/L  --  108 123*  --  153* 138*  --  138*   AST (SGOT) U/L  --  29 29  --  38* 39*  --  40*   ALT (SGPT) U/L  --  18 21  --  26 35*  --  40*   AMMONIA umol/L  --   --   --   --  16  --  38  --    CALCIUM mg/dL 8.1* 8.2* 8.3* 7.8* 9.2 8.1*  --  8.1*     Results from last 7 days   Lab Units 03/07/24  0920   CK TOTAL U/L 74     Results from last 7 days   Lab Units 03/08/24  2338   SED RATE mm/hr 59*         Microbiology   Microbiology Results (last 10 days)       Procedure Component Value - Date/Time    Clostridioides difficile EIA - Stool, Per Rectum [[id number]]  (Normal) Collected: 03/08/24 2327    Lab Status: Final result Specimen: Stool from Per Rectum Updated: 03/09/24 0745     C Diff GDH Ag Negative     C.diff Toxin Ag Negative    Narrative:      The result indicates the absence of toxigenic C.difficile from stool specimen.    COVID-19, FLU A/B, RSV PCR 1 HR TAT - Swab, Nasopharynx [[id number]]  (Normal) Collected: 03/07/24 1526    Lab Status: Final result Specimen: Swab from Nasopharynx Updated: 03/07/24 1607     COVID19 Not Detected     Influenza A PCR Not Detected     Influenza B PCR Not Detected     RSV, PCR Not Detected    Narrative:      Fact sheet for providers: https://www.fda.gov/media/530990/download    Fact sheet for patients: https://www.fda.gov/media/877556/download    Test performed by PCR.    Blood Culture - Blood, Arm, Left [[id number]]  (Abnormal) Collected: 03/07/24 0933    Lab Status: Preliminary result Specimen: Blood from Arm, Left Updated: 03/09/24 0745     Blood Culture Staphylococcus aureus, MRSA     Comment:   Infectious disease consultation is highly recommended to rule out distant foci of infection.  Methicillin resistant Staphylococcus aureus, Patient may be an isolation risk.        Isolated from Aerobic and Anaerobic Bottles     Gram Stain Aerobic Bottle Gram positive cocci in clusters      Anaerobic Bottle Gram positive cocci in clusters    Blood Culture - Blood, Arm,  Right [[id number]]  (Abnormal) Collected: 03/07/24 0920    Lab Status: Preliminary result Specimen: Blood from Arm, Right Updated: 03/09/24 0744     Blood Culture Staphylococcus aureus, MRSA     Comment:   Infectious disease consultation is highly recommended to rule out distant foci of infection.  Methicillin resistant Staphylococcus aureus, Patient may be an isolation risk.        Isolated from Aerobic and Anaerobic Bottles     Gram Stain Anaerobic Bottle Gram positive cocci in clusters      Aerobic Bottle Gram positive cocci in clusters    Blood Culture ID, PCR - Blood, Arm, Right [[id number]]  (Abnormal) Collected: 03/07/24 0920    Lab Status: Final result Specimen: Blood from Arm, Right Updated: 03/08/24 0433     BCID, PCR Staph aureus. mecA/C and MREJ (methicillin resistance gene) detected. Identification by BCID2 PCR.     BOTTLE TYPE Anaerobic Bottle    Narrative:      Infectious disease consultation is highly recommended to rule out distant foci of infection.            Medication Review:       Schedule Meds  busPIRone, 15 mg, Oral, BID  enoxaparin, 40 mg, Subcutaneous, Daily  folic acid, 1 mg, Oral, Daily  Lidocaine, 1 patch, Transdermal, Daily  LORazepam, 0.5 mg, Oral, Once  methocarbamol, 500 mg, Oral, 4x Daily  multivitamin with minerals, 1 tablet, Oral, Daily  [START ON 3/10/2024] potassium chloride, 20 mEq, Oral, Daily  potassium chloride ER, 40 mEq, Oral, Q4H  QUEtiapine, 50 mg, Oral, Nightly  [Held by provider] senna-docusate sodium, 2 tablet, Oral, BID  sertraline, 100 mg, Oral, BID  sodium chloride, 10 mL, Intravenous, Q12H  sodium chloride, 1 g, Oral, TID With Meals  thiamine (B-1) IV, 200 mg, Intravenous, Q8H   Followed by  [START ON 3/13/2024] thiamine, 100 mg, Oral, Daily  thiamine, 100 mg, Oral, Daily  vancomycin, 750 mg, Intravenous, Q12H        Infusion Meds  Pharmacy to dose vancomycin,         PRN Meds    acetaminophen    albuterol    [Held by provider] senna-docusate sodium **AND**  polyethylene glycol **AND** bisacodyl **AND** bisacodyl    Calcium Replacement - Follow Nurse / BPA Driven Protocol    HYDROcodone-acetaminophen    ketorolac    loperamide    Magnesium Standard Dose Replacement - Follow Nurse / BPA Driven Protocol    melatonin    Pharmacy to dose vancomycin    Phosphorus Replacement - Follow Nurse / BPA Driven Protocol    Potassium Replacement - Follow Nurse / BPA Driven Protocol    [COMPLETED] Insert Peripheral IV **AND** sodium chloride    sodium chloride    sodium chloride        Assessment & Plan       Antimicrobial Therapy   1.  IV vancomycin        2.        3.        4.        5.          Assessment     Staphylococcus aureus bacteremia, BCID detected MRSA gene.  The source is not very clear but based on the patient's symptoms we need to rule out discitis/osteomyelitis.  Endocarditis also needs to be ruled out.  Patient denies using IV drugs     History of liver cirrhosis secondary to alcohol and hemochromatosis     History of alcohol abuse and tobacco abuse     S/p left total hip replacement secondary to hip fracture in 2017 or 2018.  Patient denied having infection after the procedure    Reported diarrhea at admission.  C. difficile screen was negative     Plan     Continue IV vancomycin waiting on final blood culture susceptibilities and try to keep vancomycin trough between 15 and 20  Repeat 1 set of blood cultures today-pending  Request 2D echo ordered but not performed yet  SILVINA scheduled for Monday  Request MRI of the lumbar, thoracic and cervical spine with and without contrast to rule out discitis/osteomyelitis-not performed yet  Supportive care  A.m. labs       Sarah Ross, APRN  03/09/24  13:35 EST    Note is dictated utilizing voice recognition software/Dragon

## 2024-03-09 NOTE — PROGRESS NOTES
PROGRESS NOTE      Patient Name: Lita Yu  : 1965  MRN: [id number]  Primary Care Physician: Norma Saul APRN  Date of admission: 3/7/2024    Patient Care Team:  Norma Saul APRN as PCP - General (Nurse Practitioner)  Flory Villanueva MD (Physical Medicine and Rehabilitation)        Subjective   Subjective:   Patient seen and examined this morning, she denies any new complaints.  Review of systems:  All other review of system unremarkable      Allergies:    Allergies   Allergen Reactions   • Sulfa Antibiotics Hives   • Keflex [Cephalexin] Hives       Objective   Exam:     Vital Signs  Temp:  [98.2 °F (36.8 °C)-102.3 °F (39.1 °C)] 99.3 °F (37.4 °C)  Heart Rate:  [] 109  Resp:  [18-24] 20  BP: ()/(57-60) 104/59  SpO2:  [92 %-97 %] 92 %  on   ;   Device (Oxygen Therapy): room air  Body mass index is 23.91 kg/m².    General: Middle-age female in no acute distress.    Head:      Normocephalic and atraumatic.    Eyes:      PERRL/EOM intact, conjunctiva and sclera clear with out nystagmus.    Neck:      No masses, thyromegaly,  trachea central with normal respiratory effort   Lungs:    Clear bilaterally to auscultation.    Heart:      Regular rate and rhythm, no murmur no gallop  Abd:        Soft, nontender, not distended, bowel sounds positive, no shifting dullness   Pulses:   Pulses palpable  Extr:        No cyanosis or clubbing--no significant edema.    Neuro:    No focal deficits.   alert oriented x3  Skin:       Intact without lesions or rashes.    Psych:    Alert and cooperative; normal mood and affect; .      Results Review:  I have personally reviewed most recent Data :  CBC    Results from last 7 days   Lab Units 24  2338 24  0752 24  0920 24  0351 24  0536   WBC 10*3/mm3 6.50 6.50 8.70 5.80 5.20   HEMOGLOBIN g/dL 8.1* 9.4* 10.6* 10.1* 9.9*   PLATELETS 10*3/mm3 64* 70* 76* 125* 131*     CMP   Results from last 7 days   Lab Units  03/08/24  2338 03/08/24  0752 03/07/24  1757 03/07/24  0920 03/04/24  0351 03/03/24  0648 03/03/24  0536 03/02/24  2136   SODIUM mmol/L 124* 128* 127* 127* 135*  --  135*  --    POTASSIUM mmol/L 3.2* 3.7 3.9 4.3 4.3  --  4.0 4.0   CHLORIDE mmol/L 93* 95* 97* 92* 103  --  102  --    CO2 mmol/L 19.0* 20.0* 19.0* 18.0* 22.0  --  23.0  --    BUN mg/dL 15 17 18 20 8  --  11  --    CREATININE mg/dL 0.82 0.92 0.98 1.22* 0.93  --  0.80  --    GLUCOSE mg/dL 97 108* 86 99 94  --  89  --    ALBUMIN g/dL 2.8* 3.1*  --  3.8 3.5  --  3.5  --    BILIRUBIN mg/dL 0.5 0.5  --  0.7 0.3  --  0.3  --    ALK PHOS U/L 108 123*  --  153* 138*  --  138*  --    AST (SGOT) U/L 29 29  --  38* 39*  --  40*  --    ALT (SGPT) U/L 18 21  --  26 35*  --  40*  --    AMMONIA umol/L  --   --   --  16  --  38  --   --      ABG      CT Head Without Contrast    Result Date: 3/7/2024  Impression: No definite acute intracranial abnormality Electronically Signed: Miguel Dean MD  3/7/2024 11:29 AM EST  Workstation ID: [id number]       Scheduled Meds:busPIRone, 15 mg, Oral, BID  enoxaparin, 40 mg, Subcutaneous, Daily  folic acid, 1 mg, Oral, Daily  [Held by provider] furosemide, 40 mg, Oral, Daily  Lidocaine, 1 patch, Transdermal, Daily  methocarbamol, 500 mg, Oral, 4x Daily  multivitamin with minerals, 1 tablet, Oral, Daily  potassium chloride ER, 40 mEq, Oral, Q4H  QUEtiapine, 50 mg, Oral, Nightly  [Held by provider] senna-docusate sodium, 2 tablet, Oral, BID  sertraline, 100 mg, Oral, BID  sodium chloride, 10 mL, Intravenous, Q12H  [Held by provider] spironolactone, 100 mg, Oral, Daily  thiamine (B-1) IV, 200 mg, Intravenous, Q8H   Followed by  [START ON 3/13/2024] thiamine, 100 mg, Oral, Daily  thiamine, 100 mg, Oral, Daily  vancomycin, 750 mg, Intravenous, Q12H      Continuous Infusions:Pharmacy to dose vancomycin,       PRN Meds:•  acetaminophen  •  albuterol  •  [Held by provider] senna-docusate sodium **AND** polyethylene glycol **AND** bisacodyl  **AND** bisacodyl  •  Calcium Replacement - Follow Nurse / BPA Driven Protocol  •  HYDROcodone-acetaminophen  •  ketorolac  •  loperamide  •  Magnesium Standard Dose Replacement - Follow Nurse / BPA Driven Protocol  •  melatonin  •  Pharmacy to dose vancomycin  •  Phosphorus Replacement - Follow Nurse / BPA Driven Protocol  •  Potassium Replacement - Follow Nurse / BPA Driven Protocol  •  [COMPLETED] Insert Peripheral IV **AND** sodium chloride  •  sodium chloride  •  sodium chloride    Assessment & Plan   Assessment and Plan:         Myalgia    ASSESSMENT:  Acute kidney injury likely secondary to volume and low blood pressure  Hyponatremia may be secondary to alcohol intake follow-up with a urine studies   Metabolic acidosis may be due to his respiratory alkalosis because of some history of cirrhosis  Hypocalcemia due to poor nutritional status check phosphorus level tomorrow morning check magnesium level tomorrow morning    PLAN :   Today's labs are pending, yesterday's labs reviewed  Serum sodium is still low  Supplement potassium  Check uric acid and serum osmolarity  Will add sodium chloride tablet  Renal function has improved  Clinically looks euvolemic, multifactorial hyponatremia, increased ADH, history of EtOH, low solute load  Remains on Zoloft, if possible reduced dose  Please notify today's labs  Will continue to follow            Electronically signed by Daiana Cassidy MD,   [facility] kidney consultant  [phone]  3/9/2024  08:35 EST

## 2024-03-09 NOTE — PROGRESS NOTES
Hospitalist Service   Daily Progress Note      Patient Name: Lita Yu  : 1965  MRN: [id number]  Primary Care Physician:  Norma Saul APRN  Date of admission: 3/7/2024      Subjective      Chief Complaint: Generalized weakness    Patient seen and examined this morning.  Taking over care today.  Still feels tired and weak overall.  Having some low back pain as well, denies any fecal or urinary incontinence.  She is having some numbness and tingling on both feet and both of her arms also feel weak.  Going for MRI today.  No other complaints.    Pertinent positives as noted in HPI/subjective.  All other systems were reviewed and are negative.      Objective      Vitals:   Temp:  [98.2 °F (36.8 °C)-102.3 °F (39.1 °C)] 99.3 °F (37.4 °C)  Heart Rate:  [] 109  Resp:  [18-24] 20  BP: ()/(57-60) 104/59    Physical Exam:    General: Awake, alert, chronically ill-appearing female, lying in bed, NAD  Eyes: PERRL, EOMI, conjunctivae are clear  Cardiovascular: Regular rate and rhythm, no murmurs  Respiratory: Clear to auscultation bilaterally, no wheezing or rales, unlabored breathing  Abdomen: Soft, nontender, positive bowel sounds, no guarding  Neurologic: A&O, CN grossly intact, moves all extremities spontaneously  Musculoskeletal: Generalized weakness, no other gross deformities  Skin: Warm, dry         Result Review    Result Review:  I have personally reviewed the results from the time of this admission to 3/9/2024 11:46 EST and agree with these findings:  [x]  Laboratory  [x]  Microbiology  [x]  Radiology  [x]  EKG/Telemetry   [x]  Cardiology/Vascular   []  Pathology  [x]  Old records  []  Other:    Wounds (Last 24 Hours)       LDA Wound       Row Name 24             Wound Bilateral perineum MASD (Moisture associated skin damage)    Wound - Properties Group Present on Original Admission: Y  -DI Side: Bilateral  -DI Location: perineum  -DI Primary Wound Type: MASD  -DI     Care, Wound cleansed with;soap and water;barrier applied  -      Dressing Care open to air  -      Retired Wound - Properties Group Present on Original Admission: Y  -DI Side: Bilateral  -DI Location: perineum  -DI Primary Wound Type: MASD  -DI    Retired Wound - Properties Group Present on Original Admission: Y  -DI Side: Bilateral  -DI Location: perineum  -DI Primary Wound Type: MASD  -DI              User Key  (r) = Recorded By, (t) = Taken By, (c) = Cosigned By      Initials Name Provider Type    Negrita Steel LPN Licensed Nurse    Antonella Mulligan RN Registered Nurse                      Assessment & Plan      Brief Patient Summary:  Lita Yu is a 58 y.o. 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  Noted to have hyponatremia, hypocalcemia, hypokalemia, and SARITA on admission.  Nephrology consulted for further management.  Blood cultures collected on admission positive for MRSA, ID consulted for further evaluation.      busPIRone, 15 mg, Oral, BID  enoxaparin, 40 mg, Subcutaneous, Daily  folic acid, 1 mg, Oral, Daily  Lidocaine, 1 patch, Transdermal, Daily  methocarbamol, 500 mg, Oral, 4x Daily  multivitamin with minerals, 1 tablet, Oral, Daily  [START ON 3/10/2024] potassium chloride, 20 mEq, Oral, Daily  potassium chloride ER, 40 mEq, Oral, Q4H  QUEtiapine, 50 mg, Oral, Nightly  [Held by provider] senna-docusate sodium, 2 tablet, Oral, BID  sertraline, 100 mg, Oral, BID  sodium chloride, 10 mL, Intravenous, Q12H  sodium chloride, 1 g, Oral, TID With Meals  thiamine (B-1) IV, 200 mg, Intravenous, Q8H   Followed by  [START ON 3/13/2024] thiamine, 100 mg, Oral, Daily  thiamine, 100 mg, Oral, Daily  vancomycin, 750 mg, Intravenous, Q12H       Pharmacy to dose vancomycin,          I have utilized all available,  immediate resources to obtain, update, or review the patient's current medications including all prescriptions, over-the-counter products, herbals, cannabis/cannabidiol products, and vitamin.mineral/dietary (nutritional) supplements.    Active Hospital Problems:  Active Hospital Problems    Diagnosis     **Myalgia        Assessment/Plan:     MRSA bacteremia  Sepsis, POA  -Is positive for MRSA  -ID on board, source is not clear at this time, rule out discitis/osteomyelitis and endocarditis  -MRI of the spine pending, cardiology on board for SILVINA which is likely to be done on Monday  -2D echo ordered as well  -On vancomycin with pharmacy to dose  -Continue close monitoring    SARITA  Hyponatremia  Hypokalemia  Hypocalcemia  -Likely prerenal and due to alcohol abuse and poor p.o. intake  -Sodium being managed per nephrology, salt tabs added  -Replace potassium and calcium and monitor  -Nephrology following    Liver cirrhosis  -Likely alcohol related  -Does not appear to have decompensation at this time  -Continue supportive care and outpatient follow-up    Anemia of chronic disease  -Hemoglobin stable at this time  -Monitor and transfuse as needed    Tobacco abuse  -40-pack-year smoking history  -Counseled on cessation  -Nicotine patch if needed    DVT prophylaxis  -Lovenox      CODE STATUS:    Code Status (Patient has no pulse and is not breathing): CPR (Attempt to Resuscitate)  Medical Interventions (Patient has pulse or is breathing): Full Support      Disposition: Pending clinical course    Electronically signed by Donna Fitzpatrick DO, 03/09/24, 11:46 EST.  [facility] Hospitalist Team      Part of this note may be an electronic transcription/translation of spoken language to printed text using the Dragon Dictation System.

## 2024-03-09 NOTE — PROGRESS NOTES
Cardiology Progress Note      PATIENT IDENTIFICATION    Name: Lita Yu  Age: 58 y.o. Sex: female : 1965  MRN: [id number]    Requesting Provider    Donna Fitzpatrick DO     LOS: 1 day       Reason For Followup:  Bacteremia      Subjective:    Interval History:  Seen and examined.  Chart and labs reviewed.  Patient denies any chest pain pressure heaviness or tightness.    Review of Systems:  A complete review of systems was undertaken with the pertinent cardiovascular findings listed in history of present illness and all other systems being negative.     Assessment & Plan    Impressions:  Bacteremia-MRSA    Recommendations:  Plan for SILVINA Monday.        Objective:    Medication Review:   Scheduled Meds:busPIRone, 15 mg, Oral, BID  enoxaparin, 40 mg, Subcutaneous, Daily  folic acid, 1 mg, Oral, Daily  Lidocaine, 1 patch, Transdermal, Daily  methocarbamol, 500 mg, Oral, 4x Daily  multivitamin with minerals, 1 tablet, Oral, Daily  [START ON 3/10/2024] potassium chloride, 20 mEq, Oral, Daily  potassium chloride ER, 40 mEq, Oral, Q4H  QUEtiapine, 50 mg, Oral, Nightly  [Held by provider] senna-docusate sodium, 2 tablet, Oral, BID  sertraline, 100 mg, Oral, BID  sodium chloride, 10 mL, Intravenous, Q12H  sodium chloride, 1 g, Oral, TID With Meals  thiamine (B-1) IV, 200 mg, Intravenous, Q8H   Followed by  [START ON 3/13/2024] thiamine, 100 mg, Oral, Daily  thiamine, 100 mg, Oral, Daily  vancomycin, 750 mg, Intravenous, Q12H      Continuous Infusions:Pharmacy to dose vancomycin,       PRN Meds:.  acetaminophen    albuterol    [Held by provider] senna-docusate sodium **AND** polyethylene glycol **AND** bisacodyl **AND** bisacodyl    Calcium Replacement - Follow Nurse / BPA Driven Protocol    HYDROcodone-acetaminophen    ketorolac    loperamide    Magnesium Standard Dose Replacement - Follow Nurse / BPA Driven Protocol    [facility]    Pharmacy to dose vancomycin    Phosphorus Replacement - Follow Nurse / BPA  Driven Protocol    Potassium Replacement - Follow Nurse / BPA Driven Protocol    [COMPLETED] Insert Peripheral IV **AND** sodium chloride    sodium chloride    sodium chloride      Myalgia         Physical Exam:    General: Alert, cooperative, no distress, appears stated age  Head:  Normocephalic, atraumatic, mucous membranes moist  Eyes:  Conjunctivae/corneas clear, EOMs intact     Neck:  Supple,  no bruit  Lungs:  Clear to auscultation bilaterally, no wheezes, rhonchi or rales are noted  Chest wall: No tenderness  Heart::  Regular rate and rhythm, S1 and S2 normal, 1/6 holosystolic murmur.  No rub or gallop  Abdomen: Soft, nontender, nondistended, bowel sounds active  Extremities: No cyanosis, clubbing, or edema  Pulses: 2+ and symmetric all extremities  Skin:  No rashes or lesions  Neuro/psych: A&O x3. CN II through XII are grossly intact with appropriate affect    Vital Signs:  Vitals:    03/08/24 2020 03/09/24 0156 03/09/24 0505 03/09/24 0801   BP: 106/58  104/59    BP Location: Right arm  Right arm    Patient Position: Lying  Lying    Pulse: 101  109    Resp: 20  18 20   Temp: (!) 102.3 °F (39.1 °C) 98.2 °F (36.8 °C) 99.2 °F (37.3 °C) 99.3 °F (37.4 °C)   TempSrc: Oral Oral Oral Oral   SpO2: 93%  92% 92%   Weight:       Height:         Wt Readings from Last 1 Encounters:   03/07/24 61.2 kg (135 lb)     No intake or output data in the 24 hours ending 03/09/24 1044      Results Review:     CBC    Results from last 7 days   Lab Units 03/08/24  2338 03/08/24  0752 03/07/24  0920 03/04/24  0351 03/03/24  0536   WBC 10*3/mm3 6.50 6.50 8.70 5.80 5.20   HEMOGLOBIN g/dL 8.1* 9.4* 10.6* 10.1* 9.9*   PLATELETS 10*3/mm3 64* 70* 76* 125* 131*     Cr Clearance Estimated Creatinine Clearance: 74 mL/min (by C-G formula based on SCr of 0.82 mg/dL).  Coag   Results from last 7 days   Lab Units 03/07/24  0920   INR  1.00   APTT seconds 33.4*     HbA1C   Lab Results   Component Value Date    HGBA1C 5.00 06/12/2023     Blood  "Glucose No results found for: \"POCGLU\"  Infection   Results from last 7 days   Lab Units 03/07/24  0933 03/07/24  0920   BLOODCX  Staphylococcus aureus, MRSA* Staphylococcus aureus, MRSA*   BCIDPCR   --  Staph aureus. mecA/C and MREJ (methicillin resistance gene) detected. Identification by BCID2 PCR.*     CMP   Results from last 7 days   Lab Units 03/08/24  2338 03/08/24  0752 03/07/24  1757 03/07/24  0920 03/04/24  0351 03/03/24  0648 03/03/24  0536 03/02/24  2136   SODIUM mmol/L 124* 128* 127* 127* 135*  --  135*  --    POTASSIUM mmol/L 3.2* 3.7 3.9 4.3 4.3  --  4.0 4.0   CHLORIDE mmol/L 93* 95* 97* 92* 103  --  102  --    CO2 mmol/L 19.0* 20.0* 19.0* 18.0* 22.0  --  23.0  --    BUN mg/dL 15 17 18 20 8  --  11  --    CREATININE mg/dL 0.82 0.92 0.98 1.22* 0.93  --  0.80  --    GLUCOSE mg/dL 97 108* 86 99 94  --  89  --    ALBUMIN g/dL 2.8* 3.1*  --  3.8 3.5  --  3.5  --    BILIRUBIN mg/dL 0.5 0.5  --  0.7 0.3  --  0.3  --    ALK PHOS U/L 108 123*  --  153* 138*  --  138*  --    AST (SGOT) U/L 29 29  --  38* 39*  --  40*  --    ALT (SGPT) U/L 18 21  --  26 35*  --  40*  --    AMMONIA umol/L  --   --   --  16  --  38  --   --      ABG      UA    Results from last 7 days   Lab Units 03/07/24  0932   NITRITE UA  Negative     ABDI  No results found for: \"POCMETH\", \"POCAMPHET\", \"POCBARBITUR\", \"POCBENZO\", \"POCCOCAINE\", \"POCOPIATES\", \"POCOXYCODO\", \"POCPHENCYC\", \"POCPROPOXY\", \"POCTHC\", \"POCTRICYC\"  Lysis Labs   Results from last 7 days   Lab Units 03/08/24  2338 03/08/24  0752 03/07/24  1757 03/07/24  0920 03/04/24  0351 03/03/24  0536   INR   --   --   --  1.00  --   --    APTT seconds  --   --   --  33.4*  --   --    HEMOGLOBIN g/dL 8.1* 9.4*  --  10.6* 10.1* 9.9*   PLATELETS 10*3/mm3 64* 70*  --  76* 125* 131*   CREATININE mg/dL 0.82 0.92 0.98 1.22* 0.93 0.80     Radiology(recent) CT Head Without Contrast    Result Date: 3/7/2024  Impression: No definite acute intracranial abnormality Electronically Signed: Miguel " MD Dianne  3/7/2024 11:29 AM EST  Workstation ID: [id number]       Results from last 7 days   Lab Units 03/07/24  0920   CK TOTAL U/L 74       Imaging Results (Last 24 Hours)       ** No results found for the last 24 hours. **            Cardiac Studies:  Echo-   Stress Myoview-  Cath-        Christian Gilbert DO  03/09/24  10:44 EST    Copied information has been reviewed and is current as of 03/09/24

## 2024-03-09 NOTE — PLAN OF CARE
Goal Outcome Evaluation:  Plan of Care Reviewed With: patient           Outcome Evaluation: patient is abed moaning and complaining of generalized muscle soreness and pain. patient states that this happens a lot and she just doesn't feel good at all, she states I just want to sleep and then maybe I won't know I feel as bad as i do. Patient states that usually she can take care of herself at home but states currently there is just no way she can walk and is having a hard time realizing when she needs to use the restroom therefore she is in briefs and often states I think i need my diaper changed now, she does not let staff know if she needs to use the restroom or if she has done so unless someone enters her room, patient does move side to side with assistance and lots of encouragement. Patient states she just cant take care of herself right now. MRI states that they hope to do her studies this pm, provider did give orders for one time dose of Ativan to be used prior to MRI.

## 2024-03-09 NOTE — CASE MANAGEMENT/SOCIAL WORK
Continued Stay Note   Vik     Patient Name: Lita Yu  MRN: [id number]  Today's Date: 3/9/2024    Admit Date: 3/7/2024    Plan: from home.  Watch for IV abx needs.   Discharge Plan       Row Name 03/09/24 1711       Plan    Plan from home.  Watch for IV abx needs.    Plan Comments DC barrier: IV abx, awaiting cultures, SILVINA on Monday 3/11                      Amy Germain RN

## 2024-03-10 ENCOUNTER — APPOINTMENT (OUTPATIENT)
Dept: MRI IMAGING | Facility: HOSPITAL | Age: 59
DRG: 872 | End: 2024-03-10
Payer: COMMERCIAL

## 2024-03-10 LAB
ANION GAP SERPL CALCULATED.3IONS-SCNC: 11 MMOL/L (ref 5–15)
BACTERIA SPEC AEROBE CULT: ABNORMAL
BACTERIA SPEC AEROBE CULT: ABNORMAL
BASOPHILS # BLD AUTO: 0 10*3/MM3 (ref 0–0.2)
BASOPHILS NFR BLD AUTO: 0.3 % (ref 0–1.5)
BUN SERPL-MCNC: 14 MG/DL (ref 6–20)
BUN/CREAT SERPL: 18.7 (ref 7–25)
CALCIUM SPEC-SCNC: 8.2 MG/DL (ref 8.6–10.5)
CHLORIDE SERPL-SCNC: 100 MMOL/L (ref 98–107)
CO2 SERPL-SCNC: 18 MMOL/L (ref 22–29)
CREAT SERPL-MCNC: 0.75 MG/DL (ref 0.57–1)
DEPRECATED RDW RBC AUTO: 63.9 FL (ref 37–54)
EGFRCR SERPLBLD CKD-EPI 2021: 93.6 ML/MIN/1.73
EOSINOPHIL # BLD AUTO: 0 10*3/MM3 (ref 0–0.4)
EOSINOPHIL NFR BLD AUTO: 0.5 % (ref 0.3–6.2)
ERYTHROCYTE [DISTWIDTH] IN BLOOD BY AUTOMATED COUNT: 16.8 % (ref 12.3–15.4)
GLUCOSE SERPL-MCNC: 77 MG/DL (ref 65–99)
GRAM STN SPEC: ABNORMAL
HCT VFR BLD AUTO: 25.4 % (ref 34–46.6)
HGB BLD-MCNC: 8.1 G/DL (ref 12–15.9)
ISOLATED FROM: ABNORMAL
ISOLATED FROM: ABNORMAL
LYMPHOCYTES # BLD AUTO: 0.4 10*3/MM3 (ref 0.7–3.1)
LYMPHOCYTES NFR BLD AUTO: 6.4 % (ref 19.6–45.3)
MCH RBC QN AUTO: 33.8 PG (ref 26.6–33)
MCHC RBC AUTO-ENTMCNC: 32.1 G/DL (ref 31.5–35.7)
MCV RBC AUTO: 105.5 FL (ref 79–97)
MONOCYTES # BLD AUTO: 0.6 10*3/MM3 (ref 0.1–0.9)
MONOCYTES NFR BLD AUTO: 10.1 % (ref 5–12)
NEUTROPHILS NFR BLD AUTO: 5.1 10*3/MM3 (ref 1.7–7)
NEUTROPHILS NFR BLD AUTO: 82.7 % (ref 42.7–76)
NRBC BLD AUTO-RTO: 0.1 /100 WBC (ref 0–0.2)
PLATELET # BLD AUTO: 83 10*3/MM3 (ref 140–450)
PMV BLD AUTO: 9.4 FL (ref 6–12)
POTASSIUM SERPL-SCNC: 4.3 MMOL/L (ref 3.5–5.2)
RBC # BLD AUTO: 2.41 10*6/MM3 (ref 3.77–5.28)
SODIUM SERPL-SCNC: 129 MMOL/L (ref 136–145)
URATE SERPL-MCNC: 4.9 MG/DL (ref 2.4–5.7)
WBC NRBC COR # BLD AUTO: 6.2 10*3/MM3 (ref 3.4–10.8)

## 2024-03-10 PROCEDURE — A9579 GAD-BASE MR CONTRAST NOS,1ML: HCPCS | Performed by: INTERNAL MEDICINE

## 2024-03-10 PROCEDURE — 25010000002 VANCOMYCIN 750 MG RECONSTITUTED SOLUTION 1 EACH VIAL: Performed by: STUDENT IN AN ORGANIZED HEALTH CARE EDUCATION/TRAINING PROGRAM

## 2024-03-10 PROCEDURE — 72157 MRI CHEST SPINE W/O & W/DYE: CPT

## 2024-03-10 PROCEDURE — 85025 COMPLETE CBC W/AUTO DIFF WBC: CPT | Performed by: INTERNAL MEDICINE

## 2024-03-10 PROCEDURE — 25010000002 THIAMINE HCL 200 MG/2ML SOLUTION: Performed by: STUDENT IN AN ORGANIZED HEALTH CARE EDUCATION/TRAINING PROGRAM

## 2024-03-10 PROCEDURE — 25810000003 SODIUM CHLORIDE 0.9 % SOLUTION 250 ML FLEX CONT: Performed by: STUDENT IN AN ORGANIZED HEALTH CARE EDUCATION/TRAINING PROGRAM

## 2024-03-10 PROCEDURE — 25010000002 ENOXAPARIN PER 10 MG: Performed by: STUDENT IN AN ORGANIZED HEALTH CARE EDUCATION/TRAINING PROGRAM

## 2024-03-10 PROCEDURE — 99232 SBSQ HOSP IP/OBS MODERATE 35: CPT | Performed by: INTERNAL MEDICINE

## 2024-03-10 PROCEDURE — 72158 MRI LUMBAR SPINE W/O & W/DYE: CPT

## 2024-03-10 PROCEDURE — 72156 MRI NECK SPINE W/O & W/DYE: CPT

## 2024-03-10 PROCEDURE — 25010000002 GADOTERIDOL PER 1 ML: Performed by: INTERNAL MEDICINE

## 2024-03-10 PROCEDURE — 84550 ASSAY OF BLOOD/URIC ACID: CPT | Performed by: INTERNAL MEDICINE

## 2024-03-10 PROCEDURE — 80048 BASIC METABOLIC PNL TOTAL CA: CPT | Performed by: INTERNAL MEDICINE

## 2024-03-10 RX ORDER — DIAZEPAM 2 MG/1
2 TABLET ORAL ONCE
Status: COMPLETED | OUTPATIENT
Start: 2024-03-10 | End: 2024-03-10

## 2024-03-10 RX ADMIN — SERTRALINE 100 MG: 100 TABLET, FILM COATED ORAL at 21:01

## 2024-03-10 RX ADMIN — Medication 1 TABLET: at 08:56

## 2024-03-10 RX ADMIN — DIAZEPAM 2 MG: 2 TABLET ORAL at 13:31

## 2024-03-10 RX ADMIN — SODIUM CHLORIDE 1 G: 1 TABLET ORAL at 13:13

## 2024-03-10 RX ADMIN — Medication 10 ML: at 08:56

## 2024-03-10 RX ADMIN — QUETIAPINE FUMARATE 50 MG: 25 TABLET ORAL at 21:01

## 2024-03-10 RX ADMIN — FOLIC ACID 1 MG: 1 TABLET ORAL at 08:56

## 2024-03-10 RX ADMIN — BUSPIRONE HYDROCHLORIDE 15 MG: 15 TABLET ORAL at 08:56

## 2024-03-10 RX ADMIN — SERTRALINE 100 MG: 100 TABLET, FILM COATED ORAL at 08:56

## 2024-03-10 RX ADMIN — METHOCARBAMOL 500 MG: 500 TABLET ORAL at 17:44

## 2024-03-10 RX ADMIN — GADOTERIDOL 12 ML: 279.3 INJECTION, SOLUTION INTRAVENOUS at 15:30

## 2024-03-10 RX ADMIN — Medication 10 ML: at 21:02

## 2024-03-10 RX ADMIN — SODIUM CHLORIDE 1 G: 1 TABLET ORAL at 17:44

## 2024-03-10 RX ADMIN — HYDROCODONE BITARTRATE AND ACETAMINOPHEN 1 TABLET: 5; 325 TABLET ORAL at 21:02

## 2024-03-10 RX ADMIN — POTASSIUM CHLORIDE 20 MEQ: 1500 TABLET, EXTENDED RELEASE ORAL at 08:56

## 2024-03-10 RX ADMIN — VANCOMYCIN HYDROCHLORIDE 750 MG: 750 INJECTION, POWDER, LYOPHILIZED, FOR SOLUTION INTRAVENOUS at 05:24

## 2024-03-10 RX ADMIN — ENOXAPARIN SODIUM 40 MG: 100 INJECTION SUBCUTANEOUS at 17:44

## 2024-03-10 RX ADMIN — VANCOMYCIN HYDROCHLORIDE 750 MG: 750 INJECTION, POWDER, LYOPHILIZED, FOR SOLUTION INTRAVENOUS at 17:44

## 2024-03-10 RX ADMIN — SODIUM CHLORIDE 1 G: 1 TABLET ORAL at 08:56

## 2024-03-10 RX ADMIN — LIDOCAINE 1 PATCH: 4 PATCH TOPICAL at 08:56

## 2024-03-10 RX ADMIN — THIAMINE HYDROCHLORIDE 200 MG: 100 INJECTION, SOLUTION INTRAMUSCULAR; INTRAVENOUS at 05:24

## 2024-03-10 RX ADMIN — THIAMINE HYDROCHLORIDE 200 MG: 100 INJECTION, SOLUTION INTRAMUSCULAR; INTRAVENOUS at 13:12

## 2024-03-10 RX ADMIN — METHOCARBAMOL 500 MG: 500 TABLET ORAL at 08:56

## 2024-03-10 RX ADMIN — LOPERAMIDE HYDROCHLORIDE 2 MG: 2 CAPSULE ORAL at 13:12

## 2024-03-10 RX ADMIN — THIAMINE HYDROCHLORIDE 200 MG: 100 INJECTION, SOLUTION INTRAMUSCULAR; INTRAVENOUS at 21:02

## 2024-03-10 RX ADMIN — METHOCARBAMOL 500 MG: 500 TABLET ORAL at 21:01

## 2024-03-10 RX ADMIN — Medication 100 MG: at 08:56

## 2024-03-10 RX ADMIN — METHOCARBAMOL 500 MG: 500 TABLET ORAL at 13:12

## 2024-03-10 RX ADMIN — BUSPIRONE HYDROCHLORIDE 15 MG: 15 TABLET ORAL at 21:02

## 2024-03-10 NOTE — PROGRESS NOTES
Infectious Diseases Progress Note      LOS: 2 days   Patient Care Team:  Norma Saul APRN as PCP - General (Nurse Practitioner)  Flory Villanueva MD (Physical Medicine and Rehabilitation)    Chief Complaint: Back pain, fever    Subjective       The patient has been afebrile for the last 24 hours.  The patient is on room air, hemodynamically stable, and is tolerating antimicrobial therapy..  Patient still having severe lower back pain and leg pain.      Review of Systems:   Review of Systems   Constitutional: Negative.    HENT: Negative.     Eyes: Negative.    Respiratory: Negative.     Cardiovascular: Negative.    Gastrointestinal: Negative.    Endocrine: Negative.    Genitourinary: Negative.    Musculoskeletal:  Positive for back pain and neck pain.        Bilateral leg pain   Skin: Negative.    Neurological: Negative.    Psychiatric/Behavioral: Negative.     All other systems reviewed and are negative.       Objective     Vital Signs  Temp:  [98 °F (36.7 °C)-98.3 °F (36.8 °C)] 98 °F (36.7 °C)  Heart Rate:  [] 95  Resp:  [14-25] 20  BP: (105-123)/(50-69) 119/69    Physical Exam:  Physical Exam  Vitals and nursing note reviewed.   Constitutional:       General: She is not in acute distress.     Appearance: She is well-developed and normal weight. She is ill-appearing. She is not diaphoretic.   HENT:      Head: Normocephalic and atraumatic.   Eyes:      General: No scleral icterus.     Extraocular Movements: Extraocular movements intact.      Conjunctiva/sclera: Conjunctivae normal.      Pupils: Pupils are equal, round, and reactive to light.   Cardiovascular:      Rate and Rhythm: Normal rate and regular rhythm.      Heart sounds: Normal heart sounds, S1 normal and S2 normal. No murmur heard.  Pulmonary:      Effort: Pulmonary effort is normal. No respiratory distress.      Breath sounds: Normal breath sounds. No stridor. No wheezing or rales.   Chest:      Chest wall: No tenderness.   Abdominal:       General: Bowel sounds are normal. There is no distension.      Palpations: Abdomen is soft. There is no mass.      Tenderness: There is no abdominal tenderness. There is no guarding.   Musculoskeletal:         General: No swelling, tenderness or deformity. Normal range of motion.      Cervical back: Neck supple.      Comments: Tenderness with the movement of all joints including hips and shoulders but there is no obvious joint effusion     Tenderness at the lower back and the upper back      Skin:     General: Skin is warm and dry.      Coloration: Skin is not pale.      Findings: No bruising, erythema or rash.   Neurological:      General: No focal deficit present.      Mental Status: She is alert and oriented to person, place, and time. Mental status is at baseline.      Cranial Nerves: No cranial nerve deficit.   Psychiatric:         Mood and Affect: Mood normal.          Results Review:    I have reviewed all clinical data, test, lab, and imaging results.     Radiology  MRI Lumbar Spine With & Without Contrast    Result Date: 3/10/2024  MRI THORACIC SPINE W WO CONTRAST, MRI LUMBAR SPINE W WO CONTRAST Date of Exam: 3/10/2024 2:36 PM EDT Indication: MRSA bacteremia to rule out discitis/osteomyelitis.  Comparison: None available. Technique:  Routine multiplanar/multisequence sequence images of the thoracic and lumbar spines were obtained before and after the uneventful administration of 12 cc Prohance.  Findings: Thoracic vertebral body height and alignment are normal. The intervertebral disc spaces are maintained. Heterogeneous appearance of the bone marrow likely due to demineralization. No marrow edema. No cord signal abnormalities of the thoracic spine. No abnormal enhancement of the thoracic spine. No significant spinal canal narrowing of the thoracic spine. Lumbar vertebral body height and alignment are normal. Disc space narrowing and intervertebral disc fluid at L2-L3. Extradural fluid noted at L1-L2  tending craniocaudally over 3.7 cm x 0.6 cm AP. This fluid also extends prevertebral measuring 3.8 cm craniocaudal by 0.7 cm. There is surrounding edema. There is extradural enhancement as well as enhancement along the disc space and prevertebral soft tissues. Finding is concerning for discitis/osteomyelitis at L2-L3. The conus medullaris terminates at the L1 vertebral body level. No cord signal abnormalities. Background heterogeneous appearance of the bone marrow likely due to demineralization. T12-L1: No significant spinal canal or foraminal narrowing. L1-L2: No significant spinal canal or foraminal narrowing. L2-L3: Enhancing epidural fluid resulting in moderate canal stenosis with likely severe left foraminal narrowing and mild right foraminal narrowing. L3-L4: No significant spinal canal or foraminal narrowing. L4-L5: No significant spinal canal or foraminal narrowing. L5-S1: No significant spinal canal or foraminal narrowing.     Findings consistent with osteomyelitis/discitis at L2-L3. Electronically Signed: Ted Chand MD  3/10/2024 4:20 PM EDT  Workstation ID: [id number]    MRI Thoracic Spine With & Without Contrast    Result Date: 3/10/2024  MRI THORACIC SPINE W WO CONTRAST, MRI LUMBAR SPINE W WO CONTRAST Date of Exam: 3/10/2024 2:36 PM EDT Indication: MRSA bacteremia to rule out discitis/osteomyelitis.  Comparison: None available. Technique:  Routine multiplanar/multisequence sequence images of the thoracic and lumbar spines were obtained before and after the uneventful administration of 12 cc Prohance.  Findings: Thoracic vertebral body height and alignment are normal. The intervertebral disc spaces are maintained. Heterogeneous appearance of the bone marrow likely due to demineralization. No marrow edema. No cord signal abnormalities of the thoracic spine. No abnormal enhancement of the thoracic spine. No significant spinal canal narrowing of the thoracic spine. Lumbar vertebral body height and alignment  are normal. Disc space narrowing and intervertebral disc fluid at L2-L3. Extradural fluid noted at L1-L2 tending craniocaudally over 3.7 cm x 0.6 cm AP. This fluid also extends prevertebral measuring 3.8 cm craniocaudal by 0.7 cm. There is surrounding edema. There is extradural enhancement as well as enhancement along the disc space and prevertebral soft tissues. Finding is concerning for discitis/osteomyelitis at L2-L3. The conus medullaris terminates at the L1 vertebral body level. No cord signal abnormalities. Background heterogeneous appearance of the bone marrow likely due to demineralization. T12-L1: No significant spinal canal or foraminal narrowing. L1-L2: No significant spinal canal or foraminal narrowing. L2-L3: Enhancing epidural fluid resulting in moderate canal stenosis with likely severe left foraminal narrowing and mild right foraminal narrowing. L3-L4: No significant spinal canal or foraminal narrowing. L4-L5: No significant spinal canal or foraminal narrowing. L5-S1: No significant spinal canal or foraminal narrowing.     Findings consistent with osteomyelitis/discitis at L2-L3. Electronically Signed: Ted Chand MD  3/10/2024 4:20 PM EDT  Workstation ID: [id number]    MRI Cervical Spine With & Without Contrast    Result Date: 3/10/2024  MRI CERVICAL SPINE W WO CONTRAST Date of Exam: 3/10/2024 2:33 PM EDT Indication: MRSA bacteremia to rule out discitis/osteomyelitis.  Comparison: October 15, 2022 Technique:  Routine multiplanar/multisequence sequence images of the cervical spine were obtained before and after the uneventful administration of Prohance.  Findings: There is a reversal of the normal lordosis. The signal within the marrow of the visualized cervical vertebra is similar to the prior study. There are degenerative endplate changes scattered throughout the cervical spine. There is no intrinsic cord abnormality definitively confirmed. Exam is somewhat limited by motion artifact. Protocols were  optimized for speed due to patient pain. C2-3: No definite disc herniation. The intervertebral foramina seem patent. C3-4: There is some uncovertebral arthropathy on the right with moderate narrowing of the right intervertebral foramina. There is bilateral facet arthropathy. C4-5: There is facet arthropathy bilaterally. There is mild narrowing of the left intervertebral foramina and more moderate narrowing of the right intervertebral foramina. There is some uncovertebral arthropathy. C5-6: There is more of a broad-based osseous bar superimposed on some uncovertebral arthropathy. There is moderate narrowing of the right intervertebral foramina and mild to moderate narrowing of the left intervertebral foramina. There is bilateral facet  arthropathy. C6-7: There is more of an osseous bar. There is bilateral facet arthropathy. It looks like there may be moderate narrowing of both intervertebral foramina. C7-T1: There is some spurring about the disc space. It looks like there is probably some narrowing of both vertebral foramina. Postcontrast images reveal no definite abnormal enhancement in the cervical area. There is some signal on the postcontrast images around the inferior left scapula. Whether this may relate to some artifact as there is some susceptibility artifact around this area or could be secondary to some inflammation is uncertain and should be correlated with clinical findings.     Impression: 1.Reversal of the normal lordosis with multilevel degenerative change. 2.Exam somewhat limited due to motion artifact. 3.There is signal on the postcontrast images around the inferior left scapula that might reflect artifact as opposed to an inflammatory process in this area. Correlation with clinical findings to this area recommended. Electronically Signed: Eyad Smith MD  3/10/2024 4:13 PM EDT  Workstation ID: [id number]     Cardiology    Laboratory    Results from last 7 days   Lab Units 03/10/24  0621 03/08/24  3766  03/08/24 0752 03/07/24  0920 03/04/24  0351   WBC 10*3/mm3 6.20 6.50 6.50 8.70 5.80   HEMOGLOBIN g/dL 8.1* 8.1* 9.4* 10.6* 10.1*   HEMATOCRIT % 25.4* 25.2* 29.0* 32.0* 31.1*   PLATELETS 10*3/mm3 83* 64* 70* 76* 125*     Results from last 7 days   Lab Units 03/10/24  0621 03/09/24  0948 03/08/24  2338 03/08/24 0752 03/07/24  1757 03/07/24 0920 03/04/24  0351   SODIUM mmol/L 129* 120* 124* 128* 127* 127* 135*   POTASSIUM mmol/L 4.3 4.0 3.2* 3.7 3.9 4.3 4.3   CHLORIDE mmol/L 100 92* 93* 95* 97* 92* 103   CO2 mmol/L 18.0* 17.0* 19.0* 20.0* 19.0* 18.0* 22.0   BUN mg/dL 14 14 15 17 18 20 8   CREATININE mg/dL 0.75 0.81 0.82 0.92 0.98 1.22* 0.93   GLUCOSE mg/dL 77 111* 97 108* 86 99 94   ALBUMIN g/dL  --   --  2.8* 3.1*  --  3.8 3.5   BILIRUBIN mg/dL  --   --  0.5 0.5  --  0.7 0.3   ALK PHOS U/L  --   --  108 123*  --  153* 138*   AST (SGOT) U/L  --   --  29 29  --  38* 39*   ALT (SGPT) U/L  --   --  18 21  --  26 35*   AMMONIA umol/L  --   --   --   --   --  16  --    CALCIUM mg/dL 8.2* 8.1* 8.2* 8.3* 7.8* 9.2 8.1*     Results from last 7 days   Lab Units 03/07/24  0920   CK TOTAL U/L 74     Results from last 7 days   Lab Units 03/08/24 2338   SED RATE mm/hr 59*         Microbiology   Microbiology Results (last 10 days)       Procedure Component Value - Date/Time    Clostridioides difficile EIA - Stool, Per Rectum [[id number]]  (Normal) Collected: 03/08/24 2327    Lab Status: Final result Specimen: Stool from Per Rectum Updated: 03/09/24 0745     C Diff GDH Ag Negative     C.diff Toxin Ag Negative    Narrative:      The result indicates the absence of toxigenic C.difficile from stool specimen.    Blood Culture - Blood, Arm, Right [[id number]]  (Normal) Collected: 03/08/24 1643    Lab Status: Preliminary result Specimen: Blood from Arm, Right Updated: 03/10/24 1815     Blood Culture No growth at 2 days    COVID-19, FLU A/B, RSV PCR 1 HR TAT - Swab, Nasopharynx [[id number]]  (Normal) Collected: 03/07/24 1526    Lab Status:  Final result Specimen: Swab from Nasopharynx Updated: 03/07/24 1607     COVID19 Not Detected     Influenza A PCR Not Detected     Influenza B PCR Not Detected     RSV, PCR Not Detected    Narrative:      Fact sheet for providers: https://www.fda.gov/media/913722/download    Fact sheet for patients: https://www.fda.gov/media/195784/download    Test performed by PCR.    Blood Culture - Blood, Arm, Left [[id number]]  (Abnormal) Collected: 03/07/24 0933    Lab Status: Final result Specimen: Blood from Arm, Left Updated: 03/10/24 0657     Blood Culture Staphylococcus aureus, MRSA     Comment:   Infectious disease consultation is highly recommended to rule out distant foci of infection.  Methicillin resistant Staphylococcus aureus, Patient may be an isolation risk.        Isolated from Aerobic and Anaerobic Bottles     Gram Stain Aerobic Bottle Gram positive cocci in clusters      Anaerobic Bottle Gram positive cocci in clusters    Narrative:      Refer to previous blood culture collected on  03/07/2024 at 0920 for MICs.    Blood Culture - Blood, Arm, Right [[id number]]  (Abnormal)  (Susceptibility) Collected: 03/07/24 0920    Lab Status: Final result Specimen: Blood from Arm, Right Updated: 03/10/24 0657     Blood Culture Staphylococcus aureus, MRSA     Comment:   Infectious disease consultation is highly recommended to rule out distant foci of infection.  Methicillin resistant Staphylococcus aureus, Patient may be an isolation risk.        Isolated from Aerobic and Anaerobic Bottles     Gram Stain Anaerobic Bottle Gram positive cocci in clusters      Aerobic Bottle Gram positive cocci in clusters    Susceptibility        Staphylococcus aureus, MRSA      SOFIA      Gentamicin Susceptible      Oxacillin Resistant      Rifampin Susceptible      Vancomycin Susceptible                           Blood Culture ID, PCR - Blood, Arm, Right [[id number]]  (Abnormal) Collected: 03/07/24 0920    Lab Status: Final result Specimen: Blood  from Arm, Right Updated: 03/08/24 8544     BCID, PCR Staph aureus. mecA/C and MREJ (methicillin resistance gene) detected. Identification by BCID2 PCR.     BOTTLE TYPE Anaerobic Bottle    Narrative:      Infectious disease consultation is highly recommended to rule out distant foci of infection.            Medication Review:       Schedule Meds  busPIRone, 15 mg, Oral, BID  enoxaparin, 40 mg, Subcutaneous, Daily  folic acid, 1 mg, Oral, Daily  Lidocaine, 1 patch, Transdermal, Daily  methocarbamol, 500 mg, Oral, 4x Daily  multivitamin with minerals, 1 tablet, Oral, Daily  potassium chloride, 20 mEq, Oral, Daily  QUEtiapine, 50 mg, Oral, Nightly  [Held by provider] senna-docusate sodium, 2 tablet, Oral, BID  sertraline, 100 mg, Oral, BID  sodium chloride, 10 mL, Intravenous, Q12H  sodium chloride, 1 g, Oral, TID With Meals  thiamine (B-1) IV, 200 mg, Intravenous, Q8H   Followed by  [START ON 3/13/2024] thiamine, 100 mg, Oral, Daily  thiamine, 100 mg, Oral, Daily  vancomycin, 750 mg, Intravenous, Q12H        Infusion Meds  Pharmacy to dose vancomycin,         PRN Meds    acetaminophen    albuterol    [Held by provider] senna-docusate sodium **AND** polyethylene glycol **AND** bisacodyl **AND** bisacodyl    Calcium Replacement - Follow Nurse / BPA Driven Protocol    HYDROcodone-acetaminophen    ketorolac    loperamide    Magnesium Standard Dose Replacement - Follow Nurse / BPA Driven Protocol    melatonin    Pharmacy to dose vancomycin    Phosphorus Replacement - Follow Nurse / BPA Driven Protocol    Potassium Replacement - Follow Nurse / BPA Driven Protocol    [COMPLETED] Insert Peripheral IV **AND** sodium chloride    sodium chloride    sodium chloride        Assessment & Plan       Antimicrobial Therapy   1.  IV vancomycin        2.        3.        4.        5.          Assessment     Methicillin resistant Staphylococcus aureus bacteremia.  Likely source is lumbar discitis/osteomyelitis.  Endocarditis also needs to  be ruled out.  Patient denies using IV drugs.  Repeat blood culture from 3/8/2024 is negative so far.  2D echo did not show any vegetation    L2-L3 discitis/osteomyelitis.  Patient continues to have fairly severe back pain with bilateral leg pain MRI of the lumbar spine showed enhancement of the epidural fluid with moderate canal stenosis     History of liver cirrhosis secondary to alcohol and hemochromatosis     History of alcohol abuse and tobacco abuse     S/p left total hip replacement secondary to hip fracture in 2017 or 2018.  Patient denied having infection after the procedure    Reported diarrhea at admission.  C. difficile screen was negative     Plan     Continue IV vancomycin waiting on final blood culture susceptibilities and try to keep vancomycin trough between 15 and 20  Patient will need at least 6-8 weeks of IV antimicrobial therapy  SILVINA scheduled for Monday  Consult neurosurgery  Supportive care  A.m. labs   Consult spine surgery service    Sarah Ross, SHARIF  03/10/24  18:39 EDT    Note is dictated utilizing voice recognition software/Dragon

## 2024-03-10 NOTE — PLAN OF CARE
Goal Outcome Evaluation:              Outcome Evaluation: Patient rested throughout the shift with no complains. Plan of care ongoing.

## 2024-03-10 NOTE — PROGRESS NOTES
Hospitalist Service   Daily Progress Note      Patient Name: Lita Yu  : 1965  MRN: [id number]  Primary Care Physician:  Norma Saul APRN  Date of admission: 3/7/2024      Subjective      Chief Complaint: Generalized weakness    Patient seen and examined this morning.  Doing about the same, no fever or chills overnight.  Awaiting MRI, patient is claustrophobic, unable to finish it yesterday.  Valium ordered for today.    Pertinent positives as noted in HPI/subjective.  All other systems were reviewed and are negative.      Objective      Vitals:   Temp:  [98 °F (36.7 °C)-98.9 °F (37.2 °C)] 98.2 °F (36.8 °C)  Heart Rate:  [] 88  Resp:  [14-25] 25  BP: ()/(50-69) 123/69    Physical Exam:    General: Awake, alert, chronically ill-appearing female, lying in bed, NAD  Eyes: PERRL, EOMI, conjunctivae are clear  Cardiovascular: Regular rate and rhythm, no murmurs  Respiratory: Clear to auscultation bilaterally, no wheezing or rales, unlabored breathing  Abdomen: Soft, nontender, positive bowel sounds, no guarding  Neurologic: A&O, CN grossly intact, moves all extremities spontaneously  Musculoskeletal: Generalized weakness, no other gross deformities  Skin: Warm, dry         Result Review    Result Review:  I have personally reviewed the results from the time of this admission to 3/10/2024 11:48 EDT and agree with these findings:  [x]  Laboratory  [x]  Microbiology  []  Radiology  []  EKG/Telemetry   []  Cardiology/Vascular   []  Pathology  []  Old records  []  Other:    Wounds (Last 24 Hours)       LDA Wound       Row Name               Wound Bilateral perineum MASD (Moisture associated skin damage)    Wound - Properties Group Present on Original Admission: Y  -DI Side: Bilateral  -DI Location: perineum  -DI Primary Wound Type: MASD  -DI    Retired Wound - Properties Group Present on Original Admission: Y  -DI Side: Bilateral  -DI Location: perineum  -DI Primary Wound Type: MASD   -DI    Retired Wound - Properties Group Present on Original Admission: Y  -DI Side: Bilateral  -DI Location: perineum  -DI Primary Wound Type: MASD  -DI              User Key  (r) = Recorded By, (t) = Taken By, (c) = Cosigned By      Initials Name Provider Type    Antonella Mulligan RN Registered Nurse                      Assessment & Plan      Brief Patient Summary:  Lita Yu is a 58 y.o. 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  Noted to have hyponatremia, hypocalcemia, hypokalemia, and SARITA on admission.  Nephrology consulted for further management.  Blood cultures collected on admission positive for MRSA, ID consulted for further evaluation.      busPIRone, 15 mg, Oral, BID  diazePAM, 2 mg, Oral, Once  enoxaparin, 40 mg, Subcutaneous, Daily  folic acid, 1 mg, Oral, Daily  Lidocaine, 1 patch, Transdermal, Daily  methocarbamol, 500 mg, Oral, 4x Daily  multivitamin with minerals, 1 tablet, Oral, Daily  potassium chloride, 20 mEq, Oral, Daily  QUEtiapine, 50 mg, Oral, Nightly  [Held by provider] senna-docusate sodium, 2 tablet, Oral, BID  sertraline, 100 mg, Oral, BID  sodium chloride, 10 mL, Intravenous, Q12H  sodium chloride, 1 g, Oral, TID With Meals  thiamine (B-1) IV, 200 mg, Intravenous, Q8H   Followed by  [START ON 3/13/2024] thiamine, 100 mg, Oral, Daily  thiamine, 100 mg, Oral, Daily  vancomycin, 750 mg, Intravenous, Q12H       Pharmacy to dose vancomycin,          I have utilized all available, immediate resources to obtain, update, or review the patient's current medications including all prescriptions, over-the-counter products, herbals, cannabis/cannabidiol products, and vitamin.mineral/dietary (nutritional) supplements.    Active Hospital Problems:  Active Hospital Problems    Diagnosis     **Myalgia         Assessment/Plan:     MRSA bacteremia  Sepsis, POA  -Is positive for MRSA  -ID on board, source is not clear at this time, rule out discitis/osteomyelitis and endocarditis  -MRI of the spine pending, cardiology on board for SILVINA which is likely to be done on Monday  -2D echo ordered as well  -On vancomycin with pharmacy to dose  -Continue close monitoring    SARITA  Hyponatremia  Hypokalemia  Hypocalcemia  -Likely prerenal and due to alcohol abuse and poor p.o. intake  -Sodium being managed per nephrology, salt tabs added  -Replace potassium and calcium and monitor  -Nephrology following    Liver cirrhosis  -Likely alcohol related  -Does not appear to have decompensation at this time  -Continue supportive care and outpatient follow-up    Anemia of chronic disease  -Hemoglobin stable at this time  -Monitor and transfuse as needed    Tobacco abuse  -40-pack-year smoking history  -Counseled on cessation  -Nicotine patch if needed    DVT prophylaxis  -Lovenox      CODE STATUS:    Code Status (Patient has no pulse and is not breathing): CPR (Attempt to Resuscitate)  Medical Interventions (Patient has pulse or is breathing): Full Support      Disposition: Pending clinical course    Electronically signed by Donna Fitzpatrick DO, 03/10/24, 11:48 EDT.  [facility] Hospitalist Team      Part of this note may be an electronic transcription/translation of spoken language to printed text using the Dragon Dictation System.

## 2024-03-10 NOTE — PLAN OF CARE
Goal Outcome Evaluation:              Outcome Evaluation: Patient pleasant. Currently having MRI done. SILVINA tomorrow.

## 2024-03-10 NOTE — PROGRESS NOTES
PROGRESS NOTE      Patient Name: Lita Yu  : 1965  MRN: [id number]  Primary Care Physician: Norma Saul APRN  Date of admission: 3/7/2024    Patient Care Team:  Norma Saul APRN as PCP - General (Nurse Practitioner)  Flory Villanueva MD (Physical Medicine and Rehabilitation)        Subjective   Subjective:   Patient seen and examined this morning, she denies any new complaints.  Review of systems:  All other review of system unremarkable      Allergies:    Allergies   Allergen Reactions   • Sulfa Antibiotics Hives   • Keflex [Cephalexin] Hives       Objective   Exam:     Vital Signs  Temp:  [98 °F (36.7 °C)-98.9 °F (37.2 °C)] 98 °F (36.7 °C)  Heart Rate:  [] 88  Resp:  [14-18] 17  BP: ()/(50-69) 123/69  SpO2:  [88 %-96 %] 95 %  on   ;   Device (Oxygen Therapy): room air  Body mass index is 23.91 kg/m².    General: Middle-age female in no acute distress.    Head:      Normocephalic and atraumatic.    Eyes:      PERRL/EOM intact, conjunctiva and sclera clear with out nystagmus.    Neck:      No masses, thyromegaly,  trachea central with normal respiratory effort   Lungs:    Clear bilaterally to auscultation.    Heart:      Regular rate and rhythm, no murmur no gallop  Abd:        Soft, nontender, not distended, bowel sounds positive, no shifting dullness   Pulses:   Pulses palpable  Extr:        No cyanosis or clubbing--no significant edema.    Neuro:    No focal deficits.   alert oriented x3  Skin:       Intact without lesions or rashes.    Psych:    Alert and cooperative; normal mood and affect; .      Results Review:  I have personally reviewed most recent Data :  CBC    Results from last 7 days   Lab Units 03/10/24  0621 24  2338 24  0752 24  0920 24  0351   WBC 10*3/mm3 6.20 6.50 6.50 8.70 5.80   HEMOGLOBIN g/dL 8.1* 8.1* 9.4* 10.6* 10.1*   PLATELETS 10*3/mm3 83* 64* 70* 76* 125*     CMP   Results from last 7 days   Lab Units  03/10/24  0621 03/09/24  0948 03/08/24  2338 03/08/24  0752 03/07/24  1757 03/07/24  0920 03/04/24  0351   SODIUM mmol/L 129* 120* 124* 128* 127* 127* 135*   POTASSIUM mmol/L 4.3 4.0 3.2* 3.7 3.9 4.3 4.3   CHLORIDE mmol/L 100 92* 93* 95* 97* 92* 103   CO2 mmol/L 18.0* 17.0* 19.0* 20.0* 19.0* 18.0* 22.0   BUN mg/dL 14 14 15 17 18 20 8   CREATININE mg/dL 0.75 0.81 0.82 0.92 0.98 1.22* 0.93   GLUCOSE mg/dL 77 111* 97 108* 86 99 94   ALBUMIN g/dL  --   --  2.8* 3.1*  --  3.8 3.5   BILIRUBIN mg/dL  --   --  0.5 0.5  --  0.7 0.3   ALK PHOS U/L  --   --  108 123*  --  153* 138*   AST (SGOT) U/L  --   --  29 29  --  38* 39*   ALT (SGPT) U/L  --   --  18 21  --  26 35*   AMMONIA umol/L  --   --   --   --   --  16  --      ABG      No radiology results for the last day    Results for orders placed during the hospital encounter of 03/07/24    Adult Transthoracic Echo Complete w/ Color, Spectral and Contrast if Necessary Per Protocol    Interpretation Summary  •  Left ventricular systolic function is normal. Left ventricular ejection fraction appears to be 56 - 60%.  •  Left ventricular diastolic function was normal.  •  Estimated right ventricular systolic pressure from tricuspid regurgitation is normal (<35 mmHg).    Scheduled Meds:busPIRone, 15 mg, Oral, BID  diazePAM, 2 mg, Oral, Once  enoxaparin, 40 mg, Subcutaneous, Daily  folic acid, 1 mg, Oral, Daily  Lidocaine, 1 patch, Transdermal, Daily  methocarbamol, 500 mg, Oral, 4x Daily  multivitamin with minerals, 1 tablet, Oral, Daily  potassium chloride, 20 mEq, Oral, Daily  QUEtiapine, 50 mg, Oral, Nightly  [Held by provider] senna-docusate sodium, 2 tablet, Oral, BID  sertraline, 100 mg, Oral, BID  sodium chloride, 10 mL, Intravenous, Q12H  sodium chloride, 1 g, Oral, TID With Meals  thiamine (B-1) IV, 200 mg, Intravenous, Q8H   Followed by  [START ON 3/13/2024] thiamine, 100 mg, Oral, Daily  thiamine, 100 mg, Oral, Daily  vancomycin, 750 mg, Intravenous,  Q12H      Continuous Infusions:Pharmacy to dose vancomycin,       PRN Meds:•  acetaminophen  •  albuterol  •  [Held by provider] senna-docusate sodium **AND** polyethylene glycol **AND** bisacodyl **AND** bisacodyl  •  Calcium Replacement - Follow Nurse / BPA Driven Protocol  •  HYDROcodone-acetaminophen  •  ketorolac  •  loperamide  •  Magnesium Standard Dose Replacement - Follow Nurse / BPA Driven Protocol  •  melatonin  •  Pharmacy to dose vancomycin  •  Phosphorus Replacement - Follow Nurse / BPA Driven Protocol  •  Potassium Replacement - Follow Nurse / BPA Driven Protocol  •  [COMPLETED] Insert Peripheral IV **AND** sodium chloride  •  sodium chloride  •  sodium chloride    Assessment & Plan   Assessment and Plan:         Myalgia    ASSESSMENT:  Acute kidney injury likely secondary to volume and low blood pressure  Hyponatremia may be secondary to alcohol intake follow-up with a urine studies   Metabolic acidosis may be due to his respiratory alkalosis because of some history of cirrhosis  Hypocalcemia due to poor nutritional status check phosphorus level tomorrow morning check magnesium level tomorrow morning    PLAN :     Serum sodium better  Supplement potassium as needed   serum osmolarity pending  Continue sodium chloride tabs  Renal function has improved  Clinically looks euvolemic, multifactorial hyponatremia, increased ADH, history of EtOH, low solute load  Remains on Zoloft, if possible reduced dose  SILVINA monday    Will continue to follow            Electronically signed by Daiana Cassidy MD,   [facility] kidney consultant  [phone]  3/10/2024  09:38 EDT

## 2024-03-11 LAB
ANION GAP SERPL CALCULATED.3IONS-SCNC: 12 MMOL/L (ref 5–15)
BASOPHILS # BLD AUTO: 0 10*3/MM3 (ref 0–0.2)
BASOPHILS NFR BLD AUTO: 0.3 % (ref 0–1.5)
BUN SERPL-MCNC: 11 MG/DL (ref 6–20)
BUN/CREAT SERPL: 16.7 (ref 7–25)
CALCIUM SPEC-SCNC: 8.2 MG/DL (ref 8.6–10.5)
CHLORIDE SERPL-SCNC: 99 MMOL/L (ref 98–107)
CO2 SERPL-SCNC: 18 MMOL/L (ref 22–29)
CREAT SERPL-MCNC: 0.66 MG/DL (ref 0.57–1)
DEPRECATED RDW RBC AUTO: 66.1 FL (ref 37–54)
EGFRCR SERPLBLD CKD-EPI 2021: 103.1 ML/MIN/1.73
EOSINOPHIL # BLD AUTO: 0.1 10*3/MM3 (ref 0–0.4)
EOSINOPHIL NFR BLD AUTO: 0.9 % (ref 0.3–6.2)
ERYTHROCYTE [DISTWIDTH] IN BLOOD BY AUTOMATED COUNT: 17.8 % (ref 12.3–15.4)
GLUCOSE SERPL-MCNC: 110 MG/DL (ref 65–99)
HCT VFR BLD AUTO: 24.5 % (ref 34–46.6)
HGB BLD-MCNC: 8 G/DL (ref 12–15.9)
LYMPHOCYTES # BLD AUTO: 0.4 10*3/MM3 (ref 0.7–3.1)
LYMPHOCYTES NFR BLD AUTO: 6.6 % (ref 19.6–45.3)
MCH RBC QN AUTO: 34.8 PG (ref 26.6–33)
MCHC RBC AUTO-ENTMCNC: 32.7 G/DL (ref 31.5–35.7)
MCV RBC AUTO: 106.3 FL (ref 79–97)
MONOCYTES # BLD AUTO: 0.5 10*3/MM3 (ref 0.1–0.9)
MONOCYTES NFR BLD AUTO: 8.3 % (ref 5–12)
NEUTROPHILS NFR BLD AUTO: 5.4 10*3/MM3 (ref 1.7–7)
NEUTROPHILS NFR BLD AUTO: 83.9 % (ref 42.7–76)
NRBC BLD AUTO-RTO: 0 /100 WBC (ref 0–0.2)
PLATELET # BLD AUTO: 99 10*3/MM3 (ref 140–450)
PMV BLD AUTO: 8.5 FL (ref 6–12)
POTASSIUM SERPL-SCNC: 3.6 MMOL/L (ref 3.5–5.2)
RBC # BLD AUTO: 2.3 10*6/MM3 (ref 3.77–5.28)
SODIUM SERPL-SCNC: 129 MMOL/L (ref 136–145)
VANCOMYCIN TROUGH SERPL-MCNC: 10.9 MCG/ML (ref 5–20)
WBC NRBC COR # BLD AUTO: 6.4 10*3/MM3 (ref 3.4–10.8)

## 2024-03-11 PROCEDURE — 97535 SELF CARE MNGMENT TRAINING: CPT

## 2024-03-11 PROCEDURE — 25010000002 VANCOMYCIN 1 G RECONSTITUTED SOLUTION 1 EACH VIAL: Performed by: STUDENT IN AN ORGANIZED HEALTH CARE EDUCATION/TRAINING PROGRAM

## 2024-03-11 PROCEDURE — 25010000002 VANCOMYCIN 750 MG RECONSTITUTED SOLUTION 1 EACH VIAL: Performed by: STUDENT IN AN ORGANIZED HEALTH CARE EDUCATION/TRAINING PROGRAM

## 2024-03-11 PROCEDURE — 99232 SBSQ HOSP IP/OBS MODERATE 35: CPT | Performed by: INTERNAL MEDICINE

## 2024-03-11 PROCEDURE — 85025 COMPLETE CBC W/AUTO DIFF WBC: CPT | Performed by: INTERNAL MEDICINE

## 2024-03-11 PROCEDURE — 25810000003 SODIUM CHLORIDE 0.9 % SOLUTION 250 ML FLEX CONT: Performed by: STUDENT IN AN ORGANIZED HEALTH CARE EDUCATION/TRAINING PROGRAM

## 2024-03-11 PROCEDURE — 25010000002 THIAMINE HCL 200 MG/2ML SOLUTION: Performed by: STUDENT IN AN ORGANIZED HEALTH CARE EDUCATION/TRAINING PROGRAM

## 2024-03-11 PROCEDURE — 97110 THERAPEUTIC EXERCISES: CPT

## 2024-03-11 PROCEDURE — 80202 ASSAY OF VANCOMYCIN: CPT | Performed by: NURSE PRACTITIONER

## 2024-03-11 PROCEDURE — 25010000002 ENOXAPARIN PER 10 MG: Performed by: STUDENT IN AN ORGANIZED HEALTH CARE EDUCATION/TRAINING PROGRAM

## 2024-03-11 PROCEDURE — 80048 BASIC METABOLIC PNL TOTAL CA: CPT | Performed by: INTERNAL MEDICINE

## 2024-03-11 PROCEDURE — 99221 1ST HOSP IP/OBS SF/LOW 40: CPT | Performed by: NURSE PRACTITIONER

## 2024-03-11 RX ADMIN — METHOCARBAMOL 500 MG: 500 TABLET ORAL at 21:08

## 2024-03-11 RX ADMIN — THIAMINE HYDROCHLORIDE 200 MG: 100 INJECTION, SOLUTION INTRAMUSCULAR; INTRAVENOUS at 13:13

## 2024-03-11 RX ADMIN — ENOXAPARIN SODIUM 40 MG: 100 INJECTION SUBCUTANEOUS at 16:57

## 2024-03-11 RX ADMIN — LIDOCAINE 1 PATCH: 4 PATCH TOPICAL at 13:15

## 2024-03-11 RX ADMIN — Medication 10 ML: at 21:08

## 2024-03-11 RX ADMIN — HYDROCODONE BITARTRATE AND ACETAMINOPHEN 1 TABLET: 5; 325 TABLET ORAL at 17:25

## 2024-03-11 RX ADMIN — BUSPIRONE HYDROCHLORIDE 15 MG: 15 TABLET ORAL at 21:10

## 2024-03-11 RX ADMIN — METHOCARBAMOL 500 MG: 500 TABLET ORAL at 17:24

## 2024-03-11 RX ADMIN — METHOCARBAMOL 500 MG: 500 TABLET ORAL at 13:13

## 2024-03-11 RX ADMIN — VANCOMYCIN HYDROCHLORIDE 750 MG: 750 INJECTION, POWDER, LYOPHILIZED, FOR SOLUTION INTRAVENOUS at 05:04

## 2024-03-11 RX ADMIN — ACETAMINOPHEN 650 MG: 325 TABLET, FILM COATED ORAL at 21:07

## 2024-03-11 RX ADMIN — Medication 10 ML: at 13:16

## 2024-03-11 RX ADMIN — THIAMINE HYDROCHLORIDE 200 MG: 100 INJECTION, SOLUTION INTRAMUSCULAR; INTRAVENOUS at 05:04

## 2024-03-11 RX ADMIN — VANCOMYCIN HYDROCHLORIDE 1000 MG: 1 INJECTION, POWDER, LYOPHILIZED, FOR SOLUTION INTRAVENOUS at 16:55

## 2024-03-11 RX ADMIN — QUETIAPINE FUMARATE 50 MG: 25 TABLET ORAL at 21:08

## 2024-03-11 RX ADMIN — SODIUM CHLORIDE 1 G: 1 TABLET ORAL at 13:14

## 2024-03-11 RX ADMIN — POTASSIUM CHLORIDE 20 MEQ: 1500 TABLET, EXTENDED RELEASE ORAL at 17:24

## 2024-03-11 RX ADMIN — SERTRALINE 100 MG: 100 TABLET, FILM COATED ORAL at 21:08

## 2024-03-11 RX ADMIN — THIAMINE HYDROCHLORIDE 200 MG: 100 INJECTION, SOLUTION INTRAMUSCULAR; INTRAVENOUS at 21:08

## 2024-03-11 RX ADMIN — SODIUM CHLORIDE 1 G: 1 TABLET ORAL at 17:25

## 2024-03-11 NOTE — PROGRESS NOTES
PROGRESS NOTE      Patient Name: Lita Yu  : 1965  MRN: [id number]  Primary Care Physician: Norma Saul APRN  Date of admission: 3/7/2024    Patient Care Team:  Norma Saul APRN as PCP - General (Nurse Practitioner)  Flory Villanueva MD (Physical Medicine and Rehabilitation)        Subjective   Subjective:   Patient seen and examined this morning, she denies any new complaints.  Review of systems:  All other review of system unremarkable      Allergies:    Allergies   Allergen Reactions   • Sulfa Antibiotics Hives   • Keflex [Cephalexin] Hives       Objective   Exam:     Vital Signs  Temp:  [97.4 °F (36.3 °C)-98.7 °F (37.1 °C)] 98.7 °F (37.1 °C)  Heart Rate:  [86-97] 92  Resp:  [16-20] 16  BP: ()/(53-69) 106/64  SpO2:  [90 %-94 %] 92 %  on   ;   Device (Oxygen Therapy): room air  Body mass index is 23.91 kg/m².    General: Middle-age female in no acute distress.    Head:      Normocephalic and atraumatic.    Eyes:      PERRL/EOM intact, conjunctiva and sclera clear with out nystagmus.    Neck:      No masses, thyromegaly,  trachea central with normal respiratory effort   Lungs:    Clear bilaterally to auscultation.    Heart:      Regular rate and rhythm, no murmur no gallop  Abd:        Soft, nontender, not distended, bowel sounds positive, no shifting dullness   Pulses:   Pulses palpable  Extr:        No cyanosis or clubbing--no significant edema.    Neuro:    No focal deficits.   alert oriented x3  Skin:       Intact without lesions or rashes.    Psych:    Alert and cooperative; normal mood and affect; .      Results Review:  I have personally reviewed most recent Data :  CBC    Results from last 7 days   Lab Units 24  0505 03/10/24  0621 24  2338 24  0752 24  0920   WBC 10*3/mm3 6.40 6.20 6.50 6.50 8.70   HEMOGLOBIN g/dL 8.0* 8.1* 8.1* 9.4* 10.6*   PLATELETS 10*3/mm3 99* 83* 64* 70* 76*     CMP   Results from last 7 days   Lab Units  03/11/24  0505 03/10/24  0621 03/09/24  0948 03/08/24  2338 03/08/24  0752 03/07/24  1757 03/07/24  0920   SODIUM mmol/L 129* 129* 120* 124* 128* 127* 127*   POTASSIUM mmol/L 3.6 4.3 4.0 3.2* 3.7 3.9 4.3   CHLORIDE mmol/L 99 100 92* 93* 95* 97* 92*   CO2 mmol/L 18.0* 18.0* 17.0* 19.0* 20.0* 19.0* 18.0*   BUN mg/dL 11 14 14 15 17 18 20   CREATININE mg/dL 0.66 0.75 0.81 0.82 0.92 0.98 1.22*   GLUCOSE mg/dL 110* 77 111* 97 108* 86 99   ALBUMIN g/dL  --   --   --  2.8* 3.1*  --  3.8   BILIRUBIN mg/dL  --   --   --  0.5 0.5  --  0.7   ALK PHOS U/L  --   --   --  108 123*  --  153*   AST (SGOT) U/L  --   --   --  29 29  --  38*   ALT (SGPT) U/L  --   --   --  18 21  --  26   AMMONIA umol/L  --   --   --   --   --   --  16     ABG      MRI Lumbar Spine With & Without Contrast    Result Date: 3/10/2024  Findings consistent with osteomyelitis/discitis at L2-L3. Electronically Signed: Ted Chand MD  3/10/2024 4:20 PM EDT  Workstation ID: [id number]    MRI Thoracic Spine With & Without Contrast    Result Date: 3/10/2024  Findings consistent with osteomyelitis/discitis at L2-L3. Electronically Signed: Ted Chand MD  3/10/2024 4:20 PM EDT  Workstation ID: [id number]    MRI Cervical Spine With & Without Contrast    Result Date: 3/10/2024  Impression: 1.Reversal of the normal lordosis with multilevel degenerative change. 2.Exam somewhat limited due to motion artifact. 3.There is signal on the postcontrast images around the inferior left scapula that might reflect artifact as opposed to an inflammatory process in this area. Correlation with clinical findings to this area recommended. Electronically Signed: Eyad Smith MD  3/10/2024 4:13 PM EDT  Workstation ID: [id number]     Results for orders placed during the hospital encounter of 03/07/24    Adult Transthoracic Echo Complete w/ Color, Spectral and Contrast if Necessary Per Protocol    Interpretation Summary  •  Left ventricular systolic function is normal. Left ventricular  ejection fraction appears to be 56 - 60%.  •  Left ventricular diastolic function was normal.  •  Estimated right ventricular systolic pressure from tricuspid regurgitation is normal (<35 mmHg).    Scheduled Meds:busPIRone, 15 mg, Oral, BID  enoxaparin, 40 mg, Subcutaneous, Daily  folic acid, 1 mg, Oral, Daily  Lidocaine, 1 patch, Transdermal, Daily  methocarbamol, 500 mg, Oral, 4x Daily  multivitamin with minerals, 1 tablet, Oral, Daily  potassium chloride, 20 mEq, Oral, Daily  QUEtiapine, 50 mg, Oral, Nightly  sertraline, 100 mg, Oral, BID  sodium chloride, 10 mL, Intravenous, Q12H  sodium chloride, 1 g, Oral, TID With Meals  thiamine (B-1) IV, 200 mg, Intravenous, Q8H   Followed by  [START ON 3/13/2024] thiamine, 100 mg, Oral, Daily  thiamine, 100 mg, Oral, Daily  vancomycin, 750 mg, Intravenous, Q12H      Continuous Infusions:Pharmacy to dose vancomycin,       PRN Meds:•  acetaminophen  •  albuterol  •  Calcium Replacement - Follow Nurse / BPA Driven Protocol  •  HYDROcodone-acetaminophen  •  ketorolac  •  loperamide  •  Magnesium Standard Dose Replacement - Follow Nurse / BPA Driven Protocol  •  melatonin  •  Pharmacy to dose vancomycin  •  Phosphorus Replacement - Follow Nurse / BPA Driven Protocol  •  Potassium Replacement - Follow Nurse / BPA Driven Protocol  •  [COMPLETED] Insert Peripheral IV **AND** sodium chloride  •  sodium chloride  •  sodium chloride    Assessment & Plan   Assessment and Plan:         Myalgia    ASSESSMENT:  Acute kidney injury likely secondary to volume and low blood pressure  Hyponatremia may be secondary to alcohol intake follow-up with a urine studies   Metabolic acidosis may be due to his respiratory alkalosis because of some history of cirrhosis  Hypocalcemia due to poor nutritional status check phosphorus level tomorrow morning check magnesium level tomorrow morning    PLAN :     Sodium level is extreme fluctuation still present  Patient need to eat better  Supplement potassium  as needed   Continue sodium chloride pill  Renal function has improved  Clinically looks euvolemic, multifactorial hyponatremia, increased ADH, history of EtOH, low solute load  Remains on Zoloft, if possible reduced dose  SILVINA monday    Will continue to follow            Electronically signed by Red Quiroga MD,   [facility] kidney consultant  [phone]  3/11/2024  12:07 EDT

## 2024-03-11 NOTE — PROGRESS NOTES
Cardiology Progress Note      PATIENT IDENTIFICATION    Name: Lita Yu  Age: 58 y.o. Sex: female : 1965  MRN: [id number]    Requesting Provider    Donna Fitzpatrick DO     LOS: 2 days       Reason For Followup:  Bacteremia      Subjective:    Interval History:  Seen and examined.  Chart and labs reviewed.  Patient continues to deny any chest pain.  No new issues.    Review of Systems:  A complete review of systems was undertaken with the pertinent cardiovascular findings listed in history of present illness and all other systems being negative.     Assessment & Plan    Impressions:  Bacteremia-MRSA    Recommendations:  Plan for SILVINA tomorrow.        Objective:    Medication Review:   Scheduled Meds:busPIRone, 15 mg, Oral, BID  enoxaparin, 40 mg, Subcutaneous, Daily  folic acid, 1 mg, Oral, Daily  Lidocaine, 1 patch, Transdermal, Daily  methocarbamol, 500 mg, Oral, 4x Daily  multivitamin with minerals, 1 tablet, Oral, Daily  potassium chloride, 20 mEq, Oral, Daily  QUEtiapine, 50 mg, Oral, Nightly  sertraline, 100 mg, Oral, BID  sodium chloride, 10 mL, Intravenous, Q12H  sodium chloride, 1 g, Oral, TID With Meals  thiamine (B-1) IV, 200 mg, Intravenous, Q8H   Followed by  [START ON 3/13/2024] thiamine, 100 mg, Oral, Daily  thiamine, 100 mg, Oral, Daily  vancomycin, 750 mg, Intravenous, Q12H      Continuous Infusions:Pharmacy to dose vancomycin,       PRN Meds:.  acetaminophen    albuterol    Calcium Replacement - Follow Nurse / BPA Driven Protocol    HYDROcodone-acetaminophen    ketorolac    loperamide    Magnesium Standard Dose Replacement - Follow Nurse / BPA Driven Protocol    melatonin    Pharmacy to dose vancomycin    Phosphorus Replacement - Follow Nurse / BPA Driven Protocol    Potassium Replacement - Follow Nurse / BPA Driven Protocol    [COMPLETED] Insert Peripheral IV **AND** sodium chloride    sodium chloride    sodium chloride      Myalgia         Physical Exam:    General: Alert,  "cooperative, no distress, appears stated age  Head:  Normocephalic, atraumatic, mucous membranes moist  Eyes:  Conjunctivae/corneas clear, EOMs intact     Neck:  Supple,  no bruit  Lungs:  Clear to auscultation bilaterally, no wheezes, rhonchi or rales are noted  Chest wall: No tenderness  Heart::  Regular rate and rhythm, S1 and S2 normal, 1/6 holosystolic murmur.  No rub or gallop  Abdomen: Soft, nontender, nondistended, bowel sounds active  Extremities: No cyanosis, clubbing, or edema  Pulses: 2+ and symmetric all extremities  Skin:  No rashes or lesions  Neuro/psych: A&O x3. CN II through XII are grossly intact with appropriate affect    Vital Signs:  Vitals:    03/10/24 0735 03/10/24 1115 03/10/24 1639 03/10/24 1915   BP:  120/64 119/69 111/54   BP Location:  Right arm Right arm    Patient Position:  Lying Lying    Pulse:  95 95 97   Resp: 17 25 20 18   Temp: 98 °F (36.7 °C) 98.2 °F (36.8 °C) 98 °F (36.7 °C) 98.6 °F (37 °C)   TempSrc: Oral Oral Oral Oral   SpO2:   94% 94%   Weight:       Height:         Wt Readings from Last 1 Encounters:   03/09/24 61.2 kg (135 lb)       Intake/Output Summary (Last 24 hours) at 3/10/2024 2126  Last data filed at 3/10/2024 0802  Gross per 24 hour   Intake 240 ml   Output --   Net 240 ml         Results Review:     CBC    Results from last 7 days   Lab Units 03/10/24  0621 03/08/24  2338 03/08/24  0752 03/07/24  0920 03/04/24  0351   WBC 10*3/mm3 6.20 6.50 6.50 8.70 5.80   HEMOGLOBIN g/dL 8.1* 8.1* 9.4* 10.6* 10.1*   PLATELETS 10*3/mm3 83* 64* 70* 76* 125*     Cr Clearance Estimated Creatinine Clearance: 80.9 mL/min (by C-G formula based on SCr of 0.75 mg/dL).  Coag   Results from last 7 days   Lab Units 03/07/24  0920   INR  1.00   APTT seconds 33.4*     HbA1C   Lab Results   Component Value Date    HGBA1C 5.00 06/12/2023     Blood Glucose No results found for: \"POCGLU\"  Infection   Results from last 7 days   Lab Units 03/08/24  1643 03/07/24  0933 03/07/24  0920   BLOODCX  No " "growth at 2 days Staphylococcus aureus, MRSA* Staphylococcus aureus, MRSA*   BCIDPCR   --   --  Staph aureus. mecA/C and MREJ (methicillin resistance gene) detected. Identification by BCID2 PCR.*     CMP   Results from last 7 days   Lab Units 03/10/24  0621 03/09/24  0948 03/08/24  2338 03/08/24  0752 03/07/24  1757 03/07/24  0920 03/04/24  0351   SODIUM mmol/L 129* 120* 124* 128* 127* 127* 135*   POTASSIUM mmol/L 4.3 4.0 3.2* 3.7 3.9 4.3 4.3   CHLORIDE mmol/L 100 92* 93* 95* 97* 92* 103   CO2 mmol/L 18.0* 17.0* 19.0* 20.0* 19.0* 18.0* 22.0   BUN mg/dL 14 14 15 17 18 20 8   CREATININE mg/dL 0.75 0.81 0.82 0.92 0.98 1.22* 0.93   GLUCOSE mg/dL 77 111* 97 108* 86 99 94   ALBUMIN g/dL  --   --  2.8* 3.1*  --  3.8 3.5   BILIRUBIN mg/dL  --   --  0.5 0.5  --  0.7 0.3   ALK PHOS U/L  --   --  108 123*  --  153* 138*   AST (SGOT) U/L  --   --  29 29  --  38* 39*   ALT (SGPT) U/L  --   --  18 21  --  26 35*   AMMONIA umol/L  --   --   --   --   --  16  --      ABG      UA    Results from last 7 days   Lab Units 03/07/24  0932   NITRITE UA  Negative     ABDI  No results found for: \"POCMETH\", \"POCAMPHET\", \"POCBARBITUR\", \"POCBENZO\", \"POCCOCAINE\", \"POCOPIATES\", \"POCOXYCODO\", \"POCPHENCYC\", \"POCPROPOXY\", \"POCTHC\", \"POCTRICYC\"  Lysis Labs   Results from last 7 days   Lab Units 03/10/24  0621 03/09/24  0948 03/08/24  2338 03/08/24  0752 03/07/24  1757 03/07/24  0920 03/04/24  0351   INR   --   --   --   --   --  1.00  --    APTT seconds  --   --   --   --   --  33.4*  --    HEMOGLOBIN g/dL 8.1*  --  8.1* 9.4*  --  10.6* 10.1*   PLATELETS 10*3/mm3 83*  --  64* 70*  --  76* 125*   CREATININE mg/dL 0.75 0.81 0.82 0.92 0.98 1.22* 0.93     Radiology(recent) MRI Lumbar Spine With & Without Contrast    Result Date: 3/10/2024  Findings consistent with osteomyelitis/discitis at L2-L3. Electronically Signed: Ted Chand MD  3/10/2024 4:20 PM EDT  Workstation ID: [id number]    MRI Thoracic Spine With & Without Contrast    Result Date: " 3/10/2024  Findings consistent with osteomyelitis/discitis at L2-L3. Electronically Signed: Ted Chand MD  3/10/2024 4:20 PM EDT  Workstation ID: [id number]    MRI Cervical Spine With & Without Contrast    Result Date: 3/10/2024  Impression: 1.Reversal of the normal lordosis with multilevel degenerative change. 2.Exam somewhat limited due to motion artifact. 3.There is signal on the postcontrast images around the inferior left scapula that might reflect artifact as opposed to an inflammatory process in this area. Correlation with clinical findings to this area recommended. Electronically Signed: Eyad Smith MD  3/10/2024 4:13 PM EDT  Workstation ID: [id number]       Results from last 7 days   Lab Units 03/07/24  0920   CK TOTAL U/L 74       Imaging Results (Last 24 Hours)       Procedure Component Value Units Date/Time    MRI Lumbar Spine With & Without Contrast [[id number]] Collected: 03/10/24 1614     Updated: 03/10/24 1622    Narrative:      MRI THORACIC SPINE W WO CONTRAST, MRI LUMBAR SPINE W WO CONTRAST    Date of Exam: 3/10/2024 2:36 PM EDT    Indication: MRSA bacteremia to rule out discitis/osteomyelitis.     Comparison: None available.    Technique:  Routine multiplanar/multisequence sequence images of the thoracic and lumbar spines were obtained before and after the uneventful administration of 12 cc Prohance.      Findings: Thoracic vertebral body height and alignment are normal. The intervertebral disc spaces are maintained. Heterogeneous appearance of the bone marrow likely due to demineralization. No marrow edema. No cord signal abnormalities of the thoracic   spine. No abnormal enhancement of the thoracic spine. No significant spinal canal narrowing of the thoracic spine.    Lumbar vertebral body height and alignment are normal. Disc space narrowing and intervertebral disc fluid at L2-L3. Extradural fluid noted at L1-L2 tending craniocaudally over 3.7 cm x 0.6 cm AP. This fluid also extends prevertebral  measuring 3.8 cm   craniocaudal by 0.7 cm. There is surrounding edema. There is extradural enhancement as well as enhancement along the disc space and prevertebral soft tissues. Finding is concerning for discitis/osteomyelitis at L2-L3. The conus medullaris terminates at   the L1 vertebral body level. No cord signal abnormalities. Background heterogeneous appearance of the bone marrow likely due to demineralization.    T12-L1: No significant spinal canal or foraminal narrowing.    L1-L2: No significant spinal canal or foraminal narrowing.    L2-L3: Enhancing epidural fluid resulting in moderate canal stenosis with likely severe left foraminal narrowing and mild right foraminal narrowing.    L3-L4: No significant spinal canal or foraminal narrowing.    L4-L5: No significant spinal canal or foraminal narrowing.    L5-S1: No significant spinal canal or foraminal narrowing.      Impression:      Findings consistent with osteomyelitis/discitis at L2-L3.    Electronically Signed: Ted Chand MD    3/10/2024 4:20 PM EDT    Workstation ID: [id number]    MRI Thoracic Spine With & Without Contrast [[id number]] Collected: 03/10/24 1614     Updated: 03/10/24 1622    Narrative:      MRI THORACIC SPINE W WO CONTRAST, MRI LUMBAR SPINE W WO CONTRAST    Date of Exam: 3/10/2024 2:36 PM EDT    Indication: MRSA bacteremia to rule out discitis/osteomyelitis.     Comparison: None available.    Technique:  Routine multiplanar/multisequence sequence images of the thoracic and lumbar spines were obtained before and after the uneventful administration of 12 cc Prohance.      Findings: Thoracic vertebral body height and alignment are normal. The intervertebral disc spaces are maintained. Heterogeneous appearance of the bone marrow likely due to demineralization. No marrow edema. No cord signal abnormalities of the thoracic   spine. No abnormal enhancement of the thoracic spine. No significant spinal canal narrowing of the thoracic  spine.    Lumbar vertebral body height and alignment are normal. Disc space narrowing and intervertebral disc fluid at L2-L3. Extradural fluid noted at L1-L2 tending craniocaudally over 3.7 cm x 0.6 cm AP. This fluid also extends prevertebral measuring 3.8 cm   craniocaudal by 0.7 cm. There is surrounding edema. There is extradural enhancement as well as enhancement along the disc space and prevertebral soft tissues. Finding is concerning for discitis/osteomyelitis at L2-L3. The conus medullaris terminates at   the L1 vertebral body level. No cord signal abnormalities. Background heterogeneous appearance of the bone marrow likely due to demineralization.    T12-L1: No significant spinal canal or foraminal narrowing.    L1-L2: No significant spinal canal or foraminal narrowing.    L2-L3: Enhancing epidural fluid resulting in moderate canal stenosis with likely severe left foraminal narrowing and mild right foraminal narrowing.    L3-L4: No significant spinal canal or foraminal narrowing.    L4-L5: No significant spinal canal or foraminal narrowing.    L5-S1: No significant spinal canal or foraminal narrowing.      Impression:      Findings consistent with osteomyelitis/discitis at L2-L3.    Electronically Signed: Ted Chand MD    3/10/2024 4:20 PM EDT    Workstation ID: [id number]    MRI Cervical Spine With & Without Contrast [[id number]] Collected: 03/10/24 1603     Updated: 03/10/24 1616    Narrative:      MRI CERVICAL SPINE W WO CONTRAST    Date of Exam: 3/10/2024 2:33 PM EDT    Indication: MRSA bacteremia to rule out discitis/osteomyelitis.     Comparison: October 15, 2022    Technique:  Routine multiplanar/multisequence sequence images of the cervical spine were obtained before and after the uneventful administration of Prohance.        Findings:  There is a reversal of the normal lordosis. The signal within the marrow of the visualized cervical vertebra is similar to the prior study. There are degenerative  endplate changes scattered throughout the cervical spine. There is no intrinsic cord   abnormality definitively confirmed.    Exam is somewhat limited by motion artifact. Protocols were optimized for speed due to patient pain.    C2-3: No definite disc herniation. The intervertebral foramina seem patent.    C3-4: There is some uncovertebral arthropathy on the right with moderate narrowing of the right intervertebral foramina. There is bilateral facet arthropathy.    C4-5: There is facet arthropathy bilaterally. There is mild narrowing of the left intervertebral foramina and more moderate narrowing of the right intervertebral foramina. There is some uncovertebral arthropathy.    C5-6: There is more of a broad-based osseous bar superimposed on some uncovertebral arthropathy. There is moderate narrowing of the right intervertebral foramina and mild to moderate narrowing of the left intervertebral foramina. There is bilateral facet   arthropathy.    C6-7: There is more of an osseous bar. There is bilateral facet arthropathy. It looks like there may be moderate narrowing of both intervertebral foramina.    C7-T1: There is some spurring about the disc space. It looks like there is probably some narrowing of both vertebral foramina.    Postcontrast images reveal no definite abnormal enhancement in the cervical area. There is some signal on the postcontrast images around the inferior left scapula. Whether this may relate to some artifact as there is some susceptibility artifact around   this area or could be secondary to some inflammation is uncertain and should be correlated with clinical findings.        Impression:      Impression:  1.Reversal of the normal lordosis with multilevel degenerative change.  2.Exam somewhat limited due to motion artifact.  3.There is signal on the postcontrast images around the inferior left scapula that might reflect artifact as opposed to an inflammatory process in this area. Correlation  with clinical findings to this area recommended.        Electronically Signed: Eyad Smith MD    3/10/2024 4:13 PM EDT    Workstation ID: [id number]            Cardiac Studies:  Echo- Results for orders placed during the hospital encounter of 03/07/24    Adult Transthoracic Echo Complete w/ Color, Spectral and Contrast if Necessary Per Protocol    Interpretation Summary    Left ventricular systolic function is normal. Left ventricular ejection fraction appears to be 56 - 60%.    Left ventricular diastolic function was normal.    Estimated right ventricular systolic pressure from tricuspid regurgitation is normal (<35 mmHg).    Stress Myoview-  Cath-        Christian Gilbert DO  03/10/24  21:26 EDT    Copied information has been reviewed and is current as of 03/10/24

## 2024-03-11 NOTE — CONSULTS
[facility]   Consult Note    Patient Name: Lita Yu  : 1965  MRN: [id number]  Primary Care Physician:  Norma Saul APRN  Referring Physician: SHARIF Sanchez  Date of admission: 3/7/2024    Inpatient Neurosurgery Consult  Consult performed by: Lucretia Correa APRN  Consult ordered by: Sarah Ross APRN      Inpatient Spine Surgery Consult  Consult performed by: Lucretia Correa APRN  Consult ordered by: Pilar Foster MD        Subjective   Subjective     Reason for Consult/ Chief Complaint: Back pain/discitis/osteomyelitis and generalized weakness and muscle aches.  Patient has previous history of rhabdo myelitis and was hospitalized in February for dehydration.  Weakness - Generalized  Associated symptoms include weakness.     Lita Yu is a 58 y.o. female that presented to [facility] emergency department originally on 3/7/2024 with complaints of ankle pain general myalgias. She is status post hip replacement.  Patient positive for MRSA and has been started on IV antibiotics.  She has history of liver disease and history of alcohol abuse.  Given her back pain and bacteremia, MRIs of her cervical, thoracic and lumbar spine were obtained demonstrating osteomyelitis/discitis L2-L3 with small epidural fluid collection.  Neurosurgery was consulted for further evaluation/evaluation and offer recommendations.  Patient is awake alert lying in bed.  She is moving all extremities well.  She complains of generalized muscle pain all over as well as a component of back pain.  She is reporting that she is having trouble standing and thinks it may be due to her leg pain.  She is not complaining of any thoracic or neck pain.  She is complaining of urinary and bowel incontinence x 2 days.      Review of Systems   Musculoskeletal:  Positive for back pain and gait problem.   Neurological:  Positive for weakness.   All other systems reviewed and are negative.        Personal History     Past Medical History:   Diagnosis Date    Cirrhosis     COPD (chronic obstructive pulmonary disease)     Depression     GERD (gastroesophageal reflux disease)     Hyperlipidemia     Hypertension     PTSD (post-traumatic stress disorder)        Past Surgical History:   Procedure Laterality Date     SECTION N/A     COLONOSCOPY N/A 2021    Procedure: COLONOSCOPY with polypectomy x2 and random biopsy;  Surgeon: Osman Clay MD;  Location: [facility] ENDOSCOPY;  Service: Gastroenterology;  Laterality: N/A;  colon polyps    DENTAL PROCEDURE      ENDOSCOPY N/A 2021    Procedure: ESOPHAGOGASTRODUODENOSCOPY;  Surgeon: Osman Clay MD;  Location: [facility] ENDOSCOPY;  Service: Gastroenterology;  Laterality: N/A;  esophagitis    JOINT REPLACEMENT      REPLACEMENT TOTAL HIP LATERAL POSITION Left     TONSILLECTOMY      VAGINAL BIRTH AFTER  SECTION         Family History: family history includes Alcohol abuse in her mother and paternal grandfather. Otherwise pertinent FHx was reviewed and not pertinent to current issue.    Social History:  reports that she has been smoking cigarettes. She has been exposed to tobacco smoke. She has never used smokeless tobacco. She reports current alcohol use of about 2.0 standard drinks of alcohol per week. She reports that she does not use drugs.    Home Medications:   QUEtiapine, Vitamin B-12, Zinc Gluconate, albuterol sulfate HFA, busPIRone, diclofenac, folic acid, furosemide, lidocaine, melatonin, potassium chloride, sertraline, spironolactone, and thiamine    Allergies:  Allergies   Allergen Reactions    Sulfa Antibiotics Hives    Keflex [Cephalexin] Hives       Objective    Objective     Vitals:  Temp:  [97.4 °F (36.3 °C)-98.7 °F (37.1 °C)] 98.3 °F (36.8 °C)  Heart Rate:  [86-97] 91  Resp:  [16-20] 18  BP: ()/(53-69) 113/55    Physical Exam  Alert and oriented by 3  Speech is intact and coherent articulate with good content and  production  Cranial nerves III through XII are grossly intact with pupils symmetric and reactive and no gaze paresis or nystagmus  Sensation is intact to soft touch  both upper and lower extremities  Proprioception is intact in both upper and lower extremities symmetric  Motor strength is 5/5 in both upper and lower extremities with no focal motor deficits  Reflexes are 3+ in  lower extremities   \No dysmetria or dysdiadochokinesia    Result Review    Result Review:  I have personally reviewed the results from the time of this admission to 3/11/2024 15:02 EDT and agree with these findings:  []  Laboratory list / accordion  []  Microbiology  [x]  Radiology  []  EKG/Telemetry   []  Cardiology/Vascular   []  Pathology  []  Old records  []  Other:  Most notable findings include: MRI cervical, thoracic, lumbar imaging demonstrates no significant canal stenosis, cord compression or cord signal abnormality.  There is some hyperintensity along L2-L3 concerning for discitis/osteomyelitis with very small epidural fluid collection noted.  This results in moderate canal stenosis.      Assessment & Plan   Assessment / Plan     Brief Patient Summary:  Lita Yu is a 58 y.o. female with bacteremia and evidence for discitis/osteomyelitis lumbar spine along with small epidural fluid collection.  This is not resulting in any additional severe compressive pathology.  No other significant compressive etiology seen along spinal cord or cauda equina to explain her incontinence issues.  No neurosurgical intervention planned at this time.  Would recommend aspiration per IR.  neurosurgery recommends continue with medical management with IV antimicrobial as per ID.      Active Hospital Problems:  Active Hospital Problems    Diagnosis     **Myalgia      Plan:     Discitis/osteomyelitis/epidural fluid collection    - Medical management with antimicrobial therapy  - Please call for any questions or concerns    EtOH abuse    -  Recommend CIWA protocol per primary    I discussed findings with Dr. Tanner who agrees with plan of care.          Lucretia Correa, DNP , APRN

## 2024-03-11 NOTE — THERAPY TREATMENT NOTE
"Subjective: Pt agreeable to therapeutic plan of care.  Cognition: oriented to Person  Pt initially disoriented to place and situation, stating she was \"going to get dressed to go...\" Orients self without cuing, \"I'm in the hospital, I don't know what I was thinking. I got confused.\"  Objective:     Bed Mobility: Mod-A   Functional Transfers: Max-A and N/A or Not attempted.     Balance: unsupported sitting SBA  Functional Ambulation: N/A or Not attempted.    Grooming: Min-A  ADL Position: unsupported sitting  ADL Comments: EOB    Therapeutic Exercise - OT provided and reviewed Cardiac Rehab HEP. Pt completes 1 set x 5reps of each exercise with fair understanding, requiring modification for below shoulder-level activity. Encouraged to complete 3x/daily to facilitate increased activity tolerance. Pt will require additional education to ensure carryover.     Vitals: WNL    Pain: 8 VAS  Location: abdomen/back, states NSG has just medicated her  Interventions for pain: Repositioned  Education: Provided education on the importance of mobility in the acute care setting and ADL training    Assessment: Lita Yu presents with ADL impairments affecting function including balance, endurance / activity tolerance, pain, postural / trunk control, and strength. Pt initially confused, but orients self without cuing utilizing contextual clues. Agreeable to EOB sitting, requiring Mod A for bed mobility. Completes brief stand to reposition in bed, and declines OOB activity at this time due to 8/10 abd pain. Completes UB exercises, though limited by pain and requires modification of exercises.  Demonstrated functioning below baseline abilities indicate the need for continued skilled intervention while inpatient. Tolerating session today without incident. Will continue to follow and progress as tolerated.     Plan/Recommendations:   Moderate Intensity Therapy recommended post-acute care. This is recommended as therapy feels " "the patient would require 3-4 days per week and wouldn't tolerate \"3 hour daily\" rehab intensity. SNF would be the preferred choice. If the patient does not agree to SNF, arrange HH or OP depending on home bound status. If patient is medically complex, consider LTACH.. Pt requires no DME at discharge.     Pt desires Home at discharge. Pt cooperative; agreeable to therapeutic recommendations and plan of care.     Post-Tx Position: Supine with HOB Elevated, Alarms activated, and Call light and personal items within reach  PPE: gloves and gown    "

## 2024-03-11 NOTE — DISCHARGE PLACEMENT REQUEST
"Yonis Yu \"NAINA\" (58 y.o. Female)       Date of Birth   1965    Social Security Number       Address   [address] [place] [address] [address] [place] [facility]    Home Phone   [phone]    MRN   [id number]       Episcopalian   None    Marital Status                               Admission Date   3/7/24    Admission Type   Emergency    Admitting Provider   Edgar Oliva MD    Attending Provider   Donna Fitzpatrick DO    Department, Room/Bed   [facility] 3A MEDICAL INPATIENT, 309/1       Discharge Date       Discharge Disposition       Discharge Destination                                 Attending Provider: Donna Fitzpatrick DO    Allergies: Sulfa Antibiotics, Keflex [Cephalexin]    Isolation: Spore, Contact   Infection: MRSA No Isolation this Admit (03/08/24), C.difficile (rule out) (03/08/24), MRSA (03/09/24)   Code Status: CPR    Ht: 160 cm (63\")   Wt: 61.2 kg (135 lb)    Admission Cmt: None   Principal Problem: Myalgia [M79.10]                   Active Insurance as of 3/7/2024       Primary Coverage       Payor Plan Insurance Group Employer/Plan Group    [facility] BLUE [id number]       Payor Plan Address Payor Plan Phone Number Payor Plan Fax Number Effective Dates    [address]   6/22/2014 - None Entered    [facility] [id number]         Subscriber Name Subscriber Birth Date Member ID       YONIS YU 1965 [id number]                     Emergency Contacts        (Rel.) Home Phone Work Phone Mobile Phone    OBDULIA CANTU (Daughter) -- -- [phone]                "

## 2024-03-11 NOTE — PROGRESS NOTES
Cardiology Progress Note      PATIENT IDENTIFICATION    Name: Lita Yu  Age: 58 y.o. Sex: female : 1965  MRN: [id number]    Requesting Provider    Donna Fitzpatrick DO     LOS: 3 days         Cardiology assessment and plan    MRSA bacteremia  Lumbar discitis and osteomyelitis  History of liver cirrhosis  History of hemochromatosis  Plans to proceed with a SILVINA tomorrow  Risk benefits and alternatives reviewed and discussed with patient  SILVINA was not able to be done today secondary to patient not kept her n.p.o. status  Further recommendations based on the results of the SILVINA          Reason For Followup:  Bacteremia      Subjective:    Patient denies any SOA or CP.  She c/o weakness.  She admits to eating some lunch and a gingerale today despite strict NPO orders.    Interval History:  Seen and examined.  Chart and labs reviewed.  Patient continues to deny any chest pain.  No new issues.    Review of Systems:  A complete review of systems was undertaken with the pertinent cardiovascular findings listed in history of present illness and all other systems being negative.     Assessment & Plan    Impressions:  Bacteremia-MRSA    Recommendations:  Plan for SILVINA tomorrow.    Patient was not kept strict NPO.  Will cancel SILVINA today and try to reschedule tomorrow.      Addendum: patient is scheduled for 1pm tomorrow.      Objective:    Medication Review:   Scheduled Meds:busPIRone, 15 mg, Oral, BID  enoxaparin, 40 mg, Subcutaneous, Daily  folic acid, 1 mg, Oral, Daily  Lidocaine, 1 patch, Transdermal, Daily  methocarbamol, 500 mg, Oral, 4x Daily  multivitamin with minerals, 1 tablet, Oral, Daily  potassium chloride, 20 mEq, Oral, Daily  QUEtiapine, 50 mg, Oral, Nightly  sertraline, 100 mg, Oral, BID  sodium chloride, 10 mL, Intravenous, Q12H  sodium chloride, 1 g, Oral, TID With Meals  thiamine (B-1) IV, 200 mg, Intravenous, Q8H   Followed by  [START ON 3/13/2024] thiamine, 100 mg, Oral, Daily  thiamine, 100  "mg, Oral, Daily  vancomycin, 750 mg, Intravenous, Q12H      Continuous Infusions:Pharmacy to dose vancomycin,       PRN Meds:.  acetaminophen    albuterol    Calcium Replacement - Follow Nurse / BPA Driven Protocol    HYDROcodone-acetaminophen    ketorolac    loperamide    Magnesium Standard Dose Replacement - Follow Nurse / BPA Driven Protocol    melatonin    Pharmacy to dose vancomycin    Phosphorus Replacement - Follow Nurse / BPA Driven Protocol    Potassium Replacement - Follow Nurse / BPA Driven Protocol    [COMPLETED] Insert Peripheral IV **AND** sodium chloride    sodium chloride    sodium chloride      Myalgia         Physical Exam:    Neuro: AAOx3, no gross deficits  CV: S1S2 RRR, no murmur  Resp: non-labored, CTA  GI: abd soft, BS+  Ext: pedal pulses palp, no edema  Tele: SR    Vital Signs:  Vitals:    03/10/24 1915 03/10/24 2313 03/11/24 0453 03/11/24 0841   BP: 111/54 94/53 97/56 106/64   BP Location:  Right arm Left arm Left arm   Patient Position:  Lying Lying Lying   Pulse: 97 86 92 92   Resp: 18 18 18 16   Temp: 98.6 °F (37 °C) 97.4 °F (36.3 °C) 97.8 °F (36.6 °C) 98.7 °F (37.1 °C)   TempSrc: Oral Oral Oral Oral   SpO2: 94% 90% 90% 92%   Weight:       Height:         Wt Readings from Last 1 Encounters:   03/09/24 61.2 kg (135 lb)     No intake or output data in the 24 hours ending 03/11/24 1204        Results Review:     CBC    Results from last 7 days   Lab Units 03/11/24  0505 03/10/24  0621 03/08/24  2338 03/08/24  0752 03/07/24  0920   WBC 10*3/mm3 6.40 6.20 6.50 6.50 8.70   HEMOGLOBIN g/dL 8.0* 8.1* 8.1* 9.4* 10.6*   PLATELETS 10*3/mm3 99* 83* 64* 70* 76*     Cr Clearance Estimated Creatinine Clearance: 92 mL/min (by C-G formula based on SCr of 0.66 mg/dL).  Coag   Results from last 7 days   Lab Units 03/07/24  0920   INR  1.00   APTT seconds 33.4*     HbA1C   Lab Results   Component Value Date    HGBA1C 5.00 06/12/2023     Blood Glucose No results found for: \"POCGLU\"  Infection   Results from " "last 7 days   Lab Units 03/08/24  1643 03/07/24  0933 03/07/24  0920   BLOODCX  No growth at 2 days Staphylococcus aureus, MRSA* Staphylococcus aureus, MRSA*   BCIDPCR   --   --  Staph aureus. mecA/C and MREJ (methicillin resistance gene) detected. Identification by BCID2 PCR.*     CMP   Results from last 7 days   Lab Units 03/11/24  0505 03/10/24  0621 03/09/24  0948 03/08/24  2338 03/08/24  0752 03/07/24  1757 03/07/24  0920   SODIUM mmol/L 129* 129* 120* 124* 128* 127* 127*   POTASSIUM mmol/L 3.6 4.3 4.0 3.2* 3.7 3.9 4.3   CHLORIDE mmol/L 99 100 92* 93* 95* 97* 92*   CO2 mmol/L 18.0* 18.0* 17.0* 19.0* 20.0* 19.0* 18.0*   BUN mg/dL 11 14 14 15 17 18 20   CREATININE mg/dL 0.66 0.75 0.81 0.82 0.92 0.98 1.22*   GLUCOSE mg/dL 110* 77 111* 97 108* 86 99   ALBUMIN g/dL  --   --   --  2.8* 3.1*  --  3.8   BILIRUBIN mg/dL  --   --   --  0.5 0.5  --  0.7   ALK PHOS U/L  --   --   --  108 123*  --  153*   AST (SGOT) U/L  --   --   --  29 29  --  38*   ALT (SGPT) U/L  --   --   --  18 21  --  26   AMMONIA umol/L  --   --   --   --   --   --  16     ABG      UA    Results from last 7 days   Lab Units 03/07/24  0932   NITRITE UA  Negative     ABDI  No results found for: \"POCMETH\", \"POCAMPHET\", \"POCBARBITUR\", \"POCBENZO\", \"POCCOCAINE\", \"POCOPIATES\", \"POCOXYCODO\", \"POCPHENCYC\", \"POCPROPOXY\", \"POCTHC\", \"POCTRICYC\"  Lysis Labs   Results from last 7 days   Lab Units 03/11/24  0505 03/10/24  0621 03/09/24  0948 03/08/24  2338 03/08/24  0752 03/07/24  1757 03/07/24  0920   INR   --   --   --   --   --   --  1.00   APTT seconds  --   --   --   --   --   --  33.4*   HEMOGLOBIN g/dL 8.0* 8.1*  --  8.1* 9.4*  --  10.6*   PLATELETS 10*3/mm3 99* 83*  --  64* 70*  --  76*   CREATININE mg/dL 0.66 0.75 0.81 0.82 0.92 0.98 1.22*     Radiology(recent) MRI Lumbar Spine With & Without Contrast    Result Date: 3/10/2024  Findings consistent with osteomyelitis/discitis at L2-L3. Electronically Signed: Ted Chand MD  3/10/2024 4:20 PM EDT  " Workstation ID: [id number]    MRI Thoracic Spine With & Without Contrast    Result Date: 3/10/2024  Findings consistent with osteomyelitis/discitis at L2-L3. Electronically Signed: Ted Chand MD  3/10/2024 4:20 PM EDT  Workstation ID: [id number]    MRI Cervical Spine With & Without Contrast    Result Date: 3/10/2024  Impression: 1.Reversal of the normal lordosis with multilevel degenerative change. 2.Exam somewhat limited due to motion artifact. 3.There is signal on the postcontrast images around the inferior left scapula that might reflect artifact as opposed to an inflammatory process in this area. Correlation with clinical findings to this area recommended. Electronically Signed: Eyad Smith MD  3/10/2024 4:13 PM EDT  Workstation ID: [id number]       Results from last 7 days   Lab Units 03/07/24  0920   CK TOTAL U/L 74       Imaging Results (Last 24 Hours)       Procedure Component Value Units Date/Time    MRI Lumbar Spine With & Without Contrast [[id number]] Collected: 03/10/24 1614     Updated: 03/10/24 1622    Narrative:      MRI THORACIC SPINE W WO CONTRAST, MRI LUMBAR SPINE W WO CONTRAST    Date of Exam: 3/10/2024 2:36 PM EDT    Indication: MRSA bacteremia to rule out discitis/osteomyelitis.     Comparison: None available.    Technique:  Routine multiplanar/multisequence sequence images of the thoracic and lumbar spines were obtained before and after the uneventful administration of 12 cc Prohance.      Findings: Thoracic vertebral body height and alignment are normal. The intervertebral disc spaces are maintained. Heterogeneous appearance of the bone marrow likely due to demineralization. No marrow edema. No cord signal abnormalities of the thoracic   spine. No abnormal enhancement of the thoracic spine. No significant spinal canal narrowing of the thoracic spine.    Lumbar vertebral body height and alignment are normal. Disc space narrowing and intervertebral disc fluid at L2-L3. Extradural fluid noted at  L1-L2 tending craniocaudally over 3.7 cm x 0.6 cm AP. This fluid also extends prevertebral measuring 3.8 cm   craniocaudal by 0.7 cm. There is surrounding edema. There is extradural enhancement as well as enhancement along the disc space and prevertebral soft tissues. Finding is concerning for discitis/osteomyelitis at L2-L3. The conus medullaris terminates at   the L1 vertebral body level. No cord signal abnormalities. Background heterogeneous appearance of the bone marrow likely due to demineralization.    T12-L1: No significant spinal canal or foraminal narrowing.    L1-L2: No significant spinal canal or foraminal narrowing.    L2-L3: Enhancing epidural fluid resulting in moderate canal stenosis with likely severe left foraminal narrowing and mild right foraminal narrowing.    L3-L4: No significant spinal canal or foraminal narrowing.    L4-L5: No significant spinal canal or foraminal narrowing.    L5-S1: No significant spinal canal or foraminal narrowing.      Impression:      Findings consistent with osteomyelitis/discitis at L2-L3.    Electronically Signed: Ted Chand MD    3/10/2024 4:20 PM EDT    Workstation ID: [id number]    MRI Thoracic Spine With & Without Contrast [[id number]] Collected: 03/10/24 1614     Updated: 03/10/24 1622    Narrative:      MRI THORACIC SPINE W WO CONTRAST, MRI LUMBAR SPINE W WO CONTRAST    Date of Exam: 3/10/2024 2:36 PM EDT    Indication: MRSA bacteremia to rule out discitis/osteomyelitis.     Comparison: None available.    Technique:  Routine multiplanar/multisequence sequence images of the thoracic and lumbar spines were obtained before and after the uneventful administration of 12 cc Prohance.      Findings: Thoracic vertebral body height and alignment are normal. The intervertebral disc spaces are maintained. Heterogeneous appearance of the bone marrow likely due to demineralization. No marrow edema. No cord signal abnormalities of the thoracic   spine. No abnormal enhancement  of the thoracic spine. No significant spinal canal narrowing of the thoracic spine.    Lumbar vertebral body height and alignment are normal. Disc space narrowing and intervertebral disc fluid at L2-L3. Extradural fluid noted at L1-L2 tending craniocaudally over 3.7 cm x 0.6 cm AP. This fluid also extends prevertebral measuring 3.8 cm   craniocaudal by 0.7 cm. There is surrounding edema. There is extradural enhancement as well as enhancement along the disc space and prevertebral soft tissues. Finding is concerning for discitis/osteomyelitis at L2-L3. The conus medullaris terminates at   the L1 vertebral body level. No cord signal abnormalities. Background heterogeneous appearance of the bone marrow likely due to demineralization.    T12-L1: No significant spinal canal or foraminal narrowing.    L1-L2: No significant spinal canal or foraminal narrowing.    L2-L3: Enhancing epidural fluid resulting in moderate canal stenosis with likely severe left foraminal narrowing and mild right foraminal narrowing.    L3-L4: No significant spinal canal or foraminal narrowing.    L4-L5: No significant spinal canal or foraminal narrowing.    L5-S1: No significant spinal canal or foraminal narrowing.      Impression:      Findings consistent with osteomyelitis/discitis at L2-L3.    Electronically Signed: Ted Chand MD    3/10/2024 4:20 PM EDT    Workstation ID: [id number]    MRI Cervical Spine With & Without Contrast [[id number]] Collected: 03/10/24 1603     Updated: 03/10/24 1616    Narrative:      MRI CERVICAL SPINE W WO CONTRAST    Date of Exam: 3/10/2024 2:33 PM EDT    Indication: MRSA bacteremia to rule out discitis/osteomyelitis.     Comparison: October 15, 2022    Technique:  Routine multiplanar/multisequence sequence images of the cervical spine were obtained before and after the uneventful administration of Prohance.        Findings:  There is a reversal of the normal lordosis. The signal within the marrow of the visualized  cervical vertebra is similar to the prior study. There are degenerative endplate changes scattered throughout the cervical spine. There is no intrinsic cord   abnormality definitively confirmed.    Exam is somewhat limited by motion artifact. Protocols were optimized for speed due to patient pain.    C2-3: No definite disc herniation. The intervertebral foramina seem patent.    C3-4: There is some uncovertebral arthropathy on the right with moderate narrowing of the right intervertebral foramina. There is bilateral facet arthropathy.    C4-5: There is facet arthropathy bilaterally. There is mild narrowing of the left intervertebral foramina and more moderate narrowing of the right intervertebral foramina. There is some uncovertebral arthropathy.    C5-6: There is more of a broad-based osseous bar superimposed on some uncovertebral arthropathy. There is moderate narrowing of the right intervertebral foramina and mild to moderate narrowing of the left intervertebral foramina. There is bilateral facet   arthropathy.    C6-7: There is more of an osseous bar. There is bilateral facet arthropathy. It looks like there may be moderate narrowing of both intervertebral foramina.    C7-T1: There is some spurring about the disc space. It looks like there is probably some narrowing of both vertebral foramina.    Postcontrast images reveal no definite abnormal enhancement in the cervical area. There is some signal on the postcontrast images around the inferior left scapula. Whether this may relate to some artifact as there is some susceptibility artifact around   this area or could be secondary to some inflammation is uncertain and should be correlated with clinical findings.        Impression:      Impression:  1.Reversal of the normal lordosis with multilevel degenerative change.  2.Exam somewhat limited due to motion artifact.  3.There is signal on the postcontrast images around the inferior left scapula that might reflect  artifact as opposed to an inflammatory process in this area. Correlation with clinical findings to this area recommended.        Electronically Signed: Eyad Smith MD    3/10/2024 4:13 PM EDT    Workstation ID: [id number]            Cardiac Studies:  Echo- Results for orders placed during the hospital encounter of 03/07/24    Adult Transthoracic Echo Complete w/ Color, Spectral and Contrast if Necessary Per Protocol    Interpretation Summary    Left ventricular systolic function is normal. Left ventricular ejection fraction appears to be 56 - 60%.    Left ventricular diastolic function was normal.    Estimated right ventricular systolic pressure from tricuspid regurgitation is normal (<35 mmHg).    Stress Myoview-  Cath-      Electronically signed by SHARIF Cedillo, 03/11/24, 12:07 PM EDT.     Copied information has been reviewed and is current as of 03/11/24

## 2024-03-11 NOTE — PLAN OF CARE
Lita Yu presents with ADL impairments affecting function including balance, endurance / activity tolerance, pain, postural / trunk control, and strength. Pt initially confused, but orients self without cuing utilizing contextual clues. Agreeable to EOB sitting, requiring Mod A for bed mobility. Completes brief stand to reposition in bed, and declines OOB activity at this time due to 8/10 abd pain. Completes UB exercises, though limited by pain and requires modification of exercises.  Demonstrated functioning below baseline abilities indicate the need for continued skilled intervention while inpatient. Tolerating session today without incident. Will continue to follow and progress as tolerated.

## 2024-03-11 NOTE — PROGRESS NOTES
Hospitalist Service   Daily Progress Note      Patient Name: Lita Yu  : 1965  MRN: [id number]  Primary Care Physician:  Norma Saul APRN  Date of admission: 3/7/2024      Subjective      Chief Complaint: Generalized weakness    Patient seen and examined this morning.  Feeling better this morning, still with back pain but controlled for now.  MRI results discussed with her, neurosurgery evaluation pending today.  Possible SILVINA as well today per cardiology.  No other complaints.    Pertinent positives as noted in HPI/subjective.  All other systems were reviewed and are negative.      Objective      Vitals:   Temp:  [97.4 °F (36.3 °C)-98.7 °F (37.1 °C)] 98.7 °F (37.1 °C)  Heart Rate:  [86-97] 92  Resp:  [16-25] 16  BP: ()/(53-69) 106/64    Physical Exam:    General: Awake, alert, chronically ill-appearing female, lying in bed, NAD  Eyes: PERRL, EOMI, conjunctivae are clear  Cardiovascular: Regular rate and rhythm, no murmurs  Respiratory: Clear to auscultation bilaterally, no wheezing or rales, unlabored breathing  Abdomen: Soft, nontender, positive bowel sounds, no guarding  Neurologic: A&O, CN grossly intact, moves all extremities spontaneously  Musculoskeletal: Generalized weakness, no other gross deformities  Skin: Warm, dry         Result Review    Result Review:  I have personally reviewed the results from the time of this admission to 3/11/2024 10:03 EDT and agree with these findings:  [x]  Laboratory  [x]  Microbiology  []  Radiology  []  EKG/Telemetry   []  Cardiology/Vascular   []  Pathology  []  Old records  []  Other:    Wounds (Last 24 Hours)       LDA Wound       Row Name               Wound Bilateral perineum MASD (Moisture associated skin damage)    Wound - Properties Group Present on Original Admission: Y  -DI Side: Bilateral  -DI Location: perineum  -DI Primary Wound Type: MASD  -DI    Retired Wound - Properties Group Present on Original Admission: Y  -DI Side:  Bilateral  -DI Location: perineum  -DI Primary Wound Type: MASD  -DI    Retired Wound - Properties Group Present on Original Admission: Y  -DI Side: Bilateral  -DI Location: perineum  -DI Primary Wound Type: MASD  -DI              User Key  (r) = Recorded By, (t) = Taken By, (c) = Cosigned By      Initials Name Provider Type    Antonella Mulligan RN Registered Nurse                      Assessment & Plan      Brief Patient Summary:  Lita Yu is a 58 y.o. 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  Noted to have hyponatremia, hypocalcemia, hypokalemia, and SARITA on admission.  Nephrology consulted for further management.  Blood cultures collected on admission positive for MRSA, ID consulted for further evaluation.      busPIRone, 15 mg, Oral, BID  enoxaparin, 40 mg, Subcutaneous, Daily  folic acid, 1 mg, Oral, Daily  Lidocaine, 1 patch, Transdermal, Daily  methocarbamol, 500 mg, Oral, 4x Daily  multivitamin with minerals, 1 tablet, Oral, Daily  potassium chloride, 20 mEq, Oral, Daily  QUEtiapine, 50 mg, Oral, Nightly  sertraline, 100 mg, Oral, BID  sodium chloride, 10 mL, Intravenous, Q12H  sodium chloride, 1 g, Oral, TID With Meals  thiamine (B-1) IV, 200 mg, Intravenous, Q8H   Followed by  [START ON 3/13/2024] thiamine, 100 mg, Oral, Daily  thiamine, 100 mg, Oral, Daily  vancomycin, 750 mg, Intravenous, Q12H       Pharmacy to dose vancomycin,          I have utilized all available, immediate resources to obtain, update, or review the patient's current medications including all prescriptions, over-the-counter products, herbals, cannabis/cannabidiol products, and vitamin.mineral/dietary (nutritional) supplements.    Active Hospital Problems:  Active Hospital Problems    Diagnosis     **Myalgia        Assessment/Plan:     MRSA  bacteremia  L2-L3 discitis/osteomyelitis  Sepsis, POA  -Blood cultures positive for MRSA, likely source is lumbar discitis/osteomyelitis  -MRI spine findings noted to have L2-L3 discitis/osteomyelitis  -Continue IV vancomycin, ID following  -Cardiology on board for SILVINA  -Neurosurgery consulted    SARITA  Hyponatremia  Hypokalemia  Hypocalcemia  -Likely prerenal and due to alcohol abuse and poor p.o. intake  -Sodium being managed per nephrology, salt tabs added  -Replace potassium and calcium and monitor  -Nephrology following    Liver cirrhosis  -Likely alcohol related  -Does not appear to have decompensation at this time  -Continue supportive care and outpatient follow-up    Anemia of chronic disease  -Hemoglobin stable at this time  -Monitor and transfuse as needed    Tobacco abuse  -40-pack-year smoking history  -Counseled on cessation  -Nicotine patch if needed    DVT prophylaxis  -Lovenox      CODE STATUS:    Code Status (Patient has no pulse and is not breathing): CPR (Attempt to Resuscitate)  Medical Interventions (Patient has pulse or is breathing): Full Support      Disposition: Pending clinical course    Electronically signed by Donna Fitzpatrick DO, 03/11/24, 10:03 EDT.  [facility] Hospitalist Team      Part of this note may be an electronic transcription/translation of spoken language to printed text using the Dragon Dictation System.

## 2024-03-11 NOTE — PLAN OF CARE
Goal Outcome Evaluation:         Neuro surgery was consult this shift. SILVINA unable to be done today due to patient drinking flluids. Patient aware of NPO orders at midnight. Call light within reach.

## 2024-03-11 NOTE — PROGRESS NOTES
Infectious Diseases Progress Note      LOS: 3 days   Patient Care Team:  Norma Saul APRN as PCP - General (Nurse Practitioner)  Flory Villanueva MD (Physical Medicine and Rehabilitation)    Chief Complaint: Back pain, fever    Subjective       The patient has been afebrile for the last 24 hours.  The patient is on room air, hemodynamically stable, and is tolerating antimicrobial therapy..  Patient still having severe lower back pain and leg pain.  Unfortunately the patient ate today so the SILVINA will be postponed      Review of Systems:   Review of Systems   Constitutional: Negative.    HENT: Negative.     Eyes: Negative.    Respiratory: Negative.     Cardiovascular: Negative.    Gastrointestinal: Negative.    Endocrine: Negative.    Genitourinary: Negative.    Musculoskeletal:  Positive for back pain and neck pain.        Bilateral leg pain   Skin: Negative.    Neurological: Negative.    Psychiatric/Behavioral: Negative.     All other systems reviewed and are negative.       Objective     Vital Signs  Temp:  [97.4 °F (36.3 °C)-98.7 °F (37.1 °C)] 98.3 °F (36.8 °C)  Heart Rate:  [86-97] 91  Resp:  [16-20] 18  BP: ()/(53-69) 113/55    Physical Exam:  Physical Exam  Vitals and nursing note reviewed.   Constitutional:       General: She is not in acute distress.     Appearance: She is well-developed and normal weight. She is ill-appearing. She is not diaphoretic.   HENT:      Head: Normocephalic and atraumatic.   Eyes:      General: No scleral icterus.     Extraocular Movements: Extraocular movements intact.      Conjunctiva/sclera: Conjunctivae normal.      Pupils: Pupils are equal, round, and reactive to light.   Cardiovascular:      Rate and Rhythm: Normal rate and regular rhythm.      Heart sounds: Normal heart sounds, S1 normal and S2 normal. No murmur heard.  Pulmonary:      Effort: Pulmonary effort is normal. No respiratory distress.      Breath sounds: Normal breath sounds. No stridor. No wheezing  or rales.   Chest:      Chest wall: No tenderness.   Abdominal:      General: Bowel sounds are normal. There is no distension.      Palpations: Abdomen is soft. There is no mass.      Tenderness: There is no abdominal tenderness. There is no guarding.   Musculoskeletal:         General: No swelling, tenderness or deformity. Normal range of motion.      Cervical back: Neck supple.      Comments: Tenderness with the movement of all joints including hips and shoulders but there is no obvious joint effusion     Tenderness at the lower back and the upper back      Skin:     General: Skin is warm and dry.      Coloration: Skin is not pale.      Findings: No bruising, erythema or rash.   Neurological:      General: No focal deficit present.      Mental Status: She is alert and oriented to person, place, and time. Mental status is at baseline.      Cranial Nerves: No cranial nerve deficit.   Psychiatric:         Mood and Affect: Mood normal.          Results Review:    I have reviewed all clinical data, test, lab, and imaging results.     Radiology  MRI Lumbar Spine With & Without Contrast    Result Date: 3/10/2024  MRI THORACIC SPINE W WO CONTRAST, MRI LUMBAR SPINE W WO CONTRAST Date of Exam: 3/10/2024 2:36 PM EDT Indication: MRSA bacteremia to rule out discitis/osteomyelitis.  Comparison: None available. Technique:  Routine multiplanar/multisequence sequence images of the thoracic and lumbar spines were obtained before and after the uneventful administration of 12 cc Prohance.  Findings: Thoracic vertebral body height and alignment are normal. The intervertebral disc spaces are maintained. Heterogeneous appearance of the bone marrow likely due to demineralization. No marrow edema. No cord signal abnormalities of the thoracic spine. No abnormal enhancement of the thoracic spine. No significant spinal canal narrowing of the thoracic spine. Lumbar vertebral body height and alignment are normal. Disc space narrowing and  intervertebral disc fluid at L2-L3. Extradural fluid noted at L1-L2 tending craniocaudally over 3.7 cm x 0.6 cm AP. This fluid also extends prevertebral measuring 3.8 cm craniocaudal by 0.7 cm. There is surrounding edema. There is extradural enhancement as well as enhancement along the disc space and prevertebral soft tissues. Finding is concerning for discitis/osteomyelitis at L2-L3. The conus medullaris terminates at the L1 vertebral body level. No cord signal abnormalities. Background heterogeneous appearance of the bone marrow likely due to demineralization. T12-L1: No significant spinal canal or foraminal narrowing. L1-L2: No significant spinal canal or foraminal narrowing. L2-L3: Enhancing epidural fluid resulting in moderate canal stenosis with likely severe left foraminal narrowing and mild right foraminal narrowing. L3-L4: No significant spinal canal or foraminal narrowing. L4-L5: No significant spinal canal or foraminal narrowing. L5-S1: No significant spinal canal or foraminal narrowing.     Findings consistent with osteomyelitis/discitis at L2-L3. Electronically Signed: Ted Chand MD  3/10/2024 4:20 PM EDT  Workstation ID: [id number]    MRI Thoracic Spine With & Without Contrast    Result Date: 3/10/2024  MRI THORACIC SPINE W WO CONTRAST, MRI LUMBAR SPINE W WO CONTRAST Date of Exam: 3/10/2024 2:36 PM EDT Indication: MRSA bacteremia to rule out discitis/osteomyelitis.  Comparison: None available. Technique:  Routine multiplanar/multisequence sequence images of the thoracic and lumbar spines were obtained before and after the uneventful administration of 12 cc Prohance.  Findings: Thoracic vertebral body height and alignment are normal. The intervertebral disc spaces are maintained. Heterogeneous appearance of the bone marrow likely due to demineralization. No marrow edema. No cord signal abnormalities of the thoracic spine. No abnormal enhancement of the thoracic spine. No significant spinal canal  narrowing of the thoracic spine. Lumbar vertebral body height and alignment are normal. Disc space narrowing and intervertebral disc fluid at L2-L3. Extradural fluid noted at L1-L2 tending craniocaudally over 3.7 cm x 0.6 cm AP. This fluid also extends prevertebral measuring 3.8 cm craniocaudal by 0.7 cm. There is surrounding edema. There is extradural enhancement as well as enhancement along the disc space and prevertebral soft tissues. Finding is concerning for discitis/osteomyelitis at L2-L3. The conus medullaris terminates at the L1 vertebral body level. No cord signal abnormalities. Background heterogeneous appearance of the bone marrow likely due to demineralization. T12-L1: No significant spinal canal or foraminal narrowing. L1-L2: No significant spinal canal or foraminal narrowing. L2-L3: Enhancing epidural fluid resulting in moderate canal stenosis with likely severe left foraminal narrowing and mild right foraminal narrowing. L3-L4: No significant spinal canal or foraminal narrowing. L4-L5: No significant spinal canal or foraminal narrowing. L5-S1: No significant spinal canal or foraminal narrowing.     Findings consistent with osteomyelitis/discitis at L2-L3. Electronically Signed: Ted Chand MD  3/10/2024 4:20 PM EDT  Workstation ID: [id number]    MRI Cervical Spine With & Without Contrast    Result Date: 3/10/2024  MRI CERVICAL SPINE W WO CONTRAST Date of Exam: 3/10/2024 2:33 PM EDT Indication: MRSA bacteremia to rule out discitis/osteomyelitis.  Comparison: October 15, 2022 Technique:  Routine multiplanar/multisequence sequence images of the cervical spine were obtained before and after the uneventful administration of Prohance.  Findings: There is a reversal of the normal lordosis. The signal within the marrow of the visualized cervical vertebra is similar to the prior study. There are degenerative endplate changes scattered throughout the cervical spine. There is no intrinsic cord abnormality  definitively confirmed. Exam is somewhat limited by motion artifact. Protocols were optimized for speed due to patient pain. C2-3: No definite disc herniation. The intervertebral foramina seem patent. C3-4: There is some uncovertebral arthropathy on the right with moderate narrowing of the right intervertebral foramina. There is bilateral facet arthropathy. C4-5: There is facet arthropathy bilaterally. There is mild narrowing of the left intervertebral foramina and more moderate narrowing of the right intervertebral foramina. There is some uncovertebral arthropathy. C5-6: There is more of a broad-based osseous bar superimposed on some uncovertebral arthropathy. There is moderate narrowing of the right intervertebral foramina and mild to moderate narrowing of the left intervertebral foramina. There is bilateral facet  arthropathy. C6-7: There is more of an osseous bar. There is bilateral facet arthropathy. It looks like there may be moderate narrowing of both intervertebral foramina. C7-T1: There is some spurring about the disc space. It looks like there is probably some narrowing of both vertebral foramina. Postcontrast images reveal no definite abnormal enhancement in the cervical area. There is some signal on the postcontrast images around the inferior left scapula. Whether this may relate to some artifact as there is some susceptibility artifact around this area or could be secondary to some inflammation is uncertain and should be correlated with clinical findings.     Impression: 1.Reversal of the normal lordosis with multilevel degenerative change. 2.Exam somewhat limited due to motion artifact. 3.There is signal on the postcontrast images around the inferior left scapula that might reflect artifact as opposed to an inflammatory process in this area. Correlation with clinical findings to this area recommended. Electronically Signed: Eyad Smith MD  3/10/2024 4:13 PM EDT  Workstation ID: [id number]      Cardiology    Laboratory    Results from last 7 days   Lab Units 03/11/24  0505 03/10/24  0621 03/08/24  2338 03/08/24  0752 03/07/24  0920   WBC 10*3/mm3 6.40 6.20 6.50 6.50 8.70   HEMOGLOBIN g/dL 8.0* 8.1* 8.1* 9.4* 10.6*   HEMATOCRIT % 24.5* 25.4* 25.2* 29.0* 32.0*   PLATELETS 10*3/mm3 99* 83* 64* 70* 76*     Results from last 7 days   Lab Units 03/11/24  0505 03/10/24  0621 03/09/24  0948 03/08/24  2338 03/08/24 0752 03/07/24  1757 03/07/24  0920   SODIUM mmol/L 129* 129* 120* 124* 128* 127* 127*   POTASSIUM mmol/L 3.6 4.3 4.0 3.2* 3.7 3.9 4.3   CHLORIDE mmol/L 99 100 92* 93* 95* 97* 92*   CO2 mmol/L 18.0* 18.0* 17.0* 19.0* 20.0* 19.0* 18.0*   BUN mg/dL 11 14 14 15 17 18 20   CREATININE mg/dL 0.66 0.75 0.81 0.82 0.92 0.98 1.22*   GLUCOSE mg/dL 110* 77 111* 97 108* 86 99   ALBUMIN g/dL  --   --   --  2.8* 3.1*  --  3.8   BILIRUBIN mg/dL  --   --   --  0.5 0.5  --  0.7   ALK PHOS U/L  --   --   --  108 123*  --  153*   AST (SGOT) U/L  --   --   --  29 29  --  38*   ALT (SGPT) U/L  --   --   --  18 21  --  26   AMMONIA umol/L  --   --   --   --   --   --  16   CALCIUM mg/dL 8.2* 8.2* 8.1* 8.2* 8.3* 7.8* 9.2     Results from last 7 days   Lab Units 03/07/24  0920   CK TOTAL U/L 74     Results from last 7 days   Lab Units 03/08/24  2338   SED RATE mm/hr 59*         Microbiology   Microbiology Results (last 10 days)       Procedure Component Value - Date/Time    Clostridioides difficile EIA - Stool, Per Rectum [[id number]]  (Normal) Collected: 03/08/24 2327    Lab Status: Final result Specimen: Stool from Per Rectum Updated: 03/09/24 0745     C Diff GDH Ag Negative     C.diff Toxin Ag Negative    Narrative:      The result indicates the absence of toxigenic C.difficile from stool specimen.    Blood Culture - Blood, Arm, Right [[id number]]  (Normal) Collected: 03/08/24 1643    Lab Status: Preliminary result Specimen: Blood from Arm, Right Updated: 03/10/24 1815     Blood Culture No growth at 2 days    COVID-19, FLU  A/B, RSV PCR 1 HR TAT - Swab, Nasopharynx [[id number]]  (Normal) Collected: 03/07/24 1526    Lab Status: Final result Specimen: Swab from Nasopharynx Updated: 03/07/24 1607     COVID19 Not Detected     Influenza A PCR Not Detected     Influenza B PCR Not Detected     RSV, PCR Not Detected    Narrative:      Fact sheet for providers: https://www.fda.gov/media/110736/download    Fact sheet for patients: https://www.fda.gov/media/796357/download    Test performed by PCR.    Blood Culture - Blood, Arm, Left [[id number]]  (Abnormal) Collected: 03/07/24 0933    Lab Status: Final result Specimen: Blood from Arm, Left Updated: 03/10/24 0657     Blood Culture Staphylococcus aureus, MRSA     Comment:   Infectious disease consultation is highly recommended to rule out distant foci of infection.  Methicillin resistant Staphylococcus aureus, Patient may be an isolation risk.        Isolated from Aerobic and Anaerobic Bottles     Gram Stain Aerobic Bottle Gram positive cocci in clusters      Anaerobic Bottle Gram positive cocci in clusters    Narrative:      Refer to previous blood culture collected on  03/07/2024 at 0920 for MICs.    Blood Culture - Blood, Arm, Right [[id number]]  (Abnormal)  (Susceptibility) Collected: 03/07/24 0920    Lab Status: Final result Specimen: Blood from Arm, Right Updated: 03/10/24 0657     Blood Culture Staphylococcus aureus, MRSA     Comment:   Infectious disease consultation is highly recommended to rule out distant foci of infection.  Methicillin resistant Staphylococcus aureus, Patient may be an isolation risk.        Isolated from Aerobic and Anaerobic Bottles     Gram Stain Anaerobic Bottle Gram positive cocci in clusters      Aerobic Bottle Gram positive cocci in clusters    Susceptibility        Staphylococcus aureus, MRSA      SOFIA      Gentamicin Susceptible      Oxacillin Resistant      Rifampin Susceptible      Vancomycin Susceptible                           Blood Culture ID, PCR - Blood,  Arm, Right [[id number]]  (Abnormal) Collected: 03/07/24 0920    Lab Status: Final result Specimen: Blood from Arm, Right Updated: 03/08/24 0433     BCID, PCR Staph aureus. mecA/C and MREJ (methicillin resistance gene) detected. Identification by BCID2 PCR.     BOTTLE TYPE Anaerobic Bottle    Narrative:      Infectious disease consultation is highly recommended to rule out distant foci of infection.            Medication Review:       Schedule Meds  busPIRone, 15 mg, Oral, BID  enoxaparin, 40 mg, Subcutaneous, Daily  folic acid, 1 mg, Oral, Daily  Lidocaine, 1 patch, Transdermal, Daily  methocarbamol, 500 mg, Oral, 4x Daily  multivitamin with minerals, 1 tablet, Oral, Daily  potassium chloride, 20 mEq, Oral, Daily  QUEtiapine, 50 mg, Oral, Nightly  sertraline, 100 mg, Oral, BID  sodium chloride, 10 mL, Intravenous, Q12H  sodium chloride, 1 g, Oral, TID With Meals  thiamine (B-1) IV, 200 mg, Intravenous, Q8H   Followed by  [START ON 3/13/2024] thiamine, 100 mg, Oral, Daily  thiamine, 100 mg, Oral, Daily  vancomycin, 1,000 mg, Intravenous, Q12H        Infusion Meds  Pharmacy to dose vancomycin,         PRN Meds    acetaminophen    albuterol    Calcium Replacement - Follow Nurse / BPA Driven Protocol    HYDROcodone-acetaminophen    ketorolac    loperamide    Magnesium Standard Dose Replacement - Follow Nurse / BPA Driven Protocol    melatonin    Pharmacy to dose vancomycin    Phosphorus Replacement - Follow Nurse / BPA Driven Protocol    Potassium Replacement - Follow Nurse / BPA Driven Protocol    [COMPLETED] Insert Peripheral IV **AND** sodium chloride    sodium chloride    sodium chloride        Assessment & Plan       Antimicrobial Therapy   1.  IV vancomycin        2.        3.        4.        5.          Assessment     Methicillin resistant Staphylococcus aureus bacteremia.  Likely source is lumbar discitis/osteomyelitis.  Endocarditis also needs to be ruled out.  Patient denies using IV drugs.  Repeat blood  culture from 3/8/2024 is negative so far.  2D echo did not show any vegetation    L2-L3 discitis/osteomyelitis.  Patient continues to have fairly severe back pain with bilateral leg pain MRI of the lumbar spine showed enhancement of the epidural fluid with moderate canal stenosis.  Neurosurgery did not feel that patient was a surgical candidate at this time     History of liver cirrhosis secondary to alcohol and hemochromatosis     History of alcohol abuse and tobacco abuse     S/p left total hip replacement secondary to hip fracture in 2017 or 2018.  Patient denied having infection after the procedure    Reported diarrhea at admission.  C. difficile screen was negative     Plan     Continue IV vancomycin waiting on final blood culture susceptibilities and try to keep vancomycin trough between 15 and 20  Patient will need at least 6 weeks of IV antimicrobial therapy  Recommend repeat MRI of the lumbar spine after antimicrobial therapy is completed  SILVINA now scheduled for Tuesday  Continue supportive care  A.m. labs  Patient seen and discussed with cardiology NP at bedside    SHARIF Mae  03/11/24  15:41 EDT    Note is dictated utilizing voice recognition software/Dragon

## 2024-03-11 NOTE — PLAN OF CARE
Goal Outcome Evaluation:      Pt with complaints of general pain earlier in shift, see MAR. Vital signs stable. Call light within reach. Plan of care ongoing.

## 2024-03-12 ENCOUNTER — APPOINTMENT (OUTPATIENT)
Dept: CARDIOLOGY | Facility: HOSPITAL | Age: 59
DRG: 872 | End: 2024-03-12
Payer: COMMERCIAL

## 2024-03-12 ENCOUNTER — ANESTHESIA EVENT (OUTPATIENT)
Dept: CARDIOLOGY | Facility: HOSPITAL | Age: 59
End: 2024-03-12
Payer: COMMERCIAL

## 2024-03-12 ENCOUNTER — ANESTHESIA (OUTPATIENT)
Dept: CARDIOLOGY | Facility: HOSPITAL | Age: 59
End: 2024-03-12
Payer: COMMERCIAL

## 2024-03-12 LAB
ANION GAP SERPL CALCULATED.3IONS-SCNC: 10 MMOL/L (ref 5–15)
ANION GAP SERPL CALCULATED.3IONS-SCNC: 13 MMOL/L (ref 5–15)
ANISOCYTOSIS BLD QL: ABNORMAL
BASOPHILS # BLD AUTO: 0 10*3/MM3 (ref 0–0.2)
BASOPHILS NFR BLD AUTO: 0.2 % (ref 0–1.5)
BUN SERPL-MCNC: 11 MG/DL (ref 6–20)
BUN SERPL-MCNC: 11 MG/DL (ref 6–20)
BUN/CREAT SERPL: 19 (ref 7–25)
BUN/CREAT SERPL: 19 (ref 7–25)
CALCIUM SPEC-SCNC: 8.2 MG/DL (ref 8.6–10.5)
CALCIUM SPEC-SCNC: 8.2 MG/DL (ref 8.6–10.5)
CHLORIDE SERPL-SCNC: 102 MMOL/L (ref 98–107)
CHLORIDE SERPL-SCNC: 105 MMOL/L (ref 98–107)
CO2 SERPL-SCNC: 17 MMOL/L (ref 22–29)
CO2 SERPL-SCNC: 19 MMOL/L (ref 22–29)
CREAT SERPL-MCNC: 0.58 MG/DL (ref 0.57–1)
CREAT SERPL-MCNC: 0.58 MG/DL (ref 0.57–1)
DEPRECATED RDW RBC AUTO: 65.2 FL (ref 37–54)
DEPRECATED RDW RBC AUTO: 66.5 FL (ref 37–54)
EGFRCR SERPLBLD CKD-EPI 2021: 106.4 ML/MIN/1.73
EGFRCR SERPLBLD CKD-EPI 2021: 106.4 ML/MIN/1.73
EOSINOPHIL # BLD AUTO: 0.1 10*3/MM3 (ref 0–0.4)
EOSINOPHIL # BLD MANUAL: 0.09 10*3/MM3 (ref 0–0.4)
EOSINOPHIL NFR BLD AUTO: 1.3 % (ref 0.3–6.2)
EOSINOPHIL NFR BLD MANUAL: 1 % (ref 0.3–6.2)
ERYTHROCYTE [DISTWIDTH] IN BLOOD BY AUTOMATED COUNT: 17.6 % (ref 12.3–15.4)
ERYTHROCYTE [DISTWIDTH] IN BLOOD BY AUTOMATED COUNT: 17.7 % (ref 12.3–15.4)
GLUCOSE SERPL-MCNC: 112 MG/DL (ref 65–99)
GLUCOSE SERPL-MCNC: 79 MG/DL (ref 65–99)
HCT VFR BLD AUTO: 24 % (ref 34–46.6)
HCT VFR BLD AUTO: 24.8 % (ref 34–46.6)
HGB BLD-MCNC: 7.8 G/DL (ref 12–15.9)
HGB BLD-MCNC: 7.9 G/DL (ref 12–15.9)
LYMPHOCYTES # BLD AUTO: 0.5 10*3/MM3 (ref 0.7–3.1)
LYMPHOCYTES # BLD MANUAL: 0.69 10*3/MM3 (ref 0.7–3.1)
LYMPHOCYTES NFR BLD AUTO: 7.5 % (ref 19.6–45.3)
LYMPHOCYTES NFR BLD MANUAL: 9 % (ref 5–12)
MACROCYTES BLD QL SMEAR: ABNORMAL
MCH RBC QN AUTO: 34.1 PG (ref 26.6–33)
MCH RBC QN AUTO: 34.6 PG (ref 26.6–33)
MCHC RBC AUTO-ENTMCNC: 31.8 G/DL (ref 31.5–35.7)
MCHC RBC AUTO-ENTMCNC: 32.5 G/DL (ref 31.5–35.7)
MCV RBC AUTO: 106.3 FL (ref 79–97)
MCV RBC AUTO: 107.4 FL (ref 79–97)
MONOCYTES # BLD AUTO: 0.7 10*3/MM3 (ref 0.1–0.9)
MONOCYTES # BLD: 0.77 10*3/MM3 (ref 0.1–0.9)
MONOCYTES NFR BLD AUTO: 10.2 % (ref 5–12)
NEUTROPHILS # BLD AUTO: 7.05 10*3/MM3 (ref 1.7–7)
NEUTROPHILS NFR BLD AUTO: 5.5 10*3/MM3 (ref 1.7–7)
NEUTROPHILS NFR BLD AUTO: 80.8 % (ref 42.7–76)
NEUTROPHILS NFR BLD MANUAL: 76 % (ref 42.7–76)
NEUTS BAND NFR BLD MANUAL: 6 % (ref 0–5)
NRBC BLD AUTO-RTO: 0 /100 WBC (ref 0–0.2)
PLAT MORPH BLD: NORMAL
PLATELET # BLD AUTO: 113 10*3/MM3 (ref 140–450)
PLATELET # BLD AUTO: 142 10*3/MM3 (ref 140–450)
PMV BLD AUTO: 8.4 FL (ref 6–12)
PMV BLD AUTO: 8.7 FL (ref 6–12)
POTASSIUM SERPL-SCNC: 3.6 MMOL/L (ref 3.5–5.2)
POTASSIUM SERPL-SCNC: 3.7 MMOL/L (ref 3.5–5.2)
RBC # BLD AUTO: 2.25 10*6/MM3 (ref 3.77–5.28)
RBC # BLD AUTO: 2.31 10*6/MM3 (ref 3.77–5.28)
SCAN SLIDE: NORMAL
SODIUM SERPL-SCNC: 131 MMOL/L (ref 136–145)
SODIUM SERPL-SCNC: 135 MMOL/L (ref 136–145)
TOXIC GRANULATION: ABNORMAL
VARIANT LYMPHS NFR BLD MANUAL: 8 % (ref 19.6–45.3)
WBC NRBC COR # BLD AUTO: 6.8 10*3/MM3 (ref 3.4–10.8)
WBC NRBC COR # BLD AUTO: 8.6 10*3/MM3 (ref 3.4–10.8)

## 2024-03-12 PROCEDURE — 25810000003 SODIUM CHLORIDE 0.9 % SOLUTION 250 ML FLEX CONT: Performed by: STUDENT IN AN ORGANIZED HEALTH CARE EDUCATION/TRAINING PROGRAM

## 2024-03-12 PROCEDURE — 25810000003 SODIUM CHLORIDE 0.9 % SOLUTION: Performed by: NURSE ANESTHETIST, CERTIFIED REGISTERED

## 2024-03-12 PROCEDURE — 25010000002 ENOXAPARIN PER 10 MG: Performed by: STUDENT IN AN ORGANIZED HEALTH CARE EDUCATION/TRAINING PROGRAM

## 2024-03-12 PROCEDURE — 25010000002 PROPOFOL 200 MG/20ML EMULSION: Performed by: NURSE ANESTHETIST, CERTIFIED REGISTERED

## 2024-03-12 PROCEDURE — 93312 ECHO TRANSESOPHAGEAL: CPT

## 2024-03-12 PROCEDURE — 85007 BL SMEAR W/DIFF WBC COUNT: CPT | Performed by: INTERNAL MEDICINE

## 2024-03-12 PROCEDURE — 93320 DOPPLER ECHO COMPLETE: CPT

## 2024-03-12 PROCEDURE — 97116 GAIT TRAINING THERAPY: CPT

## 2024-03-12 PROCEDURE — 93325 DOPPLER ECHO COLOR FLOW MAPG: CPT

## 2024-03-12 PROCEDURE — 25810000003 SODIUM CHLORIDE 0.9 % SOLUTION 250 ML FLEX CONT: Performed by: NURSE PRACTITIONER

## 2024-03-12 PROCEDURE — 25810000003 LACTATED RINGERS PER 1000 ML: Performed by: NURSE ANESTHETIST, CERTIFIED REGISTERED

## 2024-03-12 PROCEDURE — 99232 SBSQ HOSP IP/OBS MODERATE 35: CPT | Performed by: INTERNAL MEDICINE

## 2024-03-12 PROCEDURE — 93320 DOPPLER ECHO COMPLETE: CPT | Performed by: INTERNAL MEDICINE

## 2024-03-12 PROCEDURE — 80048 BASIC METABOLIC PNL TOTAL CA: CPT | Performed by: INTERNAL MEDICINE

## 2024-03-12 PROCEDURE — 97530 THERAPEUTIC ACTIVITIES: CPT

## 2024-03-12 PROCEDURE — 85025 COMPLETE CBC W/AUTO DIFF WBC: CPT | Performed by: INTERNAL MEDICINE

## 2024-03-12 PROCEDURE — 93312 ECHO TRANSESOPHAGEAL: CPT | Performed by: INTERNAL MEDICINE

## 2024-03-12 PROCEDURE — 25010000002 VANCOMYCIN HCL 1.25 G RECONSTITUTED SOLUTION 1 EACH VIAL: Performed by: NURSE PRACTITIONER

## 2024-03-12 PROCEDURE — 25010000002 GLYCOPYRROLATE 0.2 MG/ML SOLUTION: Performed by: NURSE ANESTHETIST, CERTIFIED REGISTERED

## 2024-03-12 PROCEDURE — 25010000002 THIAMINE HCL 200 MG/2ML SOLUTION: Performed by: STUDENT IN AN ORGANIZED HEALTH CARE EDUCATION/TRAINING PROGRAM

## 2024-03-12 PROCEDURE — 25010000002 VANCOMYCIN 1 G RECONSTITUTED SOLUTION 1 EACH VIAL: Performed by: STUDENT IN AN ORGANIZED HEALTH CARE EDUCATION/TRAINING PROGRAM

## 2024-03-12 PROCEDURE — 93325 DOPPLER ECHO COLOR FLOW MAPG: CPT | Performed by: INTERNAL MEDICINE

## 2024-03-12 RX ORDER — GLYCOPYRROLATE 0.2 MG/ML
INJECTION INTRAMUSCULAR; INTRAVENOUS AS NEEDED
Status: DISCONTINUED | OUTPATIENT
Start: 2024-03-12 | End: 2024-03-12 | Stop reason: SURG

## 2024-03-12 RX ORDER — SODIUM CHLORIDE, SODIUM LACTATE, POTASSIUM CHLORIDE, CALCIUM CHLORIDE 600; 310; 30; 20 MG/100ML; MG/100ML; MG/100ML; MG/100ML
INJECTION, SOLUTION INTRAVENOUS CONTINUOUS PRN
Status: DISCONTINUED | OUTPATIENT
Start: 2024-03-12 | End: 2024-03-12 | Stop reason: SURG

## 2024-03-12 RX ORDER — LIDOCAINE HYDROCHLORIDE 10 MG/ML
INJECTION, SOLUTION EPIDURAL; INFILTRATION; INTRACAUDAL; PERINEURAL AS NEEDED
Status: DISCONTINUED | OUTPATIENT
Start: 2024-03-12 | End: 2024-03-12 | Stop reason: SURG

## 2024-03-12 RX ORDER — SODIUM CHLORIDE 9 MG/ML
INJECTION, SOLUTION INTRAVENOUS CONTINUOUS PRN
Status: DISCONTINUED | OUTPATIENT
Start: 2024-03-12 | End: 2024-03-12 | Stop reason: SURG

## 2024-03-12 RX ORDER — SODIUM CHLORIDE 1 G/1
1 TABLET ORAL 2 TIMES DAILY WITH MEALS
Status: DISCONTINUED | OUTPATIENT
Start: 2024-03-12 | End: 2024-03-13

## 2024-03-12 RX ORDER — PROPOFOL 10 MG/ML
INJECTION, EMULSION INTRAVENOUS AS NEEDED
Status: DISCONTINUED | OUTPATIENT
Start: 2024-03-12 | End: 2024-03-12 | Stop reason: SURG

## 2024-03-12 RX ORDER — HYDROCODONE BITARTRATE AND ACETAMINOPHEN 5; 325 MG/1; MG/1
1 TABLET ORAL EVERY 6 HOURS PRN
Status: DISCONTINUED | OUTPATIENT
Start: 2024-03-12 | End: 2024-03-15 | Stop reason: HOSPADM

## 2024-03-12 RX ADMIN — GLYCOPYRROLATE 0.1 MG: 0.2 INJECTION INTRAMUSCULAR; INTRAVENOUS at 12:35

## 2024-03-12 RX ADMIN — SERTRALINE 100 MG: 100 TABLET, FILM COATED ORAL at 09:23

## 2024-03-12 RX ADMIN — PROPOFOL 30 MG: 10 INJECTION, EMULSION INTRAVENOUS at 12:46

## 2024-03-12 RX ADMIN — LIDOCAINE HYDROCHLORIDE 25 MG: 10 INJECTION, SOLUTION EPIDURAL; INFILTRATION; INTRACAUDAL; PERINEURAL at 12:35

## 2024-03-12 RX ADMIN — PROPOFOL 30 MG: 10 INJECTION, EMULSION INTRAVENOUS at 12:43

## 2024-03-12 RX ADMIN — LIDOCAINE 1 PATCH: 4 PATCH TOPICAL at 15:53

## 2024-03-12 RX ADMIN — PROPOFOL 30 MG: 10 INJECTION, EMULSION INTRAVENOUS at 12:50

## 2024-03-12 RX ADMIN — Medication 100 MG: at 09:22

## 2024-03-12 RX ADMIN — Medication 10 ML: at 21:02

## 2024-03-12 RX ADMIN — Medication 10 ML: at 05:54

## 2024-03-12 RX ADMIN — PROPOFOL 40 MG: 10 INJECTION, EMULSION INTRAVENOUS at 12:39

## 2024-03-12 RX ADMIN — ENOXAPARIN SODIUM 40 MG: 100 INJECTION SUBCUTANEOUS at 15:51

## 2024-03-12 RX ADMIN — FOLIC ACID 1 MG: 1 TABLET ORAL at 09:22

## 2024-03-12 RX ADMIN — Medication 1 TABLET: at 09:22

## 2024-03-12 RX ADMIN — METHOCARBAMOL 500 MG: 500 TABLET ORAL at 09:22

## 2024-03-12 RX ADMIN — HYDROCODONE BITARTRATE AND ACETAMINOPHEN 1 TABLET: 5; 325 TABLET ORAL at 09:21

## 2024-03-12 RX ADMIN — SERTRALINE 100 MG: 100 TABLET, FILM COATED ORAL at 21:02

## 2024-03-12 RX ADMIN — HYDROCODONE BITARTRATE AND ACETAMINOPHEN 1 TABLET: 5; 325 TABLET ORAL at 18:07

## 2024-03-12 RX ADMIN — POTASSIUM CHLORIDE 20 MEQ: 1500 TABLET, EXTENDED RELEASE ORAL at 09:22

## 2024-03-12 RX ADMIN — THIAMINE HYDROCHLORIDE 200 MG: 100 INJECTION, SOLUTION INTRAMUSCULAR; INTRAVENOUS at 05:53

## 2024-03-12 RX ADMIN — SODIUM CHLORIDE 1 G: 1 TABLET ORAL at 18:10

## 2024-03-12 RX ADMIN — SODIUM CHLORIDE 1 G: 1 TABLET ORAL at 09:23

## 2024-03-12 RX ADMIN — THIAMINE HYDROCHLORIDE 200 MG: 100 INJECTION, SOLUTION INTRAMUSCULAR; INTRAVENOUS at 15:51

## 2024-03-12 RX ADMIN — VANCOMYCIN HYDROCHLORIDE 1250 MG: 1.25 INJECTION, POWDER, LYOPHILIZED, FOR SOLUTION INTRAVENOUS at 18:13

## 2024-03-12 RX ADMIN — Medication 5 MG: at 21:01

## 2024-03-12 RX ADMIN — BUSPIRONE HYDROCHLORIDE 15 MG: 15 TABLET ORAL at 21:02

## 2024-03-12 RX ADMIN — Medication 10 ML: at 09:23

## 2024-03-12 RX ADMIN — PROPOFOL 20 MG: 10 INJECTION, EMULSION INTRAVENOUS at 12:53

## 2024-03-12 RX ADMIN — SODIUM CHLORIDE, SODIUM LACTATE, POTASSIUM CHLORIDE, AND CALCIUM CHLORIDE: .6; .31; .03; .02 INJECTION, SOLUTION INTRAVENOUS at 12:30

## 2024-03-12 RX ADMIN — BUSPIRONE HYDROCHLORIDE 15 MG: 15 TABLET ORAL at 09:23

## 2024-03-12 RX ADMIN — VANCOMYCIN HYDROCHLORIDE 1000 MG: 1 INJECTION, POWDER, LYOPHILIZED, FOR SOLUTION INTRAVENOUS at 05:54

## 2024-03-12 RX ADMIN — PROPOFOL 50 MG: 10 INJECTION, EMULSION INTRAVENOUS at 12:35

## 2024-03-12 RX ADMIN — SODIUM CHLORIDE: 9 INJECTION, SOLUTION INTRAVENOUS at 12:56

## 2024-03-12 RX ADMIN — QUETIAPINE FUMARATE 50 MG: 25 TABLET ORAL at 21:02

## 2024-03-12 NOTE — ANESTHESIA PREPROCEDURE EVALUATION
Anesthesia Evaluation     Patient summary reviewed and Nursing notes reviewed   NPO Solid Status: > 8 hours             Airway   Mallampati: II  TM distance: >3 FB  Neck ROM: full  No difficulty expected  Dental - normal exam     Pulmonary - negative pulmonary ROS and normal exam   (+) a smoker Current, COPD,  Cardiovascular - negative cardio ROS and normal exam    ECG reviewed    (+) hypertension  (-) dysrhythmias, ZULETA      Neuro/Psych- negative ROS  GI/Hepatic/Renal/Endo - negative ROS   (+) GERD, liver disease cirrhosis    Musculoskeletal (-) negative ROS    Abdominal  - normal exam    Bowel sounds: normal.   Substance History - negative use     OB/GYN negative ob/gyn ROS         Other                    Anesthesia Plan    ASA 3     general       Anesthetic plan, risks, benefits, and alternatives have been provided, discussed and informed consent has been obtained with: patient.    CODE STATUS:    Code Status (Patient has no pulse and is not breathing): CPR (Attempt to Resuscitate)  Medical Interventions (Patient has pulse or is breathing): Full Support

## 2024-03-12 NOTE — THERAPY TREATMENT NOTE
"Subjective: Pt agreeable to therapeutic plan of care.    Objective:     RN noted that pt was hallucinating earlier, but was aware that she was hallucinating. Pt reports she is no longer hallucinating. Reports she normally drinks 1 pint of liquor per day. States she drank a pint the day before she came to the hospital.     Bed mobility - Supervision  Transfers - CGA; instructed pt in log rolling and exhalation w/ movement for supine to sit and sit to supine.   Ambulation - 50 feet Min-A and with rolling walker; initially tried to amb w/o assistive device, as pt had reported she was ambulating to/from bathroom w/o help. However, was immediately noted to have marked lateral sway. PT offered one hand held, and pt still needed min assist for amb. Then changes to use of rw (all w/in first 10 ft of ambulation). Pt no longer demonstrating marked lateral sway and was able to amb w/ min assist.    Vitals: WNL    Pain: 7 VAS   Location: low back w/ ambulation; denies pain at rest.   Intervention for pain: Repositioned, RN notified, Increased Activity, and Therapeutic Presence    Education: Provided education on the importance of mobility in the acute care setting, Verbal/Tactile Cues, Transfer Training, Gait Training, and Energy conservation strategies    Assessment: Lita Yu is improving w/ safety of movement but definitely not safe for home at d/c. Would be high risk for falls. Did better w/ use of rw w/ gait. Pt needs 6 wks iv abx so cannot go home at d/c. Pt presents with functional mobility impairments which indicate the need for skilled intervention. Tolerating session today without incident. Will continue to follow and progress as tolerated.     Plan/Recommendations:   If medically appropriate, Moderate Intensity Therapy recommended post-acute care. This is recommended as therapy feels the patient would require 3-4 days per week and wouldn't tolerate \"3 hour daily\" rehab intensity. SNF would be the " preferred choice. If the patient does not agree to SNF, arrange HH or OP depending on home bound status. If patient is medically complex, consider LTACH. Pt requires no DME at discharge.     Pt desires Skilled Rehab placement at discharge. Pt cooperative; agreeable to therapeutic recommendations and plan of care.         Basic Mobility 6-click:  Rollin = Total, A lot = 2, A little = 3; 4 = None  Supine>Sit:   1 = Total, A lot = 2, A little = 3; 4 = None   Sit>Stand with arms:  1 = Total, A lot = 2, A little = 3; 4 = None  Bed>Chair:   1 = Total, A lot = 2, A little = 3; 4 = None  Ambulate in room:  1 = Total, A lot = 2, A little = 3; 4 = None  3-5 Steps with railin = Total, A lot = 2, A little = 3; 4 = None  Score: 19    Modified Leodan: N/A = No pre-op stroke/TIA    Post-Tx Position: Supine with HOB Elevated, Alarms activated, and Call light and personal items within reach  PPE: gloves and gown

## 2024-03-12 NOTE — CASE MANAGEMENT/SOCIAL WORK
Social Work Assessment  [facility]     Patient Name: Lita Yu  MRN: [id number]  Today's Date: 3/12/2024    Admit Date: 3/7/2024     Discharge Plan       Row Name 03/12/24 1056       Plan    Plan [facility] accepted. PASRR QFR. Will need precert.         03/12/24 1055   PASRR   PASRR Status Under clinical review     Kimberlyn Callahan, ISAMAR, MSSW, CCM    Phone: [phone]  Cell: [phone]  Fax: [phone]  [email or web address]

## 2024-03-12 NOTE — ANESTHESIA POSTPROCEDURE EVALUATION
Patient: Lita Yu    Procedure Summary       Date: 03/12/24 Room / Location: [facility]    Anesthesia Start: 1230 Anesthesia Stop: 1256    Procedure: ADULT TRANSESOPHAGEAL ECHO (SILVINA) W/ CONT IF NECESSARY PER PROTOCOL Diagnosis:       (Endocarditis)      (Bacteremia)    Scheduled Providers: Niels Jung MD Provider: Ted Hoffmann MD    Anesthesia Type: general ASA Status: 3            Anesthesia Type: general    Vitals  Vitals Value Taken Time   /68 03/12/24 1416   Temp     Pulse 79 03/12/24 1437   Resp 18 03/12/24 1350   SpO2 98 % 03/12/24 1410   Vitals shown include unfiled device data.        Post Anesthesia Care and Evaluation    Patient location during evaluation: PACU  Patient participation: complete - patient participated  Level of consciousness: awake  Pain score: 0  Pain management: adequate  Anesthetic complications: No anesthetic complications  PONV Status: none  Cardiovascular status: acceptable  Respiratory status: acceptable  Hydration status: acceptable

## 2024-03-12 NOTE — PROGRESS NOTES
PROGRESS NOTE      Patient Name: Lita Yu  : 1965  MRN: [id number]  Primary Care Physician: Norma Saul APRN  Date of admission: 3/7/2024    Patient Care Team:  Norma Saul APRN as PCP - General (Nurse Practitioner)  Flory Villanueva MD (Physical Medicine and Rehabilitation)        Subjective   Subjective:   Patient seen and examined this morning, she denies any new complaints.  Review of systems:  All other review of system unremarkable      Allergies:    Allergies   Allergen Reactions   • Sulfa Antibiotics Hives   • Keflex [Cephalexin] Hives       Objective   Exam:     Vital Signs  Temp:  [97.6 °F (36.4 °C)-98.7 °F (37.1 °C)] 98.2 °F (36.8 °C)  Heart Rate:  [] 79  Resp:  [16-18] 16  BP: (103-127)/(55-64) 103/55  SpO2:  [92 %-96 %] 95 %  on   ;   Device (Oxygen Therapy): room air  Body mass index is 23.91 kg/m².    General: Middle-age female in no acute distress.    Head:      Normocephalic and atraumatic.    Eyes:      PERRL/EOM intact, conjunctiva and sclera clear with out nystagmus.    Neck:      No masses, thyromegaly,  trachea central with normal respiratory effort   Lungs:    Clear bilaterally to auscultation.    Heart:      Regular rate and rhythm, no murmur no gallop  Abd:        Soft, nontender, not distended, bowel sounds positive, no shifting dullness   Pulses:   Pulses palpable  Extr:        No cyanosis or clubbing--no significant edema.    Neuro:    No focal deficits.   alert oriented x3  Skin:       Intact without lesions or rashes.    Psych:    Alert and cooperative; normal mood and affect; .      Results Review:  I have personally reviewed most recent Data :  CBC    Results from last 7 days   Lab Units 24  0407 24  0505 03/10/24  0621 24  2338 24  0752 24  0920   WBC 10*3/mm3 6.80 6.40 6.20 6.50 6.50 8.70   HEMOGLOBIN g/dL 7.8* 8.0* 8.1* 8.1* 9.4* 10.6*   PLATELETS 10*3/mm3 113* 99* 83* 64* 70* 76*     CMP   Results from  last 7 days   Lab Units 03/12/24  0407 03/11/24  0505 03/10/24  0621 03/09/24  0948 03/08/24  2338 03/08/24  0752 03/07/24  1757 03/07/24  0920   SODIUM mmol/L 131* 129* 129* 120* 124* 128* 127* 127*   POTASSIUM mmol/L 3.7 3.6 4.3 4.0 3.2* 3.7 3.9 4.3   CHLORIDE mmol/L 102 99 100 92* 93* 95* 97* 92*   CO2 mmol/L 19.0* 18.0* 18.0* 17.0* 19.0* 20.0* 19.0* 18.0*   BUN mg/dL 11 11 14 14 15 17 18 20   CREATININE mg/dL 0.58 0.66 0.75 0.81 0.82 0.92 0.98 1.22*   GLUCOSE mg/dL 79 110* 77 111* 97 108* 86 99   ALBUMIN g/dL  --   --   --   --  2.8* 3.1*  --  3.8   BILIRUBIN mg/dL  --   --   --   --  0.5 0.5  --  0.7   ALK PHOS U/L  --   --   --   --  108 123*  --  153*   AST (SGOT) U/L  --   --   --   --  29 29  --  38*   ALT (SGPT) U/L  --   --   --   --  18 21  --  26   AMMONIA umol/L  --   --   --   --   --   --   --  16     ABG      MRI Lumbar Spine With & Without Contrast    Result Date: 3/10/2024  Findings consistent with osteomyelitis/discitis at L2-L3. Electronically Signed: Ted Chand MD  3/10/2024 4:20 PM EDT  Workstation ID: [id number]    MRI Thoracic Spine With & Without Contrast    Result Date: 3/10/2024  Findings consistent with osteomyelitis/discitis at L2-L3. Electronically Signed: Ted Chand MD  3/10/2024 4:20 PM EDT  Workstation ID: [id number]    MRI Cervical Spine With & Without Contrast    Result Date: 3/10/2024  Impression: 1.Reversal of the normal lordosis with multilevel degenerative change. 2.Exam somewhat limited due to motion artifact. 3.There is signal on the postcontrast images around the inferior left scapula that might reflect artifact as opposed to an inflammatory process in this area. Correlation with clinical findings to this area recommended. Electronically Signed: Eyad Smith MD  3/10/2024 4:13 PM EDT  Workstation ID: [id number]     Results for orders placed during the hospital encounter of 03/07/24    Adult Transthoracic Echo Complete w/ Color, Spectral and Contrast if Necessary Per  Protocol    Interpretation Summary  •  Left ventricular systolic function is normal. Left ventricular ejection fraction appears to be 56 - 60%.  •  Left ventricular diastolic function was normal.  •  Estimated right ventricular systolic pressure from tricuspid regurgitation is normal (<35 mmHg).    Scheduled Meds:busPIRone, 15 mg, Oral, BID  enoxaparin, 40 mg, Subcutaneous, Daily  folic acid, 1 mg, Oral, Daily  Lidocaine, 1 patch, Transdermal, Daily  methocarbamol, 500 mg, Oral, 4x Daily  multivitamin with minerals, 1 tablet, Oral, Daily  potassium chloride, 20 mEq, Oral, Daily  QUEtiapine, 50 mg, Oral, Nightly  sertraline, 100 mg, Oral, BID  sodium chloride, 10 mL, Intravenous, Q12H  sodium chloride, 1 g, Oral, TID With Meals  thiamine (B-1) IV, 200 mg, Intravenous, Q8H   Followed by  [START ON 3/13/2024] thiamine, 100 mg, Oral, Daily  thiamine, 100 mg, Oral, Daily  vancomycin, 1,000 mg, Intravenous, Q12H      Continuous Infusions:Pharmacy to dose vancomycin,       PRN Meds:•  acetaminophen  •  albuterol  •  Calcium Replacement - Follow Nurse / BPA Driven Protocol  •  HYDROcodone-acetaminophen  •  ketorolac  •  loperamide  •  Magnesium Standard Dose Replacement - Follow Nurse / BPA Driven Protocol  •  melatonin  •  Pharmacy to dose vancomycin  •  Phosphorus Replacement - Follow Nurse / BPA Driven Protocol  •  Potassium Replacement - Follow Nurse / BPA Driven Protocol  •  [COMPLETED] Insert Peripheral IV **AND** sodium chloride  •  sodium chloride  •  sodium chloride    Assessment & Plan   Assessment and Plan:         Myalgia    ASSESSMENT:  Acute kidney injury likely secondary to volume and low blood pressure  Hyponatremia may be secondary to alcohol intake follow-up with a urine studies   Metabolic acidosis may be due to his respiratory alkalosis because of some history of cirrhosis  Hypocalcemia due to poor nutritional status check phosphorus level tomorrow morning check magnesium level tomorrow morning    PLAN :      Sodium level is slightly better than yesterday   okay to encourage p.o. intake that will help with electrolytes  supplement potassium as needed   Continue sodium chloride pill we will decrease to 1 g twice daily  Renal function has improved  Clinically looks euvolemic, multifactorial hyponatremia, increased ADH, history of EtOH, low solute load  Remains on Zoloft, if possible reduced dose  SILVINA Monday  While acidosis likely related to underlying alkalosis  Mild hypocalcemia noted  corrected calcium is normal    Will continue to follow            Electronically signed by Red Quiroga MD,   [facility] kidney consultant  [phone]  3/12/2024  09:17 EDT

## 2024-03-12 NOTE — PLAN OF CARE
"Goal Outcome Evaluation:     Assessment: Lita Yu is improving w/ safety of movement but definitely not safe for home at d/c. Would be high risk for falls. Did better w/ use of rw w/ gait. Pt needs 6 wks iv abx so cannot go home at d/c. Pt presents with functional mobility impairments which indicate the need for skilled intervention. Tolerating session today without incident. Will continue to follow and progress as tolerated.     Plan/Recommendations:   If medically appropriate, Moderate Intensity Therapy recommended post-acute care. This is recommended as therapy feels the patient would require 3-4 days per week and wouldn't tolerate \"3 hour daily\" rehab intensity. SNF would be the preferred choice. If the patient does not agree to SNF, arrange HH or OP depending on home bound status. If patient is medically complex, consider LTACH. Pt requires no DME at discharge.     Pt desires Skilled Rehab placement at discharge. Pt cooperative; agreeable to therapeutic recommendations and plan of care.                                            "

## 2024-03-12 NOTE — PLAN OF CARE
Goal Outcome Evaluation:         Patient has continued to complain of constant back pain. Patient remains sarcastic, laughing frequently, sometimes at inappropriate times. Patient ambulated to the bathroom independently.

## 2024-03-12 NOTE — CASE MANAGEMENT/SOCIAL WORK
Continued Stay Note  DAYANARA Chery     Patient Name: Lita Yu  MRN: [id number]  Today's Date: 3/12/2024    Admit Date: 3/7/2024    Plan: [facility] (accepted.) Pending PASRR. Will need precert.   Discharge Plan       Row Name 03/12/24 0935       Plan    Plan [facility] (accepted.) Pending PASRR. Will need precert.             Ines Mckeon RN     Office phone: [phone]  Office fax: [phone]

## 2024-03-12 NOTE — PROGRESS NOTES
Hospitalist Service   Daily Progress Note      Patient Name: Lita Yu  : 1965  MRN: [id number]  Primary Care Physician:  Norma Saul APRN  Date of admission: 3/7/2024      Subjective      Chief Complaint: Generalized weakness    Patient seen and examined this morning.  Feeling better this morning, still with back pain but controlled for now.  MRI results discussed with her, neurosurgery evaluation pending today.  Possible SILVINA as well today per cardiology.  No other complaints.    Pertinent positives as noted in HPI/subjective.  All other systems were reviewed and are negative.  3/12  Patient lethargic but in no acute distress  Patient with MRSA bacteremia on vancomycin  Followed up per ID and neurosurgery and nephrology and cardiology  Patient with L2-L3 discitis-osteomyelitis continues to have severe back pain with bilateral leg pain  MRI of the lumbar spine showed enhancement of the epidural fluid with moderate canal stenosis  Patient was not A surgical candidate as per neurosurgery  Patient with history of liver cirrhosis alcohol induced   Status post left total hip replacement secondary to hip fracture in   Continue IV antibiotic with vancomycin and follow-up per ID  She will need 6 weeks of IV antibiotic  SILVINA scheduled for today  Patient will probably need skilled nursing facility for 30 days or less on discharge    Objective      Vitals:   Temp:  [97.6 °F (36.4 °C)-98.7 °F (37.1 °C)] 98.2 °F (36.8 °C)  Heart Rate:  [] 79  Resp:  [16-18] 16  BP: (103-127)/(55-64) 103/55    Physical Exam:    General: Awake, alert, chronically ill-appearing female, lying in bed, NAD  Eyes: PERRL, EOMI, conjunctivae are clear  Cardiovascular: Regular rate and rhythm, no murmurs  Respiratory: Clear to auscultation bilaterally, no wheezing or rales, unlabored breathing  Abdomen: Soft, nontender, positive bowel sounds, no guarding  Neurologic: A&O, CN grossly intact, moves all extremities  spontaneously  Musculoskeletal: Generalized weakness, no other gross deformities  Skin: Warm, dry         Result Review    Result Review:  I have personally reviewed the results from the time of this admission to 3/12/2024 10:21 EDT and agree with these findings:  [x]  Laboratory  [x]  Microbiology  []  Radiology  []  EKG/Telemetry   []  Cardiology/Vascular   []  Pathology  []  Old records  []  Other:    Wounds (Last 24 Hours)       LDA Wound       Row Name 03/11/24 2207             Wound Bilateral perineum MASD (Moisture associated skin damage)    Wound - Properties Group Present on Original Admission: Y  -DI Side: Bilateral  -DI Location: perineum  -DI Primary Wound Type: MASD  -DI    Care, Wound cleansed with;soap and water;barrier applied  -JA      Dressing Care open to air  -JA      Retired Wound - Properties Group Present on Original Admission: Y  -DI Side: Bilateral  -DI Location: perineum  -DI Primary Wound Type: MASD  -DI    Retired Wound - Properties Group Present on Original Admission: Y  -DI Side: Bilateral  -DI Location: perineum  -DI Primary Wound Type: MASD  -DI              User Key  (r) = Recorded By, (t) = Taken By, (c) = Cosigned By      Initials Name Provider Type    Shikha Butt LPN Licensed Nurse    Antonella Mulligan RN Registered Nurse                      Assessment & Plan      Brief Patient Summary:  Lita Yu is a 58 y.o. 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  Noted to have hyponatremia, hypocalcemia, hypokalemia, and SARITA on admission.  Nephrology consulted for further management.  Blood cultures collected on admission positive for MRSA, ID consulted for further evaluation.      busPIRone, 15 mg, Oral, BID  enoxaparin, 40 mg, Subcutaneous, Daily  folic acid, 1 mg, Oral, Daily  Lidocaine, 1  patch, Transdermal, Daily  methocarbamol, 500 mg, Oral, 4x Daily  multivitamin with minerals, 1 tablet, Oral, Daily  potassium chloride, 20 mEq, Oral, Daily  QUEtiapine, 50 mg, Oral, Nightly  sertraline, 100 mg, Oral, BID  sodium chloride, 10 mL, Intravenous, Q12H  sodium chloride, 1 g, Oral, BID With Meals  thiamine (B-1) IV, 200 mg, Intravenous, Q8H   Followed by  [START ON 3/13/2024] thiamine, 100 mg, Oral, Daily  thiamine, 100 mg, Oral, Daily  vancomycin, 1,000 mg, Intravenous, Q12H       Pharmacy to dose vancomycin,          I have utilized all available, immediate resources to obtain, update, or review the patient's current medications including all prescriptions, over-the-counter products, herbals, cannabis/cannabidiol products, and vitamin.mineral/dietary (nutritional) supplements.    Active Hospital Problems:  Active Hospital Problems    Diagnosis     **Myalgia        Assessment/Plan:     MRSA bacteremia  L2-L3 discitis/osteomyelitis  Sepsis, POA  -Blood cultures positive for MRSA, likely source is lumbar discitis/osteomyelitis  -MRI spine findings noted to have L2-L3 discitis/osteomyelitis  -Continue IV vancomycin, ID following  -Cardiology on board for SILVINA  -Neurosurgery consulted    SARITA  Hyponatremia  Hypokalemia  Hypocalcemia  -Likely prerenal and due to alcohol abuse and poor p.o. intake  -Sodium being managed per nephrology, salt tabs added  -Replace potassium and calcium and monitor  -Nephrology following    Liver cirrhosis  -Likely alcohol related  -Does not appear to have decompensation at this time  -Continue supportive care and outpatient follow-up    Anemia of chronic disease  -Hemoglobin stable at this time  -Monitor and transfuse as needed    Tobacco abuse  -40-pack-year smoking history  -Counseled on cessation  -Nicotine patch if needed    DVT prophylaxis  -Lovenox      CODE STATUS:    Code Status (Patient has no pulse and is not breathing): CPR (Attempt to Resuscitate)  Medical Interventions  (Patient has pulse or is breathing): Full Support      Disposition: Pending clinical course    Electronically signed by Edgar Oliva MD, 03/12/24, 10:21 EDT.  [facility]      Part of this note may be an electronic transcription/translation of spoken language to printed text using the Dragon Dictation System.

## 2024-03-12 NOTE — PROGRESS NOTES
"Pharmacy Antimicrobial Dosing Service    Subjective:  Lita Yu is a 58 y.o.female admitted with generalized weakness and pain. Pharmacy has been consulted to dose Vancomycin for possible bacteremia.      Assessment/Plan    1. Day #5 Vancomycin: Goal -600 mcg*h/mL. 1250mg (~20mg/kg ABW) IV x1 loading dose followed by 750mg (~12mg/kg ABW) IV q12h.      Patient had dose changed yesterday to 1000mg q12h, however now with improving renal function predicated AUC was 400 and trough was 9.9 mcg/mL. Increasing 1700 dose tonight to 1250mg q12h for predicted AUC of 499 and trough of 12.3 mcg/mL. Trough ordered for 3/14 at 1400.    Will continue to monitor drug levels, renal function, culture and sensitivities, and patient clinical status.       Objective:  Relevant clinical data and objective history reviewed:  160 cm (63\")   61.2 kg (135 lb)   Ideal body weight: 52.4 kg (115 lb 8.3 oz)  Adjusted ideal body weight: 55.9 kg (123 lb 5 oz)  Body mass index is 23.91 kg/m².    Results from last 7 days   Lab Units 03/11/24  1311 03/09/24  1512   VANCOMYCIN TR mcg/mL 10.90  --    VANCOMYCIN RM mcg/mL  --  12.20     Results from last 7 days   Lab Units 03/12/24  0407 03/11/24  0505 03/10/24  0621   CREATININE mg/dL 0.58 0.66 0.75     Estimated Creatinine Clearance: 104.6 mL/min (by C-G formula based on SCr of 0.58 mg/dL).  I/O last 3 completed shifts:  In: 640 [P.O.:390; IV Piggyback:250]  Out: -     Results from last 7 days   Lab Units 03/12/24  0407 03/11/24  0505 03/10/24  0621   WBC 10*3/mm3 6.80 6.40 6.20     Temperature    03/11/24 2002 03/12/24 0401 03/12/24 0815   Temp: 97.6 °F (36.4 °C) 97.9 °F (36.6 °C) 98.2 °F (36.8 °C)     Baseline culture/source/susceptibility:  Microbiology Results (last 10 days)       Procedure Component Value - Date/Time    Clostridioides difficile EIA - Stool, Per Rectum [[id number]]  (Normal) Collected: 03/08/24 0765    Lab Status: Final result Specimen: Stool from Per Rectum " Updated: 03/09/24 0745     C Diff GDH Ag Negative     C.diff Toxin Ag Negative    Narrative:      The result indicates the absence of toxigenic C.difficile from stool specimen.    Blood Culture - Blood, Arm, Right [[id number]]  (Normal) Collected: 03/08/24 1643    Lab Status: Preliminary result Specimen: Blood from Arm, Right Updated: 03/11/24 1815     Blood Culture No growth at 3 days    COVID-19, FLU A/B, RSV PCR 1 HR TAT - Swab, Nasopharynx [[id number]]  (Normal) Collected: 03/07/24 1526    Lab Status: Final result Specimen: Swab from Nasopharynx Updated: 03/07/24 1607     COVID19 Not Detected     Influenza A PCR Not Detected     Influenza B PCR Not Detected     RSV, PCR Not Detected    Narrative:      Fact sheet for providers: https://www.fda.gov/media/237456/download    Fact sheet for patients: https://www.fda.gov/media/423452/download    Test performed by PCR.    Blood Culture - Blood, Arm, Left [[id number]]  (Abnormal) Collected: 03/07/24 0933    Lab Status: Final result Specimen: Blood from Arm, Left Updated: 03/10/24 0657     Blood Culture Staphylococcus aureus, MRSA     Comment:   Infectious disease consultation is highly recommended to rule out distant foci of infection.  Methicillin resistant Staphylococcus aureus, Patient may be an isolation risk.        Isolated from Aerobic and Anaerobic Bottles     Gram Stain Aerobic Bottle Gram positive cocci in clusters      Anaerobic Bottle Gram positive cocci in clusters    Narrative:      Refer to previous blood culture collected on  03/07/2024 at 0920 for MICs.    Blood Culture - Blood, Arm, Right [[id number]]  (Abnormal)  (Susceptibility) Collected: 03/07/24 0920    Lab Status: Final result Specimen: Blood from Arm, Right Updated: 03/10/24 0657     Blood Culture Staphylococcus aureus, MRSA     Comment:   Infectious disease consultation is highly recommended to rule out distant foci of infection.  Methicillin resistant Staphylococcus aureus, Patient may be an  isolation risk.        Isolated from Aerobic and Anaerobic Bottles     Gram Stain Anaerobic Bottle Gram positive cocci in clusters      Aerobic Bottle Gram positive cocci in clusters    Susceptibility        Staphylococcus aureus, MRSA      SOFIA      Gentamicin <=0.5 ug/ml Susceptible      Oxacillin >=4 ug/ml Resistant      Rifampin <=0.5 ug/ml Susceptible      Vancomycin <=0.5 ug/ml Susceptible                           Blood Culture ID, PCR - Blood, Arm, Right [[id number]]  (Abnormal) Collected: 03/07/24 0920    Lab Status: Final result Specimen: Blood from Arm, Right Updated: 03/08/24 0433     BCID, PCR Staph aureus. mecA/C and MREJ (methicillin resistance gene) detected. Identification by BCID2 PCR.     BOTTLE TYPE Anaerobic Bottle    Narrative:      Infectious disease consultation is highly recommended to rule out distant foci of infection.            Presley Warner, Pharmacy Intern  03/12/24 10:32 EDT

## 2024-03-12 NOTE — CASE MANAGEMENT/SOCIAL WORK
Social Work Assessment  [facility]     Patient Name: Lita Yu  MRN: [id number]  Today's Date: 3/12/2024    Admit Date: 3/7/2024       Discharge Plan       Row Name 03/12/24 1537       Plan    Plan [facility] accepted. PASRR approved. Will need precert.              03/12/24 1536   PASRR   PASRR Status Approved/Complete     ISAMAR Ace, LUZ MARINAW, CCM    Phone: [phone]  Cell: [phone]  Fax: [phone]  [email or web address]

## 2024-03-12 NOTE — CASE MANAGEMENT/SOCIAL WORK
Continued Stay Note  DAYANARA Chery     Patient Name: Lita Yu  MRN: [id number]  Today's Date: 3/12/2024    Admit Date: 3/7/2024    Plan: [facility] accepted. PASRR approved. Will need precert.   Discharge Plan       Row Name 03/12/24 1543       Plan    Plan [facility] accepted. PASRR approved. Will need precert.                    Ines Mckeon RN     Office phone: [phone]  Office fax: [phone]

## 2024-03-12 NOTE — PROGRESS NOTES
Attempted to see the patient several times but she was still down having a SILVINA performed.  Will attempt to see again tomorrow.

## 2024-03-12 NOTE — PLAN OF CARE
Goal Outcome Evaluation:              Outcome Evaluation: Patient currently resting abed, no distress noted. Patient NPO in preporation for SILVINA.

## 2024-03-13 LAB
BACTERIA SPEC AEROBE CULT: NORMAL
POTASSIUM SERPL-SCNC: 4.5 MMOL/L (ref 3.5–5.2)

## 2024-03-13 PROCEDURE — 25810000003 SODIUM CHLORIDE 0.9 % SOLUTION 250 ML FLEX CONT: Performed by: NURSE PRACTITIONER

## 2024-03-13 PROCEDURE — 25010000002 ENOXAPARIN PER 10 MG: Performed by: STUDENT IN AN ORGANIZED HEALTH CARE EDUCATION/TRAINING PROGRAM

## 2024-03-13 PROCEDURE — 84132 ASSAY OF SERUM POTASSIUM: CPT | Performed by: INTERNAL MEDICINE

## 2024-03-13 PROCEDURE — 25010000002 VANCOMYCIN HCL 1.25 G RECONSTITUTED SOLUTION 1 EACH VIAL: Performed by: NURSE PRACTITIONER

## 2024-03-13 RX ORDER — POTASSIUM CHLORIDE 20 MEQ/1
40 TABLET, EXTENDED RELEASE ORAL EVERY 4 HOURS
Status: COMPLETED | OUTPATIENT
Start: 2024-03-13 | End: 2024-03-13

## 2024-03-13 RX ORDER — POTASSIUM CHLORIDE 20 MEQ/1
20 TABLET, EXTENDED RELEASE ORAL DAILY
Status: DISCONTINUED | OUTPATIENT
Start: 2024-03-14 | End: 2024-03-15 | Stop reason: HOSPADM

## 2024-03-13 RX ORDER — SODIUM CHLORIDE 1 G/1
1 TABLET ORAL DAILY
Status: DISCONTINUED | OUTPATIENT
Start: 2024-03-14 | End: 2024-03-14

## 2024-03-13 RX ADMIN — VANCOMYCIN HYDROCHLORIDE 1250 MG: 1.25 INJECTION, POWDER, LYOPHILIZED, FOR SOLUTION INTRAVENOUS at 16:57

## 2024-03-13 RX ADMIN — SODIUM CHLORIDE 1 G: 1 TABLET ORAL at 08:57

## 2024-03-13 RX ADMIN — HYDROCODONE BITARTRATE AND ACETAMINOPHEN 1 TABLET: 5; 325 TABLET ORAL at 04:45

## 2024-03-13 RX ADMIN — Medication 100 MG: at 08:57

## 2024-03-13 RX ADMIN — Medication 10 ML: at 04:46

## 2024-03-13 RX ADMIN — QUETIAPINE FUMARATE 50 MG: 25 TABLET ORAL at 20:43

## 2024-03-13 RX ADMIN — FOLIC ACID 1 MG: 1 TABLET ORAL at 08:57

## 2024-03-13 RX ADMIN — POTASSIUM CHLORIDE 40 MEQ: 1500 TABLET, EXTENDED RELEASE ORAL at 08:57

## 2024-03-13 RX ADMIN — BUSPIRONE HYDROCHLORIDE 15 MG: 15 TABLET ORAL at 20:43

## 2024-03-13 RX ADMIN — Medication 10 ML: at 20:43

## 2024-03-13 RX ADMIN — Medication 10 ML: at 08:57

## 2024-03-13 RX ADMIN — HYDROCODONE BITARTRATE AND ACETAMINOPHEN 1 TABLET: 5; 325 TABLET ORAL at 20:43

## 2024-03-13 RX ADMIN — SERTRALINE 100 MG: 100 TABLET, FILM COATED ORAL at 08:57

## 2024-03-13 RX ADMIN — VANCOMYCIN HYDROCHLORIDE 1250 MG: 1.25 INJECTION, POWDER, LYOPHILIZED, FOR SOLUTION INTRAVENOUS at 04:46

## 2024-03-13 RX ADMIN — Medication 5 MG: at 20:43

## 2024-03-13 RX ADMIN — BUSPIRONE HYDROCHLORIDE 15 MG: 15 TABLET ORAL at 08:57

## 2024-03-13 RX ADMIN — SERTRALINE 100 MG: 100 TABLET, FILM COATED ORAL at 20:43

## 2024-03-13 RX ADMIN — Medication 100 MG: at 09:01

## 2024-03-13 RX ADMIN — Medication 1 TABLET: at 08:56

## 2024-03-13 RX ADMIN — ENOXAPARIN SODIUM 40 MG: 100 INJECTION SUBCUTANEOUS at 16:57

## 2024-03-13 RX ADMIN — POTASSIUM CHLORIDE 40 MEQ: 1500 TABLET, EXTENDED RELEASE ORAL at 04:45

## 2024-03-13 RX ADMIN — LIDOCAINE 1 PATCH: 4 PATCH TOPICAL at 08:57

## 2024-03-13 NOTE — PROGRESS NOTES
PROGRESS NOTE      Patient Name: Lita Yu  : 1965  MRN: [id number]  Primary Care Physician: Norma Saul APRN  Date of admission: 3/7/2024    Patient Care Team:  Norma Saul APRN as PCP - General (Nurse Practitioner)  Flory Villanueva MD (Physical Medicine and Rehabilitation)        Subjective   Subjective:   Patient seen and examined this morning, she denies any new complaints.  Review of systems:  All other review of system unremarkable      Allergies:    Allergies   Allergen Reactions   • Sulfa Antibiotics Hives   • Keflex [Cephalexin] Hives       Objective   Exam:     Vital Signs  Temp:  [97.8 °F (36.6 °C)-100 °F (37.8 °C)] 97.9 °F (36.6 °C)  Heart Rate:  [72-83] 72  Resp:  [18-25] 20  BP: ()/(44-69) 106/62  SpO2:  [91 %-100 %] 95 %  on  Flow (L/min):  [10-15] 10;   Device (Oxygen Therapy): room air  Body mass index is 23.91 kg/m².    General: Middle-age female in no acute distress.    Head:      Normocephalic and atraumatic.    Eyes:      PERRL/EOM intact, conjunctiva and sclera clear with out nystagmus.    Neck:      No masses, thyromegaly,  trachea central with normal respiratory effort   Lungs:    Clear bilaterally to auscultation.    Heart:      Regular rate and rhythm, no murmur no gallop  Abd:        Soft, nontender, not distended, bowel sounds positive, no shifting dullness   Pulses:   Pulses palpable  Extr:        No cyanosis or clubbing--no significant edema.    Neuro:    No focal deficits.   alert oriented x3  Skin:       Intact without lesions or rashes.    Psych:    Alert and cooperative; normal mood and affect; .      Results Review:  I have personally reviewed most recent Data :  CBC    Results from last 7 days   Lab Units 24  2219 24  0407 24  0505 03/10/24  0621 24  2338 24  0752 24  0920   WBC 10*3/mm3 8.60 6.80 6.40 6.20 6.50 6.50 8.70   HEMOGLOBIN g/dL 7.9* 7.8* 8.0* 8.1* 8.1* 9.4* 10.6*   PLATELETS 10*3/mm3 142  113* 99* 83* 64* 70* 76*     CMP   Results from last 7 days   Lab Units 03/12/24  2219 03/12/24  0407 03/11/24  0505 03/10/24  0621 03/09/24  0948 03/08/24  2338 03/08/24  0752 03/07/24  1757 03/07/24  0920   SODIUM mmol/L 135* 131* 129* 129* 120* 124* 128*   < > 127*   POTASSIUM mmol/L 3.6 3.7 3.6 4.3 4.0 3.2* 3.7   < > 4.3   CHLORIDE mmol/L 105 102 99 100 92* 93* 95*   < > 92*   CO2 mmol/L 17.0* 19.0* 18.0* 18.0* 17.0* 19.0* 20.0*   < > 18.0*   BUN mg/dL 11 11 11 14 14 15 17   < > 20   CREATININE mg/dL 0.58 0.58 0.66 0.75 0.81 0.82 0.92   < > 1.22*   GLUCOSE mg/dL 112* 79 110* 77 111* 97 108*   < > 99   ALBUMIN g/dL  --   --   --   --   --  2.8* 3.1*  --  3.8   BILIRUBIN mg/dL  --   --   --   --   --  0.5 0.5  --  0.7   ALK PHOS U/L  --   --   --   --   --  108 123*  --  153*   AST (SGOT) U/L  --   --   --   --   --  29 29  --  38*   ALT (SGPT) U/L  --   --   --   --   --  18 21  --  26   AMMONIA umol/L  --   --   --   --   --   --   --   --  16    < > = values in this interval not displayed.     ABG      No radiology results for the last day    Results for orders placed during the hospital encounter of 03/07/24    Adult Transthoracic Echo Complete w/ Color, Spectral and Contrast if Necessary Per Protocol    Interpretation Summary  •  Left ventricular systolic function is normal. Left ventricular ejection fraction appears to be 56 - 60%.  •  Left ventricular diastolic function was normal.  •  Estimated right ventricular systolic pressure from tricuspid regurgitation is normal (<35 mmHg).    Scheduled Meds:busPIRone, 15 mg, Oral, BID  enoxaparin, 40 mg, Subcutaneous, Daily  folic acid, 1 mg, Oral, Daily  Lidocaine, 1 patch, Transdermal, Daily  multivitamin with minerals, 1 tablet, Oral, Daily  [START ON 3/14/2024] potassium chloride, 20 mEq, Oral, Daily  QUEtiapine, 50 mg, Oral, Nightly  sertraline, 100 mg, Oral, BID  sodium chloride, 10 mL, Intravenous, Q12H  sodium chloride, 1 g, Oral, BID With Meals  thiamine,  100 mg, Oral, Daily  thiamine, 100 mg, Oral, Daily  vancomycin, 1,250 mg, Intravenous, Q12H      Continuous Infusions:Pharmacy to dose vancomycin,       PRN Meds:•  acetaminophen  •  albuterol  •  Calcium Replacement - Follow Nurse / BPA Driven Protocol  •  HYDROcodone-acetaminophen  •  loperamide  •  Magnesium Standard Dose Replacement - Follow Nurse / BPA Driven Protocol  •  melatonin  •  Pharmacy to dose vancomycin  •  Phosphorus Replacement - Follow Nurse / BPA Driven Protocol  •  Potassium Replacement - Follow Nurse / BPA Driven Protocol  •  [COMPLETED] Insert Peripheral IV **AND** sodium chloride  •  sodium chloride  •  sodium chloride    Assessment & Plan   Assessment and Plan:         Myalgia    ASSESSMENT:  Acute kidney injury likely secondary to volume and low blood pressure  Hyponatremia may be secondary to alcohol intake follow-up with a urine studies   Metabolic acidosis may be due to his respiratory alkalosis because of some history of cirrhosis  Hypocalcemia due to poor nutritional status check phosphorus level tomorrow morning check magnesium level tomorrow morning    PLAN :     Sodium level is was better yesterday no labs done in last 24 hours   okay to encourage p.o. intake that will help with electrolytes  supplement potassium as needed we   will change sodium chloride pill to 1 pill a day check labs tomorrow morning  Renal function has improved  Clinically looks euvolemic, multifactorial hyponatremia, increased ADH, history of EtOH, low solute load  Remains on Zoloft, if possible reduced dose  SILVINA Monday  While acidosis likely related to underlying alkalosis  Mild hypocalcemia noted  corrected calcium is normal    Will continue to follow            Electronically signed by Red Quiroga MD,   [facility] kidney consultant  [phone]  3/13/2024  12:15 EDT

## 2024-03-13 NOTE — PLAN OF CARE
Problem: Adult Inpatient Plan of Care  Goal: Plan of Care Review  Outcome: Ongoing, Progressing  Goal: Patient-Specific Goal (Individualized)  Outcome: Ongoing, Progressing  Goal: Absence of Hospital-Acquired Illness or Injury  Outcome: Ongoing, Progressing  Intervention: Identify and Manage Fall Risk  Recent Flowsheet Documentation  Taken 3/13/2024 1421 by Chanell Garcia RN  Safety Promotion/Fall Prevention: safety round/check completed  Taken 3/13/2024 1200 by Chanell Garcia RN  Safety Promotion/Fall Prevention: safety round/check completed  Taken 3/13/2024 1056 by Chanell Garcia RN  Safety Promotion/Fall Prevention: safety round/check completed  Taken 3/13/2024 0859 by Chanell Garcia RN  Safety Promotion/Fall Prevention: safety round/check completed  Intervention: Prevent Skin Injury  Recent Flowsheet Documentation  Taken 3/13/2024 0859 by Chanell Garcia RN  Body Position: position changed independently  Intervention: Prevent and Manage VTE (Venous Thromboembolism) Risk  Recent Flowsheet Documentation  Taken 3/13/2024 0859 by Chanell Garcia RN  VTE Prevention/Management:   bilateral   sequential compression devices off  Intervention: Prevent Infection  Recent Flowsheet Documentation  Taken 3/13/2024 0859 by Chanell Garcia RN  Infection Prevention: single patient room provided  Goal: Optimal Comfort and Wellbeing  Outcome: Ongoing, Progressing  Intervention: Provide Person-Centered Care  Recent Flowsheet Documentation  Taken 3/13/2024 0859 by Chanell Garcia RN  Trust Relationship/Rapport:   care explained   choices provided   emotional support provided   empathic listening provided   questions encouraged   questions answered  Goal: Readiness for Transition of Care  Outcome: Ongoing, Progressing     Problem: Pain Acute  Goal: Acceptable Pain Control and Functional Ability  Outcome: Ongoing, Progressing  Intervention: Optimize Psychosocial Wellbeing  Recent Flowsheet  Documentation  Taken 3/13/2024 0859 by Chanell Garcia RN  Diversional Activities:   smartphone   television     Problem: Fall Injury Risk  Goal: Absence of Fall and Fall-Related Injury  Outcome: Ongoing, Progressing  Intervention: Promote Injury-Free Environment  Recent Flowsheet Documentation  Taken 3/13/2024 1421 by Chanell Garcia RN  Safety Promotion/Fall Prevention: safety round/check completed  Taken 3/13/2024 1200 by Chanell Garcia RN  Safety Promotion/Fall Prevention: safety round/check completed  Taken 3/13/2024 1056 by Chanell Garcia RN  Safety Promotion/Fall Prevention: safety round/check completed  Taken 3/13/2024 0859 by Chanell Garcia RN  Safety Promotion/Fall Prevention: safety round/check completed     Problem: Alcohol Withdrawal  Goal: Alcohol Withdrawal Symptom Control  Outcome: Ongoing, Progressing     Problem: Acute Neurologic Deterioration (Alcohol Withdrawal)  Goal: Optimal Neurologic Function  Outcome: Ongoing, Progressing     Problem: Substance Misuse (Alcohol Withdrawal)  Goal: Readiness for Change Identified  Outcome: Ongoing, Progressing     Problem: Electrolyte Imbalance  Goal: Electrolyte Balance  Outcome: Ongoing, Progressing     Problem: Skin Injury Risk Increased  Goal: Skin Health and Integrity  Outcome: Ongoing, Progressing   Goal Outcome Evaluation:   Pt has been relaxing bed all day. She has been impulsive with getting up to the bathroom; staff has had to assist her quiet a bit due to bed alarming when pt has gotten up. Reinforcement of using the call light has been utilized as well. She also completed her IV abx. No concerns at this time and call light within reach.

## 2024-03-13 NOTE — PROGRESS NOTES
Cardiology Progress Note      PATIENT IDENTIFICATION    Name: Lita Yu  Age: 58 y.o. Sex: female : 1965  MRN: [id number]    Requesting Provider    Edgar Oliva MD     LOS: 4 days         Cardiology assessment and plan    MRSA bacteremia  Lumbar discitis and osteomyelitis  History of liver cirrhosis  History of hemochromatosis  SILVINA with no evidence of any valvular vegetation with normal LV systolic function  Will see patient as needed in future          Reason For Followup:  Bacteremia      Subjective:    Patient denies any SOA or CP.  She c/o weakness.     Interval History:  Seen and examined.  Chart and labs reviewed.  Patient continues to deny any chest pain.  No new issues.    Review of Systems:  A complete review of systems was undertaken with the pertinent cardiovascular findings listed in history of present illness and all other systems being negative.     Assessment & Plan    Impressions:  Bacteremia-MRSA          Objective:    Medication Review:   Scheduled Meds:busPIRone, 15 mg, Oral, BID  enoxaparin, 40 mg, Subcutaneous, Daily  folic acid, 1 mg, Oral, Daily  Lidocaine, 1 patch, Transdermal, Daily  multivitamin with minerals, 1 tablet, Oral, Daily  potassium chloride, 20 mEq, Oral, Daily  QUEtiapine, 50 mg, Oral, Nightly  sertraline, 100 mg, Oral, BID  sodium chloride, 10 mL, Intravenous, Q12H  sodium chloride, 1 g, Oral, BID With Meals  [START ON 3/13/2024] thiamine, 100 mg, Oral, Daily  thiamine, 100 mg, Oral, Daily  vancomycin, 1,250 mg, Intravenous, Q12H      Continuous Infusions:Pharmacy to dose vancomycin,       PRN Meds:.  acetaminophen    albuterol    Calcium Replacement - Follow Nurse / BPA Driven Protocol    HYDROcodone-acetaminophen    loperamide    Magnesium Standard Dose Replacement - Follow Nurse / BPA Driven Protocol    melatonin    Pharmacy to dose vancomycin    Phosphorus Replacement - Follow Nurse / BPA Driven Protocol    Potassium Replacement - Follow Nurse / BPA  "Driven Protocol    [COMPLETED] Insert Peripheral IV **AND** sodium chloride    sodium chloride    sodium chloride      Myalgia         Physical Exam:    Neuro: AAOx3, no gross deficits  CV: S1S2 RRR, no murmur  Resp: non-labored, CTA  GI: abd soft, BS+  Ext: pedal pulses palp, no edema  Tele: SR    Vital Signs:  Vitals:    03/12/24 1335 03/12/24 1350 03/12/24 1540 03/12/24 1950   BP: 107/66 105/61  109/45   BP Location:    Left arm   Patient Position:    Lying   Pulse: 79 79  83   Resp:  18 20 18   Temp:   98.7 °F (37.1 °C)    TempSrc:   Oral    SpO2: 98% 100%  92%   Weight:       Height:         Wt Readings from Last 1 Encounters:   03/12/24 61.2 kg (135 lb)       Intake/Output Summary (Last 24 hours) at 3/12/2024 2144  Last data filed at 3/12/2024 2057  Gross per 24 hour   Intake 1600 ml   Output --   Net 1600 ml           Results Review:     CBC    Results from last 7 days   Lab Units 03/12/24  0407 03/11/24  0505 03/10/24  0621 03/08/24  2338 03/08/24  0752 03/07/24  0920   WBC 10*3/mm3 6.80 6.40 6.20 6.50 6.50 8.70   HEMOGLOBIN g/dL 7.8* 8.0* 8.1* 8.1* 9.4* 10.6*   PLATELETS 10*3/mm3 113* 99* 83* 64* 70* 76*     Cr Clearance Estimated Creatinine Clearance: 104.6 mL/min (by C-G formula based on SCr of 0.58 mg/dL).  Coag   Results from last 7 days   Lab Units 03/07/24  0920   INR  1.00   APTT seconds 33.4*     HbA1C   Lab Results   Component Value Date    HGBA1C 5.00 06/12/2023     Blood Glucose No results found for: \"POCGLU\"  Infection   Results from last 7 days   Lab Units 03/08/24  1643 03/07/24  0933 03/07/24  0920   BLOODCX  No growth at 4 days Staphylococcus aureus, MRSA* Staphylococcus aureus, MRSA*   BCIDPCR   --   --  Staph aureus. mecA/C and MREJ (methicillin resistance gene) detected. Identification by BCID2 PCR.*     CMP   Results from last 7 days   Lab Units 03/12/24  0407 03/11/24  0505 03/10/24  0621 03/09/24  0948 03/08/24  2338 03/08/24  0752 03/07/24  1757 03/07/24  0920   SODIUM mmol/L 131* " "129* 129* 120* 124* 128* 127* 127*   POTASSIUM mmol/L 3.7 3.6 4.3 4.0 3.2* 3.7 3.9 4.3   CHLORIDE mmol/L 102 99 100 92* 93* 95* 97* 92*   CO2 mmol/L 19.0* 18.0* 18.0* 17.0* 19.0* 20.0* 19.0* 18.0*   BUN mg/dL 11 11 14 14 15 17 18 20   CREATININE mg/dL 0.58 0.66 0.75 0.81 0.82 0.92 0.98 1.22*   GLUCOSE mg/dL 79 110* 77 111* 97 108* 86 99   ALBUMIN g/dL  --   --   --   --  2.8* 3.1*  --  3.8   BILIRUBIN mg/dL  --   --   --   --  0.5 0.5  --  0.7   ALK PHOS U/L  --   --   --   --  108 123*  --  153*   AST (SGOT) U/L  --   --   --   --  29 29  --  38*   ALT (SGPT) U/L  --   --   --   --  18 21  --  26   AMMONIA umol/L  --   --   --   --   --   --   --  16     ABG      UA    Results from last 7 days   Lab Units 03/07/24  0932   NITRITE UA  Negative     ABDI  No results found for: \"POCMETH\", \"POCAMPHET\", \"POCBARBITUR\", \"POCBENZO\", \"POCCOCAINE\", \"POCOPIATES\", \"POCOXYCODO\", \"POCPHENCYC\", \"POCPROPOXY\", \"POCTHC\", \"POCTRICYC\"  Lysis Labs   Results from last 7 days   Lab Units 03/12/24  0407 03/11/24  0505 03/10/24  0621 03/09/24  0948 03/08/24  2338 03/08/24  0752 03/07/24  1757 03/07/24  0920   INR   --   --   --   --   --   --   --  1.00   APTT seconds  --   --   --   --   --   --   --  33.4*   HEMOGLOBIN g/dL 7.8* 8.0* 8.1*  --  8.1* 9.4*  --  10.6*   PLATELETS 10*3/mm3 113* 99* 83*  --  64* 70*  --  76*   CREATININE mg/dL 0.58 0.66 0.75 0.81 0.82 0.92 0.98 1.22*     Radiology(recent) No radiology results for the last day      Results from last 7 days   Lab Units 03/07/24  0920   CK TOTAL U/L 74       Imaging Results (Last 24 Hours)       ** No results found for the last 24 hours. **            Cardiac Studies:  Echo- Results for orders placed during the hospital encounter of 03/07/24    Adult Transthoracic Echo Complete w/ Color, Spectral and Contrast if Necessary Per Protocol    Interpretation Summary    Left ventricular systolic function is normal. Left ventricular ejection fraction appears to be 56 - 60%.    Left " ventricular diastolic function was normal.    Estimated right ventricular systolic pressure from tricuspid regurgitation is normal (<35 mmHg).    Stress Myoview-  Cath-

## 2024-03-13 NOTE — PLAN OF CARE
Goal Outcome Evaluation:              Outcome Evaluation: Patient currently resting abed, voiced complainsts of lower back pain, prn administered. Patient appears restless and stated she cannot sit still. Patient did appear to sleep several hours this shift.

## 2024-03-13 NOTE — PROGRESS NOTES
Hospitalist Service   Daily Progress Note      Patient Name: Lita Yu  : 1965  MRN: [id number]  Primary Care Physician:  Norma Saul APRN  Date of admission: 3/7/2024      Subjective      Chief Complaint: Generalized weakness    Patient seen and examined this morning.  Feeling better this morning, still with back pain but controlled for now.  MRI results discussed with her, neurosurgery evaluation pending today.  Possible SILVINA as well today per cardiology.  No other complaints.    Pertinent positives as noted in HPI/subjective.  All other systems were reviewed and are negative.  3/12  Patient lethargic but in no acute distress  Patient with MRSA bacteremia on vancomycin  Followed up per ID and neurosurgery and nephrology and cardiology  Patient with L2-L3 discitis-osteomyelitis continues to have severe back pain with bilateral leg pain  MRI of the lumbar spine showed enhancement of the epidural fluid with moderate canal stenosis  Patient was not A surgical candidate as per neurosurgery  Patient with history of liver cirrhosis alcohol induced   Status post left total hip replacement secondary to hip fracture in   Continue IV antibiotic with vancomycin and follow-up per ID  She will need 6 weeks of IV antibiotic  SILVINA scheduled for today  Patient will probably need skilled nursing facility for 30 days or less on discharge    3/13  Patient had stress test which was negative  SILVINA done yesterday I cannot find the results yet  Transthoracic echo was done on   Patient with MRSA bacteremia followed up per infectious disease on IV antibiotic    Objective      Vitals:   Temp:  [97.8 °F (36.6 °C)-100 °F (37.8 °C)] 97.8 °F (36.6 °C)  Heart Rate:  [73-83] 78  Resp:  [18-25] 21  BP: ()/(44-69) 101/45  Flow (L/min):  [10-15] 10    Physical Exam:    General: Awake, alert, chronically ill-appearing female, lying in bed, NAD  Eyes: PERRL, EOMI, conjunctivae are clear  Cardiovascular:  Regular rate and rhythm, no murmurs  Respiratory: Clear to auscultation bilaterally, no wheezing or rales, unlabored breathing  Abdomen: Soft, nontender, positive bowel sounds, no guarding  Neurologic: A&O, CN grossly intact, moves all extremities spontaneously  Musculoskeletal: Generalized weakness, no other gross deformities  Skin: Warm, dry         Result Review    Result Review:  I have personally reviewed the results from the time of this admission to 3/13/2024 10:57 EDT and agree with these findings:  [x]  Laboratory  [x]  Microbiology  []  Radiology  []  EKG/Telemetry   []  Cardiology/Vascular   []  Pathology  []  Old records  []  Other:    Wounds (Last 24 Hours)       LDA Wound       Row Name 03/13/24 0859             Wound Bilateral perineum MASD (Moisture associated skin damage)    Wound - Properties Group Present on Original Admission: Y  -DI Side: Bilateral  -DI Location: perineum  -DI Primary Wound Type: MASD  -DI    Closure Unable to assess  -SR      Retired Wound - Properties Group Present on Original Admission: Y  -DI Side: Bilateral  -DI Location: perineum  -DI Primary Wound Type: MASD  -DI    Retired Wound - Properties Group Present on Original Admission: Y  -DI Side: Bilateral  -DI Location: perineum  -DI Primary Wound Type: MASD  -DI              User Key  (r) = Recorded By, (t) = Taken By, (c) = Cosigned By      Initials Name Provider Type    Chanell Joseph, RN Registered Nurse    Antonella Mulligan RN Registered Nurse                      Assessment & Plan      Brief Patient Summary:  Lita Yu is a 58 y.o. 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  Noted to have hyponatremia, hypocalcemia, hypokalemia, and SARITA on admission.  Nephrology consulted for further management.  Blood cultures collected on  admission positive for MRSA, ID consulted for further evaluation.      busPIRone, 15 mg, Oral, BID  enoxaparin, 40 mg, Subcutaneous, Daily  folic acid, 1 mg, Oral, Daily  Lidocaine, 1 patch, Transdermal, Daily  multivitamin with minerals, 1 tablet, Oral, Daily  [START ON 3/14/2024] potassium chloride, 20 mEq, Oral, Daily  QUEtiapine, 50 mg, Oral, Nightly  sertraline, 100 mg, Oral, BID  sodium chloride, 10 mL, Intravenous, Q12H  sodium chloride, 1 g, Oral, BID With Meals  thiamine, 100 mg, Oral, Daily  thiamine, 100 mg, Oral, Daily  vancomycin, 1,250 mg, Intravenous, Q12H       Pharmacy to dose vancomycin,          I have utilized all available, immediate resources to obtain, update, or review the patient's current medications including all prescriptions, over-the-counter products, herbals, cannabis/cannabidiol products, and vitamin.mineral/dietary (nutritional) supplements.    Active Hospital Problems:  Active Hospital Problems    Diagnosis     **Myalgia        Assessment/Plan:     MRSA bacteremia  L2-L3 discitis/osteomyelitis  Sepsis, POA  -Blood cultures positive for MRSA, likely source is lumbar discitis/osteomyelitis  -MRI spine findings noted to have L2-L3 discitis/osteomyelitis  -Continue IV vancomycin, ID following  -Cardiology on board for SILVINA  -Neurosurgery consulted    SARITA  Hyponatremia  Hypokalemia  Hypocalcemia  -Likely prerenal and due to alcohol abuse and poor p.o. intake  -Sodium being managed per nephrology, salt tabs added  -Replace potassium and calcium and monitor  -Nephrology following    Liver cirrhosis  -Likely alcohol related  -Does not appear to have decompensation at this time  -Continue supportive care and outpatient follow-up    Anemia of chronic disease  -Hemoglobin stable at this time  -Monitor and transfuse as needed    Tobacco abuse  -40-pack-year smoking history  -Counseled on cessation  -Nicotine patch if needed    DVT prophylaxis  -Lovenox      CODE STATUS:    Code Status (Patient  has no pulse and is not breathing): CPR (Attempt to Resuscitate)  Medical Interventions (Patient has pulse or is breathing): Full Support      Disposition: Pending clinical course    Electronically signed by Edgar Oliva MD, 03/13/24, 10:57 EDT.  [facility]      Part of this note may be an electronic transcription/translation of spoken language to printed text using the Dragon Dictation System.

## 2024-03-13 NOTE — PROGRESS NOTES
Infectious Diseases Progress Note      LOS: 5 days   Patient Care Team:  Norma Saul APRN as PCP - General (Nurse Practitioner)  Flory Villanueva MD (Physical Medicine and Rehabilitation)    Chief Complaint: Back pain, fever    Subjective       The patient has been afebrile for the last 24 hours.  The patient is on room air, hemodynamically stable, and is tolerating antimicrobial therapy..  Patient still having severe lower back pain and leg pain.  Patient status post SILVINA yesterday that was reported as negative for vegetation      Review of Systems:   Review of Systems   Constitutional: Negative.    HENT: Negative.     Eyes: Negative.    Respiratory: Negative.     Cardiovascular: Negative.    Gastrointestinal: Negative.    Endocrine: Negative.    Genitourinary: Negative.    Musculoskeletal:  Positive for back pain and neck pain.        Bilateral leg pain   Skin: Negative.    Neurological: Negative.    Psychiatric/Behavioral: Negative.     All other systems reviewed and are negative.       Objective     Vital Signs  Temp:  [97.8 °F (36.6 °C)-100 °F (37.8 °C)] 97.9 °F (36.6 °C)  Heart Rate:  [72-83] 74  Resp:  [18-21] 20  BP: (101-115)/(45-62) 106/62    Physical Exam:  Physical Exam  Vitals and nursing note reviewed.   Constitutional:       General: She is not in acute distress.     Appearance: She is well-developed and normal weight. She is ill-appearing. She is not diaphoretic.   HENT:      Head: Normocephalic and atraumatic.   Eyes:      General: No scleral icterus.     Extraocular Movements: Extraocular movements intact.      Conjunctiva/sclera: Conjunctivae normal.      Pupils: Pupils are equal, round, and reactive to light.   Cardiovascular:      Rate and Rhythm: Normal rate and regular rhythm.      Heart sounds: Normal heart sounds, S1 normal and S2 normal. No murmur heard.  Pulmonary:      Effort: Pulmonary effort is normal. No respiratory distress.      Breath sounds: Normal breath sounds. No stridor.  No wheezing or rales.   Chest:      Chest wall: No tenderness.   Abdominal:      General: Bowel sounds are normal. There is no distension.      Palpations: Abdomen is soft. There is no mass.      Tenderness: There is no abdominal tenderness. There is no guarding.   Musculoskeletal:         General: No swelling, tenderness or deformity. Normal range of motion.      Cervical back: Neck supple.      Comments: Tenderness with the movement of all joints including hips and shoulders but there is no obvious joint effusion     Tenderness at the lower back and the upper back      Skin:     General: Skin is warm and dry.      Coloration: Skin is not pale.      Findings: No bruising, erythema or rash.   Neurological:      General: No focal deficit present.      Mental Status: She is alert and oriented to person, place, and time. Mental status is at baseline.      Cranial Nerves: No cranial nerve deficit.   Psychiatric:         Mood and Affect: Mood normal.          Results Review:    I have reviewed all clinical data, test, lab, and imaging results.     Radiology  No Radiology Exams Resulted Within Past 24 Hours    Cardiology    Laboratory    Results from last 7 days   Lab Units 03/12/24 2219 03/12/24  0407 03/11/24  0505 03/10/24  0621 03/08/24  2338 03/08/24  0752 03/07/24  0920   WBC 10*3/mm3 8.60 6.80 6.40 6.20 6.50 6.50 8.70   HEMOGLOBIN g/dL 7.9* 7.8* 8.0* 8.1* 8.1* 9.4* 10.6*   HEMATOCRIT % 24.8* 24.0* 24.5* 25.4* 25.2* 29.0* 32.0*   PLATELETS 10*3/mm3 142 113* 99* 83* 64* 70* 76*     Results from last 7 days   Lab Units 03/13/24  1324 03/12/24 2219 03/12/24  0407 03/11/24  0505 03/10/24  0621 03/09/24  0948 03/08/24  2338 03/08/24  0752 03/07/24  1757 03/07/24  0920   SODIUM mmol/L  --  135* 131* 129* 129* 120* 124* 128*   < > 127*   POTASSIUM mmol/L 4.5 3.6 3.7 3.6 4.3 4.0 3.2* 3.7   < > 4.3   CHLORIDE mmol/L  --  105 102 99 100 92* 93* 95*   < > 92*   CO2 mmol/L  --  17.0* 19.0* 18.0* 18.0* 17.0* 19.0* 20.0*   < >  18.0*   BUN mg/dL  --  11 11 11 14 14 15 17   < > 20   CREATININE mg/dL  --  0.58 0.58 0.66 0.75 0.81 0.82 0.92   < > 1.22*   GLUCOSE mg/dL  --  112* 79 110* 77 111* 97 108*   < > 99   ALBUMIN g/dL  --   --   --   --   --   --  2.8* 3.1*  --  3.8   BILIRUBIN mg/dL  --   --   --   --   --   --  0.5 0.5  --  0.7   ALK PHOS U/L  --   --   --   --   --   --  108 123*  --  153*   AST (SGOT) U/L  --   --   --   --   --   --  29 29  --  38*   ALT (SGPT) U/L  --   --   --   --   --   --  18 21  --  26   AMMONIA umol/L  --   --   --   --   --   --   --   --   --  16   CALCIUM mg/dL  --  8.2* 8.2* 8.2* 8.2* 8.1* 8.2* 8.3*   < > 9.2    < > = values in this interval not displayed.     Results from last 7 days   Lab Units 03/07/24  0920   CK TOTAL U/L 74     Results from last 7 days   Lab Units 03/08/24  2338   SED RATE mm/hr 59*         Microbiology   Microbiology Results (last 10 days)       Procedure Component Value - Date/Time    Clostridioides difficile EIA - Stool, Per Rectum [[id number]]  (Normal) Collected: 03/08/24 2327    Lab Status: Final result Specimen: Stool from Per Rectum Updated: 03/09/24 0745     C Diff GDH Ag Negative     C.diff Toxin Ag Negative    Narrative:      The result indicates the absence of toxigenic C.difficile from stool specimen.    Blood Culture - Blood, Arm, Right [[id number]]  (Normal) Collected: 03/08/24 1643    Lab Status: Preliminary result Specimen: Blood from Arm, Right Updated: 03/12/24 1815     Blood Culture No growth at 4 days    COVID-19, FLU A/B, RSV PCR 1 HR TAT - Swab, Nasopharynx [[id number]]  (Normal) Collected: 03/07/24 1526    Lab Status: Final result Specimen: Swab from Nasopharynx Updated: 03/07/24 1607     COVID19 Not Detected     Influenza A PCR Not Detected     Influenza B PCR Not Detected     RSV, PCR Not Detected    Narrative:      Fact sheet for providers: https://www.fda.gov/media/921357/download    Fact sheet for patients: https://www.fda.gov/media/762694/download    Test  performed by PCR.    Blood Culture - Blood, Arm, Left [[id number]]  (Abnormal) Collected: 03/07/24 0933    Lab Status: Final result Specimen: Blood from Arm, Left Updated: 03/10/24 0657     Blood Culture Staphylococcus aureus, MRSA     Comment:   Infectious disease consultation is highly recommended to rule out distant foci of infection.  Methicillin resistant Staphylococcus aureus, Patient may be an isolation risk.        Isolated from Aerobic and Anaerobic Bottles     Gram Stain Aerobic Bottle Gram positive cocci in clusters      Anaerobic Bottle Gram positive cocci in clusters    Narrative:      Refer to previous blood culture collected on  03/07/2024 at 0920 for MICs.    Blood Culture - Blood, Arm, Right [[id number]]  (Abnormal)  (Susceptibility) Collected: 03/07/24 0920    Lab Status: Final result Specimen: Blood from Arm, Right Updated: 03/10/24 0657     Blood Culture Staphylococcus aureus, MRSA     Comment:   Infectious disease consultation is highly recommended to rule out distant foci of infection.  Methicillin resistant Staphylococcus aureus, Patient may be an isolation risk.        Isolated from Aerobic and Anaerobic Bottles     Gram Stain Anaerobic Bottle Gram positive cocci in clusters      Aerobic Bottle Gram positive cocci in clusters    Susceptibility        Staphylococcus aureus, MRSA      SOFIA      Gentamicin Susceptible      Oxacillin Resistant      Rifampin Susceptible      Vancomycin Susceptible                           Blood Culture ID, PCR - Blood, Arm, Right [[id number]]  (Abnormal) Collected: 03/07/24 0920    Lab Status: Final result Specimen: Blood from Arm, Right Updated: 03/08/24 0433     BCID, PCR Staph aureus. mecA/C and MREJ (methicillin resistance gene) detected. Identification by BCID2 PCR.     BOTTLE TYPE Anaerobic Bottle    Narrative:      Infectious disease consultation is highly recommended to rule out distant foci of infection.            Medication Review:       Schedule  Meds  busPIRone, 15 mg, Oral, BID  enoxaparin, 40 mg, Subcutaneous, Daily  folic acid, 1 mg, Oral, Daily  Lidocaine, 1 patch, Transdermal, Daily  multivitamin with minerals, 1 tablet, Oral, Daily  [START ON 3/14/2024] potassium chloride, 20 mEq, Oral, Daily  QUEtiapine, 50 mg, Oral, Nightly  sertraline, 100 mg, Oral, BID  sodium chloride, 10 mL, Intravenous, Q12H  [START ON 3/14/2024] sodium chloride, 1 g, Oral, Daily  thiamine, 100 mg, Oral, Daily  thiamine, 100 mg, Oral, Daily  vancomycin, 1,250 mg, Intravenous, Q12H        Infusion Meds  Pharmacy to dose vancomycin,         PRN Meds    acetaminophen    albuterol    Calcium Replacement - Follow Nurse / BPA Driven Protocol    HYDROcodone-acetaminophen    loperamide    Magnesium Standard Dose Replacement - Follow Nurse / BPA Driven Protocol    melatonin    Pharmacy to dose vancomycin    Phosphorus Replacement - Follow Nurse / BPA Driven Protocol    Potassium Replacement - Follow Nurse / BPA Driven Protocol    [COMPLETED] Insert Peripheral IV **AND** sodium chloride    sodium chloride    sodium chloride        Assessment & Plan       Antimicrobial Therapy   1.  IV vancomycin        2.        3.        4.        5.          Assessment     Methicillin resistant Staphylococcus aureus bacteremia.  Likely source is lumbar discitis/osteomyelitis.  Endocarditis also needs to be ruled out.  Patient denies using IV drugs.  Repeat blood culture from 3/8/2024 is negative so far.  2D echo did not show any vegetation.  SILVINA performed on 3/12/2024 with cardiology notes mentioning no vegetation    L2-L3 discitis/osteomyelitis.  Patient continues to have fairly severe back pain with bilateral leg pain MRI of the lumbar spine showed enhancement of the epidural fluid with moderate canal stenosis.  Neurosurgery did not feel that patient was a surgical candidate at this time     History of liver cirrhosis secondary to alcohol and hemochromatosis     History of alcohol abuse and tobacco  abuse     S/p left total hip replacement secondary to hip fracture in 2017 or 2018.  Patient denied having infection after the procedure    Reported diarrhea at admission.  C. difficile screen was negative     Plan     Continue IV vancomycin-keep trough between 15 and 20.  Patient will need 8 weeks of treatment from last negative blood culture on 3/8/2024.  Last day on 5/2/2024  Patient will need a PICC line before discharge-please remove when IV antibiotics are completed  Recommend repeat MRI of the lumbar spine after antimicrobial therapy is completed  Continue supportive care  Okay to discharge from ID standpoint once IV antibiotics are arranged  Continued alcohol and tobacco abuse cessation    Please fax all post discharge lab results, imaging studies and correspondence to this fax number [phone]  For any question or concern please contact our service number [phone]    SHARIF Mae  03/13/24  14:22 EDT    Note is dictated utilizing voice recognition software/Dragon

## 2024-03-14 LAB
ANION GAP SERPL CALCULATED.3IONS-SCNC: 12 MMOL/L (ref 5–15)
BASOPHILS # BLD AUTO: 0.04 10*3/MM3 (ref 0–0.2)
BASOPHILS NFR BLD AUTO: 0.5 % (ref 0–1.5)
BH CV ECHO SHUNT ASSESSMENT PERFORMED (HIDDEN SCRIPTING): 1
BUN SERPL-MCNC: 8 MG/DL (ref 6–20)
BUN/CREAT SERPL: 15.4 (ref 7–25)
CALCIUM SPEC-SCNC: 8.2 MG/DL (ref 8.6–10.5)
CHLORIDE SERPL-SCNC: 105 MMOL/L (ref 98–107)
CO2 SERPL-SCNC: 17 MMOL/L (ref 22–29)
CREAT SERPL-MCNC: 0.52 MG/DL (ref 0.57–1)
DEPRECATED RDW RBC AUTO: 68.6 FL (ref 37–54)
EGFRCR SERPLBLD CKD-EPI 2021: 109.2 ML/MIN/1.73
EOSINOPHIL # BLD AUTO: 0.2 10*3/MM3 (ref 0–0.4)
EOSINOPHIL NFR BLD AUTO: 2.5 % (ref 0.3–6.2)
ERYTHROCYTE [DISTWIDTH] IN BLOOD BY AUTOMATED COUNT: 17.3 % (ref 12.3–15.4)
GLUCOSE SERPL-MCNC: 84 MG/DL (ref 65–99)
HCT VFR BLD AUTO: 23.5 % (ref 34–46.6)
HGB BLD-MCNC: 7.5 G/DL (ref 12–15.9)
IMM GRANULOCYTES # BLD AUTO: 0.32 10*3/MM3 (ref 0–0.05)
IMM GRANULOCYTES NFR BLD AUTO: 4 % (ref 0–0.5)
LYMPHOCYTES # BLD AUTO: 1.1 10*3/MM3 (ref 0.7–3.1)
LYMPHOCYTES NFR BLD AUTO: 13.6 % (ref 19.6–45.3)
MCH RBC QN AUTO: 34.4 PG (ref 26.6–33)
MCHC RBC AUTO-ENTMCNC: 31.9 G/DL (ref 31.5–35.7)
MCV RBC AUTO: 107.8 FL (ref 79–97)
MONOCYTES # BLD AUTO: 0.74 10*3/MM3 (ref 0.1–0.9)
MONOCYTES NFR BLD AUTO: 9.1 % (ref 5–12)
NEUTROPHILS NFR BLD AUTO: 5.69 10*3/MM3 (ref 1.7–7)
NEUTROPHILS NFR BLD AUTO: 70.3 % (ref 42.7–76)
NRBC BLD AUTO-RTO: 0 /100 WBC (ref 0–0.2)
PLATELET # BLD AUTO: 150 10*3/MM3 (ref 140–450)
PMV BLD AUTO: 10.4 FL (ref 6–12)
POTASSIUM SERPL-SCNC: 4.2 MMOL/L (ref 3.5–5.2)
RBC # BLD AUTO: 2.18 10*6/MM3 (ref 3.77–5.28)
SODIUM SERPL-SCNC: 134 MMOL/L (ref 136–145)
VANCOMYCIN TROUGH SERPL-MCNC: 22.6 MCG/ML (ref 5–20)
WBC NRBC COR # BLD AUTO: 8.09 10*3/MM3 (ref 3.4–10.8)

## 2024-03-14 PROCEDURE — 97116 GAIT TRAINING THERAPY: CPT

## 2024-03-14 PROCEDURE — 80048 BASIC METABOLIC PNL TOTAL CA: CPT | Performed by: INTERNAL MEDICINE

## 2024-03-14 PROCEDURE — 25810000003 SODIUM CHLORIDE 0.9 % SOLUTION 250 ML FLEX CONT: Performed by: NURSE PRACTITIONER

## 2024-03-14 PROCEDURE — 25010000002 VANCOMYCIN HCL 1.25 G RECONSTITUTED SOLUTION 1 EACH VIAL: Performed by: NURSE PRACTITIONER

## 2024-03-14 PROCEDURE — 97530 THERAPEUTIC ACTIVITIES: CPT

## 2024-03-14 PROCEDURE — 80202 ASSAY OF VANCOMYCIN: CPT | Performed by: NURSE PRACTITIONER

## 2024-03-14 PROCEDURE — 25010000002 ENOXAPARIN PER 10 MG: Performed by: STUDENT IN AN ORGANIZED HEALTH CARE EDUCATION/TRAINING PROGRAM

## 2024-03-14 PROCEDURE — 85025 COMPLETE CBC W/AUTO DIFF WBC: CPT | Performed by: INTERNAL MEDICINE

## 2024-03-14 RX ADMIN — BUSPIRONE HYDROCHLORIDE 15 MG: 15 TABLET ORAL at 08:51

## 2024-03-14 RX ADMIN — POTASSIUM CHLORIDE 20 MEQ: 1500 TABLET, EXTENDED RELEASE ORAL at 08:51

## 2024-03-14 RX ADMIN — Medication 1 TABLET: at 08:51

## 2024-03-14 RX ADMIN — VANCOMYCIN HYDROCHLORIDE 1250 MG: 1.25 INJECTION, POWDER, LYOPHILIZED, FOR SOLUTION INTRAVENOUS at 16:58

## 2024-03-14 RX ADMIN — ENOXAPARIN SODIUM 40 MG: 100 INJECTION SUBCUTANEOUS at 16:58

## 2024-03-14 RX ADMIN — SODIUM CHLORIDE 1 G: 1 TABLET ORAL at 08:52

## 2024-03-14 RX ADMIN — Medication 100 MG: at 08:51

## 2024-03-14 RX ADMIN — VANCOMYCIN HYDROCHLORIDE 1250 MG: 1.25 INJECTION, POWDER, LYOPHILIZED, FOR SOLUTION INTRAVENOUS at 05:57

## 2024-03-14 RX ADMIN — Medication 10 ML: at 08:52

## 2024-03-14 RX ADMIN — HYDROCODONE BITARTRATE AND ACETAMINOPHEN 1 TABLET: 5; 325 TABLET ORAL at 19:26

## 2024-03-14 RX ADMIN — SERTRALINE 100 MG: 100 TABLET, FILM COATED ORAL at 08:51

## 2024-03-14 RX ADMIN — HYDROCODONE BITARTRATE AND ACETAMINOPHEN 1 TABLET: 5; 325 TABLET ORAL at 10:15

## 2024-03-14 RX ADMIN — LIDOCAINE 1 PATCH: 4 PATCH TOPICAL at 08:50

## 2024-03-14 RX ADMIN — Medication 10 ML: at 20:22

## 2024-03-14 RX ADMIN — SERTRALINE 100 MG: 100 TABLET, FILM COATED ORAL at 20:21

## 2024-03-14 RX ADMIN — FOLIC ACID 1 MG: 1 TABLET ORAL at 08:51

## 2024-03-14 RX ADMIN — HYDROCODONE BITARTRATE AND ACETAMINOPHEN 1 TABLET: 5; 325 TABLET ORAL at 03:49

## 2024-03-14 RX ADMIN — QUETIAPINE FUMARATE 50 MG: 25 TABLET ORAL at 20:21

## 2024-03-14 RX ADMIN — BUSPIRONE HYDROCHLORIDE 15 MG: 15 TABLET ORAL at 20:21

## 2024-03-14 NOTE — PROGRESS NOTES
PROGRESS NOTE      Patient Name: Lita Yu  : 1965  MRN: [id number]  Primary Care Physician: Norma Saul APRN  Date of admission: 3/7/2024    Patient Care Team:  Norma Saul APRN as PCP - General (Nurse Practitioner)  Flory Villanueva MD (Physical Medicine and Rehabilitation)        Subjective   Subjective:   Patient seen and examined this morning, she denies any new complaints.  Review of systems:  All other review of system unremarkable      Allergies:    Allergies   Allergen Reactions   • Sulfa Antibiotics Hives   • Keflex [Cephalexin] Hives       Objective   Exam:     Vital Signs  Temp:  [97.8 °F (36.6 °C)-98.8 °F (37.1 °C)] 97.8 °F (36.6 °C)  Heart Rate:  [69-76] 72  Resp:  [15-20] 18  BP: (106-124)/(55-70) 107/56  SpO2:  [95 %-99 %] 99 %  on   ;   Device (Oxygen Therapy): room air  Body mass index is 23.91 kg/m².    General: Middle-age female in no acute distress.    Head:      Normocephalic and atraumatic.    Eyes:      PERRL/EOM intact, conjunctiva and sclera clear with out nystagmus.    Neck:      No masses, thyromegaly,  trachea central with normal respiratory effort   Lungs:    Clear bilaterally to auscultation.    Heart:      Regular rate and rhythm, no murmur no gallop  Abd:        Soft, nontender, not distended, bowel sounds positive, no shifting dullness   Pulses:   Pulses palpable  Extr:        No cyanosis or clubbing--no significant edema.    Neuro:    No focal deficits.   alert oriented x3  Skin:       Intact without lesions or rashes.    Psych:    Alert and cooperative; normal mood and affect; .      Results Review:  I have personally reviewed most recent Data :  CBC    Results from last 7 days   Lab Units 24  0048 24  2219 24  0407 24  0505 03/10/24  0621 24  2338 24  0752   WBC 10*3/mm3 8.09 8.60 6.80 6.40 6.20 6.50 6.50   HEMOGLOBIN g/dL 7.5* 7.9* 7.8* 8.0* 8.1* 8.1* 9.4*   PLATELETS 10*3/mm3 150 142 113* 99* 83* 64*  70*     CMP   Results from last 7 days   Lab Units 03/14/24  0048 03/13/24  1324 03/12/24  2219 03/12/24  0407 03/11/24  0505 03/10/24  0621 03/09/24  0948 03/08/24  2338 03/08/24  0752   SODIUM mmol/L 134*  --  135* 131* 129* 129* 120* 124* 128*   POTASSIUM mmol/L 4.2 4.5 3.6 3.7 3.6 4.3 4.0 3.2* 3.7   CHLORIDE mmol/L 105  --  105 102 99 100 92* 93* 95*   CO2 mmol/L 17.0*  --  17.0* 19.0* 18.0* 18.0* 17.0* 19.0* 20.0*   BUN mg/dL 8  --  11 11 11 14 14 15 17   CREATININE mg/dL 0.52*  --  0.58 0.58 0.66 0.75 0.81 0.82 0.92   GLUCOSE mg/dL 84  --  112* 79 110* 77 111* 97 108*   ALBUMIN g/dL  --   --   --   --   --   --   --  2.8* 3.1*   BILIRUBIN mg/dL  --   --   --   --   --   --   --  0.5 0.5   ALK PHOS U/L  --   --   --   --   --   --   --  108 123*   AST (SGOT) U/L  --   --   --   --   --   --   --  29 29   ALT (SGPT) U/L  --   --   --   --   --   --   --  18 21     ABG      No radiology results for the last day    Results for orders placed during the hospital encounter of 03/07/24    Adult Transesophageal Echo (SILVINA) W/ Cont if Necessary Per Protocol    Interpretation Summary  •  Left ventricular systolic function is normal. Left ventricular ejection fraction appears to be 61 - 65%.  •  Left ventricular diastolic function was normal.  •  The left atrial cavity is mildly dilated.  •  Saline test results are negative.    Scheduled Meds:busPIRone, 15 mg, Oral, BID  enoxaparin, 40 mg, Subcutaneous, Daily  folic acid, 1 mg, Oral, Daily  Lidocaine, 1 patch, Transdermal, Daily  multivitamin with minerals, 1 tablet, Oral, Daily  potassium chloride, 20 mEq, Oral, Daily  QUEtiapine, 50 mg, Oral, Nightly  sertraline, 100 mg, Oral, BID  sodium chloride, 10 mL, Intravenous, Q12H  sodium chloride, 1 g, Oral, Daily  thiamine, 100 mg, Oral, Daily  vancomycin, 1,250 mg, Intravenous, Q12H      Continuous Infusions:Pharmacy to dose vancomycin,       PRN Meds:•  acetaminophen  •  albuterol  •  Calcium Replacement - Follow Nurse / BPA  Driven Protocol  •  HYDROcodone-acetaminophen  •  loperamide  •  Magnesium Standard Dose Replacement - Follow Nurse / BPA Driven Protocol  •  melatonin  •  Pharmacy to dose vancomycin  •  Phosphorus Replacement - Follow Nurse / BPA Driven Protocol  •  Potassium Replacement - Follow Nurse / BPA Driven Protocol  •  [COMPLETED] Insert Peripheral IV **AND** sodium chloride  •  sodium chloride  •  sodium chloride    Assessment & Plan   Assessment and Plan:         Myalgia    ASSESSMENT:  Acute kidney injury likely secondary to volume and low blood pressure  Hyponatremia may be secondary to alcohol intake follow-up with a urine studies   Metabolic acidosis may be due to his respiratory alkalosis because of some history of cirrhosis  Hypocalcemia due to poor nutritional status check phosphorus level tomorrow morning check magnesium level tomorrow morning    PLAN :     Sodium level is stable from  yesterday no labs done in last 24 hours   Need to improve nutrition instead of hydration  that will help with electrolytes  supplement potassium as needed we   DC sodium chloride pills  f/u with Na may need fluid restriction   Renal function has improved  Clinically looks euvolemic, multifactorial hyponatremia, increased ADH, history of EtOH, low solute load  Remains on Zoloft, if possible reduced dose  SILVINA Monday  While acidosis likely related to underlying alkalosis  Mild hypocalcemia noted  corrected calcium is normal    Will continue to follow            Electronically signed by Red Quiroga MD,   [facility] kidney consultant  [phone]  3/14/2024  11:14 EDT   Yes

## 2024-03-14 NOTE — PLAN OF CARE
Problem: Adult Inpatient Plan of Care  Goal: Plan of Care Review  Outcome: Ongoing, Progressing  Goal: Patient-Specific Goal (Individualized)  Outcome: Ongoing, Progressing  Goal: Absence of Hospital-Acquired Illness or Injury  Outcome: Ongoing, Progressing  Intervention: Identify and Manage Fall Risk  Recent Flowsheet Documentation  Taken 3/14/2024 1657 by Chanell Garcia RN  Safety Promotion/Fall Prevention: safety round/check completed  Taken 3/14/2024 1451 by Chanell Garcia RN  Safety Promotion/Fall Prevention: safety round/check completed  Taken 3/14/2024 1223 by Chanell Garcia RN  Safety Promotion/Fall Prevention: safety round/check completed  Taken 3/14/2024 1025 by Chanell Garcia RN  Safety Promotion/Fall Prevention: safety round/check completed  Taken 3/14/2024 0859 by Chanell Garcia RN  Safety Promotion/Fall Prevention: safety round/check completed  Intervention: Prevent and Manage VTE (Venous Thromboembolism) Risk  Recent Flowsheet Documentation  Taken 3/14/2024 0859 by Chanell Garcia RN  VTE Prevention/Management:   bilateral   sequential compression devices off  Range of Motion: active ROM (range of motion) encouraged  Goal: Optimal Comfort and Wellbeing  Outcome: Ongoing, Progressing  Intervention: Monitor Pain and Promote Comfort  Recent Flowsheet Documentation  Taken 3/14/2024 1657 by Chanell Garcia RN  Pain Management Interventions:   position adjusted   pillow support provided  Intervention: Provide Person-Centered Care  Recent Flowsheet Documentation  Taken 3/14/2024 0859 by Chanell Garcia RN  Trust Relationship/Rapport:   care explained   choices provided   emotional support provided   empathic listening provided   questions encouraged  Goal: Readiness for Transition of Care  Outcome: Ongoing, Progressing     Problem: Pain Acute  Goal: Acceptable Pain Control and Functional Ability  Outcome: Ongoing, Progressing  Intervention: Develop Pain  Management Plan  Recent Flowsheet Documentation  Taken 3/14/2024 1657 by Chanell Garcia RN  Pain Management Interventions:   position adjusted   pillow support provided  Intervention: Optimize Psychosocial Wellbeing  Recent Flowsheet Documentation  Taken 3/14/2024 0859 by Chanell Garcia RN  Diversional Activities:   smartphone   television     Problem: Fall Injury Risk  Goal: Absence of Fall and Fall-Related Injury  Outcome: Ongoing, Progressing  Intervention: Promote Injury-Free Environment  Recent Flowsheet Documentation  Taken 3/14/2024 1657 by Chanell Garcia RN  Safety Promotion/Fall Prevention: safety round/check completed  Taken 3/14/2024 1451 by Chanell Garcia RN  Safety Promotion/Fall Prevention: safety round/check completed  Taken 3/14/2024 1223 by Chanell Garcia RN  Safety Promotion/Fall Prevention: safety round/check completed  Taken 3/14/2024 1025 by Chanell Garcia RN  Safety Promotion/Fall Prevention: safety round/check completed  Taken 3/14/2024 0859 by Chanell Garcia RN  Safety Promotion/Fall Prevention: safety round/check completed     Problem: Alcohol Withdrawal  Goal: Alcohol Withdrawal Symptom Control  Outcome: Ongoing, Progressing     Problem: Acute Neurologic Deterioration (Alcohol Withdrawal)  Goal: Optimal Neurologic Function  Outcome: Ongoing, Progressing     Problem: Substance Misuse (Alcohol Withdrawal)  Goal: Readiness for Change Identified  Outcome: Ongoing, Progressing     Problem: Electrolyte Imbalance  Goal: Electrolyte Balance  Outcome: Ongoing, Progressing     Problem: Skin Injury Risk Increased  Goal: Skin Health and Integrity  Outcome: Ongoing, Progressing   Goal Outcome Evaluation:   Pt has been relaxing all day in bed. She did receive Hydrocodone PRN for pain and she has a Lidocaine patch located on her lower back. She also completed her IV abx. No concerns at this time and call light within reach.

## 2024-03-14 NOTE — PROGRESS NOTES
"Enter Query Response Below      Query Response: SARITA not due to sepsis in this case              If applicable, please update the problem list.       Patient: Lita Yu \"Minerva\"        : 1965  Account: [id number]           Admit Date:         How to Respond to this query:       a. Click New Note     b. Answer query within the yellow box.                c. Update the Problem List, if applicable.      If you have any questions about this query contact me at: Sincerely,    Roberth Dover RN, BSN  Clinical Documentation Integrity  [facility]  [email or web address]       ,     58 year old female with history of Liver cirrhosis, alcohol abuse, hypertension presented with muscle aches, weakness, diarrhea and was found to have MRSA bacteremia with source felt to be lumbar discitis/osteomyelitis, with sepsis and SARITA.    -Per renal consult 3/8- \"Acute kidney injury likely secondary to volume and low blood pressure...Metabolic acidosis may be due to his respiratory alkalosis because of some history of cirrhosis.\"      -Treatments have included  RL bolus  1000 ccs (3/7, 0937), NS bolus 500 ccs (3/7, 1702), monitoring of labs, electrolyte replacements.    -Per renal 3/13, \" Renal function has improved..... Clinically looks euvolemic.....\"    Please clarify the following:    SARITA due to sepsis  SARITA not due to sepsis in this case  Other- specify_______  Unable to determine      By submitting this query, we are merely seeking further clarification of documentation to accurately reflect all conditions that you are monitoring, evaluating, treating or that extend the hospitalization or utilize additional resources of care. Please utilize your independent clinical judgment when addressing the question(s) above.     This query and your response, once completed, will be entered into the legal medical record.    Sincerely,  Roberth Dover  Clinical Documentation Integrity Program     "

## 2024-03-14 NOTE — PROGRESS NOTES
Hospitalist Service   Daily Progress Note      Patient Name: Lita Yu  : 1965  MRN: [id number]  Primary Care Physician:  Norma Saul APRN  Date of admission: 3/7/2024      Subjective      Chief Complaint: Generalized weakness    Patient seen and examined this morning.  Feeling better this morning, still with back pain but controlled for now.  MRI results discussed with her, neurosurgery evaluation pending today.  Possible SILVINA as well today per cardiology.  No other complaints.    Pertinent positives as noted in HPI/subjective.  All other systems were reviewed and are negative.  3/12  Patient lethargic but in no acute distress  Patient with MRSA bacteremia on vancomycin  Followed up per ID and neurosurgery and nephrology and cardiology  Patient with L2-L3 discitis-osteomyelitis continues to have severe back pain with bilateral leg pain  MRI of the lumbar spine showed enhancement of the epidural fluid with moderate canal stenosis  Patient was not A surgical candidate as per neurosurgery  Patient with history of liver cirrhosis alcohol induced   Status post left total hip replacement secondary to hip fracture in   Continue IV antibiotic with vancomycin and follow-up per ID  She will need 6 weeks of IV antibiotic  SILVINA scheduled for today  Patient will probably need skilled nursing facility for 30 days or less on discharge    3/13  Patient had stress test which was negative  SILVINA done yesterday I cannot find the results yet  Transthoracic echo was done on   Patient with MRSA bacteremia followed up per infectious disease on IV antibiotic    3/14  Patient had SILVINA yesterday was no vegetation  On IV antibiotic with vancomycin  Awaiting precertification to be transferred to skilled nursing facility to continue IV antibiotic for total of 6 weeks  Awake alert oriented x 3  Hemodynamically stable  Discharge planning to skilled nursing facility once bed is available    Objective      Vitals:  Office Visit    Assessment AND Plan    Migraine  Thyroid nodule  Sleeplessness    We spent 15 minutes of the 20 minute appointment in discussion of her concerns and planning for further therapy.  She will be referred for an ultrasound to follow-up on the thyroid nodules which were small and benign-appearing on CT scan in the emergency room.  She will also be referred to sleep Medicine due to her issues with falling asleep and staying asleep despite medication her anxiety.  She will return to clinic for further follow-up as needed.      CHIEF COMPLAINT    Chief Complaint   Patient presents with   • ER F/U         History of present illness    She is here today for follow-up on recent visit to the emergency room for worsening headache.  Patient has history of migraines and states that her recent issues with sleeplessness which have been fairly chronic but worse lately have contributed to the headaches as well.  When she was in the emergency room she had a CT scan which fortunately did not show any AV malformations but did show some small thyroid nodules which they advised her getting an ultrasound for.  Patient states she has difficulties getting asleep and staying asleep.  Her  states that she does snore at night but more so when she has been very tired.  She states that the headaches are much worsened by the sleeplessness.    I have reviewed the past medical, family, and social history sections including the medications and allergies listed in the above medical record as well as the nursing notes.     Review of systems    All other review of systems negative, except for those noted.     Physical Exam    Vital Signs:    Vitals:    08/08/23 1320   BP: 102/78   Pulse: 68   Resp: 18   Temp: 98.2 °F (36.8 °C)   TempSrc: Oral   Weight: 73.5 kg (162 lb)   Height: 5' 4\" (1.626 m)   LMP: 07/24/2023     Lungs clear to auscultation and percussion heart regular rate and rhythm without murmur click or rub.  Patient alert    Temp:  [97.8 °F (36.6 °C)-98.8 °F (37.1 °C)] 97.8 °F (36.6 °C)  Heart Rate:  [69-76] 70  Resp:  [14-20] 14  BP: (107-124)/(55-70) 120/63    Physical Exam:    General: Awake, alert, chronically ill-appearing female, lying in bed, NAD  Eyes: PERRL, EOMI, conjunctivae are clear  Cardiovascular: Regular rate and rhythm, no murmurs  Respiratory: Clear to auscultation bilaterally, no wheezing or rales, unlabored breathing  Abdomen: Soft, nontender, positive bowel sounds, no guarding  Neurologic: A&O, CN grossly intact, moves all extremities spontaneously  Musculoskeletal: Generalized weakness, no other gross deformities  Skin: Warm, dry         Result Review    Result Review:  I have personally reviewed the results from the time of this admission to 3/14/2024 11:49 EDT and agree with these findings:  [x]  Laboratory  [x]  Microbiology  []  Radiology  []  EKG/Telemetry   []  Cardiology/Vascular   []  Pathology  []  Old records  []  Other:    Wounds (Last 24 Hours)       LDA Wound       Row Name 03/14/24 0859 03/13/24 2043 03/13/24 1659       Wound Bilateral perineum MASD (Moisture associated skin damage)    Wound - Properties Group Present on Original Admission: Y  -DI Side: Bilateral  -DI Location: perineum  -DI Primary Wound Type: MASD  -DI    Closure Open to air  -SR Open to air  -JA Open to air  -SR    Care, Wound -- cleansed with;soap and water  -JA cleansed with;soap and water  -SR    Retired Wound - Properties Group Present on Original Admission: Y  -DI Side: Bilateral  -DI Location: perineum  -DI Primary Wound Type: MASD  -DI    Retired Wound - Properties Group Present on Original Admission: Y  -DI Side: Bilateral  -DI Location: perineum  -DI Primary Wound Type: MASD  -DI      Row Name 03/13/24 1200             Wound Bilateral perineum MASD (Moisture associated skin damage)    Wound - Properties Group Present on Original Admission: Y  -DI Side: Bilateral  -DI Location: perineum  -DI Primary Wound Type: MASD  -DI  and oriented x4.  No focal neuro deficits appreciable.  Gait and station intact.  Fundi benign.  The patient indicates understanding of these issues and agrees with the plan.         Closure Unable to assess  -SR      Retired Wound - Properties Group Present on Original Admission: Y  -DI Side: Bilateral  -DI Location: perineum  -DI Primary Wound Type: MASD  -DI    Retired Wound - Properties Group Present on Original Admission: Y  -DI Side: Bilateral  -DI Location: perineum  -DI Primary Wound Type: MASD  -DI              User Key  (r) = Recorded By, (t) = Taken By, (c) = Cosigned By      Initials Name Provider Type    Shikha Butt LPN Licensed Nurse    SR Chanell Garcia RN Registered Nurse    Antonella Mulligan RN Registered Nurse                      Assessment & Plan      Brief Patient Summary:  Lita Yu is a 58 y.o. 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  Noted to have hyponatremia, hypocalcemia, hypokalemia, and SARITA on admission.  Nephrology consulted for further management.  Blood cultures collected on admission positive for MRSA, ID consulted for further evaluation.      busPIRone, 15 mg, Oral, BID  enoxaparin, 40 mg, Subcutaneous, Daily  folic acid, 1 mg, Oral, Daily  Lidocaine, 1 patch, Transdermal, Daily  multivitamin with minerals, 1 tablet, Oral, Daily  potassium chloride, 20 mEq, Oral, Daily  QUEtiapine, 50 mg, Oral, Nightly  sertraline, 100 mg, Oral, BID  sodium chloride, 10 mL, Intravenous, Q12H  thiamine, 100 mg, Oral, Daily  vancomycin, 1,250 mg, Intravenous, Q12H       Pharmacy to dose vancomycin,          I have utilized all available, immediate resources to obtain, update, or review the patient's current medications including all prescriptions, over-the-counter products, herbals, cannabis/cannabidiol products, and vitamin.mineral/dietary (nutritional) supplements.    Active Hospital Problems:  Active Hospital Problems    Diagnosis     **Myalgia        Assessment/Plan:      MRSA bacteremia  L2-L3 discitis/osteomyelitis  Sepsis, POA  -Blood cultures positive for MRSA, likely source is lumbar discitis/osteomyelitis  -MRI spine findings noted to have L2-L3 discitis/osteomyelitis  -Continue IV vancomycin, ID following  -Cardiology on board for SILVINA  -Neurosurgery consulted    SARITA  Hyponatremia  Hypokalemia  Hypocalcemia  -Likely prerenal and due to alcohol abuse and poor p.o. intake  -Sodium being managed per nephrology, salt tabs added  -Replace potassium and calcium and monitor  -Nephrology following    Liver cirrhosis  -Likely alcohol related  -Does not appear to have decompensation at this time  -Continue supportive care and outpatient follow-up    Anemia of chronic disease  -Hemoglobin stable at this time  -Monitor and transfuse as needed    Tobacco abuse  -40-pack-year smoking history  -Counseled on cessation  -Nicotine patch if needed    DVT prophylaxis  -Lovenox      CODE STATUS:    Code Status (Patient has no pulse and is not breathing): CPR (Attempt to Resuscitate)  Medical Interventions (Patient has pulse or is breathing): Full Support      Disposition: Pending clinical course    Electronically signed by Edgar Oliva MD, 03/14/24, 11:49 EDT.  [facility] Hospitalist Team      Part of this note may be an electronic transcription/translation of spoken language to printed text using the Dragon Dictation System.

## 2024-03-14 NOTE — CASE MANAGEMENT/SOCIAL WORK
Continued Stay Note   [facility]     Patient Name: Lita Yu  MRN: [id number]  Today's Date: 3/14/2024    Admit Date: 3/7/2024    Plan: [facility] accepted. PASRR approved. Precert requested late on 3-14.   Discharge Plan       Row Name 03/14/24 1709       Plan    Plan [facility] accepted. PASRR approved. Precert requested late on 3-14.                    Ines Mckeon RN     Office phone: [phone]  Office fax: [phone]

## 2024-03-14 NOTE — PROGRESS NOTES
"Pharmacy Antimicrobial Dosing Service    Subjective:  Lita Yu is a 58 y.o.female admitted with generalized weakness and pain. Pharmacy has been consulted to dose Vancomycin for possible bacteremia.      Assessment/Plan    1. Day #7 Vancomycin: Goal -600 mcg*h/mL. Patient receiving vancomycin 1250 mg (~20 mg/kg actual BW) IV every 12 hours. Vancomycin level on 3/14 was 22.6 mcg/mL (~8 hours post-dose).  Scr remains stable. Bayesian modeling software predicts AUC within goal range on current dose.    Will continue vancomycin at 1250 mg (~20 mg/kg actual BW) IV every 12 hours. Per notes, discharge planning in process. Will plan to recheck vancomycin level in 3-5 days if patient is still in hospital.    ID is planning for 8 weeks of IV antibiotics, with last day of therapy being 5/2/24.    Will continue to monitor drug levels, renal function, culture and sensitivities, and patient clinical status.       Objective:  Relevant clinical data and objective history reviewed:  160 cm (63\")   61.2 kg (135 lb)   Ideal body weight: 52.4 kg (115 lb 8.3 oz)  Adjusted ideal body weight: 55.9 kg (123 lb 5 oz)  Body mass index is 23.91 kg/m².    Results from last 7 days   Lab Units 03/14/24  1345 03/11/24  1311 03/09/24  1512   VANCOMYCIN TR mcg/mL 22.60* 10.90  --    VANCOMYCIN RM mcg/mL  --   --  12.20     Results from last 7 days   Lab Units 03/14/24  0048 03/12/24 2219 03/12/24  0407   CREATININE mg/dL 0.52* 0.58 0.58     Estimated Creatinine Clearance: 116.7 mL/min (A) (by C-G formula based on SCr of 0.52 mg/dL (L)).  I/O last 3 completed shifts:  In: 1360 [P.O.:860; IV Piggyback:500]  Out: -     Results from last 7 days   Lab Units 03/14/24  0048 03/12/24 2219 03/12/24  0407   WBC 10*3/mm3 8.09 8.60 6.80     Temperature    03/14/24 0324 03/14/24 0750 03/14/24 1100   Temp: 97.9 °F (36.6 °C) 97.8 °F (36.6 °C) 97.8 °F (36.6 °C)     Baseline culture/source/susceptibility:  Microbiology Results (last 10 days)  "      Procedure Component Value - Date/Time    Clostridioides difficile EIA - Stool, Per Rectum [[id number]]  (Normal) Collected: 03/08/24 2327    Lab Status: Final result Specimen: Stool from Per Rectum Updated: 03/09/24 0745     C Diff GDH Ag Negative     C.diff Toxin Ag Negative    Narrative:      The result indicates the absence of toxigenic C.difficile from stool specimen.    Blood Culture - Blood, Arm, Right [[id number]]  (Normal) Collected: 03/08/24 1643    Lab Status: Final result Specimen: Blood from Arm, Right Updated: 03/13/24 1815     Blood Culture No growth at 5 days    COVID-19, FLU A/B, RSV PCR 1 HR TAT - Swab, Nasopharynx [[id number]]  (Normal) Collected: 03/07/24 1526    Lab Status: Final result Specimen: Swab from Nasopharynx Updated: 03/07/24 1607     COVID19 Not Detected     Influenza A PCR Not Detected     Influenza B PCR Not Detected     RSV, PCR Not Detected    Narrative:      Fact sheet for providers: https://www.fda.gov/media/444504/download    Fact sheet for patients: https://www.fda.gov/media/626475/download    Test performed by PCR.    Blood Culture - Blood, Arm, Left [[id number]]  (Abnormal) Collected: 03/07/24 0933    Lab Status: Final result Specimen: Blood from Arm, Left Updated: 03/10/24 0657     Blood Culture Staphylococcus aureus, MRSA     Comment:   Infectious disease consultation is highly recommended to rule out distant foci of infection.  Methicillin resistant Staphylococcus aureus, Patient may be an isolation risk.        Isolated from Aerobic and Anaerobic Bottles     Gram Stain Aerobic Bottle Gram positive cocci in clusters      Anaerobic Bottle Gram positive cocci in clusters    Narrative:      Refer to previous blood culture collected on  03/07/2024 at 0920 for MICs.    Blood Culture - Blood, Arm, Right [[id number]]  (Abnormal)  (Susceptibility) Collected: 03/07/24 0920    Lab Status: Final result Specimen: Blood from Arm, Right Updated: 03/10/24 0657     Blood Culture  Staphylococcus aureus, MRSA     Comment:   Infectious disease consultation is highly recommended to rule out distant foci of infection.  Methicillin resistant Staphylococcus aureus, Patient may be an isolation risk.        Isolated from Aerobic and Anaerobic Bottles     Gram Stain Anaerobic Bottle Gram positive cocci in clusters      Aerobic Bottle Gram positive cocci in clusters    Susceptibility        Staphylococcus aureus, MRSA      SOFIA      Gentamicin <=0.5 ug/ml Susceptible      Oxacillin >=4 ug/ml Resistant      Rifampin <=0.5 ug/ml Susceptible      Vancomycin <=0.5 ug/ml Susceptible                           Blood Culture ID, PCR - Blood, Arm, Right [[id number]]  (Abnormal) Collected: 03/07/24 0920    Lab Status: Final result Specimen: Blood from Arm, Right Updated: 03/08/24 0433     BCID, PCR Staph aureus. mecA/C and MREJ (methicillin resistance gene) detected. Identification by BCID2 PCR.     BOTTLE TYPE Anaerobic Bottle    Narrative:      Infectious disease consultation is highly recommended to rule out distant foci of infection.          Shad Bach RPH  03/14/24 14:40 EDT

## 2024-03-14 NOTE — PROGRESS NOTES
Infectious Diseases Progress Note      LOS: 6 days   Patient Care Team:  Norma Saul APRN as PCP - General (Nurse Practitioner)  Flory Villanueva MD (Physical Medicine and Rehabilitation)    Chief Complaint: Back pain, fever    Subjective       The patient has been afebrile for the last 24 hours.  The patient is on room air, hemodynamically stable, and is tolerating antimicrobial therapy..  Patient still having severe lower back pain and leg pain      Review of Systems:   Review of Systems   Constitutional: Negative.    HENT: Negative.     Eyes: Negative.    Respiratory: Negative.     Cardiovascular: Negative.    Gastrointestinal: Negative.    Endocrine: Negative.    Genitourinary: Negative.    Musculoskeletal:  Positive for back pain and neck pain.        Bilateral leg pain   Skin: Negative.    Neurological: Negative.    Psychiatric/Behavioral: Negative.     All other systems reviewed and are negative.       Objective     Vital Signs  Temp:  [97.8 °F (36.6 °C)-98.8 °F (37.1 °C)] 97.8 °F (36.6 °C)  Heart Rate:  [69-76] 70  Resp:  [14-20] 14  BP: (107-124)/(55-70) 120/63    Physical Exam:  Physical Exam  Vitals and nursing note reviewed.   Constitutional:       General: She is not in acute distress.     Appearance: She is well-developed and normal weight. She is ill-appearing. She is not diaphoretic.   HENT:      Head: Normocephalic and atraumatic.   Eyes:      General: No scleral icterus.     Extraocular Movements: Extraocular movements intact.      Conjunctiva/sclera: Conjunctivae normal.      Pupils: Pupils are equal, round, and reactive to light.   Cardiovascular:      Rate and Rhythm: Normal rate and regular rhythm.      Heart sounds: Normal heart sounds, S1 normal and S2 normal. No murmur heard.  Pulmonary:      Effort: Pulmonary effort is normal. No respiratory distress.      Breath sounds: Normal breath sounds. No stridor. No wheezing or rales.   Chest:      Chest wall: No tenderness.   Abdominal:       General: Bowel sounds are normal. There is no distension.      Palpations: Abdomen is soft. There is no mass.      Tenderness: There is no abdominal tenderness. There is no guarding.   Musculoskeletal:         General: No swelling, tenderness or deformity. Normal range of motion.      Cervical back: Neck supple.      Comments: Tenderness with the movement of all joints including hips and shoulders but there is no obvious joint effusion     Tenderness at the lower back and the upper back      Skin:     General: Skin is warm and dry.      Coloration: Skin is not pale.      Findings: No bruising, erythema or rash.   Neurological:      General: No focal deficit present.      Mental Status: She is alert and oriented to person, place, and time. Mental status is at baseline.      Cranial Nerves: No cranial nerve deficit.   Psychiatric:         Mood and Affect: Mood normal.          Results Review:    I have reviewed all clinical data, test, lab, and imaging results.     Radiology  No Radiology Exams Resulted Within Past 24 Hours    Cardiology    Laboratory    Results from last 7 days   Lab Units 03/14/24  0048 03/12/24 2219 03/12/24  0407 03/11/24  0505 03/10/24  0621 03/08/24 2338 03/08/24  0752   WBC 10*3/mm3 8.09 8.60 6.80 6.40 6.20 6.50 6.50   HEMOGLOBIN g/dL 7.5* 7.9* 7.8* 8.0* 8.1* 8.1* 9.4*   HEMATOCRIT % 23.5* 24.8* 24.0* 24.5* 25.4* 25.2* 29.0*   PLATELETS 10*3/mm3 150 142 113* 99* 83* 64* 70*     Results from last 7 days   Lab Units 03/14/24  0048 03/13/24  1324 03/12/24 2219 03/12/24  0407 03/11/24  0505 03/10/24  0621 03/09/24  0948 03/08/24  2338 03/08/24  0752   SODIUM mmol/L 134*  --  135* 131* 129* 129* 120* 124* 128*   POTASSIUM mmol/L 4.2 4.5 3.6 3.7 3.6 4.3 4.0 3.2* 3.7   CHLORIDE mmol/L 105  --  105 102 99 100 92* 93* 95*   CO2 mmol/L 17.0*  --  17.0* 19.0* 18.0* 18.0* 17.0* 19.0* 20.0*   BUN mg/dL 8  --  11 11 11 14 14 15 17   CREATININE mg/dL 0.52*  --  0.58 0.58 0.66 0.75 0.81 0.82 0.92   GLUCOSE  mg/dL 84  --  112* 79 110* 77 111* 97 108*   ALBUMIN g/dL  --   --   --   --   --   --   --  2.8* 3.1*   BILIRUBIN mg/dL  --   --   --   --   --   --   --  0.5 0.5   ALK PHOS U/L  --   --   --   --   --   --   --  108 123*   AST (SGOT) U/L  --   --   --   --   --   --   --  29 29   ALT (SGPT) U/L  --   --   --   --   --   --   --  18 21   CALCIUM mg/dL 8.2*  --  8.2* 8.2* 8.2* 8.2* 8.1* 8.2* 8.3*           Results from last 7 days   Lab Units 03/08/24  2338   SED RATE mm/hr 59*         Microbiology   Microbiology Results (last 10 days)       Procedure Component Value - Date/Time    Clostridioides difficile EIA - Stool, Per Rectum [[id number]]  (Normal) Collected: 03/08/24 2327    Lab Status: Final result Specimen: Stool from Per Rectum Updated: 03/09/24 0745     C Diff GDH Ag Negative     C.diff Toxin Ag Negative    Narrative:      The result indicates the absence of toxigenic C.difficile from stool specimen.    Blood Culture - Blood, Arm, Right [[id number]]  (Normal) Collected: 03/08/24 1643    Lab Status: Final result Specimen: Blood from Arm, Right Updated: 03/13/24 1815     Blood Culture No growth at 5 days    COVID-19, FLU A/B, RSV PCR 1 HR TAT - Swab, Nasopharynx [[id number]]  (Normal) Collected: 03/07/24 1526    Lab Status: Final result Specimen: Swab from Nasopharynx Updated: 03/07/24 1607     COVID19 Not Detected     Influenza A PCR Not Detected     Influenza B PCR Not Detected     RSV, PCR Not Detected    Narrative:      Fact sheet for providers: https://www.fda.gov/media/422651/download    Fact sheet for patients: https://www.fda.gov/media/847173/download    Test performed by PCR.    Blood Culture - Blood, Arm, Left [[id number]]  (Abnormal) Collected: 03/07/24 0933    Lab Status: Final result Specimen: Blood from Arm, Left Updated: 03/10/24 0657     Blood Culture Staphylococcus aureus, MRSA     Comment:   Infectious disease consultation is highly recommended to rule out distant foci of infection.  Methicillin  resistant Staphylococcus aureus, Patient may be an isolation risk.        Isolated from Aerobic and Anaerobic Bottles     Gram Stain Aerobic Bottle Gram positive cocci in clusters      Anaerobic Bottle Gram positive cocci in clusters    Narrative:      Refer to previous blood culture collected on  03/07/2024 at 0920 for MICs.    Blood Culture - Blood, Arm, Right [[id number]]  (Abnormal)  (Susceptibility) Collected: 03/07/24 0920    Lab Status: Final result Specimen: Blood from Arm, Right Updated: 03/10/24 0657     Blood Culture Staphylococcus aureus, MRSA     Comment:   Infectious disease consultation is highly recommended to rule out distant foci of infection.  Methicillin resistant Staphylococcus aureus, Patient may be an isolation risk.        Isolated from Aerobic and Anaerobic Bottles     Gram Stain Anaerobic Bottle Gram positive cocci in clusters      Aerobic Bottle Gram positive cocci in clusters    Susceptibility        Staphylococcus aureus, MRSA      SOFIA      Gentamicin Susceptible      Oxacillin Resistant      Rifampin Susceptible      Vancomycin Susceptible                           Blood Culture ID, PCR - Blood, Arm, Right [[id number]]  (Abnormal) Collected: 03/07/24 0920    Lab Status: Final result Specimen: Blood from Arm, Right Updated: 03/08/24 0433     BCID, PCR Staph aureus. mecA/C and MREJ (methicillin resistance gene) detected. Identification by BCID2 PCR.     BOTTLE TYPE Anaerobic Bottle    Narrative:      Infectious disease consultation is highly recommended to rule out distant foci of infection.            Medication Review:       Schedule Meds  busPIRone, 15 mg, Oral, BID  enoxaparin, 40 mg, Subcutaneous, Daily  folic acid, 1 mg, Oral, Daily  Lidocaine, 1 patch, Transdermal, Daily  multivitamin with minerals, 1 tablet, Oral, Daily  potassium chloride, 20 mEq, Oral, Daily  QUEtiapine, 50 mg, Oral, Nightly  sertraline, 100 mg, Oral, BID  sodium chloride, 10 mL, Intravenous, Q12H  thiamine, 100  mg, Oral, Daily  vancomycin, 1,250 mg, Intravenous, Q12H        Infusion Meds  Pharmacy to dose vancomycin,         PRN Meds    acetaminophen    albuterol    Calcium Replacement - Follow Nurse / BPA Driven Protocol    HYDROcodone-acetaminophen    loperamide    Magnesium Standard Dose Replacement - Follow Nurse / BPA Driven Protocol    melatonin    Pharmacy to dose vancomycin    Phosphorus Replacement - Follow Nurse / BPA Driven Protocol    Potassium Replacement - Follow Nurse / BPA Driven Protocol    [COMPLETED] Insert Peripheral IV **AND** sodium chloride    sodium chloride    sodium chloride        Assessment & Plan       Antimicrobial Therapy   1.  IV vancomycin        2.        3.        4.        5.          Assessment     Methicillin resistant Staphylococcus aureus bacteremia.  Likely source is lumbar discitis/osteomyelitis.  Endocarditis also needs to be ruled out.  Patient denies using IV drugs.  Repeat blood culture from 3/8/2024 is negative so far.  2D echo did not show any vegetation.  SILVINA performed on 3/12/2024 with cardiology notes mentioning no vegetation    L2-L3 discitis/osteomyelitis.  Patient continues to have fairly severe back pain with bilateral leg pain MRI of the lumbar spine showed enhancement of the epidural fluid with moderate canal stenosis.  Neurosurgery did not feel that patient was a surgical candidate at this time     History of liver cirrhosis secondary to alcohol and hemochromatosis     History of alcohol abuse and tobacco abuse     S/p left total hip replacement secondary to hip fracture in 2017 or 2018.  Patient denied having infection after the procedure    Reported diarrhea at admission.  C. difficile screen was negative     Plan     Continue IV vancomycin-keep trough between 15 and 20.  Patient will need 8 weeks of treatment from last negative blood culture on 3/8/2024.  Last day on 5/2/2024  Patient will need a PICC line before discharge-please remove when IV antibiotics are  completed  Recommend repeat MRI of the lumbar spine after antimicrobial therapy is completed  Continue supportive care  Okay to discharge from ID standpoint once IV antibiotics are arranged  Continued alcohol and tobacco abuse cessation    Please fax all post discharge lab results, imaging studies and correspondence to this fax number [phone]  For any question or concern please contact our service number [phone]    SHARIF Mae  03/14/24  15:23 EDT    Note is dictated utilizing voice recognition software/Dragon

## 2024-03-14 NOTE — PLAN OF CARE
Goal Outcome Evaluation:              Outcome Evaluation: Patient resting abed, no distress noted. Patient voiced complaints of lower back pain, PRN administered and effective. Patient continues with IV antibiotic therapy and tolerating well.

## 2024-03-14 NOTE — PLAN OF CARE
"Assessment: Lita Yu presents with functional mobility impairments which indicate the need for skilled intervention. Pt's upright mobility limited by back pain; pt ambulates x50 ft with MIN A and handheld assist. Declines use of AD. Tolerating session today without incident. Will continue to follow and progress as tolerated.     Plan/Recommendations:   If medically appropriate, Moderate Intensity Therapy recommended post-acute care. This is recommended as therapy feels the patient would require 3-4 days per week and wouldn't tolerate \"3 hour daily\" rehab intensity. SNF would be the preferred choice. If the patient does not agree to SNF, arrange HH or OP depending on home bound status. If patient is medically complex, consider LTACH. Pt requires rolling walker at discharge.     Pt desires Skilled Rehab placement at discharge. Pt cooperative; agreeable to therapeutic recommendations and plan of care.                                               "

## 2024-03-14 NOTE — THERAPY TREATMENT NOTE
"Subjective: Pt agreeable to therapeutic plan of care.    Objective:     Bed mobility - CGA  Transfers - CGA  Ambulation - 50 feet Min-A; handheld assist.    Vitals: WNL    Pain: 10 VAS   Location: low back  Intervention for pain: Repositioned, RN notified, and Therapeutic Presence; reports improved once returned to supine.     Education: Provided education on the importance of mobility in the acute care setting, Verbal/Tactile Cues, Transfer Training, and Gait Training    Assessment: Lita Yu presents with functional mobility impairments which indicate the need for skilled intervention. Pt's upright mobility limited by back pain; pt ambulates x50 ft with MIN A and handheld assist. Declines use of AD. Tolerating session today without incident. Will continue to follow and progress as tolerated.     Plan/Recommendations:   If medically appropriate, Moderate Intensity Therapy recommended post-acute care. This is recommended as therapy feels the patient would require 3-4 days per week and wouldn't tolerate \"3 hour daily\" rehab intensity. SNF would be the preferred choice. If the patient does not agree to SNF, arrange HH or OP depending on home bound status. If patient is medically complex, consider LTACH. Pt requires rolling walker at discharge.     Pt desires Skilled Rehab placement at discharge. Pt cooperative; agreeable to therapeutic recommendations and plan of care.     Post-Tx Position: Supine with HOB Elevated, Alarms activated, and Call light and personal items within reach  PPE: gloves, surgical mask, and gown    "

## 2024-03-15 VITALS
HEART RATE: 67 BPM | BODY MASS INDEX: 23.92 KG/M2 | TEMPERATURE: 98 F | RESPIRATION RATE: 19 BRPM | OXYGEN SATURATION: 97 % | HEIGHT: 63 IN | SYSTOLIC BLOOD PRESSURE: 110 MMHG | DIASTOLIC BLOOD PRESSURE: 60 MMHG | WEIGHT: 135 LBS

## 2024-03-15 LAB
ANION GAP SERPL CALCULATED.3IONS-SCNC: 12 MMOL/L (ref 5–15)
BASOPHILS # BLD AUTO: 0.04 10*3/MM3 (ref 0–0.2)
BASOPHILS NFR BLD AUTO: 0.5 % (ref 0–1.5)
BUN SERPL-MCNC: 5 MG/DL (ref 6–20)
BUN/CREAT SERPL: 9.8 (ref 7–25)
CALCIUM SPEC-SCNC: 8.1 MG/DL (ref 8.6–10.5)
CHLORIDE SERPL-SCNC: 102 MMOL/L (ref 98–107)
CO2 SERPL-SCNC: 19 MMOL/L (ref 22–29)
CREAT SERPL-MCNC: 0.51 MG/DL (ref 0.57–1)
DEPRECATED RDW RBC AUTO: 67.3 FL (ref 37–54)
EGFRCR SERPLBLD CKD-EPI 2021: 109.7 ML/MIN/1.73
EOSINOPHIL # BLD AUTO: 0.16 10*3/MM3 (ref 0–0.4)
EOSINOPHIL NFR BLD AUTO: 1.9 % (ref 0.3–6.2)
ERYTHROCYTE [DISTWIDTH] IN BLOOD BY AUTOMATED COUNT: 17.1 % (ref 12.3–15.4)
GLUCOSE SERPL-MCNC: 94 MG/DL (ref 65–99)
HCT VFR BLD AUTO: 22.8 % (ref 34–46.6)
HGB BLD-MCNC: 7.2 G/DL (ref 12–15.9)
IMM GRANULOCYTES # BLD AUTO: 0.21 10*3/MM3 (ref 0–0.05)
IMM GRANULOCYTES NFR BLD AUTO: 2.5 % (ref 0–0.5)
LYMPHOCYTES # BLD AUTO: 1.02 10*3/MM3 (ref 0.7–3.1)
LYMPHOCYTES NFR BLD AUTO: 12.3 % (ref 19.6–45.3)
MCH RBC QN AUTO: 34.3 PG (ref 26.6–33)
MCHC RBC AUTO-ENTMCNC: 31.6 G/DL (ref 31.5–35.7)
MCV RBC AUTO: 108.6 FL (ref 79–97)
MONOCYTES # BLD AUTO: 0.78 10*3/MM3 (ref 0.1–0.9)
MONOCYTES NFR BLD AUTO: 9.4 % (ref 5–12)
NEUTROPHILS NFR BLD AUTO: 6.05 10*3/MM3 (ref 1.7–7)
NEUTROPHILS NFR BLD AUTO: 73.4 % (ref 42.7–76)
NRBC BLD AUTO-RTO: 0 /100 WBC (ref 0–0.2)
PLATELET # BLD AUTO: 163 10*3/MM3 (ref 140–450)
PMV BLD AUTO: 11 FL (ref 6–12)
POTASSIUM SERPL-SCNC: 3.9 MMOL/L (ref 3.5–5.2)
RBC # BLD AUTO: 2.1 10*6/MM3 (ref 3.77–5.28)
SODIUM SERPL-SCNC: 133 MMOL/L (ref 136–145)
WBC NRBC COR # BLD AUTO: 8.26 10*3/MM3 (ref 3.4–10.8)

## 2024-03-15 PROCEDURE — 25010000002 ENOXAPARIN PER 10 MG: Performed by: STUDENT IN AN ORGANIZED HEALTH CARE EDUCATION/TRAINING PROGRAM

## 2024-03-15 PROCEDURE — 85025 COMPLETE CBC W/AUTO DIFF WBC: CPT | Performed by: INTERNAL MEDICINE

## 2024-03-15 PROCEDURE — C1751 CATH, INF, PER/CENT/MIDLINE: HCPCS

## 2024-03-15 PROCEDURE — 80048 BASIC METABOLIC PNL TOTAL CA: CPT | Performed by: INTERNAL MEDICINE

## 2024-03-15 PROCEDURE — 25010000002 VANCOMYCIN HCL 1.25 G RECONSTITUTED SOLUTION 1 EACH VIAL: Performed by: NURSE PRACTITIONER

## 2024-03-15 PROCEDURE — 25810000003 SODIUM CHLORIDE 0.9 % SOLUTION 250 ML FLEX CONT: Performed by: NURSE PRACTITIONER

## 2024-03-15 RX ORDER — FLUCONAZOLE 150 MG/1
150 TABLET ORAL WEEKLY
Qty: 4 TABLET | Refills: 1 | Status: SHIPPED | OUTPATIENT
Start: 2024-03-22 | End: 2024-05-04

## 2024-03-15 RX ORDER — FLUCONAZOLE 100 MG/1
150 TABLET ORAL WEEKLY
Status: DISCONTINUED | OUTPATIENT
Start: 2024-03-15 | End: 2024-03-15 | Stop reason: HOSPADM

## 2024-03-15 RX ADMIN — POTASSIUM CHLORIDE 20 MEQ: 1500 TABLET, EXTENDED RELEASE ORAL at 08:57

## 2024-03-15 RX ADMIN — VANCOMYCIN HYDROCHLORIDE 1250 MG: 1.25 INJECTION, POWDER, LYOPHILIZED, FOR SOLUTION INTRAVENOUS at 04:45

## 2024-03-15 RX ADMIN — Medication 10 ML: at 08:58

## 2024-03-15 RX ADMIN — Medication 100 MG: at 08:57

## 2024-03-15 RX ADMIN — LIDOCAINE 1 PATCH: 4 PATCH TOPICAL at 08:56

## 2024-03-15 RX ADMIN — Medication 1 TABLET: at 08:57

## 2024-03-15 RX ADMIN — FLUCONAZOLE 150 MG: 100 TABLET ORAL at 16:05

## 2024-03-15 RX ADMIN — BUSPIRONE HYDROCHLORIDE 15 MG: 15 TABLET ORAL at 08:57

## 2024-03-15 RX ADMIN — VANCOMYCIN HYDROCHLORIDE 1250 MG: 1.25 INJECTION, POWDER, LYOPHILIZED, FOR SOLUTION INTRAVENOUS at 16:05

## 2024-03-15 RX ADMIN — ENOXAPARIN SODIUM 40 MG: 100 INJECTION SUBCUTANEOUS at 16:05

## 2024-03-15 RX ADMIN — HYDROCODONE BITARTRATE AND ACETAMINOPHEN 1 TABLET: 5; 325 TABLET ORAL at 10:00

## 2024-03-15 RX ADMIN — SERTRALINE 100 MG: 100 TABLET, FILM COATED ORAL at 08:57

## 2024-03-15 RX ADMIN — FOLIC ACID 1 MG: 1 TABLET ORAL at 08:57

## 2024-03-15 RX ADMIN — HYDROCODONE BITARTRATE AND ACETAMINOPHEN 1 TABLET: 5; 325 TABLET ORAL at 04:45

## 2024-03-15 NOTE — SIGNIFICANT NOTE
Case Management/Social Work    Patient Name:  Lita Yu  YOB: 1965  MRN: [id number]  Admit Date:  3/7/2024           03/15/24 1401   Post Acute Pre-Cert Documentation   Date Post Acute Pre-Cert Completed 03/15/24   Response Received from Insurance? Approval   Post Acute Pre-Cert Initiated Comment CHRISTOPHER received voicemail from BAKARI Martinez with [employer] ([phone] ext [phone]). Authorization ID [id number]. SNF precert has been approved. Valid  3/15-3/21. CM made aware.           Electronically signed by:  Santos Arellano CMA  03/15/24 14:02 EDT    Santos Arellano  Case Management Associate  [address] [address]  P: [phone]  F: [phone]

## 2024-03-15 NOTE — PLAN OF CARE
Problem: Adult Inpatient Plan of Care  Goal: Plan of Care Review  Outcome: Ongoing, Progressing  Goal: Patient-Specific Goal (Individualized)  Outcome: Ongoing, Progressing  Goal: Absence of Hospital-Acquired Illness or Injury  Outcome: Ongoing, Progressing  Intervention: Identify and Manage Fall Risk  Recent Flowsheet Documentation  Taken 3/15/2024 1657 by Chanell Garcia RN  Safety Promotion/Fall Prevention: safety round/check completed  Taken 3/15/2024 1459 by Chanell Garcia RN  Safety Promotion/Fall Prevention: safety round/check completed  Taken 3/15/2024 1203 by Chanell Garcia RN  Safety Promotion/Fall Prevention: safety round/check completed  Taken 3/15/2024 1000 by Chanell Garcia RN  Safety Promotion/Fall Prevention: safety round/check completed  Taken 3/15/2024 0811 by Chanell Garcia RN  Safety Promotion/Fall Prevention: safety round/check completed  Intervention: Prevent Skin Injury  Recent Flowsheet Documentation  Taken 3/15/2024 0811 by Chanell Garcia RN  Body Position: position changed independently  Intervention: Prevent and Manage VTE (Venous Thromboembolism) Risk  Recent Flowsheet Documentation  Taken 3/15/2024 0811 by Chanell Garcia RN  VTE Prevention/Management:   bilateral   sequential compression devices off  Range of Motion: active ROM (range of motion) encouraged  Goal: Optimal Comfort and Wellbeing  Outcome: Ongoing, Progressing  Intervention: Provide Person-Centered Care  Recent Flowsheet Documentation  Taken 3/15/2024 0811 by Chanell Garcia RN  Trust Relationship/Rapport:   care explained   emotional support provided   choices provided   questions answered   empathic listening provided   questions encouraged  Goal: Readiness for Transition of Care  Outcome: Ongoing, Progressing     Problem: Pain Acute  Goal: Acceptable Pain Control and Functional Ability  Outcome: Ongoing, Progressing  Intervention: Optimize Psychosocial Wellbeing  Recent  Flowsheet Documentation  Taken 3/15/2024 0811 by Chanell Garcia RN  Diversional Activities:   smartphone   television     Problem: Fall Injury Risk  Goal: Absence of Fall and Fall-Related Injury  Outcome: Ongoing, Progressing  Intervention: Promote Injury-Free Environment  Recent Flowsheet Documentation  Taken 3/15/2024 1657 by Chanell Garcia RN  Safety Promotion/Fall Prevention: safety round/check completed  Taken 3/15/2024 1459 by Chanell Garcia RN  Safety Promotion/Fall Prevention: safety round/check completed  Taken 3/15/2024 1203 by Chanell Garcia RN  Safety Promotion/Fall Prevention: safety round/check completed  Taken 3/15/2024 1000 by Chanell Garcia RN  Safety Promotion/Fall Prevention: safety round/check completed  Taken 3/15/2024 0811 by Chanell Garcia RN  Safety Promotion/Fall Prevention: safety round/check completed     Problem: Alcohol Withdrawal  Goal: Alcohol Withdrawal Symptom Control  Outcome: Ongoing, Progressing     Problem: Acute Neurologic Deterioration (Alcohol Withdrawal)  Goal: Optimal Neurologic Function  Outcome: Ongoing, Progressing     Problem: Substance Misuse (Alcohol Withdrawal)  Goal: Readiness for Change Identified  Outcome: Ongoing, Progressing     Problem: Electrolyte Imbalance  Goal: Electrolyte Balance  Outcome: Ongoing, Progressing     Problem: Skin Injury Risk Increased  Goal: Skin Health and Integrity  Outcome: Ongoing, Progressing  Intervention: Optimize Skin Protection  Recent Flowsheet Documentation  Taken 3/15/2024 0811 by Chanell Garcia RN  Pressure Reduction Techniques: frequent weight shift encouraged  Pressure Reduction Devices: positioning supports utilized     Problem: Malnutrition  Goal: Improved Nutritional Intake  Outcome: Ongoing, Progressing   Goal Outcome Evaluation:   Pt has been relaxing in bed all day. IV team has been consulted to place a PICC line as well. Pt received NORCO PRN for pain as well. Pt waiting for  discharge to [facility]. Report given to Leigh at [facility]. No concerns at this time and call light within reach.

## 2024-03-15 NOTE — PROGRESS NOTES
Hospitalist Service   Daily Progress Note      Patient Name: Lita Yu  : 1965  MRN: [id number]  Primary Care Physician:  Norma Saul APRN  Date of admission: 3/7/2024      Subjective      Chief Complaint: Generalized weakness    Patient seen and examined this morning.  Feeling better this morning, still with back pain but controlled for now.  MRI results discussed with her, neurosurgery evaluation pending today.  Possible SILVINA as well today per cardiology.  No other complaints.    Pertinent positives as noted in HPI/subjective.  All other systems were reviewed and are negative.  3/12  Patient lethargic but in no acute distress  Patient with MRSA bacteremia on vancomycin  Followed up per ID and neurosurgery and nephrology and cardiology  Patient with L2-L3 discitis-osteomyelitis continues to have severe back pain with bilateral leg pain  MRI of the lumbar spine showed enhancement of the epidural fluid with moderate canal stenosis  Patient was not A surgical candidate as per neurosurgery  Patient with history of liver cirrhosis alcohol induced   Status post left total hip replacement secondary to hip fracture in   Continue IV antibiotic with vancomycin and follow-up per ID  She will need 6 weeks of IV antibiotic  SILVINA scheduled for today  Patient will probably need skilled nursing facility for 30 days or less on discharge    3/13  Patient had stress test which was negative  SILVINA done yesterday I cannot find the results yet  Transthoracic echo was done on   Patient with MRSA bacteremia followed up per infectious disease on IV antibiotic    3/14  Patient had SILVINA yesterday was no vegetation  On IV antibiotic with vancomycin  Awaiting precertification to be transferred to skilled nursing facility to continue IV antibiotic for total of 6 weeks  Awake alert oriented x 3  Hemodynamically stable  Discharge planning to skilled nursing facility once bed is available    3/15  Seen with  RN  Patient with endocarditis on IV antibiotic with Vanco  Followed up per infectious disease  On IV vancomycin for 6 weeks  Patient with MRSA bacteremia likely source is discitis-osteomyelitis  Patient with L2-L3 discitis/osteomyelitis with chronic back pain  Patient will need repeat MRI of the spine after antimicrobial therapy is finished  Awaiting discharge planning to skilled nursing facility    Objective      Vitals:   Temp:  [97.8 °F (36.6 °C)-98.4 °F (36.9 °C)] 98.2 °F (36.8 °C)  Heart Rate:  [67-78] 67  Resp:  [14-23] 23  BP: (101-126)/(50-65) 101/63    Physical Exam:    General: Awake, alert, chronically ill-appearing female, lying in bed, NAD  Eyes: PERRL, EOMI, conjunctivae are clear  Cardiovascular: Regular rate and rhythm, no murmurs  Respiratory: Clear to auscultation bilaterally, no wheezing or rales, unlabored breathing  Abdomen: Soft, nontender, positive bowel sounds, no guarding  Neurologic: A&O, CN grossly intact, moves all extremities spontaneously  Musculoskeletal: Generalized weakness, no other gross deformities  Skin: Warm, dry         Result Review    Result Review:  I have personally reviewed the results from the time of this admission to 3/15/2024 10:02 EDT and agree with these findings:  [x]  Laboratory  [x]  Microbiology  []  Radiology  []  EKG/Telemetry   []  Cardiology/Vascular   []  Pathology  []  Old records  []  Other:    Wounds (Last 24 Hours)       LDA Wound       Row Name 03/15/24 0811 03/14/24 1926 03/14/24 1657       Wound Bilateral perineum MASD (Moisture associated skin damage)    Wound - Properties Group Present on Original Admission: Y  -DI Side: Bilateral  -DI Location: perineum  -DI Primary Wound Type: MASD  -DI    Dressing Appearance open to air  -SR open to air  -JN --    Closure Open to air  -SR Open to air  -JN Open to air  -SR    Base -- blanchable;red  -JN --    Drainage Amount -- none  -JN --    Dressing Care -- open to air  -JN --    Retired Wound - Properties  Group Present on Original Admission: Y  -DI Side: Bilateral  -DI Location: perineum  -DI Primary Wound Type: MASD  -DI    Retired Wound - Properties Group Present on Original Admission: Y  -DI Side: Bilateral  -DI Location: perineum  -DI Primary Wound Type: MASD  -DI      Row Name 03/14/24 1223             Wound Bilateral perineum MASD (Moisture associated skin damage)    Wound - Properties Group Present on Original Admission: Y  -DI Side: Bilateral  -DI Location: perineum  -DI Primary Wound Type: MASD  -DI    Closure Open to air  -SR      Retired Wound - Properties Group Present on Original Admission: Y  -DI Side: Bilateral  -DI Location: perineum  -DI Primary Wound Type: MASD  -DI    Retired Wound - Properties Group Present on Original Admission: Y  -DI Side: Bilateral  -DI Location: perineum  -DI Primary Wound Type: MASD  -DI              User Key  (r) = Recorded By, (t) = Taken By, (c) = Cosigned By      Initials Name Provider Type    Radha Saeed RN Registered Nurse    Chanell Joseph RN Registered Nurse    Antonella Mulligan RN Registered Nurse                      Assessment & Plan      Brief Patient Summary:  Lita Yu is a 58 y.o. 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  Noted to have hyponatremia, hypocalcemia, hypokalemia, and SARITA on admission.  Nephrology consulted for further management.  Blood cultures collected on admission positive for MRSA, ID consulted for further evaluation.      busPIRone, 15 mg, Oral, BID  enoxaparin, 40 mg, Subcutaneous, Daily  folic acid, 1 mg, Oral, Daily  Lidocaine, 1 patch, Transdermal, Daily  multivitamin with minerals, 1 tablet, Oral, Daily  potassium chloride, 20 mEq, Oral, Daily  QUEtiapine, 50 mg, Oral, Nightly  sertraline, 100 mg, Oral, BID  sodium chloride, 10  mL, Intravenous, Q12H  thiamine, 100 mg, Oral, Daily  vancomycin, 1,250 mg, Intravenous, Q12H       Pharmacy to dose vancomycin,          I have utilized all available, immediate resources to obtain, update, or review the patient's current medications including all prescriptions, over-the-counter products, herbals, cannabis/cannabidiol products, and vitamin.mineral/dietary (nutritional) supplements.    Active Hospital Problems:  Active Hospital Problems    Diagnosis     **Myalgia        Assessment/Plan:     MRSA bacteremia  L2-L3 discitis/osteomyelitis  Sepsis, POA  -Blood cultures positive for MRSA, likely source is lumbar discitis/osteomyelitis  -MRI spine findings noted to have L2-L3 discitis/osteomyelitis  -Continue IV vancomycin, ID following  -Cardiology on board for SILVINA  -Neurosurgery consulted    SARITA  Hyponatremia  Hypokalemia  Hypocalcemia  -Likely prerenal and due to alcohol abuse and poor p.o. intake  -Sodium being managed per nephrology, salt tabs added  -Replace potassium and calcium and monitor  -Nephrology following    Liver cirrhosis  -Likely alcohol related  -Does not appear to have decompensation at this time  -Continue supportive care and outpatient follow-up    Anemia of chronic disease  -Hemoglobin stable at this time  -Monitor and transfuse as needed    Tobacco abuse  -40-pack-year smoking history  -Counseled on cessation  -Nicotine patch if needed    DVT prophylaxis  -Lovenox      CODE STATUS:    Code Status (Patient has no pulse and is not breathing): CPR (Attempt to Resuscitate)  Medical Interventions (Patient has pulse or is breathing): Full Support      Disposition: Pending clinical course    Electronically signed by Edgar Oliva MD, 03/15/24, 10:02 EDT.  [facility] Hospitalist Team      Part of this note may be an electronic transcription/translation of spoken language to printed text using the Dragon Dictation System.

## 2024-03-15 NOTE — CONSULTS
Nutrition Services    Patient Name: Lita Yu  YOB: 1965  MRN: [id number]  Admission date: 3/7/2024    Comment:    Moderate chronic disease related malnutrition related to suspected chronic suboptimal intake in the setting of ETOH abuse and complicated clinical course as evidenced by po intake meeting < 75% of est energy needs for > 1 month and evidence of moderate muscle and fat wasting per NFPE.      Add Chocolate Boost Plus BID (Provides 720 kcals, 28 g protein if consumed)   ** Boost Plus BID may be substituted with Boost Glucose Control BID (Provides 380 kcals, 32 g protein if consumed) due to national ONS supply shortages    CLINICAL NUTRITION ASSESSMENT      Reason for Assessment 3/15 Nutrition assessment and POC related to LOS x 8 days.      H&P      Past Medical History:   Diagnosis Date    Cirrhosis     COPD (chronic obstructive pulmonary disease)     Depression     GERD (gastroesophageal reflux disease)     Hyperlipidemia     Hypertension     PTSD (post-traumatic stress disorder)        Past Surgical History:   Procedure Laterality Date     SECTION N/A     COLONOSCOPY N/A 2021    Procedure: COLONOSCOPY with polypectomy x2 and random biopsy;  Surgeon: Osman Clay MD;  Location: [facility] ENDOSCOPY;  Service: Gastroenterology;  Laterality: N/A;  colon polyps    DENTAL PROCEDURE      ENDOSCOPY N/A 2021    Procedure: ESOPHAGOGASTRODUODENOSCOPY;  Surgeon: Osman Clay MD;  Location: [facility] ENDOSCOPY;  Service: Gastroenterology;  Laterality: N/A;  esophagitis    JOINT REPLACEMENT      REPLACEMENT TOTAL HIP LATERAL POSITION Left     TONSILLECTOMY      VAGINAL BIRTH AFTER  SECTION          Current Problems   Myalgia  -muscle aches mostly in thighs and arms upon admit  -3/14 severe lower back and leg pain reported   -L2-L3 discitis - osteomyelitis  -difficult ambulation    SARITA  -likely related to volume and low blood pressure/sepsis  -Nephrology  "following    MRSA bacteremia  -on vancomycin  -Infectious disease following    Hx of alcohol abuse, alcohol induced liver cirrhosis, COPD, GERD, hyperlipidemia, HTN, PTSD       Encounter Information        Trending Narrative     3/15 Pt presented to ED on 3/7 with complaints of muscle aches and myalgia- generalized weakness. RD visited pt at bedside, where pt was sitting up in bed.  Pt reports that her appetite is \"not good\" due to ongoing stomach pain. Pt reports that she is having diarrhea with last BM this am.  Pt denies N/V at this time. Pt states that she has consumed ONS in the past and is willing/desires chocolate Boost at this time.  RD to add Chocolate Boost Plus BID. Pt reports that her appetite has been poor for >1 month.   NFPE completed, consistent with nutrition diagnosis of malnutrition using AND/ASPEN criteria. See MSA below.        Anthropometrics        Current Height, Weight Height: 160 cm (63\")  Weight: 61.2 kg (135 lb) (03/12/24 1208)       Usual Body Weight (UBW) Pt is unsure. Pt reported current weight as 135#       Trending Weight Hx     This admission: 3/15 135# (Last weight 3/12)             PTA: Weight is consistent with weight trends documented in the last 9 months.  Some fluctuation most likely related to fluid status changes.     Wt Readings from Last 30 Encounters:   03/12/24 1208 61.2 kg (135 lb)   03/09/24 0505 61.2 kg (135 lb)   03/07/24 0851 61.2 kg (135 lb)   03/02/24 1622 61.2 kg (135 lb)   03/02/24 0651 61.2 kg (135 lb)   02/21/24 1256 60.8 kg (134 lb)   01/10/24 1325 59 kg (130 lb)   10/10/23 1344 59.4 kg (131 lb)   09/08/23 1109 65.8 kg (145 lb)   07/10/23 1021 59.4 kg (131 lb)   06/12/23 0548 61 kg (134 lb 7.7 oz)   06/11/23 2236 59.2 kg (130 lb 9.6 oz)   06/11/23 1854 58.8 kg (129 lb 10.1 oz)   10/31/22 1016 50.3 kg (111 lb)   10/21/22 0441 51.2 kg (112 lb 14 oz)   10/20/22 0424 52.9 kg (116 lb 10 oz)   10/19/22 0507 53.1 kg (117 lb 1 oz)   10/17/22 0334 58.4 kg (128 lb 12 " oz)   10/16/22 0532 58.4 kg (128 lb 12 oz)   10/15/22 0203 58.3 kg (128 lb 8.5 oz)   10/14/22 2008 61.2 kg (135 lb)   09/19/22 1446 46.7 kg (103 lb)   08/17/22 1057 50.8 kg (112 lb)   08/05/22 0323 57.8 kg (127 lb 6.4 oz)   08/03/22 2028 53.9 kg (118 lb 12.8 oz)   08/03/22 1523 54.1 kg (119 lb 4.3 oz)   08/03/22 1401 54 kg (119 lb)   07/18/22 1523 59.9 kg (132 lb)   12/16/21 1418 60.1 kg (132 lb 6.4 oz)   12/14/21 1548 61.2 kg (135 lb)   12/14/21 1422 60.8 kg (134 lb)   11/26/21 1802 64 kg (141 lb 1.5 oz)   10/07/21 1258 60.8 kg (134 lb)   09/07/21 1258 59.9 kg (132 lb)   08/31/21 0742 56.6 kg (124 lb 12.5 oz)   08/20/21 1618 56.7 kg (125 lb)   07/15/21 1259 55.8 kg (123 lb)   06/14/21 1303 52.2 kg (115 lb)   03/29/21 1405 58.5 kg (129 lb)   02/19/21 1406 57.6 kg (127 lb)   02/05/21 1115 63.5 kg (140 lb)   05/12/20 1038 52.6 kg (116 lb)   03/25/20 1056 53.9 kg (118 lb 13.3 oz)   07/26/19 1231 52.3 kg (115 lb 3.2 oz)      BMI kg/m2 Body mass index is 23.91 kg/m².       Labs        Pertinent Labs Reviewed.  Hyponatremia/hypocalcemia - management per nephrology   Results from last 7 days   Lab Units 03/15/24  0444 03/14/24  0048 03/13/24  1324 03/12/24  2219 03/09/24  0948 03/08/24 2338 03/08/24 0752   SODIUM mmol/L 133* 134*  --  135*   < > 124* 128*   POTASSIUM mmol/L 3.9 4.2 4.5 3.6   < > 3.2* 3.7   CHLORIDE mmol/L 102 105  --  105   < > 93* 95*   CO2 mmol/L 19.0* 17.0*  --  17.0*   < > 19.0* 20.0*   BUN mg/dL 5* 8  --  11   < > 15 17   CREATININE mg/dL 0.51* 0.52*  --  0.58   < > 0.82 0.92   CALCIUM mg/dL 8.1* 8.2*  --  8.2*   < > 8.2* 8.3*   BILIRUBIN mg/dL  --   --   --   --   --  0.5 0.5   ALK PHOS U/L  --   --   --   --   --  108 123*   ALT (SGPT) U/L  --   --   --   --   --  18 21   AST (SGOT) U/L  --   --   --   --   --  29 29   GLUCOSE mg/dL 94 84  --  112*   < > 97 108*    < > = values in this interval not displayed.     Results from last 7 days   Lab Units 03/15/24  0444 03/08/24 2338 03/08/24  0752    MAGNESIUM mg/dL  --   --  1.7   PHOSPHORUS mg/dL  --   --  3.0   HEMOGLOBIN g/dL 7.2*   < > 9.4*   HEMATOCRIT % 22.8*   < > 29.0*    < > = values in this interval not displayed.     Lab Results   Component Value Date    HGBA1C 5.00 06/12/2023        Medications    Scheduled Medications busPIRone, 15 mg, Oral, BID  enoxaparin, 40 mg, Subcutaneous, Daily  folic acid, 1 mg, Oral, Daily  Lidocaine, 1 patch, Transdermal, Daily  multivitamin with minerals, 1 tablet, Oral, Daily  potassium chloride, 20 mEq, Oral, Daily  QUEtiapine, 50 mg, Oral, Nightly  sertraline, 100 mg, Oral, BID  sodium chloride, 10 mL, Intravenous, Q12H  thiamine, 100 mg, Oral, Daily  vancomycin, 1,250 mg, Intravenous, Q12H        Infusions Pharmacy to dose vancomycin,         PRN Medications   acetaminophen    albuterol    Calcium Replacement - Follow Nurse / BPA Driven Protocol    HYDROcodone-acetaminophen    loperamide    Magnesium Standard Dose Replacement - Follow Nurse / BPA Driven Protocol    melatonin    Pharmacy to dose vancomycin    Phosphorus Replacement - Follow Nurse / BPA Driven Protocol    Potassium Replacement - Follow Nurse / BPA Driven Protocol    [COMPLETED] Insert Peripheral IV **AND** sodium chloride    sodium chloride    sodium chloride     Physical Findings        Trending Physical   Appearance, NFPE 3/15 NFPE completed, consistent with nutrition diagnosis of moderate chronic disease related malnutrition using AND/ASPEN criteria. See MSA below.      --  Edema  No edema documented      Bowel Function Last documented Bm 3/14     Tubes No feeding tubes in place     Chewing/Swallowing No issues reported     Skin WOCN assessed 3/8 - No PI documented      --  Current Nutrition Orders & Evaluation of Intake       Oral Nutrition     Food Allergies NKFA   Current PO Diet Diet: Regular/House; Fluid Consistency: Thin (IDDSI 0)   Supplement None ordered    PO Evaluation     Trending % PO Intake 3/15 22% average intake x last 8 documented  meals  Pt refusing meals at least once a day generally   --  Nutritional Risk Screening        NRS-2002 Score          Nutrition Diagnosis         Nutrition Dx Problem 1 Moderate chronic disease related malnutrition related to suspected chronic suboptimal intake in the setting of ETOH abuse and complicated clinical course as evidenced by po intake meeting < 75% of est energy needs for > 1 month and evidence of moderate muscle and fat wasting per NFPE.        Nutrition Dx Problem 2      Intervention Goal         Intervention Goal(s) PO intake > 50% of meals  Tolerate and consume ONS        Nutrition Intervention        RD Action Continue to encourage good po intake    Add Chocolate Boost Plus BID (Provides 720 kcals, 28 g protein if consumed)   ** Boost Plus BID may be substituted with Boost Glucose Control BID (Provides 380 kcals, 32 g protein if consumed) due to national ONS supply shortages    NFPE completed      Nutrition Prescription          Diet Prescription Regular diet   Supplement Prescription Chocolate Boost Plus BID (Provides 720 kcals, 28 g protein if consumed)   ** Boost Plus BID may be substituted with Boost Glucose Control BID (Provides 380 kcals, 32 g protein if consumed) due to national ONS supply shortages   --  Monitor/Evaluation        Monitor Per protocol, I&O, PO intake, Supplement intake, Pertinent labs, Weight, Skin status, GI status, Symptoms, Swallow function, Hemodynamic stability     Malnutrition Severity Assessment      Patient meets criteria for : Moderate (non-severe) Malnutrition  Malnutrition Type (Last 8 Hours)       Malnutrition Severity Assessment       Row Name 03/15/24 0942       Malnutrition Severity Assessment    Malnutrition Type Chronic Disease - Related Malnutrition      Row Name 03/15/24 0942       Insufficient Energy Intake     Insufficient Energy Intake Findings Moderate    Insufficient Energy Intake  <75% of est. energy requirement for > or equal to 1 month      Row  Name 03/15/24 0942       Muscle Loss    Loss of Muscle Mass Findings Moderate    [place] Region Moderate - slight depression    Clavicle Bone Region Moderate - some protrusion in females, visible in males    Acromion Bone Region Moderate - acromion may slightly protrude    Scapular Bone Region Moderate - mild depression, bones may show slightly    Dorsal Hand Region Moderate - slight depression    Patellar Region Moderate - patella more prominent, less muscle definition around patella    Posterior Calf Region Moderate - some roundness, slight firmness      Row Name 03/15/24 0942       Fat Loss    Subcutaneous Fat Loss Findings Moderate    Orbital Region  Moderate -  somewhat hollowness, slightly dark circles    Upper Arm Region Moderate - some fat tissue, not ample      Row Name 03/15/24 0942       Criteria Met (Must meet criteria for severity in at least 2 of these categories: M Wasting, Fat Loss, Fluid, Secondary Signs, Wt. Status, Intake)    Patient meets criteria for  Moderate (non-severe) Malnutrition                           Electronically signed by:  Emmie Pizano RD  03/15/24 07:31 EDT

## 2024-03-15 NOTE — PROGRESS NOTES
Infectious Diseases Progress Note      LOS: 7 days   Patient Care Team:  Norma Saul APRN as PCP - General (Nurse Practitioner)  Flory Villanueva MD (Physical Medicine and Rehabilitation)    Chief Complaint: Back pain, fever    Subjective       The patient has been afebrile for the last 24 hours.  The patient is on room air, hemodynamically stable, and is tolerating antimicrobial therapy..  Patient still having severe lower back pain and leg pain      Review of Systems:   Review of Systems   Constitutional: Negative.    HENT: Negative.     Eyes: Negative.    Respiratory: Negative.     Cardiovascular: Negative.    Gastrointestinal: Negative.    Endocrine: Negative.    Genitourinary: Negative.    Musculoskeletal:  Positive for back pain and neck pain.        Bilateral leg pain   Skin: Negative.    Neurological: Negative.    Psychiatric/Behavioral: Negative.     All other systems reviewed and are negative.       Objective     Vital Signs  Temp:  [97.8 °F (36.6 °C)-98.4 °F (36.9 °C)] 97.8 °F (36.6 °C)  Heart Rate:  [67-78] 67  Resp:  [16-23] 16  BP: (101-126)/(50-65) 110/60    Physical Exam:  Physical Exam  Vitals and nursing note reviewed.   Constitutional:       General: She is not in acute distress.     Appearance: She is well-developed and normal weight. She is ill-appearing. She is not diaphoretic.   HENT:      Head: Normocephalic and atraumatic.   Eyes:      General: No scleral icterus.     Extraocular Movements: Extraocular movements intact.      Conjunctiva/sclera: Conjunctivae normal.      Pupils: Pupils are equal, round, and reactive to light.   Cardiovascular:      Rate and Rhythm: Normal rate and regular rhythm.      Heart sounds: Normal heart sounds, S1 normal and S2 normal. No murmur heard.  Pulmonary:      Effort: Pulmonary effort is normal. No respiratory distress.      Breath sounds: Normal breath sounds. No stridor. No wheezing or rales.   Chest:      Chest wall: No tenderness.   Abdominal:       General: Bowel sounds are normal. There is no distension.      Palpations: Abdomen is soft. There is no mass.      Tenderness: There is no abdominal tenderness. There is no guarding.   Musculoskeletal:         General: No swelling, tenderness or deformity. Normal range of motion.      Cervical back: Neck supple.      Comments: Tenderness with the movement of all joints including hips and shoulders but there is no obvious joint effusion     Tenderness at the lower back and the upper back      Skin:     General: Skin is warm and dry.      Coloration: Skin is not pale.      Findings: No bruising, erythema or rash.   Neurological:      General: No focal deficit present.      Mental Status: She is alert and oriented to person, place, and time. Mental status is at baseline.      Cranial Nerves: No cranial nerve deficit.   Psychiatric:         Mood and Affect: Mood normal.          Results Review:    I have reviewed all clinical data, test, lab, and imaging results.     Radiology  No Radiology Exams Resulted Within Past 24 Hours    Cardiology    Laboratory    Results from last 7 days   Lab Units 03/15/24  0444 03/14/24  0048 03/12/24 2219 03/12/24  0407 03/11/24  0505 03/10/24  0621 03/08/24  2338   WBC 10*3/mm3 8.26 8.09 8.60 6.80 6.40 6.20 6.50   HEMOGLOBIN g/dL 7.2* 7.5* 7.9* 7.8* 8.0* 8.1* 8.1*   HEMATOCRIT % 22.8* 23.5* 24.8* 24.0* 24.5* 25.4* 25.2*   PLATELETS 10*3/mm3 163 150 142 113* 99* 83* 64*     Results from last 7 days   Lab Units 03/15/24  0444 03/14/24  0048 03/13/24  1324 03/12/24 2219 03/12/24  0407 03/11/24  0505 03/10/24  0621 03/09/24  0948 03/08/24  2338   SODIUM mmol/L 133* 134*  --  135* 131* 129* 129* 120* 124*   POTASSIUM mmol/L 3.9 4.2 4.5 3.6 3.7 3.6 4.3 4.0 3.2*   CHLORIDE mmol/L 102 105  --  105 102 99 100 92* 93*   CO2 mmol/L 19.0* 17.0*  --  17.0* 19.0* 18.0* 18.0* 17.0* 19.0*   BUN mg/dL 5* 8  --  11 11 11 14 14 15   CREATININE mg/dL 0.51* 0.52*  --  0.58 0.58 0.66 0.75 0.81 0.82    GLUCOSE mg/dL 94 84  --  112* 79 110* 77 111* 97   ALBUMIN g/dL  --   --   --   --   --   --   --   --  2.8*   BILIRUBIN mg/dL  --   --   --   --   --   --   --   --  0.5   ALK PHOS U/L  --   --   --   --   --   --   --   --  108   AST (SGOT) U/L  --   --   --   --   --   --   --   --  29   ALT (SGPT) U/L  --   --   --   --   --   --   --   --  18   CALCIUM mg/dL 8.1* 8.2*  --  8.2* 8.2* 8.2* 8.2* 8.1* 8.2*           Results from last 7 days   Lab Units 03/08/24  2338   SED RATE mm/hr 59*         Microbiology   Microbiology Results (last 10 days)       Procedure Component Value - Date/Time    Clostridioides difficile EIA - Stool, Per Rectum [[id number]]  (Normal) Collected: 03/08/24 2327    Lab Status: Final result Specimen: Stool from Per Rectum Updated: 03/09/24 0745     C Diff GDH Ag Negative     C.diff Toxin Ag Negative    Narrative:      The result indicates the absence of toxigenic C.difficile from stool specimen.    Blood Culture - Blood, Arm, Right [[id number]]  (Normal) Collected: 03/08/24 1643    Lab Status: Final result Specimen: Blood from Arm, Right Updated: 03/13/24 1815     Blood Culture No growth at 5 days    COVID-19, FLU A/B, RSV PCR 1 HR TAT - Swab, Nasopharynx [[id number]]  (Normal) Collected: 03/07/24 1526    Lab Status: Final result Specimen: Swab from Nasopharynx Updated: 03/07/24 1607     COVID19 Not Detected     Influenza A PCR Not Detected     Influenza B PCR Not Detected     RSV, PCR Not Detected    Narrative:      Fact sheet for providers: https://www.fda.gov/media/488746/download    Fact sheet for patients: https://www.fda.gov/media/963723/download    Test performed by PCR.    Blood Culture - Blood, Arm, Left [[id number]]  (Abnormal) Collected: 03/07/24 0933    Lab Status: Final result Specimen: Blood from Arm, Left Updated: 03/10/24 0657     Blood Culture Staphylococcus aureus, MRSA     Comment:   Infectious disease consultation is highly recommended to rule out distant foci of  infection.  Methicillin resistant Staphylococcus aureus, Patient may be an isolation risk.        Isolated from Aerobic and Anaerobic Bottles     Gram Stain Aerobic Bottle Gram positive cocci in clusters      Anaerobic Bottle Gram positive cocci in clusters    Narrative:      Refer to previous blood culture collected on  03/07/2024 at 0920 for MICs.    Blood Culture - Blood, Arm, Right [[id number]]  (Abnormal)  (Susceptibility) Collected: 03/07/24 0920    Lab Status: Final result Specimen: Blood from Arm, Right Updated: 03/10/24 0657     Blood Culture Staphylococcus aureus, MRSA     Comment:   Infectious disease consultation is highly recommended to rule out distant foci of infection.  Methicillin resistant Staphylococcus aureus, Patient may be an isolation risk.        Isolated from Aerobic and Anaerobic Bottles     Gram Stain Anaerobic Bottle Gram positive cocci in clusters      Aerobic Bottle Gram positive cocci in clusters    Susceptibility        Staphylococcus aureus, MRSA      SOFIA      Gentamicin Susceptible      Oxacillin Resistant      Rifampin Susceptible      Vancomycin Susceptible                           Blood Culture ID, PCR - Blood, Arm, Right [[id number]]  (Abnormal) Collected: 03/07/24 0920    Lab Status: Final result Specimen: Blood from Arm, Right Updated: 03/08/24 0433     BCID, PCR Staph aureus. mecA/C and MREJ (methicillin resistance gene) detected. Identification by BCID2 PCR.     BOTTLE TYPE Anaerobic Bottle    Narrative:      Infectious disease consultation is highly recommended to rule out distant foci of infection.            Medication Review:       Schedule Meds  busPIRone, 15 mg, Oral, BID  enoxaparin, 40 mg, Subcutaneous, Daily  fluconazole, 150 mg, Oral, Weekly  folic acid, 1 mg, Oral, Daily  Lidocaine, 1 patch, Transdermal, Daily  multivitamin with minerals, 1 tablet, Oral, Daily  potassium chloride, 20 mEq, Oral, Daily  QUEtiapine, 50 mg, Oral, Nightly  sertraline, 100 mg, Oral,  BID  sodium chloride, 10 mL, Intravenous, Q12H  thiamine, 100 mg, Oral, Daily  vancomycin, 1,250 mg, Intravenous, Q12H        Infusion Meds  Pharmacy to dose vancomycin,         PRN Meds    acetaminophen    albuterol    Calcium Replacement - Follow Nurse / BPA Driven Protocol    HYDROcodone-acetaminophen    loperamide    Magnesium Standard Dose Replacement - Follow Nurse / BPA Driven Protocol    melatonin    Pharmacy to dose vancomycin    Phosphorus Replacement - Follow Nurse / BPA Driven Protocol    Potassium Replacement - Follow Nurse / BPA Driven Protocol    [COMPLETED] Insert Peripheral IV **AND** sodium chloride    sodium chloride    sodium chloride        Assessment & Plan       Antimicrobial Therapy   1.  IV vancomycin        2.        3.        4.        5.          Assessment     Methicillin resistant Staphylococcus aureus bacteremia.  Likely source is lumbar discitis/osteomyelitis.  Endocarditis also needs to be ruled out.  Patient denies using IV drugs.  Repeat blood culture from 3/8/2024 is negative so far.  2D echo did not show any vegetation.  SILVINA performed on 3/12/2024 with cardiology notes mentioning no vegetation    L2-L3 discitis/osteomyelitis.  Patient continues to have fairly severe back pain with bilateral leg pain MRI of the lumbar spine showed enhancement of the epidural fluid with moderate canal stenosis.  Neurosurgery did not feel that patient was a surgical candidate at this time     History of liver cirrhosis secondary to alcohol and hemochromatosis     History of alcohol abuse and tobacco abuse     S/p left total hip replacement secondary to hip fracture in 2017 or 2018.  Patient denied having infection after the procedure    Reported diarrhea at admission.  C. difficile screen was negative     Plan     Continue IV vancomycin-keep trough between 15 and 20.  Patient will need 8 weeks of treatment from last negative blood culture on 3/8/2024.  Last day on 5/2/2024  Patient will need a PICC  line before discharge-please remove when IV antibiotics are completed  Recommend repeat MRI of the lumbar spine after antimicrobial therapy is completed  Continue supportive care  Okay to discharge from ID standpoint once IV antibiotics are arranged  Continued alcohol and tobacco abuse cessation  Discussed with RN  Not much more to add from infectious disease standpoint-we will sign off at this time-please call with any questions.    Please fax all post discharge lab results, imaging studies and correspondence to this fax number [phone]  For any question or concern please contact our service number [phone]    Sarah Ross, SHARIF  03/15/24  16:42 EDT    Note is dictated utilizing voice recognition software/Dragon

## 2024-03-15 NOTE — PROGRESS NOTES
PROGRESS NOTE      Patient Name: Lita Yu  : 1965  MRN: [id number]  Primary Care Physician: Norma Saul APRN  Date of admission: 3/7/2024    Patient Care Team:  Norma Saul APRN as PCP - General (Nurse Practitioner)  Flory Villanueva MD (Physical Medicine and Rehabilitation)        Subjective   Subjective:   Patient seen and examined this morning, she denies any new complaints.  Review of systems:  All other review of system unremarkable      Allergies:    Allergies   Allergen Reactions    Sulfa Antibiotics Hives    Keflex [Cephalexin] Hives       Objective   Exam:     Vital Signs  Temp:  [97.8 °F (36.6 °C)-98.4 °F (36.9 °C)] 97.8 °F (36.6 °C)  Heart Rate:  [67-78] 67  Resp:  [16-23] 16  BP: (101-126)/(50-65) 110/60  SpO2:  [97 %-100 %] 97 %  on   ;   Device (Oxygen Therapy): room air  Body mass index is 23.91 kg/m².    General: Middle-age female in no acute distress.    Head:      Normocephalic and atraumatic.    Eyes:      PERRL/EOM intact, conjunctiva and sclera clear with out nystagmus.    Neck:      No masses, thyromegaly,  trachea central with normal respiratory effort   Lungs:    Clear bilaterally to auscultation.    Heart:      Regular rate and rhythm, no murmur no gallop  Abd:        Soft, nontender, not distended, bowel sounds positive, no shifting dullness   Pulses:   Pulses palpable  Extr:        No cyanosis or clubbing--no significant edema.    Neuro:    No focal deficits.   alert oriented x3  Skin:       Intact without lesions or rashes.    Psych:    Alert and cooperative; normal mood and affect; .      Results Review:  I have personally reviewed most recent Data :  CBC    Results from last 7 days   Lab Units 03/15/24  0444 24  0048 24  2219 24  0407 24  0505 03/10/24  0621 24  2338   WBC 10*3/mm3 8.26 8.09 8.60 6.80 6.40 6.20 6.50   HEMOGLOBIN g/dL 7.2* 7.5* 7.9* 7.8* 8.0* 8.1* 8.1*   PLATELETS 10*3/mm3 163 150 142 113* 99* 83* 64*      CMP   Results from last 7 days   Lab Units 03/15/24  0444 03/14/24  0048 03/13/24  1324 03/12/24  2219 03/12/24  0407 03/11/24  0505 03/10/24  0621 03/09/24  0948 03/08/24  2338   SODIUM mmol/L 133* 134*  --  135* 131* 129* 129* 120* 124*   POTASSIUM mmol/L 3.9 4.2 4.5 3.6 3.7 3.6 4.3 4.0 3.2*   CHLORIDE mmol/L 102 105  --  105 102 99 100 92* 93*   CO2 mmol/L 19.0* 17.0*  --  17.0* 19.0* 18.0* 18.0* 17.0* 19.0*   BUN mg/dL 5* 8  --  11 11 11 14 14 15   CREATININE mg/dL 0.51* 0.52*  --  0.58 0.58 0.66 0.75 0.81 0.82   GLUCOSE mg/dL 94 84  --  112* 79 110* 77 111* 97   ALBUMIN g/dL  --   --   --   --   --   --   --   --  2.8*   BILIRUBIN mg/dL  --   --   --   --   --   --   --   --  0.5   ALK PHOS U/L  --   --   --   --   --   --   --   --  108   AST (SGOT) U/L  --   --   --   --   --   --   --   --  29   ALT (SGPT) U/L  --   --   --   --   --   --   --   --  18     ABG      No radiology results for the last day    Results for orders placed during the hospital encounter of 03/07/24    Adult Transesophageal Echo (SILVINA) W/ Cont if Necessary Per Protocol    Interpretation Summary    Left ventricular systolic function is normal. Left ventricular ejection fraction appears to be 61 - 65%.    Left ventricular diastolic function was normal.    The left atrial cavity is mildly dilated.    Saline test results are negative.    Scheduled Meds:busPIRone, 15 mg, Oral, BID  enoxaparin, 40 mg, Subcutaneous, Daily  fluconazole, 150 mg, Oral, Weekly  folic acid, 1 mg, Oral, Daily  Lidocaine, 1 patch, Transdermal, Daily  multivitamin with minerals, 1 tablet, Oral, Daily  potassium chloride, 20 mEq, Oral, Daily  QUEtiapine, 50 mg, Oral, Nightly  sertraline, 100 mg, Oral, BID  sodium chloride, 10 mL, Intravenous, Q12H  thiamine, 100 mg, Oral, Daily  vancomycin, 1,250 mg, Intravenous, Q12H      Continuous Infusions:Pharmacy to dose vancomycin,       PRN Meds:  acetaminophen    albuterol    Calcium Replacement - Follow Nurse / BPA Driven  Protocol    HYDROcodone-acetaminophen    loperamide    Magnesium Standard Dose Replacement - Follow Nurse / BPA Driven Protocol    melatonin    Pharmacy to dose vancomycin    Phosphorus Replacement - Follow Nurse / BPA Driven Protocol    Potassium Replacement - Follow Nurse / BPA Driven Protocol    [COMPLETED] Insert Peripheral IV **AND** sodium chloride    sodium chloride    sodium chloride    Assessment & Plan   Assessment and Plan:         Myalgia    ASSESSMENT:  Acute kidney injury likely secondary to volume and low blood pressure  Hyponatremia may be secondary to alcohol intake follow-up with a urine studies   Metabolic acidosis may be due to his respiratory alkalosis because of some history of cirrhosis  Hypocalcemia due to poor nutritional status check phosphorus level tomorrow morning check magnesium level tomorrow morning    PLAN :     Hyponatremia is much better  Need to improve nutrition instead of hydration  that will help with electrolytes  supplement potassium as needed we   Sodium chloride pills discontinued yesterday.  Renal function has improved  Clinically looks euvolemic, multifactorial hyponatremia, increased ADH, history of EtOH, low solute load  Remains on Zoloft, if possible reduced dose  While acidosis likely related to underlying alkalosis  Mild hypocalcemia noted  corrected calcium is normal  Will continue to follow            Electronically signed by Red Quiroga MD,   [facility] kidney consultant  [phone]  3/15/2024  16:07 EDT hyponatremia is much better

## 2024-03-15 NOTE — CASE MANAGEMENT/SOCIAL WORK
Continued Stay Note  DAYANARA Vik     Patient Name: Lita Yu  MRN: [id number]  Today's Date: 3/15/2024    Admit Date: 3/7/2024    Plan: [facility] accepted. PASRR approved. Precert requested late on 3-14.   Discharge Plan       Row Name 03/15/24 1356       Plan    Plan Comments DC barriers: IV abx, pending precert, PASRR approved. Spinal surgery, neuro, nephrology cardiology, ID following                     Ines Mckeon RN      Office phone: [phone]  Office fax: [phone]

## 2024-03-15 NOTE — PAYOR COMM NOTE
"This is a clinical update for Yonis Yu \"NAINA\"   Reference/Auth # [id number]       Please call or fax determination to contact below.   Thank you.    Sarah Singleton, RN, BSN  Utilization Review Nurse  [facility]  Direct & confidential phone # [phone]  Fax # [phone]      Yonis Yu \"NAINA\" (58 y.o. Female)       Date of Birth   1965    Social Security Number       Address   [address] [place] [address] [address] [place] [address]    Home Phone   [phone]    MRN   [id number]       Scientologist   None    Marital Status                               Admission Date   3/7/24    Admission Type   Emergency    Admitting Provider   Edgar Oliva MD    Attending Provider   Edgar Oliva MD    Department, Room/Bed   [facility]A MEDICAL INPATIENT, 309/1       Discharge Date       Discharge Disposition   Skilled Nursing Facility (DC - External)    Discharge Destination                                 Attending Provider: Edgar Oliva MD    Allergies: Sulfa Antibiotics, Keflex [Cephalexin]    Isolation: Spore, Contact   Infection: MRSA No Isolation this Admit (03/08/24), MRSA (03/09/24)   Code Status: CPR    Ht: 160 cm (63\")   Wt: 61.2 kg (135 lb)    Admission Cmt: None   Principal Problem: Myalgia [M79.10]                   Active Insurance as of 3/7/2024       Primary Coverage       Payor Plan Insurance Group Employer/Plan Group    [id number]       Payor Plan Address Payor Plan Phone Number Payor Plan Fax Number Effective Dates    [address]   6/22/2014 - None Entered    [place] [address]         Subscriber Name Subscriber Birth Date Member ID       YONIS YU 1965 [id number]                     Emergency Contacts        (Rel.) Home Phone Work Phone Mobile Phone    OBDULIA CANTU (Daughter) -- -- [phone]              Vital Signs (last 7 days)       Date/Time Temp Temp src " Pulse Resp BP Patient Position SpO2    03/15/24 1128 97.8 (36.6) Oral -- 16 110/60 Lying --    03/15/24 0750 98.2 (36.8) Oral 67 23 101/63 Lying 97    03/15/24 0410 98.4 (36.9) Oral 77 21 109/65 Lying 97    03/14/24 2251 98 (36.7) Oral -- 16 107/50 Lying --    03/14/24 1848 -- -- 78 22 125/62 Lying 100    03/14/24 1657 98.1 (36.7) Oral 76 18 126/63 Lying 100    03/14/24 1100 97.8 (36.6) Oral 70 14 120/63 Lying 99    03/14/24 0750 97.8 (36.6) Oral 72 18 107/56 Lying 99    03/14/24 0324 97.9 (36.6) Oral -- 19 124/70 Lying 96    03/14/24 0100 -- -- -- -- -- -- 96    03/14/24 0052 98 (36.7) Oral 69 20 -- Lying 98    03/13/24 1947 98.8 (37.1) Oral -- 18 -- Lying --    03/13/24 1534 97.9 (36.6) Oral 76 15 112/55 Lying 99    03/13/24 1352 -- -- 74 -- -- -- 97    03/13/24 1129 97.9 (36.6) Oral 72 20 106/62 Lying 95    03/13/24 0811 97.8 (36.6) Oral 78 21 101/45 Lying 91    03/13/24 0328 97.8 (36.6) Oral -- 19 -- Lying --    03/13/24 0003 100 (37.8) Axillary 82 18 115/54 Lying 94    03/12/24 1950 -- -- 83 18 109/45 Lying 92    03/12/24 1540 98.7 (37.1) Oral -- 20 -- Lying --    03/12/24 1350 -- -- 79 18 105/61 -- 100    03/12/24 1335 -- -- 79 -- 107/66 -- 98    03/12/24 1330 -- -- 75 -- 98/61 -- 98    03/12/24 1325 -- -- 76 -- 98/58 -- 99    03/12/24 1320 -- -- 78 -- 101/61 -- 99    03/12/24 1315 -- -- 73 -- 63/51 -- 98    03/12/24 1310 -- -- 76 -- 72/44 -- 98    03/12/24 1305 -- -- 78 24 88/50 -- 97    03/12/24 1300 -- -- 76 24 91/52 -- 100    03/12/24 1257 -- -- 75 25 92/69 -- 100    03/12/24 1230 -- -- -- -- 104/66 -- --    03/12/24 1215 -- -- 78 -- 104/58 -- 99    03/12/24 1208 -- -- 74 19 92/58 Lying 98    03/12/24 0815 98.2 (36.8) Oral 79 16 103/55 Lying 95    03/12/24 0401 97.9 (36.6) Oral -- 18 104/55 Lying 96    03/11/24 2002 97.6 (36.4) Oral 114 18 105/64 Lying 95    03/11/24 1718 98.7 (37.1) Oral 90 16 127/56 Lying 94    03/11/24 1300 98.3 (36.8) Oral 91 18 113/55 Lying 92    03/11/24 0841 98.7 (37.1) Oral 92 16  106/64 Lying 92    03/11/24 0453 97.8 (36.6) Oral 92 18 97/56 Lying 90    03/10/24 2313 97.4 (36.3) Oral 86 18 94/53 Lying 90    03/10/24 1915 98.6 (37) Oral 97 18 111/54 -- 94    03/10/24 1639 98 (36.7) Oral 95 20 119/69 Lying 94    03/10/24 1115 98.2 (36.8) Oral 95 25 120/64 Lying --    03/10/24 0735 98 (36.7) Oral -- 17 -- Lying --    03/10/24 0345 -- -- -- -- 123/69 -- --    03/10/24 0343 98.3 (36.8) Oral 88 16 123/69 Lying 95    03/09/24 2338 98.2 (36.8) Oral 100 18 105/56 Lying 96    03/09/24 1939 98.2 (36.8) Oral 98 14 106/50 Lying 93    03/09/24 1543 98.9 (37.2) Oral 105 16 97/58 Lying 88    03/09/24 1146 98.6 (37) Oral -- 16 -- Lying --    03/09/24 0801 99.3 (37.4) Oral -- 20 -- Lying 92    03/09/24 0505 99.2 (37.3) Oral 109 18 104/59 Lying 92    03/09/24 0156 98.2 (36.8) Oral -- -- -- -- --    03/08/24 2020 102.3 (39.1) Oral 101 20 106/58 Lying 93    03/08/24 1550 98.9 (37.2) Oral 91 20 99/57 Lying 97    03/08/24 1249 101.2 (38.4) Axillary 104 24 112/60 Lying 96    03/08/24 0814 98.2 (36.8) Oral 99 20 100/56 Lying 90    03/08/24 0453 99.7 (37.6) Oral 96 22 96/54 Lying 91    03/08/24 0300 102.9 (39.4) Oral 112 26 113/68 Lying 92    03/08/24 0108 101.9 (38.8) Oral 105 28 108/61 Lying 93

## 2024-03-15 NOTE — CONSULTS
Picc team consult:    Procedure explained to patient and informed consent obtained.  Time out performed.  Picc line placed RUE basilic vessel utilizing US guidance and modified seldinger technique with easily compressible vessel without difficulty.

## 2024-03-15 NOTE — CASE MANAGEMENT/SOCIAL WORK
Continued Stay Note  DAYANARA Vik     Patient Name: Lita Yu  MRN: [id number]  Today's Date: 3/15/2024    Admit Date: 3/7/2024    [facility]. Precert approved 3/15-3-21.   Discharge Plan       Row Name 03/15/24 1356       Plan    Plan [facility] accepted. PASRR approved. Precert approved 3/15-3-21.    Plan Comments DC barriers: IV abx, pending precert, PASRR approved. Spinal surgery, neuro, nephrology cardiology, ID following                        Ines Mckeon RN      Office phone: [phone]  Office fax: [phone]

## 2024-03-15 NOTE — DISCHARGE PLACEMENT REQUEST
"Yonis Yu \"NAINA\" (58 y.o. Female)       Date of Birth   1965    Social Security Number       Address   [address] [place] [address] [address] [place] [facility]    Home Phone   [phone]    MRN   [id number]       Zoroastrian   None    Marital Status                               Admission Date   3/7/24    Admission Type   Emergency    Admitting Provider   Edgar Oliva MD    Attending Provider   Edgar Oliva MD    Department, Room/Bed   [facility] 3A MEDICAL INPATIENT, 309/1       Discharge Date       Discharge Disposition       Discharge Destination                                 Attending Provider: Edgar Oliva MD    Allergies: Sulfa Antibiotics, Keflex [Cephalexin]    Isolation: Spore, Contact   Infection: MRSA No Isolation this Admit (24), MRSA (24)   Code Status: CPR    Ht: 160 cm (63\")   Wt: 61.2 kg (135 lb)    Admission Cmt: None   Principal Problem: Myalgia [M79.10]                   Active Insurance as of 3/7/2024       Primary Coverage       Payor Plan Insurance Group Employer/Plan Group    [id number]       Payor Plan Address Payor Plan Phone Number Payor Plan Fax Number Effective Dates    [address]   2014 - None Entered    [place] [id number]         Subscriber Name Subscriber Birth Date Member ID       YONIS YU 1965 [id number]                     Emergency Contacts        (Rel.) Home Phone Work Phone Mobile Phone    OBDULIA CANTU (Daughter) -- -- [phone]                 History & Physical        Edgar Oliva MD at [address] [place] [address]   HISTORY AND PHYSICAL    Patient Name: Yonis Yu  : 1965  MRN: [id number]  Primary Care Physician:  Norma Saul APRN  Date of admission: 3/7/2024    Subjective  Subjective     Chief Complaint: Generalized weakness and muscle aches mostly in the thighs and the arms started yesterday  Patient had " previous history of rhabdomyolysis  Patient was hospitalized in February for dehydration and  Patient had previous hip replacement  Since last night she is complaining of generalized muscle aches and difficulty ambulation  Seen in the emergency room initial workup was negative    HPI:    Lita Yu is a 58 y.o. female with history of alcohol abuse  Presented emergency room with generalized weakness and myalgia    Review of Systems   Significant for generalized muscle aches and myalgia  No focal weakness no slurred speech no blurred vision no change in mentation no fever no chills no weight loss rest of 14 system reviewed and negative    Personal History     Past Medical History:   Diagnosis Date    Cirrhosis     COPD (chronic obstructive pulmonary disease)     Depression     GERD (gastroesophageal reflux disease)     Hyperlipidemia     Hypertension     PTSD (post-traumatic stress disorder)        Past Surgical History:   Procedure Laterality Date     SECTION N/A     COLONOSCOPY N/A 2021    Procedure: COLONOSCOPY with polypectomy x2 and random biopsy;  Surgeon: Osman Clay MD;  Location: [facility] ENDOSCOPY;  Service: Gastroenterology;  Laterality: N/A;  colon polyps    DENTAL PROCEDURE      ENDOSCOPY N/A 2021    Procedure: ESOPHAGOGASTRODUODENOSCOPY;  Surgeon: Osman Clay MD;  Location: [facility] ENDOSCOPY;  Service: Gastroenterology;  Laterality: N/A;  esophagitis    JOINT REPLACEMENT      REPLACEMENT TOTAL HIP LATERAL POSITION Left     TONSILLECTOMY      VAGINAL BIRTH AFTER  SECTION         Family History: family history includes Alcohol abuse in her mother and paternal grandfather. Otherwise pertinent FHx was reviewed and not pertinent to current issue.    Social History:  reports that she has been smoking cigarettes. She has been exposed to tobacco smoke. She has never used smokeless tobacco. She reports current alcohol use of about 2.0 standard drinks of alcohol per  week. She reports that she does not use drugs.    Home Medications:  QUEtiapine, Vitamin B-12, Zinc Gluconate, albuterol sulfate HFA, busPIRone, diclofenac, folic acid, furosemide, lidocaine, melatonin, potassium chloride, sertraline, spironolactone, and thiamine    Allergies:  Allergies   Allergen Reactions    Sulfa Antibiotics Hives    Keflex [Cephalexin] Hives       Objective  Objective     Vitals:   Temp:  [98.1 °F (36.7 °C)] 98.1 °F (36.7 °C)  Heart Rate:  [] 107  Resp:  [19] 19  BP: (103-120)/(58-65) 120/63  Physical Exam    Constitutional: Awake, alert   Eyes: PERRLA, sclerae anicteric, no conjunctival injection   HENT: NCAT, mucous membranes moist   Neck: Supple, no thyromegaly, no lymphadenopathy, trachea midline   Respiratory: Clear to auscultation bilaterally, nonlabored respirations    Cardiovascular: RRR, no murmurs, rubs, or gallops, palpable pedal pulses bilaterally   Gastrointestinal: Positive bowel sounds, soft, nontender, nondistended   Musculoskeletal: No bilateral ankle edema, no clubbing or cyanosis to extremities   Psychiatric: Appropriate affect, cooperative   Neurologic: Oriented x 3, strength symmetric in all extremities, Cranial Nerves grossly intact to confrontation, speech clear   Skin: No rashes     Result Review   Result Review:  I have personally reviewed the results from the time of this admission to 3/7/2024 11:12 EST and agree with these findings:  []  Laboratory list / accordion  []  Microbiology  []  Radiology  []  EKG/Telemetry   []  Cardiology/Vascular   []  Pathology  []  Old records  []  Other:  Most notable findings include:       Assessment & Plan  Assessment / Plan     Brief Patient Summary:  Lita Yu is a 58 y.o. female who is here with generalized muscle aches and myalgia  No focal weakness she is able to move all extremities with mild generalized weakness with no focal deficits  Patient with a history of previous rhabdomyolysis  Patient with history of  hypertension on Aldactone  History of alcohol abuse with alcohol cirrhosis  History of COPD patient is hyponatremic probably related to Aldactone      Active Hospital Problems:  Active Hospital Problems    Diagnosis     **Myalgia      Plan:   Patient will be kept observation  Hydration IV fluids  Will get nephrology consult for evaluation for hyponatremia  Get PT evaluation  Follow daily BMP  I will hold the diuretic for now  C initial CK 74  Initial thyroid test normal  Will check CT of the brain but I doubt any intracranial she had previously low T4  Repeat T4 and TSH are normal  DVT prophylaxis:  Medical DVT prophylaxis orders are present.        CODE STATUS:    Code Status (Patient has no pulse and is not breathing): CPR (Attempt to Resuscitate)  Medical Interventions (Patient has pulse or is breathing): Full Support    Admission Status:  I believe this patient meets obs status.    Edgar Oliva MD             Electronically signed by Edgar Oliva MD at 24 1117       Operative/Procedure Notes (all)    No notes of this type exist for this encounter.          Physician Progress Notes (last 48 hours)        Edgar Oliva MD at 03/15/24 1002          Hospitalist Service   Daily Progress Note      Patient Name: Lita Yu  : 1965  MRN: [id number]  Primary Care Physician:  Norma Saul APRN  Date of admission: 3/7/2024      Subjective      Chief Complaint: Generalized weakness    Patient seen and examined this morning.  Feeling better this morning, still with back pain but controlled for now.  MRI results discussed with her, neurosurgery evaluation pending today.  Possible SILVINA as well today per cardiology.  No other complaints.    Pertinent positives as noted in HPI/subjective.  All other systems were reviewed and are negative.  3/12  Patient lethargic but in no acute distress  Patient with MRSA bacteremia on vancomycin  Followed up per ID and neurosurgery and nephrology and  cardiology  Patient with L2-L3 discitis-osteomyelitis continues to have severe back pain with bilateral leg pain  MRI of the lumbar spine showed enhancement of the epidural fluid with moderate canal stenosis  Patient was not A surgical candidate as per neurosurgery  Patient with history of liver cirrhosis alcohol induced   Status post left total hip replacement secondary to hip fracture in 2017  Continue IV antibiotic with vancomycin and follow-up per ID  She will need 6 weeks of IV antibiotic  SILVINA scheduled for today  Patient will probably need skilled nursing facility for 30 days or less on discharge    3/13  Patient had stress test which was negative  SILVINA done yesterday I cannot find the results yet  Transthoracic echo was done on March 9  Patient with MRSA bacteremia followed up per infectious disease on IV antibiotic    3/14  Patient had SILVINA yesterday was no vegetation  On IV antibiotic with vancomycin  Awaiting precertification to be transferred to skilled nursing facility to continue IV antibiotic for total of 6 weeks  Awake alert oriented x 3  Hemodynamically stable  Discharge planning to skilled nursing facility once bed is available    3/15  Seen with RN  Patient with endocarditis on IV antibiotic with Vanco  Followed up per infectious disease  On IV vancomycin for 6 weeks  Patient with MRSA bacteremia likely source is discitis-osteomyelitis  Patient with L2-L3 discitis/osteomyelitis with chronic back pain  Patient will need repeat MRI of the spine after antimicrobial therapy is finished  Awaiting discharge planning to skilled nursing facility    Objective      Vitals:   Temp:  [97.8 °F (36.6 °C)-98.4 °F (36.9 °C)] 98.2 °F (36.8 °C)  Heart Rate:  [67-78] 67  Resp:  [14-23] 23  BP: (101-126)/(50-65) 101/63    Physical Exam:    General: Awake, alert, chronically ill-appearing female, lying in bed, NAD  Eyes: PERRL, EOMI, conjunctivae are clear  Cardiovascular: Regular rate and rhythm, no  murmurs  Respiratory: Clear to auscultation bilaterally, no wheezing or rales, unlabored breathing  Abdomen: Soft, nontender, positive bowel sounds, no guarding  Neurologic: A&O, CN grossly intact, moves all extremities spontaneously  Musculoskeletal: Generalized weakness, no other gross deformities  Skin: Warm, dry         Result Review    Result Review:  I have personally reviewed the results from the time of this admission to 3/15/2024 10:02 EDT and agree with these findings:  [x]  Laboratory  [x]  Microbiology  []  Radiology  []  EKG/Telemetry   []  Cardiology/Vascular   []  Pathology  []  Old records  []  Other:    Wounds (Last 24 Hours)       LDA Wound       Row Name 03/15/24 0811 03/14/24 1926 03/14/24 1657       Wound Bilateral perineum MASD (Moisture associated skin damage)    Wound - Properties Group Present on Original Admission: Y  -DI Side: Bilateral  -DI Location: perineum  -DI Primary Wound Type: MASD  -DI    Dressing Appearance open to air  -SR open to air  -JN --    Closure Open to air  -SR Open to air  -JN Open to air  -SR    Base -- blanchable;red  -JN --    Drainage Amount -- none  -JN --    Dressing Care -- open to air  -JN --    Retired Wound - Properties Group Present on Original Admission: Y  -DI Side: Bilateral  -DI Location: perineum  -DI Primary Wound Type: MASD  -DI    Retired Wound - Properties Group Present on Original Admission: Y  -DI Side: Bilateral  -DI Location: perineum  -DI Primary Wound Type: MASD  -DI      Row Name 03/14/24 1223             Wound Bilateral perineum MASD (Moisture associated skin damage)    Wound - Properties Group Present on Original Admission: Y  -DI Side: Bilateral  -DI Location: perineum  -DI Primary Wound Type: MASD  -DI    Closure Open to air  -SR      Retired Wound - Properties Group Present on Original Admission: Y  -DI Side: Bilateral  -DI Location: perineum  -DI Primary Wound Type: MASD  -DI    Retired Wound - Properties Group Present on Original  Admission: Y  -DI Side: Bilateral  -DI Location: perineum  -DI Primary Wound Type: MASD  -DI              User Key  (r) = Recorded By, (t) = Taken By, (c) = Cosigned By      Initials Name Provider Type    Radha Saeed, RN Registered Nurse    Chanell Joseph RN Registered Nurse    Antonella Mulligan RN Registered Nurse                      Assessment & Plan      Brief Patient Summary:  Lita Yu is a 58 y.o. 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  Noted to have hyponatremia, hypocalcemia, hypokalemia, and SARITA on admission.  Nephrology consulted for further management.  Blood cultures collected on admission positive for MRSA, ID consulted for further evaluation.      busPIRone, 15 mg, Oral, BID  enoxaparin, 40 mg, Subcutaneous, Daily  folic acid, 1 mg, Oral, Daily  Lidocaine, 1 patch, Transdermal, Daily  multivitamin with minerals, 1 tablet, Oral, Daily  potassium chloride, 20 mEq, Oral, Daily  QUEtiapine, 50 mg, Oral, Nightly  sertraline, 100 mg, Oral, BID  sodium chloride, 10 mL, Intravenous, Q12H  thiamine, 100 mg, Oral, Daily  vancomycin, 1,250 mg, Intravenous, Q12H       Pharmacy to dose vancomycin,          I have utilized all available, immediate resources to obtain, update, or review the patient's current medications including all prescriptions, over-the-counter products, herbals, cannabis/cannabidiol products, and vitamin.mineral/dietary (nutritional) supplements.    Active Hospital Problems:  Active Hospital Problems    Diagnosis     **Myalgia        Assessment/Plan:     MRSA bacteremia  L2-L3 discitis/osteomyelitis  Sepsis, POA  -Blood cultures positive for MRSA, likely source is lumbar discitis/osteomyelitis  -MRI spine findings noted to have L2-L3 discitis/osteomyelitis  -Continue IV vancomycin, ID  following  -Cardiology on board for SILVINA  -Neurosurgery consulted    SARITA  Hyponatremia  Hypokalemia  Hypocalcemia  -Likely prerenal and due to alcohol abuse and poor p.o. intake  -Sodium being managed per nephrology, salt tabs added  -Replace potassium and calcium and monitor  -Nephrology following    Liver cirrhosis  -Likely alcohol related  -Does not appear to have decompensation at this time  -Continue supportive care and outpatient follow-up    Anemia of chronic disease  -Hemoglobin stable at this time  -Monitor and transfuse as needed    Tobacco abuse  -40-pack-year smoking history  -Counseled on cessation  -Nicotine patch if needed    DVT prophylaxis  -Lovenox      CODE STATUS:    Code Status (Patient has no pulse and is not breathing): CPR (Attempt to Resuscitate)  Medical Interventions (Patient has pulse or is breathing): Full Support      Disposition: Pending clinical course    Electronically signed by Edgar Oliva MD, 03/15/24, 10:02 EDT.  [facility], [place]      Part of this note may be an electronic transcription/translation of spoken language to printed text using the Dragon Dictation System.      Electronically signed by Edgar Oliva MD at 03/15/24 1003       Sarah Ross APRN at 03/14/24 1523       Attestation signed by Pilar Foster MD at 03/15/24 0923    I have reviewed this documentation and agree.                  Infectious Diseases Progress Note      LOS: 6 days   Patient Care Team:  Norma Saul APRN as PCP - General (Nurse Practitioner)  Flory Villanueva MD (Physical Medicine and Rehabilitation)    Chief Complaint: Back pain, fever    Subjective       The patient has been afebrile for the last 24 hours.  The patient is on room air, hemodynamically stable, and is tolerating antimicrobial therapy..  Patient still having severe lower back pain and leg pain      Review of Systems:   Review of Systems   Constitutional: Negative.    HENT: Negative.     Eyes:  Negative.    Respiratory: Negative.     Cardiovascular: Negative.    Gastrointestinal: Negative.    Endocrine: Negative.    Genitourinary: Negative.    Musculoskeletal:  Positive for back pain and neck pain.        Bilateral leg pain   Skin: Negative.    Neurological: Negative.    Psychiatric/Behavioral: Negative.     All other systems reviewed and are negative.       Objective     Vital Signs  Temp:  [97.8 °F (36.6 °C)-98.8 °F (37.1 °C)] 97.8 °F (36.6 °C)  Heart Rate:  [69-76] 70  Resp:  [14-20] 14  BP: (107-124)/(55-70) 120/63    Physical Exam:  Physical Exam  Vitals and nursing note reviewed.   Constitutional:       General: She is not in acute distress.     Appearance: She is well-developed and normal weight. She is ill-appearing. She is not diaphoretic.   HENT:      Head: Normocephalic and atraumatic.   Eyes:      General: No scleral icterus.     Extraocular Movements: Extraocular movements intact.      Conjunctiva/sclera: Conjunctivae normal.      Pupils: Pupils are equal, round, and reactive to light.   Cardiovascular:      Rate and Rhythm: Normal rate and regular rhythm.      Heart sounds: Normal heart sounds, S1 normal and S2 normal. No murmur heard.  Pulmonary:      Effort: Pulmonary effort is normal. No respiratory distress.      Breath sounds: Normal breath sounds. No stridor. No wheezing or rales.   Chest:      Chest wall: No tenderness.   Abdominal:      General: Bowel sounds are normal. There is no distension.      Palpations: Abdomen is soft. There is no mass.      Tenderness: There is no abdominal tenderness. There is no guarding.   Musculoskeletal:         General: No swelling, tenderness or deformity. Normal range of motion.      Cervical back: Neck supple.      Comments: Tenderness with the movement of all joints including hips and shoulders but there is no obvious joint effusion     Tenderness at the lower back and the upper back      Skin:     General: Skin is warm and dry.      Coloration:  Skin is not pale.      Findings: No bruising, erythema or rash.   Neurological:      General: No focal deficit present.      Mental Status: She is alert and oriented to person, place, and time. Mental status is at baseline.      Cranial Nerves: No cranial nerve deficit.   Psychiatric:         Mood and Affect: Mood normal.          Results Review:    I have reviewed all clinical data, test, lab, and imaging results.     Radiology  No Radiology Exams Resulted Within Past 24 Hours    Cardiology    Laboratory    Results from last 7 days   Lab Units 03/14/24  0048 03/12/24 2219 03/12/24  0407 03/11/24  0505 03/10/24  0621 03/08/24  2338 03/08/24  0752   WBC 10*3/mm3 8.09 8.60 6.80 6.40 6.20 6.50 6.50   HEMOGLOBIN g/dL 7.5* 7.9* 7.8* 8.0* 8.1* 8.1* 9.4*   HEMATOCRIT % 23.5* 24.8* 24.0* 24.5* 25.4* 25.2* 29.0*   PLATELETS 10*3/mm3 150 142 113* 99* 83* 64* 70*     Results from last 7 days   Lab Units 03/14/24  0048 03/13/24  1324 03/12/24 2219 03/12/24 0407 03/11/24  0505 03/10/24  0621 03/09/24  0948 03/08/24  2338 03/08/24  0752   SODIUM mmol/L 134*  --  135* 131* 129* 129* 120* 124* 128*   POTASSIUM mmol/L 4.2 4.5 3.6 3.7 3.6 4.3 4.0 3.2* 3.7   CHLORIDE mmol/L 105  --  105 102 99 100 92* 93* 95*   CO2 mmol/L 17.0*  --  17.0* 19.0* 18.0* 18.0* 17.0* 19.0* 20.0*   BUN mg/dL 8  --  11 11 11 14 14 15 17   CREATININE mg/dL 0.52*  --  0.58 0.58 0.66 0.75 0.81 0.82 0.92   GLUCOSE mg/dL 84  --  112* 79 110* 77 111* 97 108*   ALBUMIN g/dL  --   --   --   --   --   --   --  2.8* 3.1*   BILIRUBIN mg/dL  --   --   --   --   --   --   --  0.5 0.5   ALK PHOS U/L  --   --   --   --   --   --   --  108 123*   AST (SGOT) U/L  --   --   --   --   --   --   --  29 29   ALT (SGPT) U/L  --   --   --   --   --   --   --  18 21   CALCIUM mg/dL 8.2*  --  8.2* 8.2* 8.2* 8.2* 8.1* 8.2* 8.3*           Results from last 7 days   Lab Units 03/08/24  5992   SED RATE mm/hr 59*         Microbiology   Microbiology Results (last 10 days)        Procedure Component Value - Date/Time    Clostridioides difficile EIA - Stool, Per Rectum [[id number]]  (Normal) Collected: 03/08/24 2327    Lab Status: Final result Specimen: Stool from Per Rectum Updated: 03/09/24 0745     C Diff GDH Ag Negative     C.diff Toxin Ag Negative    Narrative:      The result indicates the absence of toxigenic C.difficile from stool specimen.    Blood Culture - Blood, Arm, Right [[id number]]  (Normal) Collected: 03/08/24 1643    Lab Status: Final result Specimen: Blood from Arm, Right Updated: 03/13/24 1815     Blood Culture No growth at 5 days    COVID-19, FLU A/B, RSV PCR 1 HR TAT - Swab, Nasopharynx [[id number]]  (Normal) Collected: 03/07/24 1526    Lab Status: Final result Specimen: Swab from Nasopharynx Updated: 03/07/24 1607     COVID19 Not Detected     Influenza A PCR Not Detected     Influenza B PCR Not Detected     RSV, PCR Not Detected    Narrative:      Fact sheet for providers: https://www.fda.gov/media/138791/download    Fact sheet for patients: https://www.fda.gov/media/736771/download    Test performed by PCR.    Blood Culture - Blood, Arm, Left [[id number]]  (Abnormal) Collected: 03/07/24 0933    Lab Status: Final result Specimen: Blood from Arm, Left Updated: 03/10/24 0657     Blood Culture Staphylococcus aureus, MRSA     Comment:   Infectious disease consultation is highly recommended to rule out distant foci of infection.  Methicillin resistant Staphylococcus aureus, Patient may be an isolation risk.        Isolated from Aerobic and Anaerobic Bottles     Gram Stain Aerobic Bottle Gram positive cocci in clusters      Anaerobic Bottle Gram positive cocci in clusters    Narrative:      Refer to previous blood culture collected on  03/07/2024 at 0920 for MICs.    Blood Culture - Blood, Arm, Right [[id number]]  (Abnormal)  (Susceptibility) Collected: 03/07/24 0920    Lab Status: Final result Specimen: Blood from Arm, Right Updated: 03/10/24 0657     Blood Culture Staphylococcus  aureus, MRSA     Comment:   Infectious disease consultation is highly recommended to rule out distant foci of infection.  Methicillin resistant Staphylococcus aureus, Patient may be an isolation risk.        Isolated from Aerobic and Anaerobic Bottles     Gram Stain Anaerobic Bottle Gram positive cocci in clusters      Aerobic Bottle Gram positive cocci in clusters    Susceptibility        Staphylococcus aureus, MRSA      SOFIA      Gentamicin Susceptible      Oxacillin Resistant      Rifampin Susceptible      Vancomycin Susceptible                           Blood Culture ID, PCR - Blood, Arm, Right [[id number]]  (Abnormal) Collected: 03/07/24 0920    Lab Status: Final result Specimen: Blood from Arm, Right Updated: 03/08/24 0433     BCID, PCR Staph aureus. mecA/C and MREJ (methicillin resistance gene) detected. Identification by BCID2 PCR.     BOTTLE TYPE Anaerobic Bottle    Narrative:      Infectious disease consultation is highly recommended to rule out distant foci of infection.            Medication Review:       Schedule Meds  busPIRone, 15 mg, Oral, BID  enoxaparin, 40 mg, Subcutaneous, Daily  folic acid, 1 mg, Oral, Daily  Lidocaine, 1 patch, Transdermal, Daily  multivitamin with minerals, 1 tablet, Oral, Daily  potassium chloride, 20 mEq, Oral, Daily  QUEtiapine, 50 mg, Oral, Nightly  sertraline, 100 mg, Oral, BID  sodium chloride, 10 mL, Intravenous, Q12H  thiamine, 100 mg, Oral, Daily  vancomycin, 1,250 mg, Intravenous, Q12H        Infusion Meds  Pharmacy to dose vancomycin,         PRN Meds    acetaminophen    albuterol    Calcium Replacement - Follow Nurse / BPA Driven Protocol    HYDROcodone-acetaminophen    loperamide    Magnesium Standard Dose Replacement - Follow Nurse / BPA Driven Protocol    [facility]    Pharmacy to dose vancomycin    Phosphorus Replacement - Follow Nurse / BPA Driven Protocol    Potassium Replacement - Follow Nurse / BPA Driven Protocol    [COMPLETED] Insert Peripheral IV **AND**  sodium chloride    sodium chloride    sodium chloride        Assessment & Plan       Antimicrobial Therapy   1.  IV vancomycin        2.        3.        4.        5.          Assessment     Methicillin resistant Staphylococcus aureus bacteremia.  Likely source is lumbar discitis/osteomyelitis.  Endocarditis also needs to be ruled out.  Patient denies using IV drugs.  Repeat blood culture from 3/8/2024 is negative so far.  2D echo did not show any vegetation.  ISLVINA performed on 3/12/2024 with cardiology notes mentioning no vegetation    L2-L3 discitis/osteomyelitis.  Patient continues to have fairly severe back pain with bilateral leg pain MRI of the lumbar spine showed enhancement of the epidural fluid with moderate canal stenosis.  Neurosurgery did not feel that patient was a surgical candidate at this time     History of liver cirrhosis secondary to alcohol and hemochromatosis     History of alcohol abuse and tobacco abuse     S/p left total hip replacement secondary to hip fracture in 2017 or 2018.  Patient denied having infection after the procedure    Reported diarrhea at admission.  C. difficile screen was negative     Plan     Continue IV vancomycin-keep trough between 15 and 20.  Patient will need 8 weeks of treatment from last negative blood culture on 3/8/2024.  Last day on 5/2/2024  Patient will need a PICC line before discharge-please remove when IV antibiotics are completed  Recommend repeat MRI of the lumbar spine after antimicrobial therapy is completed  Continue supportive care  Okay to discharge from ID standpoint once IV antibiotics are arranged  Continued alcohol and tobacco abuse cessation    Please fax all post discharge lab results, imaging studies and correspondence to this fax number [phone]  For any question or concern please contact our service number [phone]    SHARIF Mae  03/14/24  15:23 EDT    Note is dictated utilizing voice recognition  software/Dragon    Electronically signed by Pilar Foster MD at 03/15/24 0923       Edgar Oliva MD at 24 [phone]          Hospitalist Service   Daily Progress Note      Patient Name: Lita Yu  : 1965  MRN: [id number]  Primary Care Physician:  Norma Saul APRN  Date of admission: 3/7/2024      Subjective      Chief Complaint: Generalized weakness    Patient seen and examined this morning.  Feeling better this morning, still with back pain but controlled for now.  MRI results discussed with her, neurosurgery evaluation pending today.  Possible SILVINA as well today per cardiology.  No other complaints.    Pertinent positives as noted in HPI/subjective.  All other systems were reviewed and are negative.  3/12  Patient lethargic but in no acute distress  Patient with MRSA bacteremia on vancomycin  Followed up per ID and neurosurgery and nephrology and cardiology  Patient with L2-L3 discitis-osteomyelitis continues to have severe back pain with bilateral leg pain  MRI of the lumbar spine showed enhancement of the epidural fluid with moderate canal stenosis  Patient was not A surgical candidate as per neurosurgery  Patient with history of liver cirrhosis alcohol induced   Status post left total hip replacement secondary to hip fracture in   Continue IV antibiotic with vancomycin and follow-up per ID  She will need 6 weeks of IV antibiotic  SILVINA scheduled for today  Patient will probably need skilled nursing facility for 30 days or less on discharge    3/13  Patient had stress test which was negative  SILVINA done yesterday I cannot find the results yet  Transthoracic echo was done on   Patient with MRSA bacteremia followed up per infectious disease on IV antibiotic    3/14  Patient had SILVINA yesterday was no vegetation  On IV antibiotic with vancomycin  Awaiting precertification to be transferred to skilled nursing facility to continue IV antibiotic for total of 6 weeks  Awake alert  oriented x 3  Hemodynamically stable  Discharge planning to skilled nursing facility once bed is available    Objective      Vitals:   Temp:  [97.8 °F (36.6 °C)-98.8 °F (37.1 °C)] 97.8 °F (36.6 °C)  Heart Rate:  [69-76] 70  Resp:  [14-20] 14  BP: (107-124)/(55-70) 120/63    Physical Exam:    General: Awake, alert, chronically ill-appearing female, lying in bed, NAD  Eyes: PERRL, EOMI, conjunctivae are clear  Cardiovascular: Regular rate and rhythm, no murmurs  Respiratory: Clear to auscultation bilaterally, no wheezing or rales, unlabored breathing  Abdomen: Soft, nontender, positive bowel sounds, no guarding  Neurologic: A&O, CN grossly intact, moves all extremities spontaneously  Musculoskeletal: Generalized weakness, no other gross deformities  Skin: Warm, dry         Result Review    Result Review:  I have personally reviewed the results from the time of this admission to 3/14/2024 11:49 EDT and agree with these findings:  [x]  Laboratory  [x]  Microbiology  []  Radiology  []  EKG/Telemetry   []  Cardiology/Vascular   []  Pathology  []  Old records  []  Other:    Wounds (Last 24 Hours)       LDA Wound       Row Name 03/14/24 0859 03/13/24 2043 03/13/24 1659       Wound Bilateral perineum MASD (Moisture associated skin damage)    Wound - Properties Group Present on Original Admission: Y  -DI Side: Bilateral  -DI Location: perineum  -DI Primary Wound Type: MASD  -DI    Closure Open to air  -SR Open to air  -JA Open to air  -SR    Care, Wound -- cleansed with;soap and water  -JA cleansed with;soap and water  -SR    Retired Wound - Properties Group Present on Original Admission: Y  -DI Side: Bilateral  -DI Location: perineum  -DI Primary Wound Type: MASD  -DI    Retired Wound - Properties Group Present on Original Admission: Y  -DI Side: Bilateral  -DI Location: perineum  -DI Primary Wound Type: MASD  -DI      Row Name 03/13/24 1200             Wound Bilateral perineum MASD (Moisture associated skin damage)    Wound  - Properties Group Present on Original Admission: Y  -DI Side: Bilateral  -DI Location: perineum  -DI Primary Wound Type: MASD  -DI    Closure Unable to assess  -SR      Retired Wound - Properties Group Present on Original Admission: Y  -DI Side: Bilateral  -DI Location: perineum  -DI Primary Wound Type: MASD  -DI    Retired Wound - Properties Group Present on Original Admission: Y  -DI Side: Bilateral  -DI Location: perineum  -DI Primary Wound Type: MASD  -DI              User Key  (r) = Recorded By, (t) = Taken By, (c) = Cosigned By      Initials Name Provider Type    Shikha Butt LPN Licensed Nurse    SR Chanell Garcia RN Registered Nurse    Antonella Mulligan RN Registered Nurse                      Assessment & Plan      Brief Patient Summary:  Lita Yu is a 58 y.o. 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  Noted to have hyponatremia, hypocalcemia, hypokalemia, and SARITA on admission.  Nephrology consulted for further management.  Blood cultures collected on admission positive for MRSA, ID consulted for further evaluation.      busPIRone, 15 mg, Oral, BID  enoxaparin, 40 mg, Subcutaneous, Daily  folic acid, 1 mg, Oral, Daily  Lidocaine, 1 patch, Transdermal, Daily  multivitamin with minerals, 1 tablet, Oral, Daily  potassium chloride, 20 mEq, Oral, Daily  QUEtiapine, 50 mg, Oral, Nightly  sertraline, 100 mg, Oral, BID  sodium chloride, 10 mL, Intravenous, Q12H  thiamine, 100 mg, Oral, Daily  vancomycin, 1,250 mg, Intravenous, Q12H       Pharmacy to dose vancomycin,          I have utilized all available, immediate resources to obtain, update, or review the patient's current medications including all prescriptions, over-the-counter products, herbals, cannabis/cannabidiol products, and vitamin.mineral/dietary  "(nutritional) supplements.    Active Hospital Problems:  Active Hospital Problems    Diagnosis     **Myalgia        Assessment/Plan:     MRSA bacteremia  L2-L3 discitis/osteomyelitis  Sepsis, POA  -Blood cultures positive for MRSA, likely source is lumbar discitis/osteomyelitis  -MRI spine findings noted to have L2-L3 discitis/osteomyelitis  -Continue IV vancomycin, ID following  -Cardiology on board for SILVINA  -Neurosurgery consulted    SARITA  Hyponatremia  Hypokalemia  Hypocalcemia  -Likely prerenal and due to alcohol abuse and poor p.o. intake  -Sodium being managed per nephrology, salt tabs added  -Replace potassium and calcium and monitor  -Nephrology following    Liver cirrhosis  -Likely alcohol related  -Does not appear to have decompensation at this time  -Continue supportive care and outpatient follow-up    Anemia of chronic disease  -Hemoglobin stable at this time  -Monitor and transfuse as needed    Tobacco abuse  -40-pack-year smoking history  -Counseled on cessation  -Nicotine patch if needed    DVT prophylaxis  -Lovenox      CODE STATUS:    Code Status (Patient has no pulse and is not breathing): CPR (Attempt to Resuscitate)  Medical Interventions (Patient has pulse or is breathing): Full Support      Disposition: Pending clinical course    Electronically signed by Edgar Oliva MD, 24, 11:49 EDT.  [facility] Hospitalist Team      Part of this note may be an electronic transcription/translation of spoken language to printed text using the Dragon Dictation System.      Electronically signed by Edgar Oliva MD at 24 1149       Edgar Oliva MD at 24 1148            Enter Query Response Below      Query Response: SARITA not due to sepsis in this case              If applicable, please update the problem list.       Patient: Lita Yu \"Minerva\"        : 1965  Account: [id number]           Admit Date:         How to Respond to this query:       a. Click New " "Note     b. Answer query within the yellow box.                c. Update the Problem List, if applicable.      If you have any questions about this query contact me at: Sincerely,    Roberth Dover RN, BSN  Clinical Documentation Integrity  [facility]  [email or web address]       ,     58 year old female with history of Liver cirrhosis, alcohol abuse, hypertension presented with muscle aches, weakness, diarrhea and was found to have MRSA bacteremia with source felt to be lumbar discitis/osteomyelitis, with sepsis and SARITA.    -Per renal consult 3/8- \"Acute kidney injury likely secondary to volume and low blood pressure...Metabolic acidosis may be due to his respiratory alkalosis because of some history of cirrhosis.\"      -Treatments have included  RL bolus  1000 ccs (3/7, 0937), NS bolus 500 ccs (3/7, 1702), monitoring of labs, electrolyte replacements.    -Per renal 3/13, \" Renal function has improved..... Clinically looks euvolemic.....\"    Please clarify the following:    SARITA due to sepsis  SARITA not due to sepsis in this case  Other- specify_______  Unable to determine      By submitting this query, we are merely seeking further clarification of documentation to accurately reflect all conditions that you are monitoring, evaluating, treating or that extend the hospitalization or utilize additional resources of care. Please utilize your independent clinical judgment when addressing the question(s) above.     This query and your response, once completed, will be entered into the legal medical record.    Sincerely,  Roberth Dover  Clinical Documentation Integrity Program       Electronically signed by Edgar Oliva MD at 24 1148       Red Quiroga MD at 24 1113              PROGRESS NOTE      Patient Name: Lita Yu  : 1965  MRN: [id number]  Primary Care Physician: Norma Saul APRN  Date of admission: 3/7/2024    Patient Care Team:  Norma Saul APRN " as PCP - General (Nurse Practitioner)  Flory Villanueva MD (Physical Medicine and Rehabilitation)        Subjective   Subjective:   Patient seen and examined this morning, she denies any new complaints.  Review of systems:  All other review of system unremarkable      Allergies:    Allergies   Allergen Reactions    Sulfa Antibiotics Hives    Keflex [Cephalexin] Hives       Objective   Exam:     Vital Signs  Temp:  [97.8 °F (36.6 °C)-98.8 °F (37.1 °C)] 97.8 °F (36.6 °C)  Heart Rate:  [69-76] 72  Resp:  [15-20] 18  BP: (106-124)/(55-70) 107/56  SpO2:  [95 %-99 %] 99 %  on   ;   Device (Oxygen Therapy): room air  Body mass index is 23.91 kg/m².    General: Middle-age female in no acute distress.    Head:      Normocephalic and atraumatic.    Eyes:      PERRL/EOM intact, conjunctiva and sclera clear with out nystagmus.    Neck:      No masses, thyromegaly,  trachea central with normal respiratory effort   Lungs:    Clear bilaterally to auscultation.    Heart:      Regular rate and rhythm, no murmur no gallop  Abd:        Soft, nontender, not distended, bowel sounds positive, no shifting dullness   Pulses:   Pulses palpable  Extr:        No cyanosis or clubbing--no significant edema.    Neuro:    No focal deficits.   alert oriented x3  Skin:       Intact without lesions or rashes.    Psych:    Alert and cooperative; normal mood and affect; .      Results Review:  I have personally reviewed most recent Data :  CBC    Results from last 7 days   Lab Units 03/14/24  0048 03/12/24  2219 03/12/24  0407 03/11/24  0505 03/10/24  0621 03/08/24  2338 03/08/24  0752   WBC 10*3/mm3 8.09 8.60 6.80 6.40 6.20 6.50 6.50   HEMOGLOBIN g/dL 7.5* 7.9* 7.8* 8.0* 8.1* 8.1* 9.4*   PLATELETS 10*3/mm3 150 142 113* 99* 83* 64* 70*     CMP   Results from last 7 days   Lab Units 03/14/24  0048 03/13/24  1324 03/12/24  2219 03/12/24  0407 03/11/24  0505 03/10/24  0621 03/09/24  0948 03/08/24  2338 03/08/24  0752   SODIUM mmol/L 134*  --  135* 131*  129* 129* 120* 124* 128*   POTASSIUM mmol/L 4.2 4.5 3.6 3.7 3.6 4.3 4.0 3.2* 3.7   CHLORIDE mmol/L 105  --  105 102 99 100 92* 93* 95*   CO2 mmol/L 17.0*  --  17.0* 19.0* 18.0* 18.0* 17.0* 19.0* 20.0*   BUN mg/dL 8  --  11 11 11 14 14 15 17   CREATININE mg/dL 0.52*  --  0.58 0.58 0.66 0.75 0.81 0.82 0.92   GLUCOSE mg/dL 84  --  112* 79 110* 77 111* 97 108*   ALBUMIN g/dL  --   --   --   --   --   --   --  2.8* 3.1*   BILIRUBIN mg/dL  --   --   --   --   --   --   --  0.5 0.5   ALK PHOS U/L  --   --   --   --   --   --   --  108 123*   AST (SGOT) U/L  --   --   --   --   --   --   --  29 29   ALT (SGPT) U/L  --   --   --   --   --   --   --  18 21     ABG      No radiology results for the last day    Results for orders placed during the hospital encounter of 03/07/24    Adult Transesophageal Echo (SILVINA) W/ Cont if Necessary Per Protocol    Interpretation Summary    Left ventricular systolic function is normal. Left ventricular ejection fraction appears to be 61 - 65%.    Left ventricular diastolic function was normal.    The left atrial cavity is mildly dilated.    Saline test results are negative.    Scheduled Meds:busPIRone, 15 mg, Oral, BID  enoxaparin, 40 mg, Subcutaneous, Daily  folic acid, 1 mg, Oral, Daily  Lidocaine, 1 patch, Transdermal, Daily  multivitamin with minerals, 1 tablet, Oral, Daily  potassium chloride, 20 mEq, Oral, Daily  QUEtiapine, 50 mg, Oral, Nightly  sertraline, 100 mg, Oral, BID  sodium chloride, 10 mL, Intravenous, Q12H  sodium chloride, 1 g, Oral, Daily  thiamine, 100 mg, Oral, Daily  vancomycin, 1,250 mg, Intravenous, Q12H      Continuous Infusions:Pharmacy to dose vancomycin,       PRN Meds:  acetaminophen    albuterol    Calcium Replacement - Follow Nurse / BPA Driven Protocol    HYDROcodone-acetaminophen    loperamide    Magnesium Standard Dose Replacement - Follow Nurse / BPA Driven Protocol    melatonin    Pharmacy to dose vancomycin    Phosphorus Replacement - Follow Nurse / BPA  Driven Protocol    Potassium Replacement - Follow Nurse / BPA Driven Protocol    [COMPLETED] Insert Peripheral IV **AND** sodium chloride    sodium chloride    sodium chloride    Assessment & Plan   Assessment and Plan:         Myalgia    ASSESSMENT:  Acute kidney injury likely secondary to volume and low blood pressure  Hyponatremia may be secondary to alcohol intake follow-up with a urine studies   Metabolic acidosis may be due to his respiratory alkalosis because of some history of cirrhosis  Hypocalcemia due to poor nutritional status check phosphorus level tomorrow morning check magnesium level tomorrow morning    PLAN :     Sodium level is stable from  yesterday no labs done in last 24 hours   Need to improve nutrition instead of hydration  that will help with electrolytes  supplement potassium as needed we   DC sodium chloride pills  f/u with Na may need fluid restriction   Renal function has improved  Clinically looks euvolemic, multifactorial hyponatremia, increased ADH, history of EtOH, low solute load  Remains on Zoloft, if possible reduced dose  SILVINA Monday  While acidosis likely related to underlying alkalosis  Mild hypocalcemia noted  corrected calcium is normal    Will continue to follow            Electronically signed by Red Quiroga MD,   [facility] kidney consultant  [phone]  3/14/2024  11:14 EDT    Electronically signed by Red Quiroga MD at 03/14/24 1116       Sarah oRss APRN at 03/13/24 1422       Attestation signed by Pilar Foster MD at 03/15/24 1006    I have reviewed this documentation and agree.                  Infectious Diseases Progress Note      LOS: 5 days   Patient Care Team:  Norma Saul APRN as PCP - General (Nurse Practitioner)  Flory Villanueva MD (Physical Medicine and Rehabilitation)    Chief Complaint: Back pain, fever    Subjective       The patient has been afebrile for the last 24 hours.  The patient is on room air, hemodynamically stable, and  is tolerating antimicrobial therapy..  Patient still having severe lower back pain and leg pain.  Patient status post SILVINA yesterday that was reported as negative for vegetation      Review of Systems:   Review of Systems   Constitutional: Negative.    HENT: Negative.     Eyes: Negative.    Respiratory: Negative.     Cardiovascular: Negative.    Gastrointestinal: Negative.    Endocrine: Negative.    Genitourinary: Negative.    Musculoskeletal:  Positive for back pain and neck pain.        Bilateral leg pain   Skin: Negative.    Neurological: Negative.    Psychiatric/Behavioral: Negative.     All other systems reviewed and are negative.       Objective     Vital Signs  Temp:  [97.8 °F (36.6 °C)-100 °F (37.8 °C)] 97.9 °F (36.6 °C)  Heart Rate:  [72-83] 74  Resp:  [18-21] 20  BP: (101-115)/(45-62) 106/62    Physical Exam:  Physical Exam  Vitals and nursing note reviewed.   Constitutional:       General: She is not in acute distress.     Appearance: She is well-developed and normal weight. She is ill-appearing. She is not diaphoretic.   HENT:      Head: Normocephalic and atraumatic.   Eyes:      General: No scleral icterus.     Extraocular Movements: Extraocular movements intact.      Conjunctiva/sclera: Conjunctivae normal.      Pupils: Pupils are equal, round, and reactive to light.   Cardiovascular:      Rate and Rhythm: Normal rate and regular rhythm.      Heart sounds: Normal heart sounds, S1 normal and S2 normal. No murmur heard.  Pulmonary:      Effort: Pulmonary effort is normal. No respiratory distress.      Breath sounds: Normal breath sounds. No stridor. No wheezing or rales.   Chest:      Chest wall: No tenderness.   Abdominal:      General: Bowel sounds are normal. There is no distension.      Palpations: Abdomen is soft. There is no mass.      Tenderness: There is no abdominal tenderness. There is no guarding.   Musculoskeletal:         General: No swelling, tenderness or deformity. Normal range of motion.       Cervical back: Neck supple.      Comments: Tenderness with the movement of all joints including hips and shoulders but there is no obvious joint effusion     Tenderness at the lower back and the upper back      Skin:     General: Skin is warm and dry.      Coloration: Skin is not pale.      Findings: No bruising, erythema or rash.   Neurological:      General: No focal deficit present.      Mental Status: She is alert and oriented to person, place, and time. Mental status is at baseline.      Cranial Nerves: No cranial nerve deficit.   Psychiatric:         Mood and Affect: Mood normal.          Results Review:    I have reviewed all clinical data, test, lab, and imaging results.     Radiology  No Radiology Exams Resulted Within Past 24 Hours    Cardiology    Laboratory    Results from last 7 days   Lab Units 03/12/24  2219 03/12/24  0407 03/11/24  0505 03/10/24  0621 03/08/24  2338 03/08/24  0752 03/07/24  0920   WBC 10*3/mm3 8.60 6.80 6.40 6.20 6.50 6.50 8.70   HEMOGLOBIN g/dL 7.9* 7.8* 8.0* 8.1* 8.1* 9.4* 10.6*   HEMATOCRIT % 24.8* 24.0* 24.5* 25.4* 25.2* 29.0* 32.0*   PLATELETS 10*3/mm3 142 113* 99* 83* 64* 70* 76*     Results from last 7 days   Lab Units 03/13/24  1324 03/12/24 2219 03/12/24  0407 03/11/24  0505 03/10/24  0621 03/09/24  0948 03/08/24  2338 03/08/24  0752 03/07/24  1757 03/07/24  0920   SODIUM mmol/L  --  135* 131* 129* 129* 120* 124* 128*   < > 127*   POTASSIUM mmol/L 4.5 3.6 3.7 3.6 4.3 4.0 3.2* 3.7   < > 4.3   CHLORIDE mmol/L  --  105 102 99 100 92* 93* 95*   < > 92*   CO2 mmol/L  --  17.0* 19.0* 18.0* 18.0* 17.0* 19.0* 20.0*   < > 18.0*   BUN mg/dL  --  11 11 11 14 14 15 17   < > 20   CREATININE mg/dL  --  0.58 0.58 0.66 0.75 0.81 0.82 0.92   < > 1.22*   GLUCOSE mg/dL  --  112* 79 110* 77 111* 97 108*   < > 99   ALBUMIN g/dL  --   --   --   --   --   --  2.8* 3.1*  --  3.8   BILIRUBIN mg/dL  --   --   --   --   --   --  0.5 0.5  --  0.7   ALK PHOS U/L  --   --   --   --   --   --  108  123*  --  153*   AST (SGOT) U/L  --   --   --   --   --   --  29 29  --  38*   ALT (SGPT) U/L  --   --   --   --   --   --  18 21  --  26   AMMONIA umol/L  --   --   --   --   --   --   --   --   --  16   CALCIUM mg/dL  --  8.2* 8.2* 8.2* 8.2* 8.1* 8.2* 8.3*   < > 9.2    < > = values in this interval not displayed.     Results from last 7 days   Lab Units 03/07/24  0920   CK TOTAL U/L 74     Results from last 7 days   Lab Units 03/08/24  2338   SED RATE mm/hr 59*         Microbiology   Microbiology Results (last 10 days)       Procedure Component Value - Date/Time    Clostridioides difficile EIA - Stool, Per Rectum [[id number]]  (Normal) Collected: 03/08/24 2327    Lab Status: Final result Specimen: Stool from Per Rectum Updated: 03/09/24 0745     C Diff GDH Ag Negative     C.diff Toxin Ag Negative    Narrative:      The result indicates the absence of toxigenic C.difficile from stool specimen.    Blood Culture - Blood, Arm, Right [[id number]]  (Normal) Collected: 03/08/24 1643    Lab Status: Preliminary result Specimen: Blood from Arm, Right Updated: 03/12/24 1815     Blood Culture No growth at 4 days    COVID-19, FLU A/B, RSV PCR 1 HR TAT - Swab, Nasopharynx [[id number]]  (Normal) Collected: 03/07/24 1526    Lab Status: Final result Specimen: Swab from Nasopharynx Updated: 03/07/24 1607     COVID19 Not Detected     Influenza A PCR Not Detected     Influenza B PCR Not Detected     RSV, PCR Not Detected    Narrative:      Fact sheet for providers: https://www.fda.gov/media/687897/download    Fact sheet for patients: https://www.fda.gov/media/471663/download    Test performed by PCR.    Blood Culture - Blood, Arm, Left [[id number]]  (Abnormal) Collected: 03/07/24 0933    Lab Status: Final result Specimen: Blood from Arm, Left Updated: 03/10/24 0657     Blood Culture Staphylococcus aureus, MRSA     Comment:   Infectious disease consultation is highly recommended to rule out distant foci of infection.  Methicillin  resistant Staphylococcus aureus, Patient may be an isolation risk.        Isolated from Aerobic and Anaerobic Bottles     Gram Stain Aerobic Bottle Gram positive cocci in clusters      Anaerobic Bottle Gram positive cocci in clusters    Narrative:      Refer to previous blood culture collected on  03/07/2024 at 0920 for MICs.    Blood Culture - Blood, Arm, Right [[id number]]  (Abnormal)  (Susceptibility) Collected: 03/07/24 0920    Lab Status: Final result Specimen: Blood from Arm, Right Updated: 03/10/24 0657     Blood Culture Staphylococcus aureus, MRSA     Comment:   Infectious disease consultation is highly recommended to rule out distant foci of infection.  Methicillin resistant Staphylococcus aureus, Patient may be an isolation risk.        Isolated from Aerobic and Anaerobic Bottles     Gram Stain Anaerobic Bottle Gram positive cocci in clusters      Aerobic Bottle Gram positive cocci in clusters    Susceptibility        Staphylococcus aureus, MRSA      SOFIA      Gentamicin Susceptible      Oxacillin Resistant      Rifampin Susceptible      Vancomycin Susceptible                           Blood Culture ID, PCR - Blood, Arm, Right [[id number]]  (Abnormal) Collected: 03/07/24 0920    Lab Status: Final result Specimen: Blood from Arm, Right Updated: 03/08/24 0433     BCID, PCR Staph aureus. mecA/C and MREJ (methicillin resistance gene) detected. Identification by BCID2 PCR.     BOTTLE TYPE Anaerobic Bottle    Narrative:      Infectious disease consultation is highly recommended to rule out distant foci of infection.            Medication Review:       Schedule Meds  busPIRone, 15 mg, Oral, BID  enoxaparin, 40 mg, Subcutaneous, Daily  folic acid, 1 mg, Oral, Daily  Lidocaine, 1 patch, Transdermal, Daily  multivitamin with minerals, 1 tablet, Oral, Daily  [START ON 3/14/2024] potassium chloride, 20 mEq, Oral, Daily  QUEtiapine, 50 mg, Oral, Nightly  sertraline, 100 mg, Oral, BID  sodium chloride, 10 mL, Intravenous,  Q12H  [START ON 3/14/2024] sodium chloride, 1 g, Oral, Daily  thiamine, 100 mg, Oral, Daily  thiamine, 100 mg, Oral, Daily  vancomycin, 1,250 mg, Intravenous, Q12H        Infusion Meds  Pharmacy to dose vancomycin,         PRN Meds    acetaminophen    albuterol    Calcium Replacement - Follow Nurse / BPA Driven Protocol    HYDROcodone-acetaminophen    loperamide    Magnesium Standard Dose Replacement - Follow Nurse / BPA Driven Protocol    melatonin    Pharmacy to dose vancomycin    Phosphorus Replacement - Follow Nurse / BPA Driven Protocol    Potassium Replacement - Follow Nurse / BPA Driven Protocol    [COMPLETED] Insert Peripheral IV **AND** sodium chloride    sodium chloride    sodium chloride        Assessment & Plan       Antimicrobial Therapy   1.  IV vancomycin        2.        3.        4.        5.          Assessment     Methicillin resistant Staphylococcus aureus bacteremia.  Likely source is lumbar discitis/osteomyelitis.  Endocarditis also needs to be ruled out.  Patient denies using IV drugs.  Repeat blood culture from 3/8/2024 is negative so far.  2D echo did not show any vegetation.  SILVINA performed on 3/12/2024 with cardiology notes mentioning no vegetation    L2-L3 discitis/osteomyelitis.  Patient continues to have fairly severe back pain with bilateral leg pain MRI of the lumbar spine showed enhancement of the epidural fluid with moderate canal stenosis.  Neurosurgery did not feel that patient was a surgical candidate at this time     History of liver cirrhosis secondary to alcohol and hemochromatosis     History of alcohol abuse and tobacco abuse     S/p left total hip replacement secondary to hip fracture in 2017 or 2018.  Patient denied having infection after the procedure    Reported diarrhea at admission.  C. difficile screen was negative     Plan     Continue IV vancomycin-keep trough between 15 and 20.  Patient will need 8 weeks of treatment from last negative blood culture on 3/8/2024.   Last day on 2024  Patient will need a PICC line before discharge-please remove when IV antibiotics are completed  Recommend repeat MRI of the lumbar spine after antimicrobial therapy is completed  Continue supportive care  Okay to discharge from ID standpoint once IV antibiotics are arranged  Continued alcohol and tobacco abuse cessation    Please fax all post discharge lab results, imaging studies and correspondence to this fax number [phone]  For any question or concern please contact our service number [phone]    SHARIF Mae  24  14:22 EDT    Note is dictated utilizing voice recognition software/Dragon    Electronically signed by Pilar Foster MD at 03/15/24 1008       Red Quiroga MD at 24 1215              PROGRESS NOTE      Patient Name: Lita Yu  : 1965  MRN: [id number]  Primary Care Physician: Norma Saul APRN  Date of admission: 3/7/2024    Patient Care Team:  Norma Saul APRN as PCP - General (Nurse Practitioner)  Flory Villanueva MD (Physical Medicine and Rehabilitation)        Subjective   Subjective:   Patient seen and examined this morning, she denies any new complaints.  Review of systems:  All other review of system unremarkable      Allergies:    Allergies   Allergen Reactions    Sulfa Antibiotics Hives    Keflex [Cephalexin] Hives       Objective   Exam:     Vital Signs  Temp:  [97.8 °F (36.6 °C)-100 °F (37.8 °C)] 97.9 °F (36.6 °C)  Heart Rate:  [72-83] 72  Resp:  [18-25] 20  BP: ()/(44-69) 106/62  SpO2:  [91 %-100 %] 95 %  on  Flow (L/min):  [10-15] 10;   Device (Oxygen Therapy): room air  Body mass index is 23.91 kg/m².    General: Middle-age female in no acute distress.    Head:      Normocephalic and atraumatic.    Eyes:      PERRL/EOM intact, conjunctiva and sclera clear with out nystagmus.    Neck:      No masses, thyromegaly,  trachea central with normal respiratory effort   Lungs:    Clear  bilaterally to auscultation.    Heart:      Regular rate and rhythm, no murmur no gallop  Abd:        Soft, nontender, not distended, bowel sounds positive, no shifting dullness   Pulses:   Pulses palpable  Extr:        No cyanosis or clubbing--no significant edema.    Neuro:    No focal deficits.   alert oriented x3  Skin:       Intact without lesions or rashes.    Psych:    Alert and cooperative; normal mood and affect; .      Results Review:  I have personally reviewed most recent Data :  CBC    Results from last 7 days   Lab Units 03/12/24 2219 03/12/24 0407 03/11/24  0505 03/10/24  0621 03/08/24  2338 03/08/24  0752 03/07/24  0920   WBC 10*3/mm3 8.60 6.80 6.40 6.20 6.50 6.50 8.70   HEMOGLOBIN g/dL 7.9* 7.8* 8.0* 8.1* 8.1* 9.4* 10.6*   PLATELETS 10*3/mm3 142 113* 99* 83* 64* 70* 76*     CMP   Results from last 7 days   Lab Units 03/12/24 2219 03/12/24  0407 03/11/24  0505 03/10/24  0621 03/09/24  0948 03/08/24  2338 03/08/24  0752 03/07/24  1757 03/07/24  0920   SODIUM mmol/L 135* 131* 129* 129* 120* 124* 128*   < > 127*   POTASSIUM mmol/L 3.6 3.7 3.6 4.3 4.0 3.2* 3.7   < > 4.3   CHLORIDE mmol/L 105 102 99 100 92* 93* 95*   < > 92*   CO2 mmol/L 17.0* 19.0* 18.0* 18.0* 17.0* 19.0* 20.0*   < > 18.0*   BUN mg/dL 11 11 11 14 14 15 17   < > 20   CREATININE mg/dL 0.58 0.58 0.66 0.75 0.81 0.82 0.92   < > 1.22*   GLUCOSE mg/dL 112* 79 110* 77 111* 97 108*   < > 99   ALBUMIN g/dL  --   --   --   --   --  2.8* 3.1*  --  3.8   BILIRUBIN mg/dL  --   --   --   --   --  0.5 0.5  --  0.7   ALK PHOS U/L  --   --   --   --   --  108 123*  --  153*   AST (SGOT) U/L  --   --   --   --   --  29 29  --  38*   ALT (SGPT) U/L  --   --   --   --   --  18 21  --  26   AMMONIA umol/L  --   --   --   --   --   --   --   --  16    < > = values in this interval not displayed.     ABG      No radiology results for the last day    Results for orders placed during the hospital encounter of 03/07/24    Adult Transthoracic Echo Complete w/  Color, Spectral and Contrast if Necessary Per Protocol    Interpretation Summary    Left ventricular systolic function is normal. Left ventricular ejection fraction appears to be 56 - 60%.    Left ventricular diastolic function was normal.    Estimated right ventricular systolic pressure from tricuspid regurgitation is normal (<35 mmHg).    Scheduled Meds:busPIRone, 15 mg, Oral, BID  enoxaparin, 40 mg, Subcutaneous, Daily  folic acid, 1 mg, Oral, Daily  Lidocaine, 1 patch, Transdermal, Daily  multivitamin with minerals, 1 tablet, Oral, Daily  [START ON 3/14/2024] potassium chloride, 20 mEq, Oral, Daily  QUEtiapine, 50 mg, Oral, Nightly  sertraline, 100 mg, Oral, BID  sodium chloride, 10 mL, Intravenous, Q12H  sodium chloride, 1 g, Oral, BID With Meals  thiamine, 100 mg, Oral, Daily  thiamine, 100 mg, Oral, Daily  vancomycin, 1,250 mg, Intravenous, Q12H      Continuous Infusions:Pharmacy to dose vancomycin,       PRN Meds:  acetaminophen    albuterol    Calcium Replacement - Follow Nurse / BPA Driven Protocol    HYDROcodone-acetaminophen    loperamide    Magnesium Standard Dose Replacement - Follow Nurse / BPA Driven Protocol    melatonin    Pharmacy to dose vancomycin    Phosphorus Replacement - Follow Nurse / BPA Driven Protocol    Potassium Replacement - Follow Nurse / BPA Driven Protocol    [COMPLETED] Insert Peripheral IV **AND** sodium chloride    sodium chloride    sodium chloride    Assessment & Plan   Assessment and Plan:         Myalgia    ASSESSMENT:  Acute kidney injury likely secondary to volume and low blood pressure  Hyponatremia may be secondary to alcohol intake follow-up with a urine studies   Metabolic acidosis may be due to his respiratory alkalosis because of some history of cirrhosis  Hypocalcemia due to poor nutritional status check phosphorus level tomorrow morning check magnesium level tomorrow morning    PLAN :     Sodium level is was better yesterday no labs done in last 24 hours   okay to  encourage p.o. intake that will help with electrolytes  supplement potassium as needed we   will change sodium chloride pill to 1 pill a day check labs tomorrow morning  Renal function has improved  Clinically looks euvolemic, multifactorial hyponatremia, increased ADH, history of EtOH, low solute load  Remains on Zoloft, if possible reduced dose  SILVINA Monday  While acidosis likely related to underlying alkalosis  Mild hypocalcemia noted  corrected calcium is normal    Will continue to follow            Electronically signed by Red Quiroga MD,   [facility] kidney consultant  [phone]  3/13/2024  12:15 EDT    Electronically signed by Red Quiroga MD at 24 1217       Edgar Oliva MD at 24 105          Hospitalist Service   Daily Progress Note      Patient Name: Lita Yu  : 1965  MRN: [id number]  Primary Care Physician:  Norma Saul APRN  Date of admission: 3/7/2024      Subjective      Chief Complaint: Generalized weakness    Patient seen and examined this morning.  Feeling better this morning, still with back pain but controlled for now.  MRI results discussed with her, neurosurgery evaluation pending today.  Possible SILVINA as well today per cardiology.  No other complaints.    Pertinent positives as noted in HPI/subjective.  All other systems were reviewed and are negative.  3/12  Patient lethargic but in no acute distress  Patient with MRSA bacteremia on vancomycin  Followed up per ID and neurosurgery and nephrology and cardiology  Patient with L2-L3 discitis-osteomyelitis continues to have severe back pain with bilateral leg pain  MRI of the lumbar spine showed enhancement of the epidural fluid with moderate canal stenosis  Patient was not A surgical candidate as per neurosurgery  Patient with history of liver cirrhosis alcohol induced   Status post left total hip replacement secondary to hip fracture in 2017  Continue IV antibiotic with vancomycin and  follow-up per ID  She will need 6 weeks of IV antibiotic  SILVINA scheduled for today  Patient will probably need skilled nursing facility for 30 days or less on discharge    3/13  Patient had stress test which was negative  SILVINA done yesterday I cannot find the results yet  Transthoracic echo was done on March 9  Patient with MRSA bacteremia followed up per infectious disease on IV antibiotic    Objective      Vitals:   Temp:  [97.8 °F (36.6 °C)-100 °F (37.8 °C)] 97.8 °F (36.6 °C)  Heart Rate:  [73-83] 78  Resp:  [18-25] 21  BP: ()/(44-69) 101/45  Flow (L/min):  [10-15] 10    Physical Exam:    General: Awake, alert, chronically ill-appearing female, lying in bed, NAD  Eyes: PERRL, EOMI, conjunctivae are clear  Cardiovascular: Regular rate and rhythm, no murmurs  Respiratory: Clear to auscultation bilaterally, no wheezing or rales, unlabored breathing  Abdomen: Soft, nontender, positive bowel sounds, no guarding  Neurologic: A&O, CN grossly intact, moves all extremities spontaneously  Musculoskeletal: Generalized weakness, no other gross deformities  Skin: Warm, dry         Result Review    Result Review:  I have personally reviewed the results from the time of this admission to 3/13/2024 10:57 EDT and agree with these findings:  [x]  Laboratory  [x]  Microbiology  []  Radiology  []  EKG/Telemetry   []  Cardiology/Vascular   []  Pathology  []  Old records  []  Other:    Wounds (Last 24 Hours)       LDA Wound       Row Name 03/13/24 0859             Wound Bilateral perineum MASD (Moisture associated skin damage)    Wound - Properties Group Present on Original Admission: Y  -DI Side: Bilateral  -DI Location: perineum  -DI Primary Wound Type: MASD  -DI    Closure Unable to assess  -SR      Retired Wound - Properties Group Present on Original Admission: Y  -DI Side: Bilateral  -DI Location: perineum  -DI Primary Wound Type: MASD  -DI    Retired Wound - Properties Group Present on Original Admission: Y  -DI Side:  Bilateral  -DI Location: perineum  -DI Primary Wound Type: MASD  -DI              User Key  (r) = Recorded By, (t) = Taken By, (c) = Cosigned By      Initials Name Provider Type    Chanell Joseph, RN Registered Nurse    Antonella Mulligan RN Registered Nurse                      Assessment & Plan      Brief Patient Summary:  Lita Yu is a 58 y.o. 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  Noted to have hyponatremia, hypocalcemia, hypokalemia, and SARITA on admission.  Nephrology consulted for further management.  Blood cultures collected on admission positive for MRSA, ID consulted for further evaluation.      busPIRone, 15 mg, Oral, BID  enoxaparin, 40 mg, Subcutaneous, Daily  folic acid, 1 mg, Oral, Daily  Lidocaine, 1 patch, Transdermal, Daily  multivitamin with minerals, 1 tablet, Oral, Daily  [START ON 3/14/2024] potassium chloride, 20 mEq, Oral, Daily  QUEtiapine, 50 mg, Oral, Nightly  sertraline, 100 mg, Oral, BID  sodium chloride, 10 mL, Intravenous, Q12H  sodium chloride, 1 g, Oral, BID With Meals  thiamine, 100 mg, Oral, Daily  thiamine, 100 mg, Oral, Daily  vancomycin, 1,250 mg, Intravenous, Q12H       Pharmacy to dose vancomycin,          I have utilized all available, immediate resources to obtain, update, or review the patient's current medications including all prescriptions, over-the-counter products, herbals, cannabis/cannabidiol products, and vitamin.mineral/dietary (nutritional) supplements.    Active Hospital Problems:  Active Hospital Problems    Diagnosis     **Myalgia        Assessment/Plan:     MRSA bacteremia  L2-L3 discitis/osteomyelitis  Sepsis, POA  -Blood cultures positive for MRSA, likely source is lumbar discitis/osteomyelitis  -MRI spine findings noted to have L2-L3  discitis/osteomyelitis  -Continue IV vancomycin, ID following  -Cardiology on board for SILVINA  -Neurosurgery consulted    SARITA  Hyponatremia  Hypokalemia  Hypocalcemia  -Likely prerenal and due to alcohol abuse and poor p.o. intake  -Sodium being managed per nephrology, salt tabs added  -Replace potassium and calcium and monitor  -Nephrology following    Liver cirrhosis  -Likely alcohol related  -Does not appear to have decompensation at this time  -Continue supportive care and outpatient follow-up    Anemia of chronic disease  -Hemoglobin stable at this time  -Monitor and transfuse as needed    Tobacco abuse  -40-pack-year smoking history  -Counseled on cessation  -Nicotine patch if needed    DVT prophylaxis  -Lovenox      CODE STATUS:    Code Status (Patient has no pulse and is not breathing): CPR (Attempt to Resuscitate)  Medical Interventions (Patient has pulse or is breathing): Full Support      Disposition: Pending clinical course    Electronically signed by Edgar Oliva MD, 03/13/24, 10:57 EDT.  [facility] Hospitalist Team      Part of this note may be an electronic transcription/translation of spoken language to printed text using the Dragon Dictation System.      Electronically signed by Edgar Oliva MD at 03/13/24 1058       Medical Student Notes (last 48 hours)  Notes from 03/13/24 1033 through 03/15/24 1033   No notes of this type exist for this encounter.       Consult Notes (last 48 hours)  Notes from 03/13/24 1034 through 03/15/24 1034   No notes of this type exist for this encounter.          Nutrition Notes (most recent note)        Emmie Pizano RD at 03/15/24 0731          Nutrition Services    Patient Name: Lita Yu  YOB: 1965  MRN: [id number]  Admission date: 3/7/2024    Comment:    Moderate chronic disease related malnutrition related to suspected chronic suboptimal intake in the setting of ETOH abuse and complicated clinical course as  evidenced by po intake meeting < 75% of est energy needs for > 1 month and evidence of moderate muscle and fat wasting per NFPE.      Add Chocolate Boost Plus BID (Provides 720 kcals, 28 g protein if consumed)   ** Boost Plus BID may be substituted with Boost Glucose Control BID (Provides 380 kcals, 32 g protein if consumed) due to national ONS supply shortages    CLINICAL NUTRITION ASSESSMENT      Reason for Assessment 3/15 Nutrition assessment and POC related to LOS x 8 days.      H&P      Past Medical History:   Diagnosis Date    Cirrhosis     COPD (chronic obstructive pulmonary disease)     Depression     GERD (gastroesophageal reflux disease)     Hyperlipidemia     Hypertension     PTSD (post-traumatic stress disorder)        Past Surgical History:   Procedure Laterality Date     SECTION N/A     COLONOSCOPY N/A 2021    Procedure: COLONOSCOPY with polypectomy x2 and random biopsy;  Surgeon: Osman Clay MD;  Location: [facility] ENDOSCOPY;  Service: Gastroenterology;  Laterality: N/A;  colon polyps    DENTAL PROCEDURE      ENDOSCOPY N/A 2021    Procedure: ESOPHAGOGASTRODUODENOSCOPY;  Surgeon: Osman Clay MD;  Location: [facility] ENDOSCOPY;  Service: Gastroenterology;  Laterality: N/A;  esophagitis    JOINT REPLACEMENT      REPLACEMENT TOTAL HIP LATERAL POSITION Left     TONSILLECTOMY      VAGINAL BIRTH AFTER  SECTION          Current Problems   Myalgia  -muscle aches mostly in thighs and arms upon admit  -3/14 severe lower back and leg pain reported   -L2-L3 discitis - osteomyelitis  -difficult ambulation    SARITA  -likely related to volume and low blood pressure/sepsis  -Nephrology following    MRSA bacteremia  -on vancomycin  -Infectious disease following    Hx of alcohol abuse, alcohol induced liver cirrhosis, COPD, GERD, hyperlipidemia, HTN, PTSD       Encounter Information        Trending Narrative     3/15 Pt presented to ED on 3/7 with complaints of muscle aches and myalgia-  "generalized weakness. RD visited pt at bedside, where pt was sitting up in bed.  Pt reports that her appetite is \"not good\" due to ongoing stomach pain. Pt reports that she is having diarrhea with last BM this am.  Pt denies N/V at this time. Pt states that she has consumed ONS in the past and is willing/desires chocolate Boost at this time.  RD to add Chocolate Boost Plus BID. Pt reports that her appetite has been poor for >1 month.   NFPE completed, consistent with nutrition diagnosis of malnutrition using AND/ASPEN criteria. See MSA below.        Anthropometrics        Current Height, Weight Height: 160 cm (63\")  Weight: 61.2 kg (135 lb) (03/12/24 1208)       Usual Body Weight (UBW) Pt is unsure. Pt reported current weight as 135#       Trending Weight Hx     This admission: 3/15 135# (Last weight 3/12)             PTA: Weight is consistent with weight trends documented in the last 9 months.  Some fluctuation most likely related to fluid status changes.     Wt Readings from Last 30 Encounters:   03/12/24 1208 61.2 kg (135 lb)   03/09/24 0505 61.2 kg (135 lb)   03/07/24 0851 61.2 kg (135 lb)   03/02/24 1622 61.2 kg (135 lb)   03/02/24 0651 61.2 kg (135 lb)   02/21/24 1256 60.8 kg (134 lb)   01/10/24 1325 59 kg (130 lb)   10/10/23 1344 59.4 kg (131 lb)   09/08/23 1109 65.8 kg (145 lb)   07/10/23 1021 59.4 kg (131 lb)   06/12/23 0548 61 kg (134 lb 7.7 oz)   06/11/23 2236 59.2 kg (130 lb 9.6 oz)   06/11/23 1854 58.8 kg (129 lb 10.1 oz)   10/31/22 1016 50.3 kg (111 lb)   10/21/22 0441 51.2 kg (112 lb 14 oz)   10/20/22 0424 52.9 kg (116 lb 10 oz)   10/19/22 0507 53.1 kg (117 lb 1 oz)   10/17/22 0334 58.4 kg (128 lb 12 oz)   10/16/22 0532 58.4 kg (128 lb 12 oz)   10/15/22 0203 58.3 kg (128 lb 8.5 oz)   10/14/22 2008 61.2 kg (135 lb)   09/19/22 1446 46.7 kg (103 lb)   08/17/22 1057 50.8 kg (112 lb)   08/05/22 0323 57.8 kg (127 lb 6.4 oz)   08/03/22 2028 53.9 kg (118 lb 12.8 oz)   08/03/22 1523 54.1 kg (119 lb 4.3 oz) "   08/03/22 1401 54 kg (119 lb)   07/18/22 1523 59.9 kg (132 lb)   12/16/21 1418 60.1 kg (132 lb 6.4 oz)   12/14/21 1548 61.2 kg (135 lb)   12/14/21 1422 60.8 kg (134 lb)   11/26/21 1802 64 kg (141 lb 1.5 oz)   10/07/21 1258 60.8 kg (134 lb)   09/07/21 1258 59.9 kg (132 lb)   08/31/21 0742 56.6 kg (124 lb 12.5 oz)   08/20/21 1618 56.7 kg (125 lb)   07/15/21 1259 55.8 kg (123 lb)   06/14/21 1303 52.2 kg (115 lb)   03/29/21 1405 58.5 kg (129 lb)   02/19/21 1406 57.6 kg (127 lb)   02/05/21 1115 63.5 kg (140 lb)   05/12/20 1038 52.6 kg (116 lb)   03/25/20 1056 53.9 kg (118 lb 13.3 oz)   07/26/19 1231 52.3 kg (115 lb 3.2 oz)      BMI kg/m2 Body mass index is 23.91 kg/m².       Labs        Pertinent Labs Reviewed.  Hyponatremia/hypocalcemia - management per nephrology   Results from last 7 days   Lab Units 03/15/24  0444 03/14/24  0048 03/13/24  1324 03/12/24  2219 03/09/24  0948 03/08/24  2338 03/08/24  0752   SODIUM mmol/L 133* 134*  --  135*   < > 124* 128*   POTASSIUM mmol/L 3.9 4.2 4.5 3.6   < > 3.2* 3.7   CHLORIDE mmol/L 102 105  --  105   < > 93* 95*   CO2 mmol/L 19.0* 17.0*  --  17.0*   < > 19.0* 20.0*   BUN mg/dL 5* 8  --  11   < > 15 17   CREATININE mg/dL 0.51* 0.52*  --  0.58   < > 0.82 0.92   CALCIUM mg/dL 8.1* 8.2*  --  8.2*   < > 8.2* 8.3*   BILIRUBIN mg/dL  --   --   --   --   --  0.5 0.5   ALK PHOS U/L  --   --   --   --   --  108 123*   ALT (SGPT) U/L  --   --   --   --   --  18 21   AST (SGOT) U/L  --   --   --   --   --  29 29   GLUCOSE mg/dL 94 84  --  112*   < > 97 108*    < > = values in this interval not displayed.     Results from last 7 days   Lab Units 03/15/24  0444 03/08/24  2338 03/08/24  0752   MAGNESIUM mg/dL  --   --  1.7   PHOSPHORUS mg/dL  --   --  3.0   HEMOGLOBIN g/dL 7.2*   < > 9.4*   HEMATOCRIT % 22.8*   < > 29.0*    < > = values in this interval not displayed.     Lab Results   Component Value Date    HGBA1C 5.00 06/12/2023        Medications    Scheduled Medications busPIRone, 15 mg,  Oral, BID  enoxaparin, 40 mg, Subcutaneous, Daily  folic acid, 1 mg, Oral, Daily  Lidocaine, 1 patch, Transdermal, Daily  multivitamin with minerals, 1 tablet, Oral, Daily  potassium chloride, 20 mEq, Oral, Daily  QUEtiapine, 50 mg, Oral, Nightly  sertraline, 100 mg, Oral, BID  sodium chloride, 10 mL, Intravenous, Q12H  thiamine, 100 mg, Oral, Daily  vancomycin, 1,250 mg, Intravenous, Q12H        Infusions Pharmacy to dose vancomycin,         PRN Medications   acetaminophen    albuterol    Calcium Replacement - Follow Nurse / BPA Driven Protocol    HYDROcodone-acetaminophen    loperamide    Magnesium Standard Dose Replacement - Follow Nurse / BPA Driven Protocol    melatonin    Pharmacy to dose vancomycin    Phosphorus Replacement - Follow Nurse / BPA Driven Protocol    Potassium Replacement - Follow Nurse / BPA Driven Protocol    [COMPLETED] Insert Peripheral IV **AND** sodium chloride    sodium chloride    sodium chloride     Physical Findings        Trending Physical   Appearance, NFPE 3/15 NFPE completed, consistent with nutrition diagnosis of moderate chronic disease related malnutrition using AND/ASPEN criteria. See MSA below.      --  Edema  No edema documented      Bowel Function Last documented Bm 3/14     Tubes No feeding tubes in place     Chewing/Swallowing No issues reported     Skin WOCN assessed 3/8 - No PI documented      --  Current Nutrition Orders & Evaluation of Intake       Oral Nutrition     Food Allergies NKFA   Current PO Diet Diet: Regular/House; Fluid Consistency: Thin (IDDSI 0)   Supplement None ordered    PO Evaluation     Trending % PO Intake 3/15 22% average intake x last 8 documented meals  Pt refusing meals at least once a day generally   --  Nutritional Risk Screening        NRS-2002 Score          Nutrition Diagnosis         Nutrition Dx Problem 1 Moderate chronic disease related malnutrition related to suspected chronic suboptimal intake in the setting of ETOH abuse and complicated  clinical course as evidenced by po intake meeting < 75% of est energy needs for > 1 month and evidence of moderate muscle and fat wasting per NFPE.        Nutrition Dx Problem 2      Intervention Goal         Intervention Goal(s) PO intake > 50% of meals  Tolerate and consume ONS        Nutrition Intervention        RD Action Continue to encourage good po intake    Add Chocolate Boost Plus BID (Provides 720 kcals, 28 g protein if consumed)   ** Boost Plus BID may be substituted with Boost Glucose Control BID (Provides 380 kcals, 32 g protein if consumed) due to national ONS supply shortages    NFPE completed      Nutrition Prescription          Diet Prescription Regular diet   Supplement Prescription Chocolate Boost Plus BID (Provides 720 kcals, 28 g protein if consumed)   ** Boost Plus BID may be substituted with Boost Glucose Control BID (Provides 380 kcals, 32 g protein if consumed) due to national ONS supply shortages   --  Monitor/Evaluation        Monitor Per protocol, I&O, PO intake, Supplement intake, Pertinent labs, Weight, Skin status, GI status, Symptoms, Swallow function, Hemodynamic stability     Malnutrition Severity Assessment      Patient meets criteria for : Moderate (non-severe) Malnutrition  Malnutrition Type (Last 8 Hours)       Malnutrition Severity Assessment       Row Name 03/15/24 0942       Malnutrition Severity Assessment    Malnutrition Type Chronic Disease - Related Malnutrition      Row Name 03/15/24 0942       Insufficient Energy Intake     Insufficient Energy Intake Findings Moderate    Insufficient Energy Intake  <75% of est. energy requirement for > or equal to 1 month      Row Name 03/15/24 0942       Muscle Loss    Loss of Muscle Mass Findings Moderate    [place] Moderate - slight depression    Clavicle Bone Region Moderate - some protrusion in females, visible in males    Acromion Bone Region Moderate - acromion may slightly protrude    Scapular Bone Region Moderate - mild  depression, bones may show slightly    Dorsal Hand Region Moderate - slight depression    Patellar Region Moderate - patella more prominent, less muscle definition around patella    Posterior Calf Region Moderate - some roundness, slight firmness      Row Name 03/15/24 0942       Fat Loss    Subcutaneous Fat Loss Findings Moderate    Orbital Region  Moderate -  somewhat hollowness, slightly dark circles    Upper Arm Region Moderate - some fat tissue, not ample      Row Name 03/15/24 0942       Criteria Met (Must meet criteria for severity in at least 2 of these categories: M Wasting, Fat Loss, Fluid, Secondary Signs, Wt. Status, Intake)    Patient meets criteria for  Moderate (non-severe) Malnutrition                           Electronically signed by:  Emmie Pizano RD  03/15/24 07:31 EDT      Electronically signed by Emmie Pizano RD at 03/15/24 0953       Speech Language Pathology Notes (most recent note)    No notes exist for this encounter.       Respiratory Therapy Notes (most recent note)    No notes exist for this encounter.     Michaela Briones, OT   Occupational Therapist  Occupational Therapy  Therapy Evaluation     Signed  Date of Service:  24  Creation Time:  24     Signed        Expand All Collapse All  Patient Name: Lita Yu                   : 1965                      MRN: [id number]                              Today's Date: 3/8/2024                                     Admit Date: 3/7/2024               Visit Dx:   Visit Diagnosis       ICD-10-CM ICD-9-CM   1. Weakness  R53.1 780.79   2. Unable to walk  R26.2 719.7         Problem List       Patient Active Problem List   Diagnosis    Postmenopausal state    Post traumatic stress disorder    Pain in joints    Osteoporosis    Macrocytic anemia    Lumbosacral radiculopathy    Leg pain, left    Peripheral vascular disease    Essential hypertension    Hyperlipidemia     Degenerative joint disease of left hip    Seizure disorder    Smoker    Status post hip replacement    Tobacco dependence syndrome    Vitamin B12 deficiency    Anemia of chronic disease    Chronic obstructive pulmonary disease    Cirrhosis of liver    Acute UTI (urinary tract infection)    Generalized weakness    Rhabdomyolysis    Non-traumatic rhabdomyolysis    Moderate malnutrition    Chest pain    Dehydration    Myalgia         Medical History        Past Medical History:   Diagnosis Date    Cirrhosis      COPD (chronic obstructive pulmonary disease)      Depression      GERD (gastroesophageal reflux disease)      Hyperlipidemia      Hypertension      PTSD (post-traumatic stress disorder)           Surgical History         Past Surgical History:   Procedure Laterality Date     SECTION N/A      COLONOSCOPY N/A 2021     Procedure: COLONOSCOPY with polypectomy x2 and random biopsy;  Surgeon: Osman Clay MD;  Location: [facility] ENDOSCOPY;  Service: Gastroenterology;  Laterality: N/A;  colon polyps    DENTAL PROCEDURE        ENDOSCOPY N/A 2021     Procedure: ESOPHAGOGASTRODUODENOSCOPY;  Surgeon: Osman Clay MD;  Location: [facility] ENDOSCOPY;  Service: Gastroenterology;  Laterality: N/A;  esophagitis    JOINT REPLACEMENT        REPLACEMENT TOTAL HIP LATERAL POSITION Left      TONSILLECTOMY        VAGINAL BIRTH AFTER  SECTION               General Information         Row Name 24 1728 24 1353            OT Time and Intention     Document Type evaluation  -SR evaluation  -SR     Mode of Treatment occupational therapy  -SR --        Row Name 24 1728 24 1353            Occupational Profile     Reason for Services/Referral (Occupational Profile) Lita Yu is a 58 y.o. female who is here with generalized muscle aches and myalgia.  Patient had previous history of rhabdomyolysis.  Found to have acute kidney injury likely secondary to volume and low blood  pressure.  She is currently living at home alone in [facility] with one step entry. At baseline, she is normally IND with household mobility and ADLs with use of SPC. Owns RW.  -SR Lita Yu is a 58 y.o. female who is here with generalized muscle aches and myalgia.  Patient had previous history of rhabdomyolysis.  Found to have acute kidney injury likely secondary to volume and low blood pressure. Pt lives at home and uses cane as needed.  -SR        Row Name 03/08/24 1728                 Living Environment     People in Home alone  -SR          Row Name 03/08/24 1728                 Cognition     Orientation Status (Cognition) oriented x 4  -SR          Row Name 03/08/24 1728                 Safety Issues, Functional Mobility     Impairments Affecting Function (Mobility) balance;endurance/activity tolerance;cognition;pain;strength  -SR                       User Key  (r) = Recorded By, (t) = Taken By, (c) = Cosigned By        Initials Name Provider Type     SR Michaela Briones, OT Occupational Therapist                                     Mobility/ADL's         Row Name 03/08/24 1729                 Bed Mobility     Bed Mobility bed mobility (all) activities  -SR       All Activities, [place] (Bed Mobility) supervision;moderate assist (50% patient effort)  -SR       Assistive Device (Bed Mobility) head of bed elevated  -SR       Comment, (Bed Mobility) Pt able to roll side to side with supervision to change brief, though once rolling to attempt to sit EOB she cried out due to pain and required mod assist.  -SR          Row Name 03/08/24 1729                 Sit-Stand Transfer     Sit-Stand [place] (Transfers) moderate assist (50% patient effort);1 person assist  -SR          Row Name 03/08/24 1729                 Activities of Daily Living     BADL Assessment/Intervention toileting  -SR          Row Name 03/08/24 1729                 Toileting Assessment/Training     [place] Level  (Toileting) dependent (less than 25% patient effort);change pad/brief  -SR                       User Key  (r) = Recorded By, (t) = Taken By, (c) = Cosigned By        Initials Name Provider Type     SR Michaela Briones OT Occupational Therapist                            Obj/Interventions         Row Name 03/08/24 1730                 Range of Motion Comprehensive     General Range of Motion no range of motion deficits identified  -SR       Comment, General Range of Motion Appears normal though guarded during therapy due to fear of pain  -SR          Row Name 03/08/24 1730                 Strength Comprehensive (MMT)     Comment, General Manual Muscle Testing (MMT) Assessment Did not MMT due to pain  -SR          Row Name 03/08/24 1730                 Balance     Balance Interventions sitting;standing;sit to stand;supported;static;dynamic;minimal challenge  -SR                       User Key  (r) = Recorded By, (t) = Taken By, (c) = Cosigned By        Initials Name Provider Type     SR Michaela Briones OT Occupational Therapist                            Goals/Plan         Row Name 03/08/24 1732                 Toileting Goal 1 (OT)     Activity/Device (Toileting Goal 1, OT) toileting skills, all  -SR       [place] Level/Cues Needed (Toileting Goal 1, OT) moderate assist (50-74% patient effort)  -SR       Time Frame (Toileting Goal 1, OT) 2 weeks  -SR          [facility] Name 03/08/24 1732                 Grooming Goal 1 (OT)     Activity/Device (Grooming Goal 1, OT) grooming skills, all  -SR       [place] (Grooming Goal 1, OT) contact guard required  -SR       Time Frame (Grooming Goal 1, OT) 2 weeks  -SR          Row Name 03/08/24 1732                 Therapy Assessment/Plan (OT)     Planned Therapy Interventions (OT) activity tolerance training;BADL retraining;IADL retraining;functional balance retraining;neuromuscular control/coordination retraining;ROM/therapeutic exercise;transfer/mobility  retraining;strengthening exercise;occupation/activity based interventions  -SR                    User Key  (r) = Recorded By, (t) = Taken By, (c) = Cosigned By        Initials Name Provider Type     SR Michaela Briones, OT Occupational Therapist                         Clinical Impression         Row Name 03/08/24 1731                 Pain Assessment     Pretreatment Pain Rating 9/10  -SR       Posttreatment Pain Rating 9/10  -SR          Row Name 03/08/24 1731                 Plan of Care Review     Outcome Evaluation Lita Yu is a 58 y.o. female who is here with generalized muscle aches and myalgia.  Patient had previous history of rhabdomyolysis.  Found to have acute kidney injury likely secondary to volume and low blood pressure.  She is currently living at home alone in [facility] with one step entry. At baseline, she is normally IND with household mobility and ADLs with use of SPC. Owns RW.  Pt able to roll side to side with supervision to change brief, though once rolling to attempt to sit EOB she cried out due to pain and required mod assist.  She is very reluctant to attempt to stand though once she agrees requires min to mod assist. Recommend SNF at discharge due to difficulty with all movement.  -SR          Row Name 03/08/24 1731                 Therapy Assessment/Plan (OT)     Rehab Potential (OT) good, to achieve stated therapy goals  -SR       Criteria for Skilled Therapeutic Interventions Met (OT) yes  -SR       Therapy Frequency (OT) 3 times/wk  -SR       Predicted Duration of Therapy Intervention (OT) Until discharge  -SR          Row Name 03/08/24 1731                 Therapy Plan Review/Discharge Plan (OT)     Anticipated Discharge Disposition (OT) skilled nursing facility  -SR          Row Name 03/08/24 1731                 Positioning and Restraints     Pre-Treatment Position in bed  -SR       Post Treatment Position bed  -SR       In Bed call light within reach;encouraged to call  for assist;exit alarm on  -SR                       User Key  (r) = Recorded By, (t) = Taken By, (c) = Cosigned By        Initials Name Provider Type     Michaela Quinones OT Occupational Therapist                            Outcome Measures         Row Name 03/08/24 1631 03/08/24 0800            How much help from another person do you currently need...     Turning from your back to your side while in flat bed without using bedrails? 2  -MB 2  -KS     Moving from lying on back to sitting on the side of a flat bed without bedrails? 2  -MB 2  -KS     Moving to and from a bed to a chair (including a wheelchair)? 2  -MB 2  -KS     Standing up from a chair using your arms (e.g., wheelchair, bedside chair)? 2  -MB 2  -KS     Climbing 3-5 steps with a railing? 1  -MB 2  -KS     To walk in hospital room? 1  -MB 2  -KS     AM-PAC 6 Clicks Score (PT) 10  -MB 12  -KS     Highest Level of Mobility Goal 4 --> Transfer to chair/commode  -MB 4 --> Transfer to chair/commode  -KS        Row Name 03/08/24 1733                 Functional Assessment     Outcome Measure Options AM-PAC 6 Clicks Daily Activity (OT)  -SR                       User Key  (r) = Recorded By, (t) = Taken By, (c) = Cosigned By        Initials Name Provider Type     Michaela Quinones OT Occupational Therapist     Vel Riddle, RN Registered Nurse     Ric Velazquez, PT Physical Therapist                             Occupational Therapy Education            Title: PT OT SLP Therapies (In Progress)         Topic: Occupational Therapy (In Progress)         Point: ADL training (In Progress)         Description:   Instruct learner(s) on proper safety adaptation and remediation techniques during self care or transfers.   Instruct in proper use of assistive devices.                       Learning Progress Summary               Patient Acceptance, E,TB, NR by SR at 3/8/2024 1733                               Point: Home exercise program (Not  Started)         Description:   Instruct learner(s) on appropriate technique for monitoring, assisting and/or progressing therapeutic exercises/activities.                       Learner Progress:  Not documented in this visit.                  Point: Precautions (Not Started)         Description:   Instruct learner(s) on prescribed precautions during self-care and functional transfers.                       Learner Progress:  Not documented in this visit.                  Point: Body mechanics (In Progress)         Description:   Instruct learner(s) on proper positioning and spine alignment during self-care, functional mobility activities and/or exercises.                       Learning Progress Summary               Patient Acceptance, E,TB, NR by SR at 3/8/2024 9166                                                   User Key         Initials Effective Dates Name Provider Type Discipline      06/16/21 -  Michaela Briones, OT Occupational Therapist OT                          OT Recommendation and Plan  Planned Therapy Interventions (OT): activity tolerance training, BADL retraining, IADL retraining, functional balance retraining, neuromuscular control/coordination retraining, ROM/therapeutic exercise, transfer/mobility retraining, strengthening exercise, occupation/activity based interventions  Therapy Frequency (OT): 3 times/wk  Plan of Care Review  Outcome Evaluation: Lita Yu is a 58 y.o. female who is here with generalized muscle aches and myalgia.  Patient had previous history of rhabdomyolysis.  Found to have acute kidney injury likely secondary to volume and low blood pressure.  She is currently living at home alone in [facility] with one step entry. At baseline, she is normally IND with household mobility and ADLs with use of SPC. Owns RW.  Pt able to roll side to side with supervision to change brief, though once rolling to attempt to sit EOB she cried out due to pain and required mod assist.   "She is very reluctant to attempt to stand though once she agrees requires min to mod assist. Recommend SNF at discharge due to difficulty with all movement.      Time Calculation:        Time Calculation- OT         Row Name 03/08/24 1733                       Time Calculation- OT     OT Start Time 1048  -SR         OT Stop Time 1105  -SR         OT Time Calculation (min) 17 min  -SR         Total Timed Code Minutes- OT 0 minute(s)  -SR         OT Received On 03/08/24  -SR         OT - Next Appointment 03/11/24  -SR         OT Goal Re-Cert Due Date 03/22/24  -SR                      User Key  (r) = Recorded By, (t) = Taken By, (c) = Cosigned By        Initials Name Provider Type     SR Michaela Briones, OT Occupational Therapist                       Therapy Charges for Today         Code Description Service Date Service Provider Modifiers Qty     [id number] HC OT EVAL MOD COMPLEXITY 4 3/8/2024 Michaela Briones, OT GO 1                   Michaela Briones, OT              3/8/2024                Tequila Mansfield OT   Occupational Therapist  Occupational Therapy  Therapy Treatment Note     Signed  Date of Service:  03/11/24 1519  Creation Time:  03/11/24 1519     Signed        Subjective: Pt agreeable to therapeutic plan of care.  Cognition: oriented to Person  Pt initially disoriented to place and situation, stating she was \"going to get dressed to go...\" Orients self without cuing, \"I'm in the hospital, I don't know what I was thinking. I got confused.\"  Objective:      Bed Mobility: Mod-A   Functional Transfers: Max-A and N/A or Not attempted.     Balance: unsupported sitting SBA  Functional Ambulation: N/A or Not attempted.     Grooming: Min-A  ADL Position: unsupported sitting  ADL Comments: EOB     Therapeutic Exercise - OT provided and reviewed Cardiac Rehab HEP. Pt completes 1 set x 5reps of each exercise with fair understanding, requiring modification for below shoulder-level activity. " "Encouraged to complete 3x/daily to facilitate increased activity tolerance. Pt will require additional education to ensure carryover.      Vitals: WNL     Pain: 8 VAS  Location: abdomen/back, states NSG has just medicated her  Interventions for pain: Repositioned  Education: Provided education on the importance of mobility in the acute care setting and ADL training     Assessment: Lita Yu presents with ADL impairments affecting function including balance, endurance / activity tolerance, pain, postural / trunk control, and strength. Pt initially confused, but orients self without cuing utilizing contextual clues. Agreeable to EOB sitting, requiring Mod A for bed mobility. Completes brief stand to reposition in bed, and declines OOB activity at this time due to 8/10 abd pain. Completes UB exercises, though limited by pain and requires modification of exercises.  Demonstrated functioning below baseline abilities indicate the need for continued skilled intervention while inpatient. Tolerating session today without incident. Will continue to follow and progress as tolerated.      Plan/Recommendations:   Moderate Intensity Therapy recommended post-acute care. This is recommended as therapy feels the patient would require 3-4 days per week and wouldn't tolerate \"3 hour daily\" rehab intensity. SNF would be the preferred choice. If the patient does not agree to SNF, arrange HH or OP depending on home bound status. If patient is medically complex, consider LTACH.. Pt requires no DME at discharge.      Pt desires Home at discharge. Pt cooperative; agreeable to therapeutic recommendations and plan of care.      Post-Tx Position: Supine with HOB Elevated, Alarms activated, and Call light and personal items within reach  PPE: gloves and gown                   Janay Barr   Physical Therapist  Physical Therapy  Therapy Treatment Note     Signed  Date of Service:  03/14/24 1508  Creation Time:  03/14/24 1508   " "  Signed        Subjective: Pt agreeable to therapeutic plan of care.     Objective:      Bed mobility - CGA  Transfers - CGA  Ambulation - 50 feet Min-A; handheld assist.     Vitals: WNL     Pain: 10 VAS   Location: low back  Intervention for pain: Repositioned, RN notified, and Therapeutic Presence; reports improved once returned to supine.      Education: Provided education on the importance of mobility in the acute care setting, Verbal/Tactile Cues, Transfer Training, and Gait Training     Assessment: Lita Yu presents with functional mobility impairments which indicate the need for skilled intervention. Pt's upright mobility limited by back pain; pt ambulates x50 ft with MIN A and handheld assist. Declines use of AD. Tolerating session today without incident. Will continue to follow and progress as tolerated.      Plan/Recommendations:   If medically appropriate, Moderate Intensity Therapy recommended post-acute care. This is recommended as therapy feels the patient would require 3-4 days per week and wouldn't tolerate \"3 hour daily\" rehab intensity. SNF would be the preferred choice. If the patient does not agree to SNF, arrange HH or OP depending on home bound status. If patient is medically complex, consider LTACH. Pt requires rolling walker at discharge.      Pt desires Skilled Rehab placement at discharge. Pt cooperative; agreeable to therapeutic recommendations and plan of care.      Post-Tx Position: Supine with HOB Elevated, Alarms activated, and Call light and personal items within reach  PPE: gloves, surgical mask, and gown                          Ric Sibley, TOBY   Physical Therapist  Specialty:  Physical Therapy  Therapy Evaluation     Signed  Date of Service:  24  Creation Time:  24     Signed        Expand All Collapse All  Patient Name: Lita Yu                   : 1965                      MRN: [id number]                              " Today's Date: 3/8/2024                                     Admit Date: 3/7/2024               Visit Dx:   Visit Diagnosis       ICD-10-CM ICD-9-CM   1. Weakness  R53.1 780.79   2. Unable to walk  R26.2 719.7         Problem List       Patient Active Problem List   Diagnosis    Postmenopausal state    Post traumatic stress disorder    Pain in joints    Osteoporosis    Macrocytic anemia    Lumbosacral radiculopathy    Leg pain, left    Peripheral vascular disease    Essential hypertension    Hyperlipidemia    Degenerative joint disease of left hip    Seizure disorder    Smoker    Status post hip replacement    Tobacco dependence syndrome    Vitamin B12 deficiency    Anemia of chronic disease    Chronic obstructive pulmonary disease    Cirrhosis of liver    Acute UTI (urinary tract infection)    Generalized weakness    Rhabdomyolysis    Non-traumatic rhabdomyolysis    Moderate malnutrition    Chest pain    Dehydration    Myalgia         Medical History        Past Medical History:   Diagnosis Date    Cirrhosis      COPD (chronic obstructive pulmonary disease)      Depression      GERD (gastroesophageal reflux disease)      Hyperlipidemia      Hypertension      PTSD (post-traumatic stress disorder)           Surgical History         Past Surgical History:   Procedure Laterality Date     SECTION N/A      COLONOSCOPY N/A 2021     Procedure: COLONOSCOPY with polypectomy x2 and random biopsy;  Surgeon: Osman Clay MD;  Location: [facility] ENDOSCOPY;  Service: Gastroenterology;  Laterality: N/A;  colon polyps    DENTAL PROCEDURE        ENDOSCOPY N/A 2021     Procedure: ESOPHAGOGASTRODUODENOSCOPY;  Surgeon: Osman Clay MD;  Location: [facility] ENDOSCOPY;  Service: Gastroenterology;  Laterality: N/A;  esophagitis    JOINT REPLACEMENT        REPLACEMENT TOTAL HIP LATERAL POSITION Left      TONSILLECTOMY        VAGINAL BIRTH AFTER  SECTION               General Information         Row Name 24  1622                 Physical Therapy Time and Intention     Document Type evaluation  -MB       Mode of Treatment physical therapy  -MB          Row Name 03/08/24 1622                 General Information     Patient Profile Reviewed yes  -MB       Prior Level of Function independent:;all household mobility;community mobility;gait;transfer;ADL's;home management  -MB       Existing Precautions/Restrictions fall  -MB       Barriers to Rehab cognitive status  -MB          Row Name 03/08/24 1622                 Living Environment     People in Home alone  -MB          Row Name 03/08/24 1622                 Home Main Entrance     Number of Stairs, Main Entrance one  -MB          Row Name 03/08/24 1622                 Stairs Within Home, Primary     Number of Stairs, Within Home, Primary none  -MB          Row Name 03/08/24 1622                 Cognition     Orientation Status (Cognition) oriented x 4  -MB          Row Name 03/08/24 1622                 Safety Issues, Functional Mobility     Safety Issues Affecting Function (Mobility) awareness of need for assistance;at risk behavior observed;insight into deficits/self-awareness;safety precaution awareness;safety precautions follow-through/compliance  -MB       Impairments Affecting Function (Mobility) balance;endurance/activity tolerance;cognition;pain;strength  -MB       Cognitive Impairments, Mobility Safety/Performance insight into deficits/self-awareness;problem-solving/reasoning;safety precaution awareness;safety precaution follow-through  -MB                       User Key  (r) = Recorded By, (t) = Taken By, (c) = Cosigned By        Initials Name Provider Type     Ric Velazquez, PT Physical Therapist                            Mobility         Row Name 03/08/24 1623                 Bed Mobility     Bed Mobility bed mobility (all) activities  -MB       All Activities, [place] (Bed Mobility) moderate assist (50% patient effort);2 person assist  -MB        Assistive Device (Bed Mobility) head of bed elevated  -MB          Row Name 03/08/24 1623                 Sit-Stand Transfer     Sit-Stand [place] (Transfers) moderate assist (50% patient effort);1 person assist  -MB       Comment, (Sit-Stand Transfer) non AD  -MB          Row Name 03/08/24 1623                 Gait/Stairs (Locomotion)     Patient was able to Ambulate no, other medical factors prevent ambulation  -MB       Reason Patient was unable to Ambulate Uncontrolled Pain  -MB                       User Key  (r) = Recorded By, (t) = Taken By, (c) = Cosigned By        Initials Name Provider Type     Ric Velazquez, TOBY Physical Therapist                            Obj/Interventions         Row Name 03/08/24 1623                 Range of Motion Comprehensive     General Range of Motion no range of motion deficits identified  -MB          Row Name 03/08/24 1623                 Strength Comprehensive (MMT)     Comment, General Manual Muscle Testing (MMT) Assessment BLE Strength 2/5 grossly  -MB          Row Name 03/08/24 1623                 Balance     Balance Assessment sitting static balance;sitting dynamic balance;sit to stand dynamic balance;standing static balance  -MB       Static Sitting Balance independent  -MB       Dynamic Sitting Balance independent  -MB       Position, Sitting Balance unsupported;sitting edge of bed  -MB       Sit to Stand Dynamic Balance minimal assist  -MB       Static Standing Balance minimal assist  -MB          Row Name 03/08/24 1623                 Sensory Assessment (Somatosensory)     Sensory Assessment (Somatosensory) sensation intact  -MB                       User Key  (r) = Recorded By, (t) = Taken By, (c) = Cosigned By        Initials Name Provider Type     Ric Velazquez PT Physical Therapist                            Goals/Plan         Row Name 03/08/24 1626                 Bed Mobility Goal 1 (PT)     Activity/Assistive Device (Bed Mobility Goal 1, PT) bed  mobility activities, all  -MB       [place] Level/Cues Needed (Bed Mobility Goal 1, PT) standby assist  -MB       Time Frame (Bed Mobility Goal 1, PT) other (see comments);2 weeks  -MB          Row Name 03/08/24 1626                 Transfer Goal 1 (PT)     Activity/Assistive Device (Transfer Goal 1, PT) transfers, all;walker, rolling  -MB       [place] Level/Cues Needed (Transfer Goal 1, PT) contact guard required  -MB       Time Frame (Transfer Goal 1, PT) long term goal (LTG);2 weeks  -MB          Row Name 03/08/24 1626                 Gait Training Goal 1 (PT)     Activity/Assistive Device (Gait Training Goal 1, PT) gait (walking locomotion);walker, rolling  -MB       [place] Level (Gait Training Goal 1, PT) contact guard required  -MB       Distance (Gait Training Goal 1, PT) 50  -MB       Time Frame (Gait Training Goal 1, PT) long term goal (LTG);2 weeks  -MB          Row Name 03/08/24 1626                 Therapy Assessment/Plan (PT)     Planned Therapy Interventions (PT) balance training;bed mobility training;gait training;home exercise program;neuromuscular re-education;patient/family education;strengthening;transfer training  -MB                    User Key  (r) = Recorded By, (t) = Taken By, (c) = Cosigned By        Initials Name Provider Type     Ric Velazquez, PT Physical Therapist                         Clinical Impression         Row Name 03/08/24 1625                 Pain     Pretreatment Pain Rating 9/10  -MB       Posttreatment Pain Rating 9/10  -MB       Pain Location generalized  -MB       Pre/Posttreatment Pain Comment reports pain all over, primarily in her back and traveling down into her legs, hypersensitive  -MB       Pain Intervention(s) Repositioned;Nursing Notified;Emotional support  -MB          Row Name 03/08/24 1636 03/08/24 1625            Plan of Care Review     Plan of Care Reviewed With -- patient  -MB     Progress -- no change  -MB     Outcome Evaluation Pt is a  57 y/o F admitted to [facility] on 3/7/24 following previous admission and discharged home a few days ago for electrolyte abnormalities. She reports that she does have a alcohol issue but over the last 2 days she has become extremely weak and unable to walk, losing control of bowel and bladder. She is primarily complaining of muscle aches and weakness, not allowing her to ambulate. Nephrology following for monitoring of labs. She is currently living at home alone in [facility] with one step entry. At baseline, she is normally IND with household mobility and ADLs with use of SPC. Owns RW. This date, she is AAOx4, but very reluctant to work with PT staff 2/2 to significant pain levels. She completes bed mobility mod A x 2 to sit Eob. Once EOB, she begins screaming in pain but is unable to locate her pain stating she is hurting all over. She seems to be pain hypersensitive this date with all movement. She is eventually agreeable to complete STS mod A x 2 and shuffle to the [facility], but again screams in pain without the ability to localize or describe. She wishes to return home, but she will not be able to return home in this condition. Pt will monitor her functional capacity, but recommending SNF at d/c.  -MB --        Row Name 03/08/24 4772                 Therapy Assessment/Plan (PT)     Rehab Potential (PT) fair, will monitor progress closely  -MB       Criteria for Skilled Interventions Met (PT) yes;meets criteria  -MB       Therapy Frequency (PT) 3 times/wk  -MB       Predicted Duration of Therapy Intervention (PT) until d/c  -MB          Row Name 03/08/24 1667                 Vital Signs     O2 Delivery Pre Treatment room air  -MB       O2 Delivery Intra Treatment room air  -MB       O2 Delivery Post Treatment room air  -MB       Pre Patient Position Supine  -MB       Intra Patient Position Standing  -MB       Post Patient Position Supine  -MB          Row Name 03/08/24 4716                 Positioning and Restraints      Pre-Treatment Position in bed  -MB       Post Treatment Position bed  -MB       In Bed notified nsg;supine;call light within reach;encouraged to call for assist;exit alarm on  -MB                       User Key  (r) = Recorded By, (t) = Taken By, (c) = Cosigned By        Initials Name Provider Type     Ric Velazquez, PT Physical Therapist                            Outcome Measures         Row Name 03/08/24 1631 03/08/24 0800            How much help from another person do you currently need...     Turning from your back to your side while in flat bed without using bedrails? 2  -MB 2  -KS     Moving from lying on back to sitting on the side of a flat bed without bedrails? 2  -MB 2  -KS     Moving to and from a bed to a chair (including a wheelchair)? 2  -MB 2  -KS     Standing up from a chair using your arms (e.g., wheelchair, bedside chair)? 2  -MB 2  -KS     Climbing 3-5 steps with a railing? 1  -MB 2  -KS     To walk in hospital room? 1  -MB 2  -KS     AM-PAC 6 Clicks Score (PT) 10  -MB 12  -KS     Highest Level of Mobility Goal 4 --> Transfer to chair/commode  -MB 4 --> Transfer to chair/commode  -KS                     User Key  (r) = Recorded By, (t) = Taken By, (c) = Cosigned By        Initials Name Provider Type     Vel Riddle, RN Registered Nurse     Ric Velazquez, PT Physical Therapist                          Physical Therapy Education            Title: PT OT SLP Therapies (Done)         Topic: Physical Therapy (Done)         Point: Mobility training (Done)         Learning Progress Summary               Patient Acceptance, E,TB, VU by MB at 3/8/2024 1631                               Point: Body mechanics (Done)         Learning Progress Summary               Patient Acceptance, E,TB, VU by MB at 3/8/2024 1631                               Point: Precautions (Done)         Learning Progress Summary               Patient Acceptance, E,TB, VU by MB at 3/8/2024 1631                                                    User Key         Initials Effective Dates Name Provider Type Discipline     MB 06/06/23 -  Ric Sibley, PT Physical Therapist PT                          PT Recommendation and Plan  Planned Therapy Interventions (PT): balance training, bed mobility training, gait training, home exercise program, neuromuscular re-education, patient/family education, strengthening, transfer training  Plan of Care Reviewed With: patient  Progress: no change  Outcome Evaluation: Pt is a 57 y/o F admitted to [facility] on 3/7/24 following previous admission and discharged home a few days ago for electrolyte abnormalities. She reports that she does have a alcohol issue but over the last 2 days she has become extremely weak and unable to walk, losing control of bowel and bladder. She is primarily complaining of muscle aches and weakness, not allowing her to ambulate. Nephrology following for monitoring of labs. She is currently living at home alone in [facility] with one step entry. At baseline, she is normally IND with household mobility and ADLs with use of SPC. Owns RW. This date, she is AAOx4, but very reluctant to work with PT staff 2/2 to significant pain levels. She completes bed mobility mod A x 2 to sit Eob. Once EOB, she begins screaming in pain but is unable to locate her pain stating she is hurting all over. She seems to be pain hypersensitive this date with all movement. She is eventually agreeable to complete STS mod A x 2 and shuffle to the HOB, but again screams in pain without the ability to localize or describe. She wishes to return home, but she will not be able to return home in this condition. Pt will monitor her functional capacity, but recommending SNF at d/c.      Time Calculation:   PT Evaluation Complexity  History, PT Evaluation Complexity: 1-2 personal factors and/or comorbidities  Examination of Body Systems (PT Eval Complexity): total of 3 or more elements  Clinical Presentation (PT Evaluation  Complexity): evolving  Clinical Decision Making (PT Evaluation Complexity): moderate complexity  Overall Complexity (PT Evaluation Complexity): moderate complexity       PT Charges         Row Name 03/08/24 1631                       Time Calculation     Start Time 1052  -MB         Stop Time 1115  -MB         Time Calculation (min) 23 min  -MB         PT Received On 03/08/24  -MB         PT - Next Appointment 03/10/24  -MB         PT Goal Re-Cert Due Date 03/22/24  -MB                 Time Calculation- PT     Total Timed Code Minutes- PT 0 minute(s)  -MB                      User Key  (r) = Recorded By, (t) = Taken By, (c) = Cosigned By        Initials Name Provider Type     Ric Velazquez, PT Physical Therapist                       Therapy Charges for Today         Code Description Service Date Service Provider Modifiers Qty     15871975968 HC PT EVAL MOD COMPLEXITY 4 3/8/2024 Ric Sibley, PT GP 1                PT G-Codes  AM-PAC 6 Clicks Score (PT): 10  PT Discharge Summary  Anticipated Discharge Disposition (PT): skilled nursing facility     Ric Sibley PT                    3/8/2024

## 2024-03-16 NOTE — DISCHARGE SUMMARY
Hospitalist Service   MD        Patient Name: Lita Yu  : 1965  MRN: [id number]  Primary Care Physician:  Norma Saul APRN  Date of admission: 3/7/2024        Subjective       Chief Complaint: Generalized weakness     Patient seen and examined this morning.  Feeling better this morning, still with back pain but controlled for now.  MRI results discussed with her, neurosurgery evaluation pending today.  Possible SILVINA as well today per cardiology.  No other complaints.     Pertinent positives as noted in HPI/subjective.  All other systems were reviewed and are negative.  3/12  Patient lethargic but in no acute distress  Patient with MRSA bacteremia on vancomycin  Followed up per ID and neurosurgery and nephrology and cardiology  Patient with L2-L3 discitis-osteomyelitis continues to have severe back pain with bilateral leg pain  MRI of the lumbar spine showed enhancement of the epidural fluid with moderate canal stenosis  Patient was not A surgical candidate as per neurosurgery  Patient with history of liver cirrhosis alcohol induced   Status post left total hip replacement secondary to hip fracture in   Continue IV antibiotic with vancomycin and follow-up per ID  She will need 6 weeks of IV antibiotic  SILVINA scheduled for today  Patient will probably need skilled nursing facility for 30 days or less on discharge     3/13  Patient had stress test which was negative  SILVINA done yesterday I cannot find the results yet  Transthoracic echo was done on   Patient with MRSA bacteremia followed up per infectious disease on IV antibiotic     3/14  Patient had SILVINA yesterday was no vegetation  On IV antibiotic with vancomycin  Awaiting precertification to be transferred to skilled nursing facility to continue IV antibiotic for total of 6 weeks  Awake alert oriented x 3  Hemodynamically stable  Discharge planning to skilled nursing facility once bed is available     3/15  Seen with RN  Patient  with endocarditis on IV antibiotic with Vanco  Followed up per infectious disease  On IV vancomycin for 6 weeks  Patient with MRSA bacteremia likely source is discitis-osteomyelitis  Patient with L2-L3 discitis/osteomyelitis with chronic back pain  Patient will need repeat MRI of the spine after antimicrobial therapy is finished  Awaiting discharge planning to skilled nursing facility     Objective       Vitals:   Temp:  [97.8 °F (36.6 °C)-98.4 °F (36.9 °C)] 98.2 °F (36.8 °C)  Heart Rate:  [67-78] 67  Resp:  [14-23] 23  BP: (101-126)/(50-65) 101/63     Physical Exam:     General: Awake, alert, chronically ill-appearing female, lying in bed, NAD  Eyes: PERRL, EOMI, conjunctivae are clear  Cardiovascular: Regular rate and rhythm, no murmurs  Respiratory: Clear to auscultation bilaterally, no wheezing or rales, unlabored breathing  Abdomen: Soft, nontender, positive bowel sounds, no guarding  Neurologic: A&O, CN grossly intact, moves all extremities spontaneously  Musculoskeletal: Generalized weakness, no other gross deformities  Skin: Warm, dry                         Result Review  Result Review:  I have personally reviewed the results from the time of this admission to 3/15/2024 10:02 EDT and agree with these findings:  [x]  Laboratory  [x]  Microbiology  []  Radiology  []  EKG/Telemetry   []  Cardiology/Vascular   []  Pathology  []  Old records  []  Other:     Wounds (Last 24 Hours)         LDA Wound         Row Name 03/15/24 0811 03/14/24 1926 03/14/24 1657             Wound Bilateral perineum MASD (Moisture associated skin damage)     Wound - Properties Group Present on Original Admission: Y  -DI Side: Bilateral  -DI Location: perineum  -DI Primary Wound Type: MASD  -DI     Dressing Appearance open to air  -SR open to air  -JN --     Closure Open to air  -SR Open to air  -JN Open to air  -SR     Base -- blanchable;red  -JN --     Drainage Amount -- none  -JN --     Dressing Care -- open to air  -JN --     Retired  Wound - Properties Group Present on Original Admission: Y  -DI Side: Bilateral  -DI Location: perineum  -DI Primary Wound Type: MASD  -DI     Retired Wound - Properties Group Present on Original Admission: Y  -DI Side: Bilateral  -DI Location: perineum  -DI Primary Wound Type: MASD  -DI        Row Name 03/14/24 1223                       Wound Bilateral perineum MASD (Moisture associated skin damage)     Wound - Properties Group Present on Original Admission: Y  -DI Side: Bilateral  -DI Location: perineum  -DI Primary Wound Type: MASD  -DI     Closure Open to air  -SR         Retired Wound - Properties Group Present on Original Admission: Y  -DI Side: Bilateral  -DI Location: perineum  -DI Primary Wound Type: MASD  -DI     Retired Wound - Properties Group Present on Original Admission: Y  -DI Side: Bilateral  -DI Location: perineum  -DI Primary Wound Type: MASD  -DI                  User Key  (r) = Recorded By, (t) = Taken By, (c) = Cosigned By        Initials Name Provider Type     Radha Saeed RN Registered Nurse     Chanell Joseph RN Registered Nurse     Antonella Mulligan RN Registered Nurse                             Assessment & Plan       Brief Patient Summary:  Lita Yu is a 58 y.o. 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  Noted to have hyponatremia, hypocalcemia, hypokalemia, and SARITA on admission.  Nephrology consulted for further management.  Blood cultures collected on admission positive for MRSA, ID consulted for further evaluation.          Scheduled Medication   busPIRone, 15 mg, Oral, BID  enoxaparin, 40 mg, Subcutaneous, Daily  folic acid, 1 mg, Oral, Daily  Lidocaine, 1 patch, Transdermal, Daily  multivitamin with minerals, 1 tablet, Oral, Daily  potassium chloride, 20 mEq, Oral,  Daily  QUEtiapine, 50 mg, Oral, Nightly  sertraline, 100 mg, Oral, BID  sodium chloride, 10 mL, Intravenous, Q12H  thiamine, 100 mg, Oral, Daily  vancomycin, 1,250 mg, Intravenous, Q12H           Infusion Medications   Pharmacy to dose vancomycin,             I have utilized all available, immediate resources to obtain, update, or review the patient's current medications including all prescriptions, over-the-counter products, herbals, cannabis/cannabidiol products, and vitamin.mineral/dietary (nutritional) supplements.     Active Hospital Problems:       Active Hospital Problems     Diagnosis      **Myalgia           Assessment/Plan:      MRSA bacteremia  L2-L3 discitis/osteomyelitis  Sepsis, POA  -Blood cultures positive for MRSA, likely source is lumbar discitis/osteomyelitis  -MRI spine findings noted to have L2-L3 discitis/osteomyelitis  -Continue IV vancomycin, ID following  -Cardiology on board for SILVINA  -Neurosurgery consulted     SARITA  Hyponatremia  Hypokalemia  Hypocalcemia  -Likely prerenal and due to alcohol abuse and poor p.o. intake  -Sodium being managed per nephrology, salt tabs added  -Replace potassium and calcium and monitor  -Nephrology following     Liver cirrhosis  -Likely alcohol related  -Does not appear to have decompensation at this time  -Continue supportive care and outpatient follow-up     Anemia of chronic disease  -Hemoglobin stable at this time  -Monitor and transfuse as needed     Tobacco abuse  -40-pack-year smoking history  -Counseled on cessation  -Nicotine patch if needed     DVT prophylaxis  -Lovenox        CODE STATUS:    Code Status (Patient has no pulse and is not breathing): CPR (Attempt to Resuscitate)  Medical Interventions (Patient has pulse or is breathing): Full Support        Disposition: Pending clinical course     Electronically signed by Edgar Oliva MD, 03/15/24, 10:02 EDT.  [facility] Hospitalist Team        Part of this note may be an electronic  transcription/translation of spoken language to printed text using the Dragon Dictation System.      time for dc 35 m

## 2024-03-18 NOTE — PAYOR COMM NOTE
"NOTIFICATION OF DISCHARGE:      AUTH $ [id number]   Yonis Yu \"NAINA\" (58 y.o. Female) 1965      DISCHARGED TO SNF ON 03/15/2024            Tequila Enamorado RN MSN  /UR  [facility]  [phone] office  [phone] fax  [email or web address]    [facility]  NPI: [id number]  Tax: [id number]            Yonis Yu \"NAINA\" (58 y.o. Female)       Date of Birth   1965    Social Security Number       Address   [address] [place] [address] [address] [place] [address]    Home Phone   [phone]    MRN   [id number]       Yarsanism   None    Marital Status                               Admission Date   3/7/24    Admission Type   Emergency    Admitting Provider   Edgar Oliva MD    Attending Provider       Department, Room/Bed   [facility] 3A MEDICAL INPATIENT, 309/1       Discharge Date   3/15/2024    Discharge Disposition   Skilled Nursing Facility (DC - External)    Discharge Destination                                 Attending Provider: (none)   Allergies: Sulfa Antibiotics, Keflex [Cephalexin]    Isolation: None   Infection: MRSA No Isolation this Admit (03/08/24), MRSA (03/09/24)   Code Status: Prior    Ht: 160 cm (63\")   Wt: 61.2 kg (135 lb)    Admission Cmt: None   Principal Problem: Myalgia [M79.10]                   Active Insurance as of 3/7/2024       Primary Coverage       Payor Plan Insurance Group Employer/Plan Group    [id number]       Payor Plan Address Payor Plan Phone Number Payor Plan Fax Number Effective Dates    [address]   6/22/2014 - None Entered    [facility] [id number]         Subscriber Name Subscriber Birth Date Member ID       YONIS YU 1965 [id number]                     Emergency Contacts        (Rel.) Home Phone Work Phone Mobile Phone    OBDULIA CANTU (Daughter) -- -- [phone]                 Discharge Summary        Edgar Oliva, " MD at 03/15/24 1958          Hospitalist Service   [place]        Patient Name: Lita Yu  : 1965  MRN: [id number]  Primary Care Physician:  Norma Saul APRN  Date of admission: 3/7/2024        Subjective       Chief Complaint: Generalized weakness     Patient seen and examined this morning.  Feeling better this morning, still with back pain but controlled for now.  MRI results discussed with her, neurosurgery evaluation pending today.  Possible SILVINA as well today per cardiology.  No other complaints.     Pertinent positives as noted in HPI/subjective.  All other systems were reviewed and are negative.  3/12  Patient lethargic but in no acute distress  Patient with MRSA bacteremia on vancomycin  Followed up per ID and neurosurgery and nephrology and cardiology  Patient with L2-L3 discitis-osteomyelitis continues to have severe back pain with bilateral leg pain  MRI of the lumbar spine showed enhancement of the epidural fluid with moderate canal stenosis  Patient was not A surgical candidate as per neurosurgery  Patient with history of liver cirrhosis alcohol induced   Status post left total hip replacement secondary to hip fracture in   Continue IV antibiotic with vancomycin and follow-up per ID  She will need 6 weeks of IV antibiotic  SILVINA scheduled for today  Patient will probably need skilled nursing facility for 30 days or less on discharge     3/13  Patient had stress test which was negative  SILVINA done yesterday I cannot find the results yet  Transthoracic echo was done on   Patient with MRSA bacteremia followed up per infectious disease on IV antibiotic     3/14  Patient had SILVINA yesterday was no vegetation  On IV antibiotic with vancomycin  Awaiting precertification to be transferred to skilled nursing facility to continue IV antibiotic for total of 6 weeks  Awake alert oriented x 3  Hemodynamically stable  Discharge planning to skilled nursing facility once bed is available      3/15  Seen with RN  Patient with endocarditis on IV antibiotic with Vanco  Followed up per infectious disease  On IV vancomycin for 6 weeks  Patient with MRSA bacteremia likely source is discitis-osteomyelitis  Patient with L2-L3 discitis/osteomyelitis with chronic back pain  Patient will need repeat MRI of the spine after antimicrobial therapy is finished  Awaiting discharge planning to skilled nursing facility     Objective       Vitals:   Temp:  [97.8 °F (36.6 °C)-98.4 °F (36.9 °C)] 98.2 °F (36.8 °C)  Heart Rate:  [67-78] 67  Resp:  [14-23] 23  BP: (101-126)/(50-65) 101/63     Physical Exam:     General: Awake, alert, chronically ill-appearing female, lying in bed, NAD  Eyes: PERRL, EOMI, conjunctivae are clear  Cardiovascular: Regular rate and rhythm, no murmurs  Respiratory: Clear to auscultation bilaterally, no wheezing or rales, unlabored breathing  Abdomen: Soft, nontender, positive bowel sounds, no guarding  Neurologic: A&O, CN grossly intact, moves all extremities spontaneously  Musculoskeletal: Generalized weakness, no other gross deformities  Skin: Warm, dry                         Result Review  Result Review:  I have personally reviewed the results from the time of this admission to 3/15/2024 10:02 EDT and agree with these findings:  [x]  Laboratory  [x]  Microbiology  []  Radiology  []  EKG/Telemetry   []  Cardiology/Vascular   []  Pathology  []  Old records  []  Other:     Wounds (Last 24 Hours)         LDA Wound         Row Name 03/15/24 0811 03/14/24 1926 03/14/24 1657             Wound Bilateral perineum MASD (Moisture associated skin damage)     Wound - Properties Group Present on Original Admission: Y  -DI Side: Bilateral  -DI Location: perineum  -DI Primary Wound Type: MASD  -DI     Dressing Appearance open to air  -SR open to air  -JN --     Closure Open to air  -SR Open to air  -JN Open to air  -SR     Base -- blanchable;red  -JN --     Drainage Amount -- none  -JN --     Dressing Care --  open to air  -JN --     Retired Wound - Properties Group Present on Original Admission: Y  -DI Side: Bilateral  -DI Location: perineum  -DI Primary Wound Type: MASD  -DI     Retired Wound - Properties Group Present on Original Admission: Y  -DI Side: Bilateral  -DI Location: perineum  -DI Primary Wound Type: MASD  -DI        Row Name 03/14/24 1223                       Wound Bilateral perineum MASD (Moisture associated skin damage)     Wound - Properties Group Present on Original Admission: Y  -DI Side: Bilateral  -DI Location: perineum  -DI Primary Wound Type: MASD  -DI     Closure Open to air  -SR         Retired Wound - Properties Group Present on Original Admission: Y  -DI Side: Bilateral  -DI Location: perineum  -DI Primary Wound Type: MASD  -DI     Retired Wound - Properties Group Present on Original Admission: Y  -DI Side: Bilateral  -DI Location: perineum  -DI Primary Wound Type: MASD  -DI                  User Key  (r) = Recorded By, (t) = Taken By, (c) = Cosigned By        Initials Name Provider Type     Radha Saeed, RN Registered Nurse     Chanell Joseph RN Registered Nurse     Antonella Mulligan RN Registered Nurse                             Assessment & Plan       Brief Patient Summary:  Lita Yu is a 58 y.o. 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  Noted to have hyponatremia, hypocalcemia, hypokalemia, and SARITA on admission.  Nephrology consulted for further management.  Blood cultures collected on admission positive for MRSA, ID consulted for further evaluation.          Scheduled Medication   busPIRone, 15 mg, Oral, BID  enoxaparin, 40 mg, Subcutaneous, Daily  folic acid, 1 mg, Oral, Daily  Lidocaine, 1 patch, Transdermal, Daily  multivitamin with minerals, 1 tablet, Oral, Daily  potassium  chloride, 20 mEq, Oral, Daily  QUEtiapine, 50 mg, Oral, Nightly  sertraline, 100 mg, Oral, BID  sodium chloride, 10 mL, Intravenous, Q12H  thiamine, 100 mg, Oral, Daily  vancomycin, 1,250 mg, Intravenous, Q12H           Infusion Medications   Pharmacy to dose vancomycin,             I have utilized all available, immediate resources to obtain, update, or review the patient's current medications including all prescriptions, over-the-counter products, herbals, cannabis/cannabidiol products, and vitamin.mineral/dietary (nutritional) supplements.     Active Hospital Problems:       Active Hospital Problems     Diagnosis      **Myalgia           Assessment/Plan:      MRSA bacteremia  L2-L3 discitis/osteomyelitis  Sepsis, POA  -Blood cultures positive for MRSA, likely source is lumbar discitis/osteomyelitis  -MRI spine findings noted to have L2-L3 discitis/osteomyelitis  -Continue IV vancomycin, ID following  -Cardiology on board for SILVINA  -Neurosurgery consulted     SARITA  Hyponatremia  Hypokalemia  Hypocalcemia  -Likely prerenal and due to alcohol abuse and poor p.o. intake  -Sodium being managed per nephrology, salt tabs added  -Replace potassium and calcium and monitor  -Nephrology following     Liver cirrhosis  -Likely alcohol related  -Does not appear to have decompensation at this time  -Continue supportive care and outpatient follow-up     Anemia of chronic disease  -Hemoglobin stable at this time  -Monitor and transfuse as needed     Tobacco abuse  -40-pack-year smoking history  -Counseled on cessation  -Nicotine patch if needed     DVT prophylaxis  -Lovenox        CODE STATUS:    Code Status (Patient has no pulse and is not breathing): CPR (Attempt to Resuscitate)  Medical Interventions (Patient has pulse or is breathing): Full Support        Disposition: Pending clinical course     Electronically signed by Edgar Oliva MD, 03/15/24, 10:02 EDT.  [facility] Hospitalist Team        Part of this note may be an  electronic transcription/translation of spoken language to printed text using the Dragon Dictation System.      time for dc 35 m    Electronically signed by Susan Oliva MD at 03/16/24 0829       Discharge Order (From admission, onward)       Start     Ordered    03/15/24 1416  Discharge patient  Once        Expected Discharge Date: 03/15/24   Expected Discharge Time: Afternoon   Discharge Disposition: Skilled Nursing Facility (DC - External)   Physician of Record for Attribution - Please select from Treatment Team: SUSAN OLIVA [[id number]]   Review needed by CMO to determine Physician of Record: No      Question Answer Comment   Physician of Record for Attribution - Please select from Treatment Team SUSAN OLIVA    Review needed by CMO to determine Physician of Record No        03/15/24 1419

## 2024-03-18 NOTE — CASE MANAGEMENT/SOCIAL WORK
Case Management Discharge Note      Final Note: [facility]         Selected Continued Care - Discharged on 3/15/2024 Admission date: 3/7/2024 - Discharge disposition: Skilled Nursing Facility (DC - External)      Destination Coordination complete.      Service Provider Selected Services Address Phone Fax Patient Preferred    [facility] [address], [place] [address] [phone] --                      Final Discharge Disposition Code: 03 - skilled nursing facility (SNF)

## 2024-04-05 ENCOUNTER — TELEPHONE (OUTPATIENT)
Dept: FAMILY MEDICINE CLINIC | Facility: CLINIC | Age: 59
End: 2024-04-05
Payer: COMMERCIAL

## 2024-04-05 NOTE — TELEPHONE ENCOUNTER
"  Caller: Lita Yu \"NAINA\"    Relationship: Self    Best call back number: [phone]    What medication are you requesting: SOMETHING FOR PAIN    What are your current symptoms:  MRSA PAIN IN LOWER EXTREMITIES    How long have you been experiencing symptoms: STARTED IN FEB    Have you had these symptoms before:    [x] Yes  [] No    Have you been treated for these symptoms before:   [x] Yes  [] No    If a prescription is needed, what is your preferred pharmacy and phone number: [id number] [place] [facility] [place] [place] [address] - [phone]  - [phone]     Additional notes:PATIENT RELEASED FROM NURSING HOME WITH 17 NEW MEDICATIONS.    PATIENT WAS GIVEN 17 TABLETS OF OXY.    PATIENT WOULD PREFER SOMETHING DIFFERENT AND NOT SOMETHING THAT IS NOT AS ADDICTIVE.    PATIENT STATED LORATAB WORKS ON  HER.    "

## 2024-04-05 NOTE — TELEPHONE ENCOUNTER
"  Caller: Lita Yu \"NAINA\"    Relationship: Self    Best call back number: [phone]    Equipment requested: BATH CHAIR, POTTY CHAIR AND WALKER    Reason for the request: PATIENT ON HOME BOUND    Prescribing Provider: VANDANA MICHAUD    Additional information or concerns: PATIENT STATED GOULDS SHOULD BE SENDING THE REQUEST FROM [facility] AND WILL NEED VANDANA'S SIGNATURE.    PATIENT NOW OUT OF NURSING HOME.        "

## 2024-04-08 NOTE — TELEPHONE ENCOUNTER
"    Caller: Lita Yu \"NAINA\"    Relationship: Self    Best call back number: [phone]    What orders are you requesting , BATH CHAIR, POTTY CHAIR, AND WALKER         Additional notes: SHOULD HAVE A REQUEST SENT BY TASHI PALUMBO'S STATED SHE NEEDS THESE TO BE MOBILE AT HOME AND TO CONTINUE BEING SELF RELIANT     "

## 2024-04-15 ENCOUNTER — OFFICE VISIT (OUTPATIENT)
Dept: FAMILY MEDICINE CLINIC | Facility: CLINIC | Age: 59
End: 2024-04-15
Payer: COMMERCIAL

## 2024-04-15 VITALS
BODY MASS INDEX: 21.26 KG/M2 | OXYGEN SATURATION: 99 % | HEART RATE: 95 BPM | DIASTOLIC BLOOD PRESSURE: 75 MMHG | TEMPERATURE: 98 F | SYSTOLIC BLOOD PRESSURE: 137 MMHG | WEIGHT: 120 LBS

## 2024-04-15 DIAGNOSIS — M46.26 OSTEOMYELITIS OF LUMBAR SPINE: ICD-10-CM

## 2024-04-15 DIAGNOSIS — R19.7 DIARRHEA, UNSPECIFIED TYPE: Primary | ICD-10-CM

## 2024-04-15 PROCEDURE — 99214 OFFICE O/P EST MOD 30 MIN: CPT | Performed by: NURSE PRACTITIONER

## 2024-04-15 RX ORDER — TRAMADOL HYDROCHLORIDE 50 MG/1
50 TABLET ORAL EVERY 8 HOURS PRN
Qty: 30 TABLET | Refills: 0 | Status: SHIPPED | OUTPATIENT
Start: 2024-04-15

## 2024-04-15 NOTE — PROGRESS NOTES
Subjective     Lita Yu is a 58 y.o. female.     History of Present Illness  Pt is here today to follow up on a recent hospitalization  She was in hospital from 3/7-3/15.  She went in with generalized weakness  She was found to have osteomyelitis of the spine  She is currently on home IV vancomycin  She is being monitored by ID  She went to rehab initially, but is now home.  She got a call from her home antibiotic company that her kidney function continues to be off.   She states her vancomycin was also elevated.  She states she has been taking ibuprofen 8 times a day due to pain  She cannot take tylenol due to liver cirrhosis  She states she has been feeling well overall  Occasionally has some dizziness  Her Hgb last week was 7.4.  She states she doesn't feel like her blood is low.  She has not drank alcohol since January.  Her back pain is feeling better.  She has pain in hip and knee   She is walking with a walker.  She is supposed to be on IV abx until May 3rd  She has HH and and infusion company  I spoke with pharmacist and her vanc trough is staying elevated  They are monitoring labs and holding vanc.   I will reach out to ID.  She was not told to follow up with ID, spine, or renal.  She state she needs something for pain if she can't take ibuprofen          Hospital note:  Chief Complaint: Generalized weakness     Patient seen and examined this morning.  Feeling better this morning, still with back pain but controlled for now.  MRI results discussed with her, neurosurgery evaluation pending today.  Possible SILVINA as well today per cardiology.  No other complaints.     Pertinent positives as noted in HPI/subjective.  All other systems were reviewed and are negative.  3/12  Patient lethargic but in no acute distress  Patient with MRSA bacteremia on vancomycin  Followed up per ID and neurosurgery and nephrology and cardiology  Patient with L2-L3 discitis-osteomyelitis continues to have severe back  pain with bilateral leg pain  MRI of the lumbar spine showed enhancement of the epidural fluid with moderate canal stenosis  Patient was not A surgical candidate as per neurosurgery  Patient with history of liver cirrhosis alcohol induced   Status post left total hip replacement secondary to hip fracture in 2017  Continue IV antibiotic with vancomycin and follow-up per ID  She will need 6 weeks of IV antibiotic  SILVINA scheduled for today  Patient will probably need skilled nursing facility for 30 days or less on discharge     3/13  Patient had stress test which was negative  SILVINA done yesterday I cannot find the results yet  Transthoracic echo was done on March 9  Patient with MRSA bacteremia followed up per infectious disease on IV antibiotic     3/14  Patient had SILVINA yesterday was no vegetation  On IV antibiotic with vancomycin  Awaiting precertification to be transferred to skilled nursing facility to continue IV antibiotic for total of 6 weeks  Awake alert oriented x 3  Hemodynamically stable  Discharge planning to skilled nursing facility once bed is available     3/15  Seen with RN  Patient with endocarditis on IV antibiotic with Vanco  Followed up per infectious disease  On IV vancomycin for 6 weeks  Patient with MRSA bacteremia likely source is discitis-osteomyelitis  Patient with L2-L3 discitis/osteomyelitis with chronic back pain  Patient will need repeat MRI of the spine after antimicrobial therapy is finished  Awaiting discharge planning to skilled nursing facility         The following portions of the patient's history were reviewed and updated as appropriate: allergies, current medications, past family history, past medical history, past social history, past surgical history, and problem list.    Review of Systems   Constitutional:  Positive for chills. Negative for fatigue and fever.   Respiratory:  Negative for chest tightness and shortness of breath.    Cardiovascular:  Negative for chest pain and  palpitations.   Gastrointestinal:  Positive for diarrhea. Negative for abdominal pain.   Musculoskeletal:  Positive for back pain.   Neurological:  Positive for dizziness. Negative for headache.   Psychiatric/Behavioral:  Negative for depressed mood and stress. The patient is not nervous/anxious.        Objective     /75 (BP Location: Left arm, Patient Position: Sitting, Cuff Size: Adult)   Pulse 95   Temp 98 °F (36.7 °C) (Tympanic)   Wt 54.4 kg (120 lb)   SpO2 99%   BMI 21.26 kg/m²     Current Outpatient Medications on File Prior to Visit   Medication Sig Dispense Refill    albuterol sulfate  (90 Base) MCG/ACT inhaler Inhale 2 puffs Every 4 (Four) Hours As Needed for Wheezing.      busPIRone (BUSPAR) 15 MG tablet TAKE 1 TABLET BY MOUTH TWICE A  tablet 1    Cyanocobalamin (Vitamin B-12) 1000 MCG sublingual tablet Place 1 tablet under the tongue Daily.      diclofenac (VOLTAREN) 50 MG EC tablet TAKE 1 TABLET BY MOUTH TWICE A DAY AS NEEDED FOR PAIN 60 tablet 0    folic acid (FOLVITE) 1 MG tablet Take 1 tablet by mouth Daily. 30 tablet 0    furosemide (LASIX) 40 MG tablet Take 1 tablet by mouth Daily. 90 tablet 0    lidocaine (LIDODERM) 5 % Place 1 patch on the skin as directed by provider Daily. Remove & Discard patch within 12 hours or as directed by MD 30 each 0    melatonin 5 MG tablet tablet Take 1 tablet by mouth At Night As Needed (sleep).      potassium chloride 10 MEQ CR tablet TAKE 4 TABLETS BY MOUTH DAILY 90 tablet 1    QUEtiapine (SEROquel) 50 MG tablet TAKE 1 TABLET BY MOUTH EVERYDAY AT BEDTIME 90 tablet 0    sertraline (ZOLOFT) 100 MG tablet TAKE 1 TABLET BY MOUTH TWICE A  tablet 1    spironolactone (ALDACTONE) 100 MG tablet Take 1 tablet by mouth Daily. 30 tablet 1    thiamine (VITAMIN B1) 100 MG tablet Take 1 tablet by mouth Daily. 30 tablet 0    Zinc Gluconate 30 MG tablet Take 1 tablet by mouth Daily.      [DISCONTINUED] fluconazole (DIFLUCAN) 150 MG tablet Take 1  tablet by mouth 1 (One) Time Per Week for 7 doses. 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 Indications: Bacteria in the Blood, Bone and/or Joint Infection       No current facility-administered medications on file prior to visit.        BMI is within normal parameters. No other follow-up for BMI required.       Physical Exam  Constitutional:       Appearance: Normal appearance. She is not ill-appearing.   HENT:      Head: Normocephalic and atraumatic.   Cardiovascular:      Rate and Rhythm: Normal rate and regular rhythm.      Heart sounds: No murmur heard.  Pulmonary:      Effort: Pulmonary effort is normal.      Breath sounds: Normal breath sounds.   Skin:     General: Skin is warm and dry.      Comments: PICC line in place on RUE   Neurological:      General: No focal deficit present.      Mental Status: She is alert and oriented to person, place, and time.   Psychiatric:         Mood and Affect: Mood normal.         Behavior: Behavior normal.         Thought Content: Thought content normal.         Judgment: Judgment normal.           Assessment & Plan     Diagnoses and all orders for this visit:    1. Diarrhea, unspecified type (Primary)  Comments:  will get an order to check for Cdiff due to stooling  Orders:  -     Clostridioides difficile EIA - Stool, Per Rectum; Future    2. Osteomyelitis of lumbar spine  -     traMADol (ULTRAM) 50 MG tablet; Take 1 tablet by mouth Every 8 (Eight) Hours As Needed for Moderate Pain.  Dispense: 30 tablet; Refill: 0    Currently on IV vanc- trough is staying elevated and Creatinine is elevated  Pt to stop ibuprofen  I will send in temporary ultram- watch carefully with Hx addiction  Seeing home health  I spoke with  pharmacist in depth  I contacted ID and labs were supposed to have been going to them weekly since hospital discharge.   I gave pharmacist ID fax number to start sending results to them to help manage.  Will try to get recent labs from [facility] since pts prev Hgb was 7.4  She states she is feeling really well at this time  Will see back in 2 wks  PICC looks good

## 2024-04-15 NOTE — PATIENT INSTRUCTIONS
Continue current treatment  Stop ibuprofen  Ultram as needed  Take sparingly  Go to ER for any fever or worsening symptoms  Bring in stool sample

## 2024-04-16 ENCOUNTER — TELEPHONE (OUTPATIENT)
Dept: FAMILY MEDICINE CLINIC | Facility: CLINIC | Age: 59
End: 2024-04-16
Payer: COMMERCIAL

## 2024-04-16 DIAGNOSIS — D64.9 LOW HEMOGLOBIN: Primary | ICD-10-CM

## 2024-04-16 NOTE — PROGRESS NOTES
HEMATOLOGY ONCOLOGY OUTPATIENT CONSULTATION       Patient name: Lita Yu  : 1965  MRN: [id number]  Primary Care Physician: Norma Saul APRN  Referring Physician: Norma Saul APRN  Reason For Consult:       History of Present Illness:  Patient is a 58 y.o. female who has been referred to us for further evaluation of anemia. Most recent labs are summarized as follows:    24:   CBC: 6.9/7.2/23.2/226  CMP: BUN/Creat: /1.6, GFR 43    Patient was admitted to [facility] on 24 for complaint of Generalized weakness and muscle aches mostly in the thighs and the arms. Infectious workup was reported positive for MRSA and the patient was started on IV antibiotics. She has history of liver disease and history of alcohol abuse. Given her back pain and bacteremia, MRIs of her cervical, thoracic and lumbar spine were obtained demonstrating osteomyelitis/discitis L2-L3 with small epidural fluid collection. SILVINA was reported as negative for vegetation. No neurosurgical interventions were indicated.    She was discharged on 3/15/24 with plans for Outpatient IV vancomycin for 6 weeks.   Patient will need repeat MRI of the spine after antimicrobial therapy is finished.   Patient had previous history of rhabdomyolysis. Patient was hospitalized in February for dehydration and  Patient had previous hip replacement in - without any complications thereafter.    Subjective:  Patient presents for initial consultation today. She is undergoing IV Antibiotic therapy with Vancomycin which is self administered per patient. Patient is complaining of back pain and is taking Frequent Ibuprofen for the pain. She also reported experiencing significant fatigue and weakness since hospital discharge. Denied any clear GI/ bleeding.     Patient reported having history of 'Hemochromatosis', although unclear when this diagnosis was made. She also reported that she was being considered for  therapeutic phlebotomy at one time, although she has never seen at Hematologist before.     Lita Yu reports a pain score of 6.  Given her pain assessment as noted, treatment options were discussed and the following options were decided upon as a follow-up plan to address the patient's pain: continuation of current treatment plan for pain.   Past Medical History:   Diagnosis Date    Cirrhosis     COPD (chronic obstructive pulmonary disease)     Depression     GERD (gastroesophageal reflux disease)     Hyperlipidemia     Hypertension     PTSD (post-traumatic stress disorder)        Past Surgical History:   Procedure Laterality Date     SECTION N/A     COLONOSCOPY N/A 2021    Procedure: COLONOSCOPY with polypectomy x2 and random biopsy;  Surgeon: Osman Clay MD;  Location: [facility] ENDOSCOPY;  Service: Gastroenterology;  Laterality: N/A;  colon polyps    DENTAL PROCEDURE      ENDOSCOPY N/A 2021    Procedure: ESOPHAGOGASTRODUODENOSCOPY;  Surgeon: Osman Clay MD;  Location: [facility] ENDOSCOPY;  Service: Gastroenterology;  Laterality: N/A;  esophagitis    JOINT REPLACEMENT      REPLACEMENT TOTAL HIP LATERAL POSITION Left     TONSILLECTOMY      VAGINAL BIRTH AFTER  SECTION           Current Outpatient Medications:     albuterol sulfate  (90 Base) MCG/ACT inhaler, Inhale 2 puffs Every 4 (Four) Hours As Needed for Wheezing., Disp: , Rfl:     busPIRone (BUSPAR) 15 MG tablet, TAKE 1 TABLET BY MOUTH TWICE A DAY, Disp: 180 tablet, Rfl: 1    Cyanocobalamin (Vitamin B-12) 1000 MCG sublingual tablet, Place 1 tablet under the tongue Daily., Disp: , Rfl:     diclofenac (VOLTAREN) 50 MG EC tablet, TAKE 1 TABLET BY MOUTH TWICE A DAY AS NEEDED FOR PAIN, Disp: 60 tablet, Rfl: 0    folic acid (FOLVITE) 1 MG tablet, Take 1 tablet by mouth Daily., Disp: 30 tablet, Rfl: 0    furosemide (LASIX) 40 MG tablet, Take 1 tablet by mouth Daily., Disp: 90 tablet, Rfl: 0    lidocaine (LIDODERM) 5 %,  Place 1 patch on the skin as directed by provider Daily. Remove & Discard patch within 12 hours or as directed by MD, Disp: 30 each, Rfl: 0    melatonin 5 MG tablet tablet, Take 1 tablet by mouth At Night As Needed (sleep)., Disp: , Rfl:     potassium chloride 10 MEQ CR tablet, TAKE 4 TABLETS BY MOUTH DAILY, Disp: 90 tablet, Rfl: 1    QUEtiapine (SEROquel) 50 MG tablet, TAKE 1 TABLET BY MOUTH EVERYDAY AT BEDTIME, Disp: 90 tablet, Rfl: 0    sertraline (ZOLOFT) 100 MG tablet, TAKE 1 TABLET BY MOUTH TWICE A DAY, Disp: 180 tablet, Rfl: 1    spironolactone (ALDACTONE) 100 MG tablet, Take 1 tablet by mouth Daily., Disp: 30 tablet, Rfl: 1    thiamine (VITAMIN B1) 100 MG tablet, Take 1 tablet by mouth Daily., Disp: 30 tablet, Rfl: 0    traMADol (ULTRAM) 50 MG tablet, Take 1 tablet by mouth Every 8 (Eight) Hours As Needed for Moderate Pain., Disp: 30 tablet, Rfl: 0    Zinc Gluconate 30 MG tablet, Take 1 tablet by mouth Daily., Disp: , Rfl:     Allergies   Allergen Reactions    Sulfa Antibiotics Hives    Keflex [Cephalexin] Hives       Family History   Problem Relation Age of Onset    Alcohol abuse Mother     Alcohol abuse Paternal Grandfather        Cancer-related family history is not on file.      Social History     Tobacco Use    Smoking status: Every Day     Current packs/day: 0.50     Types: Cigarettes     Passive exposure: Current    Smokeless tobacco: Never    Tobacco comments:     3cigs   Vaping Use    Vaping status: Never Used   Substance Use Topics    Alcohol use: Not Currently     Alcohol/week: 2.0 standard drinks of alcohol     Types: 2 Cans of beer per week     Comment: pint daily    Drug use: No     Social History     Social History Narrative    Not on file       ROS:   Review of Systems   Constitutional:  Positive for fatigue.   HENT: Negative.     Eyes: Negative.    Respiratory: Negative.     Cardiovascular: Negative.    Gastrointestinal: Negative.    Endocrine: Negative.    Genitourinary: Negative.   "  Musculoskeletal:  Positive for back pain.   Skin: Negative.    Allergic/Immunologic: Negative.    Neurological:  Positive for weakness.   Hematological: Negative.    Psychiatric/Behavioral: Negative.           Objective:    Vital Signs:  Vitals:    04/18/24 1458   BP: 122/71   Pulse: 88   SpO2: 96%   Weight: 53.2 kg (117 lb 3.2 oz)   Height: 160 cm (63\")   PainSc:   6   PainLoc: Back  Comment: hip, knee     Body mass index is 20.76 kg/m².    ECOG  (1) Restricted in physically strenuous activity, ambulatory and able to do work of light nature    Physical Exam:   Physical Exam  Constitutional:       Appearance: Normal appearance. She is normal weight.   HENT:      Head: Normocephalic and atraumatic.      Right Ear: External ear normal.      Left Ear: External ear normal.      Nose: Nose normal.      Mouth/Throat:      Mouth: Mucous membranes are moist.      Pharynx: Oropharynx is clear.   Eyes:      Extraocular Movements: Extraocular movements intact.      Conjunctiva/sclera: Conjunctivae normal.      Pupils: Pupils are equal, round, and reactive to light.   Cardiovascular:      Rate and Rhythm: Normal rate.      Pulses: Normal pulses.   Pulmonary:      Effort: Pulmonary effort is normal.   Abdominal:      General: Abdomen is flat.      Palpations: Abdomen is soft.   Musculoskeletal:         General: Normal range of motion.      Cervical back: Normal range of motion and neck supple.   Skin:     General: Skin is warm.   Neurological:      Mental Status: She is alert.   Psychiatric:         Mood and Affect: Mood normal.         Behavior: Behavior normal.         Thought Content: Thought content normal.         Judgment: Judgment normal.         Lab Results - Last 18 Months   Lab Units 04/18/24  1620 03/15/24  0444 03/14/24  0048   WBC 10*3/mm3 5.94 8.26 8.09   HEMOGLOBIN g/dL 7.5* 7.2* 7.5*   HEMATOCRIT % 23.8* 22.8* 23.5*   PLATELETS 10*3/mm3 231 163 150   MCV fL 102.6* 108.6* 107.8*     Lab Results - Last 18 Months " "  Lab Units 04/18/24  1618 03/15/24  0444 03/14/24  0048 03/09/24  0948 03/08/24  2338 03/08/24  0752   SODIUM mmol/L 138 133* 134*   < > 124* 128*   POTASSIUM mmol/L 4.5 3.9 4.2   < > 3.2* 3.7   CHLORIDE mmol/L 107 102 105   < > 93* 95*   CO2 mmol/L 18.0* 19.0* 17.0*   < > 19.0* 20.0*   BUN mg/dL 19 5* 8   < > 15 17   CREATININE mg/dL 1.65* 0.51* 0.52*   < > 0.82 0.92   CALCIUM mg/dL 9.8 8.1* 8.2*   < > 8.2* 8.3*   BILIRUBIN mg/dL 0.2  --   --   --  0.5 0.5   ALK PHOS U/L 210*  --   --   --  108 123*   ALT (SGPT) U/L 16  --   --   --  18 21   AST (SGOT) U/L 24  --   --   --  29 29   GLUCOSE mg/dL 72 94 84   < > 97 108*    < > = values in this interval not displayed.           Lab Results - Last 18 Months   Lab Units 04/18/24  1618 03/07/24  0920 03/02/24  0757   INR  1.05 1.00 1.04   APTT seconds 35.8* 33.4*  --        Lab Results   Component Value Date    IRON 38 04/18/2024    TIBC 305 04/18/2024    FERRITIN 499.30 (H) 04/18/2024       Lab Results   Component Value Date    FOLATE >20.00 04/19/2024         Lab Results   Component Value Date    RETICCTPCT 1.66 04/19/2024     Lab Results   Component Value Date    MQENKJJI93 591 04/19/2024     No results found for: \"SPEP\", \"UPEP\"  LDH   Date Value Ref Range Status   04/18/2024 190 135 - 214 U/L Final     Uric Acid   Date Value Ref Range Status   03/10/2024 4.9 2.4 - 5.7 mg/dL Final     Lab Results   Component Value Date    KAMINI Negative 10/15/2022    SEDRATE 34 (H) 04/19/2024     No results found for: \"FIBRINOGEN\", \"HAPTOGLOBIN\"  Lab Results   Component Value Date    PTT 33.4 (L) 03/07/2024    INR 1.00 03/07/2024     Lab Results   Component Value Date    SEDRATE 34 (H) 04/19/2024          Assessment & Plan :    Macrocytic anemia:  This is likely multifactorial secondary to Acute infection, Antibiotic use, Chronic alcohol use, Liver cirrhosis, Secondary to Renal dysfunction etc.  -Iron panel not consistent with Iron Deficiency anemia, Soluble transferrin receptor level " pending.  -B12, Folate, Retic counts,hemolysis labs WNL. Direct dario test Positive for C3 complement, although likely not clinically significant given normal hemolysis panel, normal Plt counts and relatively stable Hb/Hct since hospital discharge.   -Low RBC count and Low Retic count are indicative of BM suppression as likely etiology for anemia.  -if this is secondary to Alcohol induced Myelotoxicity, this may take longer to recover,especially given ongoing alcohol intake.  -Will recheck Hb in 1 week to assess for transfusion needs,  -Continue to monitor for now,no indication for IV iron at this time, unless noted to have Soluble trf receptor level elevated,  -Continue B12 and Folate supplementation.  -TO monitor CBC, if no improvement noted despite infection treatment and alcohol cessation. Will consider BM biopsy.      Alcohol Abuse:  Patient reported ongoing alcohol use, drinks a pint of alcohol daily  Will discuss the need for cessation on follow up and to explore additional resources.    Liver cirrhosis:  Unclear etiology, ALP elevated (?Alcohol Use)  Advised to continue following with GI for further management.    Renal dysfunction:  Appears to be SARITA. Likely secondary to infection,Antibiotics, dehydration and ongoing alcohol use.  -Will consider referral to Nephrology on follow up.    Osteomyelitis:  Ongoing IV antibiotic therapy. Follow with ID/Neurosurgery for further management    Reported diagnosis of ?Hemochromatosis:  Unclear whether patient referred to secondary Iron overload vs primary Hemochromatosis.  Will check for hemochromatosis mutation panel.    Continue Home monitoring of CBC and CMP. Consider transfusion PRN for Hb <7.0. Follow up in 2 weeks with Repeat CBC,CMP. Sooner as needed.      Thank you very much for providing the opportunity to participate in this patient’s care. Please do not hesitate to call if there are any other questions.

## 2024-04-16 NOTE — TELEPHONE ENCOUNTER
Pls let pt know her hgb is extremely low. I am hoping this improves stopping the ibuprofen but I want to get her in urgently with a hematologist to help correct this. If she gets dizziness or SOA she needs to go to the ER

## 2024-04-18 ENCOUNTER — LAB (OUTPATIENT)
Dept: LAB | Facility: HOSPITAL | Age: 59
End: 2024-04-18
Payer: COMMERCIAL

## 2024-04-18 ENCOUNTER — CONSULT (OUTPATIENT)
Dept: ONCOLOGY | Facility: CLINIC | Age: 59
End: 2024-04-18
Payer: COMMERCIAL

## 2024-04-18 VITALS
SYSTOLIC BLOOD PRESSURE: 122 MMHG | BODY MASS INDEX: 20.77 KG/M2 | HEART RATE: 88 BPM | HEIGHT: 63 IN | DIASTOLIC BLOOD PRESSURE: 71 MMHG | WEIGHT: 117.2 LBS | OXYGEN SATURATION: 96 %

## 2024-04-18 DIAGNOSIS — N18.9 CHRONIC KIDNEY DISEASE, UNSPECIFIED CKD STAGE: ICD-10-CM

## 2024-04-18 DIAGNOSIS — D64.9 ANEMIA, UNSPECIFIED TYPE: Primary | ICD-10-CM

## 2024-04-18 DIAGNOSIS — K74.69 OTHER CIRRHOSIS OF LIVER: ICD-10-CM

## 2024-04-18 DIAGNOSIS — D64.9 ANEMIA, UNSPECIFIED TYPE: ICD-10-CM

## 2024-04-18 LAB
ALBUMIN SERPL-MCNC: 3.6 G/DL (ref 3.5–5.2)
ALBUMIN/GLOB SERPL: 0.8 G/DL
ALP SERPL-CCNC: 210 U/L (ref 39–117)
ALT SERPL W P-5'-P-CCNC: 16 U/L (ref 1–33)
ANION GAP SERPL CALCULATED.3IONS-SCNC: 13 MMOL/L (ref 5–15)
APTT PPP: 35.8 SECONDS (ref 24–31)
AST SERPL-CCNC: 24 U/L (ref 1–32)
BASOPHILS # BLD AUTO: 0.02 10*3/MM3 (ref 0–0.2)
BASOPHILS NFR BLD AUTO: 0.3 % (ref 0–1.5)
BILIRUB SERPL-MCNC: 0.2 MG/DL (ref 0–1.2)
BUN SERPL-MCNC: 19 MG/DL (ref 6–20)
BUN/CREAT SERPL: 11.5 (ref 7–25)
CALCIUM SPEC-SCNC: 9.8 MG/DL (ref 8.6–10.5)
CHLORIDE SERPL-SCNC: 107 MMOL/L (ref 98–107)
CO2 SERPL-SCNC: 18 MMOL/L (ref 22–29)
CREAT SERPL-MCNC: 1.65 MG/DL (ref 0.57–1)
CRP SERPL-MCNC: 7.73 MG/DL (ref 0–0.5)
DEPRECATED RDW RBC AUTO: 57 FL (ref 37–54)
EGFRCR SERPLBLD CKD-EPI 2021: 36.3 ML/MIN/1.73
EOSINOPHIL # BLD AUTO: 0.31 10*3/MM3 (ref 0–0.4)
EOSINOPHIL NFR BLD AUTO: 5.2 % (ref 0.3–6.2)
ERYTHROCYTE [DISTWIDTH] IN BLOOD BY AUTOMATED COUNT: 15.8 % (ref 12.3–15.4)
FERRITIN SERPL-MCNC: 499.3 NG/ML (ref 13–150)
GLOBULIN UR ELPH-MCNC: 4.7 GM/DL
GLUCOSE SERPL-MCNC: 72 MG/DL (ref 65–99)
HCT VFR BLD AUTO: 23.8 % (ref 34–46.6)
HGB BLD-MCNC: 7.5 G/DL (ref 12–15.9)
INR PPP: 1.05 (ref 0.93–1.1)
IRON 24H UR-MRATE: 38 MCG/DL (ref 37–145)
IRON SATN MFR SERPL: 12 % (ref 20–50)
LDH SERPL-CCNC: 190 U/L (ref 135–214)
LYMPHOCYTES # BLD AUTO: 1.46 10*3/MM3 (ref 0.7–3.1)
LYMPHOCYTES NFR BLD AUTO: 24.6 % (ref 19.6–45.3)
MCH RBC QN AUTO: 32.3 PG (ref 26.6–33)
MCHC RBC AUTO-ENTMCNC: 31.5 G/DL (ref 31.5–35.7)
MCV RBC AUTO: 102.6 FL (ref 79–97)
MONOCYTES # BLD AUTO: 0.69 10*3/MM3 (ref 0.1–0.9)
MONOCYTES NFR BLD AUTO: 11.6 % (ref 5–12)
NEUTROPHILS NFR BLD AUTO: 3.46 10*3/MM3 (ref 1.7–7)
NEUTROPHILS NFR BLD AUTO: 58.3 % (ref 42.7–76)
PLATELET # BLD AUTO: 231 10*3/MM3 (ref 140–450)
PMV BLD AUTO: 8.2 FL (ref 6–12)
POTASSIUM SERPL-SCNC: 4.5 MMOL/L (ref 3.5–5.2)
PROT SERPL-MCNC: 8.3 G/DL (ref 6–8.5)
PROTHROMBIN TIME: 11.4 SECONDS (ref 9.6–11.7)
RBC # BLD AUTO: 2.32 10*6/MM3 (ref 3.77–5.28)
SODIUM SERPL-SCNC: 138 MMOL/L (ref 136–145)
TIBC SERPL-MCNC: 305 MCG/DL (ref 298–536)
TRANSFERRIN SERPL-MCNC: 205 MG/DL (ref 200–360)
WBC NRBC COR # BLD AUTO: 5.94 10*3/MM3 (ref 3.4–10.8)

## 2024-04-18 PROCEDURE — 85610 PROTHROMBIN TIME: CPT | Performed by: STUDENT IN AN ORGANIZED HEALTH CARE EDUCATION/TRAINING PROGRAM

## 2024-04-18 PROCEDURE — 80053 COMPREHEN METABOLIC PANEL: CPT | Performed by: STUDENT IN AN ORGANIZED HEALTH CARE EDUCATION/TRAINING PROGRAM

## 2024-04-18 PROCEDURE — 83540 ASSAY OF IRON: CPT | Performed by: STUDENT IN AN ORGANIZED HEALTH CARE EDUCATION/TRAINING PROGRAM

## 2024-04-18 PROCEDURE — 82728 ASSAY OF FERRITIN: CPT | Performed by: STUDENT IN AN ORGANIZED HEALTH CARE EDUCATION/TRAINING PROGRAM

## 2024-04-18 PROCEDURE — 84466 ASSAY OF TRANSFERRIN: CPT | Performed by: STUDENT IN AN ORGANIZED HEALTH CARE EDUCATION/TRAINING PROGRAM

## 2024-04-18 PROCEDURE — 83615 LACTATE (LD) (LDH) ENZYME: CPT | Performed by: STUDENT IN AN ORGANIZED HEALTH CARE EDUCATION/TRAINING PROGRAM

## 2024-04-18 PROCEDURE — 86140 C-REACTIVE PROTEIN: CPT | Performed by: STUDENT IN AN ORGANIZED HEALTH CARE EDUCATION/TRAINING PROGRAM

## 2024-04-18 PROCEDURE — 85025 COMPLETE CBC W/AUTO DIFF WBC: CPT

## 2024-04-18 PROCEDURE — 36415 COLL VENOUS BLD VENIPUNCTURE: CPT

## 2024-04-18 PROCEDURE — 83010 ASSAY OF HAPTOGLOBIN QUANT: CPT | Performed by: STUDENT IN AN ORGANIZED HEALTH CARE EDUCATION/TRAINING PROGRAM

## 2024-04-18 PROCEDURE — 85730 THROMBOPLASTIN TIME PARTIAL: CPT | Performed by: STUDENT IN AN ORGANIZED HEALTH CARE EDUCATION/TRAINING PROGRAM

## 2024-04-18 RX ORDER — VANCOMYCIN 1 G/200ML
INJECTION, SOLUTION INTRAVENOUS
COMMUNITY
Start: 2024-03-29

## 2024-04-19 ENCOUNTER — LAB (OUTPATIENT)
Dept: LAB | Facility: HOSPITAL | Age: 59
End: 2024-04-19
Payer: COMMERCIAL

## 2024-04-19 DIAGNOSIS — K74.69 OTHER CIRRHOSIS OF LIVER: ICD-10-CM

## 2024-04-19 DIAGNOSIS — N18.9 CHRONIC KIDNEY DISEASE, UNSPECIFIED CKD STAGE: ICD-10-CM

## 2024-04-19 DIAGNOSIS — D64.9 ANEMIA, UNSPECIFIED TYPE: ICD-10-CM

## 2024-04-19 LAB
DAT C3: POSITIVE
DAT IGG-SP REAG RBC-IMP: NEGATIVE
DAT POLY-SP REAG RBC QL: POSITIVE
ERYTHROCYTE [SEDIMENTATION RATE] IN BLOOD: 34 MM/HR (ref 0–30)
HAPTOGLOB SERPL-MCNC: 226 MG/DL (ref 30–200)
RETICS # AUTO: 0.04 10*6/MM3 (ref 0.02–0.13)
RETICS/RBC NFR AUTO: 1.66 % (ref 0.7–1.9)

## 2024-04-19 PROCEDURE — 85045 AUTOMATED RETICULOCYTE COUNT: CPT | Performed by: STUDENT IN AN ORGANIZED HEALTH CARE EDUCATION/TRAINING PROGRAM

## 2024-04-19 PROCEDURE — 86880 COOMBS TEST DIRECT: CPT | Performed by: STUDENT IN AN ORGANIZED HEALTH CARE EDUCATION/TRAINING PROGRAM

## 2024-04-19 PROCEDURE — 82607 VITAMIN B-12: CPT | Performed by: STUDENT IN AN ORGANIZED HEALTH CARE EDUCATION/TRAINING PROGRAM

## 2024-04-19 PROCEDURE — 82746 ASSAY OF FOLIC ACID SERUM: CPT | Performed by: STUDENT IN AN ORGANIZED HEALTH CARE EDUCATION/TRAINING PROGRAM

## 2024-04-19 PROCEDURE — 85652 RBC SED RATE AUTOMATED: CPT | Performed by: STUDENT IN AN ORGANIZED HEALTH CARE EDUCATION/TRAINING PROGRAM

## 2024-04-20 LAB
FOLATE SERPL-MCNC: >20 NG/ML (ref 4.78–24.2)
VIT B12 BLD-MCNC: 591 PG/ML (ref 211–946)

## 2024-04-22 LAB — STFR SERPL-SCNC: 23.6 NMOL/L (ref 12.2–27.3)

## 2024-04-23 ENCOUNTER — HOSPITAL ENCOUNTER (INPATIENT)
Facility: HOSPITAL | Age: 59
LOS: 2 days | Discharge: HOME | End: 2024-04-25
Attending: EMERGENCY MEDICINE
Payer: COMMERCIAL

## 2024-04-23 ENCOUNTER — APPOINTMENT (OUTPATIENT)
Dept: MRI IMAGING | Facility: HOSPITAL | Age: 59
End: 2024-04-23
Payer: COMMERCIAL

## 2024-04-23 ENCOUNTER — TELEPHONE (OUTPATIENT)
Dept: FAMILY MEDICINE CLINIC | Facility: CLINIC | Age: 59
End: 2024-04-23
Payer: COMMERCIAL

## 2024-04-23 DIAGNOSIS — E87.5 HYPERKALEMIA: Primary | ICD-10-CM

## 2024-04-23 DIAGNOSIS — N17.9 ACUTE KIDNEY INJURY: ICD-10-CM

## 2024-04-23 DIAGNOSIS — D64.9 ANEMIA, UNSPECIFIED TYPE: ICD-10-CM

## 2024-04-23 DIAGNOSIS — M62.82 NON-TRAUMATIC RHABDOMYOLYSIS: ICD-10-CM

## 2024-04-23 LAB
ABO GROUP BLD: NORMAL
ANION GAP SERPL CALCULATED.3IONS-SCNC: 8 MMOL/L (ref 5–15)
ANION GAP SERPL CALCULATED.3IONS-SCNC: 9 MMOL/L (ref 5–15)
BASOPHILS # BLD AUTO: 0.02 10*3/MM3 (ref 0–0.2)
BASOPHILS NFR BLD AUTO: 0.3 % (ref 0–1.5)
BLD GP AB SCN SERPL QL: NEGATIVE
BUN SERPL-MCNC: 19 MG/DL (ref 6–20)
BUN SERPL-MCNC: 19 MG/DL (ref 6–20)
BUN/CREAT SERPL: 10.1 (ref 7–25)
BUN/CREAT SERPL: 11.4 (ref 7–25)
CALCIUM SPEC-SCNC: 8.7 MG/DL (ref 8.6–10.5)
CALCIUM SPEC-SCNC: 9.7 MG/DL (ref 8.6–10.5)
CHLORIDE SERPL-SCNC: 107 MMOL/L (ref 98–107)
CHLORIDE SERPL-SCNC: 109 MMOL/L (ref 98–107)
CO2 SERPL-SCNC: 22 MMOL/L (ref 22–29)
CO2 SERPL-SCNC: 22 MMOL/L (ref 22–29)
CREAT SERPL-MCNC: 1.67 MG/DL (ref 0.57–1)
CREAT SERPL-MCNC: 1.88 MG/DL (ref 0.57–1)
DEPRECATED RDW RBC AUTO: 62.3 FL (ref 37–54)
EGFRCR SERPLBLD CKD-EPI 2021: 31.1 ML/MIN/1.73
EGFRCR SERPLBLD CKD-EPI 2021: 35.8 ML/MIN/1.73
EOSINOPHIL # BLD AUTO: 0.27 10*3/MM3 (ref 0–0.4)
EOSINOPHIL NFR BLD AUTO: 4.2 % (ref 0.3–6.2)
ERYTHROCYTE [DISTWIDTH] IN BLOOD BY AUTOMATED COUNT: 16.4 % (ref 12.3–15.4)
GLUCOSE BLDC GLUCOMTR-MCNC: 207 MG/DL (ref 70–105)
GLUCOSE BLDC GLUCOMTR-MCNC: 43 MG/DL (ref 70–105)
GLUCOSE BLDC GLUCOMTR-MCNC: 51 MG/DL (ref 70–105)
GLUCOSE SERPL-MCNC: 108 MG/DL (ref 65–99)
GLUCOSE SERPL-MCNC: 111 MG/DL (ref 65–99)
HCT VFR BLD AUTO: 23.1 % (ref 34–46.6)
HGB BLD-MCNC: 6.9 G/DL (ref 12–15.9)
IMM GRANULOCYTES # BLD AUTO: 0.03 10*3/MM3 (ref 0–0.05)
IMM GRANULOCYTES NFR BLD AUTO: 0.5 % (ref 0–0.5)
LYMPHOCYTES # BLD AUTO: 1.07 10*3/MM3 (ref 0.7–3.1)
LYMPHOCYTES NFR BLD AUTO: 16.6 % (ref 19.6–45.3)
MAGNESIUM SERPL-MCNC: 1.8 MG/DL (ref 1.6–2.6)
MCH RBC QN AUTO: 30.9 PG (ref 26.6–33)
MCHC RBC AUTO-ENTMCNC: 29.9 G/DL (ref 31.5–35.7)
MCV RBC AUTO: 103.6 FL (ref 79–97)
MONOCYTES # BLD AUTO: 0.61 10*3/MM3 (ref 0.1–0.9)
MONOCYTES NFR BLD AUTO: 9.5 % (ref 5–12)
NEUTROPHILS NFR BLD AUTO: 4.43 10*3/MM3 (ref 1.7–7)
NEUTROPHILS NFR BLD AUTO: 68.9 % (ref 42.7–76)
NRBC BLD AUTO-RTO: 0 /100 WBC (ref 0–0.2)
PLATELET # BLD AUTO: 217 10*3/MM3 (ref 140–450)
PMV BLD AUTO: 8.9 FL (ref 6–12)
POTASSIUM SERPL-SCNC: 6.2 MMOL/L (ref 3.5–5.2)
POTASSIUM SERPL-SCNC: 7.1 MMOL/L (ref 3.5–5.2)
RBC # BLD AUTO: 2.23 10*6/MM3 (ref 3.77–5.28)
RH BLD: POSITIVE
SODIUM SERPL-SCNC: 137 MMOL/L (ref 136–145)
SODIUM SERPL-SCNC: 140 MMOL/L (ref 136–145)
VANCOMYCIN SERPL-MCNC: 21.6 MCG/ML (ref 5–40)
WBC NRBC COR # BLD AUTO: 6.43 10*3/MM3 (ref 3.4–10.8)

## 2024-04-23 PROCEDURE — A9579 GAD-BASE MR CONTRAST NOS,1ML: HCPCS | Performed by: INTERNAL MEDICINE

## 2024-04-23 PROCEDURE — 80202 ASSAY OF VANCOMYCIN: CPT | Performed by: INTERNAL MEDICINE

## 2024-04-23 PROCEDURE — 86900 BLOOD TYPING SEROLOGIC ABO: CPT | Performed by: EMERGENCY MEDICINE

## 2024-04-23 PROCEDURE — 86900 BLOOD TYPING SEROLOGIC ABO: CPT

## 2024-04-23 PROCEDURE — 25010000002 LORAZEPAM PER 2 MG: Performed by: INTERNAL MEDICINE

## 2024-04-23 PROCEDURE — P9016 RBC LEUKOCYTES REDUCED: HCPCS

## 2024-04-23 PROCEDURE — 25810000003 SODIUM CHLORIDE 0.9 % SOLUTION: Performed by: INTERNAL MEDICINE

## 2024-04-23 PROCEDURE — 25010000002 HYDROMORPHONE 1 MG/ML SOLUTION: Performed by: INTERNAL MEDICINE

## 2024-04-23 PROCEDURE — 99285 EMERGENCY DEPT VISIT HI MDM: CPT

## 2024-04-23 PROCEDURE — 87040 BLOOD CULTURE FOR BACTERIA: CPT | Performed by: INTERNAL MEDICINE

## 2024-04-23 PROCEDURE — 72157 MRI CHEST SPINE W/O & W/DYE: CPT

## 2024-04-23 PROCEDURE — 25010000002 CALCIUM GLUCONATE-NACL 1-0.675 GM/50ML-% SOLUTION: Performed by: EMERGENCY MEDICINE

## 2024-04-23 PROCEDURE — 25810000003 SODIUM CHLORIDE 0.9 % SOLUTION: Performed by: EMERGENCY MEDICINE

## 2024-04-23 PROCEDURE — 72158 MRI LUMBAR SPINE W/O & W/DYE: CPT

## 2024-04-23 PROCEDURE — 80048 BASIC METABOLIC PNL TOTAL CA: CPT | Performed by: EMERGENCY MEDICINE

## 2024-04-23 PROCEDURE — 86923 COMPATIBILITY TEST ELECTRIC: CPT

## 2024-04-23 PROCEDURE — 85025 COMPLETE CBC W/AUTO DIFF WBC: CPT | Performed by: EMERGENCY MEDICINE

## 2024-04-23 PROCEDURE — 36430 TRANSFUSION BLD/BLD COMPNT: CPT

## 2024-04-23 PROCEDURE — 25010000002 GADOTERIDOL PER 1 ML: Performed by: INTERNAL MEDICINE

## 2024-04-23 PROCEDURE — 63710000001 INSULIN REGULAR HUMAN PER 5 UNITS: Performed by: EMERGENCY MEDICINE

## 2024-04-23 PROCEDURE — 82948 REAGENT STRIP/BLOOD GLUCOSE: CPT | Performed by: EMERGENCY MEDICINE

## 2024-04-23 PROCEDURE — 86901 BLOOD TYPING SEROLOGIC RH(D): CPT | Performed by: EMERGENCY MEDICINE

## 2024-04-23 PROCEDURE — 82948 REAGENT STRIP/BLOOD GLUCOSE: CPT

## 2024-04-23 PROCEDURE — 86850 RBC ANTIBODY SCREEN: CPT | Performed by: EMERGENCY MEDICINE

## 2024-04-23 PROCEDURE — 80048 BASIC METABOLIC PNL TOTAL CA: CPT | Performed by: INTERNAL MEDICINE

## 2024-04-23 PROCEDURE — 93005 ELECTROCARDIOGRAM TRACING: CPT | Performed by: EMERGENCY MEDICINE

## 2024-04-23 PROCEDURE — 83735 ASSAY OF MAGNESIUM: CPT | Performed by: EMERGENCY MEDICINE

## 2024-04-23 PROCEDURE — 86901 BLOOD TYPING SEROLOGIC RH(D): CPT

## 2024-04-23 RX ORDER — FOLIC ACID 1 MG/1
1 TABLET ORAL DAILY
Status: DISCONTINUED | OUTPATIENT
Start: 2024-04-24 | End: 2024-04-25 | Stop reason: HOSPADM

## 2024-04-23 RX ORDER — BISACODYL 10 MG
10 SUPPOSITORY, RECTAL RECTAL DAILY PRN
Status: DISCONTINUED | OUTPATIENT
Start: 2024-04-23 | End: 2024-04-25 | Stop reason: HOSPADM

## 2024-04-23 RX ORDER — ENOXAPARIN SODIUM 100 MG/ML
40 INJECTION SUBCUTANEOUS DAILY
Status: DISCONTINUED | OUTPATIENT
Start: 2024-04-23 | End: 2024-04-23

## 2024-04-23 RX ORDER — NICOTINE POLACRILEX 4 MG
15 LOZENGE BUCCAL
Status: DISCONTINUED | OUTPATIENT
Start: 2024-04-23 | End: 2024-04-25 | Stop reason: HOSPADM

## 2024-04-23 RX ORDER — QUETIAPINE FUMARATE 25 MG/1
50 TABLET, FILM COATED ORAL NIGHTLY
Status: DISCONTINUED | OUTPATIENT
Start: 2024-04-23 | End: 2024-04-25 | Stop reason: HOSPADM

## 2024-04-23 RX ORDER — SODIUM CHLORIDE 0.9 % (FLUSH) 0.9 %
10 SYRINGE (ML) INJECTION AS NEEDED
Status: DISCONTINUED | OUTPATIENT
Start: 2024-04-23 | End: 2024-04-25 | Stop reason: HOSPADM

## 2024-04-23 RX ORDER — GINSENG 100 MG
50 CAPSULE ORAL 2 TIMES DAILY
Status: ON HOLD | COMMUNITY

## 2024-04-23 RX ORDER — OXYCODONE HYDROCHLORIDE 5 MG/1
5 TABLET ORAL EVERY 4 HOURS PRN
Status: DISCONTINUED | OUTPATIENT
Start: 2024-04-23 | End: 2024-04-25 | Stop reason: HOSPADM

## 2024-04-23 RX ORDER — SODIUM CHLORIDE 9 MG/ML
100 INJECTION, SOLUTION INTRAVENOUS CONTINUOUS
Status: DISCONTINUED | OUTPATIENT
Start: 2024-04-23 | End: 2024-04-25

## 2024-04-23 RX ORDER — BUSPIRONE HYDROCHLORIDE 15 MG/1
15 TABLET ORAL 2 TIMES DAILY
Status: DISCONTINUED | OUTPATIENT
Start: 2024-04-23 | End: 2024-04-25 | Stop reason: HOSPADM

## 2024-04-23 RX ORDER — CALCIUM GLUCONATE 20 MG/ML
1000 INJECTION, SOLUTION INTRAVENOUS ONCE
Status: COMPLETED | OUTPATIENT
Start: 2024-04-23 | End: 2024-04-23

## 2024-04-23 RX ORDER — NITROGLYCERIN 0.4 MG/1
0.4 TABLET SUBLINGUAL
Status: DISCONTINUED | OUTPATIENT
Start: 2024-04-23 | End: 2024-04-24 | Stop reason: SDUPTHER

## 2024-04-23 RX ORDER — SERTRALINE HYDROCHLORIDE 100 MG/1
100 TABLET, FILM COATED ORAL 2 TIMES DAILY
Status: DISCONTINUED | OUTPATIENT
Start: 2024-04-23 | End: 2024-04-25 | Stop reason: HOSPADM

## 2024-04-23 RX ORDER — BISACODYL 5 MG/1
5 TABLET, DELAYED RELEASE ORAL DAILY PRN
Status: DISCONTINUED | OUTPATIENT
Start: 2024-04-23 | End: 2024-04-25 | Stop reason: HOSPADM

## 2024-04-23 RX ORDER — DEXTROSE MONOHYDRATE 25 G/50ML
INJECTION, SOLUTION INTRAVENOUS
Status: COMPLETED
Start: 2024-04-23 | End: 2024-04-23

## 2024-04-23 RX ORDER — LORAZEPAM 2 MG/ML
0.5 INJECTION INTRAMUSCULAR ONCE
Status: COMPLETED | OUTPATIENT
Start: 2024-04-23 | End: 2024-04-23

## 2024-04-23 RX ORDER — DEXTROSE MONOHYDRATE 25 G/50ML
25 INJECTION, SOLUTION INTRAVENOUS
Status: DISCONTINUED | OUTPATIENT
Start: 2024-04-23 | End: 2024-04-25 | Stop reason: HOSPADM

## 2024-04-23 RX ORDER — AMOXICILLIN 250 MG
2 CAPSULE ORAL 2 TIMES DAILY PRN
Status: DISCONTINUED | OUTPATIENT
Start: 2024-04-23 | End: 2024-04-25 | Stop reason: HOSPADM

## 2024-04-23 RX ORDER — ONDANSETRON 2 MG/ML
4 INJECTION INTRAMUSCULAR; INTRAVENOUS EVERY 6 HOURS PRN
Status: DISCONTINUED | OUTPATIENT
Start: 2024-04-23 | End: 2024-04-25 | Stop reason: HOSPADM

## 2024-04-23 RX ORDER — ACETAMINOPHEN 500 MG
1000 TABLET ORAL EVERY 8 HOURS PRN
Status: DISCONTINUED | OUTPATIENT
Start: 2024-04-23 | End: 2024-04-25 | Stop reason: HOSPADM

## 2024-04-23 RX ORDER — SODIUM CHLORIDE 9 MG/ML
40 INJECTION, SOLUTION INTRAVENOUS AS NEEDED
Status: DISCONTINUED | OUTPATIENT
Start: 2024-04-23 | End: 2024-04-25 | Stop reason: HOSPADM

## 2024-04-23 RX ORDER — IBUPROFEN 600 MG/1
1 TABLET ORAL
Status: DISCONTINUED | OUTPATIENT
Start: 2024-04-23 | End: 2024-04-25 | Stop reason: HOSPADM

## 2024-04-23 RX ORDER — POLYETHYLENE GLYCOL 3350 17 G/17G
17 POWDER, FOR SOLUTION ORAL DAILY PRN
Status: DISCONTINUED | OUTPATIENT
Start: 2024-04-23 | End: 2024-04-25 | Stop reason: HOSPADM

## 2024-04-23 RX ORDER — CHOLECALCIFEROL (VITAMIN D3) 125 MCG
5 CAPSULE ORAL NIGHTLY PRN
Status: DISCONTINUED | OUTPATIENT
Start: 2024-04-23 | End: 2024-04-25 | Stop reason: HOSPADM

## 2024-04-23 RX ORDER — DEXTROSE MONOHYDRATE 25 G/50ML
25 INJECTION, SOLUTION INTRAVENOUS ONCE
Status: COMPLETED | OUTPATIENT
Start: 2024-04-23 | End: 2024-04-23

## 2024-04-23 RX ORDER — TRAMADOL HYDROCHLORIDE 50 MG/1
50 TABLET ORAL EVERY 8 HOURS PRN
Status: DISCONTINUED | OUTPATIENT
Start: 2024-04-23 | End: 2024-04-25 | Stop reason: HOSPADM

## 2024-04-23 RX ORDER — ALBUTEROL SULFATE 90 UG/1
2 AEROSOL, METERED RESPIRATORY (INHALATION) EVERY 4 HOURS PRN
Status: DISCONTINUED | OUTPATIENT
Start: 2024-04-23 | End: 2024-04-25 | Stop reason: HOSPADM

## 2024-04-23 RX ORDER — SODIUM CHLORIDE 0.9 % (FLUSH) 0.9 %
10 SYRINGE (ML) INJECTION EVERY 12 HOURS SCHEDULED
Status: DISCONTINUED | OUTPATIENT
Start: 2024-04-23 | End: 2024-04-25 | Stop reason: HOSPADM

## 2024-04-23 RX ADMIN — HYDROMORPHONE HYDROCHLORIDE 0.5 MG: 1 INJECTION, SOLUTION INTRAMUSCULAR; INTRAVENOUS; SUBCUTANEOUS at 18:42

## 2024-04-23 RX ADMIN — GADOTERIDOL 20 ML: 279.3 INJECTION, SOLUTION INTRAVENOUS at 21:42

## 2024-04-23 RX ADMIN — SODIUM CHLORIDE 100 ML/HR: 9 INJECTION, SOLUTION INTRAVENOUS at 18:42

## 2024-04-23 RX ADMIN — SERTRALINE 100 MG: 100 TABLET, FILM COATED ORAL at 23:30

## 2024-04-23 RX ADMIN — INSULIN HUMAN 10 UNITS: 100 INJECTION, SOLUTION PARENTERAL at 17:03

## 2024-04-23 RX ADMIN — SODIUM CHLORIDE 1000 ML: 9 INJECTION, SOLUTION INTRAVENOUS at 17:03

## 2024-04-23 RX ADMIN — BUSPIRONE HYDROCHLORIDE 15 MG: 15 TABLET ORAL at 23:30

## 2024-04-23 RX ADMIN — QUETIAPINE FUMARATE 50 MG: 25 TABLET ORAL at 23:30

## 2024-04-23 RX ADMIN — DEXTROSE MONOHYDRATE 25 G: 25 INJECTION, SOLUTION INTRAVENOUS at 17:03

## 2024-04-23 RX ADMIN — CALCIUM GLUCONATE 1000 MG: 20 INJECTION, SOLUTION INTRAVENOUS at 17:03

## 2024-04-23 RX ADMIN — LORAZEPAM 0.5 MG: 2 INJECTION INTRAMUSCULAR; INTRAVENOUS at 19:47

## 2024-04-23 RX ADMIN — DEXTROSE MONOHYDRATE 25 G: 25 INJECTION, SOLUTION INTRAVENOUS at 19:34

## 2024-04-23 RX ADMIN — SODIUM ZIRCONIUM CYCLOSILICATE 10 G: 10 POWDER, FOR SUSPENSION ORAL at 17:19

## 2024-04-23 RX ADMIN — SODIUM BICARBONATE 50 MEQ: 84 INJECTION INTRAVENOUS at 17:03

## 2024-04-23 RX ADMIN — Medication 10 ML: at 23:30

## 2024-04-23 NOTE — TELEPHONE ENCOUNTER
Zita with [facility] called to report critical labs on pt. Hemoglobin level 6.8 and potassium 6.3. potassium per Zita was 3.8 last week. Per Norma OLGUIN pt is to be seen in ER ASAP. Hold potassium. Zita said that she will let patient know as pt had just texted her.

## 2024-04-23 NOTE — DISCHARGE PLACEMENT REQUEST
"Yonis Yu \"NAINA\" (58 y.o. Female)       Date of Birth   1965    Social Security Number       Address   [address] [place] [address] [address] [place] [facility]    Home Phone   [phone]    MRN   [id number]       Orthodox   None    Marital Status                               Admission Date   4/23/24    Admission Type   Emergency    Admitting Provider   Rosemarie Xiong MD    Attending Provider   Rosemarie Xiong MD    Department, Room/Bed   [facility] EMERGENCY DEPARTMENT, 05/05       Discharge Date       Discharge Disposition       Discharge Destination                                 Attending Provider: Rosemarie Xiong MD    Allergies: Sulfa Antibiotics, Keflex [Cephalexin]    Isolation: None   Infection: MRSA No Isolation this Admit (03/08/24)   Code Status: CPR    Ht: 160 cm (63\")   Wt: 55.5 kg (122 lb 5.7 oz)    Admission Cmt: None   Principal Problem: Acute hyperkalemia [E87.5]                   Active Insurance as of 4/23/2024       Primary Coverage       Payor Plan Insurance Group Employer/Plan Group    [place] [id number]       Payor Plan Address Payor Plan Phone Number Payor Plan Fax Number Effective Dates    [address]   6/22/2014 - None Entered    [facility] [id number]         Subscriber Name Subscriber Birth Date Member ID       YONIS YU 1965 [id number]                     Emergency Contacts        (Rel.) Home Phone Work Phone Mobile Phone    OBDULIA CANTU (Daughter) -- -- [phone]    SARAI WOOD (Brother) [phone] -- --                "

## 2024-04-23 NOTE — ED PROVIDER NOTES
Subjective   History of Present Illness  Chief complaint: Abnormal labs     She states she was told that her hemoglobin was 6.8 and her potassium was 6.3.  58-year-old female presents due to abnormal labs.  Patient states she had blood work done this morning and she was called from her primary doctor's office to come to the emergency room because her hemoglobin was low and her potassium was high.  Patient denies any complaints at this time.  She states her potassium is normally low when she takes a potassium supplement.  She also has a history of anemia and has been seeing a hematologist.  She states she has not needed a blood transfusion.  She did have a blood transfusion many years ago but none recently.  Patient states she has been feeling well.    History provided by:  Patient      Review of Systems   Constitutional:  Negative for fever.   HENT:  Negative for congestion.    Respiratory:  Negative for cough and shortness of breath.    Cardiovascular:  Negative for chest pain.   Gastrointestinal:  Negative for abdominal pain and vomiting.   Musculoskeletal:  Negative for back pain.   Neurological:  Negative for headaches.   Psychiatric/Behavioral:  Negative for confusion.        Past Medical History:   Diagnosis Date    Cirrhosis     COPD (chronic obstructive pulmonary disease)     Depression     GERD (gastroesophageal reflux disease)     Hyperlipidemia     Hypertension     PTSD (post-traumatic stress disorder)        Allergies   Allergen Reactions    Sulfa Antibiotics Hives    Keflex [Cephalexin] Hives       Past Surgical History:   Procedure Laterality Date     SECTION N/A     COLONOSCOPY N/A 2021    Procedure: COLONOSCOPY with polypectomy x2 and random biopsy;  Surgeon: Osman Clay MD;  Location: [facility] ENDOSCOPY;  Service: Gastroenterology;  Laterality: N/A;  colon polyps    DENTAL PROCEDURE      ENDOSCOPY N/A 2021    Procedure: ESOPHAGOGASTRODUODENOSCOPY;  Surgeon: Osman Clay MD;   "Location: [facility] ENDOSCOPY;  Service: Gastroenterology;  Laterality: N/A;  esophagitis    JOINT REPLACEMENT      REPLACEMENT TOTAL HIP LATERAL POSITION Left     TONSILLECTOMY      VAGINAL BIRTH AFTER  SECTION         Family History   Problem Relation Age of Onset    Alcohol abuse Mother     Alcohol abuse Paternal Grandfather        Social History     Socioeconomic History    Marital status:    Tobacco Use    Smoking status: Every Day     Current packs/day: 0.50     Types: Cigarettes     Passive exposure: Current    Smokeless tobacco: Never    Tobacco comments:     3cigs   Vaping Use    Vaping status: Never Used   Substance and Sexual Activity    Alcohol use: Not Currently     Alcohol/week: 2.0 standard drinks of alcohol     Types: 2 Cans of beer per week     Comment: pint daily    Drug use: No    Sexual activity: Yes     Partners: Male     Birth control/protection: Post-menopausal       /79   Pulse 78   Temp 97.4 °F (36.3 °C) (Temporal)   Resp 20   Ht 160 cm (63\")   Wt 55.5 kg (122 lb 5.7 oz)   SpO2 100%   BMI 21.67 kg/m²       Objective   Physical Exam  Vitals and nursing note reviewed.   Constitutional:       Appearance: Normal appearance.   HENT:      Head: Normocephalic and atraumatic.      Mouth/Throat:      Mouth: Mucous membranes are moist.   Cardiovascular:      Rate and Rhythm: Normal rate and regular rhythm.      Heart sounds: Normal heart sounds.   Pulmonary:      Effort: Pulmonary effort is normal. No respiratory distress.      Breath sounds: Normal breath sounds.   Abdominal:      General: Bowel sounds are normal.      Palpations: Abdomen is soft.      Tenderness: There is no abdominal tenderness.   Skin:     General: Skin is warm and dry.   Neurological:      Mental Status: She is alert and oriented to person, place, and time.         Procedures           ED Course      My interpretation of EKG shows sinus rhythm, rate of 84, no ST elevation                           Results " for orders placed or performed during the hospital encounter of 04/23/24   Basic Metabolic Panel    Specimen: Arm, Left; Blood   Result Value Ref Range    Glucose 111 (H) 65 - 99 mg/dL    BUN 19 6 - 20 mg/dL    Creatinine 1.67 (H) 0.57 - 1.00 mg/dL    Sodium 137 136 - 145 mmol/L    Potassium 7.1 (C) 3.5 - 5.2 mmol/L    Chloride 107 98 - 107 mmol/L    CO2 22.0 22.0 - 29.0 mmol/L    Calcium 9.7 8.6 - 10.5 mg/dL    BUN/Creatinine Ratio 11.4 7.0 - 25.0    Anion Gap 8.0 5.0 - 15.0 mmol/L    eGFR 35.8 (L) >60.0 mL/min/1.73   Magnesium    Specimen: Arm, Left; Blood   Result Value Ref Range    Magnesium 1.8 1.6 - 2.6 mg/dL   CBC Auto Differential    Specimen: Arm, Left; Blood   Result Value Ref Range    WBC 6.43 3.40 - 10.80 10*3/mm3    RBC 2.23 (L) 3.77 - 5.28 10*6/mm3    Hemoglobin 6.9 (C) 12.0 - 15.9 g/dL    Hematocrit 23.1 (L) 34.0 - 46.6 %    .6 (H) 79.0 - 97.0 fL    MCH 30.9 26.6 - 33.0 pg    MCHC 29.9 (L) 31.5 - 35.7 g/dL    RDW 16.4 (H) 12.3 - 15.4 %    RDW-SD 62.3 (H) 37.0 - 54.0 fl    MPV 8.9 6.0 - 12.0 fL    Platelets 217 140 - 450 10*3/mm3    Neutrophil % 68.9 42.7 - 76.0 %    Lymphocyte % 16.6 (L) 19.6 - 45.3 %    Monocyte % 9.5 5.0 - 12.0 %    Eosinophil % 4.2 0.3 - 6.2 %    Basophil % 0.3 0.0 - 1.5 %    Immature Grans % 0.5 0.0 - 0.5 %    Neutrophils, Absolute 4.43 1.70 - 7.00 10*3/mm3    Lymphocytes, Absolute 1.07 0.70 - 3.10 10*3/mm3    Monocytes, Absolute 0.61 0.10 - 0.90 10*3/mm3    Eosinophils, Absolute 0.27 0.00 - 0.40 10*3/mm3    Basophils, Absolute 0.02 0.00 - 0.20 10*3/mm3    Immature Grans, Absolute 0.03 0.00 - 0.05 10*3/mm3    nRBC 0.0 0.0 - 0.2 /100 WBC   ECG 12 Lead Electrolyte Imbalance   Result Value Ref Range    QT Interval 344 ms    QTC Interval 405 ms                 Medical Decision Making  Amount and/or Complexity of Data Reviewed  Labs: ordered.  ECG/medicine tests: ordered.    Risk  OTC drugs.  Prescription drug management.      Patient had the above evaluation.  Results were  discussed with patient.  Patient was found to have a hemoglobin of 6.9.  She was typed and crossed for unit of packed red blood cells.  She was also found to have a potassium of 7.1 and creatinine 1.67.  She was started on hyperkalemia protocol.  She was given IV fluids.  She remained hemodynamically stable in the emergency room.  I discussed with the hospitalist and the patient will be admitted for further evaluation and management.      Final diagnoses:   Hyperkalemia   Acute kidney injury   Anemia, unspecified type       ED Disposition  ED Disposition       ED Disposition   Decision to Admit    Condition   --    Comment   Level of Care: Telemetry [5]   Diagnosis: Acute hyperkalemia [663381]   Certification: I Certify That Inpatient Hospital Services Are Medically Necessary For Greater Than 2 Midnights                 No follow-up provider specified.       Medication List      No changes were made to your prescriptions during this visit.            Khanh Taylor MD  04/23/24 [id number]

## 2024-04-23 NOTE — H&P
[facility] Medicine Services  History & Physical    Patient Name: Lita Yu  : 1965  MRN: [id number]  Primary Care Physician:  Norma Saul APRN  Date of admission: 2024  Date and Time of Service: 2024     Subjective      Chief Complaint: Abnormal outpatient lab    History of Present Illness:   This is a 58 years old female with a past medical history of hypertension, peripheral vascular disease, degenerative joint disease, seizure disorder, difficult tobacco dependence, cirrhosis, generalized weakness and recent MRSA bacteremia and enhancement of the epidural fluid on imaging.  She was discharged on vancomycin which is supposed to be on till 5/3/2024.  She went for her PCPs appointment today I was later called to let her do ED given abnormal labs.    Vital signs on presentation showed a blood pressure of 109/58, heart rate 92, temperature 36.3 degrees tertius, respiratory rate 20 saturating 100% on room air.    Labs showed sodium 137, potassium 7.1, bicarb 22, creatinine 1.67 with a baseline of 0.5, glucose 111, WBC 6.43, hemoglobin 6.9, hematocrit 23.1, platelet 217.    She tells me she was supposed to be on vancomycin daily, however they have been giving it to her every day and she has noticed that her back pain has been worse since they made a change.        Review of Systems    Personal History     Past Medical History:   Diagnosis Date    Cirrhosis     COPD (chronic obstructive pulmonary disease)     Depression     GERD (gastroesophageal reflux disease)     Hyperlipidemia     Hypertension     PTSD (post-traumatic stress disorder)        Past Surgical History:   Procedure Laterality Date     SECTION N/A     COLONOSCOPY N/A 2021    Procedure: COLONOSCOPY with polypectomy x2 and random biopsy;  Surgeon: Osman Clay MD;  Location: [facility] ENDOSCOPY;  Service: Gastroenterology;  Laterality: N/A;  colon polyps    DENTAL PROCEDURE      ENDOSCOPY N/A  2021    Procedure: ESOPHAGOGASTRODUODENOSCOPY;  Surgeon: Osman Clay MD;  Location: [facility] ENDOSCOPY;  Service: Gastroenterology;  Laterality: N/A;  esophagitis    JOINT REPLACEMENT      REPLACEMENT TOTAL HIP LATERAL POSITION Left     TONSILLECTOMY      VAGINAL BIRTH AFTER  SECTION         Family History: family history includes Alcohol abuse in her mother and paternal grandfather. Otherwise pertinent FHx was reviewed and not pertinent to current issue.    Social History:  reports that she has been smoking cigarettes. She has been exposed to tobacco smoke. She has never used smokeless tobacco. She reports that she does not currently use alcohol after a past usage of about 2.0 standard drinks of alcohol per week. She reports that she does not use drugs.    Home Medications:  Prior to Admission Medications       Prescriptions Last Dose Informant Patient Reported? Taking?    albuterol sulfate  (90 Base) MCG/ACT inhaler   Yes No    Inhale 2 puffs Every 4 (Four) Hours As Needed for Wheezing.    busPIRone (BUSPAR) 15 MG tablet   No No    TAKE 1 TABLET BY MOUTH TWICE A DAY    Cyanocobalamin (Vitamin B-12) 1000 MCG sublingual tablet   Yes No    Place 1 tablet under the tongue Daily.    diclofenac (VOLTAREN) 50 MG EC tablet   No No    TAKE 1 TABLET BY MOUTH TWICE A DAY AS NEEDED FOR PAIN    folic acid (FOLVITE) 1 MG tablet   No No    Take 1 tablet by mouth Daily.    furosemide (LASIX) 40 MG tablet   No No    Take 1 tablet by mouth Daily.    melatonin 5 MG tablet tablet   Yes No    Take 1 tablet by mouth At Night As Needed (sleep).    potassium chloride 10 MEQ CR tablet   No No    TAKE 4 TABLETS BY MOUTH DAILY    QUEtiapine (SEROquel) 50 MG tablet   No No    TAKE 1 TABLET BY MOUTH EVERYDAY AT BEDTIME    sertraline (ZOLOFT) 100 MG tablet   No No    TAKE 1 TABLET BY MOUTH TWICE A DAY    spironolactone (ALDACTONE) 100 MG tablet   No No    Take 1 tablet by mouth Daily.    thiamine (VITAMIN B1) 100 MG  tablet   No No    Take 1 tablet by mouth Daily.    traMADol (ULTRAM) 50 MG tablet   No No    Take 1 tablet by mouth Every 8 (Eight) Hours As Needed for Moderate Pain.    vancomycin 1000 mg/200 mL 0.9% NS IVPB   Yes No    Zinc Gluconate 30 MG tablet   Yes No    Take 1 tablet by mouth Daily.              Allergies:  Allergies   Allergen Reactions    Sulfa Antibiotics Hives    Keflex [Cephalexin] Hives       Objective      Vitals:   Temp:  [97.4 °F (36.3 °C)] 97.4 °F (36.3 °C)  Heart Rate:  [78-92] 78  Resp:  [20] 20  BP: (105-109)/(58-79) 105/79  Body mass index is 21.67 kg/m².  Physical Exam    Appearance: No apparent distress, non-toxic appearing   HEENT/Neck: Neck is supple, Extraocular movements intact, Moist mucous membranes, Cardiovascular: Regular Rate and Rhythm,  Pulmonary: Clear to auscultation Bilaterally.   Abdomen: BS+, Soft, non-tender, non-distended.   Ext: No Cyanosis, Clubbing, Edema.   Neuro: Non-focal, Alert & Oriented x 3, tenderness on her lumbar area.    Diagnostic Data:  Lab Results (last 24 hours)       Procedure Component Value Units Date/Time    Basic Metabolic Panel [[id number]]  (Abnormal) Collected: 04/23/24 1603    Specimen: Blood from Arm, Left Updated: 04/23/24 1639     Glucose 111 mg/dL      BUN 19 mg/dL      Creatinine 1.67 mg/dL      Sodium 137 mmol/L      Potassium 7.1 mmol/L      Chloride 107 mmol/L      CO2 22.0 mmol/L      Calcium 9.7 mg/dL      BUN/Creatinine Ratio 11.4     Anion Gap 8.0 mmol/L      eGFR 35.8 mL/min/1.73     Narrative:      GFR Normal >60  Chronic Kidney Disease <60  Kidney Failure <15      Magnesium [[id number]]  (Normal) Collected: 04/23/24 1603    Specimen: Blood from Arm, Left Updated: 04/23/24 1639     Magnesium 1.8 mg/dL     CBC & Differential [[id number]]  (Abnormal) Collected: 04/23/24 1603    Specimen: Blood from Arm, Left Updated: 04/23/24 1616    Narrative:      The following orders were created for panel order CBC & Differential.  Procedure                                Abnormality         Status                     ---------                               -----------         ------                     CBC Auto Differential[[id number]]        Abnormal            Final result                 Please view results for these tests on the individual orders.    CBC Auto Differential [[id number]]  (Abnormal) Collected: 04/23/24 1603    Specimen: Blood from Arm, Left Updated: 04/23/24 1616     WBC 6.43 10*3/mm3      RBC 2.23 10*6/mm3      Hemoglobin 6.9 g/dL      Hematocrit 23.1 %      .6 fL      MCH 30.9 pg      MCHC 29.9 g/dL      RDW 16.4 %      RDW-SD 62.3 fl      MPV 8.9 fL      Platelets 217 10*3/mm3      Neutrophil % 68.9 %      Lymphocyte % 16.6 %      Monocyte % 9.5 %      Eosinophil % 4.2 %      Basophil % 0.3 %      Immature Grans % 0.5 %      Neutrophils, Absolute 4.43 10*3/mm3      Lymphocytes, Absolute 1.07 10*3/mm3      Monocytes, Absolute 0.61 10*3/mm3      Eosinophils, Absolute 0.27 10*3/mm3      Basophils, Absolute 0.02 10*3/mm3      Immature Grans, Absolute 0.03 10*3/mm3      nRBC 0.0 /100 WBC              Imaging Results (Last 24 Hours)       ** No results found for the last 24 hours. **              Assessment & Plan    This is a 58 years old female with a past medical history of hypertension, peripheral vascular disease, degenerative joint disease, seizure disorder, difficult tobacco dependence, cirrhosis, generalized weakness and recent MRSA bacteremia and enhancement of the epidural fluid on imaging.  She was discharged on vancomycin which is supposed to be on till 5/3/2024.  She went for her PCPs appointment today I was later called to let her do ED given abnormal labs.  She has been admitted for further workup and management.      Macrocytic Anemia  -Presented with hemoglobin of 6.8, status post 1 unit packed red blood cell transfusion.  -Check occult blood test,  trend hemoglobin or transfuse for hemoglobin less than 7.  -If occult blood is  positive we will consult gastroenterology.  -Check folate, B12, iron panel and ferritin level.  -For now continue to monitor.        Acute kidney injury  Hyperkalemia  -Simona prerenal in settings of poor oral intake, she states she has not been eating and drinking well.  She also takes potassium tablets at home.  -Hyperkalemia with EKG changes, she was treated with Lokelma, IV insulin and calcium gluconate in the ED.  -We will give another dose of Lokelma and repeat BMP in the evening.  -Nephrology has been consulted.          MRSA bacteremia  L2-L3 discitis/osteomyelitis   -She was recently discharged after she was diagnosed with MRSA bacteremia and L2/L3 discitis/osteomyelitis.  She is supposed to be on vancomycin until 5/3/2024.  She states that though she supposed to be on it daily but she has been getting it every other day.    -She has noticed her pain has progressively gotten worse since then.  -Repeat MRI thoracic and lumbar spine with and without contrast, blood culture and continue vancomycin daily, pharmacy to dose vancomycin.  -Continue to monitor.                DVT prophylaxis: SCDs  Full code  Continue inpatient level of care  Plan discussed with patient and nurse.                Signature:     This document has been electronically signed by Rosemarie Xiong MD on April 23, 2024 16:58 EDT   [facility] Hospitalist Team

## 2024-04-23 NOTE — PROGRESS NOTES
"Pharmacy Antimicrobial Dosing Service    Subjective:  Lita Yu is a 58 y.o.female admitted with acute hyperkalemia. Patient was recently diagnosed with MRSA bacteremia and enhancement of the epidural fluid on imaging, and is on vancomycin until 5/3/24. Pharmacy has been consulted to dose Vancomycin for possible bacteremia/CNS infection.        Assessment/Plan    Day #1 Vancomycin: Pulse dosing d/t renal dysfxn.  Patient last had vancomycin 1000 mg today at the [facility]. Will obtain random level in 4/24 with am labs. Plan to re-dose when random level is expected to be <20 mcg/mL.       Will continue to monitor drug levels, renal function, culture and sensitivities, and patient clinical status.       Objective:  Relevant clinical data and objective history reviewed:  160 cm (63\")   55.5 kg (122 lb 5.7 oz)   Ideal body weight: 52.4 kg (115 lb 8.3 oz)  Adjusted ideal body weight: 53.6 kg (118 lb 4.1 oz)  Body mass index is 21.67 kg/m².    Results from last 7 days   Lab Units 04/23/24  1603   VANCOMYCIN RM mcg/mL 21.60     Results from last 7 days   Lab Units 04/23/24  1603 04/18/24  1618   CREATININE mg/dL 1.67* 1.65*     Estimated Creatinine Clearance: 33 mL/min (A) (by C-G formula based on SCr of 1.67 mg/dL (H)).  I/O last 3 completed shifts:  In: 1050 [IV Piggyback:1050]  Out: -     Results from last 7 days   Lab Units 04/23/24  1603 04/18/24  1620   WBC 10*3/mm3 6.43 5.94     Temperature    04/23/24 1533   Temp: 97.4 °F (36.3 °C)     Baseline culture/source/susceptibility:  Microbiology Results (last 10 days)       ** No results found for the last 240 hours. **            Cleo Martinez, PharmD  04/23/24 19:46 EDT  "

## 2024-04-24 LAB
ANION GAP SERPL CALCULATED.3IONS-SCNC: 11 MMOL/L (ref 5–15)
ANION GAP SERPL CALCULATED.3IONS-SCNC: 8 MMOL/L (ref 5–15)
BACTERIA UR QL AUTO: ABNORMAL /HPF
BASOPHILS # BLD AUTO: 0.02 10*3/MM3 (ref 0–0.2)
BASOPHILS # BLD AUTO: 0.02 10*3/MM3 (ref 0–0.2)
BASOPHILS NFR BLD AUTO: 0.4 % (ref 0–1.5)
BASOPHILS NFR BLD AUTO: 0.4 % (ref 0–1.5)
BILIRUB UR QL STRIP: NEGATIVE
BUN SERPL-MCNC: 14 MG/DL (ref 6–20)
BUN SERPL-MCNC: 15 MG/DL (ref 6–20)
BUN/CREAT SERPL: 10.8 (ref 7–25)
BUN/CREAT SERPL: 9.9 (ref 7–25)
CALCIUM SPEC-SCNC: 8.2 MG/DL (ref 8.6–10.5)
CALCIUM SPEC-SCNC: 8.7 MG/DL (ref 8.6–10.5)
CHLORIDE SERPL-SCNC: 107 MMOL/L (ref 98–107)
CHLORIDE SERPL-SCNC: 108 MMOL/L (ref 98–107)
CK SERPL-CCNC: 48 U/L (ref 20–180)
CK SERPL-CCNC: 50 U/L (ref 20–180)
CLARITY UR: ABNORMAL
CO2 SERPL-SCNC: 24 MMOL/L (ref 22–29)
CO2 SERPL-SCNC: 25 MMOL/L (ref 22–29)
COLOR UR: YELLOW
CREAT SERPL-MCNC: 1.39 MG/DL (ref 0.57–1)
CREAT SERPL-MCNC: 1.41 MG/DL (ref 0.57–1)
CREAT UR-MCNC: 27.1 MG/DL
DEPRECATED RDW RBC AUTO: 60.2 FL (ref 37–54)
DEPRECATED RDW RBC AUTO: 62.1 FL (ref 37–54)
EGFRCR SERPLBLD CKD-EPI 2021: 43.9 ML/MIN/1.73
EGFRCR SERPLBLD CKD-EPI 2021: 44.6 ML/MIN/1.73
EOSINOPHIL # BLD AUTO: 0.18 10*3/MM3 (ref 0–0.4)
EOSINOPHIL # BLD AUTO: 0.2 10*3/MM3 (ref 0–0.4)
EOSINOPHIL NFR BLD AUTO: 3.2 % (ref 0.3–6.2)
EOSINOPHIL NFR BLD AUTO: 4.2 % (ref 0.3–6.2)
ERYTHROCYTE [DISTWIDTH] IN BLOOD BY AUTOMATED COUNT: 16.5 % (ref 12.3–15.4)
ERYTHROCYTE [DISTWIDTH] IN BLOOD BY AUTOMATED COUNT: 16.9 % (ref 12.3–15.4)
FERRITIN SERPL-MCNC: 427.6 NG/ML (ref 13–150)
FOLATE SERPL-MCNC: 14.6 NG/ML (ref 4.78–24.2)
GLUCOSE BLDC GLUCOMTR-MCNC: 103 MG/DL (ref 70–105)
GLUCOSE BLDC GLUCOMTR-MCNC: 130 MG/DL (ref 70–105)
GLUCOSE BLDC GLUCOMTR-MCNC: 130 MG/DL (ref 70–105)
GLUCOSE BLDC GLUCOMTR-MCNC: 133 MG/DL (ref 70–105)
GLUCOSE BLDC GLUCOMTR-MCNC: 80 MG/DL (ref 70–105)
GLUCOSE BLDC GLUCOMTR-MCNC: 84 MG/DL (ref 70–105)
GLUCOSE BLDC GLUCOMTR-MCNC: 87 MG/DL (ref 70–105)
GLUCOSE BLDC GLUCOMTR-MCNC: 91 MG/DL (ref 70–105)
GLUCOSE SERPL-MCNC: 104 MG/DL (ref 65–99)
GLUCOSE SERPL-MCNC: 90 MG/DL (ref 65–99)
GLUCOSE UR STRIP-MCNC: NEGATIVE MG/DL
HCT VFR BLD AUTO: 22.8 % (ref 34–46.6)
HCT VFR BLD AUTO: 23 % (ref 34–46.6)
HGB BLD-MCNC: 6.9 G/DL (ref 12–15.9)
HGB BLD-MCNC: 7.1 G/DL (ref 12–15.9)
HGB UR QL STRIP.AUTO: ABNORMAL
HYALINE CASTS UR QL AUTO: ABNORMAL /LPF
IMM GRANULOCYTES # BLD AUTO: 0.02 10*3/MM3 (ref 0–0.05)
IMM GRANULOCYTES # BLD AUTO: 0.02 10*3/MM3 (ref 0–0.05)
IMM GRANULOCYTES NFR BLD AUTO: 0.4 % (ref 0–0.5)
IMM GRANULOCYTES NFR BLD AUTO: 0.4 % (ref 0–0.5)
IRON 24H UR-MRATE: 75 MCG/DL (ref 37–145)
IRON SATN MFR SERPL: 30 % (ref 20–50)
KETONES UR QL STRIP: NEGATIVE
LEUKOCYTE ESTERASE UR QL STRIP.AUTO: ABNORMAL
LYMPHOCYTES # BLD AUTO: 0.8 10*3/MM3 (ref 0.7–3.1)
LYMPHOCYTES # BLD AUTO: 1.22 10*3/MM3 (ref 0.7–3.1)
LYMPHOCYTES NFR BLD AUTO: 14.1 % (ref 19.6–45.3)
LYMPHOCYTES NFR BLD AUTO: 25.9 % (ref 19.6–45.3)
MAGNESIUM SERPL-MCNC: 1.7 MG/DL (ref 1.6–2.6)
MCH RBC QN AUTO: 30.4 PG (ref 26.6–33)
MCH RBC QN AUTO: 30.9 PG (ref 26.6–33)
MCHC RBC AUTO-ENTMCNC: 30 G/DL (ref 31.5–35.7)
MCHC RBC AUTO-ENTMCNC: 31.1 G/DL (ref 31.5–35.7)
MCV RBC AUTO: 101.3 FL (ref 79–97)
MCV RBC AUTO: 99.1 FL (ref 79–97)
MONOCYTES # BLD AUTO: 0.56 10*3/MM3 (ref 0.1–0.9)
MONOCYTES # BLD AUTO: 0.6 10*3/MM3 (ref 0.1–0.9)
MONOCYTES NFR BLD AUTO: 10.6 % (ref 5–12)
MONOCYTES NFR BLD AUTO: 11.9 % (ref 5–12)
NEUTROPHILS NFR BLD AUTO: 2.69 10*3/MM3 (ref 1.7–7)
NEUTROPHILS NFR BLD AUTO: 4.04 10*3/MM3 (ref 1.7–7)
NEUTROPHILS NFR BLD AUTO: 57.2 % (ref 42.7–76)
NEUTROPHILS NFR BLD AUTO: 71.3 % (ref 42.7–76)
NITRITE UR QL STRIP: NEGATIVE
NRBC BLD AUTO-RTO: 0 /100 WBC (ref 0–0.2)
NRBC BLD AUTO-RTO: 0 /100 WBC (ref 0–0.2)
PH UR STRIP.AUTO: 8 [PH] (ref 5–8)
PHOSPHATE SERPL-MCNC: 5 MG/DL (ref 2.5–4.5)
PLATELET # BLD AUTO: 147 10*3/MM3 (ref 140–450)
PLATELET # BLD AUTO: 168 10*3/MM3 (ref 140–450)
PMV BLD AUTO: 9.2 FL (ref 6–12)
PMV BLD AUTO: 9.4 FL (ref 6–12)
POTASSIUM SERPL-SCNC: 4.8 MMOL/L (ref 3.5–5.2)
POTASSIUM SERPL-SCNC: 4.9 MMOL/L (ref 3.5–5.2)
POTASSIUM SERPL-SCNC: 4.9 MMOL/L (ref 3.5–5.2)
POTASSIUM UR-SCNC: 26.6 MMOL/L
PROT ?TM UR-MCNC: 11.8 MG/DL
PROT UR QL STRIP: NEGATIVE
QT INTERVAL: 344 MS
QT INTERVAL: 352 MS
QTC INTERVAL: 405 MS
QTC INTERVAL: 406 MS
RBC # BLD AUTO: 2.27 10*6/MM3 (ref 3.77–5.28)
RBC # BLD AUTO: 2.3 10*6/MM3 (ref 3.77–5.28)
RBC # UR STRIP: ABNORMAL /HPF
REF LAB TEST METHOD: ABNORMAL
SODIUM SERPL-SCNC: 141 MMOL/L (ref 136–145)
SODIUM SERPL-SCNC: 142 MMOL/L (ref 136–145)
SP GR UR STRIP: 1.01 (ref 1–1.03)
SQUAMOUS #/AREA URNS HPF: ABNORMAL /HPF
TIBC SERPL-MCNC: 250 MCG/DL (ref 298–536)
TRANSFERRIN SERPL-MCNC: 168 MG/DL (ref 200–360)
UROBILINOGEN UR QL STRIP: ABNORMAL
VANCOMYCIN SERPL-MCNC: 16 MCG/ML (ref 5–40)
VIT B12 BLD-MCNC: 442 PG/ML (ref 211–946)
WBC # UR STRIP: ABNORMAL /HPF
WBC NRBC COR # BLD AUTO: 4.71 10*3/MM3 (ref 3.4–10.8)
WBC NRBC COR # BLD AUTO: 5.66 10*3/MM3 (ref 3.4–10.8)

## 2024-04-24 PROCEDURE — 84133 ASSAY OF URINE POTASSIUM: CPT | Performed by: INTERNAL MEDICINE

## 2024-04-24 PROCEDURE — 25010000002 CEFTRIAXONE PER 250 MG: Performed by: INTERNAL MEDICINE

## 2024-04-24 PROCEDURE — 84466 ASSAY OF TRANSFERRIN: CPT | Performed by: INTERNAL MEDICINE

## 2024-04-24 PROCEDURE — 82948 REAGENT STRIP/BLOOD GLUCOSE: CPT

## 2024-04-24 PROCEDURE — 25810000003 SODIUM CHLORIDE 0.9 % SOLUTION: Performed by: INTERNAL MEDICINE

## 2024-04-24 PROCEDURE — 86900 BLOOD TYPING SEROLOGIC ABO: CPT

## 2024-04-24 PROCEDURE — 82550 ASSAY OF CK (CPK): CPT | Performed by: INTERNAL MEDICINE

## 2024-04-24 PROCEDURE — 84132 ASSAY OF SERUM POTASSIUM: CPT | Performed by: INTERNAL MEDICINE

## 2024-04-24 PROCEDURE — 80202 ASSAY OF VANCOMYCIN: CPT | Performed by: INTERNAL MEDICINE

## 2024-04-24 PROCEDURE — 87186 SC STD MICRODIL/AGAR DIL: CPT | Performed by: INTERNAL MEDICINE

## 2024-04-24 PROCEDURE — 82607 VITAMIN B-12: CPT | Performed by: INTERNAL MEDICINE

## 2024-04-24 PROCEDURE — 81001 URINALYSIS AUTO W/SCOPE: CPT | Performed by: INTERNAL MEDICINE

## 2024-04-24 PROCEDURE — 25010000002 VANCOMYCIN 1 G RECONSTITUTED SOLUTION 1 EACH VIAL: Performed by: INTERNAL MEDICINE

## 2024-04-24 PROCEDURE — 93010 ELECTROCARDIOGRAM REPORT: CPT | Performed by: STUDENT IN AN ORGANIZED HEALTH CARE EDUCATION/TRAINING PROGRAM

## 2024-04-24 PROCEDURE — 94761 N-INVAS EAR/PLS OXIMETRY MLT: CPT

## 2024-04-24 PROCEDURE — 82728 ASSAY OF FERRITIN: CPT | Performed by: INTERNAL MEDICINE

## 2024-04-24 PROCEDURE — 94799 UNLISTED PULMONARY SVC/PX: CPT

## 2024-04-24 PROCEDURE — 97162 PT EVAL MOD COMPLEX 30 MIN: CPT

## 2024-04-24 PROCEDURE — 82746 ASSAY OF FOLIC ACID SERUM: CPT | Performed by: INTERNAL MEDICINE

## 2024-04-24 PROCEDURE — 85025 COMPLETE CBC W/AUTO DIFF WBC: CPT | Performed by: INTERNAL MEDICINE

## 2024-04-24 PROCEDURE — 93005 ELECTROCARDIOGRAM TRACING: CPT | Performed by: STUDENT IN AN ORGANIZED HEALTH CARE EDUCATION/TRAINING PROGRAM

## 2024-04-24 PROCEDURE — 87086 URINE CULTURE/COLONY COUNT: CPT | Performed by: INTERNAL MEDICINE

## 2024-04-24 PROCEDURE — 36430 TRANSFUSION BLD/BLD COMPNT: CPT

## 2024-04-24 PROCEDURE — 25010000002 CALCIUM GLUCONATE-NACL 1-0.675 GM/50ML-% SOLUTION: Performed by: STUDENT IN AN ORGANIZED HEALTH CARE EDUCATION/TRAINING PROGRAM

## 2024-04-24 PROCEDURE — 87077 CULTURE AEROBIC IDENTIFY: CPT | Performed by: INTERNAL MEDICINE

## 2024-04-24 PROCEDURE — 63710000001 INSULIN REGULAR HUMAN PER 5 UNITS: Performed by: STUDENT IN AN ORGANIZED HEALTH CARE EDUCATION/TRAINING PROGRAM

## 2024-04-24 PROCEDURE — 80048 BASIC METABOLIC PNL TOTAL CA: CPT | Performed by: INTERNAL MEDICINE

## 2024-04-24 PROCEDURE — P9016 RBC LEUKOCYTES REDUCED: HCPCS

## 2024-04-24 PROCEDURE — 25810000003 SODIUM CHLORIDE 0.9 % SOLUTION 250 ML FLEX CONT: Performed by: INTERNAL MEDICINE

## 2024-04-24 PROCEDURE — 83540 ASSAY OF IRON: CPT | Performed by: INTERNAL MEDICINE

## 2024-04-24 PROCEDURE — 83735 ASSAY OF MAGNESIUM: CPT | Performed by: INTERNAL MEDICINE

## 2024-04-24 PROCEDURE — 84100 ASSAY OF PHOSPHORUS: CPT | Performed by: INTERNAL MEDICINE

## 2024-04-24 PROCEDURE — 82570 ASSAY OF URINE CREATININE: CPT | Performed by: INTERNAL MEDICINE

## 2024-04-24 PROCEDURE — 25010000002 HYDROMORPHONE 1 MG/ML SOLUTION: Performed by: INTERNAL MEDICINE

## 2024-04-24 PROCEDURE — 84156 ASSAY OF PROTEIN URINE: CPT | Performed by: INTERNAL MEDICINE

## 2024-04-24 PROCEDURE — 82948 REAGENT STRIP/BLOOD GLUCOSE: CPT | Performed by: STUDENT IN AN ORGANIZED HEALTH CARE EDUCATION/TRAINING PROGRAM

## 2024-04-24 RX ORDER — DEXTROSE MONOHYDRATE 25 G/50ML
25 INJECTION, SOLUTION INTRAVENOUS ONCE
Status: COMPLETED | OUTPATIENT
Start: 2024-04-24 | End: 2024-04-24

## 2024-04-24 RX ORDER — CALCIUM GLUCONATE 20 MG/ML
1000 INJECTION, SOLUTION INTRAVENOUS ONCE
Status: COMPLETED | OUTPATIENT
Start: 2024-04-24 | End: 2024-04-24

## 2024-04-24 RX ORDER — NITROGLYCERIN 0.4 MG/1
0.4 TABLET SUBLINGUAL
Status: DISCONTINUED | OUTPATIENT
Start: 2024-04-24 | End: 2024-04-25 | Stop reason: HOSPADM

## 2024-04-24 RX ADMIN — Medication 100 MG: at 09:03

## 2024-04-24 RX ADMIN — ALBUTEROL SULFATE 10 MG: 2.5 SOLUTION RESPIRATORY (INHALATION) at 00:51

## 2024-04-24 RX ADMIN — INSULIN HUMAN 5 UNITS: 100 INJECTION, SOLUTION PARENTERAL at 00:41

## 2024-04-24 RX ADMIN — VANCOMYCIN HYDROCHLORIDE 1000 MG: 1 INJECTION, POWDER, LYOPHILIZED, FOR SOLUTION INTRAVENOUS at 13:56

## 2024-04-24 RX ADMIN — Medication 5 MG: at 20:34

## 2024-04-24 RX ADMIN — SERTRALINE 100 MG: 100 TABLET, FILM COATED ORAL at 09:03

## 2024-04-24 RX ADMIN — BUSPIRONE HYDROCHLORIDE 15 MG: 15 TABLET ORAL at 09:03

## 2024-04-24 RX ADMIN — SODIUM CHLORIDE 100 ML/HR: 9 INJECTION, SOLUTION INTRAVENOUS at 20:33

## 2024-04-24 RX ADMIN — SERTRALINE 100 MG: 100 TABLET, FILM COATED ORAL at 20:33

## 2024-04-24 RX ADMIN — Medication 10 ML: at 09:04

## 2024-04-24 RX ADMIN — FOLIC ACID 1 MG: 1 TABLET ORAL at 09:03

## 2024-04-24 RX ADMIN — SODIUM ZIRCONIUM CYCLOSILICATE 10 G: 10 POWDER, FOR SUSPENSION ORAL at 00:41

## 2024-04-24 RX ADMIN — QUETIAPINE FUMARATE 50 MG: 25 TABLET ORAL at 20:33

## 2024-04-24 RX ADMIN — HYDROMORPHONE HYDROCHLORIDE 0.5 MG: 1 INJECTION, SOLUTION INTRAMUSCULAR; INTRAVENOUS; SUBCUTANEOUS at 13:56

## 2024-04-24 RX ADMIN — SODIUM BICARBONATE 50 MEQ: 84 INJECTION INTRAVENOUS at 00:41

## 2024-04-24 RX ADMIN — HYDROMORPHONE HYDROCHLORIDE 0.5 MG: 1 INJECTION, SOLUTION INTRAMUSCULAR; INTRAVENOUS; SUBCUTANEOUS at 17:02

## 2024-04-24 RX ADMIN — CALCIUM GLUCONATE 1000 MG: 20 INJECTION, SOLUTION INTRAVENOUS at 00:41

## 2024-04-24 RX ADMIN — CEFTRIAXONE 1000 MG: 1 INJECTION, POWDER, FOR SOLUTION INTRAMUSCULAR; INTRAVENOUS at 14:45

## 2024-04-24 RX ADMIN — DEXTROSE MONOHYDRATE 25 G: 25 INJECTION, SOLUTION INTRAVENOUS at 00:41

## 2024-04-24 RX ADMIN — SODIUM CHLORIDE 100 ML/HR: 9 INJECTION, SOLUTION INTRAVENOUS at 09:01

## 2024-04-24 RX ADMIN — HYDROMORPHONE HYDROCHLORIDE 0.5 MG: 1 INJECTION, SOLUTION INTRAMUSCULAR; INTRAVENOUS; SUBCUTANEOUS at 10:44

## 2024-04-24 RX ADMIN — HYDROMORPHONE HYDROCHLORIDE 0.5 MG: 1 INJECTION, SOLUTION INTRAMUSCULAR; INTRAVENOUS; SUBCUTANEOUS at 20:46

## 2024-04-24 RX ADMIN — BUSPIRONE HYDROCHLORIDE 15 MG: 15 TABLET ORAL at 20:33

## 2024-04-24 RX ADMIN — OXYCODONE HYDROCHLORIDE 5 MG: 5 TABLET ORAL at 20:33

## 2024-04-24 RX ADMIN — Medication 10 ML: at 20:34

## 2024-04-24 NOTE — SIGNIFICANT NOTE
#Hyperkalemia    - K 7.1 > 6.2    - hyperkalemia protocol ordered again    - nephrology following    Electronically signed by Aixa Murrieta DO, 04/24/24, 12:16 AM EDT.

## 2024-04-24 NOTE — PAYOR COMM NOTE
"ER ADMISSION    INPT ORDER PLACED 24      Yonis Yu \"NAINA\" (58 y.o. Female)       Date of Birth   1965    Social Security Number       Address   [address] [place] [address] [address] [place] [address]    Home Phone   [phone]    MRN   [id number]       [place]   None    Marital Status                               Admission Date   24    Admission Type   Emergency    Admitting Provider       Attending Provider   Heri Suh DO    Department, Room/Bed   [facility] 2D, 259/1       Discharge Date       Discharge Disposition       Discharge Destination                                 Attending Provider: Heri Suh DO    Allergies: Sulfa Antibiotics, Keflex [Cephalexin]    Isolation: None   Infection: MRSA No Isolation this Admit (24)   Code Status: CPR    Ht: 160 cm (63\")   Wt: 54.4 kg (119 lb 14.9 oz)    Admission Cmt: None   Principal Problem: Acute hyperkalemia [E87.5]                   Active Insurance as of 2024       Primary Coverage       Payor Plan Insurance Group Employer/Plan Group    [facility] BLUE CROSS [id number]       Payor Plan Address Payor Plan Phone Number Payor Plan Fax Number Effective Dates    [address]   2014 - None Entered    [place] [address]         Subscriber Name Subscriber Birth Date Member ID       YONIS YU 1965 [id number]                     Emergency Contacts        (Rel.) Home Phone Work Phone Mobile Phone    OBDULIA CANTU (Daughter) -- -- [phone]    SARAI WOOD (Brother) [phone] -- --                 History & Physical        Rosemarie Xiong MD at 24 [address] [place] [address] Medicine Services  History & Physical    Patient Name: Yonis Yu  : 1965  MRN: [id number]  Primary Care Physician:  Norma Saul APRN  Date of admission: 2024  Date and Time of Service: 2024     Subjective      Chief Complaint: " Abnormal outpatient lab    History of Present Illness:   This is a 58 years old female with a past medical history of hypertension, peripheral vascular disease, degenerative joint disease, seizure disorder, difficult tobacco dependence, cirrhosis, generalized weakness and recent MRSA bacteremia and enhancement of the epidural fluid on imaging.  She was discharged on vancomycin which is supposed to be on till 5/3/2024.  She went for her PCPs appointment today I was later called to let her do ED given abnormal labs.    Vital signs on presentation showed a blood pressure of 109/58, heart rate 92, temperature 36.3 degrees tertius, respiratory rate 20 saturating 100% on room air.    Labs showed sodium 137, potassium 7.1, bicarb 22, creatinine 1.67 with a baseline of 0.5, glucose 111, WBC 6.43, hemoglobin 6.9, hematocrit 23.1, platelet 217.    She tells me she was supposed to be on vancomycin daily, however they have been giving it to her every day and she has noticed that her back pain has been worse since they made a change.        Review of Systems    Personal History     Past Medical History:   Diagnosis Date    Cirrhosis     COPD (chronic obstructive pulmonary disease)     Depression     GERD (gastroesophageal reflux disease)     Hyperlipidemia     Hypertension     PTSD (post-traumatic stress disorder)        Past Surgical History:   Procedure Laterality Date     SECTION N/A     COLONOSCOPY N/A 2021    Procedure: COLONOSCOPY with polypectomy x2 and random biopsy;  Surgeon: Osman Clay MD;  Location: [facility] ENDOSCOPY;  Service: Gastroenterology;  Laterality: N/A;  colon polyps    DENTAL PROCEDURE      ENDOSCOPY N/A 2021    Procedure: ESOPHAGOGASTRODUODENOSCOPY;  Surgeon: Osman Clay MD;  Location: [facility] ENDOSCOPY;  Service: Gastroenterology;  Laterality: N/A;  esophagitis    JOINT REPLACEMENT      REPLACEMENT TOTAL HIP LATERAL POSITION Left     TONSILLECTOMY      VAGINAL BIRTH AFTER   SECTION         Family History: family history includes Alcohol abuse in her mother and paternal grandfather. Otherwise pertinent FHx was reviewed and not pertinent to current issue.    Social History:  reports that she has been smoking cigarettes. She has been exposed to tobacco smoke. She has never used smokeless tobacco. She reports that she does not currently use alcohol after a past usage of about 2.0 standard drinks of alcohol per week. She reports that she does not use drugs.    Home Medications:  Prior to Admission Medications       Prescriptions Last Dose Informant Patient Reported? Taking?    albuterol sulfate  (90 Base) MCG/ACT inhaler   Yes No    Inhale 2 puffs Every 4 (Four) Hours As Needed for Wheezing.    busPIRone (BUSPAR) 15 MG tablet   No No    TAKE 1 TABLET BY MOUTH TWICE A DAY    Cyanocobalamin (Vitamin B-12) 1000 MCG sublingual tablet   Yes No    Place 1 tablet under the tongue Daily.    diclofenac (VOLTAREN) 50 MG EC tablet   No No    TAKE 1 TABLET BY MOUTH TWICE A DAY AS NEEDED FOR PAIN    folic acid (FOLVITE) 1 MG tablet   No No    Take 1 tablet by mouth Daily.    furosemide (LASIX) 40 MG tablet   No No    Take 1 tablet by mouth Daily.    melatonin 5 MG tablet tablet   Yes No    Take 1 tablet by mouth At Night As Needed (sleep).    potassium chloride 10 MEQ CR tablet   No No    TAKE 4 TABLETS BY MOUTH DAILY    QUEtiapine (SEROquel) 50 MG tablet   No No    TAKE 1 TABLET BY MOUTH EVERYDAY AT BEDTIME    sertraline (ZOLOFT) 100 MG tablet   No No    TAKE 1 TABLET BY MOUTH TWICE A DAY    spironolactone (ALDACTONE) 100 MG tablet   No No    Take 1 tablet by mouth Daily.    thiamine (VITAMIN B1) 100 MG tablet   No No    Take 1 tablet by mouth Daily.    traMADol (ULTRAM) 50 MG tablet   No No    Take 1 tablet by mouth Every 8 (Eight) Hours As Needed for Moderate Pain.    vancomycin 1000 mg/200 mL 0.9% NS IVPB   Yes No    Zinc Gluconate 30 MG tablet   Yes No    Take 1 tablet by mouth  Daily.              Allergies:  Allergies   Allergen Reactions    Sulfa Antibiotics Hives    Keflex [Cephalexin] Hives       Objective      Vitals:   Temp:  [97.4 °F (36.3 °C)] 97.4 °F (36.3 °C)  Heart Rate:  [78-92] 78  Resp:  [20] 20  BP: (105-109)/(58-79) 105/79  Body mass index is 21.67 kg/m².  Physical Exam    Appearance: No apparent distress, non-toxic appearing   HEENT/Neck: Neck is supple, Extraocular movements intact, Moist mucous membranes, Cardiovascular: Regular Rate and Rhythm,  Pulmonary: Clear to auscultation Bilaterally.   Abdomen: BS+, Soft, non-tender, non-distended.   Ext: No Cyanosis, Clubbing, Edema.   Neuro: Non-focal, Alert & Oriented x 3, tenderness on her lumbar area.    Diagnostic Data:  Lab Results (last 24 hours)       Procedure Component Value Units Date/Time    Basic Metabolic Panel [[id number]]  (Abnormal) Collected: 04/23/24 1603    Specimen: Blood from Arm, Left Updated: 04/23/24 1639     Glucose 111 mg/dL      BUN 19 mg/dL      Creatinine 1.67 mg/dL      Sodium 137 mmol/L      Potassium 7.1 mmol/L      Chloride 107 mmol/L      CO2 22.0 mmol/L      Calcium 9.7 mg/dL      BUN/Creatinine Ratio 11.4     Anion Gap 8.0 mmol/L      eGFR 35.8 mL/min/1.73     Narrative:      GFR Normal >60  Chronic Kidney Disease <60  Kidney Failure <15      Magnesium [[id number]]  (Normal) Collected: 04/23/24 1603    Specimen: Blood from Arm, Left Updated: 04/23/24 1639     Magnesium 1.8 mg/dL     CBC & Differential [[id number]]  (Abnormal) Collected: 04/23/24 1603    Specimen: Blood from Arm, Left Updated: 04/23/24 1616    Narrative:      The following orders were created for panel order CBC & Differential.  Procedure                               Abnormality         Status                     ---------                               -----------         ------                     CBC Auto Differential[[id number]]        Abnormal            Final result                 Please view results for these tests on the  individual orders.    CBC Auto Differential [[id number]]  (Abnormal) Collected: 04/23/24 1603    Specimen: Blood from Arm, Left Updated: 04/23/24 1616     WBC 6.43 10*3/mm3      RBC 2.23 10*6/mm3      Hemoglobin 6.9 g/dL      Hematocrit 23.1 %      .6 fL      MCH 30.9 pg      MCHC 29.9 g/dL      RDW 16.4 %      RDW-SD 62.3 fl      MPV 8.9 fL      Platelets 217 10*3/mm3      Neutrophil % 68.9 %      Lymphocyte % 16.6 %      Monocyte % 9.5 %      Eosinophil % 4.2 %      Basophil % 0.3 %      Immature Grans % 0.5 %      Neutrophils, Absolute 4.43 10*3/mm3      Lymphocytes, Absolute 1.07 10*3/mm3      Monocytes, Absolute 0.61 10*3/mm3      Eosinophils, Absolute 0.27 10*3/mm3      Basophils, Absolute 0.02 10*3/mm3      Immature Grans, Absolute 0.03 10*3/mm3      nRBC 0.0 /100 WBC              Imaging Results (Last 24 Hours)       ** No results found for the last 24 hours. **              Assessment & Plan    This is a 58 years old female with a past medical history of hypertension, peripheral vascular disease, degenerative joint disease, seizure disorder, difficult tobacco dependence, cirrhosis, generalized weakness and recent MRSA bacteremia and enhancement of the epidural fluid on imaging.  She was discharged on vancomycin which is supposed to be on till 5/3/2024.  She went for her PCPs appointment today I was later called to let her do ED given abnormal labs.  She has been admitted for further workup and management.      Macrocytic Anemia  -Presented with hemoglobin of 6.8, status post 1 unit packed red blood cell transfusion.  -Check occult blood test,  trend hemoglobin or transfuse for hemoglobin less than 7.  -If occult blood is positive we will consult gastroenterology.  -Check folate, B12, iron panel and ferritin level.  -For now continue to monitor.        Acute kidney injury  Hyperkalemia  -Simona prerenal in settings of poor oral intake, she states she has not been eating and drinking well.  She also  takes potassium tablets at home.  -Hyperkalemia with EKG changes, she was treated with Lokelma, IV insulin and calcium gluconate in the ED.  -We will give another dose of Lokelma and repeat BMP in the evening.  -Nephrology has been consulted.          MRSA bacteremia  L2-L3 discitis/osteomyelitis   -She was recently discharged after she was diagnosed with MRSA bacteremia and L2/L3 discitis/osteomyelitis.  She is supposed to be on vancomycin until 5/3/2024.  She states that though she supposed to be on it daily but she has been getting it every other day.    -She has noticed her pain has progressively gotten worse since then.  -Repeat MRI thoracic and lumbar spine with and without contrast, blood culture and continue vancomycin daily, pharmacy to dose vancomycin.  -Continue to monitor.                DVT prophylaxis: SCDs  Full code  Continue inpatient level of care  Plan discussed with patient and nurse.                Signature:     This document has been electronically signed by Rosemarie Xiong MD on April 23, 2024 16:58 EDT   [place] [facility]ist Team    Electronically signed by Rosemarie Xiong MD at 04/23/24 1819       Operative/Procedure Notes (last 48 hours)  Notes from 04/22/24 1441 through 04/24/24 1441   No notes of this type exist for this encounter.          Physician Progress Notes (last 48 hours)        Heri Suh DO at 04/24/24 1306          The patient feels well currently. She does have some light headedness and dizziness. The patient states that she has no fevers, chills, sweats. No cough, No sputum production. The patient has no chest pain or SOB. Patients bowels are working well. She appears in NAD currently. Overall feels ok.    Vitas - 105/60. Pulse - 94. Temperature is 98.1 respirations - 21. Oxygen is 99 percent.    Appearance: No apparent distress, non-toxic appearing   HEENT/Neck: Neck is supple, Extraocular movements intact, Moist mucous membranes, Cardiovascular: Regular  Rate and Rhythm,  Pulmonary: Clear to auscultation Bilaterally.   Abdomen: BS+, Soft, non-tender, non-distended.   Ext: No Cyanosis, Clubbing, Edema.   Neuro: Non-focal, Alert & Oriented x 3, tenderness on her lumbar area.  Labs reviewed.     Hyperkalemia - the patient has been given hyperkalemia protocol. Nephrology has been consult. UA shows a Uti. I will start rocephin 1 g IV qday. Urine culture has been sent. Labs are pending currently. Last K was 6.2  Acute renal failure secondary to ATN - the patient is on multiple nephrotoxic medications which are on hold. She has not been taking her lasix over the last several months but has been taking her K supplementations, Aldactone, and Nsaids. They are all on hold. Continue fluid hydration  Cirrhosis - patient has a known history of cirrhosis from alcohol and fatty liver disease based on my our discussion. Will watch for fluid overload with the fluids that are being given.   Labs are ordered for AM.         Electronically signed by Heri Suh DO at 24 1312          Consult Notes (last 48 hours)        Red Quiroga MD at 24 0841              INITIAL CONSULT NOTE      Patient Name: Lita Yu  : 1965  MRN: [id number]  Primary Care Physician: Norma Saul APRN  Date of admission: 2024    Patient Care Team:  Norma Saul APRN as PCP - General (Nurse Practitioner)  Flory Villanueva MD (Physical Medicine and Rehabilitation)        Reason for Consult:       Renal failure  Subjective   History of Present Illness:   Chief Complaint:   Chief Complaint   Patient presents with    Abnormal Lab     Pt reports she was sent for Hgb of 6.8 and Potassium of 6.3 from labs done this morning.       HISTORY:  Lita Yu is a 58 y.o. female with past medical history of cirrhosis, depression, hyperlipidemia, hypertension, PTSD,.    Discharge on vancomycin but mainly admitted because of the significant abnormal labs with  significant anemia and  Patient has a osteomyelitis of the spine was on vancomycin significant increase in creatinine baseline creatinine normal 1.67 yesterday today is 1.88 with a hemoglobin of 6.9.  Patient denies any black-colored stools at this time.  Patient hemoglobin was 10.6 on  but since then is progressively declining also had significant thrombocytopenia in the past patient urine studies was 40 normal recently but did have some proteinuria and hematuria back in .       Review of systems:  All other review of system unremarkable  Constitutional: No fever, no chills, no lethargy, no weakness.  HEENT:  No headache, otalgia, itchy eyes, nasal discharge or sore throat.  Cardiac:  No chest pain, dyspnea, orthopnea or PND.  Chest:              No cough, phlegm or wheezing.  Abdomen:  No abdominal pain, nausea or vomiting.  Neuro:  No focal weakness, abnormal movements orseizure like activity.  :   No hematuria, no pyuria, no dysuria, no flank pain.  ROS was otherwise negative except as mentioned in the [place].       Personal History:     Past Medical History:   Past Medical History:   Diagnosis Date    Cirrhosis     COPD (chronic obstructive pulmonary disease)     Depression     GERD (gastroesophageal reflux disease)     Hyperlipidemia     Hypertension     PTSD (post-traumatic stress disorder)        Surgical History:      Past Surgical History:   Procedure Laterality Date     SECTION N/A     COLONOSCOPY N/A 2021    Procedure: COLONOSCOPY with polypectomy x2 and random biopsy;  Surgeon: Osman Clay MD;  Location: [facility] ENDOSCOPY;  Service: Gastroenterology;  Laterality: N/A;  colon polyps    DENTAL PROCEDURE      ENDOSCOPY N/A 2021    Procedure: ESOPHAGOGASTRODUODENOSCOPY;  Surgeon: Osman Clay MD;  Location: [facility] ENDOSCOPY;  Service: Gastroenterology;  Laterality: N/A;  esophagitis    JOINT REPLACEMENT      REPLACEMENT TOTAL HIP LATERAL POSITION Left     TONSILLECTOMY       VAGINAL BIRTH AFTER  SECTION         Family History: family history includes Alcohol abuse in her mother and paternal grandfather. Otherwise pertinent FHx was reviewed and unremarkable.     Social History:  reports that she has been smoking cigarettes. She has been exposed to tobacco smoke. She has never used smokeless tobacco. She reports that she does not currently use alcohol after a past usage of about 2.0 standard drinks of alcohol per week. She reports that she does not use drugs.    Medications:  Prior to Admission medications    Medication Sig Start Date End Date Taking? Authorizing Provider   albuterol sulfate  (90 Base) MCG/ACT inhaler Inhale 2 puffs Every 4 (Four) Hours As Needed for Wheezing.    ProviderAbigail MD   busPIRone (BUSPAR) 15 MG tablet TAKE 1 TABLET BY MOUTH TWICE A DAY 10/16/23   Norma Saul APRN   Cyanocobalamin (Vitamin B-12) 1000 MCG sublingual tablet Place 1 tablet under the tongue Daily.    ProviderAbigail MD   diclofenac (VOLTAREN) 50 MG EC tablet Take 1 tablet by mouth 2 (Two) Times a Day As Needed (pain). 24   Sarah Huang APRN   folic acid (FOLVITE) 1 MG tablet Take 1 tablet by mouth Daily. 3/4/24   Laura Quinones APRN   furosemide (LASIX) 40 MG tablet Take 1 tablet by mouth Daily. 23   Norma Saul APRN   lidocaine (LIDODERM) 5 % Place 1 patch on the skin as directed by provider Daily. Remove & Discard patch within 12 hours or as directed by MD 3/4/24   Laura Quinones APRN   melatonin 5 MG tablet tablet Take 1 tablet by mouth At Night As Needed (sleep).    ProviderAbigail MD   potassium chloride 10 MEQ CR tablet TAKE 4 TABLETS BY MOUTH DAILY 3/5/24   Norma Saul APRN   QUEtiapine (SEROquel) 50 MG tablet TAKE 1 TABLET BY MOUTH EVERYDAY AT BEDTIME 24   Norma Saul APRN   sertraline (ZOLOFT) 100 MG tablet TAKE 1 TABLET BY MOUTH TWICE A DAY 10/19/23   Norma Saul APRN   spironolactone (ALDACTONE) 100  MG tablet Take 1 tablet by mouth Daily. 6/26/23   Norma Saul APRN   thiamine (VITAMIN B1) 100 MG tablet Take 1 tablet by mouth Daily. 3/7/24   Laura Quinones APRN   Zinc Gluconate 30 MG tablet Take 1 tablet by mouth Daily. 10/17/23   Provider, MD Abigail     Scheduled Meds:busPIRone, 15 mg, Oral, BID  folic acid, 1 mg, Oral, Daily  Pharmacy to dose vancomycin, , Does not apply, Daily  QUEtiapine, 50 mg, Oral, Nightly  sertraline, 100 mg, Oral, BID  sodium chloride, 10 mL, Intravenous, Q12H  thiamine, 100 mg, Oral, Daily      Continuous Infusions:sodium chloride, 100 mL/hr, Last Rate: 100 mL/hr (04/23/24 1842)      PRN Meds:  acetaminophen    albuterol sulfate HFA    senna-docusate sodium **AND** polyethylene glycol **AND** bisacodyl **AND** bisacodyl    dextrose    dextrose    glucagon (human recombinant)    HYDROmorphone    melatonin    nitroglycerin    ondansetron    oxyCODONE    [COMPLETED] Insert Peripheral IV **AND** sodium chloride    sodium chloride    sodium chloride    traMADol    Vancomycin Pharmacy Intermittent/Pulse Dosing  Allergies:    Allergies   Allergen Reactions    Sulfa Antibiotics Hives    Keflex [Cephalexin] Hives       Objective   Exam:     Vital Signs  Temp:  [97.4 °F (36.3 °C)-98.6 °F (37 °C)] 98.3 °F (36.8 °C)  Heart Rate:  [] 98  Resp:  [17-21] 19  BP: ()/(44-92) 99/67  SpO2:  [92 %-100 %] 95 %  on   ;   Device (Oxygen Therapy): room air  Body mass index is 21.24 kg/m².  EXAM  General: Middle-age white female in no acute distress.    Head:      Normocephalic and atraumatic.    Eyes:      PERRL/EOM intact, conjunctivae and sclerae clear without nystagmus.    Neck:      No masses, thyromegaly,  trachea central   Lungs:    Clear bilaterally to auscultation.    Heart:      Regular rate and rhythm, no murmur no gallop  Abd:        Soft, nontender, not distended, bowel sounds positive, no shifting dullness.  Msk:        No deformity or scoliosis noted of thoracic or lumbar  spine.    Pulses:   Pulses normal in all 4 extremities.    Extremities:        No cyanosis or clubbing--no edema.    Neuro:    No focal deficits.   alert oriented x3  Skin:       Intact without lesions or rashes.    Psych:    Alert and cooperative; normal mood and affect; normal attention span       Results Review:  I have personally reviewed most recent Data :  [facility]  CBC    Results from last 7 days   Lab Units 04/24/24  0519 04/23/24  1603 04/18/24  1620   WBC 10*3/mm3 4.71 6.43 5.94   HEMOGLOBIN g/dL 7.1* 6.9* 7.5*   PLATELETS 10*3/mm3 168 217 231     CMP   Results from last 7 days   Lab Units 04/24/24  0518 04/23/24  2235 04/23/24  1603 04/18/24  1618   SODIUM mmol/L  --  140 137 138   POTASSIUM mmol/L 4.8 6.2* 7.1* 4.5   CHLORIDE mmol/L  --  109* 107 107   CO2 mmol/L  --  22.0 22.0 18.0*   BUN mg/dL  --  19 19 19   CREATININE mg/dL  --  1.88* 1.67* 1.65*   GLUCOSE mg/dL  --  108* 111* 72   ALBUMIN g/dL  --   --   --  3.6   BILIRUBIN mg/dL  --   --   --  0.2   ALK PHOS U/L  --   --   --  210*   AST (SGOT) U/L  --   --   --  24   ALT (SGPT) U/L  --   --   --  16     ABG      MRI Lumbar Spine With & Without Contrast    Result Date: 4/23/2024  Impression: Motion limited exam. There has been interval worsening of findings related to L2-L3 discitis and vertebral body osteomyelitis, including worsening destruction of the disc space and increased vertebral body marrow edema. There is a persistent epidural abscess that is not significantly changed in size since the comparison study. There is enhancing paraspinal soft tissue at the L2 and L3 vertebral body levels compatible with paraspinal phlegmon with no walled off fluid collection demonstrated Electronically Signed: Glenn Tomas  4/23/2024 10:23 PM EDT  Workstation ID: [id number]    MRI Thoracic Spine With & Without Contrast    Result Date: 4/23/2024  Impression: No evidence of thoracic discitis or epidural abscess Electronically Signed: Glenn  Thom  4/23/2024 10:07 PM EDT  Workstation ID: [id number]     Results for orders placed during the hospital encounter of 03/07/24    Adult Transesophageal Echo (SILVINA) W/ Cont if Necessary Per Protocol    Interpretation Summary    Left ventricular systolic function is normal. Left ventricular ejection fraction appears to be 61 - 65%.    Left ventricular diastolic function was normal.    The left atrial cavity is mildly dilated.    Saline test results are negative.        Assessment & Plan   Assessment and Plan:         Acute hyperkalemia    ASSESSMENT:  Acute kidney injury likely secondary to volume and low blood pressure and acute blood loss   severe hyperkalemia need to rule out any hematoma or GI bleed  Will get complete urine studies   patient has history of rhabdomyolysis Will get CPK level  Metabolic acidosis may be due to his respiratory alkalosis because of some history of cirrhosis            PLAN :     Patient hemodynamically better received IV fluid overnight   Continue IV fluid at this time   Patient may need a blood transfusion  we need to investigate etiology of acute blood loss that might of contributed to significant hyperkalemia   potassium level is improving at this time   Follow-up with a urine studies if significant proteinuria further workup will be needed  Follow-up with magnesium and phosphorus level  Patient has some recent thrombocytopenia no platelets are normal will follow-up        Red Quiroga MD  [facility]  4/24/2024  08:43 EDT        Electronically signed by Red Quiroga MD at 04/24/24 2877

## 2024-04-24 NOTE — PLAN OF CARE
Goal Outcome Evaluation:   Pt receiving 1 unit of blood. Hemoglobin is 6.9

## 2024-04-24 NOTE — PLAN OF CARE
Goal Outcome Evaluation:           Progress: no change  Outcome Evaluation: Pt rested in bed through the shift. Hyperkalemia protocol ordered for potassium of 6.1 and nephrology consulted. Pt on vancomycin and will have MRI of her spine today.

## 2024-04-24 NOTE — CONSULTS
INITIAL CONSULT NOTE      Patient Name: Lita Yu  : 1965  MRN: [id number]  Primary Care Physician: Norma Saul APRN  Date of admission: 2024    Patient Care Team:  Norma Saul APRN as PCP - General (Nurse Practitioner)  Flory Villanueva MD (Physical Medicine and Rehabilitation)        Reason for Consult:       Renal failure  Subjective   History of Present Illness:   Chief Complaint:   Chief Complaint   Patient presents with    Abnormal Lab     Pt reports she was sent for Hgb of 6.8 and Potassium of 6.3 from labs done this morning.       HISTORY:  Lita Yu is a 58 y.o. female with past medical history of cirrhosis, depression, hyperlipidemia, hypertension, PTSD,.    Discharge on vancomycin but mainly admitted because of the significant abnormal labs with significant anemia and  Patient has a osteomyelitis of the spine was on vancomycin significant increase in creatinine baseline creatinine normal 1.67 yesterday today is 1.88 with a hemoglobin of 6.9.  Patient denies any black-colored stools at this time.  Patient hemoglobin was 10.6 on  but since then is progressively declining also had significant thrombocytopenia in the past patient urine studies was 40 normal recently but did have some proteinuria and hematuria back in .       Review of systems:  All other review of system unremarkable  Constitutional: No fever, no chills, no lethargy, no weakness.  HEENT:  No headache, otalgia, itchy eyes, nasal discharge or sore throat.  Cardiac:  No chest pain, dyspnea, orthopnea or PND.  Chest:              No cough, phlegm or wheezing.  Abdomen:  No abdominal pain, nausea or vomiting.  Neuro:  No focal weakness, abnormal movements orseizure like activity.  :   No hematuria, no pyuria, no dysuria, no flank pain.  ROS was otherwise negative except as mentioned in the [place].       Personal History:     Past Medical History:   Past Medical History:    Diagnosis Date    Cirrhosis     COPD (chronic obstructive pulmonary disease)     Depression     GERD (gastroesophageal reflux disease)     Hyperlipidemia     Hypertension     PTSD (post-traumatic stress disorder)        Surgical History:      Past Surgical History:   Procedure Laterality Date     SECTION N/A     COLONOSCOPY N/A 2021    Procedure: COLONOSCOPY with polypectomy x2 and random biopsy;  Surgeon: Osman Clay MD;  Location: [facility] ENDOSCOPY;  Service: Gastroenterology;  Laterality: N/A;  colon polyps    DENTAL PROCEDURE      ENDOSCOPY N/A 2021    Procedure: ESOPHAGOGASTRODUODENOSCOPY;  Surgeon: Osman Clay MD;  Location: [facility] ENDOSCOPY;  Service: Gastroenterology;  Laterality: N/A;  esophagitis    JOINT REPLACEMENT      REPLACEMENT TOTAL HIP LATERAL POSITION Left     TONSILLECTOMY      VAGINAL BIRTH AFTER  SECTION         Family History: family history includes Alcohol abuse in her mother and paternal grandfather. Otherwise pertinent FHx was reviewed and unremarkable.     Social History:  reports that she has been smoking cigarettes. She has been exposed to tobacco smoke. She has never used smokeless tobacco. She reports that she does not currently use alcohol after a past usage of about 2.0 standard drinks of alcohol per week. She reports that she does not use drugs.    Medications:  Prior to Admission medications    Medication Sig Start Date End Date Taking? Authorizing Provider   albuterol sulfate  (90 Base) MCG/ACT inhaler Inhale 2 puffs Every 4 (Four) Hours As Needed for Wheezing.    ProviderAbigail MD   busPIRone (BUSPAR) 15 MG tablet TAKE 1 TABLET BY MOUTH TWICE A DAY 10/16/23   Norma Saul APRN   Cyanocobalamin (Vitamin B-12) 1000 MCG sublingual tablet Place 1 tablet under the tongue Daily.    ProviderAbigail MD   diclofenac (VOLTAREN) 50 MG EC tablet Take 1 tablet by mouth 2 (Two) Times a Day As Needed (pain). 24   Sarah Huang,  SHARIF   folic acid (FOLVITE) 1 MG tablet Take 1 tablet by mouth Daily. 3/4/24   Laura Quinones APRN   furosemide (LASIX) 40 MG tablet Take 1 tablet by mouth Daily. 6/13/23   Norma Saul APRN   lidocaine (LIDODERM) 5 % Place 1 patch on the skin as directed by provider Daily. Remove & Discard patch within 12 hours or as directed by MD 3/4/24   Laura Quinones APRN   melatonin 5 MG tablet tablet Take 1 tablet by mouth At Night As Needed (sleep).    Abigail Hood MD   potassium chloride 10 MEQ CR tablet TAKE 4 TABLETS BY MOUTH DAILY 3/5/24   Norma Saul APRN   QUEtiapine (SEROquel) 50 MG tablet TAKE 1 TABLET BY MOUTH EVERYDAY AT BEDTIME 2/16/24   Norma Saul APRN   sertraline (ZOLOFT) 100 MG tablet TAKE 1 TABLET BY MOUTH TWICE A DAY 10/19/23   Norma Saul APRN   spironolactone (ALDACTONE) 100 MG tablet Take 1 tablet by mouth Daily. 6/26/23   Norma Saul APRN   thiamine (VITAMIN B1) 100 MG tablet Take 1 tablet by mouth Daily. 3/7/24   Laura Quinones APRN   Zinc Gluconate 30 MG tablet Take 1 tablet by mouth Daily. 10/17/23   ProviderAbigail MD     Scheduled Meds:busPIRone, 15 mg, Oral, BID  folic acid, 1 mg, Oral, Daily  Pharmacy to dose vancomycin, , Does not apply, Daily  QUEtiapine, 50 mg, Oral, Nightly  sertraline, 100 mg, Oral, BID  sodium chloride, 10 mL, Intravenous, Q12H  thiamine, 100 mg, Oral, Daily      Continuous Infusions:sodium chloride, 100 mL/hr, Last Rate: 100 mL/hr (04/23/24 9532)      PRN Meds:  acetaminophen    albuterol sulfate HFA    senna-docusate sodium **AND** polyethylene glycol **AND** bisacodyl **AND** bisacodyl    dextrose    dextrose    glucagon (human recombinant)    HYDROmorphone    melatonin    nitroglycerin    ondansetron    oxyCODONE    [COMPLETED] Insert Peripheral IV **AND** sodium chloride    sodium chloride    sodium chloride    traMADol    Vancomycin Pharmacy Intermittent/Pulse Dosing  Allergies:    Allergies   Allergen Reactions     Sulfa Antibiotics Hives    Keflex [Cephalexin] Hives       Objective   Exam:     Vital Signs  Temp:  [97.4 °F (36.3 °C)-98.6 °F (37 °C)] 98.3 °F (36.8 °C)  Heart Rate:  [] 98  Resp:  [17-21] 19  BP: ()/(44-92) 99/67  SpO2:  [92 %-100 %] 95 %  on   ;   Device (Oxygen Therapy): room air  Body mass index is 21.24 kg/m².  EXAM  General: Middle-age white female in no acute distress.    Head:      Normocephalic and atraumatic.    Eyes:      PERRL/EOM intact, conjunctivae and sclerae clear without nystagmus.    Neck:      No masses, thyromegaly,  trachea central   Lungs:    Clear bilaterally to auscultation.    Heart:      Regular rate and rhythm, no murmur no gallop  Abd:        Soft, nontender, not distended, bowel sounds positive, no shifting dullness.  Msk:        No deformity or scoliosis noted of thoracic or lumbar spine.    Pulses:   Pulses normal in all 4 extremities.    Extremities:        No cyanosis or clubbing--no edema.    Neuro:    No focal deficits.   alert oriented x3  Skin:       Intact without lesions or rashes.    Psych:    Alert and cooperative; normal mood and affect; normal attention span       Results Review:  I have personally reviewed most recent Data :  BMP @LABRCNTIP(creatinine:10)  CBC    Results from last 7 days   Lab Units 04/24/24  0519 04/23/24  1603 04/18/24  1620   WBC 10*3/mm3 4.71 6.43 5.94   HEMOGLOBIN g/dL 7.1* 6.9* 7.5*   PLATELETS 10*3/mm3 168 217 231     CMP   Results from last 7 days   Lab Units 04/24/24  0518 04/23/24  2235 04/23/24  1603 04/18/24  1618   SODIUM mmol/L  --  140 137 138   POTASSIUM mmol/L 4.8 6.2* 7.1* 4.5   CHLORIDE mmol/L  --  109* 107 107   CO2 mmol/L  --  22.0 22.0 18.0*   BUN mg/dL  --  19 19 19   CREATININE mg/dL  --  1.88* 1.67* 1.65*   GLUCOSE mg/dL  --  108* 111* 72   ALBUMIN g/dL  --   --   --  3.6   BILIRUBIN mg/dL  --   --   --  0.2   ALK PHOS U/L  --   --   --  210*   AST (SGOT) U/L  --   --   --  24   ALT (SGPT) U/L  --   --   --  16      ABG      MRI Lumbar Spine With & Without Contrast    Result Date: 4/23/2024  Impression: Motion limited exam. There has been interval worsening of findings related to L2-L3 discitis and vertebral body osteomyelitis, including worsening destruction of the disc space and increased vertebral body marrow edema. There is a persistent epidural abscess that is not significantly changed in size since the comparison study. There is enhancing paraspinal soft tissue at the L2 and L3 vertebral body levels compatible with paraspinal phlegmon with no walled off fluid collection demonstrated Electronically Signed: Glenn Tomas  4/23/2024 10:23 PM EDT  Workstation ID: [id number]    MRI Thoracic Spine With & Without Contrast    Result Date: 4/23/2024  Impression: No evidence of thoracic discitis or epidural abscess Electronically Signed: Glenn Tomas  4/23/2024 10:07 PM EDT  Workstation ID: [id number]     Results for orders placed during the hospital encounter of 03/07/24    Adult Transesophageal Echo (SILVINA) W/ Cont if Necessary Per Protocol    Interpretation Summary    Left ventricular systolic function is normal. Left ventricular ejection fraction appears to be 61 - 65%.    Left ventricular diastolic function was normal.    The left atrial cavity is mildly dilated.    Saline test results are negative.        Assessment & Plan   Assessment and Plan:         Acute hyperkalemia    ASSESSMENT:  Acute kidney injury likely secondary to volume and low blood pressure and acute blood loss   severe hyperkalemia need to rule out any hematoma or GI bleed  Will get complete urine studies   patient has history of rhabdomyolysis Will get CPK level  Metabolic acidosis may be due to his respiratory alkalosis because of some history of cirrhosis            PLAN :     Patient hemodynamically better received IV fluid overnight   Continue IV fluid at this time   Patient may need a blood transfusion  we need to investigate etiology of acute blood loss  that might of contributed to significant hyperkalemia   potassium level is improving at this time   Follow-up with a urine studies if significant proteinuria further workup will be needed  Follow-up with magnesium and phosphorus level  Patient has some recent thrombocytopenia no platelets are normal will follow-up        Red Quiroga MD  [facility]  4/24/2024  08:43 EDT

## 2024-04-24 NOTE — PROGRESS NOTES
The patient feels well currently. She does have some light headedness and dizziness. The patient states that she has no fevers, chills, sweats. No cough, No sputum production. The patient has no chest pain or SOB. Patients bowels are working well. She appears in NAD currently. Overall feels ok.    Vitas - 105/60. Pulse - 94. Temperature is 98.1 respirations - 21. Oxygen is 99 percent.    Appearance: No apparent distress, non-toxic appearing   HEENT/Neck: Neck is supple, Extraocular movements intact, Moist mucous membranes, Cardiovascular: Regular Rate and Rhythm,  Pulmonary: Clear to auscultation Bilaterally.   Abdomen: BS+, Soft, non-tender, non-distended.   Ext: No Cyanosis, Clubbing, Edema.   Neuro: Non-focal, Alert & Oriented x 3, tenderness on her lumbar area.  Labs reviewed.     Hyperkalemia - the patient has been given hyperkalemia protocol. Nephrology has been consult. UA shows a Uti. I will start rocephin 1 g IV qday. Urine culture has been sent. Labs are pending currently. Last K was 6.2  Acute renal failure secondary to ATN - the patient is on multiple nephrotoxic medications which are on hold. She has not been taking her lasix over the last several months but has been taking her K supplementations, Aldactone, and Nsaids. They are all on hold. Continue fluid hydration  Cirrhosis - patient has a known history of cirrhosis from alcohol and fatty liver disease based on my our discussion. Will watch for fluid overload with the fluids that are being given.   Labs are ordered for AM.

## 2024-04-24 NOTE — THERAPY EVALUATION
Patient Name: Lita Yu  : 1965    MRN: [id number]                              Today's Date: 2024       Admit Date: 2024    Visit Dx:     ICD-10-CM ICD-9-CM   1. Hyperkalemia  E87.5 276.7   2. Acute kidney injury  N17.9 584.9   3. Anemia, unspecified type  D64.9 285.9     Patient Active Problem List   Diagnosis    Postmenopausal state    Post traumatic stress disorder    Pain in joints    Osteoporosis    Macrocytic anemia    Lumbosacral radiculopathy    Leg pain, left    Peripheral vascular disease    Essential hypertension    Hyperlipidemia    Degenerative joint disease of left hip    Seizure disorder    Smoker    Status post hip replacement    Tobacco dependence syndrome    Vitamin B12 deficiency    Anemia of chronic disease    Chronic obstructive pulmonary disease    Cirrhosis of liver    Acute UTI (urinary tract infection)    Generalized weakness    Rhabdomyolysis    Non-traumatic rhabdomyolysis    Moderate malnutrition    Chest pain    Dehydration    Myalgia    Acute hyperkalemia     Past Medical History:   Diagnosis Date    Cirrhosis     COPD (chronic obstructive pulmonary disease)     Depression     GERD (gastroesophageal reflux disease)     Hyperlipidemia     Hypertension     PTSD (post-traumatic stress disorder)      Past Surgical History:   Procedure Laterality Date     SECTION N/A     COLONOSCOPY N/A 2021    Procedure: COLONOSCOPY with polypectomy x2 and random biopsy;  Surgeon: Osman Clay MD;  Location: [facility] ENDOSCOPY;  Service: Gastroenterology;  Laterality: N/A;  colon polyps    DENTAL PROCEDURE      ENDOSCOPY N/A 2021    Procedure: ESOPHAGOGASTRODUODENOSCOPY;  Surgeon: Osman Clay MD;  Location: [facility] ENDOSCOPY;  Service: Gastroenterology;  Laterality: N/A;  esophagitis    JOINT REPLACEMENT      REPLACEMENT TOTAL HIP LATERAL POSITION Left     TONSILLECTOMY      VAGINAL BIRTH AFTER  SECTION        General Information       Row Name  04/24/24 1459          Physical Therapy Time and Intention    Document Type evaluation  -BR     Mode of Treatment physical therapy  -BR       Row Name 04/24/24 1519 04/24/24 1459       General Information    Patient Profile Reviewed -- yes  -BR    Prior Level of Function -- independent:;all household mobility;gait;transfer  Pt uses a rolling walker at baseline.  She is very specific about household chores she is unable to complete.  -BR    Existing Precautions/Restrictions seizures  -BR fall  -BR    Barriers to Rehab -- previous functional deficit  -BR      Row Name 04/24/24 1459          Living Environment    People in Home alone  -BR       Row Name 04/24/24 1459          Home Main Entrance    Number of Stairs, Main Entrance none  -BR       Row Name 04/24/24 1459          Stairs Within Home, Primary    Number of Stairs, Within Home, Primary none  -BR       Row Name 04/24/24 1459          Cognition    Orientation Status (Cognition) oriented x 4  -BR       Row Name 04/24/24 1459          Safety Issues, Functional Mobility    Impairments Affecting Function (Mobility) pain;strength;balance;range of motion (ROM);endurance/activity tolerance  -BR               User Key  (r) = Recorded By, (t) = Taken By, (c) = Cosigned By      Initials Name Provider Type    BR Lakisha Nixon, PT Physical Therapist                   Mobility       Row Name 04/24/24 1502          Bed Mobility    Bed Mobility supine-sit  -BR     Supine-Sit [place] (Bed Mobility) contact guard  -BR     Comment, (Bed Mobility) Pt cried out with logroll to sit at edge of bed. Pt uses UE to lift RLE out of bed.  -BR       Row Name 04/24/24 1502          Sit-Stand Transfer    Sit-Stand [place] (Transfers) verbal cues;contact guard;1 person assist  -BR     Assistive Device (Sit-Stand Transfers) walker, front-wheeled  -BR       Row Name 04/24/24 1502          Gait/Stairs (Locomotion)    [place] Level (Gait) contact guard;1 person assist  -BR      "Assistive Device (Gait) walker, front-wheeled  -BR     Patient was able to Ambulate yes  -BR     Distance in Feet (Gait) 30  -BR     Deviations/Abnormal Patterns (Gait) antalgic;stride length decreased;sebastián decreased  -BR     Right Sided Gait Deviations heel strike decreased;weight shift ability decreased  -BR     Comment, (Gait/Stairs) Pt ambulated to and from the bathroom with rolling walker with CGA of 1.  Pt cries out with WB on RLE but reports this \"is normal for her,\"  -BR               User Key  (r) = Recorded By, (t) = Taken By, (c) = Cosigned By      Initials Name Provider Type    Lakisha Armstrong PT Physical Therapist                   Obj/Interventions       Row Name 04/24/24 1505          Range of Motion Comprehensive    Comment, General Range of Motion AROM Right hip and knee limited by back and hip pain. LLE and R ankle WFL.  -BR       Row Name 04/24/24 1505          Strength Comprehensive (MMT)    Comment, General Manual Muscle Testing (MMT) Assessment RLE grossly 3/5; LLE grossly 3+/5  -BR       Row Name 04/24/24 1505          Balance    Balance Assessment sitting static balance;sitting dynamic balance;standing static balance  -BR     Static Sitting Balance modified independence  -BR     Dynamic Sitting Balance supervision  -BR     Position, Sitting Balance unsupported;sitting edge of bed  -BR     Static Standing Balance contact guard  -BR     Position/Device Used, Standing Balance walker, rolling  -BR       Row Name 04/24/24 1505          Sensory Assessment (Somatosensory)    Sensory Assessment (Somatosensory) LE sensation intact  -BR               User Key  (r) = Recorded By, (t) = Taken By, (c) = Cosigned By      Initials Name Provider Type    Lakisha Armstrong PT Physical Therapist                   Goals/Plan       Row Name 04/24/24 1518          Bed Mobility Goal 1 (PT)    Activity/Assistive Device (Bed Mobility Goal 1, PT) bed mobility activities, all  -BR     [place] " Level/Cues Needed (Bed Mobility Goal 1, PT) modified independence  -BR     Time Frame (Bed Mobility Goal 1, PT) long term goal (LTG);2 weeks  -BR       Row Name 04/24/24 1518          Transfer Goal 1 (PT)    Activity/Assistive Device (Transfer Goal 1, PT) transfers, all  -BR     [place] Level/Cues Needed (Transfer Goal 1, PT) modified independence  -BR     Time Frame (Transfer Goal 1, PT) long term goal (LTG);2 weeks  -BR       Row Name 04/24/24 1518          Gait Training Goal 1 (PT)    Activity/Assistive Device (Gait Training Goal 1, PT) gait (walking locomotion);assistive device use;normalize weight shifts  -BR     [place] Level (Gait Training Goal 1, PT) standby assist  -BR     Distance (Gait Training Goal 1, PT) 50  -BR     Time Frame (Gait Training Goal 1, PT) long term goal (LTG);2 weeks  -BR       Row Name 04/24/24 1518          Therapy Assessment/Plan (PT)    Planned Therapy Interventions (PT) balance training;bed mobility training;gait training;strengthening;ROM (range of motion);transfer training;patient/family education  -BR               User Key  (r) = Recorded By, (t) = Taken By, (c) = Cosigned By      Initials Name Provider Type    BR Lakisha Nixon, PT Physical Therapist                   Clinical Impression       Row Name 04/24/24 1506          Pain    Pretreatment Pain Rating 2/10  -BR     Posttreatment Pain Rating 5/10  -BR     Pain Location - Side/Orientation Right  -BR     Pain Location - back;hip  -BR       Row Name 04/24/24 1518 04/24/24 1506       Plan of Care Review    Plan of Care Reviewed With -- patient  -BR    Outcome Evaluation Pt presents as a 57 y/o F admitted to [facility] on 4/23/24 with abnormal OP lab. Pt being treated for acute hyperkalemia. MRI L-spine: interval worsening of findings related to L2-L3 discitis and vertebral body osteomyelitis, including worsening destruction of the disc space and increased vertebral body marrow edema. There is a persistent epidural abscess  that is not significantly changed in size. MRI T-spine: No evidence of thoracic discitis or epidural abscess. PMHx:  HTN, PVD, DJD, seizure disorder, difficult tobacco dependence, cirrhosis, generalized weakness and recent MRSA bacteremia and enhancement of the epidural fluid on imaging. She was discharged on vancomycin which is supposed to be on till 5/3/2024. Pt A ad O x 4 and in no acute distress at rest. Pt lives alone in a ground floor apt and uses a rolling walker for independence with household mobility. Pt does not drive. This date, pt log rolls to EOB with pt lifting RLE out of bed. Pt cries out in pain with transition movements. Pt ambulates to and from  the bathroom with rolling walker with CGA of 1 with antalgic gait pattern and intermittent cries out. Pt has no c/o pain sitting on toilet. PT assists pt with positioning in recliner to alleviate c/o pain and RLE movement of any type results in pt crying out. Pt was left in room up in recliner in no acute distress. Pt reports this pain with walking has been her baseline for several weeks. PT will follow pt with PT recommendation of Home Health Physical Therapy.  -BR --      Row Name 04/24/24 1506          Therapy Assessment/Plan (PT)    Rehab Potential (PT) good, to achieve stated therapy goals  -BR     Criteria for Skilled Interventions Met (PT) yes;meets criteria;skilled treatment is necessary  -BR     Therapy Frequency (PT) 5 times/wk  -BR     Predicted Duration of Therapy Intervention (PT) until D/C  -BR       Row Name 04/24/24 1506          Vital Signs    Pre Systolic BP Rehab 105  -BR     Pre Treatment Diastolic BP 60  -BR     Pretreatment Heart Rate (beats/min) 87  -BR     Posttreatment Heart Rate (beats/min) 94  -BR     Pretreatment Resp Rate (breaths/min) 19  -BR     Posttreatment Resp Rate (breaths/min) 19  -BR     Pre SpO2 (%) 99  -BR     O2 Delivery Pre Treatment room air  -BR     Post SpO2 (%) 98  -BR     O2 Delivery Post Treatment room air   -BR     Pre Patient Position Supine  -BR     Intra Patient Position Standing  -BR     Post Patient Position Sitting  -BR       Row Name 04/24/24 1506          Positioning and Restraints    Pre-Treatment Position in bed  -BR     Post Treatment Position chair  -BR     In Chair notified nsg;reclined;call light within reach;encouraged to call for assist;exit alarm on;legs elevated  -BR               User Key  (r) = Recorded By, (t) = Taken By, (c) = Cosigned By      Initials Name Provider Type    Lakisha Armstrong PT Physical Therapist                   Outcome Measures       Row Name 04/24/24 1521 04/24/24 0815       How much help from another person do you currently need...    Turning from your back to your side while in flat bed without using bedrails? 4  -BR 4  -SS    Moving from lying on back to sitting on the side of a flat bed without bedrails? 3  -BR 3  -SS    Moving to and from a bed to a chair (including a wheelchair)? 3  -BR 3  -SS    Standing up from a chair using your arms (e.g., wheelchair, bedside chair)? 3  -BR 3  -SS    Climbing 3-5 steps with a railing? 2  -BR 2  -SS    To walk in hospital room? 3  -BR 2  -SS    AM-PAC 6 Clicks Score (PT) 18  -BR 17  -SS    Highest Level of Mobility Goal 6 --> Walk 10 steps or more  -BR 5 --> Static standing  -SS      Row Name 04/24/24 1521          Functional Assessment    Outcome Measure Options AM-PAC 6 Clicks Basic Mobility (PT)  -BR               User Key  (r) = Recorded By, (t) = Taken By, (c) = Cosigned By      Initials Name Provider Type    Lakisha Armstrong, PT Physical Therapist    SS Merced Espino, RN Registered Nurse                                 Physical Therapy Education       Title: PT OT SLP Therapies (In Progress)       Topic: Physical Therapy (Done)       Point: Mobility training (Done)       Learning Progress Summary             Patient Acceptance, E,D, VU,DU by ALLEN at 4/24/2024 1522                         Point: Body mechanics (Done)        Learning Progress Summary             Patient Acceptance, E,D, MERCEDEZ,TAMIKO by BR at 4/24/2024 1522                         Point: Precautions (Done)       Learning Progress Summary             Patient Acceptance, E,ELTON, TAMIKO NEWSOME by BR at 4/24/2024 1522                                         User Key       Initials Effective Dates Name Provider Type Discipline    ALLEN 02/01/22 -  Lakisha Nixon PT Physical Therapist PT                  PT Recommendation and Plan  Planned Therapy Interventions (PT): balance training, bed mobility training, gait training, strengthening, ROM (range of motion), transfer training, patient/family education  Plan of Care Reviewed With: patient  Outcome Evaluation: Pt presents as a 57 y/o F admitted to [facility] on 4/23/24 with abnormal OP lab. Pt being treated for acute hyperkalemia. MRI L-spine: interval worsening of findings related to L2-L3 discitis and vertebral body osteomyelitis, including worsening destruction of the disc space and increased vertebral body marrow edema. There is a persistent epidural abscess that is not significantly changed in size. MRI T-spine: No evidence of thoracic discitis or epidural abscess. PMHx:  HTN, PVD, DJD, seizure disorder, difficult tobacco dependence, cirrhosis, generalized weakness and recent MRSA bacteremia and enhancement of the epidural fluid on imaging. She was discharged on vancomycin which is supposed to be on till 5/3/2024. Pt A ad O x 4 and in no acute distress at rest. Pt lives alone in a ground floor apt and uses a rolling walker for independence with household mobility. Pt does not drive. This date, pt log rolls to EOB with pt lifting RLE out of bed. Pt cries out in pain with transition movements. Pt ambulates to and from  the bathroom with rolling walker with CGA of 1 with antalgic gait pattern and intermittent cries out. Pt has no c/o pain sitting on toilet. PT assists pt with positioning in recliner to alleviate c/o pain and RLE movement of any  type results in pt crying out. Pt was left in room up in recliner in no acute distress. Pt reports this pain with walking has been her baseline for several weeks. PT will follow pt with PT recommendation of Home Health Physical Therapy.     Time Calculation:         PT Charges       Row Name 04/24/24 1522             Time Calculation    Start Time 1250  -BR      Stop Time 1314  -BR      Time Calculation (min) 24 min  -BR      PT Received On 04/24/24  -BR      PT - Next Appointment 04/25/24  -BR      PT Goal Re-Cert Due Date 05/08/24  -BR         Time Calculation- PT    Total Timed Code Minutes- PT 0 minute(s)  -BR                User Key  (r) = Recorded By, (t) = Taken By, (c) = Cosigned By      Initials Name Provider Type    BR Lakisha Nixon, TOBY Physical Therapist                  Therapy Charges for Today       Code Description Service Date Service Provider Modifiers Qty    [id number] HC PT EVAL MOD COMPLEXITY 4 4/24/2024 Lakisha Nixon, PT GP 1            PT G-Codes  Outcome Measure Options: AM-PAC 6 Clicks Basic Mobility (PT)  AM-PAC 6 Clicks Score (PT): 18  PT Discharge Summary  Anticipated Discharge Disposition (PT): home with home health, home with assist    Lakisha Nixon, TOBY  4/24/2024

## 2024-04-24 NOTE — PLAN OF CARE
Goal Outcome Evaluation:  Plan of Care Reviewed With: patient   Pt presents as a 59 y/o F admitted to [facility] on 4/23/24 with abnormal OP lab. Pt being treated for acute hyperkalemia. MRI L-spine: interval worsening of findings related to L2-L3 discitis and vertebral body osteomyelitis, including worsening destruction of the disc space and increased vertebral body marrow edema. There is a persistent epidural abscess that is not significantly changed in size. MRI T-spine: No evidence of thoracic discitis or epidural abscess. PMHx:  HTN, PVD, DJD, seizure disorder, difficult tobacco dependence, cirrhosis, generalized weakness and recent MRSA bacteremia and enhancement of the epidural fluid on imaging. She was discharged on vancomycin which is supposed to be on till 5/3/2024. Pt A ad O x 4 and in no acute distress at rest. Pt lives alone in a ground floor apt and uses a rolling walker for independence with household mobility. Pt does not drive. This date, pt log rolls to EOB with pt lifting RLE out of bed. Pt cries out in pain with transition movements. Pt ambulates to and from  the bathroom with rolling walker with CGA of 1 with antalgic gait pattern and intermittent cries out. Pt has no c/o pain sitting on toilet. PT assists pt with positioning in recliner to alleviate c/o pain and RLE movement of any type results in pt crying out. Pt was left in room up in recliner in no acute distress. Pt reports this pain with walking has been her baseline for several weeks. PT will follow pt with PT recommendation of Home Health Physical Therapy.                Anticipated Discharge Disposition (PT): home with home health, home with assist

## 2024-04-24 NOTE — PROGRESS NOTES
"Pharmacy Antimicrobial Dosing Service    Subjective:  Lita Yu is a 58 y.o.female admitted with acute hyperkalemia. Patient was recently diagnosed with MRSA bacteremia and enhancement of the epidural fluid on imaging, and is on vancomycin until 5/3/24. Pharmacy has been consulted to dose Vancomycin for possible bacteremia/CNS infection.        Assessment/Plan    Day #2 Vancomycin inpatient: Pulse dosing d/t renal dysfxn.  Patient last had vancomycin 1000 mg yesterday prior to arrival. Vancomycin random 4/24 at 0519= 16 mcg/ml. Will continue to to pulse dose for elevated SCR. Will schedule vancomycin 1000 mg X 1 dose today (18 mg/kg ABW) with follow up random level ordered for 4/25 with AM labs.       Will continue to monitor drug levels, renal function, culture and sensitivities, and patient clinical status.       Objective:  Relevant clinical data and objective history reviewed:  160 cm (63\")   54.4 kg (119 lb 14.9 oz)   Ideal body weight: 52.4 kg (115 lb 8.3 oz)  Adjusted ideal body weight: 53.2 kg (117 lb 4.6 oz)  Body mass index is 21.24 kg/m².    Results from last 7 days   Lab Units 04/24/24  0519 04/23/24  1603   VANCOMYCIN RM mcg/mL 16.00 21.60     Results from last 7 days   Lab Units 04/23/24  2235 04/23/24  1603 04/18/24  1618   CREATININE mg/dL 1.88* 1.67* 1.65*     Estimated Creatinine Clearance: 28.7 mL/min (A) (by C-G formula based on SCr of 1.88 mg/dL (H)).  I/O last 3 completed shifts:  In: 1350 [Blood:300; IV Piggyback:1050]  Out: 500 [Urine:500]    Results from last 7 days   Lab Units 04/24/24  0519 04/23/24  1603 04/18/24  1620   WBC 10*3/mm3 4.71 6.43 5.94     Temperature    04/24/24 0400 04/24/24 0800 04/24/24 1053   Temp: 98.3 °F (36.8 °C) 97.4 °F (36.3 °C) 98.3 °F (36.8 °C)     Baseline culture/source/susceptibility:  Microbiology Results (last 10 days)       ** No results found for the last 240 hours. **            Karey Cabral, PharmD  04/24/24 11:07 EDT    "

## 2024-04-25 ENCOUNTER — READMISSION MANAGEMENT (OUTPATIENT)
Dept: CALL CENTER | Facility: HOSPITAL | Age: 59
End: 2024-04-25
Payer: COMMERCIAL

## 2024-04-25 VITALS
SYSTOLIC BLOOD PRESSURE: 112 MMHG | HEART RATE: 83 BPM | HEIGHT: 63 IN | DIASTOLIC BLOOD PRESSURE: 70 MMHG | RESPIRATION RATE: 14 BRPM | OXYGEN SATURATION: 99 % | WEIGHT: 119.93 LBS | TEMPERATURE: 97.3 F | BODY MASS INDEX: 21.25 KG/M2

## 2024-04-25 LAB
ANION GAP SERPL CALCULATED.3IONS-SCNC: 9 MMOL/L (ref 5–15)
BASOPHILS # BLD AUTO: 0.03 10*3/MM3 (ref 0–0.2)
BASOPHILS NFR BLD AUTO: 0.6 % (ref 0–1.5)
BH BB BLOOD EXPIRATION DATE: NORMAL
BH BB BLOOD TYPE BARCODE: 5100
BH BB DISPENSE STATUS: NORMAL
BH BB PRODUCT CODE: NORMAL
BH BB UNIT NUMBER: NORMAL
BUN SERPL-MCNC: 12 MG/DL (ref 6–20)
BUN/CREAT SERPL: 9 (ref 7–25)
CALCIUM SPEC-SCNC: 8.6 MG/DL (ref 8.6–10.5)
CHLORIDE SERPL-SCNC: 107 MMOL/L (ref 98–107)
CO2 SERPL-SCNC: 25 MMOL/L (ref 22–29)
CREAT SERPL-MCNC: 1.34 MG/DL (ref 0.57–1)
CROSSMATCH INTERPRETATION: NORMAL
DEPRECATED RDW RBC AUTO: 61.7 FL (ref 37–54)
EGFRCR SERPLBLD CKD-EPI 2021: 46.6 ML/MIN/1.73
EOSINOPHIL # BLD AUTO: 0.23 10*3/MM3 (ref 0–0.4)
EOSINOPHIL NFR BLD AUTO: 4.3 % (ref 0.3–6.2)
ERYTHROCYTE [DISTWIDTH] IN BLOOD BY AUTOMATED COUNT: 16.9 % (ref 12.3–15.4)
GLUCOSE SERPL-MCNC: 78 MG/DL (ref 65–99)
HCT VFR BLD AUTO: 27.3 % (ref 34–46.6)
HGB BLD-MCNC: 8.5 G/DL (ref 12–15.9)
IMM GRANULOCYTES # BLD AUTO: 0.02 10*3/MM3 (ref 0–0.05)
IMM GRANULOCYTES NFR BLD AUTO: 0.4 % (ref 0–0.5)
LYMPHOCYTES # BLD AUTO: 1 10*3/MM3 (ref 0.7–3.1)
LYMPHOCYTES NFR BLD AUTO: 18.7 % (ref 19.6–45.3)
MAGNESIUM SERPL-MCNC: 1.6 MG/DL (ref 1.6–2.6)
MCH RBC QN AUTO: 30.8 PG (ref 26.6–33)
MCHC RBC AUTO-ENTMCNC: 31.1 G/DL (ref 31.5–35.7)
MCV RBC AUTO: 98.9 FL (ref 79–97)
MONOCYTES # BLD AUTO: 0.52 10*3/MM3 (ref 0.1–0.9)
MONOCYTES NFR BLD AUTO: 9.7 % (ref 5–12)
NEUTROPHILS NFR BLD AUTO: 3.56 10*3/MM3 (ref 1.7–7)
NEUTROPHILS NFR BLD AUTO: 66.3 % (ref 42.7–76)
NRBC BLD AUTO-RTO: 0 /100 WBC (ref 0–0.2)
PHOSPHATE SERPL-MCNC: 4.3 MG/DL (ref 2.5–4.5)
PLATELET # BLD AUTO: 141 10*3/MM3 (ref 140–450)
PMV BLD AUTO: 9.4 FL (ref 6–12)
POTASSIUM SERPL-SCNC: 4.6 MMOL/L (ref 3.5–5.2)
RBC # BLD AUTO: 2.76 10*6/MM3 (ref 3.77–5.28)
SODIUM SERPL-SCNC: 141 MMOL/L (ref 136–145)
UNIT  ABO: NORMAL
UNIT  RH: NORMAL
VANCOMYCIN SERPL-MCNC: 17.1 MCG/ML (ref 5–40)
WBC NRBC COR # BLD AUTO: 5.36 10*3/MM3 (ref 3.4–10.8)

## 2024-04-25 PROCEDURE — 97530 THERAPEUTIC ACTIVITIES: CPT

## 2024-04-25 PROCEDURE — 25810000003 SODIUM CHLORIDE 0.9 % SOLUTION: Performed by: INTERNAL MEDICINE

## 2024-04-25 PROCEDURE — 80202 ASSAY OF VANCOMYCIN: CPT | Performed by: INTERNAL MEDICINE

## 2024-04-25 PROCEDURE — 84100 ASSAY OF PHOSPHORUS: CPT | Performed by: INTERNAL MEDICINE

## 2024-04-25 PROCEDURE — 83735 ASSAY OF MAGNESIUM: CPT | Performed by: INTERNAL MEDICINE

## 2024-04-25 PROCEDURE — 25010000002 HYDROMORPHONE 1 MG/ML SOLUTION: Performed by: INTERNAL MEDICINE

## 2024-04-25 PROCEDURE — 25810000003 SODIUM CHLORIDE 0.9 % SOLUTION 250 ML FLEX CONT: Performed by: INTERNAL MEDICINE

## 2024-04-25 PROCEDURE — 80048 BASIC METABOLIC PNL TOTAL CA: CPT | Performed by: INTERNAL MEDICINE

## 2024-04-25 PROCEDURE — 85025 COMPLETE CBC W/AUTO DIFF WBC: CPT | Performed by: INTERNAL MEDICINE

## 2024-04-25 PROCEDURE — 25010000002 VANCOMYCIN 1 G RECONSTITUTED SOLUTION 1 EACH VIAL: Performed by: INTERNAL MEDICINE

## 2024-04-25 PROCEDURE — 97116 GAIT TRAINING THERAPY: CPT

## 2024-04-25 PROCEDURE — 97110 THERAPEUTIC EXERCISES: CPT

## 2024-04-25 RX ORDER — CEFDINIR 300 MG/1
300 CAPSULE ORAL 2 TIMES DAILY
Qty: 10 CAPSULE | Refills: 0 | Status: ON HOLD | OUTPATIENT
Start: 2024-04-25

## 2024-04-25 RX ADMIN — HYDROMORPHONE HYDROCHLORIDE 0.5 MG: 1 INJECTION, SOLUTION INTRAMUSCULAR; INTRAVENOUS; SUBCUTANEOUS at 11:19

## 2024-04-25 RX ADMIN — HYDROMORPHONE HYDROCHLORIDE 0.5 MG: 1 INJECTION, SOLUTION INTRAMUSCULAR; INTRAVENOUS; SUBCUTANEOUS at 07:53

## 2024-04-25 RX ADMIN — BUSPIRONE HYDROCHLORIDE 15 MG: 15 TABLET ORAL at 07:36

## 2024-04-25 RX ADMIN — VANCOMYCIN HYDROCHLORIDE 1000 MG: 1 INJECTION, POWDER, LYOPHILIZED, FOR SOLUTION INTRAVENOUS at 10:55

## 2024-04-25 RX ADMIN — HYDROMORPHONE HYDROCHLORIDE 0.5 MG: 1 INJECTION, SOLUTION INTRAMUSCULAR; INTRAVENOUS; SUBCUTANEOUS at 05:22

## 2024-04-25 RX ADMIN — FOLIC ACID 1 MG: 1 TABLET ORAL at 07:35

## 2024-04-25 RX ADMIN — Medication 10 ML: at 07:35

## 2024-04-25 RX ADMIN — OXYCODONE HYDROCHLORIDE 5 MG: 5 TABLET ORAL at 14:11

## 2024-04-25 RX ADMIN — Medication 100 MG: at 07:35

## 2024-04-25 RX ADMIN — SERTRALINE 100 MG: 100 TABLET, FILM COATED ORAL at 07:35

## 2024-04-25 RX ADMIN — SODIUM CHLORIDE 100 ML/HR: 9 INJECTION, SOLUTION INTRAVENOUS at 07:38

## 2024-04-25 NOTE — CASE MANAGEMENT/SOCIAL WORK
Case Management Discharge Note      Final Note: Home with [facility]         Selected Continued Care - Discharged on 4/25/2024 Admission date: 4/23/2024 - Discharge disposition: Home or Self Care          Home Medical Care Coordination complete.      Service Provider Selected Services Address Phone Fax Patient Preferred    [facility] ,  Home Nursing [address] [place] [address], [facility] [address] [id number] [phone] --                   Transportation Services  Private: Car    Final Discharge Disposition Code: 06 - home with home health care

## 2024-04-25 NOTE — PLAN OF CARE
Goal Outcome Evaluation:  Plan of Care Reviewed With: patient        Progress: improving       Pt c/o back and hip pain relieved with prn dilaudid. Pt received one unit PRBCs. IVFs infusing. IV antibiotics for UTI. Will continue to monitor...

## 2024-04-25 NOTE — CASE MANAGEMENT/SOCIAL WORK
Continued Stay Note   [facility]     Patient Name: Lita Yu  MRN: [id number]  Today's Date: 4/25/2024    Admit Date: 4/23/2024    Plan: D/C Plan: Home with resumption of H[facility] services thru [facility].  Pt expresses inadequate pain control with meds from PCP. Recommended she request referral to pain clinic.  Transport home by family car.   Discharge Plan       Row Name 04/25/24 0920       Plan    Plan D/C Plan: Home with resumption of H[facility] services thru [facility].  Pt expresses inadequate pain control with meds from PCP. Recommended she request referral to pain clinic.  Transport home by family car.                 Expected Discharge Date and Time       Expected Discharge Date Expected Discharge Time    Apr 25, 2024               Edyta Espino RN

## 2024-04-25 NOTE — PLAN OF CARE
Goal Outcome Evaluation:           Progress: improving  Outcome Evaluation: Patient to be discharged today after nephrologist cleared him. She will follow up with him in 4-6 weeks. Patient to continue vancomycin at home through her PICC line.

## 2024-04-25 NOTE — OUTREACH NOTE
Prep Survey      Flowsheet Row Responses   [facility] facility patient discharged from? [facility]   Is LACE score < 7 ? No   Eligibility [facility]   Date of Admission 04/23/24   Date of Discharge 04/25/24   Discharge Disposition Home-Health Care Svc   Discharge diagnosis Acute hyperkalemia   Does the patient have one of the following disease processes/diagnoses(primary or secondary)? Other   Does the patient have Home health ordered? Yes   What is the Home health agency?  [facility]   Is there a DME ordered? No   Prep survey completed? Yes            Annemarie DOUGLAS - Registered Nurse

## 2024-04-25 NOTE — PROGRESS NOTES
"Pharmacy Antimicrobial Dosing Service    Subjective:  Lita Yu is a 58 y.o.female admitted with acute hyperkalemia. Patient was recently diagnosed with MRSA bacteremia and enhancement of the epidural fluid on imaging, and she is on vancomycin until 5/3/24.   Pharmacy has been consulted to dose Vancomycin for bacteremia/CNS infection.      Assessment/Plan    1. Day #3 Vancomycin inpatient: Pulse dosing d/t renal dysfxn. Last vancomycin dose was 1000 mg (~18 mg/kg actual BW) IV on 4/24 at 1356. Vancomycin random 4/25 at 0046 was 17.1 mcg/mL. Scr has improved slightly, but still appears to be above patient's baseline.    Will administer another vancomycin 1000 mg dose today (18 mg/kg ABW) with follow up random level ordered for 4/26 with AM labs.     Will continue to monitor drug levels, renal function, culture and sensitivities, and patient clinical status.       Objective:  Relevant clinical data and objective history reviewed:  160 cm (63\")   54.4 kg (119 lb 14.9 oz)   Ideal body weight: 52.4 kg (115 lb 8.3 oz)  Adjusted ideal body weight: 53.2 kg (117 lb 4.6 oz)  Body mass index is 21.24 kg/m².    Results from last 7 days   Lab Units 04/25/24  0046 04/24/24  0519 04/23/24  1603   VANCOMYCIN RM mcg/mL 17.10 16.00 21.60     Results from last 7 days   Lab Units 04/25/24  0046 04/24/24  1554 04/24/24  1342   CREATININE mg/dL 1.34* 1.41* 1.39*     Estimated Creatinine Clearance: 40.3 mL/min (A) (by C-G formula based on SCr of 1.34 mg/dL (H)).  I/O last 3 completed shifts:  In: 708.8 [Blood:708.8]  Out: 3050 [Urine:3050]    Results from last 7 days   Lab Units 04/25/24  0046 04/24/24  1554 04/24/24  0519   WBC 10*3/mm3 5.36 5.66 4.71     Temperature    04/24/24 2354 04/25/24 0400 04/25/24 0732   Temp: 98.1 °F (36.7 °C) 98.3 °F (36.8 °C) 97.3 °F (36.3 °C)     Baseline culture/source/susceptibility:  Microbiology Results (last 10 days)       Procedure Component Value - Date/Time    Blood Culture - Blood, Arm, " Left [[id number]]  (Normal) Collected: 04/23/24 2235    Lab Status: Preliminary result Specimen: Blood from Arm, Left Updated: 04/24/24 2300     Blood Culture No growth at 24 hours    Blood Culture - Blood, Arm, Left [[id number]]  (Normal) Collected: 04/23/24 1824    Lab Status: Preliminary result Specimen: Blood from Arm, Left Updated: 04/24/24 1831     Blood Culture No growth at 24 hours          Shad Bach RP  04/25/24 09:51 EDT   None available

## 2024-04-25 NOTE — THERAPY TREATMENT NOTE
"Subjective: Pt agreeable to therapeutic plan of care.    Objective:     Bed mobility - Supervision utilizing log roll technique    Transfers - Supervision and with rolling walker. Cued for hand placement.     Ambulation - 80 feet CGA and with rolling walker. Significant antalgic gait pattern, secondary to severe back and RLE pain while ambulating.     Therapeutic Exercise - 10 Reps B LE AROM supported sitting / chair    Vitals: WNL    Pain: 10 VAS   Location: back, RLE  Intervention for pain: Repositioned, RN provided medication, Increased Activity, and Therapeutic Presence    Education: Provided education on the importance of mobility in the acute care setting, Verbal/Tactile Cues, Transfer Training, and Gait Training    Assessment: Lita Yu presents with functional mobility impairments which indicate the need for skilled intervention. Tolerating session today without incident. Pt with uncontrolled pain, reporting 10/10 back and RLE pain while upright, despite RN administering Dilaudid prior to PT session. Ambulates with significant antalgic gait pattern. Will continue to follow and progress as tolerated.     Plan/Recommendations:   If medically appropriate, Low Intensity Therapy recommended post-acute care - This is recommended as therapy feels this patient would require 2-3 visits per week. OP or HH would be the best option depending on patient's home bound status. Consider, if the patient has other  \"skilled\" needs such as wounds, IV antibiotics, etc. Combined with \"low intensity\" could also equate to a SNF. If patient is medically complex, consider LTAC. Pt requires no DME at discharge.     Pt desires Home with Home Health at discharge. Pt cooperative; agreeable to therapeutic recommendations and plan of care.         Basic Mobility 6-click:  Rollin = Total, A lot = 2, A little = 3; 4 = None  Supine>Sit:   1 = Total, A lot = 2, A little = 3; 4 = None   Sit>Stand with arms:  1 = Total, A " lot = 2, A little = 3; 4 = None  Bed>Chair:   1 = Total, A lot = 2, A little = 3; 4 = None  Ambulate in room:  1 = Total, A lot = 2, A little = 3; 4 = None  3-5 Steps with railin = Total, A lot = 2, A little = 3; 4 = None  Score: 22    Modified [place]: N/A = No pre-op stroke/TIA    Post-Tx Position: Up in Chair, Alarms activated, and Call light and personal items within reach  PPE: gloves

## 2024-04-25 NOTE — DISCHARGE INSTR - APPOINTMENTS
Dr Quiroga (Nephrology)  May 30 2024  1:30pm  [address] [place] [address]  [phone]  You will receive a packet in the mail to be filled out and take with you to the appointment. You will also need an ID and insurance card.

## 2024-04-25 NOTE — PLAN OF CARE
"Assessment: Lita Yu presents with functional mobility impairments which indicate the need for skilled intervention. Tolerating session today without incident. Pt with uncontrolled pain, reporting 10/10 back and RLE pain while upright, despite RN administering Dilaudid prior to PT session. Ambulates with significant antalgic gait pattern. Will continue to follow and progress as tolerated.     Plan/Recommendations:   If medically appropriate, Low Intensity Therapy recommended post-acute care - This is recommended as therapy feels this patient would require 2-3 visits per week. OP or HH would be the best option depending on patient's home bound status. Consider, if the patient has other  \"skilled\" needs such as wounds, IV antibiotics, etc. Combined with \"low intensity\" could also equate to a SNF. If patient is medically complex, consider LTAC. Pt requires no DME at discharge.     Pt desires Home with Home Health at discharge. Pt cooperative; agreeable to therapeutic recommendations and plan of care.     "

## 2024-04-25 NOTE — PROGRESS NOTES
PROGRESS NOTE      Patient Name: Lita Yu  : 1965  MRN: [id number]  Primary Care Physician: Norma Saul APRN  Date of admission: 2024    Patient Care Team:  Norma Saul APRN as PCP - General (Nurse Practitioner)  Flory Villanueva MD (Physical Medicine and Rehabilitation)        Subjective   Subjective:     Patient is feeling better than yesterday more alert than yesterday  Review of systems:  All other review of system are remarkable      Allergies:    Allergies   Allergen Reactions    Sulfa Antibiotics Hives    Keflex [Cephalexin] Hives       Objective   Exam:     Vital Signs  Temp:  [97.3 °F (36.3 °C)-98.3 °F (36.8 °C)] 97.3 °F (36.3 °C)  Heart Rate:  [77-94] 83  Resp:  [14-22] 14  BP: (105-131)/(60-85) 112/70  SpO2:  [90 %-99 %] 99 %  on   ;   Device (Oxygen Therapy): room air  Body mass index is 21.24 kg/m².    General: Middle-age no female in no acute distress.    Head:      Normocephalic and atraumatic.    Eyes:      PERRL/EOM intact, conjunctiva and sclera clear with out nystagmus.    Neck:      No masses, thyromegaly,  trachea central with normal respiratory effort   Lungs:    Clear bilaterally to auscultation.    Heart:      Regular rate and rhythm, no murmur no gallop  Abd:        Soft, nontender, not distended, bowel sounds positive, no shifting dullness   Pulses:   Pulses palpable  Extr:        No cyanosis or clubbing--no edema.    Neuro:    No focal deficits.   alert oriented x3  Skin:       Intact without lesions or rashes.    Psych:    Alert and cooperative; normal mood and affect; .      Results Review:  I have personally reviewed most recent Data :  CBC    Results from last 7 days   Lab Units 24  0046 24  1554 24  0519 24  1603 24  1620   WBC 10*3/mm3 5.36 5.66 4.71 6.43 5.94   HEMOGLOBIN g/dL 8.5* 6.9* 7.1* 6.9* 7.5*   PLATELETS 10*3/mm3 141 147 168 217 231     CMP   Results from last 7 days   Lab Units 24  0046  04/24/24  1554 04/24/24  1342 04/24/24  0518 04/23/24  2235 04/23/24  1603 04/18/24  1618   SODIUM mmol/L 141 141 142  --  140 137 138   POTASSIUM mmol/L 4.6 4.9 4.9 4.8 6.2* 7.1* 4.5   CHLORIDE mmol/L 107 108* 107  --  109* 107 107   CO2 mmol/L 25.0 25.0 24.0  --  22.0 22.0 18.0*   BUN mg/dL 12 14 15  --  19 19 19   CREATININE mg/dL 1.34* 1.41* 1.39*  --  1.88* 1.67* 1.65*   GLUCOSE mg/dL 78 104* 90  --  108* 111* 72   ALBUMIN g/dL  --   --   --   --   --   --  3.6   BILIRUBIN mg/dL  --   --   --   --   --   --  0.2   ALK PHOS U/L  --   --   --   --   --   --  210*   AST (SGOT) U/L  --   --   --   --   --   --  24   ALT (SGPT) U/L  --   --   --   --   --   --  16     ABG      MRI Lumbar Spine With & Without Contrast    Result Date: 4/23/2024  Impression: Motion limited exam. There has been interval worsening of findings related to L2-L3 discitis and vertebral body osteomyelitis, including worsening destruction of the disc space and increased vertebral body marrow edema. There is a persistent epidural abscess that is not significantly changed in size since the comparison study. There is enhancing paraspinal soft tissue at the L2 and L3 vertebral body levels compatible with paraspinal phlegmon with no walled off fluid collection demonstrated Electronically Signed: Glenn Tomas  4/23/2024 10:23 PM EDT  Workstation ID: [id number]    MRI Thoracic Spine With & Without Contrast    Result Date: 4/23/2024  Impression: No evidence of thoracic discitis or epidural abscess Electronically Signed: Glenn Tomas  4/23/2024 10:07 PM EDT  Workstation ID: [id number]     Results for orders placed during the hospital encounter of 03/07/24    Adult Transesophageal Echo (SILVINA) W/ Cont if Necessary Per Protocol    Interpretation Summary    Left ventricular systolic function is normal. Left ventricular ejection fraction appears to be 61 - 65%.    Left ventricular diastolic function was normal.    The left atrial cavity is mildly dilated.     Saline test results are negative.    Scheduled Meds:busPIRone, 15 mg, Oral, BID  cefTRIAXone, 1,000 mg, Intravenous, Q24H  folic acid, 1 mg, Oral, Daily  Pharmacy to dose vancomycin, , Does not apply, Daily  QUEtiapine, 50 mg, Oral, Nightly  sertraline, 100 mg, Oral, BID  sodium chloride, 10 mL, Intravenous, Q12H  thiamine, 100 mg, Oral, Daily  vancomycin, 1,000 mg, Intravenous, Once      Continuous Infusions:   PRN Meds:  acetaminophen    albuterol sulfate HFA    senna-docusate sodium **AND** polyethylene glycol **AND** bisacodyl **AND** bisacodyl    dextrose    dextrose    glucagon (human recombinant)    HYDROmorphone    melatonin    nitroglycerin    ondansetron    oxyCODONE    [COMPLETED] Insert Peripheral IV **AND** sodium chloride    sodium chloride    sodium chloride    traMADol    Vancomycin Pharmacy Intermittent/Pulse Dosing    Assessment & Plan   Assessment and Plan:         Acute hyperkalemia    ASSESSMENT:  Acute kidney injury likely secondary to volume and low blood pressure and acute blood loss   severe hyperkalemia need to rule out any hematoma or GI bleed  Will get complete urine studies   patient has history of rhabdomyolysis Will get CPK level  Metabolic acidosis may be due to his respiratory alkalosis because of some history of cirrhosis    PLAN :      Patient hemodynamically better received IV fluid overnight   As hemodynamics better discontinue IV fluid  Renal function slightly better than yesterday  Urine analysis without any significant proteinuria around 300 mg with SARITA  No significant hematuria some leukocytosis present  Hemoglobin is better after blood transfusion   potassium level is improving at this time   Follow-up with a urine studies if significant proteinuria further workup will be needed  Follow-up with magnesium and phosphorus level  Patient has some recent thrombocytopenia now platelets are normal will follow-up          Electronically signed by Red Quiroga MD,   [facility]  kidney consultant  [phone]  4/25/2024  10:23 EDT

## 2024-04-25 NOTE — PROGRESS NOTES
Iron studies, b12 and folate are all ok. Likely anemia of chronic disease.  Outpatient follow up in 1 week to have labs rechecked in addition to renal function.    27-Dec-2019 11:38

## 2024-04-25 NOTE — DISCHARGE SUMMARY
The patient feels ok currently. She has no complaints of chest pain or SOB. She does complain of chronic back pain. The patient has no urinary complaints. No itching, burning. No fevers, chills, sweats. No nausea or vomiting. Patient had a bowel movement yesterday. She appears in NAD currently. The patient has a long standing history of anemia. Patient had a colonoscopy done four years ago which was normal. She denies any rectal bleeding or hematemesis. Hemoglobin has been stable after transfusion.     BP is 112/70. Pulse rate is 83. Temperature is 97.3 respirations are 14. Oxygen saturations are 99 percent on RA    Appearance: No apparent distress, non-toxic appearing   HEENT/Neck: Neck is supple, Extraocular movements intact, Moist mucous membranes, Cardiovascular: Regular Rate and Rhythm,  Pulmonary: Clear to auscultation Bilaterally.   Abdomen: BS+, Soft, non-tender, non-distended.   Ext: No Cyanosis, Clubbing, Edema.   Neuro: Non-focal, Alert & Oriented x 3, tenderness on her lumbar area.  Labs reviewed.      Hyperkalemia - the patient has been given hyperkalemia protocol. Nephrology has been consult. UA shows a Uti. I will start rocephin 1 g IV qday. Urine culture has been sent. Labs are pending currently. Last K was 6.2    04/25/2024 - patients K is now normal.       Acute renal failure secondary to ATN - the patient is on multiple nephrotoxic medications which are on hold. She has not been taking her lasix over the last several months but has been taking her K supplementations, Aldactone, and Nsaids. They are all on hold. Continue fluid hydration    Patients creatinine has improved significantly since admission. Now down to 1.34 from peak of 1.9. Nephrology following.       Cirrhosis - patient has a known history of cirrhosis from alcohol and fatty liver disease based on my our discussion. Will watch for fluid overload with the fluids that are being given.       04/25/2024 - patients cirrhosis is  stable    4. Anemia of chronic disease.  - patient has long standing problems regarding her anemia going back to last year. This is likely from her cirrhosis as it occurs with low platelets and low WBC. The patient has no symptoms currently. She states that she cannot take iron supplementation due to her liver. Colonscopy was done 4 years ago. This can be followed up outpatient.     Ok for discharge home if ok with nephrology. Follow up for labs in the office next week. Total time spent with discharge is greater than 30 minutes. Avoid NSAIDS, Ace and diurectics for now until follow up with PCP.

## 2024-04-26 ENCOUNTER — TRANSITIONAL CARE MANAGEMENT TELEPHONE ENCOUNTER (OUTPATIENT)
Dept: CALL CENTER | Facility: HOSPITAL | Age: 59
End: 2024-04-26
Payer: COMMERCIAL

## 2024-04-26 LAB
BH BB BLOOD EXPIRATION DATE: NORMAL
BH BB BLOOD TYPE BARCODE: 5100
BH BB DISPENSE STATUS: NORMAL
BH BB PRODUCT CODE: NORMAL
BH BB UNIT NUMBER: NORMAL
CROSSMATCH INTERPRETATION: NORMAL
UNIT  ABO: NORMAL
UNIT  RH: NORMAL

## 2024-04-26 NOTE — OUTREACH NOTE
Call Center TCM Note      Flowsheet Row Responses   [facility] [place] patient discharged from? [facility]   Does the patient have one of the following disease processes/diagnoses(primary or secondary)? Other   TCM attempt successful? Yes   Call start time 1356   Call end time 1400   Discharge diagnosis Acute hyperkalemia   Meds reviewed with patient/caregiver? Yes   Is the patient having any side effects they believe may be caused by any medication additions or changes? No   Does the patient have all medications ordered at discharge? Yes   Is the patient taking all medications as directed (includes completed medication regime)? Yes   Comments Oncology 5/2/24   Does the patient have an appointment with their PCP within 7-14 days of discharge? No  [OV 4/29/2024 at 1:30 PM]   Nursing Interventions Routed TCM call to PCP office   What is the Home health agency?  [facility]   Has home health visited the patient within 72 hours of discharge? Yes   Psychosocial issues? No   Did the patient receive a copy of their discharge instructions? Yes   Nursing interventions Reviewed instructions with patient   What is the patient's perception of their health status since discharge? Improving   Is the patient/caregiver able to teach back signs and symptoms related to disease process for when to call PCP? Yes   Is the patient/caregiver able to teach back signs and symptoms related to disease process for when to call 911? Yes   Is the patient/caregiver able to teach back the hierarchy of who to call/visit for symptoms/problems? PCP, Specialist, Home health nurse, Urgent Care, ED, 911 Yes   If the patient is a current smoker, are they able to teach back resources for cessation? Not a smoker   TCM call completed? Yes   Wrap up additional comments Pt states she is doing better, and denies any chest pain/SOB. Reviewed AVS/meds with pt. Pt verified PCP/Oncology fu appts.   Call end time 1400   Would this patient benefit from a Referral to [facility]  Work? No   Is the patient interested in additional calls from an ambulatory ? No            Ninfa Mendoza RN    4/26/2024, 14:01 EDT

## 2024-04-27 LAB — BACTERIA SPEC AEROBE CULT: ABNORMAL

## 2024-04-28 DIAGNOSIS — G47.00 INSOMNIA, UNSPECIFIED TYPE: ICD-10-CM

## 2024-04-28 LAB
BACTERIA SPEC AEROBE CULT: NORMAL
BACTERIA SPEC AEROBE CULT: NORMAL

## 2024-04-29 ENCOUNTER — LAB (OUTPATIENT)
Dept: FAMILY MEDICINE CLINIC | Facility: CLINIC | Age: 59
End: 2024-04-29
Payer: COMMERCIAL

## 2024-04-29 ENCOUNTER — OFFICE VISIT (OUTPATIENT)
Dept: FAMILY MEDICINE CLINIC | Facility: CLINIC | Age: 59
End: 2024-04-29
Payer: COMMERCIAL

## 2024-04-29 VITALS
SYSTOLIC BLOOD PRESSURE: 123 MMHG | TEMPERATURE: 98 F | OXYGEN SATURATION: 99 % | BODY MASS INDEX: 21.26 KG/M2 | HEART RATE: 78 BPM | DIASTOLIC BLOOD PRESSURE: 76 MMHG | WEIGHT: 120 LBS

## 2024-04-29 DIAGNOSIS — M86.9 OSTEOMYELITIS, UNSPECIFIED SITE, UNSPECIFIED TYPE: ICD-10-CM

## 2024-04-29 DIAGNOSIS — M46.46 DISCITIS OF LUMBAR REGION: Primary | ICD-10-CM

## 2024-04-29 DIAGNOSIS — D64.9 ANEMIA, UNSPECIFIED TYPE: ICD-10-CM

## 2024-04-29 DIAGNOSIS — N17.9 ACUTE RENAL FAILURE, UNSPECIFIED ACUTE RENAL FAILURE TYPE: ICD-10-CM

## 2024-04-29 LAB
ANION GAP SERPL CALCULATED.3IONS-SCNC: 13.3 MMOL/L (ref 5–15)
BASOPHILS # BLD AUTO: 0.04 10*3/MM3 (ref 0–0.2)
BASOPHILS NFR BLD AUTO: 0.5 % (ref 0–1.5)
BUN SERPL-MCNC: 14 MG/DL (ref 6–20)
BUN/CREAT SERPL: 13.5 (ref 7–25)
CALCIUM SPEC-SCNC: 10 MG/DL (ref 8.6–10.5)
CHLORIDE SERPL-SCNC: 107 MMOL/L (ref 98–107)
CO2 SERPL-SCNC: 19.7 MMOL/L (ref 22–29)
CREAT SERPL-MCNC: 1.04 MG/DL (ref 0.57–1)
DEPRECATED RDW RBC AUTO: 45 FL (ref 37–54)
EGFRCR SERPLBLD CKD-EPI 2021: 63.2 ML/MIN/1.73
EOSINOPHIL # BLD AUTO: 0.33 10*3/MM3 (ref 0–0.4)
EOSINOPHIL NFR BLD AUTO: 4.4 % (ref 0.3–6.2)
ERYTHROCYTE [DISTWIDTH] IN BLOOD BY AUTOMATED COUNT: 13.6 % (ref 12.3–15.4)
GLUCOSE SERPL-MCNC: 78 MG/DL (ref 65–99)
HCT VFR BLD AUTO: 33.7 % (ref 34–46.6)
HGB BLD-MCNC: 10.8 G/DL (ref 12–15.9)
IMM GRANULOCYTES # BLD AUTO: 0.03 10*3/MM3 (ref 0–0.05)
IMM GRANULOCYTES NFR BLD AUTO: 0.4 % (ref 0–0.5)
LYMPHOCYTES # BLD AUTO: 1.72 10*3/MM3 (ref 0.7–3.1)
LYMPHOCYTES NFR BLD AUTO: 23.2 % (ref 19.6–45.3)
MCH RBC QN AUTO: 29.7 PG (ref 26.6–33)
MCHC RBC AUTO-ENTMCNC: 32 G/DL (ref 31.5–35.7)
MCV RBC AUTO: 92.6 FL (ref 79–97)
MONOCYTES # BLD AUTO: 0.73 10*3/MM3 (ref 0.1–0.9)
MONOCYTES NFR BLD AUTO: 9.8 % (ref 5–12)
NEUTROPHILS NFR BLD AUTO: 4.57 10*3/MM3 (ref 1.7–7)
NEUTROPHILS NFR BLD AUTO: 61.7 % (ref 42.7–76)
NRBC BLD AUTO-RTO: 0 /100 WBC (ref 0–0.2)
PLATELET # BLD AUTO: 222 10*3/MM3 (ref 140–450)
PMV BLD AUTO: 9.7 FL (ref 6–12)
POTASSIUM SERPL-SCNC: 4.4 MMOL/L (ref 3.5–5.2)
RBC # BLD AUTO: 3.64 10*6/MM3 (ref 3.77–5.28)
SODIUM SERPL-SCNC: 140 MMOL/L (ref 136–145)
WBC NRBC COR # BLD AUTO: 7.42 10*3/MM3 (ref 3.4–10.8)

## 2024-04-29 PROCEDURE — 85025 COMPLETE CBC W/AUTO DIFF WBC: CPT | Performed by: NURSE PRACTITIONER

## 2024-04-29 PROCEDURE — 80048 BASIC METABOLIC PNL TOTAL CA: CPT | Performed by: NURSE PRACTITIONER

## 2024-04-29 PROCEDURE — 36415 COLL VENOUS BLD VENIPUNCTURE: CPT

## 2024-04-29 PROCEDURE — 99495 TRANSJ CARE MGMT MOD F2F 14D: CPT | Performed by: NURSE PRACTITIONER

## 2024-04-29 RX ORDER — QUETIAPINE FUMARATE 50 MG/1
TABLET, FILM COATED ORAL
Qty: 90 TABLET | Refills: 0 | Status: ON HOLD | OUTPATIENT
Start: 2024-04-29

## 2024-04-29 RX ORDER — HYDROCODONE BITARTRATE AND ACETAMINOPHEN 7.5; 325 MG/1; MG/1
1 TABLET ORAL EVERY 12 HOURS PRN
Qty: 30 TABLET | Refills: 0 | Status: ON HOLD | OUTPATIENT
Start: 2024-04-29

## 2024-04-29 NOTE — PATIENT INSTRUCTIONS
Recheck labs  Schedule an appt with hematologist  Referral t o Dr. Quiroga  Referral to Dr. Batres  Go to ER for worsening symptoms

## 2024-04-29 NOTE — PROGRESS NOTES
Transitional Care Follow Up Visit  Subjective     Lita Yu is a 58 y.o. female who presents for a transitional care management visit.    Within 48 business hours after discharge our office contacted her via telephone to coordinate her care and needs.      I reviewed and discussed the details of that call along with the discharge summary, hospital problems, inpatient lab results, inpatient diagnostic studies, and consultation reports with Lita.     Current outpatient and discharge medications have been reconciled for the patient.  Reviewed by: SHARIF Agudelo          4/25/2024     4:52 PM   Date of TCM Phone Call   [facility]   Date of Admission 4/23/2024   Date of Discharge 4/25/2024   Discharge Disposition Home-Health Care [facility]     Risk for Readmission (LACE) Score: 12 (4/25/2024  6:00 AM)      History of Present Illness  Patient is here today for [facility] admission follow-up.  Patient was admitted from April 23 through April 29.  She was sent there when her home health labs showed potassium above 7 and hemoglobin less than 7.  Patient has been receiving IV home antibiotics for recent diagnosis of osteomyelitis of the lumbar spine.  She has been monitored by pharmacy and ID for the vancomycin dosing.  Patient has a longstanding history of anemia and hemoglobin has been trending down.  She was referred to hematology outpatient.  Patient has a history of liver cirrhosis due to alcohol abuse.  MRI while in the hospital recently showed worsening osteomyelitis and discitis.  Patient was found to have a UTI while in the hospital and was treated.  She was also followed by renal while in the hospital.  She is having increased pain in her back  She states in the hospital no one seemed to be concerned for her back infection  She is taking tramadol but it is not helping much.  She has 10 pills left  She has stopped the ibuprofen  Denies fever or chills  Pt states her  potassium was elevated but she was still taking oral potassium even though her lasix was stopped.  She was working with PT but that is done, she is doing some of the exercises on her own       Course During Hospital Stay:  see HPI     The following portions of the patient's history were reviewed and updated as appropriate: allergies, current medications, past family history, past medical history, past social history, past surgical history, and problem list.    Review of Systems   Constitutional:  Negative for chills, fatigue and fever.   Respiratory:  Negative for chest tightness and shortness of breath.    Cardiovascular:  Negative for chest pain and palpitations.   Gastrointestinal:  Negative for abdominal pain, constipation, diarrhea, nausea and vomiting.   Musculoskeletal:  Positive for back pain.   Neurological:  Positive for weakness (in legs). Negative for dizziness, numbness and headaches.       Objective   Physical Exam  Constitutional:       General: She is not in acute distress.     Appearance: Normal appearance. She is not ill-appearing.   HENT:      Head: Normocephalic and atraumatic.   Cardiovascular:      Rate and Rhythm: Normal rate and regular rhythm.      Heart sounds: No murmur heard.  Pulmonary:      Effort: Pulmonary effort is normal. No respiratory distress.      Breath sounds: Normal breath sounds.   Musculoskeletal:         General: Tenderness present.   Skin:     General: Skin is warm and dry.   Neurological:      General: No focal deficit present.      Mental Status: She is alert and oriented to person, place, and time.   Psychiatric:         Mood and Affect: Mood normal.         Behavior: Behavior normal.         Thought Content: Thought content normal.         Judgment: Judgment normal.         Assessment & Plan   Problems Addressed this Visit    None  Visit Diagnoses       Discitis of lumbar region    -  Primary    appears to be worsening  on last week of vanc- reaching out to ID  urgent  referral to Spine  monitor labs  inc pain- will switch to norco- discuss addition risk    Relevant Orders    Ambulatory Referral to Spine Surgery    Osteomyelitis, unspecified site, unspecified type        appears to be worsening  on last week of vanc- reaching out to ID  urgent referral to Spine  monitor labs    Relevant Medications    HYDROcodone-acetaminophen (NORCO) 7.5-325 MG per tablet    Other Relevant Orders    Ambulatory Referral to Spine Surgery    CBC & Differential    Basic metabolic panel    Acute renal failure, unspecified acute renal failure type        reviewed notes  check labs  referral to outpatient renal    Relevant Orders    Ambulatory Referral to Nephrology (Completed)    Anemia, unspecified type        stable  make appt with hematology  appears to be anemia of chronic disease.    Relevant Orders    CBC & Differential    Basic metabolic panel          Diagnoses         Codes Comments    Discitis of lumbar region    -  Primary ICD-10-CM: M46.46  ICD-9-CM: 722.93 appears to be worsening  on last week of vanc- reaching out to ID  urgent referral to Spine  monitor labs  inc pain- will switch to norco- discuss addition risk    Osteomyelitis, unspecified site, unspecified type     ICD-10-CM: M86.9  ICD-9-CM: 730.20 appears to be worsening  on last week of vanc- reaching out to ID  urgent referral to Spine  monitor labs    Acute renal failure, unspecified acute renal failure type     ICD-10-CM: N17.9  ICD-9-CM: 584.9 reviewed notes  check labs  referral to outpatient renal    Anemia, unspecified type     ICD-10-CM: D64.9  ICD-9-CM: 285.9 stable  make appt with hematology  appears to be anemia of chronic disease.

## 2024-05-01 ENCOUNTER — TELEPHONE (OUTPATIENT)
Dept: FAMILY MEDICINE CLINIC | Facility: CLINIC | Age: 59
End: 2024-05-01
Payer: COMMERCIAL

## 2024-05-01 ENCOUNTER — APPOINTMENT (OUTPATIENT)
Dept: GENERAL RADIOLOGY | Facility: HOSPITAL | Age: 59
End: 2024-05-01
Payer: COMMERCIAL

## 2024-05-01 ENCOUNTER — HOSPITAL ENCOUNTER (INPATIENT)
Facility: HOSPITAL | Age: 59
LOS: 10 days | Discharge: NURSING, REHAB OR LONG-TERM CARE | End: 2024-05-11
Attending: INTERNAL MEDICINE | Admitting: HOSPITALIST
Payer: COMMERCIAL

## 2024-05-01 DIAGNOSIS — M25.50 PAIN IN JOINTS: Primary | ICD-10-CM

## 2024-05-01 PROBLEM — M46.20 SPINAL ABSCESS: Status: ACTIVE | Noted: 2024-05-01

## 2024-05-01 LAB — VANCOMYCIN SERPL-MCNC: 22.6 MCG/ML (ref 5–40)

## 2024-05-01 PROCEDURE — 80202 ASSAY OF VANCOMYCIN: CPT | Performed by: INTERNAL MEDICINE

## 2024-05-01 PROCEDURE — 71045 X-RAY EXAM CHEST 1 VIEW: CPT

## 2024-05-01 RX ORDER — POLYETHYLENE GLYCOL 3350 17 G/17G
17 POWDER, FOR SOLUTION ORAL DAILY PRN
Status: DISCONTINUED | OUTPATIENT
Start: 2024-05-01 | End: 2024-05-11 | Stop reason: HOSPADM

## 2024-05-01 RX ORDER — SERTRALINE HYDROCHLORIDE 100 MG/1
100 TABLET, FILM COATED ORAL 2 TIMES DAILY
Status: DISCONTINUED | OUTPATIENT
Start: 2024-05-01 | End: 2024-05-11 | Stop reason: HOSPADM

## 2024-05-01 RX ORDER — AMOXICILLIN 250 MG
2 CAPSULE ORAL 2 TIMES DAILY PRN
Status: DISCONTINUED | OUTPATIENT
Start: 2024-05-01 | End: 2024-05-11 | Stop reason: HOSPADM

## 2024-05-01 RX ORDER — HYDROCODONE BITARTRATE AND ACETAMINOPHEN 10; 325 MG/1; MG/1
1 TABLET ORAL EVERY 4 HOURS PRN
Status: DISCONTINUED | OUTPATIENT
Start: 2024-05-01 | End: 2024-05-11 | Stop reason: HOSPADM

## 2024-05-01 RX ORDER — SODIUM CHLORIDE 9 MG/ML
40 INJECTION, SOLUTION INTRAVENOUS AS NEEDED
Status: DISCONTINUED | OUTPATIENT
Start: 2024-05-01 | End: 2024-05-11 | Stop reason: HOSPADM

## 2024-05-01 RX ORDER — HYDROCODONE BITARTRATE AND ACETAMINOPHEN 7.5; 325 MG/1; MG/1
1 TABLET ORAL EVERY 4 HOURS PRN
Status: DISCONTINUED | OUTPATIENT
Start: 2024-05-01 | End: 2024-05-11 | Stop reason: HOSPADM

## 2024-05-01 RX ORDER — ONDANSETRON 4 MG/1
4 TABLET, ORALLY DISINTEGRATING ORAL EVERY 6 HOURS PRN
Status: DISCONTINUED | OUTPATIENT
Start: 2024-05-01 | End: 2024-05-11 | Stop reason: HOSPADM

## 2024-05-01 RX ORDER — QUETIAPINE FUMARATE 25 MG/1
50 TABLET, FILM COATED ORAL NIGHTLY
Status: DISCONTINUED | OUTPATIENT
Start: 2024-05-01 | End: 2024-05-11 | Stop reason: HOSPADM

## 2024-05-01 RX ORDER — VANCOMYCIN HYDROCHLORIDE 1 G/20ML
1 INJECTION, POWDER, LYOPHILIZED, FOR SOLUTION INTRAVENOUS DAILY
Status: DISCONTINUED | OUTPATIENT
Start: 2024-05-01 | End: 2024-05-01

## 2024-05-01 RX ORDER — BUSPIRONE HYDROCHLORIDE 15 MG/1
15 TABLET ORAL 2 TIMES DAILY
Status: DISCONTINUED | OUTPATIENT
Start: 2024-05-01 | End: 2024-05-11 | Stop reason: HOSPADM

## 2024-05-01 RX ORDER — ONDANSETRON 2 MG/ML
4 INJECTION INTRAMUSCULAR; INTRAVENOUS EVERY 6 HOURS PRN
Status: DISCONTINUED | OUTPATIENT
Start: 2024-05-01 | End: 2024-05-11 | Stop reason: HOSPADM

## 2024-05-01 RX ORDER — FOLIC ACID 1 MG/1
1 TABLET ORAL DAILY
Status: DISCONTINUED | OUTPATIENT
Start: 2024-05-01 | End: 2024-05-11 | Stop reason: HOSPADM

## 2024-05-01 RX ORDER — BISACODYL 10 MG
10 SUPPOSITORY, RECTAL RECTAL DAILY PRN
Status: DISCONTINUED | OUTPATIENT
Start: 2024-05-01 | End: 2024-05-11 | Stop reason: HOSPADM

## 2024-05-01 RX ORDER — ALBUTEROL SULFATE 2.5 MG/3ML
2.5 SOLUTION RESPIRATORY (INHALATION) EVERY 6 HOURS PRN
Status: DISCONTINUED | OUTPATIENT
Start: 2024-05-01 | End: 2024-05-11 | Stop reason: HOSPADM

## 2024-05-01 RX ORDER — BISACODYL 5 MG/1
5 TABLET, DELAYED RELEASE ORAL DAILY PRN
Status: DISCONTINUED | OUTPATIENT
Start: 2024-05-01 | End: 2024-05-11 | Stop reason: HOSPADM

## 2024-05-01 RX ORDER — SODIUM CHLORIDE 0.9 % (FLUSH) 0.9 %
10 SYRINGE (ML) INJECTION EVERY 12 HOURS SCHEDULED
Status: DISCONTINUED | OUTPATIENT
Start: 2024-05-01 | End: 2024-05-11 | Stop reason: HOSPADM

## 2024-05-01 RX ORDER — SODIUM CHLORIDE 0.9 % (FLUSH) 0.9 %
10 SYRINGE (ML) INJECTION AS NEEDED
Status: DISCONTINUED | OUTPATIENT
Start: 2024-05-01 | End: 2024-05-11 | Stop reason: HOSPADM

## 2024-05-01 RX ORDER — ACETAMINOPHEN 325 MG/1
650 TABLET ORAL EVERY 4 HOURS PRN
Status: DISCONTINUED | OUTPATIENT
Start: 2024-05-01 | End: 2024-05-11 | Stop reason: HOSPADM

## 2024-05-01 RX ORDER — ACETAMINOPHEN 160 MG/5ML
650 SOLUTION ORAL EVERY 4 HOURS PRN
Status: DISCONTINUED | OUTPATIENT
Start: 2024-05-01 | End: 2024-05-11 | Stop reason: HOSPADM

## 2024-05-01 RX ORDER — ACETAMINOPHEN 650 MG/1
650 SUPPOSITORY RECTAL EVERY 4 HOURS PRN
Status: DISCONTINUED | OUTPATIENT
Start: 2024-05-01 | End: 2024-05-11 | Stop reason: HOSPADM

## 2024-05-01 RX ADMIN — SERTRALINE 100 MG: 100 TABLET, FILM COATED ORAL at 21:38

## 2024-05-01 RX ADMIN — QUETIAPINE FUMARATE 50 MG: 25 TABLET ORAL at 21:38

## 2024-05-01 RX ADMIN — HYDROCODONE BITARTRATE AND ACETAMINOPHEN 1 TABLET: 10; 325 TABLET ORAL at 17:31

## 2024-05-01 RX ADMIN — BUSPIRONE HYDROCHLORIDE 15 MG: 15 TABLET ORAL at 21:38

## 2024-05-01 RX ADMIN — HYDROCODONE BITARTRATE AND ACETAMINOPHEN 1 TABLET: 7.5; 325 TABLET ORAL at 21:38

## 2024-05-01 NOTE — PLAN OF CARE
Goal Outcome Evaluation:              Outcome Evaluation: pt direct admit from home. Pt states she had an MRI last week and her MD office called to tell her she needed to come in that she had an abscess of the spine. Pt has a PICC line that she presents with. Xray has been ordered.

## 2024-05-01 NOTE — PROGRESS NOTES
"Pharmacy Antimicrobial Dosing Service    Subjective:  Lita Yu is a 58 y.o.female admitted with spinal abscess. Pharmacy has been consulted to dose Vancomycin for possible MRSA bacteremia and enhancement of epidural fluid on imaging. She is on vancomycin until 5/3/24.        Assessment/Plan    1. Day #1 Vancomycin: Goal -600 mcg*h/mL. Vancomycin random level 22.6 mcg/mL at admission. Will plan to give vancomycin 1000mg(18 mg/kg ABW) IV q24h starting 5/2 0900. Patient is on vancomycin until 5/3/24. No levels ordered at this time.    Will continue to monitor drug levels, renal function, culture and sensitivities, and patient clinical status.       Objective:  Relevant clinical data and objective history reviewed:  160 cm (62.99\")   54.4 kg (120 lb)   Ideal body weight: 52.4 kg (115 lb 7.7 oz)  Adjusted ideal body weight: 53.2 kg (117 lb 4.6 oz)  Body mass index is 21.26 kg/m².    Results from last 7 days   Lab Units 05/01/24  1736 04/25/24  0046   VANCOMYCIN RM mcg/mL 22.60 17.10     Results from last 7 days   Lab Units 04/29/24  1430 04/25/24  0046   CREATININE mg/dL 1.04* 1.34*     Estimated Creatinine Clearance: 51.9 mL/min (A) (by C-G formula based on SCr of 1.04 mg/dL (H)).  No intake/output data recorded.    Results from last 7 days   Lab Units 04/29/24  1430 04/25/24  0046   WBC 10*3/mm3 7.42 5.36     Temperature    05/01/24 1623   Temp: 98.2 °F (36.8 °C)     Baseline culture/source/susceptibility:  Microbiology Results (last 10 days)       Procedure Component Value - Date/Time    Urine Culture - Urine, Urine, Clean Catch [[id number]]  (Abnormal)  (Susceptibility) Collected: 04/24/24 1229    Lab Status: Final result Specimen: Urine, Clean Catch Updated: 04/27/24 1056     Urine Culture >100,000 CFU/mL Klebsiella pneumoniae ssp pneumoniae    Narrative:      Colonization of the urinary tract without infection is common. Treatment is discouraged unless the patient is symptomatic, pregnant, or " undergoing an invasive urologic procedure.    Susceptibility        Klebsiella pneumoniae ssp pneumoniae      SOFIA      Amoxicillin + Clavulanate <=2 ug/ml Susceptible      Ampicillin >=32 ug/ml Resistant      Ampicillin + Sulbactam 8 ug/ml Susceptible      Cefazolin <=4 ug/ml Susceptible      Cefepime <=1 ug/ml Susceptible      Ceftazidime <=1 ug/ml Susceptible      Ceftriaxone <=1 ug/ml Susceptible      Gentamicin <=1 ug/ml Susceptible      Levofloxacin <=0.12 ug/ml Susceptible      Nitrofurantoin 128 ug/ml Resistant      Piperacillin + Tazobactam <=4 ug/ml Susceptible      Trimethoprim + Sulfamethoxazole <=20 ug/ml Susceptible                           Blood Culture - Blood, Arm, Left [[id number]]  (Normal) Collected: 04/23/24 2235    Lab Status: Final result Specimen: Blood from Arm, Left Updated: 04/28/24 2300     Blood Culture No growth at 5 days    Blood Culture - Blood, Arm, Left [[id number]]  (Normal) Collected: 04/23/24 1824    Lab Status: Final result Specimen: Blood from Arm, Left Updated: 04/28/24 1831     Blood Culture No growth at 5 days            Latrice Wesley [facility]  05/01/24 18:58 EDT

## 2024-05-01 NOTE — H&P
[facility]  History & Physical    Patient Name: Lita Yu  : 1965  MRN: [id number]  Primary Care Physician:  Norma Saul APRN  Date of admission: 2024  Date and Time of Service: 2024 at 1623    Subjective      Chief Complaint: back pain    History of Present Illness: Lita Yu is a 58 y.o. female with a CMH of hypertension, peripheral vascular disease, degenerative joint disease, seizure disorder, difficult tobacco dependence, cirrhosis, generalized weakness and recent MRSA bacteremia and enhancement of the epidural fluid on imaging. She was discharged on vancomycin which is supposed to be on till 5/3/2024 who presented to [facility] on 2024 with a chief complaint of worsening pain in her lower back. She has been receiving IV antibiotics since her hospital discharge but has not missed any doses. Despite the treatment, her pain has been increasing.  Mrs. Yu has been experiencing fevers but denies chills. She reports weakness in her right leg, which affects her ability to walk, requiring the use of a walker. She denies any recent falls or incontinence issues, although she did experience incontinence earlier when the infection first occurred.  The patient describes her lower back pain as severe, rating it a 9 out of 10 on the pain scale. The pain is exacerbated by movement and radiates down her legs. She is currently taking 7.5 mg of Norco every 12 hours for pain management, which provides minimal relief. On physical examination, the patient's pain is localized to the lower back, with the piriformis muscle tightening up..  She had a recent MRI done last week with concern for an abscess in the spine.     Review of Systems   Constitutional:  Negative for activity change, appetite change, chills, diaphoresis, fatigue and fever.   HENT: Negative.     Eyes: Negative.    Respiratory:  Negative for apnea, cough, choking, chest  tightness, shortness of breath and stridor.    Cardiovascular:  Negative for chest pain, palpitations and leg swelling.   Gastrointestinal:  Negative for abdominal distention, abdominal pain, blood in stool, constipation, diarrhea, nausea, rectal pain and vomiting.   Endocrine: Negative.    Genitourinary:  Negative for difficulty urinating, dyspareunia, dysuria, enuresis, flank pain, frequency and pelvic pain.   Musculoskeletal:  Positive for back pain and gait problem. Negative for arthralgias, joint swelling, myalgias, neck pain and neck stiffness.   Skin: Negative.    Allergic/Immunologic: Negative.    Neurological:  Negative for tremors, seizures, syncope, facial asymmetry, speech difficulty, weakness, light-headedness, numbness and headaches.   Hematological: Negative.    Psychiatric/Behavioral:  Negative for agitation, behavioral problems, confusion, decreased concentration, dysphoric mood, hallucinations, self-injury, sleep disturbance and suicidal ideas. The patient is not nervous/anxious and is not hyperactive.        Personal History     Past Medical History:   Diagnosis Date    Cirrhosis     COPD (chronic obstructive pulmonary disease)     Depression     GERD (gastroesophageal reflux disease)     Hyperlipidemia     Hypertension     PTSD (post-traumatic stress disorder)        Past Surgical History:   Procedure Laterality Date     SECTION N/A     COLONOSCOPY N/A 2021    Procedure: COLONOSCOPY with polypectomy x2 and random biopsy;  Surgeon: Osman Clay MD;  Location: [facility] ENDOSCOPY;  Service: Gastroenterology;  Laterality: N/A;  colon polyps    DENTAL PROCEDURE      ENDOSCOPY N/A 2021    Procedure: ESOPHAGOGASTRODUODENOSCOPY;  Surgeon: Osman Clay MD;  Location: [facility] ENDOSCOPY;  Service: Gastroenterology;  Laterality: N/A;  esophagitis    JOINT REPLACEMENT      REPLACEMENT TOTAL HIP LATERAL POSITION Left     TONSILLECTOMY      VAGINAL BIRTH AFTER  SECTION          Family History: family history includes Alcohol abuse in her mother and paternal grandfather. Otherwise pertinent FHx was reviewed and not pertinent to current issue.    Social History:  reports that she has been smoking cigarettes. She has been exposed to tobacco smoke. She has never used smokeless tobacco. She reports that she does not currently use alcohol after a past usage of about 2.0 standard drinks of alcohol per week. She reports that she does not use drugs.    Home Medications:  Prior to Admission Medications       Prescriptions Last Dose Informant Patient Reported? Taking?    cefdinir (OMNICEF) 300 MG capsule 5/1/2024  No Yes    Take 1 capsule by mouth 2 (Two) Times a Day.    albuterol sulfate  (90 Base) MCG/ACT inhaler   Yes No    Inhale 2 puffs Every 4 (Four) Hours As Needed for Wheezing.    busPIRone (BUSPAR) 15 MG tablet   No No    TAKE 1 TABLET BY MOUTH TWICE A DAY    Cyanocobalamin (Vitamin B-12) 1000 MCG sublingual tablet   Yes No    Place 1 tablet under the tongue Daily.    folic acid (FOLVITE) 1 MG tablet   No No    Take 1 tablet by mouth Daily.    HYDROcodone-acetaminophen (NORCO) 7.5-325 MG per tablet   No No    Take 1 tablet by mouth Every 12 (Twelve) Hours As Needed for Moderate Pain.    melatonin 5 MG tablet tablet   Yes No    Take 1 tablet by mouth At Night As Needed (sleep).    QUEtiapine (SEROquel) 50 MG tablet   No No    TAKE 1 TABLET BY MOUTH EVERYDAY AT BEDTIME    sertraline (ZOLOFT) 100 MG tablet   No No    TAKE 1 TABLET BY MOUTH TWICE A DAY    thiamine (VITAMIN B1) 100 MG tablet   No No    Take 1 tablet by mouth Daily.    vancomycin 1000 mg/200 mL 0.9% NS IVPB   Yes No    Infuse 200 mL into a venous catheter Every Other Day.    Vancomycin HCl (VANCOMYCIN PHARMACY INTERMITTENT/PULSE DOSING)   No No    Use As Needed (Pulse dosing per pharmacy) for up to 5 days. Indications: Bacteria in the Blood    Zinc 50 MG tablet   Yes No    Take 1 tablet by mouth 2 (Two) Times a Day.               Allergies:  Allergies   Allergen Reactions    Sulfa Antibiotics Hives    Keflex [Cephalexin] Hives       Objective      Vitals:   Temp:  [98.2 °F (36.8 °C)] 98.2 °F (36.8 °C)  Heart Rate:  [71] 71  Resp:  [16] 16  BP: (121)/(69) 121/69  Body mass index is 21.26 kg/m².  Physical Exam  Vitals reviewed.   Constitutional:       Appearance: Normal appearance.   HENT:      Head: Normocephalic and atraumatic.      Nose: No congestion.   Eyes:      Pupils: Pupils are equal, round, and reactive to light.   Cardiovascular:      Rate and Rhythm: Normal rate and regular rhythm.      Pulses: Normal pulses.      Heart sounds: Normal heart sounds. No murmur heard.  Pulmonary:      Effort: Pulmonary effort is normal. No respiratory distress.      Breath sounds: Normal breath sounds. No wheezing.   Abdominal:      General: Bowel sounds are normal. There is no distension.      Palpations: Abdomen is soft. There is no mass.      Tenderness: There is no abdominal tenderness. There is no guarding.   Musculoskeletal:         General: No swelling or deformity. Normal range of motion.      Cervical back: Normal range of motion.   Skin:     General: Skin is warm.   Neurological:      General: No focal deficit present.      Mental Status: She is alert and oriented to person, place, and time.      Cranial Nerves: No cranial nerve deficit.      Sensory: No sensory deficit.      Motor: No weakness.         Diagnostic Data:  Lab Results (last 24 hours)       ** No results found for the last 24 hours. **             Imaging Results (Last 24 Hours)       Procedure Component Value Units Date/Time    XR Chest 1 View [[id number]] Resulted: 05/01/24 1644     Updated: 05/01/24 1645              Assessment & Plan        This is a 58 y.o. female with:    Active and Resolved Problems  There are no hospital problems to display for this patient.    MRSA bacteremia  L2-L3 discitis/osteomyelitis   -She was recently discharged after she was  diagnosed with MRSA bacteremia and L2/L3 discitis/osteomyelitis.  She is supposed to be on vancomycin until 5/3/2024.      -She has noticed her pain has progressively gotten worse since then.  -MRI thoracic and lumbar spine with and without contrast from 4/23/24 reports  interval worsening of findings related to L2-L3 discitis and vertebral body osteomyelitis, including worsening destruction of the disc space and increased vertebral body marrow edema. There is a persistent epidural   abscess that is not significantly changed in size since the comparison study. There is enhancing paraspinal soft tissue at the L2 and L3 vertebral body levels compatible with paraspinal phlegmon with no walled off fluid collection demonstrated  -ID and spine consult  -Continue to monitor.  -Pain control  -resume vancomycin pending ID recs    Liver cirrhosis :Likely alcohol related;compensation  -Continue supportive care and outpatient follow-up     Anemia of chronic disease  -Most recent labs reveal hemoglobin stable at this time  -Monitor and transfuse as needed     Tobacco abuse  -40-pack-year smoking history  -Counseled on cessation  -Nicotine patch if needed    DVT prophylaxis:  No DVT prophylaxis order currently exists.        The patient desires to be as follows:    CODE STATUS: Full code         Moisés Gilliland, who can be contacted at [phone], is the designated person to make medical decisions on the patient's behalf if She is incapable of doing so. This was clarified with patient and/or next of kin on 5/1/2024 during the course of this H&P.    Admission Status:  I believe this patient meets inpatient status.    Expected Length of Stay: >2MN    PDMP and Medication Dispenses via Sidebar reviewed and consistent with patient reported medications.    I discussed the patient's findings and my recommendations with patient.      Signature:     This document has been electronically signed by Ashlyn Herring MD on May 1, 2024 16:45 EDT    [facility] Hospitalist Team

## 2024-05-01 NOTE — LETTER
EMS Transport Request  For use at [facility], [place], [place], [place], and [place] only   Patient Name: Lita Yu : 1965   Weight:54.4 kg (120 lb) Pick-up Location: [facility] BLS/ALS: BLS/ALS: BLS   Insurance: [employer] BLUE CROSS Auth End Date:    Pre-Cert #: D/C Summary complete:    Destination: Other [facility]   Contact Precautions: None   Equipment (O2, Fluids, etc.): None   Arrive By Date/Time:  Stretcher/WC: Wheelchair   CM Requesting: Amy Germain RN Ext: [phone]   Notes/Medical Necessity: unable to drive herself.  Precert valid for facility through      ______________________________________________________________________    *Only 2 patient bags OR 1 carry-on size bag are permitted.  Wheelchairs and walkers CANNOT transported with the patient. Acknowledge: Yes

## 2024-05-01 NOTE — TELEPHONE ENCOUNTER
I spoke with patient. I am in process of trying to get her direct admitted due to her worsening MRI. She is not responding to the vancomycin.

## 2024-05-01 NOTE — LETTER
[facility]  [address] [place] [address] [place] [address]  [phone]  [phone]        May 10, 2024      Patient: Lita Yu  YOB: 1965  Date of Visit: 5/1/2024      ADDITIONAL CLINICAL INFORMATION ATTACHED FOR POST ACUTE CARE SKILLED NURSING FACILITY PRECERTIFICATION REQUEST    REFERENCE NUMBER: [id number]  MEMBER ID: [id number]        Santos Aerllano  Case Management Associate  [facility]  [address] [place] [address] [place] [address]  P: [phone]  F: [phone]

## 2024-05-01 NOTE — LETTER
[facility]  [address] [place] [address] [place] [address]  [phone]  [phone]        May 9, 2024      Patient: Lita Yu  YOB: 1965  Date of Visit: 5/1/2024      SKILLED NURSING FACILITY PRECERTIFICATION REQUEST:     REFERENCE NUMBER: [id number]  MEMBER CASE ID: [id number]      Santos Arellano  Case Management Associate  [facility]  [address] [place] [address], [place] [address]  P: [phone]  F: [phone]

## 2024-05-01 NOTE — TELEPHONE ENCOUNTER
"Caller: Lita Yu \"NAINA\"    Relationship to patient: Self    Best call back number:     Patient is needing: PATIENT STATES SHE RECEIVED A MESSAGE YESTERDAY THAT SHE IS TO CALL [facility] BACK TODAY.     "

## 2024-05-02 ENCOUNTER — APPOINTMENT (OUTPATIENT)
Dept: MRI IMAGING | Facility: HOSPITAL | Age: 59
End: 2024-05-02
Payer: COMMERCIAL

## 2024-05-02 ENCOUNTER — READMISSION MANAGEMENT (OUTPATIENT)
Dept: CALL CENTER | Facility: HOSPITAL | Age: 59
End: 2024-05-02
Payer: COMMERCIAL

## 2024-05-02 LAB
ALBUMIN SERPL-MCNC: 3 G/DL (ref 3.5–5.2)
ALBUMIN/GLOB SERPL: 0.8 G/DL
ALP SERPL-CCNC: 187 U/L (ref 39–117)
ALT SERPL W P-5'-P-CCNC: 12 U/L (ref 1–33)
ANION GAP SERPL CALCULATED.3IONS-SCNC: 7 MMOL/L (ref 5–15)
ANION GAP SERPL CALCULATED.3IONS-SCNC: 8 MMOL/L (ref 5–15)
AST SERPL-CCNC: 22 U/L (ref 1–32)
BASOPHILS # BLD AUTO: 0.02 10*3/MM3 (ref 0–0.2)
BASOPHILS NFR BLD AUTO: 0.5 % (ref 0–1.5)
BILIRUB SERPL-MCNC: <0.2 MG/DL (ref 0–1.2)
BUN SERPL-MCNC: 14 MG/DL (ref 6–20)
BUN SERPL-MCNC: 16 MG/DL (ref 6–20)
BUN/CREAT SERPL: 12.3 (ref 7–25)
BUN/CREAT SERPL: 15.5 (ref 7–25)
CALCIUM SPEC-SCNC: 8.8 MG/DL (ref 8.6–10.5)
CALCIUM SPEC-SCNC: 9 MG/DL (ref 8.6–10.5)
CHLORIDE SERPL-SCNC: 108 MMOL/L (ref 98–107)
CHLORIDE SERPL-SCNC: 108 MMOL/L (ref 98–107)
CO2 SERPL-SCNC: 24 MMOL/L (ref 22–29)
CO2 SERPL-SCNC: 25 MMOL/L (ref 22–29)
CREAT SERPL-MCNC: 1.03 MG/DL (ref 0.57–1)
CREAT SERPL-MCNC: 1.14 MG/DL (ref 0.57–1)
DEPRECATED RDW RBC AUTO: 53.8 FL (ref 37–54)
DEPRECATED RDW RBC AUTO: 54.4 FL (ref 37–54)
EGFRCR SERPLBLD CKD-EPI 2021: 56.6 ML/MIN/1.73
EGFRCR SERPLBLD CKD-EPI 2021: 63.9 ML/MIN/1.73
EOSINOPHIL # BLD AUTO: 0.26 10*3/MM3 (ref 0–0.4)
EOSINOPHIL # BLD MANUAL: 0.32 10*3/MM3 (ref 0–0.4)
EOSINOPHIL NFR BLD AUTO: 6 % (ref 0.3–6.2)
EOSINOPHIL NFR BLD MANUAL: 7 % (ref 0.3–6.2)
ERYTHROCYTE [DISTWIDTH] IN BLOOD BY AUTOMATED COUNT: 14.8 % (ref 12.3–15.4)
ERYTHROCYTE [DISTWIDTH] IN BLOOD BY AUTOMATED COUNT: 14.9 % (ref 12.3–15.4)
ERYTHROCYTE [SEDIMENTATION RATE] IN BLOOD: 50 MM/HR (ref 0–30)
GLOBULIN UR ELPH-MCNC: 3.6 GM/DL
GLUCOSE SERPL-MCNC: 113 MG/DL (ref 65–99)
GLUCOSE SERPL-MCNC: 95 MG/DL (ref 65–99)
HCT VFR BLD AUTO: 27 % (ref 34–46.6)
HCT VFR BLD AUTO: 27.4 % (ref 34–46.6)
HGB BLD-MCNC: 8.1 G/DL (ref 12–15.9)
HGB BLD-MCNC: 8.4 G/DL (ref 12–15.9)
IMM GRANULOCYTES # BLD AUTO: 0.02 10*3/MM3 (ref 0–0.05)
IMM GRANULOCYTES NFR BLD AUTO: 0.5 % (ref 0–0.5)
LYMPHOCYTES # BLD AUTO: 0.82 10*3/MM3 (ref 0.7–3.1)
LYMPHOCYTES # BLD MANUAL: 1.27 10*3/MM3 (ref 0.7–3.1)
LYMPHOCYTES NFR BLD AUTO: 18.9 % (ref 19.6–45.3)
LYMPHOCYTES NFR BLD MANUAL: 9 % (ref 5–12)
MAGNESIUM SERPL-MCNC: 1.6 MG/DL (ref 1.6–2.6)
MAGNESIUM SERPL-MCNC: 1.9 MG/DL (ref 1.6–2.6)
MCH RBC QN AUTO: 29.8 PG (ref 26.6–33)
MCH RBC QN AUTO: 30.2 PG (ref 26.6–33)
MCHC RBC AUTO-ENTMCNC: 30 G/DL (ref 31.5–35.7)
MCHC RBC AUTO-ENTMCNC: 30.7 G/DL (ref 31.5–35.7)
MCV RBC AUTO: 98.6 FL (ref 79–97)
MCV RBC AUTO: 99.3 FL (ref 79–97)
MONOCYTES # BLD AUTO: 0.43 10*3/MM3 (ref 0.1–0.9)
MONOCYTES # BLD: 0.41 10*3/MM3 (ref 0.1–0.9)
MONOCYTES NFR BLD AUTO: 9.9 % (ref 5–12)
MYELOCYTES NFR BLD MANUAL: 1 % (ref 0–0)
NEUTROPHILS # BLD AUTO: 2.5 10*3/MM3 (ref 1.7–7)
NEUTROPHILS NFR BLD AUTO: 2.8 10*3/MM3 (ref 1.7–7)
NEUTROPHILS NFR BLD AUTO: 64.2 % (ref 42.7–76)
NEUTROPHILS NFR BLD MANUAL: 50 % (ref 42.7–76)
NEUTS BAND NFR BLD MANUAL: 5 % (ref 0–5)
NRBC BLD AUTO-RTO: 0 /100 WBC (ref 0–0.2)
PHOSPHATE SERPL-MCNC: 4.1 MG/DL (ref 2.5–4.5)
PLAT MORPH BLD: NORMAL
PLATELET # BLD AUTO: 152 10*3/MM3 (ref 140–450)
PLATELET # BLD AUTO: 153 10*3/MM3 (ref 140–450)
PMV BLD AUTO: 8.8 FL (ref 6–12)
PMV BLD AUTO: 9.2 FL (ref 6–12)
POTASSIUM SERPL-SCNC: 3.8 MMOL/L (ref 3.5–5.2)
POTASSIUM SERPL-SCNC: 3.9 MMOL/L (ref 3.5–5.2)
PROT SERPL-MCNC: 6.6 G/DL (ref 6–8.5)
RBC # BLD AUTO: 2.72 10*6/MM3 (ref 3.77–5.28)
RBC # BLD AUTO: 2.78 10*6/MM3 (ref 3.77–5.28)
RBC MORPH BLD: NORMAL
SCAN SLIDE: NORMAL
SODIUM SERPL-SCNC: 140 MMOL/L (ref 136–145)
SODIUM SERPL-SCNC: 140 MMOL/L (ref 136–145)
TOXIC GRANULATION: ABNORMAL
VARIANT LYMPHS NFR BLD MANUAL: 2 % (ref 0–5)
VARIANT LYMPHS NFR BLD MANUAL: 26 % (ref 19.6–45.3)
WBC NRBC COR # BLD AUTO: 4.35 10*3/MM3 (ref 3.4–10.8)
WBC NRBC COR # BLD AUTO: 4.54 10*3/MM3 (ref 3.4–10.8)

## 2024-05-02 PROCEDURE — 85652 RBC SED RATE AUTOMATED: CPT | Performed by: INTERNAL MEDICINE

## 2024-05-02 PROCEDURE — 83735 ASSAY OF MAGNESIUM: CPT | Performed by: HOSPITALIST

## 2024-05-02 PROCEDURE — 25010000002 VANCOMYCIN 1 G RECONSTITUTED SOLUTION 1 EACH VIAL: Performed by: INTERNAL MEDICINE

## 2024-05-02 PROCEDURE — 85025 COMPLETE CBC W/AUTO DIFF WBC: CPT | Performed by: INTERNAL MEDICINE

## 2024-05-02 PROCEDURE — 72158 MRI LUMBAR SPINE W/O & W/DYE: CPT

## 2024-05-02 PROCEDURE — 84100 ASSAY OF PHOSPHORUS: CPT | Performed by: HOSPITALIST

## 2024-05-02 PROCEDURE — A9579 GAD-BASE MR CONTRAST NOS,1ML: HCPCS | Performed by: HOSPITALIST

## 2024-05-02 PROCEDURE — 80053 COMPREHEN METABOLIC PANEL: CPT | Performed by: INTERNAL MEDICINE

## 2024-05-02 PROCEDURE — 25010000002 DIAZEPAM PER 5 MG: Performed by: HOSPITALIST

## 2024-05-02 PROCEDURE — 85007 BL SMEAR W/DIFF WBC COUNT: CPT | Performed by: INTERNAL MEDICINE

## 2024-05-02 PROCEDURE — 25810000003 SODIUM CHLORIDE 0.9 % SOLUTION 250 ML FLEX CONT: Performed by: INTERNAL MEDICINE

## 2024-05-02 PROCEDURE — 25010000002 GADOTERIDOL PER 1 ML: Performed by: HOSPITALIST

## 2024-05-02 PROCEDURE — 99222 1ST HOSP IP/OBS MODERATE 55: CPT

## 2024-05-02 PROCEDURE — 83735 ASSAY OF MAGNESIUM: CPT | Performed by: INTERNAL MEDICINE

## 2024-05-02 PROCEDURE — 87040 BLOOD CULTURE FOR BACTERIA: CPT | Performed by: NURSE PRACTITIONER

## 2024-05-02 PROCEDURE — 72197 MRI PELVIS W/O & W/DYE: CPT

## 2024-05-02 PROCEDURE — 85025 COMPLETE CBC W/AUTO DIFF WBC: CPT | Performed by: HOSPITALIST

## 2024-05-02 RX ORDER — DIAZEPAM 5 MG/ML
5 INJECTION, SOLUTION INTRAMUSCULAR; INTRAVENOUS ONCE
Status: COMPLETED | OUTPATIENT
Start: 2024-05-02 | End: 2024-05-02

## 2024-05-02 RX ADMIN — VANCOMYCIN HYDROCHLORIDE 1000 MG: 1 INJECTION, POWDER, LYOPHILIZED, FOR SOLUTION INTRAVENOUS at 08:55

## 2024-05-02 RX ADMIN — SERTRALINE 100 MG: 100 TABLET, FILM COATED ORAL at 22:55

## 2024-05-02 RX ADMIN — HYDROCODONE BITARTRATE AND ACETAMINOPHEN 1 TABLET: 7.5; 325 TABLET ORAL at 06:46

## 2024-05-02 RX ADMIN — DIAZEPAM 5 MG: 10 INJECTION, SOLUTION INTRAMUSCULAR; INTRAVENOUS at 17:57

## 2024-05-02 RX ADMIN — SERTRALINE 100 MG: 100 TABLET, FILM COATED ORAL at 08:55

## 2024-05-02 RX ADMIN — QUETIAPINE FUMARATE 50 MG: 25 TABLET ORAL at 22:55

## 2024-05-02 RX ADMIN — HYDROCODONE BITARTRATE AND ACETAMINOPHEN 1 TABLET: 10; 325 TABLET ORAL at 11:32

## 2024-05-02 RX ADMIN — HYDROCODONE BITARTRATE AND ACETAMINOPHEN 1 TABLET: 10; 325 TABLET ORAL at 02:20

## 2024-05-02 RX ADMIN — Medication 10 ML: at 02:23

## 2024-05-02 RX ADMIN — Medication 10 ML: at 22:58

## 2024-05-02 RX ADMIN — FOLIC ACID 1 MG: 1 TABLET ORAL at 08:55

## 2024-05-02 RX ADMIN — BUSPIRONE HYDROCHLORIDE 15 MG: 15 TABLET ORAL at 22:55

## 2024-05-02 RX ADMIN — HYDROCODONE BITARTRATE AND ACETAMINOPHEN 1 TABLET: 10; 325 TABLET ORAL at 22:33

## 2024-05-02 RX ADMIN — Medication 10 ML: at 08:57

## 2024-05-02 RX ADMIN — BUSPIRONE HYDROCHLORIDE 15 MG: 15 TABLET ORAL at 08:55

## 2024-05-02 RX ADMIN — GADOTERIDOL 15 ML: 279.3 INJECTION, SOLUTION INTRAVENOUS at 20:27

## 2024-05-02 NOTE — CASE MANAGEMENT/SOCIAL WORK
Discharge Planning Assessment   [facility]     Patient Name: Lita Yu  MRN: [id number]  Today's Date: 5/2/2024    Admit Date: 5/1/2024    Plan: From home with [facility] and [facility] for IV antibiotics.  Family can transport at discharge.   Discharge Needs Assessment       Row Name 05/02/24 1343       Living Environment    People in Home alone    Current Living Arrangements home    Potentially Unsafe Housing Conditions none    In the past 12 months has the electric, gas, oil, or water company threatened to shut off services in your home? No    Primary Care Provided by self    Provides Primary Care For no one    Quality of Family Relationships helpful;involved;supportive    Able to Return to Prior Arrangements yes       Resource/Environmental Concerns    Resource/Environmental Concerns none    Transportation Concerns none       Transportation Needs    In the past 12 months, has lack of transportation kept you from medical appointments or from getting medications? no    In the past 12 months, has lack of transportation kept you from meetings, work, or from getting things needed for daily living? No       Food Insecurity    Within the past 12 months, you worried that your food would run out before you got the money to buy more. Never true    Within the past 12 months, the food you bought just didn't last and you didn't have money to get more. Never true       Transition Planning    Patient/Family Anticipates Transition to home with help/services    Patient/Family Anticipated Services at Transition home health care    Transportation Anticipated car, drives self;family or friend will provide       Discharge Needs Assessment    Readmission Within the Last 30 Days current reason for admission unrelated to previous admission    Current Outpatient/Agency/Support Group homecare agency;infusion therapy, home  current with [facility] and [facility] [place]    Equipment Currently Used at Home walker, rolling;cane,  quad tip;commode;other (see comments)  IV antibiotic supplies    Concerns to be Addressed care coordination/care conferences;discharge planning    Anticipated Changes Related to Illness none    Equipment Needed After Discharge none    Outpatient/Agency/Support Group Needs homecare agency    Discharge Facility/Level of Care Needs home with home health    Provided Post Acute Provider List? N/A    N/A Provider List Comment patient current with [facility] and [facility] [place]                   Discharge Plan       Row Name 05/02/24 1359       Plan    Plan From home with [facility]  and [facility] for IV antibiotics.  Family can transport at discharge.    Patient/Family in Agreement with Plan yes    Plan Comments Patient lives at home alone. Patient drives, family  will transport at discharge. Patient performs ADL. PCP and pharmacy confirmed. Denies financial assistance needs for medication and/or food.Current with [facility] and [facility] [place] for IV antibiotics.  Has been on IV Vancomycin for approx 6 weeks which was to finish 5/3/24. Patient sent back to hospital by PCP.                  Continued Care and Services - Admitted Since 5/1/2024       Dialysis/Infusion       Service Provider Request Status Selected Services Address Phone Fax Patient Preferred    [facility] Accepted N/A [address] [place] [address] [phone] --              Home Medical Care       Service Provider Request Status Selected Services Address Phone Fax Patient Preferred    [facility] Pending - Request Sent N/A [id number] [facility], [address] [address] [phone] --                             Expected Discharge Date and Time       Expected Discharge Date Expected Discharge Time    May 4, 2024            Demographic Summary       Row Name 05/02/24 1343       General Information    Admission Type inpatient    Arrived From home    Referral Source admission  list    Reason for Consult discharge planning    Preferred Language English       Contact Information    Permission Granted to Share Info With                    Functional Status       Row Name 05/02/24 1343       Functional Status    Usual Activity Tolerance good    Current Activity Tolerance good       Functional Status, IADL    Medications independent    Meal Preparation independent    Housekeeping independent    Laundry independent    Shopping independent       Mental Status    General Appearance WDL WDL       Mental Status Summary    Recent Changes in Mental Status/Cognitive Functioning no changes                    Tequila Bourne, RN

## 2024-05-02 NOTE — CONSULTS
Infectious Diseases Consult Note    Referring Provider: Niels Sands MD    Reason for Consultation: Discitis/osteomyelitis    Patient Care Team:  Norma Saul APRN as PCP - General (Nurse Practitioner)  Flory Villanueva MD (Physical Medicine and Rehabilitation)    Chief complaint worsening back pain    Subjective     The patient has been afebrile for the last 24 hours.  The patient is on room air, hemodynamically stable, and is tolerating antimicrobial therapy.    History of present illness:      This is a 58-year-old female who presents to the hospital on 5/1/2024 with complaints of worsening back pain and abnormal findings of her recent MRI of the lumbar spine.  Patient has been receiving 8 weeks of IV vancomycin which is due to be completed today on 5/2/2024.  Patient states she has been getting her antimicrobial therapy as scheduled.  Notes increased back pain over the last several weeks and needs to use a walker to be able to walk.  Does have dropfoot.  Denies any incontinence of bowel or bladder.  Patient denies fever, chills, diaphoresis, shortness of breath, cough, GI symptoms, or any urinary symptoms.  Patient now with worsening right hip pain and swelling    Review of Systems   Review of Systems   Constitutional: Negative.    HENT: Negative.     Eyes: Negative.    Respiratory: Negative.     Cardiovascular: Negative.    Gastrointestinal: Negative.    Endocrine: Negative.    Genitourinary: Negative.    Musculoskeletal: Negative.  Positive for back pain.        Right hip pain   Skin: Negative.    Neurological: Negative.  Positive for weakness.   Psychiatric/Behavioral: Negative.     All other systems reviewed and are negative.      Medications  Medications Prior to Admission   Medication Sig Dispense Refill Last Dose    cefdinir (OMNICEF) 300 MG capsule Take 1 capsule by mouth 2 (Two) Times a Day. 10 capsule 0 5/1/2024 at 0900    albuterol sulfate  (90 Base) MCG/ACT inhaler Inhale 2 puffs  Every 4 (Four) Hours As Needed for Wheezing.       busPIRone (BUSPAR) 15 MG tablet TAKE 1 TABLET BY MOUTH TWICE A  tablet 1     Cyanocobalamin (Vitamin B-12) 1000 MCG sublingual tablet Place 1 tablet under the tongue Daily.       folic acid (FOLVITE) 1 MG tablet Take 1 tablet by mouth Daily. 30 tablet 0     HYDROcodone-acetaminophen (NORCO) 7.5-325 MG per tablet Take 1 tablet by mouth Every 12 (Twelve) Hours As Needed for Moderate Pain. 30 tablet 0     melatonin 5 MG tablet tablet Take 1 tablet by mouth At Night As Needed (sleep).       QUEtiapine (SEROquel) 50 MG tablet TAKE 1 TABLET BY MOUTH EVERYDAY AT BEDTIME 90 tablet 0     sertraline (ZOLOFT) 100 MG tablet TAKE 1 TABLET BY MOUTH TWICE A  tablet 1     thiamine (VITAMIN B1) 100 MG tablet Take 1 tablet by mouth Daily. 30 tablet 0     vancomycin 1000 mg/200 mL 0.9% NS IVPB Infuse 200 mL into a venous catheter Every Other Day.       [] Vancomycin HCl (VANCOMYCIN PHARMACY INTERMITTENT/PULSE DOSING) Use As Needed (Pulse dosing per pharmacy) for up to 5 days. Indications: Bacteria in the Blood       Zinc 50 MG tablet Take 1 tablet by mouth 2 (Two) Times a Day.          History  Past Medical History:   Diagnosis Date    Cirrhosis     COPD (chronic obstructive pulmonary disease)     Depression     GERD (gastroesophageal reflux disease)     Hyperlipidemia     Hypertension     PTSD (post-traumatic stress disorder)      Past Surgical History:   Procedure Laterality Date     SECTION N/A     COLONOSCOPY N/A 2021    Procedure: COLONOSCOPY with polypectomy x2 and random biopsy;  Surgeon: Osman Clay MD;  Location: [facility] ENDOSCOPY;  Service: Gastroenterology;  Laterality: N/A;  colon polyps    DENTAL PROCEDURE      ENDOSCOPY N/A 2021    Procedure: ESOPHAGOGASTRODUODENOSCOPY;  Surgeon: Osman Clay MD;  Location: [facility] ENDOSCOPY;  Service: Gastroenterology;  Laterality: N/A;  esophagitis    JOINT REPLACEMENT      REPLACEMENT  TOTAL HIP LATERAL POSITION Left     TONSILLECTOMY      VAGINAL BIRTH AFTER  SECTION         Family History  Family History   Problem Relation Age of Onset    Alcohol abuse Mother     Alcohol abuse Paternal Grandfather        Social History   reports that she has been smoking cigarettes. She has been exposed to tobacco smoke. She has never used smokeless tobacco. She reports that she does not currently use alcohol after a past usage of about 2.0 standard drinks of alcohol per week. She reports that she does not use drugs.    Allergies  Sulfa antibiotics and Keflex [cephalexin]    Objective     Vital Signs   Vital Signs (last 24 hours)          0700   0659  0700   1526   Most Recent      Temp (°F) 97.5 -  98.2    98.1 -  98.4     98.4 (36.9)  1217    Heart Rate 70 -  72    66 -  69     69  1217    Resp 12 -  16    14 -  16     16  1217    /61 -  121/69    104/61 -  107/55     107/55  1217    SpO2 (%) 93 -  98      97     97  1217            Physical Exam:  Physical Exam  Vitals and nursing note reviewed.   Constitutional:       General: She is not in acute distress.     Appearance: Normal appearance. She is well-developed and normal weight. She is not diaphoretic.   HENT:      Head: Normocephalic and atraumatic.   Eyes:      General: No scleral icterus.     Extraocular Movements: Extraocular movements intact.      Conjunctiva/sclera: Conjunctivae normal.      Pupils: Pupils are equal, round, and reactive to light.   Cardiovascular:      Rate and Rhythm: Normal rate and regular rhythm.      Heart sounds: Normal heart sounds, S1 normal and S2 normal. No murmur heard.  Pulmonary:      Effort: Pulmonary effort is normal. No respiratory distress.      Breath sounds: Normal breath sounds. No stridor. No wheezing or rales.   Chest:      Chest wall: No tenderness.   Abdominal:      General: Bowel sounds are normal. There is no distension.      Palpations: Abdomen is  soft. There is no mass.      Tenderness: There is no abdominal tenderness. There is no guarding.   Musculoskeletal:         General: No swelling, tenderness or deformity.      Cervical back: Neck supple.      Comments: Some edema and tenderness to the right hip   Skin:     General: Skin is warm and dry.      Coloration: Skin is not pale.      Findings: No bruising, erythema or rash.   Neurological:      Mental Status: She is alert and oriented to person, place, and time.      Comments: Patient with some difficulty walking with a walker.  Dropfoot noted   Psychiatric:         Mood and Affect: Mood normal.         Microbiology  Microbiology Results (last 10 days)       Procedure Component Value - Date/Time    Urine Culture - Urine, Urine, Clean Catch [[id number]]  (Abnormal)  (Susceptibility) Collected: 04/24/24 1229    Lab Status: Final result Specimen: Urine, Clean Catch Updated: 04/27/24 1056     Urine Culture >100,000 CFU/mL Klebsiella pneumoniae ssp pneumoniae    Narrative:      Colonization of the urinary tract without infection is common. Treatment is discouraged unless the patient is symptomatic, pregnant, or undergoing an invasive urologic procedure.    Susceptibility        Klebsiella pneumoniae ssp pneumoniae      SOFIA      Amoxicillin + Clavulanate Susceptible      Ampicillin Resistant      Ampicillin + Sulbactam Susceptible      Cefazolin Susceptible      Cefepime Susceptible      Ceftazidime Susceptible      Ceftriaxone Susceptible      Gentamicin Susceptible      Levofloxacin Susceptible      Nitrofurantoin Resistant      Piperacillin + Tazobactam Susceptible      Trimethoprim + Sulfamethoxazole Susceptible                           Blood Culture - Blood, Arm, Left [[id number]]  (Normal) Collected: 04/23/24 2235    Lab Status: Final result Specimen: Blood from Arm, Left Updated: 04/28/24 2300     Blood Culture No growth at 5 days    Blood Culture - Blood, Arm, Left [[id number]]  (Normal) Collected: 04/23/24  1824    Lab Status: Final result Specimen: Blood from Arm, Left Updated: 04/28/24 1831     Blood Culture No growth at 5 days            Laboratory  Results from last 7 days   Lab Units 05/02/24  0616   WBC 10*3/mm3 4.54   HEMOGLOBIN g/dL 8.4*   HEMATOCRIT % 27.4*   PLATELETS 10*3/mm3 152     Results from last 7 days   Lab Units 05/02/24  0616   SODIUM mmol/L 140   POTASSIUM mmol/L 3.9   CHLORIDE mmol/L 108*   CO2 mmol/L 24.0   BUN mg/dL 14   CREATININE mg/dL 1.14*   GLUCOSE mg/dL 95   CALCIUM mg/dL 9.0     Results from last 7 days   Lab Units 05/02/24  0616   SODIUM mmol/L 140   POTASSIUM mmol/L 3.9   CHLORIDE mmol/L 108*   CO2 mmol/L 24.0   BUN mg/dL 14   CREATININE mg/dL 1.14*   GLUCOSE mg/dL 95   CALCIUM mg/dL 9.0         Results from last 7 days   Lab Units 05/02/24  0616   SED RATE mm/hr 50*           Radiology  Imaging Results (Last 72 Hours)       Procedure Component Value Units Date/Time    XR Chest 1 View [[id number]] Collected: 05/01/24 1649     Updated: 05/01/24 1652    Narrative:      XR CHEST 1 VW    Date of Exam: 5/1/2024 4:38 PM EDT    Indication: PICC veriification on Direct Admit    Comparison: AP portable chest 6/11/2023    Findings:  Right arm approach PICC tip terminates in the mid SVC level. Heart size is normal. Pulmonary vascular distribution is normal. Benign calcified nodule in the right upper lobe, unchanged. No pleural effusion, pneumothorax or acute osseous abnormality.      Impression:      Impression:  Right arm approach PICC tip terminates at the mid SVC level. No acute chest finding.      Electronically Signed: Sara Jones MD    5/1/2024 4:50 PM EDT    Workstation ID: [id number]            Cardiology      Results Review:  I have reviewed all clinical data, test, lab, and imaging results.       Schedule Meds  busPIRone, 15 mg, Oral, BID  folic acid, 1 mg, Oral, Daily  QUEtiapine, 50 mg, Oral, Nightly  sertraline, 100 mg, Oral, BID  sodium chloride, 10 mL, Intravenous, Q12H  vancomycin,  1,000 mg, Intravenous, Daily        Infusion Meds  Pharmacy to dose vancomycin,         PRN Meds    acetaminophen **OR** acetaminophen **OR** acetaminophen    albuterol    senna-docusate sodium **AND** polyethylene glycol **AND** bisacodyl **AND** bisacodyl    HYDROcodone-acetaminophen    HYDROcodone-acetaminophen    ondansetron ODT **OR** ondansetron    Pharmacy to dose vancomycin    sodium chloride    sodium chloride      Assessment & Plan       Assessment    Worsening L2-L3 discitis/osteomyelitis with cyst and epidural abscess that has not changed since previous studies.  The MRI was performed on 4/24/2023.  Patient endorses worsening back pain, difficulty walking with bilateral dropfoot.  Patient received 8 weeks of IV vancomycin which actually was scheduled to be completed on 5/2/2024.  Neurosurgery did not feel that the patient was a surgical candidate during the last admission.  Patient now complaining of right hip pain and swelling    Recent admission for methicillin resistant Staphylococcus aureus bacteremia.  Likely source is lumbar discitis/osteomyelitis.   SILVINA performed on 3/12/2024 with cardiology notes mentioning no vegetation.  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  Patient denied having infection after the procedure       Plan     Continue IV vancomycin-keep trough between 15 and 20.  Repeat blood cultures x 2  MRI of the lumbar spine and pelvis with and without contrast  Continue supportive care  A.m. labs    Since the most recent MRI shows worsening discitis and no improvement epidural abscess we consider this failure of treatment with IV antibiotics alone, especially after 8 weeks of treatment.  We will see what the MRI of the lumbar spine and pelvis shows, but most likely patient will need some type of  surgical intervention.    Sarah Ross, APRN  05/02/24  15:26 EDT    Note is dictated utilizing voice recognition software/Dragon/Vladimir

## 2024-05-02 NOTE — CONSULTS
Neurosurgery Consultation      Patient: Lita Yu    Sex: female    YOB: 1965    Date of Admission: 5/1/2024  3:38 PM    Admitting Dx: Spinal abscess [M46.20]    Reason for Consult: Lumbar epidural abscess      Subjective     CC: Back pain    HPI:  Patient is a 58 y.o. female with COPD and hypertension who presents with severe low back and hip pain.  Notably, patient recently diagnosed with osteomyelitis/discitis at L2-3 with a small epidural abscess and has been treated with IV vancomycin, the last day of which was supposed to be tomorrow.  She reports continued intractable pain throughout her lower back and into her thighs.  Pain does not go below her knees and is not associated with numbness and tingling.  She has no lower extremity weakness or bowel/bladder dysfunction.  She denies saddle anesthesia.      PMH:  Past Medical History:   Diagnosis Date    Cirrhosis     COPD (chronic obstructive pulmonary disease)     Depression     GERD (gastroesophageal reflux disease)     Hyperlipidemia     Hypertension     PTSD (post-traumatic stress disorder)          Current Facility-Administered Medications:     acetaminophen (TYLENOL) tablet 650 mg, 650 mg, Oral, Q4H PRN **OR** acetaminophen (TYLENOL) 160 MG/5ML oral solution 650 mg, 650 mg, Oral, Q4H PRN **OR** acetaminophen (TYLENOL) suppository 650 mg, 650 mg, Rectal, Q4H PRN, Ashlyn Herring MD    albuterol (PROVENTIL) nebulizer solution 0.083% 2.5 mg/3mL, 2.5 mg, Nebulization, Q6H PRN, Ashlyn Herring MD    sennosides-docusate (PERICOLACE) 8.6-50 MG per tablet 2 tablet, 2 tablet, Oral, BID PRN **AND** polyethylene glycol (MIRALAX) packet 17 g, 17 g, Oral, Daily PRN **AND** bisacodyl (DULCOLAX) EC tablet 5 mg, 5 mg, Oral, Daily PRN **AND** bisacodyl (DULCOLAX) suppository 10 mg, 10 mg, Rectal, Daily PRN, Ashlyn Herring MD    busPIRone (BUSPAR) tablet 15 mg, 15 mg, Oral, BID, Ashlyn Herring MD, 15 mg at 05/02/24 0020    folic acid  (FOLVITE) tablet 1 mg, 1 mg, Oral, Daily, Ashlyn Herring MD, 1 mg at 24 0855    HYDROcodone-acetaminophen (NORCO)  MG per tablet 1 tablet, 1 tablet, Oral, Q4H PRN, Ashlyn Herring MD, 1 tablet at 24 1132    HYDROcodone-acetaminophen (NORCO) 7.5-325 MG per tablet 1 tablet, 1 tablet, Oral, Q4H PRN, Ashlyn Herring MD, 1 tablet at 24 0646    ondansetron ODT (ZOFRAN-ODT) disintegrating tablet 4 mg, 4 mg, Oral, Q6H PRN **OR** ondansetron (ZOFRAN) injection 4 mg, 4 mg, Intravenous, Q6H PRN, Ashlyn Herring MD    Pharmacy to dose vancomycin, , Does not apply, Continuous PRN, Ashlyn Herring MD    QUEtiapine (SEROquel) tablet 50 mg, 50 mg, Oral, Nightly, Ashlyn Herring MD, 50 mg at 24 2138    sertraline (ZOLOFT) tablet 100 mg, 100 mg, Oral, BID, Ashlyn Herring MD, 100 mg at 24 0855    sodium chloride 0.9 % flush 10 mL, 10 mL, Intravenous, Q12H, Ashlyn Herring MD, 10 mL at 24 0857    sodium chloride 0.9 % flush 10 mL, 10 mL, Intravenous, PRN, Ashlyn Herring MD    sodium chloride 0.9 % infusion 40 mL, 40 mL, Intravenous, PRN, Ashlyn Herring MD    vancomycin (VANCOCIN) 1,000 mg in sodium chloride 0.9 % 250 mL IVPB-VTB, 1,000 mg, Intravenous, Daily, Ashlyn Herring MD, Last Rate: 250 mL/hr at 24 0855, 1,000 mg at 24 0855    Allergies   Allergen Reactions    Sulfa Antibiotics Hives    Keflex [Cephalexin] Hives       Past Surgical History:   Procedure Laterality Date     SECTION N/A     COLONOSCOPY N/A 2021    Procedure: COLONOSCOPY with polypectomy x2 and random biopsy;  Surgeon: Osman Clay MD;  Location: [facility] ENDOSCOPY;  Service: Gastroenterology;  Laterality: N/A;  colon polyps    DENTAL PROCEDURE      ENDOSCOPY N/A 2021    Procedure: ESOPHAGOGASTRODUODENOSCOPY;  Surgeon: Osman Clay MD;  Location: [facility] ENDOSCOPY;  Service: Gastroenterology;  Laterality: N/A;  esophagitis    JOINT REPLACEMENT      REPLACEMENT TOTAL HIP  LATERAL POSITION Left     TONSILLECTOMY      VAGINAL BIRTH AFTER  SECTION         Social History     Socioeconomic History    Marital status:    Tobacco Use    Smoking status: Every Day     Current packs/day: 0.50     Types: Cigarettes     Passive exposure: Current    Smokeless tobacco: Never    Tobacco comments:     3cigs   Vaping Use    Vaping status: Never Used   Substance and Sexual Activity    Alcohol use: Not Currently     Alcohol/week: 2.0 standard drinks of alcohol     Types: 2 Cans of beer per week     Comment: pint daily    Drug use: No    Sexual activity: Yes     Partners: Male     Birth control/protection: Post-menopausal       Family History   Problem Relation Age of Onset    Alcohol abuse Mother     Alcohol abuse Paternal Grandfather          Current Medications:  Scheduled Meds:busPIRone, 15 mg, Oral, BID  folic acid, 1 mg, Oral, Daily  QUEtiapine, 50 mg, Oral, Nightly  sertraline, 100 mg, Oral, BID  sodium chloride, 10 mL, Intravenous, Q12H  vancomycin, 1,000 mg, Intravenous, Daily      Continuous Infusions:Pharmacy to dose vancomycin,       PRN Meds:   acetaminophen **OR** acetaminophen **OR** acetaminophen    albuterol    senna-docusate sodium **AND** polyethylene glycol **AND** bisacodyl **AND** bisacodyl    HYDROcodone-acetaminophen    HYDROcodone-acetaminophen    ondansetron ODT **OR** ondansetron    Pharmacy to dose vancomycin    sodium chloride    sodium chloride    ROS: 14 point review of systems was completed and is negative except for what is noted in HPI      Objective     Vitals:    24 0024 24 0443 24 0813 24 1217   BP: 106/61 108/64 104/61 107/55   BP Location: Left arm Left arm Left arm Left arm   Patient Position: Lying Lying Lying Lying   Pulse: 72 70 66 69   Resp: 14  16   Temp: 97.8 °F (36.6 °C) 97.5 °F (36.4 °C) 98.1 °F (36.7 °C) 98.4 °F (36.9 °C)   TempSrc: Oral Oral Oral Oral   SpO2: 97% 93% 97% 97%   Weight:       Height:            Physical exam  General  - awake, cooperative, in no acute distress  HEENT  - Normocephalic, atraumatic  - PERRLA, EOM intact  Cardiac  - RRR, no peripheral edema  Respiratory  - Normal respiratory rate and effort  Musculoskeletal  - Allodynia present over right knee and right hip, focally  Skin  - Warm and dry, no bleeding, bruising, or rash      NEUROLOGIC    MENTAL STATUS:  - A/O x3  MOTOR:  - GLOVER symmetrically  - 5/5 strength all 4 extremities  REFLEXES:  - Negative clonus  SENSORY:  - Normal to touch and pinprick, axial and peripheral            RESULTS REVIEW:  Results from last 7 days   Lab Units 05/02/24  0616 04/29/24  1430   WBC 10*3/mm3 4.54 7.42   HEMOGLOBIN g/dL 8.4* 10.8*   HEMATOCRIT % 27.4* 33.7*   PLATELETS 10*3/mm3 152 222        Results from last 7 days   Lab Units 05/02/24  0616 04/29/24  1430   SODIUM mmol/L 140 140   POTASSIUM mmol/L 3.9 4.4   CHLORIDE mmol/L 108* 107   CO2 mmol/L 24.0 19.7*   BUN mg/dL 14 14   CREATININE mg/dL 1.14* 1.04*   CALCIUM mg/dL 9.0 10.0   GLUCOSE mg/dL 95 78              MRI Lumbar Spine With & Without Contrast    Result Date: 4/23/2024  MRI LUMBAR SPINE W WO CONTRAST Date of Exam: 4/23/2024 8:30 PM EDT Indication: Epidural abscess suspected Worsening discitis/osteomyelitis.  Comparison: 3/10/2024 Technique:  Routine multiplanar/multisequence sequence images of the lumbar spine were obtained before and after the uneventful administration of Prohance.  Findings: Multiple sequences are limited by motion artifact. There is again noted to be fluid in the L2-L3 disc space. There has been some interval loss of disc space height and worsened endplate destruction. There is increased edema throughout the L2 and L3 vertebral bodies. There is a persistent anterior epidural enhancing fluid that results in moderate central canal stenosis, similar in severity to the comparison study. There is enhancing paraspinal soft tissue at the L2 and L3 vertebral body levels with no  walled off fluid collection. There are stable degenerative changes of the remainder of the lumbar spine     Impression: Impression: Motion limited exam. There has been interval worsening of findings related to L2-L3 discitis and vertebral body osteomyelitis, including worsening destruction of the disc space and increased vertebral body marrow edema. There is a persistent epidural abscess that is not significantly changed in size since the comparison study. There is enhancing paraspinal soft tissue at the L2 and L3 vertebral body levels compatible with paraspinal phlegmon with no walled off fluid collection demonstrated Electronically Signed: Glenn Tomas  4/23/2024 10:23 PM EDT  Workstation ID: [id number]    MRI Thoracic Spine With & Without Contrast    Result Date: 4/23/2024  MRI THORACIC SPINE W WO CONTRAST Date of Exam: 4/23/2024 8:35 PM EDT Indication: Epidural abscess suspected ?  Discitis/osteomyelitis.  Comparison: 3/10/2024 Technique:  Routine multiplanar/multisequence sequence images of the thoracic spine were obtained before and after the uneventful administration of Prohance.  Findings: Several of the sequences are significantly degraded by motion artifact. There is no disc space fluid, endplate destruction, or marrow edema. There is no epidural fluid collection. There is mild dorsal bulging of the T11-T12 disc without significant stenosis. There is no abnormal contrast enhancement. The thoracic cord is normal in caliber with no convincing signal abnormality. There is no paraspinal fluid collection     Impression: Impression: No evidence of thoracic discitis or epidural abscess Electronically Signed: Glenn Tomas  4/23/2024 10:07 PM EDT  Workstation ID: [id number]               Assessment     MDM: This is a 58 y.o. female presenting with intractable lower back pain.  She was diagnosed several weeks ago with L2-3 osteomyelitis/discitis with small epidural abscess and appears to have failed extended IV  vancomycin therapy.  Patient remains neurologically intact with no focal sensorimotor deficits.  MRI shows continued evidence of infection.  Epidural abscess is not increased in size and there is no evidence for high grade central or foraminal stenosis in the lumbar spine.    Given the lack of neurologic deficits on exam and lack of significant stenosis on imaging, there is no neurosurgical intervention or follow-up indicated at this time.  Will defer to infectious disease and the primary team for further evaluation and management.        Plan     Lumbar osteomyelitis  Lumbar discitis  Lumbar epidural abscess  - No acute neurosurgical intervention or follow-up indicated at this time  - Antibiotic therapy per infectious disease  - Medical management and pain control per primary team    Neurosurgery will sign off at this time.  Call with any questions or concerns.    I discussed my assessment and recommendations with Dr. Ciro Novoa PA-C  5/2/2024  13:31 EDT      Part of this note may be an electronic transcription/translation of spoken language to printed text using the Dragon Dictation System.

## 2024-05-02 NOTE — PROGRESS NOTES
[facility] MEDICINE SERVICE  DAILY PROGRESS NOTE    NAME: Lita Yu  : 1965  MRN: [id number]      LOS: 1 day     PROVIDER OF SERVICE: Niels Sands MD    Chief Complaint: Spinal abscess    Subjective:     Interval History:  History taken from: patient chart RN  Pain appears controlled    Review of Systems:   Review of Systems  All negative except as above  Objective:     Vital Signs  Temp:  [97.5 °F (36.4 °C)-98.2 °F (36.8 °C)] 98.1 °F (36.7 °C)  Heart Rate:  [66-72] 66  Resp:  [12-16] 14  BP: (104-121)/(61-69) 104/61   Body mass index is 21.26 kg/m².    Physical Exam  Physical Exam  AOx3 NAD  RRR S1-S2 audible  Lungs with fair air entry  Abdomen soft nontender nondistended  Scheduled Meds   busPIRone, 15 mg, Oral, BID  folic acid, 1 mg, Oral, Daily  QUEtiapine, 50 mg, Oral, Nightly  sertraline, 100 mg, Oral, BID  sodium chloride, 10 mL, Intravenous, Q12H  vancomycin, 1,000 mg, Intravenous, Daily       PRN Meds     acetaminophen **OR** acetaminophen **OR** acetaminophen    albuterol    senna-docusate sodium **AND** polyethylene glycol **AND** bisacodyl **AND** bisacodyl    HYDROcodone-acetaminophen    HYDROcodone-acetaminophen    ondansetron ODT **OR** ondansetron    Pharmacy to dose vancomycin    sodium chloride    sodium chloride   Infusions  Pharmacy to dose vancomycin,           Diagnostic Data    Results from last 7 days   Lab Units 24  0616   WBC 10*3/mm3 4.54   HEMOGLOBIN g/dL 8.4*   HEMATOCRIT % 27.4*   PLATELETS 10*3/mm3 152   GLUCOSE mg/dL 95   CREATININE mg/dL 1.14*   BUN mg/dL 14   SODIUM mmol/L 140   POTASSIUM mmol/L 3.9   ANION GAP mmol/L 8.0       XR Chest 1 View    Result Date: 2024  Impression: Right arm approach PICC tip terminates at the mid SVC level. No acute chest finding. Electronically Signed: Sara Jones MD  2024 4:50 PM EDT  Workstation ID: [id number]       I reviewed the patient's new clinical results.  I reviewed the patient's new imaging  results and agree with the interpretation.    Assessment/Plan:     Active and Resolved Problems  Active Hospital Problems    Diagnosis  POA    **Spinal abscess [M46.20]  Yes      Resolved Hospital Problems   No resolved problems to display.       58-year-old female with history of hypertension, peripheral vascular disease, degenerative joint disease, seizure disorder, tobacco abuse, EtOH liver cirrhosis, recent history of MRSA bacteremia attributed to L2-3 OM/discitis admitted to [facility] on 5/1/2024 with a worsening pain in her lower back.     #History of MRSA bacteremia  #History of L2-L3 discitis/osteomyelitis   -She was recently discharged after she was diagnosed with MRSA bacteremia and L2/L3 discitis/osteomyelitis.  Prior plan for vancomycin until 5/3/2024.      -Now admitted with worsening low back pain  -MRI thoracic and lumbar spine with and without contrast from 4/23/24 reports  interval worsening of findings related to L2-L3 discitis and vertebral body osteomyelitis, including worsening destruction of the disc space and increased vertebral body marrow edema. There is a persistent epidural abscess that is not significantly changed in size since the comparison study. There is enhancing paraspinal soft tissue at the L2 and L3 vertebral body levels compatible with paraspinal phlegmon with no walled off fluid collection demonstrated    Spine and infectious disease has been consulted  Continue with vancomycin for now.  Pharmacy to dose  Continue current pain control  Follow blood cultures     #History of alcohol liver cirrhosis   Outpatient follow-up     #Acute on chronic anemia  Monitor H&H  Transfuse PRBCs to keep hemoglobin more than 7     #Tobacco abuse  NRT  Counseled      DVT prophylaxis:  Mechanical DVT prophylaxis orders are present.         Code status is   Code Status and Medical Interventions:   Ordered at: 05/01/24 0249     Level Of Support Discussed With:    Patient     Code Status  (Patient has no pulse and is not breathing):    CPR (Attempt to Resuscitate)     Medical Interventions (Patient has pulse or is breathing):    Full Support       Plan for disposition: Anticipate discharge in 4 days    Time: 30 minutes    Signature: Electronically signed by Niels Sands MD, 05/02/24, 10:35 EDT.  [facility]

## 2024-05-02 NOTE — CASE MANAGEMENT/SOCIAL WORK
Case Management Readmission Assessment Note    Case Management Readmission Assessment (all recorded)       Readmission Interview       Row Name 05/02/24 1705             Readmission Indications    Is this hospitalization related to the prior hospital diagnosis? No      What was the reason you were admitted? POSSIBLE SPINAL ABSCESS        Row Name 05/02/24 1705             Recommendation for rehospitalization    Did you speak with your physician prior to coming to the hospital Yes      If yes, what physician did you speak with? Norma Hawkins NP (pcp)      Who recommended you return to the hospital? PCP      Did you seek care elsewhere prior to coming to the hospital? No        [facility] Name 05/02/24 1705             Follow up appointment    Do you have a PCP? Yes      Did you have an appointment with PCP/specialist after hospitalization within 7 days? Yes      Did you keep your appointment? Yes        [facility] Name 05/02/24 1705             Medications    Did you have newly prescribed medications at discharge? Yes      Did you understand the reasons for your medications at discharge and how to take them? Yes      Did you understand the side effects of your medications? Yes      Are you taking all of you prescribed medications? Yes        [facility] Name 05/02/24 1705             Discharge Instructions    Did you understand your discharge instructions? Yes      Did your family/caregiver hear your instructions? Yes      Were you told to eat a special diet? No      Were you given a number of someone to call if you had questions or concerns? Yes        [facility] Name 05/02/24 1705             Index discharge location/services    Where did you go upon discharge? Home with services      Do you have supportive family or friends in the home? Yes      What services were arranged at discharge? Home Health        [facility] Name 05/02/24 1705             Discharge Readiness    On a scale of 1-5 (5 being well prepared), how ready were you for discharge 2   states she felt she was sent home too soon stating no one addressed her spine issues at this visit  ( last time)

## 2024-05-02 NOTE — PAYOR COMM NOTE
"This is clinical for Yonis Yu   Reference/Auth#: [id number]     PLEASE NOTE: Pt has re-admitted--direct admit on 5/1/24. Attempted to submit new case on availity and it would not allow submission and appears to want to combine the 2 stays under ref# [id number] .     She was previously here IP 4/23 to 4/25/24 and was approved x 11 days.     SURESH Santamaria, RN, [facility]  Utilization Review Nurse  [facility]  Direct & confidential phone # [phone]  Fax # [phone]  ==============    DIRECT URGENT ADMIT  INPT ORDER PLACED 5/1/24    Yonis Yu \"NAINA\" (58 y.o. Female)       Date of Birth   1965    Social Security Number       Address   [address] [place] [address]    Home Phone   [phone]    MRN   [id number]       Amish   None    Marital Status                               Admission Date   5/1/24    Admission Type   Elective    Admitting Provider   Ashlyn Herring MD    Attending Provider   Niels Sands MD    Department, Room/Bed   [facility] SURGICAL INPATIENT, 4102/1       Discharge Date       Discharge Disposition       Discharge Destination                                 Attending Provider: Niels Sands MD    Allergies: Sulfa Antibiotics, Keflex [Cephalexin]    Isolation: None   Infection: MRSA No Isolation this Admit (03/08/24)   Code Status: CPR    Ht: 160 cm (62.99\")   Wt: 54.4 kg (120 lb)    Admission Cmt: None   Principal Problem: Spinal abscess [M46.20]                   Active Insurance as of 5/1/2024       Primary Coverage       Payor Plan Insurance Group Employer/Plan Group    [facility] BLUE [id number]       Payor Plan Address Payor Plan Phone Number Payor Plan Fax Number Effective Dates    [address]   6/22/2014 - None Entered    [place] [address]         Subscriber Name Subscriber Birth Date Member ID       YONIS YU 1965 [id number]               "       Emergency Contacts        (Rel.) Home Phone Work Phone Mobile Phone    OBDULIA CANTU (Daughter) -- -- [phone]    SARAI WOOD (Brother) [phone] -- --                 History & Physical        Ashlyn Herring MD at 24 1645              [facility] Medicine Services  History & Physical    Patient Name: Lita Yu  : 1965  MRN: [id number]  Primary Care Physician:  Norma Saul APRN  Date of admission: 2024  Date and Time of Service: 2024 at 1623    Subjective      Chief Complaint: back pain    History of Present Illness: Lita Yu is a 58 y.o. female with a CMH of hypertension, peripheral vascular disease, degenerative joint disease, seizure disorder, difficult tobacco dependence, cirrhosis, generalized weakness and recent MRSA bacteremia and enhancement of the epidural fluid on imaging. She was discharged on vancomycin which is supposed to be on till 5/3/2024 who presented to [facility] on 2024 with a chief complaint of worsening pain in her lower back. She has been receiving IV antibiotics since her hospital discharge but has not missed any doses. Despite the treatment, her pain has been increasing.  Mrs. Yu has been experiencing fevers but denies chills. She reports weakness in her right leg, which affects her ability to walk, requiring the use of a walker. She denies any recent falls or incontinence issues, although she did experience incontinence earlier when the infection first occurred.  The patient describes her lower back pain as severe, rating it a 9 out of 10 on the pain scale. The pain is exacerbated by movement and radiates down her legs. She is currently taking 7.5 mg of Norco every 12 hours for pain management, which provides minimal relief. On physical examination, the patient's pain is localized to the lower back, with the piriformis muscle tightening up..  She had a recent MRI done last week  with concern for an abscess in the spine.     Review of Systems   Constitutional:  Negative for activity change, appetite change, chills, diaphoresis, fatigue and fever.   HENT: Negative.     Eyes: Negative.    Respiratory:  Negative for apnea, cough, choking, chest tightness, shortness of breath and stridor.    Cardiovascular:  Negative for chest pain, palpitations and leg swelling.   Gastrointestinal:  Negative for abdominal distention, abdominal pain, blood in stool, constipation, diarrhea, nausea, rectal pain and vomiting.   Endocrine: Negative.    Genitourinary:  Negative for difficulty urinating, dyspareunia, dysuria, enuresis, flank pain, frequency and pelvic pain.   Musculoskeletal:  Positive for back pain and gait problem. Negative for arthralgias, joint swelling, myalgias, neck pain and neck stiffness.   Skin: Negative.    Allergic/Immunologic: Negative.    Neurological:  Negative for tremors, seizures, syncope, facial asymmetry, speech difficulty, weakness, light-headedness, numbness and headaches.   Hematological: Negative.    Psychiatric/Behavioral:  Negative for agitation, behavioral problems, confusion, decreased concentration, dysphoric mood, hallucinations, self-injury, sleep disturbance and suicidal ideas. The patient is not nervous/anxious and is not hyperactive.        Personal History     Past Medical History:   Diagnosis Date    Cirrhosis     COPD (chronic obstructive pulmonary disease)     Depression     GERD (gastroesophageal reflux disease)     Hyperlipidemia     Hypertension     PTSD (post-traumatic stress disorder)        Past Surgical History:   Procedure Laterality Date     SECTION N/A     COLONOSCOPY N/A 2021    Procedure: COLONOSCOPY with polypectomy x2 and random biopsy;  Surgeon: Osman Clay MD;  Location: [facility] ENDOSCOPY;  Service: Gastroenterology;  Laterality: N/A;  colon polyps    DENTAL PROCEDURE      ENDOSCOPY N/A 2021    Procedure:  ESOPHAGOGASTRODUODENOSCOPY;  Surgeon: Osman Clay MD;  Location: [facility] ENDOSCOPY;  Service: Gastroenterology;  Laterality: N/A;  esophagitis    JOINT REPLACEMENT      REPLACEMENT TOTAL HIP LATERAL POSITION Left     TONSILLECTOMY      VAGINAL BIRTH AFTER  SECTION         Family History: family history includes Alcohol abuse in her mother and paternal grandfather. Otherwise pertinent FHx was reviewed and not pertinent to current issue.    Social History:  reports that she has been smoking cigarettes. She has been exposed to tobacco smoke. She has never used smokeless tobacco. She reports that she does not currently use alcohol after a past usage of about 2.0 standard drinks of alcohol per week. She reports that she does not use drugs.    Home Medications:  Prior to Admission Medications       Prescriptions Last Dose Informant Patient Reported? Taking?    cefdinir (OMNICEF) 300 MG capsule 2024  No Yes    Take 1 capsule by mouth 2 (Two) Times a Day.    albuterol sulfate  (90 Base) MCG/ACT inhaler   Yes No    Inhale 2 puffs Every 4 (Four) Hours As Needed for Wheezing.    busPIRone (BUSPAR) 15 MG tablet   No No    TAKE 1 TABLET BY MOUTH TWICE A DAY    Cyanocobalamin (Vitamin B-12) 1000 MCG sublingual tablet   Yes No    Place 1 tablet under the tongue Daily.    folic acid (FOLVITE) 1 MG tablet   No No    Take 1 tablet by mouth Daily.    HYDROcodone-acetaminophen (NORCO) 7.5-325 MG per tablet   No No    Take 1 tablet by mouth Every 12 (Twelve) Hours As Needed for Moderate Pain.    melatonin 5 MG tablet tablet   Yes No    Take 1 tablet by mouth At Night As Needed (sleep).    QUEtiapine (SEROquel) 50 MG tablet   No No    TAKE 1 TABLET BY MOUTH EVERYDAY AT BEDTIME    sertraline (ZOLOFT) 100 MG tablet   No No    TAKE 1 TABLET BY MOUTH TWICE A DAY    thiamine (VITAMIN B1) 100 MG tablet   No No    Take 1 tablet by mouth Daily.    vancomycin 1000 mg/200 mL 0.9% NS IVPB   Yes No    Infuse 200 mL into a  venous catheter Every Other Day.    Vancomycin HCl (VANCOMYCIN PHARMACY INTERMITTENT/PULSE DOSING)   No No    Use As Needed (Pulse dosing per pharmacy) for up to 5 days. Indications: Bacteria in the Blood    Zinc 50 MG tablet   Yes No    Take 1 tablet by mouth 2 (Two) Times a Day.              Allergies:  Allergies   Allergen Reactions    Sulfa Antibiotics Hives    Keflex [Cephalexin] Hives       Objective      Vitals:   Temp:  [98.2 °F (36.8 °C)] 98.2 °F (36.8 °C)  Heart Rate:  [71] 71  Resp:  [16] 16  BP: (121)/(69) 121/69  Body mass index is 21.26 kg/m².  Physical Exam  Vitals reviewed.   Constitutional:       Appearance: Normal appearance.   HENT:      Head: Normocephalic and atraumatic.      Nose: No congestion.   Eyes:      Pupils: Pupils are equal, round, and reactive to light.   Cardiovascular:      Rate and Rhythm: Normal rate and regular rhythm.      Pulses: Normal pulses.      Heart sounds: Normal heart sounds. No murmur heard.  Pulmonary:      Effort: Pulmonary effort is normal. No respiratory distress.      Breath sounds: Normal breath sounds. No wheezing.   Abdominal:      General: Bowel sounds are normal. There is no distension.      Palpations: Abdomen is soft. There is no mass.      Tenderness: There is no abdominal tenderness. There is no guarding.   Musculoskeletal:         General: No swelling or deformity. Normal range of motion.      Cervical back: Normal range of motion.   Skin:     General: Skin is warm.   Neurological:      General: No focal deficit present.      Mental Status: She is alert and oriented to person, place, and time.      Cranial Nerves: No cranial nerve deficit.      Sensory: No sensory deficit.      Motor: No weakness.         Diagnostic Data:  Lab Results (last 24 hours)       ** No results found for the last 24 hours. **             Imaging Results (Last 24 Hours)       Procedure Component Value Units Date/Time    XR Chest 1 View [[id number]] Resulted: 05/01/24 5716      Updated: 05/01/24 1645              Assessment & Plan        This is a 58 y.o. female with:    Active and Resolved Problems  There are no hospital problems to display for this patient.    MRSA bacteremia  L2-L3 discitis/osteomyelitis   -She was recently discharged after she was diagnosed with MRSA bacteremia and L2/L3 discitis/osteomyelitis.  She is supposed to be on vancomycin until 5/3/2024.      -She has noticed her pain has progressively gotten worse since then.  -MRI thoracic and lumbar spine with and without contrast from 4/23/24 reports  interval worsening of findings related to L2-L3 discitis and vertebral body osteomyelitis, including worsening destruction of the disc space and increased vertebral body marrow edema. There is a persistent epidural   abscess that is not significantly changed in size since the comparison study. There is enhancing paraspinal soft tissue at the L2 and L3 vertebral body levels compatible with paraspinal phlegmon with no walled off fluid collection demonstrated  -ID and spine consult  -Continue to monitor.  -Pain control  -resume vancomycin pending ID recs    Liver cirrhosis :Likely alcohol related;compensation  -Continue supportive care and outpatient follow-up     Anemia of chronic disease  -Most recent labs reveal hemoglobin stable at this time  -Monitor and transfuse as needed     Tobacco abuse  -40-pack-year smoking history  -Counseled on cessation  -Nicotine patch if needed    DVT prophylaxis:  No DVT prophylaxis order currently exists.        The patient desires to be as follows:    CODE STATUS: Full code         Moisés Gilliland, who can be contacted at [phone], is the designated person to make medical decisions on the patient's behalf if She is incapable of doing so. This was clarified with patient and/or next of kin on 5/1/2024 during the course of this H&P.    Admission Status:  I believe this patient meets inpatient status.    Expected Length of Stay: >2MN    PDMP and  Medication Dispenses via Sidebar reviewed and consistent with patient reported medications.    I discussed the patient's findings and my recommendations with patient.      Signature:     This document has been electronically signed by Ashlyn Herring MD on May 1, 2024 16:45 EDT   [facility]     Electronically signed by Ashlyn Herring MD at 24 1706       Operative/Procedure Notes (last 48 hours)  Notes from 24 1242 through 24 1242   No notes of this type exist for this encounter.          Physician Progress Notes (last 48 hours)        Niels Sands MD at 24 1035              [facility] MEDICINE SERVICE  DAILY PROGRESS NOTE    NAME: Lita Yu  : 1965  MRN: [id number]      LOS: 1 day     PROVIDER OF SERVICE: Niels Sands MD    Chief Complaint: Spinal abscess    Subjective:     Interval History:  History taken from: patient chart RN  Pain appears controlled    Review of Systems:   Review of Systems  All negative except as above  Objective:     Vital Signs  Temp:  [97.5 °F (36.4 °C)-98.2 °F (36.8 °C)] 98.1 °F (36.7 °C)  Heart Rate:  [66-72] 66  Resp:  [12-16] 14  BP: (104-121)/(61-69) 104/61   Body mass index is 21.26 kg/m².    Physical Exam  Physical Exam  AOx3 NAD  RRR S1-S2 audible  Lungs with fair air entry  Abdomen soft nontender nondistended  Scheduled Meds   busPIRone, 15 mg, Oral, BID  folic acid, 1 mg, Oral, Daily  QUEtiapine, 50 mg, Oral, Nightly  sertraline, 100 mg, Oral, BID  sodium chloride, 10 mL, Intravenous, Q12H  vancomycin, 1,000 mg, Intravenous, Daily       PRN Meds     acetaminophen **OR** acetaminophen **OR** acetaminophen    albuterol    senna-docusate sodium **AND** polyethylene glycol **AND** bisacodyl **AND** bisacodyl    HYDROcodone-acetaminophen    HYDROcodone-acetaminophen    ondansetron ODT **OR** ondansetron    Pharmacy to dose vancomycin    sodium chloride    sodium chloride   Infusions  Pharmacy to dose  vancomycin,           Diagnostic Data    Results from last 7 days   Lab Units 05/02/24  0616   WBC 10*3/mm3 4.54   HEMOGLOBIN g/dL 8.4*   HEMATOCRIT % 27.4*   PLATELETS 10*3/mm3 152   GLUCOSE mg/dL 95   CREATININE mg/dL 1.14*   BUN mg/dL 14   SODIUM mmol/L 140   POTASSIUM mmol/L 3.9   ANION GAP mmol/L 8.0       XR Chest 1 View    Result Date: 5/1/2024  Impression: Right arm approach PICC tip terminates at the mid SVC level. No acute chest finding. Electronically Signed: Sara Jones MD  5/1/2024 4:50 PM EDT  Workstation ID: [id number]       I reviewed the patient's new clinical results.  I reviewed the patient's new imaging results and agree with the interpretation.    Assessment/Plan:     Active and Resolved Problems  Active Hospital Problems    Diagnosis  POA    **Spinal abscess [M46.20]  Yes      Resolved Hospital Problems   No resolved problems to display.       58-year-old female with history of hypertension, peripheral vascular disease, degenerative joint disease, seizure disorder, tobacco abuse, EtOH liver cirrhosis, recent history of MRSA bacteremia attributed to L2-3 OM/discitis admitted to [facility] on 5/1/2024 with a worsening pain in her lower back.     #History of MRSA bacteremia  #History of L2-L3 discitis/osteomyelitis   -She was recently discharged after she was diagnosed with MRSA bacteremia and L2/L3 discitis/osteomyelitis.  Prior plan for vancomycin until 5/3/2024.      -Now admitted with worsening low back pain  -MRI thoracic and lumbar spine with and without contrast from 4/23/24 reports  interval worsening of findings related to L2-L3 discitis and vertebral body osteomyelitis, including worsening destruction of the disc space and increased vertebral body marrow edema. There is a persistent epidural abscess that is not significantly changed in size since the comparison study. There is enhancing paraspinal soft tissue at the L2 and L3 vertebral body levels compatible with paraspinal  phlegmon with no walled off fluid collection demonstrated    Spine and infectious disease has been consulted  Continue with vancomycin for now.  Pharmacy to dose  Continue current pain control  Follow blood cultures     #History of alcohol liver cirrhosis   Outpatient follow-up     #Acute on chronic anemia  Monitor H&H  Transfuse PRBCs to keep hemoglobin more than 7     #Tobacco abuse  NRT  Counseled      DVT prophylaxis:  Mechanical DVT prophylaxis orders are present.         Code status is   Code Status and Medical Interventions:   Ordered at: 05/01/24 1657     Level Of Support Discussed With:    Patient     Code Status (Patient has no pulse and is not breathing):    CPR (Attempt to Resuscitate)     Medical Interventions (Patient has pulse or is breathing):    Full Support       Plan for disposition: Anticipate discharge in 4 days    Time: 30 minutes    Signature: Electronically signed by Niels Sands MD, 05/02/24, 10:35 EDT.  [facility] Hospitalist Team      Electronically signed by Niels Sands MD at 05/02/24 1041       Consult Notes (last 48 hours)  Notes from 04/30/24 1242 through 05/02/24 1242   No notes of this type exist for this encounter.

## 2024-05-02 NOTE — CASE MANAGEMENT/SOCIAL WORK
"Social Work Assessment   [facility]     Patient Name: Lita Yu  MRN: [id number]  Today's Date: 5/2/2024    Admit Date: 5/1/2024     Psychosocial       Row Name 05/02/24 1254       Values/Beliefs    Spiritual, Cultural Beliefs, Jainism Practices, Values that Affect Care no    Values/Beliefs Comment Pt is not Sabianism.       Behavior WDL    Behavior WDL WDL       Emotion Mood WDL    Emotion/Mood/Affect WDL WDL       Mental Health    Little Interest or Pleasure in Doing Things 1-->several days    Feeling Down, Depressed or Hopeless 1-->several days       Speech WDL    Speech WDL WDL       Thought Process WDL    Thought Process WDL WDL       Intellectual Performance WDL    Level of Consciousness Alert       Stress    Do you feel stress - tense, restless, nervous, or anxious, or unable to sleep at night because your mind is troubled all the time - these days? To some exte       Coping/Stress    Major Change/Loss/Stressor medical condition/diagnosis    Patient Personal Strengths expressive of needs;expressive of emotions;humor    Sources of Support adult child(stephanie);friend(s)    Techniques to [place] with Loss/Stress/Change diversional activities;play  \"I just laugh or watch something funny.\"    Reaction to Health Status anger    Understanding of Condition and Treatment needs additional information       Developmental Stage (Eriksson's)    Developmental Stage Stage 7 (35-65 years/Middle Adulthood) Generativity vs. Stagnation       C-SSRS (Recent)    Q1 Wished to be Dead (Past Month) no    Q2 Suicidal Thoughts (Past Month) no    Q6 Suicide Behavior (Lifetime) yes  Took 10 aspirin when 14 y.o. as a \"cry for help.\" No issues since.    Within the past 3 Months? no       Violence Risk    Feels Like Hurting Others no    Previous Attempt to Harm Others no                   Abuse/Neglect       Row Name 05/02/24 1300       Personal Safety    Feels Unsafe at Home or Work/School no    Feels Threatened by Someone no    Does " Anyone Try to Keep You From Having Contact with Others or Doing Things Outside Your Home? no    Physical Signs of Abuse Present no    Personal Safety Comments Pt resides alone.                   Legal       Row Name 05/02/24 1301       Financial Resource Strain    How hard is it for you to pay for the very basics like food, housing, medical care, and heating? Not hard       Financial/Legal    Source of Income pension/senior living;disability  Spouse's pension and on SSDI 2/2 liver disease.    Who Manages Finances if Patient Unable Nobody    Finance Comments Pt reports only concerns are medical bills. All others are up to date. Pt does not receive SNAP.       Legal    Criminal Activity/Legal Involvement none                   Substance Abuse       Row Name 05/02/24 1304       Substance Use    Substance Use Comment Pt smokes 3pp10d, having started at 13 y.o. Pt has tried to quit previously. Pt is not interested in quitting. Pt has hx of heavy drinking - 1  pint bourbon/day. Quit January 2024 2/2 liver disease. Pt does not go to AA but has the AA books - she reports she doesn't get along with others enough to attend meetings. She denies illicit substance use.       AUDIT-C (Alcohol Use Disorders ID Test)    Q1: How often do you have a drink containing alcohol? Never    Q2: How many drinks containing alcohol do you have on a typical day when you are drinking? None    Q3: How often do you have six or more drinks on one occasion? Never    Audit-C Score 0       Family Member Substance Use (#4)    Family History of Substance Use mother    Street Drug/Medication/Inhalant Quantity Mother has hx of alcoholism.             TANYA Manzo, W  Medical Social Worker  Ph [phone]  Fax [phone]  [email or web address]

## 2024-05-02 NOTE — DISCHARGE PLACEMENT REQUEST
"Yonis Yu \"NAINA\" (58 y.o. Female)       Date of Birth   1965    Social Security Number       Address   [address] [place] [facility]    Home Phone   [phone]    MRN   [id number]       Scientology   None    Marital Status                               Admission Date   5/1/24    Admission Type   Elective    Admitting Provider       Attending Provider   Niels Sands MD    Department, Room/Bed   [facility] SURGICAL INPATIENT, 4102/1       Discharge Date       Discharge Disposition       Discharge Destination                                 Attending Provider: Niels Sands MD    Allergies: Sulfa Antibiotics, Keflex [Cephalexin]    Isolation: None   Infection: MRSA No Isolation this Admit (03/08/24)   Code Status: CPR    Ht: 160 cm (62.99\")   Wt: 54.4 kg (120 lb)    Admission Cmt: None   Principal Problem: Spinal abscess [M46.20]                   Active Insurance as of 5/1/2024       Primary Coverage       Payor Plan Insurance Group Employer/Plan Group    [place] [id number]       Payor Plan Address Payor Plan Phone Number Payor Plan Fax Number Effective Dates    [address]   6/22/2014 - None Entered    [facility] [place] [id number]         Subscriber Name Subscriber Birth Date Member ID       YONIS YU 1965 [id number]                     Emergency Contacts        (Rel.) Home Phone Work Phone Mobile Phone    OBDULIA CANTU (Daughter) -- -- [phone]    SARAI WOOD (Brother) [phone] -- --                "

## 2024-05-02 NOTE — PLAN OF CARE
Goal Outcome Evaluation:               Patient resting in bed. Awaiting MRI. Blood cultures pending. Pain controlled with medication

## 2024-05-02 NOTE — OUTREACH NOTE
Medical Week 2 Survey      Flowsheet Row Responses   [facility] facility patient discharged from? [facility]   Does the patient have one of the following disease processes/diagnoses(primary or secondary)? Other   Week 2 attempt successful? No   Unsuccessful attempts Attempt 2   Revoke Readmitted            ABEL ARCE - Registered Nurse

## 2024-05-03 ENCOUNTER — APPOINTMENT (OUTPATIENT)
Dept: GENERAL RADIOLOGY | Facility: HOSPITAL | Age: 59
End: 2024-05-03
Payer: COMMERCIAL

## 2024-05-03 ENCOUNTER — APPOINTMENT (OUTPATIENT)
Dept: INTERVENTIONAL RADIOLOGY/VASCULAR | Facility: HOSPITAL | Age: 59
End: 2024-05-03
Payer: COMMERCIAL

## 2024-05-03 ENCOUNTER — ANESTHESIA EVENT (OUTPATIENT)
Dept: PERIOP | Facility: HOSPITAL | Age: 59
End: 2024-05-03
Payer: COMMERCIAL

## 2024-05-03 LAB
APPEARANCE FLD: ABNORMAL
COLOR FLD: ABNORMAL
CRYSTALS FLD MICRO: NORMAL
METHOD: ABNORMAL
NEUTROPHILS NFR FLD MANUAL: 100 %
NUC CELL # FLD: 6599 /MM3

## 2024-05-03 PROCEDURE — 25010000002 LIDOCAINE 1 % SOLUTION

## 2024-05-03 PROCEDURE — 25010000002 VANCOMYCIN 1 G RECONSTITUTED SOLUTION 1 EACH VIAL: Performed by: NURSE PRACTITIONER

## 2024-05-03 PROCEDURE — 89060 EXAM SYNOVIAL FLUID CRYSTALS: CPT | Performed by: INTERNAL MEDICINE

## 2024-05-03 PROCEDURE — 73502 X-RAY EXAM HIP UNI 2-3 VIEWS: CPT

## 2024-05-03 PROCEDURE — 89051 BODY FLUID CELL COUNT: CPT | Performed by: INTERNAL MEDICINE

## 2024-05-03 PROCEDURE — 77002 NEEDLE LOCALIZATION BY XRAY: CPT

## 2024-05-03 PROCEDURE — 0S993ZX DRAINAGE OF RIGHT HIP JOINT, PERCUTANEOUS APPROACH, DIAGNOSTIC: ICD-10-PCS | Performed by: RADIOLOGY

## 2024-05-03 PROCEDURE — 25010000002 FENTANYL CITRATE (PF) 50 MCG/ML SOLUTION

## 2024-05-03 PROCEDURE — 25810000003 SODIUM CHLORIDE 0.9 % SOLUTION 250 ML FLEX CONT: Performed by: NURSE PRACTITIONER

## 2024-05-03 PROCEDURE — 87205 SMEAR GRAM STAIN: CPT | Performed by: INTERNAL MEDICINE

## 2024-05-03 PROCEDURE — 87070 CULTURE OTHR SPECIMN AEROBIC: CPT | Performed by: INTERNAL MEDICINE

## 2024-05-03 RX ORDER — ACETAMINOPHEN 500 MG
1000 TABLET ORAL ONCE
Status: CANCELLED | OUTPATIENT
Start: 2024-05-04

## 2024-05-03 RX ORDER — FENTANYL CITRATE 50 UG/ML
INJECTION, SOLUTION INTRAMUSCULAR; INTRAVENOUS AS NEEDED
Status: COMPLETED | OUTPATIENT
Start: 2024-05-03 | End: 2024-05-03

## 2024-05-03 RX ORDER — LIDOCAINE HYDROCHLORIDE 10 MG/ML
INJECTION, SOLUTION INFILTRATION; PERINEURAL AS NEEDED
Status: COMPLETED | OUTPATIENT
Start: 2024-05-03 | End: 2024-05-03

## 2024-05-03 RX ORDER — CHOLECALCIFEROL (VITAMIN D3) 125 MCG
5 CAPSULE ORAL ONCE
Status: COMPLETED | OUTPATIENT
Start: 2024-05-03 | End: 2024-05-03

## 2024-05-03 RX ADMIN — Medication 10 ML: at 08:58

## 2024-05-03 RX ADMIN — BUSPIRONE HYDROCHLORIDE 15 MG: 15 TABLET ORAL at 20:11

## 2024-05-03 RX ADMIN — BUSPIRONE HYDROCHLORIDE 15 MG: 15 TABLET ORAL at 08:57

## 2024-05-03 RX ADMIN — HYDROCODONE BITARTRATE AND ACETAMINOPHEN 1 TABLET: 10; 325 TABLET ORAL at 06:15

## 2024-05-03 RX ADMIN — SERTRALINE 100 MG: 100 TABLET, FILM COATED ORAL at 08:57

## 2024-05-03 RX ADMIN — Medication 10 ML: at 20:11

## 2024-05-03 RX ADMIN — HYDROCODONE BITARTRATE AND ACETAMINOPHEN 1 TABLET: 10; 325 TABLET ORAL at 14:59

## 2024-05-03 RX ADMIN — SERTRALINE 100 MG: 100 TABLET, FILM COATED ORAL at 20:11

## 2024-05-03 RX ADMIN — FENTANYL CITRATE 100 MCG: 50 INJECTION, SOLUTION INTRAMUSCULAR; INTRAVENOUS at 12:41

## 2024-05-03 RX ADMIN — HYDROCODONE BITARTRATE AND ACETAMINOPHEN 1 TABLET: 10; 325 TABLET ORAL at 18:46

## 2024-05-03 RX ADMIN — QUETIAPINE FUMARATE 50 MG: 25 TABLET ORAL at 20:11

## 2024-05-03 RX ADMIN — LIDOCAINE HYDROCHLORIDE 6 ML: 10 INJECTION, SOLUTION INFILTRATION; PERINEURAL at 12:46

## 2024-05-03 RX ADMIN — FOLIC ACID 1 MG: 1 TABLET ORAL at 08:57

## 2024-05-03 RX ADMIN — VANCOMYCIN HYDROCHLORIDE 1000 MG: 1 INJECTION, POWDER, LYOPHILIZED, FOR SOLUTION INTRAVENOUS at 08:57

## 2024-05-03 RX ADMIN — Medication 5 MG: at 20:48

## 2024-05-03 NOTE — PROGRESS NOTES
Infectious Diseases Progress Note      LOS: 2 days   Patient Care Team:  Norma Saul APRN as PCP - General (Nurse Practitioner)  Flory Villanueva MD (Physical Medicine and Rehabilitation)    Chief Complaint: Back pain and right hip pain    Subjective     The patient had no high-grade fever during the last 24 hours.  She remained hemodynamically stable.  She is tolerating her current antibiotics with no side effects.    Review of Systems:   Review of Systems   Constitutional: Negative.    HENT: Negative.     Eyes: Negative.    Respiratory: Negative.     Cardiovascular: Negative.    Gastrointestinal: Negative.    Genitourinary: Negative.    Musculoskeletal:  Positive for arthralgias and back pain.   Skin: Negative.    Neurological: Negative.    Hematological: Negative.    Psychiatric/Behavioral: Negative.          Objective     Vital Signs  Temp:  [98.3 °F (36.8 °C)-98.4 °F (36.9 °C)] 98.3 °F (36.8 °C)  Heart Rate:  [69-73] 73  Resp:  [14-16] 14  BP: (107-114)/(55-70) 114/70    Physical Exam:  Physical Exam  Vitals and nursing note reviewed.   Constitutional:       Appearance: She is well-developed.   HENT:      Head: Normocephalic and atraumatic.   Eyes:      Pupils: Pupils are equal, round, and reactive to light.   Cardiovascular:      Rate and Rhythm: Normal rate and regular rhythm.      Heart sounds: Normal heart sounds.   Pulmonary:      Effort: Pulmonary effort is normal. No respiratory distress.      Breath sounds: Normal breath sounds. No wheezing or rales.   Abdominal:      General: Bowel sounds are normal. There is no distension.      Palpations: Abdomen is soft. There is no mass.      Tenderness: There is no abdominal tenderness. There is no guarding or rebound.   Musculoskeletal:         General: No deformity. Normal range of motion.      Cervical back: Normal range of motion and neck supple.      Comments: Tenderness on the right hip with limitation of the right hip movement   Skin:     General:  Skin is warm.      Findings: No erythema or rash.   Neurological:      Mental Status: She is alert and oriented to person, place, and time.      Cranial Nerves: No cranial nerve deficit.          Results Review:    I have reviewed all clinical data, test, lab, and imaging results.     Radiology  MRI Lumbar Spine With & Without Contrast    Result Date: 5/3/2024  MRI LUMBAR SPINE W WO CONTRAST Date of Exam: 5/2/2024 7:00 PM EDT Indication: Worsening back pain and weakness.  Comparison: MRI lumbar spine 4/23/2024, 3/10/2024 Technique:  Routine multiplanar/multisequence sequence images of the lumbar spine were obtained before and after the uneventful administration of Prohance.  Findings: The lumbar alignment is maintained. There is persistent abnormal T2 hyperintense signal within the L2 and L3 vertebral bodies as well as the L2-L3 disc. There is similar loss of intervertebral disc space height and endplate destruction at this level. On the postcontrast images, there is diffuse L2 and L3 vertebral body enhancement as well as paraspinal soft tissue enhancement similar to the previous study. Additionally, there is anterior epidural enhancing fluid along the L2 and L3 vertebral bodies measuring up to approximately 5 mm in thickness and resulting in moderate canal stenosis. There is a Schmorl's node in the superior L4 endplate. There are stable degenerative changes of the remainder of the lumbar spine.     Impression: Findings remain consistent with L2-L3 discitis and vertebral body osteomyelitis with enhancing paraspinal phlegmon and anterior epidural abscess resulting in moderate canal stenosis. The findings appear similar to the prior study when accounting for prominent motion artifact that was seen on those images. Electronically Signed: Ines Donald MD  5/3/2024 7:35 AM EDT  Workstation ID: [id number]    MRI Pelvis With & Without Contrast    Result Date: 5/2/2024  MRI PELVIS W WO CONTRAST Date of Exam: 5/2/2024 7:00  PM EDT Indication: Seen right hip pain and swelling. MRSA bacteremia. L2-3 discitis/osteomyelitis.  Comparison: Radiographs February 7, 2023 Technique:  Routine multiplanar/multisequence images of the pelvis were obtained before and after the uneventful intravenous administration of 15 mL of Prohance.  Findings: There is prominent soft tissue edema and enhancement surrounding the right hip. There is a moderate to right hip effusion with synovial thickening and enhancement. There is severe superior right hip joint space narrowing which appears to represent an interval change from the prior radiographs from February. There is marrow edema of the acetabulum and femoral head-neck surrounding the hip joint. These findings are concerning for joint infection given patient's history. There is avascular necrosis of the femoral head. There appears to be mild loss of the spherical contour of the superior femoral head which may represent a component of developing articular surface collapse. There is some suspected subchondral sclerosis at the acetabulum. There is superior and lateral subluxation of the femoral head. There appears to be mild remodeling of the acetabulum, likely the sequelae of the advanced arthropathy. There is edema in the musculature surrounding the hip and in the gluteus minimus, medius, and iliac is muscles. No rim-enhancing collection is identified in the soft tissues surrounding the hip at this time. Status post left hip arthroplasty. Artifact from hardware limits evaluation of surrounding structures. No significant collection is identified at the left hip on this exam. No other suspicious marrow signal abnormality of the pelvis on this exam. No significant sacroiliac joint effusion. There appear to be mild to moderate degenerative changes at the sacroiliac joints and pubic symphysis. Trace amount of free fluid is seen in the pelvis. There is mild to moderate diffuse muscle atrophy. No significant bursal  fluid collection is seen. No definite acute tendon tear.     Impression: 1.Prominent soft tissue edema and enhancement surrounding the right hip with right hip effusion, synovial thickening and enhancement, and periarticular marrow edema. There is severe right hip joint space narrowing which represents a change from prior radiographs from February 2023. These findings are concerning for joint infection given patient's history. Aspiration is recommended for diagnosis. 2.Avascular necrosis of the right femoral head, as before, with possible component of developing articular surface collapse which may contribute to arthropathy and edema. 3.Status post left hip arthroplasty. Artifact from hardware limits assessment, but no significant left hip collection is seen at this time. 4.See separate report for evaluation of the lumbar spine. Electronically Signed: Jluis Medina  5/2/2024 9:56 PM EDT  Workstation ID: [id number]     Cardiology    Laboratory    Results from last 7 days   Lab Units 05/02/24 2310 05/02/24  0616 04/29/24  1430   WBC 10*3/mm3 4.35 4.54 7.42   HEMOGLOBIN g/dL 8.1* 8.4* 10.8*   HEMATOCRIT % 27.0* 27.4* 33.7*   PLATELETS 10*3/mm3 153 152 222     Results from last 7 days   Lab Units 05/02/24  2310 05/02/24  0616 04/29/24  1430   SODIUM mmol/L 140 140 140   POTASSIUM mmol/L 3.8 3.9 4.4   CHLORIDE mmol/L 108* 108* 107   CO2 mmol/L 25.0 24.0 19.7*   BUN mg/dL 16 14 14   CREATININE mg/dL 1.03* 1.14* 1.04*   GLUCOSE mg/dL 113* 95 78   ALBUMIN g/dL 3.0*  --   --    BILIRUBIN mg/dL <0.2  --   --    ALK PHOS U/L 187*  --   --    AST (SGOT) U/L 22  --   --    ALT (SGPT) U/L 12  --   --    CALCIUM mg/dL 8.8 9.0 10.0         Results from last 7 days   Lab Units 05/02/24  0616   SED RATE mm/hr 50*         Microbiology   Microbiology Results (last 10 days)       Procedure Component Value - Date/Time    Urine Culture - Urine, Urine, Clean Catch [[id number]]  (Abnormal)  (Susceptibility) Collected: 04/24/24 1229    Lab  Status: Final result Specimen: Urine, Clean Catch Updated: 04/27/24 1056     Urine Culture >100,000 CFU/mL Klebsiella pneumoniae ssp pneumoniae    Narrative:      Colonization of the urinary tract without infection is common. Treatment is discouraged unless the patient is symptomatic, pregnant, or undergoing an invasive urologic procedure.    Susceptibility        Klebsiella pneumoniae ssp pneumoniae      SOFIA      Amoxicillin + Clavulanate Susceptible      Ampicillin Resistant      Ampicillin + Sulbactam Susceptible      Cefazolin Susceptible      Cefepime Susceptible      Ceftazidime Susceptible      Ceftriaxone Susceptible      Gentamicin Susceptible      Levofloxacin Susceptible      Nitrofurantoin Resistant      Piperacillin + Tazobactam Susceptible      Trimethoprim + Sulfamethoxazole Susceptible                           Blood Culture - Blood, Arm, Left [[id number]]  (Normal) Collected: 04/23/24 2235    Lab Status: Final result Specimen: Blood from Arm, Left Updated: 04/28/24 2300     Blood Culture No growth at 5 days    Blood Culture - Blood, Arm, Left [[id number]]  (Normal) Collected: 04/23/24 1824    Lab Status: Final result Specimen: Blood from Arm, Left Updated: 04/28/24 1831     Blood Culture No growth at 5 days            Medication Review:       Schedule Meds  busPIRone, 15 mg, Oral, BID  folic acid, 1 mg, Oral, Daily  QUEtiapine, 50 mg, Oral, Nightly  sertraline, 100 mg, Oral, BID  sodium chloride, 10 mL, Intravenous, Q12H  vancomycin, 1,000 mg, Intravenous, Daily        Infusion Meds  Pharmacy to dose vancomycin,         PRN Meds    acetaminophen **OR** acetaminophen **OR** acetaminophen    albuterol    senna-docusate sodium **AND** polyethylene glycol **AND** bisacodyl **AND** bisacodyl    HYDROcodone-acetaminophen    HYDROcodone-acetaminophen    ondansetron ODT **OR** ondansetron    Pharmacy to dose vancomycin    sodium chloride    sodium chloride        Assessment & Plan       Antimicrobial Therapy    1.  IV vancomycin        2.        3.        4.        5.              Assessment     Discitis/osteomyelitis at the level of L2-L3.  Current MRI showed worsening of discitis with persistent epidural abscess and moderate canal stenosis.  There was paraspinal phlegmon.  Neurosurgery service following and the patient and decided not to do surgical intervention.  The patient has been receiving IV antibiotics for 8 weeks prior to admission.  I am concerned about clinical failure and need of surgery.    Possible right hip septic arthritis based on MRI of the pelvis.     Recent admission for methicillin resistant Staphylococcus aureus bacteremia.  Likely source is lumbar discitis/osteomyelitis.   SILVINA performed on 3/12/2024 with cardiology notes mentioning no vegetation.  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  Patient denied having infection after the procedure        Plan     Continue IV vancomycin-keep trough between 15 and 20.   I will extend the duration of the treatment to 12 weeks.  If patient continues to have abnormality after 12 weeks then she needs to be evaluated for surgery.  Maximum duration of antibiotics will be 2 weeks otherwise the case would be consider failure of therapy  Waiting on repeat blood culture results  Consult IR for right hip arthrocentesis and send synovial fluid for cell and differential, culture and crystals  MRI of the lumbar spine and pelvis with and without contrast  Continue supportive care  A.m. chilango Foster MD  05/03/24  11:48 EDT    Note is dictated utilizing voice recognition software/Dragon

## 2024-05-03 NOTE — CASE MANAGEMENT/SOCIAL WORK
Continued Stay Note  DAYANARA Chery     Patient Name: Lita Yu  MRN: [id number]  Today's Date: 5/3/2024    Admit Date: 5/1/2024    Plan: Home with [facility] and [facility] for IV antibiotics. Family can transport at discharge.   Discharge Plan       Row Name 05/03/24 1452       Plan    Plan Home with [facility]  and [facility] for IV antibiotics. Family can transport at discharge.    Plan Comments DC barriers: IV antibiotics,  blood cultures pending s/p IR aspiration of right hip.                    Expected Discharge Date and Time       Expected Discharge Date Expected Discharge Time    May 6, 2024           Tequila Bourne RN

## 2024-05-03 NOTE — PLAN OF CARE
Goal Outcome Evaluation:               Patient to IR for hip aspiration. Awaiting culture. Pain controlled with medication. Appetite good. Picc line patent

## 2024-05-03 NOTE — PROGRESS NOTES
[facility] MEDICINE SERVICE  DAILY PROGRESS NOTE    NAME: Lita Yu  : 1965  MRN: [id number]      LOS: 2 days     PROVIDER OF SERVICE: Niels Sands MD    Chief Complaint: Spinal abscess    Subjective:     Interval History:  History taken from: patient chart RN  Pain controlled appears comfortable afebrile discussed  MRI findings discussed with patient  Review of Systems:   Review of Systems  All negative except as above  Objective:     Vital Signs  Temp:  [98.3 °F (36.8 °C)-98.4 °F (36.9 °C)] 98.3 °F (36.8 °C)  Heart Rate:  [69-73] 73  Resp:  [14-16] 14  BP: (107-114)/(55-70) 114/70   Body mass index is 21.26 kg/m².    Physical Exam  Physical Exam  AOx3 NAD  RRR S1 and S2 audible  Lungs with fair entry  Abdomen soft nontender nondistended  Scheduled Meds   busPIRone, 15 mg, Oral, BID  folic acid, 1 mg, Oral, Daily  QUEtiapine, 50 mg, Oral, Nightly  sertraline, 100 mg, Oral, BID  sodium chloride, 10 mL, Intravenous, Q12H  vancomycin, 1,000 mg, Intravenous, Daily       PRN Meds     acetaminophen **OR** acetaminophen **OR** acetaminophen    albuterol    senna-docusate sodium **AND** polyethylene glycol **AND** bisacodyl **AND** bisacodyl    HYDROcodone-acetaminophen    HYDROcodone-acetaminophen    ondansetron ODT **OR** ondansetron    Pharmacy to dose vancomycin    sodium chloride    sodium chloride   Infusions  Pharmacy to dose vancomycin,           Diagnostic Data    Results from last 7 days   Lab Units 24  2310   WBC 10*3/mm3 4.35   HEMOGLOBIN g/dL 8.1*   HEMATOCRIT % 27.0*   PLATELETS 10*3/mm3 153   GLUCOSE mg/dL 113*   CREATININE mg/dL 1.03*   BUN mg/dL 16   SODIUM mmol/L 140   POTASSIUM mmol/L 3.8   AST (SGOT) U/L 22   ALT (SGPT) U/L 12   ALK PHOS U/L 187*   BILIRUBIN mg/dL <0.2   ANION GAP mmol/L 7.0       MRI Lumbar Spine With & Without Contrast    Result Date: 5/3/2024  Impression: Findings remain consistent with L2-L3 discitis and vertebral body osteomyelitis with  enhancing paraspinal phlegmon and anterior epidural abscess resulting in moderate canal stenosis. The findings appear similar to the prior study when accounting for prominent motion artifact that was seen on those images. Electronically Signed: Ines Donald MD  5/3/2024 7:35 AM EDT  Workstation ID: [id number]    MRI Pelvis With & Without Contrast    Result Date: 5/2/2024  Impression: 1.Prominent soft tissue edema and enhancement surrounding the right hip with right hip effusion, synovial thickening and enhancement, and periarticular marrow edema. There is severe right hip joint space narrowing which represents a change from prior radiographs from February 2023. These findings are concerning for joint infection given patient's history. Aspiration is recommended for diagnosis. 2.Avascular necrosis of the right femoral head, as before, with possible component of developing articular surface collapse which may contribute to arthropathy and edema. 3.Status post left hip arthroplasty. Artifact from hardware limits assessment, but no significant left hip collection is seen at this time. 4.See separate report for evaluation of the lumbar spine. Electronically Signed: Jluis Medina  5/2/2024 9:56 PM EDT  Workstation ID: [id number]    XR Chest 1 View    Result Date: 5/1/2024  Impression: Right arm approach PICC tip terminates at the mid SVC level. No acute chest finding. Electronically Signed: aSra Jones MD  5/1/2024 4:50 PM EDT  Workstation ID: [id number]       I reviewed the patient's new clinical results.  I reviewed the patient's new imaging results and agree with the interpretation.    Assessment/Plan:     Active and Resolved Problems  Active Hospital Problems    Diagnosis  POA    **Spinal abscess [M46.20]  Yes      Resolved Hospital Problems   No resolved problems to display.       58-year-old female with history of hypertension, peripheral vascular disease, degenerative joint disease, seizure disorder, tobacco abuse,  EtOH liver cirrhosis, recent history of MRSA bacteremia attributed to L2-3 OM/discitis admitted to [facility] on 5/1/2024 with a worsening pain in her lower back.      #History of MRSA bacteremia  #History of L2-L3 discitis/osteomyelitis   -She was recently discharged after she was diagnosed with MRSA bacteremia and L2/L3 discitis/osteomyelitis.  Prior plan for vancomycin until 5/3/2024.      -Now admitted with worsening low back pain  -MRI lumbar and pelvis spine as noted.  Shows right hip effusion Along with L2-3 discitis and vertebral body osteomyelitis with enhancing paraspinal phlegmon    ID following continue vancomycin  Orthopedics consulted anticipate patient will require right hip aspiration  Continue current pain control  Seen by CHRISTIANO no plan for surgical intervention  Follow infectious workup       #History of alcohol liver cirrhosis          Outpatient follow-up     #Acute on chronic anemia  Monitor H&H  Transfuse PRBCs to keep hemoglobin more than 7     #Tobacco abuse  NRT  Counseled    DVT prophylaxis:  Mechanical DVT prophylaxis orders are present.         Code status is   Code Status and Medical Interventions:   Ordered at: 05/01/24 3777     Level Of Support Discussed With:    Patient     Code Status (Patient has no pulse and is not breathing):    CPR (Attempt to Resuscitate)     Medical Interventions (Patient has pulse or is breathing):    Full Support       Plan for disposition: TBD    Time: 30 minutes    Signature: Electronically signed by Niels Snads MD, 05/03/24, 11:34 EDT.  [facility] Hospitalist Team

## 2024-05-03 NOTE — PLAN OF CARE
Goal Outcome Evaluation:         Pt had MRI today, results showed joint space narrowing, findings were concerning for joint infection, pt may have possible aspiration for diagnosis (per radiology), VSS on RA, pain tx per MAR PRN, able to make needs known, plan of care ongoing.

## 2024-05-04 ENCOUNTER — APPOINTMENT (OUTPATIENT)
Dept: GENERAL RADIOLOGY | Facility: HOSPITAL | Age: 59
End: 2024-05-04
Payer: COMMERCIAL

## 2024-05-04 ENCOUNTER — ANESTHESIA (OUTPATIENT)
Dept: PERIOP | Facility: HOSPITAL | Age: 59
End: 2024-05-04
Payer: COMMERCIAL

## 2024-05-04 LAB
ALBUMIN SERPL-MCNC: 3.2 G/DL (ref 3.5–5.2)
ALBUMIN/GLOB SERPL: 0.8 G/DL
ALP SERPL-CCNC: 184 U/L (ref 39–117)
ALT SERPL W P-5'-P-CCNC: 13 U/L (ref 1–33)
ANION GAP SERPL CALCULATED.3IONS-SCNC: 9 MMOL/L (ref 5–15)
AST SERPL-CCNC: 18 U/L (ref 1–32)
BASOPHILS # BLD AUTO: 0.03 10*3/MM3 (ref 0–0.2)
BASOPHILS NFR BLD AUTO: 0.7 % (ref 0–1.5)
BILIRUB SERPL-MCNC: 0.2 MG/DL (ref 0–1.2)
BUN SERPL-MCNC: 14 MG/DL (ref 6–20)
BUN/CREAT SERPL: 13.2 (ref 7–25)
CALCIUM SPEC-SCNC: 9.3 MG/DL (ref 8.6–10.5)
CHLORIDE SERPL-SCNC: 105 MMOL/L (ref 98–107)
CO2 SERPL-SCNC: 25 MMOL/L (ref 22–29)
CREAT SERPL-MCNC: 1.06 MG/DL (ref 0.57–1)
DEPRECATED RDW RBC AUTO: 54 FL (ref 37–54)
EGFRCR SERPLBLD CKD-EPI 2021: 61.8 ML/MIN/1.73
EOSINOPHIL # BLD AUTO: 0.28 10*3/MM3 (ref 0–0.4)
EOSINOPHIL NFR BLD AUTO: 6.1 % (ref 0.3–6.2)
ERYTHROCYTE [DISTWIDTH] IN BLOOD BY AUTOMATED COUNT: 14.8 % (ref 12.3–15.4)
GLOBULIN UR ELPH-MCNC: 4.1 GM/DL
GLUCOSE SERPL-MCNC: 89 MG/DL (ref 65–99)
HCT VFR BLD AUTO: 27.7 % (ref 34–46.6)
HGB BLD-MCNC: 8.5 G/DL (ref 12–15.9)
IMM GRANULOCYTES # BLD AUTO: 0.01 10*3/MM3 (ref 0–0.05)
IMM GRANULOCYTES NFR BLD AUTO: 0.2 % (ref 0–0.5)
LYMPHOCYTES # BLD AUTO: 1.19 10*3/MM3 (ref 0.7–3.1)
LYMPHOCYTES NFR BLD AUTO: 26 % (ref 19.6–45.3)
MAGNESIUM SERPL-MCNC: 1.6 MG/DL (ref 1.6–2.6)
MCH RBC QN AUTO: 30.4 PG (ref 26.6–33)
MCHC RBC AUTO-ENTMCNC: 30.7 G/DL (ref 31.5–35.7)
MCV RBC AUTO: 98.9 FL (ref 79–97)
MONOCYTES # BLD AUTO: 0.46 10*3/MM3 (ref 0.1–0.9)
MONOCYTES NFR BLD AUTO: 10 % (ref 5–12)
NEUTROPHILS NFR BLD AUTO: 2.61 10*3/MM3 (ref 1.7–7)
NEUTROPHILS NFR BLD AUTO: 57 % (ref 42.7–76)
NRBC BLD AUTO-RTO: 0 /100 WBC (ref 0–0.2)
PHOSPHATE SERPL-MCNC: 3.7 MG/DL (ref 2.5–4.5)
PLATELET # BLD AUTO: 167 10*3/MM3 (ref 140–450)
PMV BLD AUTO: 9.1 FL (ref 6–12)
POTASSIUM SERPL-SCNC: 3.8 MMOL/L (ref 3.5–5.2)
PROT SERPL-MCNC: 7.3 G/DL (ref 6–8.5)
RBC # BLD AUTO: 2.8 10*6/MM3 (ref 3.77–5.28)
SODIUM SERPL-SCNC: 139 MMOL/L (ref 136–145)
VANCOMYCIN TROUGH SERPL-MCNC: 12.2 MCG/ML (ref 5–20)
WBC NRBC COR # BLD AUTO: 4.58 10*3/MM3 (ref 3.4–10.8)

## 2024-05-04 PROCEDURE — 25010000002 DEXAMETHASONE PER 1 MG: Performed by: ANESTHESIOLOGIST ASSISTANT

## 2024-05-04 PROCEDURE — 25810000003 LACTATED RINGERS PER 1000 ML: Performed by: ANESTHESIOLOGY

## 2024-05-04 PROCEDURE — 25010000002 VASOPRESSIN 20 UNIT/ML SOLUTION: Performed by: ANESTHESIOLOGIST ASSISTANT

## 2024-05-04 PROCEDURE — C1776 JOINT DEVICE (IMPLANTABLE): HCPCS | Performed by: ORTHOPAEDIC SURGERY

## 2024-05-04 PROCEDURE — 25010000002 CEFTAROLINE FOSAMIL PER 10 MG: Performed by: INTERNAL MEDICINE

## 2024-05-04 PROCEDURE — 85025 COMPLETE CBC W/AUTO DIFF WBC: CPT | Performed by: HOSPITALIST

## 2024-05-04 PROCEDURE — 80053 COMPREHEN METABOLIC PANEL: CPT | Performed by: HOSPITALIST

## 2024-05-04 PROCEDURE — 84100 ASSAY OF PHOSPHORUS: CPT | Performed by: HOSPITALIST

## 2024-05-04 PROCEDURE — 25010000002 HYDROMORPHONE 1 MG/ML SOLUTION: Performed by: ANESTHESIOLOGIST ASSISTANT

## 2024-05-04 PROCEDURE — C1713 ANCHOR/SCREW BN/BN,TIS/BN: HCPCS | Performed by: ORTHOPAEDIC SURGERY

## 2024-05-04 PROCEDURE — 25010000002 PROPOFOL 200 MG/20ML EMULSION: Performed by: ANESTHESIOLOGIST ASSISTANT

## 2024-05-04 PROCEDURE — 25010000002 FENTANYL CITRATE (PF) 100 MCG/2ML SOLUTION: Performed by: ANESTHESIOLOGIST ASSISTANT

## 2024-05-04 PROCEDURE — 25010000002 HYDROMORPHONE 1 MG/ML SOLUTION: Performed by: HOSPITALIST

## 2024-05-04 PROCEDURE — 0SB90ZZ EXCISION OF RIGHT HIP JOINT, OPEN APPROACH: ICD-10-PCS | Performed by: ORTHOPAEDIC SURGERY

## 2024-05-04 PROCEDURE — 73600 X-RAY EXAM OF ANKLE: CPT

## 2024-05-04 PROCEDURE — 76000 FLUOROSCOPY <1 HR PHYS/QHP: CPT

## 2024-05-04 PROCEDURE — 83735 ASSAY OF MAGNESIUM: CPT | Performed by: HOSPITALIST

## 2024-05-04 PROCEDURE — 25810000003 SODIUM CHLORIDE 0.9 % SOLUTION 250 ML FLEX CONT: Performed by: NURSE PRACTITIONER

## 2024-05-04 PROCEDURE — 25010000002 ONDANSETRON PER 1 MG: Performed by: ANESTHESIOLOGIST ASSISTANT

## 2024-05-04 PROCEDURE — 0SR9069 REPLACEMENT OF RIGHT HIP JOINT WITH OXIDIZED ZIRCONIUM ON POLYETHYLENE SYNTHETIC SUBSTITUTE, CEMENTED, OPEN APPROACH: ICD-10-PCS | Performed by: ORTHOPAEDIC SURGERY

## 2024-05-04 PROCEDURE — 80202 ASSAY OF VANCOMYCIN: CPT | Performed by: INTERNAL MEDICINE

## 2024-05-04 PROCEDURE — 25010000002 VANCOMYCIN 1 G RECONSTITUTED SOLUTION: Performed by: ORTHOPAEDIC SURGERY

## 2024-05-04 PROCEDURE — 25010000002 VANCOMYCIN 1 G RECONSTITUTED SOLUTION 1 EACH VIAL: Performed by: NURSE PRACTITIONER

## 2024-05-04 PROCEDURE — 73501 X-RAY EXAM HIP UNI 1 VIEW: CPT

## 2024-05-04 PROCEDURE — 25010000002 SUGAMMADEX 200 MG/2ML SOLUTION: Performed by: ANESTHESIOLOGIST ASSISTANT

## 2024-05-04 PROCEDURE — 25010000002 TOBRAMYCIN PER 80 MG: Performed by: ORTHOPAEDIC SURGERY

## 2024-05-04 PROCEDURE — 73502 X-RAY EXAM HIP UNI 2-3 VIEWS: CPT

## 2024-05-04 DEVICE — R3 CONSTRAINED ACETABULAR LINER 58MM
Type: IMPLANTABLE DEVICE | Site: HIP | Status: FUNCTIONAL
Brand: R3

## 2024-05-04 DEVICE — POLARSTEM STANDARD CEMENTED 0
Type: IMPLANTABLE DEVICE | Site: HIP | Status: FUNCTIONAL
Brand: POLARSTEM

## 2024-05-04 DEVICE — DEV CONTRL TISS STRATAFIX SPIRAL MNCRYL UD 3/0 PLS 30CM: Type: IMPLANTABLE DEVICE | Site: HIP | Status: FUNCTIONAL

## 2024-05-04 DEVICE — OXINIUM FEMORAL HEAD 12/14 TAPER                                    22 MM +0
Type: IMPLANTABLE DEVICE | Site: HIP | Status: FUNCTIONAL
Brand: OXINIUM

## 2024-05-04 DEVICE — CMT BONE PALACOS R HI/VISC 1X40: Type: IMPLANTABLE DEVICE | Site: HIP | Status: FUNCTIONAL

## 2024-05-04 RX ORDER — SODIUM CHLORIDE, SODIUM LACTATE, POTASSIUM CHLORIDE, CALCIUM CHLORIDE 600; 310; 30; 20 MG/100ML; MG/100ML; MG/100ML; MG/100ML
9 INJECTION, SOLUTION INTRAVENOUS CONTINUOUS PRN
Status: DISCONTINUED | OUTPATIENT
Start: 2024-05-04 | End: 2024-05-11 | Stop reason: HOSPADM

## 2024-05-04 RX ORDER — FENTANYL CITRATE 50 UG/ML
50 INJECTION, SOLUTION INTRAMUSCULAR; INTRAVENOUS
Status: DISCONTINUED | OUTPATIENT
Start: 2024-05-04 | End: 2024-05-04 | Stop reason: HOSPADM

## 2024-05-04 RX ORDER — EPHEDRINE SULFATE 5 MG/ML
INJECTION INTRAVENOUS AS NEEDED
Status: DISCONTINUED | OUTPATIENT
Start: 2024-05-04 | End: 2024-05-04 | Stop reason: SURG

## 2024-05-04 RX ORDER — LORAZEPAM 2 MG/ML
1 INJECTION INTRAMUSCULAR
Status: DISCONTINUED | OUTPATIENT
Start: 2024-05-04 | End: 2024-05-04 | Stop reason: HOSPADM

## 2024-05-04 RX ORDER — SODIUM CHLORIDE 0.9 % (FLUSH) 0.9 %
10 SYRINGE (ML) INJECTION AS NEEDED
Status: DISCONTINUED | OUTPATIENT
Start: 2024-05-04 | End: 2024-05-04 | Stop reason: HOSPADM

## 2024-05-04 RX ORDER — MIDAZOLAM HYDROCHLORIDE 1 MG/ML
2 INJECTION INTRAMUSCULAR; INTRAVENOUS
Status: DISCONTINUED | OUTPATIENT
Start: 2024-05-04 | End: 2024-05-04 | Stop reason: HOSPADM

## 2024-05-04 RX ORDER — OXYCODONE HYDROCHLORIDE 5 MG/1
7.5 TABLET ORAL ONCE AS NEEDED
Status: DISCONTINUED | OUTPATIENT
Start: 2024-05-04 | End: 2024-05-04 | Stop reason: HOSPADM

## 2024-05-04 RX ORDER — DEXAMETHASONE SODIUM PHOSPHATE 4 MG/ML
INJECTION, SOLUTION INTRA-ARTICULAR; INTRALESIONAL; INTRAMUSCULAR; INTRAVENOUS; SOFT TISSUE AS NEEDED
Status: DISCONTINUED | OUTPATIENT
Start: 2024-05-04 | End: 2024-05-04 | Stop reason: SURG

## 2024-05-04 RX ORDER — DIPHENHYDRAMINE HCL 25 MG
25 CAPSULE ORAL
Status: DISCONTINUED | OUTPATIENT
Start: 2024-05-04 | End: 2024-05-04 | Stop reason: HOSPADM

## 2024-05-04 RX ORDER — IPRATROPIUM BROMIDE AND ALBUTEROL SULFATE 2.5; .5 MG/3ML; MG/3ML
3 SOLUTION RESPIRATORY (INHALATION) ONCE AS NEEDED
Status: DISCONTINUED | OUTPATIENT
Start: 2024-05-04 | End: 2024-05-04 | Stop reason: HOSPADM

## 2024-05-04 RX ORDER — PHENYLEPHRINE HCL IN 0.9% NACL 1 MG/10 ML
SYRINGE (ML) INTRAVENOUS AS NEEDED
Status: DISCONTINUED | OUTPATIENT
Start: 2024-05-04 | End: 2024-05-04 | Stop reason: SURG

## 2024-05-04 RX ORDER — LIDOCAINE HYDROCHLORIDE 10 MG/ML
INJECTION, SOLUTION EPIDURAL; INFILTRATION; INTRACAUDAL; PERINEURAL AS NEEDED
Status: DISCONTINUED | OUTPATIENT
Start: 2024-05-04 | End: 2024-05-04 | Stop reason: SURG

## 2024-05-04 RX ORDER — OXYCODONE HCL 10 MG/1
10 TABLET, FILM COATED, EXTENDED RELEASE ORAL ONCE
Status: COMPLETED | OUTPATIENT
Start: 2024-05-04 | End: 2024-05-04

## 2024-05-04 RX ORDER — ACETAMINOPHEN 650 MG/1
650 SUPPOSITORY RECTAL EVERY 4 HOURS PRN
Status: DISCONTINUED | OUTPATIENT
Start: 2024-05-04 | End: 2024-05-04 | Stop reason: HOSPADM

## 2024-05-04 RX ORDER — ONDANSETRON 2 MG/ML
INJECTION INTRAMUSCULAR; INTRAVENOUS AS NEEDED
Status: DISCONTINUED | OUTPATIENT
Start: 2024-05-04 | End: 2024-05-04 | Stop reason: SURG

## 2024-05-04 RX ORDER — PROMETHAZINE HYDROCHLORIDE 25 MG/1
25 SUPPOSITORY RECTAL ONCE AS NEEDED
Status: DISCONTINUED | OUTPATIENT
Start: 2024-05-04 | End: 2024-05-04 | Stop reason: HOSPADM

## 2024-05-04 RX ORDER — EPHEDRINE SULFATE 5 MG/ML
5 INJECTION INTRAVENOUS ONCE AS NEEDED
Status: DISCONTINUED | OUTPATIENT
Start: 2024-05-04 | End: 2024-05-04 | Stop reason: HOSPADM

## 2024-05-04 RX ORDER — VASOPRESSIN 20 U/ML
INJECTION PARENTERAL AS NEEDED
Status: DISCONTINUED | OUTPATIENT
Start: 2024-05-04 | End: 2024-05-04 | Stop reason: SURG

## 2024-05-04 RX ORDER — VANCOMYCIN HYDROCHLORIDE 1 G/20ML
INJECTION, POWDER, LYOPHILIZED, FOR SOLUTION INTRAVENOUS AS NEEDED
Status: DISCONTINUED | OUTPATIENT
Start: 2024-05-04 | End: 2024-05-04 | Stop reason: HOSPADM

## 2024-05-04 RX ORDER — LABETALOL HYDROCHLORIDE 5 MG/ML
5 INJECTION, SOLUTION INTRAVENOUS
Status: DISCONTINUED | OUTPATIENT
Start: 2024-05-04 | End: 2024-05-04 | Stop reason: HOSPADM

## 2024-05-04 RX ORDER — HYDRALAZINE HYDROCHLORIDE 20 MG/ML
5 INJECTION INTRAMUSCULAR; INTRAVENOUS
Status: DISCONTINUED | OUTPATIENT
Start: 2024-05-04 | End: 2024-05-04 | Stop reason: HOSPADM

## 2024-05-04 RX ORDER — DIPHENHYDRAMINE HYDROCHLORIDE 50 MG/ML
12.5 INJECTION INTRAMUSCULAR; INTRAVENOUS
Status: DISCONTINUED | OUTPATIENT
Start: 2024-05-04 | End: 2024-05-04 | Stop reason: HOSPADM

## 2024-05-04 RX ORDER — FENTANYL CITRATE 50 UG/ML
100 INJECTION, SOLUTION INTRAMUSCULAR; INTRAVENOUS
Status: DISCONTINUED | OUTPATIENT
Start: 2024-05-04 | End: 2024-05-04 | Stop reason: HOSPADM

## 2024-05-04 RX ORDER — PROPOFOL 10 MG/ML
INJECTION, EMULSION INTRAVENOUS AS NEEDED
Status: DISCONTINUED | OUTPATIENT
Start: 2024-05-04 | End: 2024-05-04 | Stop reason: SURG

## 2024-05-04 RX ORDER — PROMETHAZINE HYDROCHLORIDE 25 MG/1
25 TABLET ORAL ONCE AS NEEDED
Status: DISCONTINUED | OUTPATIENT
Start: 2024-05-04 | End: 2024-05-04 | Stop reason: HOSPADM

## 2024-05-04 RX ORDER — TOBRAMYCIN 1.2 G/30ML
INJECTION, POWDER, LYOPHILIZED, FOR SOLUTION INTRAVENOUS AS NEEDED
Status: DISCONTINUED | OUTPATIENT
Start: 2024-05-04 | End: 2024-05-04 | Stop reason: HOSPADM

## 2024-05-04 RX ORDER — NALOXONE HCL 0.4 MG/ML
0.4 VIAL (ML) INJECTION AS NEEDED
Status: DISCONTINUED | OUTPATIENT
Start: 2024-05-04 | End: 2024-05-04 | Stop reason: HOSPADM

## 2024-05-04 RX ORDER — FLUMAZENIL 0.1 MG/ML
0.5 INJECTION INTRAVENOUS AS NEEDED
Status: DISCONTINUED | OUTPATIENT
Start: 2024-05-04 | End: 2024-05-04 | Stop reason: HOSPADM

## 2024-05-04 RX ORDER — DIPHENHYDRAMINE HYDROCHLORIDE 50 MG/ML
12.5 INJECTION INTRAMUSCULAR; INTRAVENOUS ONCE AS NEEDED
Status: DISCONTINUED | OUTPATIENT
Start: 2024-05-04 | End: 2024-05-04 | Stop reason: HOSPADM

## 2024-05-04 RX ORDER — ACETAMINOPHEN 325 MG/1
650 TABLET ORAL ONCE AS NEEDED
Status: DISCONTINUED | OUTPATIENT
Start: 2024-05-04 | End: 2024-05-04 | Stop reason: HOSPADM

## 2024-05-04 RX ORDER — OXYCODONE HYDROCHLORIDE 5 MG/1
15 TABLET ORAL EVERY 4 HOURS PRN
Status: DISCONTINUED | OUTPATIENT
Start: 2024-05-04 | End: 2024-05-04 | Stop reason: HOSPADM

## 2024-05-04 RX ORDER — ONDANSETRON 2 MG/ML
4 INJECTION INTRAMUSCULAR; INTRAVENOUS ONCE AS NEEDED
Status: DISCONTINUED | OUTPATIENT
Start: 2024-05-04 | End: 2024-05-04 | Stop reason: HOSPADM

## 2024-05-04 RX ORDER — SODIUM CHLORIDE 0.9 % (FLUSH) 0.9 %
10 SYRINGE (ML) INJECTION EVERY 12 HOURS SCHEDULED
Status: DISCONTINUED | OUTPATIENT
Start: 2024-05-04 | End: 2024-05-04 | Stop reason: HOSPADM

## 2024-05-04 RX ORDER — ROCURONIUM BROMIDE 10 MG/ML
INJECTION, SOLUTION INTRAVENOUS AS NEEDED
Status: DISCONTINUED | OUTPATIENT
Start: 2024-05-04 | End: 2024-05-04 | Stop reason: SURG

## 2024-05-04 RX ORDER — FENTANYL CITRATE 50 UG/ML
INJECTION, SOLUTION INTRAMUSCULAR; INTRAVENOUS AS NEEDED
Status: DISCONTINUED | OUTPATIENT
Start: 2024-05-04 | End: 2024-05-04 | Stop reason: SURG

## 2024-05-04 RX ADMIN — DEXAMETHASONE SODIUM PHOSPHATE 8 MG: 4 INJECTION, SOLUTION INTRAMUSCULAR; INTRAVENOUS at 08:27

## 2024-05-04 RX ADMIN — HYDROCODONE BITARTRATE AND ACETAMINOPHEN 1 TABLET: 10; 325 TABLET ORAL at 02:58

## 2024-05-04 RX ADMIN — HYDROCODONE BITARTRATE AND ACETAMINOPHEN 1 TABLET: 10; 325 TABLET ORAL at 18:05

## 2024-05-04 RX ADMIN — HYDROMORPHONE HYDROCHLORIDE 1 MG: 1 INJECTION, SOLUTION INTRAMUSCULAR; INTRAVENOUS; SUBCUTANEOUS at 20:19

## 2024-05-04 RX ADMIN — Medication 150 MCG: at 09:19

## 2024-05-04 RX ADMIN — HYDROMORPHONE HYDROCHLORIDE 1 MG: 1 INJECTION, SOLUTION INTRAMUSCULAR; INTRAVENOUS; SUBCUTANEOUS at 14:24

## 2024-05-04 RX ADMIN — HYDROMORPHONE HYDROCHLORIDE 0.5 MG: 1 INJECTION, SOLUTION INTRAMUSCULAR; INTRAVENOUS; SUBCUTANEOUS at 10:44

## 2024-05-04 RX ADMIN — Medication 150 MCG: at 09:02

## 2024-05-04 RX ADMIN — PROPOFOL 30 MG: 10 INJECTION, EMULSION INTRAVENOUS at 09:15

## 2024-05-04 RX ADMIN — CEFTAROLINE FOSAMIL 600 MG: 600 POWDER, FOR SOLUTION INTRAVENOUS at 17:19

## 2024-05-04 RX ADMIN — QUETIAPINE FUMARATE 50 MG: 25 TABLET ORAL at 20:12

## 2024-05-04 RX ADMIN — SERTRALINE 100 MG: 100 TABLET, FILM COATED ORAL at 20:12

## 2024-05-04 RX ADMIN — FENTANYL CITRATE 50 MCG: 50 INJECTION, SOLUTION INTRAMUSCULAR; INTRAVENOUS at 08:21

## 2024-05-04 RX ADMIN — Medication 150 MCG: at 08:33

## 2024-05-04 RX ADMIN — VANCOMYCIN HYDROCHLORIDE 1000 MG: 1 INJECTION, POWDER, LYOPHILIZED, FOR SOLUTION INTRAVENOUS at 08:14

## 2024-05-04 RX ADMIN — BUSPIRONE HYDROCHLORIDE 15 MG: 15 TABLET ORAL at 20:12

## 2024-05-04 RX ADMIN — VASOPRESSIN 3 UNITS: 20 INJECTION INTRAVENOUS at 09:35

## 2024-05-04 RX ADMIN — LIDOCAINE HYDROCHLORIDE 50 MG: 10 INJECTION, SOLUTION EPIDURAL; INFILTRATION; INTRACAUDAL; PERINEURAL at 08:21

## 2024-05-04 RX ADMIN — PROPOFOL 150 MCG/KG/MIN: 10 INJECTION, EMULSION INTRAVENOUS at 08:25

## 2024-05-04 RX ADMIN — ROCURONIUM 20 MG: 50 INJECTION, SOLUTION INTRAVENOUS at 09:09

## 2024-05-04 RX ADMIN — HYDROCODONE BITARTRATE AND ACETAMINOPHEN 1 TABLET: 7.5; 325 TABLET ORAL at 12:59

## 2024-05-04 RX ADMIN — SODIUM CHLORIDE, SODIUM LACTATE, POTASSIUM CHLORIDE, AND CALCIUM CHLORIDE: .6; .31; .03; .02 INJECTION, SOLUTION INTRAVENOUS at 08:12

## 2024-05-04 RX ADMIN — SUGAMMADEX 200 MG: 100 INJECTION, SOLUTION INTRAVENOUS at 09:36

## 2024-05-04 RX ADMIN — ROCURONIUM 50 MG: 50 INJECTION, SOLUTION INTRAVENOUS at 08:21

## 2024-05-04 RX ADMIN — Medication 150 MCG: at 08:37

## 2024-05-04 RX ADMIN — PROPOFOL 150 MG: 10 INJECTION, EMULSION INTRAVENOUS at 08:21

## 2024-05-04 RX ADMIN — Medication 10 ML: at 20:12

## 2024-05-04 RX ADMIN — Medication 200 MCG: at 09:11

## 2024-05-04 RX ADMIN — EPHEDRINE SULFATE 10 MG: 5 INJECTION INTRAVENOUS at 09:43

## 2024-05-04 RX ADMIN — ONDANSETRON 4 MG: 2 INJECTION INTRAMUSCULAR; INTRAVENOUS at 09:33

## 2024-05-04 RX ADMIN — FENTANYL CITRATE 50 MCG: 50 INJECTION, SOLUTION INTRAMUSCULAR; INTRAVENOUS at 08:16

## 2024-05-04 RX ADMIN — HYDROMORPHONE HYDROCHLORIDE 0.5 MG: 1 INJECTION, SOLUTION INTRAMUSCULAR; INTRAVENOUS; SUBCUTANEOUS at 10:59

## 2024-05-04 RX ADMIN — VASOPRESSIN 1 UNITS: 20 INJECTION INTRAVENOUS at 09:26

## 2024-05-04 RX ADMIN — VASOPRESSIN 3 UNITS: 20 INJECTION INTRAVENOUS at 09:45

## 2024-05-04 RX ADMIN — OXYCODONE HYDROCHLORIDE 10 MG: 10 TABLET, FILM COATED, EXTENDED RELEASE ORAL at 07:21

## 2024-05-04 RX ADMIN — HYDROMORPHONE HYDROCHLORIDE 0.5 MG: 1 INJECTION, SOLUTION INTRAMUSCULAR; INTRAVENOUS; SUBCUTANEOUS at 10:30

## 2024-05-04 RX ADMIN — VASOPRESSIN 2 UNITS: 20 INJECTION INTRAVENOUS at 09:31

## 2024-05-04 NOTE — PROGRESS NOTES
"Pharmacy Antimicrobial Dosing Service    Subjective:  Lita Yu is a 58 y.o.female admitted with worsening back pain. Pharmacy has been consulted to dose Vancomycin for possible MRSA bacteremia and bone and/or joint infection.  Pt was started on vancomycin 3/8 for MRSA bacteremia, found to have lumbar discitis/osteomyelitis, epidural abscess. Was discharged to [facility] on vancomycin to complete 8 wks (LD 5/2/24). [facility] began managing vanc ~4/2/24. Confirmed w/[facility] out[facility] infusion center, pt had therapeutic level demonstrated 4/2, ~4/7, and 4/16. However, level on 4/23 was only 12.  Pt readmitted 4/23-25 w/SARITA, hyperK, possibly d/t NSAID use. MRI at that time showed interval worsening of discitis/OM. After discharge, pt was started on vanc 1gm QOD 4/27, w/subtherapeutic level of 9 on 4/29.  Pt readmitted 5/1 w/worsening pain.  MRI this admit showed discitis/OM, epidural abscess unchanged from previous; R hip effusion.    PMH: cirrhosis      Assessment/Plan    1. Day #4 (this admission) Vancomycin: Goal -600 mcg*h/mL.   Pt was receiving vancomycin 1000 mg Q48h as outpatient prior to admission. This was changed to 1000 mg Q24h on admission. Based on level obtained today prior to 3rd dose of current regimen, Bayesian modeling software predicts steady state , trough 15.1, and half-life ~18 hrs. D/t therapeutic AUC, will continue current regimen. Repeat trough 5/7 (or sooner if clinically warranted).    Will continue to monitor drug levels, renal function, culture and sensitivities, and patient clinical status.       Objective:  Relevant clinical data and objective history reviewed:  160 cm (62.99\")   54.4 kg (120 lb)   Ideal body weight: 52.4 kg (115 lb 7.7 oz)  Adjusted ideal body weight: 53.2 kg (117 lb 4.6 oz)  Body mass index is 21.26 kg/m².    Results from last 7 days   Lab Units 05/04/24  0557 05/01/24  1736   VANCOMYCIN TR mcg/mL 12.20  --    VANCOMYCIN RM mcg/mL  --  " 22.60     Results from last 7 days   Lab Units 05/04/24  0557 05/02/24  2310 05/02/24  0616   CREATININE mg/dL 1.06* 1.03* 1.14*     Estimated Creatinine Clearance: 50.9 mL/min (A) (by C-G formula based on SCr of 1.06 mg/dL (H)).  I/O last 3 completed shifts:  In: 480 [P.O.:480]  Out: 1500 [Urine:1500]    Results from last 7 days   Lab Units 05/04/24  0557 05/02/24  2310 05/02/24  0616   WBC 10*3/mm3 4.58 4.35 4.54     Temperature    05/04/24 1000 05/04/24 1115 05/04/24 1158   Temp: 96.8 °F (36 °C) 97 °F (36.1 °C) 97.2 °F (36.2 °C)     Baseline culture/source/susceptibility:  Microbiology Results (last 10 days)       Procedure Component Value - Date/Time    Body Fluid Culture - Body Fluid, Hip, Right [[id number]] Collected: 05/03/24 1256    Lab Status: Preliminary result Specimen: Body Fluid from Hip, Right Updated: 05/04/24 0731     Body Fluid Culture No growth at 24 hours     Gram Stain Many (4+) WBCs per low power field      No organisms seen    Blood Culture - Blood, Arm, Right [[id number]]  (Normal) Collected: 05/02/24 1607    Lab Status: Preliminary result Specimen: Blood from Arm, Right Updated: 05/03/24 1632     Blood Culture No growth at 24 hours    Blood Culture - Blood, Blood, Central Line [[id number]]  (Normal) Collected: 05/02/24 1553    Lab Status: Preliminary result Specimen: Blood, Central Line Updated: 05/03/24 1632     Blood Culture No growth at 24 hours            Cassandra Camacho PharmD, BCPS  05/04/24 12:46 EDT

## 2024-05-04 NOTE — PROGRESS NOTES
Infectious Diseases Progress Note      LOS: 3 days   Patient Care Team:  Norma Saul APRN as PCP - General (Nurse Practitioner)  Flory Villanueva MD (Physical Medicine and Rehabilitation)    Chief Complaint: Back pain and right hip pain    Subjective     The patient had no high-grade fever during the last 24 hours.  She remained hemodynamically stable.  She is tolerating her current antibiotics with no side effects.  The patient is s/p right hip arthroplasty earlier today    Review of Systems:   Review of Systems   Constitutional: Negative.    HENT: Negative.     Eyes: Negative.    Respiratory: Negative.     Cardiovascular: Negative.    Gastrointestinal: Negative.    Genitourinary: Negative.    Musculoskeletal:  Positive for arthralgias and back pain.   Skin: Negative.    Neurological: Negative.    Hematological: Negative.    Psychiatric/Behavioral: Negative.          Objective     Vital Signs  Temp:  [96.8 °F (36 °C)-98.4 °F (36.9 °C)] 97.2 °F (36.2 °C)  Heart Rate:  [62-82] 75  Resp:  [12-19] 18  BP: ()/(52-70) 95/59    Physical Exam:  Physical Exam  Vitals and nursing note reviewed.   Constitutional:       Appearance: She is well-developed.   HENT:      Head: Normocephalic and atraumatic.   Eyes:      Pupils: Pupils are equal, round, and reactive to light.   Cardiovascular:      Rate and Rhythm: Normal rate and regular rhythm.      Heart sounds: Normal heart sounds.   Pulmonary:      Effort: Pulmonary effort is normal. No respiratory distress.      Breath sounds: Normal breath sounds. No wheezing or rales.   Abdominal:      General: Bowel sounds are normal. There is no distension.      Palpations: Abdomen is soft. There is no mass.      Tenderness: There is no abdominal tenderness. There is no guarding or rebound.   Musculoskeletal:         General: No deformity.      Cervical back: Normal range of motion and neck supple.      Comments: S/p right hip arthroplasty   Skin:     General: Skin is warm.       Findings: No erythema or rash.   Neurological:      Mental Status: She is alert and oriented to person, place, and time.      Cranial Nerves: No cranial nerve deficit.          Results Review:    I have reviewed all clinical data, test, lab, and imaging results.     Radiology  FL C Arm During Surgery    Result Date: 5/4/2024  This procedure was auto-finalized with no dictation required.    XR Hip 1 View Without Pelvis Right (Surgery Only)    Result Date: 5/4/2024  This procedure was auto-finalized with no dictation required.    XR Hip With or Without Pelvis 2 - 3 View Right    Result Date: 5/4/2024  XR HIP W OR WO PELVIS 2-3 VIEW RIGHT Date of Exam: 5/4/2024 10:00 AM EDT Indication: postop Comparison: Pelvis radiograph 5/3/2024 Findings: There is a right hip arthroplasty with findings of acetabular remodeling and bone cement. The opening angle appears vertical and anterior. The femoral component is proud with associated bone cement. There is soft tissue gas. There is osteopenia. No periprosthetic fracture. Left total arthroplasty is present.     Impression: Immediate postop appearance of the right total of arthroplasty and changes of acetabular remodeling. The opening angle of the arthroplasty appears more vertically and possibly anteriorly located. Femoral component is proud by 1.4 cm. Electronically Signed: Ana Walker MD  5/4/2024 10:22 AM EDT  Workstation ID: [id number]    XR Ankle 2 View Right    Result Date: 5/4/2024  XR ANKLE 2 VW RIGHT Date of Exam: 5/4/2024 10:00 AM EDT Indication: postop, ensure no ankle injury Comparison: None available. Findings: Bone mineral density is normal. Ankle mortise is intact. No fractures or dislocations. No ankle joint effusion. No significant joint space narrowing.     Impression: Negative ankle radiograph. Electronically Signed: Ana Walker MD  5/4/2024 10:19 AM EDT  Workstation ID: [id number]     Cardiology    Laboratory    Results from last 7 days   Lab Units  05/04/24  0557 05/02/24  2310 05/02/24  0616 04/29/24  1430   WBC 10*3/mm3 4.58 4.35 4.54 7.42   HEMOGLOBIN g/dL 8.5* 8.1* 8.4* 10.8*   HEMATOCRIT % 27.7* 27.0* 27.4* 33.7*   PLATELETS 10*3/mm3 167 153 152 222     Results from last 7 days   Lab Units 05/04/24  0557 05/02/24  2310 05/02/24  0616 04/29/24  1430   SODIUM mmol/L 139 140 140 140   POTASSIUM mmol/L 3.8 3.8 3.9 4.4   CHLORIDE mmol/L 105 108* 108* 107   CO2 mmol/L 25.0 25.0 24.0 19.7*   BUN mg/dL 14 16 14 14   CREATININE mg/dL 1.06* 1.03* 1.14* 1.04*   GLUCOSE mg/dL 89 113* 95 78   ALBUMIN g/dL 3.2* 3.0*  --   --    BILIRUBIN mg/dL 0.2 <0.2  --   --    ALK PHOS U/L 184* 187*  --   --    AST (SGOT) U/L 18 22  --   --    ALT (SGPT) U/L 13 12  --   --    CALCIUM mg/dL 9.3 8.8 9.0 10.0         Results from last 7 days   Lab Units 05/02/24  0616   SED RATE mm/hr 50*         Microbiology   Microbiology Results (last 10 days)       Procedure Component Value - Date/Time    Body Fluid Culture - Body Fluid, Hip, Right [[id number]] Collected: 05/03/24 1256    Lab Status: Preliminary result Specimen: Body Fluid from Hip, Right Updated: 05/04/24 0731     Body Fluid Culture No growth at 24 hours     Gram Stain Many (4+) WBCs per low power field      No organisms seen    Blood Culture - Blood, Arm, Right [[id number]]  (Normal) Collected: 05/02/24 1607    Lab Status: Preliminary result Specimen: Blood from Arm, Right Updated: 05/03/24 1632     Blood Culture No growth at 24 hours    Blood Culture - Blood, Blood, Central Line [[id number]]  (Normal) Collected: 05/02/24 1553    Lab Status: Preliminary result Specimen: Blood, Central Line Updated: 05/03/24 4790     Blood Culture No growth at 24 hours            Medication Review:       Schedule Meds  busPIRone, 15 mg, Oral, BID  ceftaroline, 600 mg, Intravenous, Q12H  folic acid, 1 mg, Oral, Daily  QUEtiapine, 50 mg, Oral, Nightly  sertraline, 100 mg, Oral, BID  sodium chloride, 10 mL, Intravenous, Q12H  vancomycin, 1,000 mg,  Intravenous, Daily        Infusion Meds  lactated ringers, 9 mL/hr, Last Rate: 100 mL/hr (05/04/24 0812)  Pharmacy to dose vancomycin,         PRN Meds    acetaminophen **OR** acetaminophen **OR** acetaminophen    albuterol    alteplase (CATHFLO/ACTIVASE) 1 mg in sterile water (preservative free) 1.1 mL injection    senna-docusate sodium **AND** polyethylene glycol **AND** bisacodyl **AND** bisacodyl    HYDROcodone-acetaminophen    HYDROcodone-acetaminophen    HYDROmorphone    lactated ringers    ondansetron ODT **OR** ondansetron    Pharmacy to dose vancomycin    sodium chloride    sodium chloride        Assessment & Plan       Antimicrobial Therapy   1.  IV vancomycin        2.        3.        4.        5.              Assessment     Discitis/osteomyelitis at the level of L2-L3.  Current MRI showed worsening of discitis with persistent epidural abscess and moderate canal stenosis.  There was paraspinal phlegmon.  Neurosurgery service following and the patient and decided not to do surgical intervention.  The patient has been receiving IV antibiotics for 8 weeks prior to admission.  I am concerned about clinical failure and need of surgery.    Possible right hip septic arthritis based on MRI of the pelvis.  Synovial fluid findings was not highly consistent with infection with a white count of only 5000 but predominantly neutrophils.  Findings was consistent with avascular necrosis and patient required right hip arthroplasty on May 4, 2024.  Cultures are negative so far     Recent admission for methicillin resistant Staphylococcus aureus bacteremia.  Likely source is lumbar discitis/osteomyelitis.   SILVINA performed on 3/12/2024 with cardiology notes mentioning no vegetation.  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  Patient  denied having infection after the procedure        Plan     Continue IV vancomycin-keep trough between 15 and 20.   I will extend the duration of the treatment to 12 weeks.  If patient continues to have abnormality after 12 weeks then she needs to be evaluated for surgery.  Maximum duration of antibiotics will be 12 weeks otherwise the case would be consider failure of therapy  Waiting on repeat blood culture results and right hip fluid culture results  I will add IV Teflaro 600 mg every 12 hours I consider 2 weeks of treatment.  Continue supportive care  A.m. labs      Pilar Foster MD  05/04/24  15:30 EDT    Note is dictated utilizing voice recognition software/Dragon

## 2024-05-04 NOTE — OP NOTE
Anterior Total Hip Operative Note  Dr. SHADE Chen II  [phone]    PATIENT NAME: Lita Yu  MRN: [id number]  : 1965 AGE: 58 y.o. 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  A discussion of operative versus nonoperative treatment was had. They elected to undergo anterior total hip arthroplasty. The risks of surgery were discussed and included the risk of anesthesia, infection, damage to neurovascular structures, implant loosening/failure, fracture, hardware prominence, dislocation, the need for further procedures, medical complications, and others. No guarantees were made. The patient wished to proceed with surgery and a surgical consent was signed.  COMPONENTS:   Size 0 polar stem with a +0 22 mm head ball  58 mm outer diameter constrained liner for an R3 acetabular cup    PERTINENT FINDINGS:  Avascular necrosis with concerns for osteomyelitis and infection    DETAILS OF PROCEDURE:   The patient was met in the preoperative area. The site was marked. The consent and H&P were reviewed. The patient was then wheeled back to the operative suite underwent anesthesia. The [place] table boots were secured to the patients’ feet. The patient was moved onto the [place] table and secured in the supine position. The perineal post was inserted and the boots were secured into the leg  holders. Surgical alcohol was used to thoroughly clean the operative area.     The hip and leg was then prepped in the normal sterile fashion, multiple layers of sterile drapes, and surgical space suits for the entire operative team. New outer gloves were used by all sterile surgical team members after final draping. The surgical incision was marked. A surgical timeout was performed.    A Modified Landrum-Erickson anterior approach was used. Dissection was carried down to the fascia. The fascia was incised and the tensor fascia faviola muscle was retracted laterally and the sartorius medially. The lateral femoral circumflex vessels were identified and cauterized using bovie. The rectus femoris was retracted medially. A capsulotomy was then performed.  There was no gross purulence but the tissue itself looked wildly unhealthy.  Retractors were placed appropriately and a femoral neck osteotomy was made.  The head was extracted.  There was noted to be fracturing of the femoral head with avascular necrosis and collapse.  The bone itself looked quite unhealthy.  I was unable to determine whether or not this was osteomyelitis versus simple avascular necrosis, but based on her preoperative workup I was highly concerned about infection.  A complete synovectomy was then performed and a thorough irrigation and debridement was performed of the deep hip using multiple liters of saline and some Betadine along with Irrisept.  I then placed a reamer into the acetabulum.  There was a large superior defect due to the previous avascular necrosis with superior escape.  I was able to ream up to get an appropriate sized acetabular component.  I then open the smallest polar stem available which was a size 0 along with an appropriate constrained liner.  The constrained liner was marked up on the backside with a saw to create a little more undulations for cement fixation.  1 pack of cement with vancomycin and tobramycin was then mixed.  A very  "thin layer of cement was placed all along the femoral stem.  The rest of the cement was utilized to cement the constrained liner into proper abduction and anteversion.  I then turned my attention to the femur.    Using external rotation extension the femur was exposed.  A box osteotome rasp and progressive broaching was then performed.  Afterwards, the femur was thoroughly irrigated.  I then trialed the stem I had open.  I did have to shave off a little bit of the excess cement to get it to fit.  A second batch of antibiotic cement was mixed and just proximally the femur was cemented so that it would be stable.  It was inserted all the way down into proper position and a head ball was attached.  The hip was then brought back and we attempted to relocate.  We did have a difficult time with relocation mostly due to the patient's previous superior hip location.  She was significantly short preoperatively and required a fair amount of traction to get the hip back in.  At 1 point, the foot did fall out of the boot and had to be replaced into a smaller boot to allow for better traction.  We also gave the patient paralytic.  I was able to then reduce the hip.  X-rays were taken of the hip itself all the way down to the knee to ensure there was no fracture.  The wound was then irrigated and closed in layers and a sterile dressing was applied.    The patient was moved from the [place] table to the [facility] where the boots were removed. The patient was taken to the recovery room in stable condition. There were no complications and the patient tolerated the procedure well.    R \"Heri\" Gustavo HALE MD  Orthopaedic Surgery  [facility]  [phone]            "

## 2024-05-04 NOTE — CONSULTS
Orthopaedic Surgery  Consult Note  Dr. SHADE Fernandez” Gustavo II  [phone]    HPI:  Patient is a 58 y.o. Not  or  female who presents with severe right hip pain and concern for infection.  Patient has had recent epidural abscess last has been complaining of severe hip pain.  She has a history of a left total hip arthroplasty performed for avascular necrosis and has had longstanding hip pain in the right hip but has gotten worse over the last year.  Due to her pain, she underwent an aspiration yesterday with a low white cell count but with 100% neutrophils which was very concerning for infection.  The MRI was also concerning for infection.  Culture negative so far, she was on antibiotics prior to the aspiration which could lead yield a lower white cell count as well as make an aspiration negative for culture.    MEDICAL HISTORY  Past Medical History:   Diagnosis Date    Cirrhosis     COPD (chronic obstructive pulmonary disease)     Depression     GERD (gastroesophageal reflux disease)     Hyperlipidemia     Hypertension     PTSD (post-traumatic stress disorder)      Past Surgical History:   Procedure Laterality Date     SECTION N/A     COLONOSCOPY N/A 2021    Procedure: COLONOSCOPY with polypectomy x2 and random biopsy;  Surgeon: Osman Clay MD;  Location: [facility] ENDOSCOPY;  Service: Gastroenterology;  Laterality: N/A;  colon polyps    DENTAL PROCEDURE      ENDOSCOPY N/A 2021    Procedure: ESOPHAGOGASTRODUODENOSCOPY;  Surgeon: Osman Clay MD;  Location: [facility] ENDOSCOPY;  Service: Gastroenterology;  Laterality: N/A;  esophagitis    JOINT REPLACEMENT      REPLACEMENT TOTAL HIP LATERAL POSITION Left     TONSILLECTOMY      VAGINAL BIRTH AFTER  SECTION       Prior to Admission medications    Medication Sig Start Date End Date Taking? Authorizing Provider   cefdinir (OMNICEF) 300 MG capsule Take 1 capsule by mouth 2 (Two) Times a Day. 24  Yes Heri Suh, DO    albuterol sulfate  (90 Base) MCG/ACT inhaler Inhale 2 puffs Every 4 (Four) Hours As Needed for Wheezing.    Abigail Hood MD   busPIRone (BUSPAR) 15 MG tablet TAKE 1 TABLET BY MOUTH TWICE A DAY 10/16/23   Norma Saul APRN   Cyanocobalamin (Vitamin B-12) 1000 MCG sublingual tablet Place 1 tablet under the tongue Daily.    Abigail Hood MD   folic acid (FOLVITE) 1 MG tablet Take 1 tablet by mouth Daily. 3/4/24   Laura Quinones APRN   HYDROcodone-acetaminophen (NORCO) 7.5-325 MG per tablet Take 1 tablet by mouth Every 12 (Twelve) Hours As Needed for Moderate Pain. 4/29/24   Norma Saul APRN   melatonin 5 MG tablet tablet Take 1 tablet by mouth At Night As Needed (sleep).    Abigail Hood MD   QUEtiapine (SEROquel) 50 MG tablet TAKE 1 TABLET BY MOUTH EVERYDAY AT BEDTIME 4/29/24   Norma Saul APRN   sertraline (ZOLOFT) 100 MG tablet TAKE 1 TABLET BY MOUTH TWICE A DAY 10/19/23   Norma Saul APRN   thiamine (VITAMIN B1) 100 MG tablet Take 1 tablet by mouth Daily. 3/7/24   Laura Quinones APRN   vancomycin 1000 mg/200 mL 0.9% NS IVPB Infuse 200 mL into a venous catheter Every Other Day. 3/29/24 5/3/24  Abigail Hood MD   Zinc 50 MG tablet Take 1 tablet by mouth 2 (Two) Times a Day.    Abigail Hood MD     Allergies   Allergen Reactions    Sulfa Antibiotics Hives    Keflex [Cephalexin] Hives     Most Recent Immunizations   Administered Date(s) Administered    COVID-19 (PFIZER) Purple Cap Monovalent 04/03/2021    Flu Vaccine Intradermal Quad 18-64YR 11/29/2018    Fluzone (or Fluarix & Flulaval for VFC) >6mos 10/10/2023    Pneumococcal Conjugate 20-Valent (PCV20) 10/10/2023    Shingrix 10/10/2023    flucelvax quad pfs =>4 YRS 01/04/2020     Social History     Tobacco Use    Smoking status: Every Day     Current packs/day: 0.50     Types: Cigarettes     Passive exposure: Current    Smokeless tobacco: Never    Tobacco comments:     3cigs   Substance Use  "Topics    Alcohol use: Not Currently     Alcohol/week: 2.0 standard drinks of alcohol     Types: 2 Cans of beer per week     Comment: pint daily      Social History     Substance and Sexual Activity   Drug Use No       VITALS: /70 (BP Location: Left arm, Patient Position: Lying)   Pulse 69   Temp 98.2 °F (36.8 °C)   Resp 14   Ht 160 cm (62.99\")   Wt 54.4 kg (120 lb)   SpO2 98%   BMI 21.26 kg/m²  Body mass index is 21.26 kg/m².    PHYSICAL EXAM:   CONSTITUTIONAL: No acute distress  LUNGS: Equal chest rise, no shortness of air  CARDIOVASCULAR: palpable peripheral pulses  SKIN: no skin lesions in the area examined  LYMPH: no lymphadenopathy in the area examined  EXTREMITY: Right Lower Extremity  Tenderness to Palpation: Tenderness to palpation at the hip  Gross Deformity: No Gross Deformity  Pulses:  Brisk Capillary Refill  Sensation: Intact grossly  Motor: 5/5 EHL/FHL/TA/GS motor complexes    RADIOLOGY REVIEW:   IR Inject/asp large joint or bursa    Result Date: 5/3/2024  Technically successful right hip aspiration yielding 12 mL of cloudy yellow synovial fluid utilizing fluoroscopic guidance. Report dictated by: Jose Linton DNP  I have personally reviewed this case and agree with the findings above: Electronically Signed: Yimi Costello MD  5/3/2024 1:56 PM EDT  Workstation ID: [id number]    XR Hip With or Without Pelvis 2 - 3 View Right    Result Date: 5/3/2024  Impression: 1. Findings compatible with avascular necrosis of the right femoral head with severe joint space loss. 2. Left hip arthroplasty without complication. Electronically Signed: Ines Donald MD  5/3/2024 12:20 PM EDT  Workstation ID: [id number]    MRI Lumbar Spine With & Without Contrast    Result Date: 5/3/2024  Impression: Findings remain consistent with L2-L3 discitis and vertebral body osteomyelitis with enhancing paraspinal phlegmon and anterior epidural abscess resulting in moderate canal stenosis. The findings appear similar to the prior " study when accounting for prominent motion artifact that was seen on those images. Electronically Signed: Ines Donald MD  5/3/2024 7:35 AM EDT  Workstation ID: [id number]    MRI Pelvis With & Without Contrast    Result Date: 5/2/2024  Impression: 1.Prominent soft tissue edema and enhancement surrounding the right hip with right hip effusion, synovial thickening and enhancement, and periarticular marrow edema. There is severe right hip joint space narrowing which represents a change from prior radiographs from February 2023. These findings are concerning for joint infection given patient's history. Aspiration is recommended for diagnosis. 2.Avascular necrosis of the right femoral head, as before, with possible component of developing articular surface collapse which may contribute to arthropathy and edema. 3.Status post left hip arthroplasty. Artifact from hardware limits assessment, but no significant left hip collection is seen at this time. 4.See separate report for evaluation of the lumbar spine. Electronically Signed: Jluis Medina  5/2/2024 9:56 PM EDT  Workstation ID: [id number]    XR Chest 1 View    Result Date: 5/1/2024  Impression: Right arm approach PICC tip terminates at the mid SVC level. No acute chest finding. Electronically Signed: Sara Jones MD  5/1/2024 4:50 PM EDT  Workstation ID: [id number]     LABS:   Results for the past 24 hours:   Recent Results (from the past 24 hour(s))   Body Fluid Culture - Body Fluid, Hip, Right    Collection Time: 05/03/24 12:56 PM    Specimen: Hip, Right; Body Fluid   Result Value Ref Range    Body Fluid Culture No growth at 24 hours     Gram Stain Many (4+) WBCs per low power field     Gram Stain No organisms seen    Crystal Exam, Fluid - Synovial Fluid, Hip, Right    Collection Time: 05/03/24 12:56 PM    Specimen: Hip, Right; Synovial Fluid   Result Value Ref Range    Crystals, Fluid No crystals seen    Body fluid cell count - Body Fluid, Hip, Right    Collection  Time: 05/03/24 12:56 PM    Specimen: Hip, Right; Body Fluid   Result Value Ref Range    Color, Fluid Dark Yellow     Appearance, Fluid Cloudy (A) Clear    Nucleated Cells, Fluid 6,599 /mm3    Method: Automated [employer] XN Method    Body fluid differential - Body Fluid, Hip, Right    Collection Time: 05/03/24 12:56 PM    Specimen: Hip, Right; Body Fluid   Result Value Ref Range    Neutrophils, Fluid 100 %   Comprehensive Metabolic Panel    Collection Time: 05/04/24  5:57 AM    Specimen: Arm, Left; Blood   Result Value Ref Range    Glucose 89 65 - 99 mg/dL    BUN 14 6 - 20 mg/dL    Creatinine 1.06 (H) 0.57 - 1.00 mg/dL    Sodium 139 136 - 145 mmol/L    Potassium 3.8 3.5 - 5.2 mmol/L    Chloride 105 98 - 107 mmol/L    CO2 25.0 22.0 - 29.0 mmol/L    Calcium 9.3 8.6 - 10.5 mg/dL    Total Protein 7.3 6.0 - 8.5 g/dL    Albumin 3.2 (L) 3.5 - 5.2 g/dL    ALT (SGPT) 13 1 - 33 U/L    AST (SGOT) 18 1 - 32 U/L    Alkaline Phosphatase 184 (H) 39 - 117 U/L    Total Bilirubin 0.2 0.0 - 1.2 mg/dL    Globulin 4.1 gm/dL    A/G Ratio 0.8 g/dL    BUN/Creatinine Ratio 13.2 7.0 - 25.0    Anion Gap 9.0 5.0 - 15.0 mmol/L    eGFR 61.8 >60.0 mL/min/1.73   Magnesium    Collection Time: 05/04/24  5:57 AM    Specimen: Arm, Left; Blood   Result Value Ref Range    Magnesium 1.6 1.6 - 2.6 mg/dL   Phosphorus    Collection Time: 05/04/24  5:57 AM    Specimen: Arm, Left; Blood   Result Value Ref Range    Phosphorus 3.7 2.5 - 4.5 mg/dL   Vancomycin, Trough    Collection Time: 05/04/24  5:57 AM    Specimen: Arm, Left; Blood   Result Value Ref Range    Vancomycin Trough 12.20 5.00 - 20.00 mcg/mL   CBC Auto Differential    Collection Time: 05/04/24  5:57 AM    Specimen: Arm, Left; Blood   Result Value Ref Range    WBC 4.58 3.40 - 10.80 10*3/mm3    RBC 2.80 (L) 3.77 - 5.28 10*6/mm3    Hemoglobin 8.5 (L) 12.0 - 15.9 g/dL    Hematocrit 27.7 (L) 34.0 - 46.6 %    MCV 98.9 (H) 79.0 - 97.0 fL    MCH 30.4 26.6 - 33.0 pg    MCHC 30.7 (L) 31.5 - 35.7 g/dL    RDW 14.8  "12.3 - 15.4 %    RDW-SD 54.0 37.0 - 54.0 fl    MPV 9.1 6.0 - 12.0 fL    Platelets 167 140 - 450 10*3/mm3    Neutrophil % 57.0 42.7 - 76.0 %    Lymphocyte % 26.0 19.6 - 45.3 %    Monocyte % 10.0 5.0 - 12.0 %    Eosinophil % 6.1 0.3 - 6.2 %    Basophil % 0.7 0.0 - 1.5 %    Immature Grans % 0.2 0.0 - 0.5 %    Neutrophils, Absolute 2.61 1.70 - 7.00 10*3/mm3    Lymphocytes, Absolute 1.19 0.70 - 3.10 10*3/mm3    Monocytes, Absolute 0.46 0.10 - 0.90 10*3/mm3    Eosinophils, Absolute 0.28 0.00 - 0.40 10*3/mm3    Basophils, Absolute 0.03 0.00 - 0.20 10*3/mm3    Immature Grans, Absolute 0.01 0.00 - 0.05 10*3/mm3    nRBC 0.0 0.0 - 0.2 /100 WBC       IMPRESSION:  Patient is a 58 y.o. 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  Disposition: We discussed treatment options for the right hip.  Clearly she needs the infected bone surgically excised.  Plan will be to perform a right hip arthroplasty utilizing antibiotic cement hopefully eradicate the infection.  The antibiotic cement may lead to early loosening of the components and could lead to need for revision surgery.  However, this procedure will likely allow her to have a little bit better function than an antibiotic spacer there is very good data supporting the use of this construct in infected joint cases.  Patient is agreeable and would rather have hip replacement surgery with the antibiotic cement as opposed to an antibiotic spacer.  We are planning surgery today.  She will get 6 weeks of IV antibiotics and be partial weightbearing on the right hip until the infection is eradicated.    R \"Heri\" Gustavo HALE MD  Orthopaedic Surgery  [facility]  [phone] - [place] Office  [phone] - [place] Office            "

## 2024-05-04 NOTE — ANESTHESIA POSTPROCEDURE EVALUATION
Patient: Lita Yu    Procedure Summary       Date: 05/04/24 Room / Location: [facility] OR 11 / [facility] MAIN OR    Anesthesia Start: 0812 Anesthesia Stop: 0958    Procedure: TOTAL HIP ARTHROPLASTY ANTERIOR (Right: Hip) Diagnosis:       Pain in joints      (Pain in joints [M25.50])    Surgeons: Cristobal Chen II, MD Provider: Roberto Carlos Nichols MD    Anesthesia Type: general, ERAS Protocol ASA Status: 4            Anesthesia Type: general, ERAS Protocol    Vitals  Vitals Value Taken Time   /56 05/04/24 1108   Temp 96.8 °F (36 °C) 05/04/24 1000   Pulse 69 05/04/24 1113   Resp 12 05/04/24 1100   SpO2 93 % 05/04/24 1113   Vitals shown include unfiled device data.        Post Anesthesia Care and Evaluation    Patient location during evaluation: PACU  Patient participation: complete - patient participated  Level of consciousness: awake  Pain scale: See nurse's notes for pain score.  Pain management: adequate    Airway patency: patent  Anesthetic complications: No anesthetic complications  PONV Status: none  Cardiovascular status: acceptable  Respiratory status: acceptable and spontaneous ventilation  Hydration status: acceptable    Comments: Patient seen and examined postoperatively; vital signs stable; SpO2 greater than or equal to 90%; cardiopulmonary status stable; nausea/vomiting adequately controlled; pain adequately controlled; no apparent anesthesia complications; patient discharged from anesthesia care when discharge criteria were met

## 2024-05-04 NOTE — ANESTHESIA PROCEDURE NOTES
Airway  Urgency: elective    Date/Time: 5/4/2024 8:23 AM  End Time:5/4/2024 8:23 AM  Airway not difficult    General Information and Staff    Patient location during procedure: OR  Anesthesiologist: Roberto Carlos Nichols MD  CRNA/CAA: Jacqueline Martin CAA    Indications and Patient Condition  Indications for airway management: airway protection    Preoxygenated: yes  Mask difficulty assessment: 1 - vent by mask    Final Airway Details  Final airway type: endotracheal airway      Successful airway: ETT  Cuffed: yes   Successful intubation technique: video laryngoscopy  Facilitating devices/methods: intubating stylet  Endotracheal tube insertion site: oral  Blade: Saha  Blade size: 3  ETT size (mm): 7.0  Cormack-Lehane Classification: grade I - full view of glottis  Placement verified by: chest auscultation, capnometry and palpation of cuff   Measured from: lips  ETT/EBT  to lips (cm): 21  Number of attempts at approach: 1  Assessment: lips, teeth, and gum same as pre-op and atraumatic intubation

## 2024-05-04 NOTE — PROGRESS NOTES
[facility] MEDICINE SERVICE  DAILY PROGRESS NOTE    NAME: Lita Yu  : 1965  MRN: [id number]      LOS: 3 days     PROVIDER OF SERVICE: Niels Sands MD    Chief Complaint: Spinal abscess    Subjective:     Interval History:  History taken from: patient chart RN  For OR today with orthopedics     Review of Systems:   Review of Systems    Objective:     Vital Signs  Temp:  [96.8 °F (36 °C)-98.4 °F (36.9 °C)] 97.2 °F (36.2 °C)  Heart Rate:  [62-82] 75  Resp:  [12-19] 18  BP: ()/(52-70) 95/59   Body mass index is 21.26 kg/m².    Physical Exam  Physical Exam  AOx3 NAD  RRR S1 and S2 audible  Lungs with fair entry  Abdomen soft nontender nondistended  Scheduled Meds   busPIRone, 15 mg, Oral, BID  folic acid, 1 mg, Oral, Daily  QUEtiapine, 50 mg, Oral, Nightly  sertraline, 100 mg, Oral, BID  sodium chloride, 10 mL, Intravenous, Q12H  vancomycin, 1,000 mg, Intravenous, Daily       PRN Meds     acetaminophen **OR** acetaminophen **OR** acetaminophen    albuterol    alteplase (CATHFLO/ACTIVASE) 1 mg in sterile water (preservative free) 1.1 mL injection    senna-docusate sodium **AND** polyethylene glycol **AND** bisacodyl **AND** bisacodyl    HYDROcodone-acetaminophen    HYDROcodone-acetaminophen    lactated ringers    ondansetron ODT **OR** ondansetron    Pharmacy to dose vancomycin    sodium chloride    sodium chloride   Infusions  lactated ringers, 9 mL/hr, Last Rate: 100 mL/hr (24 0812)  Pharmacy to dose vancomycin,           Diagnostic Data    Results from last 7 days   Lab Units 24  0557   WBC 10*3/mm3 4.58   HEMOGLOBIN g/dL 8.5*   HEMATOCRIT % 27.7*   PLATELETS 10*3/mm3 167   GLUCOSE mg/dL 89   CREATININE mg/dL 1.06*   BUN mg/dL 14   SODIUM mmol/L 139   POTASSIUM mmol/L 3.8   AST (SGOT) U/L 18   ALT (SGPT) U/L 13   ALK PHOS U/L 184*   BILIRUBIN mg/dL 0.2   ANION GAP mmol/L 9.0       XR Hip With or Without Pelvis 2 - 3 View Right    Result Date: 2024  Impression:  Immediate postop appearance of the right total of arthroplasty and changes of acetabular remodeling. The opening angle of the arthroplasty appears more vertically and possibly anteriorly located. Femoral component is proud by 1.4 cm. Electronically Signed: Ana Walker MD  5/4/2024 10:22 AM EDT  Workstation ID: [id number]    XR Ankle 2 View Right    Result Date: 5/4/2024  Impression: Negative ankle radiograph. Electronically Signed: Ana Walker MD  5/4/2024 10:19 AM EDT  Workstation ID: [id number]    IR Inject/asp large joint or bursa    Result Date: 5/3/2024  Technically successful right hip aspiration yielding 12 mL of cloudy yellow synovial fluid utilizing fluoroscopic guidance. Report dictated by: Jose Linton DNP  I have personally reviewed this case and agree with the findings above: Electronically Signed: Yimi Costello MD  5/3/2024 1:56 PM EDT  Workstation ID: [id number]    XR Hip With or Without Pelvis 2 - 3 View Right    Result Date: 5/3/2024  Impression: 1. Findings compatible with avascular necrosis of the right femoral head with severe joint space loss. 2. Left hip arthroplasty without complication. Electronically Signed: Ines Donald MD  5/3/2024 12:20 PM EDT  Workstation ID: [id number]    MRI Lumbar Spine With & Without Contrast    Result Date: 5/3/2024  Impression: Findings remain consistent with L2-L3 discitis and vertebral body osteomyelitis with enhancing paraspinal phlegmon and anterior epidural abscess resulting in moderate canal stenosis. The findings appear similar to the prior study when accounting for prominent motion artifact that was seen on those images. Electronically Signed: Ines Donald MD  5/3/2024 7:35 AM EDT  Workstation ID: [id number]    MRI Pelvis With & Without Contrast    Result Date: 5/2/2024  Impression: 1.Prominent soft tissue edema and enhancement surrounding the right hip with right hip effusion, synovial thickening and enhancement, and periarticular marrow edema. There is severe right  hip joint space narrowing which represents a change from prior radiographs from February 2023. These findings are concerning for joint infection given patient's history. Aspiration is recommended for diagnosis. 2.Avascular necrosis of the right femoral head, as before, with possible component of developing articular surface collapse which may contribute to arthropathy and edema. 3.Status post left hip arthroplasty. Artifact from hardware limits assessment, but no significant left hip collection is seen at this time. 4.See separate report for evaluation of the lumbar spine. Electronically Signed: Jluis Medina  5/2/2024 9:56 PM EDT  Workstation ID: [id number]       I reviewed the patient's new clinical results.  I reviewed the patient's new imaging results and agree with the interpretation.    Assessment/Plan:     Active and Resolved Problems  Active Hospital Problems    Diagnosis  POA    **Spinal abscess [M46.20]  Yes    Pain in joints [M25.50]  Unknown      Resolved Hospital Problems   No resolved problems to display.       58-year-old female with history of hypertension, peripheral vascular disease, degenerative joint disease, seizure disorder, tobacco abuse, EtOH liver cirrhosis, recent history of MRSA bacteremia attributed to L2-3 OM/discitis admitted to [facility] on 5/1/2024 with a worsening pain in her lower back.      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  Prior plan for vancomycin until 5/3/2024.      -Now admitted with worsening low back pain  -MRI lumbar and pelvis spine as noted.  Shows right hip effusion Along with L2-3 discitis and vertebral body osteomyelitis with enhancing paraspinal phlegmon     ID following continue vancomycin.  Status post IR guided right hip aspiration  Orthopedics following status post R  anterior total hip arthroplasty 5/4  Continue current pain control  Seen by CHRISTIANO no plan for surgical intervention  Follow infectious workup        #History of alcohol liver cirrhosis          Outpatient follow-up     #Acute on chronic anemia  Monitor H&H  Transfuse PRBCs to keep hemoglobin more than 7     #Tobacco abuse  NRT  Counseled    DVT prophylaxis:  Mechanical DVT prophylaxis orders are present.         Code status is   Code Status and Medical Interventions:   Ordered at: 05/01/24 1553     Level Of Support Discussed With:    Patient     Code Status (Patient has no pulse and is not breathing):    CPR (Attempt to Resuscitate)     Medical Interventions (Patient has pulse or is breathing):    Full Support       Plan for disposition: Anticipate discharge in 4 days    Time: 30 minutes    Signature: Electronically signed by Niels Sands MD, 05/04/24, 13:14 EDT.  [facility] Hospitalist Team

## 2024-05-04 NOTE — ACP (ADVANCE CARE PLANNING)
Patient reports that she did not have any needs in regards to Advanced Directives. Patient stated she wants to be fully aggressive with treatment. Education on purpose for AD in communicating decision making and representation when not A&O. Patient reports that if she is unable to make decisions she does not know anyone she can trust. She reports that she has three adult children who would be NOK.     Chaplain Ted Kenny

## 2024-05-04 NOTE — PLAN OF CARE
Goal Outcome Evaluation:              Outcome Evaluation: Pt will have total hip arthoplasty this morning.

## 2024-05-04 NOTE — PLAN OF CARE
Goal Outcome Evaluation:      Pt is able to make needs known. Pain is managed with medication regimen. Pt did not ambulate during shift d/t non weight bearing. Education is complete, call light is in reach, and no other needs at this time. Continue to monitor

## 2024-05-04 NOTE — ANESTHESIA PROCEDURE NOTES
Peripheral IV    Start time: 5/4/2024 8:50 AM  End time: 5/4/2024 8:54 AM  Line placed for Fluids/Medication Admin.  Performed By   CRNA/CAA: Robinson Marsh CAA  Preanesthetic Checklist  Completed: patient identified, IV checked, site marked, risks and benefits discussed, surgical consent, monitors and equipment checked, pre-op evaluation and timeout performed  Peripheral IV Prep   Patient position: supine   Prep: ChloraPrep  Patient monitoring: heart rate, cardiac monitor and continuous pulse ox  Peripheral IV Procedure   Laterality:left  Location:  Wrist  Catheter size: 20 G          Post Assessment   Dressing Type: tape and transparent.    IV Dressing/Site: clean, dry and intact  Additional Notes  IV placed not running adequately.  Second IV needed.

## 2024-05-04 NOTE — ANESTHESIA PROCEDURE NOTES
Peripheral IV    Patient location during procedure: OR  Start time: 5/4/2024 8:30 AM  End time: 5/4/2024 8:30 AM  Line placed for Fluids/Medication Admin.  Performed By   CRNA/CAA: Jacqueline Martin CAA  Preanesthetic Checklist  Completed: patient identified, IV checked, site marked, risks and benefits discussed, surgical consent, monitors and equipment checked, pre-op evaluation and timeout performed  Peripheral IV Prep   Patient position: supine   Prep: ChloraPrep  Patient monitoring: heart rate, cardiac monitor and continuous pulse ox  Peripheral IV Procedure   Laterality:left  Location:  Hand  Catheter size: 20 G          Post Assessment   Dressing Type: tape and transparent.    IV Dressing/Site: clean, dry and intact  Additional Notes  PICC line very sluggish.  Propofol not getting in enough for adequate anesthesia depth.

## 2024-05-04 NOTE — ANESTHESIA PREPROCEDURE EVALUATION
Anesthesia Evaluation     Patient summary reviewed and Nursing notes reviewed   no history of anesthetic complications:   NPO Solid Status: > 8 hours  NPO Liquid Status: > 2 hours           Airway   Dental      Pulmonary    (+) a smoker Current, COPD,  Cardiovascular     ECG reviewed    (+) hypertension, PVD, hyperlipidemia      Neuro/Psych  (+) seizures, weakness, numbness, psychiatric history Depression and PTSD  GI/Hepatic/Renal/Endo    (+) GERD, liver disease cirrhosis    Musculoskeletal     (+) arthralgias, back pain, chronic pain, myalgias, radiculopathy  Abdominal    Substance History      OB/GYN          Other   arthritis, blood dyscrasia anemia,     ROS/Med Hx Other: Additional History:  Low albumin, chest pain, bactermia, osteomyelitis, discitis, spinal/epidural abscess, UTI, hemochromatosis, rhabdomyolysis, h/o alcohol abuse, dehydration, malnutrition    Echo:    Echocardiogram Findings    Left Ventricle Left ventricular systolic function is normal. Left ventricular ejection fraction appears to be 61 - 65%.     Normal left ventricular cavity size and wall thickness noted. All left ventricular wall segments contract normally. Left ventricular diastolic function was normal. Normal left atrial pressure. No evidence of left ventricular thrombus or mass present.  Right Ventricle Normal right ventricular cavity size, wall thickness and systolic function noted.  Left Atrium The left atrial cavity is mildly dilated. No evidence of left atrial thrombus or mass present. There is no spontaneous echo contrast present. No evidence of cardiac transplantation present. Left atrial appendage was found to be multilobar in nature. Left atrial appendage morphology best described as chicken wing. The left atrial appendage was visualized through multiple planes. Doppler interrogation shows normal flow within the left atrial appendage. No evidence of a left atrial appendage thrombus was present.  The interatrial septum does  not appear to be redundant. No interatrial septal aneurysm present. No lipomatous hypertrophy of the interatrial septum present. No evidence of a patent foramen ovale. No evidence of an atrial septal defect present.  Saline test results are negative. Normal pulmonary vein flow.  Right Atrium Normal right atrial cavity size noted.  Aortic Valve The aortic valve is structurally normal. Trace to mild aortic valve regurgitation is present. No hemodynamically significant aortic valve stenosis is present.  Mitral Valve The mitral valve is grossly normal in structure. Mild mitral valve regurgitation is present. No significant mitral valve stenosis is present.  Tricuspid Valve The tricuspid valve is structurally normal with no significant stenosis present. Trace to mild tricuspid valve regurgitation is present.  Pulmonic Valve The pulmonic valve is not well visualized. The pulmonic valve is grossly normal in structure. There is no significant pulmonic valve regurgitation present. There is no pulmonic valve stenosis present.  Greater Vessels No dilation of the aortic root is present.  Pericardium There is no evidence of pericardial effusion. .        Stress Test:    ·  Left ventricular ejection fraction is hyperdynamic (Calculated EF > 70%).  ·  Findings consistent with a normal ECG stress test.     Low risk study  Normal perfusion at stress and rest  Hyperdynamic EF 74%  Low risk treadmill stress ECG  No arrhythmias seen        PSH:  REPLACEMENT TOTAL HIP LATERAL POSITION  SECTION  VAGINAL BIRTH AFTER  SECTION TONSILLECTOMY  DENTAL PROCEDURE JOINT REPLACEMENT  ENDOSCOPY COLONOSCOPY                Anesthesia Plan    ASA 4     general and ERAS Protocol   total IV anesthesia  Reason for not using neuraxial anesthesia or peripheral nerve block: Surgeon Refused, Other and Neurological Conditions  (Patient identified; pre-operative vital signs, all relevant labs/studies, complete medical/surgical/anesthetic  history, full medication list, full allergy list, and NPO status obtained/reviewed; physical assessment performed; anesthetic options, side effects, potential complications, risks, and benefits discussed; questions answered; written anesthesia consent obtained; patient cleared for procedure; anesthesia machine and equipment checked and functioning)  intravenous induction     Anesthetic plan, risks, benefits, and alternatives have been provided, discussed and informed consent has been obtained with: patient.    Plan discussed with CRNA and CAA.    CODE STATUS:    Level Of Support Discussed With: Patient  Code Status (Patient has no pulse and is not breathing): CPR (Attempt to Resuscitate)  Medical Interventions (Patient has pulse or is breathing): Full Support

## 2024-05-05 LAB
ABO GROUP BLD: NORMAL
ALBUMIN SERPL-MCNC: 2.8 G/DL (ref 3.5–5.2)
ALBUMIN/GLOB SERPL: 0.8 G/DL
ALP SERPL-CCNC: 145 U/L (ref 39–117)
ALT SERPL W P-5'-P-CCNC: 13 U/L (ref 1–33)
ANION GAP SERPL CALCULATED.3IONS-SCNC: 11 MMOL/L (ref 5–15)
AST SERPL-CCNC: 25 U/L (ref 1–32)
BASOPHILS # BLD AUTO: 0 10*3/MM3 (ref 0–0.2)
BASOPHILS NFR BLD AUTO: 0 % (ref 0–1.5)
BILIRUB SERPL-MCNC: <0.2 MG/DL (ref 0–1.2)
BLD GP AB SCN SERPL QL: NEGATIVE
BUN SERPL-MCNC: 15 MG/DL (ref 6–20)
BUN/CREAT SERPL: 18.5 (ref 7–25)
CALCIUM SPEC-SCNC: 8.5 MG/DL (ref 8.6–10.5)
CHLORIDE SERPL-SCNC: 103 MMOL/L (ref 98–107)
CO2 SERPL-SCNC: 24 MMOL/L (ref 22–29)
CREAT SERPL-MCNC: 0.81 MG/DL (ref 0.57–1)
DEPRECATED RDW RBC AUTO: 53.2 FL (ref 37–54)
EGFRCR SERPLBLD CKD-EPI 2021: 85.3 ML/MIN/1.73
EOSINOPHIL # BLD AUTO: 0.01 10*3/MM3 (ref 0–0.4)
EOSINOPHIL NFR BLD AUTO: 0.1 % (ref 0.3–6.2)
ERYTHROCYTE [DISTWIDTH] IN BLOOD BY AUTOMATED COUNT: 14.8 % (ref 12.3–15.4)
GLOBULIN UR ELPH-MCNC: 3.6 GM/DL
GLUCOSE SERPL-MCNC: 117 MG/DL (ref 65–99)
HCT VFR BLD AUTO: 20.2 % (ref 34–46.6)
HCT VFR BLD AUTO: 23.6 % (ref 34–46.6)
HGB BLD-MCNC: 6.3 G/DL (ref 12–15.9)
HGB BLD-MCNC: 7.5 G/DL (ref 12–15.9)
IMM GRANULOCYTES # BLD AUTO: 0.03 10*3/MM3 (ref 0–0.05)
IMM GRANULOCYTES NFR BLD AUTO: 0.4 % (ref 0–0.5)
LYMPHOCYTES # BLD AUTO: 0.95 10*3/MM3 (ref 0.7–3.1)
LYMPHOCYTES NFR BLD AUTO: 12.9 % (ref 19.6–45.3)
MAGNESIUM SERPL-MCNC: 1.5 MG/DL (ref 1.6–2.6)
MCH RBC QN AUTO: 30.4 PG (ref 26.6–33)
MCHC RBC AUTO-ENTMCNC: 31.2 G/DL (ref 31.5–35.7)
MCV RBC AUTO: 97.6 FL (ref 79–97)
MONOCYTES # BLD AUTO: 0.67 10*3/MM3 (ref 0.1–0.9)
MONOCYTES NFR BLD AUTO: 9.1 % (ref 5–12)
NEUTROPHILS NFR BLD AUTO: 5.73 10*3/MM3 (ref 1.7–7)
NEUTROPHILS NFR BLD AUTO: 77.5 % (ref 42.7–76)
NRBC BLD AUTO-RTO: 0 /100 WBC (ref 0–0.2)
PHOSPHATE SERPL-MCNC: 3.3 MG/DL (ref 2.5–4.5)
PLATELET # BLD AUTO: 144 10*3/MM3 (ref 140–450)
PMV BLD AUTO: 9.5 FL (ref 6–12)
POTASSIUM SERPL-SCNC: 4.2 MMOL/L (ref 3.5–5.2)
PROT SERPL-MCNC: 6.4 G/DL (ref 6–8.5)
RBC # BLD AUTO: 2.07 10*6/MM3 (ref 3.77–5.28)
RH BLD: POSITIVE
SODIUM SERPL-SCNC: 138 MMOL/L (ref 136–145)
WBC NRBC COR # BLD AUTO: 7.39 10*3/MM3 (ref 3.4–10.8)

## 2024-05-05 PROCEDURE — 86900 BLOOD TYPING SEROLOGIC ABO: CPT | Performed by: NURSE PRACTITIONER

## 2024-05-05 PROCEDURE — 86850 RBC ANTIBODY SCREEN: CPT | Performed by: NURSE PRACTITIONER

## 2024-05-05 PROCEDURE — 86900 BLOOD TYPING SEROLOGIC ABO: CPT

## 2024-05-05 PROCEDURE — 25010000002 ENOXAPARIN PER 10 MG: Performed by: HOSPITALIST

## 2024-05-05 PROCEDURE — 86923 COMPATIBILITY TEST ELECTRIC: CPT

## 2024-05-05 PROCEDURE — 97166 OT EVAL MOD COMPLEX 45 MIN: CPT

## 2024-05-05 PROCEDURE — 25010000002 CEFTAROLINE FOSAMIL PER 10 MG: Performed by: INTERNAL MEDICINE

## 2024-05-05 PROCEDURE — 85025 COMPLETE CBC W/AUTO DIFF WBC: CPT | Performed by: ORTHOPAEDIC SURGERY

## 2024-05-05 PROCEDURE — 25010000002 VANCOMYCIN 1 G RECONSTITUTED SOLUTION 1 EACH VIAL: Performed by: ORTHOPAEDIC SURGERY

## 2024-05-05 PROCEDURE — 36430 TRANSFUSION BLD/BLD COMPNT: CPT

## 2024-05-05 PROCEDURE — P9016 RBC LEUKOCYTES REDUCED: HCPCS

## 2024-05-05 PROCEDURE — 84100 ASSAY OF PHOSPHORUS: CPT | Performed by: ORTHOPAEDIC SURGERY

## 2024-05-05 PROCEDURE — 85014 HEMATOCRIT: CPT | Performed by: HOSPITALIST

## 2024-05-05 PROCEDURE — 25810000003 SODIUM CHLORIDE 0.9 % SOLUTION 250 ML FLEX CONT: Performed by: ORTHOPAEDIC SURGERY

## 2024-05-05 PROCEDURE — 83735 ASSAY OF MAGNESIUM: CPT | Performed by: ORTHOPAEDIC SURGERY

## 2024-05-05 PROCEDURE — 85018 HEMOGLOBIN: CPT | Performed by: HOSPITALIST

## 2024-05-05 PROCEDURE — 97162 PT EVAL MOD COMPLEX 30 MIN: CPT

## 2024-05-05 PROCEDURE — 97535 SELF CARE MNGMENT TRAINING: CPT

## 2024-05-05 PROCEDURE — 86901 BLOOD TYPING SEROLOGIC RH(D): CPT | Performed by: NURSE PRACTITIONER

## 2024-05-05 PROCEDURE — 80053 COMPREHEN METABOLIC PANEL: CPT | Performed by: ORTHOPAEDIC SURGERY

## 2024-05-05 RX ORDER — CYCLOBENZAPRINE HCL 10 MG
10 TABLET ORAL 3 TIMES DAILY
Status: DISCONTINUED | OUTPATIENT
Start: 2024-05-05 | End: 2024-05-11 | Stop reason: HOSPADM

## 2024-05-05 RX ORDER — ENOXAPARIN SODIUM 100 MG/ML
40 INJECTION SUBCUTANEOUS EVERY 24 HOURS
Status: DISCONTINUED | OUTPATIENT
Start: 2024-05-05 | End: 2024-05-11 | Stop reason: HOSPADM

## 2024-05-05 RX ADMIN — CYCLOBENZAPRINE 10 MG: 10 TABLET, FILM COATED ORAL at 20:19

## 2024-05-05 RX ADMIN — CEFTAROLINE FOSAMIL 600 MG: 600 POWDER, FOR SOLUTION INTRAVENOUS at 05:10

## 2024-05-05 RX ADMIN — SERTRALINE 100 MG: 100 TABLET, FILM COATED ORAL at 08:04

## 2024-05-05 RX ADMIN — ENOXAPARIN SODIUM 40 MG: 100 INJECTION SUBCUTANEOUS at 16:15

## 2024-05-05 RX ADMIN — HYDROCODONE BITARTRATE AND ACETAMINOPHEN 1 TABLET: 10; 325 TABLET ORAL at 03:26

## 2024-05-05 RX ADMIN — HYDROCODONE BITARTRATE AND ACETAMINOPHEN 1 TABLET: 10; 325 TABLET ORAL at 20:19

## 2024-05-05 RX ADMIN — CEFTAROLINE FOSAMIL 600 MG: 600 POWDER, FOR SOLUTION INTRAVENOUS at 14:46

## 2024-05-05 RX ADMIN — BUSPIRONE HYDROCHLORIDE 15 MG: 15 TABLET ORAL at 20:19

## 2024-05-05 RX ADMIN — CEFTAROLINE FOSAMIL 600 MG: 600 POWDER, FOR SOLUTION INTRAVENOUS at 21:50

## 2024-05-05 RX ADMIN — VANCOMYCIN HYDROCHLORIDE 1000 MG: 1 INJECTION, POWDER, LYOPHILIZED, FOR SOLUTION INTRAVENOUS at 08:04

## 2024-05-05 RX ADMIN — CYCLOBENZAPRINE 10 MG: 10 TABLET, FILM COATED ORAL at 12:05

## 2024-05-05 RX ADMIN — HYDROCODONE BITARTRATE AND ACETAMINOPHEN 1 TABLET: 10; 325 TABLET ORAL at 08:04

## 2024-05-05 RX ADMIN — FOLIC ACID 1 MG: 1 TABLET ORAL at 08:04

## 2024-05-05 RX ADMIN — SERTRALINE 100 MG: 100 TABLET, FILM COATED ORAL at 20:19

## 2024-05-05 RX ADMIN — HYDROCODONE BITARTRATE AND ACETAMINOPHEN 1 TABLET: 10; 325 TABLET ORAL at 12:05

## 2024-05-05 RX ADMIN — QUETIAPINE FUMARATE 50 MG: 25 TABLET ORAL at 20:19

## 2024-05-05 RX ADMIN — Medication 10 ML: at 08:05

## 2024-05-05 RX ADMIN — CYCLOBENZAPRINE 10 MG: 10 TABLET, FILM COATED ORAL at 16:15

## 2024-05-05 RX ADMIN — BUSPIRONE HYDROCHLORIDE 15 MG: 15 TABLET ORAL at 08:04

## 2024-05-05 RX ADMIN — Medication 10 ML: at 20:21

## 2024-05-05 NOTE — PROGRESS NOTES
Infectious Diseases Progress Note      LOS: 4 days   Patient Care Team:  Norma Saul APRN as PCP - General (Nurse Practitioner)  Flory Villanueva MD (Physical Medicine and Rehabilitation)    Chief Complaint: Back pain and right hip pain    Subjective     The patient had no high-grade fever during the last 24 hours.  She remained hemodynamically stable.  She is tolerating her current antibiotics with no side effects.      Review of Systems:   Review of Systems   Constitutional: Negative.    HENT: Negative.     Eyes: Negative.    Respiratory: Negative.     Cardiovascular: Negative.    Gastrointestinal: Negative.    Genitourinary: Negative.    Musculoskeletal:  Positive for arthralgias and back pain.   Skin: Negative.    Neurological: Negative.    Hematological: Negative.    Psychiatric/Behavioral: Negative.          Objective     Vital Signs  Temp:  [97.9 °F (36.6 °C)-98.5 °F (36.9 °C)] 98.5 °F (36.9 °C)  Heart Rate:  [70-89] 73  Resp:  [14-18] 16  BP: ()/(53-69) 98/58    Physical Exam:  Physical Exam  Vitals and nursing note reviewed.   Constitutional:       Appearance: She is well-developed.   HENT:      Head: Normocephalic and atraumatic.   Eyes:      Pupils: Pupils are equal, round, and reactive to light.   Cardiovascular:      Rate and Rhythm: Normal rate and regular rhythm.      Heart sounds: Normal heart sounds.   Pulmonary:      Effort: Pulmonary effort is normal. No respiratory distress.      Breath sounds: Normal breath sounds. No wheezing or rales.   Abdominal:      General: Bowel sounds are normal. There is no distension.      Palpations: Abdomen is soft. There is no mass.      Tenderness: There is no abdominal tenderness. There is no guarding or rebound.   Musculoskeletal:         General: No deformity.      Cervical back: Normal range of motion and neck supple.      Comments: S/p right hip arthroplasty   Skin:     General: Skin is warm.      Findings: No erythema or rash.   Neurological:       Mental Status: She is alert and oriented to person, place, and time.      Cranial Nerves: No cranial nerve deficit.          Results Review:    I have reviewed all clinical data, test, lab, and imaging results.     Radiology  No Radiology Exams Resulted Within Past 24 Hours    Cardiology    Laboratory    Results from last 7 days   Lab Units 05/05/24  1335 05/05/24  0401 05/04/24  0557 05/02/24  2310 05/02/24  0616 04/29/24  1430   WBC 10*3/mm3  --  7.39 4.58 4.35 4.54 7.42   HEMOGLOBIN g/dL 7.5* 6.3* 8.5* 8.1* 8.4* 10.8*   HEMATOCRIT % 23.6* 20.2* 27.7* 27.0* 27.4* 33.7*   PLATELETS 10*3/mm3  --  144 167 153 152 222     Results from last 7 days   Lab Units 05/05/24  0334 05/04/24  0557 05/02/24  2310 05/02/24  0616 04/29/24  1430   SODIUM mmol/L 138 139 140 140 140   POTASSIUM mmol/L 4.2 3.8 3.8 3.9 4.4   CHLORIDE mmol/L 103 105 108* 108* 107   CO2 mmol/L 24.0 25.0 25.0 24.0 19.7*   BUN mg/dL 15 14 16 14 14   CREATININE mg/dL 0.81 1.06* 1.03* 1.14* 1.04*   GLUCOSE mg/dL 117* 89 113* 95 78   ALBUMIN g/dL 2.8* 3.2* 3.0*  --   --    BILIRUBIN mg/dL <0.2 0.2 <0.2  --   --    ALK PHOS U/L 145* 184* 187*  --   --    AST (SGOT) U/L 25 18 22  --   --    ALT (SGPT) U/L 13 13 12  --   --    CALCIUM mg/dL 8.5* 9.3 8.8 9.0 10.0         Results from last 7 days   Lab Units 05/02/24  0616   SED RATE mm/hr 50*         Microbiology   Microbiology Results (last 10 days)       Procedure Component Value - Date/Time    Body Fluid Culture - Body Fluid, Hip, Right [[id number]] Collected: 05/03/24 1256    Lab Status: Preliminary result Specimen: Body Fluid from Hip, Right Updated: 05/05/24 0653     Body Fluid Culture No growth at 2 days     Gram Stain Many (4+) WBCs per low power field      No organisms seen    Blood Culture - Blood, Arm, Right [[id number]]  (Normal) Collected: 05/02/24 1607    Lab Status: Preliminary result Specimen: Blood from Arm, Right Updated: 05/04/24 1631     Blood Culture No growth at 2 days    Blood Culture -  Blood, Blood, Central Line [[id number]]  (Normal) Collected: 05/02/24 1553    Lab Status: Preliminary result Specimen: Blood, Central Line Updated: 05/04/24 1631     Blood Culture No growth at 2 days            Medication Review:       Schedule Meds  busPIRone, 15 mg, Oral, BID  ceftaroline, 600 mg, Intravenous, Q8H  cyclobenzaprine, 10 mg, Oral, TID  enoxaparin, 40 mg, Subcutaneous, Q24H  folic acid, 1 mg, Oral, Daily  QUEtiapine, 50 mg, Oral, Nightly  sertraline, 100 mg, Oral, BID  sodium chloride, 10 mL, Intravenous, Q12H  vancomycin, 1,000 mg, Intravenous, Daily        Infusion Meds  lactated ringers, 9 mL/hr, Last Rate: 100 mL/hr (05/04/24 0812)  Pharmacy to dose vancomycin,         PRN Meds    acetaminophen **OR** acetaminophen **OR** acetaminophen    albuterol    alteplase (CATHFLO/ACTIVASE) 1 mg in sterile water (preservative free) 1.1 mL injection    senna-docusate sodium **AND** polyethylene glycol **AND** bisacodyl **AND** bisacodyl    HYDROcodone-acetaminophen    HYDROcodone-acetaminophen    HYDROmorphone    lactated ringers    ondansetron ODT **OR** ondansetron    Pharmacy to dose vancomycin    sodium chloride    sodium chloride        Assessment & Plan       Antimicrobial Therapy   1.  IV vancomycin        2.  IV Teflaro        3.        4.        5.              Assessment     Discitis/osteomyelitis at the level of L2-L3.  Current MRI showed worsening of discitis with persistent epidural abscess and moderate canal stenosis.  There was paraspinal phlegmon.  Neurosurgery service following and the patient and decided not to do surgical intervention.  The patient has been receiving IV antibiotics for 8 weeks prior to admission.  I am concerned about clinical failure and need of surgery.    Possible right hip septic arthritis based on MRI of the pelvis.  Synovial fluid findings was not highly consistent with infection with a white count of only 5000 but predominantly neutrophils.  Findings was consistent  with avascular necrosis and patient required right hip arthroplasty on May 4, 2024.  Cultures are negative so far     Recent admission for methicillin resistant Staphylococcus aureus bacteremia.  Likely source is lumbar discitis/osteomyelitis.   SILVINA performed on 3/12/2024 with cardiology notes mentioning no vegetation.  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  Patient denied having infection after the procedure        Plan     Continue IV vancomycin-keep trough between 15 and 20.   I will extend the duration of the treatment to 12 weeks.  If patient continues to have abnormality after 12 weeks then she needs to be evaluated for surgery.  Maximum duration of antibiotics will be 12 weeks otherwise the case would be consider failure of therapy  Waiting on repeat blood culture results and right hip fluid culture results  Continue IV Teflaro for 2 weeks.  I will adjust dose to 600 mg every 8 hours  Continue supportive care  A.m. labs      Pilar Foster MD  05/05/24  15:10 EDT    Note is dictated utilizing voice recognition software/Dragon

## 2024-05-05 NOTE — PROGRESS NOTES
[facility] MEDICINE SERVICE  DAILY PROGRESS NOTE    NAME: Lita Yu  : 1965  MRN: [id number]      LOS: 4 days     PROVIDER OF SERVICE: Niels Sands MD    Chief Complaint: Spinal abscess    Subjective:     Interval History:  History taken from: patient chart RN  Pain right hip appears comfortable    Review of Systems:   Review of Systems  All negative except as above  Objective:     Vital Signs  Temp:  [97 °F (36.1 °C)-98.5 °F (36.9 °C)] 97.9 °F (36.6 °C)  Heart Rate:  [70-84] 73  Resp:  [12-18] 16  BP: ()/(53-69) 98/59   Body mass index is 21.26 kg/m².    Physical Exam  Physical Exam  AOx3 NAD  RRR S1 and S2 audible  Lungs with fair entry  Abdomen soft nontender nondistended  Scheduled Meds   busPIRone, 15 mg, Oral, BID  ceftaroline, 600 mg, Intravenous, Q12H  cyclobenzaprine, 10 mg, Oral, TID  folic acid, 1 mg, Oral, Daily  QUEtiapine, 50 mg, Oral, Nightly  sertraline, 100 mg, Oral, BID  sodium chloride, 10 mL, Intravenous, Q12H  vancomycin, 1,000 mg, Intravenous, Daily       PRN Meds     acetaminophen **OR** acetaminophen **OR** acetaminophen    albuterol    alteplase (CATHFLO/ACTIVASE) 1 mg in sterile water (preservative free) 1.1 mL injection    senna-docusate sodium **AND** polyethylene glycol **AND** bisacodyl **AND** bisacodyl    HYDROcodone-acetaminophen    HYDROcodone-acetaminophen    HYDROmorphone    lactated ringers    ondansetron ODT **OR** ondansetron    Pharmacy to dose vancomycin    sodium chloride    sodium chloride   Infusions  lactated ringers, 9 mL/hr, Last Rate: 100 mL/hr (24 0812)  Pharmacy to dose vancomycin,           Diagnostic Data    Results from last 7 days   Lab Units 24  0401 24  0334   WBC 10*3/mm3 7.39  --    HEMOGLOBIN g/dL 6.3*  --    HEMATOCRIT % 20.2*  --    PLATELETS 10*3/mm3 144  --    GLUCOSE mg/dL  --  117*   CREATININE mg/dL  --  0.81   BUN mg/dL  --  15   SODIUM mmol/L  --  138   POTASSIUM mmol/L  --  4.2   AST  (SGOT) U/L  --  25   ALT (SGPT) U/L  --  13   ALK PHOS U/L  --  145*   BILIRUBIN mg/dL  --  <0.2   ANION GAP mmol/L  --  11.0       XR Hip With or Without Pelvis 2 - 3 View Right    Result Date: 5/4/2024  Impression: Immediate postop appearance of the right total of arthroplasty and changes of acetabular remodeling. The opening angle of the arthroplasty appears more vertically and possibly anteriorly located. Femoral component is proud by 1.4 cm. Electronically Signed: Ana Walker MD  5/4/2024 10:22 AM EDT  Workstation ID: [id number]    XR Ankle 2 View Right    Result Date: 5/4/2024  Impression: Negative ankle radiograph. Electronically Signed: Ana Walker MD  5/4/2024 10:19 AM EDT  Workstation ID: [id number]    IR Inject/asp large joint or bursa    Result Date: 5/3/2024  Technically successful right hip aspiration yielding 12 mL of cloudy yellow synovial fluid utilizing fluoroscopic guidance. Report dictated by: Jose Linton DNP  I have personally reviewed this case and agree with the findings above: Electronically Signed: Yimi Costello MD  5/3/2024 1:56 PM EDT  Workstation ID: [id number]    XR Hip With or Without Pelvis 2 - 3 View Right    Result Date: 5/3/2024  Impression: 1. Findings compatible with avascular necrosis of the right femoral head with severe joint space loss. 2. Left hip arthroplasty without complication. Electronically Signed: Ines Donald MD  5/3/2024 12:20 PM EDT  Workstation ID: [id number]       I reviewed the patient's new clinical results.  I reviewed the patient's new imaging results and agree with the interpretation.    Assessment/Plan:     Active and Resolved Problems  Active Hospital Problems    Diagnosis  POA    **Spinal abscess [M46.20]  Yes    Pain in joints [M25.50]  Unknown      Resolved Hospital Problems   No resolved problems to display.       58-year-old female with history of hypertension, peripheral vascular disease, degenerative joint disease, seizure disorder, tobacco abuse, EtOH liver  cirrhosis, recent history of MRSA bacteremia attributed to L2-3 OM/discitis admitted to [facility] on 5/1/2024 with a worsening pain in her lower back.      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  Prior plan for vancomycin until 5/3/2024.      -Now admitted with worsening low back pain  -MRI lumbar and pelvis spine as noted.  Shows right hip effusion Along with L2-3 discitis and vertebral body osteomyelitis with enhancing paraspinal phlegmon     ID following continue vancomycin.  Duration of treatment extended to 12 weeks.  Teflaro added status post IR guided right hip aspiration.  Cell count studies noted follow culture  Orthopedics following status post left ?anterior total hip arthroplasty 5/4  Continue current pain control  Seen by CHRISTIANO no plan for surgical intervention  Follow infectious workup  DVT prophylaxis with enoxaparin  PT/OT.  Patient adamantly refuses rehab  Add Flexeril 10 3 times daily  Encourage p.o. pain medications.  Taper IV pain medications    #History of alcohol liver cirrhosis          Outpatient follow-up     #Acute blood loss anemia on chronic anemia  Monitor H&H.  Hemoglobin 6.3 received 1 unit of PRBC  Transfuse PRBCs to keep hemoglobin more than 7     #Tobacco abuse  NRT  Counseled    DVT prophylaxis:  Mechanical DVT prophylaxis orders are present.         Code status is   Code Status and Medical Interventions:   Ordered at: 05/01/24 8954     Level Of Support Discussed With:    Patient     Code Status (Patient has no pulse and is not breathing):    CPR (Attempt to Resuscitate)     Medical Interventions (Patient has pulse or is breathing):    Full Support       Plan for disposition: Anticipate discharge in 3 days    Time: 30 minutes    Signature: Electronically signed by Niels Sands MD,  05/05/24, 10:41 EDT.  [facility] Hospitalist Team

## 2024-05-05 NOTE — PLAN OF CARE
Goal Outcome Evaluation:      C/o pain on right hip, incision area, medicated per MAR. Skin is warm to touch. Moves extremities. PICC line dressing is dry and intact. Oxygen saturation on. Call light is within reach. Plan of care ongoing.

## 2024-05-05 NOTE — THERAPY EVALUATION
Patient Name: Lita Yu  : 1965    MRN: [id number]                              Today's Date: 2024       Admit Date: 2024    Visit Dx:     ICD-10-CM ICD-9-CM   1. Pain in joints  M25.50 719.49     Patient Active Problem List   Diagnosis    Postmenopausal state    Post traumatic stress disorder    Pain in joints    Osteoporosis    Macrocytic anemia    Lumbosacral radiculopathy    Leg pain, left    Peripheral vascular disease    Essential hypertension    Hyperlipidemia    Degenerative joint disease of left hip    Seizure disorder    Smoker    Status post hip replacement    Tobacco dependence syndrome    Vitamin B12 deficiency    Anemia of chronic disease    Chronic obstructive pulmonary disease    Cirrhosis of liver    Acute UTI (urinary tract infection)    Generalized weakness    Rhabdomyolysis    Non-traumatic rhabdomyolysis    Moderate malnutrition    Chest pain    Dehydration    Myalgia    Acute hyperkalemia    Spinal abscess     Past Medical History:   Diagnosis Date    Cirrhosis     COPD (chronic obstructive pulmonary disease)     Depression     GERD (gastroesophageal reflux disease)     Hyperlipidemia     Hypertension     PTSD (post-traumatic stress disorder)      Past Surgical History:   Procedure Laterality Date     SECTION N/A     COLONOSCOPY N/A 2021    Procedure: COLONOSCOPY with polypectomy x2 and random biopsy;  Surgeon: Osman Clay MD;  Location: [facility] ENDOSCOPY;  Service: Gastroenterology;  Laterality: N/A;  colon polyps    DENTAL PROCEDURE      ENDOSCOPY N/A 2021    Procedure: ESOPHAGOGASTRODUODENOSCOPY;  Surgeon: Osman Clay MD;  Location: [facility] ENDOSCOPY;  Service: Gastroenterology;  Laterality: N/A;  esophagitis    JOINT REPLACEMENT      REPLACEMENT TOTAL HIP LATERAL POSITION Left     TONSILLECTOMY      VAGINAL BIRTH AFTER  SECTION        General Information       Row Name 24 5003          Physical Therapy Time and Intention     Document Type evaluation  -     Mode of Treatment individual therapy;physical therapy  -[facility] Name 05/05/24 1733          General Information    Prior Level of Function independent:;all household mobility;ADL's;work  -     Existing Precautions/Restrictions fall  -[facility] Name 05/05/24 1733          Living Environment    People in Home alone  -[facility] Name 05/05/24 1733          Home Main Entrance    Number of Stairs, Main Entrance none  -[facility] Name 05/05/24 1733          Stairs Within Home, Primary    Number of Stairs, Within Home, Primary none  -[facility] Name 05/05/24 1733          Cognition    Orientation Status (Cognition) oriented x 4  -[facility] Name 05/05/24 1733          Safety Issues, Functional Mobility    Impairments Affecting Function (Mobility) endurance/activity tolerance;pain;strength;range of motion (ROM)  -               User Key  (r) = Recorded By, (t) = Taken By, (c) = Cosigned By      Initials Name Provider Type    Cortez Orozco PT Physical Therapist                   Mobility       [facility] Name 05/05/24 1006          Bed Mobility    Bed Mobility supine-sit  -     Supine-Sit [place] (Bed Mobility) 2 person assist;minimum assist (75% patient effort)  -     Assistive Device (Bed Mobility) head of bed elevated;bed rails;draw sheet  -[facility] Name 05/05/24 1006          Bed-Chair Transfer    Bed-Chair [place] (Transfers) minimum assist (75% patient effort)  -     Assistive Device (Bed-Chair Transfers) walker, front-wheeled  -[facility] Name 05/05/24 1006          Sit-Stand Transfer    Sit-Stand [place] (Transfers) minimum assist (75% patient effort);verbal cues  -     Assistive Device (Sit-Stand Transfers) walker, front-wheeled  -[facility] Name 05/05/24 1006          Gait/Stairs (Locomotion)    [place] Level (Gait) minimum assist (75% patient effort)  -     Assistive Device (Gait) walker, front-wheeled  -     Patient was  able to Ambulate yes  -EL     Distance in Feet (Gait) 15  -EL     Deviations/Abnormal Patterns (Gait) gait speed decreased;stride length decreased  -EL     Comment, (Gait/Stairs) Vocalized a lot of pain, but able to bear weight well.  -EL               User Key  (r) = Recorded By, (t) = Taken By, (c) = Cosigned By      Initials Name Provider Type    Cortez Orozco PT Physical Therapist                   Obj/Interventions       Row Name 05/05/24 1740          Range of Motion Comprehensive    General Range of Motion bilateral lower extremity ROM WFL  -EL       [facility] Name 05/05/24 1740          Strength Comprehensive (MMT)    General Manual Muscle Testing (MMT) Assessment lower extremity strength deficits identified  -EL     Comment, General Manual Muscle Testing (MMT) Assessment LLE functionally 3/5 gross, RLE WFL  -EL       Row Name 05/05/24 1740          Balance    Balance Assessment sitting static balance;standing static balance;standing dynamic balance  -EL     Static Sitting Balance contact guard  -EL     Static Standing Balance minimal assist  -EL     Dynamic Standing Balance minimal assist  -EL     Position/Device Used, Standing Balance walker, front-wheeled  -EL       Row Name 05/05/24 1740          Sensory Assessment (Somatosensory)    Sensory Assessment (Somatosensory) sensation intact  -EL               User Key  (r) = Recorded By, (t) = Taken By, (c) = Cosigned By      Initials Name Provider Type    Cortez Orozco PT Physical Therapist                   Goals/Plan       Row Name 05/05/24 1754          Bed Mobility Goal 1 (PT)    Activity/Assistive Device (Bed Mobility Goal 1, PT) bed mobility activities, all  -EL     [place] Level/Cues Needed (Bed Mobility Goal 1, PT) modified independence  -EL     Time Frame (Bed Mobility Goal 1, PT) long term goal (LTG);2 weeks  -EL       Row Name 05/05/24 1754          Transfer Goal 1 (PT)    Activity/Assistive Device (Transfer Goal 1, PT) transfers, all;walker,  rolling  -EL     [place] Level/Cues Needed (Transfer Goal 1, PT) modified independence  -EL     Time Frame (Transfer Goal 1, PT) long term goal (LTG);2 weeks  -EL       Row Name 05/05/24 7945          Gait Training Goal 1 (PT)    Activity/Assistive Device (Gait Training Goal 1, PT) gait (walking locomotion);walker, rolling  -EL     [place] Level (Gait Training Goal 1, PT) modified independence  -EL     Distance (Gait Training Goal 1, PT) 120  -EL     Time Frame (Gait Training Goal 1, PT) long term goal (LTG);2 weeks  -EL       Row Name 05/05/24 8957          Therapy Assessment/Plan (PT)    Planned Therapy Interventions (PT) neuromuscular re-education;balance training;bed mobility training;transfer training;gait training;patient/family education;strengthening  -EL               User Key  (r) = Recorded By, (t) = Taken By, (c) = Cosigned By      Initials Name Provider Type    Cortez Orozco, PT Physical Therapist                   Clinical Impression       Row Name 05/05/24 8715          Pain Scale: FACES Pre/Post-Treatment    Pain: FACES Scale, Pretreatment 2-->hurts little bit  -EL     Posttreatment Pain Rating 6-->hurts even more  -EL     Pre/Posttreatment Pain Comment Painful with movement, very vocal.  -EL       Row Name 05/05/24 9544          Plan of Care Review    Plan of Care Reviewed With patient  -EL     Outcome Evaluation Pt is a 59 YO F POD 1 L ant BRE, s/p femoral head frx from avascular necrosis. Pt has recently dealt with L2-3 discectomy as well, with spinal abscess. Pt painful this date, and vocalized pain with mobiltiy, but able to transfer and ambulate with RWx. Requiring cueing for weight shifting and AD managment. Pt states she lives home alone, typically is independent with all ADLs, ambulation without AD and no recent falls. Pt states she plans to have someone stay with her at d/c to provide assistance and her preference is HHPT. If she is unable to make arangements to have assitance at  home she will likely need short term IP rehab following d/c. PT to follow to continue to assess need.  -EL       Row Name 05/05/24 1744          Therapy Assessment/Plan (PT)    Rehab Potential (PT) good, to achieve stated therapy goals  -EL     Criteria for Skilled Interventions Met (PT) yes  -EL     Therapy Frequency (PT) 5 times/wk  -EL               User Key  (r) = Recorded By, (t) = Taken By, (c) = Cosigned By      Initials Name Provider Type    Cortez Orozco, PT Physical Therapist                   Outcome Measures       Row Name 05/05/24 1754 05/05/24 0810       How much help from another person do you currently need...    Turning from your back to your side while in flat bed without using bedrails? 4  -EL 4  -MS    Moving from lying on back to sitting on the side of a flat bed without bedrails? 3  -EL 4  -MS    Moving to and from a bed to a chair (including a wheelchair)? 3  -EL 3  -MS    Standing up from a chair using your arms (e.g., wheelchair, bedside chair)? 3  -EL 3  -MS    Climbing 3-5 steps with a railing? 1  -EL 2  -MS    To walk in hospital room? 3  -EL 3  -MS    AM-PAC 6 Clicks Score (PT) 17  -EL 19  -MS    Highest Level of Mobility Goal 5 --> Static standing  -EL 6 --> Walk 10 steps or more  -MS      Row Name 05/05/24 1754          Functional Assessment    Outcome Measure Options AM-PAC 6 Clicks Basic Mobility (PT)  -EL               User Key  (r) = Recorded By, (t) = Taken By, (c) = Cosigned By      Initials Name Provider Type    Cortez Orozco, PT Physical Therapist    Latisha Hoffman LPN Licensed Nurse                                 Physical Therapy Education       Title: PT OT SLP Therapies (In Progress)       Topic: Physical Therapy (Not Started)       Point: Mobility training (Done)       Learning Progress Summary             Patient Acceptance, E,TB, VU by JUANA at 5/5/2024 1754                         Point: Body mechanics (Not Started)       Learner Progress:  Not documented in this  visit.              Point: Precautions (Done)       Learning Progress Summary             Patient Acceptance, E,TB, VU by  at 5/5/2024 1755                                         User Key       Initials Effective Dates Name Provider Type Discipline     06/23/20 -  Cortez Ledesma, PT Physical Therapist PT                  PT Recommendation and Plan  Planned Therapy Interventions (PT): neuromuscular re-education, balance training, bed mobility training, transfer training, gait training, patient/family education, strengthening  Plan of Care Reviewed With: patient  Outcome Evaluation: Pt is a 59 YO F POD 1 L ant BRE, s/p femoral head frx from avascular necrosis. Pt has recently dealt with L2-3 discectomy as well, with spinal abscess. Pt painful this date, and vocalized pain with mobiltiy, but able to transfer and ambulate with RWx. Requiring cueing for weight shifting and AD managment. Pt states she lives home alone, typically is independent with all ADLs, ambulation without AD and no recent falls. Pt states she plans to have someone stay with her at d/c to provide assistance and her preference is HHPT. If she is unable to make arangements to have assitance at home she will likely need short term IP rehab following d/c. PT to follow to continue to assess need.     Time Calculation:   PT Evaluation Complexity  History, PT Evaluation Complexity: 1-2 personal factors and/or comorbidities  Examination of Body Systems (PT Eval Complexity): total of 3 or more elements  Clinical Presentation (PT Evaluation Complexity): evolving  Clinical Decision Making (PT Evaluation Complexity): moderate complexity  Overall Complexity (PT Evaluation Complexity): moderate complexity     PT Charges       Row Name 05/05/24 2962             Time Calculation    Start Time 1006  -EL      Stop Time 1028  -EL      Time Calculation (min) 22 min  -EL      PT Received On 05/05/24  -EL      PT - Next Appointment 05/06/24  -EL      PT Goal Re-Cert Due  Date 05/19/24  -JUANA                User Key  (r) = Recorded By, (t) = Taken By, (c) = Cosigned By      Initials Name Provider Type    Cortez Orozco, PT Physical Therapist                  Therapy Charges for Today       Code Description Service Date Service Provider Modifiers Qty    11234552938 HC PT EVAL MOD COMPLEXITY 4 5/5/2024 Cortez Ledesma, PT GP 1            PT G-Codes  Outcome Measure Options: AM-PAC 6 Clicks Basic Mobility (PT)  AM-PAC 6 Clicks Score (PT): 17  PT Discharge Summary  Anticipated Discharge Disposition (PT): inpatient rehabilitation facility, other (see comments) (pending progress)    Cortez Ledesma, PT  5/5/2024

## 2024-05-05 NOTE — PLAN OF CARE
Goal Outcome Evaluation:         Pt is able to make needs known. Pain is managed with medication regimen. Pt is able to transfer to chair with assist x 1. Education is complete, call light is in reach, and no other needs at this time. Continue to monitor.

## 2024-05-05 NOTE — PLAN OF CARE
Goal Outcome Evaluation:  Plan of Care Reviewed With: patient           Outcome Evaluation: Pt is a 59 YO F POD 1 L ant BRE, s/p femoral head frx from avascular necrosis. Pt has recently dealt with L2-3 discectomy as well, with spinal abscess. Pt painful this date, and vocalized pain with mobiltiy, but able to transfer and ambulate with RWx. Requiring cueing for weight shifting and AD managment. Pt states she lives home alone, typically is independent with all ADLs, ambulation without AD and no recent falls. Pt states she plans to have someone stay with her at d/c to provide assistance and her preference is HHPT. If she is unable to make arangements to have assitance at home she will likely need short term IP rehab following d/c. PT to follow to continue to assess need.      Anticipated Discharge Disposition (PT): inpatient rehabilitation facility, other (see comments) (pending progress)

## 2024-05-05 NOTE — PLAN OF CARE
Goal Outcome Evaluation:  Plan of Care Reviewed With: patient        Progress: improving  Outcome Evaluation: Lita Yu is a 58 y.o. female with avascular necrosis with femoral head fracturing and collapse that subsequently became infected after she developed a spinal abscess.  Pt elected to undergo anterior total hip arthroplasty.  Pt is painful but motivated and able to walk in the room a little bit before sitting in the recliner to ice her hip. Pt lives alone and works, drives, etc. She does use a SP cane because of her spinal & hip issues but not at work. Pt will need 24 hr care for a short time at home. She wants to try and find friends to come assist her and go home with [facility], but if she is not able to arrange this a short acute IP rehab stay would be very appropraite for her. Dylan good progress.      Anticipated Discharge Disposition (OT): home with 24/7 care, home with home health, inpatient rehabilitation facility

## 2024-05-06 LAB
ALBUMIN SERPL-MCNC: 2.7 G/DL (ref 3.5–5.2)
ALBUMIN/GLOB SERPL: 0.8 G/DL
ALP SERPL-CCNC: 149 U/L (ref 39–117)
ALT SERPL W P-5'-P-CCNC: 16 U/L (ref 1–33)
ANION GAP SERPL CALCULATED.3IONS-SCNC: 5 MMOL/L (ref 5–15)
AST SERPL-CCNC: 25 U/L (ref 1–32)
BASOPHILS # BLD AUTO: 0.01 10*3/MM3 (ref 0–0.2)
BASOPHILS NFR BLD AUTO: 0.2 % (ref 0–1.5)
BH BB BLOOD EXPIRATION DATE: NORMAL
BH BB BLOOD TYPE BARCODE: 5100
BH BB DISPENSE STATUS: NORMAL
BH BB PRODUCT CODE: NORMAL
BH BB UNIT NUMBER: NORMAL
BILIRUB SERPL-MCNC: 0.2 MG/DL (ref 0–1.2)
BUN SERPL-MCNC: 18 MG/DL (ref 6–20)
BUN/CREAT SERPL: 16.4 (ref 7–25)
CALCIUM SPEC-SCNC: 8.3 MG/DL (ref 8.6–10.5)
CHLORIDE SERPL-SCNC: 108 MMOL/L (ref 98–107)
CO2 SERPL-SCNC: 26 MMOL/L (ref 22–29)
CREAT SERPL-MCNC: 1.1 MG/DL (ref 0.57–1)
CROSSMATCH INTERPRETATION: NORMAL
DEPRECATED RDW RBC AUTO: 60.2 FL (ref 37–54)
EGFRCR SERPLBLD CKD-EPI 2021: 59.1 ML/MIN/1.73
EOSINOPHIL # BLD AUTO: 0.15 10*3/MM3 (ref 0–0.4)
EOSINOPHIL NFR BLD AUTO: 3.2 % (ref 0.3–6.2)
ERYTHROCYTE [DISTWIDTH] IN BLOOD BY AUTOMATED COUNT: 17.3 % (ref 12.3–15.4)
GLOBULIN UR ELPH-MCNC: 3.3 GM/DL
GLUCOSE SERPL-MCNC: 99 MG/DL (ref 65–99)
HCT VFR BLD AUTO: 22.5 % (ref 34–46.6)
HCT VFR BLD AUTO: 25.3 % (ref 34–46.6)
HGB BLD-MCNC: 7 G/DL (ref 12–15.9)
HGB BLD-MCNC: 7.6 G/DL (ref 12–15.9)
IMM GRANULOCYTES # BLD AUTO: 0.04 10*3/MM3 (ref 0–0.05)
IMM GRANULOCYTES NFR BLD AUTO: 0.8 % (ref 0–0.5)
LYMPHOCYTES # BLD AUTO: 1.1 10*3/MM3 (ref 0.7–3.1)
LYMPHOCYTES NFR BLD AUTO: 23.2 % (ref 19.6–45.3)
MAGNESIUM SERPL-MCNC: 1.6 MG/DL (ref 1.6–2.6)
MCH RBC QN AUTO: 29.7 PG (ref 26.6–33)
MCHC RBC AUTO-ENTMCNC: 31.1 G/DL (ref 31.5–35.7)
MCV RBC AUTO: 95.3 FL (ref 79–97)
MONOCYTES # BLD AUTO: 0.54 10*3/MM3 (ref 0.1–0.9)
MONOCYTES NFR BLD AUTO: 11.4 % (ref 5–12)
NEUTROPHILS NFR BLD AUTO: 2.9 10*3/MM3 (ref 1.7–7)
NEUTROPHILS NFR BLD AUTO: 61.2 % (ref 42.7–76)
NRBC BLD AUTO-RTO: 0 /100 WBC (ref 0–0.2)
PHOSPHATE SERPL-MCNC: 3.1 MG/DL (ref 2.5–4.5)
PLATELET # BLD AUTO: 126 10*3/MM3 (ref 140–450)
PMV BLD AUTO: 9.7 FL (ref 6–12)
POTASSIUM SERPL-SCNC: 3.7 MMOL/L (ref 3.5–5.2)
PROT SERPL-MCNC: 6 G/DL (ref 6–8.5)
RBC # BLD AUTO: 2.36 10*6/MM3 (ref 3.77–5.28)
SODIUM SERPL-SCNC: 139 MMOL/L (ref 136–145)
UNIT  ABO: NORMAL
UNIT  RH: NORMAL
WBC NRBC COR # BLD AUTO: 4.74 10*3/MM3 (ref 3.4–10.8)

## 2024-05-06 PROCEDURE — 85014 HEMATOCRIT: CPT | Performed by: HOSPITALIST

## 2024-05-06 PROCEDURE — 85018 HEMOGLOBIN: CPT | Performed by: HOSPITALIST

## 2024-05-06 PROCEDURE — 25010000002 CEFTAROLINE FOSAMIL PER 10 MG: Performed by: INTERNAL MEDICINE

## 2024-05-06 PROCEDURE — 97110 THERAPEUTIC EXERCISES: CPT

## 2024-05-06 PROCEDURE — 85025 COMPLETE CBC W/AUTO DIFF WBC: CPT | Performed by: ORTHOPAEDIC SURGERY

## 2024-05-06 PROCEDURE — 84100 ASSAY OF PHOSPHORUS: CPT | Performed by: ORTHOPAEDIC SURGERY

## 2024-05-06 PROCEDURE — 80053 COMPREHEN METABOLIC PANEL: CPT | Performed by: ORTHOPAEDIC SURGERY

## 2024-05-06 PROCEDURE — 97112 NEUROMUSCULAR REEDUCATION: CPT

## 2024-05-06 PROCEDURE — 25810000003 SODIUM CHLORIDE 0.9 % SOLUTION 250 ML FLEX CONT: Performed by: INTERNAL MEDICINE

## 2024-05-06 PROCEDURE — 97530 THERAPEUTIC ACTIVITIES: CPT

## 2024-05-06 PROCEDURE — 97116 GAIT TRAINING THERAPY: CPT

## 2024-05-06 PROCEDURE — 25010000002 VANCOMYCIN 1 G RECONSTITUTED SOLUTION 1 EACH VIAL: Performed by: INTERNAL MEDICINE

## 2024-05-06 PROCEDURE — 25010000002 ENOXAPARIN PER 10 MG: Performed by: HOSPITALIST

## 2024-05-06 PROCEDURE — 83735 ASSAY OF MAGNESIUM: CPT | Performed by: ORTHOPAEDIC SURGERY

## 2024-05-06 PROCEDURE — 97535 SELF CARE MNGMENT TRAINING: CPT

## 2024-05-06 RX ORDER — CALCIUM CARBONATE 500 MG/1
1 TABLET, CHEWABLE ORAL 3 TIMES DAILY PRN
Status: DISCONTINUED | OUTPATIENT
Start: 2024-05-06 | End: 2024-05-11 | Stop reason: HOSPADM

## 2024-05-06 RX ADMIN — CEFTAROLINE FOSAMIL 600 MG: 600 POWDER, FOR SOLUTION INTRAVENOUS at 06:22

## 2024-05-06 RX ADMIN — FOLIC ACID 1 MG: 1 TABLET ORAL at 10:02

## 2024-05-06 RX ADMIN — CYCLOBENZAPRINE 10 MG: 10 TABLET, FILM COATED ORAL at 21:03

## 2024-05-06 RX ADMIN — CEFTAROLINE FOSAMIL 600 MG: 600 POWDER, FOR SOLUTION INTRAVENOUS at 13:17

## 2024-05-06 RX ADMIN — Medication 10 ML: at 21:04

## 2024-05-06 RX ADMIN — QUETIAPINE FUMARATE 50 MG: 25 TABLET ORAL at 21:03

## 2024-05-06 RX ADMIN — SERTRALINE 100 MG: 100 TABLET, FILM COATED ORAL at 21:03

## 2024-05-06 RX ADMIN — ENOXAPARIN SODIUM 40 MG: 100 INJECTION SUBCUTANEOUS at 15:07

## 2024-05-06 RX ADMIN — HYDROCODONE BITARTRATE AND ACETAMINOPHEN 1 TABLET: 7.5; 325 TABLET ORAL at 10:02

## 2024-05-06 RX ADMIN — CYCLOBENZAPRINE 10 MG: 10 TABLET, FILM COATED ORAL at 10:01

## 2024-05-06 RX ADMIN — SERTRALINE 100 MG: 100 TABLET, FILM COATED ORAL at 10:02

## 2024-05-06 RX ADMIN — CALCIUM CARBONATE 1 TABLET: 500 TABLET, CHEWABLE ORAL at 21:44

## 2024-05-06 RX ADMIN — CYCLOBENZAPRINE 10 MG: 10 TABLET, FILM COATED ORAL at 15:07

## 2024-05-06 RX ADMIN — HYDROCODONE BITARTRATE AND ACETAMINOPHEN 1 TABLET: 10; 325 TABLET ORAL at 18:58

## 2024-05-06 RX ADMIN — BUSPIRONE HYDROCHLORIDE 15 MG: 15 TABLET ORAL at 21:03

## 2024-05-06 RX ADMIN — HYDROCODONE BITARTRATE AND ACETAMINOPHEN 1 TABLET: 10; 325 TABLET ORAL at 15:06

## 2024-05-06 RX ADMIN — Medication 10 ML: at 10:02

## 2024-05-06 RX ADMIN — BUSPIRONE HYDROCHLORIDE 15 MG: 15 TABLET ORAL at 10:02

## 2024-05-06 RX ADMIN — VANCOMYCIN HYDROCHLORIDE 1000 MG: 1 INJECTION, POWDER, LYOPHILIZED, FOR SOLUTION INTRAVENOUS at 10:05

## 2024-05-06 RX ADMIN — CEFTAROLINE FOSAMIL 600 MG: 600 POWDER, FOR SOLUTION INTRAVENOUS at 21:03

## 2024-05-06 RX ADMIN — HYDROCODONE BITARTRATE AND ACETAMINOPHEN 1 TABLET: 7.5; 325 TABLET ORAL at 06:14

## 2024-05-06 NOTE — THERAPY TREATMENT NOTE
"Subjective: Pt agreeable to therapeutic plan of care.  Cognition: oriented to Person, Place, Time, and Situation    Objective:     Bed Mobility: Min-A   Functional Transfers: Min-A, Assist x 2, and with rolling walker     Balance: supported, static, dynamic, and standing CGA  Functional Ambulation: CGA and with rolling walker to restroom, then returned to chair.    Grooming: CGA  ADL Position: supported standing  ADL Comments: In standing at sink, pt able to complete hair care but requires CGA for balance.    Ther Ex: Pt completed BUE ther ex in all planes including shoulder flex/ex, shoulder abd/add, elbow flex/ext x10 2 sets to increase endurance and strength in prep for ADLs and further mobility via support with BUES.  Pt req cues for activity pacing and technique. Pt educated on performing 2-3x/day to increase strength and endurance.      Vitals: WNL    Pain: 7 VAS  Location: R hip  Interventions for pain: Repositioned, Increased Activity, and Therapeutic Presence  Education: Provided education on the importance of mobility in the acute care setting, Verbal/Tactile Cues, ADL training, and Transfer Training      Assessment: Lita Yu presents with ADL impairments affecting function including balance, endurance / activity tolerance, pain, and strength. Demonstrated functioning below baseline abilities indicate the need for continued skilled intervention while inpatient. Pt tearful due to pain this date, but able to work through the pain to ambulate to restroom, stand and complete grooming activity, then return to seated in chair at bedside. Good partial weight bear adherence.  Tolerating session today without incident. Will continue to follow and progress as tolerated.     Plan/Recommendations:   Moderate Intensity Therapy recommended post-acute care. This is recommended as therapy feels the patient would require 3-4 days per week and wouldn't tolerate \"3 hour daily\" rehab intensity. SNF would be the " preferred choice. If the patient does not agree to SNF, arrange HH or OP depending on home bound status. If patient is medically complex, consider LTACH.. Pt requires no DME at discharge.     Pt desires Skilled Rehab placement at discharge. Pt cooperative; agreeable to therapeutic recommendations and plan of care.     Modified [place]: N/A = No pre-op stroke/TIA    Post-Tx Position: Up in Chair, Alarms activated, and Call light and personal items within reach  PPE: gloves

## 2024-05-06 NOTE — THERAPY EVALUATION
Patient Name: Lita Yu  : 1965    MRN: [id number]                              Today's Date: 2024       Admit Date: 2024    Visit Dx:     ICD-10-CM ICD-9-CM   1. Pain in joints  M25.50 719.49     Patient Active Problem List   Diagnosis    Postmenopausal state    Post traumatic stress disorder    Pain in joints    Osteoporosis    Macrocytic anemia    Lumbosacral radiculopathy    Leg pain, left    Peripheral vascular disease    Essential hypertension    Hyperlipidemia    Degenerative joint disease of left hip    Seizure disorder    Smoker    Status post hip replacement    Tobacco dependence syndrome    Vitamin B12 deficiency    Anemia of chronic disease    Chronic obstructive pulmonary disease    Cirrhosis of liver    Acute UTI (urinary tract infection)    Generalized weakness    Rhabdomyolysis    Non-traumatic rhabdomyolysis    Moderate malnutrition    Chest pain    Dehydration    Myalgia    Acute hyperkalemia    Spinal abscess     Past Medical History:   Diagnosis Date    Cirrhosis     COPD (chronic obstructive pulmonary disease)     Depression     GERD (gastroesophageal reflux disease)     Hyperlipidemia     Hypertension     PTSD (post-traumatic stress disorder)      Past Surgical History:   Procedure Laterality Date     SECTION N/A     COLONOSCOPY N/A 2021    Procedure: COLONOSCOPY with polypectomy x2 and random biopsy;  Surgeon: Osman Clay MD;  Location: [facility] ENDOSCOPY;  Service: Gastroenterology;  Laterality: N/A;  colon polyps    DENTAL PROCEDURE      ENDOSCOPY N/A 2021    Procedure: ESOPHAGOGASTRODUODENOSCOPY;  Surgeon: Osman Clay MD;  Location: [facility] ENDOSCOPY;  Service: Gastroenterology;  Laterality: N/A;  esophagitis    JOINT REPLACEMENT      REPLACEMENT TOTAL HIP LATERAL POSITION Left     TONSILLECTOMY      VAGINAL BIRTH AFTER  SECTION        General Information    No documentation.                    Mobility/ADL's    No  documentation.                  Obj/Interventions    No documentation.                  Goals/Plan    No documentation.                  Clinical Impression    No documentation.                  Outcome Measures    No documentation.                   Occupational Therapy Education       Title: PT OT SLP Therapies (Done)       Topic: Occupational Therapy (Done)       Point: ADL training (Done)       Description:   Instruct learner(s) on proper safety adaptation and remediation techniques during self care or transfers.   Instruct in proper use of assistive devices.                  Learning Progress Summary             Patient Acceptance, E,TB, VU by  at 5/5/2024 2026    Acceptance, E,TB, VU,NR by  at 5/5/2024 1251                         Point: Home exercise program (Done)       Description:   Instruct learner(s) on appropriate technique for monitoring, assisting and/or progressing therapeutic exercises/activities.                  Learning Progress Summary             Patient Acceptance, E,TB, VU by  at 5/5/2024 2026    Acceptance, E,TB, VU,NR by  at 5/5/2024 1251                         Point: Precautions (Done)       Description:   Instruct learner(s) on prescribed precautions during self-care and functional transfers.                  Learning Progress Summary             Patient Acceptance, E,TB, VU by  at 5/5/2024 2026    Acceptance, E,TB, VU,NR by  at 5/5/2024 1251                         Point: Body mechanics (Done)       Description:   Instruct learner(s) on proper positioning and spine alignment during self-care, functional mobility activities and/or exercises.                  Learning Progress Summary             Patient Acceptance, E,TB, VU by  at 5/5/2024 2026    Acceptance, E,TB, VU,NR by  at 5/5/2024 1251                                         User Key       Initials Effective Dates Name Provider Type Discipline     06/16/21 -  Maria Teresa Covarrubias OT Occupational Therapist OT      05/09/19 -  Neha Rao, RN Registered Nurse Nurse                  OT Recommendation and Plan  Planned Therapy Interventions (OT): activity tolerance training, adaptive equipment training, BADL retraining, cognitive/visual perception retraining, functional balance retraining, transfer/mobility retraining, occupation/activity based interventions, patient/caregiver education/training  Therapy Frequency (OT): 5 times/wk  Plan of Care Review  Plan of Care Reviewed With: patient  Progress: improving  Outcome Evaluation: Lita Yu is a 58 y.o. female with avascular necrosis with femoral head fracturing and collapse that subsequently became infected after she developed a spinal abscess.  Pt elected to undergo anterior total hip arthroplasty.  Pt is painful but motivated and able to walk in the room a little bit before sitting in the recliner to ice her hip. Pt lives alone and works, drives, etc. She does use a SP cane because of her spinal & hip issues but not at work. Pt will need 24 hr care for a short time at home. She wants to try and find friends to come assist her and go home with [facility], but if she is not able to arrange this a short acute IP rehab stay would be very appropraite for her. Dylan good progress.     Time Calculation:          Therapy Charges for Today       Code Description Service Date Service Provider Modifiers Qty    13612372436 HC OT SELF CARE/MGMT/TRAIN EA 15 MIN 5/5/2024 Maria Teresa Covarrubias OT GO 1    [id number] HC OT EVAL MOD COMPLEXITY 3 5/5/2024 Maria Teresa Covarrubias OT GO 1                 Maria Teresa Covarrubias OT  5/6/2024

## 2024-05-06 NOTE — THERAPY TREATMENT NOTE
Subjective: Pt agreeable to therapeutic plan of care.    Objective:     Bed mobility - Min-A  Transfers - Min-A, Assist x 2, and with rolling walker from low bed surface.  Ambulation - 1 x 15 feet to bathroom, 1 x 20 feet to bedside recliner CGA and with rolling walker    WB'ing status: R Lower Extremity Partial Weight Bearing    ROM: WFL    Therapeutic Exercise: 10 Reps R Lower Extremity AROM  ankle pumps, heel slides, quad sets, LAQ.    Precautions: Weight-bearing restriction     Vitals: WNL    Pain: 7 VAS   Location: incisional hip  Intervention for pain: Repositioned, RN provided medication, RN notified, Increased Activity, and Therapeutic Presence    Education: Provided education on the importance of mobility in the acute care setting, Verbal/Tactile Cues, Transfer Training, Gait Training, Energy conservation strategies, and WB'ing status    Assessment: Lita Yu (prefers [facility]) is a 57 y/o F POD#2 s/p R anterior BRE for avascular necrosis of the hip and is PWB per ortho. Pt presents with functional mobility impairments which indicate the need for skilled intervention. Pt required Dagmar to RLE for bed mobility this date, but demo good sitting balance EOB with SBA. Pt reporting significant pain 7/10 RLE, but able to tolerate LE exercise and ambulating with RW within her room. Pt required minAx2 for STS with reduced weight-bearing RLE. Pt ambulated 1 x 15 feet to bathroom and 1 x 20 feet to bedside recliner. Pt would continue to benefit from IP rehab upon d/c for improving functional ability. Tolerating session today without incident. Will continue to follow and progress as tolerated.     Plan/Recommendations:   If medically appropriate, High Intensity Therapy recommended post-acute care. This is recommended as therapy feels the patient would require 5-6 days per week, 2-3 hours per day. At this time, inpatient rehabilitation (acute rehab) would be the first choice and SNF would be second. Pt requires  no DME at discharge.     Pt desires Inpatient Rehabilitation placement at discharge. Pt cooperative; agreeable to therapeutic recommendations and plan of care.         Basic Mobility 6-click:  Rollin = Total, A lot = 2, A little = 3; 4 = None  Supine>Sit:   1 = Total, A lot = 2, A little = 3; 4 = None   Sit>Stand with arms:  1 = Total, A lot = 2, A little = 3; 4 = None  Bed>Chair:   1 = Total, A lot = 2, A little = 3; 4 = None  Ambulate in room:  1 = Total, A lot = 2, A little = 3; 4 = None  3-5 Steps with railin = Total, A lot = 2, A little = 3; 4 = None  Score: 19    Modified Eaton: N/A = No pre-op stroke/TIA    Post-Tx Position: Up in Chair, Alarms activated, and Call light and personal items within reach  PPE: gloves

## 2024-05-06 NOTE — PLAN OF CARE
Assessment: Lita Yu (prefers [facility]) is a 59 y/o F POD#2 s/p R anterior BRE for avascular necrosis of the hip and is PWB per ortho. Pt presents with functional mobility impairments which indicate the need for skilled intervention. Pt required Dagmar to RLE for bed mobility this date, but demo good sitting balance EOB with SBA. Pt reporting significant pain 7/10 RLE, but able to tolerate LE exercise and ambulating with RW within her room. Pt required minAx2 for STS with reduced weight-bearing RLE. Pt ambulated 1 x 15 feet to bathroom and 1 x 20 feet to bedside recliner. Pt would continue to benefit from IP rehab upon d/c for improving functional ability. Tolerating session today without incident. Will continue to follow and progress as tolerated.

## 2024-05-06 NOTE — CONSULTS
PICC Team note:  PICC flushes easily with good blood return. Primary Nurse, Dorothy agreed no cathflo needed at this time.

## 2024-05-06 NOTE — PLAN OF CARE
Goal Outcome Evaluation:      Has some slight pain on incision site, medicated per MAR. Skin is warm to touch, moves extremities. Alert and oriented x 4, plan of care is ongoing. Call light within reach.

## 2024-05-06 NOTE — CASE MANAGEMENT/SOCIAL WORK
Continued Stay Note   Vik     Patient Name: Lita Yu  MRN: [id number]  Today's Date: 5/6/2024    Admit Date: 5/1/2024    Plan: Referral to [facility] snf pending acceptance and precert.  Will need PASRR  Patient current with [facility] and [facility]   Discharge Plan       Row Name 05/06/24 1501       Plan    Plan Referral to [facility] snf pending acceptance and precert.  Will need PASRR  Patient current with [facility] and [facility]    Plan Comments dC BARRIER: IV antbiotics, monitoring hgb.  pending acceptance to snf, precert and pasrr                    Expected Discharge Date and Time       Expected Discharge Date Expected Discharge Time    May 7, 2024               Tequila Bourne RN

## 2024-05-06 NOTE — PLAN OF CARE
"Goal Outcome Evaluation:                 Assessment: Lita Yu presents with ADL impairments affecting function including balance, endurance / activity tolerance, pain, and strength. Demonstrated functioning below baseline abilities indicate the need for continued skilled intervention while inpatient. Pt tearful due to pain this date, but able to work through the pain to ambulate to restroom, stand and complete grooming activity, then return to seated in chair at bedside. Good partial weight bear adherence.  Tolerating session today without incident. Will continue to follow and progress as tolerated.      Plan/Recommendations:   Moderate Intensity Therapy recommended post-acute care. This is recommended as therapy feels the patient would require 3-4 days per week and wouldn't tolerate \"3 hour daily\" rehab intensity. SNF would be the preferred choice. If the patient does not agree to SNF, arrange HH or OP depending on home bound status. If patient is medically complex, consider LTACH.. Pt requires no DME at discharge.      Pt desires Skilled Rehab placement at discharge. Pt cooperative; agreeable to therapeutic recommendations and plan of care.          Anticipated Discharge Disposition (OT): skilled nursing facility                        "

## 2024-05-06 NOTE — PROGRESS NOTES
[facility] MEDICINE SERVICE  DAILY PROGRESS NOTE    NAME: Lita Yu  : 1965  MRN: [id number]      LOS: 5 days     PROVIDER OF SERVICE: Niels Sands MD    Chief Complaint: Spinal abscess    Subjective:     Interval History:  History taken from: patient chart RN  Appears comfortable rehab receptive today    Review of Systems:   Review of Systems  All negative except as above  Objective:     Vital Signs  Temp:  [97.9 °F (36.6 °C)-98.5 °F (36.9 °C)] 97.9 °F (36.6 °C)  Heart Rate:  [68-72] 68  Resp:  [16-17] 17  BP: ()/(51-58) 102/57   Body mass index is 21.26 kg/m².    Physical Exam  Physical Exam  AOx3 NAD  RRR S1 and S2 audible  Lungs with fair entry  Abdomen soft nontender nondistended  Scheduled Meds   busPIRone, 15 mg, Oral, BID  ceftaroline, 600 mg, Intravenous, Q8H  cyclobenzaprine, 10 mg, Oral, TID  enoxaparin, 40 mg, Subcutaneous, Q24H  folic acid, 1 mg, Oral, Daily  QUEtiapine, 50 mg, Oral, Nightly  sertraline, 100 mg, Oral, BID  sodium chloride, 10 mL, Intravenous, Q12H  vancomycin, 1,000 mg, Intravenous, Daily       PRN Meds     acetaminophen **OR** acetaminophen **OR** acetaminophen    albuterol    alteplase (CATHFLO/ACTIVASE) 1 mg in sterile water (preservative free) 1.1 mL injection    senna-docusate sodium **AND** polyethylene glycol **AND** bisacodyl **AND** bisacodyl    HYDROcodone-acetaminophen    HYDROcodone-acetaminophen    HYDROmorphone    lactated ringers    ondansetron ODT **OR** ondansetron    Pharmacy to dose vancomycin    sodium chloride    sodium chloride   Infusions  lactated ringers, 9 mL/hr, Last Rate: 100 mL/hr (24 0812)  Pharmacy to dose vancomycin,           Diagnostic Data    Results from last 7 days   Lab Units 24  0433   WBC 10*3/mm3 4.74   HEMOGLOBIN g/dL 7.0*   HEMATOCRIT % 22.5*   PLATELETS 10*3/mm3 126*   GLUCOSE mg/dL 99   CREATININE mg/dL 1.10*   BUN mg/dL 18   SODIUM mmol/L 139   POTASSIUM mmol/L 3.7   AST (SGOT) U/L 25    ALT (SGPT) U/L 16   ALK PHOS U/L 149*   BILIRUBIN mg/dL 0.2   ANION GAP mmol/L 5.0       No radiology results for the last day      I reviewed the patient's new clinical results.  I reviewed the patient's new imaging results and agree with the interpretation.    Assessment/Plan:     Active and Resolved Problems  Active Hospital Problems    Diagnosis  POA    **Spinal abscess [M46.20]  Yes    Pain in joints [M25.50]  Unknown      Resolved Hospital Problems   No resolved problems to display.       58-year-old female with history of hypertension, peripheral vascular disease, degenerative joint disease, seizure disorder, tobacco abuse, EtOH liver cirrhosis, recent history of MRSA bacteremia attributed to L2-3 OM/discitis admitted to [facility] on 5/1/2024 with a worsening pain in her lower back.      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  Prior plan for vancomycin until 5/3/2024.      -Now admitted with worsening low back pain  -MRI lumbar and pelvis spine as noted.  Shows right hip effusion Along with L2-3 discitis and vertebral body osteomyelitis with enhancing paraspinal phlegmon     ID following continue vancomycin.  Duration of treatment extended to 12 weeks.  Teflaro added status post IR guided right hip aspiration.  Cell count studies noted follow culture. NGTD  Orthopedics following status post R anterior total hip arthroplasty 5/4  Continue current pain control. Encourage po  Seen by CHRISTIANO no plan for surgical intervention  Follow infectious workup  DVT prophylaxis with enoxaparin  PT/OT.  Receptive to rehab today  Continue Flexeril 10 3 times daily  Encourage p.o. pain medications.  Taper IV pain medications     #History of alcohol liver cirrhosis          Outpatient follow-up     #Acute blood loss anemia on chronic  anemia  Monitor H&H.  Hemoglobin 6.3 received 1 unit of PRBC 5/5  Hemoglobin 7 this morning repeat H&H known  Transfuse PRBCs to keep hemoglobin more than 7     #Tobacco abuse  NRT  Counseled    DVT prophylaxis:  Medical and mechanical DVT prophylaxis orders are present.         Code status is   Code Status and Medical Interventions:   Ordered at: 05/01/24 5356     Level Of Support Discussed With:    Patient     Code Status (Patient has no pulse and is not breathing):    CPR (Attempt to Resuscitate)     Medical Interventions (Patient has pulse or is breathing):    Full Support       Plan for disposition:  Anticipate discharge 48 hours  Do not suspect patient will require more then 30 days at rehab     Time: 30 minutes    Signature: Electronically signed by Niels Sands MD, 05/06/24, 11:05 EDT.  [facility] Hospitalist Team

## 2024-05-06 NOTE — PROGRESS NOTES
Infectious Diseases Progress Note      LOS: 5 days   Patient Care Team:  Norma Saul APRN as PCP - General (Nurse Practitioner)  Flory Villanueva MD (Physical Medicine and Rehabilitation)    Chief Complaint: Back pain and right hip pain    Subjective     The patient had no high-grade fever during the last 24 hours.  She remained hemodynamically stable.  She is tolerating her current antibiotics with no side effects.      Review of Systems:   Review of Systems   Constitutional: Negative.    HENT: Negative.     Eyes: Negative.    Respiratory: Negative.     Cardiovascular: Negative.    Gastrointestinal: Negative.    Genitourinary: Negative.    Musculoskeletal:  Positive for arthralgias and back pain.   Skin: Negative.    Neurological: Negative.    Hematological: Negative.    Psychiatric/Behavioral: Negative.          Objective     Vital Signs  Temp:  [97.9 °F (36.6 °C)-98.5 °F (36.9 °C)] 98.1 °F (36.7 °C)  Heart Rate:  [63-72] 63  Resp:  [16-17] 16  BP: ()/(47-58) 95/47    Physical Exam:  Physical Exam  Vitals and nursing note reviewed.   Constitutional:       Appearance: She is well-developed.   HENT:      Head: Normocephalic and atraumatic.   Eyes:      Pupils: Pupils are equal, round, and reactive to light.   Cardiovascular:      Rate and Rhythm: Normal rate and regular rhythm.      Heart sounds: Normal heart sounds.   Pulmonary:      Effort: Pulmonary effort is normal. No respiratory distress.      Breath sounds: Normal breath sounds. No wheezing or rales.   Abdominal:      General: Bowel sounds are normal. There is no distension.      Palpations: Abdomen is soft. There is no mass.      Tenderness: There is no abdominal tenderness. There is no guarding or rebound.   Musculoskeletal:         General: No deformity.      Cervical back: Normal range of motion and neck supple.      Comments: S/p right hip arthroplasty   Skin:     General: Skin is warm.      Findings: No erythema or rash.   Neurological:       Mental Status: She is alert and oriented to person, place, and time.      Cranial Nerves: No cranial nerve deficit.          Results Review:    I have reviewed all clinical data, test, lab, and imaging results.     Radiology  No Radiology Exams Resulted Within Past 24 Hours    Cardiology    Laboratory    Results from last 7 days   Lab Units 05/06/24  0433 05/05/24  1335 05/05/24  0401 05/04/24  0557 05/02/24  2310 05/02/24  0616 04/29/24  1430   WBC 10*3/mm3 4.74  --  7.39 4.58 4.35 4.54 7.42   HEMOGLOBIN g/dL 7.0* 7.5* 6.3* 8.5* 8.1* 8.4* 10.8*   HEMATOCRIT % 22.5* 23.6* 20.2* 27.7* 27.0* 27.4* 33.7*   PLATELETS 10*3/mm3 126*  --  144 167 153 152 222     Results from last 7 days   Lab Units 05/06/24  0433 05/05/24  0334 05/04/24  0557 05/02/24  2310 05/02/24  0616 04/29/24  1430   SODIUM mmol/L 139 138 139 140 140 140   POTASSIUM mmol/L 3.7 4.2 3.8 3.8 3.9 4.4   CHLORIDE mmol/L 108* 103 105 108* 108* 107   CO2 mmol/L 26.0 24.0 25.0 25.0 24.0 19.7*   BUN mg/dL 18 15 14 16 14 14   CREATININE mg/dL 1.10* 0.81 1.06* 1.03* 1.14* 1.04*   GLUCOSE mg/dL 99 117* 89 113* 95 78   ALBUMIN g/dL 2.7* 2.8* 3.2* 3.0*  --   --    BILIRUBIN mg/dL 0.2 <0.2 0.2 <0.2  --   --    ALK PHOS U/L 149* 145* 184* 187*  --   --    AST (SGOT) U/L 25 25 18 22  --   --    ALT (SGPT) U/L 16 13 13 12  --   --    CALCIUM mg/dL 8.3* 8.5* 9.3 8.8 9.0 10.0         Results from last 7 days   Lab Units 05/02/24  0616   SED RATE mm/hr 50*         Microbiology   Microbiology Results (last 10 days)       Procedure Component Value - Date/Time    Body Fluid Culture - Body Fluid, Hip, Right [[id number]] Collected: 05/03/24 1256    Lab Status: Preliminary result Specimen: Body Fluid from Hip, Right Updated: 05/06/24 0634     Body Fluid Culture No growth at 3 days     Gram Stain Many (4+) WBCs per low power field      No organisms seen    Blood Culture - Blood, Arm, Right [[id number]]  (Normal) Collected: 05/02/24 1607    Lab Status: Preliminary result Specimen:  Blood from Arm, Right Updated: 05/05/24 1631     Blood Culture No growth at 3 days    Blood Culture - Blood, Blood, Central Line [[id number]]  (Normal) Collected: 05/02/24 1553    Lab Status: Preliminary result Specimen: Blood, Central Line Updated: 05/05/24 1631     Blood Culture No growth at 3 days            Medication Review:       Schedule Meds  alteplase (CATHFLO/ACTIVASE) 2 mg in sterile water (preservative free) 2.2 mL injection, 2 mg, Intracatheter, Once  busPIRone, 15 mg, Oral, BID  ceftaroline, 600 mg, Intravenous, Q8H  cyclobenzaprine, 10 mg, Oral, TID  enoxaparin, 40 mg, Subcutaneous, Q24H  folic acid, 1 mg, Oral, Daily  QUEtiapine, 50 mg, Oral, Nightly  sertraline, 100 mg, Oral, BID  sodium chloride, 10 mL, Intravenous, Q12H  vancomycin, 1,000 mg, Intravenous, Daily        Infusion Meds  lactated ringers, 9 mL/hr, Last Rate: 100 mL/hr (05/04/24 0812)  Pharmacy to dose vancomycin,         PRN Meds    acetaminophen **OR** acetaminophen **OR** acetaminophen    albuterol    alteplase (CATHFLO/ACTIVASE) 1 mg in sterile water (preservative free) 1.1 mL injection    senna-docusate sodium **AND** polyethylene glycol **AND** bisacodyl **AND** bisacodyl    HYDROcodone-acetaminophen    HYDROcodone-acetaminophen    HYDROmorphone    lactated ringers    ondansetron ODT **OR** ondansetron    Pharmacy to dose vancomycin    sodium chloride    sodium chloride        Assessment & Plan       Antimicrobial Therapy   1.  IV vancomycin        2.  IV Teflaro        3.        4.        5.              Assessment     Discitis/osteomyelitis at the level of L2-L3.  Current MRI showed worsening of discitis with persistent epidural abscess and moderate canal stenosis.  There was paraspinal phlegmon.  Neurosurgery service following and the patient and decided not to do surgical intervention.  The patient has been receiving IV antibiotics for 8 weeks prior to admission.  I am concerned about clinical failure and need of  surgery.    Possible right hip septic arthritis based on MRI of the pelvis.  Synovial fluid findings was not highly consistent with infection with a white count of only 5000 but predominantly neutrophils.  Findings was consistent with avascular necrosis and patient required right hip arthroplasty on May 4, 2024.  Cultures are negative so far     Recent admission for methicillin resistant Staphylococcus aureus bacteremia.  Likely source is lumbar discitis/osteomyelitis.   SILVINA performed on 3/12/2024 with cardiology notes mentioning no vegetation.  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  Patient denied having infection after the procedure        Plan     Continue IV vancomycin-keep trough between 15 and 20.   I will extend the duration of the treatment to 12 weeks.  If patient continues to have abnormality after 12 weeks then she needs to be evaluated for surgery.  Maximum duration of antibiotics will be 12 weeks otherwise the case would be consider failure of therapy  Waiting on repeat blood culture results and right hip fluid culture results  Continue IV Teflaro  600 mg every 8 hours for 2 weeks  Continue supportive care  A.m. labs      Pilar Foster MD  05/06/24  13:13 EDT    Note is dictated utilizing voice recognition software/Dragon

## 2024-05-06 NOTE — DISCHARGE PLACEMENT REQUEST
"Yonis Yu \"NAINA\" (58 y.o. Female)       Date of Birth   1965    Social Security Number       Address   [address] [place] [address]    Home Phone   [phone]    MRN   [id number]       Druze   None    Marital Status                               Admission Date   5/1/24    Admission Type   Elective    Admitting Provider   Niels Sands MD    Attending Provider   Niels Sands MD    Department, Room/Bed   [facility] SURGICAL INPATIENT, 4102/1       Discharge Date       Discharge Disposition       Discharge Destination                                 Attending Provider: Niels Sands MD    Allergies: Sulfa Antibiotics, Keflex [Cephalexin]    Isolation: None   Infection: MRSA No Isolation this Admit (03/08/24)   Code Status: CPR    Ht: 160 cm (62.99\")   Wt: 54.4 kg (120 lb)    Admission Cmt: None   Principal Problem: Spinal abscess [M46.20]                   Active Insurance as of 5/1/2024       Primary Coverage       Payor Plan Insurance Group Employer/Plan Group    [id number]       Payor Plan Address Payor Plan Phone Number Payor Plan Fax Number Effective Dates    [address]   6/22/2014 - None Entered    [facility] [id number]         Subscriber Name Subscriber Birth Date Member ID       YONIS YU 1965 [id number]                     Emergency Contacts        (Rel.) Home Phone Work Phone Mobile Phone    PEPEBIENVENIDO LENTZSOSA (Daughter) -- -- [phone]    SARAI WOOD (Brother) [phone] -- --                "

## 2024-05-06 NOTE — PAYOR COMM NOTE
"This is RECONSIDERATION REVIEW REQUEST  PT: Yonis Yu    Reference/Auth#: [id number]     EXTENDED AUTHORIZATION PENDING:     Please call or fax determination to contact below.   Thank you.    SURESH Santamaria, RN, CCM  Utilization Review Nurse  [facility]  Direct & confidential phone # [phone]  Fax # [phone]        Yonis Yu \"NAINA\" (58 y.o. Female)       Date of Birth   1965    Social Security Number       Address   [address] [place] [address]    Home Phone   [phone]    MRN   [id number]       [employer]   None    Marital Status                               Admission Date   5/1/24    Admission Type   Elective    Admitting Provider   Niels Sands MD    Attending Provider   Niels Sands MD    Department, Room/Bed   [facility] SURGICAL INPATIENT, 4102/1       Discharge Date       Discharge Disposition       Discharge Destination                                 Attending Provider: Niels Sands MD    Allergies: Sulfa Antibiotics, Keflex [Cephalexin]    Isolation: None   Infection: MRSA No Isolation this Admit (03/08/24)   Code Status: CPR    Ht: 160 cm (62.99\")   Wt: 54.4 kg (120 lb)    Admission Cmt: None   Principal Problem: Spinal abscess [M46.20]                   Active Insurance as of 5/1/2024       Primary Coverage       Payor Plan Insurance Group Employer/Plan Group    [place] [id number]       Payor Plan Address Payor Plan Phone Number Payor Plan Fax Number Effective Dates    [address]   6/22/2014 - None Entered    [place] [id number]         Subscriber Name Subscriber Birth Date Member ID       YONIS YU 1965 [id number]                     Emergency Contacts        (Rel.) Home Phone Work Phone Mobile Phone    PEPE,OBDULIA (Daughter) -- -- [phone]    SARAI WOOD (Brother) [phone] -- --               "   Operative/Procedure Notes (last 4 days)        Cristobal Chen II, MD at 24 0837            Anterior Total Hip Operative Note  Dr. LAGUERRE “Heri” Gustavo HALE  [phone]    PATIENT NAME: Lita Yu  MRN: [id number]  : 1965 AGE: 58 y.o. 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  A discussion of operative versus nonoperative treatment was had. They elected to undergo anterior total hip arthroplasty. The risks of surgery were discussed and included the risk of anesthesia, infection, damage to neurovascular structures, implant loosening/failure, fracture, hardware prominence, dislocation, the need for further procedures, medical complications, and others. No guarantees were made. The patient wished to proceed with surgery and a surgical consent was signed.  COMPONENTS:   Size 0 polar stem with a +0 22 mm head ball  58 mm outer diameter constrained liner for an R3 acetabular cup    PERTINENT FINDINGS:  Avascular necrosis with concerns for osteomyelitis and infection    DETAILS OF PROCEDURE:   The patient was met in the preoperative area. The site was marked. The consent and H&P were reviewed. The patient was then wheeled back to the operative suite underwent anesthesia. The [place] table boots were secured to the patients’ feet. The patient was moved onto the [place] table  and secured in the supine position. The perineal post was inserted and the boots were secured into the leg holders. Surgical alcohol was used to thoroughly clean the operative area.     The hip and leg was then prepped in the normal sterile fashion, multiple layers of sterile drapes, and surgical space suits for the entire operative team. New outer gloves were used by all sterile surgical team members after final draping. The surgical incision was marked. A surgical timeout was performed.    A Modified Landrum-Erickson anterior approach was used. Dissection was carried down to the fascia. The fascia was incised and the tensor fascia faviola muscle was retracted laterally and the sartorius medially. The lateral femoral circumflex vessels were identified and cauterized using bovie. The rectus femoris was retracted medially. A capsulotomy was then performed.  There was no gross purulence but the tissue itself looked wildly unhealthy.  Retractors were placed appropriately and a femoral neck osteotomy was made.  The head was extracted.  There was noted to be fracturing of the femoral head with avascular necrosis and collapse.  The bone itself looked quite unhealthy.  I was unable to determine whether or not this was osteomyelitis versus simple avascular necrosis, but based on her preoperative workup I was highly concerned about infection.  A complete synovectomy was then performed and a thorough irrigation and debridement was performed of the deep hip using multiple liters of saline and some Betadine along with Irrisept.  I then placed a reamer into the acetabulum.  There was a large superior defect due to the previous avascular necrosis with superior escape.  I was able to ream up to get an appropriate sized acetabular component.  I then open the smallest polar stem available which was a size 0 along with an appropriate constrained liner.  The constrained liner was marked up on the backside with a saw to create a little more  "undulations for cement fixation.  1 pack of cement with vancomycin and tobramycin was then mixed.  A very thin layer of cement was placed all along the femoral stem.  The rest of the cement was utilized to cement the constrained liner into proper abduction and anteversion.  I then turned my attention to the femur.    Using external rotation extension the femur was exposed.  A box osteotome rasp and progressive broaching was then performed.  Afterwards, the femur was thoroughly irrigated.  I then trialed the stem I had open.  I did have to shave off a little bit of the excess cement to get it to fit.  A second batch of antibiotic cement was mixed and just proximally the femur was cemented so that it would be stable.  It was inserted all the way down into proper position and a head ball was attached.  The hip was then brought back and we attempted to relocate.  We did have a difficult time with relocation mostly due to the patient's previous superior hip location.  She was significantly short preoperatively and required a fair amount of traction to get the hip back in.  At 1 point, the foot did fall out of the boot and had to be replaced into a smaller boot to allow for better traction.  We also gave the patient paralytic.  I was able to then reduce the hip.  X-rays were taken of the hip itself all the way down to the knee to ensure there was no fracture.  The wound was then irrigated and closed in layers and a sterile dressing was applied.    The patient was moved from the [place] table to the [facility] where the boots were removed. The patient was taken to the recovery room in stable condition. There were no complications and the patient tolerated the procedure well.    R \"Heri\" Gustavo HALE MD  Orthopaedic Surgery  [facility]  [phone]              Electronically signed by Cristobal Chen II, MD at 05/05/24 4374          Physician Progress Notes (last 4 days)        Pilar Foster MD at " 05/06/24 1313          Infectious Diseases Progress Note      LOS: 5 days   Patient Care Team:  Norma Saul APRN as PCP - General (Nurse Practitioner)  Flory Villanueva MD (Physical Medicine and Rehabilitation)    Chief Complaint: Back pain and right hip pain    Subjective     The patient had no high-grade fever during the last 24 hours.  She remained hemodynamically stable.  She is tolerating her current antibiotics with no side effects.      Review of Systems:   Review of Systems   Constitutional: Negative.    HENT: Negative.     Eyes: Negative.    Respiratory: Negative.     Cardiovascular: Negative.    Gastrointestinal: Negative.    Genitourinary: Negative.    Musculoskeletal:  Positive for arthralgias and back pain.   Skin: Negative.    Neurological: Negative.    Hematological: Negative.    Psychiatric/Behavioral: Negative.          Objective     Vital Signs  Temp:  [97.9 °F (36.6 °C)-98.5 °F (36.9 °C)] 98.1 °F (36.7 °C)  Heart Rate:  [63-72] 63  Resp:  [16-17] 16  BP: ()/(47-58) 95/47    Physical Exam:  Physical Exam  Vitals and nursing note reviewed.   Constitutional:       Appearance: She is well-developed.   HENT:      Head: Normocephalic and atraumatic.   Eyes:      Pupils: Pupils are equal, round, and reactive to light.   Cardiovascular:      Rate and Rhythm: Normal rate and regular rhythm.      Heart sounds: Normal heart sounds.   Pulmonary:      Effort: Pulmonary effort is normal. No respiratory distress.      Breath sounds: Normal breath sounds. No wheezing or rales.   Abdominal:      General: Bowel sounds are normal. There is no distension.      Palpations: Abdomen is soft. There is no mass.      Tenderness: There is no abdominal tenderness. There is no guarding or rebound.   Musculoskeletal:         General: No deformity.      Cervical back: Normal range of motion and neck supple.      Comments: S/p right hip arthroplasty   Skin:     General: Skin is warm.      Findings: No erythema or  rash.   Neurological:      Mental Status: She is alert and oriented to person, place, and time.      Cranial Nerves: No cranial nerve deficit.          Results Review:    I have reviewed all clinical data, test, lab, and imaging results.     Radiology  No Radiology Exams Resulted Within Past 24 Hours    Cardiology    Laboratory    Results from last 7 days   Lab Units 05/06/24  0433 05/05/24  1335 05/05/24  0401 05/04/24  0557 05/02/24  2310 05/02/24  0616 04/29/24  1430   WBC 10*3/mm3 4.74  --  7.39 4.58 4.35 4.54 7.42   HEMOGLOBIN g/dL 7.0* 7.5* 6.3* 8.5* 8.1* 8.4* 10.8*   HEMATOCRIT % 22.5* 23.6* 20.2* 27.7* 27.0* 27.4* 33.7*   PLATELETS 10*3/mm3 126*  --  144 167 153 152 222     Results from last 7 days   Lab Units 05/06/24  0433 05/05/24  0334 05/04/24  0557 05/02/24  2310 05/02/24  0616 04/29/24  1430   SODIUM mmol/L 139 138 139 140 140 140   POTASSIUM mmol/L 3.7 4.2 3.8 3.8 3.9 4.4   CHLORIDE mmol/L 108* 103 105 108* 108* 107   CO2 mmol/L 26.0 24.0 25.0 25.0 24.0 19.7*   BUN mg/dL 18 15 14 16 14 14   CREATININE mg/dL 1.10* 0.81 1.06* 1.03* 1.14* 1.04*   GLUCOSE mg/dL 99 117* 89 113* 95 78   ALBUMIN g/dL 2.7* 2.8* 3.2* 3.0*  --   --    BILIRUBIN mg/dL 0.2 <0.2 0.2 <0.2  --   --    ALK PHOS U/L 149* 145* 184* 187*  --   --    AST (SGOT) U/L 25 25 18 22  --   --    ALT (SGPT) U/L 16 13 13 12  --   --    CALCIUM mg/dL 8.3* 8.5* 9.3 8.8 9.0 10.0         Results from last 7 days   Lab Units 05/02/24  0616   SED RATE mm/hr 50*         Microbiology   Microbiology Results (last 10 days)       Procedure Component Value - Date/Time    Body Fluid Culture - Body Fluid, Hip, Right [[id number]] Collected: 05/03/24 1256    Lab Status: Preliminary result Specimen: Body Fluid from Hip, Right Updated: 05/06/24 0634     Body Fluid Culture No growth at 3 days     Gram Stain Many (4+) WBCs per low power field      No organisms seen    Blood Culture - Blood, Arm, Right [[id number]]  (Normal) Collected: 05/02/24 1607    Lab Status:  Preliminary result Specimen: Blood from Arm, Right Updated: 05/05/24 1631     Blood Culture No growth at 3 days    Blood Culture - Blood, Blood, Central Line [[id number]]  (Normal) Collected: 05/02/24 1553    Lab Status: Preliminary result Specimen: Blood, Central Line Updated: 05/05/24 1631     Blood Culture No growth at 3 days            Medication Review:       Schedule Meds  alteplase (CATHFLO/ACTIVASE) 2 mg in sterile water (preservative free) 2.2 mL injection, 2 mg, Intracatheter, Once  busPIRone, 15 mg, Oral, BID  ceftaroline, 600 mg, Intravenous, Q8H  cyclobenzaprine, 10 mg, Oral, TID  enoxaparin, 40 mg, Subcutaneous, Q24H  folic acid, 1 mg, Oral, Daily  QUEtiapine, 50 mg, Oral, Nightly  sertraline, 100 mg, Oral, BID  sodium chloride, 10 mL, Intravenous, Q12H  vancomycin, 1,000 mg, Intravenous, Daily        Infusion Meds  lactated ringers, 9 mL/hr, Last Rate: 100 mL/hr (05/04/24 0812)  Pharmacy to dose vancomycin,         PRN Meds    acetaminophen **OR** acetaminophen **OR** acetaminophen    albuterol    alteplase (CATHFLO/ACTIVASE) 1 mg in sterile water (preservative free) 1.1 mL injection    senna-docusate sodium **AND** polyethylene glycol **AND** bisacodyl **AND** bisacodyl    HYDROcodone-acetaminophen    HYDROcodone-acetaminophen    HYDROmorphone    lactated ringers    ondansetron ODT **OR** ondansetron    Pharmacy to dose vancomycin    sodium chloride    sodium chloride        Assessment & Plan       Antimicrobial Therapy   1.  IV vancomycin        2.  IV Teflaro        3.        4.        5.              Assessment     Discitis/osteomyelitis at the level of L2-L3.  Current MRI showed worsening of discitis with persistent epidural abscess and moderate canal stenosis.  There was paraspinal phlegmon.  Neurosurgery service following and the patient and decided not to do surgical intervention.  The patient has been receiving IV antibiotics for 8 weeks prior to admission.  I am concerned about clinical  failure and need of surgery.    Possible right hip septic arthritis based on MRI of the pelvis.  Synovial fluid findings was not highly consistent with infection with a white count of only 5000 but predominantly neutrophils.  Findings was consistent with avascular necrosis and patient required right hip arthroplasty on May 4, 2024.  Cultures are negative so far     Recent admission for methicillin resistant Staphylococcus aureus bacteremia.  Likely source is lumbar discitis/osteomyelitis.   SILVINA performed on 3/12/2024 with cardiology notes mentioning no vegetation.  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  Patient denied having infection after the procedure        Plan     Continue IV vancomycin-keep trough between 15 and 20.   I will extend the duration of the treatment to 12 weeks.  If patient continues to have abnormality after 12 weeks then she needs to be evaluated for surgery.  Maximum duration of antibiotics will be 12 weeks otherwise the case would be consider failure of therapy  Waiting on repeat blood culture results and right hip fluid culture results  Continue IV Teflaro  600 mg every 8 hours for 2 weeks  Continue supportive care  A.m. labs      Pilar Foster MD  24  13:13 EDT    Note is dictated utilizing voice recognition software/Dragon      Electronically signed by Pilar Foster MD at 24 1314       Niels Sands MD at 24 1104              [facility] MEDICINE SERVICE  DAILY PROGRESS NOTE    NAME: Lita Yu  : 1965  MRN: [id number]      LOS: 5 days     PROVIDER OF SERVICE: Niels Sands MD    Chief Complaint: Spinal abscess    Subjective:     Interval History:  History taken from: patient chart RN  Appears comfortable rehab receptive today    Review of  Systems:   Review of Systems  All negative except as above  Objective:     Vital Signs  Temp:  [97.9 °F (36.6 °C)-98.5 °F (36.9 °C)] 97.9 °F (36.6 °C)  Heart Rate:  [68-72] 68  Resp:  [16-17] 17  BP: ()/(51-58) 102/57   Body mass index is 21.26 kg/m².    Physical Exam  Physical Exam  AOx3 NAD  RRR S1 and S2 audible  Lungs with fair entry  Abdomen soft nontender nondistended  Scheduled Meds   busPIRone, 15 mg, Oral, BID  ceftaroline, 600 mg, Intravenous, Q8H  cyclobenzaprine, 10 mg, Oral, TID  enoxaparin, 40 mg, Subcutaneous, Q24H  folic acid, 1 mg, Oral, Daily  QUEtiapine, 50 mg, Oral, Nightly  sertraline, 100 mg, Oral, BID  sodium chloride, 10 mL, Intravenous, Q12H  vancomycin, 1,000 mg, Intravenous, Daily       PRN Meds     acetaminophen **OR** acetaminophen **OR** acetaminophen    albuterol    alteplase (CATHFLO/ACTIVASE) 1 mg in sterile water (preservative free) 1.1 mL injection    senna-docusate sodium **AND** polyethylene glycol **AND** bisacodyl **AND** bisacodyl    HYDROcodone-acetaminophen    HYDROcodone-acetaminophen    HYDROmorphone    lactated ringers    ondansetron ODT **OR** ondansetron    Pharmacy to dose vancomycin    sodium chloride    sodium chloride   Infusions  lactated ringers, 9 mL/hr, Last Rate: 100 mL/hr (05/04/24 0812)  Pharmacy to dose vancomycin,           Diagnostic Data    Results from last 7 days   Lab Units 05/06/24  0433   WBC 10*3/mm3 4.74   HEMOGLOBIN g/dL 7.0*   HEMATOCRIT % 22.5*   PLATELETS 10*3/mm3 126*   GLUCOSE mg/dL 99   CREATININE mg/dL 1.10*   BUN mg/dL 18   SODIUM mmol/L 139   POTASSIUM mmol/L 3.7   AST (SGOT) U/L 25   ALT (SGPT) U/L 16   ALK PHOS U/L 149*   BILIRUBIN mg/dL 0.2   ANION GAP mmol/L 5.0       No radiology results for the last day      I reviewed the patient's new clinical results.  I reviewed the patient's new imaging results and agree with the interpretation.    Assessment/Plan:     Active and Resolved Problems  Active Hospital Problems    Diagnosis   POA    **Spinal abscess [M46.20]  Yes    Pain in joints [M25.50]  Unknown      Resolved Hospital Problems   No resolved problems to display.       58-year-old female with history of hypertension, peripheral vascular disease, degenerative joint disease, seizure disorder, tobacco abuse, EtOH liver cirrhosis, recent history of MRSA bacteremia attributed to L2-3 OM/discitis admitted to [facility] on 5/1/2024 with a worsening pain in her lower back.      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  Prior plan for vancomycin until 5/3/2024.      -Now admitted with worsening low back pain  -MRI lumbar and pelvis spine as noted.  Shows right hip effusion Along with L2-3 discitis and vertebral body osteomyelitis with enhancing paraspinal phlegmon     ID following continue vancomycin.  Duration of treatment extended to 12 weeks.  Teflaro added status post IR guided right hip aspiration.  Cell count studies noted follow culture. NGTD  Orthopedics following status post R anterior total hip arthroplasty 5/4  Continue current pain control. Encourage po  Seen by CHRISTIANO no plan for surgical intervention  Follow infectious workup  DVT prophylaxis with enoxaparin  PT/OT.  Receptive to rehab today  Continue Flexeril 10 3 times daily  Encourage p.o. pain medications.  Taper IV pain medications     #History of alcohol liver cirrhosis          Outpatient follow-up     #Acute blood loss anemia on chronic anemia  Monitor H&H.  Hemoglobin 6.3 received 1 unit of PRBC 5/5  Hemoglobin 7 this morning repeat H&H known  Transfuse PRBCs to keep hemoglobin more than 7     #Tobacco abuse  NRT  Counseled    DVT prophylaxis:  Medical and mechanical DVT prophylaxis orders are present.         Code status is   Code Status and Medical Interventions:   Ordered at: 05/01/24  1657     Level Of Support Discussed With:    Patient     Code Status (Patient has no pulse and is not breathing):    CPR (Attempt to Resuscitate)     Medical Interventions (Patient has pulse or is breathing):    Full Support       Plan for disposition:  Anticipate discharge 24 hours    Time: 30 minutes    Signature: Electronically signed by Niels Sands MD, 05/06/24, 11:05 EDT.  [facility] Hospitalist Team      Electronically signed by Niels Sands MD at 05/06/24 1108       Pilar Foster MD at 05/05/24 1510          Infectious Diseases Progress Note      LOS: 4 days   Patient Care Team:  Norma Saul APRN as PCP - General (Nurse Practitioner)  Flory Villanueva MD (Physical Medicine and Rehabilitation)    Chief Complaint: Back pain and right hip pain    Subjective     The patient had no high-grade fever during the last 24 hours.  She remained hemodynamically stable.  She is tolerating her current antibiotics with no side effects.      Review of Systems:   Review of Systems   Constitutional: Negative.    HENT: Negative.     Eyes: Negative.    Respiratory: Negative.     Cardiovascular: Negative.    Gastrointestinal: Negative.    Genitourinary: Negative.    Musculoskeletal:  Positive for arthralgias and back pain.   Skin: Negative.    Neurological: Negative.    Hematological: Negative.    Psychiatric/Behavioral: Negative.          Objective     Vital Signs  Temp:  [97.9 °F (36.6 °C)-98.5 °F (36.9 °C)] 98.5 °F (36.9 °C)  Heart Rate:  [70-89] 73  Resp:  [14-18] 16  BP: ()/(53-69) 98/58    Physical Exam:  Physical Exam  Vitals and nursing note reviewed.   Constitutional:       Appearance: She is well-developed.   HENT:      Head: Normocephalic and atraumatic.   Eyes:      Pupils: Pupils are equal, round, and reactive to light.   Cardiovascular:      Rate and Rhythm: Normal rate and regular rhythm.      Heart sounds: Normal heart sounds.   Pulmonary:      Effort: Pulmonary effort is normal. No  respiratory distress.      Breath sounds: Normal breath sounds. No wheezing or rales.   Abdominal:      General: Bowel sounds are normal. There is no distension.      Palpations: Abdomen is soft. There is no mass.      Tenderness: There is no abdominal tenderness. There is no guarding or rebound.   Musculoskeletal:         General: No deformity.      Cervical back: Normal range of motion and neck supple.      Comments: S/p right hip arthroplasty   Skin:     General: Skin is warm.      Findings: No erythema or rash.   Neurological:      Mental Status: She is alert and oriented to person, place, and time.      Cranial Nerves: No cranial nerve deficit.          Results Review:    I have reviewed all clinical data, test, lab, and imaging results.     Radiology  No Radiology Exams Resulted Within Past 24 Hours    Cardiology    Laboratory    Results from last 7 days   Lab Units 05/05/24  1335 05/05/24  0401 05/04/24  0557 05/02/24  2310 05/02/24  0616 04/29/24  1430   WBC 10*3/mm3  --  7.39 4.58 4.35 4.54 7.42   HEMOGLOBIN g/dL 7.5* 6.3* 8.5* 8.1* 8.4* 10.8*   HEMATOCRIT % 23.6* 20.2* 27.7* 27.0* 27.4* 33.7*   PLATELETS 10*3/mm3  --  144 167 153 152 222     Results from last 7 days   Lab Units 05/05/24  0334 05/04/24  0557 05/02/24  2310 05/02/24  0616 04/29/24  1430   SODIUM mmol/L 138 139 140 140 140   POTASSIUM mmol/L 4.2 3.8 3.8 3.9 4.4   CHLORIDE mmol/L 103 105 108* 108* 107   CO2 mmol/L 24.0 25.0 25.0 24.0 19.7*   BUN mg/dL 15 14 16 14 14   CREATININE mg/dL 0.81 1.06* 1.03* 1.14* 1.04*   GLUCOSE mg/dL 117* 89 113* 95 78   ALBUMIN g/dL 2.8* 3.2* 3.0*  --   --    BILIRUBIN mg/dL <0.2 0.2 <0.2  --   --    ALK PHOS U/L 145* 184* 187*  --   --    AST (SGOT) U/L 25 18 22  --   --    ALT (SGPT) U/L 13 13 12  --   --    CALCIUM mg/dL 8.5* 9.3 8.8 9.0 10.0         Results from last 7 days   Lab Units 05/02/24  0616   SED RATE mm/hr 50*         Microbiology   Microbiology Results (last 10 days)       Procedure Component Value  - Date/Time    Body Fluid Culture - Body Fluid, Hip, Right [[id number]] Collected: 05/03/24 1256    Lab Status: Preliminary result Specimen: Body Fluid from Hip, Right Updated: 05/05/24 0653     Body Fluid Culture No growth at 2 days     Gram Stain Many (4+) WBCs per low power field      No organisms seen    Blood Culture - Blood, Arm, Right [[id number]]  (Normal) Collected: 05/02/24 1607    Lab Status: Preliminary result Specimen: Blood from Arm, Right Updated: 05/04/24 1631     Blood Culture No growth at 2 days    Blood Culture - Blood, Blood, Central Line [[id number]]  (Normal) Collected: 05/02/24 1553    Lab Status: Preliminary result Specimen: Blood, Central Line Updated: 05/04/24 1631     Blood Culture No growth at 2 days            Medication Review:       Schedule Meds  busPIRone, 15 mg, Oral, BID  ceftaroline, 600 mg, Intravenous, Q8H  cyclobenzaprine, 10 mg, Oral, TID  enoxaparin, 40 mg, Subcutaneous, Q24H  folic acid, 1 mg, Oral, Daily  QUEtiapine, 50 mg, Oral, Nightly  sertraline, 100 mg, Oral, BID  sodium chloride, 10 mL, Intravenous, Q12H  vancomycin, 1,000 mg, Intravenous, Daily        Infusion Meds  lactated ringers, 9 mL/hr, Last Rate: 100 mL/hr (05/04/24 0812)  Pharmacy to dose vancomycin,         PRN Meds    acetaminophen **OR** acetaminophen **OR** acetaminophen    albuterol    alteplase (CATHFLO/ACTIVASE) 1 mg in sterile water (preservative free) 1.1 mL injection    senna-docusate sodium **AND** polyethylene glycol **AND** bisacodyl **AND** bisacodyl    HYDROcodone-acetaminophen    HYDROcodone-acetaminophen    HYDROmorphone    lactated ringers    ondansetron ODT **OR** ondansetron    Pharmacy to dose vancomycin    sodium chloride    sodium chloride        Assessment & Plan       Antimicrobial Therapy   1.  IV vancomycin        2.  IV Teflaro        3.        4.        5.              Assessment     Discitis/osteomyelitis at the level of L2-L3.  Current MRI showed worsening of discitis with  persistent epidural abscess and moderate canal stenosis.  There was paraspinal phlegmon.  Neurosurgery service following and the patient and decided not to do surgical intervention.  The patient has been receiving IV antibiotics for 8 weeks prior to admission.  I am concerned about clinical failure and need of surgery.    Possible right hip septic arthritis based on MRI of the pelvis.  Synovial fluid findings was not highly consistent with infection with a white count of only 5000 but predominantly neutrophils.  Findings was consistent with avascular necrosis and patient required right hip arthroplasty on May 4, 2024.  Cultures are negative so far     Recent admission for methicillin resistant Staphylococcus aureus bacteremia.  Likely source is lumbar discitis/osteomyelitis.   SILVINA performed on 3/12/2024 with cardiology notes mentioning no vegetation.  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  Patient denied having infection after the procedure        Plan     Continue IV vancomycin-keep trough between 15 and 20.   I will extend the duration of the treatment to 12 weeks.  If patient continues to have abnormality after 12 weeks then she needs to be evaluated for surgery.  Maximum duration of antibiotics will be 12 weeks otherwise the case would be consider failure of therapy  Waiting on repeat blood culture results and right hip fluid culture results  Continue IV Teflaro for 2 weeks.  I will adjust dose to 600 mg every 8 hours  Continue supportive care  A.m. labs      Pilar Foster MD  05/05/24  15:10 EDT    Note is dictated utilizing voice recognition software/Dragon      Electronically signed by Pilar Foster MD at 05/05/24 1510       Niels Sands MD at 05/05/24 1040              [facility]  SERVICE  DAILY PROGRESS NOTE    NAME: Ltia Yu  : 1965  MRN: [id number]      LOS: 4 days     PROVIDER OF SERVICE: Niels Sands MD    Chief Complaint: Spinal abscess    Subjective:     Interval History:  History taken from: patient chart RN  Pain right hip appears comfortable    Review of Systems:   Review of Systems  All negative except as above  Objective:     Vital Signs  Temp:  [97 °F (36.1 °C)-98.5 °F (36.9 °C)] 97.9 °F (36.6 °C)  Heart Rate:  [70-84] 73  Resp:  [12-18] 16  BP: ()/(53-69) 98/59   Body mass index is 21.26 kg/m².    Physical Exam  Physical Exam  AOx3 NAD  RRR S1 and S2 audible  Lungs with fair entry  Abdomen soft nontender nondistended  Scheduled Meds   busPIRone, 15 mg, Oral, BID  ceftaroline, 600 mg, Intravenous, Q12H  cyclobenzaprine, 10 mg, Oral, TID  folic acid, 1 mg, Oral, Daily  QUEtiapine, 50 mg, Oral, Nightly  sertraline, 100 mg, Oral, BID  sodium chloride, 10 mL, Intravenous, Q12H  vancomycin, 1,000 mg, Intravenous, Daily       PRN Meds     acetaminophen **OR** acetaminophen **OR** acetaminophen    albuterol    alteplase (CATHFLO/ACTIVASE) 1 mg in sterile water (preservative free) 1.1 mL injection    senna-docusate sodium **AND** polyethylene glycol **AND** bisacodyl **AND** bisacodyl    HYDROcodone-acetaminophen    HYDROcodone-acetaminophen    HYDROmorphone    lactated ringers    ondansetron ODT **OR** ondansetron    Pharmacy to dose vancomycin    sodium chloride    sodium chloride   Infusions  lactated ringers, 9 mL/hr, Last Rate: 100 mL/hr (24 0812)  Pharmacy to dose vancomycin,           Diagnostic Data    Results from last 7 days   Lab Units 24  0401 24  0334   WBC 10*3/mm3 7.39  --    HEMOGLOBIN g/dL 6.3*  --    HEMATOCRIT % 20.2*  --    PLATELETS 10*3/mm3 144  --    GLUCOSE mg/dL  --  117*   CREATININE mg/dL  --  0.81   BUN mg/dL  --  15   SODIUM mmol/L  --  138   POTASSIUM mmol/L  --  4.2   AST (SGOT) U/L  --  25   ALT (SGPT)  U/L  --  13   ALK PHOS U/L  --  145*   BILIRUBIN mg/dL  --  <0.2   ANION GAP mmol/L  --  11.0       XR Hip With or Without Pelvis 2 - 3 View Right    Result Date: 5/4/2024  Impression: Immediate postop appearance of the right total of arthroplasty and changes of acetabular remodeling. The opening angle of the arthroplasty appears more vertically and possibly anteriorly located. Femoral component is proud by 1.4 cm. Electronically Signed: Ana Walker MD  5/4/2024 10:22 AM EDT  Workstation ID: [id number]    XR Ankle 2 View Right    Result Date: 5/4/2024  Impression: Negative ankle radiograph. Electronically Signed: Ana Walker MD  5/4/2024 10:19 AM EDT  Workstation ID: [id number]    IR Inject/asp large joint or bursa    Result Date: 5/3/2024  Technically successful right hip aspiration yielding 12 mL of cloudy yellow synovial fluid utilizing fluoroscopic guidance. Report dictated by: Jose Linton DNP  I have personally reviewed this case and agree with the findings above: Electronically Signed: Yimi Costello MD  5/3/2024 1:56 PM EDT  Workstation ID: [id number]    XR Hip With or Without Pelvis 2 - 3 View Right    Result Date: 5/3/2024  Impression: 1. Findings compatible with avascular necrosis of the right femoral head with severe joint space loss. 2. Left hip arthroplasty without complication. Electronically Signed: Ines Donald MD  5/3/2024 12:20 PM EDT  Workstation ID: [id number]       I reviewed the patient's new clinical results.  I reviewed the patient's new imaging results and agree with the interpretation.    Assessment/Plan:     Active and Resolved Problems  Active Hospital Problems    Diagnosis  POA    **Spinal abscess [M46.20]  Yes    Pain in joints [M25.50]  Unknown      Resolved Hospital Problems   No resolved problems to display.       58-year-old female with history of hypertension, peripheral vascular disease, degenerative joint disease, seizure disorder, tobacco abuse, EtOH liver cirrhosis, recent history of  MRSA bacteremia attributed to L2-3 OM/discitis admitted to [facility] on 5/1/2024 with a worsening pain in her lower back.      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  Prior plan for vancomycin until 5/3/2024.      -Now admitted with worsening low back pain  -MRI lumbar and pelvis spine as noted.  Shows right hip effusion Along with L2-3 discitis and vertebral body osteomyelitis with enhancing paraspinal phlegmon     ID following continue vancomycin.  Duration of treatment extended to 12 weeks.  Teflaro added status post IR guided right hip aspiration.  Cell count studies noted follow culture  Orthopedics following status post left ?anterior total hip arthroplasty 5/4  Continue current pain control  Seen by CHRISTIANO no plan for surgical intervention  Follow infectious workup  DVT prophylaxis with enoxaparin  PT/OT.  Patient adamantly refuses rehab  Add Flexeril 10 3 times daily  Encourage p.o. pain medications.  Taper IV pain medications    #History of alcohol liver cirrhosis          Outpatient follow-up     #Acute blood loss anemia on chronic anemia  Monitor H&H.  Hemoglobin 6.3 received 1 unit of PRBC  Transfuse PRBCs to keep hemoglobin more than 7     #Tobacco abuse  NRT  Counseled    DVT prophylaxis:  Mechanical DVT prophylaxis orders are present.         Code status is   Code Status and Medical Interventions:   Ordered at: 05/01/24 0176     Level Of Support Discussed With:    Patient     Code Status (Patient has no pulse and is not breathing):    CPR (Attempt to Resuscitate)     Medical Interventions (Patient has pulse or is breathing):    Full Support       Plan for disposition: Anticipate discharge in 3 days    Time: 30 minutes    Signature: Electronically signed by Niels Sands MD, 05/05/24, 10:41 EDT.  [facility]  Hospitalist Team      Electronically signed by Niels Sands MD at 05/05/24 1045       Cristobal Chen II, MD at 05/05/24 0811          Patient is postop day 1 total hip arthroplasty with copious amounts of antibiotic cement.  She is partial weightbearing.  She will likely require revision to definitive implants in the future, but will be 2 to 3 months from now.  Plan IV antibiotics and possible discharge to rehab versus home depending on patient progress.  She is follow-up in the office in 3 weeks    Electronically signed by Cristobal Chen II, MD at 05/05/24 0848       Pilar Foster MD at 05/04/24 1530          Infectious Diseases Progress Note      LOS: 3 days   Patient Care Team:  Norma Saul APRN as PCP - General (Nurse Practitioner)  Flory Villanueva MD (Physical Medicine and Rehabilitation)    Chief Complaint: Back pain and right hip pain    Subjective     The patient had no high-grade fever during the last 24 hours.  She remained hemodynamically stable.  She is tolerating her current antibiotics with no side effects.  The patient is s/p right hip arthroplasty earlier today    Review of Systems:   Review of Systems   Constitutional: Negative.    HENT: Negative.     Eyes: Negative.    Respiratory: Negative.     Cardiovascular: Negative.    Gastrointestinal: Negative.    Genitourinary: Negative.    Musculoskeletal:  Positive for arthralgias and back pain.   Skin: Negative.    Neurological: Negative.    Hematological: Negative.    Psychiatric/Behavioral: Negative.          Objective     Vital Signs  Temp:  [96.8 °F (36 °C)-98.4 °F (36.9 °C)] 97.2 °F (36.2 °C)  Heart Rate:  [62-82] 75  Resp:  [12-19] 18  BP: ()/(52-70) 95/59    Physical Exam:  Physical Exam  Vitals and nursing note reviewed.   Constitutional:       Appearance: She is well-developed.   HENT:      Head: Normocephalic and atraumatic.   Eyes:      Pupils: Pupils are equal, round, and reactive to light.    Cardiovascular:      Rate and Rhythm: Normal rate and regular rhythm.      Heart sounds: Normal heart sounds.   Pulmonary:      Effort: Pulmonary effort is normal. No respiratory distress.      Breath sounds: Normal breath sounds. No wheezing or rales.   Abdominal:      General: Bowel sounds are normal. There is no distension.      Palpations: Abdomen is soft. There is no mass.      Tenderness: There is no abdominal tenderness. There is no guarding or rebound.   Musculoskeletal:         General: No deformity.      Cervical back: Normal range of motion and neck supple.      Comments: S/p right hip arthroplasty   Skin:     General: Skin is warm.      Findings: No erythema or rash.   Neurological:      Mental Status: She is alert and oriented to person, place, and time.      Cranial Nerves: No cranial nerve deficit.          Results Review:    I have reviewed all clinical data, test, lab, and imaging results.     Radiology  FL C Arm During Surgery    Result Date: 5/4/2024  This procedure was auto-finalized with no dictation required.    XR Hip 1 View Without Pelvis Right (Surgery Only)    Result Date: 5/4/2024  This procedure was auto-finalized with no dictation required.    XR Hip With or Without Pelvis 2 - 3 View Right    Result Date: 5/4/2024  XR HIP W OR WO PELVIS 2-3 VIEW RIGHT Date of Exam: 5/4/2024 10:00 AM EDT Indication: postop Comparison: Pelvis radiograph 5/3/2024 Findings: There is a right hip arthroplasty with findings of acetabular remodeling and bone cement. The opening angle appears vertical and anterior. The femoral component is proud with associated bone cement. There is soft tissue gas. There is osteopenia. No periprosthetic fracture. Left total arthroplasty is present.     Impression: Immediate postop appearance of the right total of arthroplasty and changes of acetabular remodeling. The opening angle of the arthroplasty appears more vertically and possibly anteriorly located. Femoral component is  proud by 1.4 cm. Electronically Signed: Ana Walker MD  5/4/2024 10:22 AM EDT  Workstation ID: [id number]    XR Ankle 2 View Right    Result Date: 5/4/2024  XR ANKLE 2 VW RIGHT Date of Exam: 5/4/2024 10:00 AM EDT Indication: postop, ensure no ankle injury Comparison: None available. Findings: Bone mineral density is normal. Ankle mortise is intact. No fractures or dislocations. No ankle joint effusion. No significant joint space narrowing.     Impression: Negative ankle radiograph. Electronically Signed: Ana Walker MD  5/4/2024 10:19 AM EDT  Workstation ID: [id number]     Cardiology    Laboratory    Results from last 7 days   Lab Units 05/04/24 0557 05/02/24 2310 05/02/24 0616 04/29/24  1430   WBC 10*3/mm3 4.58 4.35 4.54 7.42   HEMOGLOBIN g/dL 8.5* 8.1* 8.4* 10.8*   HEMATOCRIT % 27.7* 27.0* 27.4* 33.7*   PLATELETS 10*3/mm3 167 153 152 222     Results from last 7 days   Lab Units 05/04/24  0557 05/02/24  2310 05/02/24  0616 04/29/24  1430   SODIUM mmol/L 139 140 140 140   POTASSIUM mmol/L 3.8 3.8 3.9 4.4   CHLORIDE mmol/L 105 108* 108* 107   CO2 mmol/L 25.0 25.0 24.0 19.7*   BUN mg/dL 14 16 14 14   CREATININE mg/dL 1.06* 1.03* 1.14* 1.04*   GLUCOSE mg/dL 89 113* 95 78   ALBUMIN g/dL 3.2* 3.0*  --   --    BILIRUBIN mg/dL 0.2 <0.2  --   --    ALK PHOS U/L 184* 187*  --   --    AST (SGOT) U/L 18 22  --   --    ALT (SGPT) U/L 13 12  --   --    CALCIUM mg/dL 9.3 8.8 9.0 10.0         Results from last 7 days   Lab Units 05/02/24  0616   SED RATE mm/hr 50*         Microbiology   Microbiology Results (last 10 days)       Procedure Component Value - Date/Time    Body Fluid Culture - Body Fluid, Hip, Right [[id number]] Collected: 05/03/24 1256    Lab Status: Preliminary result Specimen: Body Fluid from Hip, Right Updated: 05/04/24 0731     Body Fluid Culture No growth at 24 hours     Gram Stain Many (4+) WBCs per low power field      No organisms seen    Blood Culture - Blood, Arm, Right [[id number]]  (Normal) Collected:  05/02/24 1607    Lab Status: Preliminary result Specimen: Blood from Arm, Right Updated: 05/03/24 1632     Blood Culture No growth at 24 hours    Blood Culture - Blood, Blood, Central Line [[id number]]  (Normal) Collected: 05/02/24 1553    Lab Status: Preliminary result Specimen: Blood, Central Line Updated: 05/03/24 1632     Blood Culture No growth at 24 hours            Medication Review:       Schedule Meds  busPIRone, 15 mg, Oral, BID  ceftaroline, 600 mg, Intravenous, Q12H  folic acid, 1 mg, Oral, Daily  QUEtiapine, 50 mg, Oral, Nightly  sertraline, 100 mg, Oral, BID  sodium chloride, 10 mL, Intravenous, Q12H  vancomycin, 1,000 mg, Intravenous, Daily        Infusion Meds  lactated ringers, 9 mL/hr, Last Rate: 100 mL/hr (05/04/24 0812)  Pharmacy to dose vancomycin,         PRN Meds    acetaminophen **OR** acetaminophen **OR** acetaminophen    albuterol    alteplase (CATHFLO/ACTIVASE) 1 mg in sterile water (preservative free) 1.1 mL injection    senna-docusate sodium **AND** polyethylene glycol **AND** bisacodyl **AND** bisacodyl    HYDROcodone-acetaminophen    HYDROcodone-acetaminophen    HYDROmorphone    lactated ringers    ondansetron ODT **OR** ondansetron    Pharmacy to dose vancomycin    sodium chloride    sodium chloride        Assessment & Plan       Antimicrobial Therapy   1.  IV vancomycin        2.        3.        4.        5.              Assessment     Discitis/osteomyelitis at the level of L2-L3.  Current MRI showed worsening of discitis with persistent epidural abscess and moderate canal stenosis.  There was paraspinal phlegmon.  Neurosurgery service following and the patient and decided not to do surgical intervention.  The patient has been receiving IV antibiotics for 8 weeks prior to admission.  I am concerned about clinical failure and need of surgery.    Possible right hip septic arthritis based on MRI of the pelvis.  Synovial fluid findings was not highly consistent with infection with a  white count of only 5000 but predominantly neutrophils.  Findings was consistent with avascular necrosis and patient required right hip arthroplasty on May 4, 2024.  Cultures are negative so far     Recent admission for methicillin resistant Staphylococcus aureus bacteremia.  Likely source is lumbar discitis/osteomyelitis.   SILVINA performed on 3/12/2024 with cardiology notes mentioning no vegetation.  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  Patient denied having infection after the procedure        Plan     Continue IV vancomycin-keep trough between 15 and 20.   I will extend the duration of the treatment to 12 weeks.  If patient continues to have abnormality after 12 weeks then she needs to be evaluated for surgery.  Maximum duration of antibiotics will be 12 weeks otherwise the case would be consider failure of therapy  Waiting on repeat blood culture results and right hip fluid culture results  I will add IV Teflaro 600 mg every 12 hours I consider 2 weeks of treatment.  Continue supportive care  A.m. labs      Pilar Foster MD  24  15:30 EDT    Note is dictated utilizing voice recognition software/Dragon      Electronically signed by Pilar Foster MD at 24 1532       Niels Sands MD at 24 1314              [facility] MEDICINE SERVICE  DAILY PROGRESS NOTE    NAME: Lita Yu  : 1965  MRN: [id number]      LOS: 3 days     PROVIDER OF SERVICE: Niels Sands MD    Chief Complaint: Spinal abscess    Subjective:     Interval History:  History taken from: patient chart RN  For OR today with orthopedics     Review of Systems:   Review of Systems    Objective:     Vital Signs  Temp:  [96.8 °F (36 °C)-98.4 °F (36.9 °C)] 97.2 °F (36.2 °C)  Heart Rate:  [62-82] 75  Resp:   [12-19] 18  BP: ()/(52-70) 95/59   Body mass index is 21.26 kg/m².    Physical Exam  Physical Exam  AOx3 NAD  RRR S1 and S2 audible  Lungs with fair entry  Abdomen soft nontender nondistended  Scheduled Meds   busPIRone, 15 mg, Oral, BID  folic acid, 1 mg, Oral, Daily  QUEtiapine, 50 mg, Oral, Nightly  sertraline, 100 mg, Oral, BID  sodium chloride, 10 mL, Intravenous, Q12H  vancomycin, 1,000 mg, Intravenous, Daily       PRN Meds     acetaminophen **OR** acetaminophen **OR** acetaminophen    albuterol    alteplase (CATHFLO/ACTIVASE) 1 mg in sterile water (preservative free) 1.1 mL injection    senna-docusate sodium **AND** polyethylene glycol **AND** bisacodyl **AND** bisacodyl    HYDROcodone-acetaminophen    HYDROcodone-acetaminophen    lactated ringers    ondansetron ODT **OR** ondansetron    Pharmacy to dose vancomycin    sodium chloride    sodium chloride   Infusions  lactated ringers, 9 mL/hr, Last Rate: 100 mL/hr (05/04/24 0812)  Pharmacy to dose vancomycin,           Diagnostic Data    Results from last 7 days   Lab Units 05/04/24  0557   WBC 10*3/mm3 4.58   HEMOGLOBIN g/dL 8.5*   HEMATOCRIT % 27.7*   PLATELETS 10*3/mm3 167   GLUCOSE mg/dL 89   CREATININE mg/dL 1.06*   BUN mg/dL 14   SODIUM mmol/L 139   POTASSIUM mmol/L 3.8   AST (SGOT) U/L 18   ALT (SGPT) U/L 13   ALK PHOS U/L 184*   BILIRUBIN mg/dL 0.2   ANION GAP mmol/L 9.0       XR Hip With or Without Pelvis 2 - 3 View Right    Result Date: 5/4/2024  Impression: Immediate postop appearance of the right total of arthroplasty and changes of acetabular remodeling. The opening angle of the arthroplasty appears more vertically and possibly anteriorly located. Femoral component is proud by 1.4 cm. Electronically Signed: Ana Walker MD  5/4/2024 10:22 AM EDT  Workstation ID: [id number]    XR Ankle 2 View Right    Result Date: 5/4/2024  Impression: Negative ankle radiograph. Electronically Signed: Ana Walker MD  5/4/2024 10:19 AM EDT  Workstation ID:  [id number]    IR Inject/asp large joint or bursa    Result Date: 5/3/2024  Technically successful right hip aspiration yielding 12 mL of cloudy yellow synovial fluid utilizing fluoroscopic guidance. Report dictated by: Jose Linton DNP  I have personally reviewed this case and agree with the findings above: Electronically Signed: Yimi Costello MD  5/3/2024 1:56 PM EDT  Workstation ID: [id number]    XR Hip With or Without Pelvis 2 - 3 View Right    Result Date: 5/3/2024  Impression: 1. Findings compatible with avascular necrosis of the right femoral head with severe joint space loss. 2. Left hip arthroplasty without complication. Electronically Signed: Ines Donald MD  5/3/2024 12:20 PM EDT  Workstation ID: [id number]    MRI Lumbar Spine With & Without Contrast    Result Date: 5/3/2024  Impression: Findings remain consistent with L2-L3 discitis and vertebral body osteomyelitis with enhancing paraspinal phlegmon and anterior epidural abscess resulting in moderate canal stenosis. The findings appear similar to the prior study when accounting for prominent motion artifact that was seen on those images. Electronically Signed: Ines Donald MD  5/3/2024 7:35 AM EDT  Workstation ID: [id number]    MRI Pelvis With & Without Contrast    Result Date: 5/2/2024  Impression: 1.Prominent soft tissue edema and enhancement surrounding the right hip with right hip effusion, synovial thickening and enhancement, and periarticular marrow edema. There is severe right hip joint space narrowing which represents a change from prior radiographs from February 2023. These findings are concerning for joint infection given patient's history. Aspiration is recommended for diagnosis. 2.Avascular necrosis of the right femoral head, as before, with possible component of developing articular surface collapse which may contribute to arthropathy and edema. 3.Status post left hip arthroplasty. Artifact from hardware limits assessment, but no significant left hip  collection is seen at this time. 4.See separate report for evaluation of the lumbar spine. Electronically Signed: Jluis Medina  5/2/2024 9:56 PM EDT  Workstation ID: [id number]       I reviewed the patient's new clinical results.  I reviewed the patient's new imaging results and agree with the interpretation.    Assessment/Plan:     Active and Resolved Problems  Active Hospital Problems    Diagnosis  POA    **Spinal abscess [M46.20]  Yes    Pain in joints [M25.50]  Unknown      Resolved Hospital Problems   No resolved problems to display.       58-year-old female with history of hypertension, peripheral vascular disease, degenerative joint disease, seizure disorder, tobacco abuse, EtOH liver cirrhosis, recent history of MRSA bacteremia attributed to L2-3 OM/discitis admitted to [facility] on 5/1/2024 with a worsening pain in her lower back.      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  Prior plan for vancomycin until 5/3/2024.      -Now admitted with worsening low back pain  -MRI lumbar and pelvis spine as noted.  Shows right hip effusion Along with L2-3 discitis and vertebral body osteomyelitis with enhancing paraspinal phlegmon     ID following continue vancomycin.  Status post IR guided right hip aspiration  Orthopedics following status post R anterior total hip arthroplasty 5/4  Continue current pain control  Seen by CHRISTIANO no plan for surgical intervention  Follow infectious workup        #History of alcohol liver cirrhosis          Outpatient follow-up     #Acute on chronic anemia  Monitor H&H  Transfuse PRBCs to keep hemoglobin more than 7     #Tobacco abuse  NRT  Counseled    DVT prophylaxis:  Mechanical DVT prophylaxis orders are present.         Code status is   Code Status and Medical Interventions:   Ordered at: 05/01/24  1657     Level Of Support Discussed With:    Patient     Code Status (Patient has no pulse and is not breathing):    CPR (Attempt to Resuscitate)     Medical Interventions (Patient has pulse or is breathing):    Full Support       Plan for disposition: Anticipate discharge in 4 days    Time: 30 minutes    Signature: Electronically signed by Niels Sands MD, 05/04/24, 13:14 EDT.  [facility]      Electronically signed by Niels Sands MD at 05/05/24 1133       Pilar Foster MD at 05/03/24 1148          Infectious Diseases Progress Note      LOS: 2 days   Patient Care Team:  Norma Saul APRN as PCP - General (Nurse Practitioner)  Flory Villanueva MD (Physical Medicine and Rehabilitation)    Chief Complaint: Back pain and right hip pain    Subjective     The patient had no high-grade fever during the last 24 hours.  She remained hemodynamically stable.  She is tolerating her current antibiotics with no side effects.    Review of Systems:   Review of Systems   Constitutional: Negative.    HENT: Negative.     Eyes: Negative.    Respiratory: Negative.     Cardiovascular: Negative.    Gastrointestinal: Negative.    Genitourinary: Negative.    Musculoskeletal:  Positive for arthralgias and back pain.   Skin: Negative.    Neurological: Negative.    Hematological: Negative.    Psychiatric/Behavioral: Negative.          Objective     Vital Signs  Temp:  [98.3 °F (36.8 °C)-98.4 °F (36.9 °C)] 98.3 °F (36.8 °C)  Heart Rate:  [69-73] 73  Resp:  [14-16] 14  BP: (107-114)/(55-70) 114/70    Physical Exam:  Physical Exam  Vitals and nursing note reviewed.   Constitutional:       Appearance: She is well-developed.   HENT:      Head: Normocephalic and atraumatic.   Eyes:      Pupils: Pupils are equal, round, and reactive to light.   Cardiovascular:      Rate and Rhythm: Normal rate and regular rhythm.      Heart sounds: Normal heart sounds.   Pulmonary:      Effort: Pulmonary effort is normal. No  respiratory distress.      Breath sounds: Normal breath sounds. No wheezing or rales.   Abdominal:      General: Bowel sounds are normal. There is no distension.      Palpations: Abdomen is soft. There is no mass.      Tenderness: There is no abdominal tenderness. There is no guarding or rebound.   Musculoskeletal:         General: No deformity. Normal range of motion.      Cervical back: Normal range of motion and neck supple.      Comments: Tenderness on the right hip with limitation of the right hip movement   Skin:     General: Skin is warm.      Findings: No erythema or rash.   Neurological:      Mental Status: She is alert and oriented to person, place, and time.      Cranial Nerves: No cranial nerve deficit.          Results Review:    I have reviewed all clinical data, test, lab, and imaging results.     Radiology  MRI Lumbar Spine With & Without Contrast    Result Date: 5/3/2024  MRI LUMBAR SPINE W WO CONTRAST Date of Exam: 5/2/2024 7:00 PM EDT Indication: Worsening back pain and weakness.  Comparison: MRI lumbar spine 4/23/2024, 3/10/2024 Technique:  Routine multiplanar/multisequence sequence images of the lumbar spine were obtained before and after the uneventful administration of Prohance.  Findings: The lumbar alignment is maintained. There is persistent abnormal T2 hyperintense signal within the L2 and L3 vertebral bodies as well as the L2-L3 disc. There is similar loss of intervertebral disc space height and endplate destruction at this level. On the postcontrast images, there is diffuse L2 and L3 vertebral body enhancement as well as paraspinal soft tissue enhancement similar to the previous study. Additionally, there is anterior epidural enhancing fluid along the L2 and L3 vertebral bodies measuring up to approximately 5 mm in thickness and resulting in moderate canal stenosis. There is a Schmorl's node in the superior L4 endplate. There are stable degenerative changes of the remainder of the lumbar  spine.     Impression: Findings remain consistent with L2-L3 discitis and vertebral body osteomyelitis with enhancing paraspinal phlegmon and anterior epidural abscess resulting in moderate canal stenosis. The findings appear similar to the prior study when accounting for prominent motion artifact that was seen on those images. Electronically Signed: Ines Donald MD  5/3/2024 7:35 AM EDT  Workstation ID: [id number]    MRI Pelvis With & Without Contrast    Result Date: 5/2/2024  MRI PELVIS W WO CONTRAST Date of Exam: 5/2/2024 7:00 PM EDT Indication: Seen right hip pain and swelling. MRSA bacteremia. L2-3 discitis/osteomyelitis.  Comparison: Radiographs February 7, 2023 Technique:  Routine multiplanar/multisequence images of the pelvis were obtained before and after the uneventful intravenous administration of 15 mL of Prohance.  Findings: There is prominent soft tissue edema and enhancement surrounding the right hip. There is a moderate to right hip effusion with synovial thickening and enhancement. There is severe superior right hip joint space narrowing which appears to represent an interval change from the prior radiographs from February. There is marrow edema of the acetabulum and femoral head-neck surrounding the hip joint. These findings are concerning for joint infection given patient's history. There is avascular necrosis of the femoral head. There appears to be mild loss of the spherical contour of the superior femoral head which may represent a component of developing articular surface collapse. There is some suspected subchondral sclerosis at the acetabulum. There is superior and lateral subluxation of the femoral head. There appears to be mild remodeling of the acetabulum, likely the sequelae of the advanced arthropathy. There is edema in the musculature surrounding the hip and in the gluteus minimus, medius, and iliac is muscles. No rim-enhancing collection is identified in the soft tissues surrounding  the hip at this time. Status post left hip arthroplasty. Artifact from hardware limits evaluation of surrounding structures. No significant collection is identified at the left hip on this exam. No other suspicious marrow signal abnormality of the pelvis on this exam. No significant sacroiliac joint effusion. There appear to be mild to moderate degenerative changes at the sacroiliac joints and pubic symphysis. Trace amount of free fluid is seen in the pelvis. There is mild to moderate diffuse muscle atrophy. No significant bursal fluid collection is seen. No definite acute tendon tear.     Impression: 1.Prominent soft tissue edema and enhancement surrounding the right hip with right hip effusion, synovial thickening and enhancement, and periarticular marrow edema. There is severe right hip joint space narrowing which represents a change from prior radiographs from February 2023. These findings are concerning for joint infection given patient's history. Aspiration is recommended for diagnosis. 2.Avascular necrosis of the right femoral head, as before, with possible component of developing articular surface collapse which may contribute to arthropathy and edema. 3.Status post left hip arthroplasty. Artifact from hardware limits assessment, but no significant left hip collection is seen at this time. 4.See separate report for evaluation of the lumbar spine. Electronically Signed: Jluis Medina  5/2/2024 9:56 PM EDT  Workstation ID: [id number]     Cardiology    Laboratory    Results from last 7 days   Lab Units 05/02/24 2310 05/02/24 0616 04/29/24  1430   WBC 10*3/mm3 4.35 4.54 7.42   HEMOGLOBIN g/dL 8.1* 8.4* 10.8*   HEMATOCRIT % 27.0* 27.4* 33.7*   PLATELETS 10*3/mm3 153 152 222     Results from last 7 days   Lab Units 05/02/24 2310 05/02/24  0616 04/29/24  1430   SODIUM mmol/L 140 140 140   POTASSIUM mmol/L 3.8 3.9 4.4   CHLORIDE mmol/L 108* 108* 107   CO2 mmol/L 25.0 24.0 19.7*   BUN mg/dL 16 14 14   CREATININE  mg/dL 1.03* 1.14* 1.04*   GLUCOSE mg/dL 113* 95 78   ALBUMIN g/dL 3.0*  --   --    BILIRUBIN mg/dL <0.2  --   --    ALK PHOS U/L 187*  --   --    AST (SGOT) U/L 22  --   --    ALT (SGPT) U/L 12  --   --    CALCIUM mg/dL 8.8 9.0 10.0         Results from last 7 days   Lab Units 05/02/24  0616   SED RATE mm/hr 50*         Microbiology   Microbiology Results (last 10 days)       Procedure Component Value - Date/Time    Urine Culture - Urine, Urine, Clean Catch [[id number]]  (Abnormal)  (Susceptibility) Collected: 04/24/24 1229    Lab Status: Final result Specimen: Urine, Clean Catch Updated: 04/27/24 1056     Urine Culture >100,000 CFU/mL Klebsiella pneumoniae ssp pneumoniae    Narrative:      Colonization of the urinary tract without infection is common. Treatment is discouraged unless the patient is symptomatic, pregnant, or undergoing an invasive urologic procedure.    Susceptibility        Klebsiella pneumoniae ssp pneumoniae      SOFIA      Amoxicillin + Clavulanate Susceptible      Ampicillin Resistant      Ampicillin + Sulbactam Susceptible      Cefazolin Susceptible      Cefepime Susceptible      Ceftazidime Susceptible      Ceftriaxone Susceptible      Gentamicin Susceptible      Levofloxacin Susceptible      Nitrofurantoin Resistant      Piperacillin + Tazobactam Susceptible      Trimethoprim + Sulfamethoxazole Susceptible                           Blood Culture - Blood, Arm, Left [[id number]]  (Normal) Collected: 04/23/24 2235    Lab Status: Final result Specimen: Blood from Arm, Left Updated: 04/28/24 2300     Blood Culture No growth at 5 days    Blood Culture - Blood, Arm, Left [[id number]]  (Normal) Collected: 04/23/24 1824    Lab Status: Final result Specimen: Blood from Arm, Left Updated: 04/28/24 1831     Blood Culture No growth at 5 days            Medication Review:       Schedule Meds  busPIRone, 15 mg, Oral, BID  folic acid, 1 mg, Oral, Daily  QUEtiapine, 50 mg, Oral, Nightly  sertraline, 100 mg, Oral,  BID  sodium chloride, 10 mL, Intravenous, Q12H  vancomycin, 1,000 mg, Intravenous, Daily        Infusion Meds  Pharmacy to dose vancomycin,         PRN Meds    acetaminophen **OR** acetaminophen **OR** acetaminophen    albuterol    senna-docusate sodium **AND** polyethylene glycol **AND** bisacodyl **AND** bisacodyl    HYDROcodone-acetaminophen    HYDROcodone-acetaminophen    ondansetron ODT **OR** ondansetron    Pharmacy to dose vancomycin    sodium chloride    sodium chloride        Assessment & Plan       Antimicrobial Therapy   1.  IV vancomycin        2.        3.        4.        5.              Assessment     Discitis/osteomyelitis at the level of L2-L3.  Current MRI showed worsening of discitis with persistent epidural abscess and moderate canal stenosis.  There was paraspinal phlegmon.  Neurosurgery service following and the patient and decided not to do surgical intervention.  The patient has been receiving IV antibiotics for 8 weeks prior to admission.  I am concerned about clinical failure and need of surgery.    Possible right hip septic arthritis based on MRI of the pelvis.     Recent admission for methicillin resistant Staphylococcus aureus bacteremia.  Likely source is lumbar discitis/osteomyelitis.   SILVINA performed on 3/12/2024 with cardiology notes mentioning no vegetation.  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  Patient denied having infection after the procedure        Plan     Continue IV vancomycin-keep trough between 15 and 20.   I will extend the duration of the treatment to 12 weeks.  If patient continues to have abnormality after 12 weeks then she needs to be evaluated for surgery.  Maximum duration of antibiotics will be 2 weeks otherwise the case would be consider failure of therapy  Waiting on  repeat blood culture results  Consult IR for right hip arthrocentesis and send synovial fluid for cell and differential, culture and crystals  MRI of the lumbar spine and pelvis with and without contrast  Continue supportive care  A.m. labs      Pilar Foster MD  24  11:48 EDT    Note is dictated utilizing voice recognition software/Dragon      Electronically signed by Pilar Foster MD at 24 1152       Niels Sands MD at 24 1134              [facility] MEDICINE SERVICE  DAILY PROGRESS NOTE    NAME: Lita Yu  : 1965  MRN: [id number]      LOS: 2 days     PROVIDER OF SERVICE: Niels Sands MD    Chief Complaint: Spinal abscess    Subjective:     Interval History:  History taken from: patient chart RN  Pain controlled appears comfortable afebrile discussed  MRI findings discussed with patient  Review of Systems:   Review of Systems  All negative except as above  Objective:     Vital Signs  Temp:  [98.3 °F (36.8 °C)-98.4 °F (36.9 °C)] 98.3 °F (36.8 °C)  Heart Rate:  [69-73] 73  Resp:  [14-16] 14  BP: (107-114)/(55-70) 114/70   Body mass index is 21.26 kg/m².    Physical Exam  Physical Exam  AOx3 NAD  RRR S1 and S2 audible  Lungs with fair entry  Abdomen soft nontender nondistended  Scheduled Meds   busPIRone, 15 mg, Oral, BID  folic acid, 1 mg, Oral, Daily  QUEtiapine, 50 mg, Oral, Nightly  sertraline, 100 mg, Oral, BID  sodium chloride, 10 mL, Intravenous, Q12H  vancomycin, 1,000 mg, Intravenous, Daily       PRN Meds     acetaminophen **OR** acetaminophen **OR** acetaminophen    albuterol    senna-docusate sodium **AND** polyethylene glycol **AND** bisacodyl **AND** bisacodyl    HYDROcodone-acetaminophen    HYDROcodone-acetaminophen    ondansetron ODT **OR** ondansetron    Pharmacy to dose vancomycin    sodium chloride    sodium chloride   Infusions  Pharmacy to dose vancomycin,           Diagnostic Data    Results from last 7 days   Lab Units 24  0860    WBC 10*3/mm3 4.35   HEMOGLOBIN g/dL 8.1*   HEMATOCRIT % 27.0*   PLATELETS 10*3/mm3 153   GLUCOSE mg/dL 113*   CREATININE mg/dL 1.03*   BUN mg/dL 16   SODIUM mmol/L 140   POTASSIUM mmol/L 3.8   AST (SGOT) U/L 22   ALT (SGPT) U/L 12   ALK PHOS U/L 187*   BILIRUBIN mg/dL <0.2   ANION GAP mmol/L 7.0       MRI Lumbar Spine With & Without Contrast    Result Date: 5/3/2024  Impression: Findings remain consistent with L2-L3 discitis and vertebral body osteomyelitis with enhancing paraspinal phlegmon and anterior epidural abscess resulting in moderate canal stenosis. The findings appear similar to the prior study when accounting for prominent motion artifact that was seen on those images. Electronically Signed: Ines Donald MD  5/3/2024 7:35 AM EDT  Workstation ID: [id number]    MRI Pelvis With & Without Contrast    Result Date: 5/2/2024  Impression: 1.Prominent soft tissue edema and enhancement surrounding the right hip with right hip effusion, synovial thickening and enhancement, and periarticular marrow edema. There is severe right hip joint space narrowing which represents a change from prior radiographs from February 2023. These findings are concerning for joint infection given patient's history. Aspiration is recommended for diagnosis. 2.Avascular necrosis of the right femoral head, as before, with possible component of developing articular surface collapse which may contribute to arthropathy and edema. 3.Status post left hip arthroplasty. Artifact from hardware limits assessment, but no significant left hip collection is seen at this time. 4.See separate report for evaluation of the lumbar spine. Electronically Signed: Jluis Medina  5/2/2024 9:56 PM EDT  Workstation ID: [id number]    XR Chest 1 View    Result Date: 5/1/2024  Impression: Right arm approach PICC tip terminates at the mid SVC level. No acute chest finding. Electronically Signed: Sara Jones MD  5/1/2024 4:50 PM EDT  Workstation ID: [id number]       I  reviewed the patient's new clinical results.  I reviewed the patient's new imaging results and agree with the interpretation.    Assessment/Plan:     Active and Resolved Problems  Active Hospital Problems    Diagnosis  POA    **Spinal abscess [M46.20]  Yes      Resolved Hospital Problems   No resolved problems to display.       58-year-old female with history of hypertension, peripheral vascular disease, degenerative joint disease, seizure disorder, tobacco abuse, EtOH liver cirrhosis, recent history of MRSA bacteremia attributed to L2-3 OM/discitis admitted to [facility] on 5/1/2024 with a worsening pain in her lower back.      #History of MRSA bacteremia  #History of L2-L3 discitis/osteomyelitis   -She was recently discharged after she was diagnosed with MRSA bacteremia and L2/L3 discitis/osteomyelitis.  Prior plan for vancomycin until 5/3/2024.      -Now admitted with worsening low back pain  -MRI lumbar and pelvis spine as noted.  Shows right hip effusion Along with L2-3 discitis and vertebral body osteomyelitis with enhancing paraspinal phlegmon    ID following continue vancomycin  Orthopedics consulted anticipate patient will require right hip aspiration  Continue current pain control  Seen by CHRISTIANO no plan for surgical intervention  Follow infectious workup       #History of alcohol liver cirrhosis          Outpatient follow-up     #Acute on chronic anemia  Monitor H&H  Transfuse PRBCs to keep hemoglobin more than 7     #Tobacco abuse  NRT  Counseled    DVT prophylaxis:  Mechanical DVT prophylaxis orders are present.         Code status is   Code Status and Medical Interventions:   Ordered at: 05/01/24 6921     Level Of Support Discussed With:    Patient     Code Status (Patient has no pulse and is not breathing):    CPR (Attempt to Resuscitate)     Medical Interventions (Patient has pulse or is breathing):    Full Support       Plan for disposition: TBD    Time: 30 minutes    Signature:  Electronically signed by Niels Sands MD, 24, 11:34 EDT.  [facility] Hospitalist Team      Electronically signed by Niels Sands MD at 24 1139          Consult Notes (last 4 days)        Cristobal Chen II, MD at 24 0748        Consult Orders    1. Inpatient Orthopedic Surgery Consult [[id number]] ordered by Niels Sands MD at 24 0853                   Orthopaedic Surgery  Consult Note  Dr. SHADE Fernandez” Gustavo HALE  [phone]    HPI:  Patient is a 58 y.o. Not  or  female who presents with severe right hip pain and concern for infection.  Patient has had recent epidural abscess last has been complaining of severe hip pain.  She has a history of a left total hip arthroplasty performed for avascular necrosis and has had longstanding hip pain in the right hip but has gotten worse over the last year.  Due to her pain, she underwent an aspiration yesterday with a low white cell count but with 100% neutrophils which was very concerning for infection.  The MRI was also concerning for infection.  Culture negative so far, she was on antibiotics prior to the aspiration which could lead yield a lower white cell count as well as make an aspiration negative for culture.    MEDICAL HISTORY  Past Medical History:   Diagnosis Date    Cirrhosis     COPD (chronic obstructive pulmonary disease)     Depression     GERD (gastroesophageal reflux disease)     Hyperlipidemia     Hypertension     PTSD (post-traumatic stress disorder)      Past Surgical History:   Procedure Laterality Date     SECTION N/A     COLONOSCOPY N/A 2021    Procedure: COLONOSCOPY with polypectomy x2 and random biopsy;  Surgeon: Osman Clay MD;  Location: [facility] ENDOSCOPY;  Service: Gastroenterology;  Laterality: N/A;  colon polyps    DENTAL PROCEDURE      ENDOSCOPY N/A 2021    Procedure: ESOPHAGOGASTRODUODENOSCOPY;  Surgeon: Osman Clay MD;  Location: [facility] ENDOSCOPY;   Service: Gastroenterology;  Laterality: N/A;  esophagitis    JOINT REPLACEMENT      REPLACEMENT TOTAL HIP LATERAL POSITION Left     TONSILLECTOMY      VAGINAL BIRTH AFTER  SECTION       Prior to Admission medications    Medication Sig Start Date End Date Taking? Authorizing Provider   cefdinir (OMNICEF) 300 MG capsule Take 1 capsule by mouth 2 (Two) Times a Day. 24  Yes Heri Suh,    albuterol sulfate  (90 Base) MCG/ACT inhaler Inhale 2 puffs Every 4 (Four) Hours As Needed for Wheezing.    Abigial Hood MD   busPIRone (BUSPAR) 15 MG tablet TAKE 1 TABLET BY MOUTH TWICE A DAY 10/16/23   Norma Saul APRN   Cyanocobalamin (Vitamin B-12) 1000 MCG sublingual tablet Place 1 tablet under the tongue Daily.    Abigail Hood MD   folic acid (FOLVITE) 1 MG tablet Take 1 tablet by mouth Daily. 3/4/24   Laura Quinones APRN   HYDROcodone-acetaminophen (NORCO) 7.5-325 MG per tablet Take 1 tablet by mouth Every 12 (Twelve) Hours As Needed for Moderate Pain. 24   Norma Saul APRN   melatonin 5 MG tablet tablet Take 1 tablet by mouth At Night As Needed (sleep).    Abigail Hood MD   QUEtiapine (SEROquel) 50 MG tablet TAKE 1 TABLET BY MOUTH EVERYDAY AT BEDTIME 24   Norma Saul APRN   sertraline (ZOLOFT) 100 MG tablet TAKE 1 TABLET BY MOUTH TWICE A DAY 10/19/23   Norma Saul APRN   thiamine (VITAMIN B1) 100 MG tablet Take 1 tablet by mouth Daily. 3/7/24   Laura Quinones APRN   vancomycin 1000 mg/200 mL 0.9% NS IVPB Infuse 200 mL into a venous catheter Every Other Day. 3/29/24 5/3/24  Abigail Hood MD   Zinc 50 MG tablet Take 1 tablet by mouth 2 (Two) Times a Day.    Abigail Hood MD     Allergies   Allergen Reactions    Sulfa Antibiotics Hives    Keflex [Cephalexin] Hives     Most Recent Immunizations   Administered Date(s) Administered    COVID-19 (PFIZER) Purple Cap Monovalent 2021    Flu Vaccine Intradermal Quad 18-64YR  "11/29/2018    Fluzone (or Fluarix & Flulaval for VFC) >6mos 10/10/2023    Pneumococcal Conjugate 20-Valent (PCV20) 10/10/2023    Shingrix 10/10/2023    flucelvax quad pfs =>4 YRS 01/04/2020     Social History     Tobacco Use    Smoking status: Every Day     Current packs/day: 0.50     Types: Cigarettes     Passive exposure: Current    Smokeless tobacco: Never    Tobacco comments:     3cigs   Substance Use Topics    Alcohol use: Not Currently     Alcohol/week: 2.0 standard drinks of alcohol     Types: 2 Cans of beer per week     Comment: pint daily      Social History     Substance and Sexual Activity   Drug Use No       VITALS: /70 (BP Location: Left arm, Patient Position: Lying)   Pulse 69   Temp 98.2 °F (36.8 °C)   Resp 14   Ht 160 cm (62.99\")   Wt 54.4 kg (120 lb)   SpO2 98%   BMI 21.26 kg/m²  Body mass index is 21.26 kg/m².    PHYSICAL EXAM:   CONSTITUTIONAL: No acute distress  LUNGS: Equal chest rise, no shortness of air  CARDIOVASCULAR: palpable peripheral pulses  SKIN: no skin lesions in the area examined  LYMPH: no lymphadenopathy in the area examined  EXTREMITY: Right Lower Extremity  Tenderness to Palpation: Tenderness to palpation at the hip  Gross Deformity: No Gross Deformity  Pulses:  Brisk Capillary Refill  Sensation: Intact grossly  Motor: 5/5 EHL/FHL/TA/GS motor complexes    RADIOLOGY REVIEW:   IR Inject/asp large joint or bursa    Result Date: 5/3/2024  Technically successful right hip aspiration yielding 12 mL of cloudy yellow synovial fluid utilizing fluoroscopic guidance. Report dictated by: Jose Linton DNP  I have personally reviewed this case and agree with the findings above: Electronically Signed: Yimi Costello MD  5/3/2024 1:56 PM EDT  Workstation ID: [id number]    XR Hip With or Without Pelvis 2 - 3 View Right    Result Date: 5/3/2024  Impression: 1. Findings compatible with avascular necrosis of the right femoral head with severe joint space loss. 2. Left hip arthroplasty without " complication. Electronically Signed: Ines Donald MD  5/3/2024 12:20 PM EDT  Workstation ID: [id number]    MRI Lumbar Spine With & Without Contrast    Result Date: 5/3/2024  Impression: Findings remain consistent with L2-L3 discitis and vertebral body osteomyelitis with enhancing paraspinal phlegmon and anterior epidural abscess resulting in moderate canal stenosis. The findings appear similar to the prior study when accounting for prominent motion artifact that was seen on those images. Electronically Signed: Ines Donald MD  5/3/2024 7:35 AM EDT  Workstation ID: [id number]    MRI Pelvis With & Without Contrast    Result Date: 5/2/2024  Impression: 1.Prominent soft tissue edema and enhancement surrounding the right hip with right hip effusion, synovial thickening and enhancement, and periarticular marrow edema. There is severe right hip joint space narrowing which represents a change from prior radiographs from February 2023. These findings are concerning for joint infection given patient's history. Aspiration is recommended for diagnosis. 2.Avascular necrosis of the right femoral head, as before, with possible component of developing articular surface collapse which may contribute to arthropathy and edema. 3.Status post left hip arthroplasty. Artifact from hardware limits assessment, but no significant left hip collection is seen at this time. 4.See separate report for evaluation of the lumbar spine. Electronically Signed: Jlusi Medina  5/2/2024 9:56 PM EDT  Workstation ID: [id number]    XR Chest 1 View    Result Date: 5/1/2024  Impression: Right arm approach PICC tip terminates at the mid SVC level. No acute chest finding. Electronically Signed: Sara Jones MD  5/1/2024 4:50 PM EDT  Workstation ID: [id number]     LABS:   Results for the past 24 hours:   Recent Results (from the past 24 hour(s))   Body Fluid Culture - Body Fluid, Hip, Right    Collection Time: 05/03/24 12:56 PM    Specimen: Hip, Right; Body Fluid    Result Value Ref Range    Body Fluid Culture No growth at 24 hours     Gram Stain Many (4+) WBCs per low power field     Gram Stain No organisms seen    Crystal Exam, Fluid - Synovial Fluid, Hip, Right    Collection Time: 05/03/24 12:56 PM    Specimen: Hip, Right; Synovial Fluid   Result Value Ref Range    Crystals, Fluid No crystals seen    Body fluid cell count - Body Fluid, Hip, Right    Collection Time: 05/03/24 12:56 PM    Specimen: Hip, Right; Body Fluid   Result Value Ref Range    Color, Fluid Dark Yellow     Appearance, Fluid Cloudy (A) Clear    Nucleated Cells, Fluid 6,599 /mm3    Method: Automated Sysmex XN Method    Body fluid differential - Body Fluid, Hip, Right    Collection Time: 05/03/24 12:56 PM    Specimen: Hip, Right; Body Fluid   Result Value Ref Range    Neutrophils, Fluid 100 %   Comprehensive Metabolic Panel    Collection Time: 05/04/24  5:57 AM    Specimen: Arm, Left; Blood   Result Value Ref Range    Glucose 89 65 - 99 mg/dL    BUN 14 6 - 20 mg/dL    Creatinine 1.06 (H) 0.57 - 1.00 mg/dL    Sodium 139 136 - 145 mmol/L    Potassium 3.8 3.5 - 5.2 mmol/L    Chloride 105 98 - 107 mmol/L    CO2 25.0 22.0 - 29.0 mmol/L    Calcium 9.3 8.6 - 10.5 mg/dL    Total Protein 7.3 6.0 - 8.5 g/dL    Albumin 3.2 (L) 3.5 - 5.2 g/dL    ALT (SGPT) 13 1 - 33 U/L    AST (SGOT) 18 1 - 32 U/L    Alkaline Phosphatase 184 (H) 39 - 117 U/L    Total Bilirubin 0.2 0.0 - 1.2 mg/dL    Globulin 4.1 gm/dL    A/G Ratio 0.8 g/dL    BUN/Creatinine Ratio 13.2 7.0 - 25.0    Anion Gap 9.0 5.0 - 15.0 mmol/L    eGFR 61.8 >60.0 mL/min/1.73   Magnesium    Collection Time: 05/04/24  5:57 AM    Specimen: Arm, Left; Blood   Result Value Ref Range    Magnesium 1.6 1.6 - 2.6 mg/dL   Phosphorus    Collection Time: 05/04/24  5:57 AM    Specimen: Arm, Left; Blood   Result Value Ref Range    Phosphorus 3.7 2.5 - 4.5 mg/dL   Vancomycin, Trough    Collection Time: 05/04/24  5:57 AM    Specimen: Arm, Left; Blood   Result Value Ref Range     Vancomycin Trough 12.20 5.00 - 20.00 mcg/mL   CBC Auto Differential    Collection Time: 05/04/24  5:57 AM    Specimen: Arm, Left; Blood   Result Value Ref Range    WBC 4.58 3.40 - 10.80 10*3/mm3    RBC 2.80 (L) 3.77 - 5.28 10*6/mm3    Hemoglobin 8.5 (L) 12.0 - 15.9 g/dL    Hematocrit 27.7 (L) 34.0 - 46.6 %    MCV 98.9 (H) 79.0 - 97.0 fL    MCH 30.4 26.6 - 33.0 pg    MCHC 30.7 (L) 31.5 - 35.7 g/dL    RDW 14.8 12.3 - 15.4 %    RDW-SD 54.0 37.0 - 54.0 fl    MPV 9.1 6.0 - 12.0 fL    Platelets 167 140 - 450 10*3/mm3    Neutrophil % 57.0 42.7 - 76.0 %    Lymphocyte % 26.0 19.6 - 45.3 %    Monocyte % 10.0 5.0 - 12.0 %    Eosinophil % 6.1 0.3 - 6.2 %    Basophil % 0.7 0.0 - 1.5 %    Immature Grans % 0.2 0.0 - 0.5 %    Neutrophils, Absolute 2.61 1.70 - 7.00 10*3/mm3    Lymphocytes, Absolute 1.19 0.70 - 3.10 10*3/mm3    Monocytes, Absolute 0.46 0.10 - 0.90 10*3/mm3    Eosinophils, Absolute 0.28 0.00 - 0.40 10*3/mm3    Basophils, Absolute 0.03 0.00 - 0.20 10*3/mm3    Immature Grans, Absolute 0.01 0.00 - 0.05 10*3/mm3    nRBC 0.0 0.0 - 0.2 /100 WBC       IMPRESSION:  Patient is a 58 y.o. 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  Disposition: We discussed treatment options for the right hip.  Clearly she needs the infected bone surgically excised.  Plan will be to perform a right hip arthroplasty utilizing antibiotic cement hopefully eradicate the infection.  The antibiotic cement may lead to early loosening of the components and could lead to need for revision surgery.  However, this procedure will likely allow her to have a little bit better function than an antibiotic spacer there is very good data supporting the use of this construct in infected joint cases.  Patient is agreeable and would rather have hip replacement surgery with the antibiotic cement as opposed to an antibiotic spacer.  We are  "planning surgery today.  She will get 6 weeks of IV antibiotics and be partial weightbearing on the right hip until the infection is eradicated.    R \"Heri\" Gustavo HALE MD  Orthopaedic Surgery  [facility]  [phone] - [place] Office  [phone] - [place] Office              Electronically signed by Cristobal Chen II, MD at 05/04/24 0793       Sarah Ross APRN at 05/02/24 1527        Consult Orders    1. Inpatient Infectious Diseases Consult [[id number]] ordered by Ashlyn Herring MD at 05/01/24 1651              Attestation signed by Pilar Foster MD at 05/03/24 1012    I have reviewed this documentation and agree.                  Infectious Diseases Consult Note    Referring Provider: Niels Sands MD    Reason for Consultation: Discitis/osteomyelitis    Patient Care Team:  Norma Saul APRN as PCP - General (Nurse Practitioner)  Flory Villanueva MD (Physical Medicine and Rehabilitation)    Chief complaint worsening back pain    Subjective     The patient has been afebrile for the last 24 hours.  The patient is on room air, hemodynamically stable, and is tolerating antimicrobial therapy.    History of present illness:      This is a 58-year-old female who presents to the hospital on 5/1/2024 with complaints of worsening back pain and abnormal findings of her recent MRI of the lumbar spine.  Patient has been receiving 8 weeks of IV vancomycin which is due to be completed today on 5/2/2024.  Patient states she has been getting her antimicrobial therapy as scheduled.  Notes increased back pain over the last several weeks and needs to use a walker to be able to walk.  Does have dropfoot.  Denies any incontinence of bowel or bladder.  Patient denies fever, chills, diaphoresis, shortness of breath, cough, GI symptoms, or any urinary symptoms.  Patient now with worsening right hip pain and swelling    Review of Systems   Review of Systems   Constitutional: " Negative.    HENT: Negative.     Eyes: Negative.    Respiratory: Negative.     Cardiovascular: Negative.    Gastrointestinal: Negative.    Endocrine: Negative.    Genitourinary: Negative.    Musculoskeletal: Negative.  Positive for back pain.        Right hip pain   Skin: Negative.    Neurological: Negative.  Positive for weakness.   Psychiatric/Behavioral: Negative.     All other systems reviewed and are negative.      Medications  Medications Prior to Admission   Medication Sig Dispense Refill Last Dose    cefdinir (OMNICEF) 300 MG capsule Take 1 capsule by mouth 2 (Two) Times a Day. 10 capsule 0 2024 at 0900    albuterol sulfate  (90 Base) MCG/ACT inhaler Inhale 2 puffs Every 4 (Four) Hours As Needed for Wheezing.       busPIRone (BUSPAR) 15 MG tablet TAKE 1 TABLET BY MOUTH TWICE A  tablet 1     Cyanocobalamin (Vitamin B-12) 1000 MCG sublingual tablet Place 1 tablet under the tongue Daily.       folic acid (FOLVITE) 1 MG tablet Take 1 tablet by mouth Daily. 30 tablet 0     HYDROcodone-acetaminophen (NORCO) 7.5-325 MG per tablet Take 1 tablet by mouth Every 12 (Twelve) Hours As Needed for Moderate Pain. 30 tablet 0     melatonin 5 MG tablet tablet Take 1 tablet by mouth At Night As Needed (sleep).       QUEtiapine (SEROquel) 50 MG tablet TAKE 1 TABLET BY MOUTH EVERYDAY AT BEDTIME 90 tablet 0     sertraline (ZOLOFT) 100 MG tablet TAKE 1 TABLET BY MOUTH TWICE A  tablet 1     thiamine (VITAMIN B1) 100 MG tablet Take 1 tablet by mouth Daily. 30 tablet 0     vancomycin 1000 mg/200 mL 0.9% NS IVPB Infuse 200 mL into a venous catheter Every Other Day.       [] Vancomycin HCl (VANCOMYCIN PHARMACY INTERMITTENT/PULSE DOSING) Use As Needed (Pulse dosing per pharmacy) for up to 5 days. Indications: Bacteria in the Blood       Zinc 50 MG tablet Take 1 tablet by mouth 2 (Two) Times a Day.          History  Past Medical History:   Diagnosis Date    Cirrhosis     COPD (chronic obstructive pulmonary  disease)     Depression     GERD (gastroesophageal reflux disease)     Hyperlipidemia     Hypertension     PTSD (post-traumatic stress disorder)      Past Surgical History:   Procedure Laterality Date     SECTION N/A     COLONOSCOPY N/A 2021    Procedure: COLONOSCOPY with polypectomy x2 and random biopsy;  Surgeon: Osman Clay MD;  Location: [facility] ENDOSCOPY;  Service: Gastroenterology;  Laterality: N/A;  colon polyps    DENTAL PROCEDURE      ENDOSCOPY N/A 2021    Procedure: ESOPHAGOGASTRODUODENOSCOPY;  Surgeon: Osman Clay MD;  Location: [facility] ENDOSCOPY;  Service: Gastroenterology;  Laterality: N/A;  esophagitis    JOINT REPLACEMENT      REPLACEMENT TOTAL HIP LATERAL POSITION Left     TONSILLECTOMY      VAGINAL BIRTH AFTER  SECTION         Family History  Family History   Problem Relation Age of Onset    Alcohol abuse Mother     Alcohol abuse Paternal Grandfather        Social History   reports that she has been smoking cigarettes. She has been exposed to tobacco smoke. She has never used smokeless tobacco. She reports that she does not currently use alcohol after a past usage of about 2.0 standard drinks of alcohol per week. She reports that she does not use drugs.    Allergies  Sulfa antibiotics and Keflex [cephalexin]    Objective     Vital Signs   Vital Signs (last 24 hours)         05 0700   0659  0700   1526   Most Recent      Temp (°F) 97.5 -  98.2    98.1 -  98.4     98.4 (36.9)  1217    Heart Rate 70 -  72    66 -  69     69 05/ 1217    Resp 12 -  16    14 -  16     16  1217    /61 -  121/69    104/61 -  107/55     107/55  1217    SpO2 (%) 93 -  98      97     97 / 1217            Physical Exam:  Physical Exam  Vitals and nursing note reviewed.   Constitutional:       General: She is not in acute distress.     Appearance: Normal appearance. She is well-developed and normal weight. She is not diaphoretic.   HENT:      Head:  Normocephalic and atraumatic.   Eyes:      General: No scleral icterus.     Extraocular Movements: Extraocular movements intact.      Conjunctiva/sclera: Conjunctivae normal.      Pupils: Pupils are equal, round, and reactive to light.   Cardiovascular:      Rate and Rhythm: Normal rate and regular rhythm.      Heart sounds: Normal heart sounds, S1 normal and S2 normal. No murmur heard.  Pulmonary:      Effort: Pulmonary effort is normal. No respiratory distress.      Breath sounds: Normal breath sounds. No stridor. No wheezing or rales.   Chest:      Chest wall: No tenderness.   Abdominal:      General: Bowel sounds are normal. There is no distension.      Palpations: Abdomen is soft. There is no mass.      Tenderness: There is no abdominal tenderness. There is no guarding.   Musculoskeletal:         General: No swelling, tenderness or deformity.      Cervical back: Neck supple.      Comments: Some edema and tenderness to the right hip   Skin:     General: Skin is warm and dry.      Coloration: Skin is not pale.      Findings: No bruising, erythema or rash.   Neurological:      Mental Status: She is alert and oriented to person, place, and time.      Comments: Patient with some difficulty walking with a walker.  Dropfoot noted   Psychiatric:         Mood and Affect: Mood normal.         Microbiology  Microbiology Results (last 10 days)       Procedure Component Value - Date/Time    Urine Culture - Urine, Urine, Clean Catch [[id number]]  (Abnormal)  (Susceptibility) Collected: 04/24/24 1229    Lab Status: Final result Specimen: Urine, Clean Catch Updated: 04/27/24 1056     Urine Culture >100,000 CFU/mL Klebsiella pneumoniae ssp pneumoniae    Narrative:      Colonization of the urinary tract without infection is common. Treatment is discouraged unless the patient is symptomatic, pregnant, or undergoing an invasive urologic procedure.    Susceptibility        Klebsiella pneumoniae ssp pneumoniae      SOFIA       Amoxicillin + Clavulanate Susceptible      Ampicillin Resistant      Ampicillin + Sulbactam Susceptible      Cefazolin Susceptible      Cefepime Susceptible      Ceftazidime Susceptible      Ceftriaxone Susceptible      Gentamicin Susceptible      Levofloxacin Susceptible      Nitrofurantoin Resistant      Piperacillin + Tazobactam Susceptible      Trimethoprim + Sulfamethoxazole Susceptible                           Blood Culture - Blood, Arm, Left [[id number]]  (Normal) Collected: 04/23/24 2235    Lab Status: Final result Specimen: Blood from Arm, Left Updated: 04/28/24 2300     Blood Culture No growth at 5 days    Blood Culture - Blood, Arm, Left [[id number]]  (Normal) Collected: 04/23/24 1824    Lab Status: Final result Specimen: Blood from Arm, Left Updated: 04/28/24 1831     Blood Culture No growth at 5 days            Laboratory  Results from last 7 days   Lab Units 05/02/24  0616   WBC 10*3/mm3 4.54   HEMOGLOBIN g/dL 8.4*   HEMATOCRIT % 27.4*   PLATELETS 10*3/mm3 152     Results from last 7 days   Lab Units 05/02/24  0616   SODIUM mmol/L 140   POTASSIUM mmol/L 3.9   CHLORIDE mmol/L 108*   CO2 mmol/L 24.0   BUN mg/dL 14   CREATININE mg/dL 1.14*   GLUCOSE mg/dL 95   CALCIUM mg/dL 9.0     Results from last 7 days   Lab Units 05/02/24  0616   SODIUM mmol/L 140   POTASSIUM mmol/L 3.9   CHLORIDE mmol/L 108*   CO2 mmol/L 24.0   BUN mg/dL 14   CREATININE mg/dL 1.14*   GLUCOSE mg/dL 95   CALCIUM mg/dL 9.0         Results from last 7 days   Lab Units 05/02/24  0616   SED RATE mm/hr 50*           Radiology  Imaging Results (Last 72 Hours)       Procedure Component Value Units Date/Time    XR Chest 1 View [[id number]] Collected: 05/01/24 1649     Updated: 05/01/24 1652    Narrative:      XR CHEST 1 VW    Date of Exam: 5/1/2024 4:38 PM EDT    Indication: PICC veriification on Direct Admit    Comparison: AP portable chest 6/11/2023    Findings:  Right arm approach PICC tip terminates in the mid SVC level. Heart size is  normal. Pulmonary vascular distribution is normal. Benign calcified nodule in the right upper lobe, unchanged. No pleural effusion, pneumothorax or acute osseous abnormality.      Impression:      Impression:  Right arm approach PICC tip terminates at the mid SVC level. No acute chest finding.      Electronically Signed: Sara Jones MD    5/1/2024 4:50 PM EDT    Workstation ID: [id number]            Cardiology      Results Review:  I have reviewed all clinical data, test, lab, and imaging results.       Schedule Meds  busPIRone, 15 mg, Oral, BID  folic acid, 1 mg, Oral, Daily  QUEtiapine, 50 mg, Oral, Nightly  sertraline, 100 mg, Oral, BID  sodium chloride, 10 mL, Intravenous, Q12H  vancomycin, 1,000 mg, Intravenous, Daily        Infusion Meds  Pharmacy to dose vancomycin,         PRN Meds    acetaminophen **OR** acetaminophen **OR** acetaminophen    albuterol    senna-docusate sodium **AND** polyethylene glycol **AND** bisacodyl **AND** bisacodyl    HYDROcodone-acetaminophen    HYDROcodone-acetaminophen    ondansetron ODT **OR** ondansetron    Pharmacy to dose vancomycin    sodium chloride    sodium chloride      Assessment & Plan       Assessment    Worsening L2-L3 discitis/osteomyelitis with cyst and epidural abscess that has not changed since previous studies.  The MRI was performed on 4/24/2023.  Patient endorses worsening back pain, difficulty walking with bilateral dropfoot.  Patient received 8 weeks of IV vancomycin which actually was scheduled to be completed on 5/2/2024.  Neurosurgery did not feel that the patient was a surgical candidate during the last admission.  Patient now complaining of right hip pain and swelling    Recent admission for methicillin resistant Staphylococcus aureus bacteremia.  Likely source is lumbar discitis/osteomyelitis.   SILVINA performed on 3/12/2024 with cardiology notes mentioning no vegetation.  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  Patient denied having infection after the procedure       Plan     Continue IV vancomycin-keep trough between 15 and 20.  Repeat blood cultures x 2  MRI of the lumbar spine and pelvis with and without contrast  Continue supportive care  A.m. labs    Since the most recent MRI shows worsening discitis and no improvement epidural abscess we consider this failure of treatment with IV antibiotics alone, especially after 8 weeks of treatment.  We will see what the MRI of the lumbar spine and pelvis shows, but most likely patient will need some type of surgical intervention.    Sarah Ross, APRN  05/02/24  15:26 EDT    Note is dictated utilizing voice recognition software/Dragon/Osteomyelitis    Electronically signed by Pilar Foster MD at 05/03/24 1019       Elbert Novoa PA at 05/02/24 1331        Consult Orders    1. Inpatient Spine Surgery Consult [[id number]] ordered by Ashlyn Herring MD at 05/01/24 1658              Attestation signed by Carl Tanner IV, MD at 05/02/24 1437    I have reviewed this documentation and agree.  I personally reviewed and interpreted the patient's imaging.  Redemonstration of findings consistent with L2-3 discitis osteomyelitis without high-grade stenosis.  No surgical intervention indicated at this time.  Further workup and treatment per hospitalist and infectious disease                  Neurosurgery Consultation      Patient: Lita Yu    Sex: female    YOB: 1965    Date of Admission: 5/1/2024  3:38 PM    Admitting Dx: Spinal abscess [M46.20]    Reason for Consult: Lumbar epidural abscess      Subjective     CC: Back pain    HPI:  Patient is a 58 y.o. female with COPD and hypertension who presents with severe low back and hip pain.  Notably, patient recently diagnosed with osteomyelitis/discitis  at L2-3 with a small epidural abscess and has been treated with IV vancomycin, the last day of which was supposed to be tomorrow.  She reports continued intractable pain throughout her lower back and into her thighs.  Pain does not go below her knees and is not associated with numbness and tingling.  She has no lower extremity weakness or bowel/bladder dysfunction.  She denies saddle anesthesia.      PMH:  Past Medical History:   Diagnosis Date    Cirrhosis     COPD (chronic obstructive pulmonary disease)     Depression     GERD (gastroesophageal reflux disease)     Hyperlipidemia     Hypertension     PTSD (post-traumatic stress disorder)          Current Facility-Administered Medications:     acetaminophen (TYLENOL) tablet 650 mg, 650 mg, Oral, Q4H PRN **OR** acetaminophen (TYLENOL) 160 MG/5ML oral solution 650 mg, 650 mg, Oral, Q4H PRN **OR** acetaminophen (TYLENOL) suppository 650 mg, 650 mg, Rectal, Q4H PRN, Ashlyn Herring MD    albuterol (PROVENTIL) nebulizer solution 0.083% 2.5 mg/3mL, 2.5 mg, Nebulization, Q6H PRN, Ashlyn Herring MD    sennosides-docusate (PERICOLACE) 8.6-50 MG per tablet 2 tablet, 2 tablet, Oral, BID PRN **AND** polyethylene glycol (MIRALAX) packet 17 g, 17 g, Oral, Daily PRN **AND** bisacodyl (DULCOLAX) EC tablet 5 mg, 5 mg, Oral, Daily PRN **AND** bisacodyl (DULCOLAX) suppository 10 mg, 10 mg, Rectal, Daily PRN, Ashlyn Herring MD    busPIRone (BUSPAR) tablet 15 mg, 15 mg, Oral, BID, Ashlyn Herring MD, 15 mg at 05/02/24 0855    folic acid (FOLVITE) tablet 1 mg, 1 mg, Oral, Daily, Ashlyn Herring MD, 1 mg at 05/02/24 0855    HYDROcodone-acetaminophen (NORCO)  MG per tablet 1 tablet, 1 tablet, Oral, Q4H PRN, Ashlyn Herring MD, 1 tablet at 05/02/24 1132    HYDROcodone-acetaminophen (NORCO) 7.5-325 MG per tablet 1 tablet, 1 tablet, Oral, Q4H PRN, Ashlyn Herring MD, 1 tablet at 05/02/24 0646    ondansetron ODT (ZOFRAN-ODT) disintegrating tablet 4 mg, 4 mg, Oral, Q6H PRN **OR**  ondansetron (ZOFRAN) injection 4 mg, 4 mg, Intravenous, Q6H PRN, Ashlyn Herring MD    Pharmacy to dose vancomycin, , Does not apply, Continuous PRN, Ashlyn Herring MD    QUEtiapine (SEROquel) tablet 50 mg, 50 mg, Oral, Nightly, Ashlyn Herring MD, 50 mg at 24 2138    sertraline (ZOLOFT) tablet 100 mg, 100 mg, Oral, BID, Ashlyn Herring MD, 100 mg at 24 0855    sodium chloride 0.9 % flush 10 mL, 10 mL, Intravenous, Q12H, Ashlyn Herring MD, 10 mL at 24 0857    sodium chloride 0.9 % flush 10 mL, 10 mL, Intravenous, PRN, Ashlyn Herring MD    sodium chloride 0.9 % infusion 40 mL, 40 mL, Intravenous, PRN, Ashlyn Herring MD    vancomycin (VANCOCIN) 1,000 mg in sodium chloride 0.9 % 250 mL IVPB-VTB, 1,000 mg, Intravenous, Daily, Ashlyn Herring MD, Last Rate: 250 mL/hr at 24 0855, 1,000 mg at 24 0855    Allergies   Allergen Reactions    Sulfa Antibiotics Hives    Keflex [Cephalexin] Hives       Past Surgical History:   Procedure Laterality Date     SECTION N/A     COLONOSCOPY N/A 2021    Procedure: COLONOSCOPY with polypectomy x2 and random biopsy;  Surgeon: Osman Clay MD;  Location: [facility] ENDOSCOPY;  Service: Gastroenterology;  Laterality: N/A;  colon polyps    DENTAL PROCEDURE      ENDOSCOPY N/A 2021    Procedure: ESOPHAGOGASTRODUODENOSCOPY;  Surgeon: Osman Clay MD;  Location: [facility] ENDOSCOPY;  Service: Gastroenterology;  Laterality: N/A;  esophagitis    JOINT REPLACEMENT      REPLACEMENT TOTAL HIP LATERAL POSITION Left     TONSILLECTOMY      VAGINAL BIRTH AFTER  SECTION         Social History     Socioeconomic History    Marital status:    Tobacco Use    Smoking status: Every Day     Current packs/day: 0.50     Types: Cigarettes     Passive exposure: Current    Smokeless tobacco: Never    Tobacco comments:     3cigs   Vaping Use    Vaping status: Never Used   Substance and Sexual Activity    Alcohol use: Not Currently      Alcohol/week: 2.0 standard drinks of alcohol     Types: 2 Cans of beer per week     Comment: pint daily    Drug use: No    Sexual activity: Yes     Partners: Male     Birth control/protection: Post-menopausal       Family History   Problem Relation Age of Onset    Alcohol abuse Mother     Alcohol abuse Paternal Grandfather          Current Medications:  Scheduled Meds:busPIRone, 15 mg, Oral, BID  folic acid, 1 mg, Oral, Daily  QUEtiapine, 50 mg, Oral, Nightly  sertraline, 100 mg, Oral, BID  sodium chloride, 10 mL, Intravenous, Q12H  vancomycin, 1,000 mg, Intravenous, Daily      Continuous Infusions:Pharmacy to dose vancomycin,       PRN Meds:   acetaminophen **OR** acetaminophen **OR** acetaminophen    albuterol    senna-docusate sodium **AND** polyethylene glycol **AND** bisacodyl **AND** bisacodyl    HYDROcodone-acetaminophen    HYDROcodone-acetaminophen    ondansetron ODT **OR** ondansetron    Pharmacy to dose vancomycin    sodium chloride    sodium chloride    ROS: 14 point review of systems was completed and is negative except for what is noted in HPI      Objective     Vitals:    05/02/24 0024 05/02/24 0443 05/02/24 0813 05/02/24 1217   BP: 106/61 108/64 104/61 107/55   BP Location: Left arm Left arm Left arm Left arm   Patient Position: Lying Lying Lying Lying   Pulse: 72 70 66 69   Resp: 14 12 14 16   Temp: 97.8 °F (36.6 °C) 97.5 °F (36.4 °C) 98.1 °F (36.7 °C) 98.4 °F (36.9 °C)   TempSrc: Oral Oral Oral Oral   SpO2: 97% 93% 97% 97%   Weight:       Height:           Physical exam  General  - awake, cooperative, in no acute distress  HEENT  - Normocephalic, atraumatic  - PERRLA, EOM intact  Cardiac  - RRR, no peripheral edema  Respiratory  - Normal respiratory rate and effort  Musculoskeletal  - Allodynia present over right knee and right hip, focally  Skin  - Warm and dry, no bleeding, bruising, or rash      NEUROLOGIC    MENTAL STATUS:  - A/O x3  MOTOR:  - GLOVER symmetrically  - 5/5 strength all 4  extremities  REFLEXES:  - Negative clonus  SENSORY:  - Normal to touch and pinprick, axial and peripheral            RESULTS REVIEW:  Results from last 7 days   Lab Units 05/02/24  0616 04/29/24  1430   WBC 10*3/mm3 4.54 7.42   HEMOGLOBIN g/dL 8.4* 10.8*   HEMATOCRIT % 27.4* 33.7*   PLATELETS 10*3/mm3 152 222        Results from last 7 days   Lab Units 05/02/24  0616 04/29/24  1430   SODIUM mmol/L 140 140   POTASSIUM mmol/L 3.9 4.4   CHLORIDE mmol/L 108* 107   CO2 mmol/L 24.0 19.7*   BUN mg/dL 14 14   CREATININE mg/dL 1.14* 1.04*   CALCIUM mg/dL 9.0 10.0   GLUCOSE mg/dL 95 78              MRI Lumbar Spine With & Without Contrast    Result Date: 4/23/2024  MRI LUMBAR SPINE W WO CONTRAST Date of Exam: 4/23/2024 8:30 PM EDT Indication: Epidural abscess suspected Worsening discitis/osteomyelitis.  Comparison: 3/10/2024 Technique:  Routine multiplanar/multisequence sequence images of the lumbar spine were obtained before and after the uneventful administration of Prohance.  Findings: Multiple sequences are limited by motion artifact. There is again noted to be fluid in the L2-L3 disc space. There has been some interval loss of disc space height and worsened endplate destruction. There is increased edema throughout the L2 and L3 vertebral bodies. There is a persistent anterior epidural enhancing fluid that results in moderate central canal stenosis, similar in severity to the comparison study. There is enhancing paraspinal soft tissue at the L2 and L3 vertebral body levels with no walled off fluid collection. There are stable degenerative changes of the remainder of the lumbar spine     Impression: Impression: Motion limited exam. There has been interval worsening of findings related to L2-L3 discitis and vertebral body osteomyelitis, including worsening destruction of the disc space and increased vertebral body marrow edema. There is a persistent epidural abscess that is not significantly changed in size since the  comparison study. There is enhancing paraspinal soft tissue at the L2 and L3 vertebral body levels compatible with paraspinal phlegmon with no walled off fluid collection demonstrated Electronically Signed: Glenn Tomas  4/23/2024 10:23 PM EDT  Workstation ID: [id number]    MRI Thoracic Spine With & Without Contrast    Result Date: 4/23/2024  MRI THORACIC SPINE W WO CONTRAST Date of Exam: 4/23/2024 8:35 PM EDT Indication: Epidural abscess suspected ?  Discitis/osteomyelitis.  Comparison: 3/10/2024 Technique:  Routine multiplanar/multisequence sequence images of the thoracic spine were obtained before and after the uneventful administration of Prohance.  Findings: Several of the sequences are significantly degraded by motion artifact. There is no disc space fluid, endplate destruction, or marrow edema. There is no epidural fluid collection. There is mild dorsal bulging of the T11-T12 disc without significant stenosis. There is no abnormal contrast enhancement. The thoracic cord is normal in caliber with no convincing signal abnormality. There is no paraspinal fluid collection     Impression: Impression: No evidence of thoracic discitis or epidural abscess Electronically Signed: Glenn Tomas  4/23/2024 10:07 PM EDT  Workstation ID: [id number]               Assessment     MDM: This is a 58 y.o. female presenting with intractable lower back pain.  She was diagnosed several weeks ago with L2-3 osteomyelitis/discitis with small epidural abscess and appears to have failed extended IV vancomycin therapy.  Patient remains neurologically intact with no focal sensorimotor deficits.  MRI shows continued evidence of infection.  Epidural abscess is not increased in size and there is no evidence for high grade central or foraminal stenosis in the lumbar spine.    Given the lack of neurologic deficits on exam and lack of significant stenosis on imaging, there is no neurosurgical intervention or follow-up indicated at this time.  Will  defer to infectious disease and the primary team for further evaluation and management.        Plan     Lumbar osteomyelitis  Lumbar discitis  Lumbar epidural abscess  - No acute neurosurgical intervention or follow-up indicated at this time  - Antibiotic therapy per infectious disease  - Medical management and pain control per primary team    Neurosurgery will sign off at this time.  Call with any questions or concerns.    I discussed my assessment and recommendations with Dr. Ciro Novoa PA-C  5/2/2024  13:31 EDT      Part of this note may be an electronic transcription/translation of spoken language to printed text using the Dragon Dictation System.      Electronically signed by Carl Tanner IV, MD at 05/02/24 6005

## 2024-05-07 LAB
ALBUMIN SERPL-MCNC: 2.6 G/DL (ref 3.5–5.2)
ALBUMIN/GLOB SERPL: 0.8 G/DL
ALP SERPL-CCNC: 154 U/L (ref 39–117)
ALT SERPL W P-5'-P-CCNC: 16 U/L (ref 1–33)
ANION GAP SERPL CALCULATED.3IONS-SCNC: 9 MMOL/L (ref 5–15)
AST SERPL-CCNC: 26 U/L (ref 1–32)
BACTERIA SPEC AEROBE CULT: NORMAL
BACTERIA SPEC AEROBE CULT: NORMAL
BASOPHILS # BLD AUTO: 0.01 10*3/MM3 (ref 0–0.2)
BASOPHILS NFR BLD AUTO: 0.2 % (ref 0–1.5)
BILIRUB SERPL-MCNC: 0.3 MG/DL (ref 0–1.2)
BUN SERPL-MCNC: 15 MG/DL (ref 6–20)
BUN/CREAT SERPL: 17.2 (ref 7–25)
CALCIUM SPEC-SCNC: 8.2 MG/DL (ref 8.6–10.5)
CHLORIDE SERPL-SCNC: 106 MMOL/L (ref 98–107)
CO2 SERPL-SCNC: 25 MMOL/L (ref 22–29)
CREAT SERPL-MCNC: 0.87 MG/DL (ref 0.57–1)
DEPRECATED RDW RBC AUTO: 58.4 FL (ref 37–54)
EGFRCR SERPLBLD CKD-EPI 2021: 78.3 ML/MIN/1.73
EOSINOPHIL # BLD AUTO: 0.19 10*3/MM3 (ref 0–0.4)
EOSINOPHIL NFR BLD AUTO: 4.4 % (ref 0.3–6.2)
ERYTHROCYTE [DISTWIDTH] IN BLOOD BY AUTOMATED COUNT: 16.8 % (ref 12.3–15.4)
GLOBULIN UR ELPH-MCNC: 3.3 GM/DL
GLUCOSE SERPL-MCNC: 78 MG/DL (ref 65–99)
HCT VFR BLD AUTO: 30.8 % (ref 34–46.6)
HGB BLD-MCNC: 9.5 G/DL (ref 12–15.9)
IMM GRANULOCYTES # BLD AUTO: 0.06 10*3/MM3 (ref 0–0.05)
IMM GRANULOCYTES NFR BLD AUTO: 1.4 % (ref 0–0.5)
LYMPHOCYTES # BLD AUTO: 0.93 10*3/MM3 (ref 0.7–3.1)
LYMPHOCYTES NFR BLD AUTO: 21.6 % (ref 19.6–45.3)
MAGNESIUM SERPL-MCNC: 1.6 MG/DL (ref 1.6–2.6)
MCH RBC QN AUTO: 29.1 PG (ref 26.6–33)
MCHC RBC AUTO-ENTMCNC: 30.8 G/DL (ref 31.5–35.7)
MCV RBC AUTO: 94.5 FL (ref 79–97)
MONOCYTES # BLD AUTO: 0.45 10*3/MM3 (ref 0.1–0.9)
MONOCYTES NFR BLD AUTO: 10.5 % (ref 5–12)
NEUTROPHILS NFR BLD AUTO: 2.66 10*3/MM3 (ref 1.7–7)
NEUTROPHILS NFR BLD AUTO: 61.9 % (ref 42.7–76)
NRBC BLD AUTO-RTO: 0 /100 WBC (ref 0–0.2)
PHOSPHATE SERPL-MCNC: 3.7 MG/DL (ref 2.5–4.5)
PLATELET # BLD AUTO: 96 10*3/MM3 (ref 140–450)
PMV BLD AUTO: 9.5 FL (ref 6–12)
POTASSIUM SERPL-SCNC: 3.7 MMOL/L (ref 3.5–5.2)
PROT SERPL-MCNC: 5.9 G/DL (ref 6–8.5)
RBC # BLD AUTO: 3.26 10*6/MM3 (ref 3.77–5.28)
SODIUM SERPL-SCNC: 140 MMOL/L (ref 136–145)
VANCOMYCIN TROUGH SERPL-MCNC: 16.9 MCG/ML (ref 5–20)
WBC NRBC COR # BLD AUTO: 4.3 10*3/MM3 (ref 3.4–10.8)

## 2024-05-07 PROCEDURE — 83735 ASSAY OF MAGNESIUM: CPT | Performed by: ORTHOPAEDIC SURGERY

## 2024-05-07 PROCEDURE — 84100 ASSAY OF PHOSPHORUS: CPT | Performed by: ORTHOPAEDIC SURGERY

## 2024-05-07 PROCEDURE — 25010000002 ENOXAPARIN PER 10 MG: Performed by: HOSPITALIST

## 2024-05-07 PROCEDURE — 85025 COMPLETE CBC W/AUTO DIFF WBC: CPT | Performed by: ORTHOPAEDIC SURGERY

## 2024-05-07 PROCEDURE — 97110 THERAPEUTIC EXERCISES: CPT

## 2024-05-07 PROCEDURE — 25810000003 SODIUM CHLORIDE 0.9 % SOLUTION 250 ML FLEX CONT: Performed by: INTERNAL MEDICINE

## 2024-05-07 PROCEDURE — 97530 THERAPEUTIC ACTIVITIES: CPT

## 2024-05-07 PROCEDURE — 97116 GAIT TRAINING THERAPY: CPT

## 2024-05-07 PROCEDURE — 80053 COMPREHEN METABOLIC PANEL: CPT | Performed by: ORTHOPAEDIC SURGERY

## 2024-05-07 PROCEDURE — 25010000002 VANCOMYCIN 1 G RECONSTITUTED SOLUTION 1 EACH VIAL: Performed by: INTERNAL MEDICINE

## 2024-05-07 PROCEDURE — 80202 ASSAY OF VANCOMYCIN: CPT | Performed by: HOSPITALIST

## 2024-05-07 PROCEDURE — 25010000002 CEFTAROLINE FOSAMIL PER 10 MG: Performed by: INTERNAL MEDICINE

## 2024-05-07 RX ADMIN — HYDROCODONE BITARTRATE AND ACETAMINOPHEN 1 TABLET: 10; 325 TABLET ORAL at 01:33

## 2024-05-07 RX ADMIN — BUSPIRONE HYDROCHLORIDE 15 MG: 15 TABLET ORAL at 21:13

## 2024-05-07 RX ADMIN — Medication 10 ML: at 21:14

## 2024-05-07 RX ADMIN — FOLIC ACID 1 MG: 1 TABLET ORAL at 08:42

## 2024-05-07 RX ADMIN — ENOXAPARIN SODIUM 40 MG: 100 INJECTION SUBCUTANEOUS at 15:16

## 2024-05-07 RX ADMIN — SERTRALINE 100 MG: 100 TABLET, FILM COATED ORAL at 21:12

## 2024-05-07 RX ADMIN — CEFTAROLINE FOSAMIL 600 MG: 600 POWDER, FOR SOLUTION INTRAVENOUS at 13:00

## 2024-05-07 RX ADMIN — CYCLOBENZAPRINE 10 MG: 10 TABLET, FILM COATED ORAL at 08:42

## 2024-05-07 RX ADMIN — CEFTAROLINE FOSAMIL 600 MG: 600 POWDER, FOR SOLUTION INTRAVENOUS at 05:44

## 2024-05-07 RX ADMIN — HYDROCODONE BITARTRATE AND ACETAMINOPHEN 1 TABLET: 10; 325 TABLET ORAL at 05:52

## 2024-05-07 RX ADMIN — QUETIAPINE FUMARATE 50 MG: 25 TABLET ORAL at 21:13

## 2024-05-07 RX ADMIN — VANCOMYCIN HYDROCHLORIDE 1000 MG: 1 INJECTION, POWDER, LYOPHILIZED, FOR SOLUTION INTRAVENOUS at 08:42

## 2024-05-07 RX ADMIN — HYDROCODONE BITARTRATE AND ACETAMINOPHEN 1 TABLET: 10; 325 TABLET ORAL at 21:13

## 2024-05-07 RX ADMIN — HYDROCODONE BITARTRATE AND ACETAMINOPHEN 1 TABLET: 10; 325 TABLET ORAL at 12:51

## 2024-05-07 RX ADMIN — CYCLOBENZAPRINE 10 MG: 10 TABLET, FILM COATED ORAL at 21:13

## 2024-05-07 RX ADMIN — SERTRALINE 100 MG: 100 TABLET, FILM COATED ORAL at 08:42

## 2024-05-07 RX ADMIN — BUSPIRONE HYDROCHLORIDE 15 MG: 15 TABLET ORAL at 08:42

## 2024-05-07 RX ADMIN — HYDROCODONE BITARTRATE AND ACETAMINOPHEN 1 TABLET: 7.5; 325 TABLET ORAL at 17:12

## 2024-05-07 RX ADMIN — CYCLOBENZAPRINE 10 MG: 10 TABLET, FILM COATED ORAL at 15:16

## 2024-05-07 RX ADMIN — Medication 10 ML: at 08:43

## 2024-05-07 RX ADMIN — CEFTAROLINE FOSAMIL 600 MG: 600 POWDER, FOR SOLUTION INTRAVENOUS at 21:13

## 2024-05-07 NOTE — CASE MANAGEMENT/SOCIAL WORK
Continued Stay Note   Vik     Patient Name: Lita Yu  MRN: [id number]  Today's Date: 5/7/2024    Admit Date: 5/1/2024    Plan: Referral to [facility] pending acceptance and precert.  PASRR  approved   Discharge Plan       Row Name 05/07/24 1130       Plan    Plan Referral to [facility] pending acceptance and precert.  PASRR  approved    Plan Comments DC barriers: IV antibiotics, monitoring hgb,  pending acceptance to snf and precert                 Expected Discharge Date and Time       Expected Discharge Date Expected Discharge Time    May 8, 2024           Tequila Bourne RN

## 2024-05-07 NOTE — THERAPY TREATMENT NOTE
"Subjective: Pt agreeable to therapeutic plan of care.    Weight Bearing Status: PWB RLE    Objective:     Bed mobility - Supine to sitting SBA    Transfers - CGA and with rolling walker    ADLs - distant supervision    Ambulation - 15 feet + 25 feet CGA and with rolling walker. Pt demonstrates appropriate compliance to PWB status on RLE.     Therapeutic Exercise - 10 Reps MELISA NERI lying supine and supported sitting / chair    Vitals: Hypotensive  Pre ambulation B/P 96/54 mmHg  Post ambulation B/P 88/70 mmHg    Pain: 9 VAS   Location: R hip  Intervention for pain: Repositioned, Increased Activity, and Therapeutic Presence    Education: Provided education on the importance of mobility in the acute care setting, Verbal/Tactile Cues, ADL training, Transfer Training, Gait Training, and WB'ing status    Assessment: Lita Yu presents with functional mobility impairments which indicate the need for skilled intervention. Tolerating session today without incident. Pt painful and hypotensive this session, but eager to mobilize and participate in PT session. Will continue to follow and progress as tolerated.     Plan/Recommendations:   If medically appropriate, Moderate Intensity Therapy recommended post-acute care. This is recommended as therapy feels the patient would require 3-4 days per week and wouldn't tolerate \"3 hour daily\" rehab intensity. SNF would be the preferred choice. If the patient does not agree to SNF, arrange HH or OP depending on home bound status. If patient is medically complex, consider LTACH. Pt requires no DME at discharge.     Pt desires Skilled Rehab placement at discharge. Pt cooperative; agreeable to therapeutic recommendations and plan of care.         Basic Mobility 6-click:  Rollin = Total, A lot = 2, A little = 3; 4 = None  Supine>Sit:   1 = Total, A lot = 2, A little = 3; 4 = None   Sit>Stand with arms:  1 = Total, A lot = 2, A little = 3; 4 = None  Bed>Chair:   1 = " Total, A lot = 2, A little = 3; 4 = None  Ambulate in room:  1 = Total, A lot = 2, A little = 3; 4 = None  3-5 Steps with railin = Total, A lot = 2, A little = 3; 4 = None  Score: 19    Modified Leodan: N/A = No pre-op stroke/TIA    Post-Tx Position: Up in Chair, Alarms activated, and Call light and personal items within reach  PPE: gloves

## 2024-05-07 NOTE — PLAN OF CARE
Goal Outcome Evaluation: Pt amb to bathroom with walker. Painful but pain managed per MAR.  Plan of care ongoing

## 2024-05-07 NOTE — PLAN OF CARE
Goal Outcome Evaluation:      Pt is Aox4, able to make needs/concerns known, and ambulates with an assist x 1 w/walker. Pain management utilized - see MAR. Hypotensive at times - not symptomatic. Waiting for DC placement. Plan of care ongoing.

## 2024-05-07 NOTE — PLAN OF CARE
"Assessment: Lita Yu presents with functional mobility impairments which indicate the need for skilled intervention. Tolerating session today without incident. Pt painful and hypotensive this session, but eager to mobilize and participate in PT session. Will continue to follow and progress as tolerated.     Plan/Recommendations:   If medically appropriate, Moderate Intensity Therapy recommended post-acute care. This is recommended as therapy feels the patient would require 3-4 days per week and wouldn't tolerate \"3 hour daily\" rehab intensity. SNF would be the preferred choice. If the patient does not agree to SNF, arrange HH or OP depending on home bound status. If patient is medically complex, consider LTACH. Pt requires no DME at discharge.     Pt desires Skilled Rehab placement at discharge. Pt cooperative; agreeable to therapeutic recommendations and plan of care.     "

## 2024-05-07 NOTE — PROGRESS NOTES
"Pharmacy Antimicrobial Dosing Service    Subjective:  Lita Yu is a 58 y.o.female admitted with worsening back pain. Pharmacy has been consulted to dose Vancomycin for possible MRSA bacteremia and bone and/or joint infection, CNS infection.    Pt was started on vancomycin 3/8 for MRSA bacteremia, found to have lumbar discitis/osteomyelitis, epidural abscess. Was discharged to [facility] on vancomycin to complete 8 wks (LD 5/2/24). [facility] began managing vanc ~4/2/24. Confirmed w/[facility] outpt infusion center, pt had therapeutic level demonstrated 4/2, ~4/7, and 4/16. However, level on 4/23 was only 12.  Pt readmitted 4/23-25 w/SARITA, hyperK, possibly d/t NSAID use. MRI at that time showed interval worsening of discitis/OM. After discharge, pt was started on vanc 1gm QOD 4/27, w/subtherapeutic level of 9 on 4/29.  Pt readmitted 5/1 w/worsening pain.     PMH: cirrhosis, seizures      Assessment/Plan    1. Day #7 (this admission) Vancomycin: Goal -600 mcg*h/mL. Patient is currently receiving 1000mg Q24H. Based on level obtained today will continue vancomycin 1000mg (~18.4mg/kg ABW) Q24H for a predicted AUC of 454 based on Bayesian modeling software. Will obtain next trough level on 5/10 at 0800, or sooner if clinically indicated.     2. Day #4 Ceftaroline: 600 mg IV Q8h, for estCrCl > 50 mL/min.    Will continue to monitor drug levels, renal function, culture and sensitivities, and patient clinical status.       Objective:  Relevant clinical data and objective history reviewed:  160 cm (62.99\")   54.4 kg (120 lb)   Ideal body weight: 52.4 kg (115 lb 7.7 oz)  Adjusted ideal body weight: 53.2 kg (117 lb 4.6 oz)  Body mass index is 21.26 kg/m².    Results from last 7 days   Lab Units 05/07/24  0130 05/04/24  0557 05/01/24  1736   VANCOMYCIN TR mcg/mL 16.90 12.20  --    VANCOMYCIN RM mcg/mL  --   --  22.60     Results from last 7 days   Lab Units 05/07/24  0130 05/06/24  0433 05/05/24  0334 " [address] [facility]  PHYSICAL THERAPY  [] EVALUATION  [x] DAILY NOTE (LAND) [] DAILY NOTE (AQUATIC ) [] PROGRESS NOTE [] DISCHARGE NOTE    [x] OUTPATIENT REHABILITATION CENTER [facility]   [] [place] [address]    [] [facility]   [] Lucy Cantory    Date: 2021  Patient Name:  Roverto Hardwick  : 1950  MRN: [id number]  CSN: [id number]    Referring Practitioner Lucia Berg, *   Diagnosis Age-related osteoporosis without current pathological fracture [M81.0]    Treatment Diagnosis Decreased balance with single heel raise and single leg stance, functional strength sit<>stand,    Date of Evaluation 21    Additional Pertinent History HTN, deg. Disc disease, OA-hands, bilateral knees, removal of Baker's cyst right knee, history of fractured patella right knee 7-8 years ago, osteoporosis. Hypothyroid, trigeminal nerve pain. Cervical/neck issues, history of herniated discs at lumbar. Functional Outcome Measure Used Chair stand test    Functional Outcome Score 8x in 30 seconds. WFLs if  <than 8x equals risk for mobility, disability and fraility.   (21)       Insurance: Primary: Payor: Karine Noe /  /  / ,   Secondary: [facility]   Authorization Information: Unlimited visits based on medical necessity. Aquatic yes, modalities yes, iontophoresis, HP/CP are not covered. No precertification. Visit # 7, 7/10 for progress note   Visits Allowed: Per medical necessity   Recertification Date:    Physician Follow-Up: Follow up in 2022. Physician Orders: Bone fragility: PT    History of Present Illness: Patient has been on bone building medication: Fosomax and had right hip pain, skin blisters/itching, teeth sensitivity  took medication for 18 weeks. Noted decreased height by 1 1/2 inches,    Subjective: Patient denies of any pain at hip today. Exercises going well.      TREATMENT   Precautions: Osteoporosis no endrange rotation, lateral flexion of   CREATININE mg/dL 0.87 1.10* 0.81     Estimated Creatinine Clearance: 62 mL/min (by C-G formula based on SCr of 0.87 mg/dL).  I/O last 3 completed shifts:  In: 1160 [P.O.:1160]  Out: 500 [Urine:500]    Results from last 7 days   Lab Units 05/07/24  0130 05/06/24  0433 05/05/24  0401   WBC 10*3/mm3 4.30 4.74 7.39     Temperature    05/06/24 1307 05/06/24 2013 05/07/24 0442   Temp: 98.1 °F (36.7 °C) 98.1 °F (36.7 °C) 97.3 °F (36.3 °C)     Baseline culture/source/susceptibility:  Microbiology Results (last 10 days)       Procedure Component Value - Date/Time    Body Fluid Culture - Body Fluid, Hip, Right [[id number]] Collected: 05/03/24 1256    Lab Status: Preliminary result Specimen: Body Fluid from Hip, Right Updated: 05/07/24 0644     Body Fluid Culture No growth at 4 days     Gram Stain Many (4+) WBCs per low power field      No organisms seen    Blood Culture - Blood, Arm, Right [[id number]]  (Normal) Collected: 05/02/24 1607    Lab Status: Preliminary result Specimen: Blood from Arm, Right Updated: 05/06/24 1631     Blood Culture No growth at 4 days    Blood Culture - Blood, Blood, Central Line [[id number]]  (Normal) Collected: 05/02/24 1553    Lab Status: Preliminary result Specimen: Blood, Central Line Updated: 05/06/24 1631     Blood Culture No growth at 4 days            Constanza Hickey, Pharmacy Intern  05/07/24 10:27 EDT      spine, No loaded spine flexion, Concentrate on strengthening spinal extensors, position awareness, ADLS, balance and fall prevention. Osteoporosis /bone fragility program.    Pain: 0/10      X in shaded column indicates activity completed today   Modalities Parameters/  Location  Notes               Manual Therapy Time/Technique  Notes               Exercise/Intervention   Notes   ADLs: proper bed mobility with supine <>sit using logroll technique. General information regarding the bone fragility program in PT. Review of T scores on Dexascan       Shoulder retraction with depression 10x  x    Neck retraction  10x 5 seconds x    Thoracic extension with ball behind mid thoracic region 5x 5-10 seconds     Hands clasped behind head horizontal abduction with elbows opening and closing 5x  x    Retro shoulder rolls 10x  x    Pec stretch in corner 3x 10 seconds  x    Wall push ups  15x  x    Standing back against wall:       Horizontal abduction/adduction palms up  10x peach x    Standing ER bilaterally  10x Peach  x    Shoulder extension with neck rotation  5x peach x    Shoulder rows 10x Peach  x    Thoracic extension mobilization supine with soft blue foam roller 10x  x    Child's pose with thoracic extension 5x  x                  Sidelying book opening exercises 5x bilateral   x    Bridge and then bridge with marching 10x, 5x 5 seconds x    Supine in hooklying: hands clasped behind the head open and close elbows  10x  x    Prone: press up        Standing heelraises on foam  15x  x    Tandem stance on foam (bilateral lead) 2x 30 seconds x    Single leg stance left/right 2x 10 seconds x    3 way hip ex with peach band   5x  x    Sit<>stand from chair with blue foam cushion in it. x5  x      Specific Interventions Next Treatment: Osteoporosis/Bone fragility program. Patient has right hip pain limiting functional mobility with squats, chair stand, decreased balance with SLS, single heelraises.  Decreased right hip strength, right hip pain. Concentrate on spine extensor strengthening. Avoiding flexion/loaded flexion of spine. Balance, LE strength, core strength. Bone fragility program with ADLS, fall prevention and exercise principles. Activity/Treatment Tolerance:right hip pain with 3 way hip. Decreased reps   [x]  Patient tolerated treatment well  []  Patient limited by fatigue  []  Patient limited by pain   []  Patient limited by medical complications  []  Other:     Assessment:  Patient progressing through ex well. Added resistance band loop for 3 way hip, Progressed balance in tandem stance. Noting improved balance. GOALS:  Patient Goal: To become more knowledgeable of Osteoporosis management. Have an exercise program that will help build bone mass. Be able to return to a good ex program addressing balance, right hip and strength. Use the fitness equipment that she has at her home properly due to osteoporosis     Short Term Goals:  Time Frame: 4 weeks  1. Patient to complete chair stand test from 8x to 10x using LEs/core instead of supporting hands on thighs to sit<>stand. 2. Patient to demonstrate increased balance: SLS from 13 -17 seconds to 20 seconds, Tandem stance from 23-30 seconds to 45 seconds with improved stance time through RLE and balance. 3. Patient to demonstrate increased strength at back extensors, core and LEs at hip musculature with hip ability to improve functional mobility with ease with transfers with chair stand test without UE support on armrests or thighs, ability to complete a functional partial squat and to full squat if right hip pain does not interfere with these functional activities. 4. Patient to demonstrate increased ROM at right hip: right hip flexion (SKTC) 110 degrees to 120 degrees actively, SLR from 65 degrees to 80 degrees. With increased ease with mobility with crossing legs, placing pants on, partial squat, lifting RLE.        Long Term Goals:  Time Frame: 8

## 2024-05-07 NOTE — PROGRESS NOTES
Infectious Diseases Progress Note      LOS: 6 days   Patient Care Team:  Norma Saul APRN as PCP - General (Nurse Practitioner)  Flory Villanueva MD (Physical Medicine and Rehabilitation)    Chief Complaint: Back pain and right hip pain    Subjective     The patient had no high-grade fever during the last 24 hours.  She remained hemodynamically stable.  She is tolerating her current antibiotics with no side effects.      Review of Systems:   Review of Systems   Constitutional: Negative.    HENT: Negative.     Eyes: Negative.    Respiratory: Negative.     Cardiovascular: Negative.    Gastrointestinal: Negative.    Genitourinary: Negative.    Musculoskeletal:  Positive for arthralgias and back pain.   Skin: Negative.    Neurological: Negative.    Hematological: Negative.    Psychiatric/Behavioral: Negative.          Objective     Vital Signs  Temp:  [97.3 °F (36.3 °C)-98.1 °F (36.7 °C)] 98 °F (36.7 °C)  Heart Rate:  [77] 77  Resp:  [13-19] 19  BP: ()/(50-67) 108/51    Physical Exam:  Physical Exam  Vitals and nursing note reviewed.   Constitutional:       Appearance: She is well-developed.   HENT:      Head: Normocephalic and atraumatic.   Eyes:      Pupils: Pupils are equal, round, and reactive to light.   Cardiovascular:      Rate and Rhythm: Normal rate and regular rhythm.      Heart sounds: Normal heart sounds.   Pulmonary:      Effort: Pulmonary effort is normal. No respiratory distress.      Breath sounds: Normal breath sounds. No wheezing or rales.   Abdominal:      General: Bowel sounds are normal. There is no distension.      Palpations: Abdomen is soft. There is no mass.      Tenderness: There is no abdominal tenderness. There is no guarding or rebound.   Musculoskeletal:         General: No deformity.      Cervical back: Normal range of motion and neck supple.      Comments: S/p right hip arthroplasty   Skin:     General: Skin is warm.      Findings: No erythema or rash.   Neurological:       Mental Status: She is alert and oriented to person, place, and time.      Cranial Nerves: No cranial nerve deficit.          Results Review:    I have reviewed all clinical data, test, lab, and imaging results.     Radiology  No Radiology Exams Resulted Within Past 24 Hours    Cardiology    Laboratory    Results from last 7 days   Lab Units 05/07/24  0130 05/06/24  1316 05/06/24  0433 05/05/24  1335 05/05/24  0401 05/04/24  0557 05/02/24  2310 05/02/24  0616   WBC 10*3/mm3 4.30  --  4.74  --  7.39 4.58 4.35 4.54   HEMOGLOBIN g/dL 9.5* 7.6* 7.0* 7.5* 6.3* 8.5* 8.1* 8.4*   HEMATOCRIT % 30.8* 25.3* 22.5* 23.6* 20.2* 27.7* 27.0* 27.4*   PLATELETS 10*3/mm3 96*  --  126*  --  144 167 153 152     Results from last 7 days   Lab Units 05/07/24  0130 05/06/24  0433 05/05/24  0334 05/04/24  0557 05/02/24  2310 05/02/24  0616   SODIUM mmol/L 140 139 138 139 140 140   POTASSIUM mmol/L 3.7 3.7 4.2 3.8 3.8 3.9   CHLORIDE mmol/L 106 108* 103 105 108* 108*   CO2 mmol/L 25.0 26.0 24.0 25.0 25.0 24.0   BUN mg/dL 15 18 15 14 16 14   CREATININE mg/dL 0.87 1.10* 0.81 1.06* 1.03* 1.14*   GLUCOSE mg/dL 78 99 117* 89 113* 95   ALBUMIN g/dL 2.6* 2.7* 2.8* 3.2* 3.0*  --    BILIRUBIN mg/dL 0.3 0.2 <0.2 0.2 <0.2  --    ALK PHOS U/L 154* 149* 145* 184* 187*  --    AST (SGOT) U/L 26 25 25 18 22  --    ALT (SGPT) U/L 16 16 13 13 12  --    CALCIUM mg/dL 8.2* 8.3* 8.5* 9.3 8.8 9.0         Results from last 7 days   Lab Units 05/02/24  0616   SED RATE mm/hr 50*         Microbiology   Microbiology Results (last 10 days)       Procedure Component Value - Date/Time    Body Fluid Culture - Body Fluid, Hip, Right [[id number]] Collected: 05/03/24 1256    Lab Status: Preliminary result Specimen: Body Fluid from Hip, Right Updated: 05/07/24 0644     Body Fluid Culture No growth at 4 days     Gram Stain Many (4+) WBCs per low power field      No organisms seen    Blood Culture - Blood, Arm, Right [[id number]]  (Normal) Collected: 05/02/24 1607    Lab Status:  Preliminary result Specimen: Blood from Arm, Right Updated: 05/06/24 1631     Blood Culture No growth at 4 days    Blood Culture - Blood, Blood, Central Line [[id number]]  (Normal) Collected: 05/02/24 1553    Lab Status: Preliminary result Specimen: Blood, Central Line Updated: 05/06/24 1631     Blood Culture No growth at 4 days            Medication Review:       Schedule Meds  alteplase (CATHFLO/ACTIVASE) 2 mg in sterile water (preservative free) 2.2 mL injection, 2 mg, Intracatheter, Once  busPIRone, 15 mg, Oral, BID  ceftaroline, 600 mg, Intravenous, Q8H  cyclobenzaprine, 10 mg, Oral, TID  enoxaparin, 40 mg, Subcutaneous, Q24H  folic acid, 1 mg, Oral, Daily  QUEtiapine, 50 mg, Oral, Nightly  sertraline, 100 mg, Oral, BID  sodium chloride, 10 mL, Intravenous, Q12H  vancomycin, 1,000 mg, Intravenous, Daily        Infusion Meds  lactated ringers, 9 mL/hr, Last Rate: 100 mL/hr (05/04/24 0812)  Pharmacy to dose vancomycin,         PRN Meds    acetaminophen **OR** acetaminophen **OR** acetaminophen    albuterol    alteplase (CATHFLO/ACTIVASE) 1 mg in sterile water (preservative free) 1.1 mL injection    senna-docusate sodium **AND** polyethylene glycol **AND** bisacodyl **AND** bisacodyl    calcium carbonate    HYDROcodone-acetaminophen    HYDROcodone-acetaminophen    lactated ringers    ondansetron ODT **OR** ondansetron    Pharmacy to dose vancomycin    sodium chloride    sodium chloride        Assessment & Plan       Antimicrobial Therapy   1.  IV vancomycin        2.  IV Teflaro        3.        4.        5.              Assessment     Discitis/osteomyelitis at the level of L2-L3.  Current MRI showed worsening of discitis with persistent epidural abscess and moderate canal stenosis.  There was paraspinal phlegmon.  Neurosurgery service following and the patient and decided not to do surgical intervention.  The patient has been receiving IV antibiotics for 8 weeks prior to admission.  I am concerned about clinical  failure and need of surgery.    Possible right hip septic arthritis based on MRI of the pelvis.  Synovial fluid findings was not highly consistent with infection with a white count of only 5000 but predominantly neutrophils.  Findings was consistent with avascular necrosis and patient required right hip arthroplasty on May 4, 2024.  Cultures are negative so far     Recent admission for methicillin resistant Staphylococcus aureus bacteremia.  Likely source is lumbar discitis/osteomyelitis.   SILVINA performed on 3/12/2024 with cardiology notes mentioning no vegetation.  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  Patient denied having infection after the procedure        Plan     Continue IV vancomycin-keep trough between 15 and 20.   I will extend the duration of the treatment to 12 weeks.  If patient continues to have abnormality after 12 weeks then she needs to be evaluated for surgery.  Maximum duration of antibiotics will be 12 weeks otherwise the case would be consider failure of therapy  Waiting on repeat blood culture results and right hip fluid culture results  Continue IV Teflaro  600 mg every 8 hours for 2 weeks  Continue supportive care  A.mShima Foster MD  05/07/24  14:05 EDT    Note is dictated utilizing voice recognition software/Dragon

## 2024-05-07 NOTE — PROGRESS NOTES
[facility] MEDICINE SERVICE  DAILY PROGRESS NOTE    NAME: Lita Yu  : 1965  MRN: [id number]      LOS: 6 days     PROVIDER OF SERVICE: Niels Sands MD    Chief Complaint: Spinal abscess    Subjective:     Interval History:  History taken from: patient chart RN  Pain controlled     Review of Systems:   Review of Systems  All negative except as above  Objective:     Vital Signs  Temp:  [97.3 °F (36.3 °C)-98.1 °F (36.7 °C)] 97.3 °F (36.3 °C)  Heart Rate:  [63] 63  Resp:  [13-17] 13  BP: ()/(47-67) 89/50   Body mass index is 21.26 kg/m².    Physical Exam  Physical Exam  AOx3 NAD  RRR S1 and S2 audible  Lungs with fair entry  Abdomen soft nontender nondistended  Scheduled Meds   alteplase (CATHFLO/ACTIVASE) 2 mg in sterile water (preservative free) 2.2 mL injection, 2 mg, Intracatheter, Once  busPIRone, 15 mg, Oral, BID  ceftaroline, 600 mg, Intravenous, Q8H  cyclobenzaprine, 10 mg, Oral, TID  enoxaparin, 40 mg, Subcutaneous, Q24H  folic acid, 1 mg, Oral, Daily  QUEtiapine, 50 mg, Oral, Nightly  sertraline, 100 mg, Oral, BID  sodium chloride, 10 mL, Intravenous, Q12H  vancomycin, 1,000 mg, Intravenous, Daily       PRN Meds     acetaminophen **OR** acetaminophen **OR** acetaminophen    albuterol    alteplase (CATHFLO/ACTIVASE) 1 mg in sterile water (preservative free) 1.1 mL injection    senna-docusate sodium **AND** polyethylene glycol **AND** bisacodyl **AND** bisacodyl    calcium carbonate    HYDROcodone-acetaminophen    HYDROcodone-acetaminophen    lactated ringers    ondansetron ODT **OR** ondansetron    Pharmacy to dose vancomycin    sodium chloride    sodium chloride   Infusions  lactated ringers, 9 mL/hr, Last Rate: 100 mL/hr (24 0812)  Pharmacy to dose vancomycin,           Diagnostic Data    Results from last 7 days   Lab Units 24  0130   WBC 10*3/mm3 4.30   HEMOGLOBIN g/dL 9.5*   HEMATOCRIT % 30.8*   PLATELETS 10*3/mm3 96*   GLUCOSE mg/dL 78   CREATININE  mg/dL 0.87   BUN mg/dL 15   SODIUM mmol/L 140   POTASSIUM mmol/L 3.7   AST (SGOT) U/L 26   ALT (SGPT) U/L 16   ALK PHOS U/L 154*   BILIRUBIN mg/dL 0.3   ANION GAP mmol/L 9.0       No radiology results for the last day      I reviewed the patient's new clinical results.  I reviewed the patient's new imaging results and agree with the interpretation.    Assessment/Plan:     Active and Resolved Problems  Active Hospital Problems    Diagnosis  POA    **Spinal abscess [M46.20]  Yes    Pain in joints [M25.50]  Unknown      Resolved Hospital Problems   No resolved problems to display.       58-year-old female with history of hypertension, peripheral vascular disease, degenerative joint disease, seizure disorder, tobacco abuse, EtOH liver cirrhosis, recent history of MRSA bacteremia attributed to L2-3 OM/discitis admitted to [facility] on 5/1/2024 with a worsening pain in her lower back.      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  Prior plan for vancomycin until 5/3/2024.      -Now admitted with worsening low back pain  -MRI lumbar and pelvis spine as noted.  Shows right hip effusion Along with L2-3 discitis and vertebral body osteomyelitis with enhancing paraspinal phlegmon     ID following continue vancomycin.  Duration of treatment extended to 12 weeks.  Teflaro added status post IR guided right hip aspiration.  Cell count studies noted follow culture. NGTD  Seen by orthopedics status post R anterior total hip arthroplasty 5/4.  She will require revision in future in 2 to 3 months  Continue oxycodone as needed for pain.  DC IV Dilaudid.  Continue Flexeril  Seen by CHRISTIANO no plan for surgical intervention  Follow infectious workup  DVT prophylaxis with enoxaparin  PT/OT.  Rehab     #History of alcohol liver cirrhosis           Outpatient follow-up     #Acute blood loss anemia on chronic anemia  Monitor H&H.  received 1 unit of PRBC 5/5  Transfuse PRBCs to keep hemoglobin more than 7     #Tobacco abuse  NRT  Counseled    DVT prophylaxis:  Medical and mechanical DVT prophylaxis orders are present.         Code status is   Code Status and Medical Interventions:   Ordered at: 05/01/24 7403     Level Of Support Discussed With:    Patient     Code Status (Patient has no pulse and is not breathing):    CPR (Attempt to Resuscitate)     Medical Interventions (Patient has pulse or is breathing):    Full Support       Plan for disposition: Pending placement    Time: 30 minutes    Signature: Electronically signed by Niels Sands MD, 05/07/24, 11:03 EDT.  [facility] Hospitalist Team

## 2024-05-08 LAB
ALBUMIN SERPL-MCNC: 2.6 G/DL (ref 3.5–5.2)
ALBUMIN SERPL-MCNC: 2.9 G/DL (ref 3.5–5.2)
ALBUMIN/GLOB SERPL: 0.8 G/DL
ALBUMIN/GLOB SERPL: 0.9 G/DL
ALP SERPL-CCNC: 186 U/L (ref 39–117)
ALP SERPL-CCNC: 217 U/L (ref 39–117)
ALT SERPL W P-5'-P-CCNC: 19 U/L (ref 1–33)
ALT SERPL W P-5'-P-CCNC: 23 U/L (ref 1–33)
ANION GAP SERPL CALCULATED.3IONS-SCNC: 8 MMOL/L (ref 5–15)
ANION GAP SERPL CALCULATED.3IONS-SCNC: 9 MMOL/L (ref 5–15)
AST SERPL-CCNC: 31 U/L (ref 1–32)
AST SERPL-CCNC: 38 U/L (ref 1–32)
BACTERIA FLD CULT: NORMAL
BASOPHILS # BLD AUTO: 0.02 10*3/MM3 (ref 0–0.2)
BASOPHILS # BLD AUTO: 0.02 10*3/MM3 (ref 0–0.2)
BASOPHILS NFR BLD AUTO: 0.4 % (ref 0–1.5)
BASOPHILS NFR BLD AUTO: 0.4 % (ref 0–1.5)
BILIRUB SERPL-MCNC: 0.3 MG/DL (ref 0–1.2)
BILIRUB SERPL-MCNC: 0.3 MG/DL (ref 0–1.2)
BUN SERPL-MCNC: 14 MG/DL (ref 6–20)
BUN SERPL-MCNC: 15 MG/DL (ref 6–20)
BUN/CREAT SERPL: 12.8 (ref 7–25)
BUN/CREAT SERPL: 17.6 (ref 7–25)
CALCIUM SPEC-SCNC: 7.9 MG/DL (ref 8.6–10.5)
CALCIUM SPEC-SCNC: 8.3 MG/DL (ref 8.6–10.5)
CHLORIDE SERPL-SCNC: 105 MMOL/L (ref 98–107)
CHLORIDE SERPL-SCNC: 106 MMOL/L (ref 98–107)
CO2 SERPL-SCNC: 24 MMOL/L (ref 22–29)
CO2 SERPL-SCNC: 24 MMOL/L (ref 22–29)
CREAT SERPL-MCNC: 0.85 MG/DL (ref 0.57–1)
CREAT SERPL-MCNC: 1.09 MG/DL (ref 0.57–1)
DEPRECATED RDW RBC AUTO: 58.7 FL (ref 37–54)
DEPRECATED RDW RBC AUTO: 61.2 FL (ref 37–54)
EGFRCR SERPLBLD CKD-EPI 2021: 59.7 ML/MIN/1.73
EGFRCR SERPLBLD CKD-EPI 2021: 80.5 ML/MIN/1.73
EOSINOPHIL # BLD AUTO: 0.34 10*3/MM3 (ref 0–0.4)
EOSINOPHIL # BLD AUTO: 0.37 10*3/MM3 (ref 0–0.4)
EOSINOPHIL NFR BLD AUTO: 6.6 % (ref 0.3–6.2)
EOSINOPHIL NFR BLD AUTO: 6.7 % (ref 0.3–6.2)
ERYTHROCYTE [DISTWIDTH] IN BLOOD BY AUTOMATED COUNT: 16.7 % (ref 12.3–15.4)
ERYTHROCYTE [DISTWIDTH] IN BLOOD BY AUTOMATED COUNT: 18.1 % (ref 12.3–15.4)
GLOBULIN UR ELPH-MCNC: 3.1 GM/DL
GLOBULIN UR ELPH-MCNC: 3.3 GM/DL
GLUCOSE SERPL-MCNC: 105 MG/DL (ref 65–99)
GLUCOSE SERPL-MCNC: 85 MG/DL (ref 65–99)
GRAM STN SPEC: NORMAL
GRAM STN SPEC: NORMAL
HCT VFR BLD AUTO: 21.8 % (ref 34–46.6)
HCT VFR BLD AUTO: 25.4 % (ref 34–46.6)
HCT VFR BLD AUTO: 26.5 % (ref 34–46.6)
HGB BLD-MCNC: 6.7 G/DL (ref 12–15.9)
HGB BLD-MCNC: 7.9 G/DL (ref 12–15.9)
HGB BLD-MCNC: 8 G/DL (ref 12–15.9)
IMM GRANULOCYTES # BLD AUTO: 0.07 10*3/MM3 (ref 0–0.05)
IMM GRANULOCYTES # BLD AUTO: 0.08 10*3/MM3 (ref 0–0.05)
IMM GRANULOCYTES NFR BLD AUTO: 1.4 % (ref 0–0.5)
IMM GRANULOCYTES NFR BLD AUTO: 1.4 % (ref 0–0.5)
LYMPHOCYTES # BLD AUTO: 1.11 10*3/MM3 (ref 0.7–3.1)
LYMPHOCYTES # BLD AUTO: 1.21 10*3/MM3 (ref 0.7–3.1)
LYMPHOCYTES NFR BLD AUTO: 21.7 % (ref 19.6–45.3)
LYMPHOCYTES NFR BLD AUTO: 21.9 % (ref 19.6–45.3)
MAGNESIUM SERPL-MCNC: 1.6 MG/DL (ref 1.6–2.6)
MAGNESIUM SERPL-MCNC: 1.7 MG/DL (ref 1.6–2.6)
MCH RBC QN AUTO: 28.8 PG (ref 26.6–33)
MCH RBC QN AUTO: 29.4 PG (ref 26.6–33)
MCHC RBC AUTO-ENTMCNC: 30.7 G/DL (ref 31.5–35.7)
MCHC RBC AUTO-ENTMCNC: 31.1 G/DL (ref 31.5–35.7)
MCV RBC AUTO: 92.7 FL (ref 79–97)
MCV RBC AUTO: 95.6 FL (ref 79–97)
MONOCYTES # BLD AUTO: 0.57 10*3/MM3 (ref 0.1–0.9)
MONOCYTES # BLD AUTO: 0.57 10*3/MM3 (ref 0.1–0.9)
MONOCYTES NFR BLD AUTO: 10.2 % (ref 5–12)
MONOCYTES NFR BLD AUTO: 11.2 % (ref 5–12)
NEUTROPHILS NFR BLD AUTO: 2.97 10*3/MM3 (ref 1.7–7)
NEUTROPHILS NFR BLD AUTO: 3.33 10*3/MM3 (ref 1.7–7)
NEUTROPHILS NFR BLD AUTO: 58.4 % (ref 42.7–76)
NEUTROPHILS NFR BLD AUTO: 59.7 % (ref 42.7–76)
NRBC BLD AUTO-RTO: 0 /100 WBC (ref 0–0.2)
NRBC BLD AUTO-RTO: 0 /100 WBC (ref 0–0.2)
PHOSPHATE SERPL-MCNC: 3.5 MG/DL (ref 2.5–4.5)
PHOSPHATE SERPL-MCNC: 3.6 MG/DL (ref 2.5–4.5)
PLATELET # BLD AUTO: 130 10*3/MM3 (ref 140–450)
PLATELET # BLD AUTO: 147 10*3/MM3 (ref 140–450)
PMV BLD AUTO: 9.3 FL (ref 6–12)
PMV BLD AUTO: 9.7 FL (ref 6–12)
POTASSIUM SERPL-SCNC: 3.5 MMOL/L (ref 3.5–5.2)
POTASSIUM SERPL-SCNC: 3.7 MMOL/L (ref 3.5–5.2)
PROT SERPL-MCNC: 5.7 G/DL (ref 6–8.5)
PROT SERPL-MCNC: 6.2 G/DL (ref 6–8.5)
RBC # BLD AUTO: 2.28 10*6/MM3 (ref 3.77–5.28)
RBC # BLD AUTO: 2.74 10*6/MM3 (ref 3.77–5.28)
SODIUM SERPL-SCNC: 137 MMOL/L (ref 136–145)
SODIUM SERPL-SCNC: 139 MMOL/L (ref 136–145)
WBC NRBC COR # BLD AUTO: 5.08 10*3/MM3 (ref 3.4–10.8)
WBC NRBC COR # BLD AUTO: 5.58 10*3/MM3 (ref 3.4–10.8)

## 2024-05-08 PROCEDURE — 97535 SELF CARE MNGMENT TRAINING: CPT

## 2024-05-08 PROCEDURE — 85018 HEMOGLOBIN: CPT | Performed by: HOSPITALIST

## 2024-05-08 PROCEDURE — 25010000002 CEFTAROLINE FOSAMIL PER 10 MG: Performed by: INTERNAL MEDICINE

## 2024-05-08 PROCEDURE — 85025 COMPLETE CBC W/AUTO DIFF WBC: CPT | Performed by: ORTHOPAEDIC SURGERY

## 2024-05-08 PROCEDURE — 25010000002 ENOXAPARIN PER 10 MG: Performed by: HOSPITALIST

## 2024-05-08 PROCEDURE — 97116 GAIT TRAINING THERAPY: CPT

## 2024-05-08 PROCEDURE — P9040 RBC LEUKOREDUCED IRRADIATED: HCPCS

## 2024-05-08 PROCEDURE — 80053 COMPREHEN METABOLIC PANEL: CPT | Performed by: ORTHOPAEDIC SURGERY

## 2024-05-08 PROCEDURE — 97530 THERAPEUTIC ACTIVITIES: CPT

## 2024-05-08 PROCEDURE — 25810000003 SODIUM CHLORIDE 0.9 % SOLUTION 250 ML FLEX CONT: Performed by: INTERNAL MEDICINE

## 2024-05-08 PROCEDURE — 25010000002 VANCOMYCIN 1 G RECONSTITUTED SOLUTION 1 EACH VIAL: Performed by: INTERNAL MEDICINE

## 2024-05-08 PROCEDURE — 86900 BLOOD TYPING SEROLOGIC ABO: CPT

## 2024-05-08 PROCEDURE — 85014 HEMATOCRIT: CPT | Performed by: HOSPITALIST

## 2024-05-08 PROCEDURE — 97110 THERAPEUTIC EXERCISES: CPT

## 2024-05-08 PROCEDURE — 83735 ASSAY OF MAGNESIUM: CPT | Performed by: ORTHOPAEDIC SURGERY

## 2024-05-08 PROCEDURE — 36430 TRANSFUSION BLD/BLD COMPNT: CPT

## 2024-05-08 PROCEDURE — 84100 ASSAY OF PHOSPHORUS: CPT | Performed by: ORTHOPAEDIC SURGERY

## 2024-05-08 PROCEDURE — P9016 RBC LEUKOCYTES REDUCED: HCPCS

## 2024-05-08 RX ADMIN — HYDROCODONE BITARTRATE AND ACETAMINOPHEN 1 TABLET: 10; 325 TABLET ORAL at 19:43

## 2024-05-08 RX ADMIN — HYDROCODONE BITARTRATE AND ACETAMINOPHEN 1 TABLET: 10; 325 TABLET ORAL at 07:32

## 2024-05-08 RX ADMIN — CYCLOBENZAPRINE 10 MG: 10 TABLET, FILM COATED ORAL at 15:38

## 2024-05-08 RX ADMIN — VANCOMYCIN HYDROCHLORIDE 1000 MG: 1 INJECTION, POWDER, LYOPHILIZED, FOR SOLUTION INTRAVENOUS at 08:42

## 2024-05-08 RX ADMIN — BUSPIRONE HYDROCHLORIDE 15 MG: 15 TABLET ORAL at 08:42

## 2024-05-08 RX ADMIN — Medication 10 ML: at 08:43

## 2024-05-08 RX ADMIN — SERTRALINE 100 MG: 100 TABLET, FILM COATED ORAL at 08:42

## 2024-05-08 RX ADMIN — HYDROCODONE BITARTRATE AND ACETAMINOPHEN 1 TABLET: 10; 325 TABLET ORAL at 11:33

## 2024-05-08 RX ADMIN — ENOXAPARIN SODIUM 40 MG: 100 INJECTION SUBCUTANEOUS at 15:38

## 2024-05-08 RX ADMIN — SERTRALINE 100 MG: 100 TABLET, FILM COATED ORAL at 20:12

## 2024-05-08 RX ADMIN — HYDROCODONE BITARTRATE AND ACETAMINOPHEN 1 TABLET: 10; 325 TABLET ORAL at 15:38

## 2024-05-08 RX ADMIN — CYCLOBENZAPRINE 10 MG: 10 TABLET, FILM COATED ORAL at 08:42

## 2024-05-08 RX ADMIN — Medication 10 ML: at 20:13

## 2024-05-08 RX ADMIN — HYDROCODONE BITARTRATE AND ACETAMINOPHEN 1 TABLET: 10; 325 TABLET ORAL at 02:49

## 2024-05-08 RX ADMIN — CEFTAROLINE FOSAMIL 600 MG: 600 POWDER, FOR SOLUTION INTRAVENOUS at 10:37

## 2024-05-08 RX ADMIN — QUETIAPINE FUMARATE 50 MG: 25 TABLET ORAL at 20:12

## 2024-05-08 RX ADMIN — CYCLOBENZAPRINE 10 MG: 10 TABLET, FILM COATED ORAL at 20:13

## 2024-05-08 RX ADMIN — CEFTAROLINE FOSAMIL 600 MG: 600 POWDER, FOR SOLUTION INTRAVENOUS at 18:42

## 2024-05-08 RX ADMIN — HYDROCODONE BITARTRATE AND ACETAMINOPHEN 1 TABLET: 7.5; 325 TABLET ORAL at 23:18

## 2024-05-08 RX ADMIN — BUSPIRONE HYDROCHLORIDE 15 MG: 15 TABLET ORAL at 20:13

## 2024-05-08 RX ADMIN — FOLIC ACID 1 MG: 1 TABLET ORAL at 08:42

## 2024-05-08 NOTE — SIGNIFICANT NOTE
#Anemia    - hgb 9.5 > 6.7    - no overt bleeding reported, suspect post op anemia    - 1u prbc ordered    Electronically signed by Aixa Murrieta DO, 05/08/24, 3:58 AM EDT.

## 2024-05-08 NOTE — PLAN OF CARE
Goal Outcome Evaluation:             Lita Yu presents with functional mobility impairments which indicate the need for skilled intervention. Pain limiting tolerance to R hip ROM and exercises, does well for short distances maintaining PWB RLE with walker. Tolerating session today without incident. Will continue to follow and progress as tolerated.

## 2024-05-08 NOTE — PROGRESS NOTES
Infectious Diseases Progress Note      LOS: 7 days   Patient Care Team:  Norma Saul APRN as PCP - General (Nurse Practitioner)  Flory Villanueva MD (Physical Medicine and Rehabilitation)    Chief Complaint: Back pain and right hip pain    Subjective     The patient had no high-grade fever during the last 24 hours.  She remained hemodynamically stable.  She is tolerating her current antibiotics with no side effects.      Review of Systems:   Review of Systems   Constitutional: Negative.    HENT: Negative.     Eyes: Negative.    Respiratory: Negative.     Cardiovascular: Negative.    Gastrointestinal: Negative.    Genitourinary: Negative.    Musculoskeletal:  Positive for arthralgias and back pain.   Skin: Negative.    Neurological: Negative.    Hematological: Negative.    Psychiatric/Behavioral: Negative.          Objective     Vital Signs  Temp:  [97.8 °F (36.6 °C)-98.3 °F (36.8 °C)] 98 °F (36.7 °C)  Heart Rate:  [62-73] 66  Resp:  [14-18] 14  BP: ()/(60-67) 108/67    Physical Exam:  Physical Exam  Vitals and nursing note reviewed.   Constitutional:       Appearance: She is well-developed.   HENT:      Head: Normocephalic and atraumatic.   Eyes:      Pupils: Pupils are equal, round, and reactive to light.   Cardiovascular:      Rate and Rhythm: Normal rate and regular rhythm.      Heart sounds: Normal heart sounds.   Pulmonary:      Effort: Pulmonary effort is normal. No respiratory distress.      Breath sounds: Normal breath sounds. No wheezing or rales.   Abdominal:      General: Bowel sounds are normal. There is no distension.      Palpations: Abdomen is soft. There is no mass.      Tenderness: There is no abdominal tenderness. There is no guarding or rebound.   Musculoskeletal:         General: No deformity.      Cervical back: Normal range of motion and neck supple.      Comments: S/p right hip arthroplasty   Skin:     General: Skin is warm.      Findings: No erythema or rash.   Neurological:       Mental Status: She is alert and oriented to person, place, and time.      Cranial Nerves: No cranial nerve deficit.          Results Review:    I have reviewed all clinical data, test, lab, and imaging results.     Radiology  No Radiology Exams Resulted Within Past 24 Hours    Cardiology    Laboratory    Results from last 7 days   Lab Units 05/08/24  0231 05/07/24  0130 05/06/24  1316 05/06/24  0433 05/05/24  1335 05/05/24  0401 05/04/24  0557 05/02/24  2310 05/02/24  0616   WBC 10*3/mm3 5.08 4.30  --  4.74  --  7.39 4.58 4.35 4.54   HEMOGLOBIN g/dL 6.7* 9.5* 7.6* 7.0* 7.5* 6.3* 8.5* 8.1* 8.4*   HEMATOCRIT % 21.8* 30.8* 25.3* 22.5* 23.6* 20.2* 27.7* 27.0* 27.4*   PLATELETS 10*3/mm3 130* 96*  --  126*  --  144 167 153 152     Results from last 7 days   Lab Units 05/07/24  2348 05/07/24  0130 05/06/24  0433 05/05/24  0334 05/04/24  0557 05/02/24  2310 05/02/24  0616   SODIUM mmol/L 137 140 139 138 139 140 140   POTASSIUM mmol/L 3.5 3.7 3.7 4.2 3.8 3.8 3.9   CHLORIDE mmol/L 105 106 108* 103 105 108* 108*   CO2 mmol/L 24.0 25.0 26.0 24.0 25.0 25.0 24.0   BUN mg/dL 15 15 18 15 14 16 14   CREATININE mg/dL 0.85 0.87 1.10* 0.81 1.06* 1.03* 1.14*   GLUCOSE mg/dL 105* 78 99 117* 89 113* 95   ALBUMIN g/dL 2.6* 2.6* 2.7* 2.8* 3.2* 3.0*  --    BILIRUBIN mg/dL 0.3 0.3 0.2 <0.2 0.2 <0.2  --    ALK PHOS U/L 186* 154* 149* 145* 184* 187*  --    AST (SGOT) U/L 31 26 25 25 18 22  --    ALT (SGPT) U/L 19 16 16 13 13 12  --    CALCIUM mg/dL 7.9* 8.2* 8.3* 8.5* 9.3 8.8 9.0         Results from last 7 days   Lab Units 05/02/24  0616   SED RATE mm/hr 50*         Microbiology   Microbiology Results (last 10 days)       Procedure Component Value - Date/Time    Body Fluid Culture - Body Fluid, Hip, Right [[id number]] Collected: 05/03/24 1256    Lab Status: Final result Specimen: Body Fluid from Hip, Right Updated: 05/08/24 0644     Body Fluid Culture No growth at 5 days     Gram Stain Many (4+) WBCs per low power field      No organisms seen     Blood Culture - Blood, Arm, Right [[id number]]  (Normal) Collected: 05/02/24 1607    Lab Status: Final result Specimen: Blood from Arm, Right Updated: 05/07/24 1631     Blood Culture No growth at 5 days    Blood Culture - Blood, Blood, Central Line [[id number]]  (Normal) Collected: 05/02/24 1553    Lab Status: Final result Specimen: Blood, Central Line Updated: 05/07/24 1631     Blood Culture No growth at 5 days            Medication Review:       Schedule Meds  alteplase (CATHFLO/ACTIVASE) 2 mg in sterile water (preservative free) 2.2 mL injection, 2 mg, Intracatheter, Once  busPIRone, 15 mg, Oral, BID  ceftaroline, 600 mg, Intravenous, Q8H  cyclobenzaprine, 10 mg, Oral, TID  enoxaparin, 40 mg, Subcutaneous, Q24H  folic acid, 1 mg, Oral, Daily  QUEtiapine, 50 mg, Oral, Nightly  sertraline, 100 mg, Oral, BID  sodium chloride, 10 mL, Intravenous, Q12H  vancomycin, 1,000 mg, Intravenous, Daily        Infusion Meds  lactated ringers, 9 mL/hr, Last Rate: 100 mL/hr (05/04/24 0812)  Pharmacy to dose vancomycin,         PRN Meds    acetaminophen **OR** acetaminophen **OR** acetaminophen    albuterol    alteplase (CATHFLO/ACTIVASE) 1 mg in sterile water (preservative free) 1.1 mL injection    senna-docusate sodium **AND** polyethylene glycol **AND** bisacodyl **AND** bisacodyl    calcium carbonate    HYDROcodone-acetaminophen    HYDROcodone-acetaminophen    lactated ringers    ondansetron ODT **OR** ondansetron    Pharmacy to dose vancomycin    sodium chloride    sodium chloride        Assessment & Plan       Antimicrobial Therapy   1.  IV vancomycin        2.  IV Teflaro        3.        4.        5.              Assessment     Discitis/osteomyelitis at the level of L2-L3.  Current MRI showed worsening of discitis with persistent epidural abscess and moderate canal stenosis.  There was paraspinal phlegmon.  Neurosurgery service following and the patient and decided not to do surgical intervention.  The patient has been  receiving IV antibiotics for 8 weeks prior to admission.  I am concerned about clinical failure and need of surgery.    Possible right hip septic arthritis based on MRI of the pelvis.  Synovial fluid findings was not highly consistent with infection with a white count of only 5000 but predominantly neutrophils.  Findings was consistent with avascular necrosis and patient required right hip arthroplasty on May 4, 2024.  Cultures are negative so far     Recent admission for methicillin resistant Staphylococcus aureus bacteremia.  Likely source is lumbar discitis/osteomyelitis.   SILVINA performed on 3/12/2024 with cardiology notes mentioning no vegetation.  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  Patient denied having infection after the procedure        Plan     Continue IV vancomycin-keep trough between 15 and 20.   I will extend the duration of the treatment to 12 weeks.  If patient continues to have abnormality after 12 weeks then she needs to be evaluated for surgery.  Maximum duration of antibiotics will be 12 weeks otherwise the case would be consider failure of therapy  Waiting on repeat blood culture results and right hip fluid culture results  Continue IV Teflaro  600 mg every 8 hours for 2 weeks.  However, if rehab facility is not accepting the patient since Teflaro is not available then can discontinue Teflaro  I would recommend to repeat MRI of the lumbar spine with and without contrast before finishing IV vancomycin      Pilar Foster MD  05/08/24  12:51 EDT    Note is dictated utilizing voice recognition software/Dragon

## 2024-05-08 NOTE — THERAPY TREATMENT NOTE
"Subjective: Pt agreeable to therapeutic plan of care.  Cognition: oriented to Person, Place, Time, and Situation    Objective:     Bed Mobility: Min-A sit to supine for assistance with RLE   Functional Transfers: CGA and with rolling walker     Balance: supported, static, dynamic, and standing CGA and with rolling walker  Functional Ambulation: CGA and with rolling walker    Grooming: CGA  ADL Position: unsupported standing and at sink  ADL Comments: Pt stood sinkside for completion of oral care regimen and hair care.      Vitals: WNL    Pain: 9 VAS  Location: R hip  Interventions for pain: Repositioned, Increased Activity, and Therapeutic Presence  Education: Provided education on the importance of mobility in the acute care setting, Verbal/Tactile Cues, ADL training, and Transfer Training      Assessment: Lita Yu presents with ADL impairments affecting function including balance, endurance / activity tolerance, pain, and strength. Demonstrated functioning below baseline abilities indicate the need for continued skilled intervention while inpatient. Patient continues to be hindered by pain, rated 9/10 this date. She does well with partial weight-bearing activities using RW, but pain limits progress. She was CGA for mobility and grooming in standing, but cont to require assist with supine to sit transition for managing the right leg. Tolerating session today without incident. Will continue to follow and progress as tolerated.     Plan/Recommendations:   Moderate Intensity Therapy recommended post-acute care. This is recommended as therapy feels the patient would require 3-4 days per week and wouldn't tolerate \"3 hour daily\" rehab intensity. SNF would be the preferred choice. If the patient does not agree to SNF, arrange HH or OP depending on home bound status. If patient is medically complex, consider LTACH.. Pt requires no DME at discharge.     Pt desires Skilled Rehab placement at discharge. Pt " cooperative; agreeable to therapeutic recommendations and plan of care.     Modified [place]: N/A = No pre-op stroke/TIA    Post-Tx Position: Supine with HOB Elevated, Alarms activated, and Call light and personal items within reach  PPE: gloves

## 2024-05-08 NOTE — PROGRESS NOTES
[facility] MEDICINE SERVICE  DAILY PROGRESS NOTE    NAME: Lita Yu  : 1965  MRN: [id number]      LOS: 7 days     PROVIDER OF SERVICE: Niels Sands MD    Chief Complaint: Spinal abscess    Subjective:     Interval History:  History taken from: patient chart RN  Pain controlled  Overnight events noted     Review of Systems:   Review of Systems  All negative except as above   Objective:     Vital Signs  Temp:  [97.8 °F (36.6 °C)-98.3 °F (36.8 °C)] 98.3 °F (36.8 °C)  Heart Rate:  [62-73] 66  Resp:  [16-18] 16  BP: ()/(51-67) 108/67   Body mass index is 21.26 kg/m².    Physical Exam  Physical Exam  AOx3 NAD  RRR S1 and S2 audible  Lungs with fair entry  Abdomen soft nontender nondistended  R Hip dressing intact. No active bleeding   Scheduled Meds   alteplase (CATHFLO/ACTIVASE) 2 mg in sterile water (preservative free) 2.2 mL injection, 2 mg, Intracatheter, Once  busPIRone, 15 mg, Oral, BID  ceftaroline, 600 mg, Intravenous, Q8H  cyclobenzaprine, 10 mg, Oral, TID  enoxaparin, 40 mg, Subcutaneous, Q24H  folic acid, 1 mg, Oral, Daily  QUEtiapine, 50 mg, Oral, Nightly  sertraline, 100 mg, Oral, BID  sodium chloride, 10 mL, Intravenous, Q12H  vancomycin, 1,000 mg, Intravenous, Daily       PRN Meds     acetaminophen **OR** acetaminophen **OR** acetaminophen    albuterol    alteplase (CATHFLO/ACTIVASE) 1 mg in sterile water (preservative free) 1.1 mL injection    senna-docusate sodium **AND** polyethylene glycol **AND** bisacodyl **AND** bisacodyl    calcium carbonate    HYDROcodone-acetaminophen    HYDROcodone-acetaminophen    lactated ringers    ondansetron ODT **OR** ondansetron    Pharmacy to dose vancomycin    sodium chloride    sodium chloride   Infusions  lactated ringers, 9 mL/hr, Last Rate: 100 mL/hr (24 0812)  Pharmacy to dose vancomycin,           Diagnostic Data    Results from last 7 days   Lab Units 24  0231 24  2348   WBC 10*3/mm3 5.08  --     HEMOGLOBIN g/dL 6.7*  --    HEMATOCRIT % 21.8*  --    PLATELETS 10*3/mm3 130*  --    GLUCOSE mg/dL  --  105*   CREATININE mg/dL  --  0.85   BUN mg/dL  --  15   SODIUM mmol/L  --  137   POTASSIUM mmol/L  --  3.5   AST (SGOT) U/L  --  31   ALT (SGPT) U/L  --  19   ALK PHOS U/L  --  186*   BILIRUBIN mg/dL  --  0.3   ANION GAP mmol/L  --  8.0       No radiology results for the last day      I reviewed the patient's new clinical results.  I reviewed the patient's new imaging results and agree with the interpretation.    Assessment/Plan:     Active and Resolved Problems  Active Hospital Problems    Diagnosis  POA    **Spinal abscess [M46.20]  Yes    Pain in joints [M25.50]  Unknown      Resolved Hospital Problems   No resolved problems to display.       58-year-old female with history of hypertension, peripheral vascular disease, degenerative joint disease, seizure disorder, tobacco abuse, EtOH liver cirrhosis, recent history of MRSA bacteremia attributed to L2-3 OM/discitis admitted to [facility] on 5/1/2024 with a worsening pain in her lower back.      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  Prior plan for vancomycin until 5/3/2024.      -Now admitted with worsening low back pain  -MRI lumbar and pelvis spine as noted.  Shows right hip effusion Along with L2-3 discitis and vertebral body osteomyelitis with enhancing paraspinal phlegmon     ID following continue vancomycin.  Duration of treatment extended to 12 weeks.  Teflaro added status post IR guided right hip aspiration.  Cell count studies noted follow culture. NGTD  Seen by orthopedics status post R anterior total hip arthroplasty 5/4.  She will require revision in future in 2 to 3 months  Continue oxycodone as needed for pain.  DC IV Dilaudid.  Continue Flexeril  Seen by  CHRISTIANO no plan for surgical intervention  Follow infectious workup  DVT prophylaxis with enoxaparin  PT/OT.  Rehab     #History of alcohol liver cirrhosis          Outpatient follow-up     #Acute blood loss anemia on chronic anemia  Monitor H&H.  received 1 unit of PRBC 5/5  Hb 6.7 overnight received 1 U of PRBC. Repeat levels   Transfuse PRBCs to keep hemoglobin more than 7     #Tobacco abuse  NRT  Counseled    DVT prophylaxis:  Medical and mechanical DVT prophylaxis orders are present.         Code status is   Code Status and Medical Interventions:   Ordered at: 05/01/24 3076     Level Of Support Discussed With:    Patient     Code Status (Patient has no pulse and is not breathing):    CPR (Attempt to Resuscitate)     Medical Interventions (Patient has pulse or is breathing):    Full Support       Plan for disposition: Anticipate discharge next 24 hours    Time: 30 minutes    Signature: Electronically signed by Niels Sands MD, 05/08/24, 11:43 EDT.  [facility] Hospitalist Team

## 2024-05-08 NOTE — THERAPY TREATMENT NOTE
"Subjective: Pt agreeable to therapeutic plan of care.    Objective:   Supine in bed, frustrated about her overall medical condition, does not like sitting in the recliner.     WB'ing status: R Lower Extremity Partial Weight Bearing      Therapeutic Exercise: 10 Reps R Lower Extremity AAROM   AP, QS, GS, assisted heel slides, seated LAQs and R knee flexion    Precautions: Weight-bearing restriction    Bed mobility - Min-A for sit to supine to lift RLE into bed, otherwise mod I for bed mobility skills  Transfers - CGA and with rolling walker  Ambulation - 25 feet CGA and with rolling walker, cues for PWB RLE., step to gait pattern      Pain: 10 VAS   Location: R hip, low back  Intervention for pain: Repositioned    Education: Gait Training    Assessment: Lita Yu presents with functional mobility impairments which indicate the need for skilled intervention. Pain limiting tolerance to R hip ROM and exercises, does well for short distances maintaining PWB RLE with walker. Tolerating session today without incident. Will continue to follow and progress as tolerated.     Plan/Recommendations:   If medically appropriate, Moderate Intensity Therapy recommended post-acute care. This is recommended as therapy feels the patient would require 3-4 days per week and wouldn't tolerate \"3 hour daily\" rehab intensity. SNF would be the preferred choice. If the patient does not agree to SNF, arrange HH or OP depending on home bound status. If patient is medically complex, consider LTACH. Pt requires no DME at discharge.     Pt desires Skilled Rehab placement at discharge. Pt cooperative; agreeable to therapeutic recommendations and plan of care.         Basic Mobility 6-click:  Rollin = Total, A lot = 2, A little = 3; 4 = None  Supine>Sit:   1 = Total, A lot = 2, A little = 3; 4 = None   Sit>Stand with arms:  1 = Total, A lot = 2, A little = 3; 4 = None  Bed>Chair:   1 = Total, A lot = 2, A little = 3; 4 = " None  Ambulate in room:  1 = Total, A lot = 2, A little = 3; 4 = None  3-5 Steps with railin = Total, A lot = 2, A little = 3; 4 = None  Score: 21    Modified [place]: N/A = No pre-op stroke/TIA    Post-Tx Position: Supine with HOB Elevated, Alarms activated, and Call light and personal items within reach  PPE: gloves and surgical mask

## 2024-05-08 NOTE — CASE MANAGEMENT/SOCIAL WORK
Continued Stay Note   Vik     Patient Name: Lita Yu  MRN: [id number]  Today's Date: 5/8/2024    Admit Date: 5/1/2024    Plan: Referral to [facility] pending acceptance and precert.  [facility]  approved   Discharge Plan       Row Name 05/08/24 1438       Plan    Plan Referral to [facility] pending acceptance and precert.  [facility]  approved    Plan Comments Dc barriers: IV antibiotics, Monitoring hgb,  acceptance and precert to snf.   Referral sent to [facility]. pending acceptance and precert.   next choice is [place].                    Expected Discharge Date and Time       Expected Discharge Date Expected Discharge Time    May 9, 2024           Tequila Bourne RN

## 2024-05-08 NOTE — PLAN OF CARE
"Assessment: Lita Yu presents with ADL impairments affecting function including balance, endurance / activity tolerance, pain, and strength. Demonstrated functioning below baseline abilities indicate the need for continued skilled intervention while inpatient. Patient continues to be hindered by pain, rated 9/10 this date. She does well with partial weight-bearing activities using RW, but pain limits progress. She was CGA for mobility and grooming in standing, but cont to require assist with supine to sit transition for managing the right leg. Tolerating session today without incident. Will continue to follow and progress as tolerated.      Plan/Recommendations:   Moderate Intensity Therapy recommended post-acute care. This is recommended as therapy feels the patient would require 3-4 days per week and wouldn't tolerate \"3 hour daily\" rehab intensity. SNF would be the preferred choice. If the patient does not agree to SNF, arrange HH or OP depending on home bound status. If patient is medically complex, consider LTACH.. Pt requires no DME at discharge.      Pt desires Skilled Rehab placement at discharge. Pt cooperative; agreeable to therapeutic recommendations and plan of care.        "

## 2024-05-08 NOTE — PLAN OF CARE
Problem: Adult Inpatient Plan of Care  Goal: Absence of Hospital-Acquired Illness or Injury  Intervention: Prevent Skin Injury  Recent Flowsheet Documentation  Taken 5/8/2024 0849 by Do Bond LPN  Body Position: position changed independently  Skin Protection:   adhesive use limited   tubing/devices free from skin contact     Problem: Adult Inpatient Plan of Care  Goal: Absence of Hospital-Acquired Illness or Injury  Intervention: Prevent and Manage VTE (Venous Thromboembolism) Risk  Recent Flowsheet Documentation  Taken 5/8/2024 0849 by Do Bond LPN  VTE Prevention/Management:   bilateral   foot pump device on     Problem: Adult Inpatient Plan of Care  Goal: Absence of Hospital-Acquired Illness or Injury  Intervention: Prevent Infection  Recent Flowsheet Documentation  Taken 5/8/2024 0849 by Do Bond LPN  Infection Prevention:   environmental surveillance performed   equipment surfaces disinfected   hand hygiene promoted   personal protective equipment utilized   single patient room provided   visitors restricted/screened     Problem: Adult Inpatient Plan of Care  Goal: Optimal Comfort and Wellbeing  Intervention: Monitor Pain and Promote Comfort  Recent Flowsheet Documentation  Taken 5/8/2024 1133 by Do Bond LPN  Pain Management Interventions: see MAR  Taken 5/8/2024 0849 by Do Bond LPN  Pain Management Interventions: see MAR   Goal Outcome Evaluation:           Progress: no change

## 2024-05-09 LAB
BH BB BLOOD EXPIRATION DATE: NORMAL
BH BB BLOOD TYPE BARCODE: 9500
BH BB DISPENSE STATUS: NORMAL
BH BB PRODUCT CODE: NORMAL
BH BB UNIT NUMBER: NORMAL
CROSSMATCH INTERPRETATION: NORMAL
UNIT  ABO: NORMAL
UNIT  RH: NORMAL

## 2024-05-09 PROCEDURE — 97535 SELF CARE MNGMENT TRAINING: CPT

## 2024-05-09 PROCEDURE — 25010000002 ENOXAPARIN PER 10 MG: Performed by: HOSPITALIST

## 2024-05-09 PROCEDURE — 97116 GAIT TRAINING THERAPY: CPT

## 2024-05-09 PROCEDURE — 25010000002 CEFTAROLINE FOSAMIL PER 10 MG: Performed by: INTERNAL MEDICINE

## 2024-05-09 PROCEDURE — 97110 THERAPEUTIC EXERCISES: CPT

## 2024-05-09 PROCEDURE — 63710000001 ONDANSETRON ODT 4 MG TABLET DISPERSIBLE: Performed by: ORTHOPAEDIC SURGERY

## 2024-05-09 PROCEDURE — 97530 THERAPEUTIC ACTIVITIES: CPT

## 2024-05-09 PROCEDURE — 25010000002 VANCOMYCIN 1 G RECONSTITUTED SOLUTION 1 EACH VIAL: Performed by: INTERNAL MEDICINE

## 2024-05-09 PROCEDURE — 25810000003 SODIUM CHLORIDE 0.9 % SOLUTION 250 ML FLEX CONT: Performed by: INTERNAL MEDICINE

## 2024-05-09 RX ADMIN — CEFTAROLINE FOSAMIL 600 MG: 600 POWDER, FOR SOLUTION INTRAVENOUS at 03:19

## 2024-05-09 RX ADMIN — SERTRALINE 100 MG: 100 TABLET, FILM COATED ORAL at 20:30

## 2024-05-09 RX ADMIN — BUSPIRONE HYDROCHLORIDE 15 MG: 15 TABLET ORAL at 10:04

## 2024-05-09 RX ADMIN — HYDROCODONE BITARTRATE AND ACETAMINOPHEN 1 TABLET: 10; 325 TABLET ORAL at 13:06

## 2024-05-09 RX ADMIN — ENOXAPARIN SODIUM 40 MG: 100 INJECTION SUBCUTANEOUS at 18:04

## 2024-05-09 RX ADMIN — VANCOMYCIN HYDROCHLORIDE 1000 MG: 1 INJECTION, POWDER, LYOPHILIZED, FOR SOLUTION INTRAVENOUS at 10:05

## 2024-05-09 RX ADMIN — FOLIC ACID 1 MG: 1 TABLET ORAL at 10:04

## 2024-05-09 RX ADMIN — SERTRALINE 100 MG: 100 TABLET, FILM COATED ORAL at 10:04

## 2024-05-09 RX ADMIN — CEFTAROLINE FOSAMIL 600 MG: 600 POWDER, FOR SOLUTION INTRAVENOUS at 18:04

## 2024-05-09 RX ADMIN — CYCLOBENZAPRINE 10 MG: 10 TABLET, FILM COATED ORAL at 20:30

## 2024-05-09 RX ADMIN — HYDROCODONE BITARTRATE AND ACETAMINOPHEN 1 TABLET: 10; 325 TABLET ORAL at 18:04

## 2024-05-09 RX ADMIN — QUETIAPINE FUMARATE 50 MG: 25 TABLET ORAL at 20:30

## 2024-05-09 RX ADMIN — CYCLOBENZAPRINE 10 MG: 10 TABLET, FILM COATED ORAL at 18:04

## 2024-05-09 RX ADMIN — BUSPIRONE HYDROCHLORIDE 15 MG: 15 TABLET ORAL at 20:30

## 2024-05-09 RX ADMIN — Medication 10 ML: at 10:09

## 2024-05-09 RX ADMIN — ONDANSETRON 4 MG: 4 TABLET, ORALLY DISINTEGRATING ORAL at 11:49

## 2024-05-09 RX ADMIN — HYDROCODONE BITARTRATE AND ACETAMINOPHEN 1 TABLET: 10; 325 TABLET ORAL at 22:15

## 2024-05-09 RX ADMIN — CEFTAROLINE FOSAMIL 600 MG: 600 POWDER, FOR SOLUTION INTRAVENOUS at 11:48

## 2024-05-09 RX ADMIN — HYDROCODONE BITARTRATE AND ACETAMINOPHEN 1 TABLET: 10; 325 TABLET ORAL at 08:32

## 2024-05-09 RX ADMIN — HYDROCODONE BITARTRATE AND ACETAMINOPHEN 1 TABLET: 10; 325 TABLET ORAL at 04:33

## 2024-05-09 RX ADMIN — CYCLOBENZAPRINE 10 MG: 10 TABLET, FILM COATED ORAL at 10:04

## 2024-05-09 RX ADMIN — Medication 10 ML: at 20:30

## 2024-05-09 NOTE — PLAN OF CARE
"Goal Outcome Evaluation:  Plan of Care Reviewed With: patient    Assessment: Lita Yu presents with ADL impairments affecting function including endurance / activity tolerance, pain, range of motion (ROM), and strength. Demonstrated functioning below baseline abilities indicate the need for continued skilled intervention while inpatient. Tolerating session today without incident. Pt is improving but needs safety cues and daily IV ABX for several more weeks which will not be able to be done from home. Will continue to follow and progress as tolerated.     Plan/Recommendations:   Moderate Intensity Therapy recommended post-acute care. This is recommended as therapy feels the patient would require 3-4 days per week and wouldn't tolerate \"3 hour daily\" rehab intensity. SNF would be the preferred choice. If the patient does not agree to SNF, arrange HH or OP depending on home bound status. If patient is medically complex, consider LTACH.. Pt requires no DME at discharge.     Pt desires Skilled Rehab placement at discharge. Pt cooperative; agreeable to therapeutic recommendations and plan of care.     Modified [place]: 3 = Moderate disability (Requires some help, but able to walk unassisted)                        "

## 2024-05-09 NOTE — PLAN OF CARE
Goal Outcome Evaluation:      Lita Yu presents with functional mobility impairments which indicate the need for skilled intervention. Improved tolerance to mobility this date.  R thigh swollen and some redness noted medial thigh.  RN aware. Tolerating session today without incident. Will continue to follow and progress as tolerated.

## 2024-05-09 NOTE — PROGRESS NOTES
Infectious Diseases Progress Note      LOS: 8 days   Patient Care Team:  Norma Saul APRN as PCP - General (Nurse Practitioner)  Flory Villanueva MD (Physical Medicine and Rehabilitation)    Chief Complaint: Back pain and right hip pain    Subjective     The patient had no high-grade fever during the last 24 hours.  She remained hemodynamically stable.  She is tolerating her current antibiotics with no side effects.      Review of Systems:   Review of Systems   Constitutional: Negative.    HENT: Negative.     Eyes: Negative.    Respiratory: Negative.     Cardiovascular: Negative.    Gastrointestinal: Negative.    Genitourinary: Negative.    Musculoskeletal:  Positive for arthralgias and back pain.   Skin: Negative.    Neurological: Negative.    Hematological: Negative.    Psychiatric/Behavioral: Negative.          Objective     Vital Signs  Temp:  [97.7 °F (36.5 °C)-98.2 °F (36.8 °C)] 98.2 °F (36.8 °C)  Heart Rate:  [68-73] 68  Resp:  [16] 16  BP: (112-118)/(63-65) 112/63    Physical Exam:  Physical Exam  Vitals and nursing note reviewed.   Constitutional:       Appearance: She is well-developed.   HENT:      Head: Normocephalic and atraumatic.   Eyes:      Pupils: Pupils are equal, round, and reactive to light.   Cardiovascular:      Rate and Rhythm: Normal rate and regular rhythm.      Heart sounds: Normal heart sounds.   Pulmonary:      Effort: Pulmonary effort is normal. No respiratory distress.      Breath sounds: Normal breath sounds. No wheezing or rales.   Abdominal:      General: Bowel sounds are normal. There is no distension.      Palpations: Abdomen is soft. There is no mass.      Tenderness: There is no abdominal tenderness. There is no guarding or rebound.   Musculoskeletal:         General: No deformity.      Cervical back: Normal range of motion and neck supple.      Comments: S/p right hip arthroplasty   Skin:     General: Skin is warm.      Findings: No erythema or rash.   Neurological:       Mental Status: She is alert and oriented to person, place, and time.      Cranial Nerves: No cranial nerve deficit.          Results Review:    I have reviewed all clinical data, test, lab, and imaging results.     Radiology  No Radiology Exams Resulted Within Past 24 Hours    Cardiology    Laboratory    Results from last 7 days   Lab Units 05/08/24  2326 05/08/24  1305 05/08/24  0231 05/07/24  0130 05/06/24  1316 05/06/24  0433 05/05/24  1335 05/05/24  0401 05/04/24  0557 05/02/24  2310   WBC 10*3/mm3 5.58  --  5.08 4.30  --  4.74  --  7.39 4.58 4.35   HEMOGLOBIN g/dL 7.9* 8.0* 6.7* 9.5* 7.6* 7.0* 7.5* 6.3* 8.5* 8.1*   HEMATOCRIT % 25.4* 26.5* 21.8* 30.8* 25.3* 22.5* 23.6* 20.2* 27.7* 27.0*   PLATELETS 10*3/mm3 147  --  130* 96*  --  126*  --  144 167 153     Results from last 7 days   Lab Units 05/08/24 2326 05/07/24  2348 05/07/24  0130 05/06/24  0433 05/05/24  0334 05/04/24  0557 05/02/24  2310   SODIUM mmol/L 139 137 140 139 138 139 140   POTASSIUM mmol/L 3.7 3.5 3.7 3.7 4.2 3.8 3.8   CHLORIDE mmol/L 106 105 106 108* 103 105 108*   CO2 mmol/L 24.0 24.0 25.0 26.0 24.0 25.0 25.0   BUN mg/dL 14 15 15 18 15 14 16   CREATININE mg/dL 1.09* 0.85 0.87 1.10* 0.81 1.06* 1.03*   GLUCOSE mg/dL 85 105* 78 99 117* 89 113*   ALBUMIN g/dL 2.9* 2.6* 2.6* 2.7* 2.8* 3.2* 3.0*   BILIRUBIN mg/dL 0.3 0.3 0.3 0.2 <0.2 0.2 <0.2   ALK PHOS U/L 217* 186* 154* 149* 145* 184* 187*   AST (SGOT) U/L 38* 31 26 25 25 18 22   ALT (SGPT) U/L 23 19 16 16 13 13 12   CALCIUM mg/dL 8.3* 7.9* 8.2* 8.3* 8.5* 9.3 8.8                   Microbiology   Microbiology Results (last 10 days)       Procedure Component Value - Date/Time    Body Fluid Culture - Body Fluid, Hip, Right [[id number]] Collected: 05/03/24 1256    Lab Status: Final result Specimen: Body Fluid from Hip, Right Updated: 05/08/24 0644     Body Fluid Culture No growth at 5 days     Gram Stain Many (4+) WBCs per low power field      No organisms seen    Blood Culture - Blood, Arm, Right  [[id number]]  (Normal) Collected: 05/02/24 1607    Lab Status: Final result Specimen: Blood from Arm, Right Updated: 05/07/24 1631     Blood Culture No growth at 5 days    Blood Culture - Blood, Blood, Central Line [[id number]]  (Normal) Collected: 05/02/24 1553    Lab Status: Final result Specimen: Blood, Central Line Updated: 05/07/24 1631     Blood Culture No growth at 5 days            Medication Review:       Schedule Meds  alteplase (CATHFLO/ACTIVASE) 2 mg in sterile water (preservative free) 2.2 mL injection, 2 mg, Intracatheter, Once  busPIRone, 15 mg, Oral, BID  ceftaroline, 600 mg, Intravenous, Q8H  cyclobenzaprine, 10 mg, Oral, TID  enoxaparin, 40 mg, Subcutaneous, Q24H  folic acid, 1 mg, Oral, Daily  QUEtiapine, 50 mg, Oral, Nightly  sertraline, 100 mg, Oral, BID  sodium chloride, 10 mL, Intravenous, Q12H  vancomycin, 1,000 mg, Intravenous, Daily        Infusion Meds  lactated ringers, 9 mL/hr, Last Rate: 100 mL/hr (05/04/24 0812)  Pharmacy to dose vancomycin,         PRN Meds    acetaminophen **OR** acetaminophen **OR** acetaminophen    albuterol    alteplase (CATHFLO/ACTIVASE) 1 mg in sterile water (preservative free) 1.1 mL injection    senna-docusate sodium **AND** polyethylene glycol **AND** bisacodyl **AND** bisacodyl    calcium carbonate    HYDROcodone-acetaminophen    HYDROcodone-acetaminophen    lactated ringers    ondansetron ODT **OR** ondansetron    Pharmacy to dose vancomycin    sodium chloride    sodium chloride        Assessment & Plan       Antimicrobial Therapy   1.  IV vancomycin        2.  IV Teflaro        3.        4.        5.              Assessment     Discitis/osteomyelitis at the level of L2-L3.  Current MRI showed worsening of discitis with persistent epidural abscess and moderate canal stenosis.  There was paraspinal phlegmon.  Neurosurgery service following and the patient and decided not to do surgical intervention.  The patient has been receiving IV antibiotics for 8 weeks  prior to admission.  I am concerned about clinical failure and need of surgery.    Possible right hip septic arthritis based on MRI of the pelvis.  Synovial fluid findings was not highly consistent with infection with a white count of only 5000 but predominantly neutrophils.  Findings was consistent with avascular necrosis and patient required right hip arthroplasty on May 4, 2024.  Cultures are negative so far     Recent admission for methicillin resistant Staphylococcus aureus bacteremia.  Likely source is lumbar discitis/osteomyelitis.   SILVINA performed on 3/12/2024 with cardiology notes mentioning no vegetation.  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  Patient denied having infection after the procedure        Plan     Continue IV vancomycin-keep trough between 15 and 20.   I will extend the duration of the treatment to 12 weeks.  If patient continues to have abnormality after 12 weeks then she needs to be evaluated for surgery.  Maximum duration of antibiotics will be 12 weeks otherwise the case would be consider failure of therapy  Waiting on repeat blood culture results and right hip fluid culture results  Continue IV Teflaro  600 mg every 8 hours for 2 weeks.  However, if rehab facility is not accepting the patient since Teflaro is not available then can discontinue Teflaro  I would recommend to repeat MRI of the lumbar spine with and without contrast before finishing IV vancomycin      Pilar Foster MD  05/09/24  14:57 EDT    Note is dictated utilizing voice recognition software/Dragon

## 2024-05-09 NOTE — PROGRESS NOTES
[facility] MEDICINE SERVICE  DAILY PROGRESS NOTE    NAME: Lita Yu  : 1965  MRN: [id number]      LOS: 8 days     PROVIDER OF SERVICE: Niels Sands MD    Chief Complaint: Spinal abscess    Subjective:     Interval History:  History taken from: patient chart family RN  No chest pain lightheadedness right hip pain    Review of Systems:   Review of Systems  All negative except as above  Objective:     Vital Signs  Temp:  [97.7 °F (36.5 °C)-98 °F (36.7 °C)] 97.8 °F (36.6 °C)  Heart Rate:  [66-73] 73  Resp:  [14-16] 16  BP: ()/(59-65) 118/65   Body mass index is 21.26 kg/m².    Physical Exam  Physical Exam  AOx3 NAD  RRR S1 and S2 audible  Lungs with fair entry  Abdomen soft nontender nondistended  R Hip dressing intact. No active bleeding   Scheduled Meds   alteplase (CATHFLO/ACTIVASE) 2 mg in sterile water (preservative free) 2.2 mL injection, 2 mg, Intracatheter, Once  busPIRone, 15 mg, Oral, BID  ceftaroline, 600 mg, Intravenous, Q8H  cyclobenzaprine, 10 mg, Oral, TID  enoxaparin, 40 mg, Subcutaneous, Q24H  folic acid, 1 mg, Oral, Daily  QUEtiapine, 50 mg, Oral, Nightly  sertraline, 100 mg, Oral, BID  sodium chloride, 10 mL, Intravenous, Q12H  vancomycin, 1,000 mg, Intravenous, Daily       PRN Meds     acetaminophen **OR** acetaminophen **OR** acetaminophen    albuterol    alteplase (CATHFLO/ACTIVASE) 1 mg in sterile water (preservative free) 1.1 mL injection    senna-docusate sodium **AND** polyethylene glycol **AND** bisacodyl **AND** bisacodyl    calcium carbonate    HYDROcodone-acetaminophen    HYDROcodone-acetaminophen    lactated ringers    ondansetron ODT **OR** ondansetron    Pharmacy to dose vancomycin    sodium chloride    sodium chloride   Infusions  lactated ringers, 9 mL/hr, Last Rate: 100 mL/hr (24 0812)  Pharmacy to dose vancomycin,           Diagnostic Data    Results from last 7 days   Lab Units 24  0637   WBC 10*3/mm3 5.58   HEMOGLOBIN g/dL 7.9*    HEMATOCRIT % 25.4*   PLATELETS 10*3/mm3 147   GLUCOSE mg/dL 85   CREATININE mg/dL 1.09*   BUN mg/dL 14   SODIUM mmol/L 139   POTASSIUM mmol/L 3.7   AST (SGOT) U/L 38*   ALT (SGPT) U/L 23   ALK PHOS U/L 217*   BILIRUBIN mg/dL 0.3   ANION GAP mmol/L 9.0       No radiology results for the last day      I reviewed the patient's new clinical results.  I reviewed the patient's new imaging results and agree with the interpretation.    Assessment/Plan:     Active and Resolved Problems  Active Hospital Problems    Diagnosis  POA    **Spinal abscess [M46.20]  Yes    Pain in joints [M25.50]  Unknown      Resolved Hospital Problems   No resolved problems to display.       58-year-old female with history of hypertension, peripheral vascular disease, degenerative joint disease, seizure disorder, tobacco abuse, EtOH liver cirrhosis, recent history of MRSA bacteremia attributed to L2-3 OM/discitis admitted to [facility] on 5/1/2024 with a worsening pain in her lower back.      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  Prior plan for vancomycin until 5/3/2024.      -Now admitted with worsening low back pain  -MRI lumbar and pelvis spine as noted.  Shows right hip effusion Along with L2-3 discitis and vertebral body osteomyelitis with enhancing paraspinal phlegmon     ID following continue vancomycin.  Duration of treatment extended to 12 weeks.  Teflaro added status post IR guided right hip aspiration.  Cell count studies noted follow culture. NGTD.  Per ID Teflaro can be discontinued if unable to give at rehab  Seen by orthopedics status post R anterior total hip arthroplasty 5/4.  She will require revision in future in 2 to 3 months  Continue oxycodone as needed for pain.  Continue Flexeril  Seen by CHRISTIANO no plan for surgical  intervention  Follow infectious workup  DVT prophylaxis with enoxaparin  PT/OT.  Rehab     #History of alcohol liver cirrhosis          Outpatient follow-up     #Acute blood loss anemia on chronic anemia  Monitor H&H.  received 1 unit of PRBC 5/5 and 5/8  Repeat H&H is stable  Transfuse PRBCs to keep hemoglobin more than 7     #Tobacco abuse  NRT  Counseled    DVT prophylaxis:  Medical and mechanical DVT prophylaxis orders are present.         Code status is   Code Status and Medical Interventions:   Ordered at: 05/01/24 7566     Level Of Support Discussed With:    Patient     Code Status (Patient has no pulse and is not breathing):    CPR (Attempt to Resuscitate)     Medical Interventions (Patient has pulse or is breathing):    Full Support       Plan for disposition: Pending placement    Time: 30 minutes    Signature: Electronically signed by Niels Sands MD, 05/09/24, 10:32 EDT.  [facility] Hospitalist Team

## 2024-05-09 NOTE — DISCHARGE PLACEMENT REQUEST
"Yonis Yu \"NAINA\" (58 y.o. Female)       Date of Birth   1965    Social Security Number       Address   [address] [place] [address]    Home Phone   [phone]    MRN   [id number]       Yazdanism   None    Marital Status                               Admission Date   24    Admission Type   Elective    Admitting Provider   Niels Sands MD    Attending Provider   Niels Sands MD    Department, Room/Bed   [facility] SURGICAL INPATIENT,        Discharge Date       Discharge Disposition       Discharge Destination                                 Attending Provider: Niels Sands MD    Allergies: Sulfa Antibiotics, Keflex [Cephalexin]    Isolation: None   Infection: MRSA No Isolation this Admit (24)   Code Status: CPR    Ht: 160 cm (62.99\")   Wt: 54.4 kg (120 lb)    Admission Cmt: None   Principal Problem: Spinal abscess [M46.20]                   Active Insurance as of 2024       Primary Coverage       Payor Plan Insurance Group Employer/Plan Group    [facility] BLUE CROSS [id number]       Payor Plan Address Payor Plan Phone Number Payor Plan Fax Number Effective Dates    [address]   2014 - None Entered    [place] [address]         Subscriber Name Subscriber Birth Date Member ID       YONIS YU 1965 [id number]                     Emergency Contacts        (Rel.) Home Phone Work Phone Mobile Phone    OBDULIA CANTU (Daughter) -- -- [phone]    SARAI WOOD (Brother) [phone] -- --                 History & Physical        Ashlyn Herring MD at 24 [address] [place] [address] Medicine Services  History & Physical    Patient Name: Yonis Yu  : 1965  MRN: [id number]  Primary Care Physician:  Norma Saul APRN  Date of admission: 2024  Date and Time of Service: 2024 at 1623    Subjective      Chief Complaint: back " pain    History of Present Illness: Lita Yu is a 58 y.o. female with a CMH of hypertension, peripheral vascular disease, degenerative joint disease, seizure disorder, difficult tobacco dependence, cirrhosis, generalized weakness and recent MRSA bacteremia and enhancement of the epidural fluid on imaging. She was discharged on vancomycin which is supposed to be on till 5/3/2024 who presented to [facility] on 5/1/2024 with a chief complaint of worsening pain in her lower back. She has been receiving IV antibiotics since her hospital discharge but has not missed any doses. Despite the treatment, her pain has been increasing.  Mrs. Yu has been experiencing fevers but denies chills. She reports weakness in her right leg, which affects her ability to walk, requiring the use of a walker. She denies any recent falls or incontinence issues, although she did experience incontinence earlier when the infection first occurred.  The patient describes her lower back pain as severe, rating it a 9 out of 10 on the pain scale. The pain is exacerbated by movement and radiates down her legs. She is currently taking 7.5 mg of Norco every 12 hours for pain management, which provides minimal relief. On physical examination, the patient's pain is localized to the lower back, with the piriformis muscle tightening up..  She had a recent MRI done last week with concern for an abscess in the spine.     Review of Systems   Constitutional:  Negative for activity change, appetite change, chills, diaphoresis, fatigue and fever.   HENT: Negative.     Eyes: Negative.    Respiratory:  Negative for apnea, cough, choking, chest tightness, shortness of breath and stridor.    Cardiovascular:  Negative for chest pain, palpitations and leg swelling.   Gastrointestinal:  Negative for abdominal distention, abdominal pain, blood in stool, constipation, diarrhea, nausea, rectal pain and vomiting.   Endocrine: Negative.     Genitourinary:  Negative for difficulty urinating, dyspareunia, dysuria, enuresis, flank pain, frequency and pelvic pain.   Musculoskeletal:  Positive for back pain and gait problem. Negative for arthralgias, joint swelling, myalgias, neck pain and neck stiffness.   Skin: Negative.    Allergic/Immunologic: Negative.    Neurological:  Negative for tremors, seizures, syncope, facial asymmetry, speech difficulty, weakness, light-headedness, numbness and headaches.   Hematological: Negative.    Psychiatric/Behavioral:  Negative for agitation, behavioral problems, confusion, decreased concentration, dysphoric mood, hallucinations, self-injury, sleep disturbance and suicidal ideas. The patient is not nervous/anxious and is not hyperactive.        Personal History     Past Medical History:   Diagnosis Date    Cirrhosis     COPD (chronic obstructive pulmonary disease)     Depression     GERD (gastroesophageal reflux disease)     Hyperlipidemia     Hypertension     PTSD (post-traumatic stress disorder)        Past Surgical History:   Procedure Laterality Date     SECTION N/A     COLONOSCOPY N/A 2021    Procedure: COLONOSCOPY with polypectomy x2 and random biopsy;  Surgeon: Osman Clay MD;  Location: [facility] ENDOSCOPY;  Service: Gastroenterology;  Laterality: N/A;  colon polyps    DENTAL PROCEDURE      ENDOSCOPY N/A 2021    Procedure: ESOPHAGOGASTRODUODENOSCOPY;  Surgeon: Osman Clay MD;  Location: [facility] ENDOSCOPY;  Service: Gastroenterology;  Laterality: N/A;  esophagitis    JOINT REPLACEMENT      REPLACEMENT TOTAL HIP LATERAL POSITION Left     TONSILLECTOMY      VAGINAL BIRTH AFTER  SECTION         Family History: family history includes Alcohol abuse in her mother and paternal grandfather. Otherwise pertinent FHx was reviewed and not pertinent to current issue.    Social History:  reports that she has been smoking cigarettes. She has been exposed to tobacco smoke. She has never used  smokeless tobacco. She reports that she does not currently use alcohol after a past usage of about 2.0 standard drinks of alcohol per week. She reports that she does not use drugs.    Home Medications:  Prior to Admission Medications       Prescriptions Last Dose Informant Patient Reported? Taking?    cefdinir (OMNICEF) 300 MG capsule 5/1/2024  No Yes    Take 1 capsule by mouth 2 (Two) Times a Day.    albuterol sulfate  (90 Base) MCG/ACT inhaler   Yes No    Inhale 2 puffs Every 4 (Four) Hours As Needed for Wheezing.    busPIRone (BUSPAR) 15 MG tablet   No No    TAKE 1 TABLET BY MOUTH TWICE A DAY    Cyanocobalamin (Vitamin B-12) 1000 MCG sublingual tablet   Yes No    Place 1 tablet under the tongue Daily.    folic acid (FOLVITE) 1 MG tablet   No No    Take 1 tablet by mouth Daily.    HYDROcodone-acetaminophen (NORCO) 7.5-325 MG per tablet   No No    Take 1 tablet by mouth Every 12 (Twelve) Hours As Needed for Moderate Pain.    melatonin 5 MG tablet tablet   Yes No    Take 1 tablet by mouth At Night As Needed (sleep).    QUEtiapine (SEROquel) 50 MG tablet   No No    TAKE 1 TABLET BY MOUTH EVERYDAY AT BEDTIME    sertraline (ZOLOFT) 100 MG tablet   No No    TAKE 1 TABLET BY MOUTH TWICE A DAY    thiamine (VITAMIN B1) 100 MG tablet   No No    Take 1 tablet by mouth Daily.    vancomycin 1000 mg/200 mL 0.9% NS IVPB   Yes No    Infuse 200 mL into a venous catheter Every Other Day.    Vancomycin HCl (VANCOMYCIN PHARMACY INTERMITTENT/PULSE DOSING)   No No    Use As Needed (Pulse dosing per pharmacy) for up to 5 days. Indications: Bacteria in the Blood    Zinc 50 MG tablet   Yes No    Take 1 tablet by mouth 2 (Two) Times a Day.              Allergies:  Allergies   Allergen Reactions    Sulfa Antibiotics Hives    Keflex [Cephalexin] Hives       Objective      Vitals:   Temp:  [98.2 °F (36.8 °C)] 98.2 °F (36.8 °C)  Heart Rate:  [71] 71  Resp:  [16] 16  BP: (121)/(69) 121/69  Body mass index is 21.26 kg/m².  Physical  Exam  Vitals reviewed.   Constitutional:       Appearance: Normal appearance.   HENT:      Head: Normocephalic and atraumatic.      Nose: No congestion.   Eyes:      Pupils: Pupils are equal, round, and reactive to light.   Cardiovascular:      Rate and Rhythm: Normal rate and regular rhythm.      Pulses: Normal pulses.      Heart sounds: Normal heart sounds. No murmur heard.  Pulmonary:      Effort: Pulmonary effort is normal. No respiratory distress.      Breath sounds: Normal breath sounds. No wheezing.   Abdominal:      General: Bowel sounds are normal. There is no distension.      Palpations: Abdomen is soft. There is no mass.      Tenderness: There is no abdominal tenderness. There is no guarding.   Musculoskeletal:         General: No swelling or deformity. Normal range of motion.      Cervical back: Normal range of motion.   Skin:     General: Skin is warm.   Neurological:      General: No focal deficit present.      Mental Status: She is alert and oriented to person, place, and time.      Cranial Nerves: No cranial nerve deficit.      Sensory: No sensory deficit.      Motor: No weakness.         Diagnostic Data:  Lab Results (last 24 hours)       ** No results found for the last 24 hours. **             Imaging Results (Last 24 Hours)       Procedure Component Value Units Date/Time    XR Chest 1 View [[id number]] Resulted: 05/01/24 1644     Updated: 05/01/24 1645              Assessment & Plan        This is a 58 y.o. female with:    Active and Resolved Problems  There are no hospital problems to display for this patient.    MRSA bacteremia  L2-L3 discitis/osteomyelitis   -She was recently discharged after she was diagnosed with MRSA bacteremia and L2/L3 discitis/osteomyelitis.  She is supposed to be on vancomycin until 5/3/2024.      -She has noticed her pain has progressively gotten worse since then.  -MRI thoracic and lumbar spine with and without contrast from 4/23/24 reports  interval worsening of  findings related to L2-L3 discitis and vertebral body osteomyelitis, including worsening destruction of the disc space and increased vertebral body marrow edema. There is a persistent epidural   abscess that is not significantly changed in size since the comparison study. There is enhancing paraspinal soft tissue at the L2 and L3 vertebral body levels compatible with paraspinal phlegmon with no walled off fluid collection demonstrated  -ID and spine consult  -Continue to monitor.  -Pain control  -resume vancomycin pending ID recs    Liver cirrhosis :Likely alcohol related;compensation  -Continue supportive care and outpatient follow-up     Anemia of chronic disease  -Most recent labs reveal hemoglobin stable at this time  -Monitor and transfuse as needed     Tobacco abuse  -40-pack-year smoking history  -Counseled on cessation  -Nicotine patch if needed    DVT prophylaxis:  No DVT prophylaxis order currently exists.        The patient desires to be as follows:    CODE STATUS: Full code         Moisés Gilliland, who can be contacted at [phone], is the designated person to make medical decisions on the patient's behalf if She is incapable of doing so. This was clarified with patient and/or next of kin on 5/1/2024 during the course of this H&P.    Admission Status:  I believe this patient meets inpatient status.    Expected Length of Stay: >2MN    PDMP and Medication Dispenses via Sidebar reviewed and consistent with patient reported medications.    I discussed the patient's findings and my recommendations with patient.      Signature:     This document has been electronically signed by Ashlyn Herring MD on May 1, 2024 16:45 EDT   [facility]     Electronically signed by Ashlyn Herring MD at 05/01/24 2477          Operative/Procedure Notes (all)        Cristobal Chen II, MD at 05/04/24 6767  Version 2 of 2           Anterior Total Hip Operative Note  Dr. SHADE Fernandez” Gustavo  II  [phone]    PATIENT NAME: Lita Yu  MRN: [id number]  : 1965 AGE: 58 y.o. 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  A discussion of operative versus nonoperative treatment was had. They elected to undergo anterior total hip arthroplasty. The risks of surgery were discussed and included the risk of anesthesia, infection, damage to neurovascular structures, implant loosening/failure, fracture, hardware prominence, dislocation, the need for further procedures, medical complications, and others. No guarantees were made. The patient wished to proceed with surgery and a surgical consent was signed.  COMPONENTS:   Size 0 polar stem with a +0 22 mm head ball  58 mm outer diameter constrained liner for an R3 acetabular cup    PERTINENT FINDINGS:  Avascular necrosis with concerns for osteomyelitis and infection    DETAILS OF PROCEDURE:   The patient was met in the preoperative area. The site was marked. The consent and H&P were reviewed. The patient was then wheeled back to the operative suite underwent anesthesia. The [place] table boots were secured to the patients’ feet. The patient was moved onto the [place] table and secured in the supine position. The perineal post was inserted and the boots were secured into the leg holders. Surgical alcohol was used to thoroughly clean  the operative area.     The hip and leg was then prepped in the normal sterile fashion, multiple layers of sterile drapes, and surgical space suits for the entire operative team. New outer gloves were used by all sterile surgical team members after final draping. The surgical incision was marked. A surgical timeout was performed.    A Modified Landrum-Erickson anterior approach was used. Dissection was carried down to the fascia. The fascia was incised and the tensor fascia faviola muscle was retracted laterally and the sartorius medially. The lateral femoral circumflex vessels were identified and cauterized using bovie. The rectus femoris was retracted medially. A capsulotomy was then performed.  There was no gross purulence but the tissue itself looked wildly unhealthy.  Retractors were placed appropriately and a femoral neck osteotomy was made.  The head was extracted.  There was noted to be fracturing of the femoral head with avascular necrosis and collapse.  The bone itself looked quite unhealthy.  I was unable to determine whether or not this was osteomyelitis versus simple avascular necrosis, but based on her preoperative workup I was highly concerned about infection.  A complete synovectomy was then performed and a thorough irrigation and debridement was performed of the deep hip using multiple liters of saline and some Betadine along with Irrisept.  I then placed a reamer into the acetabulum.  There was a large superior defect due to the previous avascular necrosis with superior escape.  I was able to ream up to get an appropriate sized acetabular component.  I then open the smallest polar stem available which was a size 0 along with an appropriate constrained liner.  The constrained liner was marked up on the backside with a saw to create a little more undulations for cement fixation.  1 pack of cement with vancomycin and tobramycin was then mixed.  A very thin layer of cement was placed all along the femoral  "stem.  The rest of the cement was utilized to cement the constrained liner into proper abduction and anteversion.  I then turned my attention to the femur.    Using external rotation extension the femur was exposed.  A box osteotome rasp and progressive broaching was then performed.  Afterwards, the femur was thoroughly irrigated.  I then trialed the stem I had open.  I did have to shave off a little bit of the excess cement to get it to fit.  A second batch of antibiotic cement was mixed and just proximally the femur was cemented so that it would be stable.  It was inserted all the way down into proper position and a head ball was attached.  The hip was then brought back and we attempted to relocate.  We did have a difficult time with relocation mostly due to the patient's previous superior hip location.  She was significantly short preoperatively and required a fair amount of traction to get the hip back in.  At 1 point, the foot did fall out of the boot and had to be replaced into a smaller boot to allow for better traction.  We also gave the patient paralytic.  I was able to then reduce the hip.  X-rays were taken of the hip itself all the way down to the knee to ensure there was no fracture.  The wound was then irrigated and closed in layers and a sterile dressing was applied.    The patient was moved from the [place] table to the [place] where the boots were removed. The patient was taken to the recovery room in stable condition. There were no complications and the patient tolerated the procedure well.    R \"Heri\" Gustavo HALE MD  Orthopaedic Surgery  [facility]  [phone]              Electronically signed by Cristobal Chen II, MD at 24 [phone]       Cristobal Chen II, MD at 24 6100  Version 1 of 2           Anterior Total Hip Operative Note  Dr. LAGUERRE “Heri” Gustavo HALE  [phone]    PATIENT NAME: Lita Yu  MRN: [id number]  : 1965 AGE: 58 y.o. " GENDER: female  DATE OF OPERATION: 5/4/2024  PREOPERATIVE DIAGNOSIS:  Left hip avascular necrosis with collapse and secondary septic total hip  POSTOPERATIVE DIAGNOSIS: Same  OPERATION PERFORMED: Left Anterior Total Hip Arthroplasty  SURGEON: Cristobal Chen MD  Circulator: Isadora Acuña RN  Radiology Technologist: Dantua Garay  Scrub Person: StephanieEle  Vendor Representative: Myriam Man  ANESTHESIA: General  ASSISTANT: None   ESTIMATED BLOOD LOSS: 200cc  SPONGE AND NEEDLE COUNT: Correct  INDICATIONS:   This patient had avascular necrosis with femoral head fracturing and collapse that subsequently became infected after she developed a spinal abscess.  A discussion of operative versus nonoperative treatment was had. They elected to undergo anterior total hip arthroplasty. The risks of surgery were discussed and included the risk of anesthesia, infection, damage to neurovascular structures, implant loosening/failure, fracture, hardware prominence, dislocation, the need for further procedures, medical complications, and others. No guarantees were made. The patient wished to proceed with surgery and a surgical consent was signed.  COMPONENTS:   Size 0 polar stem with a +0 22 mm head ball  58 mm outer diameter constrained liner for an R3 acetabular cup    PERTINENT FINDINGS:  Avascular necrosis with concerns for osteomyelitis and infection    DETAILS OF PROCEDURE:   The patient was met in the preoperative area. The site was marked. The consent and H&P were reviewed. The patient was then wheeled back to the operative suite underwent anesthesia. The [place] table boots were secured to the patients’ feet. The patient was moved onto the [place] table and secured in the supine position. The perineal post was inserted and the boots were secured into the leg holders. Surgical alcohol was used to thoroughly clean the operative area.     The hip and leg was then prepped in the normal sterile fashion, multiple layers of  sterile drapes, and surgical space suits for the entire operative team. New outer gloves were used by all sterile surgical team members after final draping. The surgical incision was marked. A surgical timeout was performed.    A Modified Landrum-Erickson anterior approach was used. Dissection was carried down to the fascia. The fascia was incised and the tensor fascia faviola muscle was retracted laterally and the sartorius medially. The lateral femoral circumflex vessels were identified and cauterized using bovie. The rectus femoris was retracted medially. A capsulotomy was then performed.  There was no gross purulence but the tissue itself looked wildly unhealthy.  Retractors were placed appropriately and a femoral neck osteotomy was made.  The head was extracted.  There was noted to be fracturing of the femoral head with avascular necrosis and collapse.  The bone itself looked quite unhealthy.  I was unable to determine whether or not this was osteomyelitis versus simple avascular necrosis, but based on her preoperative workup I was highly concerned about infection.  A complete synovectomy was then performed and a thorough irrigation and debridement was performed of the deep hip using multiple liters of saline and some Betadine along with Irrisept.  I then placed a reamer into the acetabulum.  There was a large superior defect due to the previous avascular necrosis with superior escape.  I was able to ream up to get an appropriate sized acetabular component.  I then open the smallest polar stem available which was a size 0 along with an appropriate constrained liner.  The constrained liner was marked up on the backside with a saw to create a little more undulations for cement fixation.  1 pack of cement with vancomycin and tobramycin was then mixed.  A very thin layer of cement was placed all along the femoral stem.  The rest of the cement was utilized to cement the constrained liner into proper abduction and  "anteversion.  I then turned my attention to the femur.    Using external rotation extension the femur was exposed.  A box osteotome rasp and progressive broaching was then performed.  Afterwards, the femur was thoroughly irrigated.  I then trialed the stem I had open.  I did have to shave off a little bit of the excess cement to get it to fit.  A second batch of antibiotic cement was mixed and just proximally the femur was cemented so that it would be stable.  It was inserted all the way down into proper position and a head ball was attached.  The hip was then brought back and we attempted to relocate.  We did have a difficult time with relocation mostly due to the patient's previous superior hip location.  She was significantly short preoperatively and required a fair amount of traction to get the hip back in.  At 1 point, the foot did fall out of the boot and had to be replaced into a smaller boot to allow for better traction.  We also gave the patient paralytic.  I was able to then reduce the hip.  X-rays were taken of the hip itself all the way down to the knee to ensure there was no fracture.  The wound was then irrigated and closed in layers and a sterile dressing was applied.    The patient was moved from the [place] table to the [facility] where the boots were removed. The patient was taken to the recovery room in stable condition. There were no complications and the patient tolerated the procedure well.    R \"Heri\" Gustavo HALE MD  Orthopaedic Surgery  [facility]  [phone]              Electronically signed by Cristobal Chen II, MD at 24 [phone]          Physician Progress Notes (last 48 hours)        Niels Sands MD at 24 1031              [facility] MEDICINE SERVICE  DAILY PROGRESS NOTE    NAME: Lita Yu  : 1965  MRN: [id number]      LOS: 8 days     PROVIDER OF SERVICE: Niels Sands MD    Chief Complaint: Spinal abscess    Subjective: "     Interval History:  History taken from: patient chart family RN  No chest pain lightheadedness right hip pain    Review of Systems:   Review of Systems  All negative except as above  Objective:     Vital Signs  Temp:  [97.7 °F (36.5 °C)-98 °F (36.7 °C)] 97.8 °F (36.6 °C)  Heart Rate:  [66-73] 73  Resp:  [14-16] 16  BP: ()/(59-65) 118/65   Body mass index is 21.26 kg/m².    Physical Exam  Physical Exam  AOx3 NAD  RRR S1 and S2 audible  Lungs with fair entry  Abdomen soft nontender nondistended  R Hip dressing intact. No active bleeding   Scheduled Meds   alteplase (CATHFLO/ACTIVASE) 2 mg in sterile water (preservative free) 2.2 mL injection, 2 mg, Intracatheter, Once  busPIRone, 15 mg, Oral, BID  ceftaroline, 600 mg, Intravenous, Q8H  cyclobenzaprine, 10 mg, Oral, TID  enoxaparin, 40 mg, Subcutaneous, Q24H  folic acid, 1 mg, Oral, Daily  QUEtiapine, 50 mg, Oral, Nightly  sertraline, 100 mg, Oral, BID  sodium chloride, 10 mL, Intravenous, Q12H  vancomycin, 1,000 mg, Intravenous, Daily       PRN Meds     acetaminophen **OR** acetaminophen **OR** acetaminophen    albuterol    alteplase (CATHFLO/ACTIVASE) 1 mg in sterile water (preservative free) 1.1 mL injection    senna-docusate sodium **AND** polyethylene glycol **AND** bisacodyl **AND** bisacodyl    calcium carbonate    HYDROcodone-acetaminophen    HYDROcodone-acetaminophen    lactated ringers    ondansetron ODT **OR** ondansetron    Pharmacy to dose vancomycin    sodium chloride    sodium chloride   Infusions  lactated ringers, 9 mL/hr, Last Rate: 100 mL/hr (05/04/24 0812)  Pharmacy to dose vancomycin,           Diagnostic Data    Results from last 7 days   Lab Units 05/08/24  2326   WBC 10*3/mm3 5.58   HEMOGLOBIN g/dL 7.9*   HEMATOCRIT % 25.4*   PLATELETS 10*3/mm3 147   GLUCOSE mg/dL 85   CREATININE mg/dL 1.09*   BUN mg/dL 14   SODIUM mmol/L 139   POTASSIUM mmol/L 3.7   AST (SGOT) U/L 38*   ALT (SGPT) U/L 23   ALK PHOS U/L 217*   BILIRUBIN mg/dL 0.3    ANION GAP mmol/L 9.0       No radiology results for the last day      I reviewed the patient's new clinical results.  I reviewed the patient's new imaging results and agree with the interpretation.    Assessment/Plan:     Active and Resolved Problems  Active Hospital Problems    Diagnosis  POA    **Spinal abscess [M46.20]  Yes    Pain in joints [M25.50]  Unknown      Resolved Hospital Problems   No resolved problems to display.       58-year-old female with history of hypertension, peripheral vascular disease, degenerative joint disease, seizure disorder, tobacco abuse, EtOH liver cirrhosis, recent history of MRSA bacteremia attributed to L2-3 OM/discitis admitted to [facility] on 5/1/2024 with a worsening pain in her lower back.      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  Prior plan for vancomycin until 5/3/2024.      -Now admitted with worsening low back pain  -MRI lumbar and pelvis spine as noted.  Shows right hip effusion Along with L2-3 discitis and vertebral body osteomyelitis with enhancing paraspinal phlegmon     ID following continue vancomycin.  Duration of treatment extended to 12 weeks.  Teflaro added status post IR guided right hip aspiration.  Cell count studies noted follow culture. NGTD.  Per ID Teflaro can be discontinued if unable to give at rehab  Seen by orthopedics status post R anterior total hip arthroplasty 5/4.  She will require revision in future in 2 to 3 months  Continue oxycodone as needed for pain.  Continue Flexeril  Seen by CHRISTIANO no plan for surgical intervention  Follow infectious workup  DVT prophylaxis with enoxaparin  PT/OT.  Rehab     #History of alcohol liver cirrhosis          Outpatient follow-up     #Acute blood loss anemia on chronic anemia  Monitor H&H.  received 1 unit of PRBC 5/5  and 5/8  Repeat H&H is stable  Transfuse PRBCs to keep hemoglobin more than 7     #Tobacco abuse  NRT  Counseled    DVT prophylaxis:  Medical and mechanical DVT prophylaxis orders are present.         Code status is   Code Status and Medical Interventions:   Ordered at: 05/01/24 4237     Level Of Support Discussed With:    Patient     Code Status (Patient has no pulse and is not breathing):    CPR (Attempt to Resuscitate)     Medical Interventions (Patient has pulse or is breathing):    Full Support       Plan for disposition: Pending placement    Time: 30 minutes    Signature: Electronically signed by Niels Sands MD, 05/09/24, 10:32 EDT.  [facility] Hospitalist Team      Electronically signed by Niels Sands MD at 05/09/24 1035       Pilar Foster MD at 05/08/24 1251          Infectious Diseases Progress Note      LOS: 7 days   Patient Care Team:  Norma Saul APRN as PCP - General (Nurse Practitioner)  Flory Villanueva MD (Physical Medicine and Rehabilitation)    Chief Complaint: Back pain and right hip pain    Subjective     The patient had no high-grade fever during the last 24 hours.  She remained hemodynamically stable.  She is tolerating her current antibiotics with no side effects.      Review of Systems:   Review of Systems   Constitutional: Negative.    HENT: Negative.     Eyes: Negative.    Respiratory: Negative.     Cardiovascular: Negative.    Gastrointestinal: Negative.    Genitourinary: Negative.    Musculoskeletal:  Positive for arthralgias and back pain.   Skin: Negative.    Neurological: Negative.    Hematological: Negative.    Psychiatric/Behavioral: Negative.          Objective     Vital Signs  Temp:  [97.8 °F (36.6 °C)-98.3 °F (36.8 °C)] 98 °F (36.7 °C)  Heart Rate:  [62-73] 66  Resp:  [14-18] 14  BP: ()/(60-67) 108/67    Physical Exam:  Physical Exam  Vitals and nursing note reviewed.   Constitutional:       Appearance: She is well-developed.   HENT:      Head:  Normocephalic and atraumatic.   Eyes:      Pupils: Pupils are equal, round, and reactive to light.   Cardiovascular:      Rate and Rhythm: Normal rate and regular rhythm.      Heart sounds: Normal heart sounds.   Pulmonary:      Effort: Pulmonary effort is normal. No respiratory distress.      Breath sounds: Normal breath sounds. No wheezing or rales.   Abdominal:      General: Bowel sounds are normal. There is no distension.      Palpations: Abdomen is soft. There is no mass.      Tenderness: There is no abdominal tenderness. There is no guarding or rebound.   Musculoskeletal:         General: No deformity.      Cervical back: Normal range of motion and neck supple.      Comments: S/p right hip arthroplasty   Skin:     General: Skin is warm.      Findings: No erythema or rash.   Neurological:      Mental Status: She is alert and oriented to person, place, and time.      Cranial Nerves: No cranial nerve deficit.          Results Review:    I have reviewed all clinical data, test, lab, and imaging results.     Radiology  No Radiology Exams Resulted Within Past 24 Hours    Cardiology    Laboratory    Results from last 7 days   Lab Units 05/08/24  0231 05/07/24  0130 05/06/24  1316 05/06/24  0433 05/05/24  1335 05/05/24  0401 05/04/24  0557 05/02/24  2310 05/02/24  0616   WBC 10*3/mm3 5.08 4.30  --  4.74  --  7.39 4.58 4.35 4.54   HEMOGLOBIN g/dL 6.7* 9.5* 7.6* 7.0* 7.5* 6.3* 8.5* 8.1* 8.4*   HEMATOCRIT % 21.8* 30.8* 25.3* 22.5* 23.6* 20.2* 27.7* 27.0* 27.4*   PLATELETS 10*3/mm3 130* 96*  --  126*  --  144 167 153 152     Results from last 7 days   Lab Units 05/07/24  2348 05/07/24  0130 05/06/24  0433 05/05/24  0334 05/04/24  0557 05/02/24  2310 05/02/24  0616   SODIUM mmol/L 137 140 139 138 139 140 140   POTASSIUM mmol/L 3.5 3.7 3.7 4.2 3.8 3.8 3.9   CHLORIDE mmol/L 105 106 108* 103 105 108* 108*   CO2 mmol/L 24.0 25.0 26.0 24.0 25.0 25.0 24.0   BUN mg/dL 15 15 18 15 14 16 14   CREATININE mg/dL 0.85 0.87 1.10* 0.81  1.06* 1.03* 1.14*   GLUCOSE mg/dL 105* 78 99 117* 89 113* 95   ALBUMIN g/dL 2.6* 2.6* 2.7* 2.8* 3.2* 3.0*  --    BILIRUBIN mg/dL 0.3 0.3 0.2 <0.2 0.2 <0.2  --    ALK PHOS U/L 186* 154* 149* 145* 184* 187*  --    AST (SGOT) U/L 31 26 25 25 18 22  --    ALT (SGPT) U/L 19 16 16 13 13 12  --    CALCIUM mg/dL 7.9* 8.2* 8.3* 8.5* 9.3 8.8 9.0         Results from last 7 days   Lab Units 05/02/24  0616   SED RATE mm/hr 50*         Microbiology   Microbiology Results (last 10 days)       Procedure Component Value - Date/Time    Body Fluid Culture - Body Fluid, Hip, Right [[id number]] Collected: 05/03/24 1256    Lab Status: Final result Specimen: Body Fluid from Hip, Right Updated: 05/08/24 0644     Body Fluid Culture No growth at 5 days     Gram Stain Many (4+) WBCs per low power field      No organisms seen    Blood Culture - Blood, Arm, Right [[id number]]  (Normal) Collected: 05/02/24 1607    Lab Status: Final result Specimen: Blood from Arm, Right Updated: 05/07/24 1631     Blood Culture No growth at 5 days    Blood Culture - Blood, Blood, Central Line [[id number]]  (Normal) Collected: 05/02/24 1553    Lab Status: Final result Specimen: Blood, Central Line Updated: 05/07/24 1631     Blood Culture No growth at 5 days            Medication Review:       Schedule Meds  alteplase (CATHFLO/ACTIVASE) 2 mg in sterile water (preservative free) 2.2 mL injection, 2 mg, Intracatheter, Once  busPIRone, 15 mg, Oral, BID  ceftaroline, 600 mg, Intravenous, Q8H  cyclobenzaprine, 10 mg, Oral, TID  enoxaparin, 40 mg, Subcutaneous, Q24H  folic acid, 1 mg, Oral, Daily  QUEtiapine, 50 mg, Oral, Nightly  sertraline, 100 mg, Oral, BID  sodium chloride, 10 mL, Intravenous, Q12H  vancomycin, 1,000 mg, Intravenous, Daily        Infusion Meds  lactated ringers, 9 mL/hr, Last Rate: 100 mL/hr (05/04/24 0812)  Pharmacy to dose vancomycin,         PRN Meds    acetaminophen **OR** acetaminophen **OR** acetaminophen    albuterol    alteplase  (CATHFLO/ACTIVASE) 1 mg in sterile water (preservative free) 1.1 mL injection    senna-docusate sodium **AND** polyethylene glycol **AND** bisacodyl **AND** bisacodyl    calcium carbonate    HYDROcodone-acetaminophen    HYDROcodone-acetaminophen    lactated ringers    ondansetron ODT **OR** ondansetron    Pharmacy to dose vancomycin    sodium chloride    sodium chloride        Assessment & Plan       Antimicrobial Therapy   1.  IV vancomycin        2.  IV Teflaro        3.        4.        5.              Assessment     Discitis/osteomyelitis at the level of L2-L3.  Current MRI showed worsening of discitis with persistent epidural abscess and moderate canal stenosis.  There was paraspinal phlegmon.  Neurosurgery service following and the patient and decided not to do surgical intervention.  The patient has been receiving IV antibiotics for 8 weeks prior to admission.  I am concerned about clinical failure and need of surgery.    Possible right hip septic arthritis based on MRI of the pelvis.  Synovial fluid findings was not highly consistent with infection with a white count of only 5000 but predominantly neutrophils.  Findings was consistent with avascular necrosis and patient required right hip arthroplasty on May 4, 2024.  Cultures are negative so far     Recent admission for methicillin resistant Staphylococcus aureus bacteremia.  Likely source is lumbar discitis/osteomyelitis.   SILVINA performed on 3/12/2024 with cardiology notes mentioning no vegetation.  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  Patient denied having infection after the procedure        Plan     Continue IV vancomycin-keep trough between 15 and 20.   I will extend the duration of the treatment to 12 weeks.  If patient continues to have abnormality after  12 weeks then she needs to be evaluated for surgery.  Maximum duration of antibiotics will be 12 weeks otherwise the case would be consider failure of therapy  Waiting on repeat blood culture results and right hip fluid culture results  Continue IV Teflaro  600 mg every 8 hours for 2 weeks.  However, if rehab facility is not accepting the patient since Teflaro is not available then can discontinue Teflaro  I would recommend to repeat MRI of the lumbar spine with and without contrast before finishing IV vancomycin      Pilar Foster MD  24  12:51 EDT    Note is dictated utilizing voice recognition software/Dragon      Electronically signed by Pilar Foster MD at 24 1251       Niels Sands MD at 24 1143              [facility] MEDICINE SERVICE  DAILY PROGRESS NOTE    NAME: Lita Yu  : 1965  MRN: [id number]      LOS: 7 days     PROVIDER OF SERVICE: Niels Sands MD    Chief Complaint: Spinal abscess    Subjective:     Interval History:  History taken from: patient chart RN  Pain controlled  Overnight events noted     Review of Systems:   Review of Systems  All negative except as above   Objective:     Vital Signs  Temp:  [97.8 °F (36.6 °C)-98.3 °F (36.8 °C)] 98.3 °F (36.8 °C)  Heart Rate:  [62-73] 66  Resp:  [16-18] 16  BP: ()/(51-67) 108/67   Body mass index is 21.26 kg/m².    Physical Exam  Physical Exam  AOx3 NAD  RRR S1 and S2 audible  Lungs with fair entry  Abdomen soft nontender nondistended  R Hip dressing intact. No active bleeding   Scheduled Meds   alteplase (CATHFLO/ACTIVASE) 2 mg in sterile water (preservative free) 2.2 mL injection, 2 mg, Intracatheter, Once  busPIRone, 15 mg, Oral, BID  ceftaroline, 600 mg, Intravenous, Q8H  cyclobenzaprine, 10 mg, Oral, TID  enoxaparin, 40 mg, Subcutaneous, Q24H  folic acid, 1 mg, Oral, Daily  QUEtiapine, 50 mg, Oral, Nightly  sertraline, 100 mg, Oral, BID  sodium chloride, 10 mL, Intravenous,  Q12H  vancomycin, 1,000 mg, Intravenous, Daily       PRN Meds     acetaminophen **OR** acetaminophen **OR** acetaminophen    albuterol    alteplase (CATHFLO/ACTIVASE) 1 mg in sterile water (preservative free) 1.1 mL injection    senna-docusate sodium **AND** polyethylene glycol **AND** bisacodyl **AND** bisacodyl    calcium carbonate    HYDROcodone-acetaminophen    HYDROcodone-acetaminophen    lactated ringers    ondansetron ODT **OR** ondansetron    Pharmacy to dose vancomycin    sodium chloride    sodium chloride   Infusions  lactated ringers, 9 mL/hr, Last Rate: 100 mL/hr (05/04/24 0812)  Pharmacy to dose vancomycin,           Diagnostic Data    Results from last 7 days   Lab Units 05/08/24  0231 05/07/24  2348   WBC 10*3/mm3 5.08  --    HEMOGLOBIN g/dL 6.7*  --    HEMATOCRIT % 21.8*  --    PLATELETS 10*3/mm3 130*  --    GLUCOSE mg/dL  --  105*   CREATININE mg/dL  --  0.85   BUN mg/dL  --  15   SODIUM mmol/L  --  137   POTASSIUM mmol/L  --  3.5   AST (SGOT) U/L  --  31   ALT (SGPT) U/L  --  19   ALK PHOS U/L  --  186*   BILIRUBIN mg/dL  --  0.3   ANION GAP mmol/L  --  8.0       No radiology results for the last day      I reviewed the patient's new clinical results.  I reviewed the patient's new imaging results and agree with the interpretation.    Assessment/Plan:     Active and Resolved Problems  Active Hospital Problems    Diagnosis  POA    **Spinal abscess [M46.20]  Yes    Pain in joints [M25.50]  Unknown      Resolved Hospital Problems   No resolved problems to display.       58-year-old female with history of hypertension, peripheral vascular disease, degenerative joint disease, seizure disorder, tobacco abuse, EtOH liver cirrhosis, recent history of MRSA bacteremia attributed to L2-3 OM/discitis admitted to [facility] on 5/1/2024 with a worsening pain in her lower back.      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  Prior plan for vancomycin until 5/3/2024.      -Now admitted with worsening low back pain  -MRI lumbar and pelvis spine as noted.  Shows right hip effusion Along with L2-3 discitis and vertebral body osteomyelitis with enhancing paraspinal phlegmon     ID following continue vancomycin.  Duration of treatment extended to 12 weeks.  Teflaro added status post IR guided right hip aspiration.  Cell count studies noted follow culture. NGTD  Seen by orthopedics status post R anterior total hip arthroplasty 5/4.  She will require revision in future in 2 to 3 months  Continue oxycodone as needed for pain.  DC IV Dilaudid.  Continue Flexeril  Seen by CHRISTIANO no plan for surgical intervention  Follow infectious workup  DVT prophylaxis with enoxaparin  PT/OT.  Rehab     #History of alcohol liver cirrhosis          Outpatient follow-up     #Acute blood loss anemia on chronic anemia  Monitor H&H.  received 1 unit of PRBC 5/5  Hb 6.7 overnight received 1 U of PRBC. Repeat levels   Transfuse PRBCs to keep hemoglobin more than 7     #Tobacco abuse  NRT  Counseled    DVT prophylaxis:  Medical and mechanical DVT prophylaxis orders are present.         Code status is   Code Status and Medical Interventions:   Ordered at: 05/01/24 1657     Level Of Support Discussed With:    Patient     Code Status (Patient has no pulse and is not breathing):    CPR (Attempt to Resuscitate)     Medical Interventions (Patient has pulse or is breathing):    Full Support       Plan for disposition: Anticipate discharge next 24 hours    Time: 30 minutes    Signature: Electronically signed by Niels Sands MD, 05/08/24, 11:43 EDT.  [facility] Hospitalist Team     Electronically signed by Niels Sands MD at 05/08/24 1147       Consult Notes (last 48 hours)  Notes from 05/07/24 1448 through 05/09/24 1448   No notes of this type exist for this  "encounter.       Nutrition Notes (most recent note)    No notes exist for this encounter.          Physical Therapy Notes (last 24 hours)        Kinjal Dodd, PT at 24 0905  Version 1 of 1         Subjective: Pt agreeable to therapeutic plan of care.     Objective:   Supine in bed, asking about d/c options.     WB'ing status: R Lower Extremity Partial Weight Bearing        Therapeutic Exercise: 10 Reps R Lower Extremity AAROM   AP, QS, GS, assisted heel slides, SAQs, ab/add     Precautions: Weight-bearing restriction     Bed mobility - mod I, uses LLE as leg  to assist RLE into bed  Transfers - CGA and with rolling walker  Ambulation - 100 feet CGA and with rolling walker, cues for PWB RLE., step to gait pattern        Pain: 6 VAS   Location: R hip  Intervention for pain: Repositioned, had pain meds prior to therapy     Education: Gait Training     Assessment: Lita Yu presents with functional mobility impairments which indicate the need for skilled intervention. Improved tolerance to mobility this date.  R thigh swollen and some redness noted medial thigh.  RN aware. Tolerating session today without incident. Will continue to follow and progress as tolerated.      Plan/Recommendations:   If medically appropriate, Moderate Intensity Therapy recommended post-acute care. This is recommended as therapy feels the patient would require 3-4 days per week and wouldn't tolerate \"3 hour daily\" rehab intensity. SNF would be the preferred choice. If the patient does not agree to SNF, arrange HH or OP depending on home bound status. If patient is medically complex, consider LTACH. Pt requires no DME at discharge.      Pt desires Skilled Rehab placement at discharge. Pt cooperative; agreeable to therapeutic recommendations and plan of care.            Basic Mobility 6-click:  Rollin = Total, A lot = 2, A little = 3; 4 = None  Supine>Sit:                      1 = Total, A " lot = 2, A little = 3; 4 = None   Sit>Stand with arms:       1 = Total, A lot = 2, A little = 3; 4 = None  Bed>Chair:                      1 = Total, A lot = 2, A little = 3; 4 = None  Ambulate in room:           1 = Total, A lot = 2, A little = 3; 4 = None  3-5 Steps with railin = Total, A lot = 2, A little = 3; 4 = None  Score: 21     Modified [place]: N/A = No pre-op stroke/TIA     Post-Tx Position: Supine with HOB Elevated, Alarms activated, and Call light and personal items within reach  PPE: gloves and surgical mask                                Electronically signed by Kinjal Dodd, PT at 24 1159       Kinjal Dodd, PT at 24 0905  Version 1 of 1         Goal Outcome Evaluation:      Lita Yu presents with functional mobility impairments which indicate the need for skilled intervention. Improved tolerance to mobility this date.  R thigh swollen and some redness noted medial thigh.  RN aware. Tolerating session today without incident. Will continue to follow and progress as tolerated.                                           Electronically signed by Kinjal Dodd, PT at 24 1159          Occupational Therapy Notes (last 24 hours)        Tristen Mcarthur, OT at 24 3209          Assessment: Lita Yu presents with ADL impairments affecting function including balance, endurance / activity tolerance, pain, and strength. Demonstrated functioning below baseline abilities indicate the need for continued skilled intervention while inpatient. Patient continues to be hindered by pain, rated 9/10 this date. She does well with partial weight-bearing activities using RW, but pain limits progress. She was CGA for mobility and grooming in standing, but cont to require assist with supine to sit transition for managing the right leg. Tolerating session today without incident. Will continue to follow and progress as tolerated.      Plan/Recommendations:   Moderate  "Intensity Therapy recommended post-acute care. This is recommended as therapy feels the patient would require 3-4 days per week and wouldn't tolerate \"3 hour daily\" rehab intensity. SNF would be the preferred choice. If the patient does not agree to SNF, arrange HH or OP depending on home bound status. If patient is medically complex, consider LTACH.. Pt requires no DME at discharge.      Pt desires Skilled Rehab placement at discharge. Pt cooperative; agreeable to therapeutic recommendations and plan of care.          Electronically signed by Tristen Mcarthur, OT at 05/08/24 1719       Tristen Mcarthur OT at 05/08/24 1715          Subjective: Pt agreeable to therapeutic plan of care.  Cognition: oriented to Person, Place, Time, and Situation    Objective:     Bed Mobility: Min-A sit to supine for assistance with RLE   Functional Transfers: CGA and with rolling walker     Balance: supported, static, dynamic, and standing CGA and with rolling walker  Functional Ambulation: CGA and with rolling walker    Grooming: CGA  ADL Position: unsupported standing and at sink  ADL Comments: Pt stood sinkside for completion of oral care regimen and hair care.      Vitals: WNL    Pain: 9 VAS  Location: R hip  Interventions for pain: Repositioned, Increased Activity, and Therapeutic Presence  Education: Provided education on the importance of mobility in the acute care setting, Verbal/Tactile Cues, ADL training, and Transfer Training      Assessment: Lita Yu presents with ADL impairments affecting function including balance, endurance / activity tolerance, pain, and strength. Demonstrated functioning below baseline abilities indicate the need for continued skilled intervention while inpatient. Patient continues to be hindered by pain, rated 9/10 this date. She does well with partial weight-bearing activities using RW, but pain limits progress. She was CGA for mobility and grooming in standing, but cont to require assist " "with supine to sit transition for managing the right leg. Tolerating session today without incident. Will continue to follow and progress as tolerated.     Plan/Recommendations:   Moderate Intensity Therapy recommended post-acute care. This is recommended as therapy feels the patient would require 3-4 days per week and wouldn't tolerate \"3 hour daily\" rehab intensity. SNF would be the preferred choice. If the patient does not agree to SNF, arrange HH or OP depending on home bound status. If patient is medically complex, consider LTACH.. Pt requires no DME at discharge.     Pt desires Skilled Rehab placement at discharge. Pt cooperative; agreeable to therapeutic recommendations and plan of care.     Modified [place]: N/A = No pre-op stroke/TIA    Post-Tx Position: Supine with HOB Elevated, Alarms activated, and Call light and personal items within reach  PPE: gloves      Electronically signed by Tristen Mcarthur OT at 24 9717       Speech Language Pathology Notes (most recent note)    No notes exist for this encounter.     Cortez Ledesma, PT   Physical Therapist  Specialty:  Physical Therapy  Therapy Evaluation     Signed  Date of Service:  24  Creation Time:  24     Signed        Expand All Collapse All  Patient Name: Lita Yu                   : 1965                      MRN: [id number]                              Today's Date: 2024                                     Admit Date: 2024               Visit Dx:   Visit Diagnosis       ICD-10-CM ICD-9-CM   1. Pain in joints  M25.50 719.49         Problem List       Patient Active Problem List   Diagnosis    Postmenopausal state    Post traumatic stress disorder    Pain in joints    Osteoporosis    Macrocytic anemia    Lumbosacral radiculopathy    Leg pain, left    Peripheral vascular disease    Essential hypertension    Hyperlipidemia    Degenerative joint disease of left hip    Seizure disorder    Smoker    Status " post hip replacement    Tobacco dependence syndrome    Vitamin B12 deficiency    Anemia of chronic disease    Chronic obstructive pulmonary disease    Cirrhosis of liver    Acute UTI (urinary tract infection)    Generalized weakness    Rhabdomyolysis    Non-traumatic rhabdomyolysis    Moderate malnutrition    Chest pain    Dehydration    Myalgia    Acute hyperkalemia    Spinal abscess         Medical History        Past Medical History:   Diagnosis Date    Cirrhosis      COPD (chronic obstructive pulmonary disease)      Depression      GERD (gastroesophageal reflux disease)      Hyperlipidemia      Hypertension      PTSD (post-traumatic stress disorder)           Surgical History         Past Surgical History:   Procedure Laterality Date     SECTION N/A      COLONOSCOPY N/A 2021     Procedure: COLONOSCOPY with polypectomy x2 and random biopsy;  Surgeon: Osman Clay MD;  Location: [facility] ENDOSCOPY;  Service: Gastroenterology;  Laterality: N/A;  colon polyps    DENTAL PROCEDURE        ENDOSCOPY N/A 2021     Procedure: ESOPHAGOGASTRODUODENOSCOPY;  Surgeon: Osman Clay MD;  Location: [facility] ENDOSCOPY;  Service: Gastroenterology;  Laterality: N/A;  esophagitis    JOINT REPLACEMENT        REPLACEMENT TOTAL HIP LATERAL POSITION Left      TONSILLECTOMY        VAGINAL BIRTH AFTER  SECTION               General Information         Row Name 24 1733                 Physical Therapy Time and Intention     Document Type evaluation  -EL       Mode of Treatment individual therapy;physical therapy  -EL          Row Name 24 1733                 General Information     Prior Level of Function independent:;all household mobility;ADL's;work  -EL       Existing Precautions/Restrictions fall  -EL          Row Name 24 1733                 Living Environment     People in Home alone  -EL          Row Name 24 1733                 Home Main Entrance     Number of Stairs, Main  Entrance none  -          Row Name 05/05/24 1733                 Stairs Within Home, Primary     Number of Stairs, Within Home, Primary none  -EL          Row Name 05/05/24 1733                 Cognition     Orientation Status (Cognition) oriented x 4  -EL          Row Name 05/05/24 1733                 Safety Issues, Functional Mobility     Impairments Affecting Function (Mobility) endurance/activity tolerance;pain;strength;range of motion (ROM)  -EL                       User Key  (r) = Recorded By, (t) = Taken By, (c) = Cosigned By        Initials Name Provider Type     Cortez Orozco, PT Physical Therapist                            Mobility         Row Name 05/05/24 1006                 Bed Mobility     Bed Mobility supine-sit  -EL       Supine-Sit [place] (Bed Mobility) 2 person assist;minimum assist (75% patient effort)  -EL       Assistive Device (Bed Mobility) head of bed elevated;bed rails;draw sheet  -          Row Name 05/05/24 1006                 Bed-Chair Transfer     Bed-Chair [place] (Transfers) minimum assist (75% patient effort)  -EL       Assistive Device (Bed-Chair Transfers) walker, front-wheeled  -          Row Name 05/05/24 1006                 Sit-Stand Transfer     Sit-Stand [place] (Transfers) minimum assist (75% patient effort);verbal cues  -EL       Assistive Device (Sit-Stand Transfers) walker, front-wheeled  -EL          Row Name 05/05/24 1006                 Gait/Stairs (Locomotion)     [place] Level (Gait) minimum assist (75% patient effort)  -EL       Assistive Device (Gait) walker, front-wheeled  -EL       Patient was able to Ambulate yes  -EL       Distance in Feet (Gait) 15  -EL       Deviations/Abnormal Patterns (Gait) gait speed decreased;stride length decreased  -EL       Comment, (Gait/Stairs) Vocalized a lot of pain, but able to bear weight well.  -EL                       User Key  (r) = Recorded By, (t) = Taken By, (c) = Cosigned By        Initials  Name Provider Type     Cortez Orozco PT Physical Therapist                            Obj/Interventions         Row Name 05/05/24 1740                 Range of Motion Comprehensive     General Range of Motion bilateral lower extremity ROM WFL  -          Row Name 05/05/24 1740                 Strength Comprehensive (MMT)     General Manual Muscle Testing (MMT) Assessment lower extremity strength deficits identified  -EL       Comment, General Manual Muscle Testing (MMT) Assessment LLE functionally 3/5 gross, RLE WFL  -EL          Row Name 05/05/24 1740                 Balance     Balance Assessment sitting static balance;standing static balance;standing dynamic balance  -EL       Static Sitting Balance contact guard  -EL       Static Standing Balance minimal assist  -EL       Dynamic Standing Balance minimal assist  -EL       Position/Device Used, Standing Balance walker, front-wheeled  -EL          Row Name 05/05/24 1740                 Sensory Assessment (Somatosensory)     Sensory Assessment (Somatosensory) sensation intact  -EL                       User Key  (r) = Recorded By, (t) = Taken By, (c) = Cosigned By        Initials Name Provider Type     Cortez Orozco PT Physical Therapist                            Goals/Plan         Row Name 05/05/24 1754                 Bed Mobility Goal 1 (PT)     Activity/Assistive Device (Bed Mobility Goal 1, PT) bed mobility activities, all  -EL       [place] Level/Cues Needed (Bed Mobility Goal 1, PT) modified independence  -EL       Time Frame (Bed Mobility Goal 1, PT) long term goal (LTG);2 weeks  -[facility] Name 05/05/24 1754                 Transfer Goal 1 (PT)     Activity/Assistive Device (Transfer Goal 1, PT) transfers, all;walker, rolling  -EL       [place] Level/Cues Needed (Transfer Goal 1, PT) modified independence  -EL       Time Frame (Transfer Goal 1, PT) long term goal (LTG);2 weeks  -          Row Name 05/05/24 1754                 Gait  Training Goal 1 (PT)     Activity/Assistive Device (Gait Training Goal 1, PT) gait (walking locomotion);walker, rolling  -EL       [place] Level (Gait Training Goal 1, PT) modified independence  -EL       Distance (Gait Training Goal 1, PT) 120  -EL       Time Frame (Gait Training Goal 1, PT) long term goal (LTG);2 weeks  -          Row Name 05/05/24 8181                 Therapy Assessment/Plan (PT)     Planned Therapy Interventions (PT) neuromuscular re-education;balance training;bed mobility training;transfer training;gait training;patient/family education;strengthening  -EL                    User Key  (r) = Recorded By, (t) = Taken By, (c) = Cosigned By        Initials Name Provider Type     Cortez Orozco, PT Physical Therapist                         Clinical Impression         Row Name 05/05/24 6512                 Pain Scale: FACES Pre/Post-Treatment     Pain: FACES Scale, Pretreatment 2-->hurts little bit  -EL       Posttreatment Pain Rating 6-->hurts even more  -EL       Pre/Posttreatment Pain Comment Painful with movement, very vocal.  -          Row Name 05/05/24 9466                 Plan of Care Review     Plan of Care Reviewed With patient  -EL       Outcome Evaluation Pt is a 59 YO F POD 1 L ant BRE, s/p femoral head frx from avascular necrosis. Pt has recently dealt with L2-3 discectomy as well, with spinal abscess. Pt painful this date, and vocalized pain with mobiltiy, but able to transfer and ambulate with RWx. Requiring cueing for weight shifting and AD managment. Pt states she lives home alone, typically is independent with all ADLs, ambulation without AD and no recent falls. Pt states she plans to have someone stay with her at d/c to provide assistance and her preference is HHPT. If she is unable to make arangements to have assitance at home she will likely need short term IP rehab following d/c. PT to follow to continue to assess need.  -          Row Name 05/05/24 6686                  Therapy Assessment/Plan (PT)     Rehab Potential (PT) good, to achieve stated therapy goals  -EL       Criteria for Skilled Interventions Met (PT) yes  -EL       Therapy Frequency (PT) 5 times/wk  -EL                       User Key  (r) = Recorded By, (t) = Taken By, (c) = Cosigned By        Initials Name Provider Type     Cortez Orozco, PT Physical Therapist                            Outcome Measures         Row Name 05/05/24 1754 05/05/24 0810            How much help from another person do you currently need...     Turning from your back to your side while in flat bed without using bedrails? 4  -EL 4  -MS     Moving from lying on back to sitting on the side of a flat bed without bedrails? 3  -EL 4  -MS     Moving to and from a bed to a chair (including a wheelchair)? 3  -EL 3  -MS     Standing up from a chair using your arms (e.g., wheelchair, bedside chair)? 3  -EL 3  -MS     Climbing 3-5 steps with a railing? 1  -EL 2  -MS     To walk in hospital room? 3  -EL 3  -MS     AM-PAC 6 Clicks Score (PT) 17  -EL 19  -MS     Highest Level of Mobility Goal 5 --> Static standing  -EL 6 --> Walk 10 steps or more  -MS        Row Name 05/05/24 1754                 Functional Assessment     Outcome Measure Options AM-PAC 6 Clicks Basic Mobility (PT)  -EL                       User Key  (r) = Recorded By, (t) = Taken By, (c) = Cosigned By        Initials Name Provider Type     Cortez Orozco, TOBY Physical Therapist     Latisha Hoffman LPN Licensed Nurse                          Physical Therapy Education            Title: PT OT SLP Therapies (In Progress)         Topic: Physical Therapy (Not Started)         Point: Mobility training (Done)         Learning Progress Summary               Patient Acceptance, E,TB, VU by JUANA at 5/5/2024 1754                               Point: Body mechanics (Not Started)         Learner Progress:  Not documented in this visit.                  Point: Precautions (Done)         Learning  Progress Summary               Patient Acceptance, E,TB, VU by  at 5/5/2024 1759                                                   User Key         Initials Effective Dates Name Provider Type Discipline      06/23/20 -  Cortez Ledesma, PT Physical Therapist PT                          PT Recommendation and Plan  Planned Therapy Interventions (PT): neuromuscular re-education, balance training, bed mobility training, transfer training, gait training, patient/family education, strengthening  Plan of Care Reviewed With: patient  Outcome Evaluation: Pt is a 59 YO F POD 1 L ant BRE, s/p femoral head frx from avascular necrosis. Pt has recently dealt with L2-3 discectomy as well, with spinal abscess. Pt painful this date, and vocalized pain with mobiltiy, but able to transfer and ambulate with RWx. Requiring cueing for weight shifting and AD managment. Pt states she lives home alone, typically is independent with all ADLs, ambulation without AD and no recent falls. Pt states she plans to have someone stay with her at d/c to provide assistance and her preference is HHPT. If she is unable to make arangements to have assitance at home she will likely need short term IP rehab following d/c. PT to follow to continue to assess need.      Time Calculation:   PT Evaluation Complexity  History, PT Evaluation Complexity: 1-2 personal factors and/or comorbidities  Examination of Body Systems (PT Eval Complexity): total of 3 or more elements  Clinical Presentation (PT Evaluation Complexity): evolving  Clinical Decision Making (PT Evaluation Complexity): moderate complexity  Overall Complexity (PT Evaluation Complexity): moderate complexity       PT Charges         Row Name 05/05/24 6390                       Time Calculation     Start Time 1006  -EL         Stop Time 1028  -EL         Time Calculation (min) 22 min  -EL         PT Received On 05/05/24  -EL         PT - Next Appointment 05/06/24  -         PT Goal Re-Cert Due Date  05/19/24  -JUANA                      User Key  (r) = Recorded By, (t) = Taken By, (c) = Cosigned By        Initials Name Provider Type     Cortez Orozco, PT Physical Therapist                       Therapy Charges for Today         Code Description Service Date Service Provider Modifiers Qty     96312570397 HC PT EVAL MOD COMPLEXITY 4 5/5/2024 Cortez Ledesma, PT GP 1                PT G-Codes  Outcome Measure Options: AM-PAC 6 Clicks Basic Mobility (PT)  AM-PAC 6 Clicks Score (PT): 17  PT Discharge Summary  Anticipated Discharge Disposition (PT): inpatient rehabilitation facility, other (see comments) (pending progress)     Cortez Ledesma, TOBY             5/5/2024

## 2024-05-09 NOTE — CASE MANAGEMENT/SOCIAL WORK
Continued Stay Note  DAYANARA Chery     Patient Name: Lita Yu  MRN: [id number]  Today's Date: 5/9/2024    Admit Date: 5/1/2024    Plan: Accepted to [facility] snf pending precert.  PASRR  approved.   Discharge Plan       Row Name 05/09/24 1506       Plan    Plan Accepted to [facility] snf pending precert.  PASRR  approved.    Roadmap to Recovery Yes    Plan Comments Dc barriers:  precert pending to [facility].  IV fluids monitoring hgb               Tequila Bourne RN

## 2024-05-09 NOTE — THERAPY TREATMENT NOTE
"Subjective: Pt agreeable to therapeutic plan of care.     Objective:   Supine in bed, asking about d/c options.     WB'ing status: R Lower Extremity Partial Weight Bearing        Therapeutic Exercise: 10 Reps R Lower Extremity AAROM   AP, QS, GS, assisted heel slides, SAQs, ab/add     Precautions: Weight-bearing restriction     Bed mobility - mod I, uses LLE as leg  to assist RLE into bed  Transfers - CGA and with rolling walker  Ambulation - 100 feet CGA and with rolling walker, cues for PWB RLE., step to gait pattern        Pain: 6 VAS   Location: R hip  Intervention for pain: Repositioned, had pain meds prior to therapy     Education: Gait Training     Assessment: Lita Yu presents with functional mobility impairments which indicate the need for skilled intervention. Improved tolerance to mobility this date.  R thigh swollen and some redness noted medial thigh.  RN aware. Tolerating session today without incident. Will continue to follow and progress as tolerated.      Plan/Recommendations:   If medically appropriate, Moderate Intensity Therapy recommended post-acute care. This is recommended as therapy feels the patient would require 3-4 days per week and wouldn't tolerate \"3 hour daily\" rehab intensity. SNF would be the preferred choice. If the patient does not agree to SNF, arrange HH or OP depending on home bound status. If patient is medically complex, consider LTACH. Pt requires no DME at discharge.      Pt desires Skilled Rehab placement at discharge. Pt cooperative; agreeable to therapeutic recommendations and plan of care.            Basic Mobility 6-click:  Rollin = Total, A lot = 2, A little = 3; 4 = None  Supine>Sit:                      1 = Total, A lot = 2, A little = 3; 4 = None   Sit>Stand with arms:       1 = Total, A lot = 2, A little = 3; 4 = None  Bed>Chair:                      1 = Total, A lot = 2, A little = 3; 4 = None  Ambulate in room:    "        1 = Total, A lot = 2, A little = 3; 4 = None  3-5 Steps with railin = Total, A lot = 2, A little = 3; 4 = None  Score: 21     Modified [place]: N/A = No pre-op stroke/TIA     Post-Tx Position: Supine with HOB Elevated, Alarms activated, and Call light and personal items within reach  PPE: gloves and surgical mask

## 2024-05-09 NOTE — THERAPY TREATMENT NOTE
Subjective: Pt agreeable to therapeutic plan of care.  Cognition: oriented to Person, Place, Time, and Situation, memory: Normal, arousal/alertness: Alert, Attentive, and Distracted, safety/judgement: fair, and awareness of deficits: fair awareness of safety precautions and fair awareness of deficits    Objective: impulsive, needs safety cues and SBA for mobility.    Bed Mobility: Supervision and utilizing compensatory strategy   Functional Transfers: SBA and with rolling walker     Balance: dynamic, with UE support, and standing Supervision  Functional Ambulation: SBA and with rolling walker 75'    Lower Body Dressing and Grooming: Min-A  ADL Position: edge of bed sitting  ADL Comments: pt is grooming self well from seated & standing. Went over donning socks today and pt needed min (A0 and safety/technique cues for RLE. Education done on anterior BRE.    Therapeutic Exercise - pt demonstrates X2 of the exercises from her HEP w/o cues.    Vitals: WNL    Pain: 2 VAS  Location: rt hip, encouraged to ice throughout the day and to limit walking to 150' at a time at this point and to be up frequently but for short times throughout the day.  Interventions for pain: Repositioned, Increased Activity, and Therapeutic Presence  Education: Provided education on the importance of mobility in the acute care setting, Verbal/Tactile Cues, ADL training, Transfer Training, Energy conservation strategies, WB'ing status, and Post-Op Precautions    Assessment: Lita Yu presents with ADL impairments affecting function including endurance / activity tolerance, pain, range of motion (ROM), and strength. Demonstrated functioning below baseline abilities indicate the need for continued skilled intervention while inpatient. Tolerating session today without incident. Pt is improving but needs safety cues and daily IV ABX for several more weeks which will not be able to be done from home. Will continue to follow and progress as  "tolerated.     Plan/Recommendations:   Moderate Intensity Therapy recommended post-acute care. This is recommended as therapy feels the patient would require 3-4 days per week and wouldn't tolerate \"3 hour daily\" rehab intensity. SNF would be the preferred choice. If the patient does not agree to SNF, arrange HH or OP depending on home bound status. If patient is medically complex, consider LTACH.. Pt requires no DME at discharge.     Pt desires Skilled Rehab placement at discharge. Pt cooperative; agreeable to therapeutic recommendations and plan of care.     Modified [place]: 3 = Moderate disability (Requires some help, but able to walk unassisted)    Post-Tx Position: Supine with HOB Elevated and Call light and personal items within reach  PPE: gloves    "

## 2024-05-10 LAB
ALBUMIN SERPL-MCNC: 2.6 G/DL (ref 3.5–5.2)
ALBUMIN/GLOB SERPL: 0.8 G/DL
ALP SERPL-CCNC: 188 U/L (ref 39–117)
ALT SERPL W P-5'-P-CCNC: 20 U/L (ref 1–33)
ANION GAP SERPL CALCULATED.3IONS-SCNC: 6 MMOL/L (ref 5–15)
AST SERPL-CCNC: 31 U/L (ref 1–32)
BASOPHILS # BLD AUTO: 0.02 10*3/MM3 (ref 0–0.2)
BASOPHILS # BLD AUTO: 0.03 10*3/MM3 (ref 0–0.2)
BASOPHILS NFR BLD AUTO: 0.4 % (ref 0–1.5)
BASOPHILS NFR BLD AUTO: 0.6 % (ref 0–1.5)
BILIRUB SERPL-MCNC: 0.3 MG/DL (ref 0–1.2)
BUN SERPL-MCNC: 14 MG/DL (ref 6–20)
BUN/CREAT SERPL: 15.4 (ref 7–25)
CALCIUM SPEC-SCNC: 8.4 MG/DL (ref 8.6–10.5)
CHLORIDE SERPL-SCNC: 108 MMOL/L (ref 98–107)
CO2 SERPL-SCNC: 25 MMOL/L (ref 22–29)
CREAT SERPL-MCNC: 0.91 MG/DL (ref 0.57–1)
DEPRECATED RDW RBC AUTO: 60.1 FL (ref 37–54)
DEPRECATED RDW RBC AUTO: 62.4 FL (ref 37–54)
EGFRCR SERPLBLD CKD-EPI 2021: 74.2 ML/MIN/1.73
EOSINOPHIL # BLD AUTO: 0.32 10*3/MM3 (ref 0–0.4)
EOSINOPHIL # BLD AUTO: 0.33 10*3/MM3 (ref 0–0.4)
EOSINOPHIL NFR BLD AUTO: 6 % (ref 0.3–6.2)
EOSINOPHIL NFR BLD AUTO: 6.5 % (ref 0.3–6.2)
ERYTHROCYTE [DISTWIDTH] IN BLOOD BY AUTOMATED COUNT: 17.3 % (ref 12.3–15.4)
ERYTHROCYTE [DISTWIDTH] IN BLOOD BY AUTOMATED COUNT: 17.8 % (ref 12.3–15.4)
GLOBULIN UR ELPH-MCNC: 3.4 GM/DL
GLUCOSE SERPL-MCNC: 82 MG/DL (ref 65–99)
HCT VFR BLD AUTO: 24.2 % (ref 34–46.6)
HCT VFR BLD AUTO: 24.5 % (ref 34–46.6)
HGB BLD-MCNC: 7.3 G/DL (ref 12–15.9)
HGB BLD-MCNC: 7.5 G/DL (ref 12–15.9)
IMM GRANULOCYTES # BLD AUTO: 0.08 10*3/MM3 (ref 0–0.05)
IMM GRANULOCYTES # BLD AUTO: 0.09 10*3/MM3 (ref 0–0.05)
IMM GRANULOCYTES NFR BLD AUTO: 1.6 % (ref 0–0.5)
IMM GRANULOCYTES NFR BLD AUTO: 1.7 % (ref 0–0.5)
LYMPHOCYTES # BLD AUTO: 1 10*3/MM3 (ref 0.7–3.1)
LYMPHOCYTES # BLD AUTO: 1.11 10*3/MM3 (ref 0.7–3.1)
LYMPHOCYTES NFR BLD AUTO: 19.8 % (ref 19.6–45.3)
LYMPHOCYTES NFR BLD AUTO: 20.8 % (ref 19.6–45.3)
MAGNESIUM SERPL-MCNC: 1.7 MG/DL (ref 1.6–2.6)
MCH RBC QN AUTO: 28.6 PG (ref 26.6–33)
MCH RBC QN AUTO: 29 PG (ref 26.6–33)
MCHC RBC AUTO-ENTMCNC: 30.2 G/DL (ref 31.5–35.7)
MCHC RBC AUTO-ENTMCNC: 30.6 G/DL (ref 31.5–35.7)
MCV RBC AUTO: 94.6 FL (ref 79–97)
MCV RBC AUTO: 94.9 FL (ref 79–97)
MONOCYTES # BLD AUTO: 0.57 10*3/MM3 (ref 0.1–0.9)
MONOCYTES # BLD AUTO: 0.61 10*3/MM3 (ref 0.1–0.9)
MONOCYTES NFR BLD AUTO: 11.3 % (ref 5–12)
MONOCYTES NFR BLD AUTO: 11.4 % (ref 5–12)
NEUTROPHILS NFR BLD AUTO: 3.06 10*3/MM3 (ref 1.7–7)
NEUTROPHILS NFR BLD AUTO: 3.17 10*3/MM3 (ref 1.7–7)
NEUTROPHILS NFR BLD AUTO: 59.5 % (ref 42.7–76)
NEUTROPHILS NFR BLD AUTO: 60.4 % (ref 42.7–76)
NRBC BLD AUTO-RTO: 0 /100 WBC (ref 0–0.2)
NRBC BLD AUTO-RTO: 0 /100 WBC (ref 0–0.2)
PHOSPHATE SERPL-MCNC: 4.1 MG/DL (ref 2.5–4.5)
PLATELET # BLD AUTO: 132 10*3/MM3 (ref 140–450)
PLATELET # BLD AUTO: 134 10*3/MM3 (ref 140–450)
PMV BLD AUTO: 9.5 FL (ref 6–12)
PMV BLD AUTO: 9.6 FL (ref 6–12)
POTASSIUM SERPL-SCNC: 4.3 MMOL/L (ref 3.5–5.2)
PROT SERPL-MCNC: 6 G/DL (ref 6–8.5)
RBC # BLD AUTO: 2.55 10*6/MM3 (ref 3.77–5.28)
RBC # BLD AUTO: 2.59 10*6/MM3 (ref 3.77–5.28)
SODIUM SERPL-SCNC: 139 MMOL/L (ref 136–145)
VANCOMYCIN TROUGH SERPL-MCNC: 14.9 MCG/ML (ref 5–20)
WBC NRBC COR # BLD AUTO: 5.06 10*3/MM3 (ref 3.4–10.8)
WBC NRBC COR # BLD AUTO: 5.33 10*3/MM3 (ref 3.4–10.8)

## 2024-05-10 PROCEDURE — 84100 ASSAY OF PHOSPHORUS: CPT | Performed by: ORTHOPAEDIC SURGERY

## 2024-05-10 PROCEDURE — 80053 COMPREHEN METABOLIC PANEL: CPT | Performed by: ORTHOPAEDIC SURGERY

## 2024-05-10 PROCEDURE — 83735 ASSAY OF MAGNESIUM: CPT | Performed by: ORTHOPAEDIC SURGERY

## 2024-05-10 PROCEDURE — 25010000002 VANCOMYCIN 1 G RECONSTITUTED SOLUTION 1 EACH VIAL: Performed by: INTERNAL MEDICINE

## 2024-05-10 PROCEDURE — 97116 GAIT TRAINING THERAPY: CPT

## 2024-05-10 PROCEDURE — 80202 ASSAY OF VANCOMYCIN: CPT | Performed by: INTERNAL MEDICINE

## 2024-05-10 PROCEDURE — 97530 THERAPEUTIC ACTIVITIES: CPT

## 2024-05-10 PROCEDURE — 25010000002 ENOXAPARIN PER 10 MG: Performed by: HOSPITALIST

## 2024-05-10 PROCEDURE — 25010000002 CEFTAROLINE FOSAMIL PER 10 MG: Performed by: INTERNAL MEDICINE

## 2024-05-10 PROCEDURE — 25810000003 SODIUM CHLORIDE 0.9 % SOLUTION 250 ML FLEX CONT: Performed by: INTERNAL MEDICINE

## 2024-05-10 PROCEDURE — 97110 THERAPEUTIC EXERCISES: CPT

## 2024-05-10 PROCEDURE — 85025 COMPLETE CBC W/AUTO DIFF WBC: CPT | Performed by: ORTHOPAEDIC SURGERY

## 2024-05-10 RX ADMIN — HYDROCODONE BITARTRATE AND ACETAMINOPHEN 1 TABLET: 10; 325 TABLET ORAL at 19:35

## 2024-05-10 RX ADMIN — SERTRALINE 100 MG: 100 TABLET, FILM COATED ORAL at 08:04

## 2024-05-10 RX ADMIN — SERTRALINE 100 MG: 100 TABLET, FILM COATED ORAL at 19:57

## 2024-05-10 RX ADMIN — HYDROCODONE BITARTRATE AND ACETAMINOPHEN 1 TABLET: 10; 325 TABLET ORAL at 07:50

## 2024-05-10 RX ADMIN — HYDROCODONE BITARTRATE AND ACETAMINOPHEN 1 TABLET: 10; 325 TABLET ORAL at 13:42

## 2024-05-10 RX ADMIN — BUSPIRONE HYDROCHLORIDE 15 MG: 15 TABLET ORAL at 08:04

## 2024-05-10 RX ADMIN — ENOXAPARIN SODIUM 40 MG: 100 INJECTION SUBCUTANEOUS at 17:18

## 2024-05-10 RX ADMIN — QUETIAPINE FUMARATE 50 MG: 25 TABLET ORAL at 19:57

## 2024-05-10 RX ADMIN — CYCLOBENZAPRINE 10 MG: 10 TABLET, FILM COATED ORAL at 17:18

## 2024-05-10 RX ADMIN — CYCLOBENZAPRINE 10 MG: 10 TABLET, FILM COATED ORAL at 23:40

## 2024-05-10 RX ADMIN — CYCLOBENZAPRINE 10 MG: 10 TABLET, FILM COATED ORAL at 08:04

## 2024-05-10 RX ADMIN — HYDROCODONE BITARTRATE AND ACETAMINOPHEN 1 TABLET: 10; 325 TABLET ORAL at 04:13

## 2024-05-10 RX ADMIN — Medication 10 ML: at 19:58

## 2024-05-10 RX ADMIN — VANCOMYCIN HYDROCHLORIDE 1000 MG: 1 INJECTION, POWDER, LYOPHILIZED, FOR SOLUTION INTRAVENOUS at 08:05

## 2024-05-10 RX ADMIN — CEFTAROLINE FOSAMIL 600 MG: 600 POWDER, FOR SOLUTION INTRAVENOUS at 02:50

## 2024-05-10 RX ADMIN — BUSPIRONE HYDROCHLORIDE 15 MG: 15 TABLET ORAL at 19:57

## 2024-05-10 RX ADMIN — CEFTAROLINE FOSAMIL 600 MG: 600 POWDER, FOR SOLUTION INTRAVENOUS at 10:17

## 2024-05-10 RX ADMIN — HYDROCODONE BITARTRATE AND ACETAMINOPHEN 1 TABLET: 10; 325 TABLET ORAL at 23:40

## 2024-05-10 RX ADMIN — CEFTAROLINE FOSAMIL 600 MG: 600 POWDER, FOR SOLUTION INTRAVENOUS at 17:17

## 2024-05-10 RX ADMIN — FOLIC ACID 1 MG: 1 TABLET ORAL at 08:04

## 2024-05-10 NOTE — CASE MANAGEMENT/SOCIAL WORK
Continued Stay Note   Vik     Patient Name: Lita Yu  MRN: [id number]  Today's Date: 5/10/2024    Admit Date: 5/1/2024    Plan: [facility].  Precert approved 5/10-5/16/24. PASRR  approved.  Ok per ID to dc on Vancomycin only . Bed ready this weekend   Discharge Plan       Row Name 05/10/24 1533       Plan    Plan [facility].  Precert approved 5/10-5/16/24. PASRR  approved.  Ok per ID to dc on Vancomycin only . Bed ready this weekend    Plan Comments DC barriers: monitoring hgb.  Per liaison bed ready over the weekend.   Per ID patient can be discharged on on IV vanc and dc Teflaro.     Taken                 Tequila Bourne RN

## 2024-05-10 NOTE — THERAPY TREATMENT NOTE
"Subjective: Pt agreeable to therapeutic plan of care.    Objective:     Bed mobility - Modified-Independent  Transfers - Supervision with rolling walker  Ambulation - 100 feet x 2 Supervision and with rolling walker    Therapeutic Exercise -    Seated BLE TE in all planes x 10 reps       Education: Provided education on the importance of mobility in the acute care setting, Verbal/Tactile Cues, Transfer Training, Gait Training, Energy conservation strategies, and WB'ing status    Assessment: Lita Yu presents with functional mobility impairments which indicate the need for skilled intervention. Ambulates out of room w/ standing rest breaks and education on maintaining WB precautions. Tolerating session today without incident. Will continue to follow and progress as tolerated.     Plan/Recommendations:   If medically appropriate, Moderate Intensity Therapy recommended post-acute care. This is recommended as therapy feels the patient would require 3-4 days per week and wouldn't tolerate \"3 hour daily\" rehab intensity. SNF would be the preferred choice. If the patient does not agree to SNF, arrange HH or OP depending on home bound status. If patient is medically complex, consider LTACH. Pt requires no DME at discharge.     Pt desires Skilled Rehab placement at discharge. Pt cooperative; agreeable to therapeutic recommendations and plan of care.         Basic Mobility 6-click:  Rollin = Total, A lot = 2, A little = 3; 4 = None  Supine>Sit:   1 = Total, A lot = 2, A little = 3; 4 = None   Sit>Stand with arms:  1 = Total, A lot = 2, A little = 3; 4 = None  Bed>Chair:   1 = Total, A lot = 2, A little = 3; 4 = None  Ambulate in room:  1 = Total, A lot = 2, A little = 3; 4 = None  3-5 Steps with railin = Total, A lot = 2, A little = 3; 4 = None  Score: 19    Modified [place]: N/A = No pre-op stroke/TIA    Post-Tx Position: Supine with HOB Elevated and Call light and personal items within " reach  PPE: gloves

## 2024-05-10 NOTE — CONSULTS
Nutrition Services    Patient Name: Lita Yu  YOB: 1965  MRN: [id number]  Admission date: 5/1/2024      Moderate chronic disease related malnutrition related to suspected hypermetabolism and  complicated clinical course in the setting of SARITA and prolonged osteomyelitis and bacteremia as evidenced by >7.5% unintentional weight loss in the last 3 months and evidence of moderate muscle and fat wasting per NFPE.      Add Chocolate Boost Plus BID (Provides 720 kcals, 28 g protein if consumed)   Boost Glucose Control may be substituted for Boost Plus at this time, due to national shortage of many ONS products. If substituted, each Boost Glucose Control will provide 190 kcal and 16g PRO.      NUTRITION SCREENING      Trending Narrative: 5/10 - Nutrition assessment and POC initiation r/t LOS x 9 days. Pt presented to ED on 5/1 with complaints of worsening pain in lower back.  Pt admitted with spinal abscess.  Pt previously in hospital with MRSA bacteremia and L2/L3 discitis/osteomyelitis diagnosis and D/C with vancomycin scheduled until 5/3. RD visited pt at bedside. Pt's appetite is good now with No N/V/D/C reported at this time. NFPE completed, consistent with nutrition diagnosis of malnutrition using AND/ASPEN criteria. See MSA below.          PO Diet: Diet: Regular/House; Fluid Consistency: Thin (IDDSI 0)   PO Supplements: none   Trending PO Intake:  5/10 - 83% average po intake x last 12 documented meals       Nutritionally-Pertinent Medications RDN Reviewed, C/W clinical course         Labs (reviewed below): Reviewed. Management per attending      Results from last 7 days   Lab Units 05/10/24  0626 05/08/24  2326 05/07/24  2348   SODIUM mmol/L 139 139 137   POTASSIUM mmol/L 4.3 3.7 3.5   CHLORIDE mmol/L 108* 106 105   CO2 mmol/L 25.0 24.0 24.0   BUN mg/dL 14 14 15   CREATININE mg/dL 0.91 1.09* 0.85   CALCIUM mg/dL 8.4* 8.3* 7.9*   BILIRUBIN mg/dL 0.3 0.3 0.3   ALK PHOS U/L 188* 217* 186*  "  ALT (SGPT) U/L 20 23 19   AST (SGOT) U/L 31 38* 31   GLUCOSE mg/dL 82 85 105*     Results from last 7 days   Lab Units 05/10/24  0626 05/08/24  2326 05/08/24  0231 05/07/24  2348   MAGNESIUM mg/dL 1.7 1.7  --  1.6   PHOSPHORUS mg/dL 4.1 3.6  --  3.5   HEMOGLOBIN g/dL 7.3* 7.9*   < >  --    HEMATOCRIT % 24.2* 25.4*   < >  --     < > = values in this interval not displayed.     Lab Results   Component Value Date    HGBA1C 5.00 06/12/2023          GI Function:  Last documented BM 5/9 - yesterday        Skin: No PI documented   R ant hip wound  MASD bilat perineum       Weight Review: Estimated body mass index is 21.26 kg/m² as calculated from the following:    Height as of this encounter: 160 cm (62.99\").    Weight as of this encounter: 54.4 kg (120 lb).    Comment:   5/10 - 120# (No weight since 5/1)  10% weight loss in the last 3 months    Wt Readings from Last 30 Encounters:   05/01/24 1622 54.4 kg (120 lb)   04/29/24 1327 54.4 kg (120 lb)   04/23/24 2146 54.4 kg (119 lb 14.9 oz)   04/23/24 1533 55.5 kg (122 lb 5.7 oz)   04/18/24 1458 53.2 kg (117 lb 3.2 oz)   04/15/24 1303 54.4 kg (120 lb)   03/12/24 1208 61.2 kg (135 lb)   03/09/24 0505 61.2 kg (135 lb)   03/07/24 0851 61.2 kg (135 lb)   03/02/24 1622 61.2 kg (135 lb)   03/02/24 0651 61.2 kg (135 lb)   02/21/24 1256 60.8 kg (134 lb)   01/10/24 1325 59 kg (130 lb)   10/10/23 1344 59.4 kg (131 lb)   09/08/23 1109 65.8 kg (145 lb)   07/10/23 1021 59.4 kg (131 lb)   06/12/23 0548 61 kg (134 lb 7.7 oz)   06/11/23 2236 59.2 kg (130 lb 9.6 oz)   06/11/23 1854 58.8 kg (129 lb 10.1 oz)   10/31/22 1016 50.3 kg (111 lb)   10/21/22 0441 51.2 kg (112 lb 14 oz)   10/20/22 0424 52.9 kg (116 lb 10 oz)   10/19/22 0507 53.1 kg (117 lb 1 oz)   10/17/22 0334 58.4 kg (128 lb 12 oz)   10/16/22 0532 58.4 kg (128 lb 12 oz)   10/15/22 0203 58.3 kg (128 lb 8.5 oz)   10/14/22 2008 61.2 kg (135 lb)   09/19/22 1446 46.7 kg (103 lb)   08/17/22 1057 50.8 kg (112 lb)   08/05/22 0323 57.8 kg " (127 lb 6.4 oz)   08/03/22 2028 53.9 kg (118 lb 12.8 oz)   08/03/22 1523 54.1 kg (119 lb 4.3 oz)   08/03/22 1401 54 kg (119 lb)   07/18/22 1523 59.9 kg (132 lb)   12/16/21 1418 60.1 kg (132 lb 6.4 oz)   12/14/21 1548 61.2 kg (135 lb)   12/14/21 1422 60.8 kg (134 lb)   11/26/21 1802 64 kg (141 lb 1.5 oz)   10/07/21 1258 60.8 kg (134 lb)   09/07/21 1258 59.9 kg (132 lb)   08/31/21 0742 56.6 kg (124 lb 12.5 oz)   08/20/21 1618 56.7 kg (125 lb)   07/15/21 1259 55.8 kg (123 lb)   06/14/21 1303 52.2 kg (115 lb)   03/29/21 1405 58.5 kg (129 lb)          --      Trending Physical   Appearance, NFPE 5/10 - NFPE completed, consistent with nutrition diagnosis of malnutrition using AND/ASPEN criteria. See MSA below.               Nutrition Problem Statement: Moderate chronic disease related malnutrition related to suspected hypermetabolism and  complicated clinical course in the setting of SARITA and prolonged osteomyelitis and bacteremia as evidenced by >7.5% unintentional weight loss in the last 3 months and evidence of moderate muscle and fat wasting per NFPE.          Nutrition Intervention: Add Chocolate Boost Plus BID (Provides 720 kcals, 28 g protein if consumed)   Boost Glucose Control may be substituted for Boost Plus at this time, due to national shortage of many ONS products. If substituted, each Boost Glucose Control will provide 190 kcal and 16g PRO.    NFPE completed    Continue to encourage good po intake          Monitoring/Evaluation Per protocol, I&O, PO intake, Supplement intake, Pertinent labs, Weight, Skin status, GI status, Symptoms, POC/GOC, Swallow function, Hemodynamic stability        Malnutrition Severity Assessment      Patient meets criteria for : Moderate (non-severe) Malnutrition  Malnutrition Type (Last 8 Hours)       Malnutrition Severity Assessment       Row Name 05/10/24 1330       Malnutrition Severity Assessment    Malnutrition Type Chronic Disease - Related Malnutrition      Row Name 05/10/24  1330       Unintentional Weight Loss     Unintentional Weight Loss Findings Moderate    Unintentional Weight Loss  Weight loss greater than 7.5% in three months      Row Name 05/10/24 1330       Muscle Loss    Loss of Muscle Mass Findings Moderate    [place] Region Moderate - slight depression    Clavicle Bone Region Moderate - some protrusion in females, visible in males    Acromion Bone Region Moderate - acromion may slightly protrude    Scapular Bone Region Moderate - mild depression, bones may show slightly    Dorsal Hand Region Moderate - slight depression    Patellar Region Moderate - patella more prominent, less muscle definition around patella    Posterior Calf Region Moderate - some roundness, slight firmness      Row Name 05/10/24 1330       Fat Loss    Subcutaneous Fat Loss Findings Moderate    Orbital Region  Moderate -  somewhat hollowness, slightly dark circles    Upper Arm Region Moderate - some fat tissue, not ample      Row Name 05/10/24 1330       Criteria Met (Must meet criteria for severity in at least 2 of these categories: M Wasting, Fat Loss, Fluid, Secondary Signs, Wt. Status, Intake)    Patient meets criteria for  Moderate (non-severe) Malnutrition                           RD to follow up per protocol.    Electronically signed by:  Emmie Pizano RD  05/10/24 10:58 EDT

## 2024-05-10 NOTE — CASE MANAGEMENT/SOCIAL WORK
Continued Stay Note  DAYANARA Chery     Patient Name: Lita Yu  MRN: [id number]  Today's Date: 5/10/2024    Admit Date: 5/1/2024    Plan: Accepted to [facility] pending precert.  PASRR  approved.  Will go on IV vancomycin   Discharge Plan       Row Name 05/10/24 1034       Plan    Plan Accepted to [facility] pending precert.  PASRR  approved.  Will go on IV vancomycin    Plan Comments DC barriers: precert pending to [facility].  iv antibiotics.   Per ID patient can be discharged on on IV vanc and dc Teflaro.                   Tequila Bourne RN

## 2024-05-10 NOTE — DISCHARGE PLACEMENT REQUEST
"Yonis Yu \"NIANA\" (58 y.o. Female)       Date of Birth   1965    Social Security Number       Address   [address] [place] [address]    Home Phone   [phone]    MRN   [id number]       Taoism   None    Marital Status                               Admission Date   5/1/24    Admission Type   Elective    Admitting Provider   Niels Sands MD    Attending Provider   Niels Sands MD    Department, Room/Bed   [facility] SURGICAL INPATIENT, 4102/1       Discharge Date       Discharge Disposition       Discharge Destination                                 Attending Provider: Niels Sands MD    Allergies: Sulfa Antibiotics, Keflex [Cephalexin]    Isolation: None   Infection: MRSA No Isolation this Admit (03/08/24)   Code Status: CPR    Ht: 160 cm (62.99\")   Wt: 54.4 kg (120 lb)    Admission Cmt: None   Principal Problem: Spinal abscess [M46.20]                   Active Insurance as of 5/1/2024       Primary Coverage       Payor Plan Insurance Group Employer/Plan Group    [facility] BLUE CROSS [id number]       Payor Plan Address Payor Plan Phone Number Payor Plan Fax Number Effective Dates    [address]   6/22/2014 - None Entered    [facility] [id number]         Subscriber Name Subscriber Birth Date Member ID       YONIS YU 1965 [id number]                     Emergency Contacts        (Rel.) Home Phone Work Phone Mobile Phone    OBDULIA CANTU (Daughter) -- -- [phone]    SARAI WOOD (Brother) [phone] -- --                 Occupational Therapy Notes (last 24 hours)        Maria Teresa Covarrubias OT at 05/09/24 1520          Goal Outcome Evaluation:  Plan of Care Reviewed With: patient    Assessment: Yonis Yu presents with ADL impairments affecting function including endurance / activity tolerance, pain, range of motion (ROM), and strength. Demonstrated functioning below baseline " "abilities indicate the need for continued skilled intervention while inpatient. Tolerating session today without incident. Pt is improving but needs safety cues and daily IV ABX for several more weeks which will not be able to be done from home. Will continue to follow and progress as tolerated.     Plan/Recommendations:   Moderate Intensity Therapy recommended post-acute care. This is recommended as therapy feels the patient would require 3-4 days per week and wouldn't tolerate \"3 hour daily\" rehab intensity. SNF would be the preferred choice. If the patient does not agree to SNF, arrange HH or OP depending on home bound status. If patient is medically complex, consider LTACH.. Pt requires no DME at discharge.     Pt desires Skilled Rehab placement at discharge. Pt cooperative; agreeable to therapeutic recommendations and plan of care.     Modified [place]: 3 = Moderate disability (Requires some help, but able to walk unassisted)                          Electronically signed by Maria Teresa Covarrubias, OT at 05/09/24 1520       Maria Teresa Covarrubias, OT at 05/09/24 1512          Subjective: Pt agreeable to therapeutic plan of care.  Cognition: oriented to Person, Place, Time, and Situation, memory: Normal, arousal/alertness: Alert, Attentive, and Distracted, safety/judgement: fair, and awareness of deficits: fair awareness of safety precautions and fair awareness of deficits    Objective: impulsive, needs safety cues and SBA for mobility.    Bed Mobility: Supervision and utilizing compensatory strategy   Functional Transfers: SBA and with rolling walker     Balance: dynamic, with UE support, and standing Supervision  Functional Ambulation: SBA and with rolling walker 75'    Lower Body Dressing and Grooming: Min-A  ADL Position: edge of bed sitting  ADL Comments: pt is grooming self well from seated & standing. Went over donning socks today and pt needed min (A0 and safety/technique cues for RLE. Education done on anterior " "BRE.    Therapeutic Exercise - pt demonstrates X2 of the exercises from her HEP w/o cues.    Vitals: WNL    Pain: 2 VAS  Location: rt hip, encouraged to ice throughout the day and to limit walking to 150' at a time at this point and to be up frequently but for short times throughout the day.  Interventions for pain: Repositioned, Increased Activity, and Therapeutic Presence  Education: Provided education on the importance of mobility in the acute care setting, Verbal/Tactile Cues, ADL training, Transfer Training, Energy conservation strategies, WB'ing status, and Post-Op Precautions    Assessment: Lita Yu presents with ADL impairments affecting function including endurance / activity tolerance, pain, range of motion (ROM), and strength. Demonstrated functioning below baseline abilities indicate the need for continued skilled intervention while inpatient. Tolerating session today without incident. Pt is improving but needs safety cues and daily IV ABX for several more weeks which will not be able to be done from home. Will continue to follow and progress as tolerated.     Plan/Recommendations:   Moderate Intensity Therapy recommended post-acute care. This is recommended as therapy feels the patient would require 3-4 days per week and wouldn't tolerate \"3 hour daily\" rehab intensity. SNF would be the preferred choice. If the patient does not agree to SNF, arrange HH or OP depending on home bound status. If patient is medically complex, consider LTACH.. Pt requires no DME at discharge.     Pt desires Skilled Rehab placement at discharge. Pt cooperative; agreeable to therapeutic recommendations and plan of care.     Modified [place]: 3 = Moderate disability (Requires some help, but able to walk unassisted)    Post-Tx Position: Supine with HOB Elevated and Call light and personal items within reach  PPE: gloves      Electronically signed by Maria Teresa Covarrubias OT at 05/09/24 8194       "

## 2024-05-10 NOTE — PROGRESS NOTES
Infectious Diseases Progress Note      LOS: 9 days   Patient Care Team:  Norma Saul APRN as PCP - General (Nurse Practitioner)  Flory Villanueva MD (Physical Medicine and Rehabilitation)    Chief Complaint: Back pain and right hip pain    Subjective     The patient had no high-grade fever during the last 24 hours.  She remained hemodynamically stable.  She is tolerating her current antibiotics with no side effects.      Review of Systems:   Review of Systems   Constitutional: Negative.    HENT: Negative.     Eyes: Negative.    Respiratory: Negative.     Cardiovascular: Negative.    Gastrointestinal: Negative.    Genitourinary: Negative.    Musculoskeletal:  Positive for arthralgias and back pain.   Skin: Negative.    Neurological: Negative.    Hematological: Negative.    Psychiatric/Behavioral: Negative.          Objective     Vital Signs  Temp:  [97.7 °F (36.5 °C)-98.1 °F (36.7 °C)] 98.1 °F (36.7 °C)  Heart Rate:  [67-77] 67  Resp:  [14-16] 16  BP: ()/(53-60) 94/54    Physical Exam:  Physical Exam  Vitals and nursing note reviewed.   Constitutional:       Appearance: She is well-developed.   HENT:      Head: Normocephalic and atraumatic.   Eyes:      Pupils: Pupils are equal, round, and reactive to light.   Cardiovascular:      Rate and Rhythm: Normal rate and regular rhythm.      Heart sounds: Normal heart sounds.   Pulmonary:      Effort: Pulmonary effort is normal. No respiratory distress.      Breath sounds: Normal breath sounds. No wheezing or rales.   Abdominal:      General: Bowel sounds are normal. There is no distension.      Palpations: Abdomen is soft. There is no mass.      Tenderness: There is no abdominal tenderness. There is no guarding or rebound.   Musculoskeletal:         General: No deformity.      Cervical back: Normal range of motion and neck supple.      Comments: S/p right hip arthroplasty   Skin:     General: Skin is warm.      Findings: No erythema or rash.   Neurological:       Mental Status: She is alert and oriented to person, place, and time.      Cranial Nerves: No cranial nerve deficit.          Results Review:    I have reviewed all clinical data, test, lab, and imaging results.     Radiology  No Radiology Exams Resulted Within Past 24 Hours    Cardiology    Laboratory    Results from last 7 days   Lab Units 05/10/24  0626 05/08/24  2326 05/08/24  1305 05/08/24  0231 05/07/24  0130 05/06/24  1316 05/06/24  0433 05/05/24  1335 05/05/24  0401 05/04/24  0557   WBC 10*3/mm3 5.33 5.58  --  5.08 4.30  --  4.74  --  7.39 4.58   HEMOGLOBIN g/dL 7.3* 7.9* 8.0* 6.7* 9.5* 7.6* 7.0*   < > 6.3* 8.5*   HEMATOCRIT % 24.2* 25.4* 26.5* 21.8* 30.8* 25.3* 22.5*   < > 20.2* 27.7*   PLATELETS 10*3/mm3 132* 147  --  130* 96*  --  126*  --  144 167    < > = values in this interval not displayed.     Results from last 7 days   Lab Units 05/10/24  0626 05/08/24  2326 05/07/24  2348 05/07/24  0130 05/06/24  0433 05/05/24  0334 05/04/24  0557   SODIUM mmol/L 139 139 137 140 139 138 139   POTASSIUM mmol/L 4.3 3.7 3.5 3.7 3.7 4.2 3.8   CHLORIDE mmol/L 108* 106 105 106 108* 103 105   CO2 mmol/L 25.0 24.0 24.0 25.0 26.0 24.0 25.0   BUN mg/dL 14 14 15 15 18 15 14   CREATININE mg/dL 0.91 1.09* 0.85 0.87 1.10* 0.81 1.06*   GLUCOSE mg/dL 82 85 105* 78 99 117* 89   ALBUMIN g/dL 2.6* 2.9* 2.6* 2.6* 2.7* 2.8* 3.2*   BILIRUBIN mg/dL 0.3 0.3 0.3 0.3 0.2 <0.2 0.2   ALK PHOS U/L 188* 217* 186* 154* 149* 145* 184*   AST (SGOT) U/L 31 38* 31 26 25 25 18   ALT (SGPT) U/L 20 23 19 16 16 13 13   CALCIUM mg/dL 8.4* 8.3* 7.9* 8.2* 8.3* 8.5* 9.3                   Microbiology   Microbiology Results (last 10 days)       Procedure Component Value - Date/Time    Body Fluid Culture - Body Fluid, Hip, Right [[id number]] Collected: 05/03/24 1256    Lab Status: Final result Specimen: Body Fluid from Hip, Right Updated: 05/08/24 0644     Body Fluid Culture No growth at 5 days     Gram Stain Many (4+) WBCs per low power field      No  organisms seen    Blood Culture - Blood, Arm, Right [[id number]]  (Normal) Collected: 05/02/24 1607    Lab Status: Final result Specimen: Blood from Arm, Right Updated: 05/07/24 1631     Blood Culture No growth at 5 days    Blood Culture - Blood, Blood, Central Line [[id number]]  (Normal) Collected: 05/02/24 1553    Lab Status: Final result Specimen: Blood, Central Line Updated: 05/07/24 1631     Blood Culture No growth at 5 days            Medication Review:       Schedule Meds  alteplase (CATHFLO/ACTIVASE) 2 mg in sterile water (preservative free) 2.2 mL injection, 2 mg, Intracatheter, Once  busPIRone, 15 mg, Oral, BID  ceftaroline, 600 mg, Intravenous, Q8H  cyclobenzaprine, 10 mg, Oral, TID  enoxaparin, 40 mg, Subcutaneous, Q24H  folic acid, 1 mg, Oral, Daily  QUEtiapine, 50 mg, Oral, Nightly  sertraline, 100 mg, Oral, BID  sodium chloride, 10 mL, Intravenous, Q12H  vancomycin, 1,000 mg, Intravenous, Daily        Infusion Meds  lactated ringers, 9 mL/hr, Last Rate: 100 mL/hr (05/04/24 0812)  Pharmacy to dose vancomycin,         PRN Meds    acetaminophen **OR** acetaminophen **OR** acetaminophen    albuterol    alteplase (CATHFLO/ACTIVASE) 1 mg in sterile water (preservative free) 1.1 mL injection    senna-docusate sodium **AND** polyethylene glycol **AND** bisacodyl **AND** bisacodyl    calcium carbonate    HYDROcodone-acetaminophen    HYDROcodone-acetaminophen    lactated ringers    ondansetron ODT **OR** ondansetron    Pharmacy to dose vancomycin    sodium chloride    sodium chloride        Assessment & Plan       Antimicrobial Therapy   1.  IV vancomycin        2.  IV Teflaro        3.        4.        5.              Assessment     Discitis/osteomyelitis at the level of L2-L3.  Current MRI showed worsening of discitis with persistent epidural abscess and moderate canal stenosis.  There was paraspinal phlegmon.  Neurosurgery service following and the patient and decided not to do surgical intervention.  The  patient has been receiving IV antibiotics for 8 weeks prior to admission.  I am concerned about clinical failure and need of surgery.    Possible right hip septic arthritis based on MRI of the pelvis.  Synovial fluid findings was not highly consistent with infection with a white count of only 5000 but predominantly neutrophils.  Findings was consistent with avascular necrosis and patient required right hip arthroplasty on May 4, 2024.  Cultures are negative so far     Recent admission for methicillin resistant Staphylococcus aureus bacteremia.  Likely source is lumbar discitis/osteomyelitis.   SILVINA performed on 3/12/2024 with cardiology notes mentioning no vegetation.  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  Patient denied having infection after the procedure        Plan     Continue IV vancomycin-keep trough between 15 and 20.   I will extend the duration of the treatment to 12 weeks.  If patient continues to have abnormality after 12 weeks then she needs to be evaluated for surgery.  Maximum duration of antibiotics will be 12 weeks otherwise the case would be consider failure of therapy  Waiting on repeat blood culture results and right hip fluid culture results  Continue IV Teflaro  600 mg every 8 hours for 2 weeks.  However, if rehab facility is not accepting the patient since Teflaro is not available then can discontinue Teflaro  I would recommend to repeat MRI of the lumbar spine with and without contrast before finishing IV vancomycin    Okay to discharge patient to rehab at any time  Weekly labs including CBC, creatinine and sed rate for 4 weeks    Please fax all post discharge lab results, imaging studies and correspondence to this fax number [phone]  For any question or concern please contact our service  number [phone]      Pilar Foster MD  05/10/24  13:55 EDT    Note is dictated utilizing voice recognition software/Dragon

## 2024-05-10 NOTE — SIGNIFICANT NOTE
Case Management/Social Work    Patient Name:  Lita Yu  YOB: 1965  MRN: [id number]  Admit Date:  5/1/2024           05/10/24 1428   Post Acute Pre-Cert Documentation   Date Post Acute Pre-Cert Completed 05/10/24   Response Received from Insurance? Approval   Response Communicated to:    Post Acute Pre-Cert Initiated Comment CHRISTOPHER received call from [facility] rep Jackie BEY ([phone], ext [phone]) with SNF precert approval. Authorization ID [id number]. Valid 5/10-5/16. CM made aware.           Electronically signed by:  Santos Arellano CMA  05/10/24 14:30 EDT    Santos Arellano  Case Management Associate  [address] [address]  P: [phone]  F: [phone]

## 2024-05-10 NOTE — TREATMENT PLAN
"  Objective:     Bed mobility - Modified-Independent  Transfers - Supervision with rolling walker  Ambulation - 100 feet x 2 Supervision and with rolling walker      Education: Provided education on the importance of mobility in the acute care setting, Verbal/Tactile Cues, Transfer Training, Gait Training, Energy conservation strategies, and WB'ing status    Assessment: Lita Yu presents with functional mobility impairments which indicate the need for skilled intervention. Ambulates out of room w/ standing rest breaks and education on maintaining WB precautions. Tolerating session today without incident. Will continue to follow and progress as tolerated.     Plan/Recommendations:   If medically appropriate, Moderate Intensity Therapy recommended post-acute care. This is recommended as therapy feels the patient would require 3-4 days per week and wouldn't tolerate \"3 hour daily\" rehab intensity. SNF would be the preferred choice. If the patient does not agree to SNF, arrange HH or OP depending on home bound status. If patient is medically complex, consider LTACH. Pt requires no DME at discharge.     Pt desires Skilled Rehab placement at discharge. Pt cooperative; agreeable to therapeutic recommendations and plan of care.     "

## 2024-05-10 NOTE — PLAN OF CARE
Goal Outcome Evaluation:  Plan of Care Reviewed With: patient        Progress: no change  Outcome Evaluation: Pt is stable and abed. No acute events. Hgb 7.3 will recheck in the morning to determine if transfusion necessary. Will continue to monitor.

## 2024-05-10 NOTE — PLAN OF CARE
Goal Outcome Evaluation:      Ice packs changed this shift. Pt ambulated well with toe touch WB and walker to restroom. Pending rehab.

## 2024-05-10 NOTE — PROGRESS NOTES
[facility] MEDICINE SERVICE  DAILY PROGRESS NOTE    NAME: Lita Yu  : 1965  MRN: [id number]      LOS: 9 days     PROVIDER OF SERVICE: Niels Sands MD    Chief Complaint: Spinal abscess    Subjective:     Interval History:  History taken from: patient chart RN  Pain controlled    Review of Systems:   Review of Systems  All negative except as above  Objective:     Vital Signs  Temp:  [97.7 °F (36.5 °C)-98.2 °F (36.8 °C)] 97.7 °F (36.5 °C)  Heart Rate:  [68-77] 68  Resp:  [14-16] 14  BP: ()/(53-63) 105/60   Body mass index is 21.26 kg/m².    Physical Exam  Physical Exam  AOx3 NAD  RRR S1 and S2 audible  Lungs with fair entry  Abdomen soft nontender nondistended  R Hip dressing intact. No active bleeding   Scheduled Meds   alteplase (CATHFLO/ACTIVASE) 2 mg in sterile water (preservative free) 2.2 mL injection, 2 mg, Intracatheter, Once  busPIRone, 15 mg, Oral, BID  ceftaroline, 600 mg, Intravenous, Q8H  cyclobenzaprine, 10 mg, Oral, TID  enoxaparin, 40 mg, Subcutaneous, Q24H  folic acid, 1 mg, Oral, Daily  QUEtiapine, 50 mg, Oral, Nightly  sertraline, 100 mg, Oral, BID  sodium chloride, 10 mL, Intravenous, Q12H  vancomycin, 1,000 mg, Intravenous, Daily       PRN Meds     acetaminophen **OR** acetaminophen **OR** acetaminophen    albuterol    alteplase (CATHFLO/ACTIVASE) 1 mg in sterile water (preservative free) 1.1 mL injection    senna-docusate sodium **AND** polyethylene glycol **AND** bisacodyl **AND** bisacodyl    calcium carbonate    HYDROcodone-acetaminophen    HYDROcodone-acetaminophen    lactated ringers    ondansetron ODT **OR** ondansetron    Pharmacy to dose vancomycin    sodium chloride    sodium chloride   Infusions  lactated ringers, 9 mL/hr, Last Rate: 100 mL/hr (24 0812)  Pharmacy to dose vancomycin,           Diagnostic Data    Results from last 7 days   Lab Units 05/10/24  0626   WBC 10*3/mm3 5.33   HEMOGLOBIN g/dL 7.3*   HEMATOCRIT % 24.2*   PLATELETS  10*3/mm3 132*   GLUCOSE mg/dL 82   CREATININE mg/dL 0.91   BUN mg/dL 14   SODIUM mmol/L 139   POTASSIUM mmol/L 4.3   AST (SGOT) U/L 31   ALT (SGPT) U/L 20   ALK PHOS U/L 188*   BILIRUBIN mg/dL 0.3   ANION GAP mmol/L 6.0       No radiology results for the last day      I reviewed the patient's new clinical results.  I reviewed the patient's new imaging results and agree with the interpretation.    Assessment/Plan:     Active and Resolved Problems  Active Hospital Problems    Diagnosis  POA    **Spinal abscess [M46.20]  Yes    Pain in joints [M25.50]  Unknown      Resolved Hospital Problems   No resolved problems to display.       58-year-old female with history of hypertension, peripheral vascular disease, degenerative joint disease, seizure disorder, tobacco abuse, EtOH liver cirrhosis, recent history of MRSA bacteremia attributed to L2-3 OM/discitis admitted to [facility] on 5/1/2024 with a worsening pain in her lower back.      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  Prior plan for vancomycin until 5/3/2024.      -Now admitted with worsening low back pain  -MRI lumbar and pelvis spine as noted.  Shows right hip effusion Along with L2-3 discitis and vertebral body osteomyelitis with enhancing paraspinal phlegmon     ID following continue vancomycin.  Duration of treatment extended to 12 weeks.  Teflaro added status post IR guided right hip aspiration.  Cell count studies noted follow culture. NGTD.  Per ID Teflaro can be discontinued if unable to give at rehab  Seen by orthopedics status post R anterior total hip arthroplasty 5/4.  She will require revision in future in 2 to 3 months  Continue oxycodone as needed for pain.  Continue Flexeril  Seen by CHRISTIANO no plan for surgical intervention  Follow infectious workup  DVT  prophylaxis with enoxaparin  PT/OT.  Rehab     #History of alcohol liver cirrhosis          Outpatient follow-up     #Acute blood loss anemia on chronic anemia  Monitor H&H.  received 1 unit of PRBC 5/5 and 5/8  Transfuse PRBCs to keep hemoglobin more than 7     #Tobacco abuse  NRT  Counseled    DVT prophylaxis:  Medical and mechanical DVT prophylaxis orders are present.         Code status is   Code Status and Medical Interventions:   Ordered at: 05/01/24 9256     Level Of Support Discussed With:    Patient     Code Status (Patient has no pulse and is not breathing):    CPR (Attempt to Resuscitate)     Medical Interventions (Patient has pulse or is breathing):    Full Support       Plan for disposition: Pending placement    Time: 30 minutes    Signature: Electronically signed by Niels Sands MD, 05/10/24, 10:12 EDT.  [facility] Hospitalist Team

## 2024-05-10 NOTE — PROGRESS NOTES
"Pharmacy Antimicrobial Dosing Service    Subjective:  Lita Yu is a 58 y.o.female admitted with worsening back pain. Pharmacy has been consulted to dose Vancomycin for possible MRSA bacteremia and bone and/or joint infection, CNS infection.    Pt was started on vancomycin 3/8 for MRSA bacteremia, found to have lumbar discitis/osteomyelitis, epidural abscess. Was discharged to [facility] on vancomycin to complete 8 wks (LD 5/2/24). [facility] began managing vanc ~4/2/24. Confirmed w/[facility] outpt infusion center, pt had therapeutic level demonstrated 4/2, ~4/7, and 4/16. However, level on 4/23 was only 12.  Pt readmitted 4/23-25 w/SARITA, hyperK, possibly d/t NSAID use. MRI at that time showed interval worsening of discitis/OM. After discharge, pt was started on vanc 1gm QOD 4/27, w/subtherapeutic level of 9 on 4/29.  Pt readmitted 5/1 w/worsening pain.     PMH: cirrhosis, seizures      Assessment/Plan    1. Day #10 (this admission) Vancomycin: Goal -600 mcg*h/mL. Patient is currently receiving 1000mg Q24H. Trough level 5/10 at 0626= 14.9 mcg/ml for predicted AUC= 459 mcg*h/ml. Will continue current regimen as AUC is within goal range. Follow up level ordered for 5/14.     2. Day #7 Ceftaroline:  600 mg IV Q8h, for estCrCl > 50 mL/min.    Will continue to monitor drug levels, renal function, culture and sensitivities, and patient clinical status.       Objective:  Relevant clinical data and objective history reviewed:  160 cm (62.99\")   54.4 kg (120 lb)   Ideal body weight: 52.4 kg (115 lb 7.7 oz)  Adjusted ideal body weight: 53.2 kg (117 lb 4.6 oz)  Body mass index is 21.26 kg/m².    Results from last 7 days   Lab Units 05/10/24  0626 05/07/24  0130 05/04/24  0557   VANCOMYCIN TR mcg/mL 14.90 16.90 12.20     Results from last 7 days   Lab Units 05/10/24  0626 05/08/24  2326 05/07/24  2348   CREATININE mg/dL 0.91 1.09* 0.85     Estimated Creatinine Clearance: 59.3 mL/min (by C-G formula " based on SCr of 0.91 mg/dL).  I/O last 3 completed shifts:  In: 3250 [P.O.:600; IV Piggyback:2650]  Out: -     Results from last 7 days   Lab Units 05/10/24  0626 05/08/24  2326 05/08/24  0231   WBC 10*3/mm3 5.33 5.58 5.08     Temperature    05/10/24 0438 05/10/24 0724 05/10/24 1224   Temp: 97.7 °F (36.5 °C) 97.7 °F (36.5 °C) 98.1 °F (36.7 °C)     Baseline culture/source/susceptibility:  Microbiology Results (last 10 days)       Procedure Component Value - Date/Time    Body Fluid Culture - Body Fluid, Hip, Right [[id number]] Collected: 05/03/24 1256    Lab Status: Final result Specimen: Body Fluid from Hip, Right Updated: 05/08/24 0644     Body Fluid Culture No growth at 5 days     Gram Stain Many (4+) WBCs per low power field      No organisms seen    Blood Culture - Blood, Arm, Right [[id number]]  (Normal) Collected: 05/02/24 1607    Lab Status: Final result Specimen: Blood from Arm, Right Updated: 05/07/24 1631     Blood Culture No growth at 5 days    Blood Culture - Blood, Blood, Central Line [[id number]]  (Normal) Collected: 05/02/24 1553    Lab Status: Final result Specimen: Blood, Central Line Updated: 05/07/24 1631     Blood Culture No growth at 5 days            Karey Cabral, PharmD  05/10/24 14:17 EDT

## 2024-05-11 VITALS
RESPIRATION RATE: 16 BRPM | TEMPERATURE: 97.9 F | DIASTOLIC BLOOD PRESSURE: 66 MMHG | SYSTOLIC BLOOD PRESSURE: 116 MMHG | WEIGHT: 120 LBS | BODY MASS INDEX: 21.26 KG/M2 | HEART RATE: 63 BPM | HEIGHT: 63 IN | OXYGEN SATURATION: 98 %

## 2024-05-11 LAB
ALBUMIN SERPL-MCNC: 2.6 G/DL (ref 3.5–5.2)
ALBUMIN/GLOB SERPL: 0.8 G/DL
ALP SERPL-CCNC: 210 U/L (ref 39–117)
ALT SERPL W P-5'-P-CCNC: 19 U/L (ref 1–33)
ANION GAP SERPL CALCULATED.3IONS-SCNC: 9 MMOL/L (ref 5–15)
AST SERPL-CCNC: 30 U/L (ref 1–32)
BILIRUB SERPL-MCNC: 0.3 MG/DL (ref 0–1.2)
BUN SERPL-MCNC: 12 MG/DL (ref 6–20)
BUN/CREAT SERPL: 13.6 (ref 7–25)
CALCIUM SPEC-SCNC: 8.2 MG/DL (ref 8.6–10.5)
CHLORIDE SERPL-SCNC: 105 MMOL/L (ref 98–107)
CO2 SERPL-SCNC: 25 MMOL/L (ref 22–29)
CREAT SERPL-MCNC: 0.88 MG/DL (ref 0.57–1)
EGFRCR SERPLBLD CKD-EPI 2021: 77.2 ML/MIN/1.73
GLOBULIN UR ELPH-MCNC: 3.4 GM/DL
GLUCOSE SERPL-MCNC: 86 MG/DL (ref 65–99)
MAGNESIUM SERPL-MCNC: 1.9 MG/DL (ref 1.6–2.6)
PHOSPHATE SERPL-MCNC: 3.7 MG/DL (ref 2.5–4.5)
POTASSIUM SERPL-SCNC: 4 MMOL/L (ref 3.5–5.2)
PROT SERPL-MCNC: 6 G/DL (ref 6–8.5)
SODIUM SERPL-SCNC: 139 MMOL/L (ref 136–145)

## 2024-05-11 PROCEDURE — 25810000003 SODIUM CHLORIDE 0.9 % SOLUTION 250 ML FLEX CONT: Performed by: INTERNAL MEDICINE

## 2024-05-11 PROCEDURE — 25010000002 VANCOMYCIN 1 G RECONSTITUTED SOLUTION 1 EACH VIAL: Performed by: INTERNAL MEDICINE

## 2024-05-11 PROCEDURE — 25010000002 CEFTAROLINE FOSAMIL PER 10 MG: Performed by: INTERNAL MEDICINE

## 2024-05-11 RX ORDER — CYCLOBENZAPRINE HCL 10 MG
10 TABLET ORAL 3 TIMES DAILY PRN
Qty: 9 TABLET | Refills: 0 | Status: SHIPPED | OUTPATIENT
Start: 2024-05-11 | End: 2024-05-14

## 2024-05-11 RX ORDER — HYDROCODONE BITARTRATE AND ACETAMINOPHEN 10; 325 MG/1; MG/1
1 TABLET ORAL EVERY 6 HOURS PRN
Qty: 12 TABLET | Refills: 0 | Status: SHIPPED | OUTPATIENT
Start: 2024-05-11 | End: 2024-05-14

## 2024-05-11 RX ORDER — ENOXAPARIN SODIUM 100 MG/ML
40 INJECTION SUBCUTANEOUS EVERY 24 HOURS
Start: 2024-05-11 | End: 2024-06-10

## 2024-05-11 RX ADMIN — HYDROCODONE BITARTRATE AND ACETAMINOPHEN 1 TABLET: 10; 325 TABLET ORAL at 14:07

## 2024-05-11 RX ADMIN — HYDROCODONE BITARTRATE AND ACETAMINOPHEN 1 TABLET: 10; 325 TABLET ORAL at 11:05

## 2024-05-11 RX ADMIN — VANCOMYCIN HYDROCHLORIDE 1000 MG: 1 INJECTION, POWDER, LYOPHILIZED, FOR SOLUTION INTRAVENOUS at 08:31

## 2024-05-11 RX ADMIN — CEFTAROLINE FOSAMIL 600 MG: 600 POWDER, FOR SOLUTION INTRAVENOUS at 11:06

## 2024-05-11 RX ADMIN — FOLIC ACID 1 MG: 1 TABLET ORAL at 08:31

## 2024-05-11 RX ADMIN — Medication 10 ML: at 08:31

## 2024-05-11 RX ADMIN — CYCLOBENZAPRINE 10 MG: 10 TABLET, FILM COATED ORAL at 08:31

## 2024-05-11 RX ADMIN — BUSPIRONE HYDROCHLORIDE 15 MG: 15 TABLET ORAL at 08:31

## 2024-05-11 RX ADMIN — CEFTAROLINE FOSAMIL 600 MG: 600 POWDER, FOR SOLUTION INTRAVENOUS at 01:14

## 2024-05-11 RX ADMIN — SERTRALINE 100 MG: 100 TABLET, FILM COATED ORAL at 08:31

## 2024-05-11 RX ADMIN — HYDROCODONE BITARTRATE AND ACETAMINOPHEN 1 TABLET: 10; 325 TABLET ORAL at 05:44

## 2024-05-11 NOTE — PLAN OF CARE
Goal Outcome Evaluation:      Rt hip with edema. Debra drain on and working. Denies diminished sensation of rle. Ttwb rle. Takes prn pain med- effective for rt hip pain. Johnna on bilat for part of shift and then pt removed them. Did not want back on. No falls. On iv abt - vss. No redness to lower back.

## 2024-05-11 NOTE — PROGRESS NOTES
Infectious Diseases Progress Note      LOS: 10 days   Patient Care Team:  Norma Saul APRN as PCP - General (Nurse Practitioner)  Flory Villanueva MD (Physical Medicine and Rehabilitation)    Chief Complaint: Back pain and right hip pain    Subjective     The patient had no high-grade fever during the last 24 hours.  She remained hemodynamically stable.  She is tolerating her current antibiotics with no side effects.      Review of Systems:   Review of Systems   Constitutional: Negative.    HENT: Negative.     Eyes: Negative.    Respiratory: Negative.     Cardiovascular: Negative.    Gastrointestinal: Negative.    Genitourinary: Negative.    Musculoskeletal:  Positive for arthralgias and back pain.   Skin: Negative.    Neurological: Negative.    Hematological: Negative.    Psychiatric/Behavioral: Negative.          Objective     Vital Signs  Temp:  [97.4 °F (36.3 °C)-98.1 °F (36.7 °C)] 97.9 °F (36.6 °C)  Heart Rate:  [63-77] 63  Resp:  [15-20] 16  BP: ()/(55-66) 116/66    Physical Exam:  Physical Exam  Vitals and nursing note reviewed.   Constitutional:       Appearance: She is well-developed.   HENT:      Head: Normocephalic and atraumatic.   Eyes:      Pupils: Pupils are equal, round, and reactive to light.   Cardiovascular:      Rate and Rhythm: Normal rate and regular rhythm.      Heart sounds: Normal heart sounds.   Pulmonary:      Effort: Pulmonary effort is normal. No respiratory distress.      Breath sounds: Normal breath sounds. No wheezing or rales.   Abdominal:      General: Bowel sounds are normal. There is no distension.      Palpations: Abdomen is soft. There is no mass.      Tenderness: There is no abdominal tenderness. There is no guarding or rebound.   Musculoskeletal:         General: No deformity.      Cervical back: Normal range of motion and neck supple.      Comments: S/p right hip arthroplasty   Skin:     General: Skin is warm.      Findings: No erythema or rash.   Neurological:       Mental Status: She is alert and oriented to person, place, and time.      Cranial Nerves: No cranial nerve deficit.          Results Review:    I have reviewed all clinical data, test, lab, and imaging results.     Radiology  No Radiology Exams Resulted Within Past 24 Hours    Cardiology    Laboratory    Results from last 7 days   Lab Units 05/10/24  2352 05/10/24  0626 05/08/24  2326 05/08/24  1305 05/08/24  0231 05/07/24  0130 05/06/24  1316 05/06/24  0433 05/05/24  1335 05/05/24  0401   WBC 10*3/mm3 5.06 5.33 5.58  --  5.08 4.30  --  4.74  --  7.39   HEMOGLOBIN g/dL 7.5* 7.3* 7.9* 8.0* 6.7* 9.5* 7.6* 7.0*   < > 6.3*   HEMATOCRIT % 24.5* 24.2* 25.4* 26.5* 21.8* 30.8* 25.3* 22.5*   < > 20.2*   PLATELETS 10*3/mm3 134* 132* 147  --  130* 96*  --  126*  --  144    < > = values in this interval not displayed.     Results from last 7 days   Lab Units 05/10/24  2352 05/10/24  0626 05/08/24  2326 05/07/24  2348 05/07/24  0130 05/06/24  0433 05/05/24  0334   SODIUM mmol/L 139 139 139 137 140 139 138   POTASSIUM mmol/L 4.0 4.3 3.7 3.5 3.7 3.7 4.2   CHLORIDE mmol/L 105 108* 106 105 106 108* 103   CO2 mmol/L 25.0 25.0 24.0 24.0 25.0 26.0 24.0   BUN mg/dL 12 14 14 15 15 18 15   CREATININE mg/dL 0.88 0.91 1.09* 0.85 0.87 1.10* 0.81   GLUCOSE mg/dL 86 82 85 105* 78 99 117*   ALBUMIN g/dL 2.6* 2.6* 2.9* 2.6* 2.6* 2.7* 2.8*   BILIRUBIN mg/dL 0.3 0.3 0.3 0.3 0.3 0.2 <0.2   ALK PHOS U/L 210* 188* 217* 186* 154* 149* 145*   AST (SGOT) U/L 30 31 38* 31 26 25 25   ALT (SGPT) U/L 19 20 23 19 16 16 13   CALCIUM mg/dL 8.2* 8.4* 8.3* 7.9* 8.2* 8.3* 8.5*                   Microbiology   Microbiology Results (last 10 days)       Procedure Component Value - Date/Time    Body Fluid Culture - Body Fluid, Hip, Right [[id number]] Collected: 05/03/24 1256    Lab Status: Final result Specimen: Body Fluid from Hip, Right Updated: 05/08/24 0644     Body Fluid Culture No growth at 5 days     Gram Stain Many (4+) WBCs per low power field      No  organisms seen    Blood Culture - Blood, Arm, Right [[id number]]  (Normal) Collected: 05/02/24 1607    Lab Status: Final result Specimen: Blood from Arm, Right Updated: 05/07/24 1631     Blood Culture No growth at 5 days    Blood Culture - Blood, Blood, Central Line [[id number]]  (Normal) Collected: 05/02/24 1553    Lab Status: Final result Specimen: Blood, Central Line Updated: 05/07/24 1631     Blood Culture No growth at 5 days            Medication Review:       Schedule Meds  alteplase (CATHFLO/ACTIVASE) 2 mg in sterile water (preservative free) 2.2 mL injection, 2 mg, Intracatheter, Once  busPIRone, 15 mg, Oral, BID  ceftaroline, 600 mg, Intravenous, Q8H  cyclobenzaprine, 10 mg, Oral, TID  enoxaparin, 40 mg, Subcutaneous, Q24H  folic acid, 1 mg, Oral, Daily  QUEtiapine, 50 mg, Oral, Nightly  sertraline, 100 mg, Oral, BID  sodium chloride, 10 mL, Intravenous, Q12H  vancomycin, 1,000 mg, Intravenous, Daily        Infusion Meds  lactated ringers, 9 mL/hr, Last Rate: 100 mL/hr (05/04/24 0812)  Pharmacy to dose vancomycin,         PRN Meds    acetaminophen **OR** acetaminophen **OR** acetaminophen    albuterol    alteplase (CATHFLO/ACTIVASE) 1 mg in sterile water (preservative free) 1.1 mL injection    senna-docusate sodium **AND** polyethylene glycol **AND** bisacodyl **AND** bisacodyl    calcium carbonate    HYDROcodone-acetaminophen    HYDROcodone-acetaminophen    lactated ringers    ondansetron ODT **OR** ondansetron    Pharmacy to dose vancomycin    sodium chloride    sodium chloride        Assessment & Plan       Antimicrobial Therapy   1.  IV vancomycin        2.  IV Teflaro        3.        4.        5.              Assessment     Discitis/osteomyelitis at the level of L2-L3.  Current MRI showed worsening of discitis with persistent epidural abscess and moderate canal stenosis.  There was paraspinal phlegmon.  Neurosurgery service following and the patient and decided not to do surgical intervention.  The  patient has been receiving IV antibiotics for 8 weeks prior to admission.  I am concerned about clinical failure and need of surgery.    Possible right hip septic arthritis based on MRI of the pelvis.  Synovial fluid findings was not highly consistent with infection with a white count of only 5000 but predominantly neutrophils.  Findings was consistent with avascular necrosis and patient required right hip arthroplasty on May 4, 2024.  Cultures are negative so far     Recent admission for methicillin resistant Staphylococcus aureus bacteremia.  Likely source is lumbar discitis/osteomyelitis.   SILVINA performed on 3/12/2024 with cardiology notes mentioning no vegetation.  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  Patient denied having infection after the procedure        Plan     Continue IV vancomycin-keep trough between 15 and 20.   I will extend the duration of the treatment to 12 weeks.  If patient continues to have epidural collection after 12 weeks then she needs to be evaluated for surgery.  Maximum duration of antibiotics will be 12 weeks otherwise the case would be consider failure of therapy.  Last day of IV vancomycin will be May 30, 2024  Continue IV Teflaro  600 mg every 8 hours for 2 weeks.  However, if rehab facility is not accepting the patient since Teflaro is not available then can discontinue Teflaro  I would recommend to repeat MRI of the lumbar spine with and without contrast before finishing IV vancomycin.      Okay to discharge patient to rehab at any time  Weekly labs including CBC, creatinine and sed rate for 4 weeks    Please fax all post discharge lab results, imaging studies and correspondence to this fax number [phone]  For any question or concern please contact our service number [phone]  [phone]      Pilar Foster MD  05/11/24  13:11 EDT    Note is dictated utilizing voice recognition software/Dragon

## 2024-05-11 NOTE — PLAN OF CARE
Goal Outcome Evaluation:  Plan of Care Reviewed With: patient        Progress: improving       Patient ready for DC to [facility], PICC in place, personal vehicle outside of emergency room, security aware,   Problem: Pain Acute  Goal: Acceptable Pain Control and Functional Ability  Outcome: Ongoing, Progressing     Problem: Infection  Goal: Absence of Infection Signs and Symptoms  Outcome: Ongoing, Progressing

## 2024-05-11 NOTE — DISCHARGE SUMMARY
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         Hospital Course     HPI:  Admitting team HPI   Lita Yu is a 58 y.o. female with a CMH of hypertension, peripheral vascular disease, degenerative joint disease, seizure disorder, difficult tobacco dependence, cirrhosis, generalized weakness and recent MRSA bacteremia and enhancement of the epidural fluid on imaging. She was discharged on vancomycin which is supposed to be on till 5/3/2024 who presented to [facility]24 with a chief complaint of worsening pain in her lower back. She has been receiving IV antibiotics since her hospital discharge but has not missed any doses. Despite the treatment, her pain has been increasing.     Hospital Course:  58-year-old female with history of hypertension, peripheral vascular disease, degenerative joint disease, seizure disorder, tobacco abuse, EtOH liver cirrhosis, recent history of MRSA bacteremia attributed to L2-3 OM/discitis admitted to [facility] on 2024 with a worsening pain in her lower back.      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  Prior plan for vancomycin until 5/3/2024.      -Now admitted with worsening low back pain  -MRI lumbar and pelvis spine as noted.  Shows right hip effusion Along with L2-3 discitis and vertebral body osteomyelitis with enhancing paraspinal phlegmon     Seen by infectious disease continue vancomycin.  Duration of treatment extended to 12 weeks.  Teflaro added status post IR guided right hip aspiration.  Cell count studies noted follow culture. NGTD.  Per ID Teflaro can be discontinued if unable to give at rehab  Seen by orthopedics status post R anterior total hip arthroplasty 5/4.  She will require revision in future in 2 to 3 months  Continue oxycodone as needed for pain.  Continue Flexeril changed to as needed  Seen by CHRISTIANO no plan for surgical intervention  DVT prophylaxis with enoxaparin  PT/OT.  Rehab   weekly CBC creatinine and ESR       #History of alcohol liver cirrhosis          Outpatient follow-up     #Acute blood loss anemia on chronic anemia  Monitor H&H.  received 1 unit of PRBC 5/5 and 5/8   since H&H is stable  Transfuse PRBCs to keep hemoglobin more than 7     #Tobacco abuse  NRT  Counseled        DISCHARGE Follow Up Recommendations for labs and diagnostics: Continue IV vancomycin per infectious disease to finish total of 10 weeks course.  Outpatient follow-up with orthopedics.  Repeat H&H in 5 days transfuse PRBCs as needed to keep hemoglobin more than 7      Reasons For Change In Medications and Indications for New Medications:      Day of Discharge     Vital Signs:  Temp:  [97.4 °F (36.3 °C)-98.1 °F (36.7 °C)] 97.4 °F (36.3 °C)  Heart Rate:  [67-77] 67  Resp:  [15-20] 20  BP: (88-99)/(54-59) 88/55    Physical Exam:  Physical Exam AOx3 NAD  RRR S1 and S2 audible  Lungs with fair entry  Abdomen soft nontender nondistended  R Hip dressing intact. No active bleeding       Pertinent  and/or Most Recent Results     LAB RESULTS:      Lab 05/10/24  4982 05/10/24  0665  05/08/24 2326 05/08/24  1305 05/08/24  0231 05/07/24  0130   WBC 5.06 5.33 5.58  --  5.08 4.30   HEMOGLOBIN 7.5* 7.3* 7.9* 8.0* 6.7* 9.5*   HEMATOCRIT 24.5* 24.2* 25.4* 26.5* 21.8* 30.8*   PLATELETS 134* 132* 147  --  130* 96*   NEUTROS ABS 3.06 3.17 3.33  --  2.97 2.66   IMMATURE GRANS (ABS) 0.08* 0.09* 0.08*  --  0.07* 0.06*   LYMPHS ABS 1.00 1.11 1.21  --  1.11 0.93   MONOS ABS 0.57 0.61 0.57  --  0.57 0.45   EOS ABS 0.33 0.32 0.37  --  0.34 0.19   MCV 94.6 94.9 92.7  --  95.6 94.5         Lab 05/10/24  2352 05/10/24  0626 05/08/24  2326 05/07/24  2348 05/07/24  0130   SODIUM 139 139 139 137 140   POTASSIUM 4.0 4.3 3.7 3.5 3.7   CHLORIDE 105 108* 106 105 106   CO2 25.0 25.0 24.0 24.0 25.0   ANION GAP 9.0 6.0 9.0 8.0 9.0   BUN 12 14 14 15 15   CREATININE 0.88 0.91 1.09* 0.85 0.87   EGFR 77.2 74.2 59.7* 80.5 78.3   GLUCOSE 86 82 85 105* 78   CALCIUM 8.2* 8.4* 8.3* 7.9* 8.2*   MAGNESIUM 1.9 1.7 1.7 1.6 1.6   PHOSPHORUS 3.7 4.1 3.6 3.5 3.7         Lab 05/10/24  2352 05/10/24  0626 05/08/24  2326 05/07/24  2348 05/07/24  0130   TOTAL PROTEIN 6.0 6.0 6.2 5.7* 5.9*   ALBUMIN 2.6* 2.6* 2.9* 2.6* 2.6*   GLOBULIN 3.4 3.4 3.3 3.1 3.3   ALT (SGPT) 19 20 23 19 16   AST (SGOT) 30 31 38* 31 26   BILIRUBIN 0.3 0.3 0.3 0.3 0.3   ALK PHOS 210* 188* 217* 186* 154*                 Lab 05/05/24  0516   ABO TYPING O   RH TYPING Positive   ANTIBODY SCREEN Negative         Brief Urine Lab Results  (Last result in the past 365 days)        Color   Clarity   Blood   Leuk Est   Nitrite   Protein   CREAT   Urine HCG        04/24/24 1230             27.1               Microbiology Results (last 10 days)       Procedure Component Value - Date/Time    Body Fluid Culture - Body Fluid, Hip, Right [[id number]] Collected: 05/03/24 1256    Lab Status: Final result Specimen: Body Fluid from Hip, Right Updated: 05/08/24 0644     Body Fluid Culture No growth at 5 days     Gram Stain Many (4+) WBCs per low power field      No organisms seen    Blood  Culture - Blood, Arm, Right [[id number]]  (Normal) Collected: 05/02/24 1607    Lab Status: Final result Specimen: Blood from Arm, Right Updated: 05/07/24 1631     Blood Culture No growth at 5 days    Blood Culture - Blood, Blood, Central Line [[id number]]  (Normal) Collected: 05/02/24 1553    Lab Status: Final result Specimen: Blood, Central Line Updated: 05/07/24 1631     Blood Culture No growth at 5 days            XR Hip With or Without Pelvis 2 - 3 View Right    Result Date: 5/4/2024  Impression: Impression: Immediate postop appearance of the right total of arthroplasty and changes of acetabular remodeling. The opening angle of the arthroplasty appears more vertically and possibly anteriorly located. Femoral component is proud by 1.4 cm. Electronically Signed: Ana Walker MD  5/4/2024 10:22 AM EDT  Workstation ID: [id number]    XR Ankle 2 View Right    Result Date: 5/4/2024  Impression: Impression: Negative ankle radiograph. Electronically Signed: Ana Walker MD  5/4/2024 10:19 AM EDT  Workstation ID: [id number]    IR Inject/asp large joint or bursa    Result Date: 5/3/2024  Impression: Technically successful right hip aspiration yielding 12 mL of cloudy yellow synovial fluid utilizing fluoroscopic guidance. Report dictated by: Jose Linton DNP  I have personally reviewed this case and agree with the findings above: Electronically Signed: Yimi Costello MD  5/3/2024 1:56 PM EDT  Workstation ID: [id number]    XR Hip With or Without Pelvis 2 - 3 View Right    Result Date: 5/3/2024  Impression: Impression: 1. Findings compatible with avascular necrosis of the right femoral head with severe joint space loss. 2. Left hip arthroplasty without complication. Electronically Signed: Ines Donald MD  5/3/2024 12:20 PM EDT  Workstation ID: [id number]    MRI Lumbar Spine With & Without Contrast    Result Date: 5/3/2024  Impression: Impression: Findings remain consistent with L2-L3 discitis and vertebral body osteomyelitis with enhancing  paraspinal phlegmon and anterior epidural abscess resulting in moderate canal stenosis. The findings appear similar to the prior study when accounting for prominent motion artifact that was seen on those images. Electronically Signed: Ines Donald MD  5/3/2024 7:35 AM EDT  Workstation ID: [id number]    MRI Pelvis With & Without Contrast    Result Date: 5/2/2024  Impression: Impression: 1.Prominent soft tissue edema and enhancement surrounding the right hip with right hip effusion, synovial thickening and enhancement, and periarticular marrow edema. There is severe right hip joint space narrowing which represents a change from prior radiographs from February 2023. These findings are concerning for joint infection given patient's history. Aspiration is recommended for diagnosis. 2.Avascular necrosis of the right femoral head, as before, with possible component of developing articular surface collapse which may contribute to arthropathy and edema. 3.Status post left hip arthroplasty. Artifact from hardware limits assessment, but no significant left hip collection is seen at this time. 4.See separate report for evaluation of the lumbar spine. Electronically Signed: Jluis Medina  5/2/2024 9:56 PM EDT  Workstation ID: [id number]    XR Chest 1 View    Result Date: 5/1/2024  Impression: Impression: Right arm approach PICC tip terminates at the mid SVC level. No acute chest finding. Electronically Signed: Sara Jones MD  5/1/2024 4:50 PM EDT  Workstation ID: [id number]    MRI Lumbar Spine With & Without Contrast    Result Date: 4/23/2024  Impression: Impression: Motion limited exam. There has been interval worsening of findings related to L2-L3 discitis and vertebral body osteomyelitis, including worsening destruction of the disc space and increased vertebral body marrow edema. There is a persistent epidural abscess that is not significantly changed in size since the comparison study. There is enhancing paraspinal soft tissue  at the L2 and L3 vertebral body levels compatible with paraspinal phlegmon with no walled off fluid collection demonstrated Electronically Signed: Glenn Tomas  4/23/2024 10:23 PM EDT  Workstation ID: [id number]    MRI Thoracic Spine With & Without Contrast    Result Date: 4/23/2024  Impression: Impression: No evidence of thoracic discitis or epidural abscess Electronically Signed: Glenn Tomas  4/23/2024 10:07 PM EDT  Workstation ID: [id number]     Results for orders placed during the hospital encounter of 03/02/24    Duplex Venous Lower Extremity - Bilateral CAR    Interpretation Summary    Normal bilateral lower extremity venous duplex scan.      Results for orders placed during the hospital encounter of 03/02/24    Duplex Venous Lower Extremity - Bilateral CAR    Interpretation Summary    Normal bilateral lower extremity venous duplex scan.      Results for orders placed during the hospital encounter of 03/07/24    Adult Transesophageal Echo (SILVINA) W/ Cont if Necessary Per Protocol    Interpretation Summary    Left ventricular systolic function is normal. Left ventricular ejection fraction appears to be 61 - 65%.    Left ventricular diastolic function was normal.    The left atrial cavity is mildly dilated.    Saline test results are negative.      Labs Pending at Discharge:      Procedures Performed  Procedure(s):  TOTAL HIP ARTHROPLASTY ANTERIOR         Consults:   Consults       Date and Time Order Name Status Description    5/3/2024  8:53 AM Inpatient Orthopedic Surgery Consult Completed     5/1/2024  4:58 PM Inpatient Spine Surgery Consult Completed     5/1/2024  4:57 PM Inpatient Infectious Diseases Consult Completed               Discharge Details        Discharge Medications        ASK your doctor about these medications        Instructions Start Date   albuterol sulfate  (90 Base) MCG/ACT inhaler  Commonly known as: PROVENTIL HFA;VENTOLIN HFA;PROAIR HFA   2 puffs, Inhalation, Every 4 Hours PRN       busPIRone 15 MG tablet  Commonly known as: BUSPAR   TAKE 1 TABLET BY MOUTH TWICE A DAY      cefdinir 300 MG capsule  Commonly known as: OMNICEF   300 mg, Oral, 2 Times Daily      folic acid 1 MG tablet  Commonly known as: FOLVITE   1 mg, Oral, Daily      HYDROcodone-acetaminophen 7.5-325 MG per tablet  Commonly known as: NORCO   1 tablet, Oral, Every 12 Hours PRN      melatonin 5 MG tablet tablet   5 mg, Oral, Nightly PRN      QUEtiapine 50 MG tablet  Commonly known as: SEROquel   TAKE 1 TABLET BY MOUTH EVERYDAY AT BEDTIME      sertraline 100 MG tablet  Commonly known as: ZOLOFT   TAKE 1 TABLET BY MOUTH TWICE A DAY      thiamine 100 MG tablet  Commonly known as: VITAMIN B1   100 mg, Oral, Daily      vancomycin 1000 MG/200ML solution ivpb  Ask about: Should I take this medication?   Infuse 200 mL into a venous catheter Every Other Day.      VANCOMYCIN PHARMACY INTERMITTENT/PULSE DOSING  Ask about: Should I take this medication?   Does not apply, As Needed      Vitamin B-12 1000 MCG sublingual tablet   1 tablet, Sublingual, Daily      Zinc 50 MG tablet   50 mg, Oral, 2 Times Daily               Allergies   Allergen Reactions    Sulfa Antibiotics Hives    Keflex [Cephalexin] Hives         Discharge Disposition: [facility]      Diet:  Hospital:  Diet Order   Procedures    Diet: Regular/House; Fluid Consistency: Thin (IDDSI 0)         Discharge Activity:         CODE STATUS:  Code Status and Medical Interventions:   Ordered at: 05/01/24 5526     Level Of Support Discussed With:    Patient     Code Status (Patient has no pulse and is not breathing):    CPR (Attempt to Resuscitate)     Medical Interventions (Patient has pulse or is breathing):    Full Support         Future Appointments   Date Time Provider Department Center   6/3/2024  1:45 PM Norma Saul APRN [facility]           Time spent on Discharge including face to face service:  >30 minutes    Signature: Electronically signed by Niels Sands MD,  05/11/24, 08:49 EDT.  [facility] Hospitalist Team

## 2024-05-11 NOTE — CASE MANAGEMENT/SOCIAL WORK
Continued Stay Note   Vik     Patient Name: Lita Yu  MRN: [id number]  Today's Date: 5/11/2024    Admit Date: 5/1/2024    Plan: [facility].  Precert approved 5/10-5/16/24. PASRR  approved.  Ok per ID to dc on Vancomycin only . Bed ready this weekend   Discharge Plan       Row Name 05/11/24 0840       Plan    Plan Comments notified of need for wheelchair van.  Pt drove self and her car is in parking lot but she is unable to drive at this time. Wheelchair van request entered and notified bed control.                    Expected Discharge Date and Time       Expected Discharge Date Expected Discharge Time    May 11, 2024               Amy Germain RN

## 2024-05-12 NOTE — CASE MANAGEMENT/SOCIAL WORK
Case Management Discharge Note           Provided Post Acute Provider List?: N/A  N/A Provider List Comment: patient current with [facility] and [facility]    Selected Continued Care - Discharged on 5/11/2024 Admission date: 5/1/2024 - Discharge disposition: Skilled Nursing Facility (DC - External)      Destination Coordination complete.      Service Provider Selected Services Address Phone Fax Patient Preferred    [facility] [address] [place] [address] [phone] --              Durable Medical Equipment    No services have been selected for the patient.                Dialysis/Infusion Coordination complete.      Service Provider Selected Services Address Phone Fax Patient Preferred    [facility] [address] [place] [id number] [phone] --              Home Medical Care Coordination complete.      Service Provider Selected Services Address Phone Fax Patient Preferred    [facility] [address] [address] [phone] --              Therapy    No services have been selected for the patient.                Community Resources    No services have been selected for the patient.                Community & DME    No services have been selected for the patient.                    Selected Continued Care - Prior Encounters Includes continued care and service providers with selected services from prior encounters from 2/1/2024 to 5/11/2024      Discharged on 4/25/2024 Admission date: 4/23/2024 - [place]charge [place] [facility]      [place] Care       Service Provider Selected Services Address Phone Fax Patient Preferred    [facility]  [facility] [address] [address] [phone]  [phone] --                      Discharged on 3/15/2024 Admission date: 3/7/2024 - Discharge disposition: Skilled Nursing Facility (DC - External)      Destination       Service Provider Selected Services Address Phone Fax Patient Preferred    [facility] [address], [place] [address] [phone] --                          Transportation Services  W/C Van: Kelly Martin    Final Discharge Disposition Code: 03 - skilled nursing facility (SNF)

## 2024-05-13 LAB
HFE GENE MUT ANL BLD/T: NORMAL
IMP & REVIEW OF LAB RESULTS: NORMAL

## 2024-05-13 NOTE — PAYOR COMM NOTE
"Yonis Yu \"NAINA\" (58 y.o. Female)  1965  REF #[id number]   DC Notice- Pt dc'd 24 to SNF    MONIQUE KIM LPN UR  Utilization Review Nurse  [facility]  Direct & confidential phone # [phone]  Fax # [phone]        Date of Birth   1965    Social Security Number       Address   [address] [place] [address]    Home Phone   [phone]    MRN   [id number]       [employer]   None    Marital Status                               Admission Date   24    Admission Type   Elective    Admitting Provider   Niels Sands MD    Attending Provider       Department, Room/Bed   [facility] SURGICAL INPATIENT,        Discharge Date   2024    Discharge Disposition   Skilled Nursing Facility (DC - External)    Discharge Destination   Other                              Attending Provider: (none)   Allergies: Sulfa Antibiotics, Keflex [Cephalexin]    Isolation: None   Infection: MRSA No Isolation this Admit (24)   Code Status: Prior    Ht: 160 cm (62.99\")   Wt: 54.4 kg (120 lb)    Admission Cmt: None   Principal Problem: Spinal abscess [M46.20]                   Active Insurance as of 2024       Primary Coverage       Payor Plan Insurance Group Employer/Plan Group    [place] [id number]       Payor Plan Address Payor Plan Phone Number Payor Plan Fax Number Effective Dates    [address]   2014 - None Entered    [place] [id number]         Subscriber Name Subscriber Birth Date Member ID       YONIS YU 1965 [id number]                     Emergency Contacts        (Rel.) Home Phone Work Phone Mobile Phone    OBDULIA CANTU (Daughter) -- -- [phone]    SARAI WOOD (Brother) [phone] -- --                 Discharge Summary        Niels Sands MD at 24 49                       [facility]  Discharge Summary    Date " of Service: 24  Patient Name: Lita Yu  : 1965  MRN: [id number]    Date of Admission: 2024  Discharge Diagnosis: #History of MRSA bacteremia  #History of L2-L3 discitis/osteomyelitis   #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         Hospital Course     HPI:  Admitting team HPI   Lita Yu is a 58 y.o. female with a CMH of hypertension, peripheral vascular disease, degenerative joint disease, seizure disorder, difficult tobacco dependence, cirrhosis, generalized weakness and recent MRSA bacteremia and enhancement of the epidural fluid on imaging. She was discharged on vancomycin which is supposed to be on till 5/3/2024 who presented to [facility] on 2024 with a chief complaint of worsening pain in her lower back. She has been receiving IV antibiotics since her hospital discharge but has not missed any doses. Despite the treatment, her pain has been increasing.     Hospital Course:  58-year-old female with history of hypertension, peripheral vascular disease, degenerative joint disease, seizure disorder, tobacco abuse, EtOH liver cirrhosis, recent history of MRSA bacteremia attributed to L2-3 OM/discitis admitted to [facility] on 2024 with a worsening pain in her lower back.      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  Prior plan for vancomycin until 5/3/2024.      -Now admitted  with worsening low back pain  -MRI lumbar and pelvis spine as noted.  Shows right hip effusion Along with L2-3 discitis and vertebral body osteomyelitis with enhancing paraspinal phlegmon     Seen by infectious disease continue vancomycin.  Duration of treatment extended to 12 weeks.  Teflaro added status post IR guided right hip aspiration.  Cell count studies noted follow culture. NGTD.  Per ID Teflaro can be discontinued if unable to give at rehab  Seen by orthopedics status post R anterior total hip arthroplasty 5/4.  She will require revision in future in 2 to 3 months  Continue oxycodone as needed for pain.  Continue Flexeril changed to as needed  Seen by CHRISTIANO no plan for surgical intervention  DVT prophylaxis with enoxaparin  PT/OT.  Rehab   weekly CBC creatinine and ESR       #History of alcohol liver cirrhosis          Outpatient follow-up     #Acute blood loss anemia on chronic anemia  Monitor H&H.  received 1 unit of PRBC 5/5 and 5/8   since H&H is stable  Transfuse PRBCs to keep hemoglobin more than 7     #Tobacco abuse  NRT  Counseled        DISCHARGE Follow Up Recommendations for labs and diagnostics: Continue IV vancomycin per infectious disease to finish total of 10 weeks course.  Outpatient follow-up with orthopedics.  Repeat H&H in 5 days transfuse PRBCs as needed to keep hemoglobin more than 7      Reasons For Change In Medications and Indications for New Medications:      Day of Discharge     Vital Signs:  Temp:  [97.4 °F (36.3 °C)-98.1 °F (36.7 °C)] 97.4 °F (36.3 °C)  Heart Rate:  [67-77] 67  Resp:  [15-20] 20  BP: (88-99)/(54-59) 88/55    Physical Exam:  Physical Exam AOx3 NAD  RRR S1 and S2 audible  Lungs with fair entry  Abdomen soft nontender nondistended  R Hip dressing intact. No active bleeding       Pertinent  and/or Most Recent Results     LAB RESULTS:      Lab 05/10/24  2352 05/10/24  0626 05/08/24  2326 05/08/24  1305 05/08/24  0231 05/07/24  0130   WBC 5.06 5.33 5.58  --  5.08 4.30    HEMOGLOBIN 7.5* 7.3* 7.9* 8.0* 6.7* 9.5*   HEMATOCRIT 24.5* 24.2* 25.4* 26.5* 21.8* 30.8*   PLATELETS 134* 132* 147  --  130* 96*   NEUTROS ABS 3.06 3.17 3.33  --  2.97 2.66   IMMATURE GRANS (ABS) 0.08* 0.09* 0.08*  --  0.07* 0.06*   LYMPHS ABS 1.00 1.11 1.21  --  1.11 0.93   MONOS ABS 0.57 0.61 0.57  --  0.57 0.45   EOS ABS 0.33 0.32 0.37  --  0.34 0.19   MCV 94.6 94.9 92.7  --  95.6 94.5         Lab 05/10/24  2352 05/10/24  0626 05/08/24  2326 05/07/24  2348 05/07/24  0130   SODIUM 139 139 139 137 140   POTASSIUM 4.0 4.3 3.7 3.5 3.7   CHLORIDE 105 108* 106 105 106   CO2 25.0 25.0 24.0 24.0 25.0   ANION GAP 9.0 6.0 9.0 8.0 9.0   BUN 12 14 14 15 15   CREATININE 0.88 0.91 1.09* 0.85 0.87   EGFR 77.2 74.2 59.7* 80.5 78.3   GLUCOSE 86 82 85 105* 78   CALCIUM 8.2* 8.4* 8.3* 7.9* 8.2*   MAGNESIUM 1.9 1.7 1.7 1.6 1.6   PHOSPHORUS 3.7 4.1 3.6 3.5 3.7         Lab 05/10/24  2352 05/10/24  0626 05/08/24  2326 05/07/24  2348 05/07/24  0130   TOTAL PROTEIN 6.0 6.0 6.2 5.7* 5.9*   ALBUMIN 2.6* 2.6* 2.9* 2.6* 2.6*   GLOBULIN 3.4 3.4 3.3 3.1 3.3   ALT (SGPT) 19 20 23 19 16   AST (SGOT) 30 31 38* 31 26   BILIRUBIN 0.3 0.3 0.3 0.3 0.3   ALK PHOS 210* 188* 217* 186* 154*                 Lab 05/05/24  0516   ABO TYPING O   RH TYPING Positive   ANTIBODY SCREEN Negative         Brief Urine Lab Results  (Last result in the past 365 days)        Color   Clarity   Blood   Leuk Est   Nitrite   Protein   CREAT   Urine HCG        04/24/24 1230             27.1               Microbiology Results (last 10 days)       Procedure Component Value - Date/Time    Body Fluid Culture - Body Fluid, Hip, Right [[id number]] Collected: 05/03/24 1256    Lab Status: Final result Specimen: Body Fluid from Hip, Right Updated: 05/08/24 0644     Body Fluid Culture No growth at 5 days     Gram Stain Many (4+) WBCs per low power field      No organisms seen    Blood Culture - Blood, Arm, Right [[id number]]  (Normal) Collected: 05/02/24 1607    Lab Status: Final  result Specimen: Blood from Arm, Right Updated: 05/07/24 1631     Blood Culture No growth at 5 days    Blood Culture - Blood, Blood, Central Line [[id number]]  (Normal) Collected: 05/02/24 1553    Lab Status: Final result Specimen: Blood, Central Line Updated: 05/07/24 1631     Blood Culture No growth at 5 days            XR Hip With or Without Pelvis 2 - 3 View Right    Result Date: 5/4/2024  Impression: Impression: Immediate postop appearance of the right total of arthroplasty and changes of acetabular remodeling. The opening angle of the arthroplasty appears more vertically and possibly anteriorly located. Femoral component is proud by 1.4 cm. Electronically Signed: Ana Walker MD  5/4/2024 10:22 AM EDT  Workstation ID: [id number]    XR Ankle 2 View Right    Result Date: 5/4/2024  Impression: Impression: Negative ankle radiograph. Electronically Signed: Ana Walker MD  5/4/2024 10:19 AM EDT  Workstation ID: [id number]    IR Inject/asp large joint or bursa    Result Date: 5/3/2024  Impression: Technically successful right hip aspiration yielding 12 mL of cloudy yellow synovial fluid utilizing fluoroscopic guidance. Report dictated by: Jose Linton DNP  I have personally reviewed this case and agree with the findings above: Electronically Signed: Yimi Costello MD  5/3/2024 1:56 PM EDT  Workstation ID: [id number]    XR Hip With or Without Pelvis 2 - 3 View Right    Result Date: 5/3/2024  Impression: Impression: 1. Findings compatible with avascular necrosis of the right femoral head with severe joint space loss. 2. Left hip arthroplasty without complication. Electronically Signed: Ines Donald MD  5/3/2024 12:20 PM EDT  Workstation ID: [id number]    MRI Lumbar Spine With & Without Contrast    Result Date: 5/3/2024  Impression: Impression: Findings remain consistent with L2-L3 discitis and vertebral body osteomyelitis with enhancing paraspinal phlegmon and anterior epidural abscess resulting in moderate canal stenosis. The  findings appear similar to the prior study when accounting for prominent motion artifact that was seen on those images. Electronically Signed: Ines Donald MD  5/3/2024 7:35 AM EDT  Workstation ID: [id number]    MRI Pelvis With & Without Contrast    Result Date: 5/2/2024  Impression: Impression: 1.Prominent soft tissue edema and enhancement surrounding the right hip with right hip effusion, synovial thickening and enhancement, and periarticular marrow edema. There is severe right hip joint space narrowing which represents a change from prior radiographs from February 2023. These findings are concerning for joint infection given patient's history. Aspiration is recommended for diagnosis. 2.Avascular necrosis of the right femoral head, as before, with possible component of developing articular surface collapse which may contribute to arthropathy and edema. 3.Status post left hip arthroplasty. Artifact from hardware limits assessment, but no significant left hip collection is seen at this time. 4.See separate report for evaluation of the lumbar spine. Electronically Signed: Jluis Medina  5/2/2024 9:56 PM EDT  Workstation ID: [id number]    XR Chest 1 View    Result Date: 5/1/2024  Impression: Impression: Right arm approach PICC tip terminates at the mid SVC level. No acute chest finding. Electronically Signed: Sara Jones MD  5/1/2024 4:50 PM EDT  Workstation ID: [id number]    MRI Lumbar Spine With & Without Contrast    Result Date: 4/23/2024  Impression: Impression: Motion limited exam. There has been interval worsening of findings related to L2-L3 discitis and vertebral body osteomyelitis, including worsening destruction of the disc space and increased vertebral body marrow edema. There is a persistent epidural abscess that is not significantly changed in size since the comparison study. There is enhancing paraspinal soft tissue at the L2 and L3 vertebral body levels compatible with paraspinal phlegmon with no walled  off fluid collection demonstrated Electronically Signed: Glenn Tomas  4/23/2024 10:23 PM EDT  Workstation ID: [id number]    MRI Thoracic Spine With & Without Contrast    Result Date: 4/23/2024  Impression: Impression: No evidence of thoracic discitis or epidural abscess Electronically Signed: Glenn Tomas  4/23/2024 10:07 PM EDT  Workstation ID: [id number]     Results for orders placed during the hospital encounter of 03/02/24    Duplex Venous Lower Extremity - Bilateral CAR    Interpretation Summary    Normal bilateral lower extremity venous duplex scan.      Results for orders placed during the hospital encounter of 03/02/24    Duplex Venous Lower Extremity - Bilateral CAR    Interpretation Summary    Normal bilateral lower extremity venous duplex scan.      Results for orders placed during the hospital encounter of 03/07/24    Adult Transesophageal Echo (SILVINA) W/ Cont if Necessary Per Protocol    Interpretation Summary    Left ventricular systolic function is normal. Left ventricular ejection fraction appears to be 61 - 65%.    Left ventricular diastolic function was normal.    The left atrial cavity is mildly dilated.    Saline test results are negative.      Labs Pending at Discharge:      Procedures Performed  Procedure(s):  TOTAL HIP ARTHROPLASTY ANTERIOR         Consults:   Consults       Date and Time Order Name Status Description    5/3/2024  8:53 AM Inpatient Orthopedic Surgery Consult Completed     5/1/2024  4:58 PM Inpatient Spine Surgery Consult Completed     5/1/2024  4:57 PM Inpatient Infectious Diseases Consult Completed               Discharge Details        Discharge Medications        ASK your doctor about these medications        Instructions Start Date   albuterol sulfate  (90 Base) MCG/ACT inhaler  Commonly known as: PROVENTIL HFA;VENTOLIN HFA;PROAIR HFA   2 puffs, Inhalation, Every 4 Hours PRN      busPIRone 15 MG tablet  Commonly known as: BUSPAR   TAKE 1 TABLET BY MOUTH TWICE A DAY       cefdinir 300 MG capsule  Commonly known as: OMNICEF   300 mg, Oral, 2 Times Daily      folic acid 1 MG tablet  Commonly known as: FOLVITE   1 mg, Oral, Daily      HYDROcodone-acetaminophen 7.5-325 MG per tablet  Commonly known as: NORCO   1 tablet, Oral, Every 12 Hours PRN      melatonin 5 MG tablet tablet   5 mg, Oral, Nightly PRN      QUEtiapine 50 MG tablet  Commonly known as: SEROquel   TAKE 1 TABLET BY MOUTH EVERYDAY AT BEDTIME      sertraline 100 MG tablet  Commonly known as: ZOLOFT   TAKE 1 TABLET BY MOUTH TWICE A DAY      thiamine 100 MG tablet  Commonly known as: VITAMIN B1   100 mg, Oral, Daily      vancomycin 1000 MG/200ML solution ivpb  Ask about: Should I take this medication?   Infuse 200 mL into a venous catheter Every Other Day.      VANCOMYCIN PHARMACY INTERMITTENT/PULSE DOSING  Ask about: Should I take this medication?   Does not apply, As Needed      Vitamin B-12 1000 MCG sublingual tablet   1 tablet, Sublingual, Daily      Zinc 50 MG tablet   50 mg, Oral, 2 Times Daily               Allergies   Allergen Reactions    Sulfa Antibiotics Hives    Keflex [Cephalexin] Hives         Discharge Disposition: [facility]      Diet:  Hospital:  Diet Order   Procedures    Diet: Regular/House; Fluid Consistency: Thin (IDDSI 0)         Discharge Activity:         CODE STATUS:  Code Status and Medical Interventions:   Ordered at: 05/01/24 0011     Level Of Support Discussed With:    Patient     Code Status (Patient has no pulse and is not breathing):    CPR (Attempt to Resuscitate)     Medical Interventions (Patient has pulse or is breathing):    Full Support         Future Appointments   Date Time Provider Department Center   6/3/2024  1:45 PM Norma Saul APRN [facility]           Time spent on Discharge including face to face service:  >30 minutes    Signature: Electronically signed by Niels Sands MD, 05/11/24, 08:49 EDT.  [facility] Hospitalist Team      Electronically signed by Niels Sands  MD Yakelin at 05/11/24 0857

## 2024-05-23 DIAGNOSIS — F41.9 ANXIETY DISORDER, UNSPECIFIED: ICD-10-CM

## 2024-05-23 RX ORDER — SERTRALINE HYDROCHLORIDE 100 MG/1
TABLET, FILM COATED ORAL
Qty: 180 TABLET | Refills: 1 | Status: SHIPPED | OUTPATIENT
Start: 2024-05-23

## 2024-06-03 ENCOUNTER — OFFICE VISIT (OUTPATIENT)
Dept: FAMILY MEDICINE CLINIC | Facility: CLINIC | Age: 59
End: 2024-06-03
Payer: COMMERCIAL

## 2024-06-03 VITALS
WEIGHT: 118 LBS | OXYGEN SATURATION: 98 % | SYSTOLIC BLOOD PRESSURE: 155 MMHG | BODY MASS INDEX: 20.91 KG/M2 | DIASTOLIC BLOOD PRESSURE: 78 MMHG | HEART RATE: 88 BPM | TEMPERATURE: 97.7 F

## 2024-06-03 DIAGNOSIS — G47.00 INSOMNIA, UNSPECIFIED TYPE: ICD-10-CM

## 2024-06-03 DIAGNOSIS — M86.9 OSTEOMYELITIS, UNSPECIFIED SITE, UNSPECIFIED TYPE: Primary | ICD-10-CM

## 2024-06-03 DIAGNOSIS — M46.46 DISCITIS OF LUMBAR REGION: ICD-10-CM

## 2024-06-03 DIAGNOSIS — F32.A DEPRESSION, UNSPECIFIED DEPRESSION TYPE: ICD-10-CM

## 2024-06-03 DIAGNOSIS — F41.9 ANXIETY: ICD-10-CM

## 2024-06-03 PROCEDURE — 99214 OFFICE O/P EST MOD 30 MIN: CPT | Performed by: NURSE PRACTITIONER

## 2024-06-03 RX ORDER — HYDROCODONE BITARTRATE AND ACETAMINOPHEN 5; 325 MG/1; MG/1
1 TABLET ORAL EVERY 12 HOURS PRN
Qty: 20 TABLET | Refills: 0 | Status: SHIPPED | OUTPATIENT
Start: 2024-06-03

## 2024-06-03 RX ORDER — QUETIAPINE FUMARATE 50 MG/1
100 TABLET, FILM COATED ORAL
Qty: 180 TABLET | Refills: 0 | Status: SHIPPED | OUTPATIENT
Start: 2024-06-03

## 2024-06-03 RX ORDER — LORAZEPAM 1 MG/1
TABLET ORAL
Qty: 1 TABLET | Refills: 0 | Status: SHIPPED | OUTPATIENT
Start: 2024-06-03

## 2024-06-03 NOTE — PROGRESS NOTES
Subjective     Lita Yu is a 58 y.o. female.     History of Present Illness  Pt is here today to follow up on osteomyelitis of spine.  Pt has been on 12 wks of antibiotics  She has been in the hospital numerous times for this and was evaluated by spine.  They did not feel like she was a surgical candidate at that time.  Infectious disease disease agreed to a total of 12 weeks antibiotics before they would deem her failure of therapy.  She continues to have back pain.  I spoke with [facility] health today and I am [facility] IV infusion and vancomycin was stopped today and PICC was pulled.  After discussion with ID and their suggestion I am going to get a stat MRI of the lumbar spine  Hospital patient was found to have right avascular necrosis of the hip.  She underwent a right hip antibiotic cement placement  Will have to have THR in future  On discharge patient went to [facility] in [place].  Patient had blood work today and CBC is stable.  Anemia stable with hemoglobin 8.2  Creatinine has been stable.  She states her back pain is no better or worse.   Denies numbness and tingling in the legs  She is having a lot of pain in her hip and back.   She is out of pain meds  She was on percocet and hydrocodone in the past.   She has a history of alcohol abuse.   Rates pain sitting a 6/10  She is having increased depression  Having trouble eating and sleeping.  She is on zoloft 100mg bid.  She takes seroquel 50mg nightly- states she has taken 2 pills and that didn't help.  Denies SI or HI      Hospital Course     HPI:  Admitting team HPI   Lita Yu is a 58 y.o. female with a CMH of hypertension, peripheral vascular disease, degenerative joint disease, seizure disorder, difficult tobacco dependence, cirrhosis, generalized weakness and recent MRSA bacteremia and enhancement of the epidural fluid on imaging. She was discharged on vancomycin which is supposed to be on till 5/3/2024 who presented  to [facility] on 5/1/2024 with a chief complaint of worsening pain in her lower back. She has been receiving IV antibiotics since her hospital discharge but has not missed any doses. Despite the treatment, her pain has been increasing.      Hospital Course:  58-year-old female with history of hypertension, peripheral vascular disease, degenerative joint disease, seizure disorder, tobacco abuse, EtOH liver cirrhosis, recent history of MRSA bacteremia attributed to L2-3 OM/discitis admitted to [facility] on 5/1/2024 with a worsening pain in her lower back.      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  Prior plan for vancomycin until 5/3/2024.      -Now admitted with worsening low back pain  -MRI lumbar and pelvis spine as noted.  Shows right hip effusion Along with L2-3 discitis and vertebral body osteomyelitis with enhancing paraspinal phlegmon     Seen by infectious disease continue vancomycin.  Duration of treatment extended to 12 weeks.  Teflaro added status post IR guided right hip aspiration.  Cell count studies noted follow culture. NGTD.  Per ID Teflaro can be discontinued if unable to give at rehab  Seen by orthopedics status post R anterior total hip arthroplasty 5/4.  She will require revision in future in 2 to 3 months  Continue oxycodone as needed for pain.  Continue Flexeril changed to as needed  Seen by CHRISTIANO no plan for surgical intervention  DVT prophylaxis with enoxaparin  PT/OT.  Rehab   weekly CBC creatinine and ESR     #History of alcohol liver cirrhosis          Outpatient follow-up     #Acute blood loss anemia on chronic anemia  Monitor H&H.  received 1 unit of PRBC 5/5 and 5/8   since H&H is stable  Transfuse PRBCs to keep hemoglobin more than 7     #Tobacco abuse  NRT  Counseled     DISCHARGE  Follow Up Recommendations for labs and diagnostics: Continue IV vancomycin per infectious disease to finish total of 10 weeks course.  Outpatient follow-up with orthopedics.  Repeat H&H in 5 days transfuse PRBCs as needed to keep hemoglobin more than 7            The following portions of the patient's history were reviewed and updated as appropriate: allergies, current medications, past family history, past medical history, past social history, past surgical history, and problem list.    Review of Systems   Constitutional:  Positive for fatigue. Negative for chills and fever.   Respiratory:  Negative for chest tightness and shortness of breath.    Cardiovascular:  Negative for chest pain and palpitations.   Gastrointestinal:  Negative for abdominal pain, nausea and vomiting.   Musculoskeletal:  Positive for arthralgias and back pain.   Neurological:  Negative for dizziness and headache.   Psychiatric/Behavioral:  Positive for depressed mood and stress. Negative for self-injury and suicidal ideas. The patient is nervous/anxious.        Objective     /78 (BP Location: Left arm, Patient Position: Sitting, Cuff Size: Adult)   Pulse 88   Temp 97.7 °F (36.5 °C) (Tympanic)   Wt 53.5 kg (118 lb)   SpO2 98%   BMI 20.91 kg/m²     Current Outpatient Medications on File Prior to Visit   Medication Sig Dispense Refill    albuterol sulfate  (90 Base) MCG/ACT inhaler Inhale 2 puffs Every 4 (Four) Hours As Needed for Wheezing.      busPIRone (BUSPAR) 15 MG tablet TAKE 1 TABLET BY MOUTH TWICE A  tablet 1    Cyanocobalamin (Vitamin B-12) 1000 MCG sublingual tablet Place 1 tablet under the tongue Daily.      Enoxaparin Sodium (LOVENOX) 40 MG/0.4ML solution prefilled syringe syringe Inject 0.4 mL under the skin into the appropriate area as directed Daily for 30 days. Indications: Prevention of Unwanted Clot in Veins      folic acid (FOLVITE) 1 MG tablet Take 1 tablet by mouth Daily. 30 tablet 0    melatonin 5  MG tablet tablet Take 1 tablet by mouth At Night As Needed (sleep).      sertraline (ZOLOFT) 100 MG tablet TAKE 1 TABLET BY MOUTH TWICE A  tablet 1    thiamine (VITAMIN B1) 100 MG tablet Take 1 tablet by mouth Daily. 30 tablet 0    [DISCONTINUED] QUEtiapine (SEROquel) 50 MG tablet TAKE 1 TABLET BY MOUTH EVERYDAY AT BEDTIME 90 tablet 0     No current facility-administered medications on file prior to visit.        BMI is within normal parameters. No other follow-up for BMI required.       Physical Exam  Constitutional:       Appearance: Normal appearance. She is not ill-appearing.   HENT:      Head: Normocephalic and atraumatic.   Cardiovascular:      Rate and Rhythm: Normal rate and regular rhythm.      Heart sounds: No murmur heard.  Pulmonary:      Effort: Pulmonary effort is normal. No respiratory distress.      Breath sounds: Normal breath sounds.   Musculoskeletal:         General: Normal range of motion.   Skin:     General: Skin is warm and dry.   Neurological:      General: No focal deficit present.      Mental Status: She is alert and oriented to person, place, and time.   Psychiatric:         Mood and Affect: Mood normal.         Behavior: Behavior normal.         Thought Content: Thought content normal.         Judgment: Judgment normal.           Assessment & Plan     Diagnoses and all orders for this visit:    1. Osteomyelitis, unspecified site, unspecified type (Primary)  Comments:  pt completed 12 wks IV vanc  per ID get repeat MRI spine  concerned for failure of therapy- if infection still present send to [facility] with   Orders:  -     MRI Lumbar Spine With & Without Contrast; Future  -     HYDROcodone-acetaminophen (NORCO) 5-325 MG per tablet; Take 1 tablet by mouth Every 12 (Twelve) Hours As Needed for Moderate Pain.  Dispense: 20 tablet; Refill: 0    2. Discitis of lumbar region  Comments:  deteriorated  on IV vanc  saw spine in hospital- not surgical per surgeon  may need  second opinion  Having increased pain- will give 20 hydrocodone-- use with caution due to history of alcohol abuse  Orders:  -     MRI Lumbar Spine With & Without Contrast; Future  -     HYDROcodone-acetaminophen (NORCO) 5-325 MG per tablet; Take 1 tablet by mouth Every 12 (Twelve) Hours As Needed for Moderate Pain.  Dispense: 20 tablet; Refill: 0    3. Anxiety  Comments:  needs ativan prior to MRI  will get a cab or ride home    4. Insomnia, unspecified type  Comments:  worse since husbands death  avoid controlled substance  stop trazodone  start seroquel  f/u 1mo  Orders:  -     QUEtiapine (SEROquel) 50 MG tablet; Take 2 tablets by mouth every night at bedtime.  Dispense: 180 tablet; Refill: 0    5. Depression, unspecified depression type  Comments:  deteriorated   will inc seroquel to 100mg nightly  on zoloft 100mg bid  denies SI or HI  Orders:  -     LORazepam (Ativan) 1 MG tablet; Take 1 tab prior to MRI  Dispense: 1 tablet; Refill: 0    I have spoke with [facility] home health nurse today and she had pulled the PICC as vancomycin was completed.  I also spoke with pharmacist.  They were planning on discontinuing care since vancomycin was completed.  I reach out to infectious disease.  We are going to get a stat MRI to evaluate osteomyelitis of the spine and abscess.  If patient continues to have persistent abscess I will try to refer to [facility]

## 2024-06-04 ENCOUNTER — HOSPITAL ENCOUNTER (OUTPATIENT)
Dept: MRI IMAGING | Facility: HOSPITAL | Age: 59
Discharge: HOME | End: 2024-06-04
Payer: COMMERCIAL

## 2024-06-04 DIAGNOSIS — M86.9 OSTEOMYELITIS, UNSPECIFIED SITE, UNSPECIFIED TYPE: ICD-10-CM

## 2024-06-04 DIAGNOSIS — M46.46 DISCITIS OF LUMBAR REGION: ICD-10-CM

## 2024-06-04 PROCEDURE — 25010000002 HEPARIN LOCK FLUSH PER 10 UNITS: Performed by: NURSE PRACTITIONER

## 2024-06-04 PROCEDURE — A9579 GAD-BASE MR CONTRAST NOS,1ML: HCPCS | Performed by: NURSE PRACTITIONER

## 2024-06-04 PROCEDURE — 25010000002 GADOTERIDOL PER 1 ML: Performed by: NURSE PRACTITIONER

## 2024-06-04 PROCEDURE — 72158 MRI LUMBAR SPINE W/O & W/DYE: CPT

## 2024-06-04 RX ORDER — HEPARIN SODIUM (PORCINE) LOCK FLUSH IV SOLN 100 UNIT/ML 100 UNIT/ML
500 SOLUTION INTRAVENOUS ONCE
Status: COMPLETED | OUTPATIENT
Start: 2024-06-04 | End: 2024-06-04

## 2024-06-04 RX ADMIN — GADOTERIDOL 11 ML: 279.3 INJECTION, SOLUTION INTRAVENOUS at 08:42

## 2024-06-04 RX ADMIN — Medication 500 UNITS: at 08:10

## 2024-06-05 ENCOUNTER — TELEPHONE (OUTPATIENT)
Dept: FAMILY MEDICINE CLINIC | Facility: CLINIC | Age: 59
End: 2024-06-05
Payer: COMMERCIAL

## 2024-06-05 DIAGNOSIS — M46.46 DISCITIS OF LUMBAR REGION: ICD-10-CM

## 2024-06-05 DIAGNOSIS — M86.9 OSTEOMYELITIS, UNSPECIFIED SITE, UNSPECIFIED TYPE: Primary | ICD-10-CM

## 2024-06-05 NOTE — TELEPHONE ENCOUNTER
Can you call patient let her know I am going to put an urgent referral into Dr. Johnson who is with [facility] who is a spine specialist.  We will see if he can get her in soon to hopefully avoid maybe hospitalization.  I know she is seeing Dr. Chen's today.

## 2024-06-06 ENCOUNTER — TELEPHONE (OUTPATIENT)
Dept: FAMILY MEDICINE CLINIC | Facility: CLINIC | Age: 59
End: 2024-06-06
Payer: COMMERCIAL

## 2024-06-07 NOTE — TELEPHONE ENCOUNTER
"    Caller: Lita Yu \"NAINA\"    Relationship: Self    Best call back number: [phone]     What is the medical concern/diagnosis: SPINE ISSUES    What specialty or service is being requested: SPINE SPECIALIST    What is the provider, practice or medical service name: [facility]    What is the office location:     What is the office phone number:     Any additional details:       "
Called pt to let know about referral   
I already placed this. Can you forward please. Pls let pt know  
I'm not sure that she does. I would recommend calling  
It was already done on wed when you put it in and I sent pt my chart about. But they always call patient to get scheduled. Does she not get in her my chart?   
20.07

## 2024-06-11 DIAGNOSIS — M46.46 DISCITIS OF LUMBAR REGION: ICD-10-CM

## 2024-06-11 DIAGNOSIS — M86.9 OSTEOMYELITIS, UNSPECIFIED SITE, UNSPECIFIED TYPE: ICD-10-CM

## 2024-06-11 RX ORDER — HYDROCODONE BITARTRATE AND ACETAMINOPHEN 5; 325 MG/1; MG/1
1 TABLET ORAL EVERY 12 HOURS PRN
Qty: 20 TABLET | Refills: 0 | Status: SHIPPED | OUTPATIENT
Start: 2024-06-11

## 2024-06-26 DIAGNOSIS — M86.9 OSTEOMYELITIS, UNSPECIFIED SITE, UNSPECIFIED TYPE: ICD-10-CM

## 2024-06-26 DIAGNOSIS — M46.46 DISCITIS OF LUMBAR REGION: ICD-10-CM

## 2024-06-26 RX ORDER — HYDROCODONE BITARTRATE AND ACETAMINOPHEN 5; 325 MG/1; MG/1
1 TABLET ORAL EVERY 12 HOURS PRN
Qty: 20 TABLET | Refills: 0 | Status: SHIPPED | OUTPATIENT
Start: 2024-06-26

## 2024-07-03 ENCOUNTER — LAB (OUTPATIENT)
Dept: FAMILY MEDICINE CLINIC | Facility: CLINIC | Age: 59
End: 2024-07-03
Payer: COMMERCIAL

## 2024-07-03 ENCOUNTER — OFFICE VISIT (OUTPATIENT)
Dept: FAMILY MEDICINE CLINIC | Facility: CLINIC | Age: 59
End: 2024-07-03
Payer: COMMERCIAL

## 2024-07-03 VITALS
OXYGEN SATURATION: 97 % | DIASTOLIC BLOOD PRESSURE: 81 MMHG | BODY MASS INDEX: 22.22 KG/M2 | SYSTOLIC BLOOD PRESSURE: 141 MMHG | WEIGHT: 125.4 LBS | RESPIRATION RATE: 18 BRPM | HEART RATE: 80 BPM | TEMPERATURE: 98.7 F | HEIGHT: 63 IN

## 2024-07-03 DIAGNOSIS — M86.9 OSTEOMYELITIS, UNSPECIFIED SITE, UNSPECIFIED TYPE: Primary | ICD-10-CM

## 2024-07-03 LAB
BASOPHILS # BLD AUTO: 0.02 10*3/MM3 (ref 0–0.2)
BASOPHILS NFR BLD AUTO: 0.4 % (ref 0–1.5)
DEPRECATED RDW RBC AUTO: 60.6 FL (ref 37–54)
EOSINOPHIL # BLD AUTO: 0.16 10*3/MM3 (ref 0–0.4)
EOSINOPHIL NFR BLD AUTO: 3.6 % (ref 0.3–6.2)
ERYTHROCYTE [DISTWIDTH] IN BLOOD BY AUTOMATED COUNT: 17.2 % (ref 12.3–15.4)
HCT VFR BLD AUTO: 29 % (ref 34–46.6)
HGB BLD-MCNC: 9.4 G/DL (ref 12–15.9)
IMM GRANULOCYTES # BLD AUTO: 0.03 10*3/MM3 (ref 0–0.05)
IMM GRANULOCYTES NFR BLD AUTO: 0.7 % (ref 0–0.5)
LYMPHOCYTES # BLD AUTO: 1.54 10*3/MM3 (ref 0.7–3.1)
LYMPHOCYTES NFR BLD AUTO: 34.6 % (ref 19.6–45.3)
MCH RBC QN AUTO: 31.9 PG (ref 26.6–33)
MCHC RBC AUTO-ENTMCNC: 32.4 G/DL (ref 31.5–35.7)
MCV RBC AUTO: 98.3 FL (ref 79–97)
MONOCYTES # BLD AUTO: 0.59 10*3/MM3 (ref 0.1–0.9)
MONOCYTES NFR BLD AUTO: 13.3 % (ref 5–12)
NEUTROPHILS NFR BLD AUTO: 2.11 10*3/MM3 (ref 1.7–7)
NEUTROPHILS NFR BLD AUTO: 47.4 % (ref 42.7–76)
NRBC BLD AUTO-RTO: 0 /100 WBC (ref 0–0.2)
PLATELET # BLD AUTO: 134 10*3/MM3 (ref 140–450)
PMV BLD AUTO: 9.9 FL (ref 6–12)
RBC # BLD AUTO: 2.95 10*6/MM3 (ref 3.77–5.28)
WBC NRBC COR # BLD AUTO: 4.45 10*3/MM3 (ref 3.4–10.8)

## 2024-07-03 PROCEDURE — 85025 COMPLETE CBC W/AUTO DIFF WBC: CPT | Performed by: NURSE PRACTITIONER

## 2024-07-03 PROCEDURE — 36415 COLL VENOUS BLD VENIPUNCTURE: CPT | Performed by: NURSE PRACTITIONER

## 2024-07-03 PROCEDURE — 99213 OFFICE O/P EST LOW 20 MIN: CPT | Performed by: NURSE PRACTITIONER

## 2024-07-03 NOTE — PROGRESS NOTES
Subjective     Lita Yu is a 58 y.o. female.     History of Present Illness  Pt is here today to follow up on osteomyelitis of spine.  Pt has been on 12 wks of antibiotics  She has been in the hospital numerous times for this and was evaluated by spine.  They did not feel like she was a surgical candidate at that time.  Infectious disease disease agreed to a total of 12 weeks antibiotics before they would deem her failure of therapy.  She continues to have back pain.  Patient was urgently referred to a new spine doctor for evaluation for potential surgery but she has not seen them.  She now has an appointment for Monday  She states her pain has not worsened but has not gotten any better.  Recent MRI still showed some osteomyelitis with discitis  Pt was also found to have avascular necrosis of the right hip  They did an arthroplasty and placed antibiotic cement  She states she is having increased pain and redness in that hip  Her male friend is here with her today.  She is a recovering alcohol.   Rates her pain a 8/10 with movement- primarily in the hip  Back pain is a 3-4/10.  Denies any new numbness or tingling.  Denies loss of bowel or bladder function.  She denies CP, SOA, dizziness, HA.          The following portions of the patient's history were reviewed and updated as appropriate: allergies, current medications, past family history, past medical history, past social history, past surgical history, and problem list.    Review of Systems   Constitutional:  Negative for chills, fatigue and fever.   Respiratory:  Negative for chest tightness and shortness of breath.    Cardiovascular:  Negative for chest pain and palpitations.   Musculoskeletal:  Positive for arthralgias and back pain.   Neurological:  Negative for dizziness and headache.       Objective     /81 (BP Location: Left arm, Patient Position: Sitting, Cuff Size: Adult)   Pulse 80   Temp 98.7 °F (37.1 °C) (Temporal)   Resp 18   Ht  "160 cm (62.99\")   Wt 56.9 kg (125 lb 6.4 oz)   SpO2 97%   BMI 22.22 kg/m²     Current Outpatient Medications on File Prior to Visit   Medication Sig Dispense Refill    albuterol sulfate  (90 Base) MCG/ACT inhaler Inhale 2 puffs Every 4 (Four) Hours As Needed for Wheezing.      busPIRone (BUSPAR) 15 MG tablet TAKE 1 TABLET BY MOUTH TWICE A  tablet 1    Cyanocobalamin (Vitamin B-12) 1000 MCG sublingual tablet Place 1 tablet under the tongue Daily.      folic acid (FOLVITE) 1 MG tablet Take 1 tablet by mouth Daily. 30 tablet 0    HYDROcodone-acetaminophen (NORCO) 5-325 MG per tablet Take 1 tablet by mouth Every 12 (Twelve) Hours As Needed for Moderate Pain. 20 tablet 0    LORazepam (Ativan) 1 MG tablet Take 1 tab prior to MRI 1 tablet 0    melatonin 5 MG tablet tablet Take 1 tablet by mouth At Night As Needed (sleep).      QUEtiapine (SEROquel) 50 MG tablet Take 2 tablets by mouth every night at bedtime. 180 tablet 0    sertraline (ZOLOFT) 100 MG tablet TAKE 1 TABLET BY MOUTH TWICE A  tablet 1    thiamine (VITAMIN B1) 100 MG tablet Take 1 tablet by mouth Daily. 30 tablet 0     No current facility-administered medications on file prior to visit.        BMI is within normal parameters. No other follow-up for BMI required.       Physical Exam  Constitutional:       General: She is not in acute distress.     Appearance: Normal appearance. She is not ill-appearing.   HENT:      Head: Normocephalic and atraumatic.   Eyes:      Extraocular Movements: Extraocular movements intact.   Pulmonary:      Effort: Pulmonary effort is normal. No respiratory distress.   Skin:     Comments: Right hip incision looks good. No redness or draining.    Neurological:      General: No focal deficit present.      Mental Status: She is alert and oriented to person, place, and time.   Psychiatric:         Mood and Affect: Mood normal.         Behavior: Behavior normal.         Thought Content: Thought content normal.        "  Judgment: Judgment normal.           Assessment & Plan     Diagnoses and all orders for this visit:    1. Osteomyelitis, unspecified site, unspecified type (Primary)  Comments:  pain is no better but no worse  denies any new neurological symptoms- no fever  has appt with spine on Mon  completed 12 wks of abx  strict ER prec  Orders:  -     CBC & Differential

## 2024-07-09 ENCOUNTER — TELEPHONE (OUTPATIENT)
Dept: FAMILY MEDICINE CLINIC | Facility: CLINIC | Age: 59
End: 2024-07-09
Payer: COMMERCIAL

## 2024-07-09 NOTE — TELEPHONE ENCOUNTER
"  Caller: Lita Yu \"NAINA\"    Relationship: Self    Best call back number:[phone]       Who are you requesting to speak with (clinical staff, provider,  specific staff member): CLINICAL OR PCP      What was the call regarding: PATIENT NEEDS TO RELAY MESSAGE ON DRShima APPOINTMENT YESTERDAY AND ASK WHAT YOU ALL WANT HER TO DO.  PLEASE CALL TO DISCUSS.    "

## 2024-07-09 NOTE — TELEPHONE ENCOUNTER
Can you get his office notes? And yes I want her to go there. Does she need a referral? I need to know what doctor

## 2024-07-09 NOTE — TELEPHONE ENCOUNTER
Spoke with patient and she said that Dr Johnson said he does not have the expertise to touch her. They called Dr Winters but that doctor works out of the hospital not practice. So they told her to go to [facility] and they have the best spine surgeons there. Should she go there? Or does she need to stay with [facility]?

## 2024-07-09 NOTE — TELEPHONE ENCOUNTER
They are wanting her to go thru the ER but she wants to make sure she should go there or stay with [facility].

## 2024-07-20 ENCOUNTER — APPOINTMENT (OUTPATIENT)
Dept: CT IMAGING | Facility: HOSPITAL | Age: 59
DRG: 466 | End: 2024-07-20
Payer: COMMERCIAL

## 2024-07-20 ENCOUNTER — HOSPITAL ENCOUNTER (INPATIENT)
Facility: HOSPITAL | Age: 59
LOS: 2 days | Discharge: HOME HEALTH | DRG: 466 | End: 2024-07-26
Attending: EMERGENCY MEDICINE | Admitting: INTERNAL MEDICINE
Payer: COMMERCIAL

## 2024-07-20 DIAGNOSIS — Z96.641 S/P TOTAL RIGHT HIP ARTHROPLASTY: Primary | ICD-10-CM

## 2024-07-20 DIAGNOSIS — M54.59 INTRACTABLE LOW BACK PAIN: ICD-10-CM

## 2024-07-20 DIAGNOSIS — M25.551 RIGHT HIP PAIN: ICD-10-CM

## 2024-07-20 DIAGNOSIS — Z87.39 HISTORY OF DISCITIS: ICD-10-CM

## 2024-07-20 PROBLEM — Z86.14 HISTORY OF MRSA INFECTION: Status: ACTIVE | Noted: 2024-07-20

## 2024-07-20 PROBLEM — D64.9 ANEMIA: Status: ACTIVE | Noted: 2024-07-20

## 2024-07-20 LAB
ALBUMIN SERPL-MCNC: 3.8 G/DL (ref 3.5–5.2)
ALBUMIN/GLOB SERPL: 1.2 G/DL
ALP SERPL-CCNC: 237 U/L (ref 39–117)
ALT SERPL W P-5'-P-CCNC: 24 U/L (ref 1–33)
ANION GAP SERPL CALCULATED.3IONS-SCNC: 11 MMOL/L (ref 5–15)
AST SERPL-CCNC: 43 U/L (ref 1–32)
BASOPHILS # BLD AUTO: 0.03 10*3/MM3 (ref 0–0.2)
BASOPHILS NFR BLD AUTO: 0.7 % (ref 0–1.5)
BILIRUB SERPL-MCNC: 0.4 MG/DL (ref 0–1.2)
BUN SERPL-MCNC: 8 MG/DL (ref 6–20)
BUN/CREAT SERPL: 11.1 (ref 7–25)
CALCIUM SPEC-SCNC: 8.5 MG/DL (ref 8.6–10.5)
CHLORIDE SERPL-SCNC: 105 MMOL/L (ref 98–107)
CO2 SERPL-SCNC: 23 MMOL/L (ref 22–29)
CREAT SERPL-MCNC: 0.72 MG/DL (ref 0.57–1)
CRP SERPL-MCNC: <0.3 MG/DL (ref 0–0.5)
D-LACTATE SERPL-SCNC: 1.5 MMOL/L (ref 0.5–2)
DEPRECATED RDW RBC AUTO: 58.1 FL (ref 37–54)
EGFRCR SERPLBLD CKD-EPI 2021: 98.3 ML/MIN/1.73
EOSINOPHIL # BLD AUTO: 0.1 10*3/MM3 (ref 0–0.4)
EOSINOPHIL NFR BLD AUTO: 2.3 % (ref 0.3–6.2)
ERYTHROCYTE [DISTWIDTH] IN BLOOD BY AUTOMATED COUNT: 15.7 % (ref 12.3–15.4)
ERYTHROCYTE [SEDIMENTATION RATE] IN BLOOD: 7 MM/HR (ref 0–30)
GLOBULIN UR ELPH-MCNC: 3.2 GM/DL
GLUCOSE SERPL-MCNC: 95 MG/DL (ref 65–99)
HCT VFR BLD AUTO: 31.1 % (ref 34–46.6)
HGB BLD-MCNC: 9.8 G/DL (ref 12–15.9)
IMM GRANULOCYTES # BLD AUTO: 0.05 10*3/MM3 (ref 0–0.05)
IMM GRANULOCYTES NFR BLD AUTO: 1.1 % (ref 0–0.5)
INR PPP: 1.12 (ref 0.9–1.1)
LYMPHOCYTES # BLD AUTO: 1.17 10*3/MM3 (ref 0.7–3.1)
LYMPHOCYTES NFR BLD AUTO: 26.6 % (ref 19.6–45.3)
MCH RBC QN AUTO: 31.9 PG (ref 26.6–33)
MCHC RBC AUTO-ENTMCNC: 31.5 G/DL (ref 31.5–35.7)
MCV RBC AUTO: 101.3 FL (ref 79–97)
MONOCYTES # BLD AUTO: 0.5 10*3/MM3 (ref 0.1–0.9)
MONOCYTES NFR BLD AUTO: 11.4 % (ref 5–12)
NEUTROPHILS NFR BLD AUTO: 2.55 10*3/MM3 (ref 1.7–7)
NEUTROPHILS NFR BLD AUTO: 57.9 % (ref 42.7–76)
NRBC BLD AUTO-RTO: 0 /100 WBC (ref 0–0.2)
PLATELET # BLD AUTO: 113 10*3/MM3 (ref 140–450)
PMV BLD AUTO: 9.5 FL (ref 6–12)
POTASSIUM SERPL-SCNC: 3.3 MMOL/L (ref 3.5–5.2)
PROCALCITONIN SERPL-MCNC: 0.03 NG/ML (ref 0–0.25)
PROT SERPL-MCNC: 7 G/DL (ref 6–8.5)
PROTHROMBIN TIME: 14.6 SECONDS (ref 11.7–14.2)
RBC # BLD AUTO: 3.07 10*6/MM3 (ref 3.77–5.28)
SODIUM SERPL-SCNC: 139 MMOL/L (ref 136–145)
WBC NRBC COR # BLD AUTO: 4.4 10*3/MM3 (ref 3.4–10.8)

## 2024-07-20 PROCEDURE — G0378 HOSPITAL OBSERVATION PER HR: HCPCS

## 2024-07-20 PROCEDURE — 99285 EMERGENCY DEPT VISIT HI MDM: CPT

## 2024-07-20 PROCEDURE — 84145 PROCALCITONIN (PCT): CPT | Performed by: EMERGENCY MEDICINE

## 2024-07-20 PROCEDURE — 25010000002 HYDROMORPHONE PER 4 MG: Performed by: INTERNAL MEDICINE

## 2024-07-20 PROCEDURE — 72131 CT LUMBAR SPINE W/O DYE: CPT

## 2024-07-20 PROCEDURE — 25010000002 DEXAMETHASONE PER 1 MG: Performed by: EMERGENCY MEDICINE

## 2024-07-20 PROCEDURE — 85610 PROTHROMBIN TIME: CPT | Performed by: EMERGENCY MEDICINE

## 2024-07-20 PROCEDURE — 25810000003 SODIUM CHLORIDE 0.9 % SOLUTION: Performed by: EMERGENCY MEDICINE

## 2024-07-20 PROCEDURE — 25010000002 ONDANSETRON PER 1 MG: Performed by: EMERGENCY MEDICINE

## 2024-07-20 PROCEDURE — 86140 C-REACTIVE PROTEIN: CPT | Performed by: EMERGENCY MEDICINE

## 2024-07-20 PROCEDURE — 83605 ASSAY OF LACTIC ACID: CPT | Performed by: EMERGENCY MEDICINE

## 2024-07-20 PROCEDURE — 25010000002 SODIUM CHLORIDE 0.9 % WITH KCL 20 MEQ 20-0.9 MEQ/L-% SOLUTION: Performed by: INTERNAL MEDICINE

## 2024-07-20 PROCEDURE — 85652 RBC SED RATE AUTOMATED: CPT | Performed by: EMERGENCY MEDICINE

## 2024-07-20 PROCEDURE — 25010000002 HYDROMORPHONE PER 4 MG: Performed by: EMERGENCY MEDICINE

## 2024-07-20 PROCEDURE — 80053 COMPREHEN METABOLIC PANEL: CPT | Performed by: EMERGENCY MEDICINE

## 2024-07-20 PROCEDURE — 72192 CT PELVIS W/O DYE: CPT

## 2024-07-20 PROCEDURE — 25010000002 THIAMINE HCL 200 MG/2ML SOLUTION: Performed by: INTERNAL MEDICINE

## 2024-07-20 PROCEDURE — 85025 COMPLETE CBC W/AUTO DIFF WBC: CPT | Performed by: EMERGENCY MEDICINE

## 2024-07-20 RX ORDER — HYDROCODONE BITARTRATE AND ACETAMINOPHEN 5; 325 MG/1; MG/1
1 TABLET ORAL EVERY 12 HOURS PRN
Status: DISCONTINUED | OUTPATIENT
Start: 2024-07-20 | End: 2024-07-26 | Stop reason: HOSPADM

## 2024-07-20 RX ORDER — QUETIAPINE FUMARATE 100 MG/1
100 TABLET, FILM COATED ORAL NIGHTLY
Status: DISCONTINUED | OUTPATIENT
Start: 2024-07-20 | End: 2024-07-26 | Stop reason: HOSPADM

## 2024-07-20 RX ORDER — ONDANSETRON 2 MG/ML
4 INJECTION INTRAMUSCULAR; INTRAVENOUS ONCE
Status: COMPLETED | OUTPATIENT
Start: 2024-07-20 | End: 2024-07-20

## 2024-07-20 RX ORDER — HYDROMORPHONE HYDROCHLORIDE 1 MG/ML
0.5 INJECTION, SOLUTION INTRAMUSCULAR; INTRAVENOUS; SUBCUTANEOUS ONCE
Status: COMPLETED | OUTPATIENT
Start: 2024-07-20 | End: 2024-07-20

## 2024-07-20 RX ORDER — ALBUTEROL SULFATE 90 UG/1
2 AEROSOL, METERED RESPIRATORY (INHALATION) EVERY 4 HOURS PRN
Status: DISCONTINUED | OUTPATIENT
Start: 2024-07-20 | End: 2024-07-26 | Stop reason: HOSPADM

## 2024-07-20 RX ORDER — LORAZEPAM 1 MG/1
1 TABLET ORAL EVERY 6 HOURS
Status: DISPENSED | OUTPATIENT
Start: 2024-07-21 | End: 2024-07-22

## 2024-07-20 RX ORDER — SODIUM CHLORIDE 0.9 % (FLUSH) 0.9 %
10 SYRINGE (ML) INJECTION EVERY 12 HOURS SCHEDULED
Status: DISCONTINUED | OUTPATIENT
Start: 2024-07-20 | End: 2024-07-26 | Stop reason: HOSPADM

## 2024-07-20 RX ORDER — TIZANIDINE 4 MG/1
4 TABLET ORAL EVERY 12 HOURS PRN
Status: DISCONTINUED | OUTPATIENT
Start: 2024-07-20 | End: 2024-07-26 | Stop reason: HOSPADM

## 2024-07-20 RX ORDER — SERTRALINE HYDROCHLORIDE 100 MG/1
100 TABLET, FILM COATED ORAL 2 TIMES DAILY
Status: DISCONTINUED | OUTPATIENT
Start: 2024-07-20 | End: 2024-07-26 | Stop reason: HOSPADM

## 2024-07-20 RX ORDER — SODIUM CHLORIDE 0.9 % (FLUSH) 0.9 %
10 SYRINGE (ML) INJECTION AS NEEDED
Status: DISCONTINUED | OUTPATIENT
Start: 2024-07-20 | End: 2024-07-26 | Stop reason: HOSPADM

## 2024-07-20 RX ORDER — THIAMINE HYDROCHLORIDE 100 MG/ML
200 INJECTION, SOLUTION INTRAMUSCULAR; INTRAVENOUS EVERY 8 HOURS SCHEDULED
Status: DISPENSED | OUTPATIENT
Start: 2024-07-20 | End: 2024-07-25

## 2024-07-20 RX ORDER — LORAZEPAM 2 MG/ML
2 INJECTION INTRAMUSCULAR
Status: ACTIVE | OUTPATIENT
Start: 2024-07-20 | End: 2024-07-25

## 2024-07-20 RX ORDER — LORAZEPAM 1 MG/1
1 TABLET ORAL
Status: DISPENSED | OUTPATIENT
Start: 2024-07-20 | End: 2024-07-25

## 2024-07-20 RX ORDER — HYDROMORPHONE HYDROCHLORIDE 1 MG/ML
0.5 INJECTION, SOLUTION INTRAMUSCULAR; INTRAVENOUS; SUBCUTANEOUS
Status: DISCONTINUED | OUTPATIENT
Start: 2024-07-20 | End: 2024-07-26 | Stop reason: HOSPADM

## 2024-07-20 RX ORDER — FOLIC ACID 1 MG/1
1 TABLET ORAL DAILY
Status: DISCONTINUED | OUTPATIENT
Start: 2024-07-20 | End: 2024-07-20

## 2024-07-20 RX ORDER — POLYETHYLENE GLYCOL 3350 17 G/17G
17 POWDER, FOR SOLUTION ORAL DAILY PRN
Status: DISCONTINUED | OUTPATIENT
Start: 2024-07-20 | End: 2024-07-26 | Stop reason: HOSPADM

## 2024-07-20 RX ORDER — ONDANSETRON 2 MG/ML
4 INJECTION INTRAMUSCULAR; INTRAVENOUS EVERY 6 HOURS PRN
Status: DISCONTINUED | OUTPATIENT
Start: 2024-07-20 | End: 2024-07-26 | Stop reason: HOSPADM

## 2024-07-20 RX ORDER — BISACODYL 10 MG
10 SUPPOSITORY, RECTAL RECTAL DAILY PRN
Status: DISCONTINUED | OUTPATIENT
Start: 2024-07-20 | End: 2024-07-26 | Stop reason: HOSPADM

## 2024-07-20 RX ORDER — LORAZEPAM 1 MG/1
1 TABLET ORAL EVERY 12 HOURS SCHEDULED
Status: COMPLETED | OUTPATIENT
Start: 2024-07-22 | End: 2024-07-23

## 2024-07-20 RX ORDER — LORAZEPAM 2 MG/ML
1 INJECTION INTRAMUSCULAR
Status: DISPENSED | OUTPATIENT
Start: 2024-07-20 | End: 2024-07-25

## 2024-07-20 RX ORDER — UREA 10 %
1000 LOTION (ML) TOPICAL DAILY
Status: DISCONTINUED | OUTPATIENT
Start: 2024-07-20 | End: 2024-07-26 | Stop reason: HOSPADM

## 2024-07-20 RX ORDER — LORAZEPAM 1 MG/1
2 TABLET ORAL EVERY 6 HOURS
Status: COMPLETED | OUTPATIENT
Start: 2024-07-20 | End: 2024-07-21

## 2024-07-20 RX ORDER — FOLIC ACID 1 MG/1
1 TABLET ORAL DAILY
Status: DISCONTINUED | OUTPATIENT
Start: 2024-07-20 | End: 2024-07-26 | Stop reason: HOSPADM

## 2024-07-20 RX ORDER — BUSPIRONE HYDROCHLORIDE 15 MG/1
15 TABLET ORAL 2 TIMES DAILY
Status: DISCONTINUED | OUTPATIENT
Start: 2024-07-20 | End: 2024-07-26 | Stop reason: HOSPADM

## 2024-07-20 RX ORDER — LORAZEPAM 1 MG/1
2 TABLET ORAL
Status: ACTIVE | OUTPATIENT
Start: 2024-07-20 | End: 2024-07-25

## 2024-07-20 RX ORDER — LORAZEPAM 1 MG/1
1 TABLET ORAL DAILY
Status: COMPLETED | OUTPATIENT
Start: 2024-07-24 | End: 2024-07-24

## 2024-07-20 RX ORDER — HYDROCODONE BITARTRATE AND ACETAMINOPHEN 10; 325 MG/1; MG/1
1 TABLET ORAL EVERY 6 HOURS PRN
Status: DISCONTINUED | OUTPATIENT
Start: 2024-07-20 | End: 2024-07-26 | Stop reason: HOSPADM

## 2024-07-20 RX ORDER — AMOXICILLIN 250 MG
2 CAPSULE ORAL 2 TIMES DAILY PRN
Status: DISCONTINUED | OUTPATIENT
Start: 2024-07-20 | End: 2024-07-26 | Stop reason: HOSPADM

## 2024-07-20 RX ORDER — BISACODYL 5 MG/1
5 TABLET, DELAYED RELEASE ORAL DAILY PRN
Status: DISCONTINUED | OUTPATIENT
Start: 2024-07-20 | End: 2024-07-26 | Stop reason: HOSPADM

## 2024-07-20 RX ORDER — SODIUM CHLORIDE 9 MG/ML
40 INJECTION, SOLUTION INTRAVENOUS AS NEEDED
Status: DISCONTINUED | OUTPATIENT
Start: 2024-07-20 | End: 2024-07-26 | Stop reason: HOSPADM

## 2024-07-20 RX ORDER — DEXAMETHASONE SODIUM PHOSPHATE 10 MG/ML
8 INJECTION INTRAMUSCULAR; INTRAVENOUS ONCE
Status: COMPLETED | OUTPATIENT
Start: 2024-07-20 | End: 2024-07-20

## 2024-07-20 RX ORDER — SODIUM CHLORIDE AND POTASSIUM CHLORIDE 150; 900 MG/100ML; MG/100ML
100 INJECTION, SOLUTION INTRAVENOUS CONTINUOUS
Status: DISCONTINUED | OUTPATIENT
Start: 2024-07-20 | End: 2024-07-21

## 2024-07-20 RX ADMIN — Medication 1000 MCG: at 18:23

## 2024-07-20 RX ADMIN — ONDANSETRON 4 MG: 2 INJECTION INTRAMUSCULAR; INTRAVENOUS at 16:32

## 2024-07-20 RX ADMIN — BUSPIRONE HYDROCHLORIDE 15 MG: 15 TABLET ORAL at 20:23

## 2024-07-20 RX ADMIN — HYDROMORPHONE HYDROCHLORIDE 0.5 MG: 1 INJECTION, SOLUTION INTRAMUSCULAR; INTRAVENOUS; SUBCUTANEOUS at 17:47

## 2024-07-20 RX ADMIN — SERTRALINE 100 MG: 100 TABLET, FILM COATED ORAL at 20:23

## 2024-07-20 RX ADMIN — THIAMINE HYDROCHLORIDE 200 MG: 100 INJECTION, SOLUTION INTRAMUSCULAR; INTRAVENOUS at 22:29

## 2024-07-20 RX ADMIN — QUETIAPINE FUMARATE 100 MG: 100 TABLET ORAL at 20:23

## 2024-07-20 RX ADMIN — SODIUM CHLORIDE 500 ML: 9 INJECTION, SOLUTION INTRAVENOUS at 16:33

## 2024-07-20 RX ADMIN — HYDROMORPHONE HYDROCHLORIDE 0.5 MG: 1 INJECTION, SOLUTION INTRAMUSCULAR; INTRAVENOUS; SUBCUTANEOUS at 14:31

## 2024-07-20 RX ADMIN — ONDANSETRON 4 MG: 2 INJECTION INTRAMUSCULAR; INTRAVENOUS at 14:30

## 2024-07-20 RX ADMIN — HYDROMORPHONE HYDROCHLORIDE 0.5 MG: 1 INJECTION, SOLUTION INTRAMUSCULAR; INTRAVENOUS; SUBCUTANEOUS at 16:33

## 2024-07-20 RX ADMIN — Medication 100 MG: at 18:23

## 2024-07-20 RX ADMIN — DEXAMETHASONE SODIUM PHOSPHATE 8 MG: 10 INJECTION INTRAMUSCULAR; INTRAVENOUS at 16:33

## 2024-07-20 RX ADMIN — FOLIC ACID 1 MG: 1 TABLET ORAL at 18:23

## 2024-07-20 RX ADMIN — HYDROCODONE BITARTRATE AND ACETAMINOPHEN 1 TABLET: 10; 325 TABLET ORAL at 18:18

## 2024-07-20 RX ADMIN — LORAZEPAM 2 MG: 1 TABLET ORAL at 20:23

## 2024-07-20 RX ADMIN — HYDROMORPHONE HYDROCHLORIDE 0.5 MG: 1 INJECTION, SOLUTION INTRAMUSCULAR; INTRAVENOUS; SUBCUTANEOUS at 20:23

## 2024-07-20 RX ADMIN — TIZANIDINE 4 MG: 4 TABLET ORAL at 19:10

## 2024-07-20 RX ADMIN — Medication 10 ML: at 20:24

## 2024-07-20 RX ADMIN — POTASSIUM CHLORIDE AND SODIUM CHLORIDE 100 ML/HR: 900; 150 INJECTION, SOLUTION INTRAVENOUS at 18:19

## 2024-07-20 NOTE — H&P
Internal medicine history and physical  INTERNAL MEDICINE   [facility]       Patient Identification:  Name: Lita Yu  Age: 58 y.o.  Sex: female  :  1965  MRN: [id number]                   Primary Care Physician: Norma Saul APRN                               Date of admission:2024    Chief Complaint: Significant worsening of back pain and pain in her right leg over the course of last 1 week.    History of Present Illness:   Patient is a 58-year-old female who has past medical history remarkable for chronic pain, posttraumatic stress disorder, COPD hypertension dyslipidemia and cirrhosis of liver was treated with IV antibiotic therapy for MRSA spine infection which she finished and 2024.  Patient also has history of avascular necrosis for which she has had right hip total arthroplasty performed.  While she was receiving antibiotic therapy her right hip was bothering her and she has seen her orthopedic surgeon and plan was made to reassess her need for intervention after the infection is resolved.  According the patient she did well after completion of antibiotic therapy but few days after her last dose of antibiotic therapy she started having increasing pain in her back going into her right leg to the point that she was unable to get up and walk.  She denies any incontinence fever or chills or night sweats.  With this she came to the emergency room here at [facility] and was found to have normal inflammatory markers with sed rate of 7 and C-reactive protein of less than 0.3.  Patient was noted to have lactic acid level of 1.5 and procalcitonin of 0.03.  She had normal white blood cell count and was not noted to have any fever or chills.  Patient does have a history of chronic alcohol use but has cut back to only 2 beer cans per week.  Patient is being admitted for spine surgery and orthopedic surgery evaluation while her pain is being managed in the  monitored setting.    Past Medical History:  Past Medical History:   Diagnosis Date    Cirrhosis     COPD (chronic obstructive pulmonary disease)     Depression     GERD (gastroesophageal reflux disease)     Hyperlipidemia     Hypertension     PTSD (post-traumatic stress disorder)      Past Surgical History:  Past Surgical History:   Procedure Laterality Date     SECTION N/A     COLONOSCOPY N/A 2021    Procedure: COLONOSCOPY with polypectomy x2 and random biopsy;  Surgeon: Osman Clay MD;  Location: [facility] ENDOSCOPY;  Service: Gastroenterology;  Laterality: N/A;  colon polyps    DENTAL PROCEDURE      ENDOSCOPY N/A 2021    Procedure: ESOPHAGOGASTRODUODENOSCOPY;  Surgeon: Osman Clay MD;  Location: [facility] ENDOSCOPY;  Service: Gastroenterology;  Laterality: N/A;  esophagitis    JOINT REPLACEMENT      REPLACEMENT TOTAL HIP LATERAL POSITION Left     TONSILLECTOMY      TOTAL HIP ARTHROPLASTY Right 2024    Procedure: TOTAL HIP ARTHROPLASTY ANTERIOR;  Surgeon: Cristobal Chen II, MD;  Location: [facility] MAIN OR;  Service: Orthopedics;  Laterality: Right;    VAGINAL BIRTH AFTER  SECTION        Home Meds:  (Not in a hospital admission)    Current Meds:     Current Facility-Administered Medications:     [COMPLETED] Insert Peripheral IV, , , Once **AND** sodium chloride 0.9 % flush 10 mL, 10 mL, Intravenous, PRN, Seng Mandujano MD    Current Outpatient Medications:     albuterol sulfate  (90 Base) MCG/ACT inhaler, Inhale 2 puffs Every 4 (Four) Hours As Needed for Wheezing., Disp: , Rfl:     busPIRone (BUSPAR) 15 MG tablet, TAKE 1 TABLET BY MOUTH TWICE A DAY, Disp: 180 tablet, Rfl: 1    Cyanocobalamin (Vitamin B-12) 1000 MCG sublingual tablet, Place 1 tablet under the tongue Daily., Disp: , Rfl:     folic acid (FOLVITE) 1 MG tablet, Take 1 tablet by mouth Daily., Disp: 30 tablet, Rfl: 0    HYDROcodone-acetaminophen (NORCO) 5-325 MG per tablet, Take 1 tablet by mouth Every  12 (Twelve) Hours As Needed for Moderate Pain., Disp: 20 tablet, Rfl: 0    LORazepam (Ativan) 1 MG tablet, Take 1 tab prior to MRI, Disp: 1 tablet, Rfl: 0    melatonin 5 MG tablet tablet, Take 1 tablet by mouth At Night As Needed (sleep)., Disp: , Rfl:     QUEtiapine (SEROquel) 50 MG tablet, Take 2 tablets by mouth every night at bedtime., Disp: 180 tablet, Rfl: 0    sertraline (ZOLOFT) 100 MG tablet, TAKE 1 TABLET BY MOUTH TWICE A DAY, Disp: 180 tablet, Rfl: 1    thiamine (VITAMIN B1) 100 MG tablet, Take 1 tablet by mouth Daily., Disp: 30 tablet, Rfl: 0  Allergies:  Allergies   Allergen Reactions    Sulfa Antibiotics Hives    Keflex [Cephalexin] Hives     Social History:   Social History     Tobacco Use    Smoking status: Every Day     Current packs/day: 0.50     Types: Cigarettes     Passive exposure: Current    Smokeless tobacco: Never    Tobacco comments:     3cigs   Substance Use Topics    Alcohol use: Not Currently     Alcohol/week: 2.0 standard drinks of alcohol     Types: 2 Cans of beer per week     Comment: pint daily      Family History:  Family History   Problem Relation Age of Onset    Alcohol abuse Mother     Alcohol abuse Paternal Grandfather           Review of Systems  See history of present illness and past medical history.      Vitals:   /77   Pulse 83   Temp 98.2 °F (36.8 °C) (Tympanic)   Resp 16   SpO2 98%   I/O: No intake or output data in the 24 hours ending 07/20/24 1722  Exam:  Patient is examined using the personal protective equipment as per guidelines from infection control for this particular patient as enacted.  Hand washing was performed before and after patient interaction.  General Appearance:    Alert, cooperative, no distress, appears stated age   Head:    Normocephalic, without obvious abnormality, atraumatic   Eyes:    PERRL, conjunctiva/corneas clear, EOM's intact, both eyes   Ears:    Normal external ear canals, both ears   Nose: No nasal drainage noted   Throat:    Lips, tongue, gums normal; oral mucosa pink and moist   Neck: Supple and no adenopathy   Back:     Symmetric, no curvature, ROM normal, no CVA tenderness   Lungs:     Clear to auscultation bilaterally, respirations unlabored   Chest Wall:    No tenderness or deformity    Heart:  S1-S2 regular   Abdomen:   Soft nontender   Extremities:   Extremities normal, atraumatic, no cyanosis or edema   Pulses:   Pulses palpable in all extremities; symmetric all extremities   Skin: No rash noted   Neurologic: Alert and oriented x 3 and while laying in the bed she is able to move her upper and lower extremities.  Otherwise grossly nonfocal examination       Data Review:      I reviewed the patient's new clinical results.  Results from last 7 days   Lab Units 07/20/24  1407   WBC 10*3/mm3 4.40   HEMOGLOBIN g/dL 9.8*   PLATELETS 10*3/mm3 113*     Results from last 7 days   Lab Units 07/20/24  1407   SODIUM mmol/L 139   POTASSIUM mmol/L 3.3*   CHLORIDE mmol/L 105   CO2 mmol/L 23.0   BUN mg/dL 8   CREATININE mg/dL 0.72   CALCIUM mg/dL 8.5*   GLUCOSE mg/dL 95     CT Lumbar Spine Without Contrast    Result Date: 7/20/2024  1. Sequela of discitis/osteomyelitis at L2-L3 with obliteration of the L2-L3 disc space and marked associated endplate irregularity/osteolysis. L2-L3 disc space loss is no worse than may MRI. Paraspinal inflammatory changes have significantly decreased/nearly resolved. 2. Mild right L5-S1 neuroforaminal stenosis secondary to disc and facet joint degeneration. 3. Bilateral total hip arthroplasties. Right arthroplasty has a cemented acetabular component. No evidence of fracture/periprosthetic fracture. Right acetabular component is positioned at the anterior superior aspect of the native right acetabulum. Recommend correlation with prior surgery.    Radiation dose reduction techniques were utilized, including automated exposure control and exposure modulation based on body size.  This report was finalized on 7/20/2024  4:05 PM by Dr. Jluis Barron M.D on Workstation: [id number]      CT Pelvis Without Contrast    Result Date: 7/20/2024  1. Sequela of discitis/osteomyelitis at L2-L3 with obliteration of the L2-L3 disc space and marked associated endplate irregularity/osteolysis. L2-L3 disc space loss is no worse than may MRI. Paraspinal inflammatory changes have significantly decreased/nearly resolved. 2. Mild right L5-S1 neuroforaminal stenosis secondary to disc and facet joint degeneration. 3. Bilateral total hip arthroplasties. Right arthroplasty has a cemented acetabular component. No evidence of fracture/periprosthetic fracture. Right acetabular component is positioned at the anterior superior aspect of the native right acetabulum. Recommend correlation with prior surgery.    Radiation dose reduction techniques were utilized, including automated exposure control and exposure modulation based on body size.  This report was finalized on 7/20/2024 4:05 PM by Dr. Jluis Barron M.D on Workstation: [id number]     Microbiology Results (last 10 days)       ** No results found for the last 240 hours. **          Brief Urine Lab Results  (Last result in the past 365 days)        Color   Clarity   Blood   Leuk Est   Nitrite   Protein   CREAT   Urine HCG        04/24/24 1230             27.1                   Assessment:  Active Hospital Problems    Diagnosis  POA    **Intractable low back pain [M54.59]  Yes    History of MRSA infection [Z86.14]  Unknown    Right hip pain [M25.551]  Unknown    S/P total right hip arthroplasty [Z96.641]  Not Applicable    Anemia [D64.9]  Unknown    Spinal abscess [M46.20]  Yes    Cirrhosis of liver [K74.60]  Yes    Chronic obstructive pulmonary disease [J44.9]  Yes    Essential hypertension [I10]  Yes    Seizure disorder [G40.909]  Yes    Post traumatic stress disorder [F43.10]  Yes    Peripheral vascular disease [I73.9]  Yes       Medical decision making/CARE plan: See admitting orders  Intractable low  back pain and right hip pain in the setting of recent MRSA infection for which she has completed prolonged course of antibiotic therapy with normalization of her inflammatory markers and no symptoms of sepsis symptoms of fever chills night sweats-plan is to get neurosurgery and orthopedic surgery consultation, provided pain medication and physical therapy evaluation.  History of alcohol use-provided with [facility] protocol and monitor for any alcohol withdrawal she is clinically appears to be stable and does not appear to be showing overt signs of withdrawal.  COPD-continue with nebulizer treatment and provided with continuous pulse ox monitoring while she is receiving narcotic pain medications.  Thrombocytopenia likely due to chronic liver disease and cirrhosis of liver-plan is to monitor.  Chronic liver disease multifactorial details not available but patient appears to be clinically compensated plan is to monitor.  Anemia-likely anemia of chronic disease plan is to check basic anemia studies and further workup if she shows decline in hemoglobin pattern.  Anxiety and depression and posttraumatic stress disorder and seizure disorder-continue her home regimen and monitor.    Melodie Brooks MD   7/20/2024  17:22 EDT    Parts of this note may be an electronic transcription/translation of spoken language to printed text using the Dragon dictation system.     Detail Level: Zone

## 2024-07-20 NOTE — ED NOTES
Nursing report ED to floor  Lita Yu  58 y.o.  female    HPI :  HPI (Adult)  Stated Reason for Visit: back pain. leg pain  History Obtained From: patient    Chief Complaint  Chief Complaint   Patient presents with    Hip Pain       Admitting doctor:   Melodie Brooks MD    Admitting diagnosis:   The primary encounter diagnosis was Intractable low back pain. Diagnoses of Right hip pain and History of discitis were also pertinent to this visit.    Code status:   Current Code Status       Date Active Code Status Order ID Comments User Context       Prior            Allergies:   Sulfa antibiotics and Keflex [cephalexin]    Isolation:   Contact    Intake and Output  No intake or output data in the 24 hours ending 07/20/24 1640    Weight:   There were no vitals filed for this visit.    Most recent vitals:   Vitals:    07/20/24 1410 07/20/24 1431 07/20/24 1531 07/20/24 1601   BP:  146/89 116/73 106/76   Pulse: 80 81 81 85   Resp:       Temp:       TempSrc:       SpO2: 99% 100% 98% 99%       Active LDAs/IV Access:   Lines, Drains & Airways       Active LDAs       Name Placement date Placement time Site Days    PICC Single Lumen 03/15/24 Right Basilic 03/15/24  1930  Basilic  126    Peripheral IV 07/20/24 1405 Right Antecubital 07/20/24  1405  Antecubital  less than 1                    Labs (abnormal labs have a star):   Labs Reviewed   COMPREHENSIVE METABOLIC PANEL - Abnormal; Notable for the following components:       Result Value    Potassium 3.3 (*)     Calcium 8.5 (*)     AST (SGOT) 43 (*)     Alkaline Phosphatase 237 (*)     All other components within normal limits    Narrative:     GFR Normal >60  Chronic Kidney Disease <60  Kidney Failure <15     PROTIME-INR - Abnormal; Notable for the following components:    Protime 14.6 (*)     INR 1.12 (*)     All other components within normal limits   CBC WITH AUTO DIFFERENTIAL - Abnormal; Notable for the following components:    RBC 3.07 (*)     Hemoglobin 9.8 (*)  "    Hematocrit 31.1 (*)     .3 (*)     RDW 15.7 (*)     RDW-SD 58.1 (*)     Platelets 113 (*)     Immature Grans % 1.1 (*)     All other components within normal limits   LACTIC ACID, PLASMA - Normal   PROCALCITONIN - Normal    Narrative:     As a Marker for Sepsis (Non-Neonates):    1. <0.5 ng/mL represents a low risk of severe sepsis and/or septic shock.  2. >2 ng/mL represents a high risk of severe sepsis and/or septic shock.    As a Marker for Lower Respiratory Tract Infections that require antibiotic therapy:    PCT on Admission    Antibiotic Therapy       6-12 Hrs later    >0.5                Strongly Recommended  >0.25 - <0.5        Recommended   0.1 - 0.25          Discouraged              Remeasure/reassess PCT  <0.1                Strongly Discouraged     Remeasure/reassess PCT    As 28 day mortality risk marker: \"Change in Procalcitonin Result\" (>80% or <=80%) if Day 0 (or Day 1) and Day 4 values are available. Refer to http://www.[employer]-pct-calculator.com    Change in PCT <=80%  A decrease of PCT levels below or equal to 80% defines a positive change in PCT test result representing a higher risk for 28-day all-cause mortality of patients diagnosed with severe sepsis for septic shock.    Change in PCT >80%  A decrease of PCT levels of more than 80% defines a negative change in PCT result representing a lower risk for 28-day all-cause mortality of patients diagnosed with severe sepsis or septic shock.      SEDIMENTATION RATE - Normal   C-REACTIVE PROTEIN - Normal   CBC AND DIFFERENTIAL    Narrative:     The following orders were created for panel order CBC & Differential.  Procedure                               Abnormality         Status                     ---------                               -----------         ------                     CBC Auto Differential[[id number]]        Abnormal            Final result                 Please view results for these tests on the individual orders. "       EKG:   No orders to display       Meds given in ED:   Medications   sodium chloride 0.9 % flush 10 mL (has no administration in time range)   sodium chloride 0.9 % bolus 500 mL (500 mL Intravenous New Bag 7/20/24 1633)   ondansetron (ZOFRAN) injection 4 mg (4 mg Intravenous Given 7/20/24 1430)   HYDROmorphone (DILAUDID) injection 0.5 mg (0.5 mg Intravenous Given 7/20/24 1431)   dexAMETHasone (DECADRON) injection 8 mg (8 mg Intravenous Given 7/20/24 1633)   HYDROmorphone (DILAUDID) injection 0.5 mg (0.5 mg Intravenous Given 7/20/24 1633)   ondansetron (ZOFRAN) injection 4 mg (4 mg Intravenous Given 7/20/24 1632)       Imaging results:  CT Lumbar Spine Without Contrast    Result Date: 7/20/2024  1. Sequela of discitis/osteomyelitis at L2-L3 with obliteration of the L2-L3 disc space and marked associated endplate irregularity/osteolysis. L2-L3 disc space loss is no worse than may MRI. Paraspinal inflammatory changes have significantly decreased/nearly resolved. 2. Mild right L5-S1 neuroforaminal stenosis secondary to disc and facet joint degeneration. 3. Bilateral total hip arthroplasties. Right arthroplasty has a cemented acetabular component. No evidence of fracture/periprosthetic fracture. Right acetabular component is positioned at the anterior superior aspect of the native right acetabulum. Recommend correlation with prior surgery.    Radiation dose reduction techniques were utilized, including automated exposure control and exposure modulation based on body size.  This report was finalized on 7/20/2024 4:05 PM by Dr. Jluis Barron M.D on Workstation: [id number]      CT Pelvis Without Contrast    Result Date: 7/20/2024  1. Sequela of discitis/osteomyelitis at L2-L3 with obliteration of the L2-L3 disc space and marked associated endplate irregularity/osteolysis. L2-L3 disc space loss is no worse than may MRI. Paraspinal inflammatory changes have significantly decreased/nearly resolved. 2. Mild right L5-S1  neuroforaminal stenosis secondary to disc and facet joint degeneration. 3. Bilateral total hip arthroplasties. Right arthroplasty has a cemented acetabular component. No evidence of fracture/periprosthetic fracture. Right acetabular component is positioned at the anterior superior aspect of the native right acetabulum. Recommend correlation with prior surgery.    Radiation dose reduction techniques were utilized, including automated exposure control and exposure modulation based on body size.  This report was finalized on 7/20/2024 4:05 PM by Dr. Jluis Barron M.D on Workstation: [id number]       Ambulatory status:   - walker, assist x 1    Social issues:   Social History     Socioeconomic History    Marital status:    Tobacco Use    Smoking status: Every Day     Current packs/day: 0.50     Types: Cigarettes     Passive exposure: Current    Smokeless tobacco: Never    Tobacco comments:     3cigs   Vaping Use    Vaping status: Never Used   Substance and Sexual Activity    Alcohol use: Not Currently     Alcohol/week: 2.0 standard drinks of alcohol     Types: 2 Cans of beer per week     Comment: pint daily    Drug use: No    Sexual activity: Yes     Partners: Male     Birth control/protection: Post-menopausal       Peripheral Neurovascular  Peripheral Neurovascular (Adult)  Peripheral Neurovascular WDL: WDL    Neuro Cognitive  Neuro Cognitive (Adult)  Cognitive/Neuro/Behavioral WDL: WDL, level of consciousness, orientation  Level of Consciousness: Alert  Orientation: oriented x 4    Learning  Learning Assessment (Adult)  Learning Readiness and Ability: no barriers identified    Respiratory  Respiratory (Adult)  Airway WDL: WDL  Respiratory WDL  Respiratory WDL: WDL, rhythm/pattern  Rhythm/Pattern, Respiratory: no shortness of breath reported, depth regular, pattern regular, unlabored    Abdominal Pain       Pain Assessments  Pain (Adult)  (0-10) Pain Rating: Rest: 8  Pain Management Interventions: see  MAR  Response to Pain Interventions: intervention not effective per patient (Dr. Mandujano notified, patient requesting additional pain medication)    NIH Stroke Scale       Yari Carrillo RN  07/20/24 16:40 EDT

## 2024-07-20 NOTE — ED PROVIDER NOTES
EMERGENCY DEPARTMENT ENCOUNTER    Room Number:  37/37  Date seen:  7/20/2024  PCP: Norma Saul APRN  Historian: Patient      HPI:  Chief Complaint: Back and leg pain  Context: Lita Yu is a 58 y.o. female who presents to the ED c/o worsening low back pain and right leg pain.  The patient states that she had an infection in her back earlier this year and is on 4 months of IV antibiotics.  She states over the past week she has had increased back and right leg pain to the point she is now having difficulty walking due to pain in her right leg.  The patient denies fevers, chills, bowel or bladder incontinence or saddle paresthesia.      PAST MEDICAL HISTORY  Active Ambulatory Problems     Diagnosis Date Noted    Postmenopausal state 12/27/2018    Post traumatic stress disorder 11/29/2018    Pain in joints 11/29/2018    Osteoporosis 04/26/2019    Macrocytic anemia 12/28/2018    Lumbosacral radiculopathy 02/21/2017    Leg pain, left 01/06/2017    Peripheral vascular disease 02/21/2017    Essential hypertension 06/17/2019    Hyperlipidemia 11/29/2018    Degenerative joint disease of left hip 06/08/2017    Seizure disorder 11/29/2018    Smoker 04/26/2019    Status post hip replacement 08/15/2017    Tobacco dependence syndrome 03/29/2017    Vitamin B12 deficiency 12/28/2018    Anemia of chronic disease 04/26/2019    Chronic obstructive pulmonary disease 06/04/2020    Cirrhosis of liver 10/14/2022    Acute UTI (urinary tract infection) 10/14/2022    Generalized weakness 10/14/2022    Rhabdomyolysis 10/14/2022    Non-traumatic rhabdomyolysis 10/14/2022    Moderate malnutrition 10/18/2022    Chest pain 06/11/2023    Dehydration 03/02/2024    Myalgia 03/07/2024    Acute hyperkalemia 04/23/2024    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       PAST SURGICAL HISTORY  Past Surgical History:   Procedure Laterality Date     SECTION N/A     COLONOSCOPY N/A 2021    Procedure: COLONOSCOPY with polypectomy x2 and random biopsy;  Surgeon: Osman Clay MD;  Location: [facility] ENDOSCOPY;  Service: Gastroenterology;  Laterality: N/A;  colon polyps    DENTAL PROCEDURE      ENDOSCOPY N/A 2021    Procedure: ESOPHAGOGASTRODUODENOSCOPY;  Surgeon: Osman Clay MD;  Location: [facility] ENDOSCOPY;  Service: Gastroenterology;  Laterality: N/A;  esophagitis    JOINT REPLACEMENT      REPLACEMENT TOTAL HIP LATERAL POSITION Left     TONSILLECTOMY      TOTAL HIP ARTHROPLASTY Right 2024    Procedure: TOTAL HIP ARTHROPLASTY ANTERIOR;  Surgeon: Cristobal Chen II, MD;  Location: [facility] MAIN OR;  Service: Orthopedics;  Laterality: Right;    VAGINAL BIRTH AFTER  SECTION           FAMILY HISTORY  Family History   Problem Relation Age of Onset    Alcohol abuse Mother     Alcohol abuse Paternal Grandfather          SOCIAL HISTORY  Social History     Socioeconomic History    Marital status:    Tobacco Use    Smoking status: Every Day     Current packs/day: 0.50     Types: Cigarettes     Passive exposure: Current    Smokeless tobacco: Never    Tobacco comments:     3cigs   Vaping Use    Vaping status: Never Used   Substance and Sexual Activity    Alcohol use: Not Currently     Alcohol/week: 2.0 standard drinks of alcohol     Types: 2 Cans of beer per week     Comment: pint daily    Drug use: No    Sexual activity: Yes     Partners: Male     Birth control/protection: Post-menopausal         ALLERGIES  Sulfa antibiotics and Keflex [cephalexin]      PHYSICAL EXAM  ED  Triage Vitals [07/20/24 1308]   Temp Heart Rate Resp BP SpO2   98.2 °F (36.8 °C) 106 16 -- 98 %      Temp src Heart Rate Source Patient Position BP Location FiO2 (%)   Tympanic Monitor -- -- --       Physical Exam      GENERAL: 58-year-old female in no acute distress  HENT: NCAT: nares patent: Neck supple  EYES: no scleral icterus  CV: regular rhythm, normal rate  RESPIRATORY: normal effort  ABDOMEN: soft, NTND: Bowel sounds positive  Back: Lower right SI tenderness  MUSCULOSKELETAL: no deformity: Right hip tenderness with range of motion but no erythema or swelling  NEURO: alert with nonfocal neuro exam  PSYCH:  calm, cooperative  SKIN: warm, dry    Vital signs and nursing notes reviewed.      LAB RESULTS  Recent Results (from the past 24 hour(s))   Comprehensive Metabolic Panel    Collection Time: 07/20/24  2:07 PM    Specimen: Blood   Result Value Ref Range    Glucose 95 65 - 99 mg/dL    BUN 8 6 - 20 mg/dL    Creatinine 0.72 0.57 - 1.00 mg/dL    Sodium 139 136 - 145 mmol/L    Potassium 3.3 (L) 3.5 - 5.2 mmol/L    Chloride 105 98 - 107 mmol/L    CO2 23.0 22.0 - 29.0 mmol/L    Calcium 8.5 (L) 8.6 - 10.5 mg/dL    Total Protein 7.0 6.0 - 8.5 g/dL    Albumin 3.8 3.5 - 5.2 g/dL    ALT (SGPT) 24 1 - 33 U/L    AST (SGOT) 43 (H) 1 - 32 U/L    Alkaline Phosphatase 237 (H) 39 - 117 U/L    Total Bilirubin 0.4 0.0 - 1.2 mg/dL    Globulin 3.2 gm/dL    A/G Ratio 1.2 g/dL    BUN/Creatinine Ratio 11.1 7.0 - 25.0    Anion Gap 11.0 5.0 - 15.0 mmol/L    eGFR 98.3 >60.0 mL/min/1.73   Protime-INR    Collection Time: 07/20/24  2:07 PM    Specimen: Blood   Result Value Ref Range    Protime 14.6 (H) 11.7 - 14.2 Seconds    INR 1.12 (H) 0.90 - 1.10   Lactic Acid, Plasma    Collection Time: 07/20/24  2:07 PM    Specimen: Blood   Result Value Ref Range    Lactate 1.5 0.5 - 2.0 mmol/L   Procalcitonin    Collection Time: 07/20/24  2:07 PM    Specimen: Blood   Result Value Ref Range    Procalcitonin 0.03 0.00 - 0.25 ng/mL   Sedimentation Rate     Collection Time: 07/20/24  2:07 PM    Specimen: Blood   Result Value Ref Range    Sed Rate 7 0 - 30 mm/hr   C-reactive Protein    Collection Time: 07/20/24  2:07 PM    Specimen: Blood   Result Value Ref Range    C-Reactive Protein <0.30 0.00 - 0.50 mg/dL   CBC Auto Differential    Collection Time: 07/20/24  2:07 PM    Specimen: Blood   Result Value Ref Range    WBC 4.40 3.40 - 10.80 10*3/mm3    RBC 3.07 (L) 3.77 - 5.28 10*6/mm3    Hemoglobin 9.8 (L) 12.0 - 15.9 g/dL    Hematocrit 31.1 (L) 34.0 - 46.6 %    .3 (H) 79.0 - 97.0 fL    MCH 31.9 26.6 - 33.0 pg    MCHC 31.5 31.5 - 35.7 g/dL    RDW 15.7 (H) 12.3 - 15.4 %    RDW-SD 58.1 (H) 37.0 - 54.0 fl    MPV 9.5 6.0 - 12.0 fL    Platelets 113 (L) 140 - 450 10*3/mm3    Neutrophil % 57.9 42.7 - 76.0 %    Lymphocyte % 26.6 19.6 - 45.3 %    Monocyte % 11.4 5.0 - 12.0 %    Eosinophil % 2.3 0.3 - 6.2 %    Basophil % 0.7 0.0 - 1.5 %    Immature Grans % 1.1 (H) 0.0 - 0.5 %    Neutrophils, Absolute 2.55 1.70 - 7.00 10*3/mm3    Lymphocytes, Absolute 1.17 0.70 - 3.10 10*3/mm3    Monocytes, Absolute 0.50 0.10 - 0.90 10*3/mm3    Eosinophils, Absolute 0.10 0.00 - 0.40 10*3/mm3    Basophils, Absolute 0.03 0.00 - 0.20 10*3/mm3    Immature Grans, Absolute 0.05 0.00 - 0.05 10*3/mm3    nRBC 0.0 0.0 - 0.2 /100 WBC       Ordered the above labs and reviewed the results.        RADIOLOGY  CT Lumbar Spine Without Contrast, CT Pelvis Without Contrast    Result Date: 7/20/2024  CT PELVIS WO CONTRAST-, CT LUMBAR SPINE WO CONTRAST-  CLINICAL HISTORY: Low back pain with radiation to the right leg. Right hip and sacral pain. Post right total hip arthroplasty May 2024  TECHNIQUE: CT scan of the lumbar spine and pelvis was obtained with a combination of 3 , 2, and 1 mm axial images. Bone and soft tissue algorithm images were obtained with sagittal and coronal reconstructed images.  FINDINGS:  Lumbar spine: No subluxation. Unchanged bilateral L5 pars defects. Marked L2 inferior endplate and L3  superior endplate cortical irregularity/osteolysis with subjacent vertebral body sclerotic changes and obliteration of the L2-L3 disc space. Discitis/osteomyelitis was seen on May MRI. L2-L3 disc space loss is no worse. Paraspinal inflammatory changes have significantly decreased and nearly resolved. L4 superior endplate Schmorl's node. Mild multilevel disc degeneration throughout the remaining lumbar spine. Mild L5-S1 facet arthropathy. No appreciable spinal canal stenosis. Mild right L5-S1 neuroforaminal stenosis secondary to disc and facet joint degeneration.  Pelvis: Bilateral total hip arthroplasties. Right arthroplasty has a cemented acetabular component. No evidence of fracture/periprosthetic fracture. Right acetabular component is positioned at the anterosuperior aspect of the native right acetabulum (series 2/image 66). Limited normal noncontrast CT appearance of the pelvic soft tissues. Exam is partially by arthroplasty streak artifact.           1. Sequela of discitis/osteomyelitis at L2-L3 with obliteration of the L2-L3 disc space and marked associated endplate irregularity/osteolysis. L2-L3 disc space loss is no worse than may MRI. Paraspinal inflammatory changes have significantly decreased/nearly resolved. 2. Mild right L5-S1 neuroforaminal stenosis secondary to disc and facet joint degeneration. 3. Bilateral total hip arthroplasties. Right arthroplasty has a cemented acetabular component. No evidence of fracture/periprosthetic fracture. Right acetabular component is positioned at the anterior superior aspect of the native right acetabulum. Recommend correlation with prior surgery.    Radiation dose reduction techniques were utilized, including automated exposure control and exposure modulation based on body size.  This report was finalized on 7/20/2024 4:05 PM by Dr. Jluis Barron M.D on Workstation: [id number]       Ordered the above noted radiological studies. Reviewed by me in PACS.             PROCEDURES  Procedures          MEDICATIONS GIVEN IN ER  Medications   sodium chloride 0.9 % flush 10 mL (has no administration in time range)   dexAMETHasone (DECADRON) injection 8 mg (has no administration in time range)   HYDROmorphone (DILAUDID) injection 0.5 mg (has no administration in time range)   ondansetron (ZOFRAN) injection 4 mg (has no administration in time range)   sodium chloride 0.9 % bolus 500 mL (has no administration in time range)   ondansetron (ZOFRAN) injection 4 mg (4 mg Intravenous Given 7/20/24 1430)   HYDROmorphone (DILAUDID) injection 0.5 mg (0.5 mg Intravenous Given 7/20/24 1431)             MEDICAL DECISION MAKING, PROGRESS, and CONSULTS    All labs have been independently reviewed by me.  All radiology studies have been reviewed by me and I have also reviewed the radiology report.   EKG's independently viewed and interpreted by me.  Discussion below represents my analysis of pertinent findings related to patient's condition, differential diagnosis, treatment plan and final disposition.      Additional sources:  - Discussed/ obtained information from independent historians: Patient's significant other is here who states for the past week he has been having to help her ambulate on a walker.    - External (non-ED) record review: I reviewed the patient's hospital admission at [facility] from March 7 through March 15 when she was admitted for L2/L3 discitis and osteomyelitis with MRSA bacteremia      - Shared decision making: After shared decision-making discussion with myself and the patient we agree she needs to admitted to the hospital for further evaluation and care      Orders placed during this visit:  Orders Placed This Encounter   Procedures    CT Lumbar Spine Without Contrast    CT Pelvis Without Contrast    Comprehensive Metabolic Panel    Protime-INR    Lactic Acid, Plasma    Procalcitonin    Sedimentation Rate    C-reactive Protein    CBC Auto Differential     Monitor Blood Pressure    [facility] (on-call MD unless specified) Details    Insert Peripheral IV    Initiate Observation Status    CBC & Differential         Differential diagnosis:  My differential diagnosis includes but is not limited to lumbar radiculopathy, spinal stenosis, sacroiliitis, hip fracture, hip dislocation, bursitis, spinal discitis or abscess      Independent interpretation of labs, radiology studies, and discussions with consultants:  ED Course as of 07/20/24 1629   Sat Jul 20, 2024   1350 Patient is afebrile.  I will check labs and a CT scan of her lumbar spine and pelvis for further evaluation.  Will treat her pain. [GP]   1444 The patient's white count, lactic acid, procalcitonin, CRP and sed rate are normal.  She is afebrile. [GP]   1618 Patient CT lumbar spine shows the sequela of previous osteomyelitis but no definitive active infection.  The patient does have a disc osteophyte complex at L5-S1 on the right.  The CT of the pelvis shows no acute fractures or dislocation. [GP]   1624 On repeat examination I advised the patient that I do not believe she has an infection in her spine at this time.  The patient still complains that she is not able to move her right hip.  She states she has seen Dr. Chen from orthopedics who wanted to operate on her hip but wanted her back cleared first to make sure she does not have infection.  With the patient's intractable pain I will give her another dose of pain medicine and IV steroids and admitted to the hospital for further evaluation and care. [GP]   1625 I discussed the above with the patient she understands and agrees with the plan. [GP]   1627 I discussed the case with Dr. Brooks from [facility].  He is aware of the patient's history of discitis and prior hip surgery.  He will admit the patient to the hospital for further evaluation and care. [GP]      ED Course User Index  [GP] Seng Mandujano MD               DIAGNOSIS  Final diagnoses:   Intractable low back  pain   Right hip pain   History of discitis         DISPOSITION  ADMISSION    Discussed treatment plan and reason for admission with pt/family and admitting physician.  Pt/family voiced understanding of the plan for admission for further testing/treatment as needed.            Latest Documented Vital Signs:  As of 16:29 EDT  BP- 106/76 HR- 85 Temp- 98.2 °F (36.8 °C) (Tympanic) O2 sat- 99%--      --------------------  Please note that portions of this were completed with a voice recognition program.       Note Disclaimer: At [facility], we believe that sharing information builds trust and better relationships. You are receiving this note because you are receiving care at [facility] or recently visited. It is possible you will see health information before a provider has talked with you about it. This kind of information can be easy to misunderstand. To help you fully understand what it means for your health, we urge you to discuss this note with your provider.             Seng Mandujano MD  07/20/24 5027

## 2024-07-20 NOTE — PLAN OF CARE
Problem: Pain Acute  Goal: Acceptable Pain Control and Functional Ability  Outcome: Ongoing, Not Progressing   Goal Outcome Evaluation:

## 2024-07-20 NOTE — NURSING NOTE
PT came up from ED around 1800 in extreme back pain radiating down right leg. Pain meds not doing a whole lot. Pt not moving much at all due to extreme pain. PT states having an infection back in May and had parts from right hip removed. As far as pt knows the infection is gone and she is back now hoping to get the rest of the surgery needed. Pt admits to drinking a 5th of alcohol per day especially since pain has been so bad lately. Dr. Brooks aware and CIWA placed.     Pt denies any numbness or tingling, radial and pedal pulses normal, LBM 7/19, voiding fine but has not voided since this morning due to pain. Will place [place] once pt can tolerate the movement to do so.

## 2024-07-21 ENCOUNTER — APPOINTMENT (OUTPATIENT)
Dept: GENERAL RADIOLOGY | Facility: HOSPITAL | Age: 59
DRG: 466 | End: 2024-07-21
Payer: COMMERCIAL

## 2024-07-21 PROBLEM — Z78.9 ALCOHOL USE: Status: ACTIVE | Noted: 2024-07-21

## 2024-07-21 PROBLEM — F10.90 ALCOHOL USE: Status: ACTIVE | Noted: 2024-07-21

## 2024-07-21 PROBLEM — E87.20 METABOLIC ACIDOSIS: Status: ACTIVE | Noted: 2024-07-21

## 2024-07-21 PROBLEM — D69.6 THROMBOCYTOPENIA: Status: ACTIVE | Noted: 2024-07-21

## 2024-07-21 LAB
ABO GROUP BLD: NORMAL
ALBUMIN SERPL-MCNC: 3.5 G/DL (ref 3.5–5.2)
ALBUMIN/GLOB SERPL: 1.2 G/DL
ALP SERPL-CCNC: 189 U/L (ref 39–117)
ALT SERPL W P-5'-P-CCNC: 24 U/L (ref 1–33)
ANION GAP SERPL CALCULATED.3IONS-SCNC: 11.8 MMOL/L (ref 5–15)
AST SERPL-CCNC: 30 U/L (ref 1–32)
BASOPHILS # BLD AUTO: 0.01 10*3/MM3 (ref 0–0.2)
BASOPHILS NFR BLD AUTO: 0.2 % (ref 0–1.5)
BILIRUB SERPL-MCNC: 0.3 MG/DL (ref 0–1.2)
BLD GP AB SCN SERPL QL: POSITIVE
BUN SERPL-MCNC: 7 MG/DL (ref 6–20)
BUN/CREAT SERPL: 11.5 (ref 7–25)
CALCIUM SPEC-SCNC: 8.4 MG/DL (ref 8.6–10.5)
CHLORIDE SERPL-SCNC: 110 MMOL/L (ref 98–107)
CO2 SERPL-SCNC: 17.2 MMOL/L (ref 22–29)
CREAT SERPL-MCNC: 0.61 MG/DL (ref 0.57–1)
DEPRECATED RDW RBC AUTO: 54.3 FL (ref 37–54)
EGFRCR SERPLBLD CKD-EPI 2021: 105.1 ML/MIN/1.73
EOSINOPHIL # BLD AUTO: 0 10*3/MM3 (ref 0–0.4)
EOSINOPHIL NFR BLD AUTO: 0 % (ref 0.3–6.2)
ERYTHROCYTE [DISTWIDTH] IN BLOOD BY AUTOMATED COUNT: 15.1 % (ref 12.3–15.4)
FERRITIN SERPL-MCNC: 505 NG/ML (ref 13–150)
FOLATE SERPL-MCNC: >20 NG/ML (ref 4.78–24.2)
GLOBULIN UR ELPH-MCNC: 3 GM/DL
GLUCOSE SERPL-MCNC: 134 MG/DL (ref 65–99)
HCT VFR BLD AUTO: 27.1 % (ref 34–46.6)
HGB BLD-MCNC: 8.9 G/DL (ref 12–15.9)
IMM GRANULOCYTES # BLD AUTO: 0.06 10*3/MM3 (ref 0–0.05)
IMM GRANULOCYTES NFR BLD AUTO: 1.5 % (ref 0–0.5)
IRON 24H UR-MRATE: 53 MCG/DL (ref 37–145)
IRON 24H UR-MRATE: 53 MCG/DL (ref 37–145)
IRON SATN MFR SERPL: 18 % (ref 20–50)
LYMPHOCYTES # BLD AUTO: 0.66 10*3/MM3 (ref 0.7–3.1)
LYMPHOCYTES NFR BLD AUTO: 16.3 % (ref 19.6–45.3)
MAGNESIUM SERPL-MCNC: 1.7 MG/DL (ref 1.6–2.6)
MCH RBC QN AUTO: 32.4 PG (ref 26.6–33)
MCHC RBC AUTO-ENTMCNC: 32.8 G/DL (ref 31.5–35.7)
MCV RBC AUTO: 98.5 FL (ref 79–97)
MONOCYTES # BLD AUTO: 0.2 10*3/MM3 (ref 0.1–0.9)
MONOCYTES NFR BLD AUTO: 4.9 % (ref 5–12)
NEUTROPHILS NFR BLD AUTO: 3.12 10*3/MM3 (ref 1.7–7)
NEUTROPHILS NFR BLD AUTO: 77.1 % (ref 42.7–76)
NRBC BLD AUTO-RTO: 0 /100 WBC (ref 0–0.2)
PLATELET # BLD AUTO: 88 10*3/MM3 (ref 140–450)
PMV BLD AUTO: 9.3 FL (ref 6–12)
POTASSIUM SERPL-SCNC: 4.2 MMOL/L (ref 3.5–5.2)
PRESERVATIVE ANTIBODY: NORMAL
PROT SERPL-MCNC: 6.5 G/DL (ref 6–8.5)
RBC # BLD AUTO: 2.75 10*6/MM3 (ref 3.77–5.28)
RETICS # AUTO: 0.07 10*6/MM3 (ref 0.02–0.13)
RETICS/RBC NFR AUTO: 2.51 % (ref 0.7–1.9)
RH BLD: POSITIVE
SODIUM SERPL-SCNC: 139 MMOL/L (ref 136–145)
T&S EXPIRATION DATE: NORMAL
TIBC SERPL-MCNC: 299 MCG/DL (ref 298–536)
TRANSFERRIN SERPL-MCNC: 201 MG/DL (ref 200–360)
VIT B12 BLD-MCNC: 650 PG/ML (ref 211–946)
WBC NRBC COR # BLD AUTO: 4.05 10*3/MM3 (ref 3.4–10.8)

## 2024-07-21 PROCEDURE — 86922 COMPATIBILITY TEST ANTIGLOB: CPT

## 2024-07-21 PROCEDURE — 80053 COMPREHEN METABOLIC PANEL: CPT | Performed by: INTERNAL MEDICINE

## 2024-07-21 PROCEDURE — 25810000003 LACTATED RINGERS PER 1000 ML: Performed by: STUDENT IN AN ORGANIZED HEALTH CARE EDUCATION/TRAINING PROGRAM

## 2024-07-21 PROCEDURE — 86901 BLOOD TYPING SEROLOGIC RH(D): CPT

## 2024-07-21 PROCEDURE — 86870 RBC ANTIBODY IDENTIFICATION: CPT

## 2024-07-21 PROCEDURE — 86870 RBC ANTIBODY IDENTIFICATION: CPT | Performed by: INTERNAL MEDICINE

## 2024-07-21 PROCEDURE — 86900 BLOOD TYPING SEROLOGIC ABO: CPT | Performed by: INTERNAL MEDICINE

## 2024-07-21 PROCEDURE — 73502 X-RAY EXAM HIP UNI 2-3 VIEWS: CPT

## 2024-07-21 PROCEDURE — 84466 ASSAY OF TRANSFERRIN: CPT | Performed by: INTERNAL MEDICINE

## 2024-07-21 PROCEDURE — 85045 AUTOMATED RETICULOCYTE COUNT: CPT | Performed by: INTERNAL MEDICINE

## 2024-07-21 PROCEDURE — 85025 COMPLETE CBC W/AUTO DIFF WBC: CPT | Performed by: INTERNAL MEDICINE

## 2024-07-21 PROCEDURE — 25010000002 SODIUM CHLORIDE 0.9 % WITH KCL 20 MEQ 20-0.9 MEQ/L-% SOLUTION: Performed by: INTERNAL MEDICINE

## 2024-07-21 PROCEDURE — 82746 ASSAY OF FOLIC ACID SERUM: CPT | Performed by: INTERNAL MEDICINE

## 2024-07-21 PROCEDURE — HZ2ZZZZ DETOXIFICATION SERVICES FOR SUBSTANCE ABUSE TREATMENT: ICD-10-PCS | Performed by: HOSPITALIST

## 2024-07-21 PROCEDURE — 25010000002 ENOXAPARIN PER 10 MG: Performed by: STUDENT IN AN ORGANIZED HEALTH CARE EDUCATION/TRAINING PROGRAM

## 2024-07-21 PROCEDURE — 83735 ASSAY OF MAGNESIUM: CPT | Performed by: INTERNAL MEDICINE

## 2024-07-21 PROCEDURE — 25010000002 HYDROMORPHONE PER 4 MG: Performed by: INTERNAL MEDICINE

## 2024-07-21 PROCEDURE — 86850 RBC ANTIBODY SCREEN: CPT | Performed by: INTERNAL MEDICINE

## 2024-07-21 PROCEDURE — 86900 BLOOD TYPING SEROLOGIC ABO: CPT

## 2024-07-21 PROCEDURE — 86920 COMPATIBILITY TEST SPIN: CPT

## 2024-07-21 PROCEDURE — 82607 VITAMIN B-12: CPT | Performed by: INTERNAL MEDICINE

## 2024-07-21 PROCEDURE — G0378 HOSPITAL OBSERVATION PER HR: HCPCS

## 2024-07-21 PROCEDURE — 83540 ASSAY OF IRON: CPT | Performed by: INTERNAL MEDICINE

## 2024-07-21 PROCEDURE — 90791 PSYCH DIAGNOSTIC EVALUATION: CPT

## 2024-07-21 PROCEDURE — 86901 BLOOD TYPING SEROLOGIC RH(D): CPT | Performed by: INTERNAL MEDICINE

## 2024-07-21 PROCEDURE — 25010000002 THIAMINE HCL 200 MG/2ML SOLUTION: Performed by: INTERNAL MEDICINE

## 2024-07-21 PROCEDURE — 82728 ASSAY OF FERRITIN: CPT | Performed by: INTERNAL MEDICINE

## 2024-07-21 RX ORDER — ENOXAPARIN SODIUM 100 MG/ML
40 INJECTION SUBCUTANEOUS ONCE
Status: COMPLETED | OUTPATIENT
Start: 2024-07-21 | End: 2024-07-21

## 2024-07-21 RX ORDER — DIPHENOXYLATE HYDROCHLORIDE AND ATROPINE SULFATE 2.5; .025 MG/1; MG/1
1 TABLET ORAL DAILY
Status: DISCONTINUED | OUTPATIENT
Start: 2024-07-21 | End: 2024-07-26 | Stop reason: HOSPADM

## 2024-07-21 RX ORDER — SODIUM CHLORIDE, SODIUM LACTATE, POTASSIUM CHLORIDE, CALCIUM CHLORIDE 600; 310; 30; 20 MG/100ML; MG/100ML; MG/100ML; MG/100ML
75 INJECTION, SOLUTION INTRAVENOUS CONTINUOUS
Status: DISCONTINUED | OUTPATIENT
Start: 2024-07-21 | End: 2024-07-23

## 2024-07-21 RX ADMIN — THIAMINE HYDROCHLORIDE 200 MG: 100 INJECTION, SOLUTION INTRAMUSCULAR; INTRAVENOUS at 15:17

## 2024-07-21 RX ADMIN — ENOXAPARIN SODIUM 40 MG: 100 INJECTION SUBCUTANEOUS at 12:13

## 2024-07-21 RX ADMIN — Medication 1 TABLET: at 12:13

## 2024-07-21 RX ADMIN — LORAZEPAM 2 MG: 1 TABLET ORAL at 15:17

## 2024-07-21 RX ADMIN — Medication 1000 MCG: at 09:40

## 2024-07-21 RX ADMIN — HYDROCODONE BITARTRATE AND ACETAMINOPHEN 1 TABLET: 5; 325 TABLET ORAL at 12:19

## 2024-07-21 RX ADMIN — SODIUM CHLORIDE, POTASSIUM CHLORIDE, SODIUM LACTATE AND CALCIUM CHLORIDE 75 ML/HR: 600; 310; 30; 20 INJECTION, SOLUTION INTRAVENOUS at 09:41

## 2024-07-21 RX ADMIN — THIAMINE HYDROCHLORIDE 200 MG: 100 INJECTION, SOLUTION INTRAMUSCULAR; INTRAVENOUS at 21:26

## 2024-07-21 RX ADMIN — POTASSIUM CHLORIDE AND SODIUM CHLORIDE 100 ML/HR: 900; 150 INJECTION, SOLUTION INTRAVENOUS at 06:22

## 2024-07-21 RX ADMIN — FOLIC ACID 1 MG: 1 TABLET ORAL at 09:40

## 2024-07-21 RX ADMIN — TIZANIDINE 4 MG: 4 TABLET ORAL at 22:25

## 2024-07-21 RX ADMIN — SERTRALINE 100 MG: 100 TABLET, FILM COATED ORAL at 20:21

## 2024-07-21 RX ADMIN — THIAMINE HYDROCHLORIDE 200 MG: 100 INJECTION, SOLUTION INTRAMUSCULAR; INTRAVENOUS at 06:23

## 2024-07-21 RX ADMIN — LORAZEPAM 1 MG: 1 TABLET ORAL at 20:22

## 2024-07-21 RX ADMIN — SODIUM CHLORIDE, POTASSIUM CHLORIDE, SODIUM LACTATE AND CALCIUM CHLORIDE 75 ML/HR: 600; 310; 30; 20 INJECTION, SOLUTION INTRAVENOUS at 23:30

## 2024-07-21 RX ADMIN — HYDROMORPHONE HYDROCHLORIDE 0.5 MG: 1 INJECTION, SOLUTION INTRAMUSCULAR; INTRAVENOUS; SUBCUTANEOUS at 21:26

## 2024-07-21 RX ADMIN — BUSPIRONE HYDROCHLORIDE 15 MG: 15 TABLET ORAL at 09:40

## 2024-07-21 RX ADMIN — SERTRALINE 100 MG: 100 TABLET, FILM COATED ORAL at 09:40

## 2024-07-21 RX ADMIN — QUETIAPINE FUMARATE 100 MG: 100 TABLET ORAL at 20:21

## 2024-07-21 RX ADMIN — LORAZEPAM 2 MG: 1 TABLET ORAL at 02:45

## 2024-07-21 RX ADMIN — Medication 10 ML: at 09:41

## 2024-07-21 RX ADMIN — LORAZEPAM 2 MG: 1 TABLET ORAL at 09:39

## 2024-07-21 RX ADMIN — Medication 10 ML: at 20:22

## 2024-07-21 RX ADMIN — BUSPIRONE HYDROCHLORIDE 15 MG: 15 TABLET ORAL at 20:21

## 2024-07-21 NOTE — CONSULTS
[facility] NEUROSURGERY CONSULT NOTE    Patient name: Lita Yu  Referring Provider:    Melodie Brooks MD     Reason for Consultation: Severe back pain    Patient Care Team:  Norma Saul APRN as PCP - General (Nurse Practitioner)  Flory Villanueva MD (Physical Medicine and Rehabilitation)  Johan Salvador MD as Consulting Physician (Hematology and Oncology)    Chief complaint: Right hip pain    Subjective .     History of present illness:    Patient is a 58 y.o.  female with PMH of chronic pain, PTSD, COPD, hypertension, dyslipidemia, liver cirrhosis, alcohol dependence, history of spine infection with need for IV therapy finished in June of this year.  Apparently she has a concurrent right hip issue for which her orthopedic surgeon I was held off on any intervention due to current IV antibiotic therapy.  Sed rate and CRP within normal limits and white blood cell count is normal.  At the time of my examination she is a fairly poor historian and is somewhat sleepy.  Her significant other notes that she is an alcoholic and she reports to only drinking 2 beer cans per week-however, the patient's significant other states that since she has been dealing with this pain she has been drinking significantly more.  She denies any bowel or bladder issues.  She reports feeling weak in the legs but she has chronic weakness in the legs and uses a walker to ambulate.  She reports significant pain in the right hip.    Review of Systems  Review of Systems   Genitourinary:  Negative for enuresis.   Musculoskeletal:  Positive for gait problem. Negative for back pain.   Neurological:  Positive for weakness. Negative for numbness.       History  PAST MEDICAL HISTORY  Past Medical History:   Diagnosis Date    Cirrhosis     COPD (chronic obstructive pulmonary disease)     Depression     GERD (gastroesophageal reflux disease)     Hyperlipidemia     Hypertension     PTSD (post-traumatic stress disorder)        PAST  SURGICAL HISTORY  Past Surgical History:   Procedure Laterality Date     SECTION N/A     COLONOSCOPY N/A 2021    Procedure: COLONOSCOPY with polypectomy x2 and random biopsy;  Surgeon: Osman Clay MD;  Location: [facility] ENDOSCOPY;  Service: Gastroenterology;  Laterality: N/A;  colon polyps    DENTAL PROCEDURE      ENDOSCOPY N/A 2021    Procedure: ESOPHAGOGASTRODUODENOSCOPY;  Surgeon: Osman Clay MD;  Location: [facility] ENDOSCOPY;  Service: Gastroenterology;  Laterality: N/A;  esophagitis    JOINT REPLACEMENT      REPLACEMENT TOTAL HIP LATERAL POSITION Left     TONSILLECTOMY      TOTAL HIP ARTHROPLASTY Right 2024    Procedure: TOTAL HIP ARTHROPLASTY ANTERIOR;  Surgeon: Cristobal Chen II, MD;  Location: [facility] MAIN OR;  Service: Orthopedics;  Laterality: Right;    VAGINAL BIRTH AFTER  SECTION         FAMILY HISTORY  Family History   Problem Relation Age of Onset    Alcohol abuse Mother     Alcohol abuse Paternal Grandfather        SOCIAL HISTORY  Social History     Tobacco Use    Smoking status: Every Day     Current packs/day: 0.50     Types: Cigarettes     Passive exposure: Current    Smokeless tobacco: Never    Tobacco comments:     3cigs   Vaping Use    Vaping status: Never Used   Substance Use Topics    Alcohol use: Not Currently     Alcohol/week: 2.0 standard drinks of alcohol     Types: 2 Cans of beer per week     Comment: pint daily    Drug use: No       Allergies:  Sulfa antibiotics and Keflex [cephalexin]    MEDICATIONS:  Medications Prior to Admission   Medication Sig Dispense Refill Last Dose    albuterol sulfate  (90 Base) MCG/ACT inhaler Inhale 2 puffs Every 4 (Four) Hours As Needed for Wheezing.   Past Week    busPIRone (BUSPAR) 15 MG tablet TAKE 1 TABLET BY MOUTH TWICE A  tablet 1 2024    HYDROcodone-acetaminophen (NORCO) 5-325 MG per tablet Take 1 tablet by mouth Every 12 (Twelve) Hours As Needed for Moderate Pain. 20 tablet 0 2024     melatonin 5 MG tablet tablet Take 1 tablet by mouth At Night As Needed (sleep).   7/19/2024    QUEtiapine (SEROquel) 50 MG tablet Take 2 tablets by mouth every night at bedtime. 180 tablet 0 7/20/2024    sertraline (ZOLOFT) 100 MG tablet TAKE 1 TABLET BY MOUTH TWICE A  tablet 1 7/20/2024    Cyanocobalamin (Vitamin B-12) 1000 MCG sublingual tablet Place 1 tablet under the tongue Daily.   Unknown    folic acid (FOLVITE) 1 MG tablet Take 1 tablet by mouth Daily. 30 tablet 0 Unknown    LORazepam (Ativan) 1 MG tablet Take 1 tab prior to MRI 1 tablet 0 Unknown    thiamine (VITAMIN B1) 100 MG tablet Take 1 tablet by mouth Daily. 30 tablet 0 Unknown       Objective     Results Review:  LABS:    Admission on 07/20/2024   Component Date Value Ref Range Status    Glucose 07/20/2024 95  65 - 99 mg/dL Final    BUN 07/20/2024 8  6 - 20 mg/dL Final    Creatinine 07/20/2024 0.72  0.57 - 1.00 mg/dL Final    Sodium 07/20/2024 139  136 - 145 mmol/L Final    Potassium 07/20/2024 3.3 (L)  3.5 - 5.2 mmol/L Final    Chloride 07/20/2024 105  98 - 107 mmol/L Final    CO2 07/20/2024 23.0  22.0 - 29.0 mmol/L Final    Calcium 07/20/2024 8.5 (L)  8.6 - 10.5 mg/dL Final    Total Protein 07/20/2024 7.0  6.0 - 8.5 g/dL Final    Albumin 07/20/2024 3.8  3.5 - 5.2 g/dL Final    ALT (SGPT) 07/20/2024 24  1 - 33 U/L Final    AST (SGOT) 07/20/2024 43 (H)  1 - 32 U/L Final    Alkaline Phosphatase 07/20/2024 237 (H)  39 - 117 U/L Final    Total Bilirubin 07/20/2024 0.4  0.0 - 1.2 mg/dL Final    Globulin 07/20/2024 3.2  gm/dL Final    A/G Ratio 07/20/2024 1.2  g/dL Final    BUN/Creatinine Ratio 07/20/2024 11.1  7.0 - 25.0 Final    Anion Gap 07/20/2024 11.0  5.0 - 15.0 mmol/L Final    eGFR 07/20/2024 98.3  >60.0 mL/min/1.73 Final    Protime 07/20/2024 14.6 (H)  11.7 - 14.2 Seconds Final    INR 07/20/2024 1.12 (H)  0.90 - 1.10 Final    Lactate 07/20/2024 1.5  0.5 - 2.0 mmol/L Final    Procalcitonin 07/20/2024 0.03  0.00 - 0.25 ng/mL Final    Sed  Rate 07/20/2024 7  0 - 30 mm/hr Final    C-Reactive Protein 07/20/2024 <0.30  0.00 - 0.50 mg/dL Final    WBC 07/20/2024 4.40  3.40 - 10.80 10*3/mm3 Final    RBC 07/20/2024 3.07 (L)  3.77 - 5.28 10*6/mm3 Final    Hemoglobin 07/20/2024 9.8 (L)  12.0 - 15.9 g/dL Final    Hematocrit 07/20/2024 31.1 (L)  34.0 - 46.6 % Final    MCV 07/20/2024 101.3 (H)  79.0 - 97.0 fL Final    MCH 07/20/2024 31.9  26.6 - 33.0 pg Final    MCHC 07/20/2024 31.5  31.5 - 35.7 g/dL Final    RDW 07/20/2024 15.7 (H)  12.3 - 15.4 % Final    RDW-SD 07/20/2024 58.1 (H)  37.0 - 54.0 fl Final    MPV 07/20/2024 9.5  6.0 - 12.0 fL Final    Platelets 07/20/2024 113 (L)  140 - 450 10*3/mm3 Final    Neutrophil % 07/20/2024 57.9  42.7 - 76.0 % Final    Lymphocyte % 07/20/2024 26.6  19.6 - 45.3 % Final    Monocyte % 07/20/2024 11.4  5.0 - 12.0 % Final    Eosinophil % 07/20/2024 2.3  0.3 - 6.2 % Final    Basophil % 07/20/2024 0.7  0.0 - 1.5 % Final    Immature Grans % 07/20/2024 1.1 (H)  0.0 - 0.5 % Final    Neutrophils, Absolute 07/20/2024 2.55  1.70 - 7.00 10*3/mm3 Final    Lymphocytes, Absolute 07/20/2024 1.17  0.70 - 3.10 10*3/mm3 Final    Monocytes, Absolute 07/20/2024 0.50  0.10 - 0.90 10*3/mm3 Final    Eosinophils, Absolute 07/20/2024 0.10  0.00 - 0.40 10*3/mm3 Final    Basophils, Absolute 07/20/2024 0.03  0.00 - 0.20 10*3/mm3 Final    Immature Grans, Absolute 07/20/2024 0.05  0.00 - 0.05 10*3/mm3 Final    nRBC 07/20/2024 0.0  0.0 - 0.2 /100 WBC Final    Glucose 07/21/2024 134 (H)  65 - 99 mg/dL Final    BUN 07/21/2024 7  6 - 20 mg/dL Final    Creatinine 07/21/2024 0.61  0.57 - 1.00 mg/dL Final    Sodium 07/21/2024 139  136 - 145 mmol/L Final    Potassium 07/21/2024 4.2  3.5 - 5.2 mmol/L Final    Chloride 07/21/2024 110 (H)  98 - 107 mmol/L Final    CO2 07/21/2024 17.2 (L)  22.0 - 29.0 mmol/L Final    Calcium 07/21/2024 8.4 (L)  8.6 - 10.5 mg/dL Final    Total Protein 07/21/2024 6.5  6.0 - 8.5 g/dL Final    Albumin 07/21/2024 3.5  3.5 - 5.2 g/dL Final     ALT (SGPT) 07/21/2024 24  1 - 33 U/L Final    AST (SGOT) 07/21/2024 30  1 - 32 U/L Final    Alkaline Phosphatase 07/21/2024 189 (H)  39 - 117 U/L Final    Total Bilirubin 07/21/2024 0.3  0.0 - 1.2 mg/dL Final    Globulin 07/21/2024 3.0  gm/dL Final    A/G Ratio 07/21/2024 1.2  g/dL Final    BUN/Creatinine Ratio 07/21/2024 11.5  7.0 - 25.0 Final    Anion Gap 07/21/2024 11.8  5.0 - 15.0 mmol/L Final    eGFR 07/21/2024 105.1  >60.0 mL/min/1.73 Final    WBC 07/21/2024 4.05  3.40 - 10.80 10*3/mm3 Final    RBC 07/21/2024 2.75 (L)  3.77 - 5.28 10*6/mm3 Final    Hemoglobin 07/21/2024 8.9 (L)  12.0 - 15.9 g/dL Final    Hematocrit 07/21/2024 27.1 (L)  34.0 - 46.6 % Final    MCV 07/21/2024 98.5 (H)  79.0 - 97.0 fL Final    MCH 07/21/2024 32.4  26.6 - 33.0 pg Final    MCHC 07/21/2024 32.8  31.5 - 35.7 g/dL Final    RDW 07/21/2024 15.1  12.3 - 15.4 % Final    RDW-SD 07/21/2024 54.3 (H)  37.0 - 54.0 fl Final    MPV 07/21/2024 9.3  6.0 - 12.0 fL Final    Platelets 07/21/2024 88 (L)  140 - 450 10*3/mm3 Final    Neutrophil % 07/21/2024 77.1 (H)  42.7 - 76.0 % Final    Lymphocyte % 07/21/2024 16.3 (L)  19.6 - 45.3 % Final    Monocyte % 07/21/2024 4.9 (L)  5.0 - 12.0 % Final    Eosinophil % 07/21/2024 0.0 (L)  0.3 - 6.2 % Final    Basophil % 07/21/2024 0.2  0.0 - 1.5 % Final    Immature Grans % 07/21/2024 1.5 (H)  0.0 - 0.5 % Final    Neutrophils, Absolute 07/21/2024 3.12  1.70 - 7.00 10*3/mm3 Final    Lymphocytes, Absolute 07/21/2024 0.66 (L)  0.70 - 3.10 10*3/mm3 Final    Monocytes, Absolute 07/21/2024 0.20  0.10 - 0.90 10*3/mm3 Final    Eosinophils, Absolute 07/21/2024 0.00  0.00 - 0.40 10*3/mm3 Final    Basophils, Absolute 07/21/2024 0.01  0.00 - 0.20 10*3/mm3 Final    Immature Grans, Absolute 07/21/2024 0.06 (H)  0.00 - 0.05 10*3/mm3 Final    nRBC 07/21/2024 0.0  0.0 - 0.2 /100 WBC Final    Magnesium 07/21/2024 1.7  1.6 - 2.6 mg/dL Final    Vitamin B-12 07/21/2024 650  211 - 946 pg/mL Final    Folate 07/21/2024 >20.00  4.78 -  24.20 ng/mL Final    Ferritin 07/21/2024 505.00 (H)  13.00 - 150.00 ng/mL Final    Iron 07/21/2024 53  37 - 145 mcg/dL Final    Iron 07/21/2024 53  37 - 145 mcg/dL Final    Iron Saturation (TSAT) 07/21/2024 18 (L)  20 - 50 % Final    Transferrin 07/21/2024 201  200 - 360 mg/dL Final    TIBC 07/21/2024 299  298 - 536 mcg/dL Final    Reticulocyte % 07/21/2024 2.51 (H)  0.70 - 1.90 % Final    Reticulocyte Absolute 07/21/2024 0.0695  0.0200 - 0.1300 10*6/mm3 Final    ABO Type 07/21/2024 O   Final    RH type 07/21/2024 Positive   Final    Antibody Screen 07/21/2024 Positive   Final    T&S Expiration Date 07/21/2024 7/24/2024 11:59:59 PM   Final       DIAGNOSTICS:  CT lumbar spine without contrast 7/20/2024:  Sequela of discitis/osteomyelitis at L2-3 with obliteration of the L2-3 disc space.  Amount of disc space loss similar to previous MRI.  Paraspinal inflammatory changes significantly decreased and nearly resolved.  There is mild right L5-S1 neuroforaminal stenosis secondary to disc and facet arthropathy.  Noted are bilateral total hip arthroplasties.  The right arthroplasty has a cemented acetabular component without evidence of fracture.    Results Review:   I reviewed the patient's new clinical results.  I personally viewed and interpreted the patient's labs, imaging study, medications and chart    Vital Signs   Temp:  [97.9 °F (36.6 °C)-98.4 °F (36.9 °C)] 97.9 °F (36.6 °C)  Heart Rate:  [] 80  Resp:  [16-18] 16  BP: (106-146)/(73-89) 143/78    Physical Exam:  Physical Exam  Constitutional:       General: She is not in acute distress.     Appearance: She is not ill-appearing, toxic-appearing or diaphoretic.   HENT:      Head: Normocephalic.      Nose: Nose normal.      Mouth/Throat:      Mouth: Mucous membranes are moist.      Pharynx: Oropharynx is clear.   Eyes:      Extraocular Movements: Extraocular movements intact.      Conjunctiva/sclera: Conjunctivae normal.      Pupils: Pupils are equal, round, and  reactive to light.   Cardiovascular:      Rate and Rhythm: Normal rate.   Pulmonary:      Effort: Pulmonary effort is normal.   Musculoskeletal:      Comments: Cries out in pain when I flex her right hip.  Notes pain in the right hip joint.   Skin:     General: Skin is warm.   Neurological:      Deep Tendon Reflexes:      Reflex Scores:       Patellar reflexes are 2+ on the right side and 2+ on the left side.       Achilles reflexes are 2+ on the right side and 2+ on the left side.  Psychiatric:         Speech: Speech normal.       Neurologic Exam     Mental Status   Disoriented to person.   Disoriented to place.   Attention: decreased. Concentration: decreased.   Speech: speech is normal   Level of consciousness: alert  Knowledge: consistent with education.     Cranial Nerves     CN III, IV, VI   Pupils are equal, round, and reactive to light.    CN V   Facial sensation intact.     CN VIII   Hearing: intact    Motor Exam   Muscle bulk: normal  Overall muscle tone: normal  Right arm tone: normal  Left arm tone: normal  Right leg tone: normal  Left leg tone: normalStrength in the left lower extremity 4+ to 5 -/5.  Strength in right lower extremity is difficult to assess given patient has significant pain in her right hip.  She denies any low back pain but states the pain is all in her right hip.  She is afraid to move her leg for fear of reproducing right hip pain.     Gait, Coordination, and Reflexes     Gait  Gait: (Gait deferred)    Reflexes   Right patellar: 2+  Left patellar: 2+  Right achilles: 2+  Left achilles: 2+  Right ankle clonus: absent  Left ankle clonus: absent      Assessment & Plan       Intractable low back pain    Post traumatic stress disorder    Peripheral vascular disease    Essential hypertension    Seizure disorder    Chronic obstructive pulmonary disease    Cirrhosis of liver    Spinal abscess    History of MRSA infection    Right hip pain    S/P total right hip arthroplasty    Anemia     "Metabolic acidosis    Thrombocytopenia    Alcohol use    This is a 58-year-old female with chronic low back pain with history of osteomyelitis who was treated with long-term IV antibiotics and just finished treatment last month.  She currently denies any low back pain but states that the pain is isolated to right hip and is aggravated with any kind of hip or leg movement.  She has been evaluated by orthopedic surgery and plans to do right hip revision in the near future.  At this point neurosurgery will sign off as there does not appear to be any acute neurosurgical process.  Please call if she develops any new or worsening back or leg pain/issues.    PLAN:     No acute neurosurgical intervention indicated  Defer management to orthopedic surgery  Neurosurgery signing off    I discussed the patient's findings and my recommendations with patient, family, and Dr. Holguin.     During patient visit, I utilized appropriate personal protective equipment including mask and gloves.  Mask used was standard procedure mask. Appropriate PPE was worn during the entire visit.  Hand hygiene was completed before and after.     Fercho Ruth, APRN  07/21/24  12:17 EDT    \"Dictated utilizing Dragon dictation\".    "

## 2024-07-21 NOTE — SIGNIFICANT NOTE
07/21/24 1153   OTHER   Discipline physical therapist   Rehab Time/Intention   Session Not Performed other (see comments)  (awaiting ortho consult tomorrow; pt now NWB RLE and can hardly tolerate any touch or movement to RLE. Lives with significant other in one level home. He is providing all care, has RW. Will check tomorrow.)   Therapy Assessment/Plan (PT)   Criteria for Skilled Interventions Met (PT) yes   Recommendation   PT - Next Appointment 07/22/24

## 2024-07-21 NOTE — PLAN OF CARE
Goal Outcome Evaluation:  Plan of Care Reviewed With: patient        Progress: improving     A&O. VSS. CIWA protocol followed, Pt slept most of night. IV dilaudid given once.

## 2024-07-21 NOTE — PROGRESS NOTES
Name: Lita Yu ADMIT: 2024   : 1965  PCP: Norma Saul APRN    MRN: [id number] LOS: 0 days   AGE/SEX: 58 y.o. female  ROOM: [facility]     Subjective   Subjective   Patient seen and examined this morning. Hospital day 1. She is resting in bed, having ongoing low back and right hip pain. Discussed with nursing, CIWA scores elevated, requiring Ativan.      Objective   Objective   Vital Signs  Temp:  [98.2 °F (36.8 °C)-98.4 °F (36.9 °C)] 98.4 °F (36.9 °C)  Heart Rate:  [] 88  Resp:  [16-18] 18  BP: (106-146)/(73-89) 144/80  SpO2:  [98 %-100 %] 98 %  on   ;   Device (Oxygen Therapy): room air  Body mass index is 23.03 kg/m².  Physical Exam  Vitals and nursing note reviewed.   Constitutional:       General: She is not in acute distress.     Appearance: She is ill-appearing.   Cardiovascular:      Rate and Rhythm: Normal rate and regular rhythm.      Pulses: Normal pulses.      Heart sounds: Normal heart sounds.   Pulmonary:      Effort: Pulmonary effort is normal. No respiratory distress.      Breath sounds: Normal breath sounds.   Abdominal:      General: Bowel sounds are normal.      Palpations: Abdomen is soft.      Tenderness: There is no abdominal tenderness.   Musculoskeletal:         General: Tenderness present.   Skin:     General: Skin is warm and dry.   Neurological:      Mental Status: She is alert.       Results Review     I reviewed the patient's new clinical results.  Results from last 7 days   Lab Units 24  0519 24  1407   WBC 10*3/mm3 4.05 4.40   HEMOGLOBIN g/dL 8.9* 9.8*   PLATELETS 10*3/mm3 88* 113*     Results from last 7 days   Lab Units 24  0519 24  1407   SODIUM mmol/L 139 139   POTASSIUM mmol/L 4.2 3.3*   CHLORIDE mmol/L 110* 105   CO2 mmol/L 17.2* 23.0   BUN mg/dL 7 8   CREATININE mg/dL 0.61 0.72   GLUCOSE mg/dL 134* 95   EGFR mL/min/1.73 105.1 98.3     Results from last 7 days   Lab Units 24  0519 24  1407   ALBUMIN g/dL  "3.5 3.8   BILIRUBIN mg/dL 0.3 0.4   ALK PHOS U/L 189* 237*   AST (SGOT) U/L 30 43*   ALT (SGPT) U/L 24 24     Results from last 7 days   Lab Units 07/21/24  0519 07/20/24  1407   CALCIUM mg/dL 8.4* 8.5*   ALBUMIN g/dL 3.5 3.8   MAGNESIUM mg/dL 1.7  --      Results from last 7 days   Lab Units 07/20/24  1407   PROCALCITONIN ng/mL 0.03   LACTATE mmol/L 1.5     No results found for: \"HGBA1C\", \"POCGLU\"    CT Lumbar Spine Without Contrast    Result Date: 7/20/2024  1. Sequela of discitis/osteomyelitis at L2-L3 with obliteration of the L2-L3 disc space and marked associated endplate irregularity/osteolysis. L2-L3 disc space loss is no worse than may MRI. Paraspinal inflammatory changes have significantly decreased/nearly resolved. 2. Mild right L5-S1 neuroforaminal stenosis secondary to disc and facet joint degeneration. 3. Bilateral total hip arthroplasties. Right arthroplasty has a cemented acetabular component. No evidence of fracture/periprosthetic fracture. Right acetabular component is positioned at the anterior superior aspect of the native right acetabulum. Recommend correlation with prior surgery.    Radiation dose reduction techniques were utilized, including automated exposure control and exposure modulation based on body size.  This report was finalized on 7/20/2024 4:05 PM by Dr. Jluis Barron M.D on Workstation: [id number]      CT Pelvis Without Contrast    Result Date: 7/20/2024  1. Sequela of discitis/osteomyelitis at L2-L3 with obliteration of the L2-L3 disc space and marked associated endplate irregularity/osteolysis. L2-L3 disc space loss is no worse than may MRI. Paraspinal inflammatory changes have significantly decreased/nearly resolved. 2. Mild right L5-S1 neuroforaminal stenosis secondary to disc and facet joint degeneration. 3. Bilateral total hip arthroplasties. Right arthroplasty has a cemented acetabular component. No evidence of fracture/periprosthetic fracture. Right acetabular component is " positioned at the anterior superior aspect of the native right acetabulum. Recommend correlation with prior surgery.    Radiation dose reduction techniques were utilized, including automated exposure control and exposure modulation based on body size.  This report was finalized on 7/20/2024 4:05 PM by Dr. Jluis Barron M.D on Workstation: [id number]       I have personally reviewed all medications:  Scheduled Medications  busPIRone, 15 mg, Oral, BID  folic acid, 1 mg, Oral, Daily  LORazepam, 2 mg, Oral, Q6H   Followed by  LORazepam, 1 mg, Oral, Q6H   Followed by  [START ON 7/22/2024] LORazepam, 1 mg, Oral, Q12H   Followed by  [START ON 7/24/2024] LORazepam, 1 mg, Oral, Daily  QUEtiapine, 100 mg, Oral, Nightly  sertraline, 100 mg, Oral, BID  sodium chloride, 10 mL, Intravenous, Q12H  thiamine (B-1) IV, 200 mg, Intravenous, Q8H   Followed by  [START ON 7/26/2024] thiamine, 100 mg, Oral, Daily  vitamin B-12, 1,000 mcg, Oral, Daily    Infusions  lactated ringers, 75 mL/hr    Diet  NPO Diet NPO Type: Sips with Meds, Ice Chips    I have personally reviewed:  [x]  Laboratory   []  Microbiology   [x]  Radiology   []  EKG/Telemetry  []  Cardiology/Vascular   []  Pathology    [x]  Records       Assessment/Plan     Active Hospital Problems    Diagnosis  POA    **Intractable low back pain [M54.59]  Yes    Metabolic acidosis [E87.20]  Yes    Thrombocytopenia [D69.6]  Yes    Alcohol use [Z78.9]  Yes    History of MRSA infection [Z86.14]  Yes    Right hip pain [M25.551]  Yes    S/P total right hip arthroplasty [Z96.641]  Not Applicable    Anemia [D64.9]  Yes    Spinal abscess [M46.20]  Yes    Cirrhosis of liver [K74.60]  Yes    Chronic obstructive pulmonary disease [J44.9]  Yes    Essential hypertension [I10]  Yes    Seizure disorder [G40.909]  Yes    Post traumatic stress disorder [F43.10]  Yes    Peripheral vascular disease [I73.9]  Yes      Resolved Hospital Problems   No resolved problems to display.       58 y.o. female  admitted with Intractable low back pain.    Intractable low back and right hip pain   - In setting of recent MRSA infection, previously completed antibiotics. No evidence of sepsis, inflammatory markers unremarkable. CT Lumbar spine, pelvis and XR of hip reviewed. Records reviewed.  - CHRISTIANO and Orthopedic surgery consulted. Will continue to follow their plans/recommendations. Greatly appreciate their help.  - PRN medications for pain. Patient currently receiving IV pain medication PRN, requiring close monitoring. Need to closely monitor for over-sedation, respiratory depression and constipation.    Alcohol abuse with concern for withdrawal  - Patient reportedly drinks ~one fifth of bourbon daily. Last drink in AM prior to arrival. CIWA scores elevated, she is requiring Ativan.  - CIWA protocol, MV, thiamine, folic acid  - Consult Access center. Will follow their plans/recommendations. Greatly appreciate their help.    Non anion-gap metabolic acidosis  - Noted on most recent labs, could be result of IVF with NS.  - D/C NS. Start patient on LR.  - Repeat labs in AM for reassessment. Further management based on results of testing.    Cirrhosis/chronic liver disease  - Appears compensated, abdominal exam benign, LFT stable. Continue monitoring.    Anemia  - Hemoglobin low on most recent labs, however, stable from prior. No evidence of overt blood loss. No indications for acute intervention at this time.  Iron studies showing low TSAT but elevated ferritin, likely reflective of anemia of chronic disease.  - Order repeat CBC in AM for reassessment. Continue to monitor, transfuse for hemoglobin <7.    Thrombocytopenia  - Platelets found to be low on most recent labs, likely 2/2 liver disease. No indications for urgent intervention at present. Will monitor for now.  - Order repeat CBC in AM for reassessment.    Hypertension  - Blood pressure appears stable and acceptable acutely at this time. No indications to warrant acute  changes/intervention at this time.  - Trend BP to guide ongoing management decisions.    COPD  - Appears stable from this perspective at present.  No evidence to suggest acute exacerbation.  Continue current medications as prescribed and closely monitor.    Depression/Anxiety  - Continue current medications as prescribed.          SCDs for DVT prophylaxis.  Full code.  Discussed with patient, family, and nursing staff.  Anticipate discharge home timing yet to be determined.  Expected discharge date/ time has not been documented.      Adonis Byers DO  [facility]  07/21/24

## 2024-07-21 NOTE — CONSULTS
"  Access center consult.    Met with patient in room #L276. Introduced self and role. Patient agreed to be evaluated. Visitor at bedside (\"life partner/caretaker). Patient allowed visitor to remain in the room.    Patient is a 59yo W/W/F. She is alert and oriented x3. Patient lives at home with her partner. Presybeterian: na. Children: 3. Occupation: unemployed. Hobbies: video games, tv, going out to eat. Education: GED. Legal: denies. :   Darshana Curiel/daughter [phone]. : na. Support system: mother, caretaker. History of violence/trauma/abuse: Hx. PTSD. Patient did not provide details. Patient feels safe in her home environment.     Access consulted for alcohol. Patient presented to the ED 7/20/24 with c/o worsening low back, right leg pain and history of recent fall.  Patient reported had drank a fifth of alcohol. No BAL available. Past medical history s/p total right hip arthroplasty, Hx. MRSA, HTN, hyperlipidemia, depression, GERD, COPD, cirrhosis.     Patient reports past rehab in 2020 for several months. She reports relapse occurred for pain control. She reports current with AA and sponsor. Patient discussed last couple of weeks increased alcohol intake due to increased hip pain stating \"I ran out of pain pills.\" Patient reports pain was not properly controlled with Rx hydrocodone 5mg BID. She reports daily consumption of a fifth of bourbon. Patient admitted the alcohol use only helped temporarily. Inquired if she felt she has a problem with alcohol patient states \"it has a problem with me.\" Patient reports initial use of alcohol began age 48yo. Longest sobriety 1 week. She reports history of \"Dts\\"/seizures, blackouts and memory loss. Current craving  8/10. Inquired of any goals for alcohol use patient states \"I want to slow down.\" She denies illicit drug use. No UDS available. Patient uses tobacco.     Throughout encounter patient drowsy, slurred speech at times, required redirection. " "Visitor supportive and involved in care. Patient denies any past inpatient psychiatric care. She reports past counseling stating \"since childhood.\" Patient denies any current mental health providers. She denies any SI/HI/A/V/H. She reports history of A/V/H, denies any command hallucinations. Denies wish to be dead. Sleep/Appetite: decreased. Depression: 0/10. Anxiety: 3/10. Current stressors: pain. Current medications buspar, seroquel, zoloft managed by her PCP. She reports her   medications are effective. Patient and visitor feel once hip surgery done will be better. She reports will continue with AA. Patient accepted additional [facility] resources. She reports will continue with PCP for psychotropic medications. Access will follow and further discuss/provide [facility] treatment/counseling options if interested.       "

## 2024-07-21 NOTE — CONSULTS
Orthopaedic & Sports Medicine Surgery  Dr. Jalen Mccarthy MD  [phone]    Orthopaedic Surgery  Consult Note      HPI:  Patient is a 58 y.o. female who presents with worsening right hip and low back pain.  She underwent a right hip antibiotic spacer with Dr. Heri Chen on 2024 for likely hip osteomyelitis versus AVN.  She has been on IV antibiotics.  She says she has been off of those, but cannot exactly tell me the length of time since she stopped those.  She has had worsening right hip pain has been growing, she said a day or 2 ago she fell and had significantly worse pain in the hip and unable to bear weight.  She was brought to [facility] and orthopedic surgery was consulted.  Infection labs were taken and within normal range.    MEDICAL HISTORY  Past Medical History:   Diagnosis Date    Cirrhosis     COPD (chronic obstructive pulmonary disease)     Depression     GERD (gastroesophageal reflux disease)     Hyperlipidemia     Hypertension     PTSD (post-traumatic stress disorder)      Past Surgical History:   Procedure Laterality Date     SECTION N/A     COLONOSCOPY N/A 2021    Procedure: COLONOSCOPY with polypectomy x2 and random biopsy;  Surgeon: Osman Clay MD;  Location: [facility] ENDOSCOPY;  Service: Gastroenterology;  Laterality: N/A;  colon polyps    DENTAL PROCEDURE      ENDOSCOPY N/A 2021    Procedure: ESOPHAGOGASTRODUODENOSCOPY;  Surgeon: Osman Clay MD;  Location: [facility] ENDOSCOPY;  Service: Gastroenterology;  Laterality: N/A;  esophagitis    JOINT REPLACEMENT      REPLACEMENT TOTAL HIP LATERAL POSITION Left     TONSILLECTOMY      TOTAL HIP ARTHROPLASTY Right 2024    Procedure: TOTAL HIP ARTHROPLASTY ANTERIOR;  Surgeon: Cristobal Chen II, MD;  Location: [facility] MAIN OR;  Service: Orthopedics;  Laterality: Right;    VAGINAL BIRTH AFTER  SECTION       Prior to Admission medications    Medication Sig Start Date End Date Taking? Authorizing  Provider   albuterol sulfate  (90 Base) MCG/ACT inhaler Inhale 2 puffs Every 4 (Four) Hours As Needed for Wheezing.   Yes Abigail Hood MD   busPIRone (BUSPAR) 15 MG tablet TAKE 1 TABLET BY MOUTH TWICE A DAY 10/16/23  Yes Norma Saul APRN   HYDROcodone-acetaminophen (NORCO) 5-325 MG per tablet Take 1 tablet by mouth Every 12 (Twelve) Hours As Needed for Moderate Pain. 6/26/24  Yes Norma Saul APRN   melatonin 5 MG tablet tablet Take 1 tablet by mouth At Night As Needed (sleep).   Yes ProviderAbigail MD   QUEtiapine (SEROquel) 50 MG tablet Take 2 tablets by mouth every night at bedtime. 6/3/24  Yes Norma Saul APRN   sertraline (ZOLOFT) 100 MG tablet TAKE 1 TABLET BY MOUTH TWICE A DAY 5/23/24  Yes Norma Saul APRN   Cyanocobalamin (Vitamin B-12) 1000 MCG sublingual tablet Place 1 tablet under the tongue Daily.    ProviderAbigail MD   folic acid (FOLVITE) 1 MG tablet Take 1 tablet by mouth Daily. 3/4/24   Laura Quinones APRN   LORazepam (Ativan) 1 MG tablet Take 1 tab prior to MRI 6/3/24   Norma Saul APRN   thiamine (VITAMIN B1) 100 MG tablet Take 1 tablet by mouth Daily. 3/7/24   Laura Quinones APRN     Allergies   Allergen Reactions    Sulfa Antibiotics Hives    Keflex [Cephalexin] Hives     Most Recent Immunizations   Administered Date(s) Administered    COVID-19 (PFIZER) Purple Cap Monovalent 04/03/2021    Flu Vaccine Intradermal Quad 18-64YR 11/29/2018    Fluzone (or Fluarix & Flulaval for VFC) >6mos 10/10/2023    Pneumococcal Conjugate 20-Valent (PCV20) 10/10/2023    Shingrix 10/10/2023    flucelvax quad pfs =>4 YRS 01/04/2020     Social History     Tobacco Use    Smoking status: Every Day     Current packs/day: 0.50     Types: Cigarettes     Passive exposure: Current    Smokeless tobacco: Never    Tobacco comments:     3cigs   Substance Use Topics    Alcohol use: Not Currently     Alcohol/week: 2.0 standard drinks of alcohol     Types: 2 Cans of  "beer per week     Comment: pint daily      Social History     Substance and Sexual Activity   Drug Use No       VITALS: /78 (BP Location: Left arm, Patient Position: Lying)   Pulse 80   Temp 97.9 °F (36.6 °C) (Oral)   Resp 16   Ht 160 cm (62.99\")   Wt 59 kg (130 lb)   SpO2 96%   BMI 23.03 kg/m²  Body mass index is 23.03 kg/m².    PHYSICAL EXAM:   CONSTITUTIONAL: No acute distress  LUNGS: Equal chest rise, no shortness of air  CARDIOVASCULAR: palpable peripheral pulses  EXTREMITY:   Right lower extremity  Tenderness to Palpation: Tenderness to palpation at the anterior lateral hip nontender over the mid thigh to distal.  She does have pain with any hip range of motion and positive logroll test.  Gross Deformity:  No gross deformity of the hip.  Anterior hip incision appears to be well-healed with no breakdown, redness, or obvious signs of infection.  Pulses:  Brisk Capillary Refill  Sensation: Intact to Saphenous, Sural, Deep Peroneal, Superficial Peroneal, and Tibial Nerves and grossly throughout extremity  Motor: 5/5 EHL/FHL/TA/GS motor complexes         RADIOLOGY REVIEW:   CT Lumbar Spine Without Contrast    Result Date: 7/20/2024  1. Sequela of discitis/osteomyelitis at L2-L3 with obliteration of the L2-L3 disc space and marked associated endplate irregularity/osteolysis. L2-L3 disc space loss is no worse than may MRI. Paraspinal inflammatory changes have significantly decreased/nearly resolved. 2. Mild right L5-S1 neuroforaminal stenosis secondary to disc and facet joint degeneration. 3. Bilateral total hip arthroplasties. Right arthroplasty has a cemented acetabular component. No evidence of fracture/periprosthetic fracture. Right acetabular component is positioned at the anterior superior aspect of the native right acetabulum. Recommend correlation with prior surgery.    Radiation dose reduction techniques were utilized, including automated exposure control and exposure modulation based on body " size.  This report was finalized on 7/20/2024 4:05 PM by Dr. Jluis Barron M.D on Workstation: [id number]      CT Pelvis Without Contrast    Result Date: 7/20/2024  1. Sequela of discitis/osteomyelitis at L2-L3 with obliteration of the L2-L3 disc space and marked associated endplate irregularity/osteolysis. L2-L3 disc space loss is no worse than may MRI. Paraspinal inflammatory changes have significantly decreased/nearly resolved. 2. Mild right L5-S1 neuroforaminal stenosis secondary to disc and facet joint degeneration. 3. Bilateral total hip arthroplasties. Right arthroplasty has a cemented acetabular component. No evidence of fracture/periprosthetic fracture. Right acetabular component is positioned at the anterior superior aspect of the native right acetabulum. Recommend correlation with prior surgery.    Radiation dose reduction techniques were utilized, including automated exposure control and exposure modulation based on body size.  This report was finalized on 7/20/2024 4:05 PM by Dr. Jluis Barron M.D on Workstation: [id number]       LABS:   Results for the past 24 hours:   Recent Results (from the past 24 hour(s))   Comprehensive Metabolic Panel    Collection Time: 07/20/24  2:07 PM    Specimen: Blood   Result Value Ref Range    Glucose 95 65 - 99 mg/dL    BUN 8 6 - 20 mg/dL    Creatinine 0.72 0.57 - 1.00 mg/dL    Sodium 139 136 - 145 mmol/L    Potassium 3.3 (L) 3.5 - 5.2 mmol/L    Chloride 105 98 - 107 mmol/L    CO2 23.0 22.0 - 29.0 mmol/L    Calcium 8.5 (L) 8.6 - 10.5 mg/dL    Total Protein 7.0 6.0 - 8.5 g/dL    Albumin 3.8 3.5 - 5.2 g/dL    ALT (SGPT) 24 1 - 33 U/L    AST (SGOT) 43 (H) 1 - 32 U/L    Alkaline Phosphatase 237 (H) 39 - 117 U/L    Total Bilirubin 0.4 0.0 - 1.2 mg/dL    Globulin 3.2 gm/dL    A/G Ratio 1.2 g/dL    BUN/Creatinine Ratio 11.1 7.0 - 25.0    Anion Gap 11.0 5.0 - 15.0 mmol/L    eGFR 98.3 >60.0 mL/min/1.73   Protime-INR    Collection Time: 07/20/24  2:07 PM    Specimen: Blood    Result Value Ref Range    Protime 14.6 (H) 11.7 - 14.2 Seconds    INR 1.12 (H) 0.90 - 1.10   Lactic Acid, Plasma    Collection Time: 07/20/24  2:07 PM    Specimen: Blood   Result Value Ref Range    Lactate 1.5 0.5 - 2.0 mmol/L   Procalcitonin    Collection Time: 07/20/24  2:07 PM    Specimen: Blood   Result Value Ref Range    Procalcitonin 0.03 0.00 - 0.25 ng/mL   Sedimentation Rate    Collection Time: 07/20/24  2:07 PM    Specimen: Blood   Result Value Ref Range    Sed Rate 7 0 - 30 mm/hr   C-reactive Protein    Collection Time: 07/20/24  2:07 PM    Specimen: Blood   Result Value Ref Range    C-Reactive Protein <0.30 0.00 - 0.50 mg/dL   CBC Auto Differential    Collection Time: 07/20/24  2:07 PM    Specimen: Blood   Result Value Ref Range    WBC 4.40 3.40 - 10.80 10*3/mm3    RBC 3.07 (L) 3.77 - 5.28 10*6/mm3    Hemoglobin 9.8 (L) 12.0 - 15.9 g/dL    Hematocrit 31.1 (L) 34.0 - 46.6 %    .3 (H) 79.0 - 97.0 fL    MCH 31.9 26.6 - 33.0 pg    MCHC 31.5 31.5 - 35.7 g/dL    RDW 15.7 (H) 12.3 - 15.4 %    RDW-SD 58.1 (H) 37.0 - 54.0 fl    MPV 9.5 6.0 - 12.0 fL    Platelets 113 (L) 140 - 450 10*3/mm3    Neutrophil % 57.9 42.7 - 76.0 %    Lymphocyte % 26.6 19.6 - 45.3 %    Monocyte % 11.4 5.0 - 12.0 %    Eosinophil % 2.3 0.3 - 6.2 %    Basophil % 0.7 0.0 - 1.5 %    Immature Grans % 1.1 (H) 0.0 - 0.5 %    Neutrophils, Absolute 2.55 1.70 - 7.00 10*3/mm3    Lymphocytes, Absolute 1.17 0.70 - 3.10 10*3/mm3    Monocytes, Absolute 0.50 0.10 - 0.90 10*3/mm3    Eosinophils, Absolute 0.10 0.00 - 0.40 10*3/mm3    Basophils, Absolute 0.03 0.00 - 0.20 10*3/mm3    Immature Grans, Absolute 0.05 0.00 - 0.05 10*3/mm3    nRBC 0.0 0.0 - 0.2 /100 WBC   Comprehensive Metabolic Panel    Collection Time: 07/21/24  5:19 AM    Specimen: Blood   Result Value Ref Range    Glucose 134 (H) 65 - 99 mg/dL    BUN 7 6 - 20 mg/dL    Creatinine 0.61 0.57 - 1.00 mg/dL    Sodium 139 136 - 145 mmol/L    Potassium 4.2 3.5 - 5.2 mmol/L    Chloride 110 (H)  98 - 107 mmol/L    CO2 17.2 (L) 22.0 - 29.0 mmol/L    Calcium 8.4 (L) 8.6 - 10.5 mg/dL    Total Protein 6.5 6.0 - 8.5 g/dL    Albumin 3.5 3.5 - 5.2 g/dL    ALT (SGPT) 24 1 - 33 U/L    AST (SGOT) 30 1 - 32 U/L    Alkaline Phosphatase 189 (H) 39 - 117 U/L    Total Bilirubin 0.3 0.0 - 1.2 mg/dL    Globulin 3.0 gm/dL    A/G Ratio 1.2 g/dL    BUN/Creatinine Ratio 11.5 7.0 - 25.0    Anion Gap 11.8 5.0 - 15.0 mmol/L    eGFR 105.1 >60.0 mL/min/1.73   CBC Auto Differential    Collection Time: 07/21/24  5:19 AM    Specimen: Blood   Result Value Ref Range    WBC 4.05 3.40 - 10.80 10*3/mm3    RBC 2.75 (L) 3.77 - 5.28 10*6/mm3    Hemoglobin 8.9 (L) 12.0 - 15.9 g/dL    Hematocrit 27.1 (L) 34.0 - 46.6 %    MCV 98.5 (H) 79.0 - 97.0 fL    MCH 32.4 26.6 - 33.0 pg    MCHC 32.8 31.5 - 35.7 g/dL    RDW 15.1 12.3 - 15.4 %    RDW-SD 54.3 (H) 37.0 - 54.0 fl    MPV 9.3 6.0 - 12.0 fL    Platelets 88 (L) 140 - 450 10*3/mm3    Neutrophil % 77.1 (H) 42.7 - 76.0 %    Lymphocyte % 16.3 (L) 19.6 - 45.3 %    Monocyte % 4.9 (L) 5.0 - 12.0 %    Eosinophil % 0.0 (L) 0.3 - 6.2 %    Basophil % 0.2 0.0 - 1.5 %    Immature Grans % 1.5 (H) 0.0 - 0.5 %    Neutrophils, Absolute 3.12 1.70 - 7.00 10*3/mm3    Lymphocytes, Absolute 0.66 (L) 0.70 - 3.10 10*3/mm3    Monocytes, Absolute 0.20 0.10 - 0.90 10*3/mm3    Eosinophils, Absolute 0.00 0.00 - 0.40 10*3/mm3    Basophils, Absolute 0.01 0.00 - 0.20 10*3/mm3    Immature Grans, Absolute 0.06 (H) 0.00 - 0.05 10*3/mm3    nRBC 0.0 0.0 - 0.2 /100 WBC   Magnesium    Collection Time: 07/21/24  5:19 AM    Specimen: Blood   Result Value Ref Range    Magnesium 1.7 1.6 - 2.6 mg/dL   Ferritin    Collection Time: 07/21/24  5:19 AM    Specimen: Blood   Result Value Ref Range    Ferritin 505.00 (H) 13.00 - 150.00 ng/mL   Iron    Collection Time: 07/21/24  5:19 AM    Specimen: Blood   Result Value Ref Range    Iron 53 37 - 145 mcg/dL   Iron Profile    Collection Time: 07/21/24  5:19 AM    Specimen: Blood   Result Value Ref Range     Iron 53 37 - 145 mcg/dL    Iron Saturation (TSAT) 18 (L) 20 - 50 %    Transferrin 201 200 - 360 mg/dL    TIBC 299 298 - 536 mcg/dL   Reticulocytes    Collection Time: 07/21/24  5:19 AM    Specimen: Blood   Result Value Ref Range    Reticulocyte % 2.51 (H) 0.70 - 1.90 %    Reticulocyte Absolute 0.0695 0.0200 - 0.1300 10*6/mm3   Vitamin B12    Collection Time: 07/21/24  8:39 AM    Specimen: Blood   Result Value Ref Range    Vitamin B-12 650 211 - 946 pg/mL   Folate    Collection Time: 07/21/24  8:39 AM    Specimen: Blood   Result Value Ref Range    Folate >20.00 4.78 - 24.20 ng/mL   Type & Screen    Collection Time: 07/21/24  8:39 AM    Specimen: Blood   Result Value Ref Range    ABO Type O     RH type Positive     Antibody Screen Positive     T&S Expiration Date 7/24/2024 11:59:59 PM        IMPRESSION:  Patient is a 58 y.o. female with right hip spacer for likely osteomyelitis versus AVN with Dr. Heri Chen on 5/4/2024, with worsening hip pain after a fall.    PLAN:   Admited to: Melodie Brooks MD  Diet: Okay for diet today  Weight Bearing: Nonweightbearing right lower extremity  Labs: Reviewed and as above.  Inflammatory markers within normal range.  Imaging: Right hip x-rays and pelvis CT show antibiotic spacer with femoral head and acetabular polyethylene protrusio in the acetabulum.  With the metal artifact, unsure if this is complete pelvic discontinuity at this time.  Anti-coagulation: Mechanical DVT ppx with SCD's, chemical DVT PPX: From med rec do not see that she is on home anticoagulation.  Will give 1 dose of Lovenox today, and then potentially hold for surgery.  If she is not going to have surgery tomorrow she should be put back on Lovenox, and hold day of planned surgery.  Float Heels  Incentive Spirometer  She does have significant change from her previous x-rays with antibiotic spacer.  She has a least acetabular component protrusio.  Patient was planning on revision surgery after infection was  cleared.  Inflammatory markers currently within normal range.  This was can be planned outpatient, but has had significant pain and inability to walk after a recent fall.  No surgery planned for today.  I discussed the case with my partner Dr. Heri Chen, we will look at her chart and decide on surgery if will be in house and what day.  She likely needs revision of her antibiotic spacer components.  We briefly discussed on the risks including anesthetic complications, infection, periprosthetic fracture, amongst others.  Defer surgical recommendations to Dr. Chen as this is a complex case, and she would benefit from his advanced arthroplasty experience.  The risks, benefits, alternatives, and expectations were discussed with the patient and family and they include but not limited to risks of anesthesia, bleeding, DVT, PE, heart attack, stroke, even death, damage to neurovascular structures, infection, need for repeat surgery, scarring, wound issues, fracture/retear, instability, stiffness, loss of range of motion, incomplete resolution of symptoms, weakness to name a few.        Jalen Mccarthy MD  [facility]  Orthopaedic Surgery, Sports Medicine  [phone]

## 2024-07-21 NOTE — PLAN OF CARE
Goal Outcome Evaluation:  Plan of Care Reviewed With: patient        Progress: improving  Outcome Evaluation: A&OX4, PAIN CONTROLLED C PO MEDS, CIWA PROTOCOL- SCHEDULED ATIVAN, BEDREST, POSSIBLE SX ONCE SEEN BY DR KHANNA, 2 UNITS PRBC READY TO TRANSFUSE IF HGB < 7, ADEQUATE PO INTAKE, IVF, VSS, RA

## 2024-07-22 ENCOUNTER — ANESTHESIA EVENT (OUTPATIENT)
Dept: PERIOP | Facility: HOSPITAL | Age: 59
End: 2024-07-22
Payer: COMMERCIAL

## 2024-07-22 ENCOUNTER — ANESTHESIA (OUTPATIENT)
Dept: PERIOP | Facility: HOSPITAL | Age: 59
End: 2024-07-22
Payer: COMMERCIAL

## 2024-07-22 ENCOUNTER — APPOINTMENT (OUTPATIENT)
Dept: GENERAL RADIOLOGY | Facility: HOSPITAL | Age: 59
DRG: 466 | End: 2024-07-22
Payer: COMMERCIAL

## 2024-07-22 LAB
ALBUMIN SERPL-MCNC: 3.2 G/DL (ref 3.5–5.2)
ALBUMIN/GLOB SERPL: 1.2 G/DL
ALP SERPL-CCNC: 160 U/L (ref 39–117)
ALT SERPL W P-5'-P-CCNC: 18 U/L (ref 1–33)
ANION GAP SERPL CALCULATED.3IONS-SCNC: 8 MMOL/L (ref 5–15)
AST SERPL-CCNC: 30 U/L (ref 1–32)
BASOPHILS # BLD AUTO: 0.02 10*3/MM3 (ref 0–0.2)
BASOPHILS NFR BLD AUTO: 0.5 % (ref 0–1.5)
BILIRUB SERPL-MCNC: 0.2 MG/DL (ref 0–1.2)
BUN SERPL-MCNC: 10 MG/DL (ref 6–20)
BUN/CREAT SERPL: 14.5 (ref 7–25)
CALCIUM SPEC-SCNC: 8.3 MG/DL (ref 8.6–10.5)
CHLORIDE SERPL-SCNC: 106 MMOL/L (ref 98–107)
CO2 SERPL-SCNC: 21 MMOL/L (ref 22–29)
CREAT SERPL-MCNC: 0.69 MG/DL (ref 0.57–1)
DEPRECATED RDW RBC AUTO: 54.6 FL (ref 37–54)
EGFRCR SERPLBLD CKD-EPI 2021: 102 ML/MIN/1.73
EOSINOPHIL # BLD AUTO: 0.04 10*3/MM3 (ref 0–0.4)
EOSINOPHIL NFR BLD AUTO: 1.1 % (ref 0.3–6.2)
ERYTHROCYTE [DISTWIDTH] IN BLOOD BY AUTOMATED COUNT: 14.9 % (ref 12.3–15.4)
GLOBULIN UR ELPH-MCNC: 2.7 GM/DL
GLUCOSE SERPL-MCNC: 82 MG/DL (ref 65–99)
HCT VFR BLD AUTO: 25 % (ref 34–46.6)
HGB BLD-MCNC: 8.1 G/DL (ref 12–15.9)
IMM GRANULOCYTES # BLD AUTO: 0.04 10*3/MM3 (ref 0–0.05)
IMM GRANULOCYTES NFR BLD AUTO: 1.1 % (ref 0–0.5)
LYMPHOCYTES # BLD AUTO: 1.32 10*3/MM3 (ref 0.7–3.1)
LYMPHOCYTES NFR BLD AUTO: 35.4 % (ref 19.6–45.3)
MCH RBC QN AUTO: 32.1 PG (ref 26.6–33)
MCHC RBC AUTO-ENTMCNC: 32.4 G/DL (ref 31.5–35.7)
MCV RBC AUTO: 99.2 FL (ref 79–97)
MONOCYTES # BLD AUTO: 0.33 10*3/MM3 (ref 0.1–0.9)
MONOCYTES NFR BLD AUTO: 8.8 % (ref 5–12)
NEUTROPHILS NFR BLD AUTO: 1.98 10*3/MM3 (ref 1.7–7)
NEUTROPHILS NFR BLD AUTO: 53.1 % (ref 42.7–76)
NRBC BLD AUTO-RTO: 0 /100 WBC (ref 0–0.2)
PLATELET # BLD AUTO: 82 10*3/MM3 (ref 140–450)
PMV BLD AUTO: 10.5 FL (ref 6–12)
POTASSIUM SERPL-SCNC: 3.3 MMOL/L (ref 3.5–5.2)
PROT SERPL-MCNC: 5.9 G/DL (ref 6–8.5)
RBC # BLD AUTO: 2.52 10*6/MM3 (ref 3.77–5.28)
SODIUM SERPL-SCNC: 135 MMOL/L (ref 136–145)
WBC NRBC COR # BLD AUTO: 3.73 10*3/MM3 (ref 3.4–10.8)

## 2024-07-22 PROCEDURE — 25810000003 SODIUM CHLORIDE 0.9 % SOLUTION: Performed by: ORTHOPAEDIC SURGERY

## 2024-07-22 PROCEDURE — 25810000003 LACTATED RINGERS PER 1000 ML: Performed by: STUDENT IN AN ORGANIZED HEALTH CARE EDUCATION/TRAINING PROGRAM

## 2024-07-22 PROCEDURE — C1713 ANCHOR/SCREW BN/BN,TIS/BN: HCPCS | Performed by: ORTHOPAEDIC SURGERY

## 2024-07-22 PROCEDURE — 25810000003 SODIUM CHLORIDE 0.9 % SOLUTION 250 ML FLEX CONT: Performed by: ORTHOPAEDIC SURGERY

## 2024-07-22 PROCEDURE — 25010000002 HYDROMORPHONE 1 MG/ML SOLUTION: Performed by: ANESTHESIOLOGY

## 2024-07-22 PROCEDURE — 85025 COMPLETE CBC W/AUTO DIFF WBC: CPT | Performed by: STUDENT IN AN ORGANIZED HEALTH CARE EDUCATION/TRAINING PROGRAM

## 2024-07-22 PROCEDURE — 25010000002 HYDROMORPHONE 1 MG/ML SOLUTION: Performed by: ORTHOPAEDIC SURGERY

## 2024-07-22 PROCEDURE — 73501 X-RAY EXAM HIP UNI 1 VIEW: CPT

## 2024-07-22 PROCEDURE — 25010000002 DEXAMETHASONE SODIUM PHOSPHATE 20 MG/5ML SOLUTION: Performed by: ANESTHESIOLOGY

## 2024-07-22 PROCEDURE — 0SPB0JZ REMOVAL OF SYNTHETIC SUBSTITUTE FROM LEFT HIP JOINT, OPEN APPROACH: ICD-10-PCS | Performed by: ORTHOPAEDIC SURGERY

## 2024-07-22 PROCEDURE — 25010000002 THIAMINE HCL 200 MG/2ML SOLUTION: Performed by: INTERNAL MEDICINE

## 2024-07-22 PROCEDURE — 25010000002 PHENYLEPHRINE 10 MG/ML SOLUTION: Performed by: ANESTHESIOLOGY

## 2024-07-22 PROCEDURE — 0SRB0JZ REPLACEMENT OF LEFT HIP JOINT WITH SYNTHETIC SUBSTITUTE, OPEN APPROACH: ICD-10-PCS | Performed by: ORTHOPAEDIC SURGERY

## 2024-07-22 PROCEDURE — 25010000002 CLINDAMYCIN 900 MG/50ML SOLUTION: Performed by: ORTHOPAEDIC SURGERY

## 2024-07-22 PROCEDURE — 25010000002 FENTANYL CITRATE (PF) 50 MCG/ML SOLUTION: Performed by: ANESTHESIOLOGY

## 2024-07-22 PROCEDURE — 25010000002 SUGAMMADEX 200 MG/2ML SOLUTION: Performed by: ANESTHESIOLOGY

## 2024-07-22 PROCEDURE — 25010000002 VANCOMYCIN 1 G RECONSTITUTED SOLUTION 1 EACH VIAL: Performed by: ORTHOPAEDIC SURGERY

## 2024-07-22 PROCEDURE — C1776 JOINT DEVICE (IMPLANTABLE): HCPCS | Performed by: ORTHOPAEDIC SURGERY

## 2024-07-22 PROCEDURE — 25810000003 LACTATED RINGERS PER 1000 ML: Performed by: ORTHOPAEDIC SURGERY

## 2024-07-22 PROCEDURE — 25010000002 HYDROMORPHONE PER 4 MG: Performed by: ANESTHESIOLOGY

## 2024-07-22 PROCEDURE — 25010000002 PROPOFOL 10 MG/ML EMULSION: Performed by: ANESTHESIOLOGY

## 2024-07-22 PROCEDURE — 25010000002 VANCOMYCIN 10 G RECONSTITUTED SOLUTION: Performed by: ORTHOPAEDIC SURGERY

## 2024-07-22 PROCEDURE — 0QH604Z INSERTION OF INTERNAL FIXATION DEVICE INTO RIGHT UPPER FEMUR, OPEN APPROACH: ICD-10-PCS | Performed by: ORTHOPAEDIC SURGERY

## 2024-07-22 PROCEDURE — 25010000002 THIAMINE PER 100 MG: Performed by: ORTHOPAEDIC SURGERY

## 2024-07-22 PROCEDURE — 25010000002 VANCOMYCIN 1 G RECONSTITUTED SOLUTION: Performed by: ORTHOPAEDIC SURGERY

## 2024-07-22 PROCEDURE — 80053 COMPREHEN METABOLIC PANEL: CPT | Performed by: STUDENT IN AN ORGANIZED HEALTH CARE EDUCATION/TRAINING PROGRAM

## 2024-07-22 PROCEDURE — G0378 HOSPITAL OBSERVATION PER HR: HCPCS

## 2024-07-22 DEVICE — OR3O DUAL MOBILITY LINER 46/60
Type: IMPLANTABLE DEVICE | Site: HIP | Status: FUNCTIONAL
Brand: OR3O DUAL MOBILITY

## 2024-07-22 DEVICE — REDAPT LOCKING SCREW 30MM
Type: IMPLANTABLE DEVICE | Site: HIP | Status: FUNCTIONAL
Brand: REDAPT

## 2024-07-22 DEVICE — REDAPT 190MM SLEEVELESS REVISION                                    STEM SIZE 18 STANDARD OFFSET
Type: IMPLANTABLE DEVICE | Site: HIP | Status: FUNCTIONAL
Brand: REDAPT

## 2024-07-22 DEVICE — DEV CONTRL TISS STRATAFIX SPIRAL MNCRYL UD 3/0 PLS 30CM: Type: IMPLANTABLE DEVICE | Site: HIP | Status: FUNCTIONAL

## 2024-07-22 DEVICE — OXINIUM FEMORAL HEAD 12/14 TAPER                                    28 MM +0
Type: IMPLANTABLE DEVICE | Site: HIP | Status: FUNCTIONAL
Brand: OXINIUM

## 2024-07-22 DEVICE — REDAPT MODULAR SHELL 60MM
Type: IMPLANTABLE DEVICE | Site: HIP | Status: FUNCTIONAL
Brand: REDAPT

## 2024-07-22 DEVICE — DEV CONTRL TISS STRATAFIX SYMM PDS PLUS VIL CT-1 60CM: Type: IMPLANTABLE DEVICE | Site: HIP | Status: FUNCTIONAL

## 2024-07-22 DEVICE — REDAPT LOCKING SCREW 15MM
Type: IMPLANTABLE DEVICE | Site: HIP | Status: FUNCTIONAL
Brand: REDAPT

## 2024-07-22 DEVICE — OR3O DUAL MOBILITY XLPE INSERT 28/46
Type: IMPLANTABLE DEVICE | Site: HIP | Status: FUNCTIONAL
Brand: OR3O DUAL MOBILITY

## 2024-07-22 DEVICE — REDAPT LOCKING SCREW 25MM
Type: IMPLANTABLE DEVICE | Site: HIP | Status: FUNCTIONAL
Brand: REDAPT

## 2024-07-22 DEVICE — REDAPT LOCKING SCREW 50MM
Type: IMPLANTABLE DEVICE | Site: HIP | Status: FUNCTIONAL
Brand: REDAPT

## 2024-07-22 DEVICE — ACCORD 2.0MM COBALT CHROME CABLE                                    WITH CLAMP
Type: IMPLANTABLE DEVICE | Site: HIP | Status: FUNCTIONAL
Brand: ACCORD

## 2024-07-22 DEVICE — REDAPT LOCKING SCREW 20MM
Type: IMPLANTABLE DEVICE | Site: HIP | Status: FUNCTIONAL
Brand: REDAPT

## 2024-07-22 RX ORDER — CLINDAMYCIN PHOSPHATE 900 MG/50ML
900 INJECTION, SOLUTION INTRAVENOUS EVERY 8 HOURS
Status: COMPLETED | OUTPATIENT
Start: 2024-07-22 | End: 2024-07-23

## 2024-07-22 RX ORDER — FENTANYL CITRATE 50 UG/ML
50 INJECTION, SOLUTION INTRAMUSCULAR; INTRAVENOUS
Status: DISCONTINUED | OUTPATIENT
Start: 2024-07-22 | End: 2024-07-22 | Stop reason: HOSPADM

## 2024-07-22 RX ORDER — CEFAZOLIN SODIUM 1 G/3ML
INJECTION, POWDER, FOR SOLUTION INTRAMUSCULAR; INTRAVENOUS AS NEEDED
Status: DISCONTINUED | OUTPATIENT
Start: 2024-07-22 | End: 2024-07-22 | Stop reason: HOSPADM

## 2024-07-22 RX ORDER — VANCOMYCIN/0.9 % SOD CHLORIDE 1.5G/250ML
1500 PLASTIC BAG, INJECTION (ML) INTRAVENOUS
Status: DISCONTINUED | OUTPATIENT
Start: 2024-07-23 | End: 2024-07-22

## 2024-07-22 RX ORDER — TRANEXAMIC ACID 100 MG/ML
INJECTION, SOLUTION INTRAVENOUS AS NEEDED
Status: DISCONTINUED | OUTPATIENT
Start: 2024-07-22 | End: 2024-07-22 | Stop reason: SURG

## 2024-07-22 RX ORDER — HYDROCODONE BITARTRATE AND ACETAMINOPHEN 7.5; 325 MG/1; MG/1
1 TABLET ORAL ONCE AS NEEDED
Status: COMPLETED | OUTPATIENT
Start: 2024-07-22 | End: 2024-07-22

## 2024-07-22 RX ORDER — DROPERIDOL 2.5 MG/ML
0.62 INJECTION, SOLUTION INTRAMUSCULAR; INTRAVENOUS ONCE AS NEEDED
Status: DISCONTINUED | OUTPATIENT
Start: 2024-07-22 | End: 2024-07-22 | Stop reason: HOSPADM

## 2024-07-22 RX ORDER — DIPHENHYDRAMINE HYDROCHLORIDE 50 MG/ML
12.5 INJECTION INTRAMUSCULAR; INTRAVENOUS
Status: DISCONTINUED | OUTPATIENT
Start: 2024-07-22 | End: 2024-07-22 | Stop reason: HOSPADM

## 2024-07-22 RX ORDER — VANCOMYCIN/0.9 % SOD CHLORIDE 1.5G/250ML
1500 PLASTIC BAG, INJECTION (ML) INTRAVENOUS ONCE
Status: COMPLETED | OUTPATIENT
Start: 2024-07-22 | End: 2024-07-22

## 2024-07-22 RX ORDER — VANCOMYCIN HYDROCHLORIDE 1 G/20ML
INJECTION, POWDER, LYOPHILIZED, FOR SOLUTION INTRAVENOUS AS NEEDED
Status: DISCONTINUED | OUTPATIENT
Start: 2024-07-22 | End: 2024-07-22 | Stop reason: HOSPADM

## 2024-07-22 RX ORDER — ROCURONIUM BROMIDE 10 MG/ML
INJECTION, SOLUTION INTRAVENOUS AS NEEDED
Status: DISCONTINUED | OUTPATIENT
Start: 2024-07-22 | End: 2024-07-22 | Stop reason: SURG

## 2024-07-22 RX ORDER — PHENYLEPHRINE HYDROCHLORIDE 10 MG/ML
INJECTION INTRAVENOUS AS NEEDED
Status: DISCONTINUED | OUTPATIENT
Start: 2024-07-22 | End: 2024-07-22 | Stop reason: SURG

## 2024-07-22 RX ORDER — HYDROMORPHONE HYDROCHLORIDE 1 MG/ML
0.25 INJECTION, SOLUTION INTRAMUSCULAR; INTRAVENOUS; SUBCUTANEOUS
Status: DISCONTINUED | OUTPATIENT
Start: 2024-07-22 | End: 2024-07-22 | Stop reason: HOSPADM

## 2024-07-22 RX ORDER — NALOXONE HCL 0.4 MG/ML
0.2 VIAL (ML) INJECTION AS NEEDED
Status: DISCONTINUED | OUTPATIENT
Start: 2024-07-22 | End: 2024-07-22 | Stop reason: HOSPADM

## 2024-07-22 RX ORDER — CLINDAMYCIN PHOSPHATE 600 MG/50ML
600 INJECTION, SOLUTION INTRAVENOUS
Status: DISCONTINUED | OUTPATIENT
Start: 2024-07-23 | End: 2024-07-22

## 2024-07-22 RX ORDER — FLUMAZENIL 0.1 MG/ML
0.2 INJECTION INTRAVENOUS AS NEEDED
Status: DISCONTINUED | OUTPATIENT
Start: 2024-07-22 | End: 2024-07-22 | Stop reason: HOSPADM

## 2024-07-22 RX ORDER — CLINDAMYCIN PHOSPHATE 900 MG/50ML
900 INJECTION, SOLUTION INTRAVENOUS ONCE
Status: COMPLETED | OUTPATIENT
Start: 2024-07-22 | End: 2024-07-22

## 2024-07-22 RX ORDER — DEXAMETHASONE SODIUM PHOSPHATE 4 MG/ML
INJECTION, SOLUTION INTRA-ARTICULAR; INTRALESIONAL; INTRAMUSCULAR; INTRAVENOUS; SOFT TISSUE AS NEEDED
Status: DISCONTINUED | OUTPATIENT
Start: 2024-07-22 | End: 2024-07-22 | Stop reason: SURG

## 2024-07-22 RX ORDER — PROPOFOL 10 MG/ML
VIAL (ML) INTRAVENOUS AS NEEDED
Status: DISCONTINUED | OUTPATIENT
Start: 2024-07-22 | End: 2024-07-22 | Stop reason: SURG

## 2024-07-22 RX ORDER — EPHEDRINE SULFATE 50 MG/ML
10 INJECTION, SOLUTION INTRAVENOUS ONCE AS NEEDED
Status: COMPLETED | OUTPATIENT
Start: 2024-07-22 | End: 2024-07-22

## 2024-07-22 RX ORDER — CLINDAMYCIN PHOSPHATE 900 MG/50ML
900 INJECTION, SOLUTION INTRAVENOUS
Status: DISCONTINUED | OUTPATIENT
Start: 2024-07-23 | End: 2024-07-22

## 2024-07-22 RX ORDER — TRANEXAMIC ACID 10 MG/ML
1000 INJECTION, SOLUTION INTRAVENOUS ONCE
Status: DISCONTINUED | OUTPATIENT
Start: 2024-07-22 | End: 2024-07-22 | Stop reason: HOSPADM

## 2024-07-22 RX ORDER — ONDANSETRON 2 MG/ML
4 INJECTION INTRAMUSCULAR; INTRAVENOUS ONCE AS NEEDED
Status: DISCONTINUED | OUTPATIENT
Start: 2024-07-22 | End: 2024-07-22 | Stop reason: HOSPADM

## 2024-07-22 RX ORDER — LIDOCAINE HYDROCHLORIDE 20 MG/ML
INJECTION, SOLUTION EPIDURAL; INFILTRATION; INTRACAUDAL; PERINEURAL AS NEEDED
Status: DISCONTINUED | OUTPATIENT
Start: 2024-07-22 | End: 2024-07-22 | Stop reason: SURG

## 2024-07-22 RX ORDER — OXYCODONE AND ACETAMINOPHEN 7.5; 325 MG/1; MG/1
1 TABLET ORAL EVERY 4 HOURS PRN
Status: DISCONTINUED | OUTPATIENT
Start: 2024-07-22 | End: 2024-07-22 | Stop reason: HOSPADM

## 2024-07-22 RX ORDER — HYDRALAZINE HYDROCHLORIDE 20 MG/ML
10 INJECTION INTRAMUSCULAR; INTRAVENOUS
Status: DISCONTINUED | OUTPATIENT
Start: 2024-07-22 | End: 2024-07-22 | Stop reason: HOSPADM

## 2024-07-22 RX ORDER — HYDROMORPHONE HYDROCHLORIDE 1 MG/ML
0.5 INJECTION, SOLUTION INTRAMUSCULAR; INTRAVENOUS; SUBCUTANEOUS
Status: DISCONTINUED | OUTPATIENT
Start: 2024-07-22 | End: 2024-07-22 | Stop reason: HOSPADM

## 2024-07-22 RX ORDER — LABETALOL HYDROCHLORIDE 5 MG/ML
10 INJECTION, SOLUTION INTRAVENOUS
Status: DISCONTINUED | OUTPATIENT
Start: 2024-07-22 | End: 2024-07-22 | Stop reason: HOSPADM

## 2024-07-22 RX ADMIN — Medication 10 ML: at 12:28

## 2024-07-22 RX ADMIN — DEXAMETHASONE SODIUM PHOSPHATE 6 MG: 4 INJECTION, SOLUTION INTRAMUSCULAR; INTRAVENOUS at 15:23

## 2024-07-22 RX ADMIN — HYDROCODONE BITARTRATE AND ACETAMINOPHEN 1 TABLET: 7.5; 325 TABLET ORAL at 18:13

## 2024-07-22 RX ADMIN — FENTANYL CITRATE 50 MCG: 50 INJECTION, SOLUTION INTRAMUSCULAR; INTRAVENOUS at 17:13

## 2024-07-22 RX ADMIN — VANCOMYCIN HYDROCHLORIDE 1000 MG: 1 INJECTION, POWDER, LYOPHILIZED, FOR SOLUTION INTRAVENOUS at 22:00

## 2024-07-22 RX ADMIN — LORAZEPAM 1 MG: 1 TABLET ORAL at 20:45

## 2024-07-22 RX ADMIN — BUSPIRONE HYDROCHLORIDE 15 MG: 15 TABLET ORAL at 20:45

## 2024-07-22 RX ADMIN — SERTRALINE 100 MG: 100 TABLET, FILM COATED ORAL at 20:45

## 2024-07-22 RX ADMIN — LORAZEPAM 1 MG: 1 TABLET ORAL at 02:19

## 2024-07-22 RX ADMIN — BUSPIRONE HYDROCHLORIDE 15 MG: 15 TABLET ORAL at 11:57

## 2024-07-22 RX ADMIN — TIZANIDINE 4 MG: 4 TABLET ORAL at 22:07

## 2024-07-22 RX ADMIN — QUETIAPINE FUMARATE 100 MG: 100 TABLET ORAL at 20:45

## 2024-07-22 RX ADMIN — LORAZEPAM 1 MG: 1 TABLET ORAL at 11:56

## 2024-07-22 RX ADMIN — SODIUM CHLORIDE, POTASSIUM CHLORIDE, SODIUM LACTATE AND CALCIUM CHLORIDE 75 ML/HR: 600; 310; 30; 20 INJECTION, SOLUTION INTRAVENOUS at 19:03

## 2024-07-22 RX ADMIN — ROCURONIUM BROMIDE 40 MG: 10 INJECTION, SOLUTION INTRAVENOUS at 15:20

## 2024-07-22 RX ADMIN — SODIUM CHLORIDE, POTASSIUM CHLORIDE, SODIUM LACTATE AND CALCIUM CHLORIDE: 600; 310; 30; 20 INJECTION, SOLUTION INTRAVENOUS at 15:43

## 2024-07-22 RX ADMIN — TRANEXAMIC ACID 1000 MG: 1 INJECTION, SOLUTION INTRAVENOUS at 15:32

## 2024-07-22 RX ADMIN — SUGAMMADEX 400 MG: 100 INJECTION, SOLUTION INTRAVENOUS at 16:44

## 2024-07-22 RX ADMIN — CLINDAMYCIN PHOSPHATE 900 MG: 900 INJECTION, SOLUTION INTRAVENOUS at 20:45

## 2024-07-22 RX ADMIN — TRANEXAMIC ACID 1000 MG: 1 INJECTION, SOLUTION INTRAVENOUS at 16:40

## 2024-07-22 RX ADMIN — SERTRALINE 100 MG: 100 TABLET, FILM COATED ORAL at 11:57

## 2024-07-22 RX ADMIN — EPHEDRINE SULFATE 15 MG: 50 INJECTION INTRAVENOUS at 17:08

## 2024-07-22 RX ADMIN — PHENYLEPHRINE HYDROCHLORIDE 200 MCG: 10 INJECTION INTRAVENOUS at 16:08

## 2024-07-22 RX ADMIN — THIAMINE HYDROCHLORIDE 200 MG: 100 INJECTION, SOLUTION INTRAMUSCULAR; INTRAVENOUS at 22:07

## 2024-07-22 RX ADMIN — Medication 10 ML: at 20:45

## 2024-07-22 RX ADMIN — CLINDAMYCIN PHOSPHATE 900 MG: 900 INJECTION, SOLUTION INTRAVENOUS at 15:11

## 2024-07-22 RX ADMIN — Medication 5 MG: at 20:51

## 2024-07-22 RX ADMIN — HYDROMORPHONE HYDROCHLORIDE 0.5 MG: 1 INJECTION, SOLUTION INTRAMUSCULAR; INTRAVENOUS; SUBCUTANEOUS at 15:45

## 2024-07-22 RX ADMIN — PHENYLEPHRINE HYDROCHLORIDE 100 MCG: 10 INJECTION INTRAVENOUS at 15:56

## 2024-07-22 RX ADMIN — ROCURONIUM BROMIDE 10 MG: 10 INJECTION, SOLUTION INTRAVENOUS at 15:41

## 2024-07-22 RX ADMIN — HYDROMORPHONE HYDROCHLORIDE 0.5 MG: 1 INJECTION, SOLUTION INTRAMUSCULAR; INTRAVENOUS; SUBCUTANEOUS at 20:51

## 2024-07-22 RX ADMIN — PROPOFOL 120 MG: 10 INJECTION, EMULSION INTRAVENOUS at 15:19

## 2024-07-22 RX ADMIN — HYDROMORPHONE HYDROCHLORIDE 0.5 MG: 1 INJECTION, SOLUTION INTRAMUSCULAR; INTRAVENOUS; SUBCUTANEOUS at 17:31

## 2024-07-22 RX ADMIN — FENTANYL CITRATE 100 MCG: 50 INJECTION, SOLUTION INTRAMUSCULAR; INTRAVENOUS at 16:59

## 2024-07-22 RX ADMIN — SODIUM CHLORIDE, POTASSIUM CHLORIDE, SODIUM LACTATE AND CALCIUM CHLORIDE 75 ML/HR: 600; 310; 30; 20 INJECTION, SOLUTION INTRAVENOUS at 12:26

## 2024-07-22 RX ADMIN — LIDOCAINE HYDROCHLORIDE 60 MG: 20 INJECTION, SOLUTION EPIDURAL; INFILTRATION; INTRACAUDAL; PERINEURAL at 15:19

## 2024-07-22 RX ADMIN — HYDROMORPHONE HYDROCHLORIDE 0.5 MG: 1 INJECTION, SOLUTION INTRAMUSCULAR; INTRAVENOUS; SUBCUTANEOUS at 15:15

## 2024-07-22 RX ADMIN — THIAMINE HYDROCHLORIDE 200 MG: 100 INJECTION, SOLUTION INTRAMUSCULAR; INTRAVENOUS at 05:45

## 2024-07-22 RX ADMIN — VANCOMYCIN HYDROCHLORIDE 1500 MG: 10 INJECTION, POWDER, LYOPHILIZED, FOR SOLUTION INTRAVENOUS at 14:16
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
## 2024-07-22 NOTE — PROGRESS NOTES
Name: Lita Yu ADMIT: 2024   : 1965  PCP: Norma Saul APRN    MRN: [id number] LOS: 0 days   AGE/SEX: 58 y.o. female  ROOM: [facility]     Subjective   Subjective   Patient seen and examined this morning. Hospital day 2, she is resting in bed, family at bedside. Hip pain ongoing, orthopedic surgery tentatively planning for OR today.      Objective   Objective   Vital Signs  Temp:  [97.9 °F (36.6 °C)-98.8 °F (37.1 °C)] 97.9 °F (36.6 °C)  Heart Rate:  [73-94] 82  Resp:  [16-20] 16  BP: (116-159)/(68-87) 150/87  SpO2:  [92 %-99 %] 92 %  on   ;   Device (Oxygen Therapy): room air  Body mass index is 25.24 kg/m².  Physical Exam  Vitals and nursing note reviewed.   Constitutional:       General: She is awake. She is not in acute distress.     Appearance: She is ill-appearing.   Cardiovascular:      Rate and Rhythm: Normal rate and regular rhythm.      Pulses: Normal pulses.      Heart sounds: Normal heart sounds.   Pulmonary:      Effort: Pulmonary effort is normal. No respiratory distress.      Breath sounds: Normal breath sounds. No wheezing.   Abdominal:      General: Bowel sounds are normal. There is no distension.      Palpations: Abdomen is soft.      Tenderness: There is no abdominal tenderness.   Musculoskeletal:         General: Tenderness present.   Skin:     General: Skin is warm and dry.       Results Review     I reviewed the patient's new clinical results.  Results from last 7 days   Lab Units 24  0719 24  0519 24  1407   WBC 10*3/mm3 3.73 4.05 4.40   HEMOGLOBIN g/dL 8.1* 8.9* 9.8*   PLATELETS 10*3/mm3 82* 88* 113*     Results from last 7 days   Lab Units 24  0720 24  0519 24  1407   SODIUM mmol/L 135* 139 139   POTASSIUM mmol/L 3.3* 4.2 3.3*   CHLORIDE mmol/L 106 110* 105   CO2 mmol/L 21.0* 17.2* 23.0   BUN mg/dL 10 7 8   CREATININE mg/dL 0.69 0.61 0.72   GLUCOSE mg/dL 82 134* 95   EGFR mL/min/1.73 102.0 105.1 98.3     Results from last 7  "days   Lab Units 07/22/24  0720 07/21/24  0519 07/20/24  1407   ALBUMIN g/dL 3.2* 3.5 3.8   BILIRUBIN mg/dL 0.2 0.3 0.4   ALK PHOS U/L 160* 189* 237*   AST (SGOT) U/L 30 30 43*   ALT (SGPT) U/L 18 24 24     Results from last 7 days   Lab Units 07/22/24  0720 07/21/24  0519 07/20/24  1407   CALCIUM mg/dL 8.3* 8.4* 8.5*   ALBUMIN g/dL 3.2* 3.5 3.8   MAGNESIUM mg/dL  --  1.7  --      Results from last 7 days   Lab Units 07/20/24  1407   PROCALCITONIN ng/mL 0.03   LACTATE mmol/L 1.5     No results found for: \"HGBA1C\", \"POCGLU\"    CT Lumbar Spine Without Contrast    Result Date: 7/20/2024  1. Sequela of discitis/osteomyelitis at L2-L3 with obliteration of the L2-L3 disc space and marked associated endplate irregularity/osteolysis. L2-L3 disc space loss is no worse than may MRI. Paraspinal inflammatory changes have significantly decreased/nearly resolved. 2. Mild right L5-S1 neuroforaminal stenosis secondary to disc and facet joint degeneration. 3. Bilateral total hip arthroplasties. Right arthroplasty has a cemented acetabular component. No evidence of fracture/periprosthetic fracture. Right acetabular component is positioned at the anterior superior aspect of the native right acetabulum. Recommend correlation with prior surgery.    Radiation dose reduction techniques were utilized, including automated exposure control and exposure modulation based on body size.  This report was finalized on 7/20/2024 4:05 PM by Dr. Jluis Barron M.D on Workstation: [id number]      CT Pelvis Without Contrast    Result Date: 7/20/2024  1. Sequela of discitis/osteomyelitis at L2-L3 with obliteration of the L2-L3 disc space and marked associated endplate irregularity/osteolysis. L2-L3 disc space loss is no worse than may MRI. Paraspinal inflammatory changes have significantly decreased/nearly resolved. 2. Mild right L5-S1 neuroforaminal stenosis secondary to disc and facet joint degeneration. 3. Bilateral total hip arthroplasties. Right " arthroplasty has a cemented acetabular component. No evidence of fracture/periprosthetic fracture. Right acetabular component is positioned at the anterior superior aspect of the native right acetabulum. Recommend correlation with prior surgery.    Radiation dose reduction techniques were utilized, including automated exposure control and exposure modulation based on body size.  This report was finalized on 7/20/2024 4:05 PM by Dr. Jluis Barron M.D on Workstation: [id number]       I have personally reviewed all medications:  Scheduled Medications  busPIRone, 15 mg, Oral, BID  folic acid, 1 mg, Oral, Daily  LORazepam, 1 mg, Oral, Q6H   Followed by  LORazepam, 1 mg, Oral, Q12H   Followed by  [START ON 7/24/2024] LORazepam, 1 mg, Oral, Daily  multivitamin, 1 tablet, Oral, Daily  QUEtiapine, 100 mg, Oral, Nightly  sertraline, 100 mg, Oral, BID  sodium chloride, 10 mL, Intravenous, Q12H  thiamine (B-1) IV, 200 mg, Intravenous, Q8H   Followed by  [START ON 7/26/2024] thiamine, 100 mg, Oral, Daily  vitamin B-12, 1,000 mcg, Oral, Daily    Infusions  lactated ringers, 75 mL/hr, Last Rate: 75 mL/hr (07/21/24 2330)    Diet  NPO Diet NPO Type: Strict NPO    I have personally reviewed:  [x]  Laboratory   []  Microbiology   [x]  Radiology   []  EKG/Telemetry  []  Cardiology/Vascular   []  Pathology    []  Records       Assessment/Plan     Active Hospital Problems    Diagnosis  POA    **Intractable low back pain [M54.59]  Yes    Metabolic acidosis [E87.20]  Yes    Thrombocytopenia [D69.6]  Yes    Alcohol use [Z78.9]  Yes    History of MRSA infection [Z86.14]  Yes    Right hip pain [M25.551]  Yes    S/P total right hip arthroplasty [Z96.641]  Not Applicable    Anemia [D64.9]  Yes    Spinal abscess [M46.20]  Yes    Cirrhosis of liver [K74.60]  Yes    Chronic obstructive pulmonary disease [J44.9]  Yes    Essential hypertension [I10]  Yes    Seizure disorder [G40.909]  Yes    Post traumatic stress disorder [F43.10]  Yes     Peripheral vascular disease [I73.9]  Yes      Resolved Hospital Problems   No resolved problems to display.       58 y.o. female admitted with Intractable low back pain.    Intractable low back and right hip pain   - In setting of recent MRSA infection, previously completed antibiotics. No evidence of sepsis, inflammatory markers unremarkable. CT Lumbar spine, pelvis and XR of hip reviewed. Records reviewed.  - CHRISTIANO and Orthopedic surgery consulted. Orthopedic noting tentative plan for OR today (07/22). Notes from consultants reviewed. Will continue to follow their plans/recommendations. Greatly appreciate their help.  - PRN medications for pain. Patient currently receiving IV pain medication PRN, requiring close monitoring. Need to closely monitor for over-sedation, respiratory depression and constipation.    Alcohol abuse with concern for withdrawal  - Patient reportedly drinks ~one fifth of bourbon daily. Last drink in AM prior to arrival. She is on scheduled Ativan per protocol, tolerating well, no withdrawal symptoms at present. Can continue for now, likely discontinue on 07/23 if stable.  - CIWA protocol, MV, thiamine, folic acid  - Consulted [facility]. Will follow their plans/recommendations. Greatly appreciate their help.    Hypokalemia  - K low on most recent labs; Will replete via electrolyte protocol. Follow up repeat labs to guide ongoing management decisions. Replete PRN per protocol. Continue to monitor    Non anion-gap metabolic acidosis  - Noted on most recent labs, could be result of IVF with NS. NS was stopped, LR started, values have improved. Continue as prescribed and continue monitoring.  - Repeat labs in AM for reassessment. Further management based on results of testing.    Cirrhosis/chronic liver disease  - Appears compensated, abdominal exam benign, LFT stable. Need to closely monitor volume status because patient receiving IVF. Continue monitoring.    Anemia  - Hemoglobin low on most  recent labs, worse from prior. No evidence of overt blood loss, could be in part due to dilution from IVF. No indications for acute intervention at this time.  Iron studies showing low TSAT but elevated ferritin, likely reflective of anemia of chronic disease.  - Order repeat CBC in AM for reassessment. Continue to monitor, transfuse for hemoglobin <7.    Thrombocytopenia  - Platelets found to be low on most recent labs, likely 2/2 liver disease. No indications for urgent intervention at present. Will monitor for now.  - Order repeat CBC in AM for reassessment.    Hypertension  - Blood pressure appears stable and acceptable acutely at this time. No indications to warrant acute changes/intervention at this time.  - Trend BP to guide ongoing management decisions.    COPD  - Appears stable from this perspective at present.  No evidence to suggest acute exacerbation.  Continue current medications as prescribed and closely monitor.    Depression/Anxiety  - Continue current medications as prescribed.      Portions of this text have been copied and I have reviewed these. Documentation accurate as of 07/22/24.       SCDs for DVT prophylaxis.  Full code.  Discussed with patient, family, and nursing staff.  Anticipate discharge home timing yet to be determined.  Expected discharge date/ time has not been documented.      Adonis Byers DO  [facility]  07/22/24

## 2024-07-22 NOTE — PROGRESS NOTES
Access attempted follow up. Pt continues out of room since hip surgery today. Pt's nurse states pt's CIWA was a 0. Access will continue to follow.

## 2024-07-22 NOTE — ANESTHESIA PREPROCEDURE EVALUATION
Anesthesia Evaluation     NPO Solid Status: > 8 hours  NPO Liquid Status: > 8 hours           Airway   Mallampati: I  No difficulty expected  Dental      Pulmonary    (+) a smoker Current, Smoked day of surgery, cigarettes, COPD,  Cardiovascular   Exercise tolerance: good (4-7 METS)    (+) hypertension, PVD, hyperlipidemia      Neuro/Psych  (+) seizures, numbness, psychiatric history  GI/Hepatic/Renal/Endo    (+) GERD, liver disease    Musculoskeletal     Abdominal    Substance History      OB/GYN          Other   arthritis,                 Anesthesia Plan    ASA 3     general     intravenous induction     Anesthetic plan, risks, benefits, and alternatives have been provided, discussed and informed consent has been obtained with: patient.    CODE STATUS:    Code Status (Patient has no pulse and is not breathing): CPR (Attempt to Resuscitate)  Medical Interventions (Patient has pulse or is breathing): Full Support

## 2024-07-22 NOTE — OP NOTE
Total Hip Revision Operative Note  Dr. SHADE Chen II  [phone]    PATIENT NAME: Lita Yu  MRN: [id number]  : 1965 AGE: 58 y.o. GENDER: female  DATE OF OPERATION: 2024  PREOPERATIVE DIAGNOSIS:  Loose acetabular component  POSTOPERATIVE DIAGNOSIS: Same with proximal femur fracture  OPERATION PERFORMED: Right Total Hip Revision with prophylactic fixation of the femur  SURGEON: Cristobal Chen MD  Circulator: So De Los Santos RN  Scrub Person: So Shannon PCT  Vendor Representative: Carl Ruiz  Assistant: Al Rahman CSA  ANESTHESIA: General  ASSISTANT: Kristian Rahman. This case would not have been possible without another set of skilled surgical hands for retraction, use of instrumentation, and general assistance.  This assistance was vital to the success of the case.   ESTIMATED BLOOD LOSS: 300cc  SPONGE AND NEEDLE COUNT: Correct  INDICATIONS:  This patient had a previous hip infection and underwent total hip arthroplasty with copious amounts of antibiotic cement.  She had a fall and suffered an acetabular fracture with disruption of her acetabular component.  A discussion of operative versus nonoperative treatment was had with the patient. They elected to undergo revision hip arthroplasty. The risks of surgery were discussed and included the risk of anesthesia, infection, damage to neurovascular structures, implant loosening/failure, fracture, hardware prominence, dislocation, the need for further procedures, medical complications, and others. No guarantees were made. The patient wished to proceed with surgery and a surgical consent was signed.  COMPONENTS:   Readapt 60 mm outer diameter shell with 8 locking screws and a dual mobility liner  Readapt femoral standard offset size 18 x 190 with a +0 dual mobility head ball  Accord 2 mm cable with clamp    CPT For Billing: Total Hip Revision Full: 54770  Prophylactic Wiring Of Femur: 20015    DETAILS OF PROCEDURE:   The  patient was met in the preoperative area. The site was marked. The consent and H&P were reviewed. The patient was then wheeled back to the operative suite and underwent anesthesia. The patient was positioned laterally using the pegboard.  An axillary roll was placed under the down arm. Excess hair about the operative hip was removed using surgical clippers. Surgical alcohol was used to thoroughly clean the entire operative extremity.     The hip and leg were then prepped in the normal sterile fashion, which included ChloraPrep, multiple layers of sterile drapes, and surgical space suits for the entire operative team. New outer gloves were used by all sterile surgical team members after final draping. The surgical incision was marked. A surgical timeout was performed in which administration of preoperative antibiotics and tranexamic acid, if not contraindicated, was confirmed.    A standard posterior approach to the hip was then utilized.  After dissection through the fascia, I was able to identify the acetabulum.  The acetabular component had gotten loose and had slid anteriorly.  It had fractured off much of the anterior wall.  I was able to extract most of the antibiotic cement this still remained within the acetabulum and then was able to identify and remove the entire acetabular component which was still attached to the femoral component through the constrained liner.  Eventually, I was able to break that bond and then I removed the femoral head ball.  I was easily able to extract the femoral component from the cement mantle and then used an osteotome to loosen the cement circumferentially around the femur until all that was gone.  I did notice that there was a fracture of the proximal femur prior to removing the cement.  This likely happened during her fall.  I next placed a cable just distal to the lesser trochanter.  This cable acted as a prophylactic cable to prevent catastrophic fracture propagation during  "reaming, implantation, and early weightbearing.  Failure to pass this cable could have been disastrous for the hip.    I then began reaming of the femur until there was adequate fill.  Proximal reaming was also performed.  I then turned my attention to the acetabulum.  Retractors were placed appropriately.  The patient was missing most of the anterior wall of the acetabulum due to the fall and fracture.  I used progressive reaming until there was adequate fill as there was still reasonable bone superiorly inferiorly and posteriorly.  A real acetabular shell 1 mm larger was then inserted into proper abduction and anteversion while maximizing bony coverage.  I then placed 8 locking screws to ensure reasonable early fixation.  A neutral liner was then snapped into place.  I then trialed the hip and felt that we were a little bit short and stability was not ideal.  So I reamed up on the femur 1 more size and then trialed again.  There was very good hip stability.  A real femoral component was then opened and inserted to the same depth and version as the trial.  The wound was copiously irrigated and injected with an analgesic cocktail.  Antibiotics were left deep within the wound.  The wound was then closed in layers and a sterile dressing was applied.    The patient was moved from the operative table to the bed. The patient was taken to the recovery room in stable condition. There were no complications and the patient tolerated the procedure well.    R \"Heri\" Gustavo HALE MD  Orthopaedic Surgery  [facility]  [phone]                "

## 2024-07-22 NOTE — PLAN OF CARE
Problem: Fall Injury Risk  Goal: Absence of Fall and Fall-Related Injury  Outcome: Ongoing, Progressing     Problem: Pain Acute  Goal: Acceptable Pain Control and Functional Ability  Outcome: Ongoing, Progressing     Problem: Adult Inpatient Plan of Care  Goal: Plan of Care Review  Outcome: Ongoing, Progressing  Goal: Patient-Specific Goal (Individualized)  Outcome: Ongoing, Progressing  Goal: Absence of Hospital-Acquired Illness or Injury  Outcome: Ongoing, Progressing  Goal: Optimal Comfort and Wellbeing  Outcome: Ongoing, Progressing  Goal: Readiness for Transition of Care  Outcome: Ongoing, Progressing     Problem: Skin Injury Risk Increased  Goal: Skin Health and Integrity  Outcome: Ongoing, Progressing   Goal Outcome Evaluation:

## 2024-07-22 NOTE — ANESTHESIA POSTPROCEDURE EVALUATION
Patient: Lita Yu    Procedure Summary       Date: 07/22/24 Room / Location:  [facility] /  [facility]    Anesthesia Start: 1513 Anesthesia Stop: 1714    Procedure: TOTAL HIP ARTHROPLASTY REVISION (Right: Hip) Diagnosis:       Right hip pain      (Right hip pain [M25.551])    Surgeons: Cristobal Chen II, MD Provider: Christian Romano MD    Anesthesia Type: general ASA Status: 3            Anesthesia Type: general    Vitals  Vitals Value Taken Time   /58 07/22/24 1800   Temp 36.4 °C (97.6 °F) 07/22/24 1708   Pulse 103 07/22/24 1801   Resp 18 07/22/24 1745   SpO2 99 % 07/22/24 1801   Vitals shown include unfiled device data.        Post Anesthesia Care and Evaluation    Patient location during evaluation: bedside  Pain management: adequate    Airway patency: patent  Anesthetic complications: No anesthetic complications    Cardiovascular status: acceptable  Respiratory status: acceptable  Hydration status: acceptable

## 2024-07-22 NOTE — ANESTHESIA PROCEDURE NOTES
Airway  Urgency: elective    Date/Time: 7/22/2024 3:22 PM  Airway not difficult    General Information and Staff    Patient location during procedure: OR  Anesthesiologist: Kirill Dunne MD    Indications and Patient Condition  Indications for airway management: airway protection    Preoxygenated: yes  Mask difficulty assessment: 1 - vent by mask    Final Airway Details  Final airway type: endotracheal airway      Successful airway: ETT  Cuffed: yes   Successful intubation technique: direct laryngoscopy  Endotracheal tube insertion site: oral  Blade: Dominic  Blade size: 3  ETT size (mm): 7.0  Cormack-Lehane Classification: grade IIa - partial view of glottis  Placement verified by: chest auscultation and capnometry   Measured from: lips  ETT/EBT  to lips (cm): 22  Number of attempts at approach: 1  Assessment: lips, teeth, and gum same as pre-op and atraumatic intubation

## 2024-07-22 NOTE — SIGNIFICANT NOTE
07/22/24 0809   OTHER   Discipline physical therapist   Rehab Time/Intention   Session Not Performed other (see comments)  (Pt currently NWB and plan for BRE revision today. PT will follow up with pt post-op for eval as appropriate.)   Therapy Assessment/Plan (PT)   Criteria for Skilled Interventions Met (PT) yes   Recommendation   PT - Next Appointment 07/23/24

## 2024-07-22 NOTE — PLAN OF CARE
Goal Outcome Evaluation:  Plan of Care Reviewed With: patient        Progress: no change   A&O. VSS. CIWA protocol followed, Pt slept most of night. IV dilaudid given. Bedrest. Possible sx today. CHG bath done. NPO after midnight.

## 2024-07-22 NOTE — PROGRESS NOTES
Planning possible surgical intervention later today on left hip as long as there is OR availability.  Patient to be kept NPO.  Risk benefits and alternatives discussed with the patient and she does wish to proceed.

## 2024-07-22 NOTE — SIGNIFICANT NOTE
07/22/24 0814   OTHER   Discipline occupational therapist   Rehab Time/Intention   Session Not Performed other (see comments)  (The pt is NPO for her surgical procedure with ortho today. Will f/u tomorrow.)   Recommendation   OT - Next Appointment 07/23/24

## 2024-07-23 LAB
ALBUMIN SERPL-MCNC: 3 G/DL (ref 3.5–5.2)
ALBUMIN/GLOB SERPL: 1.4 G/DL
ALP SERPL-CCNC: 120 U/L (ref 39–117)
ALT SERPL W P-5'-P-CCNC: 16 U/L (ref 1–33)
ANION GAP SERPL CALCULATED.3IONS-SCNC: 7 MMOL/L (ref 5–15)
AST SERPL-CCNC: 25 U/L (ref 1–32)
BASOPHILS # BLD AUTO: 0.01 10*3/MM3 (ref 0–0.2)
BASOPHILS NFR BLD AUTO: 0.2 % (ref 0–1.5)
BILIRUB SERPL-MCNC: <0.2 MG/DL (ref 0–1.2)
BUN SERPL-MCNC: 11 MG/DL (ref 6–20)
BUN/CREAT SERPL: 13.6 (ref 7–25)
CALCIUM SPEC-SCNC: 7.4 MG/DL (ref 8.6–10.5)
CHLORIDE SERPL-SCNC: 106 MMOL/L (ref 98–107)
CO2 SERPL-SCNC: 22 MMOL/L (ref 22–29)
CREAT SERPL-MCNC: 0.81 MG/DL (ref 0.57–1)
DEPRECATED RDW RBC AUTO: 54 FL (ref 37–54)
EGFRCR SERPLBLD CKD-EPI 2021: 85.3 ML/MIN/1.73
EOSINOPHIL # BLD AUTO: 0 10*3/MM3 (ref 0–0.4)
EOSINOPHIL NFR BLD AUTO: 0 % (ref 0.3–6.2)
ERYTHROCYTE [DISTWIDTH] IN BLOOD BY AUTOMATED COUNT: 14.9 % (ref 12.3–15.4)
GLOBULIN UR ELPH-MCNC: 2.2 GM/DL
GLUCOSE SERPL-MCNC: 112 MG/DL (ref 65–99)
HCT VFR BLD AUTO: 19 % (ref 34–46.6)
HCT VFR BLD AUTO: 22.1 % (ref 34–46.6)
HGB BLD-MCNC: 6.2 G/DL (ref 12–15.9)
HGB BLD-MCNC: 7.1 G/DL (ref 12–15.9)
IMM GRANULOCYTES # BLD AUTO: 0.09 10*3/MM3 (ref 0–0.05)
IMM GRANULOCYTES NFR BLD AUTO: 1.5 % (ref 0–0.5)
LYMPHOCYTES # BLD AUTO: 0.65 10*3/MM3 (ref 0.7–3.1)
LYMPHOCYTES NFR BLD AUTO: 10.8 % (ref 19.6–45.3)
MCH RBC QN AUTO: 32.6 PG (ref 26.6–33)
MCHC RBC AUTO-ENTMCNC: 32.6 G/DL (ref 31.5–35.7)
MCV RBC AUTO: 100 FL (ref 79–97)
MONOCYTES # BLD AUTO: 0.51 10*3/MM3 (ref 0.1–0.9)
MONOCYTES NFR BLD AUTO: 8.5 % (ref 5–12)
NEUTROPHILS NFR BLD AUTO: 4.76 10*3/MM3 (ref 1.7–7)
NEUTROPHILS NFR BLD AUTO: 79 % (ref 42.7–76)
NRBC BLD AUTO-RTO: 0 /100 WBC (ref 0–0.2)
PLATELET # BLD AUTO: 85 10*3/MM3 (ref 140–450)
PMV BLD AUTO: 9.5 FL (ref 6–12)
POTASSIUM SERPL-SCNC: 4.7 MMOL/L (ref 3.5–5.2)
PROT SERPL-MCNC: 5.2 G/DL (ref 6–8.5)
RBC # BLD AUTO: 1.9 10*6/MM3 (ref 3.77–5.28)
SODIUM SERPL-SCNC: 135 MMOL/L (ref 136–145)
WBC NRBC COR # BLD AUTO: 6.02 10*3/MM3 (ref 3.4–10.8)

## 2024-07-23 PROCEDURE — 25010000002 VANCOMYCIN 1 G RECONSTITUTED SOLUTION 1 EACH VIAL: Performed by: ORTHOPAEDIC SURGERY

## 2024-07-23 PROCEDURE — 85025 COMPLETE CBC W/AUTO DIFF WBC: CPT | Performed by: ORTHOPAEDIC SURGERY

## 2024-07-23 PROCEDURE — 25010000002 HYDROMORPHONE 1 MG/ML SOLUTION: Performed by: ORTHOPAEDIC SURGERY

## 2024-07-23 PROCEDURE — 25810000003 LACTATED RINGERS PER 1000 ML: Performed by: STUDENT IN AN ORGANIZED HEALTH CARE EDUCATION/TRAINING PROGRAM

## 2024-07-23 PROCEDURE — G0378 HOSPITAL OBSERVATION PER HR: HCPCS

## 2024-07-23 PROCEDURE — 80053 COMPREHEN METABOLIC PANEL: CPT | Performed by: ORTHOPAEDIC SURGERY

## 2024-07-23 PROCEDURE — 25010000002 HYDROMORPHONE PER 4 MG: Performed by: ORTHOPAEDIC SURGERY

## 2024-07-23 PROCEDURE — 86900 BLOOD TYPING SEROLOGIC ABO: CPT

## 2024-07-23 PROCEDURE — 25010000002 CLINDAMYCIN 900 MG/50ML SOLUTION: Performed by: ORTHOPAEDIC SURGERY

## 2024-07-23 PROCEDURE — 85014 HEMATOCRIT: CPT | Performed by: STUDENT IN AN ORGANIZED HEALTH CARE EDUCATION/TRAINING PROGRAM

## 2024-07-23 PROCEDURE — 85018 HEMOGLOBIN: CPT | Performed by: STUDENT IN AN ORGANIZED HEALTH CARE EDUCATION/TRAINING PROGRAM

## 2024-07-23 PROCEDURE — 25010000002 THIAMINE HCL 200 MG/2ML SOLUTION: Performed by: ORTHOPAEDIC SURGERY

## 2024-07-23 PROCEDURE — 36430 TRANSFUSION BLD/BLD COMPNT: CPT

## 2024-07-23 PROCEDURE — 25810000003 SODIUM CHLORIDE 0.9 % SOLUTION 250 ML FLEX CONT: Performed by: ORTHOPAEDIC SURGERY

## 2024-07-23 PROCEDURE — P9016 RBC LEUKOCYTES REDUCED: HCPCS

## 2024-07-23 RX ORDER — SODIUM CHLORIDE, SODIUM LACTATE, POTASSIUM CHLORIDE, CALCIUM CHLORIDE 600; 310; 30; 20 MG/100ML; MG/100ML; MG/100ML; MG/100ML
75 INJECTION, SOLUTION INTRAVENOUS CONTINUOUS
Status: DISCONTINUED | OUTPATIENT
Start: 2024-07-23 | End: 2024-07-25

## 2024-07-23 RX ADMIN — Medication 5 MG: at 22:11

## 2024-07-23 RX ADMIN — LORAZEPAM 1 MG: 1 TABLET ORAL at 09:52

## 2024-07-23 RX ADMIN — HYDROCODONE BITARTRATE AND ACETAMINOPHEN 1 TABLET: 10; 325 TABLET ORAL at 17:03

## 2024-07-23 RX ADMIN — SERTRALINE 100 MG: 100 TABLET, FILM COATED ORAL at 22:11

## 2024-07-23 RX ADMIN — HYDROMORPHONE HYDROCHLORIDE 0.5 MG: 1 INJECTION, SOLUTION INTRAMUSCULAR; INTRAVENOUS; SUBCUTANEOUS at 02:30

## 2024-07-23 RX ADMIN — HYDROMORPHONE HYDROCHLORIDE 0.5 MG: 1 INJECTION, SOLUTION INTRAMUSCULAR; INTRAVENOUS; SUBCUTANEOUS at 22:11

## 2024-07-23 RX ADMIN — Medication 1 TABLET: at 09:52

## 2024-07-23 RX ADMIN — SODIUM CHLORIDE, POTASSIUM CHLORIDE, SODIUM LACTATE AND CALCIUM CHLORIDE 75 ML/HR: 600; 310; 30; 20 INJECTION, SOLUTION INTRAVENOUS at 14:52

## 2024-07-23 RX ADMIN — QUETIAPINE FUMARATE 100 MG: 100 TABLET ORAL at 22:11

## 2024-07-23 RX ADMIN — CLINDAMYCIN PHOSPHATE 900 MG: 900 INJECTION, SOLUTION INTRAVENOUS at 02:32

## 2024-07-23 RX ADMIN — TIZANIDINE 4 MG: 4 TABLET ORAL at 22:11

## 2024-07-23 RX ADMIN — THIAMINE HYDROCHLORIDE 200 MG: 100 INJECTION, SOLUTION INTRAMUSCULAR; INTRAVENOUS at 14:58

## 2024-07-23 RX ADMIN — Medication 10 ML: at 22:56

## 2024-07-23 RX ADMIN — HYDROMORPHONE HYDROCHLORIDE 0.5 MG: 1 INJECTION, SOLUTION INTRAMUSCULAR; INTRAVENOUS; SUBCUTANEOUS at 14:52

## 2024-07-23 RX ADMIN — HYDROMORPHONE HYDROCHLORIDE 0.5 MG: 1 INJECTION, SOLUTION INTRAMUSCULAR; INTRAVENOUS; SUBCUTANEOUS at 06:04

## 2024-07-23 RX ADMIN — HYDROMORPHONE HYDROCHLORIDE 0.5 MG: 1 INJECTION, SOLUTION INTRAMUSCULAR; INTRAVENOUS; SUBCUTANEOUS at 18:24

## 2024-07-23 RX ADMIN — SERTRALINE 100 MG: 100 TABLET, FILM COATED ORAL at 09:56

## 2024-07-23 RX ADMIN — BUSPIRONE HYDROCHLORIDE 15 MG: 15 TABLET ORAL at 22:11

## 2024-07-23 RX ADMIN — BUSPIRONE HYDROCHLORIDE 15 MG: 15 TABLET ORAL at 09:52

## 2024-07-23 RX ADMIN — THIAMINE HYDROCHLORIDE 200 MG: 100 INJECTION, SOLUTION INTRAMUSCULAR; INTRAVENOUS at 22:11

## 2024-07-23 RX ADMIN — FOLIC ACID 1 MG: 1 TABLET ORAL at 09:53

## 2024-07-23 RX ADMIN — VANCOMYCIN HYDROCHLORIDE 1000 MG: 1 INJECTION, POWDER, LYOPHILIZED, FOR SOLUTION INTRAVENOUS at 06:09

## 2024-07-23 RX ADMIN — Medication 1000 MCG: at 09:52

## 2024-07-23 RX ADMIN — THIAMINE HYDROCHLORIDE 200 MG: 100 INJECTION, SOLUTION INTRAMUSCULAR; INTRAVENOUS at 06:04

## 2024-07-23 RX ADMIN — HYDROCODONE BITARTRATE AND ACETAMINOPHEN 1 TABLET: 10; 325 TABLET ORAL at 09:52

## 2024-07-23 RX ADMIN — CLINDAMYCIN PHOSPHATE 900 MG: 900 INJECTION, SOLUTION INTRAVENOUS at 14:52

## 2024-07-23 NOTE — PROGRESS NOTES
This patient is toe-touch weightbearing.  Any extra weight could lead to failure of her acetabular component.  I am very worried about the patient going home as I do not trust her to comply with these restrictions.  I explained to her that if she does go home and does not go to a rehab her chance of failure of the hip is quite high.  She needs to be toe-touch weightbearing for at least 6 weeks.  Unfortunately, she has very high risk for noncompliance and surgical complication.

## 2024-07-23 NOTE — PLAN OF CARE
Goal Outcome Evaluation:  Plan of Care Reviewed With: patient, spouse           Outcome Evaluation: Pt is POD#1 of right hip revision. VSS. PRN pain meds. 1 unit blood transfused this shift. Dressing intact with scant dried drainage. NWB to RLE. Plan is for rehab at d/c. Will continue to monitor.

## 2024-07-23 NOTE — PROGRESS NOTES
[facility] did follow up with pt and SO. Pt very sleepy but able to respond briefly. Pt's SO stated they both plan to quit drinking alcohol. Access strongly encouraged nursing rehab to help pt with safe recovery from hip surgery as suggested by surgeon. Access also encouraged return to AA mtgs to help stay sober.Pt reported visual hallucinations before surgery but was not reporting that after surgery. Pt CIWA has been 0. Access will continue to follow.

## 2024-07-23 NOTE — PROGRESS NOTES
Name: Lita Yu ADMIT: 2024   : 1965  PCP: Norma Saul APRN    MRN: [id number] LOS: 0 days   AGE/SEX: 58 y.o. female  ROOM: [facility]     Subjective   Subjective   Patient seen and examined this morning. Hospital day 3, POD #1 s/p Right Total Hip Revision with prophylactic fixation of the femur on 24. She is resting in bed, but having significant ongoing pain and discomfort in hip.     Objective   Objective   Vital Signs  Temp:  [97.2 °F (36.2 °C)-98 °F (36.7 °C)] 97.2 °F (36.2 °C)  Heart Rate:  [] 68  Resp:  [14-18] 16  BP: ()/() 99/62  SpO2:  [90 %-100 %] 94 %  on  Flow (L/min):  [2-6] 2;   Device (Oxygen Therapy): room air  Body mass index is 25.24 kg/m².  Physical Exam  Vitals and nursing note reviewed.   Constitutional:       General: She is awake.      Appearance: She is ill-appearing.      Comments: Uncomfortable appearing   Cardiovascular:      Rate and Rhythm: Normal rate and regular rhythm.      Pulses: Normal pulses.      Heart sounds: Normal heart sounds.   Pulmonary:      Effort: Pulmonary effort is normal. No respiratory distress.      Breath sounds: Normal breath sounds. No wheezing.   Abdominal:      General: Bowel sounds are normal. There is no distension.      Palpations: Abdomen is soft.      Tenderness: There is no abdominal tenderness.   Musculoskeletal:         General: Tenderness (right hip, dressing C/D/I) present.   Skin:     General: Skin is warm and dry.       Results Review     I reviewed the patient's new clinical results.  Results from last 7 days   Lab Units 24  0509 24  0719 24  0519 24  1407   WBC 10*3/mm3 6.02 3.73 4.05 4.40   HEMOGLOBIN g/dL 6.2* 8.1* 8.9* 9.8*   PLATELETS 10*3/mm3 85* 82* 88* 113*     Results from last 7 days   Lab Units 24  0509 24  0720 24  0519 24  1407   SODIUM mmol/L 135* 135* 139 139   POTASSIUM mmol/L 4.7 3.3* 4.2 3.3*   CHLORIDE mmol/L 106 106 110* 105  "  CO2 mmol/L 22.0 21.0* 17.2* 23.0   BUN mg/dL 11 10 7 8   CREATININE mg/dL 0.81 0.69 0.61 0.72   GLUCOSE mg/dL 112* 82 134* 95   EGFR mL/min/1.73 85.3 102.0 105.1 98.3     Results from last 7 days   Lab Units 07/23/24  0509 07/22/24  0720 07/21/24  0519 07/20/24  1407   ALBUMIN g/dL 3.0* 3.2* 3.5 3.8   BILIRUBIN mg/dL <0.2 0.2 0.3 0.4   ALK PHOS U/L 120* 160* 189* 237*   AST (SGOT) U/L 25 30 30 43*   ALT (SGPT) U/L 16 18 24 24     Results from last 7 days   Lab Units 07/23/24  0509 07/22/24  0720 07/21/24  0519 07/20/24  1407   CALCIUM mg/dL 7.4* 8.3* 8.4* 8.5*   ALBUMIN g/dL 3.0* 3.2* 3.5 3.8   MAGNESIUM mg/dL  --   --  1.7  --      Results from last 7 days   Lab Units 07/20/24  1407   PROCALCITONIN ng/mL 0.03   LACTATE mmol/L 1.5     No results found for: \"HGBA1C\", \"POCGLU\"    XR Hip With or Without Pelvis 1 View Right    Result Date: 7/22/2024  Revised right total hip arthroplasty with proximal femoral cerclage wire. Hardware is well seated without evidence of acute periprosthetic fracture. Contralateral left total hip arthroplasty.  This report was finalized on 7/22/2024 5:57 PM by Dr. Jluis Barron M.D on Workstation: [id number]       I have personally reviewed all medications:  Scheduled Medications  busPIRone, 15 mg, Oral, BID  clindamycin, 900 mg, Intravenous, Q8H  folic acid, 1 mg, Oral, Daily  LORazepam, 1 mg, Oral, Q12H   Followed by  [START ON 7/24/2024] LORazepam, 1 mg, Oral, Daily  multivitamin, 1 tablet, Oral, Daily  QUEtiapine, 100 mg, Oral, Nightly  sertraline, 100 mg, Oral, BID  sodium chloride, 10 mL, Intravenous, Q12H  thiamine (B-1) IV, 200 mg, Intravenous, Q8H   Followed by  [START ON 7/26/2024] thiamine, 100 mg, Oral, Daily  vitamin B-12, 1,000 mcg, Oral, Daily    Infusions  lactated ringers, 75 mL/hr, Last Rate: 75 mL/hr (07/22/24 1903)    Diet  Diet: Regular/House; Fluid Consistency: Thin (IDDSI 0)    I have personally reviewed:  [x]  Laboratory   []  Microbiology   [x]  Radiology   []  " EKG/Telemetry  []  Cardiology/Vascular   []  Pathology    []  Records       Assessment/Plan     Active Hospital Problems    Diagnosis  POA    **Intractable low back pain [M54.59]  Yes    Metabolic acidosis [E87.20]  Yes    Thrombocytopenia [D69.6]  Yes    Alcohol use [Z78.9]  Yes    History of MRSA infection [Z86.14]  Yes    Right hip pain [M25.551]  Yes    S/P total right hip arthroplasty [Z96.641]  Not Applicable    Anemia [D64.9]  Yes    Spinal abscess [M46.20]  Yes    Cirrhosis of liver [K74.60]  Yes    Chronic obstructive pulmonary disease [J44.9]  Yes    Essential hypertension [I10]  Yes    Seizure disorder [G40.909]  Yes    Post traumatic stress disorder [F43.10]  Yes    Peripheral vascular disease [I73.9]  Yes      Resolved Hospital Problems   No resolved problems to display.       58 y.o. female admitted with Intractable low back pain.    Intractable low back and right hip pain   Loose acetabular component with proximal femur fracture  - In setting of recent MRSA infection, previously completed antibiotics. No evidence of sepsis, inflammatory markers unremarkable. CT Lumbar spine, pelvis and XR of hip reviewed. Records reviewed.  - CHRISTIANO and Orthopedic surgery consulted. Neurosurgery stated no acute intervention warranted.  - Orthopedic surgery continues to follow. Patient is POD #1 s/p Right Total Hip Revision with prophylactic fixation of the femur on 07/22/24. They are concerned about her going home and concerned about compliance with toe-touch weight bearing restrictions at discharge and think she would benefit from rehab. They informed her that if she does go home and does not go to a rehab her chance of failure of the hip is quite high, she is supposed to continue with these restrictions for at least 6 weeks. Greatly appreciate their help. DVT prophylaxis per orthopedic surgery.  - CCP to discuss rehab with patient.   - Continue with PT/OT, fall precautions, weight bearing restrictions.  - PRN  medications for pain. Patient currently receiving IV pain medication PRN, requiring close monitoring. Need to closely monitor for over-sedation, respiratory depression and constipation.    Acute on chronic Anemia  - Hemoglobin low on most recent labs, worse from prior, <7, likely reflective of perioperative blood loss from procedure, possibly dilutional component from IVF contributing as well.  Iron studies showing low TSAT but elevated ferritin, likely reflective of anemia of chronic disease.  - Transfuse 1 unit PRBC now, recheck hemoglobin afterwards.  - Order repeat CBC in AM for reassessment. Continue to monitor, transfuse for hemoglobin <7.    Alcohol abuse with concern for withdrawal  - Patient reportedly drinks ~one fifth of bourbon daily. Last drink in AM prior to arrival. She is on scheduled Ativan per protocol, tolerating well, no withdrawal symptoms at present. Can continue for now, as this is controlling symptoms. This is due to be completed on 07/24.  - CIWA protocol, MV, thiamine, folic acid  - Consulted Access center. Will follow their plans/recommendations. Greatly appreciate their help.    Hypokalemia  - K low on prior labs, repleted via electrolyte protocol. Follow up repeat labs to guide ongoing management decisions. Replete PRN per protocol. Continue to monitor    Non anion-gap metabolic acidosis  - Noted on labs, could be result of IVF with NS. NS was stopped, LR started, values have improved.   - Repeat labs in AM for reassessment. Further management based on results of testing.    Cirrhosis/chronic liver disease  - Appears compensated, abdominal exam benign, LFT stable. Need to closely monitor volume status because patient receiving IVF. Continue monitoring.    Thrombocytopenia  - Platelets found to be low on most recent labs, likely 2/2 liver disease. No indications for urgent intervention at present. Will monitor for now.  - Order repeat CBC in AM for reassessment.    Hypertension  - Blood  pressure appears stable and acceptable acutely at this time. No indications to warrant acute changes/intervention at this time.  - Trend BP to guide ongoing management decisions.    COPD  - Appears stable from this perspective at present.  No evidence to suggest acute exacerbation.  Continue current medications as prescribed and closely monitor.    Depression/Anxiety  - Continue current medications as prescribed.      Portions of this text have been copied and I have reviewed these. Documentation accurate as of 07/23/24.       SCDs for DVT prophylaxis.  Full code.  Discussed with patient, family, and nursing staff.  Anticipate discharge home timing yet to be determined.  Expected discharge date/ time has not been documented.      Adonis Byers DO  [facility]  07/23/24

## 2024-07-23 NOTE — SIGNIFICANT NOTE
07/23/24 1441   OTHER   Discipline physical therapist   Rehab Time/Intention   Session Not Performed other (see comments)  (Patient with Hgb of 6.2 in AM, about to recieve blood. Patient with low BP in PM, recieving fluids. Hgb not yet rechecked. Acute PT will f/u.)   Recommendation   PT - Next Appointment 07/24/24

## 2024-07-23 NOTE — CASE MANAGEMENT/SOCIAL WORK
Discharge Planning Assessment  [facility]     Patient Name: Lita Yu  MRN: [id number]  Today's Date: 7/23/2024    Admit Date: 7/20/2024    Plan: plans home with significant other and [facility] ( referral pending)   Discharge Needs Assessment       Row Name 07/23/24 1455       Living Environment    People in Home other (see comments)    Unique Family Situation Ted Randle [phone]    Current Living Arrangements home    Potentially Unsafe Housing Conditions none    Primary Care Provided by self    Provides Primary Care For no one    Quality of Family Relationships helpful;involved;supportive    Able to Return to Prior Arrangements yes       Resource/Environmental Concerns    Resource/Environmental Concerns none    Transportation Concerns none       Transition Planning    Patient/Family Anticipates Transition to home with family    Patient/Family Anticipated Services at Transition ;home health care    Transportation Anticipated family or friend will provide;car, drives self       Discharge Needs Assessment    Readmission Within the Last 30 Days no previous admission in last 30 days    Equipment Currently Used at Home walker, standard    Concerns to be Addressed denies needs/concerns at this time                   Discharge Plan       Row Name 07/23/24 5748       Plan    Plan plans home with significant other and [facility] ( referral pending)    Patient/Family in Agreement with Plan yes    Plan Comments Spoke with patient at bedside. Introduced self and explained role. Facesheet verified. Patient lives in a 1st floor apartment with pam steps to enter. Her significant other, Ted Randle [phone], will be able to assist as needed. At baseline, she is IADLS. She does have a access to a cane or walker if needed. Discussed SNF, but patient is adamant that she wants to return home. She has used [facility] in the past and would like to have them follow again. Referral placed in [facility]. Significant  other is asking about a w/c at [place]. Advised that CCP will need to see PT recs before one can be ordered. CCP will follow.                  Continued Care and Services - Admitted Since 7/20/2024    No active coordination exists for this encounter.       Selected Continued Care - Prior Encounters Includes continued care and service providers with selected services from prior encounters from 4/21/2024 to 7/23/2024      Discharged on 5/11/2024 Admission date: 5/1/2024 - Discharge disposition: Skilled Nursing Facility (DC - External)      Destination       Service Provider Selected Services Address Phone Fax Patient Preferred    [facility] [address] [address] [phone] --              Dialysis/Infusion       Service Provider Selected Services Address Phone Fax Patient Preferred    [facility] [address] [place] [id number] [phone] --              Home Medical Care       Service Provider Selected Services Address Phone Fax Patient Preferred    [facility] [address] [facility], [address] [address] [phone] --                      Discharged on 4/25/2024 Admission date: 4/23/2024 - [place] [facility]      [place]       Service Provider Selected Services Address Phone Fax Patient Preferred    [facility] [address] [facility], [address] [address] [phone] --                             Demographic Summary       Row Name 07/23/24 1451       General Information    Admission Type observation    Arrived From home    Referral Source admission list    Reason for Consult discharge planning    Preferred Language English                   Functional Status       Row Name 07/23/24 9402        Functional Status    Usual Activity Tolerance good    Current Activity Tolerance moderate       Functional Status, IADL    Medications independent    Meal Preparation independent    Housekeeping independent    Laundry independent    Shopping independent                   Psychosocial    No documentation.                  Abuse/Neglect    No documentation.                  Legal    No documentation.                  Substance Abuse    No documentation.                  Patient Forms    No documentation.                     Nancy Lam RN

## 2024-07-23 NOTE — DISCHARGE PLACEMENT REQUEST
"Yonis Yu \"NAINA\" (58 y.o. Female)       Date of Birth   1965    Social Security Number       Address   [address] [place]    Home Phone   [phone]    MRN   [id number]       Mormonism   None    Marital Status                               Admission Date   7/20/24    Admission Type   Emergency    Admitting Provider   Melodie Brooks MD    Attending Provider   Adonis Byers DO    Department, Room/Bed   [address], P788/1       Discharge Date       Discharge Disposition       Discharge Destination                                 Attending Provider: Adonis Byers DO    Allergies: Sulfa Antibiotics, Keflex [Cephalexin]    Isolation: Contact   Infection: MRSA (07/22/24)   Code Status: CPR    Ht: 160 cm (62.99\")   Wt: 64.6 kg (142 lb 7 oz)    Admission Cmt: None   Principal Problem: Intractable low back pain [M54.59]                   Active Insurance as of 7/20/2024       Primary Coverage       Payor Plan Insurance Group Employer/Plan Group    [place] [id number]       Payor Plan Address Payor Plan Phone Number Payor Plan Fax Number Effective Dates    [address]   6/22/2014 - None Entered    [place] [id number]         Subscriber Name Subscriber Birth Date Member ID       YONIS YU 1965 [id number]                     Emergency Contacts        (Rel.) Home Phone Work Phone Mobile Phone    OBDULIA CANTU (Daughter) -- -- [phone]    MILANA DAI (Significant Other) [phone] -- [phone]                "

## 2024-07-23 NOTE — PLAN OF CARE
Goal Outcome Evaluation:  Plan of Care Reviewed With: patient           Outcome Evaluation: A&ox4, vss, last CIWA score 0, R hip dressing CDI, pain controlled with prn dilaudid.

## 2024-07-24 LAB
ALBUMIN SERPL-MCNC: 2.6 G/DL (ref 3.5–5.2)
ALBUMIN/GLOB SERPL: 1 G/DL
ALP SERPL-CCNC: 117 U/L (ref 39–117)
ALT SERPL W P-5'-P-CCNC: 18 U/L (ref 1–33)
ANION GAP SERPL CALCULATED.3IONS-SCNC: 9 MMOL/L (ref 5–15)
AST SERPL-CCNC: 41 U/L (ref 1–32)
BASOPHILS # BLD AUTO: 0.01 10*3/MM3 (ref 0–0.2)
BASOPHILS NFR BLD AUTO: 0.2 % (ref 0–1.5)
BILIRUB SERPL-MCNC: 0.2 MG/DL (ref 0–1.2)
BUN SERPL-MCNC: 11 MG/DL (ref 6–20)
BUN/CREAT SERPL: 13.3 (ref 7–25)
CALCIUM SPEC-SCNC: 7.6 MG/DL (ref 8.6–10.5)
CHLORIDE SERPL-SCNC: 106 MMOL/L (ref 98–107)
CO2 SERPL-SCNC: 22 MMOL/L (ref 22–29)
CREAT SERPL-MCNC: 0.83 MG/DL (ref 0.57–1)
DEPRECATED RDW RBC AUTO: 57.4 FL (ref 37–54)
EGFRCR SERPLBLD CKD-EPI 2021: 82.9 ML/MIN/1.73
EOSINOPHIL # BLD AUTO: 0.12 10*3/MM3 (ref 0–0.4)
EOSINOPHIL NFR BLD AUTO: 2.6 % (ref 0.3–6.2)
ERYTHROCYTE [DISTWIDTH] IN BLOOD BY AUTOMATED COUNT: 16.2 % (ref 12.3–15.4)
GLOBULIN UR ELPH-MCNC: 2.5 GM/DL
GLUCOSE SERPL-MCNC: 89 MG/DL (ref 65–99)
HCT VFR BLD AUTO: 21.8 % (ref 34–46.6)
HCT VFR BLD AUTO: 21.9 % (ref 34–46.6)
HGB BLD-MCNC: 7.1 G/DL (ref 12–15.9)
HGB BLD-MCNC: 7.2 G/DL (ref 12–15.9)
IMM GRANULOCYTES # BLD AUTO: 0.06 10*3/MM3 (ref 0–0.05)
IMM GRANULOCYTES NFR BLD AUTO: 1.3 % (ref 0–0.5)
LYMPHOCYTES # BLD AUTO: 1.15 10*3/MM3 (ref 0.7–3.1)
LYMPHOCYTES NFR BLD AUTO: 25.1 % (ref 19.6–45.3)
MCH RBC QN AUTO: 31.8 PG (ref 26.6–33)
MCHC RBC AUTO-ENTMCNC: 32.4 G/DL (ref 31.5–35.7)
MCV RBC AUTO: 98.2 FL (ref 79–97)
MONOCYTES # BLD AUTO: 0.44 10*3/MM3 (ref 0.1–0.9)
MONOCYTES NFR BLD AUTO: 9.6 % (ref 5–12)
NEUTROPHILS NFR BLD AUTO: 2.8 10*3/MM3 (ref 1.7–7)
NEUTROPHILS NFR BLD AUTO: 61.2 % (ref 42.7–76)
NRBC BLD AUTO-RTO: 0 /100 WBC (ref 0–0.2)
PLATELET # BLD AUTO: 88 10*3/MM3 (ref 140–450)
PMV BLD AUTO: 10.8 FL (ref 6–12)
POTASSIUM SERPL-SCNC: 3.8 MMOL/L (ref 3.5–5.2)
PROT SERPL-MCNC: 5.1 G/DL (ref 6–8.5)
RBC # BLD AUTO: 2.23 10*6/MM3 (ref 3.77–5.28)
SODIUM SERPL-SCNC: 137 MMOL/L (ref 136–145)
WBC NRBC COR # BLD AUTO: 4.58 10*3/MM3 (ref 3.4–10.8)

## 2024-07-24 PROCEDURE — 85018 HEMOGLOBIN: CPT | Performed by: STUDENT IN AN ORGANIZED HEALTH CARE EDUCATION/TRAINING PROGRAM

## 2024-07-24 PROCEDURE — 25010000002 THIAMINE HCL 200 MG/2ML SOLUTION: Performed by: ORTHOPAEDIC SURGERY

## 2024-07-24 PROCEDURE — 25010000002 HYDROMORPHONE PER 4 MG: Performed by: ORTHOPAEDIC SURGERY

## 2024-07-24 PROCEDURE — 85014 HEMATOCRIT: CPT | Performed by: STUDENT IN AN ORGANIZED HEALTH CARE EDUCATION/TRAINING PROGRAM

## 2024-07-24 PROCEDURE — 25810000003 LACTATED RINGERS PER 1000 ML: Performed by: STUDENT IN AN ORGANIZED HEALTH CARE EDUCATION/TRAINING PROGRAM

## 2024-07-24 PROCEDURE — 97162 PT EVAL MOD COMPLEX 30 MIN: CPT

## 2024-07-24 PROCEDURE — 97530 THERAPEUTIC ACTIVITIES: CPT

## 2024-07-24 PROCEDURE — 25010000002 LORAZEPAM PER 2 MG: Performed by: ORTHOPAEDIC SURGERY

## 2024-07-24 PROCEDURE — 97535 SELF CARE MNGMENT TRAINING: CPT

## 2024-07-24 PROCEDURE — 97166 OT EVAL MOD COMPLEX 45 MIN: CPT

## 2024-07-24 PROCEDURE — 80053 COMPREHEN METABOLIC PANEL: CPT | Performed by: ORTHOPAEDIC SURGERY

## 2024-07-24 PROCEDURE — 85025 COMPLETE CBC W/AUTO DIFF WBC: CPT | Performed by: ORTHOPAEDIC SURGERY

## 2024-07-24 RX ADMIN — THIAMINE HYDROCHLORIDE 200 MG: 100 INJECTION, SOLUTION INTRAMUSCULAR; INTRAVENOUS at 04:31

## 2024-07-24 RX ADMIN — SENNOSIDES AND DOCUSATE SODIUM 2 TABLET: 50; 8.6 TABLET ORAL at 17:48

## 2024-07-24 RX ADMIN — SODIUM CHLORIDE, POTASSIUM CHLORIDE, SODIUM LACTATE AND CALCIUM CHLORIDE 75 ML/HR: 600; 310; 30; 20 INJECTION, SOLUTION INTRAVENOUS at 17:07

## 2024-07-24 RX ADMIN — Medication 5 MG: at 21:10

## 2024-07-24 RX ADMIN — FOLIC ACID 1 MG: 1 TABLET ORAL at 11:09

## 2024-07-24 RX ADMIN — Medication 10 ML: at 21:10

## 2024-07-24 RX ADMIN — QUETIAPINE FUMARATE 100 MG: 100 TABLET ORAL at 21:10

## 2024-07-24 RX ADMIN — Medication 1 TABLET: at 08:48

## 2024-07-24 RX ADMIN — LORAZEPAM 1 MG: 1 TABLET ORAL at 08:47

## 2024-07-24 RX ADMIN — HYDROCODONE BITARTRATE AND ACETAMINOPHEN 1 TABLET: 10; 325 TABLET ORAL at 17:48

## 2024-07-24 RX ADMIN — SERTRALINE 100 MG: 100 TABLET, FILM COATED ORAL at 22:00

## 2024-07-24 RX ADMIN — HYDROMORPHONE HYDROCHLORIDE 0.5 MG: 1 INJECTION, SOLUTION INTRAMUSCULAR; INTRAVENOUS; SUBCUTANEOUS at 21:10

## 2024-07-24 RX ADMIN — THIAMINE HYDROCHLORIDE 200 MG: 100 INJECTION, SOLUTION INTRAMUSCULAR; INTRAVENOUS at 13:16

## 2024-07-24 RX ADMIN — BUSPIRONE HYDROCHLORIDE 15 MG: 15 TABLET ORAL at 21:10

## 2024-07-24 RX ADMIN — SERTRALINE 100 MG: 100 TABLET, FILM COATED ORAL at 11:09

## 2024-07-24 RX ADMIN — THIAMINE HYDROCHLORIDE 200 MG: 100 INJECTION, SOLUTION INTRAMUSCULAR; INTRAVENOUS at 21:09

## 2024-07-24 RX ADMIN — Medication 1000 MCG: at 08:47

## 2024-07-24 RX ADMIN — LORAZEPAM 1 MG: 1 TABLET ORAL at 17:58

## 2024-07-24 RX ADMIN — BUSPIRONE HYDROCHLORIDE 15 MG: 15 TABLET ORAL at 08:48

## 2024-07-24 RX ADMIN — HYDROMORPHONE HYDROCHLORIDE 0.5 MG: 1 INJECTION, SOLUTION INTRAMUSCULAR; INTRAVENOUS; SUBCUTANEOUS at 04:19

## 2024-07-24 RX ADMIN — Medication 10 ML: at 09:01

## 2024-07-24 RX ADMIN — HYDROMORPHONE HYDROCHLORIDE 0.5 MG: 1 INJECTION, SOLUTION INTRAMUSCULAR; INTRAVENOUS; SUBCUTANEOUS at 13:16

## 2024-07-24 RX ADMIN — LORAZEPAM 1 MG: 2 INJECTION INTRAMUSCULAR; INTRAVENOUS at 04:30

## 2024-07-24 RX ADMIN — HYDROCODONE BITARTRATE AND ACETAMINOPHEN 1 TABLET: 10; 325 TABLET ORAL at 11:09

## 2024-07-24 NOTE — PLAN OF CARE
Goal Outcome Evaluation:  Plan of Care Reviewed With: patient        Progress: improving  Outcome Evaluation: VSS. NVI. dressing CDI. PW and breif. CIWAS was 3. intermittent confusion. DONALDO. pain managed with PO and IV meds. TTWB. plan to d/c when stable.

## 2024-07-24 NOTE — PROGRESS NOTES
Name: Lita Yu ADMIT: 2024   : 1965  PCP: Norma Saul APRN    MRN: [id number] LOS: 0 days   AGE/SEX: 58 y.o. female  ROOM: [facility]     Subjective   Subjective   Patient seen and examined this morning. Hospital day 4, POD #2 s/p Right Total Hip Revision with prophylactic fixation of the femur on 24. She continues to have ongoing post-operative pain, also having some intermittent visual hallucinations this morning per discussion with family at bedside and nursing, family concerned she is experiencing more signs of ETOH withdrawal.     Objective   Objective   Vital Signs  Temp:  [97.2 °F (36.2 °C)-98.2 °F (36.8 °C)] 97.3 °F (36.3 °C)  Heart Rate:  [72-97] 82  Resp:  [16-18] 16  BP: ()/(50-67) 120/67  SpO2:  [93 %-100 %] 97 %  on   ;   Device (Oxygen Therapy): room air  Body mass index is 25.24 kg/m².  Physical Exam  Vitals and nursing note reviewed.   Constitutional:       General: She is awake.      Appearance: She is ill-appearing.      Comments: Uncomfortable appearing   Cardiovascular:      Rate and Rhythm: Normal rate and regular rhythm.      Pulses: Normal pulses.      Heart sounds: Normal heart sounds.   Pulmonary:      Effort: Pulmonary effort is normal. No respiratory distress.      Breath sounds: Normal breath sounds. No wheezing.   Abdominal:      General: Bowel sounds are normal. There is no distension.      Palpations: Abdomen is soft.      Tenderness: There is no abdominal tenderness.   Musculoskeletal:         General: Tenderness (right hip, dressing C/D/I) present.   Skin:     General: Skin is warm and dry.   Neurological:      Mental Status: She is confused.       Results Review     I reviewed the patient's new clinical results.  Results from last 7 days   Lab Units 24  0522 24  0509 24  0719 24  0519   WBC 10*3/mm3 4.58  --  6.02 3.73 4.05   HEMOGLOBIN g/dL 7.1* 7.1* 6.2* 8.1* 8.9*   PLATELETS 10*3/mm3 88*  --  85*  "82* 88*     Results from last 7 days   Lab Units 07/24/24  0522 07/23/24  0509 07/22/24  0720 07/21/24  0519   SODIUM mmol/L 137 135* 135* 139   POTASSIUM mmol/L 3.8 4.7 3.3* 4.2   CHLORIDE mmol/L 106 106 106 110*   CO2 mmol/L 22.0 22.0 21.0* 17.2*   BUN mg/dL 11 11 10 7   CREATININE mg/dL 0.83 0.81 0.69 0.61   GLUCOSE mg/dL 89 112* 82 134*   EGFR mL/min/1.73 82.9 85.3 102.0 105.1     Results from last 7 days   Lab Units 07/24/24  0522 07/23/24  0509 07/22/24  0720 07/21/24  0519   ALBUMIN g/dL 2.6* 3.0* 3.2* 3.5   BILIRUBIN mg/dL 0.2 <0.2 0.2 0.3   ALK PHOS U/L 117 120* 160* 189*   AST (SGOT) U/L 41* 25 30 30   ALT (SGPT) U/L 18 16 18 24     Results from last 7 days   Lab Units 07/24/24  0522 07/23/24  0509 07/22/24  0720 07/21/24  0519   CALCIUM mg/dL 7.6* 7.4* 8.3* 8.4*   ALBUMIN g/dL 2.6* 3.0* 3.2* 3.5   MAGNESIUM mg/dL  --   --   --  1.7     Results from last 7 days   Lab Units 07/20/24  1407   PROCALCITONIN ng/mL 0.03   LACTATE mmol/L 1.5     No results found for: \"HGBA1C\", \"POCGLU\"    XR Hip With or Without Pelvis 1 View Right    Result Date: 7/22/2024  Revised right total hip arthroplasty with proximal femoral cerclage wire. Hardware is well seated without evidence of acute periprosthetic fracture. Contralateral left total hip arthroplasty.  This report was finalized on 7/22/2024 5:57 PM by Dr. Jluis Barron M.D on Workstation: [id number]       I have personally reviewed all medications:  Scheduled Medications  busPIRone, 15 mg, Oral, BID  folic acid, 1 mg, Oral, Daily  LORazepam, 1 mg, Oral, Daily  multivitamin, 1 tablet, Oral, Daily  QUEtiapine, 100 mg, Oral, Nightly  sertraline, 100 mg, Oral, BID  sodium chloride, 10 mL, Intravenous, Q12H  thiamine (B-1) IV, 200 mg, Intravenous, Q8H   Followed by  [START ON 7/26/2024] thiamine, 100 mg, Oral, Daily  vitamin B-12, 1,000 mcg, Oral, Daily    Infusions  lactated ringers, 75 mL/hr, Last Rate: 75 mL/hr (07/23/24 1452)    Diet  Diet: Regular/House; Fluid " Consistency: Thin (IDDSI 0)    I have personally reviewed:  [x]  Laboratory   []  Microbiology   [x]  Radiology   []  EKG/Telemetry  []  Cardiology/Vascular   []  Pathology    []  Records       Assessment/Plan     Active Hospital Problems    Diagnosis  POA    **Intractable low back pain [M54.59]  Yes    Metabolic acidosis [E87.20]  Yes    Thrombocytopenia [D69.6]  Yes    Alcohol use [Z78.9]  Yes    History of MRSA infection [Z86.14]  Yes    Right hip pain [M25.551]  Yes    S/P total right hip arthroplasty [Z96.641]  Not Applicable    Anemia [D64.9]  Yes    Spinal abscess [M46.20]  Yes    Cirrhosis of liver [K74.60]  Yes    Chronic obstructive pulmonary disease [J44.9]  Yes    Essential hypertension [I10]  Yes    Seizure disorder [G40.909]  Yes    Post traumatic stress disorder [F43.10]  Yes    Peripheral vascular disease [I73.9]  Yes      Resolved Hospital Problems   No resolved problems to display.       58 y.o. female admitted with Intractable low back pain.    Intractable low back and right hip pain   Loose acetabular component with proximal femur fracture  - In setting of recent MRSA infection, previously completed antibiotics. No evidence of sepsis, inflammatory markers unremarkable. CT Lumbar spine, pelvis and XR of hip reviewed. Records reviewed.  - CHRISTIANO and Orthopedic surgery consulted. Neurosurgery stated no acute intervention warranted.  - Orthopedic surgery continues to follow. Patient is POD #2 s/p Right Total Hip Revision with prophylactic fixation of the femur on 07/22/24. They are concerned about her going home and concerned about compliance with toe-touch weight bearing restrictions at discharge and think she would benefit from rehab. They informed her that if she does go home and does not go to a rehab her chance of failure of the hip is quite high, she is supposed to continue with these restrictions for at least 6 weeks. Greatly appreciate their help. DVT prophylaxis per orthopedic surgery.  -  Discussions about rehab ongoing with patient and family, try to encourage this.  - Continue with PT/OT, fall precautions, weight bearing restrictions.  - PRN medications for pain. Patient currently receiving IV pain medication PRN, requiring close monitoring. Need to closely monitor for over-sedation, respiratory depression and constipation.    Acute on chronic Anemia  - Hemoglobin low on most recent labs, but slightly improved after PRBC transfusion on 07/23. Hemoglobin remains right on border of needing another transfusion, order repeat H/H for noon to see if another transfusion is warranted. worse from prior, <7, Worsening over past 1-2 days likely reflective of perioperative blood loss from procedure. Iron studies showing low TSAT but elevated ferritin, likely reflective of anemia of chronic disease.  - Order repeat CBC in AM for reassessment. Continue to monitor, transfuse for hemoglobin <7.    Alcohol abuse with concern for withdrawal  - Patient reportedly drinks ~one fifth of bourbon daily. Last drink in AM prior to arrival. She is on scheduled Ativan per protocol, tolerating well, but is having hallucinations today, concern that she may be beginning to exhibit more signs of withdrawal, so need to closely monitor this, as she may require further intervention.   - CIWA protocol, MV, thiamine, folic acid  - Consulted [facility]. Will follow their plans/recommendations. Greatly appreciate their help.    Hypokalemia  - K low on prior labs, repleted via electrolyte protocol. Follow up repeat labs to guide ongoing management decisions. Replete PRN per protocol. Continue to monitor    Non anion-gap metabolic acidosis  - Noted on labs, could be result of IVF with NS. NS was stopped, LR started, continue as prescribed, continue monitoring for now.  - Repeat labs in AM for reassessment. Further management based on results of testing.    Cirrhosis/chronic liver disease  - Appears compensated, abdominal exam benign,  LFT stable. Need to closely monitor volume status because patient receiving IVF. Continue monitoring.    Thrombocytopenia  - Platelets found to be low on most recent labs, likely 2/2 liver disease. No indications for urgent intervention at present. Will monitor for now.  - Order repeat CBC in AM for reassessment.    Hypertension  - Blood pressure appears stable and acceptable acutely at this time. No indications to warrant acute changes/intervention at this time.  - Trend BP to guide ongoing management decisions.    COPD  - Appears stable from this perspective at present.  No evidence to suggest acute exacerbation.  Continue current medications as prescribed and closely monitor.    Depression/Anxiety  - Continue current medications as prescribed.      Portions of this text have been copied and I have reviewed these. Documentation accurate as of 07/24/24.       SCDs for DVT prophylaxis.  Full code.  Discussed with patient, family, and nursing staff.  Anticipate discharge home with HH vs SNU facility timing yet to be determined.  Expected Discharge Date: 7/26/2024; Expected Discharge Time:       Adonis Byers DO  [facility]  07/24/24

## 2024-07-24 NOTE — PLAN OF CARE
The pt was admitted to [facility] 2/2 to  Imaging (+) antibiotic spacer with femoral head and acetabular polyethylene protrusion in the acetabulum. Pt s/o R BRE Revision on 7/22. Per Ortho pt is strict TTWB for at least 6 weeks. Pmhx significant for  PTSD, COPD, HTN, cirrhosis, avascular necrosis w/ R BRE and MRSA spine infection. The pt also has been going through ETOH w/d. Today, the pt was very emotional but agreeable to OT/PT. Max A x2 provided for bed mobility. Mod A donning socks at the EOB. Max A x2 to stand and sustain her WB status. She returned back to supine. The pt's spouse at bedside reports his plan is to have the pt be bedbound x6 weeks after D/C - OT recommends SNF vs. IRF pending progress due to her PLOF as being independent.

## 2024-07-24 NOTE — THERAPY EVALUATION
Patient Name: Lita Yu  : 1965    MRN: [id number]                              Today's Date: 2024       Admit Date: 2024    Visit Dx:     ICD-10-CM ICD-9-CM   1. Intractable low back pain  M54.59 724.2   2. Right hip pain  M25.551 719.45   3. History of discitis  Z87.39 V13.4     Patient Active Problem List   Diagnosis    Postmenopausal state    Post traumatic stress disorder    Pain in joints    Osteoporosis    Macrocytic anemia    Lumbosacral radiculopathy    Leg pain, left    Peripheral vascular disease    Essential hypertension    Hyperlipidemia    Degenerative joint disease of left hip    Seizure disorder    Smoker    Status post hip replacement    Tobacco dependence syndrome    Vitamin B12 deficiency    Anemia of chronic disease    Chronic obstructive pulmonary disease    Cirrhosis of liver    Acute UTI (urinary tract infection)    Generalized weakness    Rhabdomyolysis    Non-traumatic rhabdomyolysis    Moderate malnutrition    Chest pain    Dehydration    Myalgia    Acute hyperkalemia    Spinal abscess    Intractable low back pain    History of MRSA infection    Right hip pain    S/P total right hip arthroplasty    Anemia    Metabolic acidosis    Thrombocytopenia    Alcohol use     Past Medical History:   Diagnosis Date    Cirrhosis     COPD (chronic obstructive pulmonary disease)     Depression     GERD (gastroesophageal reflux disease)     Hyperlipidemia     Hypertension     PTSD (post-traumatic stress disorder)      Past Surgical History:   Procedure Laterality Date     SECTION N/A     COLONOSCOPY N/A 2021    Procedure: COLONOSCOPY with polypectomy x2 and random biopsy;  Surgeon: Osman Clay MD;  Location: [facility] ENDOSCOPY;  Service: Gastroenterology;  Laterality: N/A;  colon polyps    DENTAL PROCEDURE      ENDOSCOPY N/A 2021    Procedure: ESOPHAGOGASTRODUODENOSCOPY;  Surgeon: Osman Clay MD;  Location: [facility] ENDOSCOPY;  Service:  Gastroenterology;  Laterality: N/A;  esophagitis    JOINT REPLACEMENT      REPLACEMENT TOTAL HIP LATERAL POSITION Left     TONSILLECTOMY      TOTAL HIP ARTHROPLASTY Right 2024    Procedure: TOTAL HIP ARTHROPLASTY ANTERIOR;  Surgeon: Cristobal Chen II, MD;  Location: [facility] MAIN OR;  Service: Orthopedics;  Laterality: Right;    TOTAL HIP ARTHROPLASTY REVISION Right 2024    Procedure: TOTAL HIP ARTHROPLASTY REVISION;  Surgeon: Cristobal Chen II, MD;  [place]  [facility] [place]  Service: [place]opedics;  Laterality: Right;    VAGINAL BIRTH AFTER  SECTION        General Information       Row Name 24 1438          Physical Therapy Time and Intention    Document Type evaluation  -MG     Mode of Treatment co-treatment;physical therapy;occupational therapy;other (see comments)  -MG       Row Name 24 1438          General Information    Patient Profile Reviewed yes  -MG     Prior Level of Function independent:  No AD/DME.  -MG     Existing Precautions/Restrictions fall  -MG     Barriers to Rehab medically complex;cognitive status  -MG       Row Name 24 1438          Living Environment    People in Home significant other  -MG       Row Name 24 1438          Home Main Entrance    Number of Stairs, Main Entrance none  -MG       Row Name 24 1438          Stairs Within Home, Primary    Number of Stairs, Within Home, Primary none  -MG       Row Name 24 1438          Cognition    Orientation Status (Cognition) oriented to;person;verbal cues/prompts needed for orientation  Emotionally labile. Lethargic and intermittently confused.  -MG       Row Name 24 1438          Safety Issues, Functional Mobility    Safety Issues Affecting Function (Mobility) ability to follow commands;awareness of need for assistance;insight into deficits/self-awareness;positioning of assistive device;safety precaution awareness;judgment;safety precautions  follow-through/compliance;sequencing abilities;unable to maintain weight-bearing restrictions  -MG     Impairments Affecting Function (Mobility) balance;cognition;endurance/activity tolerance;strength;pain  -MG     Cognitive Impairments, Mobility Safety/Performance attention;awareness, need for assistance;insight into deficits/self-awareness;judgment;problem-solving/reasoning;safety precaution awareness;safety precaution follow-through;sequencing abilities  -MG     Comment, Safety Issues/Impairments (Mobility) Co treatment medically appropriate and necessary due to patient acuity level, activity tolerance and safety of patient and staff. Evaluation established to achieve all goals in POC. Gait belt and non-skid socks donned.  -MG               User Key  (r) = Recorded By, (t) = Taken By, (c) = Cosigned By      Initials Name Provider Type    MG Tammy Johnson, PT Physical Therapist                   Mobility       Row Name 07/24/24 1440          Bed Mobility    Bed Mobility supine-sit;sit-supine  -MG     Supine-Sit [place] (Bed Mobility) maximum assist (25% patient effort);2 person assist;verbal cues;nonverbal cues (demo/gesture)  -MG     Sit-Supine [place] (Bed Mobility) maximum assist (25% patient effort);2 person assist;verbal cues;nonverbal cues (demo/gesture)  -MG     Assistive Device (Bed Mobility) bed rails;head of bed elevated;leg   -MG     Comment, (Bed Mobility) Cues for sequencing and maintaining her WB.  -MG       Row Name 07/24/24 1440          Sit-Stand Transfer    Sit-Stand [place] (Transfers) maximum assist (25% patient effort);2 person assist;verbal cues  -MG     Assistive Device (Sit-Stand Transfers) walker, front-wheeled  -MG     Comment, (Sit-Stand Transfer) PT placed foot under pts R foot to assess WB and to help maintain her precaution. Increased time and Mod/MaxA to keep weight shifted onto LLE. Pt able to maintain during standing, but had difficulty when coming to sit.  During standing pt closing her eyes, cued to open, and when she would open they would begin rolling back into her head; dec reponsiveness. Once returned to sitting pt began crying, but quickly stopped.  -       Row Name 07/24/24 1440          Mobility    Extremity Weight-bearing Status right lower extremity  -MG     Right Lower Extremity (Weight-bearing Status) toe touch weight-bearing (TTWB)  -MG               User Key  (r) = Recorded By, (t) = Taken By, (c) = Cosigned By      Initials Name Provider Type    MG Tammy Johnson, PT Physical Therapist                   Obj/Interventions       [facility] Name 07/24/24 1441          Range of Motion Comprehensive    General Range of Motion lower extremity range of motion deficits identified  -MG     Comment, General Range of Motion Limited RLE d/t surgical pain. Keeps RLE adducted and internally rotated w/ foot in PF and inversion, but can actively move out of this position to neutral.  -[facility] Name 07/24/24 1441          Strength Comprehensive (MMT)    General Manual Muscle Testing (MMT) Assessment lower extremity strength deficits identified  -MG     Comment, General Manual Muscle Testing (MMT) Assessment Grossly 4-/5.  -[facility] Name 07/24/24 1441          Balance    Balance Assessment sitting static balance;sitting dynamic balance;standing static balance  -MG     Static Sitting Balance contact guard;2-person assist;verbal cues  -MG     Dynamic Sitting Balance minimal assist;2-person assist;verbal cues  -MG     Position, Sitting Balance sitting edge of bed  -MG     Static Standing Balance maximum assist;2-person assist;verbal cues;non-verbal cues (demo/gesture)  -MG     Position/Device Used, Standing Balance supported;walker, front-wheeled  -MG     Comment, Balance Unsteady throughout and difficulty maintaining her WB status.  -[facility] Name 07/24/24 1441          Sensory Assessment (Somatosensory)    Sensory Assessment (Somatosensory) sensation intact  -MG                User Key  (r) = Recorded By, (t) = Taken By, (c) = Cosigned By      Initials Name Provider Type    MG Tammy Johnson, PT Physical Therapist                   Goals/Plan       Row Name 07/24/24 1538          Bed Mobility Goal 1 (PT)    Activity/Assistive Device (Bed Mobility Goal 1, PT) bed mobility activities, all  -MG     [place] Level/Cues Needed (Bed Mobility Goal 1, PT) minimum assist (75% or more patient effort)  -MG     Time Frame (Bed Mobility Goal 1, PT) 1 week  -MG       Row Name 07/24/24 1538          Transfer Goal 1 (PT)    Activity/Assistive Device (Transfer Goal 1, PT) transfers, all  -MG     [place] Level/Cues Needed (Transfer Goal 1, PT) minimum assist (75% or more patient effort)  -MG     Time Frame (Transfer Goal 1, PT) 1 week  -MG       Row Name 07/24/24 1538          Gait Training Goal 1 (PT)    Activity/Assistive Device (Gait Training Goal 1, PT) gait (walking locomotion)  -MG     [place] Level (Gait Training Goal 1, PT) minimum assist (75% or more patient effort);moderate assist (50-74% patient effort)  -MG     Distance (Gait Training Goal 1, PT) 5  -MG     Time Frame (Gait Training Goal 1, PT) 1 week  -MG       Row Name 07/24/24 1538          Patient Education Goal (PT)    Activity (Patient Education Goal, PT) Accurately states and follows her WB precautions.  -MG     [place]/Cues/Accuracy (Memory Goal 2, PT) demonstrates adequately;independent;verbalizes understanding  -MG     Time Frame (Patient Education Goal, PT) 1 week  -MG       Row Name 07/24/24 1538          Therapy Assessment/Plan (PT)    Planned Therapy Interventions (PT) balance training;bed mobility training;gait training;home exercise program;patient/family education;stretching;strengthening;neuromuscular re-education;ROM (range of motion);motor coordination training;postural re-education;transfer training  -MG               User Key  (r) = Recorded By, (t) = Taken By, (c) = Cosigned By       Initials Name Provider Type    MG Tammy Johnson, PT Physical Therapist                   Clinical Impression       Row Name 07/24/24 1530          Pain    Pre/Posttreatment Pain Comment Pt does not rate or endorse pain, but did call out in pain during STS.  -MG     Pain Intervention(s) Medication (See MAR);Ambulation/increased activity;Repositioned;Rest  -MG     Additional Documentation Pain Scale: FACES Pre/Post-Treatment (Group)  -MG       Row Name 07/24/24 1530          Pain Scale: FACES Pre/Post-Treatment    Pain: FACES Scale, Pretreatment 0-->no hurt  -MG     Posttreatment Pain Rating 4-->hurts little more  -MG     Pain Location - Side/Orientation Right  -MG     Pain Location incisional  -MG     Pain Location - hip  -MG       Row Name 07/24/24 5334          Plan of Care Review    Plan of Care Reviewed With patient;significant other  -MG     Progress no change  -MG     Outcome Evaluation Pt is a 59 y/o F with h/o PTSD, COPD, HTN, cirrhosis, avascular necrosis w/ R BRE and MRSA spine infection who presented to the ED with c/o increasing pain in her back, down into her RLE and was unable to mobilize. Imaging (+) antibiotic spacer with femoral head and acetabular polyethylene protrusion in the acetabulum. Pt s/o R BRE Revision on 7/22. Per Ortho pt is strict TTWB for at least 6 weeks. Pt/SO report pt was independent at baseline w/o use of AD and they live in a first floor apt with no steps. Today, pt was MaxA x2 for bed mobility and STS w/ RW. Pt was lethargic, emotionally labile and intermittently confused. Pt was heavily educated on her WB and hip precautions; unable to recall/teach back after education, but was able to recall her WB status at end of session. Pt was able to maintain her WB status from sitting to standing, but difficulty maintaining in standing and return to sit requiring Max/DepA from this PT to help maintain. On standing pt also became less responsive, with eyes closed and when she opened them  to cueing they began to roll back in her head. Pt was safely assisted back to sitting where she had a brief cry and returned to supine. Education provided to SO on her WB and activity recs as he states she is not allowed to put weight on her RLE and he will have her stay in the bed for 6 weeks w/ no mobility. Pt presents with decreased/impaired strength, balance, cognition and endurance, thus will benefit from skilled PT services to address these deficits. SBAR w/ RN following session. May benefit from abduction pillow while in bed as pt keeping RLE adducted and internerally rotated and ankle w/ PF and inversion (can actively move to neutral position). Rec SNF at [place] as pt would be unsafe to return home at this time.  -MG       Row Name 07/24/24 1530          Therapy Assessment/Plan (PT)    Rehab Potential (PT) fair, will monitor progress closely  -MG     Criteria for Skilled Interventions Met (PT) yes;meets criteria  -MG     Therapy Frequency (PT) 6 times/wk  -MG       Row Name 07/24/24 1530          Vital Signs    O2 Delivery Pre Treatment room air  -MG     O2 Delivery Intra Treatment room air  -MG     O2 Delivery Post Treatment room air  -MG     Pre Patient Position Supine  -MG     Intra Patient Position Standing  -MG     Post Patient Position Supine  -MG       Row Name 07/24/24 1530          Positioning and Restraints    Pre-Treatment Position in bed  -MG     Post Treatment Position bed  -MG     In Bed notified nsg;supine;fowlers;call light within reach;encouraged to call for assist;exit alarm on;with family/caregiver;side rails up x3;pillow between legs;RLE elevated  -MG               User Key  (r) = Recorded By, (t) = Taken By, (c) = Cosigned By      Initials Name Provider Type    Tammy Nina, PT Physical Therapist                   Outcome Measures       Row Name 07/24/24 1539 07/24/24 0859       How much help from another person do you currently need...    Turning from your back to your side while in  flat bed without using bedrails? 3  -MG 4  -RK    Moving from lying on back to sitting on the side of a flat bed without bedrails? 2  -MG 2  -RK    Moving to and from a bed to a chair (including a wheelchair)? 1  -MG 1  -RK    Standing up from a chair using your arms (e.g., wheelchair, bedside chair)? 2  -MG 1  -RK    Climbing 3-5 steps with a railing? 1  -MG 1  -RK    To walk in hospital room? 1  -MG 1  -RK    AM-PAC 6 Clicks Score (PT) 10  -MG 10  -RK    Highest Level of Mobility Goal 4 --> Transfer to chair/commode  -MG 4 --> Transfer to chair/commode  -RK              User Key  (r) = Recorded By, (t) = Taken By, (c) = Cosigned By      Initials Name Provider Type    MG Tammy Johnson, PT Physical Therapist    Loyda Sol, RN Registered Nurse                                 Physical Therapy Education       Title: PT OT SLP Therapies (In Progress)       Topic: Physical Therapy (In Progress)       Point: Mobility training (In Progress)       Learning Progress Summary             Patient Acceptance, E,TB,D, NR,NL by  at 7/24/2024 1539                         Point: Home exercise program (In Progress)       Learning Progress Summary             Patient Acceptance, E,TB,D, NR,NL by  at 7/24/2024 1539                         Point: Body mechanics (In Progress)       Learning Progress Summary             Patient Acceptance, E,TB,D, NR,NL by  at 7/24/2024 1539                         Point: Precautions (In Progress)       Learning Progress Summary             Patient Acceptance, E,TB,D, NR,NL by  at 7/24/2024 1539                                         User Key       Initials Effective Dates Name Provider Type Discipline     05/24/22 -  Tammy Johnson, PT Physical Therapist PT                  PT Recommendation and Plan  Planned Therapy Interventions (PT): balance training, bed mobility training, gait training, home exercise program, patient/family education, stretching, strengthening, neuromuscular  re-education, ROM (range of motion), motor coordination training, postural re-education, transfer training  Plan of Care Reviewed With: patient, significant other  Progress: no change  Outcome Evaluation: Pt is a 59 y/o F with h/o PTSD, COPD, HTN, cirrhosis, avascular necrosis w/ R BRE and MRSA spine infection who presented to the ED with c/o increasing pain in her back, down into her RLE and was unable to mobilize. Imaging (+) antibiotic spacer with femoral head and acetabular polyethylene protrusion in the acetabulum. Pt s/o R BRE Revision on 7/22. Per Ortho pt is strict TTWB for at least 6 weeks. Pt/SO report pt was independent at baseline w/o use of AD and they live in a first floor apt with no steps. Today, pt was MaxA x2 for bed mobility and STS w/ RW. Pt was lethargic, emotionally labile and intermittently confused. Pt was heavily educated on her WB and hip precautions; unable to recall/teach back after education, but was able to recall her WB status at end of session. Pt was able to maintain her WB status from sitting to standing, but difficulty maintaining in standing and return to sit requiring Max/DepA from this PT to help maintain. On standing pt also became less responsive, with eyes closed and when she opened them to cueing they began to roll back in her head. Pt was safely assisted back to sitting where she had a brief cry and returned to supine. Education provided to SO on her WB and activity recs as he states she is not allowed to put weight on her RLE and he will have her stay in the bed for 6 weeks w/ no mobility. Pt presents with decreased/impaired strength, balance, cognition and endurance, thus will benefit from skilled PT services to address these deficits. SBAR w/ RN following session. May benefit from abduction pillow while in bed as pt keeping RLE adducted and internerally rotated and ankle w/ PF and inversion (can actively move to neutral position). Rec SNF at [place] as pt would be unsafe to  return home at this time.     Time Calculation:         PT Charges       Row Name 07/24/24 1540             Time Calculation    Start Time 1317  -MG      Stop Time 1341  -MG      Time Calculation (min) 24 min  -MG      PT Received On 07/24/24  -MG      PT - Next Appointment 07/25/24  -MG      PT Goal Re-Cert Due Date 08/07/24  -MG         Time Calculation- PT    Total Timed Code Minutes- PT 12 minute(s)  -MG                User Key  (r) = Recorded By, (t) = Taken By, (c) = Cosigned By      Initials Name Provider Type    MG Tammy Johnson, PT Physical Therapist                  Therapy Charges for Today       Code Description Service Date Service Provider Modifiers Qty    25779546575 HC PT EVAL MOD COMPLEXITY 3 7/24/2024 Tammy Johnson, PT GP 1    47755562803 HC PT THERAPEUTIC ACT EA 15 MIN 7/24/2024 Tammy Johnson, PT GP 1            PT G-Codes  AM-PAC 6 Clicks Score (PT): 10  PT Discharge Summary  Anticipated Discharge Disposition (PT): skilled nursing facility    Tammy Johnson, TOBY  7/24/2024

## 2024-07-24 NOTE — PLAN OF CARE
Goal Outcome Evaluation:  Plan of Care Reviewed With: patient, significant other        Progress: no change  Outcome Evaluation: Pt is a 57 y/o F with h/o PTSD, COPD, HTN, cirrhosis, avascular necrosis w/ R BRE and MRSA spine infection who presented to the ED with c/o increasing pain in her back, down into her RLE and was unable to mobilize. Imaging (+) antibiotic spacer with femoral head and acetabular polyethylene protrusion in the acetabulum. Pt s/o R BRE Revision on 7/22. Per Ortho pt is strict TTWB for at least 6 weeks. Pt/SO report pt was independent at baseline w/o use of AD and they live in a first floor apt with no steps. Today, pt was MaxA x2 for bed mobility and STS w/ RW. Pt was lethargic, emotionally labile and intermittently confused. Pt was heavily educated on her WB and hip precautions; unable to recall/teach back after education, but was able to recall her WB status at end of session. Pt was able to maintain her WB status from sitting to standing, but difficulty maintaining in standing and return to sit requiring Max/DepA from this PT to help maintain. On standing pt also became less responsive, with eyes closed and when she opened them to cueing they began to roll back in her head. Pt was safely assisted back to sitting where she had a brief cry and returned to supine. Education provided to SO on her WB and activity recs as he states she is not allowed to put weight on her RLE and he will have her stay in the bed for 6 weeks w/ no mobility. Pt presents with decreased/impaired strength, balance, cognition and endurance, thus will benefit from skilled PT services to address these deficits. SBAR w/ RN following session. May benefit from abduction pillow while in bed as pt keeping RLE adducted and internerally rotated and ankle w/ PF and inversion (can actively move to neutral position). Rec SNF at [place] as pt would be unsafe to return home at this time.      Anticipated Discharge Disposition (PT):  skilled nursing facility

## 2024-07-24 NOTE — PLAN OF CARE
Goal Outcome Evaluation:  Plan of Care Reviewed With: patient           Outcome Evaluation: Vss, prn dilaudid given for pain, last [place] score was 8, patient restless and agitated, tried to get out of bed and claims she sees and hears her dead ex- in her room, treated per kiki. R hip dressing intact.Refusing SCD's.

## 2024-07-24 NOTE — THERAPY EVALUATION
Patient Name: Lita Yu  : 1965    MRN: [id number]                              Today's Date: 2024       Admit Date: 2024    Visit Dx:     ICD-10-CM ICD-9-CM   1. Intractable low back pain  M54.59 724.2   2. Right hip pain  M25.551 719.45   3. History of discitis  Z87.39 V13.4     Patient Active Problem List   Diagnosis    Postmenopausal state    Post traumatic stress disorder    Pain in joints    Osteoporosis    Macrocytic anemia    Lumbosacral radiculopathy    Leg pain, left    Peripheral vascular disease    Essential hypertension    Hyperlipidemia    Degenerative joint disease of left hip    Seizure disorder    Smoker    Status post hip replacement    Tobacco dependence syndrome    Vitamin B12 deficiency    Anemia of chronic disease    Chronic obstructive pulmonary disease    Cirrhosis of liver    Acute UTI (urinary tract infection)    Generalized weakness    Rhabdomyolysis    Non-traumatic rhabdomyolysis    Moderate malnutrition    Chest pain    Dehydration    Myalgia    Acute hyperkalemia    Spinal abscess    Intractable low back pain    History of MRSA infection    Right hip pain    S/P total right hip arthroplasty    Anemia    Metabolic acidosis    Thrombocytopenia    Alcohol use     Past Medical History:   Diagnosis Date    Cirrhosis     COPD (chronic obstructive pulmonary disease)     Depression     GERD (gastroesophageal reflux disease)     Hyperlipidemia     Hypertension     PTSD (post-traumatic stress disorder)      Past Surgical History:   Procedure Laterality Date     SECTION N/A     COLONOSCOPY N/A 2021    Procedure: COLONOSCOPY with polypectomy x2 and random biopsy;  Surgeon: Osman Clay MD;  Location: [facility] ENDOSCOPY;  Service: Gastroenterology;  Laterality: N/A;  colon polyps    DENTAL PROCEDURE      ENDOSCOPY N/A 2021    Procedure: ESOPHAGOGASTRODUODENOSCOPY;  Surgeon: Osman Clay MD;  Location: [facility] ENDOSCOPY;  Service:  Gastroenterology;  Laterality: N/A;  esophagitis    JOINT REPLACEMENT      REPLACEMENT TOTAL HIP LATERAL POSITION Left     TONSILLECTOMY      TOTAL HIP ARTHROPLASTY Right 2024    Procedure: TOTAL HIP ARTHROPLASTY ANTERIOR;  Surgeon: Cristobal Chen II, MD;  Location: [facility] MAIN OR;  Service: Orthopedics;  Laterality: Right;    TOTAL HIP ARTHROPLASTY REVISION Right 2024    Procedure: TOTAL HIP ARTHROPLASTY REVISION;  Surgeon: Cristobal Chen II, MD;  Location:  [facility] [place];  Service: Orthopedics;  Laterality: Right;    VAGINAL BIRTH AFTER  SECTION        General Information       Row Name 24 1615          OT Time and Intention    Document Type evaluation  -RB     Mode of Treatment co-treatment;physical therapy;occupational therapy  -RB       Row Name 24 161          General Information    Patient Profile Reviewed yes  -RB     Prior Level of Function independent:;ADL's;transfer  -RB     Existing Precautions/Restrictions fall  ETOH W/D  -RB     Barriers to Rehab medically complex;cognitive status;previous functional deficit  -RB       Row Name 24 161          Living Environment    People in Home significant other  -RB       Row Name 24 161          Home Main Entrance    Number of Stairs, Main Entrance none  -RB       Row Name 24 161          Cognition    Orientation Status (Cognition) oriented to;person;verbal cues/prompts needed for orientation;unable/difficult to assess  -RB       Row Name 24 161          Safety Issues, Functional Mobility    Safety Issues Affecting Function (Mobility) ability to follow commands;awareness of need for assistance;impulsivity;insight into deficits/self-awareness;judgment;problem-solving;safety precaution awareness;positioning of assistive device;safety precautions follow-through/compliance;sequencing abilities  -RB     Impairments Affecting Function (Mobility) balance;cognition;endurance/activity  tolerance;coordination;postural/trunk control;range of motion (ROM);strength;pain  -RB     Comment, Safety Issues/Impairments (Mobility) Co treatment medically appropriate and necessary due to patient acuity level, activity tolerance and safety of patient and staff. Evaluation established to achieve all goals in POC. Gait belt and non-skid socks donned.  -RB               User Key  (r) = Recorded By, (t) = Taken By, (c) = Cosigned By      Initials Name Provider Type    RB Daniela Guerrier OT Occupational Therapist                     Mobility/ADL's       Row Name 07/24/24 1614          Bed Mobility    Bed Mobility supine-sit;sit-supine  -RB     Supine-Sit [place] (Bed Mobility) maximum assist (25% patient effort);2 person assist;verbal cues;nonverbal cues (demo/gesture)  -RB     Sit-Supine [place] (Bed Mobility) maximum assist (25% patient effort);2 person assist;verbal cues;nonverbal cues (demo/gesture)  -RB     Assistive Device (Bed Mobility) bed rails  -RB       Row Name 07/24/24 1614          Transfers    Transfers sit-stand transfer;stand-sit transfer  -RB     Comment, (Transfers) very emotional and at times won't speak while standing. ended standing attempts for safety  -RB       Row Name 07/24/24 1614          Sit-Stand Transfer    Sit-Stand [place] (Transfers) maximum assist (25% patient effort);2 person assist;verbal cues;nonverbal cues (demo/gesture)  -RB     Comment, (Sit-Stand Transfer) cues for sustaining her WB status.  -RB       Row Name 07/24/24 1614          Functional Mobility    Functional Mobility- Ind. Level not tested;unable to perform  -RB       Row Name 07/24/24 1614          Activities of Daily Living    BADL Assessment/Intervention lower body dressing  -RB       Row Name 07/24/24 1614          Mobility    Extremity Weight-bearing Status right lower extremity  -RB     Right Lower Extremity (Weight-bearing Status) toe touch weight-bearing (TTWB)  -RB       Row Name 07/24/24  1614          Lower Body Dressing Assessment/Training    [place] Level (Lower Body Dressing) lower body dressing skills;moderate assist (50% patient effort)  -RB     Position (Lower Body Dressing) edge of bed sitting  -RB               User Key  (r) = Recorded By, (t) = Taken By, (c) = Cosigned By      Initials Name Provider Type    Daniela Miguel OT Occupational Therapist                   Obj/Interventions       Row Name 07/24/24 1613          Sensory Assessment (Somatosensory)    Sensory Assessment (Somatosensory) sensation intact  -RB       Row Name 07/24/24 1613          Vision Assessment/Intervention    Visual Impairment/Limitations WFL  -RB       Row Name 07/24/24 1613          Range of Motion Comprehensive    Comment, General Range of Motion Limited RLE due to surgical pain. Internal rotation noticed of distal RLE. BUE WFL  -RB       Row Name 07/24/24 1613          Strength Comprehensive (MMT)    Comment, General Manual Muscle Testing (MMT) Assessment BUE WFL. RLE weakness  -RB       Row Name 07/24/24 1613          Motor Skills    Motor Skills functional endurance  -RB     Functional Endurance poor tolerance for seated and standing activity  -RB       Row Name 07/24/24 1613          Balance    Comment, Balance CGA->Min A sitting balance at the EOB. Max A x2 standing balance + walker  -RB               User Key  (r) = Recorded By, (t) = Taken By, (c) = Cosigned By      Initials Name Provider Type    Daniela Miguel OT Occupational Therapist                   Goals/Plan       Row Name 07/24/24 1612          Bed Mobility Goal 1 (OT)    Activity/Assistive Device (Bed Mobility Goal 1, OT) bed mobility activities, all  -RB     [place] Level/Cues Needed (Bed Mobility Goal 1, OT) standby assist  -RB     Time Frame (Bed Mobility Goal 1, OT) short term goal (STG);2 weeks  -RB     Progress/Outcomes (Bed Mobility Goal 1, OT) new goal  -RB       Row Name 07/24/24 1612          Transfer Goal 1 (OT)     Activity/Assistive Device (Transfer Goal 1, OT) transfers, all  -RB     [place] Level/Cues Needed (Transfer Goal 1, OT) standby assist  -RB     Time Frame (Transfer Goal 1, OT) short term goal (STG);2 weeks  -RB     Progress/Outcome (Transfer Goal 1, OT) new goal  -RB       Row Name 07/24/24 1612          Bathing Goal 1 (OT)    Activity/Device (Bathing Goal 1, OT) bathing skills, all  -RB     [place] Level/Cues Needed (Bathing Goal 1, OT) minimum assist (75% or more patient effort)  -RB     Time Frame (Bathing Goal 1, OT) short term goal (STG);2 weeks  -RB     Progress/Outcomes (Bathing Goal 1, OT) new goal  -RB       Row Name 07/24/24 1612          Dressing Goal 1 (OT)    Activity/Device (Dressing Goal 1, OT) dressing skills, all  -RB     [place]/Cues Needed (Dressing Goal 1, OT) minimum assist (75% or more patient effort)  -RB     Time Frame (Dressing Goal 1, OT) short term goal (STG);2 weeks  -RB     Progress/Outcome (Dressing Goal 1, OT) new goal  -RB       Row Name 07/24/24 1612          Toileting Goal 1 (OT)    Activity/Device (Toileting Goal 1, OT) toileting skills, all  -RB     [place] Level/Cues Needed (Toileting Goal 1, OT) minimum assist (75% or more patient effort)  -RB     Time Frame (Toileting Goal 1, OT) short term goal (STG);2 weeks  -RB     Progress/Outcome (Toileting Goal 1, OT) new goal  -RB       Row Name 07/24/24 1612          Grooming Goal 1 (OT)    Activity/Device (Grooming Goal 1, OT) grooming skills, all  -RB     [place] (Grooming Goal 1, OT) standby assist  -RB     Time Frame (Grooming Goal 1, OT) short term goal (STG);2 weeks  -RB     Progress/Outcome (Grooming Goal 1, OT) new goal  -RB       Row Name 07/24/24 1612          Therapy Assessment/Plan (OT)    Planned Therapy Interventions (OT) transfer/mobility retraining;strengthening exercise;ROM/therapeutic exercise;activity tolerance training;adaptive equipment training;BADL retraining;functional balance  retraining;occupation/activity based interventions;patient/caregiver education/training;cognitive/visual perception retraining  -RB               User Key  (r) = Recorded By, (t) = Taken By, (c) = Cosigned By      Initials Name Provider Type    RB Daniela Guerrier, LALA Occupational Therapist                   Clinical Impression       Row Name 07/24/24 1611          Pain Assessment    Pretreatment Pain Rating 5/10  -RB     Posttreatment Pain Rating 10/10  -RB     Pain Location generalized  -RB     Pre/Posttreatment Pain Comment no location but crying the majority of the session  -RB     Pain Intervention(s) Ambulation/increased activity;Repositioned  -RB       Row Name 07/24/24 1616          Plan of Care Review    Plan of Care Reviewed With patient  -RB     Progress no change  -RB     Outcome Evaluation The pt was admitted to [facility] 2/2 to  Imaging (+) antibiotic spacer with femoral head and acetabular polyethylene protrusion in the acetabulum. Pt s/o R BRE Revision on 7/22. Per Ortho pt is strict TTWB for at least 6 weeks. Pmhx significant for  PTSD, COPD, HTN, cirrhosis, avascular necrosis w/ R BRE and MRSA spine infection. The pt also has been going through ETOH w/d. Today, the pt was very emotional but agreeable to OT/PT. Max A x2 provided for bed mobility. Mod A donning socks at the EOB. Max A x2 to stand and sustain her WB status. She returned back to supine. The pt's spouse at bedside reports his plan is to have the pt be bedbound x6 weeks after D/C - OT recommends SNF vs. IRF pending progress due to her PLOF as being independent.  -RB       Row Name 07/24/24 5761          Therapy Assessment/Plan (OT)    Rehab Potential (OT) good, to achieve stated therapy goals  -RB     Criteria for Skilled Therapeutic Interventions Met (OT) yes;skilled treatment is necessary  -RB     Therapy Frequency (OT) 5 times/wk  -RB       Row Name 07/24/24 8007          Therapy Plan Review/Discharge Plan (OT)    Anticipated Discharge  Disposition (OT) inpatient rehabilitation facility;skilled nursing facility  -RB       Row Name 07/24/24 1613          Positioning and Restraints    Pre-Treatment Position in bed  -RB     Post Treatment Position bed  -RB     In Bed notified nsg;supine;call light within reach;encouraged to call for assist;exit alarm on;fowlers;legs elevated  -RB               User Key  (r) = Recorded By, (t) = Taken By, (c) = Cosigned By      Initials Name Provider Type    RB Daniela Guerrier OT Occupational Therapist                   Outcome Measures       Row Name 07/24/24 1612          How much help from another is currently needed...    Putting on and taking off regular lower body clothing? 2  -RB     Bathing (including washing, rinsing, and drying) 2  -RB     Toileting (which includes using toilet bed pan or urinal) 1  -RB     Putting on and taking off regular upper body clothing 2  -RB     Taking care of personal grooming (such as brushing teeth) 2  -RB     Eating meals 3  -RB     AM-PAC 6 Clicks Score (OT) 12  -RB       Row Name 07/24/24 1539 07/24/24 0859       How much help from another person do you currently need...    Turning from your back to your side while in flat bed without using bedrails? 3  -MG 4  -RK    Moving from lying on back to sitting on the side of a flat bed without bedrails? 2  -MG 2  -RK    Moving to and from a bed to a chair (including a wheelchair)? 1  -MG 1  -RK    Standing up from a chair using your arms (e.g., wheelchair, bedside chair)? 2  -MG 1  -RK    Climbing 3-5 steps with a railing? 1  -MG 1  -RK    To walk in hospital room? 1  -MG 1  -RK    AM-PAC 6 Clicks Score (PT) 10  -MG 10  -RK    Highest Level of Mobility Goal 4 --> Transfer to chair/commode  -MG 4 --> Transfer to chair/commode  -RK      Row Name 07/24/24 1612          Modified [place] Scale    Modified [place] Scale 4 - Moderately severe disability.  Unable to walk without assistance, and unable to attend to own bodily needs without  assistance.  -RB       Row Name 07/24/24 1612          Functional Assessment    Outcome Measure Options AM-PAC 6 Clicks Daily Activity (OT);Modified [place]  -RB               User Key  (r) = Recorded By, (t) = Taken By, (c) = Cosigned By      Initials Name Provider Type    Tammy Nina, PT Physical Therapist    RB Daniela Guerrier, OT Occupational Therapist    Loyda Sol, RN Registered Nurse                    Occupational Therapy Education       Title: PT OT SLP Therapies (In Progress)       Topic: Occupational Therapy (Not Started)       Point: ADL training (Not Started)       Description:   Instruct learner(s) on proper safety adaptation and remediation techniques during self care or transfers.   Instruct in proper use of assistive devices.                  Learner Progress:  Not documented in this visit.              Point: Home exercise program (Not Started)       Description:   Instruct learner(s) on appropriate technique for monitoring, assisting and/or progressing therapeutic exercises/activities.                  Learner Progress:  Not documented in this visit.              Point: Precautions (Not Started)       Description:   Instruct learner(s) on prescribed precautions during self-care and functional transfers.                  Learner Progress:  Not documented in this visit.              Point: Body mechanics (Not Started)       Description:   Instruct learner(s) on proper positioning and spine alignment during self-care, functional mobility activities and/or exercises.                  Learner Progress:  Not documented in this visit.                                  OT Recommendation and Plan  Planned Therapy Interventions (OT): transfer/mobility retraining, strengthening exercise, ROM/therapeutic exercise, activity tolerance training, adaptive equipment training, BADL retraining, functional balance retraining, occupation/activity based interventions, patient/caregiver  education/training, cognitive/visual perception retraining  Therapy Frequency (OT): 5 times/wk  Plan of Care Review  Plan of Care Reviewed With: patient  Progress: no change  Outcome Evaluation: The pt was admitted to [facility] 2/2 to  Imaging (+) antibiotic spacer with femoral head and acetabular polyethylene protrusion in the acetabulum. Pt s/o R BRE Revision on 7/22. Per Ortho pt is strict TTWB for at least 6 weeks. Pmhx significant for  PTSD, COPD, HTN, cirrhosis, avascular necrosis w/ R BRE and MRSA spine infection. The pt also has been going through ETOH w/d. Today, the pt was very emotional but agreeable to OT/PT. Max A x2 provided for bed mobility. Mod A donning socks at the EOB. Max A x2 to stand and sustain her WB status. She returned back to supine. The pt's spouse at bedside reports his plan is to have the pt be bedbound x6 weeks after D/C - OT recommends SNF vs. IRF pending progress due to her PLOF as being independent.     Time Calculation:         Time Calculation- OT       Row Name 07/24/24 1611             Time Calculation- OT    OT Start Time 1315  -RB      OT Stop Time 1345  -RB      OT Time Calculation (min) 30 min  -RB      Total Timed Code Minutes- OT 15 minute(s)  -RB      OT Received On 07/24/24  -RB      OT - Next Appointment 07/25/24  -RB      OT Goal Re-Cert Due Date 08/07/24  -RB         Timed Charges    41110 - OT Self Care/Mgmt Minutes 15  -RB         Untimed Charges    OT Eval/Re-eval Minutes 15  -RB         Total Minutes    Timed Charges Total Minutes 15  -RB      Untimed Charges Total Minutes 15  -RB       Total Minutes 30  -RB                User Key  (r) = Recorded By, (t) = Taken By, (c) = Cosigned By      Initials Name Provider Type    RB Daniela Guerrier OT Occupational Therapist                  Therapy Charges for Today       Code Description Service Date Service Provider Modifiers Qty    [id number] HC OT SELF CARE/MGMT/TRAIN EA 15 MIN 7/24/2024 Daniela Guerrier OT GO 1     [id number]  OT EVAL MOD COMPLEXITY 3 7/24/2024 Daniela Guerrier OT GO 1                 Daniela Guerrier OT  7/24/2024

## 2024-07-24 NOTE — NURSING NOTE
Access center follow up regarding ETOH: chart reviewed. Last CIWA recorded of 8. Ativan and Dilaudid overnight. Plan for skilled rehab upon discharge. Pt encouraged to attend AA meetings again. Following.

## 2024-07-25 DIAGNOSIS — G47.00 INSOMNIA, UNSPECIFIED TYPE: ICD-10-CM

## 2024-07-25 LAB
ALBUMIN SERPL-MCNC: 2.7 G/DL (ref 3.5–5.2)
ALBUMIN/GLOB SERPL: 1.1 G/DL
ALP SERPL-CCNC: 119 U/L (ref 39–117)
ALT SERPL W P-5'-P-CCNC: 17 U/L (ref 1–33)
ANION GAP SERPL CALCULATED.3IONS-SCNC: 8 MMOL/L (ref 5–15)
AST SERPL-CCNC: 29 U/L (ref 1–32)
BASOPHILS # BLD AUTO: 0.01 10*3/MM3 (ref 0–0.2)
BASOPHILS NFR BLD AUTO: 0.2 % (ref 0–1.5)
BH BB BLOOD EXPIRATION DATE: NORMAL
BH BB BLOOD EXPIRATION DATE: NORMAL
BH BB BLOOD TYPE BARCODE: 5100
BH BB BLOOD TYPE BARCODE: 5100
BH BB DISPENSE STATUS: NORMAL
BH BB DISPENSE STATUS: NORMAL
BH BB PRODUCT CODE: NORMAL
BH BB PRODUCT CODE: NORMAL
BH BB UNIT NUMBER: NORMAL
BH BB UNIT NUMBER: NORMAL
BILIRUB SERPL-MCNC: 0.3 MG/DL (ref 0–1.2)
BUN SERPL-MCNC: 8 MG/DL (ref 6–20)
BUN/CREAT SERPL: 12.3 (ref 7–25)
CALCIUM SPEC-SCNC: 8.3 MG/DL (ref 8.6–10.5)
CHLORIDE SERPL-SCNC: 106 MMOL/L (ref 98–107)
CO2 SERPL-SCNC: 24 MMOL/L (ref 22–29)
CREAT SERPL-MCNC: 0.65 MG/DL (ref 0.57–1)
CROSSMATCH INTERPRETATION: NORMAL
CROSSMATCH INTERPRETATION: NORMAL
DEPRECATED RDW RBC AUTO: 57.8 FL (ref 37–54)
EGFRCR SERPLBLD CKD-EPI 2021: 103.5 ML/MIN/1.73
EOSINOPHIL # BLD AUTO: 0.13 10*3/MM3 (ref 0–0.4)
EOSINOPHIL NFR BLD AUTO: 2.5 % (ref 0.3–6.2)
ERYTHROCYTE [DISTWIDTH] IN BLOOD BY AUTOMATED COUNT: 15.9 % (ref 12.3–15.4)
GLOBULIN UR ELPH-MCNC: 2.4 GM/DL
GLUCOSE SERPL-MCNC: 84 MG/DL (ref 65–99)
HCT VFR BLD AUTO: 23 % (ref 34–46.6)
HGB BLD-MCNC: 7.4 G/DL (ref 12–15.9)
IMM GRANULOCYTES # BLD AUTO: 0.1 10*3/MM3 (ref 0–0.05)
IMM GRANULOCYTES NFR BLD AUTO: 1.9 % (ref 0–0.5)
LYMPHOCYTES # BLD AUTO: 1.27 10*3/MM3 (ref 0.7–3.1)
LYMPHOCYTES NFR BLD AUTO: 24 % (ref 19.6–45.3)
MCH RBC QN AUTO: 32 PG (ref 26.6–33)
MCHC RBC AUTO-ENTMCNC: 32.2 G/DL (ref 31.5–35.7)
MCV RBC AUTO: 99.6 FL (ref 79–97)
MONOCYTES # BLD AUTO: 0.5 10*3/MM3 (ref 0.1–0.9)
MONOCYTES NFR BLD AUTO: 9.5 % (ref 5–12)
NEUTROPHILS NFR BLD AUTO: 3.28 10*3/MM3 (ref 1.7–7)
NEUTROPHILS NFR BLD AUTO: 61.9 % (ref 42.7–76)
NRBC BLD AUTO-RTO: 0 /100 WBC (ref 0–0.2)
PLATELET # BLD AUTO: 89 10*3/MM3 (ref 140–450)
PMV BLD AUTO: 9.7 FL (ref 6–12)
POTASSIUM SERPL-SCNC: 3.4 MMOL/L (ref 3.5–5.2)
POTASSIUM SERPL-SCNC: 4.4 MMOL/L (ref 3.5–5.2)
PROT SERPL-MCNC: 5.1 G/DL (ref 6–8.5)
RBC # BLD AUTO: 2.31 10*6/MM3 (ref 3.77–5.28)
SODIUM SERPL-SCNC: 138 MMOL/L (ref 136–145)
UNIT  ABO: NORMAL
UNIT  ABO: NORMAL
UNIT  RH: NORMAL
UNIT  RH: NORMAL
WBC NRBC COR # BLD AUTO: 5.29 10*3/MM3 (ref 3.4–10.8)

## 2024-07-25 PROCEDURE — 25010000002 THIAMINE HCL 200 MG/2ML SOLUTION: Performed by: ORTHOPAEDIC SURGERY

## 2024-07-25 PROCEDURE — 85025 COMPLETE CBC W/AUTO DIFF WBC: CPT | Performed by: ORTHOPAEDIC SURGERY

## 2024-07-25 PROCEDURE — 97530 THERAPEUTIC ACTIVITIES: CPT

## 2024-07-25 PROCEDURE — 80053 COMPREHEN METABOLIC PANEL: CPT | Performed by: ORTHOPAEDIC SURGERY

## 2024-07-25 PROCEDURE — 25010000002 HYDROMORPHONE PER 4 MG: Performed by: ORTHOPAEDIC SURGERY

## 2024-07-25 PROCEDURE — 84132 ASSAY OF SERUM POTASSIUM: CPT | Performed by: ORTHOPAEDIC SURGERY

## 2024-07-25 PROCEDURE — 25810000003 LACTATED RINGERS PER 1000 ML: Performed by: STUDENT IN AN ORGANIZED HEALTH CARE EDUCATION/TRAINING PROGRAM

## 2024-07-25 PROCEDURE — 97110 THERAPEUTIC EXERCISES: CPT

## 2024-07-25 RX ORDER — QUETIAPINE FUMARATE 50 MG/1
50 TABLET, FILM COATED ORAL
Qty: 90 TABLET | Refills: 2 | OUTPATIENT
Start: 2024-07-25 | End: 2024-07-26 | Stop reason: HOSPADM

## 2024-07-25 RX ORDER — POTASSIUM CHLORIDE 750 MG/1
40 TABLET, FILM COATED, EXTENDED RELEASE ORAL EVERY 4 HOURS
Status: COMPLETED | OUTPATIENT
Start: 2024-07-25 | End: 2024-07-25

## 2024-07-25 RX ADMIN — Medication 1 TABLET: at 09:04

## 2024-07-25 RX ADMIN — HYDROMORPHONE HYDROCHLORIDE 0.5 MG: 1 INJECTION, SOLUTION INTRAMUSCULAR; INTRAVENOUS; SUBCUTANEOUS at 09:09

## 2024-07-25 RX ADMIN — Medication 5 MG: at 20:02

## 2024-07-25 RX ADMIN — HYDROCODONE BITARTRATE AND ACETAMINOPHEN 1 TABLET: 10; 325 TABLET ORAL at 20:02

## 2024-07-25 RX ADMIN — Medication 10 ML: at 20:02

## 2024-07-25 RX ADMIN — FOLIC ACID 1 MG: 1 TABLET ORAL at 09:04

## 2024-07-25 RX ADMIN — SERTRALINE 100 MG: 100 TABLET, FILM COATED ORAL at 20:02

## 2024-07-25 RX ADMIN — HYDROMORPHONE HYDROCHLORIDE 0.5 MG: 1 INJECTION, SOLUTION INTRAMUSCULAR; INTRAVENOUS; SUBCUTANEOUS at 17:50

## 2024-07-25 RX ADMIN — QUETIAPINE FUMARATE 100 MG: 100 TABLET ORAL at 20:02

## 2024-07-25 RX ADMIN — Medication 10 ML: at 09:16

## 2024-07-25 RX ADMIN — HYDROCODONE BITARTRATE AND ACETAMINOPHEN 1 TABLET: 10; 325 TABLET ORAL at 13:29

## 2024-07-25 RX ADMIN — POTASSIUM CHLORIDE 40 MEQ: 750 TABLET, EXTENDED RELEASE ORAL at 10:38

## 2024-07-25 RX ADMIN — THIAMINE HYDROCHLORIDE 200 MG: 100 INJECTION, SOLUTION INTRAMUSCULAR; INTRAVENOUS at 04:50

## 2024-07-25 RX ADMIN — Medication 1000 MCG: at 09:04

## 2024-07-25 RX ADMIN — POTASSIUM CHLORIDE 40 MEQ: 750 TABLET, EXTENDED RELEASE ORAL at 07:48

## 2024-07-25 RX ADMIN — BUSPIRONE HYDROCHLORIDE 15 MG: 15 TABLET ORAL at 20:02

## 2024-07-25 RX ADMIN — HYDROMORPHONE HYDROCHLORIDE 0.5 MG: 1 INJECTION, SOLUTION INTRAMUSCULAR; INTRAVENOUS; SUBCUTANEOUS at 04:50

## 2024-07-25 RX ADMIN — BUSPIRONE HYDROCHLORIDE 15 MG: 15 TABLET ORAL at 09:04

## 2024-07-25 RX ADMIN — SODIUM CHLORIDE, POTASSIUM CHLORIDE, SODIUM LACTATE AND CALCIUM CHLORIDE 75 ML/HR: 600; 310; 30; 20 INJECTION, SOLUTION INTRAVENOUS at 06:23

## 2024-07-25 RX ADMIN — THIAMINE HYDROCHLORIDE 200 MG: 100 INJECTION, SOLUTION INTRAMUSCULAR; INTRAVENOUS at 13:29

## 2024-07-25 RX ADMIN — SERTRALINE 100 MG: 100 TABLET, FILM COATED ORAL at 09:04

## 2024-07-25 RX ADMIN — HYDROCODONE BITARTRATE AND ACETAMINOPHEN 1 TABLET: 10; 325 TABLET ORAL at 07:48

## 2024-07-25 NOTE — THERAPY TREATMENT NOTE
Patient Name: Lita Yu  : 1965    MRN: [id number]                              Today's Date: 2024       Admit Date: 2024    Visit Dx:     ICD-10-CM ICD-9-CM   1. Intractable low back pain  M54.59 724.2   2. Right hip pain  M25.551 719.45   3. History of discitis  Z87.39 V13.4     Patient Active Problem List   Diagnosis    Postmenopausal state    Post traumatic stress disorder    Pain in joints    Osteoporosis    Macrocytic anemia    Lumbosacral radiculopathy    Leg pain, left    Peripheral vascular disease    Essential hypertension    Hyperlipidemia    Degenerative joint disease of left hip    Seizure disorder    Smoker    Status post hip replacement    Tobacco dependence syndrome    Vitamin B12 deficiency    Anemia of chronic disease    Chronic obstructive pulmonary disease    Cirrhosis of liver    Acute UTI (urinary tract infection)    Generalized weakness    Rhabdomyolysis    Non-traumatic rhabdomyolysis    Moderate malnutrition    Chest pain    Dehydration    Myalgia    Acute hyperkalemia    Spinal abscess    Intractable low back pain    History of MRSA infection    Right hip pain    S/P total right hip arthroplasty    Anemia    Metabolic acidosis    Thrombocytopenia    Alcohol use     Past Medical History:   Diagnosis Date    Cirrhosis     COPD (chronic obstructive pulmonary disease)     Depression     GERD (gastroesophageal reflux disease)     Hyperlipidemia     Hypertension     PTSD (post-traumatic stress disorder)      Past Surgical History:   Procedure Laterality Date     SECTION N/A     COLONOSCOPY N/A 2021    Procedure: COLONOSCOPY with polypectomy x2 and random biopsy;  Surgeon: Osman Clay MD;  Location: [facility] ENDOSCOPY;  Service: Gastroenterology;  Laterality: N/A;  colon polyps    DENTAL PROCEDURE      ENDOSCOPY N/A 2021    Procedure: ESOPHAGOGASTRODUODENOSCOPY;  Surgeon: Osman Clay MD;  Location: [facility] ENDOSCOPY;  Service:  Gastroenterology;  Laterality: N/A;  esophagitis    JOINT REPLACEMENT      REPLACEMENT TOTAL HIP LATERAL POSITION Left     TONSILLECTOMY      TOTAL HIP ARTHROPLASTY Right 2024    Procedure: TOTAL HIP ARTHROPLASTY ANTERIOR;  Surgeon: Cristobal Chen II, MD;  Location: [facility] MAIN OR;  Service: Orthopedics;  Laterality: Right;    TOTAL HIP ARTHROPLASTY REVISION Right 2024    Procedure: TOTAL HIP ARTHROPLASTY REVISION;  Surgeon: Cristobal Chen II, MD;  Location:  [facility];  Service: Orthopedics;  Laterality: Right;    VAGINAL BIRTH AFTER  SECTION        General Information       Row Name 24 1539          OT Time and Intention    Document Type therapy note (daily note)  -RB     Mode of Treatment co-treatment;physical therapy;occupational therapy  -RB       Row Name 24 1539          General Information    Patient Profile Reviewed yes  -RB       Row Name 24 1539          Cognition    Orientation Status (Cognition) oriented to;person;place;situation;verbal cues/prompts needed for orientation  Improved mentation today, however, continued to demo confusion  -RB               User Key  (r) = Recorded By, (t) = Taken By, (c) = Cosigned By      Initials Name Provider Type    RB Daniela Guerrier OT Occupational Therapist                     Mobility/ADL's       Row Name 24 1539          Bed Mobility    Bed Mobility supine-sit;sit-supine  -RB     Supine-Sit [place] (Bed Mobility) minimum assist (75% patient effort);moderate assist (50% patient effort);1 person assist;verbal cues  -RB     Sit-Supine [place] (Bed Mobility) minimum assist (75% patient effort);moderate assist (50% patient effort);1 person assist;verbal cues  -RB     Assistive Device (Bed Mobility) bed rails  -RB       Row Name 24 1539          Transfers    Transfers sit-stand transfer;stand-sit transfer  -RB     Comment, (Transfers) stood 5x with Min-Mod A x2, longest stand 3-4 min,  able to attempt to side shuffle to her L  -RB       Row Name 07/25/24 1539          Sit-Stand Transfer    Sit-Stand [place] (Transfers) moderate assist (50% patient effort);minimum assist (75% patient effort);2 person assist;verbal cues;nonverbal cues (demo/gesture)  -RB     Assistive Device (Sit-Stand Transfers) walker, front-wheeled  -RB       Row Name 07/25/24 1539          Stand-Sit Transfer    Stand-Sit [place] (Transfers) minimum assist (75% patient effort);moderate assist (50% patient effort);verbal cues;2 person assist  -RB     Assistive Device (Stand-Sit Transfers) walker, front-wheeled  -RB       Row Name 07/25/24 1539          Functional Mobility    Functional Mobility- Ind. Level not tested  -RB       Row Name 07/25/24 1539          Activities of Daily Living    BADL Assessment/Intervention lower body dressing  -RB       Row Name 07/25/24 1539          Lower Body Dressing Assessment/Training    [place] Level (Lower Body Dressing) lower body dressing skills;maximum assist (25% patient effort)  -RB     Position (Lower Body Dressing) edge of bed sitting  -RB               User Key  (r) = Recorded By, (t) = Taken By, (c) = Cosigned By      Initials Name Provider Type    Daniela Miguel OT Occupational Therapist                   Obj/Interventions       Row Name 07/25/24 1541          Shoulder (Therapeutic Exercise)    Shoulder (Therapeutic Exercise) AROM (active range of motion)  -RB     Shoulder AROM (Therapeutic Exercise) bilateral;flexion;extension;10 repetitions;sitting  -RB       Row Name 07/25/24 1541          Motor Skills    Therapeutic Exercise shoulder  -RB       Row Name 07/25/24 1541          Balance    Comment, Balance SBA/CGA sitting balance at the EOB, Min-Mod A x2 for standing balance  -RB               User Key  (r) = Recorded By, (t) = Taken By, (c) = Cosigned By      Initials Name Provider Type    Daniela Miguel OT Occupational Therapist                    Goals/Plan    No documentation.                  Clinical Impression       Row Name 07/25/24 1541          Pain Assessment    Pretreatment Pain Rating 5/10  -RB     Posttreatment Pain Rating 8/10  -RB     Pain Location incisional  -RB     Pain Intervention(s) Repositioned  -RB       Row Name 07/25/24 1541          Plan of Care Review    Plan of Care Reviewed With patient  -RB     Progress improving  -RB       Row Name 07/25/24 1541          Therapy Assessment/Plan (OT)    Rehab Potential (OT) good, to achieve stated therapy goals  -RB     Criteria for Skilled Therapeutic Interventions Met (OT) yes;skilled treatment is necessary  -RB     Therapy Frequency (OT) 5 times/wk  -RB       Row Name 07/25/24 1541          Therapy Plan Review/Discharge Plan (OT)    Anticipated Discharge Disposition (OT) inpatient rehabilitation facility  -RB       Row Name 07/25/24 1541          Positioning and Restraints    Pre-Treatment Position in bed  -RB     Post Treatment Position bed  -RB     In Bed call light within reach;notified nsg;supine;encouraged to call for assist;fowlers;legs elevated  -RB               User Key  (r) = Recorded By, (t) = Taken By, (c) = Cosigned By      Initials Name Provider Type    RB Daniela Guerrier, OT Occupational Therapist                   Outcome Measures       Row Name 07/25/24 0845          How much help from another person do you currently need...    Turning from your back to your side while in flat bed without using bedrails? 3  -RK     Moving from lying on back to sitting on the side of a flat bed without bedrails? 2  -RK     Moving to and from a bed to a chair (including a wheelchair)? 1  -RK     Standing up from a chair using your arms (e.g., wheelchair, bedside chair)? 2  -RK     Climbing 3-5 steps with a railing? 1  -RK     To walk in hospital room? 1  -RK     AM-PAC 6 Clicks Score (PT) 10  -RK     Highest Level of Mobility Goal 4 --> Transfer to chair/commode  -RK               User Key  (r)  = Recorded By, (t) = Taken By, (c) = Cosigned By      Initials Name Provider Type    Loyda Sol, RN Registered Nurse                    Occupational Therapy Education       Title: PT OT SLP Therapies (In Progress)       Topic: Occupational Therapy (Not Started)       Point: ADL training (Not Started)       Description:   Instruct learner(s) on proper safety adaptation and remediation techniques during self care or transfers.   Instruct in proper use of assistive devices.                  Learner Progress:  Not documented in this visit.              Point: Home exercise program (Not Started)       Description:   Instruct learner(s) on appropriate technique for monitoring, assisting and/or progressing therapeutic exercises/activities.                  Learner Progress:  Not documented in this visit.              Point: Precautions (Not Started)       Description:   Instruct learner(s) on prescribed precautions during self-care and functional transfers.                  Learner Progress:  Not documented in this visit.              Point: Body mechanics (Not Started)       Description:   Instruct learner(s) on proper positioning and spine alignment during self-care, functional mobility activities and/or exercises.                  Learner Progress:  Not documented in this visit.                                  OT Recommendation and Plan  Planned Therapy Interventions (OT): transfer/mobility retraining, strengthening exercise, ROM/therapeutic exercise, activity tolerance training, adaptive equipment training, BADL retraining, functional balance retraining, occupation/activity based interventions, patient/caregiver education/training, cognitive/visual perception retraining  Therapy Frequency (OT): 5 times/wk  Plan of Care Review  Plan of Care Reviewed With: patient  Progress: improving  Outcome Evaluation: The pt was admitted to [facility] 2/2 to  Imaging (+) antibiotic spacer with femoral head and acetabular  polyethylene protrusion in the acetabulum. Pt s/o R BRE Revision on 7/22. Per Ortho pt is strict TTWB for at least 6 weeks. Pmhx significant for  PTSD, COPD, HTN, cirrhosis, avascular necrosis w/ R BRE and MRSA spine infection. The pt also has been going through ETOH w/d. Today, the pt was very emotional but agreeable to OT/PT. Max A x2 provided for bed mobility. Mod A donning socks at the EOB. Max A x2 to stand and sustain her WB status. She returned back to supine. The pt's spouse at bedside reports his plan is to have the pt be bedbound x6 weeks after D/C - OT recommends SNF vs. IRF pending progress due to her PLOF as being independent.     Time Calculation:         Time Calculation- OT       Row Name 07/25/24 1538             Time Calculation- OT    OT Start Time 1315  -RB      OT Stop Time 1345  -RB      OT Time Calculation (min) 30 min  -RB      Total Timed Code Minutes- OT 30 minute(s)  -RB      OT Received On 07/25/24  -RB      OT - Next Appointment 07/26/24  -RB         Timed Charges    12130 - OT Therapeutic Exercise Minutes 10  -RB      73758 - OT Therapeutic Activity Minutes 20  -RB         Total Minutes    Timed Charges Total Minutes 30  -RB       Total Minutes 30  -RB                User Key  (r) = Recorded By, (t) = Taken By, (c) = Cosigned By      Initials Name Provider Type    RB Daniela Guerrier OT Occupational Therapist                  Therapy Charges for Today       Code Description Service Date Service Provider Modifiers Qty    88873477074 HC OT SELF CARE/MGMT/TRAIN EA 15 MIN 7/24/2024 Daniela Guerrier OT GO 1    25419570511 HC OT EVAL MOD COMPLEXITY 3 7/24/2024 Daniela Guerrier OT GO 1    33950923693 HC OT THER PROC EA 15 MIN 7/25/2024 Daniela Guerrier OT GO 1    40808769336 HC OT THERAPEUTIC ACT EA 15 MIN 7/25/2024 Daniela Guerrier OT GO 1                 Daniela Guerrier OT  7/25/2024

## 2024-07-25 NOTE — PAYOR COMM NOTE
"Yonis Yu \"NAINA\" (58 y.o. Female)      SEE FOR INPATIENT:  REF# [id number]     PATIENT WAS OBSERVATION AND CONVERTED TO INPATIENT 07/24/24    [facility] DEPT: [phone], [phone]    [facility]: NPI [id number] [facility]# [id number]    YAA PHELPS RN,[facility]       Date of Birth   1965    Social Security Number       Address   [address] [facility] [place] [address]    Home Phone   [phone]    MRN   [id number]       Advent   None    Marital Status                               Admission Date   7/20/24    Admission Type   Emergency    Admitting Provider   Melodie Brooks MD    Attending Provider   Jluis Maguire MD    Department, Room/Bed   [address], P788/1       Discharge Date       Discharge Disposition       Discharge Destination                                 Attending Provider: Jluis Maguire MD    Allergies: Sulfa Antibiotics, Keflex [Cephalexin]    Isolation: Contact   Infection: MRSA (07/22/24)   Code Status: CPR    Ht: 160 cm (62.99\")   Wt: 64.6 kg (142 lb 7 oz)    Admission Cmt: None   Principal Problem: Intractable low back pain [M54.59]                   Active Insurance as of 7/20/2024       Primary Coverage       Payor Plan Insurance Group Employer/Plan Group    [id number]       Payor Plan Address Payor Plan Phone Number Payor Plan Fax Number Effective Dates    [address]   6/22/2014 - None Entered    [place] [address]         Subscriber Name Subscriber Birth Date Member ID       YONIS YU 1965 [id number]                     Emergency Contacts        (Rel.) Home Phone Work Phone Mobile Phone    OBDULIA CANTU (Daughter) -- -- [phone]    MILANA DAI (Significant Other) [phone] -- [phone]                 History & Physical        Melodie Brooks MD at 07/20/24 1722          Internal medicine history and physical  INTERNAL " MEDICINE   [facility]       Patient Identification:  Name: Lita Yu  Age: 58 y.o.  Sex: female  :  1965  MRN: [id number]                   Primary Care Physician: Norma Saul APRN                               Date of admission:2024    Chief Complaint: Significant worsening of back pain and pain in her right leg over the course of last 1 week.    History of Present Illness:   Patient is a 58-year-old female who has past medical history remarkable for chronic pain, posttraumatic stress disorder, COPD hypertension dyslipidemia and cirrhosis of liver was treated with IV antibiotic therapy for MRSA spine infection which she finished and 2024.  Patient also has history of avascular necrosis for which she has had right hip total arthroplasty performed.  While she was receiving antibiotic therapy her right hip was bothering her and she has seen her orthopedic surgeon and plan was made to reassess her need for intervention after the infection is resolved.  According the patient she did well after completion of antibiotic therapy but few days after her last dose of antibiotic therapy she started having increasing pain in her back going into her right leg to the point that she was unable to get up and walk.  She denies any incontinence fever or chills or night sweats.  With this she came to the emergency room here at [facility] and was found to have normal inflammatory markers with sed rate of 7 and C-reactive protein of less than 0.3.  Patient was noted to have lactic acid level of 1.5 and procalcitonin of 0.03.  She had normal white blood cell count and was not noted to have any fever or chills.  Patient does have a history of chronic alcohol use but has cut back to only 2 beer cans per week.  Patient is being admitted for spine surgery and orthopedic surgery evaluation while her pain is being managed in the monitored setting.    Past Medical History:  Past  Medical History:   Diagnosis Date    Cirrhosis     COPD (chronic obstructive pulmonary disease)     Depression     GERD (gastroesophageal reflux disease)     Hyperlipidemia     Hypertension     PTSD (post-traumatic stress disorder)      Past Surgical History:  Past Surgical History:   Procedure Laterality Date     SECTION N/A     COLONOSCOPY N/A 2021    Procedure: COLONOSCOPY with polypectomy x2 and random biopsy;  Surgeon: Osman Clay MD;  Location: [facility] ENDOSCOPY;  Service: Gastroenterology;  Laterality: N/A;  colon polyps    DENTAL PROCEDURE      ENDOSCOPY N/A 2021    Procedure: ESOPHAGOGASTRODUODENOSCOPY;  Surgeon: Osman Clay MD;  Location: [facility] ENDOSCOPY;  Service: Gastroenterology;  Laterality: N/A;  esophagitis    JOINT REPLACEMENT      REPLACEMENT TOTAL HIP LATERAL POSITION Left     TONSILLECTOMY      TOTAL HIP ARTHROPLASTY Right 2024    Procedure: TOTAL HIP ARTHROPLASTY ANTERIOR;  Surgeon: Cristobal Chen II, MD;  Location: [facility] MAIN OR;  Service: Orthopedics;  Laterality: Right;    VAGINAL BIRTH AFTER  SECTION        Home Meds:  (Not in a hospital admission)    Current Meds:     Current Facility-Administered Medications:     [COMPLETED] Insert Peripheral IV, , , Once **AND** sodium chloride 0.9 % flush 10 mL, 10 mL, Intravenous, PRN, Seng Mandujano MD    Current Outpatient Medications:     albuterol sulfate  (90 Base) MCG/ACT inhaler, Inhale 2 puffs Every 4 (Four) Hours As Needed for Wheezing., Disp: , Rfl:     busPIRone (BUSPAR) 15 MG tablet, TAKE 1 TABLET BY MOUTH TWICE A DAY, Disp: 180 tablet, Rfl: 1    Cyanocobalamin (Vitamin B-12) 1000 MCG sublingual tablet, Place 1 tablet under the tongue Daily., Disp: , Rfl:     folic acid (FOLVITE) 1 MG tablet, Take 1 tablet by mouth Daily., Disp: 30 tablet, Rfl: 0    HYDROcodone-acetaminophen (NORCO) 5-325 MG per tablet, Take 1 tablet by mouth Every 12 (Twelve) Hours As Needed for Moderate Pain.,  Disp: 20 tablet, Rfl: 0    LORazepam (Ativan) 1 MG tablet, Take 1 tab prior to MRI, Disp: 1 tablet, Rfl: 0    melatonin 5 MG tablet tablet, Take 1 tablet by mouth At Night As Needed (sleep)., Disp: , Rfl:     QUEtiapine (SEROquel) 50 MG tablet, Take 2 tablets by mouth every night at bedtime., Disp: 180 tablet, Rfl: 0    sertraline (ZOLOFT) 100 MG tablet, TAKE 1 TABLET BY MOUTH TWICE A DAY, Disp: 180 tablet, Rfl: 1    thiamine (VITAMIN B1) 100 MG tablet, Take 1 tablet by mouth Daily., Disp: 30 tablet, Rfl: 0  Allergies:  Allergies   Allergen Reactions    Sulfa Antibiotics Hives    Keflex [Cephalexin] Hives     Social History:   Social History     Tobacco Use    Smoking status: Every Day     Current packs/day: 0.50     Types: Cigarettes     Passive exposure: Current    Smokeless tobacco: Never    Tobacco comments:     3cigs   Substance Use Topics    Alcohol use: Not Currently     Alcohol/week: 2.0 standard drinks of alcohol     Types: 2 Cans of beer per week     Comment: pint daily      Family History:  Family History   Problem Relation Age of Onset    Alcohol abuse Mother     Alcohol abuse Paternal Grandfather           Review of Systems  See history of present illness and past medical history.      Vitals:   /77   Pulse 83   Temp 98.2 °F (36.8 °C) (Tympanic)   Resp 16   SpO2 98%   I/O: No intake or output data in the 24 hours ending 07/20/24 1722  Exam:  Patient is examined using the personal protective equipment as per guidelines from infection control for this particular patient as enacted.  Hand washing was performed before and after patient interaction.  General Appearance:    Alert, cooperative, no distress, appears stated age   Head:    Normocephalic, without obvious abnormality, atraumatic   Eyes:    PERRL, conjunctiva/corneas clear, EOM's intact, both eyes   Ears:    Normal external ear canals, both ears   Nose: No nasal drainage noted   Throat:   Lips, tongue, gums normal; oral mucosa pink and  moist   Neck: Supple and no adenopathy   Back:     Symmetric, no curvature, ROM normal, no CVA tenderness   Lungs:     Clear to auscultation bilaterally, respirations unlabored   Chest Wall:    No tenderness or deformity    Heart:  S1-S2 regular   Abdomen:   Soft nontender   Extremities:   Extremities normal, atraumatic, no cyanosis or edema   Pulses:   Pulses palpable in all extremities; symmetric all extremities   Skin: No rash noted   Neurologic: Alert and oriented x 3 and while laying in the bed she is able to move her upper and lower extremities.  Otherwise grossly nonfocal examination       Data Review:      I reviewed the patient's new clinical results.  Results from last 7 days   Lab Units 07/20/24  1407   WBC 10*3/mm3 4.40   HEMOGLOBIN g/dL 9.8*   PLATELETS 10*3/mm3 113*     Results from last 7 days   Lab Units 07/20/24  1407   SODIUM mmol/L 139   POTASSIUM mmol/L 3.3*   CHLORIDE mmol/L 105   CO2 mmol/L 23.0   BUN mg/dL 8   CREATININE mg/dL 0.72   CALCIUM mg/dL 8.5*   GLUCOSE mg/dL 95     CT Lumbar Spine Without Contrast    Result Date: 7/20/2024  1. Sequela of discitis/osteomyelitis at L2-L3 with obliteration of the L2-L3 disc space and marked associated endplate irregularity/osteolysis. L2-L3 disc space loss is no worse than may MRI. Paraspinal inflammatory changes have significantly decreased/nearly resolved. 2. Mild right L5-S1 neuroforaminal stenosis secondary to disc and facet joint degeneration. 3. Bilateral total hip arthroplasties. Right arthroplasty has a cemented acetabular component. No evidence of fracture/periprosthetic fracture. Right acetabular component is positioned at the anterior superior aspect of the native right acetabulum. Recommend correlation with prior surgery.    Radiation dose reduction techniques were utilized, including automated exposure control and exposure modulation based on body size.  This report was finalized on 7/20/2024 4:05 PM by Dr. Jluis Barron M.D on  Workstation: [id number]      CT Pelvis Without Contrast    Result Date: 7/20/2024  1. Sequela of discitis/osteomyelitis at L2-L3 with obliteration of the L2-L3 disc space and marked associated endplate irregularity/osteolysis. L2-L3 disc space loss is no worse than may MRI. Paraspinal inflammatory changes have significantly decreased/nearly resolved. 2. Mild right L5-S1 neuroforaminal stenosis secondary to disc and facet joint degeneration. 3. Bilateral total hip arthroplasties. Right arthroplasty has a cemented acetabular component. No evidence of fracture/periprosthetic fracture. Right acetabular component is positioned at the anterior superior aspect of the native right acetabulum. Recommend correlation with prior surgery.    Radiation dose reduction techniques were utilized, including automated exposure control and exposure modulation based on body size.  This report was finalized on 7/20/2024 4:05 PM by Dr. Jluis Barron M.D on Workstation: [id number]     Microbiology Results (last 10 days)       ** No results found for the last 240 hours. **          Assessment:  Active Hospital Problems    Diagnosis  POA    **Intractable low back pain [M54.59]  Yes    History of MRSA infection [Z86.14]  Unknown    Right hip pain [M25.551]  Unknown    S/P total right hip arthroplasty [Z96.641]  Not Applicable    Anemia [D64.9]  Unknown    Spinal abscess [M46.20]  Yes    Cirrhosis of liver [K74.60]  Yes    Chronic obstructive pulmonary disease [J44.9]  Yes    Essential hypertension [I10]  Yes    Seizure disorder [G40.909]  Yes    Post traumatic stress disorder [F43.10]  Yes    Peripheral vascular disease [I73.9]  Yes       Medical decision making/CARE plan: See admitting orders  Intractable low back pain and right hip pain in the setting of recent MRSA infection for which she has completed prolonged course of antibiotic therapy with normalization of her inflammatory markers and no symptoms of sepsis symptoms of fever chills  night sweats-plan is to get neurosurgery and orthopedic surgery consultation, provided pain medication and physical therapy evaluation.  History of alcohol use-provided with [facility] protocol and monitor for any alcohol withdrawal she is clinically appears to be stable and does not appear to be showing overt signs of withdrawal.  COPD-continue with nebulizer treatment and provided with continuous pulse ox monitoring while she is receiving narcotic pain medications.  Thrombocytopenia likely due to chronic liver disease and cirrhosis of liver-plan is to monitor.  Chronic liver disease multifactorial details not available but patient appears to be clinically compensated plan is to monitor.  Anemia-likely anemia of chronic disease plan is to check basic anemia studies and further workup if she shows decline in hemoglobin pattern.  Anxiety and depression and posttraumatic stress disorder and seizure disorder-continue her home regimen and monitor.    Melodie Brooks MD   7/20/2024  17:22 EDT    Parts of this note may be an electronic transcription/translation of spoken language to printed text using the Dragon dictation system.      Electronically signed by Melodie Brooks MD at 07/21/24 0552          Emergency Department Notes          Seng Mandujano MD at 07/20/24 1345           EMERGENCY DEPARTMENT ENCOUNTER    Room Number:  37/37  Date seen:  7/20/2024  PCP: Norma Saul APRN  Historian: Patient      HPI:  Chief Complaint: Back and leg pain  Context: Lita Yu is a 58 y.o. female who presents to the ED c/o worsening low back pain and right leg pain.  The patient states that she had an infection in her back earlier this year and is on 4 months of IV antibiotics.  She states over the past week she has had increased back and right leg pain to the point she is now having difficulty walking due to pain in her right leg.  The patient denies fevers, chills, bowel or bladder incontinence or saddle  paresthesia.      PAST MEDICAL HISTORY  Active Ambulatory Problems     Diagnosis Date Noted    Postmenopausal state 2018    Post traumatic stress disorder 2018    Pain in joints 2018    Osteoporosis 2019    Macrocytic anemia 2018    Lumbosacral radiculopathy 2017    Leg pain, left 2017    Peripheral vascular disease 2017    Essential hypertension 2019    Hyperlipidemia 2018    Degenerative joint disease of left hip 2017    Seizure disorder 2018    Smoker 2019    Status post hip replacement 08/15/2017    Tobacco dependence syndrome 2017    Vitamin B12 deficiency 2018    Anemia of chronic disease 2019    Chronic obstructive pulmonary disease 2020    Cirrhosis of liver 10/14/2022    Acute UTI (urinary tract infection) 10/14/2022    Generalized weakness 10/14/2022    Rhabdomyolysis 10/14/2022    Non-traumatic rhabdomyolysis 10/14/2022    Moderate malnutrition 10/18/2022    Chest pain 2023    Dehydration 2024    Myalgia 2024    Acute hyperkalemia 2024    Spinal abscess 2024     Resolved Ambulatory Problems     Diagnosis Date Noted    Alcohol withdrawal syndrome, with delirium 2022    Hypokalemia 2022     Past Medical History:   Diagnosis Date    Cirrhosis     COPD (chronic obstructive pulmonary disease)     Depression     GERD (gastroesophageal reflux disease)     Hypertension     PTSD (post-traumatic stress disorder)          REVIEW OF SYSTEMS  All systems reviewed and negative except for those discussed in HPI.       PAST SURGICAL HISTORY  Past Surgical History:   Procedure Laterality Date     SECTION N/A     COLONOSCOPY N/A 2021    Procedure: COLONOSCOPY with polypectomy x2 and random biopsy;  Surgeon: Osman Clay MD;  Location: [facility] ENDOSCOPY;  Service: Gastroenterology;  Laterality: N/A;  colon polyps    DENTAL PROCEDURE      ENDOSCOPY N/A 2021     Procedure: ESOPHAGOGASTRODUODENOSCOPY;  Surgeon: Osman Clay MD;  Location: [facility] ENDOSCOPY;  Service: Gastroenterology;  Laterality: N/A;  esophagitis    JOINT REPLACEMENT      REPLACEMENT TOTAL HIP LATERAL POSITION Left     TONSILLECTOMY      TOTAL HIP ARTHROPLASTY Right 2024    Procedure: TOTAL HIP ARTHROPLASTY ANTERIOR;  Surgeon: Cristobal Chen II, MD;  Location: [facility] MAIN OR;  Service: Orthopedics;  Laterality: Right;    VAGINAL BIRTH AFTER  SECTION           FAMILY HISTORY  Family History   Problem Relation Age of Onset    Alcohol abuse Mother     Alcohol abuse Paternal Grandfather          SOCIAL HISTORY  Social History     Socioeconomic History    Marital status:    Tobacco Use    Smoking status: Every Day     Current packs/day: 0.50     Types: Cigarettes     Passive exposure: Current    Smokeless tobacco: Never    Tobacco comments:     3cigs   Vaping Use    Vaping status: Never Used   Substance and Sexual Activity    Alcohol use: Not Currently     Alcohol/week: 2.0 standard drinks of alcohol     Types: 2 Cans of beer per week     Comment: pint daily    Drug use: No    Sexual activity: Yes     Partners: Male     Birth control/protection: Post-menopausal         ALLERGIES  Sulfa antibiotics and Keflex [cephalexin]      PHYSICAL EXAM  ED Triage Vitals [24 1308]   Temp Heart Rate Resp BP SpO2   98.2 °F (36.8 °C) 106 16 -- 98 %      Temp src Heart Rate Source Patient Position BP Location FiO2 (%)   Tympanic Monitor -- -- --       Physical Exam      GENERAL: 58-year-old female in no acute distress  HENT: NCAT: nares patent: Neck supple  EYES: no scleral icterus  CV: regular rhythm, normal rate  RESPIRATORY: normal effort  ABDOMEN: soft, NTND: Bowel sounds positive  Back: Lower right SI tenderness  MUSCULOSKELETAL: no deformity: Right hip tenderness with range of motion but no erythema or swelling  NEURO: alert with nonfocal neuro exam  PSYCH:  calm, cooperative  SKIN:  warm, dry    Vital signs and nursing notes reviewed.      LAB RESULTS  Recent Results (from the past 24 hour(s))   Comprehensive Metabolic Panel    Collection Time: 07/20/24  2:07 PM    Specimen: Blood   Result Value Ref Range    Glucose 95 65 - 99 mg/dL    BUN 8 6 - 20 mg/dL    Creatinine 0.72 0.57 - 1.00 mg/dL    Sodium 139 136 - 145 mmol/L    Potassium 3.3 (L) 3.5 - 5.2 mmol/L    Chloride 105 98 - 107 mmol/L    CO2 23.0 22.0 - 29.0 mmol/L    Calcium 8.5 (L) 8.6 - 10.5 mg/dL    Total Protein 7.0 6.0 - 8.5 g/dL    Albumin 3.8 3.5 - 5.2 g/dL    ALT (SGPT) 24 1 - 33 U/L    AST (SGOT) 43 (H) 1 - 32 U/L    Alkaline Phosphatase 237 (H) 39 - 117 U/L    Total Bilirubin 0.4 0.0 - 1.2 mg/dL    Globulin 3.2 gm/dL    A/G Ratio 1.2 g/dL    BUN/Creatinine Ratio 11.1 7.0 - 25.0    Anion Gap 11.0 5.0 - 15.0 mmol/L    eGFR 98.3 >60.0 mL/min/1.73   Protime-INR    Collection Time: 07/20/24  2:07 PM    Specimen: Blood   Result Value Ref Range    Protime 14.6 (H) 11.7 - 14.2 Seconds    INR 1.12 (H) 0.90 - 1.10   Lactic Acid, Plasma    Collection Time: 07/20/24  2:07 PM    Specimen: Blood   Result Value Ref Range    Lactate 1.5 0.5 - 2.0 mmol/L   Procalcitonin    Collection Time: 07/20/24  2:07 PM    Specimen: Blood   Result Value Ref Range    Procalcitonin 0.03 0.00 - 0.25 ng/mL   Sedimentation Rate    Collection Time: 07/20/24  2:07 PM    Specimen: Blood   Result Value Ref Range    Sed Rate 7 0 - 30 mm/hr   C-reactive Protein    Collection Time: 07/20/24  2:07 PM    Specimen: Blood   Result Value Ref Range    C-Reactive Protein <0.30 0.00 - 0.50 mg/dL   CBC Auto Differential    Collection Time: 07/20/24  2:07 PM    Specimen: Blood   Result Value Ref Range    WBC 4.40 3.40 - 10.80 10*3/mm3    RBC 3.07 (L) 3.77 - 5.28 10*6/mm3    Hemoglobin 9.8 (L) 12.0 - 15.9 g/dL    Hematocrit 31.1 (L) 34.0 - 46.6 %    .3 (H) 79.0 - 97.0 fL    MCH 31.9 26.6 - 33.0 pg    MCHC 31.5 31.5 - 35.7 g/dL    RDW 15.7 (H) 12.3 - 15.4 %    RDW-SD 58.1  (H) 37.0 - 54.0 fl    MPV 9.5 6.0 - 12.0 fL    Platelets 113 (L) 140 - 450 10*3/mm3    Neutrophil % 57.9 42.7 - 76.0 %    Lymphocyte % 26.6 19.6 - 45.3 %    Monocyte % 11.4 5.0 - 12.0 %    Eosinophil % 2.3 0.3 - 6.2 %    Basophil % 0.7 0.0 - 1.5 %    Immature Grans % 1.1 (H) 0.0 - 0.5 %    Neutrophils, Absolute 2.55 1.70 - 7.00 10*3/mm3    Lymphocytes, Absolute 1.17 0.70 - 3.10 10*3/mm3    Monocytes, Absolute 0.50 0.10 - 0.90 10*3/mm3    Eosinophils, Absolute 0.10 0.00 - 0.40 10*3/mm3    Basophils, Absolute 0.03 0.00 - 0.20 10*3/mm3    Immature Grans, Absolute 0.05 0.00 - 0.05 10*3/mm3    nRBC 0.0 0.0 - 0.2 /100 WBC       Ordered the above labs and reviewed the results.        RADIOLOGY  CT Lumbar Spine Without Contrast, CT Pelvis Without Contrast    Result Date: 7/20/2024  CT PELVIS WO CONTRAST-, CT LUMBAR SPINE WO CONTRAST-  CLINICAL HISTORY: Low back pain with radiation to the right leg. Right hip and sacral pain. Post right total hip arthroplasty May 2024  TECHNIQUE: CT scan of the lumbar spine and pelvis was obtained with a combination of 3 , 2, and 1 mm axial images. Bone and soft tissue algorithm images were obtained with sagittal and coronal reconstructed images.  FINDINGS:  Lumbar spine: No subluxation. Unchanged bilateral L5 pars defects. Marked L2 inferior endplate and L3 superior endplate cortical irregularity/osteolysis with subjacent vertebral body sclerotic changes and obliteration of the L2-L3 disc space. Discitis/osteomyelitis was seen on May MRI. L2-L3 disc space loss is no worse. Paraspinal inflammatory changes have significantly decreased and nearly resolved. L4 superior endplate Schmorl's node. Mild multilevel disc degeneration throughout the remaining lumbar spine. Mild L5-S1 facet arthropathy. No appreciable spinal canal stenosis. Mild right L5-S1 neuroforaminal stenosis secondary to disc and facet joint degeneration.  Pelvis: Bilateral total hip arthroplasties. Right arthroplasty has a  cemented acetabular component. No evidence of fracture/periprosthetic fracture. Right acetabular component is positioned at the anterosuperior aspect of the native right acetabulum (series 2/image 66). Limited normal noncontrast CT appearance of the pelvic soft tissues. Exam is partially by arthroplasty streak artifact.           1. Sequela of discitis/osteomyelitis at L2-L3 with obliteration of the L2-L3 disc space and marked associated endplate irregularity/osteolysis. L2-L3 disc space loss is no worse than may MRI. Paraspinal inflammatory changes have significantly decreased/nearly resolved. 2. Mild right L5-S1 neuroforaminal stenosis secondary to disc and facet joint degeneration. 3. Bilateral total hip arthroplasties. Right arthroplasty has a cemented acetabular component. No evidence of fracture/periprosthetic fracture. Right acetabular component is positioned at the anterior superior aspect of the native right acetabulum. Recommend correlation with prior surgery.    Radiation dose reduction techniques were utilized, including automated exposure control and exposure modulation based on body size.  This report was finalized on 7/20/2024 4:05 PM by Dr. Jluis Barron M.D on Workstation: [id number]       Ordered the above noted radiological studies. Reviewed by me in PACS.            PROCEDURES  Procedures    MEDICATIONS GIVEN IN ER  Medications   sodium chloride 0.9 % flush 10 mL (has no administration in time range)   dexAMETHasone (DECADRON) injection 8 mg (has no administration in time range)   HYDROmorphone (DILAUDID) injection 0.5 mg (has no administration in time range)   ondansetron (ZOFRAN) injection 4 mg (has no administration in time range)   sodium chloride 0.9 % bolus 500 mL (has no administration in time range)   ondansetron (ZOFRAN) injection 4 mg (4 mg Intravenous Given 7/20/24 1430)   HYDROmorphone (DILAUDID) injection 0.5 mg (0.5 mg Intravenous Given 7/20/24 1431)         MEDICAL DECISION  MAKING, PROGRESS, and CONSULTS    All labs have been independently reviewed by me.  All radiology studies have been reviewed by me and I have also reviewed the radiology report.   EKG's independently viewed and interpreted by me.  Discussion below represents my analysis of pertinent findings related to patient's condition, differential diagnosis, treatment plan and final disposition.      Additional sources:  - Discussed/ obtained information from independent historians: Patient's significant other is here who states for the past week he has been having to help her ambulate on a walker.    - External (non-ED) record review: I reviewed the patient's hospital admission at [facility] from March 7 through March 15 when she was admitted for L2/L3 discitis and osteomyelitis with MRSA bacteremia      - Shared decision making: After shared decision-making discussion with myself and the patient we agree she needs to admitted to the hospital for further evaluation and care      Orders placed during this visit:  Orders Placed This Encounter   Procedures    CT Lumbar Spine Without Contrast    CT Pelvis Without Contrast    Comprehensive Metabolic Panel    Protime-INR    Lactic Acid, Plasma    Procalcitonin    Sedimentation Rate    C-reactive Protein    CBC Auto Differential    Monitor Blood Pressure    [facility] (on-call MD unless specified) Details    Insert Peripheral IV    Initiate Observation Status    CBC & Differential         Differential diagnosis:  My differential diagnosis includes but is not limited to lumbar radiculopathy, spinal stenosis, sacroiliitis, hip fracture, hip dislocation, bursitis, spinal discitis or abscess      Independent interpretation of labs, radiology studies, and discussions with consultants:  ED Course as of 07/20/24 1629   Sat Jul 20, 2024   1350 Patient is afebrile.  I will check labs and a CT scan of her lumbar spine and pelvis for further evaluation.  Will treat her pain. [GP]   1444 The  patient's white count, lactic acid, procalcitonin, CRP and sed rate are normal.  She is afebrile. [GP]   1618 Patient CT lumbar spine shows the sequela of previous osteomyelitis but no definitive active infection.  The patient does have a disc osteophyte complex at L5-S1 on the right.  The CT of the pelvis shows no acute fractures or dislocation. [GP]   1624 On repeat examination I advised the patient that I do not believe she has an infection in her spine at this time.  The patient still complains that she is not able to move her right hip.  She states she has seen Dr. Chen from orthopedics who wanted to operate on her hip but wanted her back cleared first to make sure she does not have infection.  With the patient's intractable pain I will give her another dose of pain medicine and IV steroids and admitted to the hospital for further evaluation and care. [GP]   1625 I discussed the above with the patient she understands and agrees with the plan. [GP]   1627 I discussed the case with Dr. Brooks from [facility].  He is aware of the patient's history of discitis and prior hip surgery.  He will admit the patient to the hospital for further evaluation and care. [GP]      ED Course User Index  [GP] Seng Mandujano MD         DIAGNOSIS  Final diagnoses:   Intractable low back pain   Right hip pain   History of discitis         DISPOSITION  ADMISSION    Discussed treatment plan and reason for admission with pt/family and admitting physician.  Pt/family voiced understanding of the plan for admission for further testing/treatment as needed.            Latest Documented Vital Signs:  As of 16:29 EDT  BP- 106/76 HR- 85 Temp- 98.2 °F (36.8 °C) (Tympanic) O2 sat- 99%--      --------------------  Please note that portions of this were completed with a voice recognition program.       Note Disclaimer: At [facility], we believe that sharing information builds trust and better relationships. You are receiving this note because you  are receiving care at [facility] or recently visited. It is possible you will see health information before a provider has talked with you about it. This kind of information can be easy to misunderstand. To help you fully understand what it means for your health, we urge you to discuss this note with your provider.         Seng Mandujano MD  07/20/24 1629      Electronically signed by Seng Mandujano MD at 07/20/24 1629       Degonda, Janet, RN at 07/20/24 1307          Back pain and right leg pain started in February but worsened over the last week    Electronically signed by Degonda, Janet, RN at 07/20/24 1307       Vital Signs (last 4 days)       Date/Time Temp Temp src Pulse Resp BP Patient Position SpO2    07/25/24 0900 97.8 (36.6) Oral 80 18 127/80 Lying 97    07/25/24 0520 98.4 (36.9) Oral 85 16 110/70 Lying 95    07/24/24 2145 98.5 (36.9) Oral 88 17 117/71 Lying 97    07/24/24 1803 98.3 (36.8) Oral 96 16 120/73 Lying 99    07/24/24 1350 98.2 (36.8) Oral 86 16 118/69 Lying 98    07/24/24 1052 99 (37.2) Oral 99 -- -- Lying 96    07/24/24 0535 97.3 (36.3) Oral 82 16 120/67 Lying 97    07/24/24 0101 97.6 (36.4) Oral 75 16 107/60 Lying 93    07/23/24 2115 98.2 (36.8) Oral 97 16 108/67 Lying 95    07/23/24 1830 98.1 (36.7) Oral 93 16 101/63 Lying 98    07/23/24 1322 -- -- -- -- 92/64 Lying --    07/23/24 1258 97.2 (36.2) Oral 92 16 84/50  Lying 100    BP: md called at 07/23/24 1258    07/23/24 1002 97.2 (36.2) Oral 89 16 100/61 Lying 100    07/23/24 0911 97.3 (36.3) Oral 72 18 90/58 Lying 96    07/23/24 0518 97.2 (36.2) Oral 68 16 99/62 Lying 94    07/23/24 0121 97.3 (36.3) Oral 72 16 99/56 Lying 95    07/22/24 2112 97.9 (36.6) Oral 92 16 91/47 Lying 95    07/22/24 1845 97.9 (36.6) Oral 99 17 105/67 -- 100    07/22/24 1815 97.8 (36.6) Oral 109 16 113/73 Sitting 97    07/22/24 1800 -- -- 107 16 103/58 -- 100    07/22/24 1745 -- -- 113 18 130/60 -- 90    07/22/24 1730 -- -- 120 18 119/70 -- 95    07/22/24  1715 -- -- 114 14 153/82 -- 100    07/22/24 1710 -- -- 115 14 156/83 -- 99    07/22/24 1708 97.6 (36.4) Oral 114 14 142/115 Lying 95    07/22/24 1258 98 (36.7) Oral 79 18 151/82 Sitting 100    07/22/24 1001 97.8 (36.6) Oral 75 16 163/85 Lying 96    07/22/24 0551 97.9 (36.6) Oral 82 16 150/87 Lying 92    07/22/24 0223 98.8 (37.1) Oral 73 16 132/82 Lying 92    07/21/24 2157 98.3 (36.8) Oral 87 16 159/86 Lying 99    07/21/24 1742 98.6 (37) Oral 94 16 130/68 Lying 99    07/21/24 1333 98.2 (36.8) Oral 81 20 116/68 Lying 97    07/21/24 0934 97.9 (36.6) Oral 80 16 143/78 Lying 96    07/21/24 0323 98.4 (36.9) Oral 88 18 144/80 Lying 98          Intake & Output (last 4 days)         07/21 0701  07/22 0700 07/22 0701  07/23 0700 07/23 0701  07/24 0700 07/24 0701  07/25 0700 07/25 0701  07/26 0700    P.O. 360 50  1540 120    I.V. (mL/kg)  1000 (15.5)       Blood   355      Total Intake(mL/kg) 360 (5.6) 1050 (16.3) 355 (5.5) 1540 (23.8) 120 (1.9)    Urine (mL/kg/hr)  1200 (0.8) 1000 (0.6) 700 (0.5)     Stool    0     Total Output  1200 1000 700     Net +360 -150 -645 +840 +120             Urine Unmeasured Occurrence 1 x   1 x     Stool Unmeasured Occurrence    1 x        Lines, Drains & Airways       Active LDAs       Name Placement date Placement time Site Days    Peripheral IV 07/23/24 1156 Left Forearm 07/23/24  1156  Forearm  1    External Urinary Catheter 07/20/24 2038  --  4                  CIWA        Date/Time CIWA-Ar Score    07/25/24 0845 1     07/24/24 2110 0     07/24/24 1754 6     07/24/24 1640 3     07/24/24 1206 3     07/24/24 0815 8     07/24/24 0422 8     07/23/24 2130 0     07/23/24 1703 1     07/23/24 1258 0     07/23/24 0900 0     07/23/24 0023 0     07/22/24 2045 3     07/22/24 1846 2     07/22/24 1258 4     07/22/24 0800 0     07/21/24 2021 0     07/21/24 1726 0     07/21/24 1219 0     07/21/24 0800 0     07/20/24 2023 0                            Blood Administration Record (From admission, onward)       Completed transfusions       Ordered     Start    07/23/24 0645  Transfuse RBC Infuse Each Unit Over: 3.5H  Transfusion        Released Time Blood Unit Number Status   07/23/24 0918   24  [id number]  [id number] Completed 07/23/24 1303       07/23/24 0642             Lab Results (all)       Procedure Component Value Units Date/Time    CBC & Differential [[id number]]  (Abnormal) Collected: 07/25/24 0511    Specimen: Blood Updated: 07/25/24 0555    Narrative:      The following orders were created for panel order CBC & Differential.  Procedure                               Abnormality         Status                     ---------                               -----------         ------                     CBC Auto Differential[[id number]]        Abnormal            Final result                 Please view results for these tests on the individual orders.    CBC Auto Differential [[id number]]  (Abnormal) Collected: 07/25/24 0511    Specimen: Blood Updated: 07/25/24 0555     WBC 5.29 10*3/mm3      RBC 2.31 10*6/mm3      Hemoglobin 7.4 g/dL      Hematocrit 23.0 %      MCV 99.6 fL      MCH 32.0 pg      MCHC 32.2 g/dL      RDW 15.9 %      RDW-SD 57.8 fl      MPV 9.7 fL      Platelets 89 10*3/mm3      Neutrophil % 61.9 %      Lymphocyte % 24.0 %      Monocyte % 9.5 %      Eosinophil % 2.5 %      Basophil % 0.2 %      Immature Grans % 1.9 %      Neutrophils, Absolute 3.28 10*3/mm3      Lymphocytes, Absolute 1.27 10*3/mm3      Monocytes, Absolute 0.50 10*3/mm3      Eosinophils, Absolute 0.13 10*3/mm3      Basophils, Absolute 0.01 10*3/mm3      Immature Grans, Absolute 0.10 10*3/mm3      nRBC 0.0 /100 WBC     Comprehensive Metabolic Panel [[id number]]  (Abnormal) Collected: 07/25/24 0511    Specimen: Blood Updated: 07/25/24 0553     Glucose 84 mg/dL      BUN 8 mg/dL      Creatinine 0.65 mg/dL      Sodium 138 mmol/L      Potassium 3.4 mmol/L      Chloride 106 mmol/L      CO2 24.0 mmol/L      Calcium 8.3 mg/dL      Total Protein 5.1  g/dL      Albumin 2.7 g/dL      ALT (SGPT) 17 U/L      AST (SGOT) 29 U/L      Alkaline Phosphatase 119 U/L      Total Bilirubin 0.3 mg/dL      Globulin 2.4 gm/dL      A/G Ratio 1.1 g/dL      BUN/Creatinine Ratio 12.3     Anion Gap 8.0 mmol/L      eGFR 103.5 mL/min/1.73     Narrative:      GFR Normal >60  Chronic Kidney Disease <60  Kidney Failure <15      Hemoglobin & Hematocrit, Blood [[id number]]  (Abnormal) Collected: 07/24/24 1203    Specimen: Blood Updated: 07/24/24 1215     Hemoglobin 7.2 g/dL      Hematocrit 21.8 %     Comprehensive Metabolic Panel [[id number]]  (Abnormal) Collected: 07/24/24 0522    Specimen: Blood Updated: 07/24/24 0643     Glucose 89 mg/dL      BUN 11 mg/dL      Creatinine 0.83 mg/dL      Sodium 137 mmol/L      Potassium 3.8 mmol/L      Comment: Slight hemolysis detected by analyzer. Result may be falsely elevated.        Chloride 106 mmol/L      CO2 22.0 mmol/L      Calcium 7.6 mg/dL      Total Protein 5.1 g/dL      Albumin 2.6 g/dL      ALT (SGPT) 18 U/L      AST (SGOT) 41 U/L      Comment: Slight hemolysis detected by analyzer. Result may be falsely elevated.        Alkaline Phosphatase 117 U/L      Total Bilirubin 0.2 mg/dL      Globulin 2.5 gm/dL      A/G Ratio 1.0 g/dL      BUN/Creatinine Ratio 13.3     Anion Gap 9.0 mmol/L      eGFR 82.9 mL/min/1.73     Narrative:      GFR Normal >60  Chronic Kidney Disease <60  Kidney Failure <15      CBC & Differential [[id number]]  (Abnormal) Collected: 07/24/24 0522    Specimen: Blood Updated: 07/24/24 0625    Narrative:      The following orders were created for panel order CBC & Differential.  Procedure                               Abnormality         Status                     ---------                               -----------         ------                     CBC Auto Differential[[id number]]        Abnormal            Final result                 Please view results for these tests on the individual orders.    CBC Auto Differential  [[id number]]  (Abnormal) Collected: 07/24/24 0522    Specimen: Blood Updated: 07/24/24 0625     WBC 4.58 10*3/mm3      RBC 2.23 10*6/mm3      Hemoglobin 7.1 g/dL      Hematocrit 21.9 %      MCV 98.2 fL      MCH 31.8 pg      MCHC 32.4 g/dL      RDW 16.2 %      RDW-SD 57.4 fl      MPV 10.8 fL      Platelets 88 10*3/mm3      Neutrophil % 61.2 %      Lymphocyte % 25.1 %      Monocyte % 9.6 %      Eosinophil % 2.6 %      Basophil % 0.2 %      Immature Grans % 1.3 %      Neutrophils, Absolute 2.80 10*3/mm3      Lymphocytes, Absolute 1.15 10*3/mm3      Monocytes, Absolute 0.44 10*3/mm3      Eosinophils, Absolute 0.12 10*3/mm3      Basophils, Absolute 0.01 10*3/mm3      Immature Grans, Absolute 0.06 10*3/mm3      nRBC 0.0 /100 WBC     Hemoglobin & Hematocrit, Blood [[id number]]  (Abnormal) Collected: 07/23/24 1958    Specimen: Blood Updated: 07/23/24 2037     Hemoglobin 7.1 g/dL      Hematocrit 22.1 %     CBC & Differential [[id number]]  (Abnormal) Collected: 07/23/24 0509    Specimen: Blood Updated: 07/23/24 0623    Narrative:      The following orders were created for panel order CBC & Differential.  Procedure                               Abnormality         Status                     ---------                               -----------         ------                     CBC Auto Differential[[id number]]        Abnormal            Final result                 Please view results for these tests on the individual orders.    CBC Auto Differential [[id number]]  (Abnormal) Collected: 07/23/24 0509    Specimen: Blood Updated: 07/23/24 0623     WBC 6.02 10*3/mm3      RBC 1.90 10*6/mm3      Hemoglobin 6.2 g/dL      Hematocrit 19.0 %      .0 fL      MCH 32.6 pg      MCHC 32.6 g/dL      RDW 14.9 %      RDW-SD 54.0 fl      MPV 9.5 fL      Platelets 85 10*3/mm3      Neutrophil % 79.0 %      Lymphocyte % 10.8 %      Monocyte % 8.5 %      Eosinophil % 0.0 %      Basophil % 0.2 %      Immature Grans % 1.5 %      Neutrophils, Absolute  4.76 10*3/mm3      Lymphocytes, Absolute 0.65 10*3/mm3      Monocytes, Absolute 0.51 10*3/mm3      Eosinophils, Absolute 0.00 10*3/mm3      Basophils, Absolute 0.01 10*3/mm3      Immature Grans, Absolute 0.09 10*3/mm3      nRBC 0.0 /100 WBC     Comprehensive Metabolic Panel [[id number]]  (Abnormal) Collected: 07/23/24 0509    Specimen: Blood Updated: 07/23/24 0621     Glucose 112 mg/dL      BUN 11 mg/dL      Creatinine 0.81 mg/dL      Sodium 135 mmol/L      Potassium 4.7 mmol/L      Chloride 106 mmol/L      CO2 22.0 mmol/L      Calcium 7.4 mg/dL      Total Protein 5.2 g/dL      Albumin 3.0 g/dL      ALT (SGPT) 16 U/L      AST (SGOT) 25 U/L      Alkaline Phosphatase 120 U/L      Total Bilirubin <0.2 mg/dL      Globulin 2.2 gm/dL      A/G Ratio 1.4 g/dL      BUN/Creatinine Ratio 13.6     Anion Gap 7.0 mmol/L      eGFR 85.3 mL/min/1.73     Narrative:      GFR Normal >60  Chronic Kidney Disease <60  Kidney Failure <15      Comprehensive Metabolic Panel [[id number]]  (Abnormal) Collected: 07/22/24 0720    Specimen: Blood Updated: 07/22/24 0843     Glucose 82 mg/dL      BUN 10 mg/dL      Creatinine 0.69 mg/dL      Sodium 135 mmol/L      Potassium 3.3 mmol/L      Chloride 106 mmol/L      CO2 21.0 mmol/L      Calcium 8.3 mg/dL      Total Protein 5.9 g/dL      Albumin 3.2 g/dL      ALT (SGPT) 18 U/L      AST (SGOT) 30 U/L      Alkaline Phosphatase 160 U/L      Total Bilirubin 0.2 mg/dL      Globulin 2.7 gm/dL      A/G Ratio 1.2 g/dL      BUN/Creatinine Ratio 14.5     Anion Gap 8.0 mmol/L      eGFR 102.0 mL/min/1.73     Narrative:      GFR Normal >60  Chronic Kidney Disease <60  Kidney Failure <15      CBC & Differential [[id number]]  (Abnormal) Collected: 07/22/24 0719    Specimen: Blood Updated: 07/22/24 0829    Narrative:      The following orders were created for panel order CBC & Differential.  Procedure                               Abnormality         Status                     ---------                                -----------         ------                     CBC Auto Differential[[id number]]        Abnormal            Final result                 Please view results for these tests on the individual orders.    CBC Auto Differential [[id number]]  (Abnormal) Collected: 07/22/24 0719    Specimen: Blood Updated: 07/22/24 0829     WBC 3.73 10*3/mm3      RBC 2.52 10*6/mm3      Hemoglobin 8.1 g/dL      Hematocrit 25.0 %      MCV 99.2 fL      MCH 32.1 pg      MCHC 32.4 g/dL      RDW 14.9 %      RDW-SD 54.6 fl      MPV 10.5 fL      Platelets 82 10*3/mm3      Neutrophil % 53.1 %      Lymphocyte % 35.4 %      Monocyte % 8.8 %      Eosinophil % 1.1 %      Basophil % 0.5 %      Immature Grans % 1.1 %      Neutrophils, Absolute 1.98 10*3/mm3      Lymphocytes, Absolute 1.32 10*3/mm3      Monocytes, Absolute 0.33 10*3/mm3      Eosinophils, Absolute 0.04 10*3/mm3      Basophils, Absolute 0.02 10*3/mm3      Immature Grans, Absolute 0.04 10*3/mm3      nRBC 0.0 /100 WBC     Vitamin B12 [[id number]]  (Normal) Collected: 07/21/24 0839    Specimen: Blood Updated: 07/21/24 0934     Vitamin B-12 650 pg/mL     Narrative:      Results may be falsely increased if patient taking Biotin.      Folate [[id number]]  (Normal) Collected: 07/21/24 0839    Specimen: Blood Updated: 07/21/24 0934     Folate >20.00 ng/mL     Narrative:      Results may be falsely increased if patient taking Biotin.      Ferritin [[id number]]  (Abnormal) Collected: 07/21/24 0519    Specimen: Blood Updated: 07/21/24 0703     Ferritin 505.00 ng/mL     Narrative:      Results may be falsely decreased if patient taking Biotin.      Iron [[id number]]  (Normal) Collected: 07/21/24 0519    Specimen: Blood Updated: 07/21/24 0658     Iron 53 mcg/dL     Iron Profile [[id number]]  (Abnormal) Collected: 07/21/24 0519    Specimen: Blood Updated: 07/21/24 0658     Iron 53 mcg/dL      Iron Saturation (TSAT) 18 %      Transferrin 201 mg/dL      TIBC 299 mcg/dL     Reticulocytes [[id number]]  (Abnormal)  Collected: 07/21/24 0519    Specimen: Blood Updated: 07/21/24 0619     Reticulocyte % 2.51 %      Reticulocyte Absolute 0.0695 10*6/mm3     Magnesium [[id number]]  (Normal) Collected: 07/21/24 0519    Specimen: Blood Updated: 07/21/24 0604     Magnesium 1.7 mg/dL     Comprehensive Metabolic Panel [[id number]]  (Abnormal) Collected: 07/21/24 0519    Specimen: Blood Updated: 07/21/24 0604     Glucose 134 mg/dL      BUN 7 mg/dL      Creatinine 0.61 mg/dL      Sodium 139 mmol/L      Potassium 4.2 mmol/L      Chloride 110 mmol/L      CO2 17.2 mmol/L      Calcium 8.4 mg/dL      Total Protein 6.5 g/dL      Albumin 3.5 g/dL      ALT (SGPT) 24 U/L      AST (SGOT) 30 U/L      Alkaline Phosphatase 189 U/L      Total Bilirubin 0.3 mg/dL      Globulin 3.0 gm/dL      A/G Ratio 1.2 g/dL      BUN/Creatinine Ratio 11.5     Anion Gap 11.8 mmol/L      eGFR 105.1 mL/min/1.73     Narrative:      GFR Normal >60  Chronic Kidney Disease <60  Kidney Failure <15      CBC Auto Differential [[id number]]  (Abnormal) Collected: 07/21/24 0519    Specimen: Blood Updated: 07/21/24 0540     WBC 4.05 10*3/mm3      RBC 2.75 10*6/mm3      Hemoglobin 8.9 g/dL      Hematocrit 27.1 %      MCV 98.5 fL      MCH 32.4 pg      MCHC 32.8 g/dL      RDW 15.1 %      RDW-SD 54.3 fl      MPV 9.3 fL      Platelets 88 10*3/mm3      Neutrophil % 77.1 %      Lymphocyte % 16.3 %      Monocyte % 4.9 %      Eosinophil % 0.0 %      Basophil % 0.2 %      Immature Grans % 1.5 %      Neutrophils, Absolute 3.12 10*3/mm3      Lymphocytes, Absolute 0.66 10*3/mm3      Monocytes, Absolute 0.20 10*3/mm3      Eosinophils, Absolute 0.00 10*3/mm3      Basophils, Absolute 0.01 10*3/mm3      Immature Grans, Absolute 0.06 10*3/mm3      nRBC 0.0 /100 WBC     Procalcitonin [[id number]]  (Normal) Collected: 07/20/24 1407    Specimen: Blood Updated: 07/20/24 1444     Procalcitonin 0.03 ng/mL     Narrative:      As a Marker for Sepsis (Non-Neonates):    1. <0.5 ng/mL represents a low risk of  "severe sepsis and/or septic shock.  2. >2 ng/mL represents a high risk of severe sepsis and/or septic shock.    As a Marker for Lower Respiratory Tract Infections that require antibiotic therapy:    PCT on Admission    Antibiotic Therapy       6-12 Hrs later    >0.5                Strongly Recommended  >0.25 - <0.5        Recommended   0.1 - 0.25          Discouraged              Remeasure/reassess PCT  <0.1                Strongly Discouraged     Remeasure/reassess PCT    As 28 day mortality risk marker: \"Change in Procalcitonin Result\" (>80% or <=80%) if Day 0 (or Day 1) and Day 4 values are available. Refer to http://www.[email or web address]pct-calculator.com    Change in PCT <=80%  A decrease of PCT levels below or equal to 80% defines a positive change in PCT test result representing a higher risk for 28-day all-cause mortality of patients diagnosed with severe sepsis for septic shock.    Change in PCT >80%  A decrease of PCT levels of more than 80% defines a negative change in PCT result representing a lower risk for 28-day all-cause mortality of patients diagnosed with severe sepsis or septic shock.       Comprehensive Metabolic Panel [[id number]]  (Abnormal) Collected: 07/20/24 1407    Specimen: Blood Updated: 07/20/24 1437     Glucose 95 mg/dL      BUN 8 mg/dL      Creatinine 0.72 mg/dL      Sodium 139 mmol/L      Potassium 3.3 mmol/L      Chloride 105 mmol/L      CO2 23.0 mmol/L      Calcium 8.5 mg/dL      Total Protein 7.0 g/dL      Albumin 3.8 g/dL      ALT (SGPT) 24 U/L      AST (SGOT) 43 U/L      Alkaline Phosphatase 237 U/L      Total Bilirubin 0.4 mg/dL      Globulin 3.2 gm/dL      A/G Ratio 1.2 g/dL      BUN/Creatinine Ratio 11.1     Anion Gap 11.0 mmol/L      eGFR 98.3 mL/min/1.73     Narrative:      GFR Normal >60  Chronic Kidney Disease <60  Kidney Failure <15      C-reactive Protein [[id number]]  (Normal) Collected: 07/20/24 1407    Specimen: Blood Updated: 07/20/24 1437     C-Reactive Protein <0.30 mg/dL     " Lactic Acid, Plasma [[id number]]  (Normal) Collected: 07/20/24 1407    Specimen: Blood Updated: 07/20/24 1434     Lactate 1.5 mmol/L     Protime-INR [[id number]]  (Abnormal) Collected: 07/20/24 1407    Specimen: Blood Updated: 07/20/24 1433     Protime 14.6 Seconds      INR 1.12    Sedimentation Rate [[id number]]  (Normal) Collected: 07/20/24 1407    Specimen: Blood Updated: 07/20/24 1432     Sed Rate 7 mm/hr     CBC & Differential [[id number]]  (Abnormal) Collected: 07/20/24 1407    Specimen: Blood Updated: 07/20/24 1418    Narrative:      The following orders were created for panel order CBC & Differential.  Procedure                               Abnormality         Status                     ---------                               -----------         ------                     CBC Auto Differential[[id number]]        Abnormal            Final result                 Please view results for these tests on the individual orders.    CBC Auto Differential [[id number]]  (Abnormal) Collected: 07/20/24 1407    Specimen: Blood Updated: 07/20/24 1418     WBC 4.40 10*3/mm3      RBC 3.07 10*6/mm3      Hemoglobin 9.8 g/dL      Hematocrit 31.1 %      .3 fL      MCH 31.9 pg      MCHC 31.5 g/dL      RDW 15.7 %      RDW-SD 58.1 fl      MPV 9.5 fL      Platelets 113 10*3/mm3      Neutrophil % 57.9 %      Lymphocyte % 26.6 %      Monocyte % 11.4 %      Eosinophil % 2.3 %      Basophil % 0.7 %      Immature Grans % 1.1 %      Neutrophils, Absolute 2.55 10*3/mm3      Lymphocytes, Absolute 1.17 10*3/mm3      Monocytes, Absolute 0.50 10*3/mm3      Eosinophils, Absolute 0.10 10*3/mm3      Basophils, Absolute 0.03 10*3/mm3      Immature Grans, Absolute 0.05 10*3/mm3      nRBC 0.0 /100 WBC           Imaging Results (All)       Procedure Component Value Units Date/Time    XR Hip With or Without Pelvis 1 View Right [[id number]] Collected: 07/22/24 1756     Updated: 07/22/24 1800    Narrative:      XR HIP W OR WO PELVIS 1 VIEW RIGHT-      INDICATION: Postop     COMPARISON: 7/21/2024       Impression:      Revised right total hip arthroplasty with proximal femoral  cerclage wire. Hardware is well seated without evidence of acute  periprosthetic fracture. Contralateral left total hip arthroplasty.     This report was finalized on 7/22/2024 5:57 PM by Dr. Jluis Barron M.D on Workstation: [id number]       XR Hip With or Without Pelvis 2 - 3 View Right [[id number]] Collected: 07/21/24 0743     Updated: 07/21/24 0753    Narrative:      2 VIEW RIGHT HIP AND 1 VIEW AP PELVIS     HISTORY: Right hip pain.     FINDINGS: There are bilateral total hip replacements and the left hip  replacement is unremarkable. On the right, there appears to be a  significant change since the postoperative images of the right hip dated  5/4/2024. There is now considerable loss of the bony acetabulum with  protrusion of the femoral head superiorly and anteriorly as seen on the  CT scan performed yesterday. There is increased density interposed  between the femoral head and neck extending to the calcar that relates  to the acetabular cement at the posterior margin of the acetabulum again  best seen on the CT scan. 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     This report was finalized on 7/21/2024 7:50 AM by Dr. Ryan Glass M.D  on Workstation: [id number]       CT Lumbar Spine Without Contrast [[id number]] Collected: 07/20/24 1552     Updated: 07/20/24 1608    Narrative:      Impression:      1. Sequela of discitis/osteomyelitis at L2-L3 with obliteration of the  L2-L3 disc space and marked associated endplate irregularity/osteolysis.  L2-L3 disc space loss is no worse than may MRI. Paraspinal inflammatory  changes have significantly decreased/nearly resolved.  2. Mild right L5-S1 neuroforaminal stenosis secondary to disc and facet  joint  degeneration.  3. Bilateral total hip arthroplasties. Right arthroplasty has a cemented  acetabular component. No evidence of fracture/periprosthetic fracture.  Right acetabular component is positioned at the anterior superior aspect  of the native right acetabulum. Recommend correlation with prior  surgery.           Radiation dose reduction techniques were utilized, including automated  exposure control and exposure modulation based on body size.     This report was finalized on 7/20/2024 4:05 PM by Dr. Jluis Barron M.D on Workstation: [id number]       CT Pelvis Without Contrast [[id number]] Collected: 07/20/24 1552     Updated: 07/20/24 1608    Narrative:      CT PELVIS WO CONTRAST-, CT LUMBAR SPINE WO CONTRAST-     CLINICAL HISTORY: Low back pain with radiation to the right leg. Right  hip and sacral pain. Post right total hip arthroplasty May 2024     TECHNIQUE: CT scan of the lumbar spine and pelvis was obtained with a  combination of 3 , 2, and 1 mm axial images. Bone and soft tissue  algorithm images were obtained with sagittal and coronal reconstructed  images.     FINDINGS:     Lumbar spine: No subluxation. Unchanged bilateral L5 pars defects.  Marked L2 inferior endplate and L3 superior endplate cortical  irregularity/osteolysis with subjacent vertebral body sclerotic changes  and obliteration of the L2-L3 disc space. Discitis/osteomyelitis was  seen on May MRI. L2-L3 disc space loss is no worse. Paraspinal  inflammatory changes have significantly decreased and nearly resolved.  L4 superior endplate Schmorl's node. Mild multilevel disc degeneration  throughout the remaining lumbar spine. Mild L5-S1 facet arthropathy. No  appreciable spinal canal stenosis. Mild right L5-S1 neuroforaminal  stenosis secondary to disc and facet joint degeneration.     Pelvis: Bilateral total hip arthroplasties. Right arthroplasty has a  cemented acetabular component. No evidence of fracture/periprosthetic  fracture. Right  acetabular component is positioned at the anterosuperior  aspect of the native right acetabulum (series 2/image 66). Limited  normal noncontrast CT appearance of the pelvic soft tissues. Exam is  partially by arthroplasty streak artifact.                      Impression:      1. Sequela of discitis/osteomyelitis at L2-L3 with obliteration of the  L2-L3 disc space and marked associated endplate irregularity/osteolysis.  L2-L3 disc space loss is no worse than may MRI. Paraspinal inflammatory  changes have significantly decreased/nearly resolved.  2. Mild right L5-S1 neuroforaminal stenosis secondary to disc and facet  joint degeneration.  3. Bilateral total hip arthroplasties. Right arthroplasty has a cemented  acetabular component. No evidence of fracture/periprosthetic fracture.  Right acetabular component is positioned at the anterior superior aspect  of the native right acetabulum. Recommend correlation with prior  surgery.           Radiation dose reduction techniques were utilized, including automated  exposure control and exposure modulation based on body size.     This report was finalized on 2024 4:05 PM by Dr. Jluis Barron M.D on Workstation: [id number]                Operative/Procedure Notes (all)        Cristobal Chen II, MD at 24 1540  Version 1 of 1           Total Hip Revision Operative Note  Dr. LAGUERRE “Heri” Gustavo HALE  [phone]    PATIENT NAME: Lita Yu  MRN: [id number]  : 1965 AGE: 58 y.o. GENDER: female  DATE OF OPERATION: 2024  PREOPERATIVE DIAGNOSIS:  Loose acetabular component  POSTOPERATIVE DIAGNOSIS: Same with proximal femur fracture  OPERATION PERFORMED: Right Total Hip Revision with prophylactic fixation of the femur  SURGEON: Cristobal Chen MD  Circulator: So De Los Santos RN  Scrub Person: So Shannon PCT  Vendor Representative: Carl Ruiz  Assistant: Al Rahman CSA  ANESTHESIA: General  ASSISTANT: Kristian Rahman. This case  would not have been possible without another set of skilled surgical hands for retraction, use of instrumentation, and general assistance.  This assistance was vital to the success of the case.   ESTIMATED BLOOD LOSS: 300cc  SPONGE AND NEEDLE COUNT: Correct  INDICATIONS:  This patient had a previous hip infection and underwent total hip arthroplasty with copious amounts of antibiotic cement.  She had a fall and suffered an acetabular fracture with disruption of her acetabular component.  A discussion of operative versus nonoperative treatment was had with the patient. They elected to undergo revision hip arthroplasty. The risks of surgery were discussed and included the risk of anesthesia, infection, damage to neurovascular structures, implant loosening/failure, fracture, hardware prominence, dislocation, the need for further procedures, medical complications, and others. No guarantees were made. The patient wished to proceed with surgery and a surgical consent was signed.  COMPONENTS:   Readapt 60 mm outer diameter shell with 8 locking screws and a dual mobility liner  Readapt femoral standard offset size 18 x 190 with a +0 dual mobility head ball  Accord 2 mm cable with clamp    CPT For Billing: Total Hip Revision Full: 16733  Prophylactic Wiring Of Femur: 16679    DETAILS OF PROCEDURE:   The patient was met in the preoperative area. The site was marked. The consent and H&P were reviewed. The patient was then wheeled back to the operative suite and underwent anesthesia. The patient was positioned laterally using the pegboard.  An axillary roll was placed under the down arm. Excess hair about the operative hip was removed using surgical clippers. Surgical alcohol was used to thoroughly clean the entire operative extremity.     The hip and leg were then prepped in the normal sterile fashion, which included ChloraPrep, multiple layers of sterile drapes, and surgical space suits for the entire operative team. New  outer gloves were used by all sterile surgical team members after final draping. The surgical incision was marked. A surgical timeout was performed in which administration of preoperative antibiotics and tranexamic acid, if not contraindicated, was confirmed.    A standard posterior approach to the hip was then utilized.  After dissection through the fascia, I was able to identify the acetabulum.  The acetabular component had gotten loose and had slid anteriorly.  It had fractured off much of the anterior wall.  I was able to extract most of the antibiotic cement this still remained within the acetabulum and then was able to identify and remove the entire acetabular component which was still attached to the femoral component through the constrained liner.  Eventually, I was able to break that bond and then I removed the femoral head ball.  I was easily able to extract the femoral component from the cement mantle and then used an osteotome to loosen the cement circumferentially around the femur until all that was gone.  I did notice that there was a fracture of the proximal femur prior to removing the cement.  This likely happened during her fall.  I next placed a cable just distal to the lesser trochanter.  This cable acted as a prophylactic cable to prevent catastrophic fracture propagation during reaming, implantation, and early weightbearing.  Failure to pass this cable could have been disastrous for the hip.    I then began reaming of the femur until there was adequate fill.  Proximal reaming was also performed.  I then turned my attention to the acetabulum.  Retractors were placed appropriately.  The patient was missing most of the anterior wall of the acetabulum due to the fall and fracture.  I used progressive reaming until there was adequate fill as there was still reasonable bone superiorly inferiorly and posteriorly.  A real acetabular shell 1 mm larger was then inserted into proper abduction and  "anteversion while maximizing bony coverage.  I then placed 8 locking screws to ensure reasonable early fixation.  A neutral liner was then snapped into place.  I then trialed the hip and felt that we were a little bit short and stability was not ideal.  So I reamed up on the femur 1 more size and then trialed again.  There was very good hip stability.  A real femoral component was then opened and inserted to the same depth and version as the trial.  The wound was copiously irrigated and injected with an analgesic cocktail.  Antibiotics were left deep within the wound.  The wound was then closed in layers and a sterile dressing was applied.    The patient was moved from the operative table to the bed. The patient was taken to the recovery room in stable condition. There were no complications and the patient tolerated the procedure well.    R \"Heri\" Gustavo HALE MD  Orthopaedic Surgery  [facility]  [phone]        Electronically signed by Cristobal Chen II, MD at 24 2710          Physician Progress Notes (all)        Adonis Byers DO at 24 0788              Name: Lita Yu ADMIT: 2024   : 1965  PCP: Norma Saul APRN    MRN: [id number] LOS: 0 days   AGE/SEX: 58 y.o. female  ROOM: [facility]     Subjective   Subjective   Patient seen and examined this morning. Hospital day 4, POD #2 s/p Right Total Hip Revision with prophylactic fixation of the femur on 24. She continues to have ongoing post-operative pain, also having some intermittent visual hallucinations this morning per discussion with family at bedside and nursing, family concerned she is experiencing more signs of ETOH withdrawal.    Objective   Objective   Vital Signs  Temp:  [97.2 °F (36.2 °C)-98.2 °F (36.8 °C)] 97.3 °F (36.3 °C)  Heart Rate:  [72-97] 82  Resp:  [16-18] 16  BP: ()/(50-67) 120/67  SpO2:  [93 %-100 %] 97 %  on   ;   Device (Oxygen Therapy): room air  Body mass " index is 25.24 kg/m².  Physical Exam  Vitals and nursing note reviewed.   Constitutional:       General: She is awake.      Appearance: She is ill-appearing.      Comments: Uncomfortable appearing   Cardiovascular:      Rate and Rhythm: Normal rate and regular rhythm.      Pulses: Normal pulses.      Heart sounds: Normal heart sounds.   Pulmonary:      Effort: Pulmonary effort is normal. No respiratory distress.      Breath sounds: Normal breath sounds. No wheezing.   Abdominal:      General: Bowel sounds are normal. There is no distension.      Palpations: Abdomen is soft.      Tenderness: There is no abdominal tenderness.   Musculoskeletal:         General: Tenderness (right hip, dressing C/D/I) present.   Skin:     General: Skin is warm and dry.   Neurological:      Mental Status: She is confused.       Results Review     I reviewed the patient's new clinical results.  Results from last 7 days   Lab Units 07/24/24 0522 07/23/24 1958 07/23/24 0509 07/22/24 0719 07/21/24  0519   WBC 10*3/mm3 4.58  --  6.02 3.73 4.05   HEMOGLOBIN g/dL 7.1* 7.1* 6.2* 8.1* 8.9*   PLATELETS 10*3/mm3 88*  --  85* 82* 88*     Results from last 7 days   Lab Units 07/24/24 0522 07/23/24 0509 07/22/24 0720 07/21/24  0519   SODIUM mmol/L 137 135* 135* 139   POTASSIUM mmol/L 3.8 4.7 3.3* 4.2   CHLORIDE mmol/L 106 106 106 110*   CO2 mmol/L 22.0 22.0 21.0* 17.2*   BUN mg/dL 11 11 10 7   CREATININE mg/dL 0.83 0.81 0.69 0.61   GLUCOSE mg/dL 89 112* 82 134*   EGFR mL/min/1.73 82.9 85.3 102.0 105.1     Results from last 7 days   Lab Units 07/24/24 0522 07/23/24 0509 07/22/24 0720 07/21/24  0519   ALBUMIN g/dL 2.6* 3.0* 3.2* 3.5   BILIRUBIN mg/dL 0.2 <0.2 0.2 0.3   ALK PHOS U/L 117 120* 160* 189*   AST (SGOT) U/L 41* 25 30 30   ALT (SGPT) U/L 18 16 18 24     Results from last 7 days   Lab Units 07/24/24  0522 07/23/24  0509 07/22/24  0720 07/21/24  0519   CALCIUM mg/dL 7.6* 7.4* 8.3* 8.4*   ALBUMIN g/dL 2.6* 3.0* 3.2* 3.5   MAGNESIUM mg/dL  " --   --   --  1.7     Results from last 7 days   Lab Units 07/20/24  1407   PROCALCITONIN ng/mL 0.03   LACTATE mmol/L 1.5     No results found for: \"HGBA1C\", \"POCGLU\"    XR Hip With or Without Pelvis 1 View Right    Result Date: 7/22/2024  Revised right total hip arthroplasty with proximal femoral cerclage wire. Hardware is well seated without evidence of acute periprosthetic fracture. Contralateral left total hip arthroplasty.  This report was finalized on 7/22/2024 5:57 PM by Dr. Jluis Barron M.D on Workstation: [id number]       I have personally reviewed all medications:  Scheduled Medications  busPIRone, 15 mg, Oral, BID  folic acid, 1 mg, Oral, Daily  LORazepam, 1 mg, Oral, Daily  multivitamin, 1 tablet, Oral, Daily  QUEtiapine, 100 mg, Oral, Nightly  sertraline, 100 mg, Oral, BID  sodium chloride, 10 mL, Intravenous, Q12H  thiamine (B-1) IV, 200 mg, Intravenous, Q8H   Followed by  [START ON 7/26/2024] thiamine, 100 mg, Oral, Daily  vitamin B-12, 1,000 mcg, Oral, Daily    Infusions  lactated ringers, 75 mL/hr, Last Rate: 75 mL/hr (07/23/24 1452)    Diet  Diet: Regular/House; Fluid Consistency: Thin (IDDSI 0)    I have personally reviewed:  [x]  Laboratory   []  Microbiology   [x]  Radiology   []  EKG/Telemetry  []  Cardiology/Vascular   []  Pathology    []  Records      Assessment/Plan     Active Hospital Problems    Diagnosis  POA    **Intractable low back pain [M54.59]  Yes    Metabolic acidosis [E87.20]  Yes    Thrombocytopenia [D69.6]  Yes    Alcohol use [Z78.9]  Yes    History of MRSA infection [Z86.14]  Yes    Right hip pain [M25.551]  Yes    S/P total right hip arthroplasty [Z96.641]  Not Applicable    Anemia [D64.9]  Yes    Spinal abscess [M46.20]  Yes    Cirrhosis of liver [K74.60]  Yes    Chronic obstructive pulmonary disease [J44.9]  Yes    Essential hypertension [I10]  Yes    Seizure disorder [G40.909]  Yes    Post traumatic stress disorder [F43.10]  Yes    Peripheral vascular disease [I73.9]  " Yes      Resolved Hospital Problems   No resolved problems to display.       58 y.o. female admitted with Intractable low back pain.    Intractable low back and right hip pain   Loose acetabular component with proximal femur fracture  - In setting of recent MRSA infection, previously completed antibiotics. No evidence of sepsis, inflammatory markers unremarkable. CT Lumbar spine, pelvis and XR of hip reviewed. Records reviewed.  - CHRISTIANO and Orthopedic surgery consulted. Neurosurgery stated no acute intervention warranted.  - Orthopedic surgery continues to follow. Patient is POD #2 s/p Right Total Hip Revision with prophylactic fixation of the femur on 07/22/24. They are concerned about her going home and concerned about compliance with toe-touch weight bearing restrictions at discharge and think she would benefit from rehab. They informed her that if she does go home and does not go to a rehab her chance of failure of the hip is quite high, she is supposed to continue with these restrictions for at least 6 weeks. Greatly appreciate their help. DVT prophylaxis per orthopedic surgery.  - Discussions about rehab ongoing with patient and family, try to encourage this.  - Continue with PT/OT, fall precautions, weight bearing restrictions.  - PRN medications for pain. Patient currently receiving IV pain medication PRN, requiring close monitoring. Need to closely monitor for over-sedation, respiratory depression and constipation.    Acute on chronic Anemia  - Hemoglobin low on most recent labs, but slightly improved after PRBC transfusion on 07/23. Hemoglobin remains right on border of needing another transfusion, order repeat H/H for noon to see if another transfusion is warranted. worse from prior, <7, Worsening over past 1-2 days likely reflective of perioperative blood loss from procedure. Iron studies showing low TSAT but elevated ferritin, likely reflective of anemia of chronic disease.  - Order repeat CBC in AM for  reassessment. Continue to monitor, transfuse for hemoglobin <7.    Alcohol abuse with concern for withdrawal  - Patient reportedly drinks ~one fifth of bourbon daily. Last drink in AM prior to arrival. She is on scheduled Ativan per protocol, tolerating well, but is having hallucinations today, concern that she may be beginning to exhibit more signs of withdrawal, so need to closely monitor this, as she may require further intervention.   - CIWA protocol, MV, thiamine, folic acid  - Consulted [facility]. Will follow their plans/recommendations. Greatly appreciate their help.    Hypokalemia  - K low on prior labs, repleted via electrolyte protocol. Follow up repeat labs to guide ongoing management decisions. Replete PRN per protocol. Continue to monitor    Non anion-gap metabolic acidosis  - Noted on labs, could be result of IVF with NS. NS was stopped, LR started, continue as prescribed, continue monitoring for now.  - Repeat labs in AM for reassessment. Further management based on results of testing.    Cirrhosis/chronic liver disease  - Appears compensated, abdominal exam benign, LFT stable. Need to closely monitor volume status because patient receiving IVF. Continue monitoring.    Thrombocytopenia  - Platelets found to be low on most recent labs, likely 2/2 liver disease. No indications for urgent intervention at present. Will monitor for now.  - Order repeat CBC in AM for reassessment.    Hypertension  - Blood pressure appears stable and acceptable acutely at this time. No indications to warrant acute changes/intervention at this time.  - Trend BP to guide ongoing management decisions.    COPD  - Appears stable from this perspective at present.  No evidence to suggest acute exacerbation.  Continue current medications as prescribed and closely monitor.    Depression/Anxiety  - Continue current medications as prescribed.      Portions of this text have been copied and I have reviewed these. Documentation  accurate as of 24.       SCDs for DVT prophylaxis.  Full code.  Discussed with patient, family, and nursing staff.  Anticipate discharge home with HH vs SNU facility timing yet to be determined.  Expected Discharge Date: 2024; Expected Discharge Time:       Adonis Byers DO  [facility]  24      Electronically signed by Adonis Byers DO at 24 7970       Adonis Byers DO at 24 1673              Name: Lita Yu ADMIT: 2024   : 1965  PCP: Norma Saul APRN    MRN: [id number] LOS: 0 days   AGE/SEX: 58 y.o. female  ROOM: [facility]     Subjective   Subjective   Patient seen and examined this morning. Hospital day 3, POD #1 s/p Right Total Hip Revision with prophylactic fixation of the femur on 24. She is resting in bed, but having significant ongoing pain and discomfort in hip.    Objective   Objective   Vital Signs  Temp:  [97.2 °F (36.2 °C)-98 °F (36.7 °C)] 97.2 °F (36.2 °C)  Heart Rate:  [] 68  Resp:  [14-18] 16  BP: ()/() 99/62  SpO2:  [90 %-100 %] 94 %  on  Flow (L/min):  [2-6] 2;   Device (Oxygen Therapy): room air  Body mass index is 25.24 kg/m².  Physical Exam  Vitals and nursing note reviewed.   Constitutional:       General: She is awake.      Appearance: She is ill-appearing.      Comments: Uncomfortable appearing   Cardiovascular:      Rate and Rhythm: Normal rate and regular rhythm.      Pulses: Normal pulses.      Heart sounds: Normal heart sounds.   Pulmonary:      Effort: Pulmonary effort is normal. No respiratory distress.      Breath sounds: Normal breath sounds. No wheezing.   Abdominal:      General: Bowel sounds are normal. There is no distension.      Palpations: Abdomen is soft.      Tenderness: There is no abdominal tenderness.   Musculoskeletal:         General: Tenderness (right hip, dressing C/D/I) present.   Skin:     General: Skin is warm and dry.       Results Review     I reviewed the  "patient's new clinical results.  Results from last 7 days   Lab Units 07/23/24  0509 07/22/24 0719 07/21/24 0519 07/20/24  1407   WBC 10*3/mm3 6.02 3.73 4.05 4.40   HEMOGLOBIN g/dL 6.2* 8.1* 8.9* 9.8*   PLATELETS 10*3/mm3 85* 82* 88* 113*     Results from last 7 days   Lab Units 07/23/24  0509 07/22/24 0720 07/21/24 0519 07/20/24  1407   SODIUM mmol/L 135* 135* 139 139   POTASSIUM mmol/L 4.7 3.3* 4.2 3.3*   CHLORIDE mmol/L 106 106 110* 105   CO2 mmol/L 22.0 21.0* 17.2* 23.0   BUN mg/dL 11 10 7 8   CREATININE mg/dL 0.81 0.69 0.61 0.72   GLUCOSE mg/dL 112* 82 134* 95   EGFR mL/min/1.73 85.3 102.0 105.1 98.3     Results from last 7 days   Lab Units 07/23/24  0509 07/22/24 0720 07/21/24 0519 07/20/24  1407   ALBUMIN g/dL 3.0* 3.2* 3.5 3.8   BILIRUBIN mg/dL <0.2 0.2 0.3 0.4   ALK PHOS U/L 120* 160* 189* 237*   AST (SGOT) U/L 25 30 30 43*   ALT (SGPT) U/L 16 18 24 24     Results from last 7 days   Lab Units 07/23/24  0509 07/22/24 0720 07/21/24 0519 07/20/24  1407   CALCIUM mg/dL 7.4* 8.3* 8.4* 8.5*   ALBUMIN g/dL 3.0* 3.2* 3.5 3.8   MAGNESIUM mg/dL  --   --  1.7  --      Results from last 7 days   Lab Units 07/20/24  1407   PROCALCITONIN ng/mL 0.03   LACTATE mmol/L 1.5     No results found for: \"HGBA1C\", \"POCGLU\"    XR Hip With or Without Pelvis 1 View Right    Result Date: 7/22/2024  Revised right total hip arthroplasty with proximal femoral cerclage wire. Hardware is well seated without evidence of acute periprosthetic fracture. Contralateral left total hip arthroplasty.  This report was finalized on 7/22/2024 5:57 PM by Dr. Jluis Barron M.D on Workstation: [id number]       I have personally reviewed all medications:  Scheduled Medications  busPIRone, 15 mg, Oral, BID  clindamycin, 900 mg, Intravenous, Q8H  folic acid, 1 mg, Oral, Daily  LORazepam, 1 mg, Oral, Q12H   Followed by  [START ON 7/24/2024] LORazepam, 1 mg, Oral, Daily  multivitamin, 1 tablet, Oral, Daily  QUEtiapine, 100 mg, Oral, " Nightly  sertraline, 100 mg, Oral, BID  sodium chloride, 10 mL, Intravenous, Q12H  thiamine (B-1) IV, 200 mg, Intravenous, Q8H   Followed by  [START ON 7/26/2024] thiamine, 100 mg, Oral, Daily  vitamin B-12, 1,000 mcg, Oral, Daily    Infusions  lactated ringers, 75 mL/hr, Last Rate: 75 mL/hr (07/22/24 1903)    Diet  Diet: Regular/House; Fluid Consistency: Thin (IDDSI 0)    I have personally reviewed:  [x]  Laboratory   []  Microbiology   [x]  Radiology   []  EKG/Telemetry  []  Cardiology/Vascular   []  Pathology    []  Records      Assessment/Plan     Active Hospital Problems    Diagnosis  POA    **Intractable low back pain [M54.59]  Yes    Metabolic acidosis [E87.20]  Yes    Thrombocytopenia [D69.6]  Yes    Alcohol use [Z78.9]  Yes    History of MRSA infection [Z86.14]  Yes    Right hip pain [M25.551]  Yes    S/P total right hip arthroplasty [Z96.641]  Not Applicable    Anemia [D64.9]  Yes    Spinal abscess [M46.20]  Yes    Cirrhosis of liver [K74.60]  Yes    Chronic obstructive pulmonary disease [J44.9]  Yes    Essential hypertension [I10]  Yes    Seizure disorder [G40.909]  Yes    Post traumatic stress disorder [F43.10]  Yes    Peripheral vascular disease [I73.9]  Yes      Resolved Hospital Problems   No resolved problems to display.       58 y.o. female admitted with Intractable low back pain.    Intractable low back and right hip pain   Loose acetabular component with proximal femur fracture  - In setting of recent MRSA infection, previously completed antibiotics. No evidence of sepsis, inflammatory markers unremarkable. CT Lumbar spine, pelvis and XR of hip reviewed. Records reviewed.  - CHRISTIANO and Orthopedic surgery consulted. Neurosurgery stated no acute intervention warranted.  - Orthopedic surgery continues to follow. Patient is POD #1 s/p Right Total Hip Revision with prophylactic fixation of the femur on 07/22/24. They are concerned about her going home and concerned about compliance with toe-touch weight  bearing restrictions at discharge and think she would benefit from rehab. They informed her that if she does go home and does not go to a rehab her chance of failure of the hip is quite high, she is supposed to continue with these restrictions for at least 6 weeks. Greatly appreciate their help. DVT prophylaxis per orthopedic surgery.  - CCP to discuss rehab with patient.   - Continue with PT/OT, fall precautions, weight bearing restrictions.  - PRN medications for pain. Patient currently receiving IV pain medication PRN, requiring close monitoring. Need to closely monitor for over-sedation, respiratory depression and constipation.    Acute on chronic Anemia  - Hemoglobin low on most recent labs, worse from prior, <7, likely reflective of perioperative blood loss from procedure, possibly dilutional component from IVF contributing as well.  Iron studies showing low TSAT but elevated ferritin, likely reflective of anemia of chronic disease.  - Transfuse 1 unit PRBC now, recheck hemoglobin afterwards.  - Order repeat CBC in AM for reassessment. Continue to monitor, transfuse for hemoglobin <7.    Alcohol abuse with concern for withdrawal  - Patient reportedly drinks ~one fifth of bourbon daily. Last drink in AM prior to arrival. She is on scheduled Ativan per protocol, tolerating well, no withdrawal symptoms at present. Can continue for now, as this is controlling symptoms. This is due to be completed on 07/24.  - CIWA protocol, MV, thiamine, folic acid  - Consulted [facility]. Will follow their plans/recommendations. Greatly appreciate their help.    Hypokalemia  - K low on prior labs, repleted via electrolyte protocol. Follow up repeat labs to guide ongoing management decisions. Replete PRN per protocol. Continue to monitor    Non anion-gap metabolic acidosis  - Noted on labs, could be result of IVF with NS. NS was stopped, LR started, values have improved.   - Repeat labs in AM for reassessment. Further  management based on results of testing.    Cirrhosis/chronic liver disease  - Appears compensated, abdominal exam benign, LFT stable. Need to closely monitor volume status because patient receiving IVF. Continue monitoring.    Thrombocytopenia  - Platelets found to be low on most recent labs, likely 2/2 liver disease. No indications for urgent intervention at present. Will monitor for now.  - Order repeat CBC in AM for reassessment.    Hypertension  - Blood pressure appears stable and acceptable acutely at this time. No indications to warrant acute changes/intervention at this time.  - Trend BP to guide ongoing management decisions.    COPD  - Appears stable from this perspective at present.  No evidence to suggest acute exacerbation.  Continue current medications as prescribed and closely monitor.    Depression/Anxiety  - Continue current medications as prescribed.      Portions of this text have been copied and I have reviewed these. Documentation accurate as of 07/23/24.       SCDs for DVT prophylaxis.  Full code.  Discussed with patient, family, and nursing staff.  Anticipate discharge home timing yet to be determined.  Expected discharge date/ time has not been documented.      Adonis Byers DO  [facility]  07/23/24      Electronically signed by Adonis Byers DO at 07/23/24 1140       Cristobal Chen II, MD at 07/23/24 0709          This patient is toe-touch weightbearing.  Any extra weight could lead to failure of her acetabular component.  I am very worried about the patient going home as I do not trust her to comply with these restrictions.  I explained to her that if she does go home and does not go to a rehab her chance of failure of the hip is quite high.  She needs to be toe-touch weightbearing for at least 6 weeks.  Unfortunately, she has very high risk for noncompliance and surgical complication.  Electronically signed by Cristobal Chen II, MD at 07/23/24 0723        Modesta Adonis, DO at 24 0754              Name: Lita Yu ADMIT: 2024   : 1965  PCP: Norma Saul APRN    MRN: [id number] LOS: 0 days   AGE/SEX: 58 y.o. female  ROOM: [facility]     Subjective   Subjective   Patient seen and examined this morning. Hospital day 2, she is resting in bed, family at bedside. Hip pain ongoing, orthopedic surgery tentatively planning for OR today.     Objective   Objective   Vital Signs  Temp:  [97.9 °F (36.6 °C)-98.8 °F (37.1 °C)] 97.9 °F (36.6 °C)  Heart Rate:  [73-94] 82  Resp:  [16-20] 16  BP: (116-159)/(68-87) 150/87  SpO2:  [92 %-99 %] 92 %  on   ;   Device (Oxygen Therapy): room air  Body mass index is 25.24 kg/m².  Physical Exam  Vitals and nursing note reviewed.   Constitutional:       General: She is awake. She is not in acute distress.     Appearance: She is ill-appearing.   Cardiovascular:      Rate and Rhythm: Normal rate and regular rhythm.      Pulses: Normal pulses.      Heart sounds: Normal heart sounds.   Pulmonary:      Effort: Pulmonary effort is normal. No respiratory distress.      Breath sounds: Normal breath sounds. No wheezing.   Abdominal:      General: Bowel sounds are normal. There is no distension.      Palpations: Abdomen is soft.      Tenderness: There is no abdominal tenderness.   Musculoskeletal:         General: Tenderness present.   Skin:     General: Skin is warm and dry.       Results Review     I reviewed the patient's new clinical results.  Results from last 7 days   Lab Units 24  0719 24  0519 24  1407   WBC 10*3/mm3 3.73 4.05 4.40   HEMOGLOBIN g/dL 8.1* 8.9* 9.8*   PLATELETS 10*3/mm3 82* 88* 113*     Results from last 7 days   Lab Units 24  0720 24  0519 24  1407   SODIUM mmol/L 135* 139 139   POTASSIUM mmol/L 3.3* 4.2 3.3*   CHLORIDE mmol/L 106 110* 105   CO2 mmol/L 21.0* 17.2* 23.0   BUN mg/dL 10 7 8   CREATININE mg/dL 0.69 0.61 0.72   GLUCOSE mg/dL 82 134* 95   EGFR  "mL/min/1.73 102.0 105.1 98.3     Results from last 7 days   Lab Units 07/22/24  0720 07/21/24  0519 07/20/24  1407   ALBUMIN g/dL 3.2* 3.5 3.8   BILIRUBIN mg/dL 0.2 0.3 0.4   ALK PHOS U/L 160* 189* 237*   AST (SGOT) U/L 30 30 43*   ALT (SGPT) U/L 18 24 24     Results from last 7 days   Lab Units 07/22/24  0720 07/21/24  0519 07/20/24  1407   CALCIUM mg/dL 8.3* 8.4* 8.5*   ALBUMIN g/dL 3.2* 3.5 3.8   MAGNESIUM mg/dL  --  1.7  --      Results from last 7 days   Lab Units 07/20/24  1407   PROCALCITONIN ng/mL 0.03   LACTATE mmol/L 1.5     No results found for: \"HGBA1C\", \"POCGLU\"       I have personally reviewed all medications:  Scheduled Medications  busPIRone, 15 mg, Oral, BID  folic acid, 1 mg, Oral, Daily  LORazepam, 1 mg, Oral, Q6H   Followed by  LORazepam, 1 mg, Oral, Q12H   Followed by  [START ON 7/24/2024] LORazepam, 1 mg, Oral, Daily  multivitamin, 1 tablet, Oral, Daily  QUEtiapine, 100 mg, Oral, Nightly  sertraline, 100 mg, Oral, BID  sodium chloride, 10 mL, Intravenous, Q12H  thiamine (B-1) IV, 200 mg, Intravenous, Q8H   Followed by  [START ON 7/26/2024] thiamine, 100 mg, Oral, Daily  vitamin B-12, 1,000 mcg, Oral, Daily    Infusions  lactated ringers, 75 mL/hr, Last Rate: 75 mL/hr (07/21/24 2330)    Diet  NPO Diet NPO Type: Strict NPO    I have personally reviewed:  [x]  Laboratory   []  Microbiology   [x]  Radiology   []  EKG/Telemetry  []  Cardiology/Vascular   []  Pathology    []  Records      Assessment/Plan     Active Hospital Problems    Diagnosis  POA    **Intractable low back pain [M54.59]  Yes    Metabolic acidosis [E87.20]  Yes    Thrombocytopenia [D69.6]  Yes    Alcohol use [Z78.9]  Yes    History of MRSA infection [Z86.14]  Yes    Right hip pain [M25.551]  Yes    S/P total right hip arthroplasty [Z96.641]  Not Applicable    Anemia [D64.9]  Yes    Spinal abscess [M46.20]  Yes    Cirrhosis of liver [K74.60]  Yes    Chronic obstructive pulmonary disease [J44.9]  Yes    Essential hypertension [I10]  " Yes    Seizure disorder [G40.909]  Yes    Post traumatic stress disorder [F43.10]  Yes    Peripheral vascular disease [I73.9]  Yes      Resolved Hospital Problems   No resolved problems to display.       58 y.o. female admitted with Intractable low back pain.    Intractable low back and right hip pain   - In setting of recent MRSA infection, previously completed antibiotics. No evidence of sepsis, inflammatory markers unremarkable. CT Lumbar spine, pelvis and XR of hip reviewed. Records reviewed.  - CHRISTIANO and Orthopedic surgery consulted. Orthopedic noting tentative plan for OR today (07/22). Notes from consultants reviewed. Will continue to follow their plans/recommendations. Greatly appreciate their help.  - PRN medications for pain. Patient currently receiving IV pain medication PRN, requiring close monitoring. Need to closely monitor for over-sedation, respiratory depression and constipation.    Alcohol abuse with concern for withdrawal  - Patient reportedly drinks ~one fifth of bourbon daily. Last drink in AM prior to arrival. She is on scheduled Ativan per protocol, tolerating well, no withdrawal symptoms at present. Can continue for now, likely discontinue on 07/23 if stable.  - CIWA protocol, MV, thiamine, folic acid  - Consulted [facility]. Will follow their plans/recommendations. Greatly appreciate their help.    Hypokalemia  - K low on most recent labs; Will replete via electrolyte protocol. Follow up repeat labs to guide ongoing management decisions. Replete PRN per protocol. Continue to monitor    Non anion-gap metabolic acidosis  - Noted on most recent labs, could be result of IVF with NS. NS was stopped, LR started, values have improved. Continue as prescribed and continue monitoring.  - Repeat labs in AM for reassessment. Further management based on results of testing.    Cirrhosis/chronic liver disease  - Appears compensated, abdominal exam benign, LFT stable. Need to closely monitor volume status  because patient receiving IVF. Continue monitoring.    Anemia  - Hemoglobin low on most recent labs, worse from prior. No evidence of overt blood loss, could be in part due to dilution from IVF. No indications for acute intervention at this time.  Iron studies showing low TSAT but elevated ferritin, likely reflective of anemia of chronic disease.  - Order repeat CBC in AM for reassessment. Continue to monitor, transfuse for hemoglobin <7.    Thrombocytopenia  - Platelets found to be low on most recent labs, likely 2/2 liver disease. No indications for urgent intervention at present. Will monitor for now.  - Order repeat CBC in AM for reassessment.    Hypertension  - Blood pressure appears stable and acceptable acutely at this time. No indications to warrant acute changes/intervention at this time.  - Trend BP to guide ongoing management decisions.    COPD  - Appears stable from this perspective at present.  No evidence to suggest acute exacerbation.  Continue current medications as prescribed and closely monitor.    Depression/Anxiety  - Continue current medications as prescribed.      Portions of this text have been copied and I have reviewed these. Documentation accurate as of 07/22/24.       SCDs for DVT prophylaxis.  Full code.  Discussed with patient, family, and nursing staff.  Anticipate discharge home timing yet to be determined.  Expected discharge date/ time has not been documented.      Adonis Byers DO  [facility]  07/22/24          Electronically signed by Adonis Byers DO at 07/22/24 0906       Cristobal Chen II, MD at 07/22/24 0589          Planning possible surgical intervention later today on left hip as long as there is OR availability.  Patient to be kept NPO.  Risk benefits and alternatives discussed with the patient and she does wish to proceed.    Electronically signed by Cristobal Chen II, MD at 07/22/24 0553       Adonis Byers DO at 07/21/24 0820               Name: Lita Yu ADMIT: 2024   : 1965  PCP: Norma Saul APRN    MRN: [id number] LOS: 0 days   AGE/SEX: 58 y.o. female  ROOM: [facility]     Subjective   Subjective   Patient seen and examined this morning. Hospital day 1. She is resting in bed, having ongoing low back and right hip pain. Discussed with nursing, CIWA scores elevated, requiring Ativan.     Objective   Objective   Vital Signs  Temp:  [98.2 °F (36.8 °C)-98.4 °F (36.9 °C)] 98.4 °F (36.9 °C)  Heart Rate:  [] 88  Resp:  [16-18] 18  BP: (106-146)/(73-89) 144/80  SpO2:  [98 %-100 %] 98 %  on   ;   Device (Oxygen Therapy): room air  Body mass index is 23.03 kg/m².  Physical Exam  Vitals and nursing note reviewed.   Constitutional:       General: She is not in acute distress.     Appearance: She is ill-appearing.   Cardiovascular:      Rate and Rhythm: Normal rate and regular rhythm.      Pulses: Normal pulses.      Heart sounds: Normal heart sounds.   Pulmonary:      Effort: Pulmonary effort is normal. No respiratory distress.      Breath sounds: Normal breath sounds.   Abdominal:      General: Bowel sounds are normal.      Palpations: Abdomen is soft.      Tenderness: There is no abdominal tenderness.   Musculoskeletal:         General: Tenderness present.   Skin:     General: Skin is warm and dry.   Neurological:      Mental Status: She is alert.       Results Review     I reviewed the patient's new clinical results.  Results from last 7 days   Lab Units 24  0519 24  1407   WBC 10*3/mm3 4.05 4.40   HEMOGLOBIN g/dL 8.9* 9.8*   PLATELETS 10*3/mm3 88* 113*     Results from last 7 days   Lab Units 24  0519 24  1407   SODIUM mmol/L 139 139   POTASSIUM mmol/L 4.2 3.3*   CHLORIDE mmol/L 110* 105   CO2 mmol/L 17.2* 23.0   BUN mg/dL 7 8   CREATININE mg/dL 0.61 0.72   GLUCOSE mg/dL 134* 95   EGFR mL/min/1.73 105.1 98.3     Results from last 7 days   Lab Units 24  0519 24  1407   ALBUMIN  "g/dL 3.5 3.8   BILIRUBIN mg/dL 0.3 0.4   ALK PHOS U/L 189* 237*   AST (SGOT) U/L 30 43*   ALT (SGPT) U/L 24 24     Results from last 7 days   Lab Units 07/21/24  0519 07/20/24  1407   CALCIUM mg/dL 8.4* 8.5*   ALBUMIN g/dL 3.5 3.8   MAGNESIUM mg/dL 1.7  --      Results from last 7 days   Lab Units 07/20/24  1407   PROCALCITONIN ng/mL 0.03   LACTATE mmol/L 1.5     No results found for: \"HGBA1C\", \"POCGLU\"    I have personally reviewed all medications:  Scheduled Medications  busPIRone, 15 mg, Oral, BID  folic acid, 1 mg, Oral, Daily  LORazepam, 2 mg, Oral, Q6H   Followed by  LORazepam, 1 mg, Oral, Q6H   Followed by  [START ON 7/22/2024] LORazepam, 1 mg, Oral, Q12H   Followed by  [START ON 7/24/2024] LORazepam, 1 mg, Oral, Daily  QUEtiapine, 100 mg, Oral, Nightly  sertraline, 100 mg, Oral, BID  sodium chloride, 10 mL, Intravenous, Q12H  thiamine (B-1) IV, 200 mg, Intravenous, Q8H   Followed by  [START ON 7/26/2024] thiamine, 100 mg, Oral, Daily  vitamin B-12, 1,000 mcg, Oral, Daily    Infusions  lactated ringers, 75 mL/hr    Diet  NPO Diet NPO Type: Sips with Meds, Ice Chips    I have personally reviewed:  [x]  Laboratory   []  Microbiology   [x]  Radiology   []  EKG/Telemetry  []  Cardiology/Vascular   []  Pathology    [x]  Records      Assessment/Plan     Active Hospital Problems    Diagnosis  POA    **Intractable low back pain [M54.59]  Yes    Metabolic acidosis [E87.20]  Yes    Thrombocytopenia [D69.6]  Yes    Alcohol use [Z78.9]  Yes    History of MRSA infection [Z86.14]  Yes    Right hip pain [M25.551]  Yes    S/P total right hip arthroplasty [Z96.641]  Not Applicable    Anemia [D64.9]  Yes    Spinal abscess [M46.20]  Yes    Cirrhosis of liver [K74.60]  Yes    Chronic obstructive pulmonary disease [J44.9]  Yes    Essential hypertension [I10]  Yes    Seizure disorder [G40.909]  Yes    Post traumatic stress disorder [F43.10]  Yes    Peripheral vascular disease [I73.9]  Yes      Resolved Hospital Problems   No " resolved problems to display.       58 y.o. female admitted with Intractable low back pain.    Intractable low back and right hip pain   - In setting of recent MRSA infection, previously completed antibiotics. No evidence of sepsis, inflammatory markers unremarkable. CT Lumbar spine, pelvis and XR of hip reviewed. Records reviewed.  - CHRISTIANO and Orthopedic surgery consulted. Will continue to follow their plans/recommendations. Greatly appreciate their help.  - PRN medications for pain. Patient currently receiving IV pain medication PRN, requiring close monitoring. Need to closely monitor for over-sedation, respiratory depression and constipation.    Alcohol abuse with concern for withdrawal  - Patient reportedly drinks ~one fifth of bourbon daily. Last drink in AM prior to arrival. CIWA scores elevated, she is requiring Ativan.  - CIWA protocol, MV, thiamine, folic acid  - Consult [facility]. Will follow their plans/recommendations. Greatly appreciate their help.    Non anion-gap metabolic acidosis  - Noted on most recent labs, could be result of IVF with NS.  - D/C NS. Start patient on LR.  - Repeat labs in AM for reassessment. Further management based on results of testing.    Cirrhosis/chronic liver disease  - Appears compensated, abdominal exam benign, LFT stable. Continue monitoring.    Anemia  - Hemoglobin low on most recent labs, however, stable from prior. No evidence of overt blood loss. No indications for acute intervention at this time.  Iron studies showing low TSAT but elevated ferritin, likely reflective of anemia of chronic disease.  - Order repeat CBC in AM for reassessment. Continue to monitor, transfuse for hemoglobin <7.    Thrombocytopenia  - Platelets found to be low on most recent labs, likely 2/2 liver disease. No indications for urgent intervention at present. Will monitor for now.  - Order repeat CBC in AM for reassessment.    Hypertension  - Blood pressure appears stable and acceptable  acutely at this time. No indications to warrant acute changes/intervention at this time.  - Trend BP to guide ongoing management decisions.    COPD  - Appears stable from this perspective at present.  No evidence to suggest acute exacerbation.  Continue current medications as prescribed and closely monitor.    Depression/Anxiety  - Continue current medications as prescribed.    SCDs for DVT prophylaxis.  Full code.  Discussed with patient, family, and nursing staff.  Anticipate discharge home timing yet to be determined.  Expected discharge date/ time has not been documented.      Adonis Byers DO  [facility]  07/21/24          Electronically signed by Adonis Byers DO at 07/21/24 0935          Consult Notes (all)        Fercho Ruth APRN at 07/21/24 1216        Consult Orders    1. Inpatient Neurosurgery Consult [[id number]] ordered by Melodie Brooks MD at 07/20/24 1727              Attestation signed by Sukhjinder Holguin MD at 07/21/24 1250    I have reviewed this documentation and agree.                  [facility] CONSULT NOTE    Patient name: Lita Yu  Referring Provider:    Melodie Brooks MD     Reason for Consultation: Severe back pain    Patient Care Team:  Norma Saul APRN as PCP - General (Nurse Practitioner)  Flory Villanueva MD (Physical Medicine and Rehabilitation)  Johan Salvador MD as Consulting Physician (Hematology and Oncology)    Chief complaint: Right hip pain    Subjective .     History of present illness:    Patient is a 58 y.o.  female with PMH of chronic pain, PTSD, COPD, hypertension, dyslipidemia, liver cirrhosis, alcohol dependence, history of spine infection with need for IV therapy finished in June of this year.  Apparently she has a concurrent right hip issue for which her orthopedic surgeon I was held off on any intervention due to current IV antibiotic therapy.  Sed rate and CRP within normal limits and white blood cell  count is normal.  At the time of my examination she is a fairly poor historian and is somewhat sleepy.  Her significant other notes that she is an alcoholic and she reports to only drinking 2 beer cans per week-however, the patient's significant other states that since she has been dealing with this pain she has been drinking significantly more.  She denies any bowel or bladder issues.  She reports feeling weak in the legs but she has chronic weakness in the legs and uses a walker to ambulate.  She reports significant pain in the right hip.    Review of Systems  Review of Systems   Genitourinary:  Negative for enuresis.   Musculoskeletal:  Positive for gait problem. Negative for back pain.   Neurological:  Positive for weakness. Negative for numbness.       History        Allergies:  Sulfa antibiotics and Keflex [cephalexin]    MEDICATIONS:  Medications Prior to Admission     Objective     Results Review:  LABS:    Admission on 07/20/2024   Component Date Value Ref Range Status    Glucose 07/20/2024 95  65 - 99 mg/dL Final    BUN 07/20/2024 8  6 - 20 mg/dL Final    Creatinine 07/20/2024 0.72  0.57 - 1.00 mg/dL Final    Sodium 07/20/2024 139  136 - 145 mmol/L Final    Potassium 07/20/2024 3.3 (L)  3.5 - 5.2 mmol/L Final    Chloride 07/20/2024 105  98 - 107 mmol/L Final    CO2 07/20/2024 23.0  22.0 - 29.0 mmol/L Final    Calcium 07/20/2024 8.5 (L)  8.6 - 10.5 mg/dL Final    Total Protein 07/20/2024 7.0  6.0 - 8.5 g/dL Final    Albumin 07/20/2024 3.8  3.5 - 5.2 g/dL Final    ALT (SGPT) 07/20/2024 24  1 - 33 U/L Final    AST (SGOT) 07/20/2024 43 (H)  1 - 32 U/L Final    Alkaline Phosphatase 07/20/2024 237 (H)  39 - 117 U/L Final    Total Bilirubin 07/20/2024 0.4  0.0 - 1.2 mg/dL Final    Globulin 07/20/2024 3.2  gm/dL Final    A/G Ratio 07/20/2024 1.2  g/dL Final    BUN/Creatinine Ratio 07/20/2024 11.1  7.0 - 25.0 Final    Anion Gap 07/20/2024 11.0  5.0 - 15.0 mmol/L Final    eGFR 07/20/2024 98.3  >60.0 mL/min/1.73  Final    Protime 07/20/2024 14.6 (H)  11.7 - 14.2 Seconds Final    INR 07/20/2024 1.12 (H)  0.90 - 1.10 Final    Lactate 07/20/2024 1.5  0.5 - 2.0 mmol/L Final    Procalcitonin 07/20/2024 0.03  0.00 - 0.25 ng/mL Final    Sed Rate 07/20/2024 7  0 - 30 mm/hr Final    C-Reactive Protein 07/20/2024 <0.30  0.00 - 0.50 mg/dL Final    WBC 07/20/2024 4.40  3.40 - 10.80 10*3/mm3 Final    RBC 07/20/2024 3.07 (L)  3.77 - 5.28 10*6/mm3 Final    Hemoglobin 07/20/2024 9.8 (L)  12.0 - 15.9 g/dL Final    Hematocrit 07/20/2024 31.1 (L)  34.0 - 46.6 % Final    MCV 07/20/2024 101.3 (H)  79.0 - 97.0 fL Final    MCH 07/20/2024 31.9  26.6 - 33.0 pg Final    MCHC 07/20/2024 31.5  31.5 - 35.7 g/dL Final    RDW 07/20/2024 15.7 (H)  12.3 - 15.4 % Final    RDW-SD 07/20/2024 58.1 (H)  37.0 - 54.0 fl Final    MPV 07/20/2024 9.5  6.0 - 12.0 fL Final    Platelets 07/20/2024 113 (L)  140 - 450 10*3/mm3 Final    Neutrophil % 07/20/2024 57.9  42.7 - 76.0 % Final    Lymphocyte % 07/20/2024 26.6  19.6 - 45.3 % Final    Monocyte % 07/20/2024 11.4  5.0 - 12.0 % Final    Eosinophil % 07/20/2024 2.3  0.3 - 6.2 % Final    Basophil % 07/20/2024 0.7  0.0 - 1.5 % Final    Immature Grans % 07/20/2024 1.1 (H)  0.0 - 0.5 % Final    Neutrophils, Absolute 07/20/2024 2.55  1.70 - 7.00 10*3/mm3 Final    Lymphocytes, Absolute 07/20/2024 1.17  0.70 - 3.10 10*3/mm3 Final    Monocytes, Absolute 07/20/2024 0.50  0.10 - 0.90 10*3/mm3 Final    Eosinophils, Absolute 07/20/2024 0.10  0.00 - 0.40 10*3/mm3 Final    Basophils, Absolute 07/20/2024 0.03  0.00 - 0.20 10*3/mm3 Final    Immature Grans, Absolute 07/20/2024 0.05  0.00 - 0.05 10*3/mm3 Final    nRBC 07/20/2024 0.0  0.0 - 0.2 /100 WBC Final    Glucose 07/21/2024 134 (H)  65 - 99 mg/dL Final    BUN 07/21/2024 7  6 - 20 mg/dL Final    Creatinine 07/21/2024 0.61  0.57 - 1.00 mg/dL Final    Sodium 07/21/2024 139  136 - 145 mmol/L Final    Potassium 07/21/2024 4.2  3.5 - 5.2 mmol/L Final    Chloride 07/21/2024 110 (H)  98 -  107 mmol/L Final    CO2 07/21/2024 17.2 (L)  22.0 - 29.0 mmol/L Final    Calcium 07/21/2024 8.4 (L)  8.6 - 10.5 mg/dL Final    Total Protein 07/21/2024 6.5  6.0 - 8.5 g/dL Final    Albumin 07/21/2024 3.5  3.5 - 5.2 g/dL Final    ALT (SGPT) 07/21/2024 24  1 - 33 U/L Final    AST (SGOT) 07/21/2024 30  1 - 32 U/L Final    Alkaline Phosphatase 07/21/2024 189 (H)  39 - 117 U/L Final    Total Bilirubin 07/21/2024 0.3  0.0 - 1.2 mg/dL Final    Globulin 07/21/2024 3.0  gm/dL Final    A/G Ratio 07/21/2024 1.2  g/dL Final    BUN/Creatinine Ratio 07/21/2024 11.5  7.0 - 25.0 Final    Anion Gap 07/21/2024 11.8  5.0 - 15.0 mmol/L Final    eGFR 07/21/2024 105.1  >60.0 mL/min/1.73 Final    WBC 07/21/2024 4.05  3.40 - 10.80 10*3/mm3 Final    RBC 07/21/2024 2.75 (L)  3.77 - 5.28 10*6/mm3 Final    Hemoglobin 07/21/2024 8.9 (L)  12.0 - 15.9 g/dL Final    Hematocrit 07/21/2024 27.1 (L)  34.0 - 46.6 % Final    MCV 07/21/2024 98.5 (H)  79.0 - 97.0 fL Final    MCH 07/21/2024 32.4  26.6 - 33.0 pg Final    MCHC 07/21/2024 32.8  31.5 - 35.7 g/dL Final    RDW 07/21/2024 15.1  12.3 - 15.4 % Final    RDW-SD 07/21/2024 54.3 (H)  37.0 - 54.0 fl Final    MPV 07/21/2024 9.3  6.0 - 12.0 fL Final    Platelets 07/21/2024 88 (L)  140 - 450 10*3/mm3 Final    Neutrophil % 07/21/2024 77.1 (H)  42.7 - 76.0 % Final    Lymphocyte % 07/21/2024 16.3 (L)  19.6 - 45.3 % Final    Monocyte % 07/21/2024 4.9 (L)  5.0 - 12.0 % Final    Eosinophil % 07/21/2024 0.0 (L)  0.3 - 6.2 % Final    Basophil % 07/21/2024 0.2  0.0 - 1.5 % Final    Immature Grans % 07/21/2024 1.5 (H)  0.0 - 0.5 % Final    Neutrophils, Absolute 07/21/2024 3.12  1.70 - 7.00 10*3/mm3 Final    Lymphocytes, Absolute 07/21/2024 0.66 (L)  0.70 - 3.10 10*3/mm3 Final    Monocytes, Absolute 07/21/2024 0.20  0.10 - 0.90 10*3/mm3 Final    Eosinophils, Absolute 07/21/2024 0.00  0.00 - 0.40 10*3/mm3 Final    Basophils, Absolute 07/21/2024 0.01  0.00 - 0.20 10*3/mm3 Final    Immature Grans, Absolute 07/21/2024  0.06 (H)  0.00 - 0.05 10*3/mm3 Final    nRBC 07/21/2024 0.0  0.0 - 0.2 /100 WBC Final    Magnesium 07/21/2024 1.7  1.6 - 2.6 mg/dL Final    Vitamin B-12 07/21/2024 650  211 - 946 pg/mL Final    Folate 07/21/2024 >20.00  4.78 - 24.20 ng/mL Final    Ferritin 07/21/2024 505.00 (H)  13.00 - 150.00 ng/mL Final    Iron 07/21/2024 53  37 - 145 mcg/dL Final    Iron 07/21/2024 53  37 - 145 mcg/dL Final    Iron Saturation (TSAT) 07/21/2024 18 (L)  20 - 50 % Final    Transferrin 07/21/2024 201  200 - 360 mg/dL Final    TIBC 07/21/2024 299  298 - 536 mcg/dL Final    Reticulocyte % 07/21/2024 2.51 (H)  0.70 - 1.90 % Final    Reticulocyte Absolute 07/21/2024 0.0695  0.0200 - 0.1300 10*6/mm3 Final    ABO Type 07/21/2024 O   Final    RH type 07/21/2024 Positive   Final    Antibody Screen 07/21/2024 Positive   Final    T&S Expiration Date 07/21/2024 7/24/2024 11:59:59 PM   Final       DIAGNOSTICS:  CT lumbar spine without contrast 7/20/2024:  Sequela of discitis/osteomyelitis at L2-3 with obliteration of the L2-3 disc space.  Amount of disc space loss similar to previous MRI.  Paraspinal inflammatory changes significantly decreased and nearly resolved.  There is mild right L5-S1 neuroforaminal stenosis secondary to disc and facet arthropathy.  Noted are bilateral total hip arthroplasties.  The right arthroplasty has a cemented acetabular component without evidence of fracture.    Results Review:   I reviewed the patient's new clinical results.  I personally viewed and interpreted the patient's labs, imaging study, medications and chart    Vital Signs   Temp:  [97.9 °F (36.6 °C)-98.4 °F (36.9 °C)] 97.9 °F (36.6 °C)  Heart Rate:  [] 80  Resp:  [16-18] 16  BP: (106-146)/(73-89) 143/78    Physical Exam:  Physical Exam  Constitutional:       General: She is not in acute distress.     Appearance: She is not ill-appearing, toxic-appearing or diaphoretic.   HENT:      Head: Normocephalic.      Nose: Nose normal.      Mouth/Throat:       Mouth: Mucous membranes are moist.      Pharynx: Oropharynx is clear.   Eyes:      Extraocular Movements: Extraocular movements intact.      Conjunctiva/sclera: Conjunctivae normal.      Pupils: Pupils are equal, round, and reactive to light.   Cardiovascular:      Rate and Rhythm: Normal rate.   Pulmonary:      Effort: Pulmonary effort is normal.   Musculoskeletal:      Comments: Cries out in pain when I flex her right hip.  Notes pain in the right hip joint.   Skin:     General: Skin is warm.   Neurological:      Deep Tendon Reflexes:      Reflex Scores:       Patellar reflexes are 2+ on the right side and 2+ on the left side.       Achilles reflexes are 2+ on the right side and 2+ on the left side.  Psychiatric:         Speech: Speech normal.       Neurologic Exam     Mental Status   Disoriented to person.   Disoriented to place.   Attention: decreased. Concentration: decreased.   Speech: speech is normal   Level of consciousness: alert  Knowledge: consistent with education.     Cranial Nerves     CN III, IV, VI   Pupils are equal, round, and reactive to light.    CN V   Facial sensation intact.     CN VIII   Hearing: intact    Motor Exam   Muscle bulk: normal  Overall muscle tone: normal  Right arm tone: normal  Left arm tone: normal  Right leg tone: normal  Left leg tone: normalStrength in the left lower extremity 4+ to 5 -/5.  Strength in right lower extremity is difficult to assess given patient has significant pain in her right hip.  She denies any low back pain but states the pain is all in her right hip.  She is afraid to move her leg for fear of reproducing right hip pain.     Gait, Coordination, and Reflexes     Gait  Gait: (Gait deferred)    Reflexes   Right patellar: 2+  Left patellar: 2+  Right achilles: 2+  Left achilles: 2+  Right ankle clonus: absent  Left ankle clonus: absent      Assessment & Plan       Intractable low back pain    Post traumatic stress disorder    Peripheral vascular disease     "Essential hypertension    Seizure disorder    Chronic obstructive pulmonary disease    Cirrhosis of liver    Spinal abscess    History of MRSA infection    Right hip pain    S/P total right hip arthroplasty    Anemia    Metabolic acidosis    Thrombocytopenia    Alcohol use    This is a 58-year-old female with chronic low back pain with history of osteomyelitis who was treated with long-term IV antibiotics and just finished treatment last month.  She currently denies any low back pain but states that the pain is isolated to right hip and is aggravated with any kind of hip or leg movement.  She has been evaluated by orthopedic surgery and plans to do right hip revision in the near future.  At this point neurosurgery will sign off as there does not appear to be any acute neurosurgical process.  Please call if she develops any new or worsening back or leg pain/issues.    PLAN:     No acute neurosurgical intervention indicated  Defer management to orthopedic surgery  Neurosurgery signing off    I discussed the patient's findings and my recommendations with patient, family, and Dr. Holguin.     During patient visit, I utilized appropriate personal protective equipment including mask and gloves.  Mask used was standard procedure mask. Appropriate PPE was worn during the entire visit.  Hand hygiene was completed before and after.     Fercho Ruth, APRN  07/21/24  12:17 EDT    \"Dictated utilizing Dragon dictation\".      Electronically signed by Sukhjinder Holguin MD at 07/21/24 1250       Jalen Mccarthy MD at 07/21/24 1031        Consult Orders    1. Inpatient Orthopedic Surgery Consult [[id number]] ordered by Melodie Brooks MD at 07/20/24 1607                       Orthopaedic & Sports Medicine Surgery  Dr. Jalen Mccarthy MD  [phone]    Orthopaedic Surgery  Consult Note      HPI:  Patient is a 58 y.o. female who presents with worsening right hip and low back pain.  She underwent a right hip antibiotic spacer with Dr. Liriano " "Sweet on 5/4/2024 for likely hip osteomyelitis versus AVN.  She has been on IV antibiotics.  She says she has been off of those, but cannot exactly tell me the length of time since she stopped those.  She has had worsening right hip pain has been growing, she said a day or 2 ago she fell and had significantly worse pain in the hip and unable to bear weight.  She was brought to [facility] and orthopedic surgery was consulted.  Infection labs were taken and within normal range.    MEDICAL HISTORY    VITALS: /78 (BP Location: Left arm, Patient Position: Lying)   Pulse 80   Temp 97.9 °F (36.6 °C) (Oral)   Resp 16   Ht 160 cm (62.99\")   Wt 59 kg (130 lb)   SpO2 96%   BMI 23.03 kg/m²  Body mass index is 23.03 kg/m².    PHYSICAL EXAM:   CONSTITUTIONAL: No acute distress  LUNGS: Equal chest rise, no shortness of air  CARDIOVASCULAR: palpable peripheral pulses  EXTREMITY:   Right lower extremity  Tenderness to Palpation: Tenderness to palpation at the anterior lateral hip nontender over the mid thigh to distal.  She does have pain with any hip range of motion and positive logroll test.  Gross Deformity:  No gross deformity of the hip.  Anterior hip incision appears to be well-healed with no breakdown, redness, or obvious signs of infection.  Pulses:  Brisk Capillary Refill  Sensation: Intact to Saphenous, Sural, Deep Peroneal, Superficial Peroneal, and Tibial Nerves and grossly throughout extremity  Motor: 5/5 EHL/FHL/TA/GS motor complexes         RADIOLOGY REVIEW:     LABS:   Results for the past 24 hours:   Recent Results (from the past 24 hour(s))   Comprehensive Metabolic Panel    Collection Time: 07/20/24  2:07 PM    Specimen: Blood   Result Value Ref Range    Glucose 95 65 - 99 mg/dL    BUN 8 6 - 20 mg/dL    Creatinine 0.72 0.57 - 1.00 mg/dL    Sodium 139 136 - 145 mmol/L    Potassium 3.3 (L) 3.5 - 5.2 mmol/L    Chloride 105 98 - 107 mmol/L    CO2 23.0 22.0 - 29.0 mmol/L    Calcium 8.5 (L) 8.6 - " 10.5 mg/dL    Total Protein 7.0 6.0 - 8.5 g/dL    Albumin 3.8 3.5 - 5.2 g/dL    ALT (SGPT) 24 1 - 33 U/L    AST (SGOT) 43 (H) 1 - 32 U/L    Alkaline Phosphatase 237 (H) 39 - 117 U/L    Total Bilirubin 0.4 0.0 - 1.2 mg/dL    Globulin 3.2 gm/dL    A/G Ratio 1.2 g/dL    BUN/Creatinine Ratio 11.1 7.0 - 25.0    Anion Gap 11.0 5.0 - 15.0 mmol/L    eGFR 98.3 >60.0 mL/min/1.73   Protime-INR    Collection Time: 07/20/24  2:07 PM    Specimen: Blood   Result Value Ref Range    Protime 14.6 (H) 11.7 - 14.2 Seconds    INR 1.12 (H) 0.90 - 1.10   Lactic Acid, Plasma    Collection Time: 07/20/24  2:07 PM    Specimen: Blood   Result Value Ref Range    Lactate 1.5 0.5 - 2.0 mmol/L   Procalcitonin    Collection Time: 07/20/24  2:07 PM    Specimen: Blood   Result Value Ref Range    Procalcitonin 0.03 0.00 - 0.25 ng/mL   Sedimentation Rate    Collection Time: 07/20/24  2:07 PM    Specimen: Blood   Result Value Ref Range    Sed Rate 7 0 - 30 mm/hr   C-reactive Protein    Collection Time: 07/20/24  2:07 PM    Specimen: Blood   Result Value Ref Range    C-Reactive Protein <0.30 0.00 - 0.50 mg/dL   CBC Auto Differential    Collection Time: 07/20/24  2:07 PM    Specimen: Blood   Result Value Ref Range    WBC 4.40 3.40 - 10.80 10*3/mm3    RBC 3.07 (L) 3.77 - 5.28 10*6/mm3    Hemoglobin 9.8 (L) 12.0 - 15.9 g/dL    Hematocrit 31.1 (L) 34.0 - 46.6 %    .3 (H) 79.0 - 97.0 fL    MCH 31.9 26.6 - 33.0 pg    MCHC 31.5 31.5 - 35.7 g/dL    RDW 15.7 (H) 12.3 - 15.4 %    RDW-SD 58.1 (H) 37.0 - 54.0 fl    MPV 9.5 6.0 - 12.0 fL    Platelets 113 (L) 140 - 450 10*3/mm3    Neutrophil % 57.9 42.7 - 76.0 %    Lymphocyte % 26.6 19.6 - 45.3 %    Monocyte % 11.4 5.0 - 12.0 %    Eosinophil % 2.3 0.3 - 6.2 %    Basophil % 0.7 0.0 - 1.5 %    Immature Grans % 1.1 (H) 0.0 - 0.5 %    Neutrophils, Absolute 2.55 1.70 - 7.00 10*3/mm3    Lymphocytes, Absolute 1.17 0.70 - 3.10 10*3/mm3    Monocytes, Absolute 0.50 0.10 - 0.90 10*3/mm3    Eosinophils, Absolute 0.10 0.00  - 0.40 10*3/mm3    Basophils, Absolute 0.03 0.00 - 0.20 10*3/mm3    Immature Grans, Absolute 0.05 0.00 - 0.05 10*3/mm3    nRBC 0.0 0.0 - 0.2 /100 WBC   Comprehensive Metabolic Panel    Collection Time: 07/21/24  5:19 AM    Specimen: Blood   Result Value Ref Range    Glucose 134 (H) 65 - 99 mg/dL    BUN 7 6 - 20 mg/dL    Creatinine 0.61 0.57 - 1.00 mg/dL    Sodium 139 136 - 145 mmol/L    Potassium 4.2 3.5 - 5.2 mmol/L    Chloride 110 (H) 98 - 107 mmol/L    CO2 17.2 (L) 22.0 - 29.0 mmol/L    Calcium 8.4 (L) 8.6 - 10.5 mg/dL    Total Protein 6.5 6.0 - 8.5 g/dL    Albumin 3.5 3.5 - 5.2 g/dL    ALT (SGPT) 24 1 - 33 U/L    AST (SGOT) 30 1 - 32 U/L    Alkaline Phosphatase 189 (H) 39 - 117 U/L    Total Bilirubin 0.3 0.0 - 1.2 mg/dL    Globulin 3.0 gm/dL    A/G Ratio 1.2 g/dL    BUN/Creatinine Ratio 11.5 7.0 - 25.0    Anion Gap 11.8 5.0 - 15.0 mmol/L    eGFR 105.1 >60.0 mL/min/1.73   CBC Auto Differential    Collection Time: 07/21/24  5:19 AM    Specimen: Blood   Result Value Ref Range    WBC 4.05 3.40 - 10.80 10*3/mm3    RBC 2.75 (L) 3.77 - 5.28 10*6/mm3    Hemoglobin 8.9 (L) 12.0 - 15.9 g/dL    Hematocrit 27.1 (L) 34.0 - 46.6 %    MCV 98.5 (H) 79.0 - 97.0 fL    MCH 32.4 26.6 - 33.0 pg    MCHC 32.8 31.5 - 35.7 g/dL    RDW 15.1 12.3 - 15.4 %    RDW-SD 54.3 (H) 37.0 - 54.0 fl    MPV 9.3 6.0 - 12.0 fL    Platelets 88 (L) 140 - 450 10*3/mm3    Neutrophil % 77.1 (H) 42.7 - 76.0 %    Lymphocyte % 16.3 (L) 19.6 - 45.3 %    Monocyte % 4.9 (L) 5.0 - 12.0 %    Eosinophil % 0.0 (L) 0.3 - 6.2 %    Basophil % 0.2 0.0 - 1.5 %    Immature Grans % 1.5 (H) 0.0 - 0.5 %    Neutrophils, Absolute 3.12 1.70 - 7.00 10*3/mm3    Lymphocytes, Absolute 0.66 (L) 0.70 - 3.10 10*3/mm3    Monocytes, Absolute 0.20 0.10 - 0.90 10*3/mm3    Eosinophils, Absolute 0.00 0.00 - 0.40 10*3/mm3    Basophils, Absolute 0.01 0.00 - 0.20 10*3/mm3    Immature Grans, Absolute 0.06 (H) 0.00 - 0.05 10*3/mm3    nRBC 0.0 0.0 - 0.2 /100 WBC   Magnesium    Collection Time:  07/21/24  5:19 AM    Specimen: Blood   Result Value Ref Range    Magnesium 1.7 1.6 - 2.6 mg/dL   Ferritin    Collection Time: 07/21/24  5:19 AM    Specimen: Blood   Result Value Ref Range    Ferritin 505.00 (H) 13.00 - 150.00 ng/mL   Iron    Collection Time: 07/21/24  5:19 AM    Specimen: Blood   Result Value Ref Range    Iron 53 37 - 145 mcg/dL   Iron Profile    Collection Time: 07/21/24  5:19 AM    Specimen: Blood   Result Value Ref Range    Iron 53 37 - 145 mcg/dL    Iron Saturation (TSAT) 18 (L) 20 - 50 %    Transferrin 201 200 - 360 mg/dL    TIBC 299 298 - 536 mcg/dL   Reticulocytes    Collection Time: 07/21/24  5:19 AM    Specimen: Blood   Result Value Ref Range    Reticulocyte % 2.51 (H) 0.70 - 1.90 %    Reticulocyte Absolute 0.0695 0.0200 - 0.1300 10*6/mm3   Vitamin B12    Collection Time: 07/21/24  8:39 AM    Specimen: Blood   Result Value Ref Range    Vitamin B-12 650 211 - 946 pg/mL   Folate    Collection Time: 07/21/24  8:39 AM    Specimen: Blood   Result Value Ref Range    Folate >20.00 4.78 - 24.20 ng/mL   Type & Screen    Collection Time: 07/21/24  8:39 AM    Specimen: Blood   Result Value Ref Range    ABO Type O     RH type Positive     Antibody Screen Positive     T&S Expiration Date 7/24/2024 11:59:59 PM        IMPRESSION:  Patient is a 58 y.o. female with right hip spacer for likely osteomyelitis versus AVN with Dr. Heri Chen on 5/4/2024, with worsening hip pain after a fall.    PLAN:   Admited to: Melodie Brooks MD  Diet: Okay for diet today  Weight Bearing: Nonweightbearing right lower extremity  Labs: Reviewed and as above.  Inflammatory markers within normal range.  Imaging: Right hip x-rays and pelvis CT show antibiotic spacer with femoral head and acetabular polyethylene protrusio in the acetabulum.  With the metal artifact, unsure if this is complete pelvic discontinuity at this time.  Anti-coagulation: Mechanical DVT ppx with SCD's, chemical DVT PPX: From med rec do not see that she is on  home anticoagulation.  Will give 1 dose of Lovenox today, and then potentially hold for surgery.  If she is not going to have surgery tomorrow she should be put back on Lovenox, and hold day of planned surgery.  Float Heels  Incentive Spirometer  She does have significant change from her previous x-rays with antibiotic spacer.  She has a least acetabular component protrusio.  Patient was planning on revision surgery after infection was cleared.  Inflammatory markers currently within normal range.  This was can be planned outpatient, but has had significant pain and inability to walk after a recent fall.  No surgery planned for today.  I discussed the case with my partner Dr. Heri Chen, we will look at her chart and decide on surgery if will be in house and what day.  She likely needs revision of her antibiotic spacer components.  We briefly discussed on the risks including anesthetic complications, infection, periprosthetic fracture, amongst others.  Defer surgical recommendations to Dr. Chen as this is a complex case, and she would benefit from his advanced arthroplasty experience.  The risks, benefits, alternatives, and expectations were discussed with the patient and family and they include but not limited to risks of anesthesia, bleeding, DVT, PE, heart attack, stroke, even death, damage to neurovascular structures, infection, need for repeat surgery, scarring, wound issues, fracture/retear, instability, stiffness, loss of range of motion, incomplete resolution of symptoms, weakness to name a few.        Jalen Mccarthy MD  [facility]  Orthopaedic Surgery, Sports Medicine  [phone]         Electronically signed by Jalen Mccarthy MD at 07/21/24 5969

## 2024-07-25 NOTE — PROGRESS NOTES
Continued Stay Note  [facility]     Patient Name: Lita Yu  MRN: [id number]  Today's Date: 7/25/2024    Admit Date: 7/20/2024    Plan: plans home with significant other and [facility] ( referral pending)   Discharge Plan       Row Name 07/25/24 1519       Plan    Plan Comments Pt would like to d/c to [facility]. Referral sent in [facility], awaiting approval/bed availability. Pt has [place] so will require precert. CCP to follow.                   Discharge Codes    No documentation.                 Expected Discharge Date and Time       Expected Discharge Date Expected Discharge Time    Jul 26, 2024               Sandi Murrieta RN

## 2024-07-25 NOTE — PLAN OF CARE
Goal Outcome Evaluation:  Plan of Care Reviewed With: patient        Progress: no change     A&O. VSS. DONALDO CDI and flashing green. CIWA score 0. Voiding per purwick. Pt had a large BM 7/24. PRN pain medication given. Low K+ this am electrolyte protocol ordered.

## 2024-07-25 NOTE — THERAPY TREATMENT NOTE
Patient Name: Lita Yu  : 1965    MRN: [id number]                              Today's Date: 2024       Admit Date: 2024    Visit Dx:     ICD-10-CM ICD-9-CM   1. Intractable low back pain  M54.59 724.2   2. Right hip pain  M25.551 719.45   3. History of discitis  Z87.39 V13.4     Patient Active Problem List   Diagnosis    Postmenopausal state    Post traumatic stress disorder    Pain in joints    Osteoporosis    Macrocytic anemia    Lumbosacral radiculopathy    Leg pain, left    Peripheral vascular disease    Essential hypertension    Hyperlipidemia    Degenerative joint disease of left hip    Seizure disorder    Smoker    Status post hip replacement    Tobacco dependence syndrome    Vitamin B12 deficiency    Anemia of chronic disease    Chronic obstructive pulmonary disease    Cirrhosis of liver    Acute UTI (urinary tract infection)    Generalized weakness    Rhabdomyolysis    Non-traumatic rhabdomyolysis    Moderate malnutrition    Chest pain    Dehydration    Myalgia    Acute hyperkalemia    Spinal abscess    Intractable low back pain    History of MRSA infection    Right hip pain    S/P total right hip arthroplasty    Anemia    Metabolic acidosis    Thrombocytopenia    Alcohol use     Past Medical History:   Diagnosis Date    Cirrhosis     COPD (chronic obstructive pulmonary disease)     Depression     GERD (gastroesophageal reflux disease)     Hyperlipidemia     Hypertension     PTSD (post-traumatic stress disorder)      Past Surgical History:   Procedure Laterality Date     SECTION N/A     COLONOSCOPY N/A 2021    Procedure: COLONOSCOPY with polypectomy x2 and random biopsy;  Surgeon: Osman Clay MD;  Location: [facility] ENDOSCOPY;  Service: Gastroenterology;  Laterality: N/A;  colon polyps    DENTAL PROCEDURE      ENDOSCOPY N/A 2021    Procedure: ESOPHAGOGASTRODUODENOSCOPY;  Surgeon: Osman Clay MD;  Location: [facility] ENDOSCOPY;  Service:  Gastroenterology;  Laterality: N/A;  esophagitis    JOINT REPLACEMENT      REPLACEMENT TOTAL HIP LATERAL POSITION Left     TONSILLECTOMY      TOTAL HIP ARTHROPLASTY Right 2024    Procedure: TOTAL HIP ARTHROPLASTY ANTERIOR;  Surgeon: Cristobal Chen II, MD;  Location: [facility] MAIN OR;  Service: Orthopedics;  Laterality: Right;    TOTAL HIP ARTHROPLASTY REVISION Right 2024    Procedure: TOTAL HIP ARTHROPLASTY REVISION;  Surgeon: Cristobal Chen II, MD;  [place]  [facility] [place] [facility] [place]  Service: [place]opedics;  Laterality: Right;    VAGINAL BIRTH AFTER  SECTION        General Information       Row Name 24 1543          Physical Therapy Time and Intention    Document Type therapy note (daily note)  -MG     Mode of Treatment co-treatment;occupational therapy;physical therapy;other (see comments)  -MG       Row Name 24 1543          General Information    Patient Profile Reviewed yes  -MG     Existing Precautions/Restrictions fall  CIWA precautions  -MG     Barriers to Rehab medically complex;previous functional deficit;cognitive status  -MG       Row Name 24 1543          Cognition    Orientation Status (Cognition) oriented to;person;place;situation;verbal cues/prompts needed for orientation  Improved alertness. Cont to be emotionally labile and intermittently confused.  -MG       Row Name 24 1543          Safety Issues, Functional Mobility    Safety Issues Affecting Function (Mobility) ability to follow commands;awareness of need for assistance;insight into deficits/self-awareness;judgment;safety precaution awareness;safety precautions follow-through/compliance;sequencing abilities;unable to maintain weight-bearing restrictions  -MG     Impairments Affecting Function (Mobility) balance;cognition;endurance/activity tolerance;coordination;postural/trunk control;range of motion (ROM);strength;pain  -MG     Cognitive Impairments, Mobility Safety/Performance  attention;awareness, need for assistance;insight into deficits/self-awareness;judgment;safety precaution awareness;safety precaution follow-through;sequencing abilities  -MG     Comment, Safety Issues/Impairments (Mobility) Co treatment medically appropriate and necessary due to patient acuity level, activity tolerance and safety of patient and staff. Treatment is focusing on progression of care and goals established in the POC. Gait belt and non-skid socks donned.  -MG               User Key  (r) = Recorded By, (t) = Taken By, (c) = Cosigned By      Initials Name Provider Type    MG Tammy Johnson, PT Physical Therapist                   Mobility       Row Name 07/25/24 1544          Bed Mobility    Supine-Sit [place] (Bed Mobility) minimum assist (75% patient effort);moderate assist (50% patient effort);1 person assist;verbal cues  -MG     Sit-Supine [place] (Bed Mobility) minimum assist (75% patient effort);moderate assist (50% patient effort);1 person assist;verbal cues  -MG     Assistive Device (Bed Mobility) bed rails;head of bed elevated  -MG     Comment, (Bed Mobility) Cues for sequencing and maintaining her WB. Primary assist at her RLE.  -MG       Row Name 07/25/24 1544          Transfers    Comment, (Transfers) Trialed pivot to L, but unable to complete d/t pain, weakness and inability to maintain WB.  -MG       Row Name 07/25/24 1544          Sit-Stand Transfer    Sit-Stand Preston (Transfers) minimum assist (75% patient effort);moderate assist (50% patient effort);2 person assist;verbal cues;nonverbal cues (demo/gesture)  -MG     Assistive Device (Sit-Stand Transfers) walker, front-wheeled  -MG     Comment, (Sit-Stand Transfer) Cues for sequencing, hand placement, WB status and posture. Total x5 from EOB. This PTs foot under pts R heel to assess and help maintain WB status. First two stands pt unable to maintain her WB precaution requiring Min/ModA for weight shifting onto her LLE. Slight  WB through RLE on third stand, and able to maintain her WB precaution on the 3rd and 4th stand; intermittently able to hold the RLE extended in front of her.  -MG       Row Name 07/25/24 1544          Mobility    Extremity Weight-bearing Status right lower extremity  -MG     Right Lower Extremity (Weight-bearing Status) toe touch weight-bearing (TTWB)  -MG               User Key  (r) = Recorded By, (t) = Taken By, (c) = Cosigned By      Initials Name Provider Type    MG Tammy Johnson, PT Physical Therapist                   Obj/Interventions       Row Name 07/25/24 1549          Balance    Static Sitting Balance contact guard;standby assist;verbal cues  -MG     Dynamic Sitting Balance minimal assist;verbal cues  -MG     Position, Sitting Balance sitting edge of bed  -MG     Static Standing Balance minimal assist;moderate assist;2-person assist;verbal cues;non-verbal cues (demo/gesture)  -MG     Position/Device Used, Standing Balance supported;walker, front-wheeled  -MG     Comment, Balance Repeated and frequent cues for WB status on RLE in sitting. In standing pt having difficulty maintaining WB status. Tolerated longest stand was 3-4 min w/ RLE extended or resting on PT foot to assess and maintain WB status.  -MG               User Key  (r) = Recorded By, (t) = Taken By, (c) = Cosigned By      Initials Name Provider Type    MG Tammy Johnson, PT Physical Therapist                   Goals/Plan    No documentation.                  Clinical Impression       Row Name 07/25/24 1551          Pain    Pretreatment Pain Rating 6/10  -MG     Posttreatment Pain Rating 8/10  -MG     Pain Location - Side/Orientation Right  -MG     Pain Location incisional  -MG     Pain Location - hip  -MG     Pre/Posttreatment Pain Comment Pt on/off crying throughout session, stating d/t pain and being depressed.  -MG     Pain Intervention(s) Medication (See MAR);Ambulation/increased activity;Repositioned;Rest;Cold pack  -MG       Row Name  07/25/24 1601          Plan of Care Review    Plan of Care Reviewed With patient  -MG     Progress improving  -MG     Outcome Evaluation Pt was seen for PT/OT cotx this PM and tolerated the session well, demoing some progress. Pt was more alert this date, but continues being emotionally labile and having intermittent confusion. Pt was educated on her WB status and hip precautions at beginning of session, it was written on her board, and she was able to recall her WB status and 2/3 hip precautions at end of session. Today, pt was Min/ModA x1 for bed mobility and Min/ModA x2 for total of 5 STS from EOB w/ RW. Pt was unable to maintain her WB precaution the first three stands, but the last two she was able to maintain and tolerated her longest stand for 3-4 min. Pt attempted shuffle/pivot on her LLE, but was having difficulty d/t weakness, fatigue and pain. Pt reports during her session that she is motivated to improve, wants to go to acute rehab ([facility]). RN and CCP made aware. PT will cont to follow and rec AIR at [place].  -MG       Row Name 07/25/24 1551          Therapy Assessment/Plan (PT)    Rehab Potential (PT) fair, will monitor progress closely  -MG     Criteria for Skilled Interventions Met (PT) yes;meets criteria  -MG     Therapy Frequency (PT) 6 times/wk  -MG       Row Name 07/25/24 1551          Vital Signs    O2 Delivery Pre Treatment room air  -MG     O2 Delivery Intra Treatment room air  -MG     O2 Delivery Post Treatment room air  -MG     Pre Patient Position Supine  -MG     Intra Patient Position Standing  -MG     Post Patient Position Supine  -MG       Row Name 07/25/24 1551          Positioning and Restraints    Pre-Treatment Position in bed  -MG     Post Treatment Position bed  -MG               User Key  (r) = Recorded By, (t) = Taken By, (c) = Cosigned By      Initials Name Provider Type    MG Tammy Johnson, PT Physical Therapist                   Outcome Measures       Row Name 07/25/24 1601 07/25/24  0845       How much help from another person do you currently need...    Turning from your back to your side while in flat bed without using bedrails? 3  -MG 3  -RK    Moving from lying on back to sitting on the side of a flat bed without bedrails? 2  -MG 2  -RK    Moving to and from a bed to a chair (including a wheelchair)? 1  -MG 1  -RK    Standing up from a chair using your arms (e.g., wheelchair, bedside chair)? 2  -MG 2  -RK    Climbing 3-5 steps with a railing? 1  -MG 1  -RK    To walk in hospital room? 1  -MG 1  -RK    AM-PAC 6 Clicks Score (PT) 10  -MG 10  -RK    Highest Level of Mobility Goal 4 --> Transfer to chair/commode  -MG 4 --> Transfer to chair/commode  -RK              User Key  (r) = Recorded By, (t) = Taken By, (c) = Cosigned By      Initials Name Provider Type    MG Tammy Johnson, PT Physical Therapist    Loyda Sol, RN Registered Nurse                                 Physical Therapy Education       Title: PT OT SLP Therapies (In Progress)       Topic: Physical Therapy (Done)       Point: Mobility training (Done)       Learning Progress Summary             Patient Acceptance, E,TB,D, VU,NR by MG at 7/25/2024 1601    Acceptance, E,TB,D, NR,NL by MG at 7/24/2024 1539                         Point: Home exercise program (Done)       Learning Progress Summary             Patient Acceptance, E,TB,D, VU,NR by MG at 7/25/2024 1601    Acceptance, E,TB,D, NR,NL by MG at 7/24/2024 1539                         Point: Body mechanics (Done)       Learning Progress Summary             Patient Acceptance, E,TB,D, VU,NR by MG at 7/25/2024 1601    Acceptance, E,TB,D, NR,NL by MG at 7/24/2024 1539                         Point: Precautions (Done)       Learning Progress Summary             Patient Acceptance, E,TB,D, VU,NR by MG at 7/25/2024 1601    Acceptance, E,TB,D, NR,NL by MG at 7/24/2024 1539                                         User Key       Initials Effective Dates Name Provider Type  Discipline     05/24/22 -  Tammy Johnson PT Physical Therapist PT                  PT Recommendation and Plan  Planned Therapy Interventions (PT): balance training, bed mobility training, gait training, home exercise program, patient/family education, stretching, strengthening, neuromuscular re-education, ROM (range of motion), motor coordination training, postural re-education, transfer training  Plan of Care Reviewed With: patient  Progress: improving  Outcome Evaluation: Pt was seen for PT/OT cotx this PM and tolerated the session well, demoing some progress. Pt was more alert this date, but continues being emotionally labile and having intermittent confusion. Pt was educated on her WB status and hip precautions at beginning of session, it was written on her board, and she was able to recall her WB status and 2/3 hip precautions at end of session. Today, pt was Min/ModA x1 for bed mobility and Min/ModA x2 for total of 5 STS from EOB w/ RW. Pt was unable to maintain her WB precaution the first three stands, but the last two she was able to maintain and tolerated her longest stand for 3-4 min. Pt attempted shuffle/pivot on her LLE, but was having difficulty d/t weakness, fatigue and pain. Pt reports during her session that she is motivated to improve, wants to go to acute rehab ([facility] RN and CCP made aware. PT will cont to follow and rec AIR at [place].     Time Calculation:         PT Charges       Row Name 07/25/24 1601             Time Calculation    Start Time 1347  -MG      Stop Time 1414  -MG      Time Calculation (min) 27 min  -MG      PT Received On 07/25/24  -MG      PT - Next Appointment 07/26/24  -MG                User Key  (r) = Recorded By, (t) = Taken By, (c) = Cosigned By      Initials Name Provider Type     Tammy Johnson, PT Physical Therapist                  Therapy Charges for Today       Code Description Service Date Service Provider Modifiers Qty    34493754110 HC PT EVAL MOD COMPLEXITY 3  7/24/2024 Tammy Johnson, PT GP 1    26154883330 HC PT THERAPEUTIC ACT EA 15 MIN 7/24/2024 Tammy Johnson, PT GP 1    66607572873 HC PT THERAPEUTIC ACT EA 15 MIN 7/25/2024 Tammy Johnson, PT GP 2            PT G-Codes  Outcome Measure Options: AM-PAC 6 Clicks Daily Activity (OT), Modified [place]  AM-PAC 6 Clicks Score (PT): 10  AM-PAC 6 Clicks Score (OT): 12  Modified [place] Scale: 4 - Moderately severe disability.  Unable to walk without assistance, and unable to attend to own bodily needs without assistance.  PT Discharge Summary  Anticipated Discharge Disposition (PT): inpatient rehabilitation facility    Tammy Johnson PT  7/25/2024

## 2024-07-25 NOTE — PLAN OF CARE
Goal Outcome Evaluation:  Plan of Care Reviewed With: patient        Progress: improving  Outcome Evaluation: Pt was seen for PT/OT cotx this PM and tolerated the session well, demoing some progress. Pt was more alert this date, but continues being emotionally labile and having intermittent confusion. Pt was educated on her WB status and hip precautions at beginning of session, it was written on her board, and she was able to recall her WB status and 2/3 hip precautions at end of session. Today, pt was Min/ModA x1 for bed mobility and Min/ModA x2 for total of 5 STS from EOB w/ RW. Pt was unable to maintain her WB precaution the first three stands, but the last two she was able to maintain and tolerated her longest stand for 3-4 min. Pt attempted shuffle/pivot on her LLE, but was having difficulty d/t weakness, fatigue and pain. Pt reports during her session that she is motivated to improve, wants to go to acute rehab ([facility]). RN and CCP made aware. PT will cont to follow and rec AIR at [place].      Anticipated Discharge Disposition (PT): inpatient rehabilitation facility

## 2024-07-25 NOTE — PROGRESS NOTES
"    Name: Lita Yu ADMIT: 2024   : 1965  PCP: Norma Saul APRN    MRN: [id number] LOS: 1 days   AGE/SEX: 58 y.o. female  ROOM: [facility]     Subjective   Subjective   No events.  Becomes tearful quite easily.  Reports that she is still in significant pain though she was sleeping pretty soundly when I came in.  Asks to be \"knocked out\"       Objective   Objective   Vital Signs  Temp:  [98.2 °F (36.8 °C)-99 °F (37.2 °C)] 98.4 °F (36.9 °C)  Heart Rate:  [85-99] 85  Resp:  [16-17] 16  BP: (110-120)/(69-73) 110/70  SpO2:  [95 %-99 %] 95 %  on   ;   Device (Oxygen Therapy): room air  Body mass index is 25.24 kg/m².  Physical Exam  Vitals reviewed.   Constitutional:       General: She is not in acute distress.     Comments: Drowsy but arousable   Eyes:      General: No scleral icterus.  Cardiovascular:      Rate and Rhythm: Normal rate and regular rhythm.   Pulmonary:      Effort: Pulmonary effort is normal. No respiratory distress.   Abdominal:      General: Bowel sounds are normal. There is no distension.      Palpations: Abdomen is soft.      Tenderness: There is no abdominal tenderness.   Musculoskeletal:      Right lower leg: No edema.      Left lower leg: No edema.   Skin:     General: Skin is warm and dry.      Coloration: Skin is pale.   Neurological:      Comments: No tremor, no focal deficit   Psychiatric:      Comments: Labile emotionally       Results Review     I reviewed the patient's new clinical results.  Results from last 7 days   Lab Units 24  0511 24  1203 24  0522 24  1958 24  0509 24  0719   WBC 10*3/mm3 5.29  --  4.58  --  6.02 3.73   HEMOGLOBIN g/dL 7.4* 7.2* 7.1* 7.1* 6.2* 8.1*   PLATELETS 10*3/mm3 89*  --  88*  --  85* 82*     Results from last 7 days   Lab Units 24  0511 24  0522 24  0509 24  0720   SODIUM mmol/L 138 137 135* 135*   POTASSIUM mmol/L 3.4* 3.8 4.7 3.3*   CHLORIDE mmol/L 106 106 106 106   CO2 " "mmol/L 24.0 22.0 22.0 21.0*   BUN mg/dL 8 11 11 10   CREATININE mg/dL 0.65 0.83 0.81 0.69   GLUCOSE mg/dL 84 89 112* 82   EGFR mL/min/1.73 103.5 82.9 85.3 102.0     Results from last 7 days   Lab Units 07/25/24  0511 07/24/24  0522 07/23/24  0509 07/22/24  0720   ALBUMIN g/dL 2.7* 2.6* 3.0* 3.2*   BILIRUBIN mg/dL 0.3 0.2 <0.2 0.2   ALK PHOS U/L 119* 117 120* 160*   AST (SGOT) U/L 29 41* 25 30   ALT (SGPT) U/L 17 18 16 18     Results from last 7 days   Lab Units 07/25/24  0511 07/24/24  0522 07/23/24  0509 07/22/24  0720 07/21/24  0519   CALCIUM mg/dL 8.3* 7.6* 7.4* 8.3* 8.4*   ALBUMIN g/dL 2.7* 2.6* 3.0* 3.2* 3.5   MAGNESIUM mg/dL  --   --   --   --  1.7     Results from last 7 days   Lab Units 07/20/24  1407   PROCALCITONIN ng/mL 0.03   LACTATE mmol/L 1.5     No results found for: \"HGBA1C\", \"POCGLU\"    No radiology results for the last day    I have personally reviewed all medications:  Scheduled Medications  busPIRone, 15 mg, Oral, BID  folic acid, 1 mg, Oral, Daily  multivitamin, 1 tablet, Oral, Daily  potassium chloride ER, 40 mEq, Oral, Q4H  QUEtiapine, 100 mg, Oral, Nightly  sertraline, 100 mg, Oral, BID  sodium chloride, 10 mL, Intravenous, Q12H  thiamine (B-1) IV, 200 mg, Intravenous, Q8H   Followed by  [START ON 7/26/2024] thiamine, 100 mg, Oral, Daily  vitamin B-12, 1,000 mcg, Oral, Daily    Infusions     Diet  Diet: Regular/House; Fluid Consistency: Thin (IDDSI 0)    I have personally reviewed:  [x]  Laboratory   [x]  Microbiology   [x]  Radiology   []  EKG/Telemetry  []  Cardiology/Vascular   []  Pathology    []  Records       Assessment/Plan     Active Hospital Problems    Diagnosis  POA    **Intractable low back pain [M54.59]  Yes    Metabolic acidosis [E87.20]  Yes    Thrombocytopenia [D69.6]  Yes    Alcohol use [Z78.9]  Yes    History of MRSA infection [Z86.14]  Yes    Right hip pain [M25.551]  Yes    S/P total right hip arthroplasty [Z96.641]  Not Applicable    Anemia [D64.9]  Yes    Spinal abscess " [M46.20]  Yes    Cirrhosis of liver [K74.60]  Yes    Chronic obstructive pulmonary disease [J44.9]  Yes    Essential hypertension [I10]  Yes    Seizure disorder [G40.909]  Yes    Post traumatic stress disorder [F43.10]  Yes    Peripheral vascular disease [I73.9]  Yes      Resolved Hospital Problems   No resolved problems to display.       58 y.o. female admitted with Intractable low back pain and right hip pain, found to have loose acetabular component and underwent right total hip revision 7/22    Long talk with patient about continued weakness and needs for rehab.  I think she will do very poorly if she tries to go home with just family assistance which is what she had initially wanted.  It appears that later PT saw her and patient has now agreed to go to acute rehab which I think is the best chance that a good outcome    Acute on chronic anemia, stable following transfusion.  Hemoglobin up slightly today but will continue to monitor  - She has thrombocytopenia which is probably due to cirrhosis and is stable    Alcohol abuse with withdrawal.  She is finished with the Ativan taper.  CIWA scores are low today, should not require additional Ativan at this time  - Continue thiamine  -  reports they are both going to stop drinking and are motivated to stay sober which I commended them on.    Replacing electrolytes per protocol    Reviewed other medical history and appears to be otherwise stable at this time.      SCDs were the only thing ordered for DVT prophylaxis currently.  Probably would benefit from aspirin, will discuss with orthopedics      Jluis Maguire MD  [facility]  07/25/24  10:10 EDT

## 2024-07-25 NOTE — NURSING NOTE
Access Center follow-up d/t ETOH.     Patient RIB with  at bedside. The patient gave permission for her  to remain present. The patient was sleeping upon entering room but woke up to name but quickly fell back asleep. The patient's  reported the patient had a poor sleep r/t physical pain. The patient's  reported the patient was having hallucinations seeing her ex- but those have now subsided. Last CIWA 0. Patient and her  plan on attending AA meetings and abstaining from ETOH together. Access following.

## 2024-07-25 NOTE — PLAN OF CARE
The pt participated in OT/PT this afternoon. She completed bed mobility with Min-Mod A. Min-Mod A x2 to stand 5x while maintaining her weight bearing precautions. She attempted several small shuffles to her left. Max A for LBD seated at the EOB. BUE exercises as tolerated. She was confused today but her mentation was much improved compared to the therapy session yesterday. She reports her motivation to improve and is interested in [facility].       Anticipated Discharge Disposition (OT): inpatient rehabilitation facility

## 2024-07-25 NOTE — PLAN OF CARE
Goal Outcome Evaluation:  Plan of Care Reviewed With: patient        Progress: improving  Outcome Evaluation: VSS. NVI. dressing CDI flashing green. pt able to stand x2 with PT today. BM today. pt open to rehab. MARIJA and mikal. PHIILP 1 this shift. pain managed with PO and IV meds. plan to /c when plan is made.

## 2024-07-26 ENCOUNTER — READMISSION MANAGEMENT (OUTPATIENT)
Dept: CALL CENTER | Facility: HOSPITAL | Age: 59
End: 2024-07-26
Payer: COMMERCIAL

## 2024-07-26 VITALS
RESPIRATION RATE: 16 BRPM | DIASTOLIC BLOOD PRESSURE: 63 MMHG | BODY MASS INDEX: 25.24 KG/M2 | HEART RATE: 65 BPM | WEIGHT: 142.44 LBS | HEIGHT: 63 IN | OXYGEN SATURATION: 96 % | SYSTOLIC BLOOD PRESSURE: 101 MMHG | TEMPERATURE: 97.1 F

## 2024-07-26 PROBLEM — F10.930 ALCOHOL WITHDRAWAL SYNDROME WITHOUT COMPLICATION: Status: RESOLVED | Noted: 2022-08-03 | Resolved: 2024-07-26

## 2024-07-26 PROBLEM — E87.20 METABOLIC ACIDOSIS: Status: RESOLVED | Noted: 2024-07-21 | Resolved: 2024-07-26

## 2024-07-26 PROBLEM — F10.930 ALCOHOL WITHDRAWAL SYNDROME WITHOUT COMPLICATION: Status: ACTIVE | Noted: 2022-08-03

## 2024-07-26 LAB
ALBUMIN SERPL-MCNC: 2.9 G/DL (ref 3.5–5.2)
ALBUMIN/GLOB SERPL: 1 G/DL
ALP SERPL-CCNC: 140 U/L (ref 39–117)
ALT SERPL W P-5'-P-CCNC: <5 U/L (ref 1–33)
ANION GAP SERPL CALCULATED.3IONS-SCNC: 7.7 MMOL/L (ref 5–15)
AST SERPL-CCNC: 30 U/L (ref 1–32)
BASOPHILS # BLD AUTO: 0.02 10*3/MM3 (ref 0–0.2)
BASOPHILS NFR BLD AUTO: 0.4 % (ref 0–1.5)
BILIRUB SERPL-MCNC: 0.4 MG/DL (ref 0–1.2)
BUN SERPL-MCNC: 7 MG/DL (ref 6–20)
BUN/CREAT SERPL: 9.2 (ref 7–25)
CALCIUM SPEC-SCNC: 8.5 MG/DL (ref 8.6–10.5)
CHLORIDE SERPL-SCNC: 103 MMOL/L (ref 98–107)
CO2 SERPL-SCNC: 26.3 MMOL/L (ref 22–29)
CREAT SERPL-MCNC: 0.76 MG/DL (ref 0.57–1)
DEPRECATED RDW RBC AUTO: 57.6 FL (ref 37–54)
EGFRCR SERPLBLD CKD-EPI 2021: 92.1 ML/MIN/1.73
EOSINOPHIL # BLD AUTO: 0.23 10*3/MM3 (ref 0–0.4)
EOSINOPHIL NFR BLD AUTO: 4.3 % (ref 0.3–6.2)
ERYTHROCYTE [DISTWIDTH] IN BLOOD BY AUTOMATED COUNT: 15.7 % (ref 12.3–15.4)
GLOBULIN UR ELPH-MCNC: 2.9 GM/DL
GLUCOSE SERPL-MCNC: 94 MG/DL (ref 65–99)
HCT VFR BLD AUTO: 24.2 % (ref 34–46.6)
HGB BLD-MCNC: 7.7 G/DL (ref 12–15.9)
IMM GRANULOCYTES # BLD AUTO: 0.11 10*3/MM3 (ref 0–0.05)
IMM GRANULOCYTES NFR BLD AUTO: 2.1 % (ref 0–0.5)
LYMPHOCYTES # BLD AUTO: 1.36 10*3/MM3 (ref 0.7–3.1)
LYMPHOCYTES NFR BLD AUTO: 25.4 % (ref 19.6–45.3)
MCH RBC QN AUTO: 32.4 PG (ref 26.6–33)
MCHC RBC AUTO-ENTMCNC: 31.8 G/DL (ref 31.5–35.7)
MCV RBC AUTO: 101.7 FL (ref 79–97)
MONOCYTES # BLD AUTO: 0.62 10*3/MM3 (ref 0.1–0.9)
MONOCYTES NFR BLD AUTO: 11.6 % (ref 5–12)
NEUTROPHILS NFR BLD AUTO: 3.01 10*3/MM3 (ref 1.7–7)
NEUTROPHILS NFR BLD AUTO: 56.2 % (ref 42.7–76)
NRBC BLD AUTO-RTO: 0 /100 WBC (ref 0–0.2)
PLATELET # BLD AUTO: 99 10*3/MM3 (ref 140–450)
PMV BLD AUTO: 9.6 FL (ref 6–12)
POTASSIUM SERPL-SCNC: 4.3 MMOL/L (ref 3.5–5.2)
PROT SERPL-MCNC: 5.8 G/DL (ref 6–8.5)
RBC # BLD AUTO: 2.38 10*6/MM3 (ref 3.77–5.28)
SODIUM SERPL-SCNC: 137 MMOL/L (ref 136–145)
WBC NRBC COR # BLD AUTO: 5.35 10*3/MM3 (ref 3.4–10.8)

## 2024-07-26 PROCEDURE — 97530 THERAPEUTIC ACTIVITIES: CPT

## 2024-07-26 PROCEDURE — 85025 COMPLETE CBC W/AUTO DIFF WBC: CPT | Performed by: ORTHOPAEDIC SURGERY

## 2024-07-26 PROCEDURE — 97110 THERAPEUTIC EXERCISES: CPT

## 2024-07-26 PROCEDURE — 25010000002 HYDROMORPHONE PER 4 MG: Performed by: ORTHOPAEDIC SURGERY

## 2024-07-26 PROCEDURE — 97535 SELF CARE MNGMENT TRAINING: CPT

## 2024-07-26 PROCEDURE — 80053 COMPREHEN METABOLIC PANEL: CPT | Performed by: ORTHOPAEDIC SURGERY

## 2024-07-26 PROCEDURE — 25010000002 HYDROMORPHONE 1 MG/ML SOLUTION: Performed by: ORTHOPAEDIC SURGERY

## 2024-07-26 RX ORDER — LANOLIN ALCOHOL/MO/W.PET/CERES
100 CREAM (GRAM) TOPICAL DAILY
Qty: 30 TABLET | Refills: 0 | Status: SHIPPED | OUTPATIENT
Start: 2024-07-26

## 2024-07-26 RX ORDER — TIZANIDINE 4 MG/1
4 TABLET ORAL EVERY 12 HOURS PRN
Qty: 14 TABLET | Refills: 0 | Status: SHIPPED | OUTPATIENT
Start: 2024-07-26

## 2024-07-26 RX ORDER — DIPHENOXYLATE HYDROCHLORIDE AND ATROPINE SULFATE 2.5; .025 MG/1; MG/1
1 TABLET ORAL DAILY
Start: 2024-07-27

## 2024-07-26 RX ORDER — HYDROCODONE BITARTRATE AND ACETAMINOPHEN 10; 325 MG/1; MG/1
1 TABLET ORAL EVERY 4 HOURS PRN
Qty: 18 TABLET | Refills: 0 | Status: SHIPPED | OUTPATIENT
Start: 2024-07-26 | End: 2024-07-29

## 2024-07-26 RX ADMIN — TIZANIDINE 4 MG: 4 TABLET ORAL at 08:07

## 2024-07-26 RX ADMIN — Medication 1000 MCG: at 10:25

## 2024-07-26 RX ADMIN — Medication 10 ML: at 08:07

## 2024-07-26 RX ADMIN — BUSPIRONE HYDROCHLORIDE 15 MG: 15 TABLET ORAL at 10:25

## 2024-07-26 RX ADMIN — SERTRALINE 100 MG: 100 TABLET, FILM COATED ORAL at 10:25

## 2024-07-26 RX ADMIN — HYDROCODONE BITARTRATE AND ACETAMINOPHEN 1 TABLET: 10; 325 TABLET ORAL at 05:34

## 2024-07-26 RX ADMIN — HYDROMORPHONE HYDROCHLORIDE 0.5 MG: 1 INJECTION, SOLUTION INTRAMUSCULAR; INTRAVENOUS; SUBCUTANEOUS at 15:56

## 2024-07-26 RX ADMIN — HYDROCODONE BITARTRATE AND ACETAMINOPHEN 1 TABLET: 10; 325 TABLET ORAL at 11:22

## 2024-07-26 RX ADMIN — HYDROMORPHONE HYDROCHLORIDE 0.5 MG: 1 INJECTION, SOLUTION INTRAMUSCULAR; INTRAVENOUS; SUBCUTANEOUS at 03:30

## 2024-07-26 RX ADMIN — FOLIC ACID 1 MG: 1 TABLET ORAL at 10:25

## 2024-07-26 RX ADMIN — Medication 100 MG: at 15:54

## 2024-07-26 RX ADMIN — HYDROCODONE BITARTRATE AND ACETAMINOPHEN 1 TABLET: 10; 325 TABLET ORAL at 18:30

## 2024-07-26 RX ADMIN — Medication 1 TABLET: at 10:25

## 2024-07-26 NOTE — THERAPY TREATMENT NOTE
Patient Name: Lita Yu  : 1965    MRN: [id number]                              Today's Date: 2024       Admit Date: 2024    Visit Dx:     ICD-10-CM ICD-9-CM   1. Intractable low back pain  M54.59 724.2   2. Right hip pain  M25.551 719.45   3. History of discitis  Z87.39 V13.4     Patient Active Problem List   Diagnosis    Postmenopausal state    Post traumatic stress disorder    Pain in joints    Osteoporosis    Macrocytic anemia    Lumbosacral radiculopathy    Leg pain, left    Peripheral vascular disease    Essential hypertension    Hyperlipidemia    Degenerative joint disease of left hip    Seizure disorder    Smoker    Status post hip replacement    Tobacco dependence syndrome    Vitamin B12 deficiency    Anemia of chronic disease    Chronic obstructive pulmonary disease    Cirrhosis of liver    Acute UTI (urinary tract infection)    Generalized weakness    Rhabdomyolysis    Non-traumatic rhabdomyolysis    Moderate malnutrition    Chest pain    Dehydration    Myalgia    Acute hyperkalemia    Spinal abscess    Intractable low back pain    History of MRSA infection    Right hip pain    S/P total right hip arthroplasty    Anemia    Thrombocytopenia    Alcohol use     Past Medical History:   Diagnosis Date    Cirrhosis     COPD (chronic obstructive pulmonary disease)     Depression     GERD (gastroesophageal reflux disease)     Hyperlipidemia     Hypertension     PTSD (post-traumatic stress disorder)      Past Surgical History:   Procedure Laterality Date     SECTION N/A     COLONOSCOPY N/A 2021    Procedure: COLONOSCOPY with polypectomy x2 and random biopsy;  Surgeon: Osman Clay MD;  Location: [facility] ENDOSCOPY;  Service: Gastroenterology;  Laterality: N/A;  colon polyps    DENTAL PROCEDURE      ENDOSCOPY N/A 2021    Procedure: ESOPHAGOGASTRODUODENOSCOPY;  Surgeon: Osman Clay MD;  Location: [facility] ENDOSCOPY;  Service: Gastroenterology;  Laterality: N/A;   esophagitis    JOINT REPLACEMENT      REPLACEMENT TOTAL HIP LATERAL POSITION Left     TONSILLECTOMY      TOTAL HIP ARTHROPLASTY Right 2024    Procedure: TOTAL HIP ARTHROPLASTY ANTERIOR;  Surgeon: Cristobal Chen II, MD;  Location: [facility] MAIN OR;  Service: Orthopedics;  Laterality: Right;    TOTAL HIP ARTHROPLASTY REVISION Right 2024    Procedure: TOTAL HIP ARTHROPLASTY REVISION;  Surgeon: Cristobal Chen II, MD;  Location:  [facility] [place];  Service: Orthopedics;  Laterality: Right;    VAGINAL BIRTH AFTER  SECTION        General Information       Row Name 24 1302          Physical Therapy Time and Intention    Document Type therapy note (daily note)  -MG     Mode of Treatment co-treatment;occupational therapy;physical therapy;other (see comments)  -MG       Row Name 24 1309          General Information    Patient Profile Reviewed yes  -MG     Existing Precautions/Restrictions fall;hip, anterior;right  CIWA precautions  -MG     Barriers to Rehab medically complex;previous functional deficit;cognitive status  -MG       Row Name 24 1301          Cognition    Orientation Status (Cognition) oriented to;person;place;situation;verbal cues/prompts needed for orientation  Improved alertness. Cont to be emotionally labile and intermittently confused.  -MG       Row Name 24 130          Safety Issues, Functional Mobility    Safety Issues Affecting Function (Mobility) ability to follow commands;awareness of need for assistance;insight into deficits/self-awareness;judgment;positioning of assistive device;safety precaution awareness;safety precautions follow-through/compliance;sequencing abilities;unable to maintain weight-bearing restrictions  -MG     Impairments Affecting Function (Mobility) balance;cognition;endurance/activity tolerance;coordination;postural/trunk control;range of motion (ROM);strength;pain  -MG     Cognitive Impairments, Mobility Safety/Performance  attention;awareness, need for assistance;insight into deficits/self-awareness;judgment;problem-solving/reasoning;safety precaution follow-through;sequencing abilities;safety precaution awareness  -MG     Comment, Safety Issues/Impairments (Mobility) Co treatment medically appropriate and necessary due to patient acuity level, activity tolerance and safety of patient and staff. Treatment is focusing on progression of care and goals established in the POC. Gait belt and non-skid socks donned.  -MG               User Key  (r) = Recorded By, (t) = Taken By, (c) = Cosigned By      Initials Name Provider Type    MG Tammy Johnson, PT Physical Therapist                   Mobility       Row Name 07/26/24 1310          Bed Mobility    Supine-Sit [place] (Bed Mobility) minimum assist (75% patient effort);moderate assist (50% patient effort);1 person assist;verbal cues  -MG     Assistive Device (Bed Mobility) bed rails;head of bed elevated  -MG     Comment, (Bed Mobility) Increased time to complete and primary assist at RLE to maintain her WB precaution.  -MG       Row Name 07/26/24 1310          Bed-Chair Transfer    Bed-Chair [place] (Transfers) moderate assist (50% patient effort);2 person assist;verbal cues  -MG     Assistive Device (Bed-Chair Transfers) walker, front-wheeled  -MG     Comment, (Bed-Chair Transfer) Stand/hop pivot to L. Increased time to complete. Cues for sequencing, posture and WB status. Assist given from OT at gait belt and this PT w/ RW management and maintaining WB precaution.  -MG       Row Name 07/26/24 1310          Sit-Stand Transfer    Sit-Stand [place] (Transfers) minimum assist (75% patient effort);moderate assist (50% patient effort);2 person assist;verbal cues;nonverbal cues (demo/gesture)  -MG     Assistive Device (Sit-Stand Transfers) walker, front-wheeled  -MG     Comment, (Sit-Stand Transfer) x1 EOB, x1 chair. Cues for seuqencing, posture, hand placement and WB status. This  PTs foot under pts heel to assess and maintain WB. Pt was able to maintain WB status on second stand, but difficutly on the first.  -MG       Row Name 07/26/24 1310          Mobility    Extremity Weight-bearing Status right lower extremity  -MG     Right Lower Extremity (Weight-bearing Status) toe touch weight-bearing (TTWB)  -MG               User Key  (r) = Recorded By, (t) = Taken By, (c) = Cosigned By      Initials Name Provider Type    MG Tammy Johnson, PT Physical Therapist                   Obj/Interventions       Row Name 07/26/24 1312          Motor Skills    Therapeutic Exercise other (see comments)  Supine AP, GS, HS and Habd/add w/ this PT hand under pts R heel to avoid WB into chair; x3. Edu to perform w/ SO assist for 10 reps TID.  -MG       Row Name 07/26/24 1312          Balance    Balance Assessment standing dynamic balance  -MG     Static Sitting Balance contact guard;standby assist;verbal cues  -MG     Dynamic Sitting Balance contact guard;minimal assist;verbal cues  -MG     Position, Sitting Balance sitting edge of bed;sitting in chair  -MG     Static Standing Balance minimal assist;moderate assist;2-person assist;verbal cues;non-verbal cues (demo/gesture)  -MG     Dynamic Standing Balance moderate assist;2-person assist;verbal cues;non-verbal cues (demo/gesture)  -MG     Position/Device Used, Standing Balance supported;walker, front-wheeled  -MG     Comment, Balance Static standing w/ this PT while OT assisting w/ brief change and pericare (see their note). Tolerated standing longest for 3 min. Cued x2 during stand to maintain WB status.  -MG               User Key  (r) = Recorded By, (t) = Taken By, (c) = Cosigned By      Initials Name Provider Type    MG Tammy Johnson, PT Physical Therapist                   Goals/Plan    No documentation.                  Clinical Impression       Row Name 07/26/24 1314          Pain    Pretreatment Pain Rating 4/10  -MG     Posttreatment Pain Rating 6/10   -MG     Pain Location - Side/Orientation Right  -MG     Pain Location incisional  -MG     Pain Location - hip  -MG     Pain Intervention(s) Medication (See MAR);Ambulation/increased activity;Repositioned;Rest  -MG       Row Name 07/26/24 1314          Pain Scale: FACES Pre/Post-Treatment    Pain: FACES Scale, Pretreatment 0-->no hurt  -MG     Posttreatment Pain Rating 4-->hurts little more  -MG     Pain Location - Side/Orientation Right  -MG     Pain Location incisional  -MG     Pain Location - hip  -MG       Row Name 07/26/24 1314          Plan of Care Review    Plan of Care Reviewed With patient;significant other  -MG     Progress improving  -MG     Outcome Evaluation Pt seen for PT/OT tx this AM and tolerated the session fairly. Beginning of session pt was only able to recall her WB status and to not cross her LEs; re-educated on precautions. Today, pt was Min/ModA x1 for bed mobility w/ primary assist at her R to maintain WB status, Min/ModA x2 for two STS to RW and ModA x2 for stand pivot/hop to the L w/ RW to the bedside chair. Pt required assist and multiple repeated cues to maintain her WB status throughout the session, but was able to maintain for brief periods of time during tsfs. Pt stood with balance and WB assist from this PT while OT assisted w/ brief change and pericare, roughly 3 min stand w/ Min/ModA for balance. Once in recliner pt performed LE ther-ex for 3 reps, demoing how SO to assist to maintain her WB status. Pt continues to be emotionally labile throughout the session and had poor insight into her condition and mobility if DC home; thinking she would be able to walk. Extensive time spent w/ pt and her SO on mobilization at home via stand pivot tsfs w/ RW only to bed, WC or BSC, need to maintain her WB status and precautions, how SO will need to assist w/ all aspects of care and need to cont being mobile, not sedentary in her bed. Pt began crying and made a gun gesture towards her head; [facility]  made aware as pt has expressed multiple times how she is depressed. Pt then stating that she wants to go to a rehab at [place] and not return home; discussed again w/ RN and CCP as pt and her SO cont to report the opposite to CCP. This PT and OT discussed w/ CCP. If pt is to DC home she will need HH PT/OT, WC w/ elevating leg rests and BSC. Cont to rec rehab at [place] as pt would be unsafe to return home and likely high risk for noncompliance.  -MG       Row Name 07/26/24 1314          Therapy Assessment/Plan (PT)    Rehab Potential (PT) fair, will monitor progress closely  -MG     Criteria for Skilled Interventions Met (PT) yes;meets criteria  -MG     Therapy Frequency (PT) 6 times/wk  -MG       Row Name 07/26/24 1314          Vital Signs    O2 Delivery Pre Treatment room air  -MG     O2 Delivery Intra Treatment room air  -MG     O2 Delivery Post Treatment room air  -MG     Pre Patient Position Supine  -MG     Intra Patient Position Standing  -MG     Post Patient Position Sitting  -MG       Row Name 07/26/24 1314          Positioning and Restraints    Pre-Treatment Position in bed  -MG     Post Treatment Position chair  -MG     In Chair notified nsg;reclined;call light within reach;encouraged to call for assist;exit alarm on;with family/caregiver;legs elevated;RLE elevated  -MG               User Key  (r) = Recorded By, (t) = Taken By, (c) = Cosigned By      Initials Name Provider Type    MG Tammy Johnson, PT Physical Therapist                   Outcome Measures       Row Name 07/26/24 1330 07/26/24 0809       How much help from another person do you currently need...    Turning from your back to your side while in flat bed without using bedrails? 3  -MG 3  -JM    Moving from lying on back to sitting on the side of a flat bed without bedrails? 2  -MG 2  -JM    Moving to and from a bed to a chair (including a wheelchair)? 2  -MG 2  -JM    Standing up from a chair using your arms (e.g., wheelchair, bedside chair)? 2  -MG 2   -JM    Climbing 3-5 steps with a railing? 1  -MG 1  -JM    To walk in hospital room? 1  -MG 1  -JM    AM-PAC 6 Clicks Score (PT) 11  -MG 11  -JM    Highest Level of Mobility Goal 4 --> Transfer to chair/commode  -MG 4 --> Transfer to chair/commode  -JM              User Key  (r) = Recorded By, (t) = Taken By, (c) = Cosigned By      Initials Name Provider Type    MG Tammy Johnson, PT Physical Therapist    Marley Blanc, RN Registered Nurse                                 Physical Therapy Education       Title: PT OT SLP Therapies (In Progress)       Topic: Physical Therapy (Done)       Point: Mobility training (Done)       Learning Progress Summary             Patient Acceptance, TB,E,D, VU,NR by  at 7/26/2024 1330    Acceptance, E,TB,D, VU,NR by  at 7/25/2024 1601    Acceptance, E,TB,D, NR,NL by MG at 7/24/2024 1539                         Point: Home exercise program (Done)       Learning Progress Summary             Patient Acceptance, TB,E,D, VU,NR by  at 7/26/2024 1330    Acceptance, E,TB,D, VU,NR by MG at 7/25/2024 1601    Acceptance, E,TB,D, NR,NL by  at 7/24/2024 1539                         Point: Body mechanics (Done)       Learning Progress Summary             Patient Acceptance, TB,E,D, VU,NR by MG at 7/26/2024 1330    Acceptance, E,TB,D, VU,NR by MG at 7/25/2024 1601    Acceptance, E,TB,D, NR,NL by MG at 7/24/2024 1539                         Point: Precautions (Done)       Learning Progress Summary             Patient Acceptance, TB,E,D, VU,NR by  at 7/26/2024 1330    Acceptance, E,TB,D, VU,NR by  at 7/25/2024 1601    Acceptance, E,TB,D, NR,NL by  at 7/24/2024 1539                                         User Key       Initials Effective Dates Name Provider Type Discipline     05/24/22 -  Tammy Johnson, PT Physical Therapist PT                  PT Recommendation and Plan  Planned Therapy Interventions (PT): balance training, bed mobility training, gait training, home exercise  program, patient/family education, stretching, strengthening, neuromuscular re-education, ROM (range of motion), motor coordination training, postural re-education, transfer training  Plan of Care Reviewed With: patient, significant other  Progress: improving  Outcome Evaluation: Pt seen for PT/OT tx this AM and tolerated the session fairly. Beginning of session pt was only able to recall her WB status and to not cross her LEs; re-educated on precautions. Today, pt was Min/ModA x1 for bed mobility w/ primary assist at her R to maintain WB status, Min/ModA x2 for two STS to RW and ModA x2 for stand pivot/hop to the L w/ RW to the bedside chair. Pt required assist and multiple repeated cues to maintain her WB status throughout the session, but was able to maintain for brief periods of time during tsfs. Pt stood with balance and WB assist from this PT while OT assisted w/ brief change and pericare, roughly 3 min stand w/ Min/ModA for balance. Once in recliner pt performed LE ther-ex for 3 reps, demoing how SO to assist to maintain her WB status. Pt continues to be emotionally labile throughout the session and had poor insight into her condition and mobility if DC home; thinking she would be able to walk. Extensive time spent w/ pt and her SO on mobilization at home via stand pivot tsfs w/ RW only to bed, WC or BSC, need to maintain her WB status and precautions, how SO will need to assist w/ all aspects of care and need to cont being mobile, not sedentary in her bed. Pt began crying and made a gun gesture towards her head; RN made aware as pt has expressed multiple times how she is depressed. Pt then stating that she wants to go to a rehab at [place] and not return home; discussed again w/ RN and CCP as pt and her SO cont to report the opposite to CCP. This PT and OT discussed w/ CCP. If pt is to DC home she will need HH PT/OT, WC w/ elevating leg rests and BSC. Cont to rec rehab at [place] as pt would be unsafe to return home  and likely high risk for noncompliance.     Time Calculation:         PT Charges       Row Name 07/26/24 1330             Time Calculation    Start Time 1122  -MG      Stop Time 1153  -MG      Time Calculation (min) 31 min  -MG      PT Received On 07/26/24  -MG      PT - Next Appointment 07/27/24  -MG                User Key  (r) = Recorded By, (t) = Taken By, (c) = Cosigned By      Initials Name Provider Type    MG Tammy Johnson, PT Physical Therapist                  Therapy Charges for Today       Code Description Service Date Service Provider Modifiers Qty    81590793593  PT THERAPEUTIC ACT EA 15 MIN 7/25/2024 Tammy Johnson, PT GP 2    74094177698 HC PT THERAPEUTIC ACT EA 15 MIN 7/26/2024 Tammy Johnson, PT GP 1    [id number]  PT THER PROC EA 15 MIN 7/26/2024 Tammy Johnson, PT GP 1            PT G-Codes  Outcome Measure Options: AM-PAC 6 Clicks Daily Activity (OT), Modified [place]  AM-PAC 6 Clicks Score (PT): 11  AM-PAC 6 Clicks Score (OT): 12  Modified [place] Scale: 4 - Moderately severe disability.  Unable to walk without assistance, and unable to attend to own bodily needs without assistance.  PT Discharge Summary  Anticipated Discharge Disposition (PT): inpatient rehabilitation facility, skilled nursing facility    Tammy Johnson PT  7/26/2024

## 2024-07-26 NOTE — PROGRESS NOTES
[facility] follow up d/t EtOH; this writer reviewed chart and spoke with RN Mary Grace. Per RN, patient slept most of the night, significant other at bedside; no hallucinations noted overnight.  Last CIWA was 0 at 20:02; patient and significant other plan to abstain from alcohol and attend AA meetings together.  Discharge plan likely inpatient (physical) rehab (referral pending); CCP involved and providing assistance. No further needs/concerns noted at this time per RN and/or medical team; [facility] [place] to continue following.

## 2024-07-26 NOTE — PLAN OF CARE
The pt participated in OT/PT this morning. Min-Mod A x2 to transfer to the EOB. Min-Mod A x2 to SPS over to her bedside chair while maintaining her NWB status. Total A LBD. Detailed time provided to both the pt and her s/o on OT/PT recs on D/C to a rehab facility for further care. The pt's spouse consistently making comments about a D/C home and the pt requesting to go to a rehab facility. The pt reported depression (see PT POC for more details of the pt's emotional status). IRF vs. SNF recommended as the pt has poor memory, insight into her deficits and is at risk of injury to her RLE if she returns home.     Anticipated Discharge Disposition (OT): inpatient rehabilitation facility

## 2024-07-26 NOTE — PROGRESS NOTES
Continued Stay Note  [facility]     Patient Name: Lita Yu  MRN: [id number]  Today's Date: 7/26/2024    Admit Date: 7/20/2024    Plan: [facility] HH and family support   Discharge Plan       Row Name 07/26/24 1542       Plan    Plan [facility] HH and family support    Patient/Family in Agreement with Plan yes    Plan Comments Pt states she wanted to d/c home. PT/OT saw patient then patient agreeable to d/c to SNF, referrals sent to [facility], [facility], [facility], and [facility]F. Per [facility] rep, pt will have a $275/co pay per episode and a 35% per day payment. Informed patient who states she can not afford and plans to d/c home with family support and [facility] HH. Wheelchair ordered per MD and referral sent to [facility]. [facility] plans to deliver WC to bedside.                   Discharge Codes    No documentation.                 Expected Discharge Date and Time       Expected Discharge Date Expected Discharge Time    Jul 26, 2024               Sandi Murrieta RN

## 2024-07-26 NOTE — PLAN OF CARE
Goal Outcome Evaluation:  Plan of Care Reviewed With: patient        Progress: no change     A&O. VSS. DONALDO CDI and flashing green. CIWA score 0. Voiding per [place]wick. PRN pain medication given.

## 2024-07-26 NOTE — DISCHARGE SUMMARY
Patient Name: Lita Yu  : 1965  MRN: [id number]    Date of Admission: 2024  Date of Discharge:  2024  Primary Care Physician: Norma Saul APRN      Chief Complaint:   Hip Pain      Discharge Diagnoses     Active Hospital Problems    Diagnosis  POA    **Intractable low back pain [M54.59]  Yes    Thrombocytopenia [D69.6]  Yes    Alcohol use [Z78.9]  Yes    History of MRSA infection [Z86.14]  Yes    Right hip pain [M25.551]  Yes    S/P total right hip arthroplasty [Z96.641]  Not Applicable    Anemia [D64.9]  Yes    Spinal abscess [M46.20]  Yes    Cirrhosis of liver [K74.60]  Yes    Chronic obstructive pulmonary disease [J44.9]  Yes    Essential hypertension [I10]  Yes    Seizure disorder [G40.909]  Yes    Post traumatic stress disorder [F43.10]  Yes    Peripheral vascular disease [I73.9]  Yes      Resolved Hospital Problems    Diagnosis Date Resolved POA    Metabolic acidosis [E87.20] 2024 Yes    Alcohol withdrawal syndrome without complication [F10.930] 2024 No        Hospital Course     Ms. Yu is a 58 y.o. female with a history of chronic pain, posttraumatic stress disorder, COPD hypertension dyslipidemia and cirrhosis of liver who was recently treated with IV antibiotic therapy for MRSA spine infection which she finished and 2024. Presented with worsening right hip and lower back pain along with concerns for ETOH withdrawal. She was seen by both Neurosurgery and Orthopedic surgery. Neurosurgery said no acute intervention. Ortho took her to the OR for Right Total Hip Revision with prophylactic fixation of the femur on 24. They are concerned about her complying with weight bearing restrictions and recommended rehab, as did PT. Patient was initially agreeable to acute rehab but did not qualify per the rehab facility. Referrals were sent to SNFs but she could not afford the copay required. She is unfortunately going to have to go home with  and  family despite the high risk involved.  Otherwise, she was treated for ETOH withdrawal and has completed lorazepam taper and has not required any prn dosing for a few days now. She also had some acute blood loss anemia and was transfused and hgb has demonstrated an upward trend the last few days. [facility] assisted with getting her equipment for discharge and home health.      Day of Discharge     Subjective:  No events, still some pain ctrl issues    Physical Exam:  Temp:  [97.1 °F (36.2 °C)-98.1 °F (36.7 °C)] 97.1 °F (36.2 °C)  Heart Rate:  [65-86] 65  Resp:  [16] 16  BP: (101-128)/(63-78) 101/63  Body mass index is 25.24 kg/m².  Physical Exam  Vitals reviewed.   Constitutional:       General: She is not in acute distress.     Comments: Alert, less tearful   Eyes:      General: No scleral icterus.  Cardiovascular:      Rate and Rhythm: Normal rate and regular rhythm.   Pulmonary:      Effort: Pulmonary effort is normal. No respiratory distress.   Abdominal:      General: Bowel sounds are normal. There is no distension.      Palpations: Abdomen is soft.      Tenderness: There is no abdominal tenderness.   Musculoskeletal:      Right lower leg: No edema.      Left lower leg: No edema.   Skin:     General: Skin is warm and dry.      Coloration: Skin is pale.   Neurological:      Comments: No tremor, no focal deficit   Psychiatric:      Comments: Labile emotionally         Consultants     Consult Orders (all) (From admission, onward)       Start     Ordered    07/21/24 0928  Inpatient Access Center Consult  Once        Provider:  (Not yet assigned)    07/21/24 0927    07/20/24 1744  Inpatient Orthopedic Surgery Consult  Once        Specialty:  Orthopedic Surgery  Provider:  Cristobal Chen II, MD    07/20/24 1743    07/20/24 1744  Inpatient Neurosurgery Consult  Once        Specialty:  Neurosurgery  Provider:  Joseph Abel MD    07/20/24 1743    07/20/24 1624  [facility] (on-call MD unless specified) Details  Once         Specialty:  Hospitalist  Provider:  (Not yet assigned)    07/20/24 1624                  Procedures     TOTAL HIP ARTHROPLASTY REVISION    Imaging Results (All)       Procedure Component Value Units Date/Time    XR Hip With or Without Pelvis 1 View Right [[id number]] Collected: 07/22/24 1756     Updated: 07/22/24 1800    Narrative:      XR HIP W OR WO PELVIS 1 VIEW RIGHT-     INDICATION: Postop     COMPARISON: 7/21/2024       Impression:      Revised right total hip arthroplasty with proximal femoral  cerclage wire. Hardware is well seated without evidence of acute  periprosthetic fracture. Contralateral left total hip arthroplasty.     This report was finalized on 7/22/2024 5:57 PM by Dr. Jluis Barron M.D on Workstation: [id number]       XR Hip With or Without Pelvis 2 - 3 View Right [[id number]] Collected: 07/21/24 0743     Updated: 07/21/24 0753    Narrative:      2 VIEW RIGHT HIP AND 1 VIEW AP PELVIS     HISTORY: Right hip pain.     FINDINGS: There are bilateral total hip replacements and the left hip  replacement is unremarkable. On the right, there appears to be a  significant change since the postoperative images of the right hip dated  5/4/2024. There is now considerable loss of the bony acetabulum with  protrusion of the femoral head superiorly and anteriorly as seen on the  CT scan performed yesterday. There is increased density interposed  between the femoral head and neck extending to the calcar that relates  to the acetabular cement at the posterior margin of the acetabulum again  best seen on the CT scan. 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     This report was finalized on 7/21/2024 7:50 AM by Dr. Ryan Glass M.D  on Workstation: [id number]       CT Lumbar Spine Without Contrast [[id number]] Collected: 07/20/24 1552     Updated: 07/20/24 1608    Narrative:       CT PELVIS WO CONTRAST-, CT LUMBAR SPINE WO CONTRAST-     CLINICAL HISTORY: Low back pain with radiation to the right leg. Right  hip and sacral pain. Post right total hip arthroplasty May 2024     TECHNIQUE: CT scan of the lumbar spine and pelvis was obtained with a  combination of 3 , 2, and 1 mm axial images. Bone and soft tissue  algorithm images were obtained with sagittal and coronal reconstructed  images.     FINDINGS:     Lumbar spine: No subluxation. Unchanged bilateral L5 pars defects.  Marked L2 inferior endplate and L3 superior endplate cortical  irregularity/osteolysis with subjacent vertebral body sclerotic changes  and obliteration of the L2-L3 disc space. Discitis/osteomyelitis was  seen on May MRI. L2-L3 disc space loss is no worse. Paraspinal  inflammatory changes have significantly decreased and nearly resolved.  L4 superior endplate Schmorl's node. Mild multilevel disc degeneration  throughout the remaining lumbar spine. Mild L5-S1 facet arthropathy. No  appreciable spinal canal stenosis. Mild right L5-S1 neuroforaminal  stenosis secondary to disc and facet joint degeneration.     Pelvis: Bilateral total hip arthroplasties. Right arthroplasty has a  cemented acetabular component. No evidence of fracture/periprosthetic  fracture. Right acetabular component is positioned at the anterosuperior  aspect of the native right acetabulum (series 2/image 66). Limited  normal noncontrast CT appearance of the pelvic soft tissues. Exam is  partially by arthroplasty streak artifact.                      Impression:      1. Sequela of discitis/osteomyelitis at L2-L3 with obliteration of the  L2-L3 disc space and marked associated endplate irregularity/osteolysis.  L2-L3 disc space loss is no worse than may MRI. Paraspinal inflammatory  changes have significantly decreased/nearly resolved.  2. Mild right L5-S1 neuroforaminal stenosis secondary to disc and facet  joint degeneration.  3. Bilateral total hip  arthroplasties. Right arthroplasty has a cemented  acetabular component. No evidence of fracture/periprosthetic fracture.  Right acetabular component is positioned at the anterior superior aspect  of the native right acetabulum. Recommend correlation with prior  surgery.           Radiation dose reduction techniques were utilized, including automated  exposure control and exposure modulation based on body size.     This report was finalized on 7/20/2024 4:05 PM by Dr. Jluis Barron M.D on Workstation: [id number]       CT Pelvis Without Contrast [[id number]] Collected: 07/20/24 1552     Updated: 07/20/24 1608    Narrative:      CT PELVIS WO CONTRAST-, CT LUMBAR SPINE WO CONTRAST-     CLINICAL HISTORY: Low back pain with radiation to the right leg. Right  hip and sacral pain. Post right total hip arthroplasty May 2024     TECHNIQUE: CT scan of the lumbar spine and pelvis was obtained with a  combination of 3 , 2, and 1 mm axial images. Bone and soft tissue  algorithm images were obtained with sagittal and coronal reconstructed  images.     FINDINGS:     Lumbar spine: No subluxation. Unchanged bilateral L5 pars defects.  Marked L2 inferior endplate and L3 superior endplate cortical  irregularity/osteolysis with subjacent vertebral body sclerotic changes  and obliteration of the L2-L3 disc space. Discitis/osteomyelitis was  seen on May MRI. L2-L3 disc space loss is no worse. Paraspinal  inflammatory changes have significantly decreased and nearly resolved.  L4 superior endplate Schmorl's node. Mild multilevel disc degeneration  throughout the remaining lumbar spine. Mild L5-S1 facet arthropathy. No  appreciable spinal canal stenosis. Mild right L5-S1 neuroforaminal  stenosis secondary to disc and facet joint degeneration.     Pelvis: Bilateral total hip arthroplasties. Right arthroplasty has a  cemented acetabular component. No evidence of fracture/periprosthetic  fracture. Right acetabular component is positioned at  the anterosuperior  aspect of the native right acetabulum (series 2/image 66). Limited  normal noncontrast CT appearance of the pelvic soft tissues. Exam is  partially by arthroplasty streak artifact.                      Impression:      1. Sequela of discitis/osteomyelitis at L2-L3 with obliteration of the  L2-L3 disc space and marked associated endplate irregularity/osteolysis.  L2-L3 disc space loss is no worse than may MRI. Paraspinal inflammatory  changes have significantly decreased/nearly resolved.  2. Mild right L5-S1 neuroforaminal stenosis secondary to disc and facet  joint degeneration.  3. Bilateral total hip arthroplasties. Right arthroplasty has a cemented  acetabular component. No evidence of fracture/periprosthetic fracture.  Right acetabular component is positioned at the anterior superior aspect  of the native right acetabulum. Recommend correlation with prior  surgery.           Radiation dose reduction techniques were utilized, including automated  exposure control and exposure modulation based on body size.     This report was finalized on 7/20/2024 4:05 PM by Dr. Jluis Barron M.D on Workstation: [id number]             Results for orders placed during the hospital encounter of 03/02/24    Duplex Venous Lower Extremity - Bilateral CAR    Interpretation Summary    Normal bilateral lower extremity venous duplex scan.      Pertinent Labs     Results from last 7 days   Lab Units 07/26/24  1033 07/25/24  0511 07/24/24  1203 07/24/24  0522 07/23/24  1958 07/23/24  0509   WBC 10*3/mm3 5.35 5.29  --  4.58  --  6.02   HEMOGLOBIN g/dL 7.7* 7.4* 7.2* 7.1*   < > 6.2*   PLATELETS 10*3/mm3 99* 89*  --  88*  --  85*    < > = values in this interval not displayed.     Results from last 7 days   Lab Units 07/26/24  1033 07/25/24  1541 07/25/24  0511 07/24/24  0522 07/23/24  0509   SODIUM mmol/L 137  --  138 137 135*   POTASSIUM mmol/L 4.3 4.4 3.4* 3.8 4.7   CHLORIDE mmol/L 103  --  106 106 106   CO2 mmol/L  "26.3  --  24.0 22.0 22.0   BUN mg/dL 7  --  8 11 11   CREATININE mg/dL 0.76  --  0.65 0.83 0.81   GLUCOSE mg/dL 94  --  84 89 112*   EGFR mL/min/1.73 92.1  --  103.5 82.9 85.3     Results from last 7 days   Lab Units 07/26/24  1033 07/25/24  0511 07/24/24  0522 07/23/24  0509   ALBUMIN g/dL 2.9* 2.7* 2.6* 3.0*   BILIRUBIN mg/dL 0.4 0.3 0.2 <0.2   ALK PHOS U/L 140* 119* 117 120*   AST (SGOT) U/L 30 29 41* 25   ALT (SGPT) U/L <5 17 18 16     Results from last 7 days   Lab Units 07/26/24  1033 07/25/24  0511 07/24/24  0522 07/23/24  0509 07/22/24  0720 07/21/24  0519   CALCIUM mg/dL 8.5* 8.3* 7.6* 7.4*   < > 8.4*   ALBUMIN g/dL 2.9* 2.7* 2.6* 3.0*   < > 3.5   MAGNESIUM mg/dL  --   --   --   --   --  1.7    < > = values in this interval not displayed.               Invalid input(s): \"LDLCALC\"          Test Results Pending at Discharge       Discharge Details        Discharge Medications        New Medications        Instructions Start Date   HYDROcodone-acetaminophen  MG per tablet  Commonly known as: NORCO  Replaces: HYDROcodone-acetaminophen 5-325 MG per tablet  Notes to patient: 7/26/24 NEXT DOSE ANYTIME AFTER    1 tablet, Oral, Every 4 Hours PRN      multivitamin tablet tablet  Notes to patient: 7/27/24   1 tablet, Oral, Daily   Start Date: July 27, 2024     naloxone 4 MG/0.1ML nasal spray  Commonly known as: NARCAN   Call 911. Don't prime. [place] in 1 nostril for overdose. Repeat in 2-3 minutes in other nostril if no or minimal breathing/responsiveness.      tiZANidine 4 MG tablet  Commonly known as: ZANAFLEX  Notes to patient: 7/26/24 PM   4 mg, Oral, Every 12 Hours PRN             Continue These Medications        Instructions Start Date   albuterol sulfate  (90 Base) MCG/ACT inhaler  Commonly known as: PROVENTIL HFA;VENTOLIN HFA;PROAIR HFA   2 puffs, Inhalation, Every 4 Hours PRN      busPIRone 15 MG tablet  Commonly known as: BUSPAR  Notes to patient: 7/26/24 PM   TAKE 1 TABLET BY MOUTH TWICE A " DAY      folic acid 1 MG tablet  Commonly known as: FOLVITE  Notes to patient: 7/27/24   1 mg, Oral, Daily      melatonin 5 MG tablet tablet   5 mg, Oral, Nightly PRN      sertraline 100 MG tablet  Commonly known as: ZOLOFT  Notes to patient: 7/26/24 PM   TAKE 1 TABLET BY MOUTH TWICE A DAY      thiamine 100 MG tablet  Commonly known as: VITAMIN B1  Notes to patient: 7/27/24   100 mg, Oral, Daily      Vitamin B-12 1000 MCG sublingual tablet  Notes to patient: 7/27/24   1 tablet, Sublingual, Daily             Stop These Medications      HYDROcodone-acetaminophen 5-325 MG per tablet  Commonly known as: NORCO  Replaced by: HYDROcodone-acetaminophen  MG per tablet     LORazepam 1 MG tablet  Commonly known as: Ativan     QUEtiapine 50 MG tablet  Commonly known as: SEROquel              Allergies   Allergen Reactions    Sulfa Antibiotics Hives    Keflex [Cephalexin] Hives       Discharge Disposition:  Home-Health Care Svc      Discharge Diet:  Diet Order   Procedures    Diet: Regular/House; Fluid Consistency: Thin (IDDSI 0)       Discharge Activity:       CODE STATUS:    Code Status and Medical Interventions:   Ordered at: 07/20/24 1727     Code Status (Patient has no pulse and is not breathing):    CPR (Attempt to Resuscitate)     Medical Interventions (Patient has pulse or is breathing):    Full Support       Future Appointments   Date Time Provider Department Center   10/3/2024  1:45 PM Norma Saul APRN [facility]  [facility]     Additional Instructions for the Follow-ups that You Need to Schedule       Discharge Follow-up with PCP   As directed       Currently Documented PCP:    Norma Saul APRN    PCP Phone Number:    [phone]     Follow Up Details: 1 week        Discharge Follow-up with Specified Provider: Dr Chen per his recs   As directed      To: Dr Chen per his recs               Contact information for follow-up providers       Norma Saul APRN .    Specialties: Nurse Practitioner,  Family Medicine  Why: 1 week  Contact information:  [address]  [address]  [place] [address]  [phone]               Cristobal Chen II, MD Follow up in 2 week(s).    Specialty: Orthopedic Surgery  Contact information:  [address]  [address]  [facility] [address]  [phone]                       Contact information for after-discharge care       Home Medical Care       [facility] .    Services: Home Rehabilitation, Home Nursing  Contact information:  [address], [address]  [facility] [address] [phone]                                   Additional Instructions for the Follow-ups that You Need to Schedule       Discharge Follow-up with PCP   As directed       Currently Documented PCP:    Norma Saul APRN    PCP Phone Number:    [phone]     Follow Up Details: 1 week        Discharge Follow-up with Specified Provider: Dr Chen per his recs   As directed      To: Dr Chen per his recs            Time Spent on Discharge:  Greater than 30 minutes      Jluis Maguire MD  [facility]  07/26/24  18:08 EDT

## 2024-07-26 NOTE — OUTREACH NOTE
Prep Survey      Flowsheet Row Responses   [facility] patient discharged from? [place]   Is LACE score < 7 ? No   Eligibility [facility]   Date of Admission 07/20/24   Date of Discharge 07/26/24   Discharge diagnosis TOTAL HIP ARTHROPLASTY REVISION   Does the patient have one of the following disease processes/diagnoses(primary or secondary)? Total Joint Replacement   Does the patient have Home health ordered? Yes   What is the Home health agency?  [facility]    Prep survey completed? Yes            Sommer CONDE - Registered Nurse

## 2024-07-26 NOTE — THERAPY TREATMENT NOTE
Patient Name: Lita Yu  : 1965    MRN: [id number]                              Today's Date: 2024       Admit Date: 2024    Visit Dx:     ICD-10-CM ICD-9-CM   1. Intractable low back pain  M54.59 724.2   2. Right hip pain  M25.551 719.45   3. History of discitis  Z87.39 V13.4     Patient Active Problem List   Diagnosis    Postmenopausal state    Post traumatic stress disorder    Pain in joints    Osteoporosis    Macrocytic anemia    Lumbosacral radiculopathy    Leg pain, left    Peripheral vascular disease    Essential hypertension    Hyperlipidemia    Degenerative joint disease of left hip    Seizure disorder    Smoker    Status post hip replacement    Tobacco dependence syndrome    Vitamin B12 deficiency    Anemia of chronic disease    Chronic obstructive pulmonary disease    Cirrhosis of liver    Acute UTI (urinary tract infection)    Generalized weakness    Rhabdomyolysis    Non-traumatic rhabdomyolysis    Moderate malnutrition    Chest pain    Dehydration    Myalgia    Acute hyperkalemia    Spinal abscess    Intractable low back pain    History of MRSA infection    Right hip pain    S/P total right hip arthroplasty    Anemia    Thrombocytopenia    Alcohol use     Past Medical History:   Diagnosis Date    Cirrhosis     COPD (chronic obstructive pulmonary disease)     Depression     GERD (gastroesophageal reflux disease)     Hyperlipidemia     Hypertension     PTSD (post-traumatic stress disorder)      Past Surgical History:   Procedure Laterality Date     SECTION N/A     COLONOSCOPY N/A 2021    Procedure: COLONOSCOPY with polypectomy x2 and random biopsy;  Surgeon: Osman Clay MD;  Location: [facility] ENDOSCOPY;  Service: Gastroenterology;  Laterality: N/A;  colon polyps    DENTAL PROCEDURE      ENDOSCOPY N/A 2021    Procedure: ESOPHAGOGASTRODUODENOSCOPY;  Surgeon: Osman Clay MD;  Location: [facility] ENDOSCOPY;  Service: Gastroenterology;  Laterality: N/A;   esophagitis    JOINT REPLACEMENT      REPLACEMENT TOTAL HIP LATERAL POSITION Left     TONSILLECTOMY      TOTAL HIP ARTHROPLASTY Right 2024    Procedure: TOTAL HIP ARTHROPLASTY ANTERIOR;  Surgeon: Cristobal Chen II, MD;  Location: [facility] MAIN OR;  Service: Orthopedics;  Laterality: Right;    TOTAL HIP ARTHROPLASTY REVISION Right 2024    Procedure: TOTAL HIP ARTHROPLASTY REVISION;  Surgeon: Cristobal Chen II, MD;  [place]  [facility] [place];  Service: Orthopedics;  Laterality: Right;    VAGINAL BIRTH AFTER  SECTION        General Information       Row Name 24 1438          OT Time and Intention    Document Type therapy note (daily note)  -RB     Mode of Treatment co-treatment;physical therapy;occupational therapy  -RB       Row Name 24 1438          General Information    Patient Profile Reviewed yes  -RB       Row Name 24 1438          Cognition    Orientation Status (Cognition) oriented to;person;place;verbal cues/prompts needed for orientation  generalized confusion present and poor memory - consistent education provided on sustaining NWB status  -RB       Row Name 24 1438          Safety Issues, Functional Mobility    Safety Issues Affecting Function (Mobility) ability to follow commands;awareness of need for assistance;impulsivity;insight into deficits/self-awareness;judgment;problem-solving;safety precaution awareness;sequencing abilities;positioning of assistive device;safety precautions follow-through/compliance;at risk behavior observed  -RB     Comment, Safety Issues/Impairments (Mobility) The pt participated in OT/PT this morning. Min-Mod A x2 to transfer to the EOB. Min-Mod A x2 to SPS over to her bedside chair while maintaining her NWB status. Total A LBD. Detailed time provided to both the pt and her s/o on OT/PT recs on D/C to a rehab facility for further care. The pt's spouse consistently making comments about a D/C home and the pt requesting to  go to a rehab facility. The pt reported depression (see PT POC for more details of the pt's emotional status). IRF vs. SNF recommended as the pt has poor memory, insight into her deficits and is at risk of injury to her RLE if she returns home.     Anticipated Discharge Disposition (OT): inpatient rehabilitation facility  -               User Key  (r) = Recorded By, (t) = Taken By, (c) = Cosigned By      Initials Name Provider Type    RB Daniela Guerrier OT Occupational Therapist                     Mobility/ADL's       Row Name 07/26/24 1439          Bed Mobility    Bed Mobility supine-sit  -RB     Supine-Sit [place] (Bed Mobility) minimum assist (75% patient effort);moderate assist (50% patient effort);2 person assist;verbal cues;nonverbal cues (demo/gesture)  -     Assistive Device (Bed Mobility) bed rails  -RB       Row Name 07/26/24 1439          Transfers    Transfers sit-stand transfer;stand-sit transfer  -RB     Comment, (Transfers) tolerated a stand pivot to her bedside chair  -RB       Row Name 07/26/24 1439          Bed-Chair Transfer    Bed-Chair [place] (Transfers) moderate assist (50% patient effort);2 person assist;verbal cues;nonverbal cues (demo/gesture)  -RB     Assistive Device (Bed-Chair Transfers) walker, front-wheeled  -RB       Row Name 07/26/24 1439          Sit-Stand Transfer    Sit-Stand [place] (Transfers) minimum assist (75% patient effort);moderate assist (50% patient effort);2 person assist;verbal cues;nonverbal cues (demo/gesture)  -     Assistive Device (Sit-Stand Transfers) walker, front-wheeled  -       Row Name 07/26/24 1439          Stand-Sit Transfer    Stand-Sit [place] (Transfers) minimum assist (75% patient effort);moderate assist (50% patient effort);2 person assist;verbal cues;nonverbal cues (demo/gesture)  -     Assistive Device (Stand-Sit Transfers) walker, front-wheeled  -       Row Name 07/26/24 1439          Activities of Daily Living     BADL Assessment/Intervention lower body dressing;toileting  -RB       Row Name 07/26/24 1439          Mobility    Right Lower Extremity (Weight-bearing Status) toe touch weight-bearing (TTWB)  -RB       Row Name 07/26/24 1439          Lower Body Dressing Assessment/Training    [place] Level (Lower Body Dressing) lower body dressing skills;dependent (less than 25% patient effort)  -RB     Position (Lower Body Dressing) edge of bed sitting  -RB       Row Name 07/26/24 1439          Toileting Assessment/Training    [place] Level (Toileting) toileting skills;dependent (less than 25% patient effort)  -RB     Position (Toileting) supported standing;supported sitting  -RB               User Key  (r) = Recorded By, (t) = Taken By, (c) = Cosigned By      Initials Name Provider Type    RB Daniela Guerrier OT Occupational Therapist                   Obj/Interventions    No documentation.                  Goals/Plan    No documentation.                  Clinical Impression       Row Name 07/26/24 1443          Pain Assessment    Pretreatment Pain Rating 4/10  -RB     Posttreatment Pain Rating 6/10  -RB     Pain Location - Side/Orientation Right  -RB     Pain Location incisional  -RB     Pain Intervention(s) Repositioned;Ambulation/increased activity  -RB       Row Name 07/26/24 1443          Plan of Care Review    Plan of Care Reviewed With patient;significant other  -RB     Progress improving  -RB     Outcome Evaluation The pt participated in OT/PT this morning. Min-Mod A x2 to transfer to the EOB. Min-Mod A x2 to SPS over to her bedside chair while maintaining her NWB status. Total A LBD. Detailed time provided to both the pt and her s/o on OT/PT recs on D/C to a rehab facility for further care. The pt's spouse consistently making comments about a D/C home and the pt requesting to go to a rehab facility. The pt reported depression (see PT POC for more details of the pt's emotional status). IRF vs. SNF  recommended as the pt has poor memory, insight into her deficits and is at risk of injury to her RLE if she returns home.     Anticipated Discharge Disposition (OT): inpatient rehabilitation facility  -       Row Name 07/26/24 1443          Therapy Assessment/Plan (OT)    Rehab Potential (OT) good, to achieve stated therapy goals  -RB     Criteria for Skilled Therapeutic Interventions Met (OT) yes;skilled treatment is necessary  -RB     Therapy Frequency (OT) 5 times/wk  -RB       Row Name 07/26/24 1443          Therapy Plan Review/Discharge Plan (OT)    Anticipated Discharge Disposition (OT) inpatient rehabilitation facility  -       Row Name 07/26/24 1443          Positioning and Restraints    Pre-Treatment Position in bed  -RB     Post Treatment Position chair  -RB     In Chair notified nsg;reclined;sitting;call light within reach;encouraged to call for assist;exit alarm on  -RB               User Key  (r) = Recorded By, (t) = Taken By, (c) = Cosigned By      Initials Name Provider Type    Daniela Miguel, LALA Occupational Therapist                   Outcome Measures       Row Name 07/26/24 1330 07/26/24 0809       How much help from another person do you currently need...    Turning from your back to your side while in flat bed without using bedrails? 3  -MG 3  -JM    Moving from lying on back to sitting on the side of a flat bed without bedrails? 2  -MG 2  -JM    Moving to and from a bed to a chair (including a wheelchair)? 2  -MG 2  -JM    Standing up from a chair using your arms (e.g., wheelchair, bedside chair)? 2  -MG 2  -JM    Climbing 3-5 steps with a railing? 1  -MG 1  -JM    To walk in hospital room? 1  -MG 1  -JM    AM-PAC 6 Clicks Score (PT) 11  -MG 11  -JM    Highest Level of Mobility Goal 4 --> Transfer to chair/commode  -MG 4 --> Transfer to chair/commode  -JM              User Key  (r) = Recorded By, (t) = Taken By, (c) = Cosigned By      Initials Name Provider Type    MG Tammy Johnson,  PT Physical Therapist    Marley Blanc, RN Registered Nurse                    Occupational Therapy Education       Title: PT OT SLP Therapies (In Progress)       Topic: Occupational Therapy (Not Started)       Point: ADL training (Not Started)       Description:   Instruct learner(s) on proper safety adaptation and remediation techniques during self care or transfers.   Instruct in proper use of assistive devices.                  Learner Progress:  Not documented in this visit.              Point: Home exercise program (Not Started)       Description:   Instruct learner(s) on appropriate technique for monitoring, assisting and/or progressing therapeutic exercises/activities.                  Learner Progress:  Not documented in this visit.              Point: Precautions (Not Started)       Description:   Instruct learner(s) on prescribed precautions during self-care and functional transfers.                  Learner Progress:  Not documented in this visit.              Point: Body mechanics (Not Started)       Description:   Instruct learner(s) on proper positioning and spine alignment during self-care, functional mobility activities and/or exercises.                  Learner Progress:  Not documented in this visit.                                  OT Recommendation and Plan  Planned Therapy Interventions (OT): transfer/mobility retraining, strengthening exercise, ROM/therapeutic exercise, activity tolerance training, adaptive equipment training, BADL retraining, functional balance retraining, occupation/activity based interventions, patient/caregiver education/training, cognitive/visual perception retraining  Therapy Frequency (OT): 5 times/wk  Plan of Care Review  Plan of Care Reviewed With: patient, significant other  Progress: improving  Outcome Evaluation: The pt participated in OT/PT this morning. Min-Mod A x2 to transfer to the EOB. Min-Mod A x2 to SPS over to her bedside chair while maintaining  her NWB status. Total A LBD. Detailed time provided to both the pt and her s/o on OT/PT recs on D/C to a rehab facility for further care. The pt's spouse consistently making comments about a D/C home and the pt requesting to go to a rehab facility. The pt reported depression (see PT POC for more details of the pt's emotional status). IRF vs. SNF recommended as the pt has poor memory, insight into her deficits and is at risk of injury to her RLE if she returns home.     Anticipated Discharge Disposition (OT): inpatient rehabilitation facility     Time Calculation:         Time Calculation- OT       Row Name 07/26/24 1437             Time Calculation- OT    OT Start Time 1110  -RB      OT Stop Time 1140  -RB      OT Time Calculation (min) 30 min  -RB      Total Timed Code Minutes- OT 30 minute(s)  -RB      OT Received On 07/26/24  -RB      OT - Next Appointment 07/29/24  -RB         Timed Charges    85772 - OT Therapeutic Activity Minutes 20  -RB      00257 - OT Self Care/Mgmt Minutes 10  -RB         Total Minutes    Timed Charges Total Minutes 30  -RB       Total Minutes 30  -RB                User Key  (r) = Recorded By, (t) = Taken By, (c) = Cosigned By      Initials Name Provider Type    RB Daniela Guerrier OT Occupational Therapist                  Therapy Charges for Today       Code Description Service Date Service Provider Modifiers Qty    73140289266 HC OT THER PROC EA 15 MIN 7/25/2024 Daniela Guerrier OT GO 1    34179404093 HC OT THERAPEUTIC ACT EA 15 MIN 7/25/2024 Daniela Guerrier OT GO 1    29551138846 HC OT THERAPEUTIC ACT EA 15 MIN 7/26/2024 Daniela Guerrier OT GO 1    51046947819 HC OT SELF CARE/MGMT/TRAIN EA 15 MIN 7/26/2024 Daniela Guerrier OT GO 1                 Daniela Guerrier OT  7/26/2024

## 2024-07-26 NOTE — PLAN OF CARE
Goal Outcome Evaluation:  Plan of Care Reviewed With: patient, significant other        Progress: improving  Outcome Evaluation: Pt seen for PT/OT tx this AM and tolerated the session fairly. Beginning of session pt was only able to recall her WB status and to not cross her LEs; re-educated on precautions. Today, pt was Min/ModA x1 for bed mobility w/ primary assist at her R to maintain WB status, Min/ModA x2 for two STS to RW and ModA x2 for stand pivot/hop to the L w/ RW to the bedside chair. Pt required assist and multiple repeated cues to maintain her WB status throughout the session, but was able to maintain for brief periods of time during tsfs. Pt stood with balance and WB assist from this PT while OT assisted w/ brief change and pericare, roughly 3 min stand w/ Min/ModA for balance. Once in recliner pt performed LE ther-ex for 3 reps, demoing how SO to assist to maintain her WB status. Pt continues to be emotionally labile throughout the session and had poor insight into her condition and mobility if DC home; thinking she would be able to walk. Extensive time spent w/ pt and her SO on mobilization at home via stand pivot tsfs w/ RW only to bed, WC or BSC, need to maintain her WB status and precautions, how SO will need to assist w/ all aspects of care and need to cont being mobile, not sedentary in her bed. Pt began crying and made a gun gesture towards her head; RN made aware as pt has expressed multiple times how she is depressed. Pt then stating that she wants to go to a rehab at [place] and not return home; discussed again w/ RN and CCP as pt and her SO cont to report the opposite to CCP. This PT and OT discussed w/ CCP. If pt is to DC home she will need HH PT/OT, WC w/ elevating leg rests and BSC. Cont to rec rehab at [place] as pt would be unsafe to return home and likely high risk for noncompliance.      Anticipated Discharge Disposition (PT): inpatient rehabilitation facility, skilled nursing facility

## 2024-07-26 NOTE — PROGRESS NOTES
Lita Yu will need to be discharged with a wheelchair from a [employer] due to her right hip arthroplasty . Because of the previous mentioned issues, the patient has a mobility limitation that significantly impairs them to accomplish their MRADL entirely in the home. Mobility limitation cannot be resolved by using a cane or walker because she her pain and partial weightbearing on lower extremities. The patient's functional mobility deficit will be resolved with the use of a wheelchair. The patient is planning to use the wheelchair on a regular basis in the home and has not expressed an unwillingness to do so. The patient can safely use a manual wheelchair and has the strength to maneuver the wheelchair independently.

## 2024-07-29 ENCOUNTER — TRANSITIONAL CARE MANAGEMENT TELEPHONE ENCOUNTER (OUTPATIENT)
Dept: CALL CENTER | Facility: HOSPITAL | Age: 59
End: 2024-07-29
Payer: COMMERCIAL

## 2024-07-29 NOTE — OUTREACH NOTE
Call Center TCM Note      Flowsheet Row Responses   [facility] facility patient discharged from? [place]   Does the patient have one of the following disease processes/diagnoses(primary or secondary)? Total Joint Replacement   Joint surgery performed? Hip   TCM attempt successful? No   Unsuccessful attempts Attempt 2            Vania Davies MA    7/29/2024, 16:44 EDT

## 2024-07-29 NOTE — PAYOR COMM NOTE
"Yonis Yu \"NAINA\" (58 y.o. Female)      PATIENT DISCHARGED 24  REF# [id number]     PLEASE REPLY WITH ALL APPROVED DAYS     [facility] DEPT: [phone]     [facility]: NPI [id number] [facility]# [id number]     YAA PHELPS RN,CCP       Date of Birth   1965    Social Security Number       Address   [address] [place] [address]    Home Phone   [phone]    MRN   [id number]       Restorationism   None    Marital Status                               Admission Date   24    Admission Type   Emergency    Admitting Provider   Melodie Brooks MD    Attending Provider       Department, Room/Bed   [address], P788/1       Discharge Date   2024    Discharge Disposition   Home-Health Care Sv    Discharge Destination                                 Attending Provider: (none)   Allergies: Sulfa Antibiotics, Keflex [Cephalexin]    Isolation: None   Infection: MRSA (24)   Code Status: Prior    Ht: 160 cm (62.99\")   Wt: 64.6 kg (142 lb 7 oz)    Admission Cmt: None   Principal Problem: Intractable low back pain [M54.59]                   Active Insurance as of 2024       Primary Coverage       Payor Plan Insurance Group Employer/Plan Group    [place] [id number]       Payor Plan Address Payor Plan Phone Number Payor Plan Fax Number Effective Dates    [address]   2014 - None Entered    [place] [id number]         Subscriber Name Subscriber Birth Date Member ID       YONIS YU 1965 [id number]                     Emergency Contacts        (Rel.) Home Phone Work Phone Mobile Phone    OBDULIA CANTU (Daughter) -- -- [phone]    MILANA DAI (Significant Other) [phone] -- [phone]                 Discharge Summary        Jluis Maguire MD at 24 1808              Patient Name: Yonis Yu  : 1965  MRN: " [id number]    Date of Admission: 7/20/2024  Date of Discharge:  7/26/2024  Primary Care Physician: Norma Saul APRN      Chief Complaint:   Hip Pain      Discharge Diagnoses     Active Hospital Problems    Diagnosis  POA    **Intractable low back pain [M54.59]  Yes    Thrombocytopenia [D69.6]  Yes    Alcohol use [Z78.9]  Yes    History of MRSA infection [Z86.14]  Yes    Right hip pain [M25.551]  Yes    S/P total right hip arthroplasty [Z96.641]  Not Applicable    Anemia [D64.9]  Yes    Spinal abscess [M46.20]  Yes    Cirrhosis of liver [K74.60]  Yes    Chronic obstructive pulmonary disease [J44.9]  Yes    Essential hypertension [I10]  Yes    Seizure disorder [G40.909]  Yes    Post traumatic stress disorder [F43.10]  Yes    Peripheral vascular disease [I73.9]  Yes      Resolved Hospital Problems    Diagnosis Date Resolved POA    Metabolic acidosis [E87.20] 07/26/2024 Yes    Alcohol withdrawal syndrome without complication [F10.930] 07/26/2024 No        Hospital Course     Ms. Yu is a 58 y.o. female with a history of chronic pain, posttraumatic stress disorder, COPD hypertension dyslipidemia and cirrhosis of liver who was recently treated with IV antibiotic therapy for MRSA spine infection which she finished and June 2024. Presented with worsening right hip and lower back pain along with concerns for ETOH withdrawal. She was seen by both Neurosurgery and Orthopedic surgery. Neurosurgery said no acute intervention. Ortho took her to the OR for Right Total Hip Revision with prophylactic fixation of the femur on 07/22/24. They are concerned about her complying with weight bearing restrictions and recommended rehab, as did PT. Patient was initially agreeable to acute rehab but did not qualify per the rehab facility. Referrals were sent to SNFs but she could not afford the copay required. She is unfortunately going to have to go home with HH and family despite the high risk involved.  Otherwise, she was  treated for ETOH withdrawal and has completed lorazepam taper and has not required any prn dosing for a few days now. She also had some acute blood loss anemia and was transfused and hgb has demonstrated an upward trend the last few days. [facility] assisted with getting her equipment for discharge and home health.      Day of Discharge     Subjective:  No events, still some pain ctrl issues    Physical Exam:  Temp:  [97.1 °F (36.2 °C)-98.1 °F (36.7 °C)] 97.1 °F (36.2 °C)  Heart Rate:  [65-86] 65  Resp:  [16] 16  BP: (101-128)/(63-78) 101/63  Body mass index is 25.24 kg/m².  Physical Exam  Vitals reviewed.   Constitutional:       General: She is not in acute distress.     Comments: Alert, less tearful   Eyes:      General: No scleral icterus.  Cardiovascular:      Rate and Rhythm: Normal rate and regular rhythm.   Pulmonary:      Effort: Pulmonary effort is normal. No respiratory distress.   Abdominal:      General: Bowel sounds are normal. There is no distension.      Palpations: Abdomen is soft.      Tenderness: There is no abdominal tenderness.   Musculoskeletal:      Right lower leg: No edema.      Left lower leg: No edema.   Skin:     General: Skin is warm and dry.      Coloration: Skin is pale.   Neurological:      Comments: No tremor, no focal deficit   Psychiatric:      Comments: Labile emotionally         Consultants     Consult Orders (all) (From admission, onward)       Start     Ordered    07/21/24 0928  Inpatient Access Center Consult  Once        Provider:  (Not yet assigned)    07/21/24 0927    07/20/24 1744  Inpatient Orthopedic Surgery Consult  Once        Specialty:  Orthopedic Surgery  Provider:  Cristobal Chen II, MD    07/20/24 1743    07/20/24 1744  Inpatient Neurosurgery Consult  Once        Specialty:  Neurosurgery  Provider:  Joseph Abel MD    07/20/24 1743    07/20/24 1624  [facility] (on-call MD unless specified) Details  Once        Specialty:  Hospitalist  Provider:  (Not yet  assigned)    07/20/24 1624                  Procedures     TOTAL HIP ARTHROPLASTY REVISION    Imaging Results (All)       Procedure Component Value Units Date/Time    XR Hip With or Without Pelvis 1 View Right [[id number]] Collected: 07/22/24 1756     Updated: 07/22/24 1800    Narrative:      XR HIP W OR WO PELVIS 1 VIEW RIGHT-     INDICATION: Postop     COMPARISON: 7/21/2024       Impression:      Revised right total hip arthroplasty with proximal femoral  cerclage wire. Hardware is well seated without evidence of acute  periprosthetic fracture. Contralateral left total hip arthroplasty.     This report was finalized on 7/22/2024 5:57 PM by Dr. Jluis Barron M.D on Workstation: [id number]       XR Hip With or Without Pelvis 2 - 3 View Right [[id number]] Collected: 07/21/24 0743     Updated: 07/21/24 0753    Narrative:      2 VIEW RIGHT HIP AND 1 VIEW AP PELVIS     HISTORY: Right hip pain.     FINDINGS: There are bilateral total hip replacements and the left hip  replacement is unremarkable. On the right, there appears to be a  significant change since the postoperative images of the right hip dated  5/4/2024. There is now considerable loss of the bony acetabulum with  protrusion of the femoral head superiorly and anteriorly as seen on the  CT scan performed yesterday. There is increased density interposed  between the femoral head and neck extending to the calcar that relates  to the acetabular cement at the posterior margin of the acetabulum again  best seen on the CT scan. 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     This report was finalized on 7/21/2024 7:50 AM by Dr. Ryan Glass M.D  on Workstation: [id number]       CT Lumbar Spine Without Contrast [[id number]] Collected: 07/20/24 1552     Updated: 07/20/24 1608    Narrative:      CT PELVIS WO CONTRAST-, CT LUMBAR SPINE WO  CONTRAST-     CLINICAL HISTORY: Low back pain with radiation to the right leg. Right  hip and sacral pain. Post right total hip arthroplasty May 2024     TECHNIQUE: CT scan of the lumbar spine and pelvis was obtained with a  combination of 3 , 2, and 1 mm axial images. Bone and soft tissue  algorithm images were obtained with sagittal and coronal reconstructed  images.     FINDINGS:     Lumbar spine: No subluxation. Unchanged bilateral L5 pars defects.  Marked L2 inferior endplate and L3 superior endplate cortical  irregularity/osteolysis with subjacent vertebral body sclerotic changes  and obliteration of the L2-L3 disc space. Discitis/osteomyelitis was  seen on May MRI. L2-L3 disc space loss is no worse. Paraspinal  inflammatory changes have significantly decreased and nearly resolved.  L4 superior endplate Schmorl's node. Mild multilevel disc degeneration  throughout the remaining lumbar spine. Mild L5-S1 facet arthropathy. No  appreciable spinal canal stenosis. Mild right L5-S1 neuroforaminal  stenosis secondary to disc and facet joint degeneration.     Pelvis: Bilateral total hip arthroplasties. Right arthroplasty has a  cemented acetabular component. No evidence of fracture/periprosthetic  fracture. Right acetabular component is positioned at the anterosuperior  aspect of the native right acetabulum (series 2/image 66). Limited  normal noncontrast CT appearance of the pelvic soft tissues. Exam is  partially by arthroplasty streak artifact.                      Impression:      1. Sequela of discitis/osteomyelitis at L2-L3 with obliteration of the  L2-L3 disc space and marked associated endplate irregularity/osteolysis.  L2-L3 disc space loss is no worse than may MRI. Paraspinal inflammatory  changes have significantly decreased/nearly resolved.  2. Mild right L5-S1 neuroforaminal stenosis secondary to disc and facet  joint degeneration.  3. Bilateral total hip arthroplasties. Right arthroplasty has a  cemented  acetabular component. No evidence of fracture/periprosthetic fracture.  Right acetabular component is positioned at the anterior superior aspect  of the native right acetabulum. Recommend correlation with prior  surgery.           Radiation dose reduction techniques were utilized, including automated  exposure control and exposure modulation based on body size.     This report was finalized on 7/20/2024 4:05 PM by Dr. Jluis Barron M.D on Workstation: [id number]       CT Pelvis Without Contrast [[id number]] Collected: 07/20/24 1552     Updated: 07/20/24 1608    Narrative:      CT PELVIS WO CONTRAST-, CT LUMBAR SPINE WO CONTRAST-     CLINICAL HISTORY: Low back pain with radiation to the right leg. Right  hip and sacral pain. Post right total hip arthroplasty May 2024     TECHNIQUE: CT scan of the lumbar spine and pelvis was obtained with a  combination of 3 , 2, and 1 mm axial images. Bone and soft tissue  algorithm images were obtained with sagittal and coronal reconstructed  images.     FINDINGS:     Lumbar spine: No subluxation. Unchanged bilateral L5 pars defects.  Marked L2 inferior endplate and L3 superior endplate cortical  irregularity/osteolysis with subjacent vertebral body sclerotic changes  and obliteration of the L2-L3 disc space. Discitis/osteomyelitis was  seen on May MRI. L2-L3 disc space loss is no worse. Paraspinal  inflammatory changes have significantly decreased and nearly resolved.  L4 superior endplate Schmorl's node. Mild multilevel disc degeneration  throughout the remaining lumbar spine. Mild L5-S1 facet arthropathy. No  appreciable spinal canal stenosis. Mild right L5-S1 neuroforaminal  stenosis secondary to disc and facet joint degeneration.     Pelvis: Bilateral total hip arthroplasties. Right arthroplasty has a  cemented acetabular component. No evidence of fracture/periprosthetic  fracture. Right acetabular component is positioned at the anterosuperior  aspect of the native  right acetabulum (series 2/image 66). Limited  normal noncontrast CT appearance of the pelvic soft tissues. Exam is  partially by arthroplasty streak artifact.                      Impression:      1. Sequela of discitis/osteomyelitis at L2-L3 with obliteration of the  L2-L3 disc space and marked associated endplate irregularity/osteolysis.  L2-L3 disc space loss is no worse than may MRI. Paraspinal inflammatory  changes have significantly decreased/nearly resolved.  2. Mild right L5-S1 neuroforaminal stenosis secondary to disc and facet  joint degeneration.  3. Bilateral total hip arthroplasties. Right arthroplasty has a cemented  acetabular component. No evidence of fracture/periprosthetic fracture.  Right acetabular component is positioned at the anterior superior aspect  of the native right acetabulum. Recommend correlation with prior  surgery.           Radiation dose reduction techniques were utilized, including automated  exposure control and exposure modulation based on body size.     This report was finalized on 7/20/2024 4:05 PM by Dr. Jluis Barron M.D on Workstation: [id number]             Results for orders placed during the hospital encounter of 03/02/24    Duplex Venous Lower Extremity - Bilateral CAR    Interpretation Summary    Normal bilateral lower extremity venous duplex scan.      Pertinent Labs     Results from last 7 days   Lab Units 07/26/24  1033 07/25/24  0511 07/24/24  1203 07/24/24  0522 07/23/24  1958 07/23/24  0509   WBC 10*3/mm3 5.35 5.29  --  4.58  --  6.02   HEMOGLOBIN g/dL 7.7* 7.4* 7.2* 7.1*   < > 6.2*   PLATELETS 10*3/mm3 99* 89*  --  88*  --  85*    < > = values in this interval not displayed.     Results from last 7 days   Lab Units 07/26/24  1033 07/25/24  1541 07/25/24  0511 07/24/24  0522 07/23/24  0509   SODIUM mmol/L 137  --  138 137 135*   POTASSIUM mmol/L 4.3 4.4 3.4* 3.8 4.7   CHLORIDE mmol/L 103  --  106 106 106   CO2 mmol/L 26.3  --  24.0 22.0 22.0   BUN mg/dL 7  --   "8 11 11   CREATININE mg/dL 0.76  --  0.65 0.83 0.81   GLUCOSE mg/dL 94  --  84 89 112*   EGFR mL/min/1.73 92.1  --  103.5 82.9 85.3     Results from last 7 days   Lab Units 07/26/24  1033 07/25/24  0511 07/24/24  0522 07/23/24  0509   ALBUMIN g/dL 2.9* 2.7* 2.6* 3.0*   BILIRUBIN mg/dL 0.4 0.3 0.2 <0.2   ALK PHOS U/L 140* 119* 117 120*   AST (SGOT) U/L 30 29 41* 25   ALT (SGPT) U/L <5 17 18 16     Results from last 7 days   Lab Units 07/26/24  1033 07/25/24  0511 07/24/24  0522 07/23/24  0509 07/22/24  0720 07/21/24  0519   CALCIUM mg/dL 8.5* 8.3* 7.6* 7.4*   < > 8.4*   ALBUMIN g/dL 2.9* 2.7* 2.6* 3.0*   < > 3.5   MAGNESIUM mg/dL  --   --   --   --   --  1.7    < > = values in this interval not displayed.               Invalid input(s): \"LDLCALC\"          Test Results Pending at Discharge       Discharge Details        Discharge Medications        New Medications        Instructions Start Date   HYDROcodone-acetaminophen  MG per tablet  Commonly known as: NORCO  Replaces: HYDROcodone-acetaminophen 5-325 MG per tablet  Notes to patient: 7/26/24 NEXT DOSE ANYTIME AFTER    1 tablet, Oral, Every 4 Hours PRN      multivitamin tablet tablet  Notes to patient: 7/27/24   1 tablet, Oral, Daily   Start Date: July 27, 2024     naloxone 4 MG/0.1ML nasal spray  Commonly known as: NARCAN   Call 911. Don't prime. Anton in 1 nostril for overdose. Repeat in 2-3 minutes in other nostril if no or minimal breathing/responsiveness.      tiZANidine 4 MG tablet  Commonly known as: ZANAFLEX  Notes to patient: 7/26/24 PM   4 mg, Oral, Every 12 Hours PRN             Continue These Medications        Instructions Start Date   albuterol sulfate  (90 Base) MCG/ACT inhaler  Commonly known as: PROVENTIL HFA;VENTOLIN HFA;PROAIR HFA   2 puffs, Inhalation, Every 4 Hours PRN      busPIRone 15 MG tablet  Commonly known as: BUSPAR  Notes to patient: 7/26/24 PM   TAKE 1 TABLET BY MOUTH TWICE A DAY      folic acid 1 MG tablet  Commonly known " as: FOLVITE  Notes to patient: 7/27/24   1 mg, Oral, Daily      melatonin 5 MG tablet tablet   5 mg, Oral, Nightly PRN      sertraline 100 MG tablet  Commonly known as: ZOLOFT  Notes to patient: 7/26/24 PM   TAKE 1 TABLET BY MOUTH TWICE A DAY      thiamine 100 MG tablet  Commonly known as: VITAMIN B1  Notes to patient: 7/27/24   100 mg, Oral, Daily      Vitamin B-12 1000 MCG sublingual tablet  Notes to patient: 7/27/24   1 tablet, Sublingual, Daily             Stop These Medications      HYDROcodone-acetaminophen 5-325 MG per tablet  Commonly known as: NORCO  Replaced by: HYDROcodone-acetaminophen  MG per tablet     LORazepam 1 MG tablet  Commonly known as: Ativan     QUEtiapine 50 MG tablet  Commonly known as: SEROquel              Allergies   Allergen Reactions    Sulfa Antibiotics Hives    Keflex [Cephalexin] Hives       Discharge Disposition:  Home-Health Care c      Discharge Diet:  Diet Order   Procedures    Diet: Regular/House; Fluid Consistency: Thin (IDDSI 0)       Discharge Activity:       CODE STATUS:    Code Status and Medical Interventions:   Ordered at: 07/20/24 1727     Code Status (Patient has no pulse and is not breathing):    CPR (Attempt to Resuscitate)     Medical Interventions (Patient has pulse or is breathing):    Full Support       Future Appointments   Date Time Provider Department Center   10/3/2024  1:45 PM Norma Saul APRN [facility]  [facility]     Additional Instructions for the Follow-ups that You Need to Schedule       Discharge Follow-up with PCP   As directed       Currently Documented PCP:    Norma Saul APRN    PCP Phone Number:    [phone]     Follow Up Details: 1 week        Discharge Follow-up with Specified Provider: Dr Chen per his recs   As directed      To: Dr Chen per his recs               Contact information for follow-up providers       Norma Saul APRN .    Specialties: Nurse Practitioner, Family Medicine  Why: 1 week  Contact  information:  [address]  [address]  [place] [address]  [phone]               Cecelia Khanna II, MD Follow up in 2 week(s).    Specialty: Orthopedic Surgery  Contact information:  [address]  [address]  [facility] [id number] [phone]                       Contact information for after-discharge care       Home Medical Care       [facility] .    Services: Home Rehabilitation, Home Nursing  Contact information:  [address] [employer], [address]  [place] [id number] [phone]                                   Additional Instructions for the Follow-ups that You Need to Schedule       Discharge Follow-up with PCP   As directed       Currently Documented PCP:    Norma Saul APRN    PCP Phone Number:    [phone]     Follow Up Details: 1 week        Discharge Follow-up with Specified Provider: Dr Khanna per his recs   As directed      To: Dr Khanna per his recs            Time Spent on Discharge:  Greater than 30 minutes      Jluis Maguire MD  [facility]  07/26/24  18:08 EDT              Electronically signed by Jluis Maguire MD at 07/26/24 1823       Discharge Order (From admission, onward)       Start     Ordered    07/26/24 1130  Discharge patient  Once        Expected Discharge Date: 07/26/24   Discharge Disposition: Home-Health Care [facility]   Physician of Record for Attribution - Please select from Treatment Team: CECELIA KHANNA II [[id number]]   Review needed by CMO to determine Physician of Record: No      Question Answer Comment   Physician of Record for Attribution - Please select from Treatment Team CECELIA KHANNA II    Review needed by CMO to determine Physician of Record No        07/26/24 1133                  Sandi Murrieta RN     Case Management     Progress Notes     Signed     Date of Service: 07/26/24 1547  Creation Time: 07/26/24 1547     Signed          Continued Stay Note  [facility]     Patient Name: Lita Yu            MRN: [id number]  Today's Date: 7/26/2024                Admit Date: 7/20/2024     Plan: [facility] HH and family support    Discharge Plan         Row Name 07/26/24 1542           Plan     Plan [facility] HH and family support     Patient/Family in Agreement with Plan yes     Plan Comments Pt states she wanted to d/c home. PT/OT saw patient then patient agreeable to d/c to SNF, referrals sent to [facility], [facility], [facility], and [facility]. Per [facility] rep, pt will have a $275/co pay per episode and a 35% per day payment. Informed patient who states she can not afford and plans to d/c home with family support and [facility] HH. Wheelchair ordered per MD and referral sent to [facility]. [facility] plans to deliver WC to bedside.                         Discharge Codes    No documentation.                      Expected Discharge Date and Time         Expected Discharge Date Expected Discharge Time     Jul 26, 2024                     Sandi Murrieta RN

## 2024-07-29 NOTE — OUTREACH NOTE
Call Center TCM Note      Flowsheet Row Responses   [facility] facility patient discharged from? [facility]   Does the patient have one of the following disease processes/diagnoses(primary or secondary)? Total Joint Replacement   Joint surgery performed? Hip   TCM attempt successful? No   Unsuccessful attempts Attempt 1            Vania Davies MA    7/29/2024, 11:03 EDT

## 2024-07-30 ENCOUNTER — TRANSITIONAL CARE MANAGEMENT TELEPHONE ENCOUNTER (OUTPATIENT)
Dept: CALL CENTER | Facility: HOSPITAL | Age: 59
End: 2024-07-30
Payer: COMMERCIAL

## 2024-08-05 ENCOUNTER — PATIENT OUTREACH (OUTPATIENT)
Dept: CASE MANAGEMENT | Facility: CLINIC | Age: 59
End: 2024-08-05
Payer: COMMERCIAL

## 2024-08-05 ENCOUNTER — TELEPHONE (OUTPATIENT)
Dept: ONCOLOGY | Facility: CLINIC | Age: 59
End: 2024-08-05
Payer: COMMERCIAL

## 2024-08-05 DIAGNOSIS — Z96.641 S/P TOTAL RIGHT HIP ARTHROPLASTY: Primary | ICD-10-CM

## 2024-08-05 NOTE — TELEPHONE ENCOUNTER
I called pt to inquire if she would like to r/s her cancelled appt from May w/ Dr Salvador.   Pt declined, said she did not need it.

## 2024-08-06 RX ORDER — QUETIAPINE FUMARATE 50 MG/1
1 TABLET, FILM COATED ORAL
COMMUNITY

## 2024-08-08 ENCOUNTER — OFFICE VISIT (OUTPATIENT)
Dept: FAMILY MEDICINE CLINIC | Facility: CLINIC | Age: 59
End: 2024-08-08
Payer: COMMERCIAL

## 2024-08-08 VITALS
TEMPERATURE: 98.2 F | OXYGEN SATURATION: 99 % | DIASTOLIC BLOOD PRESSURE: 76 MMHG | HEART RATE: 86 BPM | BODY MASS INDEX: 25.16 KG/M2 | HEIGHT: 63 IN | WEIGHT: 142 LBS | SYSTOLIC BLOOD PRESSURE: 127 MMHG

## 2024-08-08 DIAGNOSIS — Z96.641 S/P TOTAL RIGHT HIP ARTHROPLASTY: Primary | ICD-10-CM

## 2024-08-08 PROCEDURE — 99495 TRANSJ CARE MGMT MOD F2F 14D: CPT

## 2024-08-08 RX ORDER — SERTRALINE HYDROCHLORIDE 100 MG/1
TABLET, FILM COATED ORAL
COMMUNITY
Start: 2024-07-27

## 2024-08-08 RX ORDER — HYDROCODONE BITARTRATE AND ACETAMINOPHEN 10; 325 MG/1; MG/1
1 TABLET ORAL EVERY 4 HOURS PRN
Qty: 42 TABLET | Refills: 0 | Status: SHIPPED | OUTPATIENT
Start: 2024-08-08

## 2024-08-08 NOTE — PROGRESS NOTES
"Transitional Care Follow Up Visit  Subjective     Lita Yu is a 58 y.o. female who presents for a transitional care management visit.    Within 48 business hours after discharge our office contacted her via telephone to coordinate her care and needs.      I reviewed and discussed the details of that call along with the discharge summary, hospital problems, inpatient lab results, inpatient diagnostic studies, and consultation reports with Lita.     Current outpatient and discharge medications have been reconciled for the patient.  Reviewed by: SHARIF Paz          7/26/2024     7:33 PM   Date of TCM Phone Call   [facility]   Date of Admission 7/20/2024   Date of Discharge 7/26/2024     Risk for Readmission (LACE) Score: 15 (7/26/2024  6:01 AM)      History of Present Illness   Course During Hospital Stay:      \"Ms. Yu is a 58 y.o. female with a history of chronic pain, posttraumatic stress disorder, COPD hypertension dyslipidemia and cirrhosis of liver who was recently treated with IV antibiotic therapy for MRSA spine infection which she finished and June 2024. Presented with worsening right hip and lower back pain along with concerns for ETOH withdrawal. She was seen by both Neurosurgery and Orthopedic surgery. Neurosurgery said no acute intervention. Ortho took her to the OR for Right Total Hip Revision with prophylactic fixation of the femur on 07/22/24. They are concerned about her complying with weight bearing restrictions and recommended rehab, as did PT. Patient was initially agreeable to acute rehab but did not qualify per the rehab facility. Referrals were sent to SNFs but she could not afford the copay required. She is unfortunately going to have to go home with HH and family despite the high risk involved.  Otherwise, she was treated for ETOH withdrawal and has completed lorazepam taper and has not required any prn dosing for a few days " "now. She also had some acute blood loss anemia and was transfused and hgb has demonstrated an upward trend the last few days. [facility] assisted with getting her equipment for discharge and home health.\"     Pt presents today following up on 07/20 hospitalization. She had a right total hip revision on 7/22. Se sees Dr. Chen next week. She can't put any weight on it for 6-8 weeks before therapy. She denies fever, chills, malaise. Right hip hurts but feels better. She is feeling much better than she has been. Incision looks good. Her main complaint is that the staples are annoying her.    The following portions of the patient's history were reviewed and updated as appropriate: allergies, current medications, past family history, past medical history, past social history, past surgical history, and problem list.    Review of Systems    Objective   /76 (BP Location: Left arm, Patient Position: Sitting, Cuff Size: Adult)   Pulse 86   Temp 98.2 °F (36.8 °C) (Temporal)   Ht 160 cm (62.99\")   Wt 64.4 kg (142 lb)   SpO2 99%   BMI 25.16 kg/m²   Physical Exam  Vitals reviewed.   Constitutional:       General: She is not in acute distress.     Appearance: Normal appearance. She is not ill-appearing, toxic-appearing or diaphoretic.   Cardiovascular:      Rate and Rhythm: Normal rate and regular rhythm.      Pulses: Normal pulses.      Heart sounds: Normal heart sounds.   Pulmonary:      Effort: Pulmonary effort is normal. No respiratory distress.      Breath sounds: Normal breath sounds.   Skin:     General: Skin is warm and dry.      Capillary Refill: Capillary refill takes less than 2 seconds.      Comments: Clean, dry, stapled, well approximated incision on right lateral hip   Neurological:      General: No focal deficit present.      Mental Status: She is alert and oriented to person, place, and time.   Psychiatric:         Mood and Affect: Mood normal.         Behavior: Behavior normal.         Thought Content: " Thought content normal.         Judgment: Judgment normal.         Assessment & Plan   Diagnoses and all orders for this visit:    1. S/P total right hip arthroplasty (Primary)  Comments:  -pt wanted staples removed, I advised her Dr. Chen needed to do this.  -pt feels well, incision looks good  Orders:  -     HYDROcodone-acetaminophen (Norco)  MG per tablet; Take 1 tablet by mouth Every 4 (Four) Hours As Needed for Moderate Pain.  Dispense: 42 tablet; Refill: 0

## 2024-08-12 ENCOUNTER — OFFICE (OUTPATIENT)
Dept: URBAN - METROPOLITAN AREA CLINIC 64 | Facility: CLINIC | Age: 59
End: 2024-08-12
Payer: COMMERCIAL

## 2024-08-12 VITALS
HEIGHT: 63 IN | HEART RATE: 75 BPM | DIASTOLIC BLOOD PRESSURE: 64 MMHG | SYSTOLIC BLOOD PRESSURE: 106 MMHG | WEIGHT: 125 LBS

## 2024-08-12 DIAGNOSIS — K74.69 OTHER CIRRHOSIS OF LIVER: ICD-10-CM

## 2024-08-12 DIAGNOSIS — K52.9 NONINFECTIVE GASTROENTERITIS AND COLITIS, UNSPECIFIED: ICD-10-CM

## 2024-08-12 DIAGNOSIS — D53.9 NUTRITIONAL ANEMIA, UNSPECIFIED: ICD-10-CM

## 2024-08-12 DIAGNOSIS — K21.9 GASTRO-ESOPHAGEAL REFLUX DISEASE WITHOUT ESOPHAGITIS: ICD-10-CM

## 2024-08-12 PROCEDURE — 99214 OFFICE O/P EST MOD 30 MIN: CPT | Performed by: NURSE PRACTITIONER

## 2024-08-12 RX ORDER — PANTOPRAZOLE SODIUM 40 MG/1
40 TABLET, DELAYED RELEASE ORAL
Qty: 30 | Refills: 11 | Status: ACTIVE
Start: 2024-08-12

## 2024-08-19 ENCOUNTER — TELEPHONE (OUTPATIENT)
Dept: CASE MANAGEMENT | Facility: CLINIC | Age: 59
End: 2024-08-19
Payer: COMMERCIAL

## 2024-08-19 NOTE — TELEPHONE ENCOUNTER
ACM attempted CM Outreach call. No answer left voicemail for patient to return call at [phone]

## 2024-08-20 ENCOUNTER — TELEPHONE (OUTPATIENT)
Dept: CASE MANAGEMENT | Facility: CLINIC | Age: 59
End: 2024-08-20
Payer: COMMERCIAL

## 2024-08-20 NOTE — TELEPHONE ENCOUNTER
ACM attempted CM Outreach call. No answer left voicemail for patient to return call at [phone]

## 2024-08-28 ENCOUNTER — TELEPHONE (OUTPATIENT)
Dept: CASE MANAGEMENT | Facility: CLINIC | Age: 59
End: 2024-08-28
Payer: COMMERCIAL

## 2024-08-28 NOTE — TELEPHONE ENCOUNTER
ACM attempted  Outreach call. No answer left voicemail for patient to return call at [phone]. Patient HRCM and has ACM contact information will schedule 30 day CR from last outreach then close

## 2024-08-29 DIAGNOSIS — G47.00 INSOMNIA, UNSPECIFIED: ICD-10-CM

## 2024-08-29 RX ORDER — QUETIAPINE FUMARATE 50 MG/1
100 TABLET, FILM COATED ORAL
Qty: 180 TABLET | Refills: 1 | Status: SHIPPED | OUTPATIENT
Start: 2024-08-29

## 2024-09-12 ENCOUNTER — TELEPHONE (OUTPATIENT)
Dept: CASE MANAGEMENT | Facility: CLINIC | Age: 59
End: 2024-09-12
Payer: COMMERCIAL

## 2024-09-12 NOTE — TELEPHONE ENCOUNTER
Per chart review patient made no attempts at contact to return calls. Will close HRCM program.    There is no generic yet it is brand new.  If can't afford it, we need to discuss other options.

## 2024-10-03 ENCOUNTER — LAB (OUTPATIENT)
Dept: FAMILY MEDICINE CLINIC | Facility: CLINIC | Age: 59
End: 2024-10-03
Payer: COMMERCIAL

## 2024-10-03 ENCOUNTER — OFFICE VISIT (OUTPATIENT)
Dept: FAMILY MEDICINE CLINIC | Facility: CLINIC | Age: 59
End: 2024-10-03
Payer: COMMERCIAL

## 2024-10-03 VITALS
SYSTOLIC BLOOD PRESSURE: 116 MMHG | OXYGEN SATURATION: 97 % | WEIGHT: 132 LBS | BODY MASS INDEX: 23.39 KG/M2 | TEMPERATURE: 97.8 F | HEART RATE: 75 BPM | DIASTOLIC BLOOD PRESSURE: 73 MMHG

## 2024-10-03 DIAGNOSIS — Z23 NEED FOR IMMUNIZATION AGAINST INFLUENZA: ICD-10-CM

## 2024-10-03 DIAGNOSIS — D64.9 ANEMIA, UNSPECIFIED TYPE: ICD-10-CM

## 2024-10-03 DIAGNOSIS — Z12.31 ENCOUNTER FOR SCREENING MAMMOGRAM FOR MALIGNANT NEOPLASM OF BREAST: ICD-10-CM

## 2024-10-03 DIAGNOSIS — E78.5 HYPERLIPIDEMIA, UNSPECIFIED HYPERLIPIDEMIA TYPE: ICD-10-CM

## 2024-10-03 DIAGNOSIS — F41.9 ANXIETY: ICD-10-CM

## 2024-10-03 DIAGNOSIS — K70.31 ALCOHOLIC CIRRHOSIS OF LIVER WITH ASCITES: Primary | ICD-10-CM

## 2024-10-03 DIAGNOSIS — K70.31 ALCOHOLIC CIRRHOSIS OF LIVER WITH ASCITES: ICD-10-CM

## 2024-10-03 DIAGNOSIS — Z23 NEED FOR TDAP VACCINATION: ICD-10-CM

## 2024-10-03 DIAGNOSIS — G47.00 INSOMNIA, UNSPECIFIED: ICD-10-CM

## 2024-10-03 LAB
ALBUMIN SERPL-MCNC: 4 G/DL (ref 3.5–5.2)
ALBUMIN/GLOB SERPL: 1.1 G/DL
ALP SERPL-CCNC: 149 U/L (ref 39–117)
ALT SERPL W P-5'-P-CCNC: 10 U/L (ref 1–33)
ANION GAP SERPL CALCULATED.3IONS-SCNC: 9.5 MMOL/L (ref 5–15)
AST SERPL-CCNC: 27 U/L (ref 1–32)
BASOPHILS # BLD AUTO: 0.03 10*3/MM3 (ref 0–0.2)
BASOPHILS NFR BLD AUTO: 0.5 % (ref 0–1.5)
BILIRUB SERPL-MCNC: 0.3 MG/DL (ref 0–1.2)
BUN SERPL-MCNC: 11 MG/DL (ref 6–20)
BUN/CREAT SERPL: 11.1 (ref 7–25)
CALCIUM SPEC-SCNC: 9.6 MG/DL (ref 8.6–10.5)
CHLORIDE SERPL-SCNC: 102 MMOL/L (ref 98–107)
CHOLEST SERPL-MCNC: 230 MG/DL (ref 0–200)
CO2 SERPL-SCNC: 28.5 MMOL/L (ref 22–29)
CREAT SERPL-MCNC: 0.99 MG/DL (ref 0.57–1)
DEPRECATED RDW RBC AUTO: 44.9 FL (ref 37–54)
EGFRCR SERPLBLD CKD-EPI 2021: 67.1 ML/MIN/1.73
EOSINOPHIL # BLD AUTO: 0.17 10*3/MM3 (ref 0–0.4)
EOSINOPHIL NFR BLD AUTO: 2.9 % (ref 0.3–6.2)
ERYTHROCYTE [DISTWIDTH] IN BLOOD BY AUTOMATED COUNT: 12.8 % (ref 12.3–15.4)
GLOBULIN UR ELPH-MCNC: 3.8 GM/DL
GLUCOSE SERPL-MCNC: 94 MG/DL (ref 65–99)
HCT VFR BLD AUTO: 33.5 % (ref 34–46.6)
HDLC SERPL-MCNC: 53 MG/DL (ref 40–60)
HGB BLD-MCNC: 11 G/DL (ref 12–15.9)
IMM GRANULOCYTES # BLD AUTO: 0.02 10*3/MM3 (ref 0–0.05)
IMM GRANULOCYTES NFR BLD AUTO: 0.3 % (ref 0–0.5)
LDLC SERPL CALC-MCNC: 137 MG/DL (ref 0–100)
LDLC/HDLC SERPL: 2.5 {RATIO}
LYMPHOCYTES # BLD AUTO: 1.72 10*3/MM3 (ref 0.7–3.1)
LYMPHOCYTES NFR BLD AUTO: 29.3 % (ref 19.6–45.3)
MCH RBC QN AUTO: 32.2 PG (ref 26.6–33)
MCHC RBC AUTO-ENTMCNC: 32.8 G/DL (ref 31.5–35.7)
MCV RBC AUTO: 98 FL (ref 79–97)
MONOCYTES # BLD AUTO: 0.46 10*3/MM3 (ref 0.1–0.9)
MONOCYTES NFR BLD AUTO: 7.8 % (ref 5–12)
NEUTROPHILS NFR BLD AUTO: 3.48 10*3/MM3 (ref 1.7–7)
NEUTROPHILS NFR BLD AUTO: 59.2 % (ref 42.7–76)
NRBC BLD AUTO-RTO: 0 /100 WBC (ref 0–0.2)
PLATELET # BLD AUTO: 67 10*3/MM3 (ref 140–450)
PMV BLD AUTO: 9.7 FL (ref 6–12)
POTASSIUM SERPL-SCNC: 3.5 MMOL/L (ref 3.5–5.2)
PROT SERPL-MCNC: 7.8 G/DL (ref 6–8.5)
RBC # BLD AUTO: 3.42 10*6/MM3 (ref 3.77–5.28)
SODIUM SERPL-SCNC: 140 MMOL/L (ref 136–145)
TRIGL SERPL-MCNC: 223 MG/DL (ref 0–150)
VLDLC SERPL-MCNC: 40 MG/DL (ref 5–40)
WBC NRBC COR # BLD AUTO: 5.88 10*3/MM3 (ref 3.4–10.8)

## 2024-10-03 PROCEDURE — 90656 IIV3 VACC NO PRSV 0.5 ML IM: CPT | Performed by: NURSE PRACTITIONER

## 2024-10-03 PROCEDURE — 36415 COLL VENOUS BLD VENIPUNCTURE: CPT | Performed by: NURSE PRACTITIONER

## 2024-10-03 PROCEDURE — 99214 OFFICE O/P EST MOD 30 MIN: CPT | Performed by: NURSE PRACTITIONER

## 2024-10-03 PROCEDURE — 90472 IMMUNIZATION ADMIN EACH ADD: CPT | Performed by: NURSE PRACTITIONER

## 2024-10-03 PROCEDURE — 80053 COMPREHEN METABOLIC PANEL: CPT | Performed by: NURSE PRACTITIONER

## 2024-10-03 PROCEDURE — 80061 LIPID PANEL: CPT | Performed by: NURSE PRACTITIONER

## 2024-10-03 PROCEDURE — 85025 COMPLETE CBC W/AUTO DIFF WBC: CPT | Performed by: NURSE PRACTITIONER

## 2024-10-03 PROCEDURE — 90471 IMMUNIZATION ADMIN: CPT | Performed by: NURSE PRACTITIONER

## 2024-10-03 PROCEDURE — 90715 TDAP VACCINE 7 YRS/> IM: CPT | Performed by: NURSE PRACTITIONER

## 2024-10-03 RX ORDER — BUSPIRONE HYDROCHLORIDE 15 MG/1
15 TABLET ORAL 2 TIMES DAILY
Qty: 180 TABLET | Refills: 1 | Status: SHIPPED | OUTPATIENT
Start: 2024-10-03

## 2024-10-03 RX ORDER — QUETIAPINE FUMARATE 50 MG/1
100 TABLET, FILM COATED ORAL
Qty: 180 TABLET | Refills: 1 | Status: SHIPPED | OUTPATIENT
Start: 2024-10-03

## 2024-10-03 RX ORDER — OXYCODONE AND ACETAMINOPHEN 5; 325 MG/1; MG/1
1 TABLET ORAL EVERY 4 HOURS PRN
COMMUNITY
Start: 2024-10-01

## 2024-10-03 RX ORDER — SERTRALINE HYDROCHLORIDE 100 MG/1
100 TABLET, FILM COATED ORAL 2 TIMES DAILY
Qty: 180 TABLET | Refills: 1 | Status: SHIPPED | OUTPATIENT
Start: 2024-10-03

## 2024-10-03 NOTE — PROGRESS NOTES
Subjective     Lita Yu is a 58 y.o. female.     History of Present Illness  Patient is here today for follow-up on chronic medical conditions.  Patient had a right total hip replacement.  She is doing well  Doesn't need walker anymore  She still has pain but its improving.   She has been sober from alcohol since July 20th.   She has pain meds if needed  She started PT this week.     Osteomyelitis of spine-patient had osteomyelitis of the spine with discitis earlier this year.  She completed numerous rounds of IV antibiotics.  She has seen a spine surgeon since then.  Everything stabilized.    Insomnia-patient is currently on Seroquel 100mg nightly but hasn't been taking it if she takes the pain meds.     Anxiety/depression-patient is currently on Zoloft 100 mg twice daily and BuSpar 15 mg twice daily.  Patient has a history of alcohol abuse. Mood is doing well. Denies SI or HI.     Liver cirrhosis-patient has a history of alcoholism.  She is current with GI.  She saw them in August. Had an US which patient reports was good and an EGD is scheduled in Dec.    Anemia- has had anemia believe it is due to the liver.      Labs-due  Pap smear- will schedule  Mammogram- due  DEXA- 10/10/23 osteoporosis  Colonoscopy-    Vaccines:  Flu- due  PNA-  Shingles- due  Tdap- due  Covid-19-    Dental exam-  Eye exam-       The following portions of the patient's history were reviewed and updated as appropriate: allergies, current medications, past family history, past medical history, past social history, past surgical history, and problem list.    Review of Systems   Constitutional:  Negative for chills, fatigue and fever.   Respiratory:  Negative for chest tightness and shortness of breath.    Cardiovascular:  Negative for chest pain and palpitations.   Gastrointestinal:  Negative for abdominal pain, nausea and vomiting.   Musculoskeletal:  Positive for arthralgias and back pain.   Neurological:  Positive for  headache. Negative for dizziness.   Psychiatric/Behavioral:  Positive for depressed mood and stress. Negative for self-injury and suicidal ideas. The patient is nervous/anxious.        Objective     /73   Pulse 75   Temp 97.8 °F (36.6 °C) (Tympanic)   Wt 59.9 kg (132 lb)   SpO2 97%   BMI 23.39 kg/m²     Current Outpatient Medications on File Prior to Visit   Medication Sig Dispense Refill    oxyCODONE-acetaminophen (PERCOCET) 5-325 MG per tablet Take 1 tablet by mouth Every 4 (Four) Hours As Needed. Every 4 to 6 hours      albuterol sulfate  (90 Base) MCG/ACT inhaler Inhale 2 puffs Every 4 (Four) Hours As Needed for Wheezing.      HYDROcodone-acetaminophen (Norco)  MG per tablet Take 1 tablet by mouth Every 4 (Four) Hours As Needed for Moderate Pain. 42 tablet 0    multivitamin (THERAGRAN) tablet tablet Take 1 tablet by mouth Daily.      naloxone (NARCAN) 4 MG/0.1ML nasal spray Call 911. Don't prime. [place] in 1 nostril for overdose. Repeat in 2-3 minutes in other nostril if no or minimal breathing/responsiveness. 2 each 0    [DISCONTINUED] busPIRone (BUSPAR) 15 MG tablet TAKE 1 TABLET BY MOUTH TWICE A  tablet 1    [DISCONTINUED] Cyanocobalamin (Vitamin B-12) 1000 MCG sublingual tablet Place 1 tablet under the tongue Daily.      [DISCONTINUED] folic acid (FOLVITE) 1 MG tablet Take 1 tablet by mouth Daily. 30 tablet 0    [DISCONTINUED] melatonin 5 MG tablet tablet Take 1 tablet by mouth At Night As Needed (sleep).      [DISCONTINUED] QUEtiapine (SEROquel) 50 MG tablet TAKE 2 TABLETS BY MOUTH EVERY NIGHT AT BEDTIME 180 tablet 1    [DISCONTINUED] sertraline (ZOLOFT) 100 MG tablet TAKE 1 TABLET BY MOUTH TWICE A  tablet 1    [DISCONTINUED] sertraline (Zoloft) 100 MG tablet 1 tablet 2 TIMES DAILY (route: oral)      [DISCONTINUED] thiamine (VITAMIN B1) 100 MG tablet Take 1 tablet by mouth Daily. 30 tablet 0    [DISCONTINUED] tiZANidine (ZANAFLEX) 4 MG tablet Take 1 tablet by mouth Every  12 (Twelve) Hours As Needed for Muscle Spasms. 14 tablet 0     No current facility-administered medications on file prior to visit.        BMI is within normal parameters. No other follow-up for BMI required.       Physical Exam  Constitutional:       General: She is not in acute distress.     Appearance: Normal appearance. She is not ill-appearing.   HENT:      Head: Normocephalic and atraumatic.   Eyes:      Extraocular Movements: Extraocular movements intact.   Cardiovascular:      Rate and Rhythm: Normal rate and regular rhythm.      Heart sounds: No murmur heard.  Pulmonary:      Effort: Pulmonary effort is normal. No respiratory distress.   Neurological:      General: No focal deficit present.      Mental Status: She is alert and oriented to person, place, and time.   Psychiatric:         Mood and Affect: Mood normal.         Behavior: Behavior normal.         Thought Content: Thought content normal.         Judgment: Judgment normal.           Assessment & Plan     Diagnoses and all orders for this visit:    1. Alcoholic cirrhosis of liver with ascites (Primary)  Comments:  stable  sees GI  had recent US  check labs  sober since July  Orders:  -     Comprehensive Metabolic Panel; Future  -     Lipid Panel; Future  -     CBC & Differential    2. Encounter for screening mammogram for malignant neoplasm of breast  -     Mammo Screening Digital Tomosynthesis Bilateral With CAD; Future    3. Anxiety  Comments:  stable  cont zoloft and buspar  denies SI or HI  Orders:  -     sertraline (Zoloft) 100 MG tablet; Take 1 tablet by mouth 2 (Two) Times a Day.  Dispense: 180 tablet; Refill: 1  -     busPIRone (BUSPAR) 15 MG tablet; Take 1 tablet by mouth 2 (Two) Times a Day.  Dispense: 180 tablet; Refill: 1    4. Hyperlipidemia, unspecified hyperlipidemia type  Comments:  stable  work on diet and exercise  check labs  Orders:  -     Comprehensive Metabolic Panel; Future  -     Lipid Panel; Future  -     CBC &  Differential    5. Anemia, unspecified type  Comments:  has seen hematology  has upcoming EGD  check labs  Orders:  -     CBC & Differential    6. Insomnia, unspecified  Comments:  stable  takes trazodone when not on pain mens  Orders:  -     QUEtiapine (SEROquel) 50 MG tablet; Take 2 tablets by mouth every night at bedtime.  Dispense: 180 tablet; Refill: 1    7. Need for Tdap vaccination  -     Tdap Vaccine => 8yo IM (BOOSTRIX/ADACEL)    8. Need for immunization against influenza  -     Fluzone >6mos

## 2024-10-16 ENCOUNTER — E-VISIT (OUTPATIENT)
Dept: ADMINISTRATIVE | Facility: OTHER | Age: 59
End: 2024-10-16
Payer: COMMERCIAL

## 2024-10-16 NOTE — E-VISIT ESCALATED
Status: Referred Out  Date: 10/16/2024 16:29:32  Acuity Level: Not applicable  Referral message: Based on the information you provided during your interview, eVisit is not appropriate for treating your condition.  Patient: Lita Yu  Patient : 1965  Patient Address: [address] [place] [address] [address], [place] [address]  Patient Phone: [phone]  Clinician Response: Unavailable  Diagnosis: Unavailable  Diagnosis ICD: Unavailable     Patient Interview Questions and Responses: None available

## 2024-10-17 ENCOUNTER — LAB (OUTPATIENT)
Dept: FAMILY MEDICINE CLINIC | Facility: CLINIC | Age: 59
End: 2024-10-17
Payer: COMMERCIAL

## 2024-10-17 ENCOUNTER — OFFICE VISIT (OUTPATIENT)
Dept: FAMILY MEDICINE CLINIC | Facility: CLINIC | Age: 59
End: 2024-10-17
Payer: COMMERCIAL

## 2024-10-17 VITALS
DIASTOLIC BLOOD PRESSURE: 87 MMHG | TEMPERATURE: 97.7 F | HEART RATE: 73 BPM | BODY MASS INDEX: 24.03 KG/M2 | HEIGHT: 63 IN | SYSTOLIC BLOOD PRESSURE: 142 MMHG | WEIGHT: 135.6 LBS | OXYGEN SATURATION: 99 % | RESPIRATION RATE: 20 BRPM

## 2024-10-17 DIAGNOSIS — M25.562 LEFT MEDIAL KNEE PAIN: Primary | ICD-10-CM

## 2024-10-17 LAB
BASOPHILS # BLD AUTO: 0.03 10*3/MM3 (ref 0–0.2)
BASOPHILS NFR BLD AUTO: 0.6 % (ref 0–1.5)
CRP SERPL-MCNC: 2.63 MG/DL (ref 0–0.5)
DEPRECATED RDW RBC AUTO: 42.7 FL (ref 37–54)
EOSINOPHIL # BLD AUTO: 0.21 10*3/MM3 (ref 0–0.4)
EOSINOPHIL NFR BLD AUTO: 4.2 % (ref 0.3–6.2)
ERYTHROCYTE [DISTWIDTH] IN BLOOD BY AUTOMATED COUNT: 12 % (ref 12.3–15.4)
ERYTHROCYTE [SEDIMENTATION RATE] IN BLOOD: 33 MM/HR (ref 0–30)
HCT VFR BLD AUTO: 34.6 % (ref 34–46.6)
HGB BLD-MCNC: 11.4 G/DL (ref 12–15.9)
IMM GRANULOCYTES # BLD AUTO: 0.01 10*3/MM3 (ref 0–0.05)
IMM GRANULOCYTES NFR BLD AUTO: 0.2 % (ref 0–0.5)
LYMPHOCYTES # BLD AUTO: 1.73 10*3/MM3 (ref 0.7–3.1)
LYMPHOCYTES NFR BLD AUTO: 34.9 % (ref 19.6–45.3)
MCH RBC QN AUTO: 32.1 PG (ref 26.6–33)
MCHC RBC AUTO-ENTMCNC: 32.9 G/DL (ref 31.5–35.7)
MCV RBC AUTO: 97.5 FL (ref 79–97)
MONOCYTES # BLD AUTO: 0.42 10*3/MM3 (ref 0.1–0.9)
MONOCYTES NFR BLD AUTO: 8.5 % (ref 5–12)
NEUTROPHILS NFR BLD AUTO: 2.56 10*3/MM3 (ref 1.7–7)
NEUTROPHILS NFR BLD AUTO: 51.6 % (ref 42.7–76)
NRBC BLD AUTO-RTO: 0 /100 WBC (ref 0–0.2)
PLATELET # BLD AUTO: 120 10*3/MM3 (ref 140–450)
PMV BLD AUTO: 10.4 FL (ref 6–12)
RBC # BLD AUTO: 3.55 10*6/MM3 (ref 3.77–5.28)
WBC NRBC COR # BLD AUTO: 4.96 10*3/MM3 (ref 3.4–10.8)

## 2024-10-17 PROCEDURE — 99213 OFFICE O/P EST LOW 20 MIN: CPT | Performed by: PHYSICIAN ASSISTANT

## 2024-10-17 PROCEDURE — 85652 RBC SED RATE AUTOMATED: CPT | Performed by: PHYSICIAN ASSISTANT

## 2024-10-17 PROCEDURE — 86140 C-REACTIVE PROTEIN: CPT | Performed by: PHYSICIAN ASSISTANT

## 2024-10-17 PROCEDURE — 85025 COMPLETE CBC W/AUTO DIFF WBC: CPT | Performed by: PHYSICIAN ASSISTANT

## 2024-10-17 PROCEDURE — 36415 COLL VENOUS BLD VENIPUNCTURE: CPT | Performed by: PHYSICIAN ASSISTANT

## 2024-10-17 NOTE — PROGRESS NOTES
Berkley Yu is a 59 y.o. female.     History of Present Illness  Pt presents with left knee pain since 10/16/24. She was walking like normal and all of a sudden felt bad pain in her left knee on the medial aspect.  She has been having to use a walker since.  No fevers.  She has been applying ice to it.  She denies any known injury.           Past Medical History:   Diagnosis Date    Cirrhosis     COPD (chronic obstructive pulmonary disease)     Depression     GERD (gastroesophageal reflux disease)     Hyperlipidemia     Hypertension     PTSD (post-traumatic stress disorder)      Past Surgical History:   Procedure Laterality Date     SECTION N/A     COLONOSCOPY N/A 2021    Procedure: COLONOSCOPY with polypectomy x2 and random biopsy;  Surgeon: Osman Clay MD;  Location: [facility] ENDOSCOPY;  Service: Gastroenterology;  Laterality: N/A;  colon polyps    DENTAL PROCEDURE      ENDOSCOPY N/A 2021    Procedure: ESOPHAGOGASTRODUODENOSCOPY;  Surgeon: Osman Clay MD;  Location: [facility] ENDOSCOPY;  Service: Gastroenterology;  Laterality: N/A;  esophagitis    JOINT REPLACEMENT      REPLACEMENT TOTAL HIP LATERAL POSITION Left     TONSILLECTOMY      TOTAL HIP ARTHROPLASTY Right 2024    Procedure: TOTAL HIP ARTHROPLASTY ANTERIOR;  Surgeon: Cristobal Chen II, MD;  Location: [facility] MAIN OR;  Service: Orthopedics;  Laterality: Right;    TOTAL HIP ARTHROPLASTY REVISION Right 2024    Procedure: TOTAL HIP ARTHROPLASTY REVISION;  Surgeon: Cristobal Chen II, MD;  Location: [facility] OR OSC;  Service: Orthopedics;  Laterality: Right;    VAGINAL BIRTH AFTER  SECTION       Family History   Problem Relation Age of Onset    Alcohol abuse Mother     Alcohol abuse Paternal Grandfather      Social History     Socioeconomic History    Marital status:    Tobacco Use    Smoking status: Every Day     Current packs/day: 0.50     Types: Cigarettes     Passive  "exposure: Current    Smokeless tobacco: Never    Tobacco comments:     3cigs   Vaping Use    Vaping status: Never Used   Substance and Sexual Activity    Alcohol use: Not Currently     Alcohol/week: 2.0 standard drinks of alcohol     Types: 2 Cans of beer per week     Comment: pint daily    Drug use: No    Sexual activity: Yes     Partners: Male     Birth control/protection: Post-menopausal         Current Outpatient Medications:     albuterol sulfate  (90 Base) MCG/ACT inhaler, Inhale 2 puffs Every 4 (Four) Hours As Needed for Wheezing., Disp: , Rfl:     busPIRone (BUSPAR) 15 MG tablet, Take 1 tablet by mouth 2 (Two) Times a Day., Disp: 180 tablet, Rfl: 1    HYDROcodone-acetaminophen (Norco)  MG per tablet, Take 1 tablet by mouth Every 4 (Four) Hours As Needed for Moderate Pain., Disp: 42 tablet, Rfl: 0    multivitamin (THERAGRAN) tablet tablet, Take 1 tablet by mouth Daily., Disp: , Rfl:     naloxone (NARCAN) 4 MG/0.1ML nasal spray, Call 911. Don't prime. [place] in 1 nostril for overdose. Repeat in 2-3 minutes in other nostril if no or minimal breathing/responsiveness., Disp: 2 each, Rfl: 0    QUEtiapine (SEROquel) 50 MG tablet, Take 2 tablets by mouth every night at bedtime., Disp: 180 tablet, Rfl: 1    sertraline (Zoloft) 100 MG tablet, Take 1 tablet by mouth 2 (Two) Times a Day., Disp: 180 tablet, Rfl: 1    oxyCODONE-acetaminophen (PERCOCET) 5-325 MG per tablet, Take 1 tablet by mouth Every 4 (Four) Hours As Needed. Every 4 to 6 hours (Patient not taking: Reported on 10/17/2024), Disp: , Rfl:     Review of Systems   Constitutional:  Negative for activity change, appetite change, chills, diaphoresis, fatigue, fever, unexpected weight gain and unexpected weight loss.   Musculoskeletal:  Positive for arthralgias and gait problem.     /87 (BP Location: Left arm, Patient Position: Sitting, Cuff Size: Adult)   Pulse 73   Temp 97.7 °F (36.5 °C) (Temporal)   Resp 20   Ht 160 cm (62.99\")   Wt 61.5 " kg (135 lb 9.6 oz)   SpO2 99%   BMI 24.03 kg/m²       Objective   Physical Exam  Vitals and nursing note reviewed.   Constitutional:       Appearance: Normal appearance. She is normal weight.   Musculoskeletal:         General: Tenderness present.      Left knee: Bony tenderness present. No swelling, deformity or erythema. Normal range of motion. Tenderness present over the medial joint line.   Skin:     General: Skin is warm and dry.      Findings: No erythema.   Neurological:      General: No focal deficit present.      Mental Status: She is alert and oriented to person, place, and time.   Psychiatric:         Mood and Affect: Mood normal.         Behavior: Behavior normal.         Thought Content: Thought content normal.         Procedures     Assessment    Diagnoses and all orders for this visit:    1. Left medial knee pain (Primary)  Comments:  Will check xrays and labs due to pt's history.  Advised that if any worsening pain, swelling, redness, fevers, she has to go to the ER.  Orders:  -     XR Knee 3 View Left; Future  -     CBC w AUTO Differential  -     Sedimentation rate, automated  -     C-reactive protein

## 2024-10-23 ENCOUNTER — TELEPHONE (OUTPATIENT)
Dept: FAMILY MEDICINE CLINIC | Facility: CLINIC | Age: 59
End: 2024-10-23
Payer: COMMERCIAL

## 2024-10-23 NOTE — TELEPHONE ENCOUNTER
"  Caller: Lita Yu \"NAINA\"    Relationship to patient: Self    Best call back number: [phone]    Patient is needing:     WAS IN ON 10.17.24 AND STATES SHE IS STILL EXPERIENCING THE SWOLLEN AND PAINFUL LEFT KNEE.     SHE TRIED TO GO TO PHYSICAL THERAPY BUT IT IS TOO PAINFUL TO DO ANY WORK WITH THEM.     PLEASE CALL TO ADVISE          "

## 2024-10-23 NOTE — TELEPHONE ENCOUNTER
It looks like Loraine saw her. I can't say since I didn't see it. She did advise if anything worsened or didn't improve to go to the ER with her history, which I also recommend

## 2024-11-15 ENCOUNTER — LAB (OUTPATIENT)
Dept: FAMILY MEDICINE CLINIC | Facility: CLINIC | Age: 59
End: 2024-11-15
Payer: COMMERCIAL

## 2024-11-15 ENCOUNTER — OFFICE VISIT (OUTPATIENT)
Dept: FAMILY MEDICINE CLINIC | Facility: CLINIC | Age: 59
End: 2024-11-15
Payer: COMMERCIAL

## 2024-11-15 VITALS
BODY MASS INDEX: 23.74 KG/M2 | OXYGEN SATURATION: 98 % | TEMPERATURE: 97.8 F | DIASTOLIC BLOOD PRESSURE: 99 MMHG | WEIGHT: 134 LBS | HEART RATE: 100 BPM | HEIGHT: 63 IN | SYSTOLIC BLOOD PRESSURE: 173 MMHG

## 2024-11-15 DIAGNOSIS — R05.9 COUGH, UNSPECIFIED TYPE: ICD-10-CM

## 2024-11-15 DIAGNOSIS — R05.9 COUGH, UNSPECIFIED TYPE: Primary | ICD-10-CM

## 2024-11-15 LAB
BASOPHILS # BLD AUTO: 0.04 10*3/MM3 (ref 0–0.2)
BASOPHILS NFR BLD AUTO: 0.6 % (ref 0–1.5)
DEPRECATED RDW RBC AUTO: 43.4 FL (ref 37–54)
EOSINOPHIL # BLD AUTO: 0.18 10*3/MM3 (ref 0–0.4)
EOSINOPHIL NFR BLD AUTO: 2.9 % (ref 0.3–6.2)
ERYTHROCYTE [DISTWIDTH] IN BLOOD BY AUTOMATED COUNT: 12.5 % (ref 12.3–15.4)
HCT VFR BLD AUTO: 34.8 % (ref 34–46.6)
HGB BLD-MCNC: 11.6 G/DL (ref 12–15.9)
IMM GRANULOCYTES # BLD AUTO: 0.02 10*3/MM3 (ref 0–0.05)
IMM GRANULOCYTES NFR BLD AUTO: 0.3 % (ref 0–0.5)
LYMPHOCYTES # BLD AUTO: 2.35 10*3/MM3 (ref 0.7–3.1)
LYMPHOCYTES NFR BLD AUTO: 37.3 % (ref 19.6–45.3)
MCH RBC QN AUTO: 32 PG (ref 26.6–33)
MCHC RBC AUTO-ENTMCNC: 33.3 G/DL (ref 31.5–35.7)
MCV RBC AUTO: 96.1 FL (ref 79–97)
MONOCYTES # BLD AUTO: 0.55 10*3/MM3 (ref 0.1–0.9)
MONOCYTES NFR BLD AUTO: 8.7 % (ref 5–12)
NEUTROPHILS NFR BLD AUTO: 3.16 10*3/MM3 (ref 1.7–7)
NEUTROPHILS NFR BLD AUTO: 50.2 % (ref 42.7–76)
NRBC BLD AUTO-RTO: 0 /100 WBC (ref 0–0.2)
PLATELET # BLD AUTO: 125 10*3/MM3 (ref 140–450)
PMV BLD AUTO: 10.4 FL (ref 6–12)
RBC # BLD AUTO: 3.62 10*6/MM3 (ref 3.77–5.28)
WBC NRBC COR # BLD AUTO: 6.3 10*3/MM3 (ref 3.4–10.8)

## 2024-11-15 PROCEDURE — 36415 COLL VENOUS BLD VENIPUNCTURE: CPT

## 2024-11-15 PROCEDURE — 85025 COMPLETE CBC W/AUTO DIFF WBC: CPT | Performed by: NURSE PRACTITIONER

## 2024-11-15 PROCEDURE — 99213 OFFICE O/P EST LOW 20 MIN: CPT | Performed by: NURSE PRACTITIONER

## 2024-11-15 RX ORDER — BENZONATATE 100 MG/1
100 CAPSULE ORAL 3 TIMES DAILY PRN
Qty: 30 CAPSULE | Refills: 0 | Status: SHIPPED | OUTPATIENT
Start: 2024-11-15

## 2024-11-15 NOTE — PROGRESS NOTES
Subjective   Lita Yu is a 59 y.o. female.       HPI   Pt is here today with concern of a cough.    Symptoms started a week ago.    She was near someone who had pneumonia 10 days ago; she says this friend has E. Coli that causes infection.    She reports her cough is productive at times and dry at other times.  She is a daily smoker.  She has pressure in ears and headache.  No sore throat.  No fevers.         The following portions of the patient's history were reviewed and updated as appropriate: allergies, current medications, past family history, past medical history, past social history, past surgical history, and problem list.    Review of Systems   Constitutional:  Negative for chills, fatigue and fever.   HENT:  Positive for ear pain (fullness). Negative for congestion, ear discharge, nosebleeds, postnasal drip, rhinorrhea, sinus pressure, sneezing and sore throat.    Respiratory:  Positive for cough. Negative for shortness of breath and wheezing.    Cardiovascular:  Negative for chest pain and palpitations.   Gastrointestinal:  Negative for diarrhea, nausea and vomiting.   Genitourinary:  Negative for dysuria, frequency, hematuria and urgency.   Neurological:  Positive for headache. Negative for dizziness, weakness and light-headedness.   Psychiatric/Behavioral:  Negative for depressed mood. The patient is not nervous/anxious.        Objective   Physical Exam  Vitals reviewed.   Constitutional:       General: She is not in acute distress.     Appearance: Normal appearance.   HENT:      Head: Normocephalic and atraumatic.      Right Ear: Hearing, tympanic membrane, ear canal and external ear normal.      Left Ear: Hearing, tympanic membrane, ear canal and external ear normal.      Nose: Nose normal. Rhinorrhea present.      Right Sinus: No maxillary sinus tenderness or frontal sinus tenderness.      Left Sinus: No maxillary sinus tenderness or frontal sinus tenderness.      Mouth/Throat:       Lips: Pink.      Mouth: Mucous membranes are moist.      Pharynx: Postnasal drip present. No pharyngeal swelling, oropharyngeal exudate, posterior oropharyngeal erythema or uvula swelling.   Eyes:      General:         Right eye: No discharge.         Left eye: No discharge.      Conjunctiva/sclera: Conjunctivae normal.   Cardiovascular:      Rate and Rhythm: Normal rate and regular rhythm.      Pulses: Normal pulses.      Heart sounds: Normal heart sounds. No murmur heard.  Pulmonary:      Effort: Pulmonary effort is normal. No respiratory distress.      Breath sounds: Normal breath sounds. No wheezing or rhonchi.   Chest:      Chest wall: No tenderness.   Abdominal:      Tenderness: There is no right CVA tenderness or left CVA tenderness.   Musculoskeletal:      Cervical back: Normal range of motion and neck supple. No tenderness.   Lymphadenopathy:      Cervical: No cervical adenopathy.   Neurological:      General: No focal deficit present.      Mental Status: She is alert and oriented to person, place, and time.   Psychiatric:         Mood and Affect: Mood normal.         BMI is within normal parameters. No other follow-up for BMI required.       Procedures   Assessment & Plan   Diagnoses and all orders for this visit:    1. Cough, unspecified type (Primary)  Comments:  Will get CBC today.   CXR ordered but patient wants to wait on blood result before getting that.    Tessalon Perles sent in for cough.    Increase fluids.  Orders:  -     XR Chest PA & Lateral; Future  -     CBC w AUTO Differential; Future  -     benzonatate (Tessalon Perles) 100 MG capsule; Take 1 capsule by mouth 3 (Three) Times a Day As Needed for Cough.  Dispense: 30 capsule; Refill: 0

## 2024-12-20 ENCOUNTER — TELEPHONE (OUTPATIENT)
Dept: FAMILY MEDICINE CLINIC | Facility: CLINIC | Age: 59
End: 2024-12-20
Payer: COMMERCIAL

## 2024-12-20 NOTE — TELEPHONE ENCOUNTER
"Caller: Lita Yu \"NAINA\"    Relationship: Self    Best call back number: [phone]     What is the best time to reach you: ANY    Who are you requesting to speak with (clinical staff, provider,  specific staff member): FLASH    Do you know the name of the person who called:     What was the call regarding: PATIENT IS SLEEPING 10 OR MORE HOURS A DAY AND STILL WAKES UP FEELING EXHAUSTED. SHE IS EXHAUSTED ALL THE TIME. SHE THINKS HER BLOOD MIGHT BE LOW. I ADVISED PATIENT TO GO TO ER. SHE WOULD PREFER TO SPEAK WITH FLASH AND SEE WHAT SHE NEEDS TO DO.    Is it okay if the provider responds through MyChart:       "

## 2024-12-26 NOTE — PROGRESS NOTES
Subjective     Lita Yu is a 59 y.o. female.     History of Present Illness  Patient is here today with complaints of fatigue.  She states it has been going on for several weeks  Denies any recent illnesses  Denies black stools or vomiting  She gets about 11 hours of sleep  She states it is broken sleep sometimes  She does not snore  Denies vaginal bleeding  Reports she is still sober from alcohol  She has an EGD scheduled for Feb due to anemia  She was previously on protonix for 1 mo. Will restart  Occasionally takes BC powder for HA    BP is high again  She does not monitor  Tolerated lisinopril in the past.        The following portions of the patient's history were reviewed and updated as appropriate: allergies, current medications, past family history, past medical history, past social history, past surgical history, and problem list.    Review of Systems   Constitutional:  Positive for fatigue. Negative for chills and fever.   Respiratory:  Negative for chest tightness and shortness of breath.    Cardiovascular:  Negative for chest pain and palpitations.   Neurological:  Positive for dizziness. Negative for headache.       Objective     /86   Pulse 80   Temp 98.4 °F (36.9 °C) (Tympanic)   Wt 61.2 kg (135 lb)   SpO2 98%   BMI 23.91 kg/m²     Current Outpatient Medications on File Prior to Visit   Medication Sig Dispense Refill    albuterol sulfate  (90 Base) MCG/ACT inhaler Inhale 2 puffs Every 4 (Four) Hours As Needed for Wheezing.      benzonatate (Tessalon Perles) 100 MG capsule Take 1 capsule by mouth 3 (Three) Times a Day As Needed for Cough. 30 capsule 0    busPIRone (BUSPAR) 15 MG tablet Take 1 tablet by mouth 2 (Two) Times a Day. 180 tablet 1    HYDROcodone-acetaminophen (Norco)  MG per tablet Take 1 tablet by mouth Every 4 (Four) Hours As Needed for Moderate Pain. 42 tablet 0    multivitamin (THERAGRAN) tablet tablet Take 1 tablet by mouth Daily.      naloxone  (NARCAN) 4 MG/0.1ML nasal spray Call 911. Don't prime. [place] in 1 nostril for overdose. Repeat in 2-3 minutes in other nostril if no or minimal breathing/responsiveness. 2 each 0    QUEtiapine (SEROquel) 50 MG tablet Take 2 tablets by mouth every night at bedtime. 180 tablet 1    sertraline (Zoloft) 100 MG tablet Take 1 tablet by mouth 2 (Two) Times a Day. 180 tablet 1     No current facility-administered medications on file prior to visit.        BMI is within normal parameters. No other follow-up for BMI required.       Physical Exam  Constitutional:       General: She is not in acute distress.     Appearance: Normal appearance. She is not ill-appearing.   HENT:      Head: Normocephalic and atraumatic.   Eyes:      Extraocular Movements: Extraocular movements intact.   Cardiovascular:      Rate and Rhythm: Normal rate and regular rhythm.      Heart sounds: No murmur heard.  Pulmonary:      Effort: Pulmonary effort is normal. No respiratory distress.   Skin:     General: Skin is warm and dry.   Neurological:      General: No focal deficit present.      Mental Status: She is alert and oriented to person, place, and time.   Psychiatric:         Mood and Affect: Mood normal.         Behavior: Behavior normal.         Thought Content: Thought content normal.         Judgment: Judgment normal.           Assessment & Plan     Diagnoses and all orders for this visit:    1. Other fatigue (Primary)  Comments:  has history of anemia  check labs  has upcoming EGD  restart protonix  Orders:  -     CBC and Differential; Future  -     Comprehensive metabolic panel; Future  -     Iron; Future  -     Vitamin B12; Future  -     Vitamin D 1,25 dihydroxy; Future  -     TSH; Future  -     pantoprazole (Protonix) 40 MG EC tablet; Take 1 tablet by mouth Daily.  Dispense: 90 tablet; Refill: 1    2. Hypertension, unspecified type  Comments:  elevated  restart lisinopril  f/u 1 mo  Orders:  -     lisinopril (PRINIVIL,ZESTRIL) 10 MG tablet;  Take 1 tablet by mouth Daily.  Dispense: 30 tablet; Refill: 1

## 2024-12-27 ENCOUNTER — OFFICE VISIT (OUTPATIENT)
Dept: FAMILY MEDICINE CLINIC | Facility: CLINIC | Age: 59
End: 2024-12-27
Payer: COMMERCIAL

## 2024-12-27 VITALS
WEIGHT: 135 LBS | TEMPERATURE: 98.4 F | DIASTOLIC BLOOD PRESSURE: 86 MMHG | SYSTOLIC BLOOD PRESSURE: 150 MMHG | BODY MASS INDEX: 23.91 KG/M2 | OXYGEN SATURATION: 98 % | HEART RATE: 80 BPM

## 2024-12-27 DIAGNOSIS — R53.83 OTHER FATIGUE: Primary | ICD-10-CM

## 2024-12-27 DIAGNOSIS — I10 HYPERTENSION, UNSPECIFIED TYPE: ICD-10-CM

## 2024-12-27 PROCEDURE — 99214 OFFICE O/P EST MOD 30 MIN: CPT | Performed by: NURSE PRACTITIONER

## 2024-12-27 RX ORDER — LISINOPRIL 10 MG/1
10 TABLET ORAL DAILY
Qty: 30 TABLET | Refills: 1 | Status: SHIPPED | OUTPATIENT
Start: 2024-12-27

## 2024-12-27 RX ORDER — PANTOPRAZOLE SODIUM 40 MG/1
40 TABLET, DELAYED RELEASE ORAL DAILY
Qty: 90 TABLET | Refills: 1 | Status: SHIPPED | OUTPATIENT
Start: 2024-12-27

## 2024-12-30 ENCOUNTER — LAB (OUTPATIENT)
Dept: FAMILY MEDICINE CLINIC | Facility: CLINIC | Age: 59
End: 2024-12-30
Payer: COMMERCIAL

## 2024-12-30 ENCOUNTER — TELEPHONE (OUTPATIENT)
Dept: FAMILY MEDICINE CLINIC | Facility: CLINIC | Age: 59
End: 2024-12-30
Payer: COMMERCIAL

## 2024-12-30 DIAGNOSIS — R53.83 OTHER FATIGUE: ICD-10-CM

## 2024-12-30 LAB
ALBUMIN SERPL-MCNC: 3.8 G/DL (ref 3.5–5.2)
ALBUMIN/GLOB SERPL: 1.1 G/DL
ALP SERPL-CCNC: 154 U/L (ref 39–117)
ALT SERPL W P-5'-P-CCNC: 15 U/L (ref 1–33)
ANION GAP SERPL CALCULATED.3IONS-SCNC: 9.7 MMOL/L (ref 5–15)
AST SERPL-CCNC: 26 U/L (ref 1–32)
BASOPHILS # BLD AUTO: 0.02 10*3/MM3 (ref 0–0.2)
BASOPHILS NFR BLD AUTO: 0.4 % (ref 0–1.5)
BILIRUB SERPL-MCNC: 0.4 MG/DL (ref 0–1.2)
BUN SERPL-MCNC: 10 MG/DL (ref 6–20)
BUN/CREAT SERPL: 10 (ref 7–25)
CALCIUM SPEC-SCNC: 9.1 MG/DL (ref 8.6–10.5)
CHLORIDE SERPL-SCNC: 105 MMOL/L (ref 98–107)
CO2 SERPL-SCNC: 24.3 MMOL/L (ref 22–29)
CREAT SERPL-MCNC: 1 MG/DL (ref 0.57–1)
DEPRECATED RDW RBC AUTO: 51.2 FL (ref 37–54)
EGFRCR SERPLBLD CKD-EPI 2021: 65.8 ML/MIN/1.73
EOSINOPHIL # BLD AUTO: 0.12 10*3/MM3 (ref 0–0.4)
EOSINOPHIL NFR BLD AUTO: 2.6 % (ref 0.3–6.2)
ERYTHROCYTE [DISTWIDTH] IN BLOOD BY AUTOMATED COUNT: 13.8 % (ref 12.3–15.4)
GLOBULIN UR ELPH-MCNC: 3.6 GM/DL
GLUCOSE SERPL-MCNC: 86 MG/DL (ref 65–99)
HCT VFR BLD AUTO: 32.7 % (ref 34–46.6)
HGB BLD-MCNC: 10.3 G/DL (ref 12–15.9)
IMM GRANULOCYTES # BLD AUTO: 0.05 10*3/MM3 (ref 0–0.05)
IMM GRANULOCYTES NFR BLD AUTO: 1.1 % (ref 0–0.5)
IRON 24H UR-MRATE: 137 MCG/DL (ref 37–145)
LYMPHOCYTES # BLD AUTO: 1.5 10*3/MM3 (ref 0.7–3.1)
LYMPHOCYTES NFR BLD AUTO: 32.1 % (ref 19.6–45.3)
MCH RBC QN AUTO: 32.1 PG (ref 26.6–33)
MCHC RBC AUTO-ENTMCNC: 31.5 G/DL (ref 31.5–35.7)
MCV RBC AUTO: 101.9 FL (ref 79–97)
MONOCYTES # BLD AUTO: 0.6 10*3/MM3 (ref 0.1–0.9)
MONOCYTES NFR BLD AUTO: 12.8 % (ref 5–12)
NEUTROPHILS NFR BLD AUTO: 2.38 10*3/MM3 (ref 1.7–7)
NEUTROPHILS NFR BLD AUTO: 51 % (ref 42.7–76)
NRBC BLD AUTO-RTO: 0 /100 WBC (ref 0–0.2)
PLATELET # BLD AUTO: 114 10*3/MM3 (ref 140–450)
PMV BLD AUTO: 10.7 FL (ref 6–12)
POTASSIUM SERPL-SCNC: 4.1 MMOL/L (ref 3.5–5.2)
PROT SERPL-MCNC: 7.4 G/DL (ref 6–8.5)
RBC # BLD AUTO: 3.21 10*6/MM3 (ref 3.77–5.28)
SODIUM SERPL-SCNC: 139 MMOL/L (ref 136–145)
TSH SERPL DL<=0.05 MIU/L-ACNC: 2.37 UIU/ML (ref 0.27–4.2)
VIT B12 BLD-MCNC: 265 PG/ML (ref 211–946)
WBC NRBC COR # BLD AUTO: 4.67 10*3/MM3 (ref 3.4–10.8)

## 2024-12-30 PROCEDURE — 83540 ASSAY OF IRON: CPT | Performed by: NURSE PRACTITIONER

## 2024-12-30 PROCEDURE — 80050 GENERAL HEALTH PANEL: CPT | Performed by: NURSE PRACTITIONER

## 2024-12-30 PROCEDURE — 82652 VIT D 1 25-DIHYDROXY: CPT | Performed by: NURSE PRACTITIONER

## 2024-12-30 PROCEDURE — 36415 COLL VENOUS BLD VENIPUNCTURE: CPT

## 2024-12-30 PROCEDURE — 82607 VITAMIN B-12: CPT | Performed by: NURSE PRACTITIONER

## 2024-12-30 NOTE — TELEPHONE ENCOUNTER
Prior Auth completed for Pantoprazole Sodium 40MG dr tablets via covermymeds. Pending determination within 72 hours minium.   (Key: [id number])    Form  [employer] Electronic PA Form (2017 NCPDP)    ___________________________________________    See message from Kanari response below:   Outcome  Additional Information Required  Your PA has been resolved, no additional PA is required. For further inquiries please contact the number on the back of the member prescription card.     Message from Kanari faxed to patient pharmacy.

## 2024-12-31 DIAGNOSIS — D64.9 ANEMIA, UNSPECIFIED TYPE: Primary | ICD-10-CM

## 2024-12-31 RX ORDER — LANOLIN ALCOHOL/MO/W.PET/CERES
1000 CREAM (GRAM) TOPICAL DAILY
Qty: 30 TABLET | Refills: 0 | Status: SHIPPED | OUTPATIENT
Start: 2024-12-31

## 2024-12-31 RX ORDER — FERROUS SULFATE 325(65) MG
325 TABLET ORAL
Qty: 30 TABLET | Refills: 0 | Status: SHIPPED | OUTPATIENT
Start: 2024-12-31

## 2025-01-02 LAB — 1,25(OH)2D SERPL-MCNC: 51.9 PG/ML (ref 24.8–81.5)

## 2025-01-02 RX ORDER — OMEPRAZOLE 40 MG/1
40 CAPSULE, DELAYED RELEASE ORAL DAILY
Qty: 90 CAPSULE | Refills: 1 | Status: SHIPPED | OUTPATIENT
Start: 2025-01-02

## 2025-01-14 ENCOUNTER — TELEPHONE (OUTPATIENT)
Dept: ONCOLOGY | Facility: CLINIC | Age: 60
End: 2025-01-14
Payer: COMMERCIAL

## 2025-01-14 NOTE — TELEPHONE ENCOUNTER
Called to formerly [facility] appt from Ref.. Patient last had an appt w/Dr Salvador in May of 2024. No answer, left v/m for call back to r/s that appt

## 2025-01-21 ENCOUNTER — TELEPHONE (OUTPATIENT)
Dept: ONCOLOGY | Facility: CLINIC | Age: 60
End: 2025-01-21
Payer: COMMERCIAL

## 2025-01-21 DIAGNOSIS — I10 HYPERTENSION, UNSPECIFIED TYPE: ICD-10-CM

## 2025-01-21 RX ORDER — LISINOPRIL 10 MG/1
10 TABLET ORAL DAILY
Qty: 90 TABLET | Refills: 1 | Status: SHIPPED | OUTPATIENT
Start: 2025-01-21

## 2025-01-27 ENCOUNTER — TELEPHONE (OUTPATIENT)
Dept: ONCOLOGY | Facility: CLINIC | Age: 60
End: 2025-01-27
Payer: COMMERCIAL

## 2025-01-27 NOTE — TELEPHONE ENCOUNTER
HEM -Called to Replaced by [facility] New Pt appt from Ref. No answer, left v/m for call back. HUB to Replaced by [facility] at first available HEM appointment w/ Salvador or Pt preference

## 2025-02-03 ENCOUNTER — ANESTHESIA EVENT (OUTPATIENT)
Dept: GASTROENTEROLOGY | Facility: HOSPITAL | Age: 60
End: 2025-02-03
Payer: COMMERCIAL

## 2025-02-03 NOTE — ANESTHESIA PREPROCEDURE EVALUATION
Anesthesia Evaluation     Patient summary reviewed and Nursing notes reviewed   NPO Solid Status: > 8 hours  NPO Liquid Status: > 8 hours           Airway   Mallampati: I  TM distance: >3 FB  Neck ROM: full  No difficulty expected  Dental - normal exam     Pulmonary - negative pulmonary ROS and normal exam   (+) a smoker Current, Abstained day of surgery, cigarettes, COPD,  Cardiovascular - negative cardio ROS and normal exam  Exercise tolerance: unable to assess    ECG reviewed    (+) hypertension, PVD, hyperlipidemia    ROS comment: NL STRESS and ECHO    Neuro/Psych- negative ROS  (+) seizures, numbness, psychiatric history PTSD  GI/Hepatic/Renal/Endo - negative ROS   (+) GERD, liver disease    Musculoskeletal     Abdominal  - normal exam    Bowel sounds: normal.   Substance History - negative use     OB/GYN negative ob/gyn ROS         Other - negative ROS  arthritis,       Other Comment: PTSD (post-traumatic stress disorder)  Hypertension   Hyperlipidemia  Depression   GERD (gastroesophageal reflux disease)  COPD (chronic obstructive pulmonary disease)   Cirrhosis       Surgical History  Current as of 25 1622  REPLACEMENT TOTAL HIP LATERAL POSITION  SECTION  VAGINAL BIRTH AFTER  SECTION TONSILLECTOMY  DENTAL PROCEDURE JOINT REPLACEMENT  ENDOSCOPY COLONOSCOPY  TOTAL HIP ARTHROPLASTY TOTAL HIP AR      ROS/Med Hx Other: THR 2024 MAC 3 GRI  TTE 3/2024 STRUCTURALLY NORMAL EF NORMAL  SEROQUEL                  Anesthesia Plan    ASA 3     general     intravenous induction     Anesthetic plan, risks, benefits, and alternatives have been provided, discussed and informed consent has been obtained with: patient.  Pre-procedure education provided    CODE STATUS:

## 2025-02-04 ENCOUNTER — ANESTHESIA (OUTPATIENT)
Dept: GASTROENTEROLOGY | Facility: HOSPITAL | Age: 60
End: 2025-02-04
Payer: COMMERCIAL

## 2025-02-05 RX ORDER — LANOLIN ALCOHOL/MO/W.PET/CERES
1000 CREAM (GRAM) TOPICAL DAILY
Qty: 90 TABLET | Refills: 1 | Status: SHIPPED | OUTPATIENT
Start: 2025-02-05

## 2025-02-05 RX ORDER — FERROUS SULFATE 325(65) MG
325 TABLET ORAL
Qty: 90 TABLET | Refills: 1 | Status: SHIPPED | OUTPATIENT
Start: 2025-02-05

## 2025-04-06 DIAGNOSIS — F41.9 ANXIETY: ICD-10-CM

## 2025-04-07 RX ORDER — BUSPIRONE HYDROCHLORIDE 15 MG/1
15 TABLET ORAL 2 TIMES DAILY
Qty: 180 TABLET | Refills: 1 | Status: SHIPPED | OUTPATIENT
Start: 2025-04-07

## 2025-05-06 ENCOUNTER — APPOINTMENT (OUTPATIENT)
Dept: GENERAL RADIOLOGY | Facility: HOSPITAL | Age: 60
End: 2025-05-06
Payer: COMMERCIAL

## 2025-05-06 ENCOUNTER — HOSPITAL ENCOUNTER (EMERGENCY)
Facility: HOSPITAL | Age: 60
Discharge: HOME | End: 2025-05-06
Attending: EMERGENCY MEDICINE | Admitting: EMERGENCY MEDICINE
Payer: COMMERCIAL

## 2025-05-06 VITALS
OXYGEN SATURATION: 99 % | SYSTOLIC BLOOD PRESSURE: 119 MMHG | HEART RATE: 75 BPM | WEIGHT: 125.44 LBS | BODY MASS INDEX: 22.23 KG/M2 | DIASTOLIC BLOOD PRESSURE: 75 MMHG | TEMPERATURE: 97.6 F | HEIGHT: 63 IN | RESPIRATION RATE: 18 BRPM

## 2025-05-06 DIAGNOSIS — S80.02XA CONTUSION OF LEFT KNEE, INITIAL ENCOUNTER: Primary | ICD-10-CM

## 2025-05-06 PROCEDURE — 73562 X-RAY EXAM OF KNEE 3: CPT

## 2025-05-06 PROCEDURE — 99283 EMERGENCY DEPT VISIT LOW MDM: CPT

## 2025-05-06 NOTE — ED PROVIDER NOTES
Subjective   History of Present Illness  59-year-old female states she tripped and fell and hit her left knee tonight.  She complains of pain over the anterior aspect of the knee and denies any other injury.  Specifically she reports no head injury or neck or back pain or numbness or weakness.  Review of Systems    Past Medical History:   Diagnosis Date    Cirrhosis     COPD (chronic obstructive pulmonary disease)     Depression     GERD (gastroesophageal reflux disease)     Hyperlipidemia     Hypertension     PTSD (post-traumatic stress disorder)        Allergies   Allergen Reactions    Sulfa Antibiotics Hives    Keflex [Cephalexin] Hives       Past Surgical History:   Procedure Laterality Date     SECTION N/A     COLONOSCOPY N/A 2021    Procedure: COLONOSCOPY with polypectomy x2 and random biopsy;  Surgeon: Osman Clay MD;  Location: [facility] ENDOSCOPY;  Service: Gastroenterology;  Laterality: N/A;  colon polyps    DENTAL PROCEDURE      ENDOSCOPY N/A 2021    Procedure: ESOPHAGOGASTRODUODENOSCOPY;  Surgeon: Osman Clay MD;  Location: [facility] ENDOSCOPY;  Service: Gastroenterology;  Laterality: N/A;  esophagitis    JOINT REPLACEMENT      REPLACEMENT TOTAL HIP LATERAL POSITION Left     TONSILLECTOMY      TOTAL HIP ARTHROPLASTY Right 2024    Procedure: TOTAL HIP ARTHROPLASTY ANTERIOR;  Surgeon: Cristobal Chen II, MD;  Location: [facility] MAIN OR;  Service: Orthopedics;  Laterality: Right;    TOTAL HIP ARTHROPLASTY REVISION Right 2024    Procedure: TOTAL HIP ARTHROPLASTY REVISION;  Surgeon: Cristobal Chen II, MD;  Location:  [facility];  Service: Orthopedics;  Laterality: Right;    VAGINAL BIRTH AFTER  SECTION         Family History   Problem Relation Age of Onset    Alcohol abuse Mother     Alcohol abuse Paternal Grandfather        Social History     Socioeconomic History    Marital status:    Tobacco Use    Smoking status: Every Day     Current  "packs/day: 1.00     Types: Cigarettes     Passive exposure: Current    Smokeless tobacco: Never    Tobacco comments:     3cigs   Vaping Use    Vaping status: Never Used   Substance and Sexual Activity    Alcohol use: Not Currently     Alcohol/week: 2.0 standard drinks of alcohol     Types: 2 Cans of beer per week     Comment: pint daily    Drug use: No    Sexual activity: Yes     Partners: Male     Birth control/protection: Post-menopausal     Prior to Admission medications    Medication Sig Start Date End Date Taking? Authorizing Provider   albuterol sulfate  (90 Base) MCG/ACT inhaler Inhale 2 puffs Every 4 (Four) Hours As Needed for Wheezing.    Provider, MD Abigail   busPIRone (BUSPAR) 15 MG tablet TAKE 1 TABLET BY MOUTH TWICE A DAY 4/7/25   Lita Gregg MD   ferrous sulfate 325 (65 FE) MG tablet Take 1 tablet by mouth Daily With Breakfast. 2/5/25   Norma Saul APRN   lisinopril (PRINIVIL,ZESTRIL) 10 MG tablet Take 1 tablet by mouth Daily. 1/21/25   Norma Saul APRN   multivitamin (THERAGRAN) tablet tablet Take 1 tablet by mouth Daily. 7/27/24   Jluis Maguire MD   naloxone (NARCAN) 4 MG/0.1ML nasal spray Call 911. Don't prime. [place] in 1 nostril for overdose. Repeat in 2-3 minutes in other nostril if no or minimal breathing/responsiveness. 7/26/24   Jluis Maguire MD   omeprazole (priLOSEC) 40 MG capsule Take 1 capsule by mouth Daily. 1/2/25   Norma Saul APRN   QUEtiapine (SEROquel) 50 MG tablet Take 2 tablets by mouth every night at bedtime. 10/3/24   Norma Saul APRN   sertraline (Zoloft) 100 MG tablet Take 1 tablet by mouth 2 (Two) Times a Day. 10/3/24   Norma Saul APRN   vitamin B-12 (CYANOCOBALAMIN) 1000 MCG tablet Take 1 tablet by mouth Daily. 2/5/25   Norma Saul APRN     /65 (BP Location: Left arm, Patient Position: Sitting)   Pulse 80   Temp 97.6 °F (36.4 °C) (Oral)   Resp 18   Ht 160 cm (63\")   Wt " 56.9 kg (125 lb 7.1 oz)   SpO2 99%   BMI 22.22 kg/m²         Objective   Physical Exam  General: Well-appearing, no acute distress  Psych: Oriented, pleasant affect  Normocephalic, atraumatic  Respirations:  nonlabored respirations  Extremity: There is some soft tissue swelling and ecchymosis over the anterior aspect of the left knee, she has normal quadriceps and patellar tendon strength, there is no ligamentous laxity, no apparent joint effusion, she has normal pulses and sensorimotor function distally, calves and thighs are symmetric and nontender, no hip tenderness  Procedures           ED Course      XR Knee 3 View Left  Result Date: 5/6/2025  Impression: No acute fractures or dislocations. Anterior soft tissue swelling. Electronically Signed: Ana Walker MD  5/6/2025 7:34 PM EDT  Workstation ID: [id number]                                                     Medical Decision Making  Patient was advised of findings.  We discussed the importance of follow-up with primary care if symptoms persist to further rule out occult injury.  She was given warning signs for return and discharged in good condition    Amount and/or Complexity of Data Reviewed  Radiology: ordered and independent interpretation performed.     Details: My independent interpretation of x-ray image of the left knee no apparent acute bony injury, soft tissue swelling        Final diagnoses:   Contusion of left knee, initial encounter       ED Disposition  ED Disposition       ED Disposition   Discharge    Condition   Stable    Comment   --               Norma Saul, APRN  [address] [facility] [address]  [place] [id number] [phone]    In 1 week  As needed         Medication List      No changes were made to your prescriptions during this visit.            Parvez Taylor MD  05/06/25 2130

## 2025-05-06 NOTE — DISCHARGE INSTRUCTIONS
Cool compress, elevation, weight-bear as tolerated.  Follow-up with your doctor in 1 week if symptoms persist to further rule out occult injury.  Return for increased pain, numbness, weakness or any other concern

## 2025-05-12 ENCOUNTER — LAB (OUTPATIENT)
Dept: FAMILY MEDICINE CLINIC | Facility: CLINIC | Age: 60
End: 2025-05-12
Payer: COMMERCIAL

## 2025-05-12 ENCOUNTER — OFFICE VISIT (OUTPATIENT)
Dept: FAMILY MEDICINE CLINIC | Facility: CLINIC | Age: 60
End: 2025-05-12
Payer: COMMERCIAL

## 2025-05-12 VITALS
HEART RATE: 76 BPM | WEIGHT: 122 LBS | SYSTOLIC BLOOD PRESSURE: 105 MMHG | TEMPERATURE: 97.8 F | OXYGEN SATURATION: 97 % | DIASTOLIC BLOOD PRESSURE: 63 MMHG | BODY MASS INDEX: 21.61 KG/M2

## 2025-05-12 DIAGNOSIS — F41.9 ANXIETY: ICD-10-CM

## 2025-05-12 DIAGNOSIS — K70.31 ALCOHOLIC CIRRHOSIS OF LIVER WITH ASCITES: ICD-10-CM

## 2025-05-12 DIAGNOSIS — I10 HYPERTENSION, UNSPECIFIED TYPE: ICD-10-CM

## 2025-05-12 DIAGNOSIS — Z00.00 PREVENTATIVE HEALTH CARE: Primary | ICD-10-CM

## 2025-05-12 DIAGNOSIS — Z23 NEED FOR SHINGLES VACCINE: ICD-10-CM

## 2025-05-12 DIAGNOSIS — E78.5 HYPERLIPIDEMIA, UNSPECIFIED HYPERLIPIDEMIA TYPE: ICD-10-CM

## 2025-05-12 DIAGNOSIS — Z12.4 CERVICAL CANCER SCREENING: ICD-10-CM

## 2025-05-12 DIAGNOSIS — Z00.00 PREVENTATIVE HEALTH CARE: ICD-10-CM

## 2025-05-12 DIAGNOSIS — Z12.31 ENCOUNTER FOR SCREENING MAMMOGRAM FOR MALIGNANT NEOPLASM OF BREAST: ICD-10-CM

## 2025-05-12 DIAGNOSIS — G47.00 INSOMNIA, UNSPECIFIED: ICD-10-CM

## 2025-05-12 DIAGNOSIS — M81.0 OSTEOPOROSIS, UNSPECIFIED OSTEOPOROSIS TYPE, UNSPECIFIED PATHOLOGICAL FRACTURE PRESENCE: ICD-10-CM

## 2025-05-12 PROCEDURE — 80053 COMPREHEN METABOLIC PANEL: CPT | Performed by: NURSE PRACTITIONER

## 2025-05-12 PROCEDURE — 99396 PREV VISIT EST AGE 40-64: CPT | Performed by: NURSE PRACTITIONER

## 2025-05-12 PROCEDURE — 80061 LIPID PANEL: CPT | Performed by: NURSE PRACTITIONER

## 2025-05-12 PROCEDURE — 90471 IMMUNIZATION ADMIN: CPT | Performed by: NURSE PRACTITIONER

## 2025-05-12 PROCEDURE — 90750 HZV VACC RECOMBINANT IM: CPT | Performed by: NURSE PRACTITIONER

## 2025-05-12 PROCEDURE — 36415 COLL VENOUS BLD VENIPUNCTURE: CPT

## 2025-05-12 RX ORDER — QUETIAPINE FUMARATE 50 MG/1
100 TABLET, FILM COATED ORAL
Qty: 180 TABLET | Refills: 1 | Status: SHIPPED | OUTPATIENT
Start: 2025-05-12

## 2025-05-12 NOTE — PROGRESS NOTES
Subjective     Lita Yu is a 59 y.o. female.     History of Present Illness  Pt is here today for her annual CPE and pap.   She is dating someone  She walk daily  She smokes 2-3 cigs a day  She doesn't eat a well diet.   She has a history of alcoholism.  She has lost her sobriety  She is drinking 1-3 beers a day   She states she is not getting dunk  Has a history of jessica THR  Pt states she fell on Monday and hurt her right leg  Went to the ER and was told there was no fracture  She still has a hematoma of the knee and lots of bruising down the leg  She hasn't had menstrual cycles in 15 yrs     Osteomyelitis of spine-patient had osteomyelitis of the spine with discitis last year.  She completed numerous rounds of IV antibiotics.  She has seen a spine surgeon since then.  Everything stabilized.     Insomnia- pt is currently on seroquel 100mg nightly     Anxiety/depression-patient is currently on Zoloft 100 mg twice daily and BuSpar 15 mg twice daily.  Mood is doing well. Denies SI or HI.      Liver cirrhosis- patient has a history of alcoholism.  She is current with GI.  She had an US which patient reports was good and an EGD in Dec. She is having an upper GI tomorrow to check on the bleeding. She has stopped taking her vitamins. She has been more fatigued.      Anemia- has had anemia believe it is due to the liver.     GERD- pt is currently on prilosec 40mg daily    HTN- pt is currently on lisinopril 10mg daily. Denies CP, SOA, dizziness, HA    Labs-due  Pap smear- due  Mammogram- due  DEXA- 10/10/23 osteoporosis  Colonoscopy-     Vaccines:  Flu- N/A  PNA-  Shingles- due  Tdap- UTD  Covid-19-     Dental exam-  Eye exam-         The following portions of the patient's history were reviewed and updated as appropriate: allergies, current medications, past family history, past medical history, past social history, past surgical history, and problem list.    Review of Systems   Constitutional:  Positive for  fatigue. Negative for appetite change, chills and fever.   HENT:  Negative for congestion, ear pain, hearing loss, postnasal drip, rhinorrhea, sinus pressure, sore throat, swollen glands and trouble swallowing.    Eyes:  Negative for blurred vision, double vision, pain, discharge, itching and visual disturbance.   Respiratory:  Negative for cough, chest tightness, shortness of breath and wheezing.    Cardiovascular:  Negative for chest pain and palpitations.   Gastrointestinal:  Negative for abdominal pain, blood in stool, constipation, diarrhea, nausea and vomiting.   Endocrine: Negative for polydipsia, polyphagia and polyuria.   Genitourinary:  Negative for breast lump, breast pain, dysuria, flank pain, frequency, urgency, vaginal bleeding, vaginal discharge and vaginal pain.   Musculoskeletal:  Negative for arthralgias, back pain and myalgias.   Skin:  Positive for bruise. Negative for rash and skin lesions.   Neurological:  Negative for dizziness, weakness, numbness and headache.   Psychiatric/Behavioral:  Negative for self-injury, suicidal ideas, depressed mood and stress. The patient is not nervous/anxious.        Objective     /63 (BP Location: Left arm, Patient Position: Sitting, Cuff Size: Large Adult)   Pulse 76   Temp 97.8 °F (36.6 °C) (Tympanic)   Wt 55.3 kg (122 lb)   SpO2 97%   BMI 21.61 kg/m²     Current Outpatient Medications on File Prior to Visit   Medication Sig Dispense Refill    albuterol sulfate  (90 Base) MCG/ACT inhaler Inhale 2 puffs Every 4 (Four) Hours As Needed for Wheezing.      busPIRone (BUSPAR) 15 MG tablet TAKE 1 TABLET BY MOUTH TWICE A  tablet 1    ferrous sulfate 325 (65 FE) MG tablet Take 1 tablet by mouth Daily With Breakfast. 90 tablet 1    lisinopril (PRINIVIL,ZESTRIL) 10 MG tablet Take 1 tablet by mouth Daily. 90 tablet 1    multivitamin (THERAGRAN) tablet tablet Take 1 tablet by mouth Daily.      naloxone (NARCAN) 4 MG/0.1ML nasal spray Call 911.  Don't prime. Spray in 1 nostril for overdose. Repeat in 2-3 minutes in other nostril if no or minimal breathing/responsiveness. 2 each 0    omeprazole (priLOSEC) 40 MG capsule Take 1 capsule by mouth Daily. 90 capsule 1    sertraline (Zoloft) 100 MG tablet Take 1 tablet by mouth 2 (Two) Times a Day. 180 tablet 1    vitamin B-12 (CYANOCOBALAMIN) 1000 MCG tablet Take 1 tablet by mouth Daily. 90 tablet 1    [DISCONTINUED] QUEtiapine (SEROquel) 50 MG tablet Take 2 tablets by mouth every night at bedtime. 180 tablet 1     No current facility-administered medications on file prior to visit.        BMI is within normal parameters. No other follow-up for BMI required.       Physical Exam  Vitals reviewed. Exam conducted with a chaperone present.   Constitutional:       General: She is not in acute distress.     Appearance: Normal appearance. She is well-developed. She is not diaphoretic.   HENT:      Head: Normocephalic and atraumatic.      Right Ear: Tympanic membrane and ear canal normal.      Left Ear: Tympanic membrane and ear canal normal.      Nose: No congestion or rhinorrhea.      Mouth/Throat:      Pharynx: No oropharyngeal exudate or posterior oropharyngeal erythema.   Eyes:      General:         Right eye: No discharge.         Left eye: No discharge.      Extraocular Movements: Extraocular movements intact.      Conjunctiva/sclera: Conjunctivae normal.   Cardiovascular:      Rate and Rhythm: Normal rate and regular rhythm.      Heart sounds: No murmur heard.  Pulmonary:      Effort: Pulmonary effort is normal. No respiratory distress.      Breath sounds: Normal breath sounds. No wheezing or rales.   Chest:   Breasts:     Right: Normal.      Left: Normal.   Abdominal:      General: Bowel sounds are normal.      Palpations: Abdomen is soft.   Genitourinary:     Exam position: Supine.      Labia:         Right: No rash or tenderness.         Left: No rash or tenderness.       Vagina: Normal.      Cervix: Normal.       Uterus: Not enlarged and not tender.       Adnexa:         Right: No mass or tenderness.          Left: No mass or tenderness.     Musculoskeletal:         General: Swelling (left knee) present. Normal range of motion.      Cervical back: Normal range of motion.   Skin:     General: Skin is warm and dry.      Findings: Bruising present.   Neurological:      General: No focal deficit present.      Mental Status: She is alert and oriented to person, place, and time.   Psychiatric:         Mood and Affect: Mood normal.         Behavior: Behavior normal.         Thought Content: Thought content normal.         Judgment: Judgment normal.           Assessment & Plan     Diagnoses and all orders for this visit:    1. Preventative health care (Primary)  Comments:  work on diet and exercise  get mammo  check labs  stop drinking and smoking  Orders:  -     Comprehensive Metabolic Panel; Future  -     Lipid Panel; Future    2. Encounter for screening mammogram for malignant neoplasm of breast  -     Mammo Screening Digital Tomosynthesis Bilateral With CAD; Future    3. Cervical cancer screening  -     IGP,Aptima HPV,Age Gdln    4. Insomnia, unspecified  Comments:  stable  cont seroquel  Orders:  -     QUEtiapine (SEROquel) 50 MG tablet; Take 2 tablets by mouth every night at bedtime.  Dispense: 180 tablet; Refill: 1    5. Hypertension, unspecified type  Comments:  stable  cont lisinopril  check labs  Orders:  -     Comprehensive Metabolic Panel; Future  -     Lipid Panel; Future    6. Anxiety  Comments:  stable  cont zoloft  denies SI or HI    7. Hyperlipidemia, unspecified hyperlipidemia type  Comments:  stable  diet controlled  Orders:  -     Comprehensive Metabolic Panel; Future  -     Lipid Panel; Future    8. Alcoholic cirrhosis of liver with ascites  Comments:  back to drinking 1-3 beers daily  advised to stop  follow up with GI  Orders:  -     Comprehensive Metabolic Panel; Future  -     Lipid Panel; Future    9.  Osteoporosis, unspecified osteoporosis type, unspecified pathological fracture presence  Comments:  hasnt seen specialist  referral made  Orders:  -     Ambulatory Referral to Endocrinology

## 2025-05-13 ENCOUNTER — RESULTS FOLLOW-UP (OUTPATIENT)
Dept: FAMILY MEDICINE CLINIC | Facility: CLINIC | Age: 60
End: 2025-05-13
Payer: COMMERCIAL

## 2025-05-13 ENCOUNTER — HOSPITAL ENCOUNTER (OUTPATIENT)
Facility: HOSPITAL | Age: 60
Setting detail: HOSPITAL OUTPATIENT SURGERY
Discharge: HOME | End: 2025-05-13
Attending: INTERNAL MEDICINE | Admitting: INTERNAL MEDICINE
Payer: COMMERCIAL

## 2025-05-13 VITALS
HEIGHT: 63 IN | SYSTOLIC BLOOD PRESSURE: 139 MMHG | OXYGEN SATURATION: 100 % | WEIGHT: 121.8 LBS | RESPIRATION RATE: 18 BRPM | TEMPERATURE: 97.5 F | HEART RATE: 62 BPM | BODY MASS INDEX: 21.58 KG/M2 | DIASTOLIC BLOOD PRESSURE: 52 MMHG

## 2025-05-13 DIAGNOSIS — R87.612 LOW GRADE SQUAMOUS INTRAEPITHELIAL LESION ON CYTOLOGIC SMEAR OF CERVIX (LGSIL): ICD-10-CM

## 2025-05-13 DIAGNOSIS — K74.60 CIRRHOSIS: ICD-10-CM

## 2025-05-13 DIAGNOSIS — E87.6 HYPOKALEMIA: Primary | ICD-10-CM

## 2025-05-13 DIAGNOSIS — K52.9 CHRONIC DIARRHEA: ICD-10-CM

## 2025-05-13 DIAGNOSIS — K21.9 GERD (GASTROESOPHAGEAL REFLUX DISEASE): ICD-10-CM

## 2025-05-13 LAB
ALBUMIN SERPL-MCNC: 4.2 G/DL (ref 3.5–5.2)
ALBUMIN/GLOB SERPL: 1.3 G/DL
ALP SERPL-CCNC: 200 U/L (ref 39–117)
ALT SERPL W P-5'-P-CCNC: 16 U/L (ref 1–33)
ANION GAP SERPL CALCULATED.3IONS-SCNC: 13 MMOL/L (ref 5–15)
AST SERPL-CCNC: 32 U/L (ref 1–32)
BASE DEFICIT: ABNORMAL
BASE EXCESS BLDV CALC-SCNC: <0 MMOL/L (ref 0–3)
BILIRUB SERPL-MCNC: 0.4 MG/DL (ref 0–1.2)
BUN SERPL-MCNC: 4 MG/DL (ref 6–20)
BUN/CREAT SERPL: 5.9 (ref 7–25)
CA-I BLDA-SCNC: 1.24 MMOL/L (ref 1.12–1.32)
CALCIUM SPEC-SCNC: 9.3 MG/DL (ref 8.6–10.5)
CHLORIDE SERPL-SCNC: 98 MMOL/L (ref 98–107)
CHOLEST SERPL-MCNC: 200 MG/DL (ref 0–200)
CO2 CONTENT VENOUS: 24 MMOL/L (ref 24–29)
CO2 SERPL-SCNC: 21 MMOL/L (ref 22–29)
CREAT SERPL-MCNC: 0.68 MG/DL (ref 0.57–1)
EGFRCR SERPLBLD CKD-EPI 2021: 101.7 ML/MIN/1.73
GLOBULIN UR ELPH-MCNC: 3.3 GM/DL
GLUCOSE BLDC GLUCOMTR-MCNC: 100 MG/DL (ref 70–105)
GLUCOSE SERPL-MCNC: 73 MG/DL (ref 65–99)
HCO3 BLDV-SCNC: 22.4 MMOL/L (ref 23–28)
HCT VFR BLDA CALC: 32 % (ref 38–51)
HDLC SERPL-MCNC: 51 MG/DL (ref 40–60)
HGB BLDA-MCNC: 10.9 G/DL (ref 12–17)
LDLC SERPL CALC-MCNC: 87 MG/DL (ref 0–100)
LDLC/HDLC SERPL: 1.42 {RATIO}
PCO2 BLDV: 39.9 MM HG (ref 41–51)
PH BLDV: 7.36 PH UNITS (ref 7.31–7.41)
PO2 BLDV: 31 MM HG (ref 35–42)
POTASSIUM BLDA-SCNC: 3.2 MMOL/L (ref 3.5–4.9)
POTASSIUM SERPL-SCNC: 3 MMOL/L (ref 3.5–5.2)
PROT SERPL-MCNC: 7.5 G/DL (ref 6–8.5)
SAO2 % BLDCOV: ABNORMAL %
SODIUM BLD-SCNC: 137 MMOL/L (ref 138–146)
SODIUM SERPL-SCNC: 132 MMOL/L (ref 136–145)
TRIGL SERPL-MCNC: 384 MG/DL (ref 0–150)
VLDLC SERPL-MCNC: 62 MG/DL (ref 5–40)

## 2025-05-13 PROCEDURE — 84295 ASSAY OF SERUM SODIUM: CPT

## 2025-05-13 PROCEDURE — 25010000002 PROPOFOL 200 MG/20ML EMULSION: Performed by: NURSE ANESTHETIST, CERTIFIED REGISTERED

## 2025-05-13 PROCEDURE — 85014 HEMATOCRIT: CPT

## 2025-05-13 PROCEDURE — 82947 ASSAY GLUCOSE BLOOD QUANT: CPT

## 2025-05-13 PROCEDURE — 25010000002 LIDOCAINE PF 2% 2 % SOLUTION: Performed by: NURSE ANESTHETIST, CERTIFIED REGISTERED

## 2025-05-13 PROCEDURE — 88305 TISSUE EXAM BY PATHOLOGIST: CPT | Performed by: INTERNAL MEDICINE

## 2025-05-13 PROCEDURE — 84132 ASSAY OF SERUM POTASSIUM: CPT

## 2025-05-13 PROCEDURE — 82330 ASSAY OF CALCIUM: CPT

## 2025-05-13 PROCEDURE — 82803 BLOOD GASES ANY COMBINATION: CPT

## 2025-05-13 RX ORDER — PROPOFOL 10 MG/ML
INJECTION, EMULSION INTRAVENOUS AS NEEDED
Status: DISCONTINUED | OUTPATIENT
Start: 2025-05-13 | End: 2025-05-13 | Stop reason: SURG

## 2025-05-13 RX ORDER — POTASSIUM CHLORIDE 1500 MG/1
20 TABLET, EXTENDED RELEASE ORAL 2 TIMES DAILY
Qty: 6 TABLET | Refills: 0 | Status: SHIPPED | OUTPATIENT
Start: 2025-05-13 | End: 2025-05-16

## 2025-05-13 RX ORDER — SODIUM CHLORIDE 9 MG/ML
INJECTION, SOLUTION INTRAVENOUS CONTINUOUS PRN
Status: DISCONTINUED | OUTPATIENT
Start: 2025-05-13 | End: 2025-05-13 | Stop reason: SURG

## 2025-05-13 RX ORDER — ONDANSETRON 2 MG/ML
4 INJECTION INTRAMUSCULAR; INTRAVENOUS EVERY 6 HOURS PRN
Status: CANCELLED | OUTPATIENT
Start: 2025-05-13

## 2025-05-13 RX ORDER — LIDOCAINE HYDROCHLORIDE 20 MG/ML
INJECTION, SOLUTION EPIDURAL; INFILTRATION; INTRACAUDAL; PERINEURAL AS NEEDED
Status: DISCONTINUED | OUTPATIENT
Start: 2025-05-13 | End: 2025-05-13 | Stop reason: SURG

## 2025-05-13 RX ORDER — DEXMEDETOMIDINE HYDROCHLORIDE 100 UG/ML
INJECTION, SOLUTION INTRAVENOUS AS NEEDED
Status: DISCONTINUED | OUTPATIENT
Start: 2025-05-13 | End: 2025-05-13 | Stop reason: SURG

## 2025-05-13 RX ORDER — ONDANSETRON 4 MG/1
4 TABLET, ORALLY DISINTEGRATING ORAL EVERY 6 HOURS PRN
Status: CANCELLED | OUTPATIENT
Start: 2025-05-13

## 2025-05-13 RX ADMIN — PROPOFOL 50 MG: 10 INJECTION, EMULSION INTRAVENOUS at 12:41

## 2025-05-13 RX ADMIN — SODIUM CHLORIDE: 9 INJECTION, SOLUTION INTRAVENOUS at 12:36

## 2025-05-13 RX ADMIN — PROPOFOL 100 MG: 10 INJECTION, EMULSION INTRAVENOUS at 12:37

## 2025-05-13 RX ADMIN — DEXMEDETOMIDINE HYDROCHLORIDE 4 MCG: 100 INJECTION, SOLUTION INTRAVENOUS at 12:43

## 2025-05-13 RX ADMIN — PROPOFOL 50 MG: 10 INJECTION, EMULSION INTRAVENOUS at 12:40

## 2025-05-13 RX ADMIN — DEXMEDETOMIDINE HYDROCHLORIDE 4 MCG: 100 INJECTION, SOLUTION INTRAVENOUS at 12:41

## 2025-05-13 RX ADMIN — LIDOCAINE HYDROCHLORIDE 50 MG: 20 INJECTION, SOLUTION EPIDURAL; INFILTRATION; INTRACAUDAL; PERINEURAL at 12:37

## 2025-05-13 NOTE — H&P
GI CONSULT  NOTE:    Referring Provider:    Norma Saul APRN Obert, Jonathan, MD    Chief complaint: <principal problem not specified>    Subjective .       Pre op diagnosis  Chronic diarrhea [K52.9]  GERD (gastroesophageal reflux disease) [K21.9]  Cirrhosis [K74.60]      History of present illness:      Lita Yu is a 59 y.o. female who presents today for Procedure(s):  ESOPHAGOGASTRODUODENOSCOPY for the indications listed below.     The updated Patient Profile was reviewed prior to the procedure, in conjunction with the Physical Exam, including medical conditions, surgical procedures, medications, allergies, family history and social history.     Pre-operatively, I reviewed the indication(s) for the procedure, the risks of the procedure [including but not limited to: unexpected bleeding possibly requiring hospitalization and/or unplanned repeat procedures, perforation possibly requiring surgical treatment, missed lesions and complications of sedation/MAC (also explained by anesthesia staff)].     I have evaluated the patient for risks associated with the planned anesthesia and the procedure to be performed and find the patient an acceptable candidate for IV sedation.    Multiple opportunities were provided for any questions or concerns, and all questions were answered satisfactorily before any anesthesia was administered. We will proceed with the planned procedure.    Past Medical History:  Past Medical History:   Diagnosis Date    Cirrhosis     COPD (chronic obstructive pulmonary disease)     Depression     GERD (gastroesophageal reflux disease)     Hyperlipidemia     Hypertension     PTSD (post-traumatic stress disorder)        Past Surgical History:  Past Surgical History:   Procedure Laterality Date     SECTION N/A     COLONOSCOPY N/A 2021    Procedure: COLONOSCOPY with polypectomy x2 and random biopsy;  Surgeon: Osman Clay MD;  Location: [facility];  Service:  Gastroenterology;  Laterality: N/A;  colon polyps    DENTAL PROCEDURE      ENDOSCOPY N/A 2021    Procedure: ESOPHAGOGASTRODUODENOSCOPY;  Surgeon: Osman Clya MD;  Location: [facility] ENDOSCOPY;  Service: Gastroenterology;  Laterality: N/A;  esophagitis    JOINT REPLACEMENT      REPLACEMENT TOTAL HIP LATERAL POSITION Left     TONSILLECTOMY      TOTAL HIP ARTHROPLASTY Right 2024    Procedure: TOTAL HIP ARTHROPLASTY ANTERIOR;  Surgeon: Cristobal Chen II, MD;  Location: [facility] MAIN OR;  Service: Orthopedics;  Laterality: Right;    TOTAL HIP ARTHROPLASTY REVISION Right 2024    Procedure: TOTAL HIP ARTHROPLASTY REVISION;  Surgeon: Cristobal Chen II, MD;  Location:  [facility];  Service: Orthopedics;  Laterality: Right;    VAGINAL BIRTH AFTER  SECTION         Social History:  Social History     Tobacco Use    Smoking status: Every Day     Current packs/day: 1.00     Types: Cigarettes     Passive exposure: Current    Smokeless tobacco: Never    Tobacco comments:     3cigs   Vaping Use    Vaping status: Never Used   Substance Use Topics    Alcohol use: Not Currently     Alcohol/week: 2.0 standard drinks of alcohol     Types: 2 Cans of beer per week     Comment: pint daily    Drug use: No       Family History:  Family History   Problem Relation Age of Onset    Alcohol abuse Mother     Alcohol abuse Paternal Grandfather        Medications:  Medications Prior to Admission   Medication Sig Dispense Refill Last Dose/Taking    albuterol sulfate  (90 Base) MCG/ACT inhaler Inhale 2 puffs Every 4 (Four) Hours As Needed for Wheezing.   Past Week    busPIRone (BUSPAR) 15 MG tablet TAKE 1 TABLET BY MOUTH TWICE A  tablet 1 2025    ferrous sulfate 325 (65 FE) MG tablet Take 1 tablet by mouth Daily With Breakfast. 90 tablet 1 Past Week    lisinopril (PRINIVIL,ZESTRIL) 10 MG tablet Take 1 tablet by mouth Daily. 90 tablet 1 2025    multivitamin (THERAGRAN) tablet tablet  "Take 1 tablet by mouth Daily.   Past Week    naloxone (NARCAN) 4 MG/0.1ML nasal spray Call 911. Don't prime. Hubbard in 1 nostril for overdose. Repeat in 2-3 minutes in other nostril if no or minimal breathing/responsiveness. 2 each 0 Taking    omeprazole (priLOSEC) 40 MG capsule Take 1 capsule by mouth Daily. 90 capsule 1 5/12/2025    QUEtiapine (SEROquel) 50 MG tablet Take 2 tablets by mouth every night at bedtime. 180 tablet 1 5/12/2025    sertraline (Zoloft) 100 MG tablet Take 1 tablet by mouth 2 (Two) Times a Day. 180 tablet 1 5/12/2025    vitamin B-12 (CYANOCOBALAMIN) 1000 MCG tablet Take 1 tablet by mouth Daily. 90 tablet 1 Past Week       Scheduled Meds:  Continuous Infusions:No current facility-administered medications for this encounter.    PRN Meds:.    ALLERGIES:  Sulfa antibiotics and Keflex [cephalexin]    ROS:  The following systems were reviewed and negative;  Constitution:  No fevers, chills, no unintentional weight loss  Skin: no rash, no jaundice  Eyes:  No blurry vision, no eye pain  HENT:  No change in hearing or smell  Resp:  No dyspnea or cough  CV:  No chest pain or palpitations  :  No dysuria, hematuria  Musculoskeletal:  No leg cramps or arthralgias  Neuro:  No tremor, no numbness  Psych:  No depression or confsusion    Objective     Vital Signs:   Vitals:    01/24/25 1537 05/02/25 1133 05/13/25 1110   BP:   100/74   BP Location:   Left arm   Patient Position:   Sitting   Pulse:   86   Resp:   18   Temp:   97.5 °F (36.4 °C)   TempSrc:   Oral   SpO2:   99%   Weight: 63.5 kg (140 lb) 54.4 kg (120 lb) 55.2 kg (121 lb 12.8 oz)   Height: 160 cm (63\") 160 cm (63\") 160 cm (63\")       Physical Exam:       General Appearance:    Awake and alert, in no acute distress   Head:    Normocephalic, without obvious abnormality, atraumatic   Throat:   No oral lesions, no thrush, oral mucosa moist   Lungs:     respirations regular, even and unlabored   Skin:   No rash, no jaundice       Results Review:  Lab " Results (last 24 hours)       Procedure Component Value Units Date/Time    POC Chem Panel [[id number]]  (Abnormal) Collected: 05/13/25 1044    Specimen: Venous Blood Updated: 05/13/25 1100     Glucose 100 mg/dL      Comment: Serial Number: [id number]:  [id number]        Sodium 137 mmol/L      POC Potassium 3.2 mmol/L      Ionized Calcium 1.24 mmol/L      Hematocrit 32 %      Hemoglobin 10.9 g/dL      pH, Venous 7.360 pH Units      pCO2, Venous 39.9 mm Hg      CO2 CONTENT  24 mmol/L      HCO3, Venous 22.4 mmol/L      Base Excess, Venous <0.0 mmol/L      Base Deficit --     O2 Saturation, Venous --     pO2, Venous 31.0 mm Hg             Imaging Results (Last 24 Hours)       ** No results found for the last 24 hours. **             I reviewed the patient's labs and imaging.    ASSESSMENT AND PLAN:      Active Problems:    * No active hospital problems. *       Procedure(s):  ESOPHAGOGASTRODUODENOSCOPY      I discussed the patient's findings and my recommendations with the patient.    Osman Clay MD  05/13/25  12:35 EDT

## 2025-05-13 NOTE — OP NOTE
ESOPHAGOGASTRODUODENOSCOPY Procedure Report    Patient Name:  Lita Yu  YOB: 1965    Date of Surgery:  5/13/2025     Pre-Op Diagnosis:  Chronic diarrhea [K52.9]  GERD (gastroesophageal reflux disease) [K21.9]  Cirrhosis [K74.60]    Postop diagnosis:  1.  Esophageal varices  2.  Irregular Z-line  3.  Gastritis      Procedure/CPT® Codes:  OH ESOPHAGOGASTRODUODENOSCOPY TRANSORAL DIAGNOSTIC [04225]    Procedure(s):  ESOPHAGOGASTRODUODENOSCOPY with biopsy x1    Staff:  Surgeon(s):  Osman Clay MD      Anesthesia: Monitored Anesthesia Care    Description of Procedure:  A description of the procedure as well as risks, benefits and alternative methods were explained to the patient who voiced understanding and signed the corresponding consent form. A physical exam was performed and vital signs were monitored throughout the procedure.    An upper GI endoscope was placed into the mouth and proceeded through the esophagus, stomach and second portion of the duodenum without difficulty. The scope was then retroflexed and the fundus was visualized. The procedure was not difficult and there were no immediate complications.  There was no blood loss.    Impression:  1.  Irregular Z-line at the GE junction with signs of esophagitis  2.  1 quart of F1 varices in the distal esophagus  3.  Erosions and erythema throughout the stomach suggestive of erosive gastritis, cold forcep biopsies of antrum body were taken for H. pylori  4.  Normal duodenal mucosa visualized to D2    Recommendations:  Follow-up biopsy results and treat H. pylori if positive  Repeat EGD in 2 years for esophageal varices surveillance      Osman Clay MD     Date: 5/13/2025    Time: 12:49 EDT

## 2025-05-13 NOTE — LETTER
May 16, 2025  [facility]  [address]  [address]  [place] [address]    Lita MARIA M Aimee  [id number]  [place] [address]          Dear MsShima Aimee    Thank you for choosing [facility] for your care.  This letter is in regards to your recent TEST RESULTS. We have been unable to contact you at the phone number(s) in your records.  Please contact the office at [phone].        Thank you,    Alisia Lubin, CMA

## 2025-05-13 NOTE — DISCHARGE INSTRUCTIONS
A responsible adult should stay with you and you should rest quietly for the rest of the day.    Do not drink alcohol, drive, operate any heavy machinery or power tools or make any legal/important decisions for the next 24 hours.     If you begin to experience severe pain, increased shortness of breath, racing heartbeat or a fever above 101 F, seek immediate medical attention.     Follow up with MD as instructed. Call office for results in 3 to 5 days if needed.     Office number: [phone]    Impression:  1.  Irregular Z-line at the GE junction with signs of esophagitis  2.  1 quart of F1 varices in the distal esophagus  3.  Erosions and erythema throughout the stomach suggestive of erosive gastritis, cold forcep biopsies of antrum body were taken for H. pylori  4.  Normal duodenal mucosa visualized to D2     Recommendations:  Follow-up biopsy results and treat H. pylori if positive  Repeat EGD in 2 years for esophageal varices surveillance

## 2025-05-13 NOTE — ANESTHESIA POSTPROCEDURE EVALUATION
Patient: Lita uY    Procedure Summary       Date: 05/13/25 Room / Location: [facility] ENDOSCOPY 1 / [facility] ENDOSCOPY    Anesthesia Start: 1236 Anesthesia Stop: 1248    Procedure: ESOPHAGOGASTRODUODENOSCOPY with biopsy x1 Diagnosis:       Chronic diarrhea      GERD (gastroesophageal reflux disease)      Cirrhosis      (Chronic diarrhea [K52.9])      (GERD (gastroesophageal reflux disease) [K21.9])      (Cirrhosis [K74.60])    Surgeons: Osman Clay MD Provider: Chuy Rahman MD    Anesthesia Type: general ASA Status: 3            Anesthesia Type: general    Vitals  Vitals Value Taken Time   /52 05/13/25 13:10   Temp     Pulse 83 05/13/25 13:11   Resp     SpO2 98 % 05/13/25 13:11   Vitals shown include unfiled device data.        Post Anesthesia Care and Evaluation    Patient location during evaluation: PACU  Patient participation: complete - patient participated  Level of consciousness: awake  Pain scale: See nurse's notes for pain score.  Pain management: adequate    Airway patency: patent  Anesthetic complications: No anesthetic complications  PONV Status: none  Cardiovascular status: acceptable  Respiratory status: acceptable and spontaneous ventilation  Hydration status: acceptable    Comments: Patient seen and examined postoperatively; vital signs stable; SpO2 greater than or equal to 90%; cardiopulmonary status stable; nausea/vomiting adequately controlled; pain adequately controlled; no apparent anesthesia complications; patient discharged from anesthesia care when discharge criteria were met

## 2025-05-14 NOTE — PROGRESS NOTES
Left message to return call to doctor's office at [facility]. Either to get test results or message from provider.

## 2025-05-14 NOTE — PROGRESS NOTES
Left message to return call to doctor's office at [facility]. Either to get test results or message from provider.

## 2025-05-15 LAB
LAB AP CASE REPORT: NORMAL
PATH REPORT.FINAL DX SPEC: NORMAL
PATH REPORT.GROSS SPEC: NORMAL

## 2025-05-15 NOTE — PROGRESS NOTES
Left message to return call to doctor's office at [facility]. Either to get test results or message from provider.

## 2025-05-18 LAB
AGE GDLN ACOG TESTING: NORMAL
CYTOLOGIST CVX/VAG CYTO: ABNORMAL
CYTOLOGY CVX/VAG DOC CYTO: ABNORMAL
CYTOLOGY CVX/VAG DOC THIN PREP: ABNORMAL
DX ICD CODE: ABNORMAL
DX ICD CODE: ABNORMAL
HPV GENOTYPE REFLEX: ABNORMAL
HPV I/H RISK 4 DNA CVX QL PROBE+SIG AMP: NEGATIVE
OTHER STN SPEC: ABNORMAL
PATHOLOGIST CVX/VAG CYTO: ABNORMAL
RECOM F/U CVX/VAG CYTO: ABNORMAL
SERVICE CMNT-IMP: ABNORMAL
STAT OF ADQ CVX/VAG CYTO-IMP: ABNORMAL

## 2025-05-19 NOTE — PROGRESS NOTES
Left message to return call to doctor's office at [facility]. Either to get test results or message from provider.

## 2025-07-02 NOTE — PLAN OF CARE
Goal Outcome Evaluation:  Plan of Care Reviewed With: patient        Progress: no change   Pt rested through the night. Pt did c/o headache rating 9 and nausea see mar. Pt BM had fewer BM this shift. Pt VSS will cont to monitor.    Opt out

## 2025-07-07 RX ORDER — OMEPRAZOLE 40 MG/1
40 CAPSULE, DELAYED RELEASE ORAL DAILY
Qty: 90 CAPSULE | Refills: 1 | Status: SHIPPED | OUTPATIENT
Start: 2025-07-07

## 2025-07-16 DIAGNOSIS — I10 HYPERTENSION, UNSPECIFIED TYPE: ICD-10-CM

## 2025-07-16 RX ORDER — LISINOPRIL 10 MG/1
10 TABLET ORAL DAILY
Qty: 90 TABLET | Refills: 1 | Status: SHIPPED | OUTPATIENT
Start: 2025-07-16

## 2025-08-04 RX ORDER — FERROUS SULFATE 325(65) MG
1 TABLET ORAL
Qty: 90 TABLET | Refills: 1 | Status: SHIPPED | OUTPATIENT
Start: 2025-08-04

## 2025-08-11 RX ORDER — LANOLIN ALCOHOL/MO/W.PET/CERES
1000 CREAM (GRAM) TOPICAL DAILY
Qty: 90 TABLET | Refills: 1 | Status: SHIPPED | OUTPATIENT
Start: 2025-08-11

## (undated) DEVICE — SUT VIC 2/0 CT1 CR8 18IN J839D

## (undated) DEVICE — SUT ETHIB 2 CV V37 MS/4 30IN MX69G

## (undated) DEVICE — SOL ISO/ALC RUB 91PCT 16OZ

## (undated) DEVICE — PATIENT RETURN ELECTRODE, SINGLE-USE, CONTACT QUALITY MONITORING, ADULT, WITH 9FT CORD, FOR PATIENTS WEIGING OVER 33LBS. (15KG): Brand: MEGADYNE

## (undated) DEVICE — APPL CHLORAPREP HI/LITE 26ML ORNG

## (undated) DEVICE — NEEDLE, QUINCKE, 20GX3.5": Brand: MEDLINE

## (undated) DEVICE — PK TOTL HIP 50

## (undated) DEVICE — KT SURG TURNOVER 050

## (undated) DEVICE — 6617 IOBAN II PATIENT ISOLATION DRAPE 5/BX,4BX/CS: Brand: STERI-DRAPE™ IOBAN™ 2

## (undated) DEVICE — 3M™ STERI-DRAPE™  ISOLATION DRAPE WITH INCISE FILM AND POUCH 1017: Brand: STERI-DRAPE™

## (undated) DEVICE — GAUZE,SPONGE,FLUFF,6"X6.75",STRL,10/TRAY: Brand: MEDLINE

## (undated) DEVICE — DRSNG BURN ACTICOAT FLEX 7 1X24IN

## (undated) DEVICE — GLV SURG SIGNATURE ESSENTIAL PF LTX SZ8.5

## (undated) DEVICE — PENCL SMOKE/EVAC MEGADYNE TELESCP 10FT

## (undated) DEVICE — TBG PENCL TELESCP MEGADYNE SMOKE EVAC 15FT

## (undated) DEVICE — ZIPPERED TOGA, 2X LARGE: Brand: FLYTE

## (undated) DEVICE — IRRIGATOR BULB ASEPTO 60CC STRL

## (undated) DEVICE — UNDERGLV SURG BIOGEL INDICAT PI SZ8.5 BLU

## (undated) DEVICE — 450 ML BOTTLE OF 0.05% CHLORHEXIDINE GLUCONATE IN 99.95% STERILE WATER FOR IRRIGATION, USP AND APPLICATOR.: Brand: IRRISEPT ANTIMICROBIAL WOUND LAVAGE

## (undated) DEVICE — REFLECTION FLEXIBLE DRILL 35MM: Brand: REFLECTION

## (undated) DEVICE — SOL ISO/ALC 70PCT 4OZ

## (undated) DEVICE — TRAP FLD MINIVAC MEGADYNE 100ML

## (undated) DEVICE — FRCP BIOP RADLJAW4 HOT 2.2X240 BX40

## (undated) DEVICE — PAPR PRNT PK SONY W RIBN UPC55

## (undated) DEVICE — PK HIP TOTL 40

## (undated) DEVICE — BITEBLOCK ENDO W/STRAP 60F A/ LF DISP

## (undated) DEVICE — PACK,UNIVERSAL,NO GOWNS: Brand: MEDLINE

## (undated) DEVICE — REFLECTION FLEXIBLE DRILL 25MM: Brand: REFLECTION

## (undated) DEVICE — STPLR SKIN VISISTAT WD 35CT

## (undated) DEVICE — SNAR POLYP HOTSNARE/BRAIDED OVL/MINI 7F 2.8X10MM 230CM 1P/U

## (undated) DEVICE — GLV SURG SENSICARE PI ORTHO SZ8.5 LF STRL

## (undated) DEVICE — PK ENDO GI 50

## (undated) DEVICE — PICO 7 10CM X 30CM: Brand: PICO™ 7

## (undated) DEVICE — SINGLE-USE BIOPSY FORCEPS: Brand: RADIAL JAW 4

## (undated) DEVICE — STAPLER, SKIN, 35W, A: Brand: MEDLINE INDUSTRIES, INC.

## (undated) DEVICE — C-ARM: Brand: UNBRANDED

## (undated) DEVICE — KT PT POSITION SUPINE HANA/PROFX TABL

## (undated) DEVICE — S/M FLEXIBLE ALEXIS ORTHOPAEDIC PROTECTOR: Brand: ALEXIS® ORTHOPAEDIC PROTECTOR

## (undated) DEVICE — RECIPROCATING BLADE HEAVY DUTY LONG, OFFSET  (77.6 X 0.77 X 11.2MM)

## (undated) DEVICE — UNDRGLV SURG BIOGEL PUNCTUREINDICATION SZ7 PF STRL

## (undated) DEVICE — GLV SURG PREMIERPRO ORTHO LTX PF SZ8.5 BRN

## (undated) DEVICE — PICO 7 10CM X 20CM: Brand: PICO™ 7